# Patient Record
Sex: MALE | Race: BLACK OR AFRICAN AMERICAN | NOT HISPANIC OR LATINO | Employment: UNEMPLOYED | URBAN - METROPOLITAN AREA
[De-identification: names, ages, dates, MRNs, and addresses within clinical notes are randomized per-mention and may not be internally consistent; named-entity substitution may affect disease eponyms.]

---

## 2021-03-05 ENCOUNTER — HISTORICAL (OUTPATIENT)
Dept: ADMINISTRATIVE | Facility: HOSPITAL | Age: 21
End: 2021-03-05

## 2021-03-05 LAB
APPEARANCE, UA: CLEAR
BACTERIA #/AREA URNS AUTO: ABNORMAL /HPF
BILIRUB UR QL STRIP: NEGATIVE
COLOR UR: NORMAL
GLUCOSE (UA): NEGATIVE
HAV IGM SERPL QL IA: NONREACTIVE
HBV CORE IGM SERPL QL IA: NONREACTIVE
HBV SURFACE AG SERPL QL IA: NONREACTIVE
HCV AB SERPL QL IA: NONREACTIVE
HGB UR QL STRIP: NEGATIVE
HIV 1+2 AB+HIV1 P24 AG SERPL QL IA: NONREACTIVE
HYALINE CASTS #/AREA URNS LPF: ABNORMAL /LPF
KETONES UR QL STRIP: NEGATIVE
LEUKOCYTE ESTERASE UR QL STRIP: NEGATIVE
NITRITE UR QL STRIP: NEGATIVE
PH UR STRIP: 6.5 [PH] (ref 4.5–8)
PROT UR QL STRIP: NEGATIVE
RBC #/AREA URNS AUTO: ABNORMAL /HPF
SP GR UR STRIP: 1.02 (ref 1–1.03)
SQUAMOUS #/AREA URNS LPF: ABNORMAL /LPF
T PALLIDUM AB SER QL: NONREACTIVE
UROBILINOGEN UR STRIP-ACNC: NORMAL
WBC #/AREA URNS AUTO: ABNORMAL /HPF

## 2021-06-16 ENCOUNTER — HISTORICAL (OUTPATIENT)
Dept: ADMINISTRATIVE | Facility: HOSPITAL | Age: 21
End: 2021-06-16

## 2021-06-16 LAB
HAV IGM SERPL QL IA: NONREACTIVE
HBV CORE IGM SERPL QL IA: NONREACTIVE
HBV SURFACE AG SERPL QL IA: NONREACTIVE
HCV AB SERPL QL IA: NONREACTIVE
HIV 1+2 AB+HIV1 P24 AG SERPL QL IA: NONREACTIVE
T PALLIDUM AB SER QL: NONREACTIVE

## 2021-08-20 ENCOUNTER — HISTORICAL (OUTPATIENT)
Dept: ADMINISTRATIVE | Facility: HOSPITAL | Age: 21
End: 2021-08-20

## 2022-04-11 ENCOUNTER — HISTORICAL (OUTPATIENT)
Dept: ADMINISTRATIVE | Facility: HOSPITAL | Age: 22
End: 2022-04-11
Payer: MEDICAID

## 2022-04-25 VITALS
HEIGHT: 72 IN | WEIGHT: 184.06 LBS | OXYGEN SATURATION: 100 % | DIASTOLIC BLOOD PRESSURE: 96 MMHG | BODY MASS INDEX: 24.93 KG/M2 | SYSTOLIC BLOOD PRESSURE: 166 MMHG

## 2022-06-02 ENCOUNTER — HOSPITAL ENCOUNTER (EMERGENCY)
Facility: HOSPITAL | Age: 22
Discharge: HOME | End: 2022-06-02
Attending: FAMILY MEDICINE
Payer: MEDICAID

## 2022-06-02 VITALS
BODY MASS INDEX: 23.75 KG/M2 | DIASTOLIC BLOOD PRESSURE: 80 MMHG | TEMPERATURE: 98 F | WEIGHT: 175.38 LBS | SYSTOLIC BLOOD PRESSURE: 151 MMHG | HEART RATE: 100 BPM | HEIGHT: 72 IN | RESPIRATION RATE: 18 BRPM | OXYGEN SATURATION: 97 %

## 2022-06-02 DIAGNOSIS — L03.312 CELLULITIS OF BACK EXCEPT BUTTOCK: Primary | ICD-10-CM

## 2022-06-02 PROCEDURE — 99284 EMERGENCY DEPT VISIT MOD MDM: CPT

## 2022-06-02 RX ORDER — SULFAMETHOXAZOLE AND TRIMETHOPRIM 800; 160 MG/1; MG/1
1 TABLET ORAL 2 TIMES DAILY
Qty: 20 TABLET | Refills: 0 | Status: SHIPPED | OUTPATIENT
Start: 2022-06-02 | End: 2022-06-12

## 2022-06-02 RX ORDER — NAPROXEN 500 MG/1
500 TABLET ORAL 2 TIMES DAILY
Qty: 14 TABLET | Refills: 0 | Status: SHIPPED | OUTPATIENT
Start: 2022-06-02 | End: 2022-06-09

## 2022-06-02 NOTE — Clinical Note
"Magdaleno Hirsch (Robert) was seen and treated in our emergency department on 6/2/2022.  He may return to work on 06/04/2022.       If you have any questions or concerns, please don't hesitate to call.      Modesta Streeter RN    "

## 2022-06-03 NOTE — ED PROVIDER NOTES
Encounter Date: 6/2/2022       History     Chief Complaint   Patient presents with    Mass     Presents with mass on lower right back for the last week. Denies any injury or trauma.       21-year-old male presents with nontraumatic pain and swelling to the right flank for the past 1 week.  Patient has had a similar lesion to his right arm in the past and was treated for cellulitis successfully with antibiotics.  No other complaints.    The history is provided by the patient. No  was used.     Review of patient's allergies indicates:  No Known Allergies  History reviewed. No pertinent past medical history.  History reviewed. No pertinent surgical history.  History reviewed. No pertinent family history.  Social History     Tobacco Use    Smoking status: Never Smoker    Smokeless tobacco: Never Used   Substance Use Topics    Alcohol use: Never    Drug use: Never     Review of Systems   Constitutional: Negative.    HENT: Negative.    Eyes: Negative.    Respiratory: Negative.    Cardiovascular: Negative.    Gastrointestinal: Negative.    Endocrine: Negative.    Genitourinary: Negative.    Musculoskeletal: Negative.    Skin: Negative.    Allergic/Immunologic: Negative.    Neurological: Negative.    Hematological: Negative.    Psychiatric/Behavioral: Negative.        Physical Exam     Initial Vitals [06/02/22 2104]   BP Pulse Resp Temp SpO2   (!) 151/80 100 18 98.2 °F (36.8 °C) 97 %      MAP       --         Physical Exam    Nursing note and vitals reviewed.  Constitutional: He appears well-developed and well-nourished.   HENT:   Head: Normocephalic and atraumatic.   Eyes: Pupils are equal, round, and reactive to light.   Neck: Neck supple.   Normal range of motion.  Cardiovascular: Normal rate and regular rhythm.   Pulmonary/Chest: Breath sounds normal.   Abdominal: Abdomen is soft. Bowel sounds are normal.   Right flank -there is a 2 cm x 1 cm raised erythematous indurated area.  No fluctuance or  drainage.  Nothing to drain.   Musculoskeletal:         General: Normal range of motion.      Cervical back: Normal range of motion and neck supple.     Neurological: He is alert and oriented to person, place, and time. He has normal strength. GCS score is 15. GCS eye subscore is 4. GCS verbal subscore is 5. GCS motor subscore is 6.   Skin: Skin is warm. Capillary refill takes less than 2 seconds.   Psychiatric: He has a normal mood and affect.         ED Course   Procedures  Labs Reviewed - No data to display       Imaging Results    None          Medications - No data to display                       Clinical Impression:   Final diagnoses:  [L03.312] Cellulitis of back except buttock (Primary)          ED Disposition Condition    Discharge Stable        ED Prescriptions     Medication Sig Dispense Start Date End Date Auth. Provider    sulfamethoxazole-trimethoprim 800-160mg (BACTRIM DS) 800-160 mg Tab Take 1 tablet by mouth 2 (two) times daily. for 10 days 20 tablet 6/2/2022 6/12/2022 Oleg Arnold MD    naproxen (NAPROSYN) 500 MG tablet Take 1 tablet (500 mg total) by mouth 2 (two) times daily. for 7 days 14 tablet 6/2/2022 6/9/2022 Oleg Arnold MD        Follow-up Information     Follow up With Specialties Details Why Contact Info    Your PCP  Today             Oleg Arnold MD  06/03/22 0838

## 2022-06-03 NOTE — ED NOTES
Pt c/o of mass to right posterior lower back. Denies drainage, hurts to lay down, rates pain as 10/10. Denies fever.

## 2022-06-28 ENCOUNTER — OFFICE VISIT (OUTPATIENT)
Dept: URGENT CARE | Facility: CLINIC | Age: 22
End: 2022-06-28
Payer: MEDICAID

## 2022-06-28 VITALS
WEIGHT: 181.81 LBS | BODY MASS INDEX: 24.63 KG/M2 | RESPIRATION RATE: 18 BRPM | DIASTOLIC BLOOD PRESSURE: 99 MMHG | HEIGHT: 72 IN | TEMPERATURE: 98 F | HEART RATE: 53 BPM | OXYGEN SATURATION: 100 % | SYSTOLIC BLOOD PRESSURE: 158 MMHG

## 2022-06-28 DIAGNOSIS — Z20.2 STD EXPOSURE: Primary | ICD-10-CM

## 2022-06-28 DIAGNOSIS — I10 HYPERTENSION, UNSPECIFIED TYPE: ICD-10-CM

## 2022-06-28 LAB
C TRACH DNA SPEC QL NAA+PROBE: NOT DETECTED
HAV IGM SERPL QL IA: NONREACTIVE
HBV CORE IGM SERPL QL IA: NONREACTIVE
HBV SURFACE AG SERPL QL IA: NONREACTIVE
HCV AB SERPL QL IA: NONREACTIVE
HIV 1+2 AB+HIV1 P24 AG SERPL QL IA: NONREACTIVE
N GONORRHOEA DNA SPEC QL NAA+PROBE: NOT DETECTED
T PALLIDUM AB SER QL: NONREACTIVE

## 2022-06-28 PROCEDURE — 99213 OFFICE O/P EST LOW 20 MIN: CPT | Mod: S$PBB,,, | Performed by: FAMILY MEDICINE

## 2022-06-28 PROCEDURE — 87591 N.GONORRHOEAE DNA AMP PROB: CPT

## 2022-06-28 PROCEDURE — 87491 CHLMYD TRACH DNA AMP PROBE: CPT

## 2022-06-28 PROCEDURE — 86780 TREPONEMA PALLIDUM: CPT

## 2022-06-28 PROCEDURE — 99213 PR OFFICE/OUTPT VISIT, EST, LEVL III, 20-29 MIN: ICD-10-PCS | Mod: S$PBB,,, | Performed by: FAMILY MEDICINE

## 2022-06-28 PROCEDURE — 99214 OFFICE O/P EST MOD 30 MIN: CPT | Mod: PBBFAC | Performed by: FAMILY MEDICINE

## 2022-06-28 PROCEDURE — 87389 HIV-1 AG W/HIV-1&-2 AB AG IA: CPT

## 2022-06-28 PROCEDURE — 36415 COLL VENOUS BLD VENIPUNCTURE: CPT

## 2022-06-28 PROCEDURE — 80074 ACUTE HEPATITIS PANEL: CPT

## 2022-06-28 RX ORDER — DOXYCYCLINE HYCLATE 100 MG
100 TABLET ORAL 2 TIMES DAILY
Qty: 14 TABLET | Refills: 0 | Status: SHIPPED | OUTPATIENT
Start: 2022-06-28 | End: 2022-07-05

## 2022-06-28 NOTE — PROGRESS NOTES
"Subjective:       Patient ID: Magdaleno Hirsch is a 22 y.o. male.    Vitals:  height is 5' 11.81" (1.824 m) and weight is 82.5 kg (181 lb 12.8 oz). His oral temperature is 98.4 °F (36.9 °C). His blood pressure is 158/99 (abnormal) and his pulse is 53 (abnormal). His respiration is 18 and oxygen saturation is 100%.     Chief Complaint: Exposure to STD (Pt reports having unprotected intercourse with female partner 3 weeks ago, female partner tested positive for chlamydia, pt denies any symptoms.)    Patient states having unprotected sex with partner and partner just tested positive for chlamydia and seeking treatment. Denies any present symptoms. Also would like to be tested for all STIs.   Patient also had a high BP reading at visit. States not being treated for HTN but that he does have a family history of HTN. Denies having a PCP.    Exposure to STD  This is a new problem. The current episode started in the past 7 days. The problem has been unchanged. He is sexually active. He inconsistently uses condoms.     ROS    Objective:      Physical Exam   Constitutional: He is oriented to person, place, and time. He appears well-developed. No distress.   HENT:   Head: Normocephalic and atraumatic.   Ears:   Right Ear: External ear normal.   Left Ear: External ear normal.   Nose: Nose normal. No nasal deformity. No epistaxis.   Mouth/Throat: Oropharynx is clear and moist and mucous membranes are normal.   Eyes: Conjunctivae and lids are normal.   Neck: Trachea normal and phonation normal. Neck supple.   Cardiovascular: Normal rate, regular rhythm and normal heart sounds.   Pulmonary/Chest: Effort normal and breath sounds normal.   Abdominal: Normal appearance and bowel sounds are normal. He exhibits no distension. Soft. flat abdomen There is no abdominal tenderness.   Genitourinary:         Comments: Denies any discharge, burning with urination or penile symptoms     Musculoskeletal: Normal range of motion.         General: " Normal range of motion.   Neurological: He is alert and oriented to person, place, and time. He has normal reflexes.   Skin: Skin is warm, dry, intact and not diaphoretic.   Psychiatric: His speech is normal and behavior is normal. Judgment and thought content normal.   Nursing note and vitals reviewed.        Assessment:       1. STD exposure    2. Hypertension, unspecified type          Plan:         STD exposure  -     Chlamydia/GC, PCR  -     SYPHILIS ANTIBODY (WITH REFLEX RPR)  -     HIV 1/2 Ag/Ab (4th Gen)  -     Hepatitis Panel, Acute    Hypertension, unspecified type    Other orders  -     doxycycline (VIBRA-TABS) 100 MG tablet; Take 1 tablet (100 mg total) by mouth 2 (two) times daily. for 7 days  Dispense: 14 tablet; Refill: 0        Doxycycline prescribed for chlamydia exposure. Spoke to patient about the importance of safe sex practices and abstaining from sex until both partners have completed antibiotics. Pt verbalized understanding. STD panel pending, will treat if any other STDs positive.     Incidental finding of HTN. Pt states family history but no personal hx of HTN. Pt denies having a PCP, was given information and encouraged to obtain a PCP.    Instructed pt to return to clinic if any symptoms present.

## 2022-06-30 LAB — PATH REV: NORMAL

## 2022-07-18 VITALS
WEIGHT: 186 LBS | DIASTOLIC BLOOD PRESSURE: 98 MMHG | BODY MASS INDEX: 24.65 KG/M2 | OXYGEN SATURATION: 97 % | RESPIRATION RATE: 18 BRPM | TEMPERATURE: 99 F | SYSTOLIC BLOOD PRESSURE: 149 MMHG | HEIGHT: 73 IN | HEART RATE: 86 BPM

## 2022-07-18 PROCEDURE — 99283 EMERGENCY DEPT VISIT LOW MDM: CPT | Mod: 25

## 2022-07-19 ENCOUNTER — HOSPITAL ENCOUNTER (EMERGENCY)
Facility: HOSPITAL | Age: 22
Discharge: HOME | End: 2022-07-19
Attending: FAMILY MEDICINE
Payer: MEDICAID

## 2022-07-19 DIAGNOSIS — M25.572 ACUTE LEFT ANKLE PAIN: Primary | ICD-10-CM

## 2022-07-19 RX ORDER — MELOXICAM 15 MG/1
15 TABLET ORAL DAILY
Qty: 5 TABLET | Refills: 0 | Status: SHIPPED | OUTPATIENT
Start: 2022-07-19 | End: 2022-07-24

## 2022-07-19 NOTE — Clinical Note
"Magdaleno Garsia" Torrey was seen and treated in our emergency department on 7/18/2022.  He may return to work on 07/20/2022.       If you have any questions or concerns, please don't hesitate to call.      Oleg Arnold MD"

## 2022-07-19 NOTE — ED TRIAGE NOTES
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 Pt c/o swelling and pain to left ankle.

## 2022-07-19 NOTE — ED PROVIDER NOTES
Encounter Date: 7/18/2022       History     Chief Complaint   Patient presents with    Ankle Pain     Left ankle     22-year-old male presents with left lateral ankle pain after a customer ran a cart into his ankle while working at [employer] earlier today.  Patient reports pain worse with movement.  No other complaints.        Review of patient's allergies indicates:  No Known Allergies  History reviewed. No pertinent past medical history.  Past Surgical History:   Procedure Laterality Date    KNEE SURGERY Left      History reviewed. No pertinent family history.  Social History     Tobacco Use    Smoking status: Never Smoker    Smokeless tobacco: Never Used   Substance Use Topics    Alcohol use: Never    Drug use: Never     Review of Systems   HENT: Negative.    Eyes: Negative.    Respiratory: Negative.    Cardiovascular: Negative.    Gastrointestinal: Negative.    Endocrine: Negative.    Genitourinary: Negative.    Musculoskeletal: Positive for arthralgias.   Skin: Negative.    Allergic/Immunologic: Negative.    Neurological: Negative.    Hematological: Negative.    Psychiatric/Behavioral: Negative.        Physical Exam     Initial Vitals [07/18/22 2254]   BP Pulse Resp Temp SpO2   (!) 149/98 86 18 98.6 °F (37 °C) 97 %      MAP       --         Physical Exam    Nursing note and vitals reviewed.  Constitutional: He appears well-developed and well-nourished.   HENT:   Head: Normocephalic and atraumatic.   Eyes: EOM are normal. Pupils are equal, round, and reactive to light.   Neck: Neck supple.   Normal range of motion.  Cardiovascular: Normal rate and regular rhythm.   Pulmonary/Chest: Breath sounds normal.   Abdominal: Abdomen is soft.   Musculoskeletal:         General: Normal range of motion.      Cervical back: Normal range of motion and neck supple.      Comments: Left ankle-minimal swelling over the lateral malleolus.  Mildly tender to palpation.  No point bony tenderness.  Nontender to palpation over the  forefoot and medial malleolus.  Full range of motion, strength 5/5.  Sensation motion circulation intact throughout.     Neurological: He is oriented to person, place, and time. He has normal strength. GCS score is 15. GCS eye subscore is 4. GCS verbal subscore is 5. GCS motor subscore is 6.   Skin: Skin is warm. Capillary refill takes less than 2 seconds.   Psychiatric: He has a normal mood and affect.         ED Course   Procedures  Labs Reviewed - No data to display       Imaging Results          X-Ray Ankle Complete Left (Preliminary result)  Result time 07/19/22 01:21:28    ED Interpretation by Oleg Arnold MD (07/19/22 01:21:28, [facility] - Emergency Dept, Emergency Medicine)    No acute bony abnormality                               Medications - No data to display                       Clinical Impression:   Final diagnoses:  [M25.572] Acute left ankle pain (Primary)          ED Disposition Condition    Discharge Stable        ED Prescriptions     Medication Sig Dispense Start Date End Date Auth. Provider    meloxicam (MOBIC) 15 MG tablet Take 1 tablet (15 mg total) by mouth once daily. for 5 days 5 tablet 7/19/2022 7/24/2022 Oleg Arnold MD        Follow-up Information     Follow up With Specialties Details Why Contact Info    Your PCP  Today             Oleg Arnold MD  07/19/22 9238

## 2022-08-18 ENCOUNTER — HOSPITAL ENCOUNTER (INPATIENT)
Facility: HOSPITAL | Age: 22
LOS: 4 days | Discharge: HOME | DRG: 840 | End: 2022-08-22
Attending: EMERGENCY MEDICINE | Admitting: STUDENT IN AN ORGANIZED HEALTH CARE EDUCATION/TRAINING PROGRAM
Payer: MEDICAID

## 2022-08-18 ENCOUNTER — TELEPHONE (OUTPATIENT)
Dept: HEMATOLOGY/ONCOLOGY | Facility: CLINIC | Age: 22
End: 2022-08-18
Payer: MEDICAID

## 2022-08-18 DIAGNOSIS — D72.9 NEUTROPHILIA: Primary | ICD-10-CM

## 2022-08-18 DIAGNOSIS — H35.63 RETINAL HEMORRHAGE, BILATERAL: ICD-10-CM

## 2022-08-18 DIAGNOSIS — D72.829 LEUKOCYTOSIS, UNSPECIFIED TYPE: ICD-10-CM

## 2022-08-18 DIAGNOSIS — R03.0 ELEVATED BLOOD PRESSURE READING: ICD-10-CM

## 2022-08-18 DIAGNOSIS — U07.1 COVID-19: ICD-10-CM

## 2022-08-18 LAB
ABS NEUT CALC (OHS): 358.35 X10(3)/MCL (ref 2.1–9.2)
ALBUMIN SERPL-MCNC: 4 GM/DL (ref 3.5–5)
ALBUMIN/GLOB SERPL: 1.3 RATIO (ref 1.1–2)
ALP SERPL-CCNC: 81 UNIT/L (ref 40–150)
ALT SERPL-CCNC: 18 UNIT/L (ref 0–55)
ANISOCYTOSIS BLD QL SMEAR: ABNORMAL
APPEARANCE UR: CLEAR
APTT PPP: 36.4 SECONDS (ref 23.4–33.9)
AST SERPL-CCNC: 36 UNIT/L (ref 5–34)
BACTERIA #/AREA URNS AUTO: ABNORMAL /HPF
BILIRUB UR QL STRIP.AUTO: NEGATIVE MG/DL
BILIRUBIN DIRECT+TOT PNL SERPL-MCNC: 1.6 MG/DL
BUN SERPL-MCNC: 12.2 MG/DL (ref 8.9–20.6)
CALCIUM SERPL-MCNC: 9.4 MG/DL (ref 8.4–10.2)
CHLORIDE SERPL-SCNC: 108 MMOL/L (ref 98–107)
CO2 SERPL-SCNC: 28 MMOL/L (ref 22–29)
COLOR UR AUTO: YELLOW
CREAT SERPL-MCNC: 1.06 MG/DL (ref 0.73–1.18)
EOSINOPHIL NFR BLD MANUAL: 12.36 X10(3)/MCL (ref 0–0.9)
EOSINOPHIL NFR BLD MANUAL: 3 % (ref 0–8)
ERYTHROCYTE [DISTWIDTH] IN BLOOD BY AUTOMATED COUNT: 21.3 % (ref 11.5–17)
FIBRINOGEN PPP-MCNC: 292 MG/DL (ref 210–463)
FLUAV AG UPPER RESP QL IA.RAPID: NOT DETECTED
FLUBV AG UPPER RESP QL IA.RAPID: NOT DETECTED
GFR SERPLBLD CREATININE-BSD FMLA CKD-EPI: >60 MLS/MIN/1.73/M2
GLOBULIN SER-MCNC: 3.2 GM/DL (ref 2.4–3.5)
GLUCOSE SERPL-MCNC: 83 MG/DL (ref 74–100)
GLUCOSE UR QL STRIP.AUTO: NEGATIVE MG/DL
HCT VFR BLD AUTO: 23.8 % (ref 42–52)
HGB BLD-MCNC: 8.3 GM/DL (ref 14–18)
INR BLD: 1.38 (ref 2–3)
KETONES UR QL STRIP.AUTO: NEGATIVE MG/DL
LDH SERPL-CCNC: 1329 U/L (ref 125–220)
LEUKOCYTE ESTERASE UR QL STRIP.AUTO: NEGATIVE UNIT/L
LYMPHOCYTES NFR BLD MANUAL: 16.48 X10(3)/MCL
LYMPHOCYTES NFR BLD MANUAL: 4 % (ref 13–40)
MAGNESIUM SERPL-MCNC: 1.9 MG/DL (ref 1.6–2.6)
MCH RBC QN AUTO: 26.3 PG (ref 27–31)
MCHC RBC AUTO-ENTMCNC: 34.9 MG/DL (ref 33–36)
MCV RBC AUTO: 75.3 FL (ref 80–94)
METAMYELOCYTES NFR BLD MANUAL: 16 %
MICROCYTES BLD QL SMEAR: ABNORMAL
MUCOUS THREADS URNS QL MICRO: ABNORMAL /LPF
MYELOCYTES NFR BLD MANUAL: 9 %
NEUTROPHILS NFR BLD MANUAL: 42 % (ref 47–80)
NEUTS BAND NFR BLD MANUAL: 20 % (ref 0–11)
NITRITE UR QL STRIP.AUTO: NEGATIVE
NRBC BLD MANUAL-RTO: 1 %
OTHER CELLS MANUAL: 6 %
PH UR STRIP.AUTO: 6 [PH]
PLATELET # BLD AUTO: 373 X10(3)/MCL (ref 130–400)
PLATELET # BLD EST: ADEQUATE 10*3/UL
PMV BLD AUTO: 10.7 FL (ref 7.4–10.4)
POTASSIUM SERPL-SCNC: 3.6 MMOL/L (ref 3.5–5.1)
PROT SERPL-MCNC: 7.2 GM/DL (ref 6.4–8.3)
PROT UR QL STRIP.AUTO: ABNORMAL MG/DL
PROTHROMBIN TIME: 16.7 SECONDS (ref 11.7–14.5)
RBC # BLD AUTO: 3.16 X10(6)/MCL (ref 4.7–6.1)
RBC #/AREA URNS AUTO: ABNORMAL /HPF
RBC UR QL AUTO: NEGATIVE UNIT/L
SARS-COV-2 RNA RESP QL NAA+PROBE: DETECTED
SODIUM SERPL-SCNC: 145 MMOL/L (ref 136–145)
SP GR UR STRIP.AUTO: 1.02
SQUAMOUS #/AREA URNS AUTO: ABNORMAL /HPF
URATE SERPL-MCNC: 9.2 MG/DL (ref 3.5–7.2)
UROBILINOGEN UR STRIP-ACNC: 1 MG/DL
WBC # SPEC AUTO: 411.9 X10(3)/MCL (ref 4.5–11.5)
WBC #/AREA URNS AUTO: ABNORMAL /HPF

## 2022-08-18 PROCEDURE — 85730 THROMBOPLASTIN TIME PARTIAL: CPT | Performed by: EMERGENCY MEDICINE

## 2022-08-18 PROCEDURE — 87636 SARSCOV2 & INF A&B AMP PRB: CPT | Performed by: EMERGENCY MEDICINE

## 2022-08-18 PROCEDURE — 85384 FIBRINOGEN ACTIVITY: CPT | Performed by: EMERGENCY MEDICINE

## 2022-08-18 PROCEDURE — 84550 ASSAY OF BLOOD/URIC ACID: CPT | Performed by: EMERGENCY MEDICINE

## 2022-08-18 PROCEDURE — 99285 EMERGENCY DEPT VISIT HI MDM: CPT | Mod: 25

## 2022-08-18 PROCEDURE — 11000001 HC ACUTE MED/SURG PRIVATE ROOM

## 2022-08-18 PROCEDURE — 81001 URINALYSIS AUTO W/SCOPE: CPT | Performed by: EMERGENCY MEDICINE

## 2022-08-18 PROCEDURE — 36415 COLL VENOUS BLD VENIPUNCTURE: CPT | Performed by: EMERGENCY MEDICINE

## 2022-08-18 PROCEDURE — 25000003 PHARM REV CODE 250: Performed by: EMERGENCY MEDICINE

## 2022-08-18 PROCEDURE — 83615 LACTATE (LD) (LDH) ENZYME: CPT | Performed by: EMERGENCY MEDICINE

## 2022-08-18 PROCEDURE — 85610 PROTHROMBIN TIME: CPT | Performed by: EMERGENCY MEDICINE

## 2022-08-18 PROCEDURE — 83735 ASSAY OF MAGNESIUM: CPT | Performed by: EMERGENCY MEDICINE

## 2022-08-18 PROCEDURE — 85025 COMPLETE CBC W/AUTO DIFF WBC: CPT | Performed by: EMERGENCY MEDICINE

## 2022-08-18 PROCEDURE — 80053 COMPREHEN METABOLIC PANEL: CPT | Performed by: EMERGENCY MEDICINE

## 2022-08-18 RX ORDER — HYDROXYUREA 500 MG/1
4000 CAPSULE ORAL ONCE
Status: COMPLETED | OUTPATIENT
Start: 2022-08-18 | End: 2022-08-18

## 2022-08-18 RX ORDER — CLONIDINE HYDROCHLORIDE 0.1 MG/1
0.1 TABLET ORAL
Status: COMPLETED | OUTPATIENT
Start: 2022-08-18 | End: 2022-08-18

## 2022-08-18 RX ORDER — ALLOPURINOL 100 MG/1
300 TABLET ORAL ONCE
Status: COMPLETED | OUTPATIENT
Start: 2022-08-18 | End: 2022-08-18

## 2022-08-18 RX ORDER — SODIUM CHLORIDE 9 MG/ML
1000 INJECTION, SOLUTION INTRAVENOUS
Status: COMPLETED | OUTPATIENT
Start: 2022-08-18 | End: 2022-08-18

## 2022-08-18 RX ADMIN — CLONIDINE HYDROCHLORIDE 0.1 MG: 0.1 TABLET ORAL at 04:08

## 2022-08-18 RX ADMIN — CLONIDINE HYDROCHLORIDE 0.1 MG: 0.1 TABLET ORAL at 07:08

## 2022-08-18 RX ADMIN — ALLOPURINOL 300 MG: 100 TABLET ORAL at 06:08

## 2022-08-18 RX ADMIN — SODIUM CHLORIDE 1000 ML: 9 INJECTION, SOLUTION INTRAVENOUS at 04:08

## 2022-08-18 RX ADMIN — HYDROXYUREA 4000 MG: 500 CAPSULE ORAL at 06:08

## 2022-08-18 NOTE — TELEPHONE ENCOUNTER
Spoke with Dr Jes Archer, [facility], that kindly called us to discuss case. Patient was referred to the ER by his eye doctor and further w/u showed WBC 411k and anemia.   I worried that patient may loose vision as he has hemorrhagic spots already and his WBC and extremely high. I recommend to refer to transfer to a facility that performed Leukopheresis as we can not do it here.    Christal Gallardo MD

## 2022-08-18 NOTE — ED PROVIDER NOTES
"Encounter Date: 8/18/2022       History     Chief Complaint   Patient presents with    Eye Problem     Pt to er from his eye doctor for having white spots in eye. Pt also c/o having a knot to chest area. Denies visual problems.     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  He has bilateral Valdez spots with vessel tortuosity."  The ophthalmologist recommended that the patient have blood work today including testing for sickle cell disease.  He also complains of a subcu nodule noted on his chest a few days ago.  No trauma.  Not painful. No fever, weight loss, sweats, appetite change or other complaint.        Review of patient's allergies indicates:  No Known Allergies  No past medical history on file.  Past Surgical History:   Procedure Laterality Date    KNEE SURGERY Left      No family history on file.  Social History     Tobacco Use    Smoking status: Never Smoker    Smokeless tobacco: Never Used   Substance Use Topics    Alcohol use: Never    Drug use: Never     Review of Systems   Constitutional: Negative for fever.   HENT: Negative for sore throat.    Respiratory: Negative for shortness of breath.    Cardiovascular: Negative for chest pain.   Gastrointestinal: Negative for nausea.   Genitourinary: Negative for dysuria.   Musculoskeletal: Negative for back pain.   Skin: Negative for rash.        subcu nodule on chest   Neurological: Negative for weakness.   Hematological: Does not bruise/bleed easily.   All other systems reviewed and are negative.      Physical Exam     Initial Vitals [08/18/22 1354]   BP Pulse Resp Temp SpO2   (!) 154/98 68 18 98.2 °F (36.8 °C) 99 %      MAP       --         Physical Exam    Nursing note and vitals reviewed.  Constitutional: Vital signs are normal. He appears " well-developed and well-nourished.   HENT:   Head: Normocephalic and atraumatic.   Mouth/Throat: Oropharynx is clear and moist.   Eyes: EOM are normal. Pupils are equal, round, and reactive to light.   Neck: Neck supple.   Normal range of motion.  Cardiovascular: Normal rate, regular rhythm and normal heart sounds.   Pulmonary/Chest: Breath sounds normal. No respiratory distress.   Abdominal: Abdomen is soft. He exhibits no distension and no mass. There is no abdominal tenderness. There is no rebound and no guarding.   Musculoskeletal:         General: No tenderness or edema.      Cervical back: Normal range of motion and neck supple. No edema or erythema.      Comments: Ambulatory without assistance, pea sized non-tender inguinal lymph nodes     Lymphadenopathy:     He has no cervical adenopathy.   Neurological: He is alert and oriented to person, place, and time.   Skin: Skin is warm and dry. Capillary refill takes less than 2 seconds.   2cm hard nodule to mid chest over left sternal edge not tender or fluctuant, maybe mobile?    Psychiatric: He has a normal mood and affect.         ED Course   Procedures  Labs Reviewed   CBC WITH DIFFERENTIAL - Abnormal; Notable for the following components:       Result Value    .9 (*)     RBC 3.16 (*)     Hgb 8.3 (*)     Hct 23.8 (*)     MCV 75.3 (*)     MCH 26.3 (*)     RDW 21.3 (*)     MPV 10.7 (*)     All other components within normal limits   COMPREHENSIVE METABOLIC PANEL - Abnormal; Notable for the following components:    Chloride 108 (*)     Bilirubin Total 1.6 (*)     Aspartate Aminotransferase 36 (*)     All other components within normal limits   COVID/FLU A&B PCR - Abnormal; Notable for the following components:    SARS-CoV-2 PCR Detected (*)     All other components within normal limits   MANUAL DIFFERENTIAL - Abnormal; Notable for the following components:    Neut Man 42 (*)     Band Neutrophil Man 20 (*)     Lymph Man 4 (*)     Abs Neut calc 358.353 (*)      Abs Lymp 16.476 (*)     Abs Eos  12.357 (*)     Anisocyte 2+ (*)     Microcyte 1+ (*)     All other components within normal limits   APTT - Abnormal; Notable for the following components:    PTT 36.4 (*)     All other components within normal limits   PROTIME-INR - Abnormal; Notable for the following components:    PT 16.7 (*)     INR 1.38 (*)     All other components within normal limits   URINALYSIS, REFLEX TO URINE CULTURE - Abnormal; Notable for the following components:    Protein, UA Trace (*)     All other components within normal limits   URIC ACID - Abnormal; Notable for the following components:    Uric Acid 9.2 (*)     All other components within normal limits   LACTATE DEHYDROGENASE - Abnormal; Notable for the following components:    Lactate Dehydrogenase 1,329 (*)     All other components within normal limits   URINALYSIS, MICROSCOPIC - Abnormal; Notable for the following components:    Mucous, UA Small (*)     All other components within normal limits   MAGNESIUM - Normal   FIBRINOGEN - Normal   HEMOGLOBIN ELECTROPHORESIS EVALUATION, BLOOD   PATHOLOGIST INTERPRETATION   CBC W/ AUTO DIFFERENTIAL    Narrative:     The following orders were created for panel order CBC Auto Differential.  Procedure                               Abnormality         Status                     ---------                               -----------         ------                     CBC with Differential[[id number]]                                                         Please view results for these tests on the individual orders.   COMPREHENSIVE METABOLIC PANEL   URIC ACID   LACTATE DEHYDROGENASE   PHOSPHORUS   CBC WITH DIFFERENTIAL   BCR/ABL, P210, BONE MARROW   BCR/ABL1, QUALITATIVE DIAGNOSTIC ASSAY          Imaging Results          X-Ray Chest PA And Lateral (Final result)  Result time 08/18/22 14:24:38    Final result by Jerry Holloway MD (08/18/22 14:24:38)                 Impression:      No acute cardiopulmonary  abnormality.      Electronically signed by: Jerry Holloway  Date:    08/18/2022  Time:    14:24             Narrative:    EXAMINATION:  XR CHEST PA AND LATERAL    CLINICAL HISTORY:  nodule on chest;    COMPARISON:  No priors    FINDINGS:  PA and lateral views of the chest were obtained. Heart and mediastinum within normal limits. The lungs are clear. No pneumothorax or significant effusion.                                 Medications   hydroxyurea capsule 4,000 mg (4,000 mg Oral Given 8/19/22 0901)   0.9%  NaCl infusion (1,000 mLs Intravenous New Bag 8/18/22 1626)   cloNIDine tablet 0.1 mg (0.1 mg Oral Given 8/18/22 1626)   allopurinoL tablet 300 mg (300 mg Oral Given 8/18/22 1835)   hydroxyurea capsule 4,000 mg (4,000 mg Oral Given 8/18/22 1835)   cloNIDine tablet 0.1 mg (0.1 mg Oral Given 8/18/22 1915)     Medical Decision Making:   Initial Assessment:   22-year-old male referred to the emergency room due to bilateral retinal hemorrhages, retinal tortuosity, Valdez spots noted on routine eye exam today .  He states he feels well other than having a small nodule into the skin anterior chest which is not painful.  Clinical Tests:   Lab Tests: Reviewed       <> Summary of Lab: White blood cell count 411,000, pathologist is reviewing the slides and blood has been sent for flow cytometry.  Radiological Study: Reviewed  ED Management:  Patient is being given IV fluids at 200 mL/hour and has been given a single dose of clonidine 0.1 mg orally.  Case was discussed with Oncology and we will be transferring this patient for oncology services for leukapheresis.  Other:   I have discussed this case with another health care provider.       <> Summary of the Discussion: Case discussed with Dr. Gallardo, Oncologist.  Dr. Gallardo is not on-call, but graciously answered my page.  We do not have Oncology to see the patient at this campus.  Patient has bilateral retinal hemorrhages, Valdez spots, retinal tortuosity of the blood vessels as  seen by his eye doctor (pt has the note).  Due to the severely elevated white blood cell count at 411K, Dr. Gallardo is recommending that we transfer this patient to a facility that can do urgent leukapheresis to reduce the risk of blindness.  She recommends that we consider transferring to [facility] in [place] or [place].             ED Course as of 08/19/22 1302   Thu Aug 18, 2022   1604 The pathologist called and states he will send the blood sample for flow cytometry and what he is seeing does not look like an acute leukemia. [SH]   1657 Blood pressure 160/110 with no history of HTN.  Given Clonidine 0.1mg PO.  Covid test is positive but he denies fever, cough, SOB, body aches etc. [SH]   1730 Accepted to [facility] in [place] to Dr Tani Driver.  He recommends we give allopurinol 300mg po, Hydroxyurea 4g po BID and NS at 100ml per hour while awaiting bed assignment.  The Transfer center will notify us when the bed is available. [SH]   Fri Aug 19, 2022   0749 This is Dr Jes Archer.  When I left last night, pt had been accepted for transfer to [facility] in [place] and the transfer center said they had a bed.  However, this morning, he still has not been assigned a bed so I will page [facility] this AM for guidance. [SH]   0777 Case discussed with Dr Thomas Hogan, Oncology on call for [facility] in [place].  He will call Dr Driver regarding whether or not pt still needs to be transferred and call me back. [SH]   0913 Still no room assignment in [place].  Discussed with Dr Hogan Oncolocist on call today.  Admitting to [facility]. [SH]   0923 Dr Khan, Hospitalist at [facility] will admit the pt to [facility] considering no bed is available in [place] and Dr Hogan, Oncologist, is agreeable to hospital admit.  Will cancel request to [place] [SH]      ED Course User Index  [SH] Jes Archer MD             Clinical Impression:   Final  diagnoses:  [D72.9] Neutrophilia (Primary)  [U07.1] COVID-19  [H35.63] Retinal hemorrhage, bilateral  [R03.0] Elevated blood pressure reading          ED Disposition Condition    Admit               Jes Archer MD  08/18/22 1701       Jes Acrher MD  08/18/22 1737       Jes Archer MD  08/18/22 1738       Jes Archer MD  08/19/22 0914       Jes Archer MD  08/19/22 1302

## 2022-08-18 NOTE — ED TRIAGE NOTES
Pt to er from his eye doctor for having white spots in eye. Pt also c/o having a knot to chest area. Denies visual problems.

## 2022-08-19 LAB
ABS NEUT (OLG): 270.34 X10(3)/MCL (ref 2.1–9.2)
ABS NEUT (OLG): 287.16 X10(3)/MCL (ref 2.1–9.2)
ALBUMIN SERPL-MCNC: 3.6 GM/DL (ref 3.5–5)
ALBUMIN SERPL-MCNC: 3.9 GM/DL (ref 3.5–5)
ALBUMIN/GLOB SERPL: 1.1 RATIO (ref 1.1–2)
ALBUMIN/GLOB SERPL: 1.3 RATIO (ref 1.1–2)
ALP SERPL-CCNC: 81 UNIT/L (ref 40–150)
ALP SERPL-CCNC: 82 UNIT/L (ref 40–150)
ALT SERPL-CCNC: 16 UNIT/L (ref 0–55)
ALT SERPL-CCNC: 17 UNIT/L (ref 0–55)
ANISOCYTOSIS BLD QL SMEAR: ABNORMAL
AST SERPL-CCNC: 42 UNIT/L (ref 5–34)
AST SERPL-CCNC: 45 UNIT/L (ref 5–34)
B-HCG SERPL QL: 3 %
BASOPHILS NFR BLD MANUAL: 3 %
BASOPHILS NFR BLD MANUAL: 31.52 X10(3)/MCL (ref 0–0.2)
BASOPHILS NFR BLD MANUAL: 8.63 X10(3)/MCL (ref 0–0.2)
BASOPHILS NFR BLD MANUAL: 9 %
BILIRUBIN DIRECT+TOT PNL SERPL-MCNC: 1.4 MG/DL
BILIRUBIN DIRECT+TOT PNL SERPL-MCNC: 1.6 MG/DL
BUN SERPL-MCNC: 12.6 MG/DL (ref 8.9–20.6)
BUN SERPL-MCNC: 13 MG/DL (ref 8.9–20.6)
CALCIUM SERPL-MCNC: 8.8 MG/DL (ref 8.4–10.2)
CALCIUM SERPL-MCNC: 9.3 MG/DL (ref 8.4–10.2)
CHLORIDE SERPL-SCNC: 106 MMOL/L (ref 98–107)
CHLORIDE SERPL-SCNC: 108 MMOL/L (ref 98–107)
CO2 SERPL-SCNC: 22 MMOL/L (ref 22–29)
CO2 SERPL-SCNC: 26 MMOL/L (ref 22–29)
CREAT SERPL-MCNC: 0.84 MG/DL (ref 0.73–1.18)
CREAT SERPL-MCNC: 0.91 MG/DL (ref 0.73–1.18)
EOSINOPHIL NFR BLD MANUAL: 2 %
EOSINOPHIL NFR BLD MANUAL: 3 %
EOSINOPHIL NFR BLD MANUAL: 7 X10(3)/MCL (ref 0–0.9)
EOSINOPHIL NFR BLD MANUAL: 8.63 X10(3)/MCL (ref 0–0.9)
ERYTHROCYTE [DISTWIDTH] IN BLOOD BY AUTOMATED COUNT: 21.7 % (ref 11.5–17)
ERYTHROCYTE [DISTWIDTH] IN BLOOD BY AUTOMATED COUNT: 22 % (ref 11.5–17)
GFR SERPLBLD CREATININE-BSD FMLA CKD-EPI: >60 MLS/MIN/1.73/M2
GFR SERPLBLD CREATININE-BSD FMLA CKD-EPI: >60 MLS/MIN/1.73/M2
GLOBULIN SER-MCNC: 2.9 GM/DL (ref 2.4–3.5)
GLOBULIN SER-MCNC: 3.2 GM/DL (ref 2.4–3.5)
GLUCOSE SERPL-MCNC: 63 MG/DL (ref 74–100)
GLUCOSE SERPL-MCNC: 85 MG/DL (ref 74–100)
HCT VFR BLD AUTO: 22.3 % (ref 42–52)
HCT VFR BLD AUTO: 26.6 % (ref 42–52)
HGB BLD-MCNC: 7.5 GM/DL (ref 14–18)
HGB BLD-MCNC: 8.9 GM/DL (ref 14–18)
IMM GRANULOCYTES # BLD AUTO: 114.16 X10(3)/MCL (ref 0–0.04)
IMM GRANULOCYTES # BLD AUTO: 86.59 X10(3)/MCL (ref 0–0.04)
IMM GRANULOCYTES NFR BLD AUTO: 30.1 %
IMM GRANULOCYTES NFR BLD AUTO: 32.6 %
INSTRUMENT WBC (OLG): 287.6 X10(3)/MCL
INSTRUMENT WBC (OLG): 350.2 X10(3)/MCL
LDH SERPL-CCNC: 1822 U/L (ref 125–220)
LDH SERPL-CCNC: 1986 U/L (ref 125–220)
LYMPHOCYTES NFR BLD MANUAL: 24.51 X10(3)/MCL
LYMPHOCYTES NFR BLD MANUAL: 7 %
MCH RBC QN AUTO: 25.5 PG (ref 27–31)
MCH RBC QN AUTO: 26.6 PG (ref 27–31)
MCHC RBC AUTO-ENTMCNC: 33.5 MG/DL (ref 33–36)
MCHC RBC AUTO-ENTMCNC: 33.6 MG/DL (ref 33–36)
MCV RBC AUTO: 75.9 FL (ref 80–94)
MCV RBC AUTO: 79.4 FL (ref 80–94)
METAMYELOCYTES NFR BLD MANUAL: 12 %
METAMYELOCYTES NFR BLD MANUAL: 17 %
MICROCYTES BLD QL SMEAR: ABNORMAL
MYELOCYTES NFR BLD MANUAL: 14 %
MYELOCYTES NFR BLD MANUAL: 14 %
NEUTROPHILS NFR BLD MANUAL: 52 %
NEUTROPHILS NFR BLD MANUAL: 53 %
NRBC BLD AUTO-RTO: 0.4 %
NRBC BLD AUTO-RTO: 0.5 %
NRBC BLD MANUAL-RTO: 1 %
PHOSPHATE SERPL-MCNC: 4.7 MG/DL (ref 2.3–4.7)
PHOSPHATE SERPL-MCNC: 4.7 MG/DL (ref 2.3–4.7)
PLATELET # BLD AUTO: 370 X10(3)/MCL (ref 130–400)
PLATELET # BLD AUTO: 447 X10(3)/MCL (ref 130–400)
PLATELET # BLD EST: ABNORMAL 10*3/UL
PLATELET # BLD EST: NORMAL 10*3/UL
PMV BLD AUTO: 10.9 FL (ref 7.4–10.4)
PMV BLD AUTO: 11.3 FL (ref 7.4–10.4)
POIKILOCYTOSIS BLD QL SMEAR: ABNORMAL
POTASSIUM SERPL-SCNC: 3.3 MMOL/L (ref 3.5–5.1)
POTASSIUM SERPL-SCNC: 4.6 MMOL/L (ref 3.5–5.1)
PROMYELOCYTES # BLD MANUAL: 3 %
PROMYELOCYTES # BLD MANUAL: 8 %
PROT SERPL-MCNC: 6.8 GM/DL (ref 6.4–8.3)
PROT SERPL-MCNC: 6.8 GM/DL (ref 6.4–8.3)
RBC # BLD AUTO: 2.94 X10(6)/MCL (ref 4.7–6.1)
RBC # BLD AUTO: 3.35 X10(6)/MCL (ref 4.7–6.1)
RBC MORPH BLD: NORMAL
SCHISTOCYTE (OLG): ABNORMAL
SODIUM SERPL-SCNC: 140 MMOL/L (ref 136–145)
SODIUM SERPL-SCNC: 141 MMOL/L (ref 136–145)
TEAR DROP CELL (OLG): ABNORMAL
URATE SERPL-MCNC: 8.4 MG/DL (ref 3.5–7.2)
URATE SERPL-MCNC: 8.6 MG/DL (ref 3.5–7.2)
WBC # SPEC AUTO: 287.6 X10(3)/MCL (ref 4.5–11.5)
WBC # SPEC AUTO: 350.2 X10(3)/MCL (ref 4.5–11.5)

## 2022-08-19 PROCEDURE — 85025 COMPLETE CBC W/AUTO DIFF WBC: CPT | Performed by: STUDENT IN AN ORGANIZED HEALTH CARE EDUCATION/TRAINING PROGRAM

## 2022-08-19 PROCEDURE — 85007 BL SMEAR W/DIFF WBC COUNT: CPT | Performed by: INTERNAL MEDICINE

## 2022-08-19 PROCEDURE — 83615 LACTATE (LD) (LDH) ENZYME: CPT | Performed by: STUDENT IN AN ORGANIZED HEALTH CARE EDUCATION/TRAINING PROGRAM

## 2022-08-19 PROCEDURE — 84100 ASSAY OF PHOSPHORUS: CPT | Performed by: INTERNAL MEDICINE

## 2022-08-19 PROCEDURE — 85060 BLOOD SMEAR INTERPRETATION: CPT | Performed by: STUDENT IN AN ORGANIZED HEALTH CARE EDUCATION/TRAINING PROGRAM

## 2022-08-19 PROCEDURE — 84550 ASSAY OF BLOOD/URIC ACID: CPT | Performed by: INTERNAL MEDICINE

## 2022-08-19 PROCEDURE — 83615 LACTATE (LD) (LDH) ENZYME: CPT | Performed by: INTERNAL MEDICINE

## 2022-08-19 PROCEDURE — 80053 COMPREHEN METABOLIC PANEL: CPT | Performed by: INTERNAL MEDICINE

## 2022-08-19 PROCEDURE — 11000001 HC ACUTE MED/SURG PRIVATE ROOM

## 2022-08-19 PROCEDURE — 85027 COMPLETE CBC AUTOMATED: CPT | Performed by: INTERNAL MEDICINE

## 2022-08-19 PROCEDURE — 84550 ASSAY OF BLOOD/URIC ACID: CPT | Performed by: STUDENT IN AN ORGANIZED HEALTH CARE EDUCATION/TRAINING PROGRAM

## 2022-08-19 PROCEDURE — 99223 1ST HOSP IP/OBS HIGH 75: CPT | Mod: ,,, | Performed by: INTERNAL MEDICINE

## 2022-08-19 PROCEDURE — 25000003 PHARM REV CODE 250: Performed by: EMERGENCY MEDICINE

## 2022-08-19 PROCEDURE — 99223 PR INITIAL HOSPITAL CARE,LEVL III: ICD-10-PCS | Mod: ,,, | Performed by: INTERNAL MEDICINE

## 2022-08-19 PROCEDURE — 36415 COLL VENOUS BLD VENIPUNCTURE: CPT | Performed by: INTERNAL MEDICINE

## 2022-08-19 PROCEDURE — 27000207 HC ISOLATION

## 2022-08-19 PROCEDURE — 80053 COMPREHEN METABOLIC PANEL: CPT | Performed by: STUDENT IN AN ORGANIZED HEALTH CARE EDUCATION/TRAINING PROGRAM

## 2022-08-19 PROCEDURE — 84100 ASSAY OF PHOSPHORUS: CPT | Performed by: STUDENT IN AN ORGANIZED HEALTH CARE EDUCATION/TRAINING PROGRAM

## 2022-08-19 PROCEDURE — 36415 COLL VENOUS BLD VENIPUNCTURE: CPT | Performed by: STUDENT IN AN ORGANIZED HEALTH CARE EDUCATION/TRAINING PROGRAM

## 2022-08-19 RX ORDER — ACETAMINOPHEN 325 MG/1
650 TABLET ORAL EVERY 4 HOURS PRN
Status: DISCONTINUED | OUTPATIENT
Start: 2022-08-19 | End: 2022-08-22 | Stop reason: HOSPADM

## 2022-08-19 RX ORDER — ACETAMINOPHEN 325 MG/1
650 TABLET ORAL EVERY 8 HOURS PRN
Status: DISCONTINUED | OUTPATIENT
Start: 2022-08-19 | End: 2022-08-22 | Stop reason: HOSPADM

## 2022-08-19 RX ORDER — GLUCAGON 1 MG
1 KIT INJECTION
Status: DISCONTINUED | OUTPATIENT
Start: 2022-08-19 | End: 2022-08-22 | Stop reason: HOSPADM

## 2022-08-19 RX ORDER — HYDROXYUREA 500 MG/1
4000 CAPSULE ORAL 2 TIMES DAILY
Status: DISCONTINUED | OUTPATIENT
Start: 2022-08-19 | End: 2022-08-22 | Stop reason: HOSPADM

## 2022-08-19 RX ORDER — SODIUM CHLORIDE 0.9 % (FLUSH) 0.9 %
10 SYRINGE (ML) INJECTION
Status: CANCELLED | OUTPATIENT
Start: 2022-08-19

## 2022-08-19 RX ORDER — TALC
6 POWDER (GRAM) TOPICAL NIGHTLY PRN
Status: CANCELLED | OUTPATIENT
Start: 2022-08-19

## 2022-08-19 RX ORDER — SODIUM CHLORIDE 0.9 % (FLUSH) 0.9 %
10 SYRINGE (ML) INJECTION EVERY 12 HOURS PRN
Status: DISCONTINUED | OUTPATIENT
Start: 2022-08-19 | End: 2022-08-22 | Stop reason: HOSPADM

## 2022-08-19 RX ORDER — ONDANSETRON 2 MG/ML
4 INJECTION INTRAMUSCULAR; INTRAVENOUS EVERY 4 HOURS PRN
Status: DISCONTINUED | OUTPATIENT
Start: 2022-08-19 | End: 2022-08-22 | Stop reason: HOSPADM

## 2022-08-19 RX ORDER — IBUPROFEN 200 MG
24 TABLET ORAL
Status: DISCONTINUED | OUTPATIENT
Start: 2022-08-19 | End: 2022-08-22 | Stop reason: HOSPADM

## 2022-08-19 RX ORDER — IBUPROFEN 200 MG
16 TABLET ORAL
Status: DISCONTINUED | OUTPATIENT
Start: 2022-08-19 | End: 2022-08-22 | Stop reason: HOSPADM

## 2022-08-19 RX ORDER — HYDROXYUREA 500 MG/1
4000 CAPSULE ORAL 2 TIMES DAILY
Status: DISCONTINUED | OUTPATIENT
Start: 2022-08-19 | End: 2022-08-19

## 2022-08-19 RX ADMIN — HYDROXYUREA 4000 MG: 500 CAPSULE ORAL at 09:08

## 2022-08-19 NOTE — CONSULTS
Subjective:       Patient ID: Magdaleno Hirsch is a 22 y.o. male.    Chief Complaint: Eye Problem (Pt to er from his eye doctor for having white spots in eye. Pt also c/o having a knot to chest area. Denies visual problems.)      Diagnosis:  1. 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  3. Retinal hemorrhage secondary to 1.    Current Treatment:   1. Hydroxyurea 4 g every 12 hours    Treatment History:  N/A    HPI  22-year-old male with no real medical history who went in for a routine eye exam on 08/18/2022 to update his eye glasses prescription.  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  He had bilateral Valdez spots with vessel tortuosity.  The optometrist recommended that the patient have blood work including testing for sickle cell disease.  The patient presented to the emergency department.  CBC in the emergency department revealed a white blood cell count of 411,900. Hemoglobin was 8.3, platelets were normal at 375,000 hundred and seventy five thousand.  Differential was suggestive of CML.  Coagulation studies revealed an INR of 1.38, PTT of 36.4 and a fibrinogen of 292.  Patient was going to present to [place], however they were on diversion.  He was transferred from [facility] to [facility].  Hematology consulted.    Interval History:   Initial consult note  No past medical history on file.   Past Surgical History:   Procedure Laterality Date    KNEE SURGERY Left      Social History     Socioeconomic History    Marital status: Single   Tobacco Use    Smoking status: Never Smoker    Smokeless tobacco: Never Used   Substance and Sexual Activity    Alcohol use: Never    Drug use: Never      No family history on file.   Review of patient's allergies indicates:  No Known Allergies   Review of Systems   Constitutional: Negative for chills, diaphoresis, fatigue, fever and unexpected weight  change.   HENT: Negative for nasal congestion, mouth sores, sinus pressure/congestion and sore throat.    Eyes: Positive for visual disturbance. Negative for pain.   Respiratory: Negative for cough, chest tightness and shortness of breath.    Cardiovascular: Negative for chest pain, palpitations and leg swelling.   Gastrointestinal: Negative for abdominal distention, abdominal pain, blood in stool, constipation and diarrhea.   Genitourinary: Negative for dysuria, frequency and hematuria.   Musculoskeletal: Negative for arthralgias and back pain.   Integumentary:  Negative for rash.   Neurological: Negative for dizziness, weakness, numbness and headaches.   Hematological: Negative for adenopathy.   Psychiatric/Behavioral: Negative for confusion.         Objective:      Physical Exam  Vitals reviewed.   Constitutional:       General: He is awake.      Appearance: Normal appearance.   HENT:      Head: Normocephalic and atraumatic.      Right Ear: Hearing normal.      Left Ear: Hearing normal.      Nose: Nose normal.   Eyes:      General: Lids are normal. Vision grossly intact.      Extraocular Movements: Extraocular movements intact.      Conjunctiva/sclera: Conjunctivae normal.   Cardiovascular:      Rate and Rhythm: Normal rate and regular rhythm.      Pulses: Normal pulses.      Heart sounds: Normal heart sounds.   Pulmonary:      Effort: Pulmonary effort is normal.      Breath sounds: Normal breath sounds. No wheezing, rhonchi or rales.   Chest:   Breasts:      Right: No axillary adenopathy or supraclavicular adenopathy.      Left: No axillary adenopathy or supraclavicular adenopathy.        Comments: Soft mass in area of the sternum  Abdominal:      General: Bowel sounds are normal.      Palpations: Abdomen is soft. There is splenomegaly.      Tenderness: There is no abdominal tenderness.   Musculoskeletal:      Cervical back: Full passive range of motion without pain.      Right lower leg: No edema.      Left  lower leg: No edema.   Lymphadenopathy:      Cervical: No cervical adenopathy.      Upper Body:      Right upper body: No supraclavicular or axillary adenopathy.      Left upper body: No supraclavicular or axillary adenopathy.   Skin:     General: Skin is warm.   Neurological:      General: No focal deficit present.      Mental Status: He is alert and oriented to person, place, and time.   Psychiatric:         Attention and Perception: Attention normal.         Mood and Affect: Mood and affect normal.         Behavior: Behavior is cooperative.         LABS AND IMAGING REVIEWED IN EPIC          Assessment:   1. 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  3. Retinal hemorrhage secondary to 1.        Plan:       At this time, the patient's CBC is suggestive of likely CML.  We will check peripheral blood for bcr/ABL quantitative and qualitative.      Patient will need a bone marrow biopsy Monday.      Continue hydroxyurea 4 g every 12 hours.      We will check every 12 hours tumor lysis labs.      As for his retinal hemorrhage, no intervention needed.  This was discussed with ophthalmology.  The treatment for this will be treatment of the underlying cause which is likely CML.      Once we get a diagnosis, we will likely start a TKI.      Paul Hogan II, MD

## 2022-08-19 NOTE — H&P
"[facility] Hospital Medicine History & Physical Examination       Patient Name: Magdaleno Hirsch  MRN: [id number]  Patient Class: IP- Inpatient   Admission Date: 8/18/2022   Admitting Physician: Brody Khan MD   Length of Stay: 0  Attending Physician: Brody Khan MD   Primary Care Provider: Primary Doctor No  Face-to-Face encounter date: 08/19/2022  Code Status: Full Code  Chief Complaint: Eye Problem (Pt to er from his eye doctor for having white spots in eye. Pt also c/o having a knot to chest area. Denies visual problems.)        Patient information was obtained from patient, patient's family, past medical records and ER records.     HISTORY OF PRESENT ILLNESS:   Magdaleno Hirsch is a 22 y.o. Black or  male with significant past medical history.  Patient went for routine eye exam Tuesday (08/16/2022) and was told to have "lattice degeneration of the retina, bilateral retinal hemorrhages and bilateral Valdez spots with vessel tortuosity." His ophthalmologist recommended he have blood work performed including testing for sickle cell disease. The patient presented to [facility] ED on 8/18/2022 for further evaluation.  Work up revealed .9, H&H 8.3/23.8, MCV 75.3, LD 1329, PT 16.7, PTT 36.4 and INR 1.3. SARS-CoV-2 PCR positive.  Chest x-ray without acute cardiopulmonary processes. The patient was being transferred to [place] but they are currently on divert and therefore patient sent to [facility]. On exam, he has no complaints. He denies fever, chills, chest pain, abdominal pain, nausea, vomiting diarrhea, headache, vision changes, weight loss, night sweats. He does not use tobacco products or alcohol. He does smoke marijuana occasionally. He is not COVID vaccination and denies exposure. He denies a family history of any cancers.      PAST MEDICAL HISTORY:   Reviewed and negative    PAST SURGICAL HISTORY:   Left knee surgery    ALLERGIES:   Patient has no known allergies.    FAMILY " HISTORY:   Reviewed and negative    SOCIAL HISTORY:   Tobacco - Denies  Alcohol - Denies  Illicit Drugs - Marijuana occasionally    HOME MEDICATIONS:   Reviewed and none    REVIEW OF SYSTEMS:   Except as documented, all other systems reviewed and negative     PHYSICAL EXAM:     VITAL SIGNS: 24 HRS MIN & MAX LAST   Temp  Min: 97.5 °F (36.4 °C)  Max: 98.2 °F (36.8 °C) 97.5 °F (36.4 °C)   BP  Min: 118/57  Max: 164/107 (!) 148/84   Pulse  Min: 66  Max: 85  82   Resp  Min: 16  Max: 20 20   SpO2  Min: 97 %  Max: 100 % 98 %       General appearance: Well-developed, well-nourished male in no apparent distress. Sitting in bed. Mother at bedside.  HEENT: Atraumatic head. Moist mucous membranes of oral cavity.  Lungs: Clear to auscultation bilaterally.   Heart: Regular rate and rhythm.   Abdomen: Soft, non-distended, non-tender. Bowel sounds are normal.   Extremities: No cyanosis, clubbing. No deformities.  Skin: No Rash. Warm and dry.  Neuro: Awake, alert and oriented. Motor and sensory exams grossly intact.  Psych/mental status: Appropriate mood and affect. Cooperative. Responds appropriately to questions.       LABS AND IMAGING:     Recent Labs   Lab 08/18/22  1424   .9*   RBC 3.16*   HGB 8.3*   HCT 23.8*   MCV 75.3*   MCH 26.3*   MCHC 34.9   RDW 21.3*      MPV 10.7*       Recent Labs   Lab 08/18/22  1424 08/18/22  1521     --    K 3.6  --    CO2 28  --    BUN 12.2  --    CREATININE 1.06  --    CALCIUM 9.4  --    MG  --  1.90   ALBUMIN 4.0  --    ALKPHOS 81  --    ALT 18  --    AST 36*  --    BILITOT 1.6*  --        Microbiology Results (last 7 days)     ** No results found for the last 168 hours. **           X-Ray Chest PA And Lateral  Narrative: EXAMINATION:  XR CHEST PA AND LATERAL    CLINICAL HISTORY:  nodule on chest;    COMPARISON:  No priors    FINDINGS:  PA and lateral views of the chest were obtained. Heart and mediastinum within normal limits. The lungs are clear. No pneumothorax or  significant effusion.  Impression: No acute cardiopulmonary abnormality.    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 No indication for transfusion at this time  - IV Hydralazine as needed for hypertension  - Labs in AM      VTE Prophylaxis: will be placed on SCD for DVT prophylaxis and will be advised to be as mobile as possible and sit in a chair as tolerated      __________________________________________________________________________  INPATIENT LIST OF MEDICATIONS     Current Facility-Administered Medications:     hydroxyurea capsule 4,000 mg, 4,000 mg, Oral, BID, Jes Archer MD, 4,000 mg at 08/19/22 0901      Scheduled Meds:   hydroxyurea  4,000 mg Oral BID     Continuous Infusions:  PRN Meds:.      Discharge Planning and Disposition: Anticipated discharge to be determined.    IKatty PA, have reviewed and discussed the case with Dr. Brody Khan MD    Please see the following addendum for further assessment and plan from there attending MD.    MAIKEL Hector-SARAH  08/19/2022    ________________________________________________________________________________    MD Addendum:  Dr. Brody MEJIA MD assumed care of this patient today at ---am/pm  For the patient encounter, I performed the substantive portion of the visit, I reviewed the NP/PA documentation, treatment plan, and medical decision making.  I had face to face time with this patient     Seen and examined the patient, agree with the above history physical exam and management.  Discussed with patient, he denies any symptoms whatsoever he went to regular eye exam and found to have retinal hemorrhage was directed to  ER in the ER was found to have WBC 400s.  He has nodular lesions newly started,  his right thigh and also on sternum which needs to be followed.   His WBC is 400s, h.3 and plt: 373.  INR is 1.38 PTT 36.4 fibrinogen is 292 mildly hypokalemic besides that CMP is unremarkable.   Covid-19 positive. But asymptomatic. CXR  Negative.   Patient was started on hydroxyurea 4 mg b.i.d.   Hemonc evaluated the patient. BMB in Monday.     Plan:   - Closely monitor the patient. CBC, CMP. Mag an phosp, uric acid q12  - Covid-19 with no symptoms, monitor.   - follow up with ophthalmology as outpt. No need for intervention for now.     CC diagnosis: acute leukemia needing close monitoring.   CC time was given 45 min     All diagnosis and differential diagnosis have been reviewed; assessment and plan has been documented; I have personally reviewed the labs and test results that are presently available; I have reviewed the patients medication list; I have reviewed the consulting providers response and recommendations. I have reviewed or attempted to review medical records based upon their availability.      Brody Khan MD  2022

## 2022-08-20 LAB
ABS NEUT (OLG): 257.66 X10(3)/MCL (ref 2.1–9.2)
ABS NEUT (OLG): 259.69 X10(3)/MCL (ref 2.1–9.2)
ABS NEUT (OLG): 292.95 X10(3)/MCL (ref 2.1–9.2)
ALBUMIN SERPL-MCNC: 3.6 GM/DL (ref 3.5–5)
ALBUMIN SERPL-MCNC: 4 GM/DL (ref 3.5–5)
ALBUMIN/GLOB SERPL: 1.1 RATIO (ref 1.1–2)
ALBUMIN/GLOB SERPL: 1.1 RATIO (ref 1.1–2)
ALP SERPL-CCNC: 84 UNIT/L (ref 40–150)
ALP SERPL-CCNC: 87 UNIT/L (ref 40–150)
ALT SERPL-CCNC: 17 UNIT/L (ref 0–55)
ALT SERPL-CCNC: 19 UNIT/L (ref 0–55)
ANISOCYTOSIS BLD QL SMEAR: ABNORMAL
AST SERPL-CCNC: 45 UNIT/L (ref 5–34)
AST SERPL-CCNC: 53 UNIT/L (ref 5–34)
B-HCG SERPL QL: 2 %
B-HCG SERPL QL: 2 %
B-HCG SERPL QL: 25 %
BASOPHILS NFR BLD MANUAL: 10 %
BASOPHILS NFR BLD MANUAL: 29.96 X10(3)/MCL (ref 0–0.2)
BASOPHILS NFR BLD MANUAL: 5 %
BILIRUBIN DIRECT+TOT PNL SERPL-MCNC: 1.3 MG/DL
BILIRUBIN DIRECT+TOT PNL SERPL-MCNC: 1.5 MG/DL
BUN SERPL-MCNC: 13.1 MG/DL (ref 8.9–20.6)
BUN SERPL-MCNC: 14 MG/DL (ref 8.9–20.6)
CALCIUM SERPL-MCNC: 9.4 MG/DL (ref 8.4–10.2)
CALCIUM SERPL-MCNC: 9.9 MG/DL (ref 8.4–10.2)
CHLORIDE SERPL-SCNC: 104 MMOL/L (ref 98–107)
CHLORIDE SERPL-SCNC: 107 MMOL/L (ref 98–107)
CO2 SERPL-SCNC: 23 MMOL/L (ref 22–29)
CO2 SERPL-SCNC: 25 MMOL/L (ref 22–29)
CREAT SERPL-MCNC: 0.87 MG/DL (ref 0.73–1.18)
CREAT SERPL-MCNC: 0.89 MG/DL (ref 0.73–1.18)
EOSINOPHIL NFR BLD MANUAL: 2 %
EOSINOPHIL NFR BLD MANUAL: 2 %
EOSINOPHIL NFR BLD MANUAL: 5.99 X10(3)/MCL (ref 0–0.9)
ERYTHROCYTE [DISTWIDTH] IN BLOOD BY AUTOMATED COUNT: 21.6 % (ref 11.5–17)
ERYTHROCYTE [DISTWIDTH] IN BLOOD BY AUTOMATED COUNT: 21.8 % (ref 11.5–17)
ERYTHROCYTE [DISTWIDTH] IN BLOOD BY AUTOMATED COUNT: 21.8 % (ref 11.5–17)
GFR SERPLBLD CREATININE-BSD FMLA CKD-EPI: >60 MLS/MIN/1.73/M2
GFR SERPLBLD CREATININE-BSD FMLA CKD-EPI: >60 MLS/MIN/1.73/M2
GLOBULIN SER-MCNC: 3.2 GM/DL (ref 2.4–3.5)
GLOBULIN SER-MCNC: 3.6 GM/DL (ref 2.4–3.5)
GLUCOSE SERPL-MCNC: 76 MG/DL (ref 74–100)
GLUCOSE SERPL-MCNC: 90 MG/DL (ref 74–100)
HCT VFR BLD AUTO: 25.7 % (ref 42–52)
HCT VFR BLD AUTO: 25.9 % (ref 42–52)
HCT VFR BLD AUTO: 28.4 % (ref 42–52)
HEMATOLOGIST REVIEW: NORMAL
HGB BLD-MCNC: 8.7 GM/DL (ref 14–18)
HGB BLD-MCNC: 8.8 GM/DL (ref 14–18)
HGB BLD-MCNC: 9.4 GM/DL (ref 14–18)
IMM GRANULOCYTES # BLD AUTO: 80.58 X10(3)/MCL (ref 0–0.04)
IMM GRANULOCYTES # BLD AUTO: 82.39 X10(3)/MCL (ref 0–0.04)
IMM GRANULOCYTES # BLD AUTO: 84.34 X10(3)/MCL (ref 0–0.04)
IMM GRANULOCYTES NFR BLD AUTO: 24.8 %
IMM GRANULOCYTES NFR BLD AUTO: 27.5 %
IMM GRANULOCYTES NFR BLD AUTO: 28.6 %
INSTRUMENT WBC (OLG): 295.1 X10(3)/MCL
INSTRUMENT WBC (OLG): 299.6 X10(3)/MCL
INSTRUMENT WBC (OLG): 325.5 X10(3)/MCL
LDH SERPL-CCNC: 1849 U/L (ref 125–220)
LDH SERPL-CCNC: 2062 U/L (ref 125–220)
LYMPHOCYTES NFR BLD MANUAL: 1 %
LYMPHOCYTES NFR BLD MANUAL: 1 %
LYMPHOCYTES NFR BLD MANUAL: 10 %
LYMPHOCYTES NFR BLD MANUAL: 3 X10(3)/MCL
LYMPHOCYTES NFR BLD MANUAL: 32.55 X10(3)/MCL
MACROCYTES BLD QL SMEAR: ABNORMAL
MACROCYTES BLD QL SMEAR: ABNORMAL
MCH RBC QN AUTO: 25.3 PG (ref 27–31)
MCH RBC QN AUTO: 26.2 PG (ref 27–31)
MCH RBC QN AUTO: 26.3 PG (ref 27–31)
MCHC RBC AUTO-ENTMCNC: 33.1 MG/DL (ref 33–36)
MCHC RBC AUTO-ENTMCNC: 33.9 MG/DL (ref 33–36)
MCHC RBC AUTO-ENTMCNC: 34 MG/DL (ref 33–36)
MCV RBC AUTO: 76.5 FL (ref 80–94)
MCV RBC AUTO: 77.4 FL (ref 80–94)
MCV RBC AUTO: 77.5 FL (ref 80–94)
METAMYELOCYTES NFR BLD MANUAL: 11 %
METAMYELOCYTES NFR BLD MANUAL: 4 %
METAMYELOCYTES NFR BLD MANUAL: 9 %
MICROCYTES BLD QL SMEAR: ABNORMAL
MONOCYTES NFR BLD MANUAL: 4 %
MYELOCYTES NFR BLD MANUAL: 11 %
MYELOCYTES NFR BLD MANUAL: 8 %
NEUTROPHILS NFR BLD MANUAL: 61 %
NEUTROPHILS NFR BLD MANUAL: 64 %
NEUTROPHILS NFR BLD MANUAL: 66 %
NRBC BLD AUTO-RTO: 0.3 %
NRBC BLD AUTO-RTO: 0.3 %
NRBC BLD AUTO-RTO: 0.5 %
PHOSPHATE SERPL-MCNC: 4.2 MG/DL (ref 2.3–4.7)
PHOSPHATE SERPL-MCNC: 4.7 MG/DL (ref 2.3–4.7)
PLATELET # BLD AUTO: 418 X10(3)/MCL (ref 130–400)
PLATELET # BLD AUTO: 432 X10(3)/MCL (ref 130–400)
PLATELET # BLD AUTO: 495 X10(3)/MCL (ref 130–400)
PLATELET # BLD EST: ABNORMAL 10*3/UL
PMV BLD AUTO: ABNORMAL FL
POLYCHROMASIA BLD QL SMEAR: ABNORMAL
POTASSIUM SERPL-SCNC: 3.4 MMOL/L (ref 3.5–5.1)
POTASSIUM SERPL-SCNC: 3.8 MMOL/L (ref 3.5–5.1)
PROMYELOCYTES # BLD MANUAL: 1 %
PROT SERPL-MCNC: 6.8 GM/DL (ref 6.4–8.3)
PROT SERPL-MCNC: 7.6 GM/DL (ref 6.4–8.3)
RBC # BLD AUTO: 3.32 X10(6)/MCL (ref 4.7–6.1)
RBC # BLD AUTO: 3.34 X10(6)/MCL (ref 4.7–6.1)
RBC # BLD AUTO: 3.71 X10(6)/MCL (ref 4.7–6.1)
RBC MORPH BLD: ABNORMAL
SODIUM SERPL-SCNC: 138 MMOL/L (ref 136–145)
SODIUM SERPL-SCNC: 140 MMOL/L (ref 136–145)
URATE SERPL-MCNC: 9.2 MG/DL (ref 3.5–7.2)
URATE SERPL-MCNC: 9.6 MG/DL (ref 3.5–7.2)
WBC # SPEC AUTO: 295.1 X10(3)/MCL (ref 4.5–11.5)
WBC # SPEC AUTO: 299.6 X10(3)/MCL (ref 4.5–11.5)
WBC # SPEC AUTO: 325.5 X10(3)/MCL (ref 4.5–11.5)

## 2022-08-20 PROCEDURE — 84100 ASSAY OF PHOSPHORUS: CPT | Performed by: INTERNAL MEDICINE

## 2022-08-20 PROCEDURE — 25000003 PHARM REV CODE 250: Performed by: INTERNAL MEDICINE

## 2022-08-20 PROCEDURE — 25000003 PHARM REV CODE 250: Performed by: EMERGENCY MEDICINE

## 2022-08-20 PROCEDURE — 84550 ASSAY OF BLOOD/URIC ACID: CPT | Performed by: INTERNAL MEDICINE

## 2022-08-20 PROCEDURE — 99232 SBSQ HOSP IP/OBS MODERATE 35: CPT | Mod: ,,, | Performed by: INTERNAL MEDICINE

## 2022-08-20 PROCEDURE — 99232 PR SUBSEQUENT HOSPITAL CARE,LEVL II: ICD-10-PCS | Mod: ,,, | Performed by: INTERNAL MEDICINE

## 2022-08-20 PROCEDURE — 80053 COMPREHEN METABOLIC PANEL: CPT | Performed by: INTERNAL MEDICINE

## 2022-08-20 PROCEDURE — 36415 COLL VENOUS BLD VENIPUNCTURE: CPT | Performed by: INTERNAL MEDICINE

## 2022-08-20 PROCEDURE — 85007 BL SMEAR W/DIFF WBC COUNT: CPT | Performed by: INTERNAL MEDICINE

## 2022-08-20 PROCEDURE — 63600175 PHARM REV CODE 636 W HCPCS: Performed by: INTERNAL MEDICINE

## 2022-08-20 PROCEDURE — 36415 COLL VENOUS BLD VENIPUNCTURE: CPT | Performed by: PHYSICIAN ASSISTANT

## 2022-08-20 PROCEDURE — 11000001 HC ACUTE MED/SURG PRIVATE ROOM

## 2022-08-20 PROCEDURE — 27000207 HC ISOLATION

## 2022-08-20 PROCEDURE — 83615 LACTATE (LD) (LDH) ENZYME: CPT | Performed by: INTERNAL MEDICINE

## 2022-08-20 PROCEDURE — 63600175 PHARM REV CODE 636 W HCPCS: Performed by: PHYSICIAN ASSISTANT

## 2022-08-20 PROCEDURE — 85025 COMPLETE CBC W/AUTO DIFF WBC: CPT | Performed by: INTERNAL MEDICINE

## 2022-08-20 PROCEDURE — 85025 COMPLETE CBC W/AUTO DIFF WBC: CPT | Performed by: PHYSICIAN ASSISTANT

## 2022-08-20 RX ORDER — HYDRALAZINE HYDROCHLORIDE 20 MG/ML
20 INJECTION INTRAMUSCULAR; INTRAVENOUS EVERY 4 HOURS PRN
Status: DISCONTINUED | OUTPATIENT
Start: 2022-08-20 | End: 2022-08-20

## 2022-08-20 RX ORDER — ENALAPRILAT 1.25 MG/ML
1.25 INJECTION INTRAVENOUS EVERY 6 HOURS PRN
Status: DISCONTINUED | OUTPATIENT
Start: 2022-08-20 | End: 2022-08-22 | Stop reason: HOSPADM

## 2022-08-20 RX ORDER — SODIUM CHLORIDE 9 MG/ML
INJECTION, SOLUTION INTRAVENOUS CONTINUOUS
Status: DISCONTINUED | OUTPATIENT
Start: 2022-08-20 | End: 2022-08-22 | Stop reason: HOSPADM

## 2022-08-20 RX ORDER — AMLODIPINE BESYLATE 5 MG/1
10 TABLET ORAL DAILY
Status: DISCONTINUED | OUTPATIENT
Start: 2022-08-20 | End: 2022-08-22 | Stop reason: HOSPADM

## 2022-08-20 RX ADMIN — SODIUM CHLORIDE: 9 INJECTION, SOLUTION INTRAVENOUS at 06:08

## 2022-08-20 RX ADMIN — HYDRALAZINE HYDROCHLORIDE 20 MG: 20 INJECTION INTRAMUSCULAR; INTRAVENOUS at 01:08

## 2022-08-20 RX ADMIN — HYDROXYUREA 4000 MG: 500 CAPSULE ORAL at 10:08

## 2022-08-20 RX ADMIN — AMLODIPINE BESYLATE 10 MG: 5 TABLET ORAL at 10:08

## 2022-08-20 RX ADMIN — SODIUM CHLORIDE: 9 INJECTION, SOLUTION INTRAVENOUS at 10:08

## 2022-08-20 RX ADMIN — ONDANSETRON 4 MG: 2 INJECTION INTRAMUSCULAR; INTRAVENOUS at 03:08

## 2022-08-20 RX ADMIN — HYDROXYUREA 4000 MG: 500 CAPSULE ORAL at 08:08

## 2022-08-20 NOTE — PROGRESS NOTES
Subjective:       Patient ID: Magdaleno Hirsch is a 22 y.o. male.    Chief Complaint: Eye Problem (Pt to er from his eye doctor for having white spots in eye. Pt also c/o having a knot to chest area. Denies visual problems.)      Diagnosis:  1. 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  3. Retinal hemorrhage secondary to 1.    Current Treatment:   1. Hydroxyurea 4 g every 12 hours    Treatment History:  N/A    Eye Problem   Pertinent negatives include no fever or weakness.     HPI:  22-year-old male with no real medical history who went in for a routine eye exam on 08/18/2022 to update his eye glasses prescription.  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  He had bilateral Valdez spots with vessel tortuosity.  The optometrist recommended that the patient have blood work including testing for sickle cell disease.  The patient presented to the emergency department.  CBC in the emergency department revealed a white blood cell count of 411,900. Hemoglobin was 8.3, platelets were normal at 375,000 hundred and seventy five thousand.  Differential was suggestive of CML.  Coagulation studies revealed an INR of 1.38, PTT of 36.4 and a fibrinogen of 292.  Patient was going to present to [place], however they were on diversion.  He was transferred from [facility] to [facility].  Hematology consulted.    Interval History:   Patient seen and examined on rounds.  Overall, he has no major issues.  He still remains asymptomatic.  His blurry vision has improved.  He denies any fevers.  No acute events overnight.    No past medical history on file.   Past Surgical History:   Procedure Laterality Date    KNEE SURGERY Left      Social History     Socioeconomic History    Marital status: Single   Tobacco Use    Smoking status: Never Smoker    Smokeless tobacco: Never Used   Substance and Sexual Activity    Alcohol  use: Never    Drug use: Never      No family history on file.   Review of patient's allergies indicates:  No Known Allergies   Review of Systems   Constitutional: Negative for chills, diaphoresis, fatigue, fever and unexpected weight change.   HENT: Negative for nasal congestion, mouth sores, sinus pressure/congestion and sore throat.    Eyes: Positive for visual disturbance. Negative for pain.   Respiratory: Negative for cough, chest tightness and shortness of breath.    Cardiovascular: Negative for chest pain, palpitations and leg swelling.   Gastrointestinal: Negative for abdominal distention, abdominal pain, blood in stool, constipation and diarrhea.   Genitourinary: Negative for dysuria, frequency and hematuria.   Musculoskeletal: Negative for arthralgias and back pain.   Integumentary:  Negative for rash.   Neurological: Negative for dizziness, weakness, numbness and headaches.   Hematological: Negative for adenopathy.   Psychiatric/Behavioral: Negative for confusion.         Objective:      Physical Exam  Vitals reviewed.   Constitutional:       General: He is awake.      Appearance: Normal appearance.   HENT:      Head: Normocephalic and atraumatic.      Right Ear: Hearing normal.      Left Ear: Hearing normal.      Nose: Nose normal.   Eyes:      General: Lids are normal. Vision grossly intact.      Extraocular Movements: Extraocular movements intact.      Conjunctiva/sclera: Conjunctivae normal.   Cardiovascular:      Rate and Rhythm: Normal rate and regular rhythm.      Pulses: Normal pulses.      Heart sounds: Normal heart sounds.   Pulmonary:      Effort: Pulmonary effort is normal.      Breath sounds: Normal breath sounds. No wheezing, rhonchi or rales.   Chest:   Breasts:      Right: No axillary adenopathy or supraclavicular adenopathy.      Left: No axillary adenopathy or supraclavicular adenopathy.        Comments: Soft mass in area of the sternum  Abdominal:      General: Bowel sounds are normal.       Palpations: Abdomen is soft. There is splenomegaly.      Tenderness: There is no abdominal tenderness.   Musculoskeletal:      Cervical back: Full passive range of motion without pain.      Right lower leg: No edema.      Left lower leg: No edema.   Lymphadenopathy:      Cervical: No cervical adenopathy.      Upper Body:      Right upper body: No supraclavicular or axillary adenopathy.      Left upper body: No supraclavicular or axillary adenopathy.   Skin:     General: Skin is warm.   Neurological:      General: No focal deficit present.      Mental Status: He is alert and oriented to person, place, and time.   Psychiatric:         Attention and Perception: Attention normal.         Mood and Affect: Mood and affect normal.         Behavior: Behavior is cooperative.         LABS AND IMAGING REVIEWED IN EPIC          Assessment:   1. 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  3. Retinal hemorrhage secondary to 1.        Plan:       At this time, the patient's CBC is suggestive of likely CML.  We will check peripheral blood for bcr/ABL quantitative and qualitative.      Patient will need a bone marrow biopsy Monday.      Continue hydroxyurea 4 g every 12 hours.      We will check every 12 hours tumor lysis labs.      As for his retinal hemorrhage, no intervention needed.  This was discussed with ophthalmology.  The treatment for this will be treatment of the underlying cause which is likely CML.      Recommended SCDs for DVT prophylaxis due to retinal hemorrhage    Once we get a diagnosis, we will likely start a TKI.      Paul Hogan II, MD

## 2022-08-20 NOTE — PROGRESS NOTES
"[facility]  Hospital Medicine Progress Note        Chief Complaint: Eye Problem (Pt to er from his eye doctor for having white spots in eye. Pt also c/o having a knot to chest area. Denies visual problems.)         Patient information was obtained from patient, patient's family, past medical records and ER records.      HISTORY OF PRESENT ILLNESS:   Magdaleno Hirsch is a 22 y.o. Black or  male with significant past medical history.  Patient went for routine eye exam Tuesday (08/16/2022) and was told to have "lattice degeneration of the retina, bilateral retinal hemorrhages and bilateral Valdez spots with vessel tortuosity." His ophthalmologist recommended he have blood work performed including testing for sickle cell disease. The patient presented to [facility] ED on 8/18/2022 for further evaluation.  Work up revealed .9, H&H 8.3/23.8, MCV 75.3, LD 1329, PT 16.7, PTT 36.4 and INR 1.3. SARS-CoV-2 PCR positive.  Chest x-ray without acute cardiopulmonary processes. The patient was being transferred to [place] but they are currently on divert and therefore patient sent to [facility]. On exam, he has no complaints. He denies fever, chills, chest pain, abdominal pain, nausea, vomiting diarrhea, headache, vision changes, weight loss, night sweats. He does not use tobacco products or alcohol. He does smoke marijuana occasionally. He is not COVID vaccination and denies exposure. He denies a family history of any cancers.    Patient currently admitted for severe leukocytosis with concerns for CML.    Today's information  Patient seen and examined at bedside.  Currently has no problems or complaints   Vitals reviewed with blood pressure persistently elevated begin p.o. amlodipine 10 mg daily   White cell count is trending down words.  Continue hydroxyurea and plans for bone marrow biopsy on 08/22, oncology recs   Tumor lysis labs reviewed LDH trending upwards, uric acid elevated blood " improving, begin IV fluids 125 cc/hour   SCDs for DVT prophylaxis given bilateral retinal hemorrhage.          Exam  General appearance: Well-developed, well-nourished male in no apparent distress. Sitting in bed. Mother at bedside.  HEENT: Atraumatic head. Moist mucous membranes of oral cavity.  Lungs: Clear to auscultation bilaterally.   Heart: Regular rate and rhythm.   Abdomen: Soft, non-distended, non-tender. Bowel sounds are normal.   Extremities: No cyanosis, clubbing. No deformities.  Skin: No Rash. Warm and dry.  Neuro: Awake, alert and oriented. Motor and sensory exams grossly intact.  Psych/mental status: Appropriate mood and affect. Cooperative. Responds appropriately to questions.           ASSESSMENT & PLAN:   Assessment:  Leukocytosis, likely CML, improving slowly  At risk of tumor lysis syndrome  Hypertension, uncontrolled  Elevated uric acid, improving  Asymptomatic COVID-19 infection  Microcytic anemia secondary to above  Bilateral retinal hemorrhage     Plan:  begin p.o. amlodipine 10 mg daily    Continue hydroxyurea and plans for bone marrow biopsy on 08/22, oncology recs   Tumor lysis labs reviewed LDH trending upwards, uric acid elevated blood improving, begin IV fluids 125 cc/hour   SCDs for DVT prophylaxis given bilateral retinal hemorrhage.    - Continue to monitor H&H. No indication for transfusion at this time  - IV Hydralazine as needed for hypertension  - Labs in AM        VTE Prophylaxis: will be placed on SCD for DVT prophylaxis and will be advised to be as mobile as possible and sit in a chair as tolerated            VITAL SIGNS: 24 HRS MIN & MAX LAST   Temp  Min: 97.8 °F (36.6 °C)  Max: 98.5 °F (36.9 °C) 98.5 °F (36.9 °C)   BP  Min: 144/85  Max: 164/97 (!) 161/86   Pulse  Min: 64  Max: 80  71   Resp  Min: 17  Max: 18 17   SpO2  Min: 97 %  Max: 100 % 98 %       Recent Labs   Lab 08/18/22  1424 08/19/22  1507 08/19/22  2140 08/20/22  0553 08/20/22  0837   .9* 350.2* 287.6*  295.1* 299.6*   RBC 3.16* 3.35* 2.94* 3.32* 3.34*   HGB 8.3* 8.9* 7.5* 8.7* 8.8*   HCT 23.8* 26.6* 22.3* 25.7* 25.9*   MCV 75.3* 79.4* 75.9* 77.4* 77.5*   MCH 26.3* 26.6* 25.5* 26.2* 26.3*   MCHC 34.9 33.5 33.6 33.9 34.0   RDW 21.3* 22.0* 21.7* 21.6* 21.8*    447* 370 418* 432*   MPV 10.7* 10.9* 11.3*  --   --        Recent Labs   Lab 08/18/22  1521 08/19/22  1507 08/19/22 2005 08/20/22  0837   NA  --  141 140 140   K  --  3.3* 4.6 3.8   CO2  --  26 22 25   BUN  --  12.6 13.0 13.1   CREATININE  --  0.84 0.91 0.87   CALCIUM  --  9.3 8.8 9.4   MG 1.90  --   --   --    ALBUMIN  --  3.9 3.6 3.6   ALKPHOS  --  81 82 84   ALT  --  17 16 17   AST  --  42* 45* 45*   BILITOT  --  1.6* 1.4 1.3          Microbiology Results (last 7 days)     ** No results found for the last 168 hours. **           See below for Radiology    Scheduled Med:   amLODIPine  10 mg Oral Daily    hydroxyurea  4,000 mg Oral BID        Continuous Infusions:   sodium chloride 0.9% 125 mL/hr at 08/20/22 1023        PRN Meds:  acetaminophen, acetaminophen, dextrose 50%, dextrose 50%, glucagon (human recombinant), glucose, glucose, ondansetron, sodium chloride 0.9%         VTE prophylaxis:     Patient condition:  Stable/Fair/Guarded/ Serious/ Critical    Anticipated discharge and Disposition:         All diagnosis and differential diagnosis have been reviewed; assessment and plan has been documented; I have personally reviewed the labs and test results that are presently available; I have reviewed the patients medication list; I have reviewed the consulting providers response and recommendations. I have reviewed or attempted to review medical records based upon their availability    All of the patient's questions have been  addressed and answered. Patient's is agreeable to the above stated plan. I will continue to monitor closely and make adjustments to medical management as  needed.  _____________________________________________________________________    Nutrition Status:    Radiology:  US Abdomen Complete  Narrative: EXAMINATION:  US ABDOMEN COMPLETE    CLINICAL HISTORY:  evaluate splenomegaly;    TECHNIQUE:  Complete abdominal ultrasound (including pancreas, aorta, liver, gallbladder, common bile duct, IVC, kidneys, and spleen) was performed.    COMPARISON:  None    FINDINGS:  LIVER: Liver measures 21 cm cranial caudal at the midclavicular line. Homogeneous echotexture.  No focal mass appreciable. Portal vein is patent with appropriate directional flow.    PANCREAS: Obscured by overlying bowel gas.    GALLBLADDER: No shadowing calculi, wall thickening, or pericholecystic fluid.    BILE DUCTS: 3 mm common bile duct. Distal common bile duct is obscured by shadowing bowel gas.    AORTA: No aneurysm.    INFERIOR VENA CAVA: Normal in appearance.    RIGHT KIDNEY: 10.6 cm. No hydronephrosis.    LEFT KIDNEY: 12.1 cm. No hydronephrosis.    SPLEEN: 22 cm.  Homogeneous echotexture.    OTHER: No ascites.  Impression: Hepatosplenomegaly.    Electronically signed by: Kya Dolan  Date:    08/20/2022  Time:    09:03      Sybil Cat MD   08/20/2022

## 2022-08-21 LAB
ABS NEUT (OLG): 173.45 X10(3)/MCL (ref 2.1–9.2)
ABS NEUT (OLG): 245.34 X10(3)/MCL (ref 2.1–9.2)
ACANTHOCYTES (OLG): SLIGHT
ALBUMIN SERPL-MCNC: 3.7 GM/DL (ref 3.5–5)
ALBUMIN SERPL-MCNC: 3.9 GM/DL (ref 3.5–5)
ALBUMIN/GLOB SERPL: 1.1 RATIO (ref 1.1–2)
ALBUMIN/GLOB SERPL: 1.2 RATIO (ref 1.1–2)
ALP SERPL-CCNC: 76 UNIT/L (ref 40–150)
ALP SERPL-CCNC: 81 UNIT/L (ref 40–150)
ALT SERPL-CCNC: 16 UNIT/L (ref 0–55)
ALT SERPL-CCNC: 17 UNIT/L (ref 0–55)
ANISOCYTOSIS BLD QL SMEAR: ABNORMAL
ANISOCYTOSIS BLD QL SMEAR: ABNORMAL
AST SERPL-CCNC: 49 UNIT/L (ref 5–34)
AST SERPL-CCNC: 55 UNIT/L (ref 5–34)
B-HCG SERPL QL: 20 %
BASOPHILS NFR BLD MANUAL: 10.9 X10(3)/MCL (ref 0–0.2)
BASOPHILS NFR BLD MANUAL: 4 %
BILIRUBIN DIRECT+TOT PNL SERPL-MCNC: 1.3 MG/DL
BILIRUBIN DIRECT+TOT PNL SERPL-MCNC: 1.4 MG/DL
BUN SERPL-MCNC: 14.3 MG/DL (ref 8.9–20.6)
BUN SERPL-MCNC: 17.7 MG/DL (ref 8.9–20.6)
CALCIUM SERPL-MCNC: 9.1 MG/DL (ref 8.4–10.2)
CALCIUM SERPL-MCNC: 9.4 MG/DL (ref 8.4–10.2)
CHLORIDE SERPL-SCNC: 105 MMOL/L (ref 98–107)
CHLORIDE SERPL-SCNC: 105 MMOL/L (ref 98–107)
CO2 SERPL-SCNC: 22 MMOL/L (ref 22–29)
CO2 SERPL-SCNC: 22 MMOL/L (ref 22–29)
CREAT SERPL-MCNC: 0.78 MG/DL (ref 0.73–1.18)
CREAT SERPL-MCNC: 0.84 MG/DL (ref 0.73–1.18)
EOSINOPHIL NFR BLD MANUAL: 13.63 X10(3)/MCL (ref 0–0.9)
EOSINOPHIL NFR BLD MANUAL: 4 %
EOSINOPHIL NFR BLD MANUAL: 5 %
EOSINOPHIL NFR BLD MANUAL: 7.62 X10(3)/MCL (ref 0–0.9)
ERYTHROCYTE [DISTWIDTH] IN BLOOD BY AUTOMATED COUNT: 21.1 % (ref 11.5–17)
ERYTHROCYTE [DISTWIDTH] IN BLOOD BY AUTOMATED COUNT: 22 % (ref 11.5–17)
GFR SERPLBLD CREATININE-BSD FMLA CKD-EPI: >60 MLS/MIN/1.73/M2
GFR SERPLBLD CREATININE-BSD FMLA CKD-EPI: >60 MLS/MIN/1.73/M2
GLOBULIN SER-MCNC: 3.1 GM/DL (ref 2.4–3.5)
GLOBULIN SER-MCNC: 3.4 GM/DL (ref 2.4–3.5)
GLUCOSE SERPL-MCNC: 76 MG/DL (ref 74–100)
GLUCOSE SERPL-MCNC: 97 MG/DL (ref 74–100)
HCT VFR BLD AUTO: 27.8 % (ref 42–52)
HCT VFR BLD AUTO: 29 % (ref 42–52)
HGB BLD-MCNC: 8.7 GM/DL (ref 14–18)
HGB BLD-MCNC: 9.4 GM/DL (ref 14–18)
IMM GRANULOCYTES # BLD AUTO: 28.59 X10(3)/MCL (ref 0–0.04)
IMM GRANULOCYTES # BLD AUTO: 53.42 X10(3)/MCL (ref 0–0.04)
IMM GRANULOCYTES NFR BLD AUTO: 15 %
IMM GRANULOCYTES NFR BLD AUTO: 19.6 %
INSTRUMENT WBC (OLG): 190.6 X10(3)/MCL
INSTRUMENT WBC (OLG): 272.6 X10(3)/MCL
LDH SERPL-CCNC: 1792 U/L (ref 125–220)
LDH SERPL-CCNC: 2133 U/L (ref 125–220)
LYMPHOCYTES NFR BLD MANUAL: 2 %
LYMPHOCYTES NFR BLD MANUAL: 5 %
LYMPHOCYTES NFR BLD MANUAL: 5.45 X10(3)/MCL
LYMPHOCYTES NFR BLD MANUAL: 9.53 X10(3)/MCL
MCH RBC QN AUTO: 23.4 PG (ref 27–31)
MCH RBC QN AUTO: 25.1 PG (ref 27–31)
MCHC RBC AUTO-ENTMCNC: 31.3 MG/DL (ref 33–36)
MCHC RBC AUTO-ENTMCNC: 32.4 MG/DL (ref 33–36)
MCV RBC AUTO: 74.7 FL (ref 80–94)
MCV RBC AUTO: 77.5 FL (ref 80–94)
METAMYELOCYTES NFR BLD MANUAL: 2 %
METAMYELOCYTES NFR BLD MANUAL: 7 %
MICROCYTES BLD QL SMEAR: ABNORMAL
MICROCYTES BLD QL SMEAR: ABNORMAL
MYELOCYTES NFR BLD MANUAL: 4 %
MYELOCYTES NFR BLD MANUAL: 9 %
NEUTROPHILS NFR BLD MANUAL: 65 %
NEUTROPHILS NFR BLD MANUAL: 74 %
NRBC BLD AUTO-RTO: 0.4 %
NRBC BLD AUTO-RTO: 0.4 %
OVALOCYTES (OLG): ABNORMAL
PHOSPHATE SERPL-MCNC: 4.9 MG/DL (ref 2.3–4.7)
PHOSPHATE SERPL-MCNC: 4.9 MG/DL (ref 2.3–4.7)
PLATELET # BLD AUTO: 432 X10(3)/MCL (ref 130–400)
PLATELET # BLD AUTO: 486 X10(3)/MCL (ref 130–400)
PLATELET # BLD EST: ABNORMAL 10*3/UL
PLATELET # BLD EST: ABNORMAL 10*3/UL
PMV BLD AUTO: ABNORMAL FL
PMV BLD AUTO: ABNORMAL FL
POTASSIUM SERPL-SCNC: 3.8 MMOL/L (ref 3.5–5.1)
POTASSIUM SERPL-SCNC: 4.1 MMOL/L (ref 3.5–5.1)
PROT SERPL-MCNC: 6.8 GM/DL (ref 6.4–8.3)
PROT SERPL-MCNC: 7.3 GM/DL (ref 6.4–8.3)
RBC # BLD AUTO: 3.72 X10(6)/MCL (ref 4.7–6.1)
RBC # BLD AUTO: 3.74 X10(6)/MCL (ref 4.7–6.1)
RBC MORPH BLD: ABNORMAL
RBC MORPH BLD: ABNORMAL
SODIUM SERPL-SCNC: 135 MMOL/L (ref 136–145)
SODIUM SERPL-SCNC: 136 MMOL/L (ref 136–145)
STOMATOCYTES (OLG): SLIGHT
TEAR DROP CELL (OLG): ABNORMAL
URATE SERPL-MCNC: 9.7 MG/DL (ref 3.5–7.2)
URATE SERPL-MCNC: 9.8 MG/DL (ref 3.5–7.2)
WBC # SPEC AUTO: 190.6 X10(3)/MCL (ref 4.5–11.5)
WBC # SPEC AUTO: 272.6 X10(3)/MCL (ref 4.5–11.5)

## 2022-08-21 PROCEDURE — 27000207 HC ISOLATION

## 2022-08-21 PROCEDURE — 25000003 PHARM REV CODE 250: Performed by: EMERGENCY MEDICINE

## 2022-08-21 PROCEDURE — 85025 COMPLETE CBC W/AUTO DIFF WBC: CPT | Performed by: INTERNAL MEDICINE

## 2022-08-21 PROCEDURE — 80053 COMPREHEN METABOLIC PANEL: CPT | Performed by: INTERNAL MEDICINE

## 2022-08-21 PROCEDURE — 11000001 HC ACUTE MED/SURG PRIVATE ROOM

## 2022-08-21 PROCEDURE — 25000003 PHARM REV CODE 250: Performed by: INTERNAL MEDICINE

## 2022-08-21 PROCEDURE — 85007 BL SMEAR W/DIFF WBC COUNT: CPT | Performed by: INTERNAL MEDICINE

## 2022-08-21 PROCEDURE — 99232 SBSQ HOSP IP/OBS MODERATE 35: CPT | Mod: ,,, | Performed by: INTERNAL MEDICINE

## 2022-08-21 PROCEDURE — 99232 PR SUBSEQUENT HOSPITAL CARE,LEVL II: ICD-10-PCS | Mod: ,,, | Performed by: INTERNAL MEDICINE

## 2022-08-21 PROCEDURE — 84550 ASSAY OF BLOOD/URIC ACID: CPT | Performed by: INTERNAL MEDICINE

## 2022-08-21 PROCEDURE — 83615 LACTATE (LD) (LDH) ENZYME: CPT | Performed by: INTERNAL MEDICINE

## 2022-08-21 PROCEDURE — 84100 ASSAY OF PHOSPHORUS: CPT | Performed by: INTERNAL MEDICINE

## 2022-08-21 PROCEDURE — 36415 COLL VENOUS BLD VENIPUNCTURE: CPT | Performed by: INTERNAL MEDICINE

## 2022-08-21 RX ADMIN — SODIUM CHLORIDE: 9 INJECTION, SOLUTION INTRAVENOUS at 02:08

## 2022-08-21 RX ADMIN — SODIUM CHLORIDE: 9 INJECTION, SOLUTION INTRAVENOUS at 08:08

## 2022-08-21 RX ADMIN — HYDROXYUREA 4000 MG: 500 CAPSULE ORAL at 10:08

## 2022-08-21 RX ADMIN — AMLODIPINE BESYLATE 10 MG: 5 TABLET ORAL at 10:08

## 2022-08-21 RX ADMIN — SODIUM CHLORIDE: 9 INJECTION, SOLUTION INTRAVENOUS at 10:08

## 2022-08-21 RX ADMIN — HYDROXYUREA 4000 MG: 500 CAPSULE ORAL at 08:08

## 2022-08-21 NOTE — PLAN OF CARE
Problem: Adult Inpatient Plan of Care  Goal: Plan of Care Review  Outcome: Ongoing, Progressing  Goal: Patient-Specific Goal (Individualized)  Outcome: Ongoing, Progressing  Goal: Absence of Hospital-Acquired Illness or Injury  Outcome: Ongoing, Progressing  Intervention: Identify and Manage Fall Risk  Flowsheets (Taken 8/21/2022 1224)  Safety Promotion/Fall Prevention:   assistive device/personal item within reach   side rails raised x 2  Intervention: Prevent Skin Injury  Flowsheets (Taken 8/21/2022 1224)  Body Position: position changed independently  Skin Protection: adhesive use limited  Intervention: Prevent and Manage VTE (Venous Thromboembolism) Risk  Flowsheets (Taken 8/21/2022 1224)  Activity Management:   Ambulated -L4   Ambulated to bathroom - L4   Ambulated in room - L4  VTE Prevention/Management:   remove, assess skin, and reapply sequential compression device   ROM (active) performed  Range of Motion: ROM (range of motion) performed  Intervention: Prevent Infection  Flowsheets (Taken 8/21/2022 1224)  Infection Prevention: hand hygiene promoted      No

## 2022-08-21 NOTE — NURSING
Educated the pt on the importance of covering the IV when showering to preventing it from coming out or restarting a new one

## 2022-08-21 NOTE — PROGRESS NOTES
Subjective:       Patient ID: Magdaleno Hirsch is a 22 y.o. male.    Chief Complaint: Eye Problem (Pt to er from his eye doctor for having white spots in eye. Pt also c/o having a knot to chest area. Denies visual problems.)      Diagnosis:  1. 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  3. Retinal hemorrhage secondary to 1.    Current Treatment:   1. Hydroxyurea 4 g every 12 hours    Treatment History:  N/A    Eye Problem   Pertinent negatives include no fever or weakness.     HPI:  22-year-old male with no real medical history who went in for a routine eye exam on 08/18/2022 to update his eye glasses prescription.  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  He had bilateral Valdez spots with vessel tortuosity.  The optometrist recommended that the patient have blood work including testing for sickle cell disease.  The patient presented to the emergency department.  CBC in the emergency department revealed a white blood cell count of 411,900. Hemoglobin was 8.3, platelets were normal at 375,000 hundred and seventy five thousand.  Differential was suggestive of CML.  Coagulation studies revealed an INR of 1.38, PTT of 36.4 and a fibrinogen of 292.  Patient was going to present to [place], however they were on diversion.  He was transferred from [facility] to [facility].  Hematology consulted.    Interval History:   Patient seen and examined on rounds.  Overall, he has no major issues.  He still remains asymptomatic.  His blurry vision has improved.  He denies any fevers.  No acute events overnight.    No past medical history on file.   Past Surgical History:   Procedure Laterality Date    KNEE SURGERY Left      Social History     Socioeconomic History    Marital status: Single   Tobacco Use    Smoking status: Never Smoker    Smokeless tobacco: Never Used   Substance and Sexual Activity    Alcohol  use: Never    Drug use: Never      No family history on file.   Review of patient's allergies indicates:  No Known Allergies   Review of Systems   Constitutional: Negative for chills, diaphoresis, fatigue, fever and unexpected weight change.   HENT: Negative for nasal congestion, mouth sores, sinus pressure/congestion and sore throat.    Eyes: Positive for visual disturbance. Negative for pain.   Respiratory: Negative for cough, chest tightness and shortness of breath.    Cardiovascular: Negative for chest pain, palpitations and leg swelling.   Gastrointestinal: Negative for abdominal distention, abdominal pain, blood in stool, constipation and diarrhea.   Genitourinary: Negative for dysuria, frequency and hematuria.   Musculoskeletal: Negative for arthralgias and back pain.   Integumentary:  Negative for rash.   Neurological: Negative for dizziness, weakness, numbness and headaches.   Hematological: Negative for adenopathy.   Psychiatric/Behavioral: Negative for confusion.         Objective:      Physical Exam  Vitals reviewed.   Constitutional:       General: He is awake.      Appearance: Normal appearance.   HENT:      Head: Normocephalic and atraumatic.      Right Ear: Hearing normal.      Left Ear: Hearing normal.      Nose: Nose normal.   Eyes:      General: Lids are normal. Vision grossly intact.      Extraocular Movements: Extraocular movements intact.      Conjunctiva/sclera: Conjunctivae normal.   Cardiovascular:      Rate and Rhythm: Normal rate and regular rhythm.      Pulses: Normal pulses.      Heart sounds: Normal heart sounds.   Pulmonary:      Effort: Pulmonary effort is normal.      Breath sounds: Normal breath sounds. No wheezing, rhonchi or rales.   Chest:   Breasts:      Right: No axillary adenopathy or supraclavicular adenopathy.      Left: No axillary adenopathy or supraclavicular adenopathy.        Comments: Soft mass in area of the sternum  Abdominal:      General: Bowel sounds are normal.       Palpations: Abdomen is soft. There is splenomegaly.      Tenderness: There is no abdominal tenderness.   Musculoskeletal:      Cervical back: Full passive range of motion without pain.      Right lower leg: No edema.      Left lower leg: No edema.   Lymphadenopathy:      Cervical: No cervical adenopathy.      Upper Body:      Right upper body: No supraclavicular or axillary adenopathy.      Left upper body: No supraclavicular or axillary adenopathy.   Skin:     General: Skin is warm.   Neurological:      General: No focal deficit present.      Mental Status: He is alert and oriented to person, place, and time.   Psychiatric:         Attention and Perception: Attention normal.         Mood and Affect: Mood and affect normal.         Behavior: Behavior is cooperative.         LABS AND IMAGING REVIEWED IN EPIC          Assessment:   1. 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  3. Retinal hemorrhage secondary to 1.        Plan:       At this time, the patient's CBC is suggestive of likely CML.  We will check peripheral blood for bcr/ABL quantitative and qualitative.      Peripheral blood smear very consistent with CML.    Patient will need a bone marrow biopsy Monday.  Made NPO at midnight and bone marrow orders placed.    Continue hydroxyurea 4 g every 12 hours.      We will check every 12 hours tumor lysis labs.      As for his retinal hemorrhage, no intervention needed.  This was discussed with ophthalmology.  The treatment for this will be treatment of the underlying cause which is likely CML.      Recommended SCDs for DVT prophylaxis due to retinal hemorrhage    Once we get a diagnosis, we will likely start a TKI.      Paul Hogan II, MD

## 2022-08-21 NOTE — PLAN OF CARE
Problem: Adult Inpatient Plan of Care  Goal: Plan of Care Review  Outcome: Ongoing, Progressing  Goal: Patient-Specific Goal (Individualized)  Outcome: Ongoing, Progressing  Goal: Absence of Hospital-Acquired Illness or Injury  Outcome: Ongoing, Progressing  Intervention: Identify and Manage Fall Risk  Flowsheets (Taken 8/20/2022 1958)  Safety Promotion/Fall Prevention:   assistive device/personal item within reach   pulse ox   side rails raised x 2  Intervention: Prevent Skin Injury  Flowsheets (Taken 8/20/2022 1958)  Body Position: position changed independently  Skin Protection: adhesive use limited  Intervention: Prevent and Manage VTE (Venous Thromboembolism) Risk  Flowsheets (Taken 8/20/2022 1958)  Activity Management:   Ambulated -L4   Ambulated to bathroom - L4   Ambulated in room - L4  VTE Prevention/Management: remove, assess skin, and reapply sequential compression device  Range of Motion: active ROM (range of motion) encouraged  Intervention: Prevent Infection  Flowsheets (Taken 8/20/2022 1958)  Infection Prevention: hand hygiene promoted

## 2022-08-21 NOTE — PROGRESS NOTES
"[facility]  Hospital Medicine Progress Note        Chief Complaint: Eye Problem (Pt to er from his eye doctor for having white spots in eye. Pt also c/o having a knot to chest area. Denies visual problems.)         Patient information was obtained from patient, patient's family, past medical records and ER records.      HISTORY OF PRESENT ILLNESS:   Magdaleno Hirsch is a 22 y.o. Black or  male with significant past medical history.  Patient went for routine eye exam Tuesday (08/16/2022) and was told to have "lattice degeneration of the retina, bilateral retinal hemorrhages and bilateral Valdez spots with vessel tortuosity." His ophthalmologist recommended he have blood work performed including testing for sickle cell disease. The patient presented to [facility] ED on 8/18/2022 for further evaluation.  Work up revealed .9, H&H 8.3/23.8, MCV 75.3, LD 1329, PT 16.7, PTT 36.4 and INR 1.3. SARS-CoV-2 PCR positive.  Chest x-ray without acute cardiopulmonary processes. The patient was being transferred to [place] but they are currently on divert and therefore patient sent to [facility]. On exam, he has no complaints. He denies fever, chills, chest pain, abdominal pain, nausea, vomiting diarrhea, headache, vision changes, weight loss, night sweats. He does not use tobacco products or alcohol. He does smoke marijuana occasionally. He is not COVID vaccination and denies exposure. He denies a family history of any cancers.     Patient currently admitted for severe leukocytosis with concerns for CML.  blood pressure persistently elevated begin p.o. amlodipine 10 mg daily   White cell count is trending down words.  Continue hydroxyurea and plans for bone marrow biopsy on 08/22, oncology recs   Tumor lysis labs reviewed LDH trending upwards, uric acid elevated blood improving, begin IV fluids 125 cc/hour   SCDs for DVT prophylaxis given bilateral retinal hemorrhage       Today's " information  Patient seen and examined at bedside.  Currently has no problems or complaints   Overnight event patient was nauseous and vomited x1 however this has subsided.    Blood pressure is better controlled.    Bone marrow biopsy  tomorrow            Exam  General appearance: Well-developed, well-nourished male in no apparent distress. Sitting in bed. Mother at bedside.  HEENT: Atraumatic head. Moist mucous membranes of oral cavity.  Lungs: Clear to auscultation bilaterally.   Heart: Regular rate and rhythm.   Abdomen: Soft, non-distended, non-tender. Bowel sounds are normal.   Extremities: No cyanosis, clubbing. No deformities.  Skin: No Rash. Warm and dry.  Neuro: Awake, alert and oriented. Motor and sensory exams grossly intact.  Psych/mental status: Appropriate mood and affect. Cooperative. Responds appropriately to questions.            ASSESSMENT & PLAN:   Assessment:  Leukocytosis, likely CML, improving slowly  At risk of tumor lysis syndrome  Hypertension, controlled  Elevated uric acid, improving  Asymptomatic COVID-19 infection  Microcytic anemia secondary to above  Bilateral retinal hemorrhage     Plan:  Continue p.o. amlodipine 10 mg daily    Continue hydroxyurea and plans for bone marrow biopsy on 08/22, oncology recs   Tumor lysis labs reviewed LDH trending upwards, uric acid elevated blood improving, begin IV fluids 125 cc/hour   SCDs for DVT prophylaxis given bilateral retinal hemorrhage.    - Continue to monitor H&H. No indication for transfusion at this time  - IV Hydralazine as needed for hypertension  - Labs in AM        VTE Prophylaxis: will be placed on SCD for DVT prophylaxis and will be advised to be as mobile as possible and sit in a chair as tolerated            VITAL SIGNS: 24 HRS MIN & MAX LAST   Temp  Min: 97.8 °F (36.6 °C)  Max: 99.4 °F (37.4 °C) 99.4 °F (37.4 °C)   BP  Min: 117/69  Max: 170/98 (!) 153/88   Pulse  Min: 70  Max: 86  73   Resp  Min: 17  Max: 18 18   SpO2  Min: 97 %   Max: 100 % 100 %       Recent Labs   Lab 08/18/22  1424 08/19/22  1507 08/19/22  2140 08/20/22  0553 08/20/22  0837 08/20/22 2010   .9* 350.2* 287.6* 295.1* 299.6* 325.5*   RBC 3.16* 3.35* 2.94* 3.32* 3.34* 3.71*   HGB 8.3* 8.9* 7.5* 8.7* 8.8* 9.4*   HCT 23.8* 26.6* 22.3* 25.7* 25.9* 28.4*   MCV 75.3* 79.4* 75.9* 77.4* 77.5* 76.5*   MCH 26.3* 26.6* 25.5* 26.2* 26.3* 25.3*   MCHC 34.9 33.5 33.6 33.9 34.0 33.1   RDW 21.3* 22.0* 21.7* 21.6* 21.8* 21.8*    447* 370 418* 432* 495*   MPV 10.7* 10.9* 11.3*  --   --   --        Recent Labs   Lab 08/18/22  1521 08/19/22  1507 08/20/22  0837 08/20/22 2010 08/21/22  0903   NA  --    < > 140 138 136   K  --    < > 3.8 3.4* 3.8   CO2  --    < > 25 23 22   BUN  --    < > 13.1 14.0 14.3   CREATININE  --    < > 0.87 0.89 0.84   CALCIUM  --    < > 9.4 9.9 9.4   MG 1.90  --   --   --   --    ALBUMIN  --    < > 3.6 4.0 3.9   ALKPHOS  --    < > 84 87 81   ALT  --    < > 17 19 17   AST  --    < > 45* 53* 55*   BILITOT  --    < > 1.3 1.5 1.4    < > = values in this interval not displayed.          Microbiology Results (last 7 days)     ** No results found for the last 168 hours. **           See below for Radiology    Scheduled Med:   amLODIPine  10 mg Oral Daily    hydroxyurea  4,000 mg Oral BID        Continuous Infusions:   sodium chloride 0.9% 125 mL/hr at 08/21/22 0247        PRN Meds:  acetaminophen, acetaminophen, dextrose 50%, dextrose 50%, enalaprilat, glucagon (human recombinant), glucose, glucose, ondansetron, sodium chloride 0.9%       VTE prophylaxis:     Patient condition:  Stable/Fair/Guarded/ Serious/ Critical    Anticipated discharge and Disposition:         All diagnosis and differential diagnosis have been reviewed; assessment and plan has been documented; I have personally reviewed the labs and test results that are presently available; I have reviewed the patients medication list; I have reviewed the consulting providers response and  recommendations. I have reviewed or attempted to review medical records based upon their availability    All of the patient's questions have been  addressed and answered. Patient's is agreeable to the above stated plan. I will continue to monitor closely and make adjustments to medical management as needed.  _____________________________________________________________________    Nutrition Status:    Radiology:  US Abdomen Complete  Narrative: EXAMINATION:  US ABDOMEN COMPLETE    CLINICAL HISTORY:  evaluate splenomegaly;    TECHNIQUE:  Complete abdominal ultrasound (including pancreas, aorta, liver, gallbladder, common bile duct, IVC, kidneys, and spleen) was performed.    COMPARISON:  None    FINDINGS:  LIVER: Liver measures 21 cm cranial caudal at the midclavicular line. Homogeneous echotexture.  No focal mass appreciable. Portal vein is patent with appropriate directional flow.    PANCREAS: Obscured by overlying bowel gas.    GALLBLADDER: No shadowing calculi, wall thickening, or pericholecystic fluid.    BILE DUCTS: 3 mm common bile duct. Distal common bile duct is obscured by shadowing bowel gas.    AORTA: No aneurysm.    INFERIOR VENA CAVA: Normal in appearance.    RIGHT KIDNEY: 10.6 cm. No hydronephrosis.    LEFT KIDNEY: 12.1 cm. No hydronephrosis.    SPLEEN: 22 cm.  Homogeneous echotexture.    OTHER: No ascites.  Impression: Hepatosplenomegaly.    Electronically signed by: Kya Dolan  Date:    08/20/2022  Time:    09:03      Sybil Cat MD   08/21/2022

## 2022-08-22 VITALS
TEMPERATURE: 98 F | BODY MASS INDEX: 25.33 KG/M2 | SYSTOLIC BLOOD PRESSURE: 137 MMHG | OXYGEN SATURATION: 100 % | WEIGHT: 187 LBS | DIASTOLIC BLOOD PRESSURE: 88 MMHG | RESPIRATION RATE: 20 BRPM | HEIGHT: 72 IN | HEART RATE: 89 BPM

## 2022-08-22 PROBLEM — C95.00 ACUTE LEUKEMIA: Status: ACTIVE | Noted: 2022-08-22

## 2022-08-22 LAB
ABS NEUT (OLG): 174.15 X10(3)/MCL (ref 2.1–9.2)
ALBUMIN SERPL-MCNC: 3.7 GM/DL (ref 3.5–5)
ALBUMIN/GLOB SERPL: 1.3 RATIO (ref 1.1–2)
ALP SERPL-CCNC: 72 UNIT/L (ref 40–150)
ALT SERPL-CCNC: 17 UNIT/L (ref 0–55)
ANISOCYTOSIS BLD QL SMEAR: ABNORMAL
AST SERPL-CCNC: 49 UNIT/L (ref 5–34)
B-HCG SERPL QL: 3 %
BILIRUBIN DIRECT+TOT PNL SERPL-MCNC: 1.4 MG/DL
BUN SERPL-MCNC: 16.5 MG/DL (ref 8.9–20.6)
CALCIUM SERPL-MCNC: 9.4 MG/DL (ref 8.4–10.2)
CHLORIDE SERPL-SCNC: 108 MMOL/L (ref 98–107)
CO2 SERPL-SCNC: 21 MMOL/L (ref 22–29)
CREAT SERPL-MCNC: 0.81 MG/DL (ref 0.73–1.18)
ERYTHROCYTE [DISTWIDTH] IN BLOOD BY AUTOMATED COUNT: 21.3 % (ref 11.5–17)
GFR SERPLBLD CREATININE-BSD FMLA CKD-EPI: >60 MLS/MIN/1.73/M2
GLOBULIN SER-MCNC: 2.8 GM/DL (ref 2.4–3.5)
GLUCOSE SERPL-MCNC: 84 MG/DL (ref 74–100)
HCT VFR BLD AUTO: 27.2 % (ref 42–52)
HGB A MFR BLD ELPH: 98 % (ref 95.8–98)
HGB A2 MFR BLD: 2 % (ref 2–3.3)
HGB BLD-MCNC: 8.6 GM/DL (ref 14–18)
HGB F MFR BLD: 0 % (ref 0–0.9)
HGB FRACT BLD ELPH-IMP: NORMAL
IMM GRANULOCYTES # BLD AUTO: 19.86 X10(3)/MCL (ref 0–0.04)
IMM GRANULOCYTES NFR BLD AUTO: 11.2 %
INSTRUMENT WBC (OLG): 177.7 X10(3)/MCL
LDH SERPL-CCNC: 1784 U/L (ref 125–220)
LYMPHOCYTES NFR BLD MANUAL: 2 %
LYMPHOCYTES NFR BLD MANUAL: 3.55 X10(3)/MCL
M HPLC HB VARIANT, B: NORMAL
MCH RBC QN AUTO: 23.4 PG (ref 27–31)
MCHC RBC AUTO-ENTMCNC: 31.6 MG/DL (ref 33–36)
MCV RBC AUTO: 74.1 FL (ref 80–94)
METAMYELOCYTES NFR BLD MANUAL: 8 %
MYELOCYTES NFR BLD MANUAL: 1 %
NEUTROPHILS NFR BLD MANUAL: 86 %
NRBC BLD AUTO-RTO: 0.3 %
NRBC BLD MANUAL-RTO: 1 %
PHOSPHATE SERPL-MCNC: 4.7 MG/DL (ref 2.3–4.7)
PLATELET # BLD AUTO: 432 X10(3)/MCL (ref 130–400)
PLATELET # BLD EST: ABNORMAL 10*3/UL
PMV BLD AUTO: ABNORMAL FL
POTASSIUM SERPL-SCNC: 4.6 MMOL/L (ref 3.5–5.1)
PROT SERPL-MCNC: 6.5 GM/DL (ref 6.4–8.3)
RBC # BLD AUTO: 3.67 X10(6)/MCL (ref 4.7–6.1)
RBC MORPH BLD: ABNORMAL
SODIUM SERPL-SCNC: 135 MMOL/L (ref 136–145)
URATE SERPL-MCNC: 9 MG/DL (ref 3.5–7.2)
WBC # SPEC AUTO: 177.7 X10(3)/MCL (ref 4.5–11.5)

## 2022-08-22 PROCEDURE — 80053 COMPREHEN METABOLIC PANEL: CPT | Performed by: INTERNAL MEDICINE

## 2022-08-22 PROCEDURE — 36415 COLL VENOUS BLD VENIPUNCTURE: CPT | Performed by: INTERNAL MEDICINE

## 2022-08-22 PROCEDURE — 84550 ASSAY OF BLOOD/URIC ACID: CPT | Performed by: INTERNAL MEDICINE

## 2022-08-22 PROCEDURE — 63600175 PHARM REV CODE 636 W HCPCS: Performed by: PATHOLOGY

## 2022-08-22 PROCEDURE — 25000003 PHARM REV CODE 250: Performed by: EMERGENCY MEDICINE

## 2022-08-22 PROCEDURE — 84100 ASSAY OF PHOSPHORUS: CPT | Performed by: INTERNAL MEDICINE

## 2022-08-22 PROCEDURE — 83615 LACTATE (LD) (LDH) ENZYME: CPT | Performed by: INTERNAL MEDICINE

## 2022-08-22 PROCEDURE — 85025 COMPLETE CBC W/AUTO DIFF WBC: CPT | Performed by: INTERNAL MEDICINE

## 2022-08-22 PROCEDURE — 99232 PR SUBSEQUENT HOSPITAL CARE,LEVL II: ICD-10-PCS | Mod: ,,, | Performed by: INTERNAL MEDICINE

## 2022-08-22 PROCEDURE — 25000003 PHARM REV CODE 250: Performed by: INTERNAL MEDICINE

## 2022-08-22 PROCEDURE — 99232 SBSQ HOSP IP/OBS MODERATE 35: CPT | Mod: ,,, | Performed by: INTERNAL MEDICINE

## 2022-08-22 PROCEDURE — 25000003 PHARM REV CODE 250

## 2022-08-22 PROCEDURE — 38220 DX BONE MARROW ASPIRATIONS: CPT | Performed by: PATHOLOGY

## 2022-08-22 RX ORDER — HYDROCODONE BITARTRATE AND ACETAMINOPHEN 5; 325 MG/1; MG/1
1 TABLET ORAL EVERY 6 HOURS PRN
Qty: 12 TABLET | Refills: 0 | Status: SHIPPED | OUTPATIENT
Start: 2022-08-22 | End: 2022-08-25

## 2022-08-22 RX ORDER — LIDOCAINE HYDROCHLORIDE 10 MG/ML
INJECTION, SOLUTION EPIDURAL; INFILTRATION; INTRACAUDAL; PERINEURAL
Status: COMPLETED
Start: 2022-08-22 | End: 2022-08-22

## 2022-08-22 RX ORDER — MIDAZOLAM HYDROCHLORIDE 1 MG/ML
1 INJECTION INTRAMUSCULAR; INTRAVENOUS
Status: COMPLETED | OUTPATIENT
Start: 2022-08-22 | End: 2022-08-22

## 2022-08-22 RX ORDER — AMLODIPINE BESYLATE 10 MG/1
10 TABLET ORAL DAILY
Qty: 30 TABLET | Refills: 11 | Status: ON HOLD | OUTPATIENT
Start: 2022-08-23 | End: 2023-07-11 | Stop reason: HOSPADM

## 2022-08-22 RX ORDER — FLUMAZENIL 0.1 MG/ML
INJECTION INTRAVENOUS
Status: DISCONTINUED
Start: 2022-08-22 | End: 2022-08-22 | Stop reason: WASHOUT

## 2022-08-22 RX ORDER — HYDROXYUREA 500 MG/1
4000 CAPSULE ORAL 2 TIMES DAILY
Qty: 480 CAPSULE | Refills: 2 | Status: ON HOLD | OUTPATIENT
Start: 2022-08-22 | End: 2022-09-27 | Stop reason: HOSPADM

## 2022-08-22 RX ORDER — FLUMAZENIL 0.1 MG/ML
0.2 INJECTION INTRAVENOUS
Status: DISCONTINUED | OUTPATIENT
Start: 2022-08-22 | End: 2022-08-22 | Stop reason: HOSPADM

## 2022-08-22 RX ORDER — MIDAZOLAM HYDROCHLORIDE 1 MG/ML
INJECTION INTRAMUSCULAR; INTRAVENOUS
Status: DISCONTINUED
Start: 2022-08-22 | End: 2022-08-22 | Stop reason: HOSPADM

## 2022-08-22 RX ADMIN — MIDAZOLAM HYDROCHLORIDE 1 MG: 1 INJECTION, SOLUTION INTRAMUSCULAR; INTRAVENOUS at 12:08

## 2022-08-22 RX ADMIN — AMLODIPINE BESYLATE 10 MG: 5 TABLET ORAL at 08:08

## 2022-08-22 RX ADMIN — HYDROXYUREA 4000 MG: 500 CAPSULE ORAL at 08:08

## 2022-08-22 RX ADMIN — LIDOCAINE HYDROCHLORIDE: 10 INJECTION, SOLUTION EPIDURAL; INFILTRATION; INTRACAUDAL; PERINEURAL at 12:08

## 2022-08-22 RX ADMIN — MIDAZOLAM HYDROCHLORIDE 2 MG: 1 INJECTION, SOLUTION INTRAMUSCULAR; INTRAVENOUS at 12:08

## 2022-08-22 RX ADMIN — SODIUM CHLORIDE: 9 INJECTION, SOLUTION INTRAVENOUS at 04:08

## 2022-08-22 NOTE — PLAN OF CARE
Problem: Adult Inpatient Plan of Care  Goal: Plan of Care Review  8/22/2022 1053 by Wesley Zheng LPN  Outcome: Ongoing, Progressing  8/22/2022 1051 by Wesley Zheng LPN  Outcome: Ongoing, Progressing  Goal: Patient-Specific Goal (Individualized)  8/22/2022 1053 by Wesley Zheng LPN  Outcome: Ongoing, Progressing  8/22/2022 1051 by Wesley Zheng LPN  Outcome: Ongoing, Progressing  Goal: Absence of Hospital-Acquired Illness or Injury  8/22/2022 1053 by Wesley Zheng LPN  Outcome: Ongoing, Progressing  8/22/2022 1051 by Wesley Zheng LPN  Outcome: Ongoing, Progressing  Intervention: Identify and Manage Fall Risk  Flowsheets (Taken 8/22/2022 1053)  Safety Promotion/Fall Prevention:   assistive device/personal item within reach   side rails raised x 2  Intervention: Prevent Skin Injury  Flowsheets (Taken 8/22/2022 1053)  Body Position: position changed independently  Skin Protection: adhesive use limited  Intervention: Prevent and Manage VTE (Venous Thromboembolism) Risk  Flowsheets (Taken 8/22/2022 1053)  Activity Management:   Ambulated -L4   Ambulated in [place] - L4   Ambulated to bathroom - L4   Ambulated in room - L4  VTE Prevention/Management:   ambulation promoted   ROM (active) performed  Range of Motion: ROM (range of motion) performed  Intervention: Prevent Infection  Flowsheets (Taken 8/22/2022 1053)  Infection Prevention: hand hygiene promoted     Problem: Infection  Goal: Absence of Infection Signs and Symptoms  Outcome: Ongoing, Progressing  Intervention: Prevent or Manage Infection  Flowsheets (Taken 8/22/2022 1053)  Isolation Precautions: precautions maintained

## 2022-08-22 NOTE — PROGRESS NOTES
Subjective:       Patient ID: Magdaleno Hirsch is a 22 y.o. male.    Chief Complaint: Eye Problem (Pt to er from his eye doctor for having white spots in eye. Pt also c/o having a knot to chest area. Denies visual problems.)      Diagnosis:  1. 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  3. Retinal hemorrhage secondary to 1.    Current Treatment:   1. Hydroxyurea 4 g every 12 hours    Treatment History:  N/A    Eye Problem   Pertinent negatives include no fever or weakness.     HPI:  22-year-old male with no real medical history who went in for a routine eye exam on 08/18/2022 to update his eye glasses prescription.  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  He had bilateral Valdez spots with vessel tortuosity.  The optometrist recommended that the patient have blood work including testing for sickle cell disease.  The patient presented to the emergency department.  CBC in the emergency department revealed a white blood cell count of 411,900. Hemoglobin was 8.3, platelets were normal at 375,000 hundred and seventy five thousand.  Differential was suggestive of CML.  Coagulation studies revealed an INR of 1.38, PTT of 36.4 and a fibrinogen of 292.  Patient was going to present to [place], however they were on diversion.  He was transferred from [facility] to [facility].  Hematology consulted.    Interval History:   Patient seen and examined on rounds.  He is doing well.  He denies any worsening symptoms.  He has his bone marrow scheduled for this morning.    No past medical history on file.   Past Surgical History:   Procedure Laterality Date    KNEE SURGERY Left      Social History     Socioeconomic History    Marital status: Single   Tobacco Use    Smoking status: Never Smoker    Smokeless tobacco: Never Used   Substance and Sexual Activity    Alcohol use: Never    Drug use: Never      No family  history on file.   Review of patient's allergies indicates:  No Known Allergies   Review of Systems   Constitutional: Negative for chills, diaphoresis, fatigue, fever and unexpected weight change.   HENT: Negative for nasal congestion, mouth sores, sinus pressure/congestion and sore throat.    Eyes: Positive for visual disturbance. Negative for pain.   Respiratory: Negative for cough, chest tightness and shortness of breath.    Cardiovascular: Negative for chest pain, palpitations and leg swelling.   Gastrointestinal: Negative for abdominal distention, abdominal pain, blood in stool, constipation and diarrhea.   Genitourinary: Negative for dysuria, frequency and hematuria.   Musculoskeletal: Negative for arthralgias and back pain.   Integumentary:  Negative for rash.   Neurological: Negative for dizziness, weakness, numbness and headaches.   Hematological: Negative for adenopathy.   Psychiatric/Behavioral: Negative for confusion.         Objective:      Physical Exam  Vitals reviewed.   Constitutional:       General: He is awake.      Appearance: Normal appearance.   HENT:      Head: Normocephalic and atraumatic.      Right Ear: Hearing normal.      Left Ear: Hearing normal.      Nose: Nose normal.   Eyes:      General: Lids are normal. Vision grossly intact.      Extraocular Movements: Extraocular movements intact.      Conjunctiva/sclera: Conjunctivae normal.   Cardiovascular:      Rate and Rhythm: Normal rate and regular rhythm.      Pulses: Normal pulses.      Heart sounds: Normal heart sounds.   Pulmonary:      Effort: Pulmonary effort is normal.      Breath sounds: Normal breath sounds. No wheezing, rhonchi or rales.   Chest:   Breasts:      Right: No axillary adenopathy or supraclavicular adenopathy.      Left: No axillary adenopathy or supraclavicular adenopathy.        Comments: Soft mass in area of the sternum  Abdominal:      General: Bowel sounds are normal.      Palpations: Abdomen is soft. There is  splenomegaly.      Tenderness: There is no abdominal tenderness.   Musculoskeletal:      Cervical back: Full passive range of motion without pain.      Right lower leg: No edema.      Left lower leg: No edema.   Lymphadenopathy:      Cervical: No cervical adenopathy.      Upper Body:      Right upper body: No supraclavicular or axillary adenopathy.      Left upper body: No supraclavicular or axillary adenopathy.   Skin:     General: Skin is warm.   Neurological:      General: No focal deficit present.      Mental Status: He is alert and oriented to person, place, and time.   Psychiatric:         Attention and Perception: Attention normal.         Mood and Affect: Mood and affect normal.         Behavior: Behavior is cooperative.         LABS AND IMAGING REVIEWED IN EPIC          Assessment:   1. 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  3. Retinal hemorrhage secondary to 1.        Plan:       At this time, the patient's CBC is suggestive of likely CML.  We will check peripheral blood for bcr/ABL quantitative and qualitative.      Peripheral blood smear very consistent with CML.    Bone marrow biopsy today.    Continue hydroxyurea 4 g every 12 hours.  Okay to discharge home with continuing Hydrea.  He will get labs twice this week, tomorrow and Thursday as well as next week.    As for his retinal hemorrhage, no intervention needed.  This was discussed with ophthalmology.  The treatment for this will be treatment of the underlying cause which is likely CML.      Once we get a diagnosis, we will likely start a TKI.      Paul Hogan II, MD

## 2022-08-22 NOTE — DISCHARGE SUMMARY
"[facility] Hospital Medicine Discharge Summary    Admit Date: 8/18/2022  Discharge Date and Time: 8/22/202212:45 PM  Admitting Physician:  Team  Discharging Physician: Sybil Cat MD.  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 Pt also c/o having a knot to chest area. Denies visual problems.)         Patient information was obtained from patient, patient's family, past medical records and ER records.      HISTORY OF PRESENT ILLNESS:   Magdaleno Hirsch is a 22 y.o. Black or  male with significant past medical history.  Patient went for routine eye exam Tuesday (08/16/2022) and was told to have "lattice degeneration of the retina, bilateral retinal hemorrhages and bilateral Valdez spots with vessel tortuosity." His ophthalmologist recommended he have blood work performed including testing for sickle cell disease. The patient presented to [facility] ED on 8/18/2022 for further evaluation.  Work up revealed .9, H&H 8.3/23.8, MCV 75.3, LD 1329, PT 16.7, PTT 36.4 and INR 1.3. SARS-CoV-2 PCR positive.  Chest x-ray without acute cardiopulmonary processes. The patient was being transferred to [place] but they are currently on divert and therefore patient sent to [facility]. On exam, he has no complaints. He denies fever, chills, chest pain, abdominal pain, nausea, vomiting diarrhea, headache, vision changes, weight loss, night sweats. He does not use tobacco products or alcohol. He does smoke marijuana occasionally. He is not COVID vaccination and denies exposure. He denies a family " history of any cancers.     Patient currently admitted for severe leukocytosis with concerns for CML.  blood pressure persistently elevated begin p.o. amlodipine 10 mg daily   White cell count is trending down words.  Continue hydroxyurea and plans for bone marrow biopsy on 08/22, oncology recs   Tumor lysis labs reviewed LDH trending upwards, uric acid elevated blood improving, begin IV fluids 125 cc/hour   SCDs for DVT prophylaxis given bilateral retinal hemorrhage  Bone marrow biopsy for today. Will follow up with treatment out pt   - po hydrea prescribed   - po norco q 6hr prn pain             Exam  General appearance: Well-developed, well-nourished male in no apparent distress. Sitting in bed. Mother at bedside.  HEENT: Atraumatic head. Moist mucous membranes of oral cavity.  Lungs: Clear to auscultation bilaterally.   Heart: Regular rate and rhythm.   Abdomen: Soft, non-distended, non-tender. Bowel sounds are normal.   Extremities: No cyanosis, clubbing. No deformities.  Skin: No Rash. Warm and dry.  Neuro: Awake, alert and oriented. Motor and sensory exams grossly intact.  Psych/mental status: Appropriate mood and affect. Cooperative. Responds appropriately to questions.        Pt was seen and examined on the day of discharge  Vitals:  VITAL SIGNS: 24 HRS MIN & MAX LAST   Temp  Min: 97.8 °F (36.6 °C)  Max: 98.4 °F (36.9 °C) 98.4 °F (36.9 °C)   BP  Min: 131/77  Max: 148/100 (!) 148/100   Pulse  Min: 83  Max: 95  83   Resp  Min: 18  Max: 20 18   SpO2  Min: 97 %  Max: 100 % 100 %       Procedures Performed: No admission procedures for hospital encounter.     Significant Diagnostic Studies: See Full reports for all details    Recent Labs   Lab 08/18/22  1424 08/19/22  1507 08/19/22  2140 08/20/22  0553 08/21/22  1000 08/21/22  1950 08/22/22  0839   .9* 350.2* 287.6*   < > 272.6* 190.6* 177.7*   RBC 3.16* 3.35* 2.94*   < > 3.74* 3.72* 3.67*   HGB 8.3* 8.9* 7.5*   < > 9.4* 8.7* 8.6*   HCT 23.8* 26.6* 22.3*    < > 29.0* 27.8* 27.2*   MCV 75.3* 79.4* 75.9*   < > 77.5* 74.7* 74.1*   MCH 26.3* 26.6* 25.5*   < > 25.1* 23.4* 23.4*   MCHC 34.9 33.5 33.6   < > 32.4* 31.3* 31.6*   RDW 21.3* 22.0* 21.7*   < > 22.0* 21.1* 21.3*    447* 370   < > 486* 432* 432*   MPV 10.7* 10.9* 11.3*  --   --   --   --     < > = values in this interval not displayed.       Recent Labs   Lab 08/18/22  1521 08/19/22  1507 08/21/22  0903 08/21/22  1950 08/22/22  0839   NA  --    < > 136 135* 135*   K  --    < > 3.8 4.1 4.6   CO2  --    < > 22 22 21*   BUN  --    < > 14.3 17.7 16.5   CREATININE  --    < > 0.84 0.78 0.81   CALCIUM  --    < > 9.4 9.1 9.4   MG 1.90  --   --   --   --    ALBUMIN  --    < > 3.9 3.7 3.7   ALKPHOS  --    < > 81 76 72   ALT  --    < > 17 16 17   AST  --    < > 55* 49* 49*   BILITOT  --    < > 1.4 1.3 1.4    < > = values in this interval not displayed.        Microbiology Results (last 7 days)     ** No results found for the last 168 hours. **           US Abdomen Complete  Narrative: EXAMINATION:  US ABDOMEN COMPLETE    CLINICAL HISTORY:  evaluate splenomegaly;    TECHNIQUE:  Complete abdominal ultrasound (including pancreas, aorta, liver, gallbladder, common bile duct, IVC, kidneys, and spleen) was performed.    COMPARISON:  None    FINDINGS:  LIVER: Liver measures 21 cm cranial caudal at the midclavicular line. Homogeneous echotexture.  No focal mass appreciable. Portal vein is patent with appropriate directional flow.    PANCREAS: Obscured by overlying bowel gas.    GALLBLADDER: No shadowing calculi, wall thickening, or pericholecystic fluid.    BILE DUCTS: 3 mm common bile duct. Distal common bile duct is obscured by shadowing bowel gas.    AORTA: No aneurysm.    INFERIOR VENA CAVA: Normal in appearance.    RIGHT KIDNEY: 10.6 cm. No hydronephrosis.    LEFT KIDNEY: 12.1 cm. No hydronephrosis.    SPLEEN: 22 cm.  Homogeneous echotexture.    OTHER: No ascites.  Impression: Hepatosplenomegaly.    Electronically signed  by: Kya Dolan  Date:    08/20/2022  Time:    09:03         Medication List      You have not been prescribed any medications.          Explained in detail to the patient about the discharge plan, medications, and follow-up visits. Pt understands and agrees with the treatment plan  Discharge Disposition: Home or Self Care   Discharged Condition: stable  Diet-   Dietary Orders (From admission, onward)     Start     Ordered    08/22/22 0001  Diet NPO Except for: Medication  Diet effective midnight        Question:  Except for  Answer:  Medication    08/21/22 0738               Medications Per DC med rec  Activities as tolerated   Follow-up Information     Primary Doctor No. Schedule an appointment as soon as possible for a visit in 1 week(s).    Why: oncology- f/up with dr orellana                     For further questions contact hospitalist office    Discharge time 33 minutes    For worsening symptoms, chest pain, shortness of breath, increased abdominal pain, high grade fever, stroke or stroke like symptoms, immediately go to the nearest Emergency Room or call 911 as soon as possible.      Sybil Urbina M.D on 8/22/2022. at 12:45 PM.

## 2022-08-22 NOTE — PROCEDURES
"Magdaleno Hirsch is a 22 y.o. male patient.    Temp: 98.4 °F (36.9 °C) (08/22/22 0757)  Pulse: 89 (08/22/22 1249)  Resp: 20 (08/22/22 1249)  BP: 137/88 (08/22/22 1249)  SpO2: 100 % (08/22/22 1249)  Weight: 84.8 kg (187 lb) (08/21/22 1230)  Height: 6' (182.9 cm) (08/21/22 1230)  Mallampati Scale: Class II  ASA Classification: Class 2    Bone marrow    Date/Time: 8/22/2022 1:04 PM  Performed by: Getachew Chen MD  Authorized by: Getachew Chen MD     Consent Done?: Yes (Verbal) and Yes (Written)   Immediately prior to procedure a time out was called to verify the correct patient, procedure, equipment, support staff and site/side marked as required.   Patient was prepped and draped in the usual sterile fashion.      Assistants?: Yes    List of assistants: Lilia Durán I was present for the entire procedure.    Position: right lateral  Anesthesia: local infiltration  Aspiration?: Yes   Biopsy?: Yes    Specimen source: posterior iliac crest  Number of Specimens: 2  Estimated blood loss (cc):5  Patient tolerated the procedure well with no immediate complications.  Post-operative instructions were provided for the patient.  Patient was discharged and will follow up if any complications occur.     The patient was sedated with 6mg Versed IV, administered in titrated doses.  The aspirate was essentially a dry tap.  Less than 1cc of liquid obtained.  The needle was partially withdrawn and re-positioned x2 and still no aspirate could be obtained.  After I obtained a core biopsy, I then re-inserted the Jamshidi needle and attempted another aspirate, but this still returned a "dry tap".  Additional core biopsy was obtained on this second pass.  A short segment (5mm) of core biopsy was preserved in EDTA in a purple top tube in the case that the reference lab may attempt some molecular testing on that sample.          8/22/2022    "

## 2022-08-23 ENCOUNTER — PATIENT OUTREACH (OUTPATIENT)
Dept: ADMINISTRATIVE | Facility: CLINIC | Age: 22
End: 2022-08-23
Payer: MEDICAID

## 2022-08-23 LAB — G6PD RBC-CCNT: 12.7 U/G HB (ref 8–11.9)

## 2022-08-23 NOTE — PROGRESS NOTES
C3 nurse spoke with Magdaleno Hirsch for a TCC post hospital discharge follow up call. The patient reports does not have a scheduled HOSFU appointment. C3 nurse was unable to schedule HOSFU appointment for [facility] PCP. Patient advised to contact their PCP to schedule a HOSPFU within 7-14 days.

## 2022-08-25 ENCOUNTER — PATIENT MESSAGE (OUTPATIENT)
Dept: HEMATOLOGY/ONCOLOGY | Facility: CLINIC | Age: 22
End: 2022-08-25
Payer: MEDICAID

## 2022-08-30 LAB
DHEA SERPL-MCNC: NORMAL
ESTROGEN SERPL-MCNC: NORMAL PG/ML
INSULIN SERPL-ACNC: NORMAL U[IU]/ML
LAB AP BM CBC: NORMAL
LAB AP BM PERIPHERAL SMEAR: NORMAL
LAB AP CLINICAL INFORMATION: NORMAL
LAB AP GROSS DESCRIPTION: NORMAL
T3RU NFR SERPL: NORMAL %

## 2022-08-31 LAB — VIEW PATHOLOGY REPORT (RELIAPATH): NORMAL

## 2022-09-05 ENCOUNTER — HOSPITAL ENCOUNTER (INPATIENT)
Facility: HOSPITAL | Age: 22
LOS: 22 days | Discharge: HOME | DRG: 840 | End: 2022-09-27
Attending: EMERGENCY MEDICINE | Admitting: INTERNAL MEDICINE
Payer: MEDICAID

## 2022-09-05 DIAGNOSIS — R50.81 PANCYTOPENIA WITH FEVER: ICD-10-CM

## 2022-09-05 DIAGNOSIS — R50.9 FEVER: ICD-10-CM

## 2022-09-05 DIAGNOSIS — C92.10 CML (CHRONIC MYELOCYTIC LEUKEMIA): Primary | ICD-10-CM

## 2022-09-05 DIAGNOSIS — I10 PRIMARY HYPERTENSION: Chronic | ICD-10-CM

## 2022-09-05 DIAGNOSIS — B37.0 OROPHARYNGEAL CANDIDIASIS: ICD-10-CM

## 2022-09-05 DIAGNOSIS — R50.81 NEUTROPENIC FEVER: ICD-10-CM

## 2022-09-05 DIAGNOSIS — D61.818 PANCYTOPENIA WITH FEVER: ICD-10-CM

## 2022-09-05 DIAGNOSIS — D70.9 NEUTROPENIC FEVER: ICD-10-CM

## 2022-09-05 LAB
ALBUMIN SERPL-MCNC: 3.7 GM/DL (ref 3.5–5)
ALBUMIN/GLOB SERPL: 1.2 RATIO (ref 1.1–2)
ALP SERPL-CCNC: 58 UNIT/L (ref 40–150)
ALT SERPL-CCNC: 24 UNIT/L (ref 0–55)
APTT PPP: 36.9 SECONDS (ref 23.4–33.9)
AST SERPL-CCNC: 24 UNIT/L (ref 5–34)
BILIRUBIN DIRECT+TOT PNL SERPL-MCNC: 1.7 MG/DL
BUN SERPL-MCNC: 11 MG/DL (ref 8.9–20.6)
CALCIUM SERPL-MCNC: 8.9 MG/DL (ref 8.4–10.2)
CHLORIDE SERPL-SCNC: 104 MMOL/L (ref 98–107)
CO2 SERPL-SCNC: 24 MMOL/L (ref 22–29)
CREAT SERPL-MCNC: 0.94 MG/DL (ref 0.73–1.18)
ERYTHROCYTE [DISTWIDTH] IN BLOOD BY AUTOMATED COUNT: 18.5 % (ref 11.5–17)
FLUAV AG UPPER RESP QL IA.RAPID: NOT DETECTED
FLUBV AG UPPER RESP QL IA.RAPID: NOT DETECTED
GFR SERPLBLD CREATININE-BSD FMLA CKD-EPI: >60 MLS/MIN/1.73/M2
GLOBULIN SER-MCNC: 3.2 GM/DL (ref 2.4–3.5)
GLUCOSE SERPL-MCNC: 83 MG/DL (ref 74–100)
HCT VFR BLD AUTO: 18.1 % (ref 42–52)
HGB BLD-MCNC: 5.8 GM/DL (ref 14–18)
IMM GRANULOCYTES # BLD AUTO: 0 X10(3)/MCL (ref 0–0.04)
IMM GRANULOCYTES NFR BLD AUTO: 0 %
INR BLD: 1.22 (ref 2–3)
LACTATE SERPL-SCNC: 1.2 MMOL/L (ref 0.5–2.2)
MCH RBC QN AUTO: 23.1 PG (ref 27–31)
MCHC RBC AUTO-ENTMCNC: 32 MG/DL (ref 33–36)
MCV RBC AUTO: 72.1 FL (ref 80–94)
NRBC BLD AUTO-RTO: 0 %
PLATELET # BLD AUTO: 23 X10(3)/MCL (ref 130–400)
PLATELETS.RETICULATED NFR BLD AUTO: 1.1 % (ref 0.9–11.2)
PMV BLD AUTO: ABNORMAL FL
POTASSIUM SERPL-SCNC: 4 MMOL/L (ref 3.5–5.1)
PROT SERPL-MCNC: 6.9 GM/DL (ref 6.4–8.3)
PROTHROMBIN TIME: 15.1 SECONDS (ref 11.7–14.5)
RBC # BLD AUTO: 2.51 X10(6)/MCL (ref 4.7–6.1)
SARS-COV-2 RNA RESP QL NAA+PROBE: NOT DETECTED
SODIUM SERPL-SCNC: 140 MMOL/L (ref 136–145)
STREP A PCR (OHS): NOT DETECTED
WBC # SPEC AUTO: 0.7 X10(3)/MCL (ref 4.5–11.5)

## 2022-09-05 PROCEDURE — 86920 COMPATIBILITY TEST SPIN: CPT | Performed by: HOSPITALIST

## 2022-09-05 PROCEDURE — 87636 SARSCOV2 & INF A&B AMP PRB: CPT | Performed by: PHYSICIAN ASSISTANT

## 2022-09-05 PROCEDURE — 85610 PROTHROMBIN TIME: CPT | Performed by: EMERGENCY MEDICINE

## 2022-09-05 PROCEDURE — 85025 COMPLETE CBC W/AUTO DIFF WBC: CPT | Performed by: EMERGENCY MEDICINE

## 2022-09-05 PROCEDURE — 80053 COMPREHEN METABOLIC PANEL: CPT | Performed by: EMERGENCY MEDICINE

## 2022-09-05 PROCEDURE — 21400001 HC TELEMETRY ROOM

## 2022-09-05 PROCEDURE — 86920 COMPATIBILITY TEST SPIN: CPT | Performed by: EMERGENCY MEDICINE

## 2022-09-05 PROCEDURE — 99291 CRITICAL CARE FIRST HOUR: CPT | Mod: 25

## 2022-09-05 PROCEDURE — 87040 BLOOD CULTURE FOR BACTERIA: CPT | Performed by: EMERGENCY MEDICINE

## 2022-09-05 PROCEDURE — 83605 ASSAY OF LACTIC ACID: CPT | Performed by: EMERGENCY MEDICINE

## 2022-09-05 PROCEDURE — 85384 FIBRINOGEN ACTIVITY: CPT | Performed by: EMERGENCY MEDICINE

## 2022-09-05 PROCEDURE — 87631 RESP VIRUS 3-5 TARGETS: CPT | Performed by: PHYSICIAN ASSISTANT

## 2022-09-05 PROCEDURE — 85730 THROMBOPLASTIN TIME PARTIAL: CPT | Performed by: EMERGENCY MEDICINE

## 2022-09-05 PROCEDURE — 25000003 PHARM REV CODE 250: Performed by: EMERGENCY MEDICINE

## 2022-09-05 PROCEDURE — 36415 COLL VENOUS BLD VENIPUNCTURE: CPT | Performed by: EMERGENCY MEDICINE

## 2022-09-05 PROCEDURE — 86901 BLOOD TYPING SEROLOGIC RH(D): CPT | Performed by: EMERGENCY MEDICINE

## 2022-09-05 RX ORDER — HYDROCODONE BITARTRATE AND ACETAMINOPHEN 500; 5 MG/1; MG/1
TABLET ORAL
Status: DISCONTINUED | OUTPATIENT
Start: 2022-09-05 | End: 2022-09-07

## 2022-09-05 RX ORDER — ACETAMINOPHEN 500 MG
500 TABLET ORAL
Status: COMPLETED | OUTPATIENT
Start: 2022-09-05 | End: 2022-09-05

## 2022-09-05 RX ORDER — CLONIDINE HYDROCHLORIDE 0.1 MG/1
0.1 TABLET ORAL
Status: COMPLETED | OUTPATIENT
Start: 2022-09-05 | End: 2022-09-05

## 2022-09-05 RX ORDER — IBUPROFEN 600 MG/1
600 TABLET ORAL
Status: DISCONTINUED | OUTPATIENT
Start: 2022-09-05 | End: 2022-09-05

## 2022-09-05 RX ADMIN — CEFEPIME 2 G: 2 INJECTION, POWDER, FOR SOLUTION INTRAVENOUS at 11:09

## 2022-09-05 RX ADMIN — CLONIDINE HYDROCHLORIDE 0.1 MG: 0.1 TABLET ORAL at 09:09

## 2022-09-05 RX ADMIN — ACETAMINOPHEN 500 MG: 500 TABLET ORAL at 09:09

## 2022-09-05 NOTE — Clinical Note
Diagnosis: Fever [460302]   Admitting Provider:: BOO DOBSON [[id number]]   Future Attending Provider: BOO DOBSON [[id number]]   Reason for IP Medical Treatment  (Clinical interventions that can only be accomplished in the IP setting? ) :: transfusion, oncology   Estimated Length of Stay:: 2 midnights   I certify that Inpatient services for greater than or equal to 2 midnights are medically necessary:: Yes   Plans for Post-Acute care--if anticipated (pick the single best option):: A. No post acute care anticipated at this time

## 2022-09-06 LAB
ABO + RH BLD: NORMAL
ABORH RETYPE: NORMAL
ABS NEUT CALC (OHS): 0.03 X10(3)/MCL (ref 2.1–9.2)
ANISOCYTOSIS BLD QL SMEAR: ABNORMAL
APPEARANCE UR: CLEAR
BACTERIA #/AREA URNS AUTO: ABNORMAL /HPF
BILIRUB UR QL STRIP.AUTO: NEGATIVE MG/DL
BLD PROD TYP BPU: NORMAL
BLOOD UNIT EXPIRATION DATE: NORMAL
BLOOD UNIT TYPE CODE: 1700
BLOOD UNIT TYPE CODE: 1700
BLOOD UNIT TYPE CODE: 600
COLOR UR AUTO: YELLOW
CROSSMATCH INTERPRETATION: NORMAL
DISPENSE STATUS: NORMAL
EOSINOPHIL NFR BLD MANUAL: 0.01 X10(3)/MCL (ref 0–0.9)
EOSINOPHIL NFR BLD MANUAL: 2 % (ref 0–8)
FIBRINOGEN PPP-MCNC: 390 MG/DL (ref 210–463)
GLUCOSE UR QL STRIP.AUTO: NEGATIVE MG/DL
GROUP & RH: NORMAL
HYPOCHROMIA BLD QL SMEAR: ABNORMAL
INDIRECT COOMBS GEL: NORMAL
KETONES UR QL STRIP.AUTO: ABNORMAL MG/DL
LEUKOCYTE ESTERASE UR QL STRIP.AUTO: NEGATIVE UNIT/L
LYMPHOCYTES NFR BLD MANUAL: 0.66 X10(3)/MCL
LYMPHOCYTES NFR BLD MANUAL: 94 % (ref 13–40)
MICROCYTES BLD QL SMEAR: ABNORMAL
MUCOUS THREADS URNS QL MICRO: ABNORMAL /LPF
NEUTROPHILS NFR BLD MANUAL: 4 % (ref 47–80)
NITRITE UR QL STRIP.AUTO: NEGATIVE
PH UR STRIP.AUTO: 7 [PH]
PLATELET # BLD EST: ABNORMAL 10*3/UL
POIKILOCYTOSIS BLD QL SMEAR: ABNORMAL
PROT UR QL STRIP.AUTO: ABNORMAL MG/DL
RBC #/AREA URNS AUTO: ABNORMAL /HPF
RBC MORPH BLD: ABNORMAL
RBC UR QL AUTO: ABNORMAL UNIT/L
SCHISTOCYTE (OLG): ABNORMAL
SP GR UR STRIP.AUTO: 1.01
SQUAMOUS #/AREA URNS AUTO: ABNORMAL /HPF
UNIT NUMBER: NORMAL
UROBILINOGEN UR STRIP-ACNC: 2 MG/DL
WBC #/AREA URNS AUTO: ABNORMAL /HPF

## 2022-09-06 PROCEDURE — P9040 RBC LEUKOREDUCED IRRADIATED: HCPCS | Performed by: EMERGENCY MEDICINE

## 2022-09-06 PROCEDURE — 81001 URINALYSIS AUTO W/SCOPE: CPT | Performed by: EMERGENCY MEDICINE

## 2022-09-06 PROCEDURE — 63600175 PHARM REV CODE 636 W HCPCS: Performed by: INTERNAL MEDICINE

## 2022-09-06 PROCEDURE — 25000003 PHARM REV CODE 250: Performed by: EMERGENCY MEDICINE

## 2022-09-06 PROCEDURE — P9037 PLATE PHERES LEUKOREDU IRRAD: HCPCS | Performed by: EMERGENCY MEDICINE

## 2022-09-06 PROCEDURE — 21400001 HC TELEMETRY ROOM

## 2022-09-06 PROCEDURE — 36430 TRANSFUSION BLD/BLD COMPNT: CPT

## 2022-09-06 PROCEDURE — 36415 COLL VENOUS BLD VENIPUNCTURE: CPT | Performed by: EMERGENCY MEDICINE

## 2022-09-06 PROCEDURE — 11000001 HC ACUTE MED/SURG PRIVATE ROOM

## 2022-09-06 PROCEDURE — 25000003 PHARM REV CODE 250: Performed by: INTERNAL MEDICINE

## 2022-09-06 PROCEDURE — 63600175 PHARM REV CODE 636 W HCPCS: Performed by: EMERGENCY MEDICINE

## 2022-09-06 RX ORDER — FAMOTIDINE 20 MG/1
20 TABLET, FILM COATED ORAL 2 TIMES DAILY
Status: DISCONTINUED | OUTPATIENT
Start: 2022-09-06 | End: 2022-09-27 | Stop reason: HOSPADM

## 2022-09-06 RX ORDER — GLUCAGON 1 MG
1 KIT INJECTION
Status: DISCONTINUED | OUTPATIENT
Start: 2022-09-06 | End: 2022-09-27 | Stop reason: HOSPADM

## 2022-09-06 RX ORDER — AMLODIPINE BESYLATE 5 MG/1
10 TABLET ORAL DAILY
Status: DISCONTINUED | OUTPATIENT
Start: 2022-09-06 | End: 2022-09-27 | Stop reason: HOSPADM

## 2022-09-06 RX ORDER — IBUPROFEN 200 MG
24 TABLET ORAL
Status: DISCONTINUED | OUTPATIENT
Start: 2022-09-06 | End: 2022-09-27 | Stop reason: HOSPADM

## 2022-09-06 RX ORDER — ONDANSETRON 2 MG/ML
4 INJECTION INTRAMUSCULAR; INTRAVENOUS EVERY 8 HOURS PRN
Status: DISCONTINUED | OUTPATIENT
Start: 2022-09-06 | End: 2022-09-27 | Stop reason: HOSPADM

## 2022-09-06 RX ORDER — ACETAMINOPHEN 500 MG
500 TABLET ORAL EVERY 4 HOURS PRN
Status: DISCONTINUED | OUTPATIENT
Start: 2022-09-06 | End: 2022-09-27 | Stop reason: HOSPADM

## 2022-09-06 RX ORDER — IBUPROFEN 200 MG
16 TABLET ORAL
Status: DISCONTINUED | OUTPATIENT
Start: 2022-09-06 | End: 2022-09-27 | Stop reason: HOSPADM

## 2022-09-06 RX ORDER — HYDROCODONE BITARTRATE AND ACETAMINOPHEN 5; 325 MG/1; MG/1
1 TABLET ORAL EVERY 6 HOURS PRN
Status: DISCONTINUED | OUTPATIENT
Start: 2022-09-06 | End: 2022-09-27 | Stop reason: HOSPADM

## 2022-09-06 RX ORDER — AMLODIPINE BESYLATE 5 MG/1
10 TABLET ORAL DAILY
Status: DISCONTINUED | OUTPATIENT
Start: 2022-09-06 | End: 2022-09-06

## 2022-09-06 RX ORDER — CLONIDINE HYDROCHLORIDE 0.1 MG/1
0.1 TABLET ORAL 3 TIMES DAILY PRN
Status: DISCONTINUED | OUTPATIENT
Start: 2022-09-06 | End: 2022-09-27 | Stop reason: HOSPADM

## 2022-09-06 RX ADMIN — CEFEPIME 2 G: 2 INJECTION, POWDER, FOR SOLUTION INTRAVENOUS at 04:09

## 2022-09-06 RX ADMIN — VANCOMYCIN HYDROCHLORIDE 1000 MG: 1 INJECTION, POWDER, LYOPHILIZED, FOR SOLUTION INTRAVENOUS at 06:09

## 2022-09-06 RX ADMIN — ACETAMINOPHEN 500 MG: 500 TABLET, FILM COATED ORAL at 12:09

## 2022-09-06 RX ADMIN — CEFEPIME 2 G: 2 INJECTION, POWDER, FOR SOLUTION INTRAVENOUS at 08:09

## 2022-09-06 RX ADMIN — AMLODIPINE BESYLATE 10 MG: 5 TABLET ORAL at 09:09

## 2022-09-06 RX ADMIN — CEFEPIME 2 G: 2 INJECTION, POWDER, FOR SOLUTION INTRAVENOUS at 11:09

## 2022-09-06 RX ADMIN — FAMOTIDINE 20 MG: 20 TABLET, FILM COATED ORAL at 08:09

## 2022-09-06 RX ADMIN — ACETAMINOPHEN 500 MG: 500 TABLET, FILM COATED ORAL at 03:09

## 2022-09-06 RX ADMIN — VANCOMYCIN HYDROCHLORIDE 1000 MG: 1 INJECTION, POWDER, LYOPHILIZED, FOR SOLUTION INTRAVENOUS at 12:09

## 2022-09-06 RX ADMIN — ACETAMINOPHEN 500 MG: 500 TABLET, FILM COATED ORAL at 04:09

## 2022-09-06 RX ADMIN — SODIUM CHLORIDE, POTASSIUM CHLORIDE, SODIUM LACTATE AND CALCIUM CHLORIDE 1000 ML: 600; 310; 30; 20 INJECTION, SOLUTION INTRAVENOUS at 01:09

## 2022-09-06 NOTE — H&P
[facility] - Emergency Dept  History & Physical    SUBJECTIVE:     Chief Complaint/Reason for Admission:   Sore throat and nose bleeds     History of Present Illness:  Patient is a 22 y.o. male seen through tele med with a nurse bedside and informed consent by me from home at [facility] er admitted for in pateint care with a new diagnosis of cml and pancytopenia with fever - patient was seen triaged and evaluated in the e r- hematology was consulted got a dose of vanc and cefepime and a platelet infusion and admitted for the same  (Not in a hospital admission)      Review of patient's allergies indicates:  No Known Allergies    No past medical history on file.  Past Surgical History:   Procedure Laterality Date    BONE MARROW BIOPSY N/A 8/22/2022    Procedure: Biopsy-bone marrow;  Surgeon: Getachew Chen MD;  Location: [facility];  Service: General;  Laterality: N/A;    KNEE SURGERY Left      History reviewed. No pertinent family history.  Social History     Tobacco Use    Smoking status: Never    Smokeless tobacco: Never   Substance Use Topics    Alcohol use: Never    Drug use: Never        Review of Systems:  Constitutional: no fever or chills, positive for fevers  Eyes: no visual changes  ENT: no nasal congestion or sore throat, positive for epistaxis  Respiratory: no cough or shortness of breath  Cardiovascular: no chest pain or palpitations  Gastrointestinal: no nausea or vomiting, no abdominal pain or change in bowel habits  Genitourinary: no hematuria or dysuria  Integument/Breast: no rash or pruritis  Hematologic/Lymphatic: no easy bruising or lymphadenopathy, positive for easy bruising  Allergy/Immunology: postnasal drip  Musculoskeletal: no arthralgias or myalgias, positive for myalgias    OBJECTIVE:     Vital Signs (Most Recent):  Temp: 100.3 °F (37.9 °C) (09/06/22 0408)  Pulse: 81 (09/06/22 0335)  Resp: 18 (09/06/22 0335)  BP: (!) 140/92 (09/06/22 0335)  SpO2: 100 % (09/06/22  0335)    Physical Exam:  General: well developed, well nourished, appears stated age  HEENT - nasal packing   Chest clear  CVS S1S2 N  Abd soft BS +  Neuro no new fnd  Ext no c/c/e  Psych NAD  Endo NAD  Msk myalgias      Laboratory:  I have reviewed all pertinent lab results within the past 24 hours.  CBC:   Recent Labs   Lab 09/05/22 2146   WBC 0.7*   RBC 2.51*   HGB 5.8*   HCT 18.1*   PLT 23*   MCV 72.1*   MCH 23.1*   MCHC 32.0*     BMP:   Recent Labs   Lab 09/05/22 2110      K 4.0   CO2 24   BUN 11.0   CREATININE 0.94   CALCIUM 8.9     CMP:   Recent Labs   Lab 09/05/22 2110   CALCIUM 8.9   ALBUMIN 3.7      K 4.0   CO2 24   BUN 11.0   CREATININE 0.94   ALKPHOS 58   ALT 24   AST 24   BILITOT 1.7*     LFTs:   Recent Labs   Lab 09/05/22 2110   ALT 24   AST 24   ALKPHOS 58   BILITOT 1.7*   ALBUMIN 3.7     Coagulation:   Recent Labs   Lab 09/05/22 2110   LABPROT 6.9   INR 1.22*     Cardiac markers: No results for input(s): CKMB, CPKMB, TROPONINT, TROPONINI, MYOGLOBIN in the last 168 hours.  ABGs: No results for input(s): PH, PCO2, PO2, HCO3, POCSATURATED, BE in the last 168 hours.  Microbiology Results (last 7 days)       Procedure Component Value Units Date/Time    Blood Culture #1 **CANNOT BE ORDERED STAT** [[id number]] Collected: 09/05/22 2135    Order Status: Sent Specimen: Blood from Antecubital, Left Updated: 09/05/22 2149    Blood Culture #2 **CANNOT BE ORDERED STAT** [[id number]] Collected: 09/05/22 2146    Order Status: Sent Specimen: Blood from Hand, Left Updated: 09/05/22 2148          Specimen (24h ago, onward)      None          No results for input(s): COLORU, CLARITYU, SPECGRAV, PHUR, PROTEINUA, GLUCOSEU, BILIRUBINCON, BLOODU, WBCU, RBCU, BACTERIA, MUCUS, NITRITE, LEUKOCYTESUR, UROBILINOGEN, HYALINECASTS in the last 168 hours.    Diagnostic Results:  Reviewed     ASSESSMENT/PLAN:     CML with pancytopenia  Admit to acute care  Hematology consulted  Platelet transfusion  Labs reviewed  Type  and cross of prbc's  Meds reviewed  Old records requested  Febrile neutropenia  IV cefepime and vanc  Hematology consulted  Precautions advised  Dobbs cultured  Repeat labs   DVT and GI prophylaxis

## 2022-09-06 NOTE — PROGRESS NOTES
[facility] EMERGENCY DEPARTMENT  Hospital Medicine Progress Note      Patient Name: Magdaleno Hirsch  MRN: [id number]  Admission Date: 9/5/2022   Length of Stay: 1  Attending Physician: Reno Lyle MD  Primary Care Provider: Primary Doctor No  Face-to-Face encounter date: 09/06/2022    Code Status: Not on file        Chief Complaint:   Sore Throat (Sore throat-chills -feet ache weakness for 2 days-has leukemia-no treatment yet-appt 9/16-oncology)        HPI:   23 yo male presents with fever Tmax 103f, sore throat, nose and gingival bleeding, weakness x 3 days.  Pt also says his appetite is poor and hurts to eat from sore gums, bottom feet have been hurting .  Recently diagnosed with leukemia biopsy 8/22 and has appointment 9/16 dr Negro.  He was started on hydroxyurea last month at initial presentation.  ED spoke with MD given 2u rbc/platelet,hydroxyurea stopped.  Pt started on cefepime and vancomycin, and ivfs.    Overview/Hospital Course:  No notes on file       Interval Hx:   Pt resting in bed ill appearing +f, hoarse,sore throat/gingiva, bottom of feet feel alittle better.    Review of Systems   All other systems reviewed and are negative.    Objective/physical exam:  General: In no acute distress,   Chest: Clear to auscultation bilaterally  Heart: RRR, +S1, S2, no appreciable murmur  Abdomen: Soft, nontender, BS +  MSK: Warm, no lower extremity edema, no clubbing or cyanosis  Neurologic: Alert and oriented x4, Cranial nerve II-XII intact, Strength 5/5 in all 4 extremities    VITAL SIGNS: 24 HRS MIN & MAX LAST   Temp  Min: 98.1 °F (36.7 °C)  Max: 100.9 °F (38.3 °C) (!) 100.4 °F (38 °C)     BP  Min: 126/68  Max: 192/112 (!) 142/83     Pulse  Min: 77  Max: 102  86   Resp  Min: 13  Max: 25 13   SpO2  Min: 98 %  Max: 100 % 100 %       Recent Labs   Lab 09/05/22  2146   WBC 0.7*   RBC 2.51*   HGB 5.8*   HCT 18.1*   MCV 72.1*   MCH 23.1*   MCHC 32.0*   RDW 18.5*   PLT 23*        Recent Labs   Lab 09/05/22 2110      K 4.0   CO2 24   BUN 11.0   CREATININE 0.94   CALCIUM 8.9   ALBUMIN 3.7   ALKPHOS 58   ALT 24   AST 24   BILITOT 1.7*        Microbiology Results (last 7 days)       Procedure Component Value Units Date/Time    Blood Culture #1 **CANNOT BE ORDERED STAT** [[id number]] Collected: 09/05/22 2135    Order Status: Resulted Specimen: Blood from Antecubital, Left Updated: 09/05/22 2149    Blood Culture #2 **CANNOT BE ORDERED STAT** [[id number]] Collected: 09/05/22 2146    Order Status: Resulted Specimen: Blood from Hand, Left Updated: 09/05/22 2148             Radiology:  X-Ray Chest AP Portable  Narrative: EXAMINATION:  XR CHEST AP PORTABLE    CLINICAL HISTORY:  Fever, unspecified    TECHNIQUE:  One view    COMPARISON:  August 18, 2022.    FINDINGS:  Cardiopericardial silhouette is within normal limits. Lungs are without dense focal or segmental consolidation, congestive process, pleural effusions or pneumothorax.  Impression: NO ACUTE CARDIOPULMONARY PROCESS IDENTIFIED.    Electronically signed by: Phill Andrew  Date:    09/06/2022  Time:    07:41      Scheduled Med:   amLODIPine  10 mg Oral Daily    ceFEPime (MAXIPIME) IVPB  2 g Intravenous Q8H        Continuous Infusions:       PRN Meds:  sodium chloride, acetaminophen     Nutrition Status:      Assessment/Plan:  Pancytopenia/febrile neutropenia  Htn    Plan    Continue antibiotics/ivfs  Bcxs  2u rbc/platelet  Repeat labs  Consult oncology/hematology    Gi proph: pepcid  Dvt proph: scd          All diagnosis and differential diagnosis have been reviewed; assessment and plan has been documented; I have personally reviewed the labs and test results that are presently available; I have reviewed the patients medication list; I have reviewed the consulting providers response and recommendations. I have reviewed or attempted to review medical records based upon their  availability      _____________________________________________________________________            Reno Lyle MD   09/06/2022

## 2022-09-06 NOTE — PROGRESS NOTES
Pharmacokinetic Initial Assessment: IV Vancomycin    Assessment/Plan:    Initiate intravenous vancomycin with loading dose of 2000   mg once followed by a maintenance dose of vancomycin 1000mg IV every 8 hours  Desired empiric serum trough concentration is 10 to 20 mcg/mL  Draw vancomycin trough level 60 min prior to fourth dose on 9/7 at approximately 1630  Pharmacy will continue to follow and monitor vancomycin.      Please contact pharmacy at extension [phone] with any questions regarding this assessment.     Thank you for the consult,   Binabatsheva Alejandro       Patient brief summary:  Magdaleno Hirsch is a 22 y.o. male initiated on antimicrobial therapy with IV Vancomycin for treatment of suspected neutropenic fever    Drug Allergies:   Review of patient's allergies indicates:  No Known Allergies    Actual Body Weight:   74.4kg      Renal Function:   Estimated Creatinine Clearance: 127.5 mL/min (based on SCr of 0.94 mg/dL).,     Dialysis Method (if applicable):  N/A    CBC (last 72 hours):  Recent Labs   Lab Result Units 09/05/22  2146   WBC x10(3)/mcL 0.7*   Hgb gm/dL 5.8*   Hct % 18.1*   Platelet x10(3)/mcL 23*   Eos Man % 2       Metabolic Panel (last 72 hours):  Recent Labs   Lab Result Units 09/05/22  2110 09/06/22  1444   Sodium Level mmol/L 140  --    Potassium Level mmol/L 4.0  --    Chloride mmol/L 104  --    Carbon Dioxide mmol/L 24  --    Glucose Level mg/dL 83  --    Glucose, UA mg/dL  --  Negative   Blood Urea Nitrogen mg/dL 11.0  --    Creatinine mg/dL 0.94  --    Albumin Level gm/dL 3.7  --    Bilirubin Total mg/dL 1.7*  --    Alkaline Phosphatase unit/L 58  --    Aspartate Aminotransferase unit/L 24  --    Alanine Aminotransferase unit/L 24  --        Drug levels (last 3 results):  No results for input(s): VANCOMYCINRA, VANCORANDOM, VANCOMYCINPE, VANCOPEAK, VANCOMYCINTR, VANCOTROUGH in the last 72 hours.    Microbiologic Results:  Microbiology Results (last 7 days)       Procedure Component Value Units  Date/Time    Blood Culture #1 **CANNOT BE ORDERED STAT** [[id number]] Collected: 09/05/22 2135    Order Status: Resulted Specimen: Blood from Antecubital, Left Updated: 09/05/22 2149    Blood Culture #2 **CANNOT BE ORDERED STAT** [[id number]] Collected: 09/05/22 2146    Order Status: Resulted Specimen: Blood from Hand, Left Updated: 09/05/22 2148

## 2022-09-06 NOTE — ED PROVIDER NOTES
Encounter Date: 9/5/2022       History     Chief Complaint   Patient presents with    Sore Throat     Sore throat-chills -feet ache weakness for 2 days-has leukemia-no treatment yet-appt 9/16-oncology     Patient is a 21 yo M presenting with fever and sore throat and weakness x 2 days. He was recently diagnosed with likely CML, had bone marrow biopsy done on 8/22. Started on medication (hydroxyurea and norco- compliant) and was to complete the work up as outpatient. His blood pressure was elevated during his hosptial stay and he was started on amlodipine 10 mg (non compliant) Also had some nose bleeds, mostly out of his left nare. Sore throat and some burning on his feet. His gums have been bleeding a little. No vomiting or diarrhea. No ear pain or neck stiff ness. He's had a non productive cough.    Review of patient's allergies indicates:  No Known Allergies  No past medical history on file.  Past Surgical History:   Procedure Laterality Date    BONE MARROW BIOPSY N/A 8/22/2022    Procedure: Biopsy-bone marrow;  Surgeon: Getachew Chen MD;  Location: [facility];  Service: General;  Laterality: N/A;    KNEE SURGERY Left      History reviewed. No pertinent family history.  Social History     Tobacco Use    Smoking status: Never    Smokeless tobacco: Never   Substance Use Topics    Alcohol use: Never    Drug use: Never     Review of Systems   Constitutional:  Positive for fatigue and fever.   HENT:  Positive for sore throat.    Respiratory:  Positive for cough. Negative for shortness of breath.    Cardiovascular:  Negative for chest pain.   Gastrointestinal:  Negative for abdominal pain, diarrhea and nausea.   Genitourinary:  Negative for dysuria.   Musculoskeletal:  Negative for back pain.   Skin:  Negative for rash.   Neurological:  Negative for weakness.   Hematological:  Does not bruise/bleed easily.   All other systems reviewed and are negative.    Physical Exam     Initial Vitals [09/05/22 2042]   BP Pulse Resp Temp  SpO2   (!) 192/112 95 20 (!) 100.5 °F (38.1 °C) 98 %      MAP       --         Physical Exam    Nursing note and vitals reviewed.  Constitutional: He appears well-developed and well-nourished. He is not diaphoretic. No distress.   HENT:   Head: Normocephalic and atraumatic.   Mouth/Throat: Oropharynx is clear and moist.   Some bleeding from the posterior gums. No bleeding currently from nares   Eyes: Conjunctivae are normal.   Neck: Neck supple.   Cardiovascular:  Normal rate, regular rhythm and normal heart sounds.           Pulmonary/Chest: Breath sounds normal. No respiratory distress. He has no wheezes. He has no rhonchi.   Abdominal: Abdomen is soft. Bowel sounds are normal. He exhibits no distension. There is no abdominal tenderness. There is no rebound and no guarding.   Musculoskeletal:         General: No edema. Normal range of motion.      Cervical back: Neck supple.     Neurological: He is alert and oriented to person, place, and time.   Skin: Skin is warm and dry.   Some dry skin on feet but no distinct rash.  A few dark spots on hands   Psychiatric: He has a normal mood and affect. Thought content normal.       ED Course   Critical Care    Date/Time: 9/6/2022 2:00 AM  Performed by: Jeanie Pedro MD  Authorized by: Jeanie Pedro MD   Direct patient critical care time: 20 minutes  Additional history critical care time: 5 minutes  Ordering / reviewing critical care time: 15 minutes  Documentation critical care time: 10 minutes  Consulting other physicians critical care time: 8 minutes  Total critical care time (exclusive of procedural time) : 58 minutes  Critical care time was exclusive of separately billable procedures and treating other patients.  Critical care was necessary to treat or prevent imminent or life-threatening deterioration of the following conditions: dehydration, sepsis and circulatory failure.  Critical care was time spent personally by me on the following activities: development of  treatment plan with patient or surrogate, discussions with consultants, evaluation of patient's response to treatment, examination of patient, obtaining history from patient or surrogate, ordering and performing treatments and interventions, ordering and review of laboratory studies, ordering and review of radiographic studies, pulse oximetry, re-evaluation of patient's condition and review of old charts.      Labs Reviewed   COMPREHENSIVE METABOLIC PANEL - Abnormal; Notable for the following components:       Result Value    Bilirubin Total 1.7 (*)     All other components within normal limits   PROTIME-INR - Abnormal; Notable for the following components:    PT 15.1 (*)     INR 1.22 (*)     All other components within normal limits   APTT - Abnormal; Notable for the following components:    PTT 36.9 (*)     All other components within normal limits   CBC WITH DIFFERENTIAL - Abnormal; Notable for the following components:    WBC 0.7 (*)     RBC 2.51 (*)     Hgb 5.8 (*)     Hct 18.1 (*)     MCV 72.1 (*)     MCH 23.1 (*)     MCHC 32.0 (*)     RDW 18.5 (*)     Platelet 23 (*)     All other components within normal limits   COVID/FLU A&B PCR - Normal   STREP GROUP A BY PCR - Normal   LACTIC ACID, PLASMA - Normal   FIBRINOGEN - Normal   BLOOD CULTURE [facility]   BLOOD CULTURE [facility]   CBC W/ AUTO DIFFERENTIAL    Narrative:     The following orders were created for panel order CBC auto differential.  Procedure                               Abnormality         Status                     ---------                               -----------         ------                     CBC with Differential[[id number]]        Abnormal            Final result               Manual Differential[[id number]]                              In process                   Please view results for these tests on the individual orders.   URINALYSIS, REFLEX TO URINE CULTURE   PATHOLOGIST INTERPRETATION   MANUAL DIFFERENTIAL   TYPE & SCREEN   ABORH RETYPE    PREPARE RBC SOFT   PREPARE PLATELETS (DOSE) SOFT          Imaging Results              X-Ray Chest AP Portable (Preliminary result)  Result time 09/05/22 22:11:38      ED Interpretation by Jeanie Pedro MD (09/05/22 22:11:38, [facility] - Emergency Dept, Emergency Medicine)    No acute process                                     Medications   vancomycin (VANCOCIN) 1,000 mg in dextrose 5 % 250 mL IVPB (1,000 mg Intravenous New Bag 9/6/22 0003)   ceFEPIme (MAXIPIME) 2 g in dextrose 5 % in water (D5W) 5 % 50 mL IVPB (MB+) (0 g Intravenous Stopped 9/5/22 2349)   0.9%  NaCl infusion (for blood administration) (has no administration in time range)   lactated ringers bolus 1,000 mL (1,000 mLs Intravenous New Bag 9/6/22 0117)   cloNIDine tablet 0.1 mg (0.1 mg Oral Given 9/5/22 2118)   acetaminophen tablet 500 mg (500 mg Oral Given 9/5/22 2118)     Medical Decision Making:   Results were reviewed with patient. Risks and benefits of blood transfusion were discussed. Questions were answered. Patient comfortable with plan. Currently sill awaiting urine.            ED Course as of 09/06/22 0123   Mon Sep 05, 2022   2239 Hospitalist paged. Dr. Refugio Hogan service paged. [GM]   Spoke with Dr. Roosevelt Cat who reviewed patient's history. Will stop hydroxyurea for now. Broad spectrum antibiotics. Okay to irradiated RBCs and irradiated Leukoreduced platelets. Fibrinogen added. Spoke with Hospitalist service who will admit the patient.    ED Course User Index  [GM] Jeanie Pedro MD             Clinical Impression:   Final diagnoses:  [R50.9] Fever        ED Disposition Condition    Admit                 Jeanie Pedro MD  09/06/22 0242

## 2022-09-07 LAB
ABS NEUT (OLG): ABNORMAL
ANION GAP SERPL CALC-SCNC: 4 MEQ/L
ANISOCYTOSIS BLD QL SMEAR: ABNORMAL
BASOPHILS NFR BLD MANUAL: 17 %
BUN SERPL-MCNC: 11.1 MG/DL (ref 8.9–20.6)
CALCIUM SERPL-MCNC: 8 MG/DL (ref 8.4–10.2)
CHLORIDE SERPL-SCNC: 106 MMOL/L (ref 98–107)
CO2 SERPL-SCNC: 26 MMOL/L (ref 22–29)
CREAT SERPL-MCNC: 0.95 MG/DL (ref 0.73–1.18)
CREAT/UREA NIT SERPL: 12
EOSINOPHIL NFR BLD MANUAL: 4 %
ERYTHROCYTE [DISTWIDTH] IN BLOOD BY AUTOMATED COUNT: 21 % (ref 11.5–17)
GFR SERPLBLD CREATININE-BSD FMLA CKD-EPI: >60 MLS/MIN/1.73/M2
GLUCOSE SERPL-MCNC: 106 MG/DL (ref 74–100)
HCT VFR BLD AUTO: 21.6 % (ref 42–52)
HGB BLD-MCNC: 6.9 GM/DL (ref 14–18)
IMM GRANULOCYTES # BLD AUTO: 0 X10(3)/MCL (ref 0–0.04)
IMM GRANULOCYTES NFR BLD AUTO: 0 %
LYMPHOCYTES NFR BLD MANUAL: 80 %
MACROCYTES BLD QL SMEAR: ABNORMAL
MCH RBC QN AUTO: 24.6 PG (ref 27–31)
MCHC RBC AUTO-ENTMCNC: 31.9 MG/DL (ref 33–36)
MCV RBC AUTO: 77.1 FL (ref 80–94)
MICROCYTES BLD QL SMEAR: ABNORMAL
NEUTROPHILS NFR BLD MANUAL: 0 %
NRBC BLD AUTO-RTO: 0 %
NRBC BLD MANUAL-RTO: 2 %
PLATELET # BLD AUTO: 20 X10(3)/MCL (ref 130–400)
PLATELET # BLD EST: ABNORMAL 10*3/UL
PMV BLD AUTO: ABNORMAL FL
POIKILOCYTOSIS BLD QL SMEAR: ABNORMAL
POTASSIUM SERPL-SCNC: 3.8 MMOL/L (ref 3.5–5.1)
RBC # BLD AUTO: 2.8 X10(6)/MCL (ref 4.7–6.1)
RBC MORPH BLD: ABNORMAL
SODIUM SERPL-SCNC: 136 MMOL/L (ref 136–145)
TEAR DROP CELL (OLG): ABNORMAL
VANCOMYCIN TROUGH SERPL-MCNC: 14.1 UG/ML (ref 15–20)
WBC # SPEC AUTO: 0.8 X10(3)/MCL (ref 4.5–11.5)

## 2022-09-07 PROCEDURE — 99222 PR INITIAL HOSPITAL CARE,LEVL II: ICD-10-PCS | Mod: ,,, | Performed by: INTERNAL MEDICINE

## 2022-09-07 PROCEDURE — 80048 BASIC METABOLIC PNL TOTAL CA: CPT | Performed by: INTERNAL MEDICINE

## 2022-09-07 PROCEDURE — 36415 COLL VENOUS BLD VENIPUNCTURE: CPT | Performed by: INTERNAL MEDICINE

## 2022-09-07 PROCEDURE — 25000003 PHARM REV CODE 250: Performed by: EMERGENCY MEDICINE

## 2022-09-07 PROCEDURE — 25000003 PHARM REV CODE 250: Performed by: INTERNAL MEDICINE

## 2022-09-07 PROCEDURE — 63600175 PHARM REV CODE 636 W HCPCS: Performed by: INTERNAL MEDICINE

## 2022-09-07 PROCEDURE — 21400001 HC TELEMETRY ROOM

## 2022-09-07 PROCEDURE — 85025 COMPLETE CBC W/AUTO DIFF WBC: CPT | Performed by: INTERNAL MEDICINE

## 2022-09-07 PROCEDURE — 99222 1ST HOSP IP/OBS MODERATE 55: CPT | Mod: ,,, | Performed by: INTERNAL MEDICINE

## 2022-09-07 PROCEDURE — 80202 ASSAY OF VANCOMYCIN: CPT | Performed by: INTERNAL MEDICINE

## 2022-09-07 PROCEDURE — 63600175 PHARM REV CODE 636 W HCPCS: Performed by: EMERGENCY MEDICINE

## 2022-09-07 RX ORDER — SODIUM CHLORIDE 9 MG/ML
INJECTION, SOLUTION INTRAVENOUS CONTINUOUS
Status: DISCONTINUED | OUTPATIENT
Start: 2022-09-07 | End: 2022-09-24

## 2022-09-07 RX ORDER — VANCOMYCIN HCL IN 5 % DEXTROSE 1G/250ML
1000 PLASTIC BAG, INJECTION (ML) INTRAVENOUS
Status: DISCONTINUED | OUTPATIENT
Start: 2022-09-07 | End: 2022-09-08

## 2022-09-07 RX ORDER — LABETALOL 200 MG/1
200 TABLET, FILM COATED ORAL EVERY 12 HOURS
Status: DISCONTINUED | OUTPATIENT
Start: 2022-09-07 | End: 2022-09-27 | Stop reason: HOSPADM

## 2022-09-07 RX ORDER — HYDRALAZINE HYDROCHLORIDE 20 MG/ML
20 INJECTION INTRAMUSCULAR; INTRAVENOUS
Status: DISCONTINUED | OUTPATIENT
Start: 2022-09-07 | End: 2022-09-27 | Stop reason: HOSPADM

## 2022-09-07 RX ORDER — LABETALOL HYDROCHLORIDE 5 MG/ML
20 INJECTION, SOLUTION INTRAVENOUS
Status: DISCONTINUED | OUTPATIENT
Start: 2022-09-07 | End: 2022-09-27 | Stop reason: HOSPADM

## 2022-09-07 RX ADMIN — AMLODIPINE BESYLATE 10 MG: 5 TABLET ORAL at 08:09

## 2022-09-07 RX ADMIN — VANCOMYCIN HYDROCHLORIDE 1000 MG: 1 INJECTION, POWDER, LYOPHILIZED, FOR SOLUTION INTRAVENOUS at 01:09

## 2022-09-07 RX ADMIN — ACETAMINOPHEN 500 MG: 500 TABLET, FILM COATED ORAL at 03:09

## 2022-09-07 RX ADMIN — CEFEPIME 2 G: 2 INJECTION, POWDER, FOR SOLUTION INTRAVENOUS at 04:09

## 2022-09-07 RX ADMIN — LABETALOL HYDROCHLORIDE 200 MG: 200 TABLET, FILM COATED ORAL at 09:09

## 2022-09-07 RX ADMIN — VANCOMYCIN HYDROCHLORIDE 1000 MG: 1 INJECTION, POWDER, LYOPHILIZED, FOR SOLUTION INTRAVENOUS at 06:09

## 2022-09-07 RX ADMIN — FAMOTIDINE 20 MG: 20 TABLET, FILM COATED ORAL at 08:09

## 2022-09-07 RX ADMIN — CEFEPIME 2 G: 2 INJECTION, POWDER, FOR SOLUTION INTRAVENOUS at 08:09

## 2022-09-07 RX ADMIN — SODIUM CHLORIDE: 9 INJECTION, SOLUTION INTRAVENOUS at 04:09

## 2022-09-07 RX ADMIN — DIPHENHYDRAMINE HYDROCHLORIDE 15 ML: 25 SOLUTION ORAL at 04:09

## 2022-09-07 RX ADMIN — VANCOMYCIN HYDROCHLORIDE 1000 MG: 1 INJECTION, POWDER, LYOPHILIZED, FOR SOLUTION INTRAVENOUS at 10:09

## 2022-09-07 NOTE — CONSULTS
Subjective:       Patient ID: Magdaleno Hirsch is a 22 y.o. male.    Chief Complaint: Sore Throat (Sore throat-chills -feet ache weakness for 2 days-has leukemia-no treatment yet-appt 9/16-oncology)      Diagnosis:  1. 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  3. Retinal hemorrhage secondary to 1.    Current Treatment:   1. Hydroxyurea 4 g every 12 hours    Treatment History:  N/A    Eye Problem   Pertinent negatives include no fever or weakness.   Sore Throat   Pertinent negatives include no abdominal pain, congestion, coughing, diarrhea, headaches or shortness of breath.   HPI:  22-year-old male with no real medical history who went in for a routine eye exam on 08/18/2022 to update his eye glasses prescription.  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  He had bilateral Valdez spots with vessel tortuosity.  The optometrist recommended that the patient have blood work including testing for sickle cell disease.  The patient presented to the emergency department.  CBC in the emergency department revealed a white blood cell count of 411,900. Hemoglobin was 8.3, platelets were normal at 375,000 hundred and seventy five thousand.  Differential was suggestive of CML.  Coagulation studies revealed an INR of 1.38, PTT of 36.4 and a fibrinogen of 292.  Patient was going to present to [place], however they were on diversion.  He was transferred from [facility] [place].  Hematology consulted.  Patient underwent bone marrow biopsy on 08/22/2022, this revealed CML, chronic phase, BCR/ABL1 positive.    Interval History:   22-year-old patient known to me from previous hospitalization.  Was discharged home on 08/22/2022, he was informed about twice weekly labs until follow up appointment with me and continued on hydroxyurea.  Unfortunately, the patient did not keep his lab appointments.  He presented to the  emergency department on 09/05/2022 with febrile neutropenia, was admitted to the hospitalist service and transferred to main campus [facility].  Hematology consulted as patient known to me.    No past medical history on file.   Past Surgical History:   Procedure Laterality Date    BONE MARROW BIOPSY N/A 8/22/2022    Procedure: Biopsy-bone marrow;  Surgeon: Getachew Chen MD;  Location: [facility];  Service: General;  Laterality: N/A;    KNEE SURGERY Left      Social History     Socioeconomic History    Marital status: Single   Tobacco Use    Smoking status: Never    Smokeless tobacco: Never   Substance and Sexual Activity    Alcohol use: Never    Drug use: Never      History reviewed. No pertinent family history.   Review of patient's allergies indicates:  No Known Allergies   Review of Systems   Constitutional:  Negative for chills, diaphoresis, fatigue, fever and unexpected weight change.   HENT:  Positive for sore throat. Negative for nasal congestion, mouth sores and sinus pressure/congestion.    Eyes:  Positive for visual disturbance. Negative for pain.   Respiratory:  Negative for cough, chest tightness and shortness of breath.    Cardiovascular:  Negative for chest pain, palpitations and leg swelling.   Gastrointestinal:  Negative for abdominal distention, abdominal pain, blood in stool, constipation and diarrhea.   Genitourinary:  Negative for dysuria, frequency and hematuria.   Musculoskeletal:  Negative for arthralgias and back pain.   Integumentary:  Negative for rash.   Neurological:  Negative for dizziness, weakness, numbness and headaches.   Hematological:  Negative for adenopathy.   Psychiatric/Behavioral:  Negative for confusion.        Objective:      Physical Exam  Vitals reviewed.   Constitutional:       General: He is awake.      Appearance: Normal appearance.   HENT:      Head: Normocephalic and atraumatic.      Right Ear: Hearing normal.      Left Ear: Hearing normal.      Nose: Nose  normal.   Eyes:      General: Lids are normal. Vision grossly intact.      Extraocular Movements: Extraocular movements intact.      Conjunctiva/sclera: Conjunctivae normal.   Cardiovascular:      Rate and Rhythm: Normal rate and regular rhythm.      Pulses: Normal pulses.      Heart sounds: Normal heart sounds.   Pulmonary:      Effort: Pulmonary effort is normal.      Breath sounds: Normal breath sounds. No wheezing, rhonchi or rales.   Chest:      Comments: Soft mass in area of the sternum  Abdominal:      General: Bowel sounds are normal.      Palpations: Abdomen is soft. There is splenomegaly.      Tenderness: There is no abdominal tenderness.   Musculoskeletal:      Cervical back: Full passive range of motion without pain.      Right lower leg: No edema.      Left lower leg: No edema.   Lymphadenopathy:      Cervical: No cervical adenopathy.      Upper Body:      Right upper body: No supraclavicular or axillary adenopathy.      Left upper body: No supraclavicular or axillary adenopathy.   Skin:     General: Skin is warm.   Neurological:      General: No focal deficit present.      Mental Status: He is alert and oriented to person, place, and time.   Psychiatric:         Attention and Perception: Attention normal.         Mood and Affect: Mood and affect normal.         Behavior: Behavior is cooperative.       LABS AND IMAGING REVIEWED IN EPIC          Assessment:   1. CML, chronic phase  2. 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  3. Retinal hemorrhage secondary to 1.        Plan:       Patient has CML, diagnosed on bone marrow biopsy done on 08/22/2022.    Patient was discharged home on 08/22/2022 with plans to continue hydroxyurea 4 g every 12 hours and have blood work twice a week until his follow up with me and final bone marrow biopsy.    Unfortunately, the patient did not have twice weekly labs, but continued on hydroxyurea.  When asked about lab appointments, the patient's wife stated that he was supposed to come on  Mondays and Thursdays.  Unfortunately he was not able to make these visits.      He is now admitted with febrile neutropenia, almost certainly from hydroxyurea.      We will hold hydroxyurea.      We will treat him with antibiotics.  So far workup is negative for COVID, flu, strep.  Does have possible thrush.      I agree with continued antibiotics, would add an antifungal.    We will need to start a TKI, likely dasatinib once the patient's counts recover.      Paul Hogan II, MD

## 2022-09-07 NOTE — PROGRESS NOTES
Pharmacokinetic Assessment Follow Up: IV Vancomycin    Vancomycin serum concentration assessment(s):  The trough level was drawn correctly and can be used to guide therapy at this time. The measurement is within the desired definitive target range of 10 to 20 mcg/mL.    Vancomycin Regimen Plan:  Continue regimen to Vancomycin 1000 mg IV every 8 hours with next serum trough concentration measured at 1630 prior to fourth dose on 9/8    Drug levels (last 3 results):  Recent Labs   Lab Result Units 09/07/22  1646   Vancomycin Trough ug/ml 14.1*       Pharmacy will continue to follow and monitor vancomycin.    Please contact pharmacy at extension [phone] for questions regarding this assessment.    Thank you for the consult,   Susan Wren, EnrriqueD       Patient brief summary:  Magdaleno Hirsch is a 22 y.o. male initiated on antimicrobial therapy with IV Vancomycin for treatment of neutropenic fever    The patient's current regimen is 1000 mg IV Q8H    Drug Allergies:   Review of patient's allergies indicates:  No Known Allergies    Actual Body Weight:   74.4 kg    Renal Function:   Estimated Creatinine Clearance: 126.2 mL/min (based on SCr of 0.95 mg/dL).,     Dialysis Method (if applicable):  N/A    CBC (last 72 hours):  Recent Labs   Lab Result Units 09/05/22  2146 09/07/22  0355   WBC x10(3)/mcL 0.7* 0.8*   Hgb gm/dL 5.8* 6.9*   Hct % 18.1* 21.6*   Platelet x10(3)/mcL 23* 20*   Eos Man % 2 4   Basophil Man %  --  17       Metabolic Panel (last 72 hours):  Recent Labs   Lab Result Units 09/05/22  2110 09/06/22  1444 09/07/22  0355   Sodium Level mmol/L 140  --  136   Potassium Level mmol/L 4.0  --  3.8   Chloride mmol/L 104  --  106   Carbon Dioxide mmol/L 24  --  26   Glucose Level mg/dL 83  --  106*   Glucose, UA mg/dL  --  Negative  --    Blood Urea Nitrogen mg/dL 11.0  --  11.1   Creatinine mg/dL 0.94  --  0.95   Albumin Level gm/dL 3.7  --   --    Bilirubin Total mg/dL 1.7*  --   --    Alkaline Phosphatase unit/L 58  --    --    Aspartate Aminotransferase unit/L 24  --   --    Alanine Aminotransferase unit/L 24  --   --        Vancomycin Administrations:  vancomycin given in the last 96 hours                     vancomycin in dextrose 5 % 1 gram/250 mL IVPB 1,000 mg (mg) 1,000 mg New Bag 09/07/22 1026    vancomycin (VANCOCIN) 1,000 mg in dextrose 5 % 250 mL IVPB (mg) 1,000 mg New Bag 09/07/22 0133    vancomycin (VANCOCIN) 1,000 mg in dextrose 5 % 250 mL IVPB (mg) 1,000 mg New Bag 09/06/22 1837    vancomycin (VANCOCIN) 1,000 mg in dextrose 5 % 250 mL IVPB (mg) 1,000 mg New Bag 09/06/22 0003                    Microbiologic Results:  Microbiology Results (last 7 days)       Procedure Component Value Units Date/Time    Blood Culture #1 **CANNOT BE ORDERED STAT** [[id number]]  (Normal) Collected: 09/05/22 2135    Order Status: Completed Specimen: Blood from Antecubital, Left Updated: 09/07/22 1001     CULTURE, BLOOD ([facility]) No Growth At 24 Hours    Blood Culture #2 **CANNOT BE ORDERED STAT** [[id number]]  (Normal) Collected: 09/05/22 2146    Order Status: Completed Specimen: Blood from Hand, Left Updated: 09/07/22 1001     CULTURE, BLOOD ([facility]) No Growth At 24 Hours

## 2022-09-07 NOTE — PLAN OF CARE
09/07/22 1051   Discharge Assessment   Assessment Type Discharge Planning Assessment   Confirmed/corrected address, phone number and insurance Yes   Confirmed Demographics Correct on Facesheet   Source of Information patient   When was your last doctors appointment?   (Patient does not have a PCP at this time.)   Communicated LISA with patient/caregiver Yes   Reason For Admission Fever   Lives With other relative(s)   Do you expect to return to your current living situation? Yes   Do you have help at home or someone to help you manage your care at home? Yes   Who are your caregiver(s) and their phone number(s)? Gaileint resides with his Aunt, Kristy Keller ([phone]) and Aunt's .   Prior to hospitilization cognitive status: Unable to Assess   Current cognitive status: Alert/Oriented   Walking or Climbing Stairs Difficulty none   Dressing/Bathing Difficulty none   Home Accessibility wheelchair accessible   Home Layout Able to live on 1st floor   Equipment Currently Used at Home none   Readmission within 30 days? Yes   Patient currently being followed by outpatient case management? No   Do you currently have service(s) that help you manage your care at home? No   Do you take prescription medications? Yes   Do you have prescription coverage? Yes   Do you have any problems affording any of your prescribed medications? No   Is the patient taking medications as prescribed? yes   Who is going to help you get home at discharge? Kristofert reports his girlfriend, Marcia Irwin ([phone])   How do you get to doctors appointments? car, drives self   Are you on dialysis? No   Do you take coumadin? No   Discharge Plan A Home with family   Discharge Plan B Home   DME Needed Upon Discharge  none   Discharge Plan discussed with: Patient;Caregiver   Name(s) and Number(s) Aunt: Kristy Keller: [phone]   Discharge Barriers Identified None   Relationship/Environment   Name(s) of Who Lives With Patient Pateint  resides with his aunt and uncle.     MARISOL scheduled patient with Dr. Mika Fraser as a new patient. Patient's appointment is Wednesday September 21, 2022 at 9 am.

## 2022-09-07 NOTE — PLAN OF CARE
Problem: Adult Inpatient Plan of Care  Goal: Plan of Care Review  Outcome: Ongoing, Progressing  Flowsheets (Taken 9/6/2022 1930)  Plan of Care Reviewed With: patient  Goal: Patient-Specific Goal (Individualized)  Outcome: Ongoing, Progressing  Goal: Absence of Hospital-Acquired Illness or Injury  Outcome: Ongoing, Progressing  Intervention: Identify and Manage Fall Risk  Flowsheets (Taken 9/6/2022 1930)  Safety Promotion/Fall Prevention:   assistive device/personal item within reach   side rails raised x 2   lighting adjusted   medications reviewed   nonskid shoes/socks when out of bed  Intervention: Prevent Skin Injury  Flowsheets (Taken 9/6/2022 1930)  Body Position: sitting up in bed  Skin Protection:   adhesive use limited   tubing/devices free from skin contact  Intervention: Prevent and Manage VTE (Venous Thromboembolism) Risk  Flowsheets (Taken 9/6/2022 1930)  Activity Management: Ambulated in room - L4  VTE Prevention/Management: ambulation promoted  Intervention: Prevent Infection  Flowsheets (Taken 9/6/2022 1930)  Infection Prevention:   hand hygiene promoted   single patient room provided   equipment surfaces disinfected   rest/sleep promoted  Goal: Optimal Comfort and Wellbeing  Outcome: Ongoing, Progressing  Intervention: Monitor Pain and Promote Comfort  Flowsheets (Taken 9/6/2022 1930)  Pain Management Interventions:   quiet environment facilitated   care clustered  Intervention: Provide Person-Centered Care  Flowsheets (Taken 9/6/2022 1930)  Trust Relationship/Rapport:   care explained   choices provided   emotional support provided   questions answered   thoughts/feelings acknowledged  Goal: Readiness for Transition of Care  Outcome: Ongoing, Progressing

## 2022-09-07 NOTE — PROGRESS NOTES
[facility]  Hospital Medicine Progress Note        Chief Complaint: Inpatient follow-up on febrile neutropenia    HPI:   Patient is a 22-year-old  male with a past medical history of hypertension who went in for a routine eye exam on 08/18/2022 to update his eye glasses prescription.  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  He had bilateral Valdez spots with vessel tortuosity.  The optometrist recommended that the patient have blood work including testing for sickle cell disease.  The patient presented to the emergency department.  CBC in the emergency department revealed a white blood cell count of 411,900. Hemoglobin was 8.3, platelets were normal at 375,000.  Differential was suggestive of chronic myelogenous leukemia.  Coagulation studies revealed an INR of 1.38, PTT of 36.4 and a fibrinogen of 292.  Patient was going to present to [place], however they were on diversion.  He was transferred from [facility] to [facility].  Hematology was consulted.  Patient underwent bone marrow biopsy on 08/22/2022, this revealed chronic myelogenous leukemia, chronic phase, BCR/ABL1 positive.  Patient was discharged home on August 22, 2022 and was supposed to obtain twice weekly labs until follow-up with Dr. Gardner and continue hydroxyurea.  Unfortunately he did not keep his lab appointments and presented to the emergency room at [facility] on 09/06/2022 and was admitted there with febrile neutropenia and anemia.  Patient was started on empiric antibiotic therapy, transfused 2 units of packed red blood cells and 1 dose of platelets and then subsequently transferred here for further treatment and hematology evaluation.     Interval Hx:   Patient reports odynophagia.  Last recorded fever was 102.4 at 3:25 a.m. this morning.  Patient is  hemodynamically stable and on room air    Objective/physical exam:  General:  Thin chronically ill-appearing  male who appears older than his stated age but in no acute distress  HENT: normocephalic, atraumatic; pharyngeal erythema with white patches  Eye: PERRL, EOMI, clear conjunctiva  Neck: full ROM, no thyromegaly, no JVD  Respiratory: clear to auscultation bilaterally  Cardiovascular: regular rate and rhythm  Gastrointestinal: non-distended, positive bowel sounds, non-tender  Musculoskeletal: no gross deformity  Integumentary: warm, dry, intact, no rashes  Neurological: cranial nerves grossly intact, no focal neurological deficit  Psychiatric: cooperative, flat affect, disinterested    VITAL SIGNS: 24 HRS MIN & MAX LAST   Temp  Min: 98.7 °F (37.1 °C)  Max: 102.4 °F (39.1 °C) 100.2 °F (37.9 °C)   BP  Min: 128/82  Max: 165/85 (!) 147/94     Pulse  Min: 76  Max: 105  76   Resp  Min: 13  Max: 22 18   SpO2  Min: 99 %  Max: 100 % 100 %       Recent Labs   Lab 09/05/22 2146 09/07/22  0355   WBC 0.7* 0.8*   RBC 2.51* 2.80*   HGB 5.8* 6.9*   HCT 18.1* 21.6*   MCV 72.1* 77.1*   MCH 23.1* 24.6*   MCHC 32.0* 31.9*   RDW 18.5* 21.0*   PLT 23* 20*       Recent Labs   Lab 09/05/22 2110 09/07/22  0355    136   K 4.0 3.8   CO2 24 26   BUN 11.0 11.1   CREATININE 0.94 0.95   CALCIUM 8.9 8.0*   ALBUMIN 3.7  --    ALKPHOS 58  --    ALT 24  --    AST 24  --    BILITOT 1.7*  --           Microbiology Results (last 7 days)       Procedure Component Value Units Date/Time    Blood Culture #1 **CANNOT BE ORDERED STAT** [[id number]]  (Normal) Collected: 09/05/22 2135    Order Status: Completed Specimen: Blood from Antecubital, Left Updated: 09/07/22 1001     CULTURE, BLOOD ([facility]) No Growth At 24 Hours    Blood Culture #2 **CANNOT BE ORDERED STAT** [[id number]]  (Normal) Collected: 09/05/22 1369    Order Status: Completed Specimen: Blood from Hand, Left Updated: 09/07/22 1001     CULTURE, BLOOD ([facility]) No Growth At 24  Hours             See below for Radiology    Scheduled Med:   amLODIPine  10 mg Oral Daily    ceFEPime (MAXIPIME) IVPB  2 g Intravenous Q8H    famotidine  20 mg Oral BID    vancomycin (VANCOCIN) IVPB  1,000 mg Intravenous Q8H        Continuous Infusions:  None.    PRN Meds:  (Magic mouthwash) 1:1:1 diphenhydramine(Benadryl) 12.5mg/5ml liq, aluminum & magnesium hydroxide-simethicone (Maalox), LIDOcaine viscous 2%, sodium chloride, acetaminophen, cloNIDine, glucagon (human recombinant), glucose, glucose, HYDROcodone-acetaminophen, ondansetron, Pharmacy to dose Vancomycin consult **AND** vancomycin - pharmacy to dose       Assessment/Plan:  Chronic myelogenous leukemia  Febrile neutropenia likely due to hydroxyurea use  Anemia and neoplastic disease  Retinal hemorrhage related to #1  Immunocompromised   Oropharyngeal candidiasis  Essential hypertension, suboptimally controlled at  Medical noncompliance      Plan:   Patient has evaluated by Medical Oncology who has discontinued the hydroxyurea and recommends continuing empiric antibiotic therapy and is also added an antifungal  Start intravenous maintenance fluids due to patient's poor oral intake  Continue antipyretics and analgesics as needed  Blood pressure control    VTE prophylaxis:  SCDs    Patient condition:  Guarded    Anticipated discharge and Disposition:     TBD    All diagnosis and differential diagnosis have been reviewed; assessment and plan has been documented; I have personally reviewed the labs and test results that are presently available; I have reviewed the patients medication list; I have reviewed the consulting providers response and recommendations. I have reviewed or attempted to review medical records based upon their availability    All of the patient's questions have been  addressed and answered. Patient's is agreeable to the above stated plan. I will continue to monitor closely and make adjustments to medical management as  needed.  _____________________________________________________________________      Radiology:  X-Ray Chest AP Portable  Narrative: EXAMINATION:  XR CHEST AP PORTABLE    CLINICAL HISTORY:  Fever, unspecified    TECHNIQUE:  One view    COMPARISON:  August 18, 2022.    FINDINGS:  Cardiopericardial silhouette is within normal limits. Lungs are without dense focal or segmental consolidation, congestive process, pleural effusions or pneumothorax.  Impression: NO ACUTE CARDIOPULMONARY PROCESS IDENTIFIED.    Electronically signed by: Phill Andrew  Date:    09/06/2022  Time:    07:41      Phillip Goins MD   09/07/2022

## 2022-09-08 LAB
ABO + RH BLD: NORMAL
ALBUMIN SERPL-MCNC: 2.9 GM/DL (ref 3.5–5)
ALBUMIN/GLOB SERPL: 1.2 RATIO (ref 1.1–2)
ALP SERPL-CCNC: 53 UNIT/L (ref 40–150)
ALT SERPL-CCNC: 15 UNIT/L (ref 0–55)
AST SERPL-CCNC: 12 UNIT/L (ref 5–34)
BASOPHILS # BLD AUTO: 0.04 X10(3)/MCL (ref 0–0.2)
BASOPHILS NFR BLD AUTO: 4.8 %
BILIRUBIN DIRECT+TOT PNL SERPL-MCNC: 1.4 MG/DL
BLD PROD TYP BPU: NORMAL
BLOOD UNIT EXPIRATION DATE: NORMAL
BLOOD UNIT TYPE CODE: 1700
BLOOD UNIT TYPE CODE: 5100
BLOOD UNIT TYPE CODE: 5100
BUN SERPL-MCNC: 9.8 MG/DL (ref 8.9–20.6)
CALCIUM SERPL-MCNC: 8.5 MG/DL (ref 8.4–10.2)
CHLORIDE SERPL-SCNC: 107 MMOL/L (ref 98–107)
CO2 SERPL-SCNC: 25 MMOL/L (ref 22–29)
CREAT SERPL-MCNC: 0.82 MG/DL (ref 0.73–1.18)
CROSSMATCH INTERPRETATION: NORMAL
DISPENSE STATUS: NORMAL
EOSINOPHIL # BLD AUTO: 0 X10(3)/MCL (ref 0–0.9)
EOSINOPHIL NFR BLD AUTO: 0 %
ERYTHROCYTE [DISTWIDTH] IN BLOOD BY AUTOMATED COUNT: 22.2 % (ref 11.5–17)
GFR SERPLBLD CREATININE-BSD FMLA CKD-EPI: >60 MLS/MIN/1.73/M2
GLOBULIN SER-MCNC: 2.4 GM/DL (ref 2.4–3.5)
GLUCOSE SERPL-MCNC: 98 MG/DL (ref 74–100)
GROUP & RH: NORMAL
HCT VFR BLD AUTO: 19.8 % (ref 42–52)
HGB BLD-MCNC: 6.4 GM/DL (ref 14–18)
IMM GRANULOCYTES # BLD AUTO: 0 X10(3)/MCL (ref 0–0.04)
IMM GRANULOCYTES NFR BLD AUTO: 0 %
INDIRECT COOMBS GEL: NORMAL
LYMPHOCYTES # BLD AUTO: 0.8 X10(3)/MCL (ref 0.6–4.6)
LYMPHOCYTES NFR BLD AUTO: 95.2 %
MCH RBC QN AUTO: 26.2 PG (ref 27–31)
MCHC RBC AUTO-ENTMCNC: 32.3 MG/DL (ref 33–36)
MCV RBC AUTO: 81.1 FL (ref 80–94)
MONOCYTES # BLD AUTO: 0 X10(3)/MCL (ref 0.1–1.3)
MONOCYTES NFR BLD AUTO: 0 %
NEUTROPHILS # BLD AUTO: 0 X10(3)/MCL (ref 2.1–9.2)
NEUTROPHILS NFR BLD AUTO: 0 %
NRBC BLD AUTO-RTO: 0 %
PLATELET # BLD AUTO: 13 X10(3)/MCL (ref 130–400)
PMV BLD AUTO: ABNORMAL FL
POTASSIUM SERPL-SCNC: 3.6 MMOL/L (ref 3.5–5.1)
PROT SERPL-MCNC: 5.3 GM/DL (ref 6.4–8.3)
RBC # BLD AUTO: 2.44 X10(6)/MCL (ref 4.7–6.1)
SODIUM SERPL-SCNC: 137 MMOL/L (ref 136–145)
UNIT NUMBER: NORMAL
WBC # SPEC AUTO: 0.8 X10(3)/MCL (ref 4.5–11.5)

## 2022-09-08 PROCEDURE — 36415 COLL VENOUS BLD VENIPUNCTURE: CPT | Performed by: HOSPITALIST

## 2022-09-08 PROCEDURE — 99232 PR SUBSEQUENT HOSPITAL CARE,LEVL II: ICD-10-PCS | Mod: ,,, | Performed by: INTERNAL MEDICINE

## 2022-09-08 PROCEDURE — 86920 COMPATIBILITY TEST SPIN: CPT | Performed by: INTERNAL MEDICINE

## 2022-09-08 PROCEDURE — 63600175 PHARM REV CODE 636 W HCPCS: Performed by: HOSPITALIST

## 2022-09-08 PROCEDURE — 86850 RBC ANTIBODY SCREEN: CPT | Performed by: HOSPITALIST

## 2022-09-08 PROCEDURE — 63700000 PHARM REV CODE 250 ALT 637 W/O HCPCS: Performed by: HOSPITALIST

## 2022-09-08 PROCEDURE — 25000003 PHARM REV CODE 250: Performed by: INTERNAL MEDICINE

## 2022-09-08 PROCEDURE — 99232 SBSQ HOSP IP/OBS MODERATE 35: CPT | Mod: ,,, | Performed by: INTERNAL MEDICINE

## 2022-09-08 PROCEDURE — P9037 PLATE PHERES LEUKOREDU IRRAD: HCPCS | Performed by: HOSPITALIST

## 2022-09-08 PROCEDURE — 36415 COLL VENOUS BLD VENIPUNCTURE: CPT | Performed by: INTERNAL MEDICINE

## 2022-09-08 PROCEDURE — 63600175 PHARM REV CODE 636 W HCPCS: Performed by: INTERNAL MEDICINE

## 2022-09-08 PROCEDURE — 80053 COMPREHEN METABOLIC PANEL: CPT | Performed by: INTERNAL MEDICINE

## 2022-09-08 PROCEDURE — 63600175 PHARM REV CODE 636 W HCPCS: Performed by: EMERGENCY MEDICINE

## 2022-09-08 PROCEDURE — 25000003 PHARM REV CODE 250: Performed by: EMERGENCY MEDICINE

## 2022-09-08 PROCEDURE — 85025 COMPLETE CBC W/AUTO DIFF WBC: CPT | Performed by: INTERNAL MEDICINE

## 2022-09-08 PROCEDURE — 36430 TRANSFUSION BLD/BLD COMPNT: CPT

## 2022-09-08 PROCEDURE — 21400001 HC TELEMETRY ROOM

## 2022-09-08 PROCEDURE — P9040 RBC LEUKOREDUCED IRRADIATED: HCPCS | Performed by: HOSPITALIST

## 2022-09-08 PROCEDURE — 86920 COMPATIBILITY TEST SPIN: CPT | Performed by: HOSPITALIST

## 2022-09-08 RX ORDER — OXYMETAZOLINE HCL 0.05 %
2 SPRAY, NON-AEROSOL (ML) NASAL EVERY 12 HOURS PRN
Status: DISPENSED | OUTPATIENT
Start: 2022-09-08 | End: 2022-09-11

## 2022-09-08 RX ORDER — HYDROCODONE BITARTRATE AND ACETAMINOPHEN 500; 5 MG/1; MG/1
TABLET ORAL
Status: DISCONTINUED | OUTPATIENT
Start: 2022-09-08 | End: 2022-09-11

## 2022-09-08 RX ORDER — FLUCONAZOLE 100 MG/1
100 TABLET ORAL DAILY
Status: DISCONTINUED | OUTPATIENT
Start: 2022-09-08 | End: 2022-09-27 | Stop reason: HOSPADM

## 2022-09-08 RX ORDER — HYDROCODONE BITARTRATE AND ACETAMINOPHEN 500; 5 MG/1; MG/1
TABLET ORAL
Status: DISCONTINUED | OUTPATIENT
Start: 2022-09-08 | End: 2022-09-10

## 2022-09-08 RX ORDER — VANCOMYCIN HCL IN 5 % DEXTROSE 1G/250ML
1000 PLASTIC BAG, INJECTION (ML) INTRAVENOUS
Status: DISCONTINUED | OUTPATIENT
Start: 2022-09-08 | End: 2022-09-11

## 2022-09-08 RX ADMIN — VANCOMYCIN HYDROCHLORIDE 1000 MG: 1 INJECTION, POWDER, LYOPHILIZED, FOR SOLUTION INTRAVENOUS at 09:09

## 2022-09-08 RX ADMIN — FAMOTIDINE 20 MG: 20 TABLET, FILM COATED ORAL at 08:09

## 2022-09-08 RX ADMIN — CEFEPIME 2 G: 2 INJECTION, POWDER, FOR SOLUTION INTRAVENOUS at 08:09

## 2022-09-08 RX ADMIN — HYDRALAZINE HYDROCHLORIDE 20 MG: 20 INJECTION INTRAMUSCULAR; INTRAVENOUS at 11:09

## 2022-09-08 RX ADMIN — LABETALOL HYDROCHLORIDE 200 MG: 200 TABLET, FILM COATED ORAL at 08:09

## 2022-09-08 RX ADMIN — VANCOMYCIN HYDROCHLORIDE 1000 MG: 1 INJECTION, POWDER, LYOPHILIZED, FOR SOLUTION INTRAVENOUS at 01:09

## 2022-09-08 RX ADMIN — CEFEPIME 2 G: 2 INJECTION, POWDER, FOR SOLUTION INTRAVENOUS at 06:09

## 2022-09-08 RX ADMIN — AMLODIPINE BESYLATE 10 MG: 5 TABLET ORAL at 08:09

## 2022-09-08 RX ADMIN — FLUCONAZOLE 100 MG: 100 TABLET ORAL at 09:09

## 2022-09-08 RX ADMIN — DIPHENHYDRAMINE HYDROCHLORIDE 15 ML: 25 SOLUTION ORAL at 11:09

## 2022-09-08 RX ADMIN — HYDROCODONE BITARTRATE AND ACETAMINOPHEN 1 TABLET: 5; 325 TABLET ORAL at 07:09

## 2022-09-08 RX ADMIN — CEFEPIME 2 G: 2 INJECTION, POWDER, FOR SOLUTION INTRAVENOUS at 12:09

## 2022-09-08 RX ADMIN — SODIUM CHLORIDE: 9 INJECTION, SOLUTION INTRAVENOUS at 04:09

## 2022-09-08 RX ADMIN — VANCOMYCIN HYDROCHLORIDE 1000 MG: 1 INJECTION, POWDER, LYOPHILIZED, FOR SOLUTION INTRAVENOUS at 08:09

## 2022-09-08 RX ADMIN — DIPHENHYDRAMINE HYDROCHLORIDE 15 ML: 25 SOLUTION ORAL at 03:09

## 2022-09-08 NOTE — PLAN OF CARE
Problem: Adult Inpatient Plan of Care  Goal: Plan of Care Review  Outcome: Ongoing, Progressing  Flowsheets (Taken 9/7/2022 1941)  Plan of Care Reviewed With: patient  Goal: Patient-Specific Goal (Individualized)  Outcome: Ongoing, Progressing  Goal: Absence of Hospital-Acquired Illness or Injury  Outcome: Ongoing, Progressing  Intervention: Identify and Manage Fall Risk  Flowsheets (Taken 9/7/2022 1941)  Safety Promotion/Fall Prevention:   assistive device/personal item within reach   side rails raised x 2   medications reviewed   lighting adjusted   nonskid shoes/socks when out of bed  Intervention: Prevent Skin Injury  Flowsheets (Taken 9/7/2022 1941)  Skin Protection:   adhesive use limited   tubing/devices free from skin contact  Intervention: Prevent and Manage VTE (Venous Thromboembolism) Risk  Flowsheets (Taken 9/7/2022 1941)  Activity Management:   Ambulated to bathroom - L4   Ambulated in room - L4  VTE Prevention/Management: ambulation promoted  Intervention: Prevent Infection  Flowsheets (Taken 9/7/2022 1941)  Infection Prevention:   hand hygiene promoted   equipment surfaces disinfected   rest/sleep promoted   single patient room provided  Goal: Optimal Comfort and Wellbeing  Outcome: Ongoing, Progressing  Intervention: Monitor Pain and Promote Comfort  Flowsheets (Taken 9/7/2022 1941)  Pain Management Interventions:   care clustered   quiet environment facilitated  Intervention: Provide Person-Centered Care  Flowsheets (Taken 9/7/2022 1941)  Trust Relationship/Rapport:   care explained   emotional support provided   choices provided   empathic listening provided   thoughts/feelings acknowledged  Goal: Readiness for Transition of Care  Outcome: Ongoing, Progressing     Problem: Fall Injury Risk  Goal: Absence of Fall and Fall-Related Injury  Outcome: Ongoing, Progressing  Intervention: Identify and Manage Contributors  Flowsheets (Taken 9/7/2022 1941)  Self-Care Promotion: independence  encouraged  Medication Review/Management: medications reviewed

## 2022-09-08 NOTE — PLAN OF CARE
Problem: Adult Inpatient Plan of Care  Goal: Plan of Care Review  Outcome: Ongoing, Progressing  Goal: Patient-Specific Goal (Individualized)  Outcome: Ongoing, Progressing  Goal: Absence of Hospital-Acquired Illness or Injury  Outcome: Ongoing, Progressing  Goal: Optimal Comfort and Wellbeing  Outcome: Ongoing, Progressing  Goal: Readiness for Transition of Care  Outcome: Ongoing, Progressing     Problem: Fall Injury Risk  Goal: Absence of Fall and Fall-Related Injury  Outcome: Ongoing, Progressing  Intervention: Identify and Manage Contributors  Flowsheets (Taken 9/8/2022 1132)  Self-Care Promotion: independence encouraged  Medication Review/Management: medications reviewed  Intervention: Promote Injury-Free Environment  Flowsheets (Taken 9/8/2022 1132)  Safety Promotion/Fall Prevention: assistive device/personal item within reach

## 2022-09-08 NOTE — PROGRESS NOTES
[facility] [place] [facility]  Hospital Medicine Progress Note        Chief Complaint: Inpatient Follow-up for fever    HPI:   22-year-old  male with a past medical history of hypertension who went in for a routine eye exam on 08/18/2022 to update his eye glasses prescription.  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  He had bilateral Valdez spots with vessel tortuosity.  The optometrist recommended that the patient have blood work including testing for sickle cell disease.  The patient presented to the emergency department.  CBC in the emergency department revealed a white blood cell count of 411,900. Hemoglobin was 8.3, platelets were normal at 375,000.  Differential was suggestive of chronic myelogenous leukemia.  Coagulation studies revealed an INR of 1.38, PTT of 36.4 and a fibrinogen of 292.  Patient was going to present to [place], however they were on diversion.  He was transferred from [facility] to [facility].  Hematology was consulted.  Patient underwent bone marrow biopsy on 08/22/2022, this revealed chronic myelogenous leukemia, chronic phase, BCR/ABL1 positive.  Patient was discharged home on August 22, 2022 and was supposed to obtain twice weekly labs until follow-up with Dr. Gardner and continue hydroxyurea.  Unfortunately he did not keep his lab appointments and presented to the emergency room at [facility] on 09/06/2022 and was admitted there with febrile neutropenia and anemia.  Patient was started on empiric antibiotic therapy, transfused 2 units of packed red blood cells and 1 dose of platelets and then subsequently transferred here for further treatment and hematology evaluation.     Interval Hx:   No changes overnight. Throat pain about the same but has chloroseptic spray. Low grade fever overnight of 100.6.      Objective/physical  exam:  General:  Thin chronically ill-appearing  male who appears older than his stated age but in no acute distress  HENT: normocephalic, atraumatic; pharyngeal erythema with white patches  Eye: PERRL, EOMI, clear conjunctiva  Neck: full ROM, no thyromegaly, no JVD  Respiratory: clear to auscultation bilaterally  Cardiovascular: regular rate and rhythm  Gastrointestinal: non-distended, positive bowel sounds, non-tender  Musculoskeletal: no gross deformity  Integumentary: warm, dry, intact, no rashes  Neurological: cranial nerves grossly intact, no focal neurological deficit  Psychiatric: cooperative, flat affect, disinterested    VITAL SIGNS: 24 HRS MIN & MAX LAST   Temp  Min: 97.8 °F (36.6 °C)  Max: 100.6 °F (38.1 °C) 97.8 °F (36.6 °C)   BP  Min: 147/94  Max: 153/85 (!) 153/85     Pulse  Min: 76  Max: 102  86   Resp  Min: 18  Max: 18 18   SpO2  Min: 98 %  Max: 100 % 100 %       Recent Labs   Lab 09/05/22 2146 09/07/22 0355 09/08/22 0348   WBC 0.7* 0.8* 0.8*   RBC 2.51* 2.80* 2.44*   HGB 5.8* 6.9* 6.4*   HCT 18.1* 21.6* 19.8*   MCV 72.1* 77.1* 81.1   MCH 23.1* 24.6* 26.2*   MCHC 32.0* 31.9* 32.3*   RDW 18.5* 21.0* 22.2*   PLT 23* 20* 13*       Recent Labs   Lab 09/05/22 2110 09/07/22 0355 09/08/22  0348    136 137   K 4.0 3.8 3.6   CO2 24 26 25   BUN 11.0 11.1 9.8   CREATININE 0.94 0.95 0.82   CALCIUM 8.9 8.0* 8.5   ALBUMIN 3.7  --  2.9*   ALKPHOS 58  --  53   ALT 24  --  15   AST 24  --  12   BILITOT 1.7*  --  1.4          Microbiology Results (last 7 days)       Procedure Component Value Units Date/Time    Blood Culture #1 **CANNOT BE ORDERED STAT** [[id number]]  (Normal) Collected: 09/05/22 2135    Order Status: Completed Specimen: Blood from Antecubital, Left Updated: 09/07/22 1001     CULTURE, BLOOD ([facility]) No Growth At 24 Hours    Blood Culture #2 **CANNOT BE ORDERED STAT** [[id number]]  (Normal) Collected: 09/05/22 2146    Order Status: Completed Specimen: Blood from Hand, Left  Updated: 09/07/22 1001     CULTURE, BLOOD ([facility]) No Growth At 24 Hours             See below for Radiology    Scheduled Med:   amLODIPine  10 mg Oral Daily    ceFEPime (MAXIPIME) IVPB  2 g Intravenous Q8H    famotidine  20 mg Oral BID    labetaloL  200 mg Oral Q12H    vancomycin (VANCOCIN) IVPB  1,000 mg Intravenous Q8H        Continuous Infusions:   sodium chloride 0.9% 125 mL/hr at 09/08/22 0404        PRN Meds:  (Magic mouthwash) 1:1:1 diphenhydramine(Benadryl) 12.5mg/5ml liq, aluminum & magnesium hydroxide-simethicone (Maalox), LIDOcaine viscous 2%, acetaminophen, cloNIDine, glucagon (human recombinant), glucose, glucose, hydrALAZINE, HYDROcodone-acetaminophen, labetaloL, ondansetron, Pharmacy to dose Vancomycin consult **AND** vancomycin - pharmacy to dose       Assessment/Plan:   Chronic myelogenous leukemia  Febrile neutropenia likely due to hydroxyurea use  Anemia and neoplastic disease  Retinal hemorrhage related to #1  Immunocompromised   Oropharyngeal candidiasis  Essential hypertension, suboptimally controlled at  Medical noncompliance    Concerned that fevers are secondary to hydroxyurea.  Only a low-grade fever last night.  Last higher fever yesterday morning around 3:00 a.m..    He remains on vanc and cefepime at this time.  So far all cultures and swabs have been negative including COVID, flu, GC/chlamydia.  White count up to 0.8 this morning.  Unfortunately platelets dropped to 13.  Hemoglobin 6.4.  Will likely need transfusion.  Will discuss with Oncology this morning.      Critical care diagnosis: pancytopenia requiring transfusion  Critical care interventions: hands on evaluation, review of labs/radiographs/records and discussions with family  Critical care time spent: >32 minutes      Patient condition:  Stable    Anticipated discharge and Disposition: TBD      All diagnosis and differential diagnosis have been reviewed; assessment and plan has been documented; I have personally reviewed the labs  and test results that are presently available; I have reviewed the patients medication list; I have reviewed the consulting providers response and recommendations. I have reviewed or attempted to review medical records based upon their availability    All of the patient's questions have been  addressed and answered. Patient's is agreeable to the above stated plan. I will continue to monitor closely and make adjustments to medical management as needed.  _____________________________________________________________________      Radiology:  X-Ray Chest AP Portable  Narrative: EXAMINATION:  XR CHEST AP PORTABLE    CLINICAL HISTORY:  Fever, unspecified    TECHNIQUE:  One view    COMPARISON:  August 18, 2022.    FINDINGS:  Cardiopericardial silhouette is within normal limits. Lungs are without dense focal or segmental consolidation, congestive process, pleural effusions or pneumothorax.  Impression: NO ACUTE CARDIOPULMONARY PROCESS IDENTIFIED.    Electronically signed by: Phill Andrew  Date:    09/06/2022  Time:    07:41      Soto Leonard MD   09/08/2022

## 2022-09-08 NOTE — PROGRESS NOTES
Pharmacokinetic Assessment Follow Up: IV Vancomycin    Vancomycin serum concentration assessment(s):    The trough level was drawn correctly and can be used to guide therapy at this time. The measurement is below the desired definitive target range of 15 to 20 mcg/mL.    Vancomycin Regimen Plan:    Continue regimen to Vancomycin 1000 mg IV every 8 hours with next serum trough concentration measured at 1630 prior to 1730 dose on 9/8    Drug levels (last 3 results):  Recent Labs   Lab Result Units 09/07/22  1646   Vancomycin Trough ug/ml 14.1*       Pharmacy will continue to follow and monitor vancomycin.    Please contact pharmacy at extension [phone] for questions regarding this assessment.    Thank you for the consult,   King Hand       Patient brief summary:  Magdaleno Hirsch is a 22 y.o. male initiated on antimicrobial therapy with IV Vancomycin for treatment of neutropenic fever    Drug Allergies:   Review of patient's allergies indicates:  No Known Allergies      Renal Function:   Estimated Creatinine Clearance: 146.2 mL/min (based on SCr of 0.82 mg/dL).,       CBC (last 72 hours):  Recent Labs   Lab Result Units 09/05/22  2146 09/07/22  0355 09/08/22  0348   WBC x10(3)/mcL 0.7* 0.8* 0.8*   Hgb gm/dL 5.8* 6.9* 6.4*   Hct % 18.1* 21.6* 19.8*   Platelet x10(3)/mcL 23* 20* 13*   Mono % %  --   --  0.0   Eos % %  --   --  0.0   Eos Man % 2 4  --    Basophil % %  --   --  4.8   Basophil Man %  --  17  --        Metabolic Panel (last 72 hours):  Recent Labs   Lab Result Units 09/05/22  2110 09/06/22  1444 09/07/22  0355 09/08/22  0348   Sodium Level mmol/L 140  --  136 137   Potassium Level mmol/L 4.0  --  3.8 3.6   Chloride mmol/L 104  --  106 107   Carbon Dioxide mmol/L 24  --  26 25   Glucose Level mg/dL 83  --  106* 98   Glucose, UA mg/dL  --  Negative  --   --    Blood Urea Nitrogen mg/dL 11.0  --  11.1 9.8   Creatinine mg/dL 0.94  --  0.95 0.82   Albumin Level gm/dL 3.7  --   --  2.9*   Bilirubin Total mg/dL 1.7*   --   --  1.4   Alkaline Phosphatase unit/L 58  --   --  53   Aspartate Aminotransferase unit/L 24  --   --  12   Alanine Aminotransferase unit/L 24  --   --  15       Vancomycin Administrations:  vancomycin given in the last 96 hours                     vancomycin in dextrose 5 % 1 gram/250 mL IVPB 1,000 mg (mg) 1,000 mg New Bag 09/08/22 0131     1,000 mg New Bag 09/07/22 1809     1,000 mg New Bag  1026    vancomycin (VANCOCIN) 1,000 mg in dextrose 5 % 250 mL IVPB (mg) 1,000 mg New Bag 09/07/22 0133    vancomycin (VANCOCIN) 1,000 mg in dextrose 5 % 250 mL IVPB (mg) 1,000 mg New Bag 09/06/22 1837    vancomycin (VANCOCIN) 1,000 mg in dextrose 5 % 250 mL IVPB (mg) 1,000 mg New Bag 09/06/22 0003                    Microbiologic Results:  Microbiology Results (last 7 days)       Procedure Component Value Units Date/Time    Blood Culture #1 **CANNOT BE ORDERED STAT** [[id number]]  (Normal) Collected: 09/05/22 2135    Order Status: Completed Specimen: Blood from Antecubital, Left Updated: 09/07/22 1001     CULTURE, BLOOD ([facility]) No Growth At 24 Hours    Blood Culture #2 **CANNOT BE ORDERED STAT** [[id number]]  (Normal) Collected: 09/05/22 2146    Order Status: Completed Specimen: Blood from Hand, Left Updated: 09/07/22 1001     CULTURE, BLOOD ([facility]) No Growth At 24 Hours

## 2022-09-08 NOTE — PROGRESS NOTES
Subjective:       Patient ID: Magdaleno Hirsch is a 22 y.o. male.    Chief Complaint: Sore Throat (Sore throat-chills -feet ache weakness for 2 days-has leukemia-no treatment yet-appt 9/16-oncology)      Diagnosis:  1. CML, chronic phase  2. 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  3. Retinal hemorrhage secondary to 1.    Current Treatment:       Treatment History:  1. Hydroxyurea 4 g every 12 hours    Eye Problem   Associated symptoms include a fever. Pertinent negatives include no weakness.   Sore Throat   Pertinent negatives include no abdominal pain, congestion, coughing, diarrhea, headaches or shortness of breath.     HPI:  22-year-old male with no real medical history who went in for a routine eye exam on 08/18/2022 to update his eye glasses prescription.  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  He had bilateral Valdez spots with vessel tortuosity.  The optometrist recommended that the patient have blood work including testing for sickle cell disease.  The patient presented to the emergency department.  CBC in the emergency department revealed a white blood cell count of 411,900. Hemoglobin was 8.3, platelets were normal at 375,000 hundred and seventy five thousand.  Differential was suggestive of CML.  Coagulation studies revealed an INR of 1.38, PTT of 36.4 and a fibrinogen of 292.  Patient was going to present to [place], however they were on diversion.  He was transferred from [facility] to [facility].  Hematology consulted.  Patient underwent bone marrow biopsy on 08/22/2022, this revealed CML, chronic phase, BCR/ABL1 positive.    Interval History:   22-year-old patient known to me from previous hospitalization.  Was discharged home on 08/22/2022, he was informed about twice weekly labs until follow up appointment with me and continued on hydroxyurea.  Unfortunately, the patient did not keep his lab appointments.   He presented to the emergency department on 09/05/2022 with febrile neutropenia, was admitted to the hospitalist service and transferred to main campus [facility].  Hematology consulted as patient known to me.    Today, 09/08/2022:   Patient seen and examined.  He is feeling better.  His throat still hurts, but he feels better.    No past medical history on file.   Past Surgical History:   Procedure Laterality Date    BONE MARROW BIOPSY N/A 8/22/2022    Procedure: Biopsy-bone marrow;  Surgeon: Getachew Chen MD;  Location: [facility];  Service: General;  Laterality: N/A;    KNEE SURGERY Left      Social History     Socioeconomic History    Marital status: Single   Tobacco Use    Smoking status: Never    Smokeless tobacco: Never   Substance and Sexual Activity    Alcohol use: Never    Drug use: Never      History reviewed. No pertinent family history.   Review of patient's allergies indicates:  No Known Allergies   Review of Systems   Constitutional:  Positive for fever. Negative for chills, diaphoresis, fatigue and unexpected weight change.   HENT:  Positive for sore throat. Negative for nasal congestion, mouth sores and sinus pressure/congestion.    Eyes:  Positive for visual disturbance. Negative for pain.   Respiratory:  Negative for cough, chest tightness and shortness of breath.    Cardiovascular:  Negative for chest pain, palpitations and leg swelling.   Gastrointestinal:  Negative for abdominal distention, abdominal pain, blood in stool, constipation and diarrhea.   Genitourinary:  Negative for dysuria, frequency and hematuria.   Musculoskeletal:  Negative for arthralgias and back pain.   Integumentary:  Negative for rash.   Neurological:  Negative for dizziness, weakness, numbness and headaches.   Hematological:  Negative for adenopathy.   Psychiatric/Behavioral:  Negative for confusion.        Objective:      Physical Exam  Vitals reviewed.   Constitutional:       General: He is awake.       Appearance: Normal appearance.   HENT:      Head: Normocephalic and atraumatic.      Right Ear: Hearing normal.      Left Ear: Hearing normal.      Nose: Nose normal.   Eyes:      General: Lids are normal. Vision grossly intact.      Extraocular Movements: Extraocular movements intact.      Conjunctiva/sclera: Conjunctivae normal.   Cardiovascular:      Rate and Rhythm: Normal rate and regular rhythm.      Pulses: Normal pulses.      Heart sounds: Normal heart sounds.   Pulmonary:      Effort: Pulmonary effort is normal.      Breath sounds: Normal breath sounds. No wheezing, rhonchi or rales.   Chest:      Comments: Soft mass in area of the sternum  Abdominal:      General: Bowel sounds are normal.      Palpations: Abdomen is soft. There is splenomegaly.      Tenderness: There is no abdominal tenderness.   Musculoskeletal:      Cervical back: Full passive range of motion without pain.      Right lower leg: No edema.      Left lower leg: No edema.   Lymphadenopathy:      Cervical: No cervical adenopathy.      Upper Body:      Right upper body: No supraclavicular or axillary adenopathy.      Left upper body: No supraclavicular or axillary adenopathy.   Skin:     General: Skin is warm.   Neurological:      General: No focal deficit present.      Mental Status: He is alert and oriented to person, place, and time.   Psychiatric:         Attention and Perception: Attention normal.         Mood and Affect: Mood and affect normal.         Behavior: Behavior is cooperative.       LABS AND IMAGING REVIEWED IN EPIC          Assessment:   1. CML, chronic phase  2. 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  3. Retinal hemorrhage secondary to 1.        Plan:       Patient has CML, diagnosed on bone marrow biopsy done on 08/22/2022.    Patient was discharged home on 08/22/2022 with plans to continue hydroxyurea 4 g every 12 hours and have blood work twice a week until his follow up with me and final bone marrow biopsy.    Unfortunately, the patient  did not have twice weekly labs, but continued on hydroxyurea.  When asked about lab appointments, the patient's wife stated that he was supposed to come on Mondays and Thursdays.  Unfortunately he was not able to make these visits.      He is now admitted with febrile neutropenia, almost certainly from hydroxyurea.      We will hold hydroxyurea.      We will treat him with antibiotics.  So far workup is negative for COVID, flu, strep.  Does have possible thrush.      I agree with continued antibiotics and fluconazole.    We will need to start a TKI, likely dasatinib once the patient's counts recover.      Paul Hogan II, MD

## 2022-09-09 ENCOUNTER — PATIENT MESSAGE (OUTPATIENT)
Dept: HEMATOLOGY/ONCOLOGY | Facility: CLINIC | Age: 22
End: 2022-09-09
Payer: MEDICAID

## 2022-09-09 LAB
ANION GAP SERPL CALC-SCNC: 8 MEQ/L
BASOPHILS # BLD AUTO: 0.05 X10(3)/MCL (ref 0–0.2)
BASOPHILS NFR BLD AUTO: 6.5 %
BUN SERPL-MCNC: 8.2 MG/DL (ref 8.9–20.6)
CALCIUM SERPL-MCNC: 8.2 MG/DL (ref 8.4–10.2)
CHLORIDE SERPL-SCNC: 106 MMOL/L (ref 98–107)
CO2 SERPL-SCNC: 24 MMOL/L (ref 22–29)
CREAT SERPL-MCNC: 0.73 MG/DL (ref 0.73–1.18)
CREAT/UREA NIT SERPL: 11
EOSINOPHIL # BLD AUTO: 0.01 X10(3)/MCL (ref 0–0.9)
EOSINOPHIL NFR BLD AUTO: 1.3 %
ERYTHROCYTE [DISTWIDTH] IN BLOOD BY AUTOMATED COUNT: 21.2 % (ref 11.5–17)
GFR SERPLBLD CREATININE-BSD FMLA CKD-EPI: >60 MLS/MIN/1.73/M2
GLUCOSE SERPL-MCNC: 98 MG/DL (ref 74–100)
HCT VFR BLD AUTO: 22.6 % (ref 42–52)
HGB BLD-MCNC: 7.3 GM/DL (ref 14–18)
IMM GRANULOCYTES # BLD AUTO: 0 X10(3)/MCL (ref 0–0.04)
IMM GRANULOCYTES NFR BLD AUTO: 0 %
LYMPHOCYTES # BLD AUTO: 0.7 X10(3)/MCL (ref 0.6–4.6)
LYMPHOCYTES NFR BLD AUTO: 90.9 %
MCH RBC QN AUTO: 25.8 PG (ref 27–31)
MCHC RBC AUTO-ENTMCNC: 32.3 MG/DL (ref 33–36)
MCV RBC AUTO: 79.9 FL (ref 80–94)
MONOCYTES # BLD AUTO: 0.01 X10(3)/MCL (ref 0.1–1.3)
MONOCYTES NFR BLD AUTO: 1.3 %
NEUTROPHILS # BLD AUTO: 0 X10(3)/MCL (ref 2.1–9.2)
NEUTROPHILS NFR BLD AUTO: 0 %
NRBC BLD AUTO-RTO: 0 %
PLATELET # BLD AUTO: 20 X10(3)/MCL (ref 130–400)
PMV BLD AUTO: ABNORMAL FL
POTASSIUM SERPL-SCNC: 3.8 MMOL/L (ref 3.5–5.1)
RBC # BLD AUTO: 2.83 X10(6)/MCL (ref 4.7–6.1)
SODIUM SERPL-SCNC: 138 MMOL/L (ref 136–145)
VANCOMYCIN TROUGH SERPL-MCNC: 16.1 UG/ML (ref 15–20)
WBC # SPEC AUTO: 0.8 X10(3)/MCL (ref 4.5–11.5)

## 2022-09-09 PROCEDURE — 99232 SBSQ HOSP IP/OBS MODERATE 35: CPT | Mod: ,,, | Performed by: INTERNAL MEDICINE

## 2022-09-09 PROCEDURE — 21400001 HC TELEMETRY ROOM

## 2022-09-09 PROCEDURE — 63600175 PHARM REV CODE 636 W HCPCS: Performed by: EMERGENCY MEDICINE

## 2022-09-09 PROCEDURE — 80202 ASSAY OF VANCOMYCIN: CPT | Performed by: HOSPITALIST

## 2022-09-09 PROCEDURE — 63700000 PHARM REV CODE 250 ALT 637 W/O HCPCS: Performed by: HOSPITALIST

## 2022-09-09 PROCEDURE — 25000003 PHARM REV CODE 250: Performed by: HOSPITALIST

## 2022-09-09 PROCEDURE — 25000003 PHARM REV CODE 250: Performed by: INTERNAL MEDICINE

## 2022-09-09 PROCEDURE — 36415 COLL VENOUS BLD VENIPUNCTURE: CPT | Performed by: HOSPITALIST

## 2022-09-09 PROCEDURE — 25000003 PHARM REV CODE 250: Performed by: EMERGENCY MEDICINE

## 2022-09-09 PROCEDURE — 63600175 PHARM REV CODE 636 W HCPCS: Performed by: HOSPITALIST

## 2022-09-09 PROCEDURE — 80048 BASIC METABOLIC PNL TOTAL CA: CPT | Performed by: HOSPITALIST

## 2022-09-09 PROCEDURE — 25000003 PHARM REV CODE 250: Performed by: NURSE PRACTITIONER

## 2022-09-09 PROCEDURE — 85025 COMPLETE CBC W/AUTO DIFF WBC: CPT | Performed by: HOSPITALIST

## 2022-09-09 PROCEDURE — 99232 PR SUBSEQUENT HOSPITAL CARE,LEVL II: ICD-10-PCS | Mod: ,,, | Performed by: INTERNAL MEDICINE

## 2022-09-09 RX ORDER — MORPHINE SULFATE 4 MG/ML
2 INJECTION, SOLUTION INTRAMUSCULAR; INTRAVENOUS ONCE
Status: DISCONTINUED | OUTPATIENT
Start: 2022-09-09 | End: 2022-09-10

## 2022-09-09 RX ORDER — HYDROCODONE BITARTRATE AND ACETAMINOPHEN 10; 325 MG/1; MG/1
1 TABLET ORAL EVERY 4 HOURS PRN
Status: DISCONTINUED | OUTPATIENT
Start: 2022-09-09 | End: 2022-09-27 | Stop reason: HOSPADM

## 2022-09-09 RX ORDER — HYDROCODONE BITARTRATE AND ACETAMINOPHEN 10; 325 MG/1; MG/1
1 TABLET ORAL EVERY 6 HOURS PRN
Status: DISCONTINUED | OUTPATIENT
Start: 2022-09-09 | End: 2022-09-09

## 2022-09-09 RX ADMIN — HYDROCODONE BITARTRATE AND ACETAMINOPHEN 1 TABLET: 10; 325 TABLET ORAL at 09:09

## 2022-09-09 RX ADMIN — LABETALOL HYDROCHLORIDE 200 MG: 200 TABLET, FILM COATED ORAL at 08:09

## 2022-09-09 RX ADMIN — HYDROCODONE BITARTRATE AND ACETAMINOPHEN 1 TABLET: 10; 325 TABLET ORAL at 02:09

## 2022-09-09 RX ADMIN — VANCOMYCIN HYDROCHLORIDE 1000 MG: 1 INJECTION, POWDER, LYOPHILIZED, FOR SOLUTION INTRAVENOUS at 12:09

## 2022-09-09 RX ADMIN — HYDROCODONE BITARTRATE AND ACETAMINOPHEN 1 TABLET: 5; 325 TABLET ORAL at 01:09

## 2022-09-09 RX ADMIN — SODIUM CHLORIDE: 9 INJECTION, SOLUTION INTRAVENOUS at 12:09

## 2022-09-09 RX ADMIN — FAMOTIDINE 20 MG: 20 TABLET, FILM COATED ORAL at 08:09

## 2022-09-09 RX ADMIN — DIPHENHYDRAMINE HYDROCHLORIDE 15 ML: 25 SOLUTION ORAL at 11:09

## 2022-09-09 RX ADMIN — VANCOMYCIN HYDROCHLORIDE 1000 MG: 1 INJECTION, POWDER, LYOPHILIZED, FOR SOLUTION INTRAVENOUS at 04:09

## 2022-09-09 RX ADMIN — CEFEPIME 2 G: 2 INJECTION, POWDER, FOR SOLUTION INTRAVENOUS at 06:09

## 2022-09-09 RX ADMIN — FLUCONAZOLE 100 MG: 100 TABLET ORAL at 08:09

## 2022-09-09 RX ADMIN — CEFEPIME 2 G: 2 INJECTION, POWDER, FOR SOLUTION INTRAVENOUS at 02:09

## 2022-09-09 RX ADMIN — CEFEPIME 2 G: 2 INJECTION, POWDER, FOR SOLUTION INTRAVENOUS at 11:09

## 2022-09-09 RX ADMIN — AMLODIPINE BESYLATE 10 MG: 5 TABLET ORAL at 08:09

## 2022-09-09 RX ADMIN — CLONIDINE HYDROCHLORIDE 0.1 MG: 0.1 TABLET ORAL at 04:09

## 2022-09-09 RX ADMIN — HYDROCODONE BITARTRATE AND ACETAMINOPHEN 1 TABLET: 10; 325 TABLET ORAL at 05:09

## 2022-09-09 RX ADMIN — VANCOMYCIN HYDROCHLORIDE 1000 MG: 1 INJECTION, POWDER, LYOPHILIZED, FOR SOLUTION INTRAVENOUS at 08:09

## 2022-09-09 NOTE — PLAN OF CARE
Problem: Adult Inpatient Plan of Care  Goal: Plan of Care Review  Outcome: Ongoing, Progressing  Goal: Patient-Specific Goal (Individualized)  Outcome: Ongoing, Progressing  Goal: Absence of Hospital-Acquired Illness or Injury  Outcome: Ongoing, Progressing  Goal: Optimal Comfort and Wellbeing  Outcome: Ongoing, Progressing  Goal: Readiness for Transition of Care  Outcome: Ongoing, Progressing     Problem: Fall Injury Risk  Goal: Absence of Fall and Fall-Related Injury  Outcome: Ongoing, Progressing  Intervention: Identify and Manage Contributors  Flowsheets (Taken 9/9/2022 1045)  Self-Care Promotion: independence encouraged  Medication Review/Management: medications reviewed  Intervention: Promote Injury-Free Environment  Flowsheets (Taken 9/9/2022 1045)  Safety Promotion/Fall Prevention:   assistive device/personal item within reach   nonskid shoes/socks when out of bed   medications reviewed   lighting adjusted

## 2022-09-09 NOTE — PROGRESS NOTES
Subjective:       Patient ID: Magdaleno Hirsch is a 22 y.o. male.    Chief Complaint: Sore Throat (Sore throat-chills -feet ache weakness for 2 days-has leukemia-no treatment yet-appt 9/16-oncology)      Diagnosis:  1. CML, chronic phase  2. 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  3. Retinal hemorrhage secondary to 1.    Current Treatment:       Treatment History:  1. Hydroxyurea 4 g every 12 hours    Eye Problem   Associated symptoms include a fever. Pertinent negatives include no weakness.   Sore Throat   Pertinent negatives include no abdominal pain, congestion, coughing, diarrhea, headaches or shortness of breath.     HPI:  22-year-old male with no real medical history who went in for a routine eye exam on 08/18/2022 to update his eye glasses prescription.  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  He had bilateral Valdez spots with vessel tortuosity.  The optometrist recommended that the patient have blood work including testing for sickle cell disease.  The patient presented to the emergency department.  CBC in the emergency department revealed a white blood cell count of 411,900. Hemoglobin was 8.3, platelets were normal at 375,000 hundred and seventy five thousand.  Differential was suggestive of CML.  Coagulation studies revealed an INR of 1.38, PTT of 36.4 and a fibrinogen of 292.  Patient was going to present to [place], however they were on diversion.  He was transferred from [facility] to [facility].  Hematology consulted.  Patient underwent bone marrow biopsy on 08/22/2022, this revealed CML, chronic phase, BCR/ABL1 positive.    Interval History:   22-year-old patient known to me from previous hospitalization.  Was discharged home on 08/22/2022, he was informed about twice weekly labs until follow up appointment with me and continued on hydroxyurea.  Unfortunately, the patient did not keep his lab appointments.   He presented to the emergency department on 09/05/2022 with febrile neutropenia, was admitted to the hospitalist service and transferred to main campus [facility].  Hematology consulted as patient known to me.    Today, 09/09/2022:   Patient seen and examined.  He is feeling better.  His throat still hurts, but he continues to have improvement. He is not having any issues with breathing.    No past medical history on file.   Past Surgical History:   Procedure Laterality Date    BONE MARROW BIOPSY N/A 8/22/2022    Procedure: Biopsy-bone marrow;  Surgeon: Getachew Chen MD;  Location: [facility];  Service: General;  Laterality: N/A;    KNEE SURGERY Left      Social History     Socioeconomic History    Marital status: Single   Tobacco Use    Smoking status: Never    Smokeless tobacco: Never   Substance and Sexual Activity    Alcohol use: Never    Drug use: Never      History reviewed. No pertinent family history.   Review of patient's allergies indicates:  No Known Allergies   Review of Systems   Constitutional:  Positive for fever. Negative for chills, diaphoresis, fatigue and unexpected weight change.   HENT:  Positive for sore throat. Negative for nasal congestion, mouth sores and sinus pressure/congestion.    Eyes:  Positive for visual disturbance. Negative for pain.   Respiratory:  Negative for cough, chest tightness and shortness of breath.    Cardiovascular:  Negative for chest pain, palpitations and leg swelling.   Gastrointestinal:  Negative for abdominal distention, abdominal pain, blood in stool, constipation and diarrhea.   Genitourinary:  Negative for dysuria, frequency and hematuria.   Musculoskeletal:  Negative for arthralgias and back pain.   Integumentary:  Negative for rash.   Neurological:  Negative for dizziness, weakness, numbness and headaches.   Hematological:  Negative for adenopathy.   Psychiatric/Behavioral:  Negative for confusion.        Objective:      Physical Exam  Vitals  reviewed.   Constitutional:       General: He is awake.      Appearance: Normal appearance.   HENT:      Head: Normocephalic and atraumatic.      Right Ear: Hearing normal.      Left Ear: Hearing normal.      Nose: Nose normal.   Eyes:      General: Lids are normal. Vision grossly intact.      Extraocular Movements: Extraocular movements intact.      Conjunctiva/sclera: Conjunctivae normal.   Cardiovascular:      Rate and Rhythm: Normal rate and regular rhythm.      Pulses: Normal pulses.      Heart sounds: Normal heart sounds.   Pulmonary:      Effort: Pulmonary effort is normal.      Breath sounds: Normal breath sounds. No wheezing, rhonchi or rales.   Chest:      Comments: Soft mass in area of the sternum  Abdominal:      General: Bowel sounds are normal.      Palpations: Abdomen is soft. There is splenomegaly.      Tenderness: There is no abdominal tenderness.   Musculoskeletal:      Cervical back: Full passive range of motion without pain.      Right lower leg: No edema.      Left lower leg: No edema.   Lymphadenopathy:      Cervical: No cervical adenopathy.      Upper Body:      Right upper body: No supraclavicular or axillary adenopathy.      Left upper body: No supraclavicular or axillary adenopathy.   Skin:     General: Skin is warm.   Neurological:      General: No focal deficit present.      Mental Status: He is alert and oriented to person, place, and time.   Psychiatric:         Attention and Perception: Attention normal.         Mood and Affect: Mood and affect normal.         Behavior: Behavior is cooperative.       LABS AND IMAGING REVIEWED IN EPIC          Assessment:   1. CML, chronic phase  2. 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  3. Retinal hemorrhage secondary to 1.        Plan:       Patient has CML, diagnosed on bone marrow biopsy done on 08/22/2022.    Patient was discharged home on 08/22/2022 with plans to continue hydroxyurea 4 g every 12 hours and have blood work twice a week until his follow up with me  and final bone marrow biopsy.    Unfortunately, the patient did not have twice weekly labs, but continued on hydroxyurea.  When asked about lab appointments, the patient's wife stated that he was supposed to come on Mondays and Thursdays.  Unfortunately he was not able to make these visits.      He is now admitted with febrile neutropenia, almost certainly from hydroxyurea.      We will hold hydroxyurea.      We will treat him with antibiotics.  So far workup is negative for COVID, flu, strep.  Does have possible thrush.      I agree with continued antibiotics and fluconazole.    Await count recovery.    We will need to start a TKI, likely dasatinib once the patient's counts recover.      Paul Hogan II, MD

## 2022-09-09 NOTE — PROGRESS NOTES
Pharmacokinetic Assessment Follow Up: IV Vancomycin    Vancomycin serum concentration assessment(s):    The trough level was drawn correctly and can be used to guide therapy at this time. The measurement is within the desired definitive target range of 15 to 20 mcg/mL.    Vancomycin Regimen Plan:    Continue regimen to Vancomycin 1000 mg IV every 8 hours with next serum trough concentration measured at 1100 prior to 1200 dose on 9/11    Drug levels (last 3 results):  Recent Labs   Lab Result Units 09/07/22  1646 09/09/22  1113   Vancomycin Trough ug/ml 14.1* 16.1       Pharmacy will continue to follow and monitor vancomycin.    Please contact pharmacy at extension [phone] for questions regarding this assessment.    Thank you for the consult,   King Hand       Patient brief summary:  Magdaleno Hirsch is a 22 y.o. male initiated on antimicrobial therapy with IV Vancomycin for treatment of neutropenic fever      Drug Allergies:   Review of patient's allergies indicates:  No Known Allergies      Renal Function:   Estimated Creatinine Clearance: 164.2 mL/min (based on SCr of 0.73 mg/dL).,     Dialysis Method (if applicable):  N/A    CBC (last 72 hours):  Recent Labs   Lab Result Units 09/07/22  0355 09/08/22  0348 09/09/22  0325   WBC x10(3)/mcL 0.8* 0.8* 0.8*   Hgb gm/dL 6.9* 6.4* 7.3*   Hct % 21.6* 19.8* 22.6*   Platelet x10(3)/mcL 20* 13* 20*   Mono % %  --  0.0 1.3   Eos % %  --  0.0 1.3   Eos Man % 4  --   --    Basophil % %  --  4.8 6.5   Basophil Man % 17  --   --        Metabolic Panel (last 72 hours):  Recent Labs   Lab Result Units 09/06/22  1444 09/07/22  0355 09/08/22  0348 09/09/22  0325   Sodium Level mmol/L  --  136 137 138   Potassium Level mmol/L  --  3.8 3.6 3.8   Chloride mmol/L  --  106 107 106   Carbon Dioxide mmol/L  --  26 25 24   Glucose Level mg/dL  --  106* 98 98   Glucose, UA mg/dL Negative  --   --   --    Blood Urea Nitrogen mg/dL  --  11.1 9.8 8.2*   Creatinine mg/dL  --  0.95 0.82 0.73   Albumin  Level gm/dL  --   --  2.9*  --    Bilirubin Total mg/dL  --   --  1.4  --    Alkaline Phosphatase unit/L  --   --  53  --    Aspartate Aminotransferase unit/L  --   --  12  --    Alanine Aminotransferase unit/L  --   --  15  --        Vancomycin Administrations:  vancomycin given in the last 96 hours                     vancomycin in dextrose 5 % 1 gram/250 mL IVPB 1,000 mg (mg) 1,000 mg New Bag 09/09/22 1217     1,000 mg New Bag  0436     1,000 mg New Bag 09/08/22 2009    vancomycin in dextrose 5 % 1 gram/250 mL IVPB 1,000 mg (mg) 1,000 mg New Bag 09/08/22 0915     1,000 mg New Bag  0131     1,000 mg New Bag 09/07/22 1809     1,000 mg New Bag  1026    vancomycin (VANCOCIN) 1,000 mg in dextrose 5 % 250 mL IVPB (mg) 1,000 mg New Bag 09/07/22 0133    vancomycin (VANCOCIN) 1,000 mg in dextrose 5 % 250 mL IVPB (mg) 1,000 mg New Bag 09/06/22 1837    vancomycin (VANCOCIN) 1,000 mg in dextrose 5 % 250 mL IVPB (mg) 1,000 mg New Bag 09/06/22 0003                    Microbiologic Results:  Microbiology Results (last 7 days)       Procedure Component Value Units Date/Time    Blood Culture #1 **CANNOT BE ORDERED STAT** [[id number]]  (Normal) Collected: 09/05/22 2135    Order Status: Completed Specimen: Blood from Antecubital, Left Updated: 09/09/22 1000     CULTURE, BLOOD ([facility]) No Growth At 72 Hours    Blood Culture #2 **CANNOT BE ORDERED STAT** [[id number]]  (Normal) Collected: 09/05/22 2146    Order Status: Completed Specimen: Blood from Hand, Left Updated: 09/09/22 1000     CULTURE, BLOOD ([facility]) No Growth At 72 Hours

## 2022-09-09 NOTE — PROGRESS NOTES
[facility]  Hospital Medicine Progress Note        Chief Complaint: Inpatient Follow-up for  fever     HPI:   22-year-old  male with a past medical history of hypertension who went in for a routine eye exam on 08/18/2022 to update his eye glasses prescription.  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  He had bilateral Valdez spots with vessel tortuosity.  The optometrist recommended that the patient have blood work including testing for sickle cell disease.  The patient presented to the emergency department.  CBC in the emergency department revealed a white blood cell count of 411,900. Hemoglobin was 8.3, platelets were normal at 375,000.  Differential was suggestive of chronic myelogenous leukemia.  Coagulation studies revealed an INR of 1.38, PTT of 36.4 and a fibrinogen of 292.  Patient was going to present to [place], however they were on diversion.  He was transferred from [facility] to [facility].  Hematology was consulted.  Patient underwent bone marrow biopsy on 08/22/2022, this revealed chronic myelogenous leukemia, chronic phase, BCR/ABL1 positive.  Patient was discharged home on August 22, 2022 and was supposed to obtain twice weekly labs until follow-up with Dr. Gardner and continue hydroxyurea.  Unfortunately he did not keep his lab appointments and presented to the emergency room at [facility] on 09/06/2022 and was admitted there with febrile neutropenia and anemia.  Patient was started on empiric antibiotic therapy, transfused 2 units of packed red blood cells and 1 dose of platelets and then subsequently transferred here for further treatment and hematology evaluation.     Interval Hx:   No acute changes overnight.  Vital stable.  No new complaints.  No family with him today     Objective/physical exam:  General:  Thin  chronically ill-appearing  male who appears older than his stated age but in no acute distress  HENT: normocephalic, atraumatic; pharyngeal erythema with white patches  Eye: PERRL, EOMI, clear conjunctiva  Neck: full ROM, no thyromegaly, no JVD  Respiratory: clear to auscultation bilaterally  Cardiovascular: regular rate and rhythm  Gastrointestinal: non-distended, positive bowel sounds, non-tender  Musculoskeletal: no gross deformity  Integumentary: warm, dry, intact, no rashes  Neurological: cranial nerves grossly intact, no focal neurological deficit  Psychiatric: cooperative, flat affect, disinterested    VITAL SIGNS: 24 HRS MIN & MAX LAST   Temp  Min: 98.3 °F (36.8 °C)  Max: 99.8 °F (37.7 °C) 98.4 °F (36.9 °C)   BP  Min: 131/81  Max: 164/97 (!) 163/97     Pulse  Min: 74  Max: 93  92   Resp  Min: 14  Max: 20 18   SpO2  Min: 98 %  Max: 100 % 99 %       Recent Labs   Lab 09/07/22 0355 09/08/22 0348 09/09/22  0325   WBC 0.8* 0.8* 0.8*   RBC 2.80* 2.44* 2.83*   HGB 6.9* 6.4* 7.3*   HCT 21.6* 19.8* 22.6*   MCV 77.1* 81.1 79.9*   MCH 24.6* 26.2* 25.8*   MCHC 31.9* 32.3* 32.3*   RDW 21.0* 22.2* 21.2*   PLT 20* 13* 20*       Recent Labs   Lab 09/05/22 2110 09/07/22 0355 09/08/22 0348 09/09/22  0325    136 137 138   K 4.0 3.8 3.6 3.8   CO2 24 26 25 24   BUN 11.0 11.1 9.8 8.2*   CREATININE 0.94 0.95 0.82 0.73   CALCIUM 8.9 8.0* 8.5 8.2*   ALBUMIN 3.7  --  2.9*  --    ALKPHOS 58  --  53  --    ALT 24  --  15  --    AST 24  --  12  --    BILITOT 1.7*  --  1.4  --           Microbiology Results (last 7 days)       Procedure Component Value Units Date/Time    Blood Culture #1 **CANNOT BE ORDERED STAT** [[id number]]  (Normal) Collected: 09/05/22 2135    Order Status: Completed Specimen: Blood from Antecubital, Left Updated: 09/08/22 1001     CULTURE, BLOOD ([facility]) No Growth At 48 Hours    Blood Culture #2 **CANNOT BE ORDERED STAT** [[id number]]  (Normal) Collected: 09/05/22 2146    Order Status:  Completed Specimen: Blood from Hand, Left Updated: 09/08/22 1001     CULTURE, BLOOD ([facility]) No Growth At 48 Hours             See below for Radiology    Scheduled Med:   amLODIPine  10 mg Oral Daily    ceFEPime (MAXIPIME) IVPB  2 g Intravenous Q8H    famotidine  20 mg Oral BID    fluconazole  100 mg Oral Daily    labetaloL  200 mg Oral Q12H    morphine  2 mg Intravenous Once    vancomycin (VANCOCIN) IVPB  1,000 mg Intravenous Q8H        Continuous Infusions:   sodium chloride 0.9% 125 mL/hr at 09/08/22 0404        PRN Meds:  (Magic mouthwash) 1:1:1 diphenhydramine(Benadryl) 12.5mg/5ml liq, aluminum & magnesium hydroxide-simethicone (Maalox), LIDOcaine viscous 2%, sodium chloride, sodium chloride, sodium chloride, acetaminophen, cloNIDine, glucagon (human recombinant), glucose, glucose, hydrALAZINE, HYDROcodone-acetaminophen, HYDROcodone-acetaminophen, labetaloL, ondansetron, oxymetazoline, Pharmacy to dose Vancomycin consult **AND** vancomycin - pharmacy to dose       Assessment/Plan:   Chronic myelogenous leukemia  Febrile neutropenia likely due to hydroxyurea use  Anemia and neoplastic disease  Retinal hemorrhage related to #1  Immunocompromised   Oropharyngeal candidiasis  Essential hypertension, suboptimally controlled at  Medical noncompliance    Cell lines low but better this morning.  Currently borderline for needing another transfusion.    Will touch base with Oncology to see if we need any more blood products.  Platelets are 20 but no more fevers.  His hemoglobin is up to 7.4.    White blood count unchanged.    He is being maintained on empiric antibiotics for now.  Like to start deescalating these as low suspicion for acute infectious process.  All cultures have remained negative times 48 hours     Critical care diagnosis: pancytopenia requiring transfusion  Critical care interventions: hands on evaluation, review of labs/radiographs/records and discussions with family  Critical care time spent: >32  minutes    Patient condition:  Stable    Anticipated discharge and Disposition: TBD      All diagnosis and differential diagnosis have been reviewed; assessment and plan has been documented; I have personally reviewed the labs and test results that are presently available; I have reviewed the patients medication list; I have reviewed the consulting providers response and recommendations. I have reviewed or attempted to review medical records based upon their availability    All of the patient's questions have been  addressed and answered. Patient's is agreeable to the above stated plan. I will continue to monitor closely and make adjustments to medical management as needed.  _____________________________________________________________________      Radiology:  X-Ray Chest AP Portable  Narrative: EXAMINATION:  XR CHEST AP PORTABLE    CLINICAL HISTORY:  Fever, unspecified    TECHNIQUE:  One view    COMPARISON:  August 18, 2022.    FINDINGS:  Cardiopericardial silhouette is within normal limits. Lungs are without dense focal or segmental consolidation, congestive process, pleural effusions or pneumothorax.  Impression: NO ACUTE CARDIOPULMONARY PROCESS IDENTIFIED.    Electronically signed by: Phill Andrew  Date:    09/06/2022  Time:    07:41      Soto Leonard MD   09/09/2022

## 2022-09-10 LAB
ABO + RH BLD: NORMAL
ABO + RH BLD: NORMAL
ANION GAP SERPL CALC-SCNC: 8 MEQ/L
ANISOCYTOSIS BLD QL SMEAR: ABNORMAL
BASOPHILS NFR BLD MANUAL: 0.04 X10(3)/MCL (ref 0–0.2)
BASOPHILS NFR BLD MANUAL: 5 %
BLD PROD TYP BPU: NORMAL
BLD PROD TYP BPU: NORMAL
BLOOD UNIT EXPIRATION DATE: NORMAL
BLOOD UNIT EXPIRATION DATE: NORMAL
BLOOD UNIT TYPE CODE: 1700
BLOOD UNIT TYPE CODE: 9500
BUN SERPL-MCNC: 7.7 MG/DL (ref 8.9–20.6)
CALCIUM SERPL-MCNC: 8.4 MG/DL (ref 8.4–10.2)
CHLORIDE SERPL-SCNC: 107 MMOL/L (ref 98–107)
CO2 SERPL-SCNC: 22 MMOL/L (ref 22–29)
CREAT SERPL-MCNC: 0.71 MG/DL (ref 0.73–1.18)
CREAT/UREA NIT SERPL: 11
CROSSMATCH INTERPRETATION: NORMAL
CROSSMATCH INTERPRETATION: NORMAL
DISPENSE STATUS: NORMAL
DISPENSE STATUS: NORMAL
ERYTHROCYTE [DISTWIDTH] IN BLOOD BY AUTOMATED COUNT: 20 % (ref 11.5–17)
GFR SERPLBLD CREATININE-BSD FMLA CKD-EPI: >60 MLS/MIN/1.73/M2
GLUCOSE SERPL-MCNC: 90 MG/DL (ref 74–100)
HCT VFR BLD AUTO: 20.7 % (ref 42–52)
HGB BLD-MCNC: 6.6 GM/DL (ref 14–18)
IMM GRANULOCYTES # BLD AUTO: 0 X10(3)/MCL (ref 0–0.04)
IMM GRANULOCYTES NFR BLD AUTO: 0 %
INSTRUMENT WBC (OLG): 0.8 X10(3)/MCL
LYMPHOCYTES NFR BLD MANUAL: 0.76 X10(3)/MCL
LYMPHOCYTES NFR BLD MANUAL: 95 %
MCH RBC QN AUTO: 24 PG (ref 27–31)
MCHC RBC AUTO-ENTMCNC: 31.9 MG/DL (ref 33–36)
MCV RBC AUTO: 75.3 FL (ref 80–94)
MICROCYTES BLD QL SMEAR: ABNORMAL
NRBC BLD AUTO-RTO: 0 %
PLATELET # BLD AUTO: 11 X10(3)/MCL (ref 130–400)
PLATELET # BLD EST: ABNORMAL 10*3/UL
PMV BLD AUTO: ABNORMAL FL
POIKILOCYTOSIS BLD QL SMEAR: ABNORMAL
POTASSIUM SERPL-SCNC: 3.9 MMOL/L (ref 3.5–5.1)
RBC # BLD AUTO: 2.75 X10(6)/MCL (ref 4.7–6.1)
RBC MORPH BLD: ABNORMAL
SCHISTOCYTE (OLG): ABNORMAL
SODIUM SERPL-SCNC: 137 MMOL/L (ref 136–145)
UNIT NUMBER: NORMAL
UNIT NUMBER: NORMAL
WBC # SPEC AUTO: 0.8 X10(3)/MCL (ref 4.5–11.5)

## 2022-09-10 PROCEDURE — 25000003 PHARM REV CODE 250: Performed by: NURSE PRACTITIONER

## 2022-09-10 PROCEDURE — 63600175 PHARM REV CODE 636 W HCPCS: Performed by: HOSPITALIST

## 2022-09-10 PROCEDURE — 25000003 PHARM REV CODE 250: Performed by: INTERNAL MEDICINE

## 2022-09-10 PROCEDURE — 99232 SBSQ HOSP IP/OBS MODERATE 35: CPT | Mod: ,,, | Performed by: INTERNAL MEDICINE

## 2022-09-10 PROCEDURE — 99232 PR SUBSEQUENT HOSPITAL CARE,LEVL II: ICD-10-PCS | Mod: ,,, | Performed by: INTERNAL MEDICINE

## 2022-09-10 PROCEDURE — 36415 COLL VENOUS BLD VENIPUNCTURE: CPT | Performed by: HOSPITALIST

## 2022-09-10 PROCEDURE — 25000003 PHARM REV CODE 250: Performed by: HOSPITALIST

## 2022-09-10 PROCEDURE — 25000003 PHARM REV CODE 250: Performed by: EMERGENCY MEDICINE

## 2022-09-10 PROCEDURE — 63600175 PHARM REV CODE 636 W HCPCS: Performed by: EMERGENCY MEDICINE

## 2022-09-10 PROCEDURE — 36430 TRANSFUSION BLD/BLD COMPNT: CPT

## 2022-09-10 PROCEDURE — 80048 BASIC METABOLIC PNL TOTAL CA: CPT | Performed by: HOSPITALIST

## 2022-09-10 PROCEDURE — 21400001 HC TELEMETRY ROOM

## 2022-09-10 PROCEDURE — P9040 RBC LEUKOREDUCED IRRADIATED: HCPCS | Performed by: INTERNAL MEDICINE

## 2022-09-10 PROCEDURE — 85025 COMPLETE CBC W/AUTO DIFF WBC: CPT | Performed by: HOSPITALIST

## 2022-09-10 PROCEDURE — 63700000 PHARM REV CODE 250 ALT 637 W/O HCPCS: Performed by: HOSPITALIST

## 2022-09-10 PROCEDURE — P9037 PLATE PHERES LEUKOREDU IRRAD: HCPCS | Performed by: INTERNAL MEDICINE

## 2022-09-10 RX ORDER — NYSTATIN 100000 [USP'U]/ML
500000 SUSPENSION ORAL 4 TIMES DAILY
Status: DISCONTINUED | OUTPATIENT
Start: 2022-09-10 | End: 2022-09-27 | Stop reason: HOSPADM

## 2022-09-10 RX ADMIN — AMLODIPINE BESYLATE 10 MG: 5 TABLET ORAL at 09:09

## 2022-09-10 RX ADMIN — SODIUM CHLORIDE: 9 INJECTION, SOLUTION INTRAVENOUS at 12:09

## 2022-09-10 RX ADMIN — FAMOTIDINE 20 MG: 20 TABLET, FILM COATED ORAL at 08:09

## 2022-09-10 RX ADMIN — NYSTATIN 500000 UNITS: 100000 SUSPENSION ORAL at 05:09

## 2022-09-10 RX ADMIN — VANCOMYCIN HYDROCHLORIDE 1000 MG: 1 INJECTION, POWDER, LYOPHILIZED, FOR SOLUTION INTRAVENOUS at 04:09

## 2022-09-10 RX ADMIN — CEFEPIME 2 G: 2 INJECTION, POWDER, FOR SOLUTION INTRAVENOUS at 06:09

## 2022-09-10 RX ADMIN — HYDROCODONE BITARTRATE AND ACETAMINOPHEN 1 TABLET: 10; 325 TABLET ORAL at 08:09

## 2022-09-10 RX ADMIN — LABETALOL HYDROCHLORIDE 200 MG: 200 TABLET, FILM COATED ORAL at 08:09

## 2022-09-10 RX ADMIN — LABETALOL HYDROCHLORIDE 200 MG: 200 TABLET, FILM COATED ORAL at 09:09

## 2022-09-10 RX ADMIN — CEFEPIME 2 G: 2 INJECTION, POWDER, FOR SOLUTION INTRAVENOUS at 11:09

## 2022-09-10 RX ADMIN — VANCOMYCIN HYDROCHLORIDE 1000 MG: 1 INJECTION, POWDER, LYOPHILIZED, FOR SOLUTION INTRAVENOUS at 08:09

## 2022-09-10 RX ADMIN — FLUCONAZOLE 100 MG: 100 TABLET ORAL at 09:09

## 2022-09-10 RX ADMIN — DIPHENHYDRAMINE HYDROCHLORIDE 15 ML: 25 SOLUTION ORAL at 03:09

## 2022-09-10 RX ADMIN — NYSTATIN 500000 UNITS: 100000 SUSPENSION ORAL at 08:09

## 2022-09-10 RX ADMIN — FAMOTIDINE 20 MG: 20 TABLET, FILM COATED ORAL at 09:09

## 2022-09-10 RX ADMIN — VANCOMYCIN HYDROCHLORIDE 1000 MG: 1 INJECTION, POWDER, LYOPHILIZED, FOR SOLUTION INTRAVENOUS at 12:09

## 2022-09-10 RX ADMIN — HYDROCODONE BITARTRATE AND ACETAMINOPHEN 1 TABLET: 10; 325 TABLET ORAL at 11:09

## 2022-09-10 RX ADMIN — CEFEPIME 2 G: 2 INJECTION, POWDER, FOR SOLUTION INTRAVENOUS at 03:09

## 2022-09-10 NOTE — PLAN OF CARE
Problem: Adult Inpatient Plan of Care  Goal: Plan of Care Review  Outcome: Ongoing, Progressing  Flowsheets (Taken 9/9/2022 2310)  Plan of Care Reviewed With: patient  Goal: Patient-Specific Goal (Individualized)  Outcome: Ongoing, Progressing  Goal: Absence of Hospital-Acquired Illness or Injury  Outcome: Ongoing, Progressing  Intervention: Identify and Manage Fall Risk  Flowsheets (Taken 9/9/2022 2310)  Safety Promotion/Fall Prevention:   assistive device/personal item within reach   side rails raised x 2   lighting adjusted   medications reviewed   nonskid shoes/socks when out of bed  Intervention: Prevent Skin Injury  Flowsheets (Taken 9/9/2022 2310)  Skin Protection:   tubing/devices free from skin contact   adhesive use limited   protective footwear used  Intervention: Prevent and Manage VTE (Venous Thromboembolism) Risk  Flowsheets (Taken 9/9/2022 2310)  Activity Management:   Ambulated to bathroom - L4   Ambulated in room - L4  VTE Prevention/Management: ambulation promoted  Intervention: Prevent Infection  Flowsheets (Taken 9/9/2022 2310)  Infection Prevention:   hand hygiene promoted   rest/sleep promoted   single patient room provided  Goal: Optimal Comfort and Wellbeing  Outcome: Ongoing, Progressing  Intervention: Monitor Pain and Promote Comfort  Flowsheets (Taken 9/9/2022 2310)  Pain Management Interventions:   medication offered   quiet environment facilitated   care clustered  Intervention: Provide Person-Centered Care  Flowsheets (Taken 9/9/2022 2310)  Trust Relationship/Rapport:   care explained   choices provided   empathic listening provided   questions answered   thoughts/feelings acknowledged   emotional support provided  Goal: Readiness for Transition of Care  Outcome: Ongoing, Progressing     Problem: Fall Injury Risk  Goal: Absence of Fall and Fall-Related Injury  Outcome: Ongoing, Progressing  Intervention: Identify and Manage Contributors  Flowsheets (Taken 9/9/2022 2310)  Self-Care  Promotion: independence encouraged  Medication Review/Management: medications reviewed

## 2022-09-10 NOTE — CARE UPDATE
History of CML recently diagnosed, was placed on hydroxyurea but missed his lab appointments and readmitted with febrile neutropenia/pancytopenia.  He received 2 unit PRBC 1 unit platelet on 09/06/2022 and another 1 unit of PRBC and 1 unit of platelet on 09/08/2022.    This morning HGB 6.6 and platelets 11, and remained leukopenic WBC 0.8.  Remains afebrile currently on broad-spectrum antibiotic and infectious workup remains negative.     Transfuse 1 unit PRBC and 1 unit of SDP this morning.

## 2022-09-10 NOTE — PROGRESS NOTES
Inpatient Nutrition Evaluation    Admit Date: 9/5/2022   Total duration of encounter: 5 days    Nutrition Recommendation/Prescription     Continue regular diet as tolerated  RD to order vanilla boost TID to provide an additional 240 kcal and 10 g protein per container  RD to monitor po intake and weight changes    Nutrition Assessment     Chart Review    Reason Seen: length of stay    Diagnosis: Chronic myelogenous leukemia  Febrile neutropenia likely due to hydroxyurea use  Anemia and neoplastic disease  Retinal hemorrhage related to #1  Immunocompromised   Oropharyngeal candidiasis  Essential hypertension, suboptimally controlled at  Medical noncompliance      Relevant Medical History: HTN    Nutrition-Related Medications: magic mouthwash PRN, NaCl, pepcid BID, zofran PRN    Nutrition-Related Labs: 9/10: WBC-0.8, RBC-2.75, H/H-6.6/20.7, BUN-7.7, creat-0.71      Diet Order: Diet Adult Regular  Oral Supplement Order: none at this time  Appetite/Oral Intake: poor/0-25% of meals  Factors Affecting Nutritional Intake: decreased appetite and diarrhea  Food/Episcopal/Cultural Preferences: none reported       Wound(s):       Comments    9/10: pt sleeping at time of visit; spoke with wife, who reports poor appetite x3-4 days, only eating few bites of food. Agreed to ONS at this time. Pt with diarrhea. UBW reported 164 lb with no weight loss. Latest weight of 74.4 kg (164 lb) on 9/6, however previous weight of 84.8 kg (187 lb) on 8/21. Per previous weights, pt with possible 12.3% weight loss in 2 weeks. Will continue to monitor.    Anthropometrics    Height: 6' (182.9 cm)    Last Weight: 74.4 kg (164 lb) (09/06/22 1747) Weight Method: Bed Scale  BMI (Calculated): 22.2  BMI Classification: normal (BMI 18.5-24.9)        Ideal Body Weight (IBW), Male: 178 lb  % Ideal Body Weight, Male (lb): 92.13 %                   Wt Readings from Last 5 Encounters:   09/06/22 74.4 kg (164 lb)   08/21/22 84.8 kg (187 lb)   07/18/22 84.4 kg  (186 lb)   06/28/22 82.5 kg (181 lb 12.8 oz)   06/02/22 79.6 kg (175 lb 6.4 oz)     Weight Change(s) Since Admission:  Admit Weight: 74.4 kg (164 lb) (09/05/22 2042)      Patient Education    Not applicable.    Monitoring & Evaluation     Dietitian will monitor food and beverage intake and weight change.  Nutrition Risk/Follow-Up: low (follow-up in 5-7 days)  Patients assigned 'low nutrition risk' status do not qualify for a full nutritional assessment but will be monitored and re-evaluated in a 5-7 day time period.    Clemencia Weber, Registration Eligible, Provisional LDN

## 2022-09-10 NOTE — PROGRESS NOTES
Subjective:       Patient ID: Magdaleno Hirsch is a 22 y.o. male.    Chief Complaint: Sore Throat (Sore throat-chills -feet ache weakness for 2 days-has leukemia-no treatment yet-appt 9/16-oncology)      Diagnosis:  1. CML, chronic phase  2. 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  3. Retinal hemorrhage secondary to 1.    Current Treatment:       Treatment History:  1. Hydroxyurea 4 g every 12 hours    Eye Problem   Associated symptoms include a fever. Pertinent negatives include no weakness.   Sore Throat   Pertinent negatives include no abdominal pain, congestion, coughing, diarrhea, headaches or shortness of breath.     HPI:  22-year-old male with no real medical history who went in for a routine eye exam on 08/18/2022 to update his eye glasses prescription.  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  He had bilateral Valdez spots with vessel tortuosity.  The optometrist recommended that the patient have blood work including testing for sickle cell disease.  The patient presented to the emergency department.  CBC in the emergency department revealed a white blood cell count of 411,900. Hemoglobin was 8.3, platelets were normal at 375,000 hundred and seventy five thousand.  Differential was suggestive of CML.  Coagulation studies revealed an INR of 1.38, PTT of 36.4 and a fibrinogen of 292.  Patient was going to present to [place], however they were on diversion.  He was transferred from [facility] to [facility].  Hematology consulted.  Patient underwent bone marrow biopsy on 08/22/2022, this revealed CML, chronic phase, BCR/ABL1 positive.    Interval History:   22-year-old patient known to me from previous hospitalization.  Was discharged home on 08/22/2022, he was informed about twice weekly labs until follow up appointment with me and continued on hydroxyurea.  Unfortunately, the patient did not keep his lab appointments.   He presented to the emergency department on 09/05/2022 with febrile neutropenia, was admitted to the hospitalist service and transferred to main campus [facility].  Hematology consulted as patient known to me.    Today, 09/10/2022:   Patient seen and examined.  .  His throat still hurts, blood in mucus from mouth today  No eating much  No Fever  Vision stable    No past medical history on file.   Past Surgical History:   Procedure Laterality Date    BONE MARROW BIOPSY N/A 8/22/2022    Procedure: Biopsy-bone marrow;  Surgeon: Getachew Chen MD;  Location: [facility];  Service: General;  Laterality: N/A;    KNEE SURGERY Left      Social History     Socioeconomic History    Marital status: Single   Tobacco Use    Smoking status: Never    Smokeless tobacco: Never   Substance and Sexual Activity    Alcohol use: Never    Drug use: Never      History reviewed. No pertinent family history.   Review of patient's allergies indicates:  No Known Allergies   Review of Systems   Constitutional:  Positive for fever. Negative for chills, diaphoresis, fatigue and unexpected weight change.   HENT:  Positive for sore throat. Negative for nasal congestion, mouth sores and sinus pressure/congestion.    Eyes:  Positive for visual disturbance. Negative for pain.   Respiratory:  Negative for cough, chest tightness and shortness of breath.    Cardiovascular:  Negative for chest pain, palpitations and leg swelling.   Gastrointestinal:  Negative for abdominal distention, abdominal pain, blood in stool, constipation and diarrhea.   Genitourinary:  Negative for dysuria, frequency and hematuria.   Musculoskeletal:  Negative for arthralgias and back pain.   Integumentary:  Negative for rash.   Neurological:  Negative for dizziness, weakness, numbness and headaches.   Hematological:  Negative for adenopathy.   Psychiatric/Behavioral:  Negative for confusion.        Objective:      Physical Exam  Vitals reviewed.   Constitutional:        General: He is awake.      Appearance: Normal appearance.   HENT:      Head: Normocephalic and atraumatic.      Right Ear: Hearing normal.      Left Ear: Hearing normal.      Nose: Nose normal.      Mouth/Throat:      Comments: Mucositis    Eyes:      General: Lids are normal. Vision grossly intact.      Extraocular Movements: Extraocular movements intact.      Conjunctiva/sclera: Conjunctivae normal.   Cardiovascular:      Rate and Rhythm: Normal rate and regular rhythm.      Pulses: Normal pulses.      Heart sounds: Normal heart sounds.   Pulmonary:      Effort: Pulmonary effort is normal.      Breath sounds: Normal breath sounds. No wheezing, rhonchi or rales.   Chest:      Comments: Soft mass in area of the sternum  Abdominal:      General: Bowel sounds are normal.      Palpations: Abdomen is soft. There is splenomegaly.      Tenderness: There is no abdominal tenderness.   Musculoskeletal:      Cervical back: Full passive range of motion without pain.      Right lower leg: No edema.      Left lower leg: No edema.   Lymphadenopathy:      Cervical: No cervical adenopathy.      Upper Body:      Right upper body: No supraclavicular or axillary adenopathy.      Left upper body: No supraclavicular or axillary adenopathy.   Skin:     General: Skin is warm.   Neurological:      General: No focal deficit present.      Mental Status: He is alert and oriented to person, place, and time.   Psychiatric:         Attention and Perception: Attention normal.         Mood and Affect: Mood and affect normal.         Behavior: Behavior is cooperative.       LABS AND IMAGING REVIEWED IN EPIC          Assessment:   1. CML, chronic phase  2. 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  3. Retinal hemorrhage secondary to 1.        Plan:       Patient has CML, diagnosed on bone marrow biopsy done on 08/22/2022.    Patient was discharged home on 08/22/2022 with plans to continue hydroxyurea 4 g every 12 hours and have blood work twice a week until his follow  up with me and final bone marrow biopsy.    Unfortunately, the patient did not have twice weekly labs, but continued on hydroxyurea.  When asked about lab appointments, the patient's wife stated that he was supposed to come on Mondays and Thursdays.  Unfortunately he was not able to make these visits.      He is now admitted with febrile neutropenia, almost certainly from hydroxyurea.      d/c hydroxyurea.      We will treat him with antibiotics.  So far workup is negative for COVID, flu, strep.  Does have possible thrush.      I agree with continued antibiotics and fluconazole.    Await count recovery.  Agree with transfusion today plt and blood  ADD boost with meals  To get paperwork for job sent for missing work       We will need to start a TKI, likely dasatinib once the patient's counts recover.      Jose Ramon Cochran MD

## 2022-09-10 NOTE — PROGRESS NOTES
[facility]  Hospital Medicine Progress Note        Chief Complaint: Inpatient Follow-up for pancytopenia, CML    HPI:   22-year-old  male with a past medical history of hypertension who went in for a routine eye exam on 08/18/2022 to update his eye glasses prescription.  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  He had bilateral Valdez spots with vessel tortuosity.  The optometrist recommended that the patient have blood work including testing for sickle cell disease.  The patient presented to the emergency department.  CBC in the emergency department revealed a white blood cell count of 411,900. Hemoglobin was 8.3, platelets were normal at 375,000.  Differential was suggestive of chronic myelogenous leukemia.  Coagulation studies revealed an INR of 1.38, PTT of 36.4 and a fibrinogen of 292.  Patient was going to present to [place], however they were on diversion.  He was transferred from [facility] to [facility].  Hematology was consulted.  Patient underwent bone marrow biopsy on 08/22/2022, this revealed chronic myelogenous leukemia, chronic phase, BCR/ABL1 positive.  Patient was discharged home on August 22, 2022 and was supposed to obtain twice weekly labs until follow-up with Dr. Gardner and continue hydroxyurea.  Unfortunately he did not keep his lab appointments and presented to the emergency room at [facility] on 09/06/2022 and was admitted there with febrile neutropenia and anemia.  Patient was started on empiric antibiotic therapy, transfused 2 units of packed red blood cells and 1 dose of platelets and then subsequently transferred here for further treatment and hematology evaluation.       Interval Hx:   Complaining of mouth and gum pain. H/h and plts low this am. No fever overnight.     Objective/physical exam:  General: In  no acute distress, afebrile  Chest: Clear to auscultation bilaterally  Heart: RRR, +S1, S2, no appreciable murmur  Abdomen: Soft, nontender, BS +  MSK: Warm, no lower extremity edema, no clubbing or cyanosis  Neurologic: Alert and oriented x4, Cranial nerve II-XII intact, Strength 5/5 in all 4 extremities    VITAL SIGNS: 24 HRS MIN & MAX LAST   Temp  Min: 98 °F (36.7 °C)  Max: 99.9 °F (37.7 °C) 98.9 °F (37.2 °C)   BP  Min: 130/79  Max: 159/88 (!) 149/81     Pulse  Min: 74  Max: 106  75   Resp  Min: 18  Max: 20 20   SpO2  Min: 98 %  Max: 100 % 100 %       Recent Labs   Lab 09/08/22  0348 09/09/22  0325 09/10/22  0340   WBC 0.8* 0.8* 0.8*   RBC 2.44* 2.83* 2.75*   HGB 6.4* 7.3* 6.6*   HCT 19.8* 22.6* 20.7*   MCV 81.1 79.9* 75.3*   MCH 26.2* 25.8* 24.0*   MCHC 32.3* 32.3* 31.9*   RDW 22.2* 21.2* 20.0*   PLT 13* 20* 11*       Recent Labs   Lab 09/05/22  2110 09/07/22  0355 09/08/22  0348 09/09/22  0325 09/10/22  0340      < > 137 138 137   K 4.0   < > 3.6 3.8 3.9   CO2 24   < > 25 24 22   BUN 11.0   < > 9.8 8.2* 7.7*   CREATININE 0.94   < > 0.82 0.73 0.71*   CALCIUM 8.9   < > 8.5 8.2* 8.4   ALBUMIN 3.7  --  2.9*  --   --    ALKPHOS 58  --  53  --   --    ALT 24  --  15  --   --    AST 24  --  12  --   --    BILITOT 1.7*  --  1.4  --   --     < > = values in this interval not displayed.          Microbiology Results (last 7 days)       Procedure Component Value Units Date/Time    Blood Culture #1 **CANNOT BE ORDERED STAT** [[id number]]  (Normal) Collected: 09/05/22 2135    Order Status: Completed Specimen: Blood from Antecubital, Left Updated: 09/10/22 1000     CULTURE, BLOOD ([facility]) No Growth At 96 Hours    Blood Culture #2 **CANNOT BE ORDERED STAT** [[id number]]  (Normal) Collected: 09/05/22 2146    Order Status: Completed Specimen: Blood from Hand, Left Updated: 09/10/22 1000     CULTURE, BLOOD ([facility]) No Growth At 96 Hours             See below for Radiology    Scheduled Med:   amLODIPine  10 mg Oral Daily     ceFEPime (MAXIPIME) IVPB  2 g Intravenous Q8H    famotidine  20 mg Oral BID    fluconazole  100 mg Oral Daily    labetaloL  200 mg Oral Q12H    vancomycin (VANCOCIN) IVPB  1,000 mg Intravenous Q8H        Continuous Infusions:   sodium chloride 0.9% 125 mL/hr at 09/10/22 0043        PRN Meds:  (Magic mouthwash) 1:1:1 diphenhydramine(Benadryl) 12.5mg/5ml liq, aluminum & magnesium hydroxide-simethicone (Maalox), LIDOcaine viscous 2%, sodium chloride, acetaminophen, cloNIDine, glucagon (human recombinant), glucose, glucose, hydrALAZINE, HYDROcodone-acetaminophen, HYDROcodone-acetaminophen, labetaloL, ondansetron, oxymetazoline, Pharmacy to dose Vancomycin consult **AND** vancomycin - pharmacy to dose       Assessment/Plan:  Chronic myelogenous leukemia  Febrile neutropenia likely due to hydroxyurea use  Anemia and neoplastic disease  Retinal hemorrhage related to #1  Immunocompromised   Oropharyngeal candidiasis  Essential hypertension, suboptimally controlled at  Medical noncompliance       White blood count stable  Transfuse PRBC, plts today  Cont on empiric IV antibiotics for now.    Cx's negative  Like to start deescalating these as low suspicion for acute infectious process.    Give magic mouthwash, oral nystatin on Fluconazole  Oncology following     Critical care diagnosis: pancytopenia requiring transfusion  Critical care interventions: hands on evaluation, review of labs/radiographs/records and discussions with family  Critical care time spent: >32 minutes     Patient condition:  Stable     Anticipated discharge and Disposition: TBD       All diagnosis and differential diagnosis have been reviewed; assessment and plan has been documented; I have personally reviewed the labs and test results that are presently available; I have reviewed the patients medication list; I have reviewed the consulting providers response and recommendations. I have reviewed or attempted to review medical records based upon their  availability    All of the patient's questions have been  addressed and answered. Patient's is agreeable to the above stated plan. I will continue to monitor closely and make adjustments to medical management as needed.  _____________________________________________________________________    Nutrition Status:    Radiology:  X-Ray Chest AP Portable  Narrative: EXAMINATION:  XR CHEST AP PORTABLE    CLINICAL HISTORY:  Fever, unspecified    TECHNIQUE:  One view    COMPARISON:  August 18, 2022.    FINDINGS:  Cardiopericardial silhouette is within normal limits. Lungs are without dense focal or segmental consolidation, congestive process, pleural effusions or pneumothorax.  Impression: NO ACUTE CARDIOPULMONARY PROCESS IDENTIFIED.    Electronically signed by: Phill Andrew  Date:    09/06/2022  Time:    07:41      Nehemiah Latham MD   09/10/2022

## 2022-09-11 LAB
ABO + RH BLD: NORMAL
ANION GAP SERPL CALC-SCNC: 6 MEQ/L
BACTERIA BLD CULT: NORMAL
BACTERIA BLD CULT: NORMAL
BASOPHILS # BLD AUTO: 0.05 X10(3)/MCL (ref 0–0.2)
BASOPHILS NFR BLD AUTO: 6.4 %
BLD PROD TYP BPU: NORMAL
BLOOD UNIT EXPIRATION DATE: NORMAL
BLOOD UNIT TYPE CODE: 5100
BUN SERPL-MCNC: 8 MG/DL (ref 8.9–20.6)
CALCIUM SERPL-MCNC: 8.9 MG/DL (ref 8.4–10.2)
CHLORIDE SERPL-SCNC: 106 MMOL/L (ref 98–107)
CO2 SERPL-SCNC: 26 MMOL/L (ref 22–29)
CREAT SERPL-MCNC: 0.76 MG/DL (ref 0.73–1.18)
CREAT/UREA NIT SERPL: 11
CROSSMATCH INTERPRETATION: NORMAL
DISPENSE STATUS: NORMAL
EOSINOPHIL # BLD AUTO: 0.03 X10(3)/MCL (ref 0–0.9)
EOSINOPHIL NFR BLD AUTO: 3.8 %
ERYTHROCYTE [DISTWIDTH] IN BLOOD BY AUTOMATED COUNT: 19.6 % (ref 11.5–17)
GFR SERPLBLD CREATININE-BSD FMLA CKD-EPI: >60 MLS/MIN/1.73/M2
GLUCOSE SERPL-MCNC: 98 MG/DL (ref 74–100)
HCT VFR BLD AUTO: 23.4 % (ref 42–52)
HGB BLD-MCNC: 7.8 GM/DL (ref 14–18)
IMM GRANULOCYTES # BLD AUTO: 0 X10(3)/MCL (ref 0–0.04)
IMM GRANULOCYTES NFR BLD AUTO: 0 %
LYMPHOCYTES # BLD AUTO: 0.69 X10(3)/MCL (ref 0.6–4.6)
LYMPHOCYTES NFR BLD AUTO: 88.5 %
MCH RBC QN AUTO: 25.8 PG (ref 27–31)
MCHC RBC AUTO-ENTMCNC: 33.3 MG/DL (ref 33–36)
MCV RBC AUTO: 77.5 FL (ref 80–94)
MONOCYTES # BLD AUTO: 0.01 X10(3)/MCL (ref 0.1–1.3)
MONOCYTES NFR BLD AUTO: 1.3 %
NEUTROPHILS # BLD AUTO: 0 X10(3)/MCL (ref 2.1–9.2)
NEUTROPHILS NFR BLD AUTO: 0 %
NRBC BLD AUTO-RTO: 0 %
PLATELET # BLD AUTO: 23 X10(3)/MCL (ref 130–400)
PMV BLD AUTO: ABNORMAL FL
POTASSIUM SERPL-SCNC: 4.4 MMOL/L (ref 3.5–5.1)
RBC # BLD AUTO: 3.02 X10(6)/MCL (ref 4.7–6.1)
SODIUM SERPL-SCNC: 138 MMOL/L (ref 136–145)
UNIT NUMBER: NORMAL
WBC # SPEC AUTO: 0.8 X10(3)/MCL (ref 4.5–11.5)

## 2022-09-11 PROCEDURE — 63600175 PHARM REV CODE 636 W HCPCS: Performed by: HOSPITALIST

## 2022-09-11 PROCEDURE — 25000003 PHARM REV CODE 250: Performed by: EMERGENCY MEDICINE

## 2022-09-11 PROCEDURE — 36430 TRANSFUSION BLD/BLD COMPNT: CPT

## 2022-09-11 PROCEDURE — 63600175 PHARM REV CODE 636 W HCPCS: Performed by: EMERGENCY MEDICINE

## 2022-09-11 PROCEDURE — 25000003 PHARM REV CODE 250: Performed by: INTERNAL MEDICINE

## 2022-09-11 PROCEDURE — 25000003 PHARM REV CODE 250: Performed by: NURSE PRACTITIONER

## 2022-09-11 PROCEDURE — 36415 COLL VENOUS BLD VENIPUNCTURE: CPT | Performed by: HOSPITALIST

## 2022-09-11 PROCEDURE — 99232 PR SUBSEQUENT HOSPITAL CARE,LEVL II: ICD-10-PCS | Mod: ,,, | Performed by: INTERNAL MEDICINE

## 2022-09-11 PROCEDURE — 21400001 HC TELEMETRY ROOM

## 2022-09-11 PROCEDURE — 99232 SBSQ HOSP IP/OBS MODERATE 35: CPT | Mod: ,,, | Performed by: INTERNAL MEDICINE

## 2022-09-11 PROCEDURE — P9037 PLATE PHERES LEUKOREDU IRRAD: HCPCS | Performed by: INTERNAL MEDICINE

## 2022-09-11 PROCEDURE — 36415 COLL VENOUS BLD VENIPUNCTURE: CPT | Performed by: INTERNAL MEDICINE

## 2022-09-11 PROCEDURE — 25000003 PHARM REV CODE 250: Performed by: HOSPITALIST

## 2022-09-11 PROCEDURE — 80048 BASIC METABOLIC PNL TOTAL CA: CPT | Performed by: INTERNAL MEDICINE

## 2022-09-11 PROCEDURE — 85025 COMPLETE CBC W/AUTO DIFF WBC: CPT | Performed by: INTERNAL MEDICINE

## 2022-09-11 PROCEDURE — 63700000 PHARM REV CODE 250 ALT 637 W/O HCPCS: Performed by: HOSPITALIST

## 2022-09-11 RX ORDER — HYDROCODONE BITARTRATE AND ACETAMINOPHEN 500; 5 MG/1; MG/1
TABLET ORAL
Status: DISCONTINUED | OUTPATIENT
Start: 2022-09-11 | End: 2022-09-19

## 2022-09-11 RX ADMIN — LABETALOL HYDROCHLORIDE 200 MG: 200 TABLET, FILM COATED ORAL at 09:09

## 2022-09-11 RX ADMIN — AMLODIPINE BESYLATE 10 MG: 5 TABLET ORAL at 09:09

## 2022-09-11 RX ADMIN — NYSTATIN 500000 UNITS: 100000 SUSPENSION ORAL at 09:09

## 2022-09-11 RX ADMIN — FLUCONAZOLE 100 MG: 100 TABLET ORAL at 09:09

## 2022-09-11 RX ADMIN — NYSTATIN 500000 UNITS: 100000 SUSPENSION ORAL at 12:09

## 2022-09-11 RX ADMIN — CEFEPIME 2 G: 2 INJECTION, POWDER, FOR SOLUTION INTRAVENOUS at 07:09

## 2022-09-11 RX ADMIN — VANCOMYCIN HYDROCHLORIDE 1000 MG: 1 INJECTION, POWDER, LYOPHILIZED, FOR SOLUTION INTRAVENOUS at 12:09

## 2022-09-11 RX ADMIN — HYDROCODONE BITARTRATE AND ACETAMINOPHEN 1 TABLET: 10; 325 TABLET ORAL at 09:09

## 2022-09-11 RX ADMIN — FAMOTIDINE 20 MG: 20 TABLET, FILM COATED ORAL at 09:09

## 2022-09-11 RX ADMIN — VANCOMYCIN HYDROCHLORIDE 1000 MG: 1 INJECTION, POWDER, LYOPHILIZED, FOR SOLUTION INTRAVENOUS at 04:09

## 2022-09-11 RX ADMIN — HYDROCODONE BITARTRATE AND ACETAMINOPHEN 1 TABLET: 10; 325 TABLET ORAL at 10:09

## 2022-09-11 RX ADMIN — SODIUM CHLORIDE: 9 INJECTION, SOLUTION INTRAVENOUS at 12:09

## 2022-09-11 RX ADMIN — NYSTATIN 500000 UNITS: 100000 SUSPENSION ORAL at 04:09

## 2022-09-11 RX ADMIN — CEFEPIME 2 G: 2 INJECTION, POWDER, FOR SOLUTION INTRAVENOUS at 02:09

## 2022-09-11 RX ADMIN — CEFEPIME 2 G: 2 INJECTION, POWDER, FOR SOLUTION INTRAVENOUS at 10:09

## 2022-09-11 NOTE — PROGRESS NOTES
[facility]  Hospital Medicine Progress Note        Chief Complaint: Inpatient Follow-up for pancytopenia, CML    HPI:   22-year-old  male with a past medical history of hypertension who went in for a routine eye exam on 08/18/2022 to update his eye glasses prescription.  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  He had bilateral Valdez spots with vessel tortuosity.  The optometrist recommended that the patient have blood work including testing for sickle cell disease.  The patient presented to the emergency department.  CBC in the emergency department revealed a white blood cell count of 411,900. Hemoglobin was 8.3, platelets were normal at 375,000.  Differential was suggestive of chronic myelogenous leukemia.  Coagulation studies revealed an INR of 1.38, PTT of 36.4 and a fibrinogen of 292.  Patient was going to present to [place], however they were on diversion.  He was transferred from [facility] to [facility].  Hematology was consulted.  Patient underwent bone marrow biopsy on 08/22/2022, this revealed chronic myelogenous leukemia, chronic phase, BCR/ABL1 positive.  Patient was discharged home on August 22, 2022 and was supposed to obtain twice weekly labs until follow-up with Dr. Gardner and continue hydroxyurea.  Unfortunately he did not keep his lab appointments and presented to the emergency room at [facility] on 09/06/2022 and was admitted there with febrile neutropenia and anemia.  Patient was started on empiric antibiotic therapy, transfused 2 units of packed red blood cells and 1 dose of platelets and then subsequently transferred here for further treatment and hematology evaluation.       Interval Hx:   Patient seen and examined this morning spiked low-grade temp of around 99.3       Objective/physical exam:  General: In no  acute distress, afebrile  Chest: Clear to auscultation bilaterally  Heart: RRR, +S1, S2, no appreciable murmur  Abdomen: Soft, nontender, BS +  MSK: Warm, no lower extremity edema, no clubbing or cyanosis  Neurologic: Alert and oriented x4, Cranial nerve II-XII intact, Strength 5/5 in all 4 extremities    VITAL SIGNS: 24 HRS MIN & MAX LAST   Temp  Min: 98.7 °F (37.1 °C)  Max: 99.5 °F (37.5 °C) 99.5 °F (37.5 °C)   BP  Min: 124/80  Max: 160/71 124/80     Pulse  Min: 76  Max: 88  76   Resp  Min: 18  Max: 20 20   SpO2  Min: 97 %  Max: 100 % 100 %       Recent Labs   Lab 09/09/22  0325 09/10/22  0340 09/11/22  0958   WBC 0.8* 0.8* 0.8*   RBC 2.83* 2.75* 3.02*   HGB 7.3* 6.6* 7.8*   HCT 22.6* 20.7* 23.4*   MCV 79.9* 75.3* 77.5*   MCH 25.8* 24.0* 25.8*   MCHC 32.3* 31.9* 33.3   RDW 21.2* 20.0* 19.6*   PLT 20* 11* 23*       Recent Labs   Lab 09/05/22  2110 09/07/22  0355 09/08/22  0348 09/09/22  0325 09/10/22  0340 09/11/22  0958      < > 137 138 137 138   K 4.0   < > 3.6 3.8 3.9 4.4   CO2 24   < > 25 24 22 26   BUN 11.0   < > 9.8 8.2* 7.7* 8.0*   CREATININE 0.94   < > 0.82 0.73 0.71* 0.76   CALCIUM 8.9   < > 8.5 8.2* 8.4 8.9   ALBUMIN 3.7  --  2.9*  --   --   --    ALKPHOS 58  --  53  --   --   --    ALT 24  --  15  --   --   --    AST 24  --  12  --   --   --    BILITOT 1.7*  --  1.4  --   --   --     < > = values in this interval not displayed.          Microbiology Results (last 7 days)       Procedure Component Value Units Date/Time    Blood Culture #1 **CANNOT BE ORDERED STAT** [[id number]]  (Normal) Collected: 09/05/22 2135    Order Status: Completed Specimen: Blood from Antecubital, Left Updated: 09/11/22 1000     CULTURE, BLOOD ([facility]) No Growth at 5 days    Blood Culture #2 **CANNOT BE ORDERED STAT** [[id number]]  (Normal) Collected: 09/05/22 2146    Order Status: Completed Specimen: Blood from Hand, Left Updated: 09/11/22 1000     CULTURE, BLOOD ([facility]) No Growth at 5 days             See below for  Radiology    Scheduled Med:   amLODIPine  10 mg Oral Daily    ceFEPime (MAXIPIME) IVPB  2 g Intravenous Q8H    famotidine  20 mg Oral BID    fluconazole  100 mg Oral Daily    labetaloL  200 mg Oral Q12H    nystatin  500,000 Units Oral QID        Continuous Infusions:   sodium chloride 0.9% 125 mL/hr at 09/11/22 0000        PRN Meds:  (Magic mouthwash) 1:1:1 diphenhydramine(Benadryl) 12.5mg/5ml liq, aluminum & magnesium hydroxide-simethicone (Maalox), LIDOcaine viscous 2%, sodium chloride, acetaminophen, cloNIDine, glucagon (human recombinant), glucose, glucose, hydrALAZINE, HYDROcodone-acetaminophen, HYDROcodone-acetaminophen, labetaloL, ondansetron, oxymetazoline       Assessment/Plan:  Chronic myelogenous leukemia  Febrile neutropenia likely due to hydroxyurea use  Anemia and neoplastic disease  Retinal hemorrhage related to #1  Immunocompromised   Oropharyngeal candidiasis  Essential hypertension, suboptimally controlled at  Medical noncompliance     On vancomycin and Zosyn all cultures have been negative Will discontinue vancomycin today  Hemoglobin is 7.8 and platelet count is 43388   Give magic mouthwash, oral nystatin on Fluconazole  Oncology following        Patient condition:  Stable     Anticipated discharge and Disposition: TBD       All diagnosis and differential diagnosis have been reviewed; assessment and plan has been documented; I have personally reviewed the labs and test results that are presently available; I have reviewed the patients medication list; I have reviewed the consulting providers response and recommendations. I have reviewed or attempted to review medical records based upon their availability    All of the patient's questions have been  addressed and answered. Patient's is agreeable to the above stated plan. I will continue to monitor closely and make adjustments to medical management as needed.  _____________________________________________________________________    Nutrition  Status:    Radiology:  X-Ray Chest AP Portable  Narrative: EXAMINATION:  XR CHEST AP PORTABLE    CLINICAL HISTORY:  Fever, unspecified    TECHNIQUE:  One view    COMPARISON:  August 18, 2022.    FINDINGS:  Cardiopericardial silhouette is within normal limits. Lungs are without dense focal or segmental consolidation, congestive process, pleural effusions or pneumothorax.  Impression: NO ACUTE CARDIOPULMONARY PROCESS IDENTIFIED.    Electronically signed by: Phill Andrew  Date:    09/06/2022  Time:    07:41      Mariajose Gonzalez MD   09/11/2022

## 2022-09-11 NOTE — PROGRESS NOTES
Subjective:       Patient ID: Magdaleno Hirsch is a 22 y.o. male.    Chief Complaint: Sore Throat (Sore throat-chills -feet ache weakness for 2 days-has leukemia-no treatment yet-appt 9/16-oncology)      Diagnosis:  1. CML, chronic phase  2. 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  3. Retinal hemorrhage secondary to 1.    Current Treatment:       Treatment History:  1. Hydroxyurea 4 g every 12 hours    Eye Problem   Pertinent negatives include no weakness.   Sore Throat   Associated symptoms include trouble swallowing. Pertinent negatives include no abdominal pain, congestion, coughing, diarrhea, headaches or shortness of breath.     HPI:  22-year-old male with no real medical history who went in for a routine eye exam on 08/18/2022 to update his eye glasses prescription.  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  He had bilateral Valdez spots with vessel tortuosity.  The optometrist recommended that the patient have blood work including testing for sickle cell disease.  The patient presented to the emergency department.  CBC in the emergency department revealed a white blood cell count of 411,900. Hemoglobin was 8.3, platelets were normal at 375,000 hundred and seventy five thousand.  Differential was suggestive of CML.  Coagulation studies revealed an INR of 1.38, PTT of 36.4 and a fibrinogen of 292.  Patient was going to present to [place], however they were on diversion.  He was transferred from [facility] to [facility].  Hematology consulted.  Patient underwent bone marrow biopsy on 08/22/2022, this revealed CML, chronic phase, BCR/ABL1 positive.    Interval History:   22-year-old patient known to me from previous hospitalization.  Was discharged home on 08/22/2022, he was informed about twice weekly labs until follow up appointment with me and continued on hydroxyurea.  Unfortunately, the patient did not keep his lab  appointments.  He presented to the emergency department on 09/05/2022 with febrile neutropenia, was admitted to the hospitalist service and transferred to main campus [facility].  Hematology consulted as patient known to me.    Today, 09/11/2022:   Patient seen and examined.  .  His throat still hurts, more  blood in mucus from mouth today  No eating much  No Fever  Vision stable  Does feel better - up more and out of bed    No past medical history on file.   Past Surgical History:   Procedure Laterality Date    BONE MARROW BIOPSY N/A 8/22/2022    Procedure: Biopsy-bone marrow;  Surgeon: Getachew Chen MD;  Location: [facility];  Service: General;  Laterality: N/A;    KNEE SURGERY Left      Social History     Socioeconomic History    Marital status: Single   Tobacco Use    Smoking status: Never    Smokeless tobacco: Never   Substance and Sexual Activity    Alcohol use: Never    Drug use: Never      History reviewed. No pertinent family history.   Review of patient's allergies indicates:  No Known Allergies   Review of Systems   Constitutional:  Negative for chills, diaphoresis, fatigue and unexpected weight change.   HENT:  Positive for mouth sores, sore throat and trouble swallowing. Negative for nasal congestion and sinus pressure/congestion.    Eyes:  Positive for visual disturbance. Negative for pain.   Respiratory:  Negative for cough, chest tightness and shortness of breath.    Cardiovascular:  Negative for chest pain, palpitations and leg swelling.   Gastrointestinal:  Negative for abdominal distention, abdominal pain, blood in stool, constipation and diarrhea.   Genitourinary:  Negative for dysuria, frequency and hematuria.   Musculoskeletal:  Negative for arthralgias and back pain.   Integumentary:  Negative for rash.   Neurological:  Negative for dizziness, weakness, numbness and headaches.   Hematological:  Negative for adenopathy.   Psychiatric/Behavioral:  Negative for confusion.         Objective:      Physical Exam  Vitals reviewed.   Constitutional:       General: He is awake.      Appearance: Normal appearance.   HENT:      Head: Normocephalic and atraumatic.      Right Ear: Hearing normal.      Left Ear: Hearing normal.      Nose: Nose normal.      Mouth/Throat:      Comments: Mucositis    Eyes:      General: Lids are normal. Vision grossly intact.      Extraocular Movements: Extraocular movements intact.      Conjunctiva/sclera: Conjunctivae normal.   Cardiovascular:      Rate and Rhythm: Normal rate and regular rhythm.      Pulses: Normal pulses.      Heart sounds: Normal heart sounds.   Pulmonary:      Effort: Pulmonary effort is normal.      Breath sounds: Normal breath sounds. No wheezing, rhonchi or rales.   Chest:      Comments: Soft mass in area of the sternum  Abdominal:      General: Bowel sounds are normal.      Palpations: Abdomen is soft. There is splenomegaly.      Tenderness: There is no abdominal tenderness.   Musculoskeletal:      Cervical back: Full passive range of motion without pain.      Right lower leg: No edema.      Left lower leg: No edema.   Lymphadenopathy:      Cervical: No cervical adenopathy.      Upper Body:      Right upper body: No supraclavicular or axillary adenopathy.      Left upper body: No supraclavicular or axillary adenopathy.   Skin:     General: Skin is warm.   Neurological:      General: No focal deficit present.      Mental Status: He is alert and oriented to person, place, and time.   Psychiatric:         Attention and Perception: Attention normal.         Mood and Affect: Mood and affect normal.         Behavior: Behavior is cooperative.       LABS AND IMAGING REVIEWED IN EPIC  Lab Review:  CBC:   Recent Labs     09/11/22  0958 09/10/22  0340 09/09/22  0325   WBC 0.8* 0.8* 0.8*   RBC 3.02* 2.75* 2.83*   HGB 7.8* 6.6* 7.3*   HCT 23.4* 20.7* 22.6*   PLT 23* 11* 20*     CMP:   Recent Labs     09/11/22 0958 09/10/22  0340 09/09/22 0325  09/08/22  0348 09/07/22  0355 09/05/22  2110 08/22/22  0839    137 138 137   < > 140 135*   K 4.4 3.9 3.8 3.6   < > 4.0 4.6   CO2 26 22 24 25   < > 24 21*   BUN 8.0* 7.7* 8.2* 9.8   < > 11.0 16.5   CREATININE 0.76 0.71* 0.73 0.82   < > 0.94 0.81   CALCIUM 8.9 8.4 8.2* 8.5   < > 8.9 9.4   ALBUMIN  --   --   --  2.9*  --  3.7 3.7   BILITOT  --   --   --  1.4  --  1.7* 1.4   ALKPHOS  --   --   --  53  --  58 72   AST  --   --   --  12  --  24 49*   ALT  --   --   --  15  --  24 17    < > = values in this interval not displayed.            Assessment:   1. CML, chronic phase  2. 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  3. Retinal hemorrhage secondary to 1.        Plan:       Patient has CML, diagnosed on bone marrow biopsy done on 08/22/2022.    Patient was discharged home on 08/22/2022 with plans to continue hydroxyurea 4 g every 12 hours and have blood work twice a week until his follow up with me and final bone marrow biopsy.    Unfortunately, the patient did not have twice weekly labs, but continued on hydroxyurea.  When asked about lab appointments, the patient's wife stated that he was supposed to come on Mondays and Thursdays.  Unfortunately he was not able to make these visits.      He is now admitted with febrile neutropenia, almost certainly from hydroxyurea.      d/c hydroxyurea.      We will treat him with antibiotics.  So far workup is negative for COVID, flu, strep.  Does have possible thrush.      I agree with continued antibiotics and fluconazole.d/c vanc    Await count recovery.  Agree with transfusion today plt today with more blood in mucus  Keep Hgb >7  ADD boost with meals  To get paperwork for job sent for missing work -to Dr. Hogan in am  Culture negative d/c vanc-- no fever  Keep on Maxipime      We will need to start a TKI, likely dasatinib once the patient's counts recover.  Dr. Prouet will return in am    Jose Ramon Cochran MD

## 2022-09-11 NOTE — PLAN OF CARE
Problem: Adult Inpatient Plan of Care  Goal: Plan of Care Review  Outcome: Ongoing, Progressing  Flowsheets (Taken 9/11/2022 0137)  Plan of Care Reviewed With: patient  Goal: Patient-Specific Goal (Individualized)  Outcome: Ongoing, Progressing  Goal: Absence of Hospital-Acquired Illness or Injury  Outcome: Ongoing, Progressing  Intervention: Identify and Manage Fall Risk  Flowsheets (Taken 9/11/2022 0137)  Safety Promotion/Fall Prevention:   assistive device/personal item within reach   nonskid shoes/socks when out of bed   side rails raised x 2  Intervention: Prevent and Manage VTE (Venous Thromboembolism) Risk  Flowsheets (Taken 9/11/2022 0137)  VTE Prevention/Management:   bleeding risk assessed   bleeding precations maintained   ambulation promoted  Intervention: Prevent Infection  Flowsheets (Taken 9/11/2022 0137)  Infection Prevention:   hand hygiene promoted   rest/sleep promoted  Goal: Optimal Comfort and Wellbeing  Outcome: Ongoing, Progressing  Intervention: Monitor Pain and Promote Comfort  Flowsheets (Taken 9/11/2022 0137)  Pain Management Interventions:   care clustered   pillow support provided  Goal: Readiness for Transition of Care  Outcome: Ongoing, Progressing     Problem: Fall Injury Risk  Goal: Absence of Fall and Fall-Related Injury  Outcome: Ongoing, Progressing  Intervention: Identify and Manage Contributors  Flowsheets (Taken 9/11/2022 0137)  Medication Review/Management: medications reviewed  Intervention: Promote Injury-Free Environment  Flowsheets (Taken 9/11/2022 0137)  Safety Promotion/Fall Prevention:   assistive device/personal item within reach   nonskid shoes/socks when out of bed   side rails raised x 2

## 2022-09-12 LAB
ABO + RH BLD: NORMAL
ANION GAP SERPL CALC-SCNC: 6 MEQ/L
BASOPHILS # BLD AUTO: 0.02 X10(3)/MCL (ref 0–0.2)
BASOPHILS NFR BLD AUTO: 2.2 %
BLD PROD TYP BPU: NORMAL
BLOOD UNIT EXPIRATION DATE: NORMAL
BLOOD UNIT TYPE CODE: 1700
BUN SERPL-MCNC: 8.6 MG/DL (ref 8.9–20.6)
CALCIUM SERPL-MCNC: 8.5 MG/DL (ref 8.4–10.2)
CHLORIDE SERPL-SCNC: 105 MMOL/L (ref 98–107)
CO2 SERPL-SCNC: 27 MMOL/L (ref 22–29)
CREAT SERPL-MCNC: 0.72 MG/DL (ref 0.73–1.18)
CREAT/UREA NIT SERPL: 12
CROSSMATCH INTERPRETATION: NORMAL
DISPENSE STATUS: NORMAL
EOSINOPHIL # BLD AUTO: 0.01 X10(3)/MCL (ref 0–0.9)
EOSINOPHIL NFR BLD AUTO: 1.1 %
ERYTHROCYTE [DISTWIDTH] IN BLOOD BY AUTOMATED COUNT: 20.2 % (ref 11.5–17)
GFR SERPLBLD CREATININE-BSD FMLA CKD-EPI: >60 MLS/MIN/1.73/M2
GLUCOSE SERPL-MCNC: 94 MG/DL (ref 74–100)
GROUP & RH: NORMAL
HCT VFR BLD AUTO: 21 % (ref 42–52)
HGB BLD-MCNC: 6.9 GM/DL (ref 14–18)
IMM GRANULOCYTES # BLD AUTO: 0 X10(3)/MCL (ref 0–0.04)
IMM GRANULOCYTES NFR BLD AUTO: 0 %
INDIRECT COOMBS GEL: NORMAL
LYMPHOCYTES # BLD AUTO: 0.85 X10(3)/MCL (ref 0.6–4.6)
LYMPHOCYTES NFR BLD AUTO: 93.4 %
MCH RBC QN AUTO: 26.1 PG (ref 27–31)
MCHC RBC AUTO-ENTMCNC: 32.9 MG/DL (ref 33–36)
MCV RBC AUTO: 79.5 FL (ref 80–94)
MONOCYTES # BLD AUTO: 0.01 X10(3)/MCL (ref 0.1–1.3)
MONOCYTES NFR BLD AUTO: 1.1 %
NEUTROPHILS # BLD AUTO: 0 X10(3)/MCL (ref 2.1–9.2)
NEUTROPHILS NFR BLD AUTO: 2.2 %
NRBC BLD AUTO-RTO: 0 %
PLATELET # BLD AUTO: 27 X10(3)/MCL (ref 130–400)
PMV BLD AUTO: ABNORMAL FL
POTASSIUM SERPL-SCNC: 3.9 MMOL/L (ref 3.5–5.1)
RBC # BLD AUTO: 2.64 X10(6)/MCL (ref 4.7–6.1)
SODIUM SERPL-SCNC: 138 MMOL/L (ref 136–145)
UNIT NUMBER: NORMAL
WBC # SPEC AUTO: 0.9 X10(3)/MCL (ref 4.5–11.5)

## 2022-09-12 PROCEDURE — P9040 RBC LEUKOREDUCED IRRADIATED: HCPCS | Performed by: INTERNAL MEDICINE

## 2022-09-12 PROCEDURE — 25000003 PHARM REV CODE 250: Performed by: INTERNAL MEDICINE

## 2022-09-12 PROCEDURE — 85025 COMPLETE CBC W/AUTO DIFF WBC: CPT | Performed by: INTERNAL MEDICINE

## 2022-09-12 PROCEDURE — 36415 COLL VENOUS BLD VENIPUNCTURE: CPT | Performed by: INTERNAL MEDICINE

## 2022-09-12 PROCEDURE — 63600175 PHARM REV CODE 636 W HCPCS: Performed by: EMERGENCY MEDICINE

## 2022-09-12 PROCEDURE — 99232 PR SUBSEQUENT HOSPITAL CARE,LEVL II: ICD-10-PCS | Mod: ,,, | Performed by: INTERNAL MEDICINE

## 2022-09-12 PROCEDURE — 99232 SBSQ HOSP IP/OBS MODERATE 35: CPT | Mod: ,,, | Performed by: INTERNAL MEDICINE

## 2022-09-12 PROCEDURE — 86850 RBC ANTIBODY SCREEN: CPT | Performed by: INTERNAL MEDICINE

## 2022-09-12 PROCEDURE — 21400001 HC TELEMETRY ROOM

## 2022-09-12 PROCEDURE — 25000003 PHARM REV CODE 250: Performed by: EMERGENCY MEDICINE

## 2022-09-12 PROCEDURE — 86920 COMPATIBILITY TEST SPIN: CPT | Performed by: INTERNAL MEDICINE

## 2022-09-12 PROCEDURE — 25000003 PHARM REV CODE 250: Performed by: NURSE PRACTITIONER

## 2022-09-12 PROCEDURE — 80048 BASIC METABOLIC PNL TOTAL CA: CPT | Performed by: INTERNAL MEDICINE

## 2022-09-12 RX ORDER — HYDROCODONE BITARTRATE AND ACETAMINOPHEN 500; 5 MG/1; MG/1
TABLET ORAL
Status: DISCONTINUED | OUTPATIENT
Start: 2022-09-12 | End: 2022-09-19

## 2022-09-12 RX ADMIN — LABETALOL HYDROCHLORIDE 200 MG: 200 TABLET, FILM COATED ORAL at 08:09

## 2022-09-12 RX ADMIN — HYDROCODONE BITARTRATE AND ACETAMINOPHEN 1 TABLET: 10; 325 TABLET ORAL at 10:09

## 2022-09-12 RX ADMIN — NYSTATIN 500000 UNITS: 100000 SUSPENSION ORAL at 08:09

## 2022-09-12 RX ADMIN — DIPHENHYDRAMINE HYDROCHLORIDE 15 ML: 25 SOLUTION ORAL at 01:09

## 2022-09-12 RX ADMIN — HYDROCODONE BITARTRATE AND ACETAMINOPHEN 1 TABLET: 10; 325 TABLET ORAL at 06:09

## 2022-09-12 RX ADMIN — SODIUM CHLORIDE: 9 INJECTION, SOLUTION INTRAVENOUS at 10:09

## 2022-09-12 RX ADMIN — CEFEPIME 2 G: 2 INJECTION, POWDER, FOR SOLUTION INTRAVENOUS at 01:09

## 2022-09-12 RX ADMIN — NYSTATIN 500000 UNITS: 100000 SUSPENSION ORAL at 03:09

## 2022-09-12 RX ADMIN — CEFEPIME 2 G: 2 INJECTION, POWDER, FOR SOLUTION INTRAVENOUS at 08:09

## 2022-09-12 RX ADMIN — FAMOTIDINE 20 MG: 20 TABLET, FILM COATED ORAL at 08:09

## 2022-09-12 RX ADMIN — SODIUM CHLORIDE: 9 INJECTION, SOLUTION INTRAVENOUS at 01:09

## 2022-09-12 RX ADMIN — NYSTATIN 500000 UNITS: 100000 SUSPENSION ORAL at 01:09

## 2022-09-12 RX ADMIN — CEFEPIME 2 G: 2 INJECTION, POWDER, FOR SOLUTION INTRAVENOUS at 03:09

## 2022-09-12 NOTE — PROGRESS NOTES
[facility]   Hospital Medicine Progress Note        Chief Complaint: Inpatient Follow-up for pancytopenia, CML    HPI:   22-year-old  male with a past medical history of hypertension who went in for a routine eye exam on 08/18/2022 to update his eye glasses prescription.  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  He had bilateral Valdez spots with vessel tortuosity.  The optometrist recommended that the patient have blood work including testing for sickle cell disease.  The patient presented to the emergency department.  CBC in the emergency department revealed a white blood cell count of 411,900. Hemoglobin was 8.3, platelets were normal at 375,000.  Differential was suggestive of chronic myelogenous leukemia.  Coagulation studies revealed an INR of 1.38, PTT of 36.4 and a fibrinogen of 292.  Patient was going to present to [place], however they were on diversion.  He was transferred from [facility] to [facility].  Hematology was consulted.  Patient underwent bone marrow biopsy on 08/22/2022, this revealed chronic myelogenous leukemia, chronic phase, BCR/ABL1 positive.  Patient was discharged home on August 22, 2022 and was supposed to obtain twice weekly labs until follow-up with Dr. Gardner and continue hydroxyurea.  Unfortunately he did not keep his lab appointments and presented to the emergency room at [facility] on 09/06/2022 and was admitted there with febrile neutropenia and anemia.  Patient was started on empiric antibiotic therapy, transfused 2 units of packed red blood cells and 1 dose of platelets and then subsequently transferred here for further treatment and hematology evaluation.       Interval Hx:   Patient seen and examined spiked low-grade temp of around 99.4       Objective/physical exam:  General: In no acute  distress, afebrile  Chest: Clear to auscultation bilaterally  Heart: RRR, +S1, S2, no appreciable murmur  Abdomen: Soft, nontender, BS +  MSK: Warm, no lower extremity edema, no clubbing or cyanosis  Neurologic: Alert and oriented x4,   VITAL SIGNS: 24 HRS MIN & MAX LAST   Temp  Min: 98.2 °F (36.8 °C)  Max: 99.5 °F (37.5 °C) 98.2 °F (36.8 °C)   BP  Min: 124/80  Max: 159/98 (!) 151/87     Pulse  Min: 65  Max: 78  66   Resp  Min: 18  Max: 20 18   SpO2  Min: 99 %  Max: 100 % 99 %       Recent Labs   Lab 09/10/22  0340 09/11/22  0958 09/12/22  0326   WBC 0.8* 0.8* 0.9*   RBC 2.75* 3.02* 2.64*   HGB 6.6* 7.8* 6.9*   HCT 20.7* 23.4* 21.0*   MCV 75.3* 77.5* 79.5*   MCH 24.0* 25.8* 26.1*   MCHC 31.9* 33.3 32.9*   RDW 20.0* 19.6* 20.2*   PLT 11* 23* 27*       Recent Labs   Lab 09/05/22  2110 09/07/22  0355 09/08/22  0348 09/09/22  0325 09/10/22  0340 09/11/22  0958 09/12/22  0326      < > 137   < > 137 138 138   K 4.0   < > 3.6   < > 3.9 4.4 3.9   CO2 24   < > 25   < > 22 26 27   BUN 11.0   < > 9.8   < > 7.7* 8.0* 8.6*   CREATININE 0.94   < > 0.82   < > 0.71* 0.76 0.72*   CALCIUM 8.9   < > 8.5   < > 8.4 8.9 8.5   ALBUMIN 3.7  --  2.9*  --   --   --   --    ALKPHOS 58  --  53  --   --   --   --    ALT 24  --  15  --   --   --   --    AST 24  --  12  --   --   --   --    BILITOT 1.7*  --  1.4  --   --   --   --     < > = values in this interval not displayed.          Microbiology Results (last 7 days)       Procedure Component Value Units Date/Time    Blood Culture #1 **CANNOT BE ORDERED STAT** [[id number]]  (Normal) Collected: 09/05/22 2135    Order Status: Completed Specimen: Blood from Antecubital, Left Updated: 09/11/22 1000     CULTURE, BLOOD ([facility]) No Growth at 5 days    Blood Culture #2 **CANNOT BE ORDERED STAT** [[id number]]  (Normal) Collected: 09/05/22 2146    Order Status: Completed Specimen: Blood from Hand, Left Updated: 09/11/22 1000     CULTURE, BLOOD ([facility]) No Growth at 5 days             See below for  Radiology    Scheduled Med:   amLODIPine  10 mg Oral Daily    ceFEPime (MAXIPIME) IVPB  2 g Intravenous Q8H    famotidine  20 mg Oral BID    fluconazole  100 mg Oral Daily    labetaloL  200 mg Oral Q12H    nystatin  500,000 Units Oral QID        Continuous Infusions:   sodium chloride 0.9% 125 mL/hr at 09/11/22 0000        PRN Meds:  (Magic mouthwash) 1:1:1 diphenhydramine(Benadryl) 12.5mg/5ml liq, aluminum & magnesium hydroxide-simethicone (Maalox), LIDOcaine viscous 2%, sodium chloride, sodium chloride, acetaminophen, cloNIDine, glucagon (human recombinant), glucose, glucose, hydrALAZINE, HYDROcodone-acetaminophen, HYDROcodone-acetaminophen, labetaloL, ondansetron       Assessment/Plan:  Chronic myelogenous leukemia  Febrile neutropenia likely due to hydroxyurea use  Anemia and neoplastic disease  Retinal hemorrhage related to #1  Immunocompromised   Oropharyngeal candidiasis  Essential hypertension, suboptimally controlled at  Medical noncompliance     On cefepime spiked low-grade temperature of around 99.4   All cultures have been negative, vancomycin was discontinued yesterday   H&H dropped will be given 1 unit of packed RBC  White cell count this morning is 0.9 and platelet count is 75274  On Magic mouthwash, nystatin and fluconazole   Oncology following      Prophylaxis:  SCD       Patient condition:  Stable     Anticipated discharge and Disposition: TBD       All diagnosis and differential diagnosis have been reviewed; assessment and plan has been documented; I have personally reviewed the labs and test results that are presently available; I have reviewed the patients medication list; I have reviewed the consulting providers response and recommendations. I have reviewed or attempted to review medical records based upon their availability    All of the patient's questions have been  addressed and answered. Patient's is agreeable to the above stated plan. I will continue to monitor closely and make  adjustments to medical management as needed.  _____________________________________________________________________    Nutrition Status:    Radiology:  X-Ray Chest AP Portable  Narrative: EXAMINATION:  XR CHEST AP PORTABLE    CLINICAL HISTORY:  Fever, unspecified    TECHNIQUE:  One view    COMPARISON:  August 18, 2022.    FINDINGS:  Cardiopericardial silhouette is within normal limits. Lungs are without dense focal or segmental consolidation, congestive process, pleural effusions or pneumothorax.  Impression: NO ACUTE CARDIOPULMONARY PROCESS IDENTIFIED.    Electronically signed by: Phill Andrew  Date:    09/06/2022  Time:    07:41      Mariajose Gonzalez MD   09/12/2022

## 2022-09-12 NOTE — PLAN OF CARE
Problem: Adult Inpatient Plan of Care  Goal: Plan of Care Review  Outcome: Ongoing, Progressing  Flowsheets (Taken 9/11/2022 2242)  Plan of Care Reviewed With: patient  Goal: Patient-Specific Goal (Individualized)  Outcome: Ongoing, Progressing  Goal: Absence of Hospital-Acquired Illness or Injury  Outcome: Ongoing, Progressing  Intervention: Identify and Manage Fall Risk  Flowsheets (Taken 9/11/2022 2242)  Safety Promotion/Fall Prevention:   assistive device/personal item within reach   side rails raised x 2   nonskid shoes/socks when out of bed  Intervention: Prevent Skin Injury  Flowsheets (Taken 9/11/2022 2242)  Skin Protection: silicone foam dressing in place  Goal: Optimal Comfort and Wellbeing  Outcome: Ongoing, Progressing  Intervention: Monitor Pain and Promote Comfort  Flowsheets (Taken 9/11/2022 2242)  Pain Management Interventions:   care clustered   medication offered  Goal: Readiness for Transition of Care  Outcome: Ongoing, Progressing     Problem: Fall Injury Risk  Goal: Absence of Fall and Fall-Related Injury  Outcome: Ongoing, Progressing

## 2022-09-12 NOTE — PROGRESS NOTES
Subjective:       Patient ID: Magdaleno Hirsch is a 22 y.o. male.    Chief Complaint: Sore Throat (Sore throat-chills -feet ache weakness for 2 days-has leukemia-no treatment yet-appt 9/16-oncology)      Diagnosis:  1. CML, chronic phase  2. 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  3. Retinal hemorrhage secondary to 1.    Current Treatment:       Treatment History:  1. Hydroxyurea 4 g every 12 hours    Eye Problem   Pertinent negatives include no weakness.   Sore Throat   Associated symptoms include trouble swallowing. Pertinent negatives include no abdominal pain, congestion, coughing, diarrhea, headaches or shortness of breath.     HPI:  22-year-old male with no real medical history who went in for a routine eye exam on 08/18/2022 to update his eye glasses prescription.  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  He had bilateral Valdez spots with vessel tortuosity.  The optometrist recommended that the patient have blood work including testing for sickle cell disease.  The patient presented to the emergency department.  CBC in the emergency department revealed a white blood cell count of 411,900. Hemoglobin was 8.3, platelets were normal at 375,000 hundred and seventy five thousand.  Differential was suggestive of CML.  Coagulation studies revealed an INR of 1.38, PTT of 36.4 and a fibrinogen of 292.  Patient was going to present to [place], however they were on diversion.  He was transferred from [facility] to [facility].  Hematology consulted.  Patient underwent bone marrow biopsy on 08/22/2022, this revealed CML, chronic phase, BCR/ABL1 positive.    Interval History:   22-year-old patient known to me from previous hospitalization.  Was discharged home on 08/22/2022, he was informed about twice weekly labs until follow up appointment with me and continued on hydroxyurea.  Unfortunately, the patient did not keep his lab  appointments.  He presented to the emergency department on 09/05/2022 with febrile neutropenia, was admitted to the hospitalist service and transferred to main campus [facility].  Hematology consulted as patient known to me.    Today, 09/12/2022:   Patient seen and examined on rounds.  His throat is feeling much better.  He has been afebrile.  No acute events overnight.    No past medical history on file.   Past Surgical History:   Procedure Laterality Date    BONE MARROW BIOPSY N/A 8/22/2022    Procedure: Biopsy-bone marrow;  Surgeon: Getachew Chen MD;  Location: [facility];  Service: General;  Laterality: N/A;    KNEE SURGERY Left      Social History     Socioeconomic History    Marital status: Single   Tobacco Use    Smoking status: Never    Smokeless tobacco: Never   Substance and Sexual Activity    Alcohol use: Never    Drug use: Never      History reviewed. No pertinent family history.   Review of patient's allergies indicates:  No Known Allergies   Review of Systems   Constitutional:  Negative for chills, diaphoresis, fatigue and unexpected weight change.   HENT:  Positive for mouth sores, sore throat and trouble swallowing. Negative for nasal congestion and sinus pressure/congestion.    Eyes:  Positive for visual disturbance. Negative for pain.   Respiratory:  Negative for cough, chest tightness and shortness of breath.    Cardiovascular:  Negative for chest pain, palpitations and leg swelling.   Gastrointestinal:  Negative for abdominal distention, abdominal pain, blood in stool, constipation and diarrhea.   Genitourinary:  Negative for dysuria, frequency and hematuria.   Musculoskeletal:  Negative for arthralgias and back pain.   Integumentary:  Negative for rash.   Neurological:  Negative for dizziness, weakness, numbness and headaches.   Hematological:  Negative for adenopathy.   Psychiatric/Behavioral:  Negative for confusion.        Objective:      Physical Exam  Vitals reviewed.    Constitutional:       General: He is awake.      Appearance: Normal appearance.   HENT:      Head: Normocephalic and atraumatic.      Right Ear: Hearing normal.      Left Ear: Hearing normal.      Nose: Nose normal.      Mouth/Throat:      Comments: Mucositis    Eyes:      General: Lids are normal. Vision grossly intact.      Extraocular Movements: Extraocular movements intact.      Conjunctiva/sclera: Conjunctivae normal.   Cardiovascular:      Rate and Rhythm: Normal rate and regular rhythm.      Pulses: Normal pulses.      Heart sounds: Normal heart sounds.   Pulmonary:      Effort: Pulmonary effort is normal.      Breath sounds: Normal breath sounds. No wheezing, rhonchi or rales.   Chest:      Comments: Soft mass in area of the sternum  Abdominal:      General: Bowel sounds are normal.      Palpations: Abdomen is soft. There is splenomegaly.      Tenderness: There is no abdominal tenderness.   Musculoskeletal:      Cervical back: Full passive range of motion without pain.      Right lower leg: No edema.      Left lower leg: No edema.   Lymphadenopathy:      Cervical: No cervical adenopathy.      Upper Body:      Right upper body: No supraclavicular or axillary adenopathy.      Left upper body: No supraclavicular or axillary adenopathy.   Skin:     General: Skin is warm.   Neurological:      General: No focal deficit present.      Mental Status: He is alert and oriented to person, place, and time.   Psychiatric:         Attention and Perception: Attention normal.         Mood and Affect: Mood and affect normal.         Behavior: Behavior is cooperative.       LABS AND IMAGING REVIEWED IN EPIC  Lab Review:  CBC:   Recent Labs     09/12/22  0326 09/11/22 0958 09/10/22  0340   WBC 0.9* 0.8* 0.8*   RBC 2.64* 3.02* 2.75*   HGB 6.9* 7.8* 6.6*   HCT 21.0* 23.4* 20.7*   PLT 27* 23* 11*       CMP:   Recent Labs     09/12/22  0326 09/11/22  0958 09/10/22  0340 09/09/22  0325 09/08/22  0348 09/07/22  0355 09/05/22  2110  08/22/22  0839    138 137   < > 137   < > 140 135*   K 3.9 4.4 3.9   < > 3.6   < > 4.0 4.6   CO2 27 26 22   < > 25   < > 24 21*   BUN 8.6* 8.0* 7.7*   < > 9.8   < > 11.0 16.5   CREATININE 0.72* 0.76 0.71*   < > 0.82   < > 0.94 0.81   CALCIUM 8.5 8.9 8.4   < > 8.5   < > 8.9 9.4   ALBUMIN  --   --   --   --  2.9*  --  3.7 3.7   BILITOT  --   --   --   --  1.4  --  1.7* 1.4   ALKPHOS  --   --   --   --  53  --  58 72   AST  --   --   --   --  12  --  24 49*   ALT  --   --   --   --  15  --  24 17    < > = values in this interval not displayed.              Assessment:   1. CML, chronic phase  2. 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  3. Retinal hemorrhage secondary to 1.        Plan:       Patient has CML, diagnosed on bone marrow biopsy done on 08/22/2022.    Patient was discharged home on 08/22/2022 with plans to continue hydroxyurea 4 g every 12 hours and have blood work twice a week until his follow up with me and final bone marrow biopsy.    Unfortunately, the patient did not have twice weekly labs, but continued on hydroxyurea.  When asked about lab appointments, the patient's wife stated that he was supposed to come on Mondays and Thursdays.  Unfortunately he was not able to make these visits.      He is now admitted with febrile neutropenia, almost certainly from hydroxyurea.      We will treat him with antibiotics.  So far workup is negative for COVID, flu, strep.  Does have possible thrush.      I agree with continued antibiotics and fluconazole.    Await count recovery.    We will need to start a TKI, likely dasatinib once the patient's counts recover.      Paul Hogan II, MD

## 2022-09-13 LAB
ANION GAP SERPL CALC-SCNC: 8 MEQ/L
BASOPHILS # BLD AUTO: 0.02 X10(3)/MCL (ref 0–0.2)
BASOPHILS NFR BLD AUTO: 2 %
BUN SERPL-MCNC: 11.2 MG/DL (ref 8.9–20.6)
CALCIUM SERPL-MCNC: 8.7 MG/DL (ref 8.4–10.2)
CHLORIDE SERPL-SCNC: 105 MMOL/L (ref 98–107)
CO2 SERPL-SCNC: 26 MMOL/L (ref 22–29)
CREAT SERPL-MCNC: 0.79 MG/DL (ref 0.73–1.18)
CREAT/UREA NIT SERPL: 14
EOSINOPHIL # BLD AUTO: 0.02 X10(3)/MCL (ref 0–0.9)
EOSINOPHIL NFR BLD AUTO: 2 %
ERYTHROCYTE [DISTWIDTH] IN BLOOD BY AUTOMATED COUNT: 18.9 % (ref 11.5–17)
GFR SERPLBLD CREATININE-BSD FMLA CKD-EPI: >60 MLS/MIN/1.73/M2
GLUCOSE SERPL-MCNC: 101 MG/DL (ref 74–100)
HCT VFR BLD AUTO: 24.4 % (ref 42–52)
HGB BLD-MCNC: 7.8 GM/DL (ref 14–18)
IMM GRANULOCYTES # BLD AUTO: 0 X10(3)/MCL (ref 0–0.04)
IMM GRANULOCYTES NFR BLD AUTO: 0 %
LYMPHOCYTES # BLD AUTO: 0.92 X10(3)/MCL (ref 0.6–4.6)
LYMPHOCYTES NFR BLD AUTO: 93 %
MCH RBC QN AUTO: 26.1 PG (ref 27–31)
MCHC RBC AUTO-ENTMCNC: 32 MG/DL (ref 33–36)
MCV RBC AUTO: 81.6 FL (ref 80–94)
MONOCYTES # BLD AUTO: 0.03 X10(3)/MCL (ref 0.1–1.3)
MONOCYTES NFR BLD AUTO: 3 %
NEUTROPHILS # BLD AUTO: 0 X10(3)/MCL (ref 2.1–9.2)
NEUTROPHILS NFR BLD AUTO: 0 %
NRBC BLD AUTO-RTO: 0 %
PLATELET # BLD AUTO: 11 X10(3)/MCL (ref 130–400)
PMV BLD AUTO: ABNORMAL FL
POTASSIUM SERPL-SCNC: 3.9 MMOL/L (ref 3.5–5.1)
RBC # BLD AUTO: 2.99 X10(6)/MCL (ref 4.7–6.1)
SODIUM SERPL-SCNC: 139 MMOL/L (ref 136–145)
WBC # SPEC AUTO: 1 X10(3)/MCL (ref 4.5–11.5)

## 2022-09-13 PROCEDURE — 25000003 PHARM REV CODE 250: Performed by: EMERGENCY MEDICINE

## 2022-09-13 PROCEDURE — 99232 SBSQ HOSP IP/OBS MODERATE 35: CPT | Mod: ,,, | Performed by: INTERNAL MEDICINE

## 2022-09-13 PROCEDURE — 85025 COMPLETE CBC W/AUTO DIFF WBC: CPT | Performed by: INTERNAL MEDICINE

## 2022-09-13 PROCEDURE — 80048 BASIC METABOLIC PNL TOTAL CA: CPT | Performed by: INTERNAL MEDICINE

## 2022-09-13 PROCEDURE — 36415 COLL VENOUS BLD VENIPUNCTURE: CPT | Performed by: INTERNAL MEDICINE

## 2022-09-13 PROCEDURE — 25000003 PHARM REV CODE 250: Performed by: INTERNAL MEDICINE

## 2022-09-13 PROCEDURE — 21400001 HC TELEMETRY ROOM

## 2022-09-13 PROCEDURE — 63700000 PHARM REV CODE 250 ALT 637 W/O HCPCS: Performed by: HOSPITALIST

## 2022-09-13 PROCEDURE — 99232 PR SUBSEQUENT HOSPITAL CARE,LEVL II: ICD-10-PCS | Mod: ,,, | Performed by: INTERNAL MEDICINE

## 2022-09-13 PROCEDURE — 63600175 PHARM REV CODE 636 W HCPCS: Performed by: EMERGENCY MEDICINE

## 2022-09-13 PROCEDURE — 25000003 PHARM REV CODE 250: Performed by: NURSE PRACTITIONER

## 2022-09-13 RX ADMIN — NYSTATIN 500000 UNITS: 100000 SUSPENSION ORAL at 08:09

## 2022-09-13 RX ADMIN — NYSTATIN 500000 UNITS: 100000 SUSPENSION ORAL at 05:09

## 2022-09-13 RX ADMIN — FAMOTIDINE 20 MG: 20 TABLET, FILM COATED ORAL at 08:09

## 2022-09-13 RX ADMIN — FLUCONAZOLE 100 MG: 100 TABLET ORAL at 08:09

## 2022-09-13 RX ADMIN — NYSTATIN 500000 UNITS: 100000 SUSPENSION ORAL at 01:09

## 2022-09-13 RX ADMIN — CEFEPIME 2 G: 2 INJECTION, POWDER, FOR SOLUTION INTRAVENOUS at 08:09

## 2022-09-13 RX ADMIN — SODIUM CHLORIDE: 9 INJECTION, SOLUTION INTRAVENOUS at 07:09

## 2022-09-13 RX ADMIN — SODIUM CHLORIDE: 9 INJECTION, SOLUTION INTRAVENOUS at 05:09

## 2022-09-13 RX ADMIN — HYDROCODONE BITARTRATE AND ACETAMINOPHEN 1 TABLET: 10; 325 TABLET ORAL at 08:09

## 2022-09-13 RX ADMIN — LABETALOL HYDROCHLORIDE 200 MG: 200 TABLET, FILM COATED ORAL at 08:09

## 2022-09-13 RX ADMIN — CEFEPIME 2 G: 2 INJECTION, POWDER, FOR SOLUTION INTRAVENOUS at 01:09

## 2022-09-13 RX ADMIN — CEFEPIME 2 G: 2 INJECTION, POWDER, FOR SOLUTION INTRAVENOUS at 05:09

## 2022-09-13 RX ADMIN — AMLODIPINE BESYLATE 10 MG: 5 TABLET ORAL at 08:09

## 2022-09-13 NOTE — PLAN OF CARE
Problem: Adult Inpatient Plan of Care  Goal: Plan of Care Review  Outcome: Ongoing, Progressing  Flowsheets (Taken 9/12/2022 1938)  Plan of Care Reviewed With: patient  Goal: Patient-Specific Goal (Individualized)  Outcome: Ongoing, Progressing  Goal: Absence of Hospital-Acquired Illness or Injury  Outcome: Ongoing, Progressing  Intervention: Identify and Manage Fall Risk  Flowsheets (Taken 9/12/2022 1938)  Safety Promotion/Fall Prevention:   assistive device/personal item within reach   nonskid shoes/socks when out of bed   side rails raised x 2   medications reviewed   lighting adjusted  Intervention: Prevent Skin Injury  Flowsheets (Taken 9/12/2022 1938)  Skin Protection:   protective footwear used   adhesive use limited   tubing/devices free from skin contact  Intervention: Prevent and Manage VTE (Venous Thromboembolism) Risk  Flowsheets (Taken 9/12/2022 1938)  Activity Management:   Ambulated to bathroom - L4   Ambulated in room - L4  VTE Prevention/Management:   bleeding precations maintained   bleeding risk assessed  Intervention: Prevent Infection  Flowsheets (Taken 9/12/2022 1938)  Infection Prevention:   single patient room provided   equipment surfaces disinfected   rest/sleep promoted   hand hygiene promoted  Goal: Optimal Comfort and Wellbeing  Outcome: Ongoing, Progressing  Intervention: Monitor Pain and Promote Comfort  Flowsheets (Taken 9/12/2022 1938)  Pain Management Interventions:   quiet environment facilitated   care clustered   medication offered  Intervention: Provide Person-Centered Care  Flowsheets (Taken 9/12/2022 1938)  Trust Relationship/Rapport:   care explained   empathic listening provided   questions answered   thoughts/feelings acknowledged   emotional support provided  Goal: Readiness for Transition of Care  Outcome: Ongoing, Progressing     Problem: Fall Injury Risk  Goal: Absence of Fall and Fall-Related Injury  Outcome: Ongoing, Progressing  Intervention: Identify and Manage  Contributors  Flowsheets (Taken 9/12/2022 1938)  Self-Care Promotion: independence encouraged  Medication Review/Management: medications reviewed

## 2022-09-13 NOTE — PROGRESS NOTES
Subjective:       Patient ID: Magdaleno Hirsch is a 22 y.o. male.    Chief Complaint: Sore Throat (Sore throat-chills -feet ache weakness for 2 days-has leukemia-no treatment yet-appt 9/16-oncology)      Diagnosis:  1. CML, chronic phase  2. 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  3. Retinal hemorrhage secondary to 1.    Current Treatment:       Treatment History:  1. Hydroxyurea 4 g every 12 hours    Eye Problem   Pertinent negatives include no weakness.   Sore Throat   Associated symptoms include trouble swallowing. Pertinent negatives include no abdominal pain, congestion, coughing, diarrhea, headaches or shortness of breath.     HPI:  22-year-old male with no real medical history who went in for a routine eye exam on 08/18/2022 to update his eye glasses prescription.  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  He had bilateral Valdez spots with vessel tortuosity.  The optometrist recommended that the patient have blood work including testing for sickle cell disease.  The patient presented to the emergency department.  CBC in the emergency department revealed a white blood cell count of 411,900. Hemoglobin was 8.3, platelets were normal at 375,000 hundred and seventy five thousand.  Differential was suggestive of CML.  Coagulation studies revealed an INR of 1.38, PTT of 36.4 and a fibrinogen of 292.  Patient was going to present to [place], however they were on diversion.  He was transferred from [facility] to [facility].  Hematology consulted.  Patient underwent bone marrow biopsy on 08/22/2022, this revealed CML, chronic phase, BCR/ABL1 positive.    Interval History:   22-year-old patient known to me from previous hospitalization.  Was discharged home on 08/22/2022, he was informed about twice weekly labs until follow up appointment with me and continued on hydroxyurea.  Unfortunately, the patient did not keep his lab  appointments.  He presented to the emergency department on 09/05/2022 with febrile neutropenia, was admitted to the hospitalist service and transferred to main campus [facility].  Hematology consulted as patient known to me.    Today, 09/13/2022:   Patient seen and examined on rounds.  His throat is feeling much better.  He remains afebrile.  Still awaiting count recovery.    No past medical history on file.   Past Surgical History:   Procedure Laterality Date    BONE MARROW BIOPSY N/A 8/22/2022    Procedure: Biopsy-bone marrow;  Surgeon: Getachew Chen MD;  Location: [facility];  Service: General;  Laterality: N/A;    KNEE SURGERY Left      Social History     Socioeconomic History    Marital status: Single   Tobacco Use    Smoking status: Never    Smokeless tobacco: Never   Substance and Sexual Activity    Alcohol use: Never    Drug use: Never      History reviewed. No pertinent family history.   Review of patient's allergies indicates:  No Known Allergies   Review of Systems   Constitutional:  Negative for chills, diaphoresis, fatigue and unexpected weight change.   HENT:  Positive for mouth sores, sore throat and trouble swallowing. Negative for nasal congestion and sinus pressure/congestion.    Eyes:  Positive for visual disturbance. Negative for pain.   Respiratory:  Negative for cough, chest tightness and shortness of breath.    Cardiovascular:  Negative for chest pain, palpitations and leg swelling.   Gastrointestinal:  Negative for abdominal distention, abdominal pain, blood in stool, constipation and diarrhea.   Genitourinary:  Negative for dysuria, frequency and hematuria.   Musculoskeletal:  Negative for arthralgias and back pain.   Integumentary:  Negative for rash.   Neurological:  Negative for dizziness, weakness, numbness and headaches.   Hematological:  Negative for adenopathy.   Psychiatric/Behavioral:  Negative for confusion.        Objective:      Physical Exam  Vitals reviewed.    Constitutional:       General: He is awake.      Appearance: Normal appearance.   HENT:      Head: Normocephalic and atraumatic.      Right Ear: Hearing normal.      Left Ear: Hearing normal.      Nose: Nose normal.      Mouth/Throat:      Comments: Mucositis    Eyes:      General: Lids are normal. Vision grossly intact.      Extraocular Movements: Extraocular movements intact.      Conjunctiva/sclera: Conjunctivae normal.   Cardiovascular:      Rate and Rhythm: Normal rate and regular rhythm.      Pulses: Normal pulses.      Heart sounds: Normal heart sounds.   Pulmonary:      Effort: Pulmonary effort is normal.      Breath sounds: Normal breath sounds. No wheezing, rhonchi or rales.   Chest:      Comments: Soft mass in area of the sternum  Abdominal:      General: Bowel sounds are normal.      Palpations: Abdomen is soft. There is splenomegaly.      Tenderness: There is no abdominal tenderness.   Musculoskeletal:      Cervical back: Full passive range of motion without pain.      Right lower leg: No edema.      Left lower leg: No edema.   Lymphadenopathy:      Cervical: No cervical adenopathy.      Upper Body:      Right upper body: No supraclavicular or axillary adenopathy.      Left upper body: No supraclavicular or axillary adenopathy.   Skin:     General: Skin is warm.   Neurological:      General: No focal deficit present.      Mental Status: He is alert and oriented to person, place, and time.   Psychiatric:         Attention and Perception: Attention normal.         Mood and Affect: Mood and affect normal.         Behavior: Behavior is cooperative.       LABS AND IMAGING REVIEWED IN EPIC  Lab Review:  CBC:   Recent Labs     09/13/22  0330 09/12/22  0326 09/11/22  0958   WBC 1.0* 0.9* 0.8*   RBC 2.99* 2.64* 3.02*   HGB 7.8* 6.9* 7.8*   HCT 24.4* 21.0* 23.4*   PLT 11* 27* 23*       CMP:   Recent Labs     09/13/22  0330 09/12/22  0326 09/11/22  0958 09/09/22  0325 09/08/22  0348 09/07/22  0355 09/05/22  2110  08/22/22  0839    138 138   < > 137   < > 140 135*   K 3.9 3.9 4.4   < > 3.6   < > 4.0 4.6   CO2 26 27 26   < > 25   < > 24 21*   BUN 11.2 8.6* 8.0*   < > 9.8   < > 11.0 16.5   CREATININE 0.79 0.72* 0.76   < > 0.82   < > 0.94 0.81   CALCIUM 8.7 8.5 8.9   < > 8.5   < > 8.9 9.4   ALBUMIN  --   --   --   --  2.9*  --  3.7 3.7   BILITOT  --   --   --   --  1.4  --  1.7* 1.4   ALKPHOS  --   --   --   --  53  --  58 72   AST  --   --   --   --  12  --  24 49*   ALT  --   --   --   --  15  --  24 17    < > = values in this interval not displayed.              Assessment:   1. CML, chronic phase  2. 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  3. Retinal hemorrhage secondary to 1.        Plan:       Patient has CML, diagnosed on bone marrow biopsy done on 08/22/2022.    Patient was discharged home on 08/22/2022 with plans to continue hydroxyurea 4 g every 12 hours and have blood work twice a week until his follow up with me and final bone marrow biopsy.    Unfortunately, the patient did not have twice weekly labs, but continued on hydroxyurea.  When asked about lab appointments, the patient's wife stated that he was supposed to come on Mondays and Thursdays.  Unfortunately he was not able to make these visits.      He is now admitted with febrile neutropenia and pancytopenia almost certainly from hydroxyurea.      We will treat him with antibiotics.  So far workup is negative for COVID, flu, strep.  Does have possible thrush.      I agree with continued antibiotics and fluconazole.    Await count recovery.    We will need to start a TKI, likely dasatinib once the patient's counts recover.      Paul Hogan II, MD

## 2022-09-13 NOTE — PHYSICIAN QUERY
PT Name: Magdaleno Hirsch  MR #: [id number]     DOCUMENTATION CLARIFICATION      CDS: Breonna Gómez RN        Contact information:[email or web address] or (cell) [phone]   This form is a permanent document in the medical record.     Query Date: September 13, 2022    By submitting this query, we are merely seeking further clarification of documentation.  Please utilize your independent clinical judgment when addressing the question(s) below.     The Medical Record contains the following:    Clinical Information Location in Medical Record   Patient has CML, diagnosed on bone marrow biopsy done on 08/22/2022.     Patient was discharged home on 08/22/2022 with plans to continue hydroxyurea 4 g every 12 hours and have blood work twice a week until his follow up with me and final bone marrow biopsy.    Unfortunately, the patient did not have twice weekly labs, but continued on hydroxyurea.  When asked about lab appointments, the patient's wife stated that he was supposed to come on Mondays and Thursdays.  Unfortunately he was not able to make these visits.      He is now admitted with febrile neutropenia, almost certainly from hydroxyurea.      We will treat him with antibiotics.  So far workup is negative for COVID, flu, strep.  Does have possible thrush.    Hem/Onc PN 9/12     Please document your best medical opinion regarding the etiology of Pancytopenia?       [   ] CML     [ x  ] Chemotherapy      [   ] Other etiology (please specify):___________________     [  ] Clinically Undetermined       Please document in your progress notes daily for the duration of treatment, until resolved, and include in your discharge summary.

## 2022-09-13 NOTE — PROGRESS NOTES
[facility]  Hospital Medicine Progress Note        Chief Complaint: Inpatient Follow-up for pancytopenia, CML    HPI:   22-year-old  male with a past medical history of hypertension who went in for a routine eye exam on 08/18/2022 to update his eye glasses prescription.  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  He had bilateral Valdez spots with vessel tortuosity.  The optometrist recommended that the patient have blood work including testing for sickle cell disease.  The patient presented to the emergency department.  CBC in the emergency department revealed a white blood cell count of 411,900. Hemoglobin was 8.3, platelets were normal at 375,000.  Differential was suggestive of chronic myelogenous leukemia.  Coagulation studies revealed an INR of 1.38, PTT of 36.4 and a fibrinogen of 292.  Patient was going to present to [place], however they were on diversion.  He was transferred from [facility] to [facility].  Hematology was consulted.  Patient underwent bone marrow biopsy on 08/22/2022, this revealed chronic myelogenous leukemia, chronic phase, BCR/ABL1 positive.  Patient was discharged home on August 22, 2022 and was supposed to obtain twice weekly labs until follow-up with Dr. Gardner and continue hydroxyurea.  Unfortunately he did not keep his lab appointments and presented to the emergency room at [facility] on 09/06/2022 and was admitted there with febrile neutropenia and anemia.  Patient was started on empiric antibiotic therapy, transfused 2 units of packed red blood cells and 1 dose of platelets and then subsequently transferred here for further treatment and hematology evaluation.       Interval Hx:   Patient seen and examined, afebrile this am, denies any complaints, no sob or chest pain. Cont iv abx        Objective/physical exam:  General: In no acute distress, afebrile  Chest: Clear to auscultation bilaterally  Heart: RRR, +S1, S2, no appreciable murmur  Abdomen: Soft, nontender, BS +  MSK: Warm, no lower extremity edema, no clubbing or cyanosis  Neurologic: Alert and oriented x4,   VITAL SIGNS: 24 HRS MIN & MAX LAST   Temp  Min: 98.1 °F (36.7 °C)  Max: 99.9 °F (37.7 °C) 99.1 °F (37.3 °C)   BP  Min: 132/75  Max: 164/92 (!) 153/103     Pulse  Min: 74  Max: 83  83   Resp  Min: 17  Max: 18 18   SpO2  Min: 96 %  Max: 100 % 100 %       Recent Labs   Lab 09/11/22 0958 09/12/22 0326 09/13/22  0330   WBC 0.8* 0.9* 1.0*   RBC 3.02* 2.64* 2.99*   HGB 7.8* 6.9* 7.8*   HCT 23.4* 21.0* 24.4*   MCV 77.5* 79.5* 81.6   MCH 25.8* 26.1* 26.1*   MCHC 33.3 32.9* 32.0*   RDW 19.6* 20.2* 18.9*   PLT 23* 27* 11*         Recent Labs   Lab 09/08/22 0348 09/09/22 0325 09/11/22 0958 09/12/22 0326 09/13/22  0330      < > 138 138 139   K 3.6   < > 4.4 3.9 3.9   CO2 25   < > 26 27 26   BUN 9.8   < > 8.0* 8.6* 11.2   CREATININE 0.82   < > 0.76 0.72* 0.79   CALCIUM 8.5   < > 8.9 8.5 8.7   ALBUMIN 2.9*  --   --   --   --    ALKPHOS 53  --   --   --   --    ALT 15  --   --   --   --    AST 12  --   --   --   --    BILITOT 1.4  --   --   --   --     < > = values in this interval not displayed.            Microbiology Results (last 7 days)       Procedure Component Value Units Date/Time    Blood Culture #1 **CANNOT BE ORDERED STAT** [[id number]]  (Normal) Collected: 09/05/22 2135    Order Status: Completed Specimen: Blood from Antecubital, Left Updated: 09/11/22 1000     CULTURE, BLOOD ([facility]) No Growth at 5 days    Blood Culture #2 **CANNOT BE ORDERED STAT** [[id number]]  (Normal) Collected: 09/05/22 2146    Order Status: Completed Specimen: Blood from Hand, Left Updated: 09/11/22 1000     CULTURE, BLOOD ([facility]) No Growth at 5 days             See below for Radiology    Scheduled Med:   amLODIPine  10 mg Oral Daily    ceFEPime (MAXIPIME)  IVPB  2 g Intravenous Q8H    famotidine  20 mg Oral BID    fluconazole  100 mg Oral Daily    labetaloL  200 mg Oral Q12H    nystatin  500,000 Units Oral QID        Continuous Infusions:   sodium chloride 0.9% 125 mL/hr at 09/13/22 0714        PRN Meds:  (Magic mouthwash) 1:1:1 diphenhydramine(Benadryl) 12.5mg/5ml liq, aluminum & magnesium hydroxide-simethicone (Maalox), LIDOcaine viscous 2%, sodium chloride, sodium chloride, sodium chloride, acetaminophen, cloNIDine, glucagon (human recombinant), glucose, glucose, hydrALAZINE, HYDROcodone-acetaminophen, HYDROcodone-acetaminophen, labetaloL, ondansetron       Assessment/Plan:  Chronic myelogenous leukemia  Febrile neutropenia likely due to hydroxyurea use  Anemia and neoplastic disease  Retinal hemorrhage related to #1  Immunocompromised   Oropharyngeal candidiasis  Essential hypertension, suboptimally controlled at  Medical noncompliance  Thrombocytopenia      On cefepime, afebrile today  All cultures have been negative,  H&H stable after blood transfusion   White cell count this morning is 1.0 and platelet count is 07347  On Magic mouthwash, nystatin and fluconazole   Oncology following      Prophylaxis:  SCD       Patient condition:  Stable     Anticipated discharge and Disposition: TBD       All diagnosis and differential diagnosis have been reviewed; assessment and plan has been documented; I have personally reviewed the labs and test results that are presently available; I have reviewed the patients medication list; I have reviewed the consulting providers response and recommendations. I have reviewed or attempted to review medical records based upon their availability    All of the patient's questions have been  addressed and answered. Patient's is agreeable to the above stated plan. I will continue to monitor closely and make adjustments to medical management as needed.  _____________________________________________________________________    Nutrition  Status:    Radiology:  X-Ray Chest AP Portable  Narrative: EXAMINATION:  XR CHEST AP PORTABLE    CLINICAL HISTORY:  Fever, unspecified    TECHNIQUE:  One view    COMPARISON:  August 18, 2022.    FINDINGS:  Cardiopericardial silhouette is within normal limits. Lungs are without dense focal or segmental consolidation, congestive process, pleural effusions or pneumothorax.  Impression: NO ACUTE CARDIOPULMONARY PROCESS IDENTIFIED.    Electronically signed by: Phill Andrew  Date:    09/06/2022  Time:    07:41      Atif Jones MD   09/13/2022

## 2022-09-14 LAB
ABO + RH BLD: NORMAL
ABO + RH BLD: NORMAL
ANION GAP SERPL CALC-SCNC: 8 MEQ/L
BASOPHILS # BLD AUTO: 0.03 X10(3)/MCL (ref 0–0.2)
BASOPHILS NFR BLD AUTO: 3 %
BLD PROD TYP BPU: NORMAL
BLD PROD TYP BPU: NORMAL
BLOOD UNIT EXPIRATION DATE: NORMAL
BLOOD UNIT EXPIRATION DATE: NORMAL
BLOOD UNIT TYPE CODE: 600
BLOOD UNIT TYPE CODE: 6200
BUN SERPL-MCNC: 11.1 MG/DL (ref 8.9–20.6)
CALCIUM SERPL-MCNC: 8.6 MG/DL (ref 8.4–10.2)
CHLORIDE SERPL-SCNC: 103 MMOL/L (ref 98–107)
CO2 SERPL-SCNC: 25 MMOL/L (ref 22–29)
CREAT SERPL-MCNC: 0.7 MG/DL (ref 0.73–1.18)
CREAT/UREA NIT SERPL: 16
CROSSMATCH INTERPRETATION: NORMAL
CROSSMATCH INTERPRETATION: NORMAL
DISPENSE STATUS: NORMAL
DISPENSE STATUS: NORMAL
EOSINOPHIL # BLD AUTO: 0.01 X10(3)/MCL (ref 0–0.9)
EOSINOPHIL NFR BLD AUTO: 1 %
ERYTHROCYTE [DISTWIDTH] IN BLOOD BY AUTOMATED COUNT: 18.7 % (ref 11.5–17)
GFR SERPLBLD CREATININE-BSD FMLA CKD-EPI: >60 MLS/MIN/1.73/M2
GLUCOSE SERPL-MCNC: 88 MG/DL (ref 74–100)
HCT VFR BLD AUTO: 22.4 % (ref 42–52)
HGB BLD-MCNC: 7.4 GM/DL (ref 14–18)
IMM GRANULOCYTES # BLD AUTO: 0 X10(3)/MCL (ref 0–0.04)
IMM GRANULOCYTES NFR BLD AUTO: 0 %
LYMPHOCYTES # BLD AUTO: 0.93 X10(3)/MCL (ref 0.6–4.6)
LYMPHOCYTES NFR BLD AUTO: 94 %
MCH RBC QN AUTO: 26.1 PG (ref 27–31)
MCHC RBC AUTO-ENTMCNC: 33 MG/DL (ref 33–36)
MCV RBC AUTO: 79.2 FL (ref 80–94)
MONOCYTES # BLD AUTO: 0.02 X10(3)/MCL (ref 0.1–1.3)
MONOCYTES NFR BLD AUTO: 2 %
NEUTROPHILS # BLD AUTO: 0 X10(3)/MCL (ref 2.1–9.2)
NEUTROPHILS NFR BLD AUTO: 0 %
NRBC BLD AUTO-RTO: 0 %
PLATELET # BLD AUTO: 6 X10(3)/MCL (ref 130–400)
PMV BLD AUTO: ABNORMAL FL
POTASSIUM SERPL-SCNC: 4 MMOL/L (ref 3.5–5.1)
RBC # BLD AUTO: 2.83 X10(6)/MCL (ref 4.7–6.1)
SODIUM SERPL-SCNC: 136 MMOL/L (ref 136–145)
UNIT NUMBER: NORMAL
UNIT NUMBER: NORMAL
WBC # SPEC AUTO: 1 X10(3)/MCL (ref 4.5–11.5)

## 2022-09-14 PROCEDURE — 80048 BASIC METABOLIC PNL TOTAL CA: CPT | Performed by: INTERNAL MEDICINE

## 2022-09-14 PROCEDURE — 63600175 PHARM REV CODE 636 W HCPCS: Performed by: INTERNAL MEDICINE

## 2022-09-14 PROCEDURE — 36415 COLL VENOUS BLD VENIPUNCTURE: CPT | Performed by: INTERNAL MEDICINE

## 2022-09-14 PROCEDURE — 63700000 PHARM REV CODE 250 ALT 637 W/O HCPCS: Performed by: HOSPITALIST

## 2022-09-14 PROCEDURE — 25000003 PHARM REV CODE 250: Performed by: EMERGENCY MEDICINE

## 2022-09-14 PROCEDURE — 36430 TRANSFUSION BLD/BLD COMPNT: CPT

## 2022-09-14 PROCEDURE — 63600175 PHARM REV CODE 636 W HCPCS: Performed by: EMERGENCY MEDICINE

## 2022-09-14 PROCEDURE — 25000003 PHARM REV CODE 250: Performed by: INTERNAL MEDICINE

## 2022-09-14 PROCEDURE — 85025 COMPLETE CBC W/AUTO DIFF WBC: CPT | Performed by: INTERNAL MEDICINE

## 2022-09-14 PROCEDURE — P9037 PLATE PHERES LEUKOREDU IRRAD: HCPCS | Performed by: INTERNAL MEDICINE

## 2022-09-14 PROCEDURE — 21400001 HC TELEMETRY ROOM

## 2022-09-14 PROCEDURE — 99232 SBSQ HOSP IP/OBS MODERATE 35: CPT | Mod: ,,, | Performed by: INTERNAL MEDICINE

## 2022-09-14 PROCEDURE — 25000003 PHARM REV CODE 250: Performed by: NURSE PRACTITIONER

## 2022-09-14 PROCEDURE — 99232 PR SUBSEQUENT HOSPITAL CARE,LEVL II: ICD-10-PCS | Mod: ,,, | Performed by: INTERNAL MEDICINE

## 2022-09-14 RX ORDER — DIPHENHYDRAMINE HYDROCHLORIDE 50 MG/ML
25 INJECTION INTRAMUSCULAR; INTRAVENOUS ONCE
Status: DISCONTINUED | OUTPATIENT
Start: 2022-09-14 | End: 2022-09-14

## 2022-09-14 RX ORDER — HYDROCODONE BITARTRATE AND ACETAMINOPHEN 500; 5 MG/1; MG/1
TABLET ORAL
Status: DISCONTINUED | OUTPATIENT
Start: 2022-09-14 | End: 2022-09-19

## 2022-09-14 RX ORDER — DIPHENHYDRAMINE HYDROCHLORIDE 50 MG/ML
25 INJECTION INTRAMUSCULAR; INTRAVENOUS ONCE
Status: COMPLETED | OUTPATIENT
Start: 2022-09-14 | End: 2022-09-14

## 2022-09-14 RX ORDER — ACETAMINOPHEN 325 MG/1
650 TABLET ORAL ONCE
Status: COMPLETED | OUTPATIENT
Start: 2022-09-14 | End: 2022-09-14

## 2022-09-14 RX ORDER — ACETAMINOPHEN 325 MG/1
650 TABLET ORAL ONCE
Status: DISCONTINUED | OUTPATIENT
Start: 2022-09-14 | End: 2022-09-14

## 2022-09-14 RX ADMIN — FAMOTIDINE 20 MG: 20 TABLET, FILM COATED ORAL at 08:09

## 2022-09-14 RX ADMIN — CEFEPIME 2 G: 2 INJECTION, POWDER, FOR SOLUTION INTRAVENOUS at 05:09

## 2022-09-14 RX ADMIN — ACETAMINOPHEN 650 MG: 325 TABLET ORAL at 07:09

## 2022-09-14 RX ADMIN — CEFEPIME 2 G: 2 INJECTION, POWDER, FOR SOLUTION INTRAVENOUS at 09:09

## 2022-09-14 RX ADMIN — AMLODIPINE BESYLATE 10 MG: 5 TABLET ORAL at 08:09

## 2022-09-14 RX ADMIN — LABETALOL HYDROCHLORIDE 200 MG: 200 TABLET, FILM COATED ORAL at 08:09

## 2022-09-14 RX ADMIN — NYSTATIN 500000 UNITS: 100000 SUSPENSION ORAL at 12:09

## 2022-09-14 RX ADMIN — NYSTATIN 500000 UNITS: 100000 SUSPENSION ORAL at 08:09

## 2022-09-14 RX ADMIN — SODIUM CHLORIDE: 9 INJECTION, SOLUTION INTRAVENOUS at 01:09

## 2022-09-14 RX ADMIN — DIPHENHYDRAMINE HYDROCHLORIDE 25 MG: 50 INJECTION INTRAMUSCULAR; INTRAVENOUS at 07:09

## 2022-09-14 RX ADMIN — CEFEPIME 2 G: 2 INJECTION, POWDER, FOR SOLUTION INTRAVENOUS at 12:09

## 2022-09-14 RX ADMIN — FLUCONAZOLE 100 MG: 100 TABLET ORAL at 08:09

## 2022-09-14 RX ADMIN — NYSTATIN 500000 UNITS: 100000 SUSPENSION ORAL at 05:09

## 2022-09-14 RX ADMIN — HYDROCODONE BITARTRATE AND ACETAMINOPHEN 1 TABLET: 10; 325 TABLET ORAL at 10:09

## 2022-09-14 NOTE — PROGRESS NOTES
Inpatient Nutrition Evaluation    Admit Date: 9/5/2022   Total duration of encounter: 9 days    Nutrition Recommendation/Prescription     Continue regular diet as tolerated  Continue vanilla boost plus TID to provide an additional 360 kcal and 14 g protein per container  Continue and encourage Magic Mouthwash, Nystatin, and fluconazole for throat.     Nutrition Assessment     Chart Review    Reason Seen: follow-up    Diagnosis: Chronic myelogenous leukemia  Febrile neutropenia likely due to hydroxyurea use  Anemia and neoplastic disease  Retinal hemorrhage related to #1  Immunocompromised   Oropharyngeal candidiasis  Essential hypertension, suboptimally controlled at  Medical noncompliance      Relevant Medical History: HTN    Nutrition-Related Medications: maxipime, fluconazole, magic mouthwash, nystatin    Nutrition-Related Labs: 9/10: WBC-0.8, RBC-2.75, H/H-6.6/20.7, BUN-7.7, creat-0.71  9/14: WBC-1.0, H/H-7.4/22.4, Crea-0.70      Diet Order: Diet Adult Regular  Oral Supplement Order: Boost Plus  Appetite/Oral Intake: poor/0-25% of meals  Factors Affecting Nutritional Intake: decreased appetite and painful swallowing  Food/Spiritism/Cultural Preferences: none reported       Wound(s):   none reported    Comments    9/10: pt sleeping at time of visit; spoke with wife, who reports poor appetite x3-4 days, only eating few bites of food. Agreed to ONS at this time. Pt with diarrhea. UBW reported 164 lb with no weight loss. Latest weight of 74.4 kg (164 lb) on 9/6, however previous weight of 84.8 kg (187 lb) on 8/21. Per previous weights, pt with possible 12.3% weight loss in 2 weeks. Will continue to monitor.  9/14: Pt sleeping. Girlfriend at bedside, stated he has not been eating well due to throat pain. Feels hungry but cant eat. Pt is drinking all Boost shakes well. Encouraged magic mouthwash to help with throat pain.     Anthropometrics    Height: 6' (182.9 cm)    Last Weight: 74.4 kg (164 lb) (09/06/22 1747)  Weight Method: Bed Scale  BMI (Calculated): 22.2  BMI Classification: normal (BMI 18.5-24.9)        Ideal Body Weight (IBW), Male: 178 lb  % Ideal Body Weight, Male (lb): 92.13 %                   Wt Readings from Last 5 Encounters:   09/06/22 74.4 kg (164 lb)   08/21/22 84.8 kg (187 lb)   07/18/22 84.4 kg (186 lb)   06/28/22 82.5 kg (181 lb 12.8 oz)   06/02/22 79.6 kg (175 lb 6.4 oz)     Weight Change(s) Since Admission:  Admit Weight: 74.4 kg (164 lb) (09/05/22 2042)  9/14: no new wts      Patient Education    Not applicable.    Monitoring & Evaluation     Dietitian will monitor food and beverage intake and weight change.  Nutrition Risk/Follow-Up: low (follow-up in 5-7 days)  Patients assigned 'low nutrition risk' status do not qualify for a full nutritional assessment but will be monitored and re-evaluated in a 5-7 day time period.

## 2022-09-14 NOTE — PLAN OF CARE
Problem: Adult Inpatient Plan of Care  Goal: Plan of Care Review  Outcome: Ongoing, Progressing  Flowsheets (Taken 9/13/2022 1936)  Plan of Care Reviewed With: patient  Goal: Patient-Specific Goal (Individualized)  Outcome: Ongoing, Progressing  Goal: Absence of Hospital-Acquired Illness or Injury  Outcome: Ongoing, Progressing  Intervention: Identify and Manage Fall Risk  Flowsheets (Taken 9/13/2022 1936)  Safety Promotion/Fall Prevention:   assistive device/personal item within reach   commode/urinal/bedpan at bedside   nonskid shoes/socks when out of bed   side rails raised x 2   medications reviewed   high risk medications identified   lighting adjusted   Fall Risk reviewed with patient/family  Intervention: Prevent Skin Injury  Flowsheets (Taken 9/13/2022 1936)  Skin Protection:   adhesive use limited   tubing/devices free from skin contact  Intervention: Prevent and Manage VTE (Venous Thromboembolism) Risk  Flowsheets (Taken 9/13/2022 1936)  Activity Management:   Ambulated to bathroom - L4   Ambulated in room - L4  VTE Prevention/Management:   ambulation promoted   intravenous hydration  Intervention: Prevent Infection  Flowsheets (Taken 9/13/2022 1936)  Infection Prevention:   hand hygiene promoted   single patient room provided   rest/sleep promoted   equipment surfaces disinfected  Goal: Optimal Comfort and Wellbeing  Outcome: Ongoing, Progressing  Intervention: Monitor Pain and Promote Comfort  Flowsheets (Taken 9/13/2022 1936)  Pain Management Interventions:   quiet environment facilitated   care clustered   medication offered  Intervention: Provide Person-Centered Care  Flowsheets (Taken 9/13/2022 1936)  Trust Relationship/Rapport:   care explained   choices provided   thoughts/feelings acknowledged   questions answered   empathic listening provided   emotional support provided  Goal: Readiness for Transition of Care  Outcome: Ongoing, Progressing  Intervention: Mutually Develop Transition  Plan  Flowsheets (Taken 9/13/2022 1936)  Equipment Currently Used at Home: none     Problem: Fall Injury Risk  Goal: Absence of Fall and Fall-Related Injury  Outcome: Ongoing, Progressing  Intervention: Identify and Manage Contributors  Flowsheets (Taken 9/13/2022 1936)  Self-Care Promotion: independence encouraged  Medication Review/Management: medications reviewed  Intervention: Promote Injury-Free Environment  Flowsheets (Taken 9/13/2022 1936)  Safety Promotion/Fall Prevention:   assistive device/personal item within reach   commode/urinal/bedpan at bedside   nonskid shoes/socks when out of bed   side rails raised x 2   medications reviewed   high risk medications identified   lighting adjusted   Fall Risk reviewed with patient/family

## 2022-09-14 NOTE — PROGRESS NOTES
[facility]  Hospital Medicine Progress Note        Chief Complaint: Inpatient Follow-up for pancytopenia, CML    HPI:   22-year-old  male with a past medical history of hypertension who went in for a routine eye exam on 08/18/2022 to update his eye glasses prescription.  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  He had bilateral Valdez spots with vessel tortuosity.  The optometrist recommended that the patient have blood work including testing for sickle cell disease.  The patient presented to the emergency department.  CBC in the emergency department revealed a white blood cell count of 411,900. Hemoglobin was 8.3, platelets were normal at 375,000.  Differential was suggestive of chronic myelogenous leukemia.  Coagulation studies revealed an INR of 1.38, PTT of 36.4 and a fibrinogen of 292.  Patient was going to present to [place], however they were on diversion.  He was transferred from [facility] to [facility].  Hematology was consulted.  Patient underwent bone marrow biopsy on 08/22/2022, this revealed chronic myelogenous leukemia, chronic phase, BCR/ABL1 positive.  Patient was discharged home on August 22, 2022 and was supposed to obtain twice weekly labs until follow-up with Dr. Gardner and continue hydroxyurea.  Unfortunately he did not keep his lab appointments and presented to the emergency room at [facility] on 09/06/2022 and was admitted there with febrile neutropenia and anemia.  Patient was started on empiric antibiotic therapy, transfused 2 units of packed red blood cells and 1 dose of platelets and then subsequently transferred here for further treatment and hematology evaluation.       Interval Hx:   Patient seen and examined, afebrile this am, denies any complaints, no sob or chest pain. Cont iv abx, will need plts  transfusion, will defer to hem/onc       Objective/physical exam:  General: In no acute distress, afebrile  Chest: Clear to auscultation bilaterally  Heart: RRR, +S1, S2, no appreciable murmur  Abdomen: Soft, nontender, BS +  MSK: Warm, no lower extremity edema, no clubbing or cyanosis  Neurologic: Alert and oriented x4,   VITAL SIGNS: 24 HRS MIN & MAX LAST   Temp  Min: 98.5 °F (36.9 °C)  Max: 99.1 °F (37.3 °C) 99 °F (37.2 °C)   BP  Min: 142/90  Max: 156/81 (!) 143/78     Pulse  Min: 63  Max: 93  63   Resp  Min: 17  Max: 18 17   SpO2  Min: 99 %  Max: 100 % 99 %       Recent Labs   Lab 09/12/22 0326 09/13/22 0330 09/14/22 0332   WBC 0.9* 1.0* 1.0*   RBC 2.64* 2.99* 2.83*   HGB 6.9* 7.8* 7.4*   HCT 21.0* 24.4* 22.4*   MCV 79.5* 81.6 79.2*   MCH 26.1* 26.1* 26.1*   MCHC 32.9* 32.0* 33.0   RDW 20.2* 18.9* 18.7*   PLT 27* 11* 6*         Recent Labs   Lab 09/08/22  0348 09/09/22 0325 09/12/22 0326 09/13/22 0330 09/14/22 0332      < > 138 139 136   K 3.6   < > 3.9 3.9 4.0   CO2 25   < > 27 26 25   BUN 9.8   < > 8.6* 11.2 11.1   CREATININE 0.82   < > 0.72* 0.79 0.70*   CALCIUM 8.5   < > 8.5 8.7 8.6   ALBUMIN 2.9*  --   --   --   --    ALKPHOS 53  --   --   --   --    ALT 15  --   --   --   --    AST 12  --   --   --   --    BILITOT 1.4  --   --   --   --     < > = values in this interval not displayed.            Microbiology Results (last 7 days)       Procedure Component Value Units Date/Time    Blood Culture #1 **CANNOT BE ORDERED STAT** [[id number]]  (Normal) Collected: 09/05/22 2135    Order Status: Completed Specimen: Blood from Antecubital, Left Updated: 09/11/22 1000     CULTURE, BLOOD ([facility]) No Growth at 5 days    Blood Culture #2 **CANNOT BE ORDERED STAT** [[id number]]  (Normal) Collected: 09/05/22 2146    Order Status: Completed Specimen: Blood from Hand, Left Updated: 09/11/22 1000     CULTURE, BLOOD ([facility]) No Growth at 5 days             See below for Radiology    Scheduled Med:   amLODIPine  10 mg  Oral Daily    ceFEPime (MAXIPIME) IVPB  2 g Intravenous Q8H    famotidine  20 mg Oral BID    fluconazole  100 mg Oral Daily    labetaloL  200 mg Oral Q12H    nystatin  500,000 Units Oral QID        Continuous Infusions:   sodium chloride 0.9% 125 mL/hr at 09/14/22 0155        PRN Meds:  (Magic mouthwash) 1:1:1 diphenhydramine(Benadryl) 12.5mg/5ml liq, aluminum & magnesium hydroxide-simethicone (Maalox), LIDOcaine viscous 2%, sodium chloride, sodium chloride, sodium chloride, sodium chloride, sodium chloride, acetaminophen, cloNIDine, glucagon (human recombinant), glucose, glucose, hydrALAZINE, HYDROcodone-acetaminophen, HYDROcodone-acetaminophen, labetaloL, ondansetron       Assessment/Plan:  Chronic myelogenous leukemia  Febrile neutropenia likely due to hydroxyurea use  Anemia and neoplastic disease  Retinal hemorrhage related to #1  Immunocompromised   Oropharyngeal candidiasis  Essential hypertension, suboptimally controlled at  Medical noncompliance  Thrombocytopenia     Will ptls transfusion, defer to hem/onc   On cefepime, afebrile today  All cultures have been negative,  H&H stable after blood transfusion   White cell count this morning is 1.0 and platelet count is 01203  On Magic mouthwash, nystatin and fluconazole   Oncology following      Prophylaxis:  SCD       Patient condition:  Stable     Anticipated discharge and Disposition: TBD       All diagnosis and differential diagnosis have been reviewed; assessment and plan has been documented; I have personally reviewed the labs and test results that are presently available; I have reviewed the patients medication list; I have reviewed the consulting providers response and recommendations. I have reviewed or attempted to review medical records based upon their availability    All of the patient's questions have been  addressed and answered. Patient's is agreeable to the above stated plan. I will continue to monitor closely and make adjustments to medical  management as needed.  _____________________________________________________________________    Nutrition Status:    Radiology:  X-Ray Chest AP Portable  Narrative: EXAMINATION:  XR CHEST AP PORTABLE    CLINICAL HISTORY:  Fever, unspecified    TECHNIQUE:  One view    COMPARISON:  August 18, 2022.    FINDINGS:  Cardiopericardial silhouette is within normal limits. Lungs are without dense focal or segmental consolidation, congestive process, pleural effusions or pneumothorax.  Impression: NO ACUTE CARDIOPULMONARY PROCESS IDENTIFIED.    Electronically signed by: Phill Andrew  Date:    09/06/2022  Time:    07:41      Atif Jones MD   09/14/2022

## 2022-09-14 NOTE — PROGRESS NOTES
Subjective:       Patient ID: Magdaleno Hirsch is a 22 y.o. male.    Chief Complaint: Sore Throat (Sore throat-chills -feet ache weakness for 2 days-has leukemia-no treatment yet-appt 9/16-oncology)      Diagnosis:  1. CML, chronic phase  2. 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  3. Retinal hemorrhage secondary to 1.    Current Treatment:       Treatment History:  1. Hydroxyurea 4 g every 12 hours    Eye Problem   Pertinent negatives include no weakness.   Sore Throat   Associated symptoms include trouble swallowing. Pertinent negatives include no abdominal pain, congestion, coughing, diarrhea, headaches or shortness of breath.     HPI:  22-year-old male with no real medical history who went in for a routine eye exam on 08/18/2022 to update his eye glasses prescription.  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  He had bilateral Valdez spots with vessel tortuosity.  The optometrist recommended that the patient have blood work including testing for sickle cell disease.  The patient presented to the emergency department.  CBC in the emergency department revealed a white blood cell count of 411,900. Hemoglobin was 8.3, platelets were normal at 375,000 hundred and seventy five thousand.  Differential was suggestive of CML.  Coagulation studies revealed an INR of 1.38, PTT of 36.4 and a fibrinogen of 292.  Patient was going to present to [place], however they were on diversion.  He was transferred from [facility] to [facility].  Hematology consulted.  Patient underwent bone marrow biopsy on 08/22/2022, this revealed CML, chronic phase, BCR/ABL1 positive.    Interval History:   22-year-old patient known to me from previous hospitalization.  Was discharged home on 08/22/2022, he was informed about twice weekly labs until follow up appointment with me and continued on hydroxyurea.  Unfortunately, the patient did not keep his lab  appointments.  He presented to the emergency department on 09/05/2022 with febrile neutropenia, was admitted to the hospitalist service and transferred to main campus [facility].  Hematology consulted as patient known to me.    Today, 09/14/2022:   Patient seen and examined on rounds.  His throat is feeling much better.  He remains afebrile.  Still awaiting count recovery.    No past medical history on file.   Past Surgical History:   Procedure Laterality Date    BONE MARROW BIOPSY N/A 8/22/2022    Procedure: Biopsy-bone marrow;  Surgeon: Getachew Chen MD;  Location: [facility];  Service: General;  Laterality: N/A;    KNEE SURGERY Left      Social History     Socioeconomic History    Marital status: Single   Tobacco Use    Smoking status: Never    Smokeless tobacco: Never   Substance and Sexual Activity    Alcohol use: Never    Drug use: Never      History reviewed. No pertinent family history.   Review of patient's allergies indicates:  No Known Allergies   Review of Systems   Constitutional:  Negative for chills, diaphoresis, fatigue and unexpected weight change.   HENT:  Positive for mouth sores, sore throat and trouble swallowing. Negative for nasal congestion and sinus pressure/congestion.    Eyes:  Positive for visual disturbance. Negative for pain.   Respiratory:  Negative for cough, chest tightness and shortness of breath.    Cardiovascular:  Negative for chest pain, palpitations and leg swelling.   Gastrointestinal:  Negative for abdominal distention, abdominal pain, blood in stool, constipation and diarrhea.   Genitourinary:  Negative for dysuria, frequency and hematuria.   Musculoskeletal:  Negative for arthralgias and back pain.   Integumentary:  Negative for rash.   Neurological:  Negative for dizziness, weakness, numbness and headaches.   Hematological:  Negative for adenopathy.   Psychiatric/Behavioral:  Negative for confusion.        Objective:      Physical Exam  Vitals reviewed.    Constitutional:       General: He is awake.      Appearance: Normal appearance.   HENT:      Head: Normocephalic and atraumatic.      Right Ear: Hearing normal.      Left Ear: Hearing normal.      Nose: Nose normal.      Mouth/Throat:      Comments: Mucositis    Eyes:      General: Lids are normal. Vision grossly intact.      Extraocular Movements: Extraocular movements intact.      Conjunctiva/sclera: Conjunctivae normal.   Cardiovascular:      Rate and Rhythm: Normal rate and regular rhythm.      Pulses: Normal pulses.      Heart sounds: Normal heart sounds.   Pulmonary:      Effort: Pulmonary effort is normal.      Breath sounds: Normal breath sounds. No wheezing, rhonchi or rales.   Chest:      Comments: Soft mass in area of the sternum  Abdominal:      General: Bowel sounds are normal.      Palpations: Abdomen is soft. There is splenomegaly.      Tenderness: There is no abdominal tenderness.   Musculoskeletal:      Cervical back: Full passive range of motion without pain.      Right lower leg: No edema.      Left lower leg: No edema.   Lymphadenopathy:      Cervical: No cervical adenopathy.      Upper Body:      Right upper body: No supraclavicular or axillary adenopathy.      Left upper body: No supraclavicular or axillary adenopathy.   Skin:     General: Skin is warm.   Neurological:      General: No focal deficit present.      Mental Status: He is alert and oriented to person, place, and time.   Psychiatric:         Attention and Perception: Attention normal.         Mood and Affect: Mood and affect normal.         Behavior: Behavior is cooperative.       LABS AND IMAGING REVIEWED IN EPIC  Lab Review:  CBC:   Recent Labs     09/14/22  0332 09/13/22  0330 09/12/22  0326   WBC 1.0* 1.0* 0.9*   RBC 2.83* 2.99* 2.64*   HGB 7.4* 7.8* 6.9*   HCT 22.4* 24.4* 21.0*   PLT 6* 11* 27*       CMP:   Recent Labs     09/14/22  0332 09/13/22  0330 09/12/22  0326 09/09/22  0325 09/08/22  0348 09/07/22  0355 09/05/22  2110  08/22/22  0839    139 138   < > 137   < > 140 135*   K 4.0 3.9 3.9   < > 3.6   < > 4.0 4.6   CO2 25 26 27   < > 25   < > 24 21*   BUN 11.1 11.2 8.6*   < > 9.8   < > 11.0 16.5   CREATININE 0.70* 0.79 0.72*   < > 0.82   < > 0.94 0.81   CALCIUM 8.6 8.7 8.5   < > 8.5   < > 8.9 9.4   ALBUMIN  --   --   --   --  2.9*  --  3.7 3.7   BILITOT  --   --   --   --  1.4  --  1.7* 1.4   ALKPHOS  --   --   --   --  53  --  58 72   AST  --   --   --   --  12  --  24 49*   ALT  --   --   --   --  15  --  24 17    < > = values in this interval not displayed.              Assessment:   1. CML, chronic phase  2. 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  3. Retinal hemorrhage secondary to 1.        Plan:       Patient has CML, diagnosed on bone marrow biopsy done on 08/22/2022.    Patient was discharged home on 08/22/2022 with plans to continue hydroxyurea 4 g every 12 hours and have blood work twice a week until his follow up with me and final bone marrow biopsy.    Unfortunately, the patient did not have twice weekly labs, but continued on hydroxyurea.  When asked about lab appointments, the patient's wife stated that he was supposed to come on Mondays and Thursdays.  Unfortunately he was not able to make these visits.      He is now admitted with febrile neutropenia and pancytopenia almost certainly from hydroxyurea.      We will treat him with antibiotics.  So far workup is negative for COVID, flu, strep.  Does have possible thrush.      I agree with continued antibiotics and fluconazole.    Await count recovery.    We will need to start a TKI, likely dasatinib once the patient's counts recover.      Paul Hogan II, MD

## 2022-09-15 LAB
ABO + RH BLD: NORMAL
ABO + RH BLD: NORMAL
ANION GAP SERPL CALC-SCNC: 6 MEQ/L
BASOPHILS # BLD AUTO: 0.02 X10(3)/MCL (ref 0–0.2)
BASOPHILS NFR BLD AUTO: 2.2 %
BLD PROD TYP BPU: NORMAL
BLD PROD TYP BPU: NORMAL
BLOOD UNIT EXPIRATION DATE: NORMAL
BLOOD UNIT EXPIRATION DATE: NORMAL
BLOOD UNIT TYPE CODE: 7300
BLOOD UNIT TYPE CODE: 8400
BUN SERPL-MCNC: 12.1 MG/DL (ref 8.9–20.6)
CALCIUM SERPL-MCNC: 9.1 MG/DL (ref 8.4–10.2)
CHLORIDE SERPL-SCNC: 103 MMOL/L (ref 98–107)
CO2 SERPL-SCNC: 28 MMOL/L (ref 22–29)
CREAT SERPL-MCNC: 0.74 MG/DL (ref 0.73–1.18)
CREAT/UREA NIT SERPL: 16
CROSSMATCH INTERPRETATION: NORMAL
CROSSMATCH INTERPRETATION: NORMAL
DISPENSE STATUS: NORMAL
DISPENSE STATUS: NORMAL
EOSINOPHIL # BLD AUTO: 0.01 X10(3)/MCL (ref 0–0.9)
EOSINOPHIL NFR BLD AUTO: 1.1 %
ERYTHROCYTE [DISTWIDTH] IN BLOOD BY AUTOMATED COUNT: 18.9 % (ref 11.5–17)
GFR SERPLBLD CREATININE-BSD FMLA CKD-EPI: >60 MLS/MIN/1.73/M2
GLUCOSE SERPL-MCNC: 92 MG/DL (ref 74–100)
HCT VFR BLD AUTO: 22.6 % (ref 42–52)
HGB BLD-MCNC: 7.3 GM/DL (ref 14–18)
IMM GRANULOCYTES # BLD AUTO: 0 X10(3)/MCL (ref 0–0.04)
IMM GRANULOCYTES NFR BLD AUTO: 0 %
LYMPHOCYTES # BLD AUTO: 0.89 X10(3)/MCL (ref 0.6–4.6)
LYMPHOCYTES NFR BLD AUTO: 95.6 %
MCH RBC QN AUTO: 25.8 PG (ref 27–31)
MCHC RBC AUTO-ENTMCNC: 32.3 MG/DL (ref 33–36)
MCV RBC AUTO: 79.9 FL (ref 80–94)
MONOCYTES # BLD AUTO: 0.01 X10(3)/MCL (ref 0.1–1.3)
MONOCYTES NFR BLD AUTO: 1.1 %
NEUTROPHILS # BLD AUTO: 0 X10(3)/MCL (ref 2.1–9.2)
NEUTROPHILS NFR BLD AUTO: 0 %
NRBC BLD AUTO-RTO: 0 %
PLATELET # BLD AUTO: 7 X10(3)/MCL (ref 130–400)
PMV BLD AUTO: ABNORMAL FL
POTASSIUM SERPL-SCNC: 3.8 MMOL/L (ref 3.5–5.1)
RBC # BLD AUTO: 2.83 X10(6)/MCL (ref 4.7–6.1)
SODIUM SERPL-SCNC: 137 MMOL/L (ref 136–145)
UNIT NUMBER: NORMAL
UNIT NUMBER: NORMAL
WBC # SPEC AUTO: 0.9 X10(3)/MCL (ref 4.5–11.5)

## 2022-09-15 PROCEDURE — 36415 COLL VENOUS BLD VENIPUNCTURE: CPT | Performed by: INTERNAL MEDICINE

## 2022-09-15 PROCEDURE — 99232 SBSQ HOSP IP/OBS MODERATE 35: CPT | Mod: ,,, | Performed by: INTERNAL MEDICINE

## 2022-09-15 PROCEDURE — 25000003 PHARM REV CODE 250: Performed by: INTERNAL MEDICINE

## 2022-09-15 PROCEDURE — 25000003 PHARM REV CODE 250: Performed by: EMERGENCY MEDICINE

## 2022-09-15 PROCEDURE — 63700000 PHARM REV CODE 250 ALT 637 W/O HCPCS: Performed by: HOSPITALIST

## 2022-09-15 PROCEDURE — 85025 COMPLETE CBC W/AUTO DIFF WBC: CPT | Performed by: INTERNAL MEDICINE

## 2022-09-15 PROCEDURE — 99232 PR SUBSEQUENT HOSPITAL CARE,LEVL II: ICD-10-PCS | Mod: ,,, | Performed by: INTERNAL MEDICINE

## 2022-09-15 PROCEDURE — 63600175 PHARM REV CODE 636 W HCPCS: Performed by: EMERGENCY MEDICINE

## 2022-09-15 PROCEDURE — 80048 BASIC METABOLIC PNL TOTAL CA: CPT | Performed by: INTERNAL MEDICINE

## 2022-09-15 PROCEDURE — 36430 TRANSFUSION BLD/BLD COMPNT: CPT

## 2022-09-15 PROCEDURE — 21400001 HC TELEMETRY ROOM

## 2022-09-15 PROCEDURE — 25000003 PHARM REV CODE 250: Performed by: NURSE PRACTITIONER

## 2022-09-15 PROCEDURE — P9037 PLATE PHERES LEUKOREDU IRRAD: HCPCS | Performed by: INTERNAL MEDICINE

## 2022-09-15 RX ORDER — HYDROCODONE BITARTRATE AND ACETAMINOPHEN 500; 5 MG/1; MG/1
TABLET ORAL
Status: DISCONTINUED | OUTPATIENT
Start: 2022-09-15 | End: 2022-09-19

## 2022-09-15 RX ADMIN — HYDROCODONE BITARTRATE AND ACETAMINOPHEN 1 TABLET: 5; 325 TABLET ORAL at 06:09

## 2022-09-15 RX ADMIN — HYDROCODONE BITARTRATE AND ACETAMINOPHEN 1 TABLET: 10; 325 TABLET ORAL at 09:09

## 2022-09-15 RX ADMIN — NYSTATIN 500000 UNITS: 100000 SUSPENSION ORAL at 12:09

## 2022-09-15 RX ADMIN — DIPHENHYDRAMINE HYDROCHLORIDE 15 ML: 25 SOLUTION ORAL at 09:09

## 2022-09-15 RX ADMIN — CEFEPIME 2 G: 2 INJECTION, POWDER, FOR SOLUTION INTRAVENOUS at 12:09

## 2022-09-15 RX ADMIN — CEFEPIME 2 G: 2 INJECTION, POWDER, FOR SOLUTION INTRAVENOUS at 09:09

## 2022-09-15 RX ADMIN — LABETALOL HYDROCHLORIDE 200 MG: 200 TABLET, FILM COATED ORAL at 09:09

## 2022-09-15 RX ADMIN — AMLODIPINE BESYLATE 10 MG: 5 TABLET ORAL at 08:09

## 2022-09-15 RX ADMIN — LABETALOL HYDROCHLORIDE 200 MG: 200 TABLET, FILM COATED ORAL at 08:09

## 2022-09-15 RX ADMIN — FAMOTIDINE 20 MG: 20 TABLET, FILM COATED ORAL at 08:09

## 2022-09-15 RX ADMIN — SODIUM CHLORIDE: 9 INJECTION, SOLUTION INTRAVENOUS at 09:09

## 2022-09-15 RX ADMIN — FAMOTIDINE 20 MG: 20 TABLET, FILM COATED ORAL at 09:09

## 2022-09-15 RX ADMIN — NYSTATIN 500000 UNITS: 100000 SUSPENSION ORAL at 08:09

## 2022-09-15 RX ADMIN — CEFEPIME 2 G: 2 INJECTION, POWDER, FOR SOLUTION INTRAVENOUS at 04:09

## 2022-09-15 RX ADMIN — FLUCONAZOLE 100 MG: 100 TABLET ORAL at 08:09

## 2022-09-15 NOTE — PROGRESS NOTES
Subjective:       Patient ID: Magdaleno Hirsch is a 22 y.o. male.    Chief Complaint: Sore Throat (Sore throat-chills -feet ache weakness for 2 days-has leukemia-no treatment yet-appt 9/16-oncology)      Diagnosis:  1. CML, chronic phase  2. 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  3. Retinal hemorrhage secondary to 1.    Current Treatment:       Treatment History:  1. Hydroxyurea 4 g every 12 hours    Eye Problem   Pertinent negatives include no weakness.   Sore Throat   Associated symptoms include trouble swallowing. Pertinent negatives include no abdominal pain, congestion, coughing, diarrhea, headaches or shortness of breath.     HPI:  22-year-old male with no real medical history who went in for a routine eye exam on 08/18/2022 to update his eye glasses prescription.  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  He had bilateral Valdez spots with vessel tortuosity.  The optometrist recommended that the patient have blood work including testing for sickle cell disease.  The patient presented to the emergency department.  CBC in the emergency department revealed a white blood cell count of 411,900. Hemoglobin was 8.3, platelets were normal at 375,000 hundred and seventy five thousand.  Differential was suggestive of CML.  Coagulation studies revealed an INR of 1.38, PTT of 36.4 and a fibrinogen of 292.  Patient was going to present to [place], however they were on diversion.  He was transferred from [facility] to [facility].  Hematology consulted.  Patient underwent bone marrow biopsy on 08/22/2022, this revealed CML, chronic phase, BCR/ABL1 positive.    Interval History:   22-year-old patient known to me from previous hospitalization.  Was discharged home on 08/22/2022, he was informed about twice weekly labs until follow up appointment with me and continued on hydroxyurea.  Unfortunately, the patient did not keep his lab  appointments.  He presented to the emergency department on 09/05/2022 with febrile neutropenia, was admitted to the hospitalist service and transferred to main campus [facility].  Hematology consulted as patient known to me.    Today, 09/15/2022:   Patient seen and examined on rounds.  His throat is feeling much better.  He remains afebrile.  He still has gum swelling.  Still awaiting count recovery.    No past medical history on file.   Past Surgical History:   Procedure Laterality Date    BONE MARROW BIOPSY N/A 8/22/2022    Procedure: Biopsy-bone marrow;  Surgeon: Getachew Chen MD;  Location: [facility];  Service: General;  Laterality: N/A;    KNEE SURGERY Left      Social History     Socioeconomic History    Marital status: Single   Tobacco Use    Smoking status: Never    Smokeless tobacco: Never   Substance and Sexual Activity    Alcohol use: Never    Drug use: Never      History reviewed. No pertinent family history.   Review of patient's allergies indicates:  No Known Allergies   Review of Systems   Constitutional:  Negative for chills, diaphoresis, fatigue and unexpected weight change.   HENT:  Positive for mouth sores, sore throat and trouble swallowing. Negative for nasal congestion and sinus pressure/congestion.    Eyes:  Positive for visual disturbance. Negative for pain.   Respiratory:  Negative for cough, chest tightness and shortness of breath.    Cardiovascular:  Negative for chest pain, palpitations and leg swelling.   Gastrointestinal:  Negative for abdominal distention, abdominal pain, blood in stool, constipation and diarrhea.   Genitourinary:  Negative for dysuria, frequency and hematuria.   Musculoskeletal:  Negative for arthralgias and back pain.   Integumentary:  Negative for rash.   Neurological:  Negative for dizziness, weakness, numbness and headaches.   Hematological:  Negative for adenopathy.   Psychiatric/Behavioral:  Negative for confusion.        Objective:      Physical  Exam  Vitals reviewed.   Constitutional:       General: He is awake.      Appearance: Normal appearance.   HENT:      Head: Normocephalic and atraumatic.      Right Ear: Hearing normal.      Left Ear: Hearing normal.      Nose: Nose normal.      Mouth/Throat:      Comments: Mucositis    Eyes:      General: Lids are normal. Vision grossly intact.      Extraocular Movements: Extraocular movements intact.      Conjunctiva/sclera: Conjunctivae normal.   Cardiovascular:      Rate and Rhythm: Normal rate and regular rhythm.      Pulses: Normal pulses.      Heart sounds: Normal heart sounds.   Pulmonary:      Effort: Pulmonary effort is normal.      Breath sounds: Normal breath sounds. No wheezing, rhonchi or rales.   Chest:      Comments: Soft mass in area of the sternum  Abdominal:      General: Bowel sounds are normal.      Palpations: Abdomen is soft. There is splenomegaly.      Tenderness: There is no abdominal tenderness.   Musculoskeletal:      Cervical back: Full passive range of motion without pain.      Right lower leg: No edema.      Left lower leg: No edema.   Lymphadenopathy:      Cervical: No cervical adenopathy.      Upper Body:      Right upper body: No supraclavicular or axillary adenopathy.      Left upper body: No supraclavicular or axillary adenopathy.   Skin:     General: Skin is warm.   Neurological:      General: No focal deficit present.      Mental Status: He is alert and oriented to person, place, and time.   Psychiatric:         Attention and Perception: Attention normal.         Mood and Affect: Mood and affect normal.         Behavior: Behavior is cooperative.       LABS AND IMAGING REVIEWED IN EPIC  Lab Review:  CBC:   Recent Labs     09/15/22  0634 09/14/22  0332 09/13/22  0330   WBC 0.9* 1.0* 1.0*   RBC 2.83* 2.83* 2.99*   HGB 7.3* 7.4* 7.8*   HCT 22.6* 22.4* 24.4*   PLT 7* 6* 11*       CMP:   Recent Labs     09/15/22  0533 09/14/22  0332 09/13/22  0330 09/09/22  0325 09/08/22  0348  09/07/22  0355 09/05/22  2110 08/22/22  0839    136 139   < > 137   < > 140 135*   K 3.8 4.0 3.9   < > 3.6   < > 4.0 4.6   CO2 28 25 26   < > 25   < > 24 21*   BUN 12.1 11.1 11.2   < > 9.8   < > 11.0 16.5   CREATININE 0.74 0.70* 0.79   < > 0.82   < > 0.94 0.81   CALCIUM 9.1 8.6 8.7   < > 8.5   < > 8.9 9.4   ALBUMIN  --   --   --   --  2.9*  --  3.7 3.7   BILITOT  --   --   --   --  1.4  --  1.7* 1.4   ALKPHOS  --   --   --   --  53  --  58 72   AST  --   --   --   --  12  --  24 49*   ALT  --   --   --   --  15  --  24 17    < > = values in this interval not displayed.              Assessment:   1. CML, chronic phase  2. 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  3. Retinal hemorrhage secondary to 1.        Plan:       Patient has CML, diagnosed on bone marrow biopsy done on 08/22/2022.    Patient was discharged home on 08/22/2022 with plans to continue hydroxyurea 4 g every 12 hours and have blood work twice a week until his follow up with me and final bone marrow biopsy.    Unfortunately, the patient did not have twice weekly labs, but continued on hydroxyurea.  When asked about lab appointments, the patient's wife stated that he was supposed to come on Mondays and Thursdays.  Unfortunately he was not able to make these visits.      He is now admitted with febrile neutropenia and pancytopenia almost certainly from hydroxyurea.      We will treat him with antibiotics.  So far workup is negative for COVID, flu, strep.  Does have possible thrush.      I agree with continued antibiotics and fluconazole.    Await count recovery.    We will need to start a TKI, likely dasatinib once the patient's counts recover.      Paul Hogan II, MD

## 2022-09-16 LAB
ALBUMIN SERPL-MCNC: 3.1 GM/DL (ref 3.5–5)
ALBUMIN/GLOB SERPL: 0.8 RATIO (ref 1.1–2)
ALP SERPL-CCNC: 61 UNIT/L (ref 40–150)
ALT SERPL-CCNC: 13 UNIT/L (ref 0–55)
AST SERPL-CCNC: 12 UNIT/L (ref 5–34)
BASOPHILS # BLD AUTO: 0.01 X10(3)/MCL (ref 0–0.2)
BASOPHILS NFR BLD AUTO: 1.2 %
BILIRUBIN DIRECT+TOT PNL SERPL-MCNC: 1.3 MG/DL
BUN SERPL-MCNC: 13.3 MG/DL (ref 8.9–20.6)
CALCIUM SERPL-MCNC: 9.2 MG/DL (ref 8.4–10.2)
CHLORIDE SERPL-SCNC: 101 MMOL/L (ref 98–107)
CO2 SERPL-SCNC: 28 MMOL/L (ref 22–29)
CREAT SERPL-MCNC: 0.72 MG/DL (ref 0.73–1.18)
EOSINOPHIL # BLD AUTO: 0.01 X10(3)/MCL (ref 0–0.9)
EOSINOPHIL NFR BLD AUTO: 1.2 %
ERYTHROCYTE [DISTWIDTH] IN BLOOD BY AUTOMATED COUNT: 18.8 % (ref 11.5–17)
GFR SERPLBLD CREATININE-BSD FMLA CKD-EPI: >60 MLS/MIN/1.73/M2
GLOBULIN SER-MCNC: 3.7 GM/DL (ref 2.4–3.5)
GLUCOSE SERPL-MCNC: 96 MG/DL (ref 74–100)
HCT VFR BLD AUTO: 22.3 % (ref 42–52)
HGB BLD-MCNC: 7.2 GM/DL (ref 14–18)
IMM GRANULOCYTES # BLD AUTO: 0 X10(3)/MCL (ref 0–0.04)
IMM GRANULOCYTES NFR BLD AUTO: 0 %
LYMPHOCYTES # BLD AUTO: 0.79 X10(3)/MCL (ref 0.6–4.6)
LYMPHOCYTES NFR BLD AUTO: 95.2 %
MCH RBC QN AUTO: 25.9 PG (ref 27–31)
MCHC RBC AUTO-ENTMCNC: 32.3 MG/DL (ref 33–36)
MCV RBC AUTO: 80.2 FL (ref 80–94)
MONOCYTES # BLD AUTO: 0.01 X10(3)/MCL (ref 0.1–1.3)
MONOCYTES NFR BLD AUTO: 1.2 %
NEUTROPHILS # BLD AUTO: 0 X10(3)/MCL (ref 2.1–9.2)
NEUTROPHILS NFR BLD AUTO: 1.2 %
NRBC BLD AUTO-RTO: 0 %
PLATELET # BLD AUTO: 16 X10(3)/MCL (ref 130–400)
PMV BLD AUTO: ABNORMAL FL
POTASSIUM SERPL-SCNC: 3.8 MMOL/L (ref 3.5–5.1)
PROT SERPL-MCNC: 6.8 GM/DL (ref 6.4–8.3)
RBC # BLD AUTO: 2.78 X10(6)/MCL (ref 4.7–6.1)
RBCS: NORMAL
SODIUM SERPL-SCNC: 135 MMOL/L (ref 136–145)
WBC # SPEC AUTO: 0.8 X10(3)/MCL (ref 4.5–11.5)

## 2022-09-16 PROCEDURE — 63700000 PHARM REV CODE 250 ALT 637 W/O HCPCS: Performed by: HOSPITALIST

## 2022-09-16 PROCEDURE — 25000003 PHARM REV CODE 250: Performed by: INTERNAL MEDICINE

## 2022-09-16 PROCEDURE — 36415 COLL VENOUS BLD VENIPUNCTURE: CPT | Performed by: INTERNAL MEDICINE

## 2022-09-16 PROCEDURE — 63600175 PHARM REV CODE 636 W HCPCS: Performed by: EMERGENCY MEDICINE

## 2022-09-16 PROCEDURE — 21400001 HC TELEMETRY ROOM

## 2022-09-16 PROCEDURE — 99232 SBSQ HOSP IP/OBS MODERATE 35: CPT | Mod: ,,, | Performed by: INTERNAL MEDICINE

## 2022-09-16 PROCEDURE — 80053 COMPREHEN METABOLIC PANEL: CPT | Performed by: INTERNAL MEDICINE

## 2022-09-16 PROCEDURE — 25000003 PHARM REV CODE 250: Performed by: EMERGENCY MEDICINE

## 2022-09-16 PROCEDURE — 25000003 PHARM REV CODE 250: Performed by: NURSE PRACTITIONER

## 2022-09-16 PROCEDURE — 85025 COMPLETE CBC W/AUTO DIFF WBC: CPT | Performed by: INTERNAL MEDICINE

## 2022-09-16 PROCEDURE — 94761 N-INVAS EAR/PLS OXIMETRY MLT: CPT

## 2022-09-16 PROCEDURE — 99232 PR SUBSEQUENT HOSPITAL CARE,LEVL II: ICD-10-PCS | Mod: ,,, | Performed by: INTERNAL MEDICINE

## 2022-09-16 RX ADMIN — HYDROCODONE BITARTRATE AND ACETAMINOPHEN 1 TABLET: 10; 325 TABLET ORAL at 08:09

## 2022-09-16 RX ADMIN — NYSTATIN 500000 UNITS: 100000 SUSPENSION ORAL at 01:09

## 2022-09-16 RX ADMIN — CEFEPIME 2 G: 2 INJECTION, POWDER, FOR SOLUTION INTRAVENOUS at 05:09

## 2022-09-16 RX ADMIN — CEFEPIME 2 G: 2 INJECTION, POWDER, FOR SOLUTION INTRAVENOUS at 08:09

## 2022-09-16 RX ADMIN — SODIUM CHLORIDE: 9 INJECTION, SOLUTION INTRAVENOUS at 03:09

## 2022-09-16 RX ADMIN — FAMOTIDINE 20 MG: 20 TABLET, FILM COATED ORAL at 08:09

## 2022-09-16 RX ADMIN — NYSTATIN 500000 UNITS: 100000 SUSPENSION ORAL at 04:09

## 2022-09-16 RX ADMIN — NYSTATIN 500000 UNITS: 100000 SUSPENSION ORAL at 08:09

## 2022-09-16 RX ADMIN — HYDROCODONE BITARTRATE AND ACETAMINOPHEN 1 TABLET: 10; 325 TABLET ORAL at 01:09

## 2022-09-16 RX ADMIN — HYDROCODONE BITARTRATE AND ACETAMINOPHEN 1 TABLET: 10; 325 TABLET ORAL at 05:09

## 2022-09-16 RX ADMIN — LABETALOL HYDROCHLORIDE 200 MG: 200 TABLET, FILM COATED ORAL at 08:09

## 2022-09-16 RX ADMIN — FLUCONAZOLE 100 MG: 100 TABLET ORAL at 08:09

## 2022-09-16 RX ADMIN — CEFEPIME 2 G: 2 INJECTION, POWDER, FOR SOLUTION INTRAVENOUS at 01:09

## 2022-09-16 RX ADMIN — AMLODIPINE BESYLATE 10 MG: 5 TABLET ORAL at 08:09

## 2022-09-16 NOTE — PROGRESS NOTES
Hospital Medicine  Progress Note    Patient Name: Magdaleno Hirsch  MRN: [id number]  Status: IP- Inpatient   Admission Date: 9/5/2022  Length of Stay: 11      CC: hospital follow-up for pancytopenia       SUBJECTIVE   22-year-old  male with history that includes recently diagnosed CML, presented to the ED at [facility] on 09/05 with pancytopenia.  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  Work up during that time noted a white count of  411,900. Hemoglobin was 8.3, platelets were normal at 375,000.  Differential was suggestive of chronic myelogenous leukemia.  Coagulation studies revealed an INR of 1.38, PTT of 36.4 and a fibrinogen of 292.  Hematology was consulted and patient underwent bone marrow biopsy on 08/22.  This revealed chronic myelogenous leukemia, chronic phase, BCR/ABL1 positive.  Patient was discharged home on August 22, 2022 on hydroxyurea, and was supposed to obtain twice weekly labs until follow-up with Dr. Gardner.  Unfortunately he did not keep these lab appointments and now presented to ED on 9/5, where he was found to be pancytopenic. Lab note a white count of 700 (ANC of 28), H&H 5.8/18, platelets 23,000.  Patient was also noted to be febrile.  He was started on empiric antibiotic therapy, transfused 2 units of packed red blood cells and 1 of platelets and then subsequently transferred [facility] for further treatment and hematology evaluation.  He was continued on antibiotics, sepsis workup remained negative, though he was noted to have oral thrush and started on Diflucan.  Hematology was on board; counts were monitored.    Today: Patient seen and examined at bedside, and chart reviewed.  Counts unchanged.  He is having some some minor gingival bleeding  today.      MEDICATIONS   Scheduled   amLODIPine  10 mg Oral Daily    ceFEPime (MAXIPIME) IVPB  2 g Intravenous Q8H    famotidine  20 mg Oral BID    fluconazole  100 mg Oral Daily    labetaloL  200 mg Oral Q12H    nystatin  500,000 Units Oral QID     Continuous Infusions   sodium chloride 0.9% 125 mL/hr at 09/15/22 0926       PHYSICAL EXAM   VITALS: T 99.8 °F (37.7 °C)   /77   P 70   RR 16   O2 99 %    GENERAL: Awake and in NAD  LUNGS: CTA B/L  CVS: Normal rate  GI/: Soft, NT, bowel sounds positive.  EXTREMITIES: No peripheral edema  NEURO: AAOx3  PSYCH: Cooperative      LABS   CBC  Recent Labs     09/16/22 0449   WBC 0.8*   HGB 7.2*   HCT 22.3*   PLT 16*       CHEM  Recent Labs     09/16/22 0449   *   K 3.8   CO2 28   BUN 13.3   CREATININE 0.72*   CALCIUM 9.2   BILITOT 1.3   AST 12   ALT 13   ALKPHOS 61   ALBUMIN 3.1*       MICROBIOLOGY     Microbiology Results (last 7 days)       Procedure Component Value Units Date/Time    Blood Culture #1 **CANNOT BE ORDERED STAT** [[id number]]  (Normal) Collected: 09/05/22 2135    Order Status: Completed Specimen: Blood from Antecubital, Left Updated: 09/11/22 1000     CULTURE, BLOOD ([facility]) No Growth at 5 days    Blood Culture #2 **CANNOT BE ORDERED STAT** [[id number]]  (Normal) Collected: 09/05/22 2146    Order Status: Completed Specimen: Blood from Hand, Left Updated: 09/11/22 1000     CULTURE, BLOOD ([facility]) No Growth at 5 days          ASSESSMENT   Chronic myelogenous leukemia  Febrile neutropenia  Pancytopenia secondary to hydroxyurea  Immunocompromised secondary to above  Retinal hemorrhage related to #1  Oropharyngeal candidiasis  Essential hypertension  Medical noncompliance    PLAN   Continue with IV antibiotics and Diflucan  Continue to monitor counts  Will monitor gingival bleeding, if severe need to consider platelet transfusion  Appreciate oncology input, planing for initiation of kinase inhibitor once counts recovered  Continue monitoring in the  interim      Prophylaxis: B/L SCDs        Wilman Rizvi MD  [facility]  09/16/2022

## 2022-09-16 NOTE — PROGRESS NOTES
Hospital Medicine  Progress Note    Patient Name: Magdaleno Hirsch  MRN: [id number]  Status: IP- Inpatient   Admission Date: 9/5/2022  Length of Stay: 10      CC: hospital follow-up for pancytopenia       SUBJECTIVE       Today: Patient seen and examined at bedside, and chart reviewed.  No new complaints today.  Counts remain low.  No evidence of bleeding.  Patient remains afebrile.      MEDICATIONS   Scheduled   amLODIPine  10 mg Oral Daily    ceFEPime (MAXIPIME) IVPB  2 g Intravenous Q8H    famotidine  20 mg Oral BID    fluconazole  100 mg Oral Daily    labetaloL  200 mg Oral Q12H    nystatin  500,000 Units Oral QID     Continuous Infusions   sodium chloride 0.9% 125 mL/hr at 09/15/22 0926       PHYSICAL EXAM   VITALS: T 99 °F (37.2 °C)   BP (!) 148/81   P 79   RR 18   O2 100 %    GENERAL: Awake and in NAD  LUNGS: CTA B/L  CVS: Normal rate  GI/: Soft, NT, bowel sounds positive.  EXTREMITIES: No peripheral edema  NEURO: AAOx3  PSYCH: Cooperative      LABS   CBC  Recent Labs     09/15/22  0634   WBC 0.9*   HGB 7.3*   HCT 22.6*   PLT 7*      CHEM  Recent Labs     09/15/22  0533      K 3.8   CO2 28   BUN 12.1   CREATININE 0.74   CALCIUM 9.1       MICROBIOLOGY     Microbiology Results (last 7 days)       Procedure Component Value Units Date/Time    Blood Culture #1 **CANNOT BE ORDERED STAT** [[id number]]  (Normal) Collected: 09/05/22 2135    Order Status: Completed Specimen: Blood from Antecubital, Left Updated: 09/11/22 1000     CULTURE, BLOOD ([facility]) No Growth at 5 days    Blood Culture #2 **CANNOT BE ORDERED STAT** [[id number]]  (Normal) Collected: 09/05/22 2146    Order Status: Completed Specimen: Blood from Hand, Left Updated: 09/11/22 1000     CULTURE, BLOOD ([facility]) No Growth at 5 days            ASSESSMENT   Chronic myelogenous leukemia  Febrile neutropenia  Pancytopenia secondary to hydroxyurea  Immunocompromised secondary to above  Retinal hemorrhage related to #1  Oropharyngeal candidiasis  Essential  hypertension  Medical noncompliance    PLAN   Continue with IV antibiotics and Diflucan  Continue to monitor counts,  Appreciate oncology input, planing for initiation of kinase inhibitor once counts recovered  Continue monitoring in the interim      Prophylaxis: B/L SCDs        Wilman Rizvi MD  [facility]  09/15/2022

## 2022-09-16 NOTE — PROGRESS NOTES
Subjective:       Patient ID: Magdaleno Hirsch is a 22 y.o. male.    Chief Complaint: Sore Throat (Sore throat-chills -feet ache weakness for 2 days-has leukemia-no treatment yet-appt 9/16-oncology)      Diagnosis:  1. CML, chronic phase  2. 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  3. Retinal hemorrhage secondary to 1.    Current Treatment:       Treatment History:  1. Hydroxyurea 4 g every 12 hours    Eye Problem   Pertinent negatives include no weakness.   Sore Throat   Associated symptoms include trouble swallowing. Pertinent negatives include no abdominal pain, congestion, coughing, diarrhea, headaches or shortness of breath.     HPI:  22-year-old male with no real medical history who went in for a routine eye exam on 08/18/2022 to update his eye glasses prescription.  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  He had bilateral Valdez spots with vessel tortuosity.  The optometrist recommended that the patient have blood work including testing for sickle cell disease.  The patient presented to the emergency department.  CBC in the emergency department revealed a white blood cell count of 411,900. Hemoglobin was 8.3, platelets were normal at 375,000 hundred and seventy five thousand.  Differential was suggestive of CML.  Coagulation studies revealed an INR of 1.38, PTT of 36.4 and a fibrinogen of 292.  Patient was going to present to [place], however they were on diversion.  He was transferred from [facility] to [facility].  Hematology consulted.  Patient underwent bone marrow biopsy on 08/22/2022, this revealed CML, chronic phase, BCR/ABL1 positive.    Interval History:   22-year-old patient known to me from previous hospitalization.  Was discharged home on 08/22/2022, he was informed about twice weekly labs until follow up appointment with me and continued on hydroxyurea.  Unfortunately, the patient did not keep his lab  appointments.  He presented to the emergency department on 09/05/2022 with febrile neutropenia, was admitted to the hospitalist service and transferred to main campus [facility].  Hematology consulted as patient known to me.    Today, 09/16/2022:   Patient seen and examined on rounds.  His throat is feeling much better.  He remains afebrile.  He still has gum swelling, but this is also starting to decrease.  We are still awaiting count recovery.    No past medical history on file.   Past Surgical History:   Procedure Laterality Date    BONE MARROW BIOPSY N/A 8/22/2022    Procedure: Biopsy-bone marrow;  Surgeon: Getachew Chen MD;  Location: [facility];  Service: General;  Laterality: N/A;    KNEE SURGERY Left      Social History     Socioeconomic History    Marital status: Single   Tobacco Use    Smoking status: Never    Smokeless tobacco: Never   Substance and Sexual Activity    Alcohol use: Never    Drug use: Never      History reviewed. No pertinent family history.   Review of patient's allergies indicates:  No Known Allergies   Review of Systems   Constitutional:  Negative for chills, diaphoresis, fatigue and unexpected weight change.   HENT:  Positive for mouth sores, sore throat and trouble swallowing. Negative for nasal congestion and sinus pressure/congestion.    Eyes:  Positive for visual disturbance. Negative for pain.   Respiratory:  Negative for cough, chest tightness and shortness of breath.    Cardiovascular:  Negative for chest pain, palpitations and leg swelling.   Gastrointestinal:  Negative for abdominal distention, abdominal pain, blood in stool, constipation and diarrhea.   Genitourinary:  Negative for dysuria, frequency and hematuria.   Musculoskeletal:  Negative for arthralgias and back pain.   Integumentary:  Negative for rash.   Neurological:  Negative for dizziness, weakness, numbness and headaches.   Hematological:  Negative for adenopathy.   Psychiatric/Behavioral:  Negative for  confusion.        Objective:      Physical Exam  Vitals reviewed.   Constitutional:       General: He is awake.      Appearance: Normal appearance.   HENT:      Head: Normocephalic and atraumatic.      Right Ear: Hearing normal.      Left Ear: Hearing normal.      Nose: Nose normal.      Mouth/Throat:      Comments: Mucositis    Eyes:      General: Lids are normal. Vision grossly intact.      Extraocular Movements: Extraocular movements intact.      Conjunctiva/sclera: Conjunctivae normal.   Cardiovascular:      Rate and Rhythm: Normal rate and regular rhythm.      Pulses: Normal pulses.      Heart sounds: Normal heart sounds.   Pulmonary:      Effort: Pulmonary effort is normal.      Breath sounds: Normal breath sounds. No wheezing, rhonchi or rales.   Chest:      Comments: Soft mass in area of the sternum  Abdominal:      General: Bowel sounds are normal.      Palpations: Abdomen is soft. There is splenomegaly.      Tenderness: There is no abdominal tenderness.   Musculoskeletal:      Cervical back: Full passive range of motion without pain.      Right lower leg: No edema.      Left lower leg: No edema.   Lymphadenopathy:      Cervical: No cervical adenopathy.      Upper Body:      Right upper body: No supraclavicular or axillary adenopathy.      Left upper body: No supraclavicular or axillary adenopathy.   Skin:     General: Skin is warm.   Neurological:      General: No focal deficit present.      Mental Status: He is alert and oriented to person, place, and time.   Psychiatric:         Attention and Perception: Attention normal.         Mood and Affect: Mood and affect normal.         Behavior: Behavior is cooperative.       LABS AND IMAGING REVIEWED IN EPIC  Lab Review:  CBC:   Recent Labs     09/16/22 0449 09/15/22  0634 09/14/22  0332   WBC 0.8* 0.9* 1.0*   RBC 2.78* 2.83* 2.83*   HGB 7.2* 7.3* 7.4*   HCT 22.3* 22.6* 22.4*   PLT 16* 7* 6*       CMP:   Recent Labs     09/16/22  0449 09/15/22  0533  09/14/22  0332 09/09/22  0325 09/08/22  0348 09/07/22  0355 09/05/22  2110   * 137 136   < > 137   < > 140   K 3.8 3.8 4.0   < > 3.6   < > 4.0   CO2 28 28 25   < > 25   < > 24   BUN 13.3 12.1 11.1   < > 9.8   < > 11.0   CREATININE 0.72* 0.74 0.70*   < > 0.82   < > 0.94   CALCIUM 9.2 9.1 8.6   < > 8.5   < > 8.9   ALBUMIN 3.1*  --   --   --  2.9*  --  3.7   BILITOT 1.3  --   --   --  1.4  --  1.7*   ALKPHOS 61  --   --   --  53  --  58   AST 12  --   --   --  12  --  24   ALT 13  --   --   --  15  --  24    < > = values in this interval not displayed.              Assessment:   1. CML, chronic phase  2. 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  3. Retinal hemorrhage secondary to 1.        Plan:       Patient has CML, diagnosed on bone marrow biopsy done on 08/22/2022.    Patient was discharged home on 08/22/2022 with plans to continue hydroxyurea 4 g every 12 hours and have blood work twice a week until his follow up with me and final bone marrow biopsy.    Unfortunately, the patient did not have twice weekly labs, but continued on hydroxyurea.  When asked about lab appointments, the patient's wife stated that he was supposed to come on Mondays and Thursdays.  Unfortunately he was not able to make these visits.      He is now admitted with febrile neutropenia and pancytopenia almost certainly from hydroxyurea.      We will treat him with antibiotics.  So far workup is negative for COVID, flu, strep.  Thrush has improved.    I agree with continued antibiotics and fluconazole.    Await count recovery.    We will need to start a TKI, likely dasatinib once the patient's counts recover.    If his gum swelling does not improve in the next week, we may need to get ENT to biopsy.      Paul Hogan II, MD

## 2022-09-17 LAB
ABO + RH BLD: NORMAL
ALBUMIN SERPL-MCNC: 3 GM/DL (ref 3.5–5)
ALBUMIN/GLOB SERPL: 0.8 RATIO (ref 1.1–2)
ALP SERPL-CCNC: 61 UNIT/L (ref 40–150)
ALT SERPL-CCNC: 14 UNIT/L (ref 0–55)
AST SERPL-CCNC: 12 UNIT/L (ref 5–34)
BASOPHILS # BLD AUTO: 0.01 X10(3)/MCL (ref 0–0.2)
BASOPHILS NFR BLD AUTO: 1.1 %
BILIRUBIN DIRECT+TOT PNL SERPL-MCNC: 1.2 MG/DL
BLD PROD TYP BPU: NORMAL
BLOOD UNIT EXPIRATION DATE: NORMAL
BLOOD UNIT TYPE CODE: 6200
BUN SERPL-MCNC: 10.8 MG/DL (ref 8.9–20.6)
CALCIUM SERPL-MCNC: 9.2 MG/DL (ref 8.4–10.2)
CHLORIDE SERPL-SCNC: 103 MMOL/L (ref 98–107)
CO2 SERPL-SCNC: 27 MMOL/L (ref 22–29)
CREAT SERPL-MCNC: 0.74 MG/DL (ref 0.73–1.18)
CROSSMATCH INTERPRETATION: NORMAL
DISPENSE STATUS: NORMAL
EOSINOPHIL # BLD AUTO: 0.01 X10(3)/MCL (ref 0–0.9)
EOSINOPHIL NFR BLD AUTO: 1.1 %
ERYTHROCYTE [DISTWIDTH] IN BLOOD BY AUTOMATED COUNT: 18.4 % (ref 11.5–17)
GFR SERPLBLD CREATININE-BSD FMLA CKD-EPI: >60 MLS/MIN/1.73/M2
GLOBULIN SER-MCNC: 3.8 GM/DL (ref 2.4–3.5)
GLUCOSE SERPL-MCNC: 90 MG/DL (ref 74–100)
HCT VFR BLD AUTO: 21.6 % (ref 42–52)
HGB BLD-MCNC: 7 GM/DL (ref 14–18)
IMM GRANULOCYTES # BLD AUTO: 0 X10(3)/MCL (ref 0–0.04)
IMM GRANULOCYTES NFR BLD AUTO: 0 %
LYMPHOCYTES # BLD AUTO: 0.86 X10(3)/MCL (ref 0.6–4.6)
LYMPHOCYTES NFR BLD AUTO: 96.7 %
MCH RBC QN AUTO: 26.2 PG (ref 27–31)
MCHC RBC AUTO-ENTMCNC: 32.4 MG/DL (ref 33–36)
MCV RBC AUTO: 80.9 FL (ref 80–94)
MONOCYTES # BLD AUTO: 0.01 X10(3)/MCL (ref 0.1–1.3)
MONOCYTES NFR BLD AUTO: 1.1 %
NEUTROPHILS # BLD AUTO: 0 X10(3)/MCL (ref 2.1–9.2)
NEUTROPHILS NFR BLD AUTO: 0 %
NRBC BLD AUTO-RTO: 0 %
PLATELET # BLD AUTO: 9 X10(3)/MCL (ref 130–400)
PMV BLD AUTO: ABNORMAL FL
POTASSIUM SERPL-SCNC: 4 MMOL/L (ref 3.5–5.1)
PROT SERPL-MCNC: 6.8 GM/DL (ref 6.4–8.3)
RBC # BLD AUTO: 2.67 X10(6)/MCL (ref 4.7–6.1)
SODIUM SERPL-SCNC: 138 MMOL/L (ref 136–145)
UNIT NUMBER: NORMAL
WBC # SPEC AUTO: 0.9 X10(3)/MCL (ref 4.5–11.5)

## 2022-09-17 PROCEDURE — P9037 PLATE PHERES LEUKOREDU IRRAD: HCPCS | Performed by: STUDENT IN AN ORGANIZED HEALTH CARE EDUCATION/TRAINING PROGRAM

## 2022-09-17 PROCEDURE — 25000003 PHARM REV CODE 250: Performed by: NURSE PRACTITIONER

## 2022-09-17 PROCEDURE — 25000003 PHARM REV CODE 250: Performed by: INTERNAL MEDICINE

## 2022-09-17 PROCEDURE — 80053 COMPREHEN METABOLIC PANEL: CPT | Performed by: INTERNAL MEDICINE

## 2022-09-17 PROCEDURE — 99233 PR SUBSEQUENT HOSPITAL CARE,LEVL III: ICD-10-PCS | Mod: ,,, | Performed by: STUDENT IN AN ORGANIZED HEALTH CARE EDUCATION/TRAINING PROGRAM

## 2022-09-17 PROCEDURE — 25000003 PHARM REV CODE 250: Performed by: EMERGENCY MEDICINE

## 2022-09-17 PROCEDURE — 21400001 HC TELEMETRY ROOM

## 2022-09-17 PROCEDURE — 99233 SBSQ HOSP IP/OBS HIGH 50: CPT | Mod: ,,, | Performed by: STUDENT IN AN ORGANIZED HEALTH CARE EDUCATION/TRAINING PROGRAM

## 2022-09-17 PROCEDURE — 85025 COMPLETE CBC W/AUTO DIFF WBC: CPT | Performed by: INTERNAL MEDICINE

## 2022-09-17 PROCEDURE — 36415 COLL VENOUS BLD VENIPUNCTURE: CPT | Performed by: INTERNAL MEDICINE

## 2022-09-17 PROCEDURE — 25000003 PHARM REV CODE 250: Performed by: STUDENT IN AN ORGANIZED HEALTH CARE EDUCATION/TRAINING PROGRAM

## 2022-09-17 PROCEDURE — 63600175 PHARM REV CODE 636 W HCPCS: Performed by: EMERGENCY MEDICINE

## 2022-09-17 PROCEDURE — 36430 TRANSFUSION BLD/BLD COMPNT: CPT

## 2022-09-17 PROCEDURE — 63700000 PHARM REV CODE 250 ALT 637 W/O HCPCS: Performed by: HOSPITALIST

## 2022-09-17 PROCEDURE — 63600175 PHARM REV CODE 636 W HCPCS: Performed by: STUDENT IN AN ORGANIZED HEALTH CARE EDUCATION/TRAINING PROGRAM

## 2022-09-17 RX ORDER — HYDROCODONE BITARTRATE AND ACETAMINOPHEN 500; 5 MG/1; MG/1
TABLET ORAL
Status: DISCONTINUED | OUTPATIENT
Start: 2022-09-17 | End: 2022-09-19

## 2022-09-17 RX ORDER — DIPHENHYDRAMINE HYDROCHLORIDE 50 MG/ML
25 INJECTION INTRAMUSCULAR; INTRAVENOUS ONCE
Status: COMPLETED | OUTPATIENT
Start: 2022-09-17 | End: 2022-09-17

## 2022-09-17 RX ORDER — ACETAMINOPHEN 325 MG/1
650 TABLET ORAL ONCE
Status: COMPLETED | OUTPATIENT
Start: 2022-09-17 | End: 2022-09-17

## 2022-09-17 RX ADMIN — DIPHENHYDRAMINE HYDROCHLORIDE 25 MG: 50 INJECTION INTRAMUSCULAR; INTRAVENOUS at 02:09

## 2022-09-17 RX ADMIN — FAMOTIDINE 20 MG: 20 TABLET, FILM COATED ORAL at 08:09

## 2022-09-17 RX ADMIN — SODIUM CHLORIDE: 9 INJECTION, SOLUTION INTRAVENOUS at 08:09

## 2022-09-17 RX ADMIN — HYDROCODONE BITARTRATE AND ACETAMINOPHEN 1 TABLET: 10; 325 TABLET ORAL at 08:09

## 2022-09-17 RX ADMIN — NYSTATIN 500000 UNITS: 100000 SUSPENSION ORAL at 08:09

## 2022-09-17 RX ADMIN — FLUCONAZOLE 100 MG: 100 TABLET ORAL at 08:09

## 2022-09-17 RX ADMIN — DIPHENHYDRAMINE HYDROCHLORIDE 15 ML: 25 SOLUTION ORAL at 08:09

## 2022-09-17 RX ADMIN — AMLODIPINE BESYLATE 10 MG: 5 TABLET ORAL at 08:09

## 2022-09-17 RX ADMIN — HYDROCODONE BITARTRATE AND ACETAMINOPHEN 1 TABLET: 10; 325 TABLET ORAL at 05:09

## 2022-09-17 RX ADMIN — CEFEPIME 2 G: 2 INJECTION, POWDER, FOR SOLUTION INTRAVENOUS at 08:09

## 2022-09-17 RX ADMIN — CEFEPIME 2 G: 2 INJECTION, POWDER, FOR SOLUTION INTRAVENOUS at 12:09

## 2022-09-17 RX ADMIN — LABETALOL HYDROCHLORIDE 200 MG: 200 TABLET, FILM COATED ORAL at 08:09

## 2022-09-17 RX ADMIN — CEFEPIME 2 G: 2 INJECTION, POWDER, FOR SOLUTION INTRAVENOUS at 05:09

## 2022-09-17 RX ADMIN — SODIUM CHLORIDE: 9 INJECTION, SOLUTION INTRAVENOUS at 09:09

## 2022-09-17 RX ADMIN — ACETAMINOPHEN 650 MG: 325 TABLET ORAL at 02:09

## 2022-09-17 RX ADMIN — NYSTATIN 500000 UNITS: 100000 SUSPENSION ORAL at 05:09

## 2022-09-17 RX ADMIN — NYSTATIN 500000 UNITS: 100000 SUSPENSION ORAL at 12:09

## 2022-09-17 NOTE — PROGRESS NOTES
Subjective:       Patient ID: Magdaleno Hirsch is a 22 y.o. male.    Chief Complaint: Sore Throat (Sore throat-chills -feet ache weakness for 2 days-has leukemia-no treatment yet-appt 9/16-oncology)      Diagnosis:  1. CML, chronic phase  2. 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  3. Retinal hemorrhage secondary to 1.    Current Treatment:       Treatment History:  1. Hydroxyurea 4 g every 12 hours    Eye Problem   Pertinent negatives include no weakness.   Sore Throat   Associated symptoms include trouble swallowing. Pertinent negatives include no abdominal pain, congestion, coughing, diarrhea, headaches or shortness of breath.     HPI:  22-year-old male with no real medical history who went in for a routine eye exam on 08/18/2022 to update his eye glasses prescription.  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  He had bilateral Valdez spots with vessel tortuosity.  The optometrist recommended that the patient have blood work including testing for sickle cell disease.  The patient presented to the emergency department.  CBC in the emergency department revealed a white blood cell count of 411,900. Hemoglobin was 8.3, platelets were normal at 375,000 hundred and seventy five thousand.  Differential was suggestive of CML.  Coagulation studies revealed an INR of 1.38, PTT of 36.4 and a fibrinogen of 292.  Patient was going to present to [place], however they were on diversion.  He was transferred from [facility] to [facility].  Hematology consulted.  Patient underwent bone marrow biopsy on 08/22/2022, this revealed CML, chronic phase, BCR/ABL1 positive.    Interval History:   22-year-old patient known to me from previous hospitalization.  Was discharged home on 08/22/2022, he was informed about twice weekly labs until follow up appointment with me and continued on hydroxyurea.  Unfortunately, the patient did not keep his lab  appointments.  He presented to the emergency department on 09/05/2022 with febrile neutropenia, was admitted to the hospitalist service and transferred to main campus [facility].  Hematology consulted as patient known to me.    Today, 09/17/2022:  Patient reports persistent symptoms of gum swelling and intermittent gum bleeding.  He denies any other symptoms of bleeding.  He reports improvement in his symptoms of sore throat and denies any fevers or chills.  He reports a fair appetite denies any nausea, vomiting, diarrhea constipation.      No past medical history on file.   Past Surgical History:   Procedure Laterality Date    BONE MARROW BIOPSY N/A 8/22/2022    Procedure: Biopsy-bone marrow;  Surgeon: Getachew Chen MD;  Location: [facility];  Service: General;  Laterality: N/A;    KNEE SURGERY Left      Social History     Socioeconomic History    Marital status: Single   Tobacco Use    Smoking status: Never    Smokeless tobacco: Never   Substance and Sexual Activity    Alcohol use: Never    Drug use: Never      History reviewed. No pertinent family history.   Review of patient's allergies indicates:  No Known Allergies     Review of Systems   Constitutional:  Negative for chills, diaphoresis, fatigue and unexpected weight change.   HENT:  Positive for dental problem, mouth sores, sore throat and trouble swallowing. Negative for nasal congestion and sinus pressure/congestion.    Eyes:  Negative for pain.   Respiratory:  Negative for cough, chest tightness and shortness of breath.    Cardiovascular:  Negative for chest pain, palpitations and leg swelling.   Gastrointestinal:  Negative for abdominal distention, abdominal pain, blood in stool, constipation and diarrhea.   Genitourinary:  Negative for dysuria, frequency and hematuria.   Musculoskeletal:  Negative for arthralgias and back pain.   Integumentary:  Negative for rash.   Neurological:  Negative for dizziness, weakness, numbness and headaches.    Hematological:  Negative for adenopathy.   Psychiatric/Behavioral:  Negative for confusion.        Objective:      Physical Exam  Vitals reviewed.   Constitutional:       General: He is awake.      Appearance: Normal appearance.   HENT:      Head: Normocephalic and atraumatic.      Right Ear: Hearing normal.      Left Ear: Hearing normal.      Nose: Nose normal.      Mouth/Throat:      Comments: Mucositis    Eyes:      General: Lids are normal. Vision grossly intact.      Extraocular Movements: Extraocular movements intact.      Conjunctiva/sclera: Conjunctivae normal.   Cardiovascular:      Rate and Rhythm: Normal rate and regular rhythm.      Pulses: Normal pulses.      Heart sounds: Normal heart sounds.   Pulmonary:      Effort: Pulmonary effort is normal.      Breath sounds: Normal breath sounds. No wheezing, rhonchi or rales.   Chest:      Comments: Soft mass in area of the sternum  Abdominal:      General: Bowel sounds are normal.      Palpations: Abdomen is soft. There is splenomegaly.      Tenderness: There is no abdominal tenderness.   Musculoskeletal:      Cervical back: Full passive range of motion without pain.      Right lower leg: No edema.      Left lower leg: No edema.   Lymphadenopathy:      Cervical: No cervical adenopathy.      Upper Body:      Right upper body: No supraclavicular or axillary adenopathy.      Left upper body: No supraclavicular or axillary adenopathy.   Skin:     General: Skin is warm.   Neurological:      General: No focal deficit present.      Mental Status: He is alert and oriented to person, place, and time.   Psychiatric:         Attention and Perception: Attention normal.         Mood and Affect: Mood and affect normal.         Behavior: Behavior is cooperative.       LABS AND IMAGING REVIEWED IN EPIC  Lab Review:  CBC:   Recent Labs     09/17/22  0346 09/16/22  0449 09/15/22  0634   WBC 0.9* 0.8* 0.9*   RBC 2.67* 2.78* 2.83*   HGB 7.0* 7.2* 7.3*   HCT 21.6* 22.3* 22.6*    PLT 9* 16* 7*       CMP:   Recent Labs     09/17/22  0346 09/16/22  0449 09/15/22  0533 09/09/22  0325 09/08/22  0348    135* 137   < > 137   K 4.0 3.8 3.8   < > 3.6   CO2 27 28 28   < > 25   BUN 10.8 13.3 12.1   < > 9.8   CREATININE 0.74 0.72* 0.74   < > 0.82   CALCIUM 9.2 9.2 9.1   < > 8.5   ALBUMIN 3.0* 3.1*  --   --  2.9*   BILITOT 1.2 1.3  --   --  1.4   ALKPHOS 61 61  --   --  53   AST 12 12  --   --  12   ALT 14 13  --   --  15    < > = values in this interval not displayed.              Assessment:   1. CML, chronic phase  2. 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  3. Retinal hemorrhage secondary to 1.        Plan:       Patient has CML, diagnosed on bone marrow biopsy done on 08/22/2022.    Patient was discharged home on 08/22/2022 with plans to continue hydroxyurea 4 g every 12 hours and have blood work twice a week until his follow up with me and final bone marrow biopsy.    Unfortunately, the patient did not have twice weekly labs, but continued on hydroxyurea.  When asked about lab appointments, the patient's wife stated that he was supposed to come on Mondays and Thursdays.  Unfortunately he was not able to make these visits.      He is now admitted with febrile neutropenia and pancytopenia almost certainly from hydroxyurea.      We will treat him with antibiotics.  So far workup is negative for COVID, flu, strep.  Thrush has improved.    I agree with continued antibiotics and fluconazole.    Await count recovery.    We will need to start a TKI, likely dasatinib once the patient's counts recover.    If his gum swelling does not improve in the next week, we may need to get ENT to biopsy.    Reviewed labs, noted platelet count less than 10,000.  Will transfuse 1 unit of irradiated platelets today in the setting of gum bleeding.  Patient afebrile overnight.

## 2022-09-17 NOTE — PROGRESS NOTES
Hospital Medicine  Progress Note    Patient Name: Magdaleno Hirsch  MRN: [id number]  Status: IP- Inpatient   Admission Date: 9/5/2022  Length of Stay: 12      CC: hospital follow-up for pancytopenia       SUBJECTIVE   22-year-old  male with history that includes recently diagnosed CML, presented to the ED at [facility] on 09/05 with pancytopenia.  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  Work up during that time noted a white count of  411,900. Hemoglobin was 8.3, platelets were normal at 375,000.  Differential was suggestive of chronic myelogenous leukemia.  Coagulation studies revealed an INR of 1.38, PTT of 36.4 and a fibrinogen of 292.  Hematology was consulted and patient underwent bone marrow biopsy on 08/22.  This revealed chronic myelogenous leukemia, chronic phase, BCR/ABL1 positive.  Patient was discharged home on August 22, 2022 on hydroxyurea, and was supposed to obtain twice weekly labs until follow-up with Dr. Gardner.  Unfortunately he did not keep these lab appointments and now presented to ED on 9/5, where he was found to be pancytopenic. Lab note a white count of 700 (ANC of 28), H&H 5.8/18, platelets 23,000.  Patient was also noted to be febrile.  He was started on empiric antibiotic therapy, transfused 2 units of packed red blood cells and 1 of platelets and then subsequently transferred [facility] for further treatment and hematology evaluation.  He was continued on antibiotics, sepsis workup remained negative, though he was noted to have oral thrush and started on Diflucan.  Hematology was on board; counts were monitored.    Today: Patient seen and examined at bedside, and chart reviewed.  Counts unchanged.  Gums still with some minor bleeding.      MEDICATIONS    Scheduled   amLODIPine  10 mg Oral Daily    ceFEPime (MAXIPIME) IVPB  2 g Intravenous Q8H    famotidine  20 mg Oral BID    fluconazole  100 mg Oral Daily    labetaloL  200 mg Oral Q12H    nystatin  500,000 Units Oral QID     Continuous Infusions   sodium chloride 0.9% 125 mL/hr at 09/17/22 0923       PHYSICAL EXAM   VITALS: T 98.6 °F (37 °C)   /75   P 68   RR 20   O2 99 %    GENERAL: Awake and in NAD  LUNGS: CTA B/L  CVS: Normal rate  GI/: Soft, NT, bowel sounds positive.  EXTREMITIES: No peripheral edema  NEURO: AAOx3  PSYCH: Cooperative      LABS   CBC  Recent Labs     09/17/22  0346   WBC 0.9*   HGB 7.0*   HCT 21.6*   PLT 9*       CHEM  Recent Labs     09/17/22  0346      K 4.0   CO2 27   BUN 10.8   CREATININE 0.74   CALCIUM 9.2   BILITOT 1.2   AST 12   ALT 14   ALKPHOS 61   ALBUMIN 3.0*       MICROBIOLOGY     Microbiology Results (last 7 days)       Procedure Component Value Units Date/Time    Blood Culture #1 **CANNOT BE ORDERED STAT** [[id number]]  (Normal) Collected: 09/05/22 2135    Order Status: Completed Specimen: Blood from Antecubital, Left Updated: 09/11/22 1000     CULTURE, BLOOD ([facility]) No Growth at 5 days    Blood Culture #2 **CANNOT BE ORDERED STAT** [[id number]]  (Normal) Collected: 09/05/22 2146    Order Status: Completed Specimen: Blood from Hand, Left Updated: 09/11/22 1000     CULTURE, BLOOD ([facility]) No Growth at 5 days          ASSESSMENT   Chronic myelogenous leukemia  Febrile neutropenia  Pancytopenia secondary to hydroxyurea  Immunocompromised secondary to above  Retinal hemorrhage related to #1  Oropharyngeal candidiasis  Essential hypertension  Medical noncompliance    PLAN   Continue with IV antibiotics and Diflucan  Continue to monitor counts  If gingival bleeding worsening would consider platelet transfusion  Appreciate oncology input, planing for initiation of kinase inhibitor once counts recovered  Otherwise continue monitoring in the interim      Prophylaxis: B/L  SCDs        Wilman Rizvi MD  [facility]  09/17/2022

## 2022-09-18 LAB
ABO + RH BLD: NORMAL
ABORH RETYPE: NORMAL
ALBUMIN SERPL-MCNC: 2.9 GM/DL (ref 3.5–5)
ALBUMIN/GLOB SERPL: 0.8 RATIO (ref 1.1–2)
ALP SERPL-CCNC: 59 UNIT/L (ref 40–150)
ALT SERPL-CCNC: 13 UNIT/L (ref 0–55)
AST SERPL-CCNC: 12 UNIT/L (ref 5–34)
BASOPHILS # BLD AUTO: 0 X10(3)/MCL (ref 0–0.2)
BASOPHILS NFR BLD AUTO: 0 %
BILIRUBIN DIRECT+TOT PNL SERPL-MCNC: 1.1 MG/DL
BLD PROD TYP BPU: NORMAL
BLOOD UNIT EXPIRATION DATE: NORMAL
BLOOD UNIT TYPE CODE: 1700
BUN SERPL-MCNC: 9.7 MG/DL (ref 8.9–20.6)
CALCIUM SERPL-MCNC: 9.1 MG/DL (ref 8.4–10.2)
CHLORIDE SERPL-SCNC: 102 MMOL/L (ref 98–107)
CO2 SERPL-SCNC: 28 MMOL/L (ref 22–29)
CREAT SERPL-MCNC: 0.73 MG/DL (ref 0.73–1.18)
CROSSMATCH INTERPRETATION: NORMAL
DISPENSE STATUS: NORMAL
EOSINOPHIL # BLD AUTO: 0 X10(3)/MCL (ref 0–0.9)
EOSINOPHIL NFR BLD AUTO: 0 %
ERYTHROCYTE [DISTWIDTH] IN BLOOD BY AUTOMATED COUNT: 18.1 % (ref 11.5–17)
GFR SERPLBLD CREATININE-BSD FMLA CKD-EPI: >60 MLS/MIN/1.73/M2
GLOBULIN SER-MCNC: 3.8 GM/DL (ref 2.4–3.5)
GLUCOSE SERPL-MCNC: 99 MG/DL (ref 74–100)
GROUP & RH: NORMAL
HCT VFR BLD AUTO: 20.6 % (ref 42–52)
HGB BLD-MCNC: 6.7 GM/DL (ref 14–18)
IMM GRANULOCYTES # BLD AUTO: 0 X10(3)/MCL (ref 0–0.04)
IMM GRANULOCYTES NFR BLD AUTO: 0 %
INDIRECT COOMBS GEL: NORMAL
LYMPHOCYTES # BLD AUTO: 0.77 X10(3)/MCL (ref 0.6–4.6)
LYMPHOCYTES NFR BLD AUTO: 97.5 %
MCH RBC QN AUTO: 25.2 PG (ref 27–31)
MCHC RBC AUTO-ENTMCNC: 32.5 MG/DL (ref 33–36)
MCV RBC AUTO: 77.4 FL (ref 80–94)
MONOCYTES # BLD AUTO: 0 X10(3)/MCL (ref 0.1–1.3)
MONOCYTES NFR BLD AUTO: 0 %
NEUTROPHILS # BLD AUTO: 0 X10(3)/MCL (ref 2.1–9.2)
NEUTROPHILS NFR BLD AUTO: 2.5 %
NRBC BLD AUTO-RTO: 0 %
PLATELET # BLD AUTO: 6 X10(3)/MCL (ref 130–400)
PMV BLD AUTO: ABNORMAL FL
POTASSIUM SERPL-SCNC: 3.9 MMOL/L (ref 3.5–5.1)
PROT SERPL-MCNC: 6.7 GM/DL (ref 6.4–8.3)
RBC # BLD AUTO: 2.66 X10(6)/MCL (ref 4.7–6.1)
SODIUM SERPL-SCNC: 137 MMOL/L (ref 136–145)
UNIT NUMBER: NORMAL
WBC # SPEC AUTO: 0.8 X10(3)/MCL (ref 4.5–11.5)

## 2022-09-18 PROCEDURE — 99233 PR SUBSEQUENT HOSPITAL CARE,LEVL III: ICD-10-PCS | Mod: ,,, | Performed by: STUDENT IN AN ORGANIZED HEALTH CARE EDUCATION/TRAINING PROGRAM

## 2022-09-18 PROCEDURE — 63700000 PHARM REV CODE 250 ALT 637 W/O HCPCS: Performed by: HOSPITALIST

## 2022-09-18 PROCEDURE — 25000003 PHARM REV CODE 250: Performed by: EMERGENCY MEDICINE

## 2022-09-18 PROCEDURE — 63600175 PHARM REV CODE 636 W HCPCS: Performed by: EMERGENCY MEDICINE

## 2022-09-18 PROCEDURE — 99233 SBSQ HOSP IP/OBS HIGH 50: CPT | Mod: ,,, | Performed by: STUDENT IN AN ORGANIZED HEALTH CARE EDUCATION/TRAINING PROGRAM

## 2022-09-18 PROCEDURE — 36415 COLL VENOUS BLD VENIPUNCTURE: CPT | Performed by: STUDENT IN AN ORGANIZED HEALTH CARE EDUCATION/TRAINING PROGRAM

## 2022-09-18 PROCEDURE — 25000003 PHARM REV CODE 250: Performed by: STUDENT IN AN ORGANIZED HEALTH CARE EDUCATION/TRAINING PROGRAM

## 2022-09-18 PROCEDURE — 36415 COLL VENOUS BLD VENIPUNCTURE: CPT | Performed by: INTERNAL MEDICINE

## 2022-09-18 PROCEDURE — P9037 PLATE PHERES LEUKOREDU IRRAD: HCPCS | Performed by: INTERNAL MEDICINE

## 2022-09-18 PROCEDURE — 21400001 HC TELEMETRY ROOM

## 2022-09-18 PROCEDURE — 85025 COMPLETE CBC W/AUTO DIFF WBC: CPT | Performed by: STUDENT IN AN ORGANIZED HEALTH CARE EDUCATION/TRAINING PROGRAM

## 2022-09-18 PROCEDURE — 25000003 PHARM REV CODE 250: Performed by: INTERNAL MEDICINE

## 2022-09-18 PROCEDURE — 25000003 PHARM REV CODE 250: Performed by: NURSE PRACTITIONER

## 2022-09-18 PROCEDURE — 86920 COMPATIBILITY TEST SPIN: CPT | Performed by: INTERNAL MEDICINE

## 2022-09-18 PROCEDURE — 86901 BLOOD TYPING SEROLOGIC RH(D): CPT | Performed by: INTERNAL MEDICINE

## 2022-09-18 PROCEDURE — 80053 COMPREHEN METABOLIC PANEL: CPT | Performed by: STUDENT IN AN ORGANIZED HEALTH CARE EDUCATION/TRAINING PROGRAM

## 2022-09-18 PROCEDURE — 36430 TRANSFUSION BLD/BLD COMPNT: CPT

## 2022-09-18 RX ORDER — HYDROCODONE BITARTRATE AND ACETAMINOPHEN 500; 5 MG/1; MG/1
TABLET ORAL
Status: DISCONTINUED | OUTPATIENT
Start: 2022-09-18 | End: 2022-09-19

## 2022-09-18 RX ORDER — DIPHENHYDRAMINE HCL 25 MG
25 CAPSULE ORAL ONCE
Status: COMPLETED | OUTPATIENT
Start: 2022-09-18 | End: 2022-09-18

## 2022-09-18 RX ORDER — ACETAMINOPHEN 325 MG/1
650 TABLET ORAL ONCE
Status: COMPLETED | OUTPATIENT
Start: 2022-09-18 | End: 2022-09-18

## 2022-09-18 RX ADMIN — DIPHENHYDRAMINE HYDROCHLORIDE 25 MG: 25 CAPSULE ORAL at 01:09

## 2022-09-18 RX ADMIN — NYSTATIN 500000 UNITS: 100000 SUSPENSION ORAL at 05:09

## 2022-09-18 RX ADMIN — CEFEPIME 2 G: 2 INJECTION, POWDER, FOR SOLUTION INTRAVENOUS at 01:09

## 2022-09-18 RX ADMIN — CEFEPIME 2 G: 2 INJECTION, POWDER, FOR SOLUTION INTRAVENOUS at 04:09

## 2022-09-18 RX ADMIN — NYSTATIN 500000 UNITS: 100000 SUSPENSION ORAL at 10:09

## 2022-09-18 RX ADMIN — FAMOTIDINE 20 MG: 20 TABLET, FILM COATED ORAL at 08:09

## 2022-09-18 RX ADMIN — LABETALOL HYDROCHLORIDE 200 MG: 200 TABLET, FILM COATED ORAL at 08:09

## 2022-09-18 RX ADMIN — NYSTATIN 500000 UNITS: 100000 SUSPENSION ORAL at 08:09

## 2022-09-18 RX ADMIN — FAMOTIDINE 20 MG: 20 TABLET, FILM COATED ORAL at 10:09

## 2022-09-18 RX ADMIN — ACETAMINOPHEN 650 MG: 325 TABLET ORAL at 01:09

## 2022-09-18 RX ADMIN — FLUCONAZOLE 100 MG: 100 TABLET ORAL at 10:09

## 2022-09-18 RX ADMIN — HYDROCODONE BITARTRATE AND ACETAMINOPHEN 1 TABLET: 10; 325 TABLET ORAL at 10:09

## 2022-09-18 RX ADMIN — AMLODIPINE BESYLATE 10 MG: 5 TABLET ORAL at 10:09

## 2022-09-18 RX ADMIN — LABETALOL HYDROCHLORIDE 200 MG: 200 TABLET, FILM COATED ORAL at 10:09

## 2022-09-18 RX ADMIN — NYSTATIN 500000 UNITS: 100000 SUSPENSION ORAL at 01:09

## 2022-09-18 RX ADMIN — HYDROCODONE BITARTRATE AND ACETAMINOPHEN 1 TABLET: 10; 325 TABLET ORAL at 12:09

## 2022-09-18 RX ADMIN — CEFEPIME 2 G: 2 INJECTION, POWDER, FOR SOLUTION INTRAVENOUS at 08:09

## 2022-09-18 RX ADMIN — SODIUM CHLORIDE: 9 INJECTION, SOLUTION INTRAVENOUS at 04:09

## 2022-09-18 NOTE — PROGRESS NOTES
Hospital Medicine  Progress Note    Patient Name: Magdaleno Hirsch  MRN: [id number]  Status: IP- Inpatient   Admission Date: 9/5/2022  Length of Stay: 13      CC: hospital follow-up for pancytopenia       SUBJECTIVE   22-year-old  male with history that includes recently diagnosed CML, presented to the ED at [facility] on 09/05 with pancytopenia.  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  Work up during that time noted a white count of  411,900. Hemoglobin was 8.3, platelets were normal at 375,000.  Differential was suggestive of chronic myelogenous leukemia.  Coagulation studies revealed an INR of 1.38, PTT of 36.4 and a fibrinogen of 292.  Hematology was consulted and patient underwent bone marrow biopsy on 08/22.  This revealed chronic myelogenous leukemia, chronic phase, BCR/ABL1 positive.  Patient was discharged home on August 22, 2022 on hydroxyurea, and was supposed to obtain twice weekly labs until follow-up with Dr. Gardner.  Unfortunately he did not keep these lab appointments and now presented to ED on 9/5, where he was found to be pancytopenic. Lab note a white count of 700 (ANC of 28), H&H 5.8/18, platelets 23,000.  Patient was also noted to be febrile.  He was started on empiric antibiotic therapy, transfused 2 units of packed red blood cells and 1 of platelets and then subsequently transferred [facility] for further treatment and hematology evaluation.  He was continued on antibiotics, sepsis workup remained negative, though he was noted to have oral thrush and started on Diflucan.  Hematology was on board; counts were monitored.    Today: Patient seen and examined at bedside, and chart reviewed.  Still with painful, bleeding gums.  H&H this morning 6.7/20, with a with  platelets down from 9,000-6000, despite transfusion of 1 unit yesterday.      MEDICATIONS   Scheduled   amLODIPine  10 mg Oral Daily    ceFEPime (MAXIPIME) IVPB  2 g Intravenous Q8H    famotidine  20 mg Oral BID    fluconazole  100 mg Oral Daily    labetaloL  200 mg Oral Q12H    nystatin  500,000 Units Oral QID     Continuous Infusions   sodium chloride 0.9% 125 mL/hr at 09/18/22 0459       PHYSICAL EXAM   VITALS: T 98.2 °F (36.8 °C)   BP (!) 149/84   P 83   RR 18   O2 99 %    GENERAL: Awake and in NAD  LUNGS: CTA B/L  CVS: Normal rate  GI/: Soft, NT, bowel sounds positive.  EXTREMITIES: No peripheral edema  NEURO: AAOx3  PSYCH: Cooperative      LABS   CBC  Recent Labs     09/18/22  0950   WBC 0.8*   HGB 6.7*   HCT 20.6*   PLT 6*       CHEM  Recent Labs     09/18/22  0950      K 3.9   CO2 28   BUN 9.7   CREATININE 0.73   CALCIUM 9.1   BILITOT 1.1   AST 12   ALT 13   ALKPHOS 59   ALBUMIN 2.9*       ASSESSMENT   Chronic myelogenous leukemia  Febrile neutropenia  Pancytopenia secondary to hydroxyurea  Immunocompromised secondary to above  Retinal hemorrhage related to #1  Oropharyngeal candidiasis  Essential hypertension  Medical noncompliance    PLAN   Continue with antibiotics and Diflucan  Continue to monitor counts  Will tranfuse 2 units PRBCs and another unit of platelets today.  Awaiting recovery of counts  Otherwise continue monitoring in the interim      Prophylaxis: B/L SCDs        Wilman Rizvi MD  [facility]  09/18/2022       Critical Care Time: 33 minutes spent in direct hands on care, review of labs, imaging and medical record, and discussion of diagnosis, treatment, prognosis with patient/family.  Patient remains at high-risk for clinical decompensation  Critical Care diagnosis: Acute blood loss anemia, thrombocytopenia, requiring transfusion of blood products

## 2022-09-18 NOTE — PROGRESS NOTES
Subjective:       Patient ID: Magdaleno Hirsch is a 22 y.o. male.    Chief Complaint: Sore Throat (Sore throat-chills -feet ache weakness for 2 days-has leukemia-no treatment yet-appt 9/16-oncology)      Diagnosis:  1. CML, chronic phase  2. 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  3. Conjuctival hemorrhage secondary to 1.    Current Treatment:       Treatment History:  1. Hydroxyurea 4 g every 12 hours        HPI:  22-year-old male with no real medical history who went in for a routine eye exam on 08/18/2022 to update his eye glasses prescription.  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  He had bilateral Valdez spots with vessel tortuosity.  The optometrist recommended that the patient have blood work including testing for sickle cell disease.  The patient presented to the emergency department.  CBC in the emergency department revealed a white blood cell count of 411,900. Hemoglobin was 8.3, platelets were normal at 375,000 hundred and seventy five thousand.  Differential was suggestive of CML.  Coagulation studies revealed an INR of 1.38, PTT of 36.4 and a fibrinogen of 292.  Patient was going to present to [place], however they were on diversion.  He was transferred from [facility] to [facility].  Hematology consulted.  Patient underwent bone marrow biopsy on 08/22/2022, this revealed CML, chronic phase, BCR/ABL1 positive.    Interval History:   22-year-old patient known to me from previous hospitalization.  Was discharged home on 08/22/2022, he was informed about twice weekly labs until follow up appointment with me and continued on hydroxyurea.  Unfortunately, the patient did not keep his lab appointments.  He presented to the emergency department on 09/05/2022 with febrile neutropenia, was admitted to the hospitalist service and transferred to main campus [facility].  Hematology consulted as patient known  to me.    Today, 09/18/2022.  Patient denies any acute concerns overnight.  He reports persistent gum swelling and intermittent gum bleeding.  He denies any other symptoms of bleeding.  Patient denies any fevers, chills diarrhea.  He denies any blood in his stools.  He reports improvement in symptoms of sore throat.      No past medical history on file.   Past Surgical History:   Procedure Laterality Date    BONE MARROW BIOPSY N/A 8/22/2022    Procedure: Biopsy-bone marrow;  Surgeon: Getachew Chen MD;  Location: [facility];  Service: General;  Laterality: N/A;    KNEE SURGERY Left      Social History     Socioeconomic History    Marital status: Single   Tobacco Use    Smoking status: Never    Smokeless tobacco: Never   Substance and Sexual Activity    Alcohol use: Never    Drug use: Never      History reviewed. No pertinent family history.   Review of patient's allergies indicates:  No Known Allergies     Review of Systems   Constitutional:  Negative for chills, diaphoresis, fatigue and unexpected weight change.   HENT:  Positive for dental problem, mouth sores, sore throat and trouble swallowing. Negative for nasal congestion and sinus pressure/congestion.    Eyes:  Negative for pain.   Respiratory:  Negative for cough, chest tightness and shortness of breath.    Cardiovascular:  Negative for chest pain, palpitations and leg swelling.   Gastrointestinal:  Negative for abdominal distention, abdominal pain, blood in stool, constipation and diarrhea.   Genitourinary:  Negative for dysuria, frequency and hematuria.   Musculoskeletal:  Negative for arthralgias and back pain.   Integumentary:  Negative for rash.   Neurological:  Negative for dizziness, weakness, numbness and headaches.   Hematological:  Negative for adenopathy.   Psychiatric/Behavioral:  Negative for confusion.        Objective:      Physical Exam  Vitals reviewed.   Constitutional:       General: He is awake.      Appearance: Normal appearance.   HENT:       Head: Normocephalic and atraumatic.      Comments: + gingival hyperplasia     Right Ear: Hearing normal.      Left Ear: Hearing normal.      Nose: Nose normal.      Mouth/Throat:      Comments: Mucositis    Eyes:      General: Lids are normal. Vision grossly intact.      Extraocular Movements: Extraocular movements intact.      Conjunctiva/sclera: Conjunctivae normal.   Cardiovascular:      Rate and Rhythm: Normal rate and regular rhythm.      Pulses: Normal pulses.      Heart sounds: Normal heart sounds.   Pulmonary:      Effort: Pulmonary effort is normal.      Breath sounds: Normal breath sounds. No wheezing, rhonchi or rales.   Chest:      Comments: Soft mass in area of the sternum  Abdominal:      General: Bowel sounds are normal.      Palpations: Abdomen is soft. There is splenomegaly.      Tenderness: There is no abdominal tenderness.   Musculoskeletal:      Cervical back: Full passive range of motion without pain.      Right lower leg: No edema.      Left lower leg: No edema.   Lymphadenopathy:      Cervical: No cervical adenopathy.      Upper Body:      Right upper body: No supraclavicular or axillary adenopathy.      Left upper body: No supraclavicular or axillary adenopathy.   Skin:     General: Skin is warm.   Neurological:      General: No focal deficit present.      Mental Status: He is alert and oriented to person, place, and time.   Psychiatric:         Attention and Perception: Attention normal.         Mood and Affect: Mood and affect normal.         Behavior: Behavior is cooperative.       LABS AND IMAGING REVIEWED IN EPIC  Lab Review:  CBC:   Recent Labs     09/18/22  0950 09/17/22  0346 09/16/22  0449   WBC 0.8* 0.9* 0.8*   RBC 2.66* 2.67* 2.78*   HGB 6.7* 7.0* 7.2*   HCT 20.6* 21.6* 22.3*   PLT 6* 9* 16*     CMP:   Recent Labs     09/18/22  0950 09/17/22  0346 09/16/22  0449    138 135*   K 3.9 4.0 3.8   CO2 28 27 28   BUN 9.7 10.8 13.3   CREATININE 0.73 0.74 0.72*   CALCIUM 9.1 9.2 9.2    ALBUMIN 2.9* 3.0* 3.1*   BILITOT 1.1 1.2 1.3   ALKPHOS 59 61 61   AST 12 12 12   ALT 13 14 13            Assessment:   1. CML, chronic phase  2. 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  3. Retinal hemorrhage secondary to 1.        Plan:       Patient has CML, diagnosed on bone marrow biopsy done on 08/22/2022.    Patient was discharged home on 08/22/2022 with plans to continue hydroxyurea 4 g every 12 hours and have blood work twice a week until his follow up with me and final bone marrow biopsy.    Unfortunately, the patient did not have twice weekly labs, but continued on hydroxyurea.  When asked about lab appointments, the patient's wife stated that he was supposed to come on Mondays and Thursdays.  Unfortunately he was not able to make these visits.      He is now admitted with febrile neutropenia and pancytopenia almost certainly from hydroxyurea.      We will treat him with antibiotics.  So far workup is negative for COVID, flu, strep.  Thrush has improved.    I agree with continued antibiotics and fluconazole.    Await count recovery.    We will need to start a TKI, likely dasatinib once the patient's counts recover.    If his gum swelling does not improve in the next week, we may need to get ENT to biopsy.    Reviewed labs, noted platelet count less than 10,000 despite 1 unit of platelet transfusion yesterday.  Noted hemoglobin less than 7.  Noted plans per primary team to transfuse 2 units of PRBC and 1 unit of platelets today.        All blood products to be irradiated.

## 2022-09-19 LAB
ABO + RH BLD: NORMAL
ANION GAP SERPL CALC-SCNC: 7 MEQ/L
BASOPHILS # BLD AUTO: 0.01 X10(3)/MCL (ref 0–0.2)
BASOPHILS NFR BLD AUTO: 1 %
BLD PROD TYP BPU: NORMAL
BLOOD UNIT EXPIRATION DATE: NORMAL
BLOOD UNIT TYPE CODE: 1700
BUN SERPL-MCNC: 11.9 MG/DL (ref 8.9–20.6)
CALCIUM SERPL-MCNC: 8.7 MG/DL (ref 8.4–10.2)
CHLORIDE SERPL-SCNC: 103 MMOL/L (ref 98–107)
CO2 SERPL-SCNC: 26 MMOL/L (ref 22–29)
CREAT SERPL-MCNC: 0.86 MG/DL (ref 0.73–1.18)
CREAT/UREA NIT SERPL: 14
CROSSMATCH INTERPRETATION: NORMAL
DISPENSE STATUS: NORMAL
EOSINOPHIL # BLD AUTO: 0 X10(3)/MCL (ref 0–0.9)
EOSINOPHIL NFR BLD AUTO: 0 %
ERYTHROCYTE [DISTWIDTH] IN BLOOD BY AUTOMATED COUNT: 17.4 % (ref 11.5–17)
GFR SERPLBLD CREATININE-BSD FMLA CKD-EPI: >60 MLS/MIN/1.73/M2
GLUCOSE SERPL-MCNC: 94 MG/DL (ref 74–100)
HCT VFR BLD AUTO: 24.1 % (ref 42–52)
HGB BLD-MCNC: 8.1 GM/DL (ref 14–18)
IMM GRANULOCYTES # BLD AUTO: 0 X10(3)/MCL (ref 0–0.04)
IMM GRANULOCYTES NFR BLD AUTO: 0 %
LYMPHOCYTES # BLD AUTO: 1.03 X10(3)/MCL (ref 0.6–4.6)
LYMPHOCYTES NFR BLD AUTO: 98.1 %
MCH RBC QN AUTO: 26.3 PG (ref 27–31)
MCHC RBC AUTO-ENTMCNC: 33.6 MG/DL (ref 33–36)
MCV RBC AUTO: 78.2 FL (ref 80–94)
MONOCYTES # BLD AUTO: 0 X10(3)/MCL (ref 0.1–1.3)
MONOCYTES NFR BLD AUTO: 0 %
NEUTROPHILS # BLD AUTO: 0 X10(3)/MCL (ref 2.1–9.2)
NEUTROPHILS NFR BLD AUTO: 0.9 %
NRBC BLD AUTO-RTO: 0 %
PLATELET # BLD AUTO: 5 X10(3)/MCL (ref 130–400)
PLATELET # BLD AUTO: 7 X10(3)/MCL (ref 130–400)
PMV BLD AUTO: ABNORMAL FL
POTASSIUM SERPL-SCNC: 4.1 MMOL/L (ref 3.5–5.1)
RBC # BLD AUTO: 3.08 X10(6)/MCL (ref 4.7–6.1)
SODIUM SERPL-SCNC: 136 MMOL/L (ref 136–145)
UNIT NUMBER: NORMAL
WBC # SPEC AUTO: 1.1 X10(3)/MCL (ref 4.5–11.5)

## 2022-09-19 PROCEDURE — 99232 PR SUBSEQUENT HOSPITAL CARE,LEVL II: ICD-10-PCS | Mod: ,,, | Performed by: STUDENT IN AN ORGANIZED HEALTH CARE EDUCATION/TRAINING PROGRAM

## 2022-09-19 PROCEDURE — 25000003 PHARM REV CODE 250: Performed by: INTERNAL MEDICINE

## 2022-09-19 PROCEDURE — 85025 COMPLETE CBC W/AUTO DIFF WBC: CPT | Performed by: INTERNAL MEDICINE

## 2022-09-19 PROCEDURE — 36415 COLL VENOUS BLD VENIPUNCTURE: CPT | Performed by: STUDENT IN AN ORGANIZED HEALTH CARE EDUCATION/TRAINING PROGRAM

## 2022-09-19 PROCEDURE — P9040 RBC LEUKOREDUCED IRRADIATED: HCPCS | Performed by: INTERNAL MEDICINE

## 2022-09-19 PROCEDURE — 63600175 PHARM REV CODE 636 W HCPCS: Performed by: EMERGENCY MEDICINE

## 2022-09-19 PROCEDURE — 21400001 HC TELEMETRY ROOM

## 2022-09-19 PROCEDURE — 85049 AUTOMATED PLATELET COUNT: CPT | Performed by: STUDENT IN AN ORGANIZED HEALTH CARE EDUCATION/TRAINING PROGRAM

## 2022-09-19 PROCEDURE — 36415 COLL VENOUS BLD VENIPUNCTURE: CPT | Performed by: INTERNAL MEDICINE

## 2022-09-19 PROCEDURE — P9037 PLATE PHERES LEUKOREDU IRRAD: HCPCS | Performed by: STUDENT IN AN ORGANIZED HEALTH CARE EDUCATION/TRAINING PROGRAM

## 2022-09-19 PROCEDURE — 63700000 PHARM REV CODE 250 ALT 637 W/O HCPCS: Performed by: HOSPITALIST

## 2022-09-19 PROCEDURE — 36430 TRANSFUSION BLD/BLD COMPNT: CPT

## 2022-09-19 PROCEDURE — 80048 BASIC METABOLIC PNL TOTAL CA: CPT | Performed by: INTERNAL MEDICINE

## 2022-09-19 PROCEDURE — 99232 SBSQ HOSP IP/OBS MODERATE 35: CPT | Mod: ,,, | Performed by: STUDENT IN AN ORGANIZED HEALTH CARE EDUCATION/TRAINING PROGRAM

## 2022-09-19 PROCEDURE — 25000003 PHARM REV CODE 250: Performed by: EMERGENCY MEDICINE

## 2022-09-19 PROCEDURE — 25000003 PHARM REV CODE 250: Performed by: NURSE PRACTITIONER

## 2022-09-19 RX ORDER — HYDROCODONE BITARTRATE AND ACETAMINOPHEN 500; 5 MG/1; MG/1
TABLET ORAL
Status: DISCONTINUED | OUTPATIENT
Start: 2022-09-19 | End: 2022-09-27 | Stop reason: HOSPADM

## 2022-09-19 RX ADMIN — FAMOTIDINE 20 MG: 20 TABLET, FILM COATED ORAL at 09:09

## 2022-09-19 RX ADMIN — DIPHENHYDRAMINE HYDROCHLORIDE 15 ML: 25 SOLUTION ORAL at 04:09

## 2022-09-19 RX ADMIN — CEFEPIME 2 G: 2 INJECTION, POWDER, FOR SOLUTION INTRAVENOUS at 05:09

## 2022-09-19 RX ADMIN — LABETALOL HYDROCHLORIDE 200 MG: 200 TABLET, FILM COATED ORAL at 08:09

## 2022-09-19 RX ADMIN — HYDROCODONE BITARTRATE AND ACETAMINOPHEN 1 TABLET: 10; 325 TABLET ORAL at 04:09

## 2022-09-19 RX ADMIN — NYSTATIN 500000 UNITS: 100000 SUSPENSION ORAL at 09:09

## 2022-09-19 RX ADMIN — CEFEPIME 2 G: 2 INJECTION, POWDER, FOR SOLUTION INTRAVENOUS at 09:09

## 2022-09-19 RX ADMIN — DIPHENHYDRAMINE HYDROCHLORIDE 15 ML: 25 SOLUTION ORAL at 12:09

## 2022-09-19 RX ADMIN — AMLODIPINE BESYLATE 10 MG: 5 TABLET ORAL at 08:09

## 2022-09-19 RX ADMIN — NYSTATIN 500000 UNITS: 100000 SUSPENSION ORAL at 08:09

## 2022-09-19 RX ADMIN — NYSTATIN 500000 UNITS: 100000 SUSPENSION ORAL at 04:09

## 2022-09-19 RX ADMIN — HYDROCODONE BITARTRATE AND ACETAMINOPHEN 1 TABLET: 10; 325 TABLET ORAL at 02:09

## 2022-09-19 RX ADMIN — FAMOTIDINE 20 MG: 20 TABLET, FILM COATED ORAL at 08:09

## 2022-09-19 RX ADMIN — LABETALOL HYDROCHLORIDE 200 MG: 200 TABLET, FILM COATED ORAL at 09:09

## 2022-09-19 RX ADMIN — FLUCONAZOLE 100 MG: 100 TABLET ORAL at 08:09

## 2022-09-19 RX ADMIN — HYDROCODONE BITARTRATE AND ACETAMINOPHEN 1 TABLET: 10; 325 TABLET ORAL at 09:09

## 2022-09-19 RX ADMIN — NYSTATIN 500000 UNITS: 100000 SUSPENSION ORAL at 12:09

## 2022-09-19 RX ADMIN — CEFEPIME 2 G: 2 INJECTION, POWDER, FOR SOLUTION INTRAVENOUS at 12:09

## 2022-09-19 NOTE — PROGRESS NOTES
Hospital Medicine  Progress Note    Patient Name: Magdaleno Hirsch  MRN: [id number]  Status: IP- Inpatient   Admission Date: 9/5/2022  Length of Stay: 14  DOS: 9/20/2022    CC: hospital follow-up for pancytopenia       SUBJECTIVE   22-year-old  male with history that includes recently diagnosed CML, presented to the ED at [facility] on 09/05 with pancytopenia.  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  Work up during that time noted a white count of  411,900. Hemoglobin was 8.3, platelets were normal at 375,000.  Differential was suggestive of chronic myelogenous leukemia.  Coagulation studies revealed an INR of 1.38, PTT of 36.4 and a fibrinogen of 292.  Hematology was consulted and patient underwent bone marrow biopsy on 08/22.  This revealed chronic myelogenous leukemia, chronic phase, BCR/ABL1 positive.  Patient was discharged home on August 22, 2022 on hydroxyurea, and was supposed to obtain twice weekly labs until follow-up with Dr. Gardner.  Unfortunately he did not keep these lab appointments and now presented to ED on 9/5, where he was found to be pancytopenic. Lab note a white count of 700 (ANC of 28), H&H 5.8/18, platelets 23,000.  Patient was also noted to be febrile.  He was started on empiric antibiotic therapy, transfused 2 units of packed red blood cells and 1 of platelets and then subsequently transferred Loc[facility] for further treatment and hematology evaluation.  He was continued on antibiotics, sepsis workup remained negative, though he was noted to have oral thrush and started on Diflucan.  Hematology was on board; counts were monitored.    Today: Patient seen and examined at bedside, and chart reviewed.  Platelet counts continues to drop despite transfusions.  Gums  still bleeding, otherwise no new issues.      MEDICATIONS   Scheduled   amLODIPine  10 mg Oral Daily    ceFEPime (MAXIPIME) IVPB  2 g Intravenous Q8H    famotidine  20 mg Oral BID    fluconazole  100 mg Oral Daily    labetaloL  200 mg Oral Q12H    nystatin  500,000 Units Oral QID     Continuous Infusions   sodium chloride 0.9% 125 mL/hr at 09/18/22 0459       PHYSICAL EXAM   VITALS: T 98.3 °F (36.8 °C)   BP (!) 141/80   P 82   RR 19   O2 100 %    GENERAL: Awake and in NAD  LUNGS: CTA B/L  CVS: Normal rate  GI/: Soft, NT, bowel sounds positive.  EXTREMITIES: No peripheral edema  NEURO: AAOx3  PSYCH: Cooperative      LABS   CBC  Recent Labs     09/19/22  0617   WBC 1.1*   HGB 8.1*   HCT 24.1*   PLT 5*     CHEM  Recent Labs     09/18/22  0950 09/19/22  0617    136   K 3.9 4.1   CO2 28 26   BUN 9.7 11.9   CREATININE 0.73 0.86   CALCIUM 9.1 8.7   BILITOT 1.1  --    AST 12  --    ALT 13  --    ALKPHOS 59  --    ALBUMIN 2.9*  --        ASSESSMENT   Chronic myelogenous leukemia  Febrile neutropenia  Pancytopenia secondary to hydroxyurea  Immunocompromised secondary to above  Retinal hemorrhage related to #1  Oropharyngeal candidiasis  Essential hypertension  Medical noncompliance    PLAN   Continue with antibiotics and Diflucan, pending rebound in counts  Getting platelets again today, Hematology started on steorids as well.  Continue to monitor counts closely, appreciate Heme/Onc input  Otherwise continue monitoring in the interim      Prophylaxis: B/L SCDs        Wilman Rizvi MD  [facility]  09/19/2022       Critical Care Time: 32 minutes spent in direct hands on care, review of labs, imaging and medical record, and discussion of diagnosis, treatment, prognosis with patient/family.  Patient remains at high-risk for clinical decompensation  Critical Care diagnosis: Acute thrombocytopenia, requiring transfusion of blood products

## 2022-09-19 NOTE — PROGRESS NOTES
Subjective:       Patient ID: Magdaleno Hirsch is a 22 y.o. male.    Chief Complaint: Sore Throat (Sore throat-chills -feet ache weakness for 2 days-has leukemia-no treatment yet-appt 9/16-oncology)      Diagnosis:  1. CML, chronic phase  2. 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  3. Conjuctival hemorrhage secondary to 1.    Current Treatment:       Treatment History:  1. Hydroxyurea 4 g every 12 hours        HPI:  22-year-old male with no real medical history who went in for a routine eye exam on 08/18/2022 to update his eye glasses prescription.  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  He had bilateral Valdez spots with vessel tortuosity.  The optometrist recommended that the patient have blood work including testing for sickle cell disease.  The patient presented to the emergency department.  CBC in the emergency department revealed a white blood cell count of 411,900. Hemoglobin was 8.3, platelets were normal at 375,000 hundred and seventy five thousand.  Differential was suggestive of CML.  Coagulation studies revealed an INR of 1.38, PTT of 36.4 and a fibrinogen of 292.  Patient was going to present to [place], however they were on diversion.  He was transferred from [facility] to [facility].  Hematology consulted.  Patient underwent bone marrow biopsy on 08/22/2022, this revealed CML, chronic phase, BCR/ABL1 positive.    Interval History:   22-year-old patient known to me from previous hospitalization.  Was discharged home on 08/22/2022, he was informed about twice weekly labs until follow up appointment with me and continued on hydroxyurea.  Unfortunately, the patient did not keep his lab appointments.  He presented to the emergency department on 09/05/2022 with febrile neutropenia, was admitted to the hospitalist service and transferred to main campus [facility].  Hematology consulted as patient known  to me.    Today, 09/19/2022.  Doing well today. Gums pain and swelling is stable if not slightly improved. Mainly pain with eating and some mild gum  bleeding. Denies bleeding elsewhere.       No past medical history on file.   Past Surgical History:   Procedure Laterality Date    BONE MARROW BIOPSY N/A 8/22/2022    Procedure: Biopsy-bone marrow;  Surgeon: Getachew Chen MD;  Location: [facility];  Service: General;  Laterality: N/A;    KNEE SURGERY Left      Social History     Socioeconomic History    Marital status: Single   Tobacco Use    Smoking status: Never    Smokeless tobacco: Never   Substance and Sexual Activity    Alcohol use: Never    Drug use: Never      History reviewed. No pertinent family history.   Review of patient's allergies indicates:  No Known Allergies     Review of Systems   Constitutional:  Negative for chills, diaphoresis, fatigue and unexpected weight change.   HENT:  Positive for dental problem, mouth sores, sore throat and trouble swallowing. Negative for nasal congestion and sinus pressure/congestion.    Eyes:  Negative for pain.   Respiratory:  Negative for cough, chest tightness and shortness of breath.    Cardiovascular:  Negative for chest pain, palpitations and leg swelling.   Gastrointestinal:  Negative for abdominal distention, abdominal pain, blood in stool, constipation and diarrhea.   Genitourinary:  Negative for dysuria, frequency and hematuria.   Musculoskeletal:  Negative for arthralgias and back pain.   Integumentary:  Negative for rash.   Neurological:  Negative for dizziness, weakness, numbness and headaches.   Hematological:  Negative for adenopathy.   Psychiatric/Behavioral:  Negative for confusion.        Objective:      Physical Exam  Vitals reviewed.   Constitutional:       General: He is awake.      Appearance: Normal appearance.   HENT:      Head: Normocephalic and atraumatic.      Comments: + gingival hyperplasia     Right Ear: Hearing normal.      Left Ear: Hearing  normal.      Nose: Nose normal.      Mouth/Throat:      Comments: Mucositis    Eyes:      General: Lids are normal. Vision grossly intact.      Extraocular Movements: Extraocular movements intact.      Conjunctiva/sclera: Conjunctivae normal.   Cardiovascular:      Rate and Rhythm: Normal rate and regular rhythm.      Pulses: Normal pulses.      Heart sounds: Normal heart sounds.   Pulmonary:      Effort: Pulmonary effort is normal.      Breath sounds: Normal breath sounds. No wheezing, rhonchi or rales.   Chest:      Comments: Soft mass in area of the sternum  Abdominal:      General: Bowel sounds are normal.      Palpations: Abdomen is soft. There is splenomegaly.      Tenderness: There is no abdominal tenderness.   Musculoskeletal:      Cervical back: Full passive range of motion without pain.      Right lower leg: No edema.      Left lower leg: No edema.   Lymphadenopathy:      Cervical: No cervical adenopathy.      Upper Body:      Right upper body: No supraclavicular or axillary adenopathy.      Left upper body: No supraclavicular or axillary adenopathy.   Skin:     General: Skin is warm.   Neurological:      General: No focal deficit present.      Mental Status: He is alert and oriented to person, place, and time.   Psychiatric:         Attention and Perception: Attention normal.         Mood and Affect: Mood and affect normal.         Behavior: Behavior is cooperative.       LABS AND IMAGING REVIEWED IN EPIC  Lab Review:  CBC:   Recent Labs     09/19/22  0617 09/18/22  0950 09/17/22  0346   WBC 1.1* 0.8* 0.9*   RBC 3.08* 2.66* 2.67*   HGB 8.1* 6.7* 7.0*   HCT 24.1* 20.6* 21.6*   PLT 5* 6* 9*       CMP:   Recent Labs     09/19/22  0617 09/18/22  0950 09/17/22  0346 09/16/22  0449    137 138 135*   K 4.1 3.9 4.0 3.8   CO2 26 28 27 28   BUN 11.9 9.7 10.8 13.3   CREATININE 0.86 0.73 0.74 0.72*   CALCIUM 8.7 9.1 9.2 9.2   ALBUMIN  --  2.9* 3.0* 3.1*   BILITOT  --  1.1 1.2 1.3   ALKPHOS  --  59 61 61   AST   --  12 12 12   ALT  --  13 14 13              Assessment:   1. CML, chronic phase  2. 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  3. Retinal hemorrhage secondary to 1.        Plan:       Patient has CML, diagnosed on bone marrow biopsy done on 08/22/2022.    Patient was discharged home on 08/22/2022 with plans to continue hydroxyurea 4 g every 12 hours and have blood work twice a week until his follow up with me and final bone marrow biopsy.    Unfortunately, the patient did not have twice weekly labs, but continued on hydroxyurea.  When asked about lab appointments, the patient's wife stated that he was supposed to come on Mondays and Thursdays.  Unfortunately he was not able to make these visits.      He is now admitted with febrile neutropenia and pancytopenia almost certainly from hydroxyurea.      We will treat him with antibiotics.  So far workup is negative for COVID, flu, strep.  Thrush has improved.    I agree with continued antibiotics and fluconazole.    Await count recovery.    We will need to start a TKI, likely dasatinib once the patient's counts recover.    Gum swelling stable/mildly improved. Will continue to monitor and hold off on ENT to biopsy for now.    Reviewed labs, noted platelet count less than 10,000 despite 1 unit of platelet transfusion yesterday.    Will check platelet count 1 hour post transfusion to ensure appropriate transfusion response      All blood products to be irradiated.         Elizabeth LeJeune, MD  Hematology/Oncology

## 2022-09-19 NOTE — PLAN OF CARE
Problem: Adult Inpatient Plan of Care  Goal: Plan of Care Review  Outcome: Ongoing, Progressing  Flowsheets (Taken 9/18/2022 2000)  Plan of Care Reviewed With: patient  Goal: Patient-Specific Goal (Individualized)  Outcome: Ongoing, Progressing  Goal: Absence of Hospital-Acquired Illness or Injury  Outcome: Ongoing, Progressing  Intervention: Identify and Manage Fall Risk  Flowsheets (Taken 9/18/2022 2000)  Safety Promotion/Fall Prevention:   assistive device/personal item within reach   high risk medications identified   lighting adjusted   medications reviewed   side rails raised x 2   nonskid shoes/socks when out of bed  Intervention: Prevent Skin Injury  Flowsheets (Taken 9/18/2022 2000)  Skin Protection:   adhesive use limited   protective footwear used   tubing/devices free from skin contact  Intervention: Prevent Infection  Flowsheets (Taken 9/18/2022 2000)  Infection Prevention:   hand hygiene promoted   single patient room provided   rest/sleep promoted   equipment surfaces disinfected  Goal: Optimal Comfort and Wellbeing  Outcome: Ongoing, Progressing  Intervention: Provide Person-Centered Care  Flowsheets (Taken 9/18/2022 1121)  Trust Relationship/Rapport:   care explained   choices provided   emotional support provided   empathic listening provided   questions answered   thoughts/feelings acknowledged  Goal: Readiness for Transition of Care  Outcome: Ongoing, Progressing  Intervention: Mutually Develop Transition Plan  Flowsheets (Taken 9/18/2022 2000)  Equipment Currently Used at Home: none     Problem: Fall Injury Risk  Goal: Absence of Fall and Fall-Related Injury  Outcome: Ongoing, Progressing  Intervention: Identify and Manage Contributors  Flowsheets (Taken 9/18/2022 2000)  Self-Care Promotion: independence encouraged  Medication Review/Management: medications reviewed

## 2022-09-20 LAB
ABO + RH BLD: NORMAL
ABO + RH BLD: NORMAL
ANION GAP SERPL CALC-SCNC: 7 MEQ/L
BASOPHILS # BLD AUTO: 0.01 X10(3)/MCL (ref 0–0.2)
BASOPHILS NFR BLD AUTO: 1 %
BLD PROD TYP BPU: NORMAL
BLD PROD TYP BPU: NORMAL
BLOOD UNIT EXPIRATION DATE: NORMAL
BLOOD UNIT EXPIRATION DATE: NORMAL
BLOOD UNIT TYPE CODE: 6200
BLOOD UNIT TYPE CODE: 8400
BUN SERPL-MCNC: 12.8 MG/DL (ref 8.9–20.6)
CALCIUM SERPL-MCNC: 9 MG/DL (ref 8.4–10.2)
CHLORIDE SERPL-SCNC: 103 MMOL/L (ref 98–107)
CO2 SERPL-SCNC: 25 MMOL/L (ref 22–29)
CREAT SERPL-MCNC: 0.75 MG/DL (ref 0.73–1.18)
CREAT/UREA NIT SERPL: 17
CROSSMATCH INTERPRETATION: NORMAL
CROSSMATCH INTERPRETATION: NORMAL
DIRECT COOMBS (DAT): NORMAL
DISPENSE STATUS: NORMAL
DISPENSE STATUS: NORMAL
EOSINOPHIL # BLD AUTO: 0 X10(3)/MCL (ref 0–0.9)
EOSINOPHIL NFR BLD AUTO: 0 %
ERYTHROCYTE [DISTWIDTH] IN BLOOD BY AUTOMATED COUNT: 17.3 % (ref 11.5–17)
FIBRINOGEN PPP-MCNC: 436 MG/DL (ref 210–463)
GFR SERPLBLD CREATININE-BSD FMLA CKD-EPI: >60 MLS/MIN/1.73/M2
GLUCOSE SERPL-MCNC: 91 MG/DL (ref 74–100)
HAPTOGLOB SERPL-MCNC: 109 MG/DL (ref 14–258)
HCT VFR BLD AUTO: 23.1 % (ref 42–52)
HGB BLD-MCNC: 7.5 GM/DL (ref 14–18)
IMM GRANULOCYTES # BLD AUTO: 0 X10(3)/MCL (ref 0–0.04)
IMM GRANULOCYTES NFR BLD AUTO: 0 %
LYMPHOCYTES # BLD AUTO: 0.92 X10(3)/MCL (ref 0.6–4.6)
LYMPHOCYTES NFR BLD AUTO: 95.8 %
MCH RBC QN AUTO: 26.2 PG (ref 27–31)
MCHC RBC AUTO-ENTMCNC: 32.5 MG/DL (ref 33–36)
MCV RBC AUTO: 80.8 FL (ref 80–94)
MONOCYTES # BLD AUTO: 0.01 X10(3)/MCL (ref 0.1–1.3)
MONOCYTES NFR BLD AUTO: 1 %
NEUTROPHILS # BLD AUTO: 0 X10(3)/MCL (ref 2.1–9.2)
NEUTROPHILS NFR BLD AUTO: 2.2 %
NRBC BLD AUTO-RTO: 0 %
PLATELET # BLD AUTO: 3 X10(3)/MCL (ref 130–400)
PLATELET # BLD AUTO: 3 X10(3)/MCL (ref 130–400)
PLATELET # BLD AUTO: 9 X10(3)/MCL (ref 130–400)
PMV BLD AUTO: ABNORMAL FL
POTASSIUM SERPL-SCNC: 4 MMOL/L (ref 3.5–5.1)
RBC # BLD AUTO: 2.86 X10(6)/MCL (ref 4.7–6.1)
RET# (OHS): 0.01 (ref 0.03–0.1)
RETICULOCYTE COUNT AUTOMATED (OLG): 0.31 % (ref 1.1–2.1)
SODIUM SERPL-SCNC: 135 MMOL/L (ref 136–145)
UNIT NUMBER: NORMAL
UNIT NUMBER: NORMAL
WBC # SPEC AUTO: 1 X10(3)/MCL (ref 4.5–11.5)

## 2022-09-20 PROCEDURE — 25000003 PHARM REV CODE 250: Performed by: INTERNAL MEDICINE

## 2022-09-20 PROCEDURE — 80048 BASIC METABOLIC PNL TOTAL CA: CPT | Performed by: INTERNAL MEDICINE

## 2022-09-20 PROCEDURE — 85049 AUTOMATED PLATELET COUNT: CPT | Performed by: STUDENT IN AN ORGANIZED HEALTH CARE EDUCATION/TRAINING PROGRAM

## 2022-09-20 PROCEDURE — 36430 TRANSFUSION BLD/BLD COMPNT: CPT

## 2022-09-20 PROCEDURE — 83010 ASSAY OF HAPTOGLOBIN QUANT: CPT | Performed by: STUDENT IN AN ORGANIZED HEALTH CARE EDUCATION/TRAINING PROGRAM

## 2022-09-20 PROCEDURE — 85025 COMPLETE CBC W/AUTO DIFF WBC: CPT | Performed by: INTERNAL MEDICINE

## 2022-09-20 PROCEDURE — 25000003 PHARM REV CODE 250: Performed by: STUDENT IN AN ORGANIZED HEALTH CARE EDUCATION/TRAINING PROGRAM

## 2022-09-20 PROCEDURE — 85045 AUTOMATED RETICULOCYTE COUNT: CPT | Performed by: STUDENT IN AN ORGANIZED HEALTH CARE EDUCATION/TRAINING PROGRAM

## 2022-09-20 PROCEDURE — 25000003 PHARM REV CODE 250: Performed by: EMERGENCY MEDICINE

## 2022-09-20 PROCEDURE — 63700000 PHARM REV CODE 250 ALT 637 W/O HCPCS: Performed by: HOSPITALIST

## 2022-09-20 PROCEDURE — 63600175 PHARM REV CODE 636 W HCPCS: Performed by: STUDENT IN AN ORGANIZED HEALTH CARE EDUCATION/TRAINING PROGRAM

## 2022-09-20 PROCEDURE — 36415 COLL VENOUS BLD VENIPUNCTURE: CPT | Performed by: STUDENT IN AN ORGANIZED HEALTH CARE EDUCATION/TRAINING PROGRAM

## 2022-09-20 PROCEDURE — 21400001 HC TELEMETRY ROOM

## 2022-09-20 PROCEDURE — 63600175 PHARM REV CODE 636 W HCPCS: Performed by: EMERGENCY MEDICINE

## 2022-09-20 PROCEDURE — 99233 SBSQ HOSP IP/OBS HIGH 50: CPT | Mod: ,,, | Performed by: STUDENT IN AN ORGANIZED HEALTH CARE EDUCATION/TRAINING PROGRAM

## 2022-09-20 PROCEDURE — 25000003 PHARM REV CODE 250: Performed by: NURSE PRACTITIONER

## 2022-09-20 PROCEDURE — 99233 PR SUBSEQUENT HOSPITAL CARE,LEVL III: ICD-10-PCS | Mod: ,,, | Performed by: STUDENT IN AN ORGANIZED HEALTH CARE EDUCATION/TRAINING PROGRAM

## 2022-09-20 PROCEDURE — 36415 COLL VENOUS BLD VENIPUNCTURE: CPT | Performed by: INTERNAL MEDICINE

## 2022-09-20 PROCEDURE — 86880 COOMBS TEST DIRECT: CPT | Performed by: STUDENT IN AN ORGANIZED HEALTH CARE EDUCATION/TRAINING PROGRAM

## 2022-09-20 PROCEDURE — P9037 PLATE PHERES LEUKOREDU IRRAD: HCPCS | Performed by: INTERNAL MEDICINE

## 2022-09-20 PROCEDURE — 85384 FIBRINOGEN ACTIVITY: CPT | Performed by: STUDENT IN AN ORGANIZED HEALTH CARE EDUCATION/TRAINING PROGRAM

## 2022-09-20 RX ORDER — MORPHINE SULFATE 4 MG/ML
2 INJECTION, SOLUTION INTRAMUSCULAR; INTRAVENOUS EVERY 4 HOURS PRN
Status: DISCONTINUED | OUTPATIENT
Start: 2022-09-20 | End: 2022-09-27 | Stop reason: HOSPADM

## 2022-09-20 RX ORDER — ACETAMINOPHEN 325 MG/1
650 TABLET ORAL ONCE
Status: COMPLETED | OUTPATIENT
Start: 2022-09-20 | End: 2022-09-20

## 2022-09-20 RX ORDER — POLYETHYLENE GLYCOL 3350 17 G/17G
17 POWDER, FOR SOLUTION ORAL DAILY
Status: DISCONTINUED | OUTPATIENT
Start: 2022-09-20 | End: 2022-09-27 | Stop reason: HOSPADM

## 2022-09-20 RX ORDER — SENNOSIDES 8.6 MG/1
8.6 TABLET ORAL DAILY PRN
Status: DISCONTINUED | OUTPATIENT
Start: 2022-09-20 | End: 2022-09-27 | Stop reason: HOSPADM

## 2022-09-20 RX ORDER — HYDROCODONE BITARTRATE AND ACETAMINOPHEN 500; 5 MG/1; MG/1
TABLET ORAL
Status: DISCONTINUED | OUTPATIENT
Start: 2022-09-20 | End: 2022-09-27 | Stop reason: HOSPADM

## 2022-09-20 RX ORDER — PANTOPRAZOLE SODIUM 40 MG/1
40 TABLET, DELAYED RELEASE ORAL DAILY
Status: COMPLETED | OUTPATIENT
Start: 2022-09-20 | End: 2022-09-24

## 2022-09-20 RX ORDER — DIPHENHYDRAMINE HYDROCHLORIDE 50 MG/ML
25 INJECTION INTRAMUSCULAR; INTRAVENOUS ONCE
Status: COMPLETED | OUTPATIENT
Start: 2022-09-20 | End: 2022-09-20

## 2022-09-20 RX ADMIN — SODIUM CHLORIDE: 9 INJECTION, SOLUTION INTRAVENOUS at 05:09

## 2022-09-20 RX ADMIN — ACETAMINOPHEN 650 MG: 325 TABLET ORAL at 09:09

## 2022-09-20 RX ADMIN — NYSTATIN 500000 UNITS: 100000 SUSPENSION ORAL at 03:09

## 2022-09-20 RX ADMIN — HYDROCODONE BITARTRATE AND ACETAMINOPHEN 1 TABLET: 10; 325 TABLET ORAL at 10:09

## 2022-09-20 RX ADMIN — MORPHINE SULFATE 2 MG: 4 INJECTION INTRAVENOUS at 03:09

## 2022-09-20 RX ADMIN — NYSTATIN 500000 UNITS: 100000 SUSPENSION ORAL at 05:09

## 2022-09-20 RX ADMIN — LABETALOL HYDROCHLORIDE 200 MG: 200 TABLET, FILM COATED ORAL at 09:09

## 2022-09-20 RX ADMIN — PANTOPRAZOLE SODIUM 40 MG: 40 TABLET, DELAYED RELEASE ORAL at 03:09

## 2022-09-20 RX ADMIN — FAMOTIDINE 20 MG: 20 TABLET, FILM COATED ORAL at 10:09

## 2022-09-20 RX ADMIN — SODIUM CHLORIDE: 9 INJECTION, SOLUTION INTRAVENOUS at 06:09

## 2022-09-20 RX ADMIN — FAMOTIDINE 20 MG: 20 TABLET, FILM COATED ORAL at 09:09

## 2022-09-20 RX ADMIN — HYDROCODONE BITARTRATE AND ACETAMINOPHEN 1 TABLET: 10; 325 TABLET ORAL at 06:09

## 2022-09-20 RX ADMIN — CEFEPIME 2 G: 2 INJECTION, POWDER, FOR SOLUTION INTRAVENOUS at 05:09

## 2022-09-20 RX ADMIN — LABETALOL HYDROCHLORIDE 200 MG: 200 TABLET, FILM COATED ORAL at 10:09

## 2022-09-20 RX ADMIN — POLYETHYLENE GLYCOL 3350 17 G: 17 POWDER, FOR SOLUTION ORAL at 09:09

## 2022-09-20 RX ADMIN — DIPHENHYDRAMINE HYDROCHLORIDE 25 MG: 50 INJECTION INTRAMUSCULAR; INTRAVENOUS at 09:09

## 2022-09-20 RX ADMIN — FLUCONAZOLE 100 MG: 100 TABLET ORAL at 09:09

## 2022-09-20 RX ADMIN — NYSTATIN 500000 UNITS: 100000 SUSPENSION ORAL at 09:09

## 2022-09-20 RX ADMIN — CEFEPIME 2 G: 2 INJECTION, POWDER, FOR SOLUTION INTRAVENOUS at 10:09

## 2022-09-20 RX ADMIN — AMLODIPINE BESYLATE 10 MG: 5 TABLET ORAL at 09:09

## 2022-09-20 RX ADMIN — NYSTATIN 500000 UNITS: 100000 SUSPENSION ORAL at 10:09

## 2022-09-20 RX ADMIN — METHYLPREDNISOLONE SODIUM SUCCINATE 40 MG: 40 INJECTION, POWDER, FOR SOLUTION INTRAMUSCULAR; INTRAVENOUS at 09:09

## 2022-09-20 RX ADMIN — CEFEPIME 2 G: 2 INJECTION, POWDER, FOR SOLUTION INTRAVENOUS at 03:09

## 2022-09-20 NOTE — PLAN OF CARE
Problem: Adult Inpatient Plan of Care  Goal: Plan of Care Review  Outcome: Ongoing, Progressing  Flowsheets (Taken 9/19/2022 2000)  Plan of Care Reviewed With: patient  Goal: Patient-Specific Goal (Individualized)  Outcome: Ongoing, Progressing  Goal: Absence of Hospital-Acquired Illness or Injury  Outcome: Ongoing, Progressing  Intervention: Identify and Manage Fall Risk  Flowsheets (Taken 9/19/2022 2000)  Safety Promotion/Fall Prevention:   assistive device/personal item within reach   high risk medications identified   lighting adjusted   medications reviewed   side rails raised x 2   nonskid shoes/socks when out of bed  Intervention: Prevent Skin Injury  Flowsheets (Taken 9/19/2022 2000)  Skin Protection:   adhesive use limited   tubing/devices free from skin contact   protective footwear used  Intervention: Prevent and Manage VTE (Venous Thromboembolism) Risk  Flowsheets (Taken 9/19/2022 2000)  Activity Management: Ambulated -L4  VTE Prevention/Management: ambulation promoted  Goal: Optimal Comfort and Wellbeing  Outcome: Ongoing, Progressing  Intervention: Monitor Pain and Promote Comfort  Flowsheets (Taken 9/19/2022 2000)  Pain Management Interventions:   care clustered   pain management plan reviewed with patient/caregiver   medication offered   quiet environment facilitated  Intervention: Provide Person-Centered Care  Flowsheets (Taken 9/19/2022 2000)  Trust Relationship/Rapport:   care explained   choices provided   emotional support provided   empathic listening provided   questions answered   thoughts/feelings acknowledged  Goal: Readiness for Transition of Care  Outcome: Ongoing, Progressing     Problem: Fall Injury Risk  Goal: Absence of Fall and Fall-Related Injury  Outcome: Ongoing, Progressing  Intervention: Identify and Manage Contributors  Flowsheets (Taken 9/19/2022 2000)  Self-Care Promotion: independence encouraged  Medication Review/Management: medications reviewed  Intervention: Promote  Injury-Free Environment  Flowsheets (Taken 9/19/2022 2000)  Safety Promotion/Fall Prevention:   assistive device/personal item within reach   high risk medications identified   lighting adjusted   medications reviewed   side rails raised x 2   nonskid shoes/socks when out of bed

## 2022-09-20 NOTE — PROGRESS NOTES
Subjective:       Patient ID: Magdaleno Hirsch is a 22 y.o. male.    Chief Complaint: Sore Throat (Sore throat-chills -feet ache weakness for 2 days-has leukemia-no treatment yet-appt 9/16-oncology)      Diagnosis:  1. CML, chronic phase  2. 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  3. Conjuctival hemorrhage secondary to 1.    Current Treatment:       Treatment History:  1. Hydroxyurea 4 g every 12 hours        HPI:  22-year-old male with no real medical history who went in for a routine eye exam on 08/18/2022 to update his eye glasses prescription.  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  He had bilateral Valdez spots with vessel tortuosity.  The optometrist recommended that the patient have blood work including testing for sickle cell disease.  The patient presented to the emergency department.  CBC in the emergency department revealed a white blood cell count of 411,900. Hemoglobin was 8.3, platelets were normal at 375,000 hundred and seventy five thousand.  Differential was suggestive of CML.  Coagulation studies revealed an INR of 1.38, PTT of 36.4 and a fibrinogen of 292.  Patient was going to present to [place], however they were on diversion.  He was transferred from [facility] to [facility].  Hematology consulted.  Patient underwent bone marrow biopsy on 08/22/2022, this revealed CML, chronic phase, BCR/ABL1 positive.    Interval History:   22-year-old patient known to me from previous hospitalization.  Was discharged home on 08/22/2022, he was informed about twice weekly labs until follow up appointment with me and continued on hydroxyurea.  Unfortunately, the patient did not keep his lab appointments.  He presented to the emergency department on 09/05/2022 with febrile neutropenia, was admitted to the hospitalist service and transferred to main campus [facility].  Hematology consulted as patient known  to me.    Today, 09/20/2022.  Yesterday 1 hour post transfusion count shows he is not responding to platelet transfusions. Unclear reason why. Today he is having more gingival bleeding as well as more pain to his left back jaw. Discussed he is a heavy fall and bleeding risk right now due to his platelet count. Precautions in place.       No past medical history on file.   Past Surgical History:   Procedure Laterality Date    BONE MARROW BIOPSY N/A 8/22/2022    Procedure: Biopsy-bone marrow;  Surgeon: Getachew Chen MD;  Location: [facility];  Service: General;  Laterality: N/A;    KNEE SURGERY Left      Social History     Socioeconomic History    Marital status: Single   Tobacco Use    Smoking status: Never    Smokeless tobacco: Never   Substance and Sexual Activity    Alcohol use: Never    Drug use: Never      History reviewed. No pertinent family history.   Review of patient's allergies indicates:  No Known Allergies     Review of Systems   Constitutional:  Negative for chills, diaphoresis, fatigue and unexpected weight change.   HENT:  Positive for dental problem, mouth sores, sore throat and trouble swallowing. Negative for nasal congestion and sinus pressure/congestion.    Eyes:  Negative for pain.   Respiratory:  Negative for cough, chest tightness and shortness of breath.    Cardiovascular:  Negative for chest pain, palpitations and leg swelling.   Gastrointestinal:  Negative for abdominal distention, abdominal pain, blood in stool, constipation and diarrhea.   Genitourinary:  Negative for dysuria, frequency and hematuria.   Musculoskeletal:  Negative for arthralgias and back pain.   Integumentary:  Negative for rash.   Neurological:  Negative for dizziness, weakness, numbness and headaches.   Hematological:  Negative for adenopathy.   Psychiatric/Behavioral:  Negative for confusion.        Objective:      Physical Exam  Vitals reviewed.   Constitutional:       General: He is awake.      Appearance: Normal  appearance.   HENT:      Head: Normocephalic and atraumatic.      Comments: + gingival hyperplasia     Right Ear: Hearing normal.      Left Ear: Hearing normal.      Nose: Nose normal.      Mouth/Throat:      Comments: Mucositis    Eyes:      General: Lids are normal. Vision grossly intact.      Extraocular Movements: Extraocular movements intact.      Conjunctiva/sclera: Conjunctivae normal.   Cardiovascular:      Rate and Rhythm: Normal rate and regular rhythm.      Pulses: Normal pulses.      Heart sounds: Normal heart sounds.   Pulmonary:      Effort: Pulmonary effort is normal.      Breath sounds: Normal breath sounds. No wheezing, rhonchi or rales.   Chest:      Comments: Soft mass in area of the sternum  Abdominal:      General: Bowel sounds are normal.      Palpations: Abdomen is soft. There is splenomegaly.      Tenderness: There is no abdominal tenderness.   Musculoskeletal:      Cervical back: Full passive range of motion without pain.      Right lower leg: No edema.      Left lower leg: No edema.   Lymphadenopathy:      Cervical: No cervical adenopathy.      Upper Body:      Right upper body: No supraclavicular or axillary adenopathy.      Left upper body: No supraclavicular or axillary adenopathy.   Skin:     General: Skin is warm.   Neurological:      General: No focal deficit present.      Mental Status: He is alert and oriented to person, place, and time.   Psychiatric:         Attention and Perception: Attention normal.         Mood and Affect: Mood and affect normal.         Behavior: Behavior is cooperative.       LABS AND IMAGING REVIEWED IN EPIC  Lab Review:  CBC:   Recent Labs     09/20/22  0906 09/20/22  0331 09/19/22  1507 09/19/22  0617 09/18/22  0950   WBC  --  1.0*  --  1.1* 0.8*   RBC  --  2.86*  --  3.08* 2.66*   HGB  --  7.5*  --  8.1* 6.7*   HCT  --  23.1*  --  24.1* 20.6*   PLT 3* 3* 7* 5* 6*       CMP:   Recent Labs     09/20/22  0331 09/19/22  0617 09/18/22  0950 09/17/22  0346  09/16/22  0449   * 136 137 138 135*   K 4.0 4.1 3.9 4.0 3.8   CO2 25 26 28 27 28   BUN 12.8 11.9 9.7 10.8 13.3   CREATININE 0.75 0.86 0.73 0.74 0.72*   CALCIUM 9.0 8.7 9.1 9.2 9.2   ALBUMIN  --   --  2.9* 3.0* 3.1*   BILITOT  --   --  1.1 1.2 1.3   ALKPHOS  --   --  59 61 61   AST  --   --  12 12 12   ALT  --   --  13 14 13              Assessment:   1. CML, chronic phase  2. 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  3. Retinal hemorrhage secondary to 1.        Plan:       Patient has CML, diagnosed on bone marrow biopsy done on 08/22/2022.    Patient was discharged home on 08/22/2022 with plans to continue hydroxyurea 4 g every 12 hours and have blood work twice a week until his follow up with me and final bone marrow biopsy.    Unfortunately, the patient did not have twice weekly labs, but continued on hydroxyurea.  When asked about lab appointments, the patient's wife stated that he was supposed to come on Mondays and Thursdays.  Unfortunately he was not able to make these visits.      He is now admitted with febrile neutropenia and pancytopenia almost certainly from hydroxyurea.      We will treat him with antibiotics.  So far workup is negative for COVID, flu, strep.  Thrush has improved.    Continue antibiotics and fluconazole as we await count recovery.     We will need to start a TKI, likely dasatinib once the patient's counts recover.    Gum swelling stable/mildly improved. Will continue to monitor and hold off on ENT to biopsy for now.    PLAN  - Add IV Morphine 2mg to help with mouth pain  - Miralax and senna-dano to relieve his current constipation    1 hour post platelet transfusion check yesterday shows he is not responding to transfusions. Unclear etiology. Will begin workup for HLA-type matching for platelet transfusions in the future but this will take time. In the interim since he is bleeding more, will try pulse dose steroids with 40mg IV solumedrol prior to transfusion and see if he responds with 1 hour post  transfusion check again today. If he does have some response will plan for 5 days of stress steroids to help with possible autoimmune component to this. Also checking for hemolysis although less likely.       All blood products to be irradiated.         Elizabeth LeJeune, MD  Hematology/Oncology

## 2022-09-21 LAB
ABS NEUT (OLG): 0.04 X10(3)/MCL (ref 2.1–9.2)
ANISOCYTOSIS BLD QL SMEAR: ABNORMAL
ERYTHROCYTE [DISTWIDTH] IN BLOOD BY AUTOMATED COUNT: 17.1 % (ref 11.5–17)
HCT VFR BLD AUTO: 22.8 % (ref 42–52)
HGB BLD-MCNC: 7.5 GM/DL (ref 14–18)
IMM GRANULOCYTES # BLD AUTO: 0 X10(3)/MCL (ref 0–0.04)
IMM GRANULOCYTES NFR BLD AUTO: 0 %
INSTRUMENT WBC (OLG): 0.7 X10(3)/MCL
LYMPHOCYTES NFR BLD MANUAL: 0.67 X10(3)/MCL
LYMPHOCYTES NFR BLD MANUAL: 95 %
MCH RBC QN AUTO: 25.7 PG (ref 27–31)
MCHC RBC AUTO-ENTMCNC: 32.9 MG/DL (ref 33–36)
MCV RBC AUTO: 78.1 FL (ref 80–94)
MICROCYTES BLD QL SMEAR: ABNORMAL
NEUTROPHILS NFR BLD MANUAL: 5 %
NRBC BLD AUTO-RTO: 0 %
PLATELET # BLD AUTO: 20 X10(3)/MCL (ref 130–400)
PLATELET # BLD EST: ABNORMAL 10*3/UL
PMV BLD AUTO: ABNORMAL FL
POIKILOCYTOSIS BLD QL SMEAR: ABNORMAL
RBC # BLD AUTO: 2.92 X10(6)/MCL (ref 4.7–6.1)
RBC MORPH BLD: ABNORMAL
SCHISTOCYTE (OLG): ABNORMAL
STOMATOCYTES (OLG): ABNORMAL
WBC # SPEC AUTO: 0.7 X10(3)/MCL (ref 4.5–11.5)

## 2022-09-21 PROCEDURE — 25000003 PHARM REV CODE 250: Performed by: INTERNAL MEDICINE

## 2022-09-21 PROCEDURE — 25000003 PHARM REV CODE 250: Performed by: STUDENT IN AN ORGANIZED HEALTH CARE EDUCATION/TRAINING PROGRAM

## 2022-09-21 PROCEDURE — 63700000 PHARM REV CODE 250 ALT 637 W/O HCPCS: Performed by: HOSPITALIST

## 2022-09-21 PROCEDURE — 99232 SBSQ HOSP IP/OBS MODERATE 35: CPT | Mod: ,,, | Performed by: STUDENT IN AN ORGANIZED HEALTH CARE EDUCATION/TRAINING PROGRAM

## 2022-09-21 PROCEDURE — 25000003 PHARM REV CODE 250: Performed by: EMERGENCY MEDICINE

## 2022-09-21 PROCEDURE — 99232 PR SUBSEQUENT HOSPITAL CARE,LEVL II: ICD-10-PCS | Mod: ,,, | Performed by: STUDENT IN AN ORGANIZED HEALTH CARE EDUCATION/TRAINING PROGRAM

## 2022-09-21 PROCEDURE — 36415 COLL VENOUS BLD VENIPUNCTURE: CPT | Performed by: INTERNAL MEDICINE

## 2022-09-21 PROCEDURE — 25000003 PHARM REV CODE 250: Performed by: NURSE PRACTITIONER

## 2022-09-21 PROCEDURE — 85025 COMPLETE CBC W/AUTO DIFF WBC: CPT | Performed by: INTERNAL MEDICINE

## 2022-09-21 PROCEDURE — 63600175 PHARM REV CODE 636 W HCPCS: Performed by: EMERGENCY MEDICINE

## 2022-09-21 PROCEDURE — 21400001 HC TELEMETRY ROOM

## 2022-09-21 PROCEDURE — 63600175 PHARM REV CODE 636 W HCPCS: Performed by: STUDENT IN AN ORGANIZED HEALTH CARE EDUCATION/TRAINING PROGRAM

## 2022-09-21 RX ADMIN — SODIUM CHLORIDE: 9 INJECTION, SOLUTION INTRAVENOUS at 08:09

## 2022-09-21 RX ADMIN — CEFEPIME 2 G: 2 INJECTION, POWDER, FOR SOLUTION INTRAVENOUS at 08:09

## 2022-09-21 RX ADMIN — CEFEPIME 2 G: 2 INJECTION, POWDER, FOR SOLUTION INTRAVENOUS at 01:09

## 2022-09-21 RX ADMIN — NYSTATIN 500000 UNITS: 100000 SUSPENSION ORAL at 05:09

## 2022-09-21 RX ADMIN — CEFEPIME 2 G: 2 INJECTION, POWDER, FOR SOLUTION INTRAVENOUS at 05:09

## 2022-09-21 RX ADMIN — FLUCONAZOLE 100 MG: 100 TABLET ORAL at 08:09

## 2022-09-21 RX ADMIN — SODIUM CHLORIDE: 9 INJECTION, SOLUTION INTRAVENOUS at 11:09

## 2022-09-21 RX ADMIN — FAMOTIDINE 20 MG: 20 TABLET, FILM COATED ORAL at 08:09

## 2022-09-21 RX ADMIN — SENNOSIDES 8.6 MG: 8.6 TABLET, FILM COATED ORAL at 08:09

## 2022-09-21 RX ADMIN — METHYLPREDNISOLONE SODIUM SUCCINATE 40 MG: 40 INJECTION, POWDER, FOR SOLUTION INTRAMUSCULAR; INTRAVENOUS at 08:09

## 2022-09-21 RX ADMIN — LABETALOL HYDROCHLORIDE 200 MG: 200 TABLET, FILM COATED ORAL at 08:09

## 2022-09-21 RX ADMIN — HYDROCODONE BITARTRATE AND ACETAMINOPHEN 1 TABLET: 10; 325 TABLET ORAL at 08:09

## 2022-09-21 RX ADMIN — NYSTATIN 500000 UNITS: 100000 SUSPENSION ORAL at 01:09

## 2022-09-21 RX ADMIN — PANTOPRAZOLE SODIUM 40 MG: 40 TABLET, DELAYED RELEASE ORAL at 08:09

## 2022-09-21 RX ADMIN — DIPHENHYDRAMINE HYDROCHLORIDE 15 ML: 25 SOLUTION ORAL at 08:09

## 2022-09-21 RX ADMIN — AMLODIPINE BESYLATE 10 MG: 5 TABLET ORAL at 08:09

## 2022-09-21 RX ADMIN — POLYETHYLENE GLYCOL 3350 17 G: 17 POWDER, FOR SOLUTION ORAL at 08:09

## 2022-09-21 RX ADMIN — NYSTATIN 500000 UNITS: 100000 SUSPENSION ORAL at 08:09

## 2022-09-21 NOTE — PROGRESS NOTES
Hospital Medicine  Progress Note    Patient Name: Magdaleno Hirsch  MRN: [id number]  Status: IP- Inpatient   Admission Date: 9/5/2022  Length of Stay: 16  DOS: 9/20/2022    CC: hospital follow-up for pancytopenia       SUBJECTIVE   22-year-old  male with history that includes recently diagnosed CML, presented to the ED at [facility] on 09/05 with pancytopenia.  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  Work up during that time noted a white count of  411,900. Hemoglobin was 8.3, platelets were normal at 375,000.  Differential was suggestive of chronic myelogenous leukemia.  Coagulation studies revealed an INR of 1.38, PTT of 36.4 and a fibrinogen of 292.  Hematology was consulted and patient underwent bone marrow biopsy on 08/22.  This revealed chronic myelogenous leukemia, chronic phase, BCR/ABL1 positive.  Patient was discharged home on August 22, 2022 on hydroxyurea, and was supposed to obtain twice weekly labs until follow-up with Dr. Gardner.  Unfortunately he did not keep these lab appointments and now presented to ED on 9/5, where he was found to be pancytopenic. Lab note a white count of 700 (ANC of 28), H&H 5.8/18, platelets 23,000.  Patient was also noted to be febrile.  He was started on empiric antibiotic therapy, transfused 2 units of packed red blood cells and 1 of platelets and then subsequently transferred [facility] for further treatment and hematology evaluation.  He was continued on antibiotics, sepsis workup remained negative, though he was noted to have oral thrush and started on Diflucan.  Hematology was on board; counts were monitored.    Today: Patient seen and examined at bedside, and chart reviewed.  Platelet counts dropped despite transfusions.  Gums still  bleeding, otherwise no new issues.      MEDICATIONS   Scheduled   amLODIPine  10 mg Oral Daily    ceFEPime (MAXIPIME) IVPB  2 g Intravenous Q8H    famotidine  20 mg Oral BID    fluconazole  100 mg Oral Daily    labetaloL  200 mg Oral Q12H    methylPREDNISolone sodium succinate injection  40 mg Intravenous Daily    nystatin  500,000 Units Oral QID    pantoprazole  40 mg Oral Daily    polyethylene glycol  17 g Oral Daily     Continuous Infusions   sodium chloride 0.9% 125 mL/hr at 09/21/22 1159       PHYSICAL EXAM   VITALS: reviewed    GENERAL: Awake and in NAD  LUNGS: CTA B/L  CVS: Normal rate  GI/: Soft, NT, bowel sounds positive.  EXTREMITIES: No peripheral edema  NEURO: AAOx3  PSYCH: Cooperative      LABS   Reviewed    ASSESSMENT   Chronic myelogenous leukemia  Febrile neutropenia  Pancytopenia secondary to hydroxyurea  Immunocompromised secondary to above  Retinal hemorrhage related to #1  Oropharyngeal candidiasis  Essential hypertension  Medical noncompliance    PLAN   Continue with antibiotics and Diflucan, pending rebound in counts  Getting platelets again today, will recheck in AM  Continue to monitor counts closely, appreciate Heme/Onc input  Otherwise continue monitoring in the interim      Prophylaxis: B/L SCDs        Wilman Rizvi MD  [facility] Medicine  09/19/2022       Critical Care Time: 32 minutes spent in direct hands on care, review of labs, imaging and medical record, and discussion of diagnosis, treatment, prognosis with patient/family.  Patient remains at high-risk for clinical decompensation  Critical Care diagnosis: Acute thrombocytopenia, requiring transfusion of blood products

## 2022-09-21 NOTE — PROGRESS NOTES
Inpatient Nutrition Evaluation    Admit Date: 9/5/2022   Total duration of encounter: 16 days    Nutrition Recommendation/Prescription     Continue regular diet as tolerated  Continue vanilla boost plus TID to provide an additional 360 kcal and 14 g protein per container  Continue and encourage Magic Mouthwash, Nystatin, and fluconazole for throat.     Nutrition Assessment     Chart Review    Reason Seen: follow-up    Diagnosis: Chronic myelogenous leukemia  Febrile neutropenia likely due to hydroxyurea use  Anemia and neoplastic disease  Retinal hemorrhage related to #1  Immunocompromised   Oropharyngeal candidiasis  Essential hypertension, suboptimally controlled at  Medical noncompliance      Relevant Medical History: HTN    Nutrition-Related Medications: maxipime, fluconazole, magic mouthwash, nystatin    Nutrition-Related Labs: 9/10: WBC-0.8, RBC-2.75, H/H-6.6/20.7, BUN-7.7, creat-0.71  9/14: WBC-1.0, H/H-7.4/22.4, Crea-0.70  9/21: WBC-0.7, H/H-7.5/22.8, (9/20): Na-135      Diet Order: Diet Adult Regular  Oral Supplement Order: Boost Plus  Appetite/Oral Intake: fair/25-50% of meals  Factors Affecting Nutritional Intake: decreased appetite and bleeding gums  Food/Moravian/Cultural Preferences: none reported       Wound(s):   none reported    Comments    9/10: pt sleeping at time of visit; spoke with wife, who reports poor appetite x3-4 days, only eating few bites of food. Agreed to ONS at this time. Pt with diarrhea. UBW reported 164 lb with no weight loss. Latest weight of 74.4 kg (164 lb) on 9/6, however previous weight of 84.8 kg (187 lb) on 8/21. Per previous weights, pt with possible 12.3% weight loss in 2 weeks. Will continue to monitor.  9/14: Pt sleeping. Girlfriend at bedside, stated he has not been eating well due to throat pain. Feels hungry but cant eat. Pt is drinking all Boost shakes well. Encouraged magic mouthwash to help with throat pain.   9/21: Pt stated he is feeling better. Throat pain  improved. Only complaint is bleeding gums-but has improved today. Likes boost drinks.    Anthropometrics    Height: 6' (182.9 cm)    Last Weight: 74.4 kg (164 lb) (09/06/22 1747) Weight Method: Bed Scale  BMI (Calculated): 22.2  BMI Classification: normal (BMI 18.5-24.9)        Ideal Body Weight (IBW), Male: 178 lb  % Ideal Body Weight, Male (lb): 92.13 %                   Wt Readings from Last 5 Encounters:   09/06/22 74.4 kg (164 lb)   08/21/22 84.8 kg (187 lb)   07/18/22 84.4 kg (186 lb)   06/28/22 82.5 kg (181 lb 12.8 oz)   06/02/22 79.6 kg (175 lb 6.4 oz)     Weight Change(s) Since Admission:  Admit Weight: 74.4 kg (164 lb) (09/05/22 2042)  9/14: no new wts      Patient Education    Not applicable.    Monitoring & Evaluation     Dietitian will monitor food and beverage intake and weight change.  Nutrition Risk/Follow-Up: low (follow-up in 5-7 days)  Patients assigned 'low nutrition risk' status do not qualify for a full nutritional assessment but will be monitored and re-evaluated in a 5-7 day time period.

## 2022-09-21 NOTE — PLAN OF CARE
SW followed up with patient at bedside. Patient reports no needs at this time. SW will follow patient as needed.

## 2022-09-21 NOTE — PROGRESS NOTES
Subjective:       Patient ID: Magdaleno Hirsch is a 22 y.o. male.    Chief Complaint: Sore Throat (Sore throat-chills -feet ache weakness for 2 days-has leukemia-no treatment yet-appt 9/16-oncology)      Diagnosis:  1. CML, chronic phase  2. 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  3. Conjuctival hemorrhage secondary to 1.    Current Treatment:       Treatment History:  1. Hydroxyurea 4 g every 12 hours        HPI:  22-year-old male with no real medical history who went in for a routine eye exam on 08/18/2022 to update his eye glasses prescription.  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  He had bilateral Valdez spots with vessel tortuosity.  The optometrist recommended that the patient have blood work including testing for sickle cell disease.  The patient presented to the emergency department.  CBC in the emergency department revealed a white blood cell count of 411,900. Hemoglobin was 8.3, platelets were normal at 375,000 hundred and seventy five thousand.  Differential was suggestive of CML.  Coagulation studies revealed an INR of 1.38, PTT of 36.4 and a fibrinogen of 292.  Patient was going to present to [place], however they were on diversion.  He was transferred from [facility] to [facility].  Hematology consulted.  Patient underwent bone marrow biopsy on 08/22/2022, this revealed CML, chronic phase, BCR/ABL1 positive.    Interval History:   22-year-old patient known to me from previous hospitalization.  Was discharged home on 08/22/2022, he was informed about twice weekly labs until follow up appointment with me and continued on hydroxyurea.  Unfortunately, the patient did not keep his lab appointments.  He presented to the emergency department on 09/05/2022 with febrile neutropenia, was admitted to the hospitalist service and transferred to main campus [facility].  Hematology consulted as patient known  to me.    Today, 09/21/2022.  Responding to platelets with steroids now. Had non bloody BM yesterday. Mouth bleeding minimal now, pain improved from yesterday.       No past medical history on file.   Past Surgical History:   Procedure Laterality Date    BONE MARROW BIOPSY N/A 8/22/2022    Procedure: Biopsy-bone marrow;  Surgeon: Getachew Chen MD;  Location: [facility];  Service: General;  Laterality: N/A;    KNEE SURGERY Left      Social History     Socioeconomic History    Marital status: Single   Tobacco Use    Smoking status: Never    Smokeless tobacco: Never   Substance and Sexual Activity    Alcohol use: Never    Drug use: Never      History reviewed. No pertinent family history.   Review of patient's allergies indicates:  No Known Allergies     Review of Systems   Constitutional:  Negative for chills, diaphoresis, fatigue and unexpected weight change.   HENT:  Positive for dental problem, mouth sores, sore throat and trouble swallowing. Negative for nasal congestion and sinus pressure/congestion.    Eyes:  Negative for pain.   Respiratory:  Negative for cough, chest tightness and shortness of breath.    Cardiovascular:  Negative for chest pain, palpitations and leg swelling.   Gastrointestinal:  Negative for abdominal distention, abdominal pain, blood in stool, constipation and diarrhea.   Genitourinary:  Negative for dysuria, frequency and hematuria.   Musculoskeletal:  Negative for arthralgias and back pain.   Integumentary:  Negative for rash.   Neurological:  Negative for dizziness, weakness, numbness and headaches.   Hematological:  Negative for adenopathy.   Psychiatric/Behavioral:  Negative for confusion.        Objective:      Physical Exam  Vitals reviewed.   Constitutional:       General: He is awake.      Appearance: Normal appearance.   HENT:      Head: Normocephalic and atraumatic.      Comments: + gingival hyperplasia     Right Ear: Hearing normal.      Left Ear: Hearing normal.      Nose: Nose  normal.      Mouth/Throat:      Comments: Mucositis    Eyes:      General: Lids are normal. Vision grossly intact.      Extraocular Movements: Extraocular movements intact.      Conjunctiva/sclera: Conjunctivae normal.   Cardiovascular:      Rate and Rhythm: Normal rate and regular rhythm.      Pulses: Normal pulses.      Heart sounds: Normal heart sounds.   Pulmonary:      Effort: Pulmonary effort is normal.      Breath sounds: Normal breath sounds. No wheezing, rhonchi or rales.   Chest:      Comments: Soft mass in area of the sternum  Abdominal:      General: Bowel sounds are normal.      Palpations: Abdomen is soft. There is splenomegaly.      Tenderness: There is no abdominal tenderness.   Musculoskeletal:      Cervical back: Full passive range of motion without pain.      Right lower leg: No edema.      Left lower leg: No edema.   Lymphadenopathy:      Cervical: No cervical adenopathy.      Upper Body:      Right upper body: No supraclavicular or axillary adenopathy.      Left upper body: No supraclavicular or axillary adenopathy.   Skin:     General: Skin is warm.   Neurological:      General: No focal deficit present.      Mental Status: He is alert and oriented to person, place, and time.   Psychiatric:         Attention and Perception: Attention normal.         Mood and Affect: Mood and affect normal.         Behavior: Behavior is cooperative.       LABS AND IMAGING REVIEWED IN EPIC  Lab Review:  CBC:   Recent Labs     09/21/22  0320 09/20/22  1335 09/20/22  0906 09/20/22  0331 09/19/22  1507 09/19/22  0617   WBC 0.7*  --   --  1.0*  --  1.1*   RBC 2.92*  --   --  2.86*  --  3.08*   HGB 7.5*  --   --  7.5*  --  8.1*   HCT 22.8*  --   --  23.1*  --  24.1*   PLT 20* 9* 3* 3*   < > 5*    < > = values in this interval not displayed.       CMP:   Recent Labs     09/20/22  0331 09/19/22  0617 09/18/22  0950 09/17/22  0346 09/16/22  0449   * 136 137 138 135*   K 4.0 4.1 3.9 4.0 3.8   CO2 25 26 28 27 28    BUN 12.8 11.9 9.7 10.8 13.3   CREATININE 0.75 0.86 0.73 0.74 0.72*   CALCIUM 9.0 8.7 9.1 9.2 9.2   ALBUMIN  --   --  2.9* 3.0* 3.1*   BILITOT  --   --  1.1 1.2 1.3   ALKPHOS  --   --  59 61 61   AST  --   --  12 12 12   ALT  --   --  13 14 13              Assessment:   1. CML, chronic phase  2. 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  3. Retinal hemorrhage secondary to 1.        Plan:       Patient has CML, diagnosed on bone marrow biopsy done on 08/22/2022.  Patient was discharged home on 08/22/2022 with plans to continue hydroxyurea 4 g every 12 hours and have blood work twice a week until his follow up with me and final bone marrow biopsy.Unfortunately, the patient did not have twice weekly labs, but continued on hydroxyurea.  When asked about lab appointments, the patient's wife stated that he was supposed to come on Mondays and Thursdays.  Unfortunately he was not able to make these visits.      He is now admitted with febrile neutropenia and pancytopenia almost certainly from hydroxyurea.  We will treat him with antibiotics.  So far workup is negative for COVID, flu, strep.  Thrush has improved.Continue antibiotics and fluconazole as we await count recovery.     We will need to start a TKI, likely dasatinib once the patient's counts recover.      PLAN  - Continue pulse dose steroids x 5 days total. Today Day 2/5. With PPI  - Continue prophylactic abx/antifungal while we await for count recovery  - No blood products needed this morning    All blood products to be irradiated.         Elizabeth LeJeune, MD  Hematology/Oncology

## 2022-09-21 NOTE — PROGRESS NOTES
Hospital Medicine  Progress Note    Patient Name: Magdaleno Hirsch  MRN: [id number]  Status: IP- Inpatient   Admission Date: 9/5/2022  Length of Stay: 16      CC: hospital follow-up for pancytopenia       SUBJECTIVE   22-year-old  male with history that includes recently diagnosed CML, presented to the ED at [facility] on 09/05 with pancytopenia.  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  Work up during that time noted a white count of  411,900. Hemoglobin was 8.3, platelets were normal at 375,000.  Differential was suggestive of chronic myelogenous leukemia.  Coagulation studies revealed an INR of 1.38, PTT of 36.4 and a fibrinogen of 292.  Hematology was consulted and patient underwent bone marrow biopsy on 08/22.  This revealed chronic myelogenous leukemia, chronic phase, BCR/ABL1 positive.  Patient was discharged home on August 22, 2022 on hydroxyurea, and was supposed to obtain twice weekly labs until follow-up with Dr. Gardner.  Unfortunately he did not keep these lab appointments and now presented to ED on 9/5, where he was found to be pancytopenic. Lab note a white count of 700 (ANC of 28), H&H 5.8/18, platelets 23,000.  Patient was also noted to be febrile.  He was started on empiric antibiotic therapy, transfused 2 units of packed red blood cells and 1 of platelets and then subsequently transferred [facility] for further treatment and hematology evaluation.  He was continued on antibiotics, sepsis workup remained negative, though he was noted to have oral thrush and started on Diflucan.  Hematology was on board; counts were monitored.    Today: Patient seen and examined at bedside, and chart reviewed.  Platelet counts have responded to transfusion yesterday with addition of  steroids.  Gums bleeding improved, otherwise no new issues.      MEDICATIONS   Scheduled   amLODIPine  10 mg Oral Daily    ceFEPime (MAXIPIME) IVPB  2 g Intravenous Q8H    famotidine  20 mg Oral BID    fluconazole  100 mg Oral Daily    labetaloL  200 mg Oral Q12H    methylPREDNISolone sodium succinate injection  40 mg Intravenous Daily    nystatin  500,000 Units Oral QID    pantoprazole  40 mg Oral Daily    polyethylene glycol  17 g Oral Daily     Continuous Infusions   sodium chloride 0.9% 125 mL/hr at 09/21/22 1159       PHYSICAL EXAM   VITALS: T 98.4 °F (36.9 °C)   /78   P 70   RR 18   O2 100 %    GENERAL: Awake and in NAD  LUNGS: CTA B/L  CVS: Normal rate  GI/: Soft, NT, bowel sounds positive.  EXTREMITIES: No peripheral edema  NEURO: AAOx3  PSYCH: Cooperative      LABS   CBC  Recent Labs     09/21/22  0320   WBC 0.7*   HGB 7.5*   HCT 22.8*   PLT 20*       CHEM  Recent Labs     09/20/22  0331   *   K 4.0   CO2 25   BUN 12.8   CREATININE 0.75   CALCIUM 9.0         ASSESSMENT   Chronic myelogenous leukemia  Febrile neutropenia  Pancytopenia secondary to hydroxyurea  Immunocompromised secondary to above  Retinal hemorrhage related to #1  Oropharyngeal candidiasis  Essential hypertension  Medical noncompliance    PLAN   Continue with antibiotics and Diflucan, pending rebound in counts  Continue steroids for thrombocytopenia resistant to transfusions  Continue to monitor counts closely, appreciate Heme/Onc input  Will transfuse further as indicated pending improvement in counts  Otherwise continue monitoring in the interim      Prophylaxis: B/L SCDs        Wilman Rizvi MD  [facility]  09/21/2022       Critical Care Time: 32 minutes spent in direct hands on care, review of labs, imaging and medical record, and discussion of diagnosis, treatment, prognosis with patient/family.  Patient remains at high-risk for clinical decompensation  Critical Care diagnosis: Acute thrombocytopenia, requiring  transfusion of blood products

## 2022-09-22 LAB
ABO + RH BLD: NORMAL
BASOPHILS # BLD AUTO: 0 X10(3)/MCL (ref 0–0.2)
BASOPHILS NFR BLD AUTO: 0 %
BLD PROD TYP BPU: NORMAL
BLOOD UNIT EXPIRATION DATE: NORMAL
BLOOD UNIT TYPE CODE: 1700
CROSSMATCH INTERPRETATION: NORMAL
DISPENSE STATUS: NORMAL
EOSINOPHIL # BLD AUTO: 0 X10(3)/MCL (ref 0–0.9)
EOSINOPHIL NFR BLD AUTO: 0 %
ERYTHROCYTE [DISTWIDTH] IN BLOOD BY AUTOMATED COUNT: 17.5 % (ref 11.5–17)
HAPTOGLOB SERPL-MCNC: 110 MG/DL (ref 14–258)
HCT VFR BLD AUTO: 23.8 % (ref 42–52)
HGB BLD-MCNC: 7.8 GM/DL (ref 14–18)
IMM GRANULOCYTES # BLD AUTO: 0 X10(3)/MCL (ref 0–0.04)
IMM GRANULOCYTES NFR BLD AUTO: 0 %
LYMPHOCYTES # BLD AUTO: 1.08 X10(3)/MCL (ref 0.6–4.6)
LYMPHOCYTES NFR BLD AUTO: 95.6 %
MCH RBC QN AUTO: 27.4 PG (ref 27–31)
MCHC RBC AUTO-ENTMCNC: 32.8 MG/DL (ref 33–36)
MCV RBC AUTO: 83.5 FL (ref 80–94)
MONOCYTES # BLD AUTO: 0.01 X10(3)/MCL (ref 0.1–1.3)
MONOCYTES NFR BLD AUTO: 0.9 %
NEUTROPHILS # BLD AUTO: 0 X10(3)/MCL (ref 2.1–9.2)
NEUTROPHILS NFR BLD AUTO: 3.5 %
NRBC BLD AUTO-RTO: 0 %
PLATELET # BLD AUTO: 17 X10(3)/MCL (ref 130–400)
PMV BLD AUTO: ABNORMAL FL
RBC # BLD AUTO: 2.85 X10(6)/MCL (ref 4.7–6.1)
RBCS: NORMAL
UNIT NUMBER: NORMAL
WBC # SPEC AUTO: 1.1 X10(3)/MCL (ref 4.5–11.5)

## 2022-09-22 PROCEDURE — 25000003 PHARM REV CODE 250: Performed by: INTERNAL MEDICINE

## 2022-09-22 PROCEDURE — 25000003 PHARM REV CODE 250: Performed by: EMERGENCY MEDICINE

## 2022-09-22 PROCEDURE — 36415 COLL VENOUS BLD VENIPUNCTURE: CPT | Performed by: STUDENT IN AN ORGANIZED HEALTH CARE EDUCATION/TRAINING PROGRAM

## 2022-09-22 PROCEDURE — 85025 COMPLETE CBC W/AUTO DIFF WBC: CPT | Performed by: STUDENT IN AN ORGANIZED HEALTH CARE EDUCATION/TRAINING PROGRAM

## 2022-09-22 PROCEDURE — 99232 SBSQ HOSP IP/OBS MODERATE 35: CPT | Mod: ,,, | Performed by: STUDENT IN AN ORGANIZED HEALTH CARE EDUCATION/TRAINING PROGRAM

## 2022-09-22 PROCEDURE — 99232 PR SUBSEQUENT HOSPITAL CARE,LEVL II: ICD-10-PCS | Mod: ,,, | Performed by: STUDENT IN AN ORGANIZED HEALTH CARE EDUCATION/TRAINING PROGRAM

## 2022-09-22 PROCEDURE — 63600175 PHARM REV CODE 636 W HCPCS: Performed by: EMERGENCY MEDICINE

## 2022-09-22 PROCEDURE — 63600175 PHARM REV CODE 636 W HCPCS: Performed by: STUDENT IN AN ORGANIZED HEALTH CARE EDUCATION/TRAINING PROGRAM

## 2022-09-22 PROCEDURE — 63700000 PHARM REV CODE 250 ALT 637 W/O HCPCS: Performed by: HOSPITALIST

## 2022-09-22 PROCEDURE — 83010 ASSAY OF HAPTOGLOBIN QUANT: CPT | Performed by: STUDENT IN AN ORGANIZED HEALTH CARE EDUCATION/TRAINING PROGRAM

## 2022-09-22 PROCEDURE — 25000003 PHARM REV CODE 250: Performed by: STUDENT IN AN ORGANIZED HEALTH CARE EDUCATION/TRAINING PROGRAM

## 2022-09-22 PROCEDURE — 21400001 HC TELEMETRY ROOM

## 2022-09-22 RX ADMIN — FAMOTIDINE 20 MG: 20 TABLET, FILM COATED ORAL at 08:09

## 2022-09-22 RX ADMIN — NYSTATIN 500000 UNITS: 100000 SUSPENSION ORAL at 08:09

## 2022-09-22 RX ADMIN — AMLODIPINE BESYLATE 10 MG: 5 TABLET ORAL at 08:09

## 2022-09-22 RX ADMIN — FLUCONAZOLE 100 MG: 100 TABLET ORAL at 08:09

## 2022-09-22 RX ADMIN — SODIUM CHLORIDE: 9 INJECTION, SOLUTION INTRAVENOUS at 05:09

## 2022-09-22 RX ADMIN — METHYLPREDNISOLONE SODIUM SUCCINATE 40 MG: 40 INJECTION, POWDER, FOR SOLUTION INTRAMUSCULAR; INTRAVENOUS at 08:09

## 2022-09-22 RX ADMIN — NYSTATIN 500000 UNITS: 100000 SUSPENSION ORAL at 01:09

## 2022-09-22 RX ADMIN — FAMOTIDINE 20 MG: 20 TABLET, FILM COATED ORAL at 09:09

## 2022-09-22 RX ADMIN — CEFEPIME 2 G: 2 INJECTION, POWDER, FOR SOLUTION INTRAVENOUS at 01:09

## 2022-09-22 RX ADMIN — NYSTATIN 500000 UNITS: 100000 SUSPENSION ORAL at 09:09

## 2022-09-22 RX ADMIN — LABETALOL HYDROCHLORIDE 200 MG: 200 TABLET, FILM COATED ORAL at 08:09

## 2022-09-22 RX ADMIN — CEFEPIME 2 G: 2 INJECTION, POWDER, FOR SOLUTION INTRAVENOUS at 09:09

## 2022-09-22 RX ADMIN — CEFEPIME 2 G: 2 INJECTION, POWDER, FOR SOLUTION INTRAVENOUS at 05:09

## 2022-09-22 RX ADMIN — PANTOPRAZOLE SODIUM 40 MG: 40 TABLET, DELAYED RELEASE ORAL at 08:09

## 2022-09-22 NOTE — PROGRESS NOTES
[facility]  Hospital Medicine Progress Note        Chief Complaint: Inpatient Follow-up for Pancytopenia    HPI:   22-year-old  male with history that includes recently diagnosed CML, presented to the ED at [facility] on 09/05 with pancytopenia.  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  Work up during that time noted a white count of  411,900. Hemoglobin was 8.3, platelets were normal at 375,000.  Differential was suggestive of chronic myelogenous leukemia.  Coagulation studies revealed an INR of 1.38, PTT of 36.4 and a fibrinogen of 292.  Hematology was consulted and patient underwent bone marrow biopsy on 08/22.  This revealed chronic myelogenous leukemia, chronic phase, BCR/ABL1 positive.  Patient was discharged home on August 22, 2022 on hydroxyurea, and was supposed to obtain twice weekly labs until follow-up with Dr. Gardner.  Unfortunately he did not keep these lab appointments and now presented to ED on 9/5, where he was found to be pancytopenic. Lab note a white count of 700 (ANC of 28), H&H 5.8/18, platelets 23,000.  Patient was also noted to be febrile.  He was started on empiric antibiotic therapy, transfused 2 units of packed red blood cells and 1 of platelets and then subsequently transferred [facility] for further treatment and hematology evaluation.  He was continued on antibiotics, sepsis workup remained negative, though he was noted to have oral thrush and started on Diflucan.  Hematology was on board; counts were monitored.    Interval Hx:      Hemodynamically stable.  Hemoglobin 7.8 with thrombocytopenia 17 K today.   Neutropenia noted    Objective/physical exam:  Vitals:    09/21/22 1919 09/21/22 2036 09/21/22 2322 09/22/22 0351    BP: (!) 147/80  125/67 128/66   Pulse: 68  (!) 57 64   Resp:  18 15 18   Temp: 98.3 °F (36.8 °C)  98.9 °F (37.2 °C) 97.5 °F (36.4 °C)   TempSrc: Oral  Oral Oral   SpO2: 100%  100% 98%   Weight:       Height:         General: In no acute distress, afebrile  Respiratory: Clear to auscultation bilaterally  Cardiovascular: S1, S2, no appreciable murmur  Abdomen: Soft, nontender, BS +  MSK: Warm, no lower extremity edema, no clubbing or cyanosis  Neurologic: Alert and oriented x4, moving all extremities with good strength     Lab Results   Component Value Date     (L) 09/20/2022    K 4.0 09/20/2022    CO2 25 09/20/2022    BUN 12.8 09/20/2022    CREATININE 0.75 09/20/2022    CALCIUM 9.0 09/20/2022      Lab Results   Component Value Date    ALT 13 09/18/2022    AST 12 09/18/2022    ALKPHOS 59 09/18/2022    BILITOT 1.1 09/18/2022      Lab Results   Component Value Date    WBC 0.7 (LL) 09/21/2022    HGB 7.5 (L) 09/21/2022    HCT 22.8 (L) 09/21/2022    MCV 78.1 (L) 09/21/2022    PLT 20 (LL) 09/21/2022           Medications:   amLODIPine  10 mg Oral Daily    ceFEPime (MAXIPIME) IVPB  2 g Intravenous Q8H    famotidine  20 mg Oral BID    fluconazole  100 mg Oral Daily    labetaloL  200 mg Oral Q12H    methylPREDNISolone sodium succinate injection  40 mg Intravenous Daily    nystatin  500,000 Units Oral QID    pantoprazole  40 mg Oral Daily    polyethylene glycol  17 g Oral Daily      (Magic mouthwash) 1:1:1 diphenhydramine(Benadryl) 12.5mg/5ml liq, aluminum & magnesium hydroxide-simethicone (Maalox), LIDOcaine viscous 2%, sodium chloride, sodium chloride, sodium chloride, sodium chloride, acetaminophen, cloNIDine, glucagon (human recombinant), glucose, glucose, hydrALAZINE, HYDROcodone-acetaminophen, HYDROcodone-acetaminophen, labetaloL, morphine, ondansetron, senna     Assessment/Plan:    Chronic myelogenous leukemia  Febrile neutropenia  Pancytopenia secondary to hydroxyurea  Immunocompromised secondary to  above  Retinal hemorrhage related to #1  Oropharyngeal candidiasis  Essential hypertension  Medical noncompliance     Plan:   -Continue current antibiotic regimen, Diflucan  -thrombocytopenia, neutropenia continues.  Oncology following.  Currently receiving steroids due to resistant to transfusions  -continue neutropenic precautions    SCDs      Kamlesh Wheeler MD

## 2022-09-22 NOTE — PROGRESS NOTES
Subjective:       Patient ID: Magdaleno Hirsch is a 22 y.o. male.    Chief Complaint: Sore Throat (Sore throat-chills -feet ache weakness for 2 days-has leukemia-no treatment yet-appt 9/16-oncology)      Diagnosis:  1. CML, chronic phase  2. 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  3. Conjuctival hemorrhage secondary to 1.    Current Treatment:       Treatment History:  1. Hydroxyurea 4 g every 12 hours        HPI:  22-year-old male with no real medical history who went in for a routine eye exam on 08/18/2022 to update his eye glasses prescription.  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  He had bilateral Valdez spots with vessel tortuosity.  The optometrist recommended that the patient have blood work including testing for sickle cell disease.  The patient presented to the emergency department.  CBC in the emergency department revealed a white blood cell count of 411,900. Hemoglobin was 8.3, platelets were normal at 375,000 hundred and seventy five thousand.  Differential was suggestive of CML.  Coagulation studies revealed an INR of 1.38, PTT of 36.4 and a fibrinogen of 292.  Patient was going to present to [facility], however they were on diversion.  He was transferred from [facility] to [facility].  Hematology consulted.  Patient underwent bone marrow biopsy on 08/22/2022, this revealed CML, chronic phase, BCR/ABL1 positive.    Interval History:   22-year-old patient known to me from previous hospitalization.  Was discharged home on 08/22/2022, he was informed about twice weekly labs until follow up appointment with me and continued on hydroxyurea.  Unfortunately, the patient did not keep his lab appointments.  He presented to the emergency department on 09/05/2022 with febrile neutropenia, was admitted to the hospitalist service and transferred to main campus [facility].  Hematology consulted as patient known  to me.    Today, 09/22/2022.  Pain continues to improve. Able to tolerate more PO. No need for transfusions today. Minimal gingival oozing only when eating or brushing teeth      No past medical history on file.   Past Surgical History:   Procedure Laterality Date    BONE MARROW BIOPSY N/A 8/22/2022    Procedure: Biopsy-bone marrow;  Surgeon: Getachew Chen MD;  Location: [facility];  Service: General;  Laterality: N/A;    KNEE SURGERY Left      Social History     Socioeconomic History    Marital status: Single   Tobacco Use    Smoking status: Never    Smokeless tobacco: Never   Substance and Sexual Activity    Alcohol use: Never    Drug use: Never      History reviewed. No pertinent family history.   Review of patient's allergies indicates:  No Known Allergies     Review of Systems   Constitutional:  Negative for chills, diaphoresis, fatigue and unexpected weight change.   HENT:  Positive for dental problem and mouth sores. Negative for nasal congestion and sinus pressure/congestion.    Eyes:  Negative for pain.   Respiratory:  Negative for cough, chest tightness and shortness of breath.    Cardiovascular:  Negative for chest pain, palpitations and leg swelling.   Gastrointestinal:  Negative for abdominal distention, abdominal pain, blood in stool, constipation and diarrhea.   Genitourinary:  Negative for dysuria, frequency and hematuria.   Musculoskeletal:  Negative for arthralgias and back pain.   Integumentary:  Negative for rash.   Neurological:  Negative for dizziness, weakness, numbness and headaches.   Hematological:  Negative for adenopathy.   Psychiatric/Behavioral:  Negative for confusion.        Objective:      Physical Exam  Vitals reviewed.   Constitutional:       General: He is awake.      Appearance: Normal appearance.   HENT:      Head: Normocephalic and atraumatic.      Comments: + gingival hyperplasia     Right Ear: Hearing normal.      Left Ear: Hearing normal.      Nose: Nose normal.       Mouth/Throat:      Comments: Mucositis    Eyes:      General: Lids are normal. Vision grossly intact.      Extraocular Movements: Extraocular movements intact.      Conjunctiva/sclera: Conjunctivae normal.   Cardiovascular:      Rate and Rhythm: Normal rate and regular rhythm.      Pulses: Normal pulses.      Heart sounds: Normal heart sounds.   Pulmonary:      Effort: Pulmonary effort is normal.      Breath sounds: Normal breath sounds. No wheezing, rhonchi or rales.   Chest:      Comments: Soft mass in area of the sternum  Abdominal:      General: Bowel sounds are normal.      Palpations: Abdomen is soft. There is splenomegaly.      Tenderness: There is no abdominal tenderness.   Musculoskeletal:      Cervical back: Full passive range of motion without pain.      Right lower leg: No edema.      Left lower leg: No edema.   Lymphadenopathy:      Cervical: No cervical adenopathy.      Upper Body:      Right upper body: No supraclavicular or axillary adenopathy.      Left upper body: No supraclavicular or axillary adenopathy.   Skin:     General: Skin is warm.   Neurological:      General: No focal deficit present.      Mental Status: He is alert and oriented to person, place, and time.   Psychiatric:         Attention and Perception: Attention normal.         Mood and Affect: Mood and affect normal.         Behavior: Behavior is cooperative.       LABS AND IMAGING REVIEWED IN EPIC  Lab Review:  CBC:   Recent Labs     09/22/22  0734 09/21/22  0320 09/20/22  1335 09/20/22  0906 09/20/22  0331   WBC 1.1* 0.7*  --   --  1.0*   RBC 2.85* 2.92*  --   --  2.86*   HGB 7.8* 7.5*  --   --  7.5*   HCT 23.8* 22.8*  --   --  23.1*   PLT 17* 20* 9*   < > 3*    < > = values in this interval not displayed.       CMP:   Recent Labs     09/20/22  0331 09/19/22  0617 09/18/22  0950 09/17/22  0346 09/16/22  0449   * 136 137 138 135*   K 4.0 4.1 3.9 4.0 3.8   CO2 25 26 28 27 28   BUN 12.8 11.9 9.7 10.8 13.3   CREATININE 0.75 0.86  0.73 0.74 0.72*   CALCIUM 9.0 8.7 9.1 9.2 9.2   ALBUMIN  --   --  2.9* 3.0* 3.1*   BILITOT  --   --  1.1 1.2 1.3   ALKPHOS  --   --  59 61 61   AST  --   --  12 12 12   ALT  --   --  13 14 13              Assessment:   1. CML, chronic phase  2. 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  3. Retinal hemorrhage secondary to 1.        Plan:       Patient has CML, diagnosed on bone marrow biopsy done on 08/22/2022.  Patient was discharged home on 08/22/2022 with plans to continue hydroxyurea 4 g every 12 hours and have blood work twice a week until his follow up with me and final bone marrow biopsy.Unfortunately, the patient did not have twice weekly labs, but continued on hydroxyurea.  When asked about lab appointments, the patient's wife stated that he was supposed to come on Mondays and Thursdays.  Unfortunately he was not able to make these visits.      He is now admitted with febrile neutropenia and pancytopenia almost certainly from hydroxyurea.  We will treat him with antibiotics.  So far workup is negative for COVID, flu, strep.  Thrush has improved.Continue antibiotics and fluconazole as we await count recovery.     We will need to start a TKI, likely dasatinib once the patient's counts recover.      PLAN  - Continue pulse dose steroids x 5 days total. Today Day 3/5. With PPI  - Continue prophylactic abx/antifungal while we await for count recovery  - No blood products needed today    All blood products to be irradiated.         Elizabeth LeJeune, MD  Hematology/Oncology

## 2022-09-23 LAB
ABS NEUT (OLG): 0.05 X10(3)/MCL (ref 2.1–9.2)
ALBUMIN SERPL-MCNC: 3 GM/DL (ref 3.5–5)
ALBUMIN/GLOB SERPL: 0.9 RATIO (ref 1.1–2)
ALP SERPL-CCNC: 79 UNIT/L (ref 40–150)
ALT SERPL-CCNC: 57 UNIT/L (ref 0–55)
ANISOCYTOSIS BLD QL SMEAR: ABNORMAL
AST SERPL-CCNC: 32 UNIT/L (ref 5–34)
BILIRUBIN DIRECT+TOT PNL SERPL-MCNC: 0.6 MG/DL
BUN SERPL-MCNC: 17.3 MG/DL (ref 8.9–20.6)
CALCIUM SERPL-MCNC: 8.8 MG/DL (ref 8.4–10.2)
CHLORIDE SERPL-SCNC: 110 MMOL/L (ref 98–107)
CO2 SERPL-SCNC: 26 MMOL/L (ref 22–29)
CREAT SERPL-MCNC: 0.64 MG/DL (ref 0.73–1.18)
DEPRECATED CALCIDIOL+CALCIFEROL SERPL-MC: 18.5 NG/ML (ref 30–80)
ERYTHROCYTE [DISTWIDTH] IN BLOOD BY AUTOMATED COUNT: 17.4 % (ref 11.5–17)
GFR SERPLBLD CREATININE-BSD FMLA CKD-EPI: >60 MLS/MIN/1.73/M2
GLOBULIN SER-MCNC: 3.4 GM/DL (ref 2.4–3.5)
GLUCOSE SERPL-MCNC: 94 MG/DL (ref 74–100)
HCT VFR BLD AUTO: 22.6 % (ref 42–52)
HGB BLD-MCNC: 7.4 GM/DL (ref 14–18)
IMM GRANULOCYTES # BLD AUTO: 0 X10(3)/MCL (ref 0–0.04)
IMM GRANULOCYTES NFR BLD AUTO: 0 %
INSTRUMENT WBC (OLG): 1 X10(3)/MCL
LYMPHOCYTES NFR BLD MANUAL: 0.95 X10(3)/MCL
LYMPHOCYTES NFR BLD MANUAL: 95 %
MAGNESIUM SERPL-MCNC: 1.6 MG/DL (ref 1.6–2.6)
MCH RBC QN AUTO: 26.3 PG (ref 27–31)
MCHC RBC AUTO-ENTMCNC: 32.7 MG/DL (ref 33–36)
MCV RBC AUTO: 80.4 FL (ref 80–94)
MICROCYTES BLD QL SMEAR: ABNORMAL
NEUTROPHILS NFR BLD MANUAL: 5 %
NRBC BLD AUTO-RTO: 0 %
NRBC BLD MANUAL-RTO: 1 %
PLATELET # BLD AUTO: 20 X10(3)/MCL (ref 130–400)
PLATELET # BLD EST: ABNORMAL 10*3/UL
PMV BLD AUTO: ABNORMAL FL
POTASSIUM SERPL-SCNC: 3.8 MMOL/L (ref 3.5–5.1)
PROT SERPL-MCNC: 6.4 GM/DL (ref 6.4–8.3)
RBC # BLD AUTO: 2.81 X10(6)/MCL (ref 4.7–6.1)
RBC MORPH BLD: ABNORMAL
SODIUM SERPL-SCNC: 142 MMOL/L (ref 136–145)
WBC # SPEC AUTO: 1.3 X10(3)/MCL (ref 4.5–11.5)

## 2022-09-23 PROCEDURE — 25000003 PHARM REV CODE 250: Performed by: INTERNAL MEDICINE

## 2022-09-23 PROCEDURE — 21400001 HC TELEMETRY ROOM

## 2022-09-23 PROCEDURE — 99232 PR SUBSEQUENT HOSPITAL CARE,LEVL II: ICD-10-PCS | Mod: ,,, | Performed by: STUDENT IN AN ORGANIZED HEALTH CARE EDUCATION/TRAINING PROGRAM

## 2022-09-23 PROCEDURE — 83735 ASSAY OF MAGNESIUM: CPT | Performed by: INTERNAL MEDICINE

## 2022-09-23 PROCEDURE — 63600175 PHARM REV CODE 636 W HCPCS: Performed by: STUDENT IN AN ORGANIZED HEALTH CARE EDUCATION/TRAINING PROGRAM

## 2022-09-23 PROCEDURE — 36415 COLL VENOUS BLD VENIPUNCTURE: CPT | Performed by: INTERNAL MEDICINE

## 2022-09-23 PROCEDURE — 63600175 PHARM REV CODE 636 W HCPCS: Performed by: EMERGENCY MEDICINE

## 2022-09-23 PROCEDURE — 25000003 PHARM REV CODE 250: Performed by: STUDENT IN AN ORGANIZED HEALTH CARE EDUCATION/TRAINING PROGRAM

## 2022-09-23 PROCEDURE — 80053 COMPREHEN METABOLIC PANEL: CPT | Performed by: INTERNAL MEDICINE

## 2022-09-23 PROCEDURE — 85025 COMPLETE CBC W/AUTO DIFF WBC: CPT | Performed by: INTERNAL MEDICINE

## 2022-09-23 PROCEDURE — 25000003 PHARM REV CODE 250: Performed by: EMERGENCY MEDICINE

## 2022-09-23 PROCEDURE — 99232 SBSQ HOSP IP/OBS MODERATE 35: CPT | Mod: ,,, | Performed by: STUDENT IN AN ORGANIZED HEALTH CARE EDUCATION/TRAINING PROGRAM

## 2022-09-23 PROCEDURE — 82306 VITAMIN D 25 HYDROXY: CPT | Performed by: INTERNAL MEDICINE

## 2022-09-23 PROCEDURE — 63700000 PHARM REV CODE 250 ALT 637 W/O HCPCS: Performed by: HOSPITALIST

## 2022-09-23 RX ADMIN — PANTOPRAZOLE SODIUM 40 MG: 40 TABLET, DELAYED RELEASE ORAL at 09:09

## 2022-09-23 RX ADMIN — NYSTATIN 500000 UNITS: 100000 SUSPENSION ORAL at 02:09

## 2022-09-23 RX ADMIN — FAMOTIDINE 20 MG: 20 TABLET, FILM COATED ORAL at 08:09

## 2022-09-23 RX ADMIN — SODIUM CHLORIDE: 9 INJECTION, SOLUTION INTRAVENOUS at 11:09

## 2022-09-23 RX ADMIN — LABETALOL HYDROCHLORIDE 200 MG: 200 TABLET, FILM COATED ORAL at 08:09

## 2022-09-23 RX ADMIN — NYSTATIN 500000 UNITS: 100000 SUSPENSION ORAL at 09:09

## 2022-09-23 RX ADMIN — NYSTATIN 500000 UNITS: 100000 SUSPENSION ORAL at 05:09

## 2022-09-23 RX ADMIN — SODIUM CHLORIDE: 9 INJECTION, SOLUTION INTRAVENOUS at 05:09

## 2022-09-23 RX ADMIN — NYSTATIN 500000 UNITS: 100000 SUSPENSION ORAL at 08:09

## 2022-09-23 RX ADMIN — CEFEPIME 2 G: 2 INJECTION, POWDER, FOR SOLUTION INTRAVENOUS at 05:09

## 2022-09-23 RX ADMIN — CEFEPIME 2 G: 2 INJECTION, POWDER, FOR SOLUTION INTRAVENOUS at 08:09

## 2022-09-23 RX ADMIN — POLYETHYLENE GLYCOL 3350 17 G: 17 POWDER, FOR SOLUTION ORAL at 09:09

## 2022-09-23 RX ADMIN — METHYLPREDNISOLONE SODIUM SUCCINATE 40 MG: 40 INJECTION, POWDER, FOR SOLUTION INTRAMUSCULAR; INTRAVENOUS at 09:09

## 2022-09-23 RX ADMIN — CEFEPIME 2 G: 2 INJECTION, POWDER, FOR SOLUTION INTRAVENOUS at 02:09

## 2022-09-23 RX ADMIN — FAMOTIDINE 20 MG: 20 TABLET, FILM COATED ORAL at 09:09

## 2022-09-23 RX ADMIN — LABETALOL HYDROCHLORIDE 200 MG: 200 TABLET, FILM COATED ORAL at 09:09

## 2022-09-23 RX ADMIN — SODIUM CHLORIDE: 9 INJECTION, SOLUTION INTRAVENOUS at 02:09

## 2022-09-23 RX ADMIN — AMLODIPINE BESYLATE 10 MG: 5 TABLET ORAL at 09:09

## 2022-09-23 RX ADMIN — FLUCONAZOLE 100 MG: 100 TABLET ORAL at 09:09

## 2022-09-23 NOTE — PLAN OF CARE
/72   Pulse 70   Temp 98 °F (36.7 °C) (Oral)   Resp 18   Ht 6' (1.829 m)   Wt 74.4 kg (164 lb)   SpO2 100%   BMI 22.24 kg/m²     Problem: Adult Inpatient Plan of Care  Goal: Plan of Care Review  Outcome: Ongoing, Progressing  Goal: Patient-Specific Goal (Individualized)  Outcome: Ongoing, Progressing  Goal: Absence of Hospital-Acquired Illness or Injury  Outcome: Ongoing, Progressing  Goal: Optimal Comfort and Wellbeing  Outcome: Ongoing, Progressing  Goal: Readiness for Transition of Care  Outcome: Ongoing, Progressing     Problem: Fall Injury Risk  Goal: Absence of Fall and Fall-Related Injury  Outcome: Ongoing, Progressing

## 2022-09-23 NOTE — PROGRESS NOTES
Subjective:       Patient ID: Magdaleno Hirsch is a 22 y.o. male.    Chief Complaint: Sore Throat (Sore throat-chills -feet ache weakness for 2 days-has leukemia-no treatment yet-appt 9/16-oncology)      Diagnosis:  1. CML, chronic phase  2. 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  3. Conjuctival hemorrhage secondary to 1.    Current Treatment:       Treatment History:  1. Hydroxyurea 4 g every 12 hours        HPI:  22-year-old male with no real medical history who went in for a routine eye exam on 08/18/2022 to update his eye glasses prescription.  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  He had bilateral Valdez spots with vessel tortuosity.  The optometrist recommended that the patient have blood work including testing for sickle cell disease.  The patient presented to the emergency department.  CBC in the emergency department revealed a white blood cell count of 411,900. Hemoglobin was 8.3, platelets were normal at 375,000 hundred and seventy five thousand.  Differential was suggestive of CML.  Coagulation studies revealed an INR of 1.38, PTT of 36.4 and a fibrinogen of 292.  Patient was going to present to [facility], however they were on diversion.  He was transferred from [facility] to [facility].  Hematology consulted.  Patient underwent bone marrow biopsy on 08/22/2022, this revealed CML, chronic phase, BCR/ABL1 positive.    Interval History:   22-year-old patient known to me from previous hospitalization.  Was discharged home on 08/22/2022, he was informed about twice weekly labs until follow up appointment with me and continued on hydroxyurea.  Unfortunately, the patient did not keep his lab appointments.  He presented to the emergency department on 09/05/2022 with febrile neutropenia, was admitted to the hospitalist service and transferred to main campus [facility].  Hematology consulted as patient known  to me.    Today, 09/23/2022.    Continues to look better. No bleeding issues overnight or yesterday. Occasional gum oozing with eating.       No past medical history on file.   Past Surgical History:   Procedure Laterality Date    BONE MARROW BIOPSY N/A 8/22/2022    Procedure: Biopsy-bone marrow;  Surgeon: Getachew Chen MD;  Location: [facility];  Service: General;  Laterality: N/A;    KNEE SURGERY Left      Social History     Socioeconomic History    Marital status: Single   Tobacco Use    Smoking status: Never    Smokeless tobacco: Never   Substance and Sexual Activity    Alcohol use: Never    Drug use: Never      History reviewed. No pertinent family history.   Review of patient's allergies indicates:  No Known Allergies     Review of Systems   Constitutional:  Negative for chills, diaphoresis, fatigue and unexpected weight change.   HENT:  Positive for dental problem and mouth sores. Negative for nasal congestion and sinus pressure/congestion.    Eyes:  Negative for pain.   Respiratory:  Negative for cough, chest tightness and shortness of breath.    Cardiovascular:  Negative for chest pain, palpitations and leg swelling.   Gastrointestinal:  Negative for abdominal distention, abdominal pain, blood in stool, constipation and diarrhea.   Genitourinary:  Negative for dysuria, frequency and hematuria.   Musculoskeletal:  Negative for arthralgias and back pain.   Integumentary:  Negative for rash.   Neurological:  Negative for dizziness, weakness, numbness and headaches.   Hematological:  Negative for adenopathy.   Psychiatric/Behavioral:  Negative for confusion.        Objective:      Physical Exam  Vitals reviewed.   Constitutional:       General: He is awake.      Appearance: Normal appearance.   HENT:      Head: Normocephalic and atraumatic.      Comments: + gingival hyperplasia     Right Ear: Hearing normal.      Left Ear: Hearing normal.      Nose: Nose normal.      Mouth/Throat:      Comments: Mucositis    Eyes:       General: Lids are normal. Vision grossly intact.      Extraocular Movements: Extraocular movements intact.      Conjunctiva/sclera: Conjunctivae normal.   Cardiovascular:      Rate and Rhythm: Normal rate and regular rhythm.      Pulses: Normal pulses.      Heart sounds: Normal heart sounds.   Pulmonary:      Effort: Pulmonary effort is normal.      Breath sounds: Normal breath sounds. No wheezing, rhonchi or rales.   Chest:      Comments: Soft mass in area of the sternum  Abdominal:      General: Bowel sounds are normal.      Palpations: Abdomen is soft. There is splenomegaly.      Tenderness: There is no abdominal tenderness.   Musculoskeletal:      Cervical back: Full passive range of motion without pain.      Right lower leg: No edema.      Left lower leg: No edema.   Lymphadenopathy:      Cervical: No cervical adenopathy.      Upper Body:      Right upper body: No supraclavicular or axillary adenopathy.      Left upper body: No supraclavicular or axillary adenopathy.   Skin:     General: Skin is warm.   Neurological:      General: No focal deficit present.      Mental Status: He is alert and oriented to person, place, and time.   Psychiatric:         Attention and Perception: Attention normal.         Mood and Affect: Mood and affect normal.         Behavior: Behavior is cooperative.       LABS AND IMAGING REVIEWED IN EPIC  Lab Review:  CBC:   Recent Labs     09/23/22  0416 09/22/22  0734 09/21/22  0320   WBC 1.3* 1.1* 0.7*   RBC 2.81* 2.85* 2.92*   HGB 7.4* 7.8* 7.5*   HCT 22.6* 23.8* 22.8*   PLT 20* 17* 20*       CMP:   Recent Labs     09/23/22  0416 09/20/22  0331 09/19/22  0617 09/18/22  0950 09/17/22  0346    135* 136 137 138   K 3.8 4.0 4.1 3.9 4.0   CO2 26 25 26 28 27   BUN 17.3 12.8 11.9 9.7 10.8   CREATININE 0.64* 0.75 0.86 0.73 0.74   CALCIUM 8.8 9.0 8.7 9.1 9.2   ALBUMIN 3.0*  --   --  2.9* 3.0*   BILITOT 0.6  --   --  1.1 1.2   ALKPHOS 79  --   --  59 61   AST 32  --   --  12 12   ALT 57*   --   --  13 14              Assessment:   1. CML, chronic phase  2. 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  3. Retinal hemorrhage secondary to 1.        Plan:       Patient has CML, diagnosed on bone marrow biopsy done on 08/22/2022.  Patient was discharged home on 08/22/2022 with plans to continue hydroxyurea 4 g every 12 hours and have blood work twice a week until his follow up with me and final bone marrow biopsy.Unfortunately, the patient did not have twice weekly labs, but continued on hydroxyurea.  When asked about lab appointments, the patient's wife stated that he was supposed to come on Mondays and Thursdays.  Unfortunately he was not able to make these visits.      He is now admitted with febrile neutropenia and pancytopenia almost certainly from hydroxyurea.  We will treat him with antibiotics.  So far workup is negative for COVID, flu, strep.  Thrush has improved.Continue antibiotics and fluconazole as we await count recovery.     We will need to start a TKI, likely dasatinib once the patient's counts recover.      PLAN  - Continue pulse dose steroids for suspected ITP x 5 days total. Today Day 4/5. With PPI  - Continue prophylactic abx/antifungal while we await for count recovery.  today  - No blood products needed today.   - Will continue to monitor over the weekend. If his counts continue to improve even off steroids will likely DC early next week.     All blood products to be irradiated.         Elizabeth LeJeune, MD  Hematology/Oncology

## 2022-09-23 NOTE — PROGRESS NOTES
[facility]  Hospital Medicine Progress Note        Chief Complaint: Inpatient Follow-up for Pancytopenia    HPI:   22-year-old  male with history that includes recently diagnosed CML, presented to the ED at [facility] on 09/05 with pancytopenia.  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  Work up during that time noted a white count of  411,900. Hemoglobin was 8.3, platelets were normal at 375,000.  Differential was suggestive of chronic myelogenous leukemia.  Coagulation studies revealed an INR of 1.38, PTT of 36.4 and a fibrinogen of 292.  Hematology was consulted and patient underwent bone marrow biopsy on 08/22.  This revealed chronic myelogenous leukemia, chronic phase, BCR/ABL1 positive.  Patient was discharged home on August 22, 2022 on hydroxyurea, and was supposed to obtain twice weekly labs until follow-up with Dr. Gardner.  Unfortunately he did not keep these lab appointments and now presented to ED on 9/5, where he was found to be pancytopenic. Lab note a white count of 700 (ANC of 28), H&H 5.8/18, platelets 23,000.  Patient was also noted to be febrile.  He was started on empiric antibiotic therapy, transfused 2 units of packed red blood cells and 1 of platelets and then subsequently transferred [facility] for further treatment and hematology evaluation.  He was continued on antibiotics, sepsis workup remained negative, though he was noted to have oral thrush and started on Diflucan.  Hematology was on board; counts were monitored.    Interval Hx:   No acute events overnight.  Comfortably resting in the bed.  Platelets 20 K today, slightly improved  from yesterday.  Hemoglobin stable    Objective/physical exam:  Vitals:    09/22/22 1900  09/22/22 2335 09/23/22 0431 09/23/22 0743   BP: 135/78 134/74 125/70 132/74   Pulse: 77 81 72 75   Resp: 18 18 18 18   Temp: 98.2 °F (36.8 °C) 98.6 °F (37 °C) 98.3 °F (36.8 °C) 98.1 °F (36.7 °C)   TempSrc: Oral Oral Oral Oral   SpO2: 100% 99% 98% 98%   Weight:       Height:         General: In no acute distress, afebrile  Respiratory: Clear to auscultation bilaterally  Cardiovascular: S1, S2, no appreciable murmur  Abdomen: Soft, nontender, BS +  MSK: Warm, no lower extremity edema, no clubbing or cyanosis  Neurologic: Alert and oriented x4, moving all extremities with good strength     Lab Results   Component Value Date     09/23/2022    K 3.8 09/23/2022    CO2 26 09/23/2022    BUN 17.3 09/23/2022    CREATININE 0.64 (L) 09/23/2022    CALCIUM 8.8 09/23/2022      Lab Results   Component Value Date    ALT 57 (H) 09/23/2022    AST 32 09/23/2022    ALKPHOS 79 09/23/2022    BILITOT 0.6 09/23/2022      Lab Results   Component Value Date    WBC 1.3 (LL) 09/23/2022    HGB 7.4 (L) 09/23/2022    HCT 22.6 (L) 09/23/2022    MCV 80.4 09/23/2022    PLT 20 (LL) 09/23/2022           Medications:   amLODIPine  10 mg Oral Daily    ceFEPime (MAXIPIME) IVPB  2 g Intravenous Q8H    famotidine  20 mg Oral BID    fluconazole  100 mg Oral Daily    labetaloL  200 mg Oral Q12H    methylPREDNISolone sodium succinate injection  40 mg Intravenous Daily    nystatin  500,000 Units Oral QID    pantoprazole  40 mg Oral Daily    polyethylene glycol  17 g Oral Daily      (Magic mouthwash) 1:1:1 diphenhydramine(Benadryl) 12.5mg/5ml liq, aluminum & magnesium hydroxide-simethicone (Maalox), LIDOcaine viscous 2%, sodium chloride, sodium chloride, sodium chloride, sodium chloride, acetaminophen, cloNIDine, glucagon (human recombinant), glucose, glucose, hydrALAZINE, HYDROcodone-acetaminophen, HYDROcodone-acetaminophen, labetaloL, morphine, ondansetron, senna     Assessment/Plan:    Chronic myelogenous leukemia  Febrile neutropenia  Pancytopenia  secondary to hydroxyurea  Immunocompromised secondary to above  Retinal hemorrhage related to #1  Oropharyngeal candidiasis  Essential hypertension  Medical noncompliance     Plan:   -continue steroids today 4/5.  Monitor count.  Oncology following  -Continue current antibiotic regimen, Diflucan  -continue neutropenic precautions     SCDs      Kamlesh Wheeler MD

## 2022-09-24 LAB
BASOPHILS # BLD AUTO: 0 X10(3)/MCL (ref 0–0.2)
BASOPHILS NFR BLD AUTO: 0 %
EOSINOPHIL # BLD AUTO: 0 X10(3)/MCL (ref 0–0.9)
EOSINOPHIL NFR BLD AUTO: 0 %
ERYTHROCYTE [DISTWIDTH] IN BLOOD BY AUTOMATED COUNT: 17.4 % (ref 11.5–17)
HCT VFR BLD AUTO: 23.7 % (ref 42–52)
HGB BLD-MCNC: 7.7 GM/DL (ref 14–18)
IMM GRANULOCYTES # BLD AUTO: 0 X10(3)/MCL (ref 0–0.04)
IMM GRANULOCYTES NFR BLD AUTO: 0 %
LYMPHOCYTES # BLD AUTO: 1.28 X10(3)/MCL (ref 0.6–4.6)
LYMPHOCYTES NFR BLD AUTO: 91.4 %
MCH RBC QN AUTO: 26.6 PG (ref 27–31)
MCHC RBC AUTO-ENTMCNC: 32.5 MG/DL (ref 33–36)
MCV RBC AUTO: 81.7 FL (ref 80–94)
MONOCYTES # BLD AUTO: 0.01 X10(3)/MCL (ref 0.1–1.3)
MONOCYTES NFR BLD AUTO: 0.7 %
NEUTROPHILS # BLD AUTO: 0.1 X10(3)/MCL (ref 2.1–9.2)
NEUTROPHILS NFR BLD AUTO: 7.9 %
NRBC BLD AUTO-RTO: 1.4 %
PLATELET # BLD AUTO: 33 X10(3)/MCL (ref 130–400)
PMV BLD AUTO: ABNORMAL FL
RBC # BLD AUTO: 2.9 X10(6)/MCL (ref 4.7–6.1)
WBC # SPEC AUTO: 1.4 X10(3)/MCL (ref 4.5–11.5)

## 2022-09-24 PROCEDURE — 99232 SBSQ HOSP IP/OBS MODERATE 35: CPT | Mod: ,,, | Performed by: INTERNAL MEDICINE

## 2022-09-24 PROCEDURE — 63600175 PHARM REV CODE 636 W HCPCS: Performed by: EMERGENCY MEDICINE

## 2022-09-24 PROCEDURE — 21400001 HC TELEMETRY ROOM

## 2022-09-24 PROCEDURE — 85025 COMPLETE CBC W/AUTO DIFF WBC: CPT | Performed by: INTERNAL MEDICINE

## 2022-09-24 PROCEDURE — 25000003 PHARM REV CODE 250: Performed by: INTERNAL MEDICINE

## 2022-09-24 PROCEDURE — 36415 COLL VENOUS BLD VENIPUNCTURE: CPT | Performed by: INTERNAL MEDICINE

## 2022-09-24 PROCEDURE — 25000003 PHARM REV CODE 250: Performed by: STUDENT IN AN ORGANIZED HEALTH CARE EDUCATION/TRAINING PROGRAM

## 2022-09-24 PROCEDURE — 25000003 PHARM REV CODE 250: Performed by: EMERGENCY MEDICINE

## 2022-09-24 PROCEDURE — 63700000 PHARM REV CODE 250 ALT 637 W/O HCPCS: Performed by: HOSPITALIST

## 2022-09-24 PROCEDURE — 99232 PR SUBSEQUENT HOSPITAL CARE,LEVL II: ICD-10-PCS | Mod: ,,, | Performed by: INTERNAL MEDICINE

## 2022-09-24 PROCEDURE — 63600175 PHARM REV CODE 636 W HCPCS: Performed by: STUDENT IN AN ORGANIZED HEALTH CARE EDUCATION/TRAINING PROGRAM

## 2022-09-24 RX ADMIN — PANTOPRAZOLE SODIUM 40 MG: 40 TABLET, DELAYED RELEASE ORAL at 08:09

## 2022-09-24 RX ADMIN — CEFEPIME 2 G: 2 INJECTION, POWDER, FOR SOLUTION INTRAVENOUS at 05:09

## 2022-09-24 RX ADMIN — NYSTATIN 500000 UNITS: 100000 SUSPENSION ORAL at 08:09

## 2022-09-24 RX ADMIN — METHYLPREDNISOLONE SODIUM SUCCINATE 40 MG: 40 INJECTION, POWDER, FOR SOLUTION INTRAMUSCULAR; INTRAVENOUS at 08:09

## 2022-09-24 RX ADMIN — AMLODIPINE BESYLATE 10 MG: 5 TABLET ORAL at 08:09

## 2022-09-24 RX ADMIN — FAMOTIDINE 20 MG: 20 TABLET, FILM COATED ORAL at 09:09

## 2022-09-24 RX ADMIN — FAMOTIDINE 20 MG: 20 TABLET, FILM COATED ORAL at 08:09

## 2022-09-24 RX ADMIN — FLUCONAZOLE 100 MG: 100 TABLET ORAL at 08:09

## 2022-09-24 RX ADMIN — NYSTATIN 500000 UNITS: 100000 SUSPENSION ORAL at 12:09

## 2022-09-24 RX ADMIN — LABETALOL HYDROCHLORIDE 200 MG: 200 TABLET, FILM COATED ORAL at 09:09

## 2022-09-24 RX ADMIN — NYSTATIN 500000 UNITS: 100000 SUSPENSION ORAL at 09:09

## 2022-09-24 RX ADMIN — LABETALOL HYDROCHLORIDE 200 MG: 200 TABLET, FILM COATED ORAL at 08:09

## 2022-09-24 RX ADMIN — NYSTATIN 500000 UNITS: 100000 SUSPENSION ORAL at 04:09

## 2022-09-24 NOTE — PROGRESS NOTES
[facility]  Hospital Medicine Progress Note        Chief Complaint: Inpatient Follow-up for Pancytopenia    HPI:   22-year-old  male with history that includes recently diagnosed CML, presented to the ED at [facility] on 09/05 with pancytopenia.  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  Work up during that time noted a white count of  411,900. Hemoglobin was 8.3, platelets were normal at 375,000.  Differential was suggestive of chronic myelogenous leukemia.  Coagulation studies revealed an INR of 1.38, PTT of 36.4 and a fibrinogen of 292.  Hematology was consulted and patient underwent bone marrow biopsy on 08/22.  This revealed chronic myelogenous leukemia, chronic phase, BCR/ABL1 positive.  Patient was discharged home on August 22, 2022 on hydroxyurea, and was supposed to obtain twice weekly labs until follow-up with Dr. Gardner.  Unfortunately he did not keep these lab appointments and now presented to ED on 9/5, where he was found to be pancytopenic. Lab note a white count of 700 (ANC of 28), H&H 5.8/18, platelets 23,000.  Patient was also noted to be febrile.  He was started on empiric antibiotic therapy, transfused 2 units of packed red blood cells and 1 of platelets and then subsequently transferred [facility] for further treatment and hematology evaluation.  He was continued on antibiotics, sepsis workup remained negative, though he was noted to have oral thrush and started on Diflucan.  Hematology was on board; counts were monitored.    Interval Hx:     No acute events overnight.  Platelets improving.  Finishing steroids today.  Comfortably ambulating in the room.      Objective/physical exam:  Vitals:    09/23/22 2047 09/23/22 2300 09/24/22  0500 09/24/22 0720   BP: (!) 160/90 (!) 145/75 (!) 141/72 135/82   Pulse: 71 72 66 64   Resp:       Temp:  98.4 °F (36.9 °C) 98 °F (36.7 °C) 98.5 °F (36.9 °C)   TempSrc:  Oral Oral Oral   SpO2:  98% 100% 98%   Weight:       Height:         General: In no acute distress, afebrile  Respiratory: Clear to auscultation bilaterally  Cardiovascular: S1, S2, no appreciable murmur  Abdomen: Soft, nontender, BS +  MSK: Warm, no lower extremity edema, no clubbing or cyanosis  Neurologic: Alert and oriented x4, moving all extremities with good strength     Lab Results   Component Value Date     09/23/2022    K 3.8 09/23/2022    CO2 26 09/23/2022    BUN 17.3 09/23/2022    CREATININE 0.64 (L) 09/23/2022    CALCIUM 8.8 09/23/2022      Lab Results   Component Value Date    ALT 57 (H) 09/23/2022    AST 32 09/23/2022    ALKPHOS 79 09/23/2022    BILITOT 0.6 09/23/2022      Lab Results   Component Value Date    WBC 1.4 (LL) 09/24/2022    HGB 7.7 (L) 09/24/2022    HCT 23.7 (L) 09/24/2022    MCV 81.7 09/24/2022    PLT 33 (LL) 09/24/2022           Medications:   amLODIPine  10 mg Oral Daily    famotidine  20 mg Oral BID    fluconazole  100 mg Oral Daily    labetaloL  200 mg Oral Q12H    nystatin  500,000 Units Oral QID    polyethylene glycol  17 g Oral Daily      (Magic mouthwash) 1:1:1 diphenhydramine(Benadryl) 12.5mg/5ml liq, aluminum & magnesium hydroxide-simethicone (Maalox), LIDOcaine viscous 2%, sodium chloride, sodium chloride, sodium chloride, sodium chloride, acetaminophen, cloNIDine, glucagon (human recombinant), glucose, glucose, hydrALAZINE, HYDROcodone-acetaminophen, HYDROcodone-acetaminophen, labetaloL, morphine, ondansetron, senna     Assessment/Plan:    Chronic myelogenous leukemia  Febrile neutropenia  Pancytopenia secondary to hydroxyurea  Immunocompromised secondary to above  Retinal hemorrhage related to #1  Oropharyngeal candidiasis  Essential hypertension  Medical noncompliance     Plan:   -continue steroids  today 5/5.  Monitor count, improving.  Oncology following  -Continue current antibiotic regimen, Diflucan  -continue neutropenic precautions     SCDs      Kamlesh Wheeler MD

## 2022-09-24 NOTE — PROGRESS NOTES
Subjective:       Patient ID: Magdaleno Hirsch is a 22 y.o. male.    Chief Complaint: Boredom    Diagnosis:  1. CML, chronic phase  2. 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  3. Conjuctival hemorrhage secondary to 1.    Current Treatment:       Treatment History:  1. Hydroxyurea 4 g every 12 hours    HPI:  22-year-old male with no real medical history who went in for a routine eye exam on 08/18/2022 to update his eye glasses prescription.  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  He had bilateral Valdez spots with vessel tortuosity.  The optometrist recommended that the patient have blood work including testing for sickle cell disease.  The patient presented to the emergency department.  CBC in the emergency department revealed a white blood cell count of 411,900. Hemoglobin was 8.3, platelets were normal at 375,000 hundred and seventy five thousand.  Differential was suggestive of CML.  Coagulation studies revealed an INR of 1.38, PTT of 36.4 and a fibrinogen of 292.  Patient was going to present to [place], however they were on diversion.  He was transferred from [facility] to [facility].  Hematology consulted.  Patient underwent bone marrow biopsy on 08/22/2022, this revealed CML, chronic phase, BCR/ABL1 positive.    Interval History:   22-year-old patient known to me from previous hospitalization.  Was discharged home on 08/22/2022, he was informed about twice weekly labs until follow up appointment with me and continued on hydroxyurea.  Unfortunately, the patient did not keep his lab appointments.  He presented to the emergency department on 09/05/2022 with febrile neutropenia, was admitted to the hospitalist service and transferred to main campus [facility].  Hematology consulted as patient known to me.    Today (9/24/2022):  Patient awake and alert this morning, in bed with his wife.  Remains afebrile.   He complains of frequent urination.  Still receiving IV normal saline at 125 cc/hour.  Day 5 of IV steroids.  Platelet count 56033.  No mucocutaneous bleeding.    No past medical history on file.   Past Surgical History:   Procedure Laterality Date    BONE MARROW BIOPSY N/A 8/22/2022    Procedure: Biopsy-bone marrow;  Surgeon: Getachew Chen MD;  Location: [facility];  Service: General;  Laterality: N/A;    KNEE SURGERY Left      Social History     Socioeconomic History    Marital status: Single   Tobacco Use    Smoking status: Never    Smokeless tobacco: Never   Substance and Sexual Activity    Alcohol use: Never    Drug use: Never      History reviewed. No pertinent family history.        Review of Systems   Constitutional:  Negative for chills, diaphoresis, fatigue and unexpected weight change.   HENT:  Negative for nasal congestion and sore throat.    Eyes:  Negative for pain.   Respiratory:  Negative for cough, chest tightness and shortness of breath.    Cardiovascular:  Negative for chest pain, palpitations and leg swelling.   Gastrointestinal:  Negative for abdominal distention, abdominal pain, blood in stool, constipation and diarrhea.   Genitourinary:  Negative for dysuria, frequency and hematuria.   Musculoskeletal:  Negative for arthralgias and back pain.   Integumentary:  Negative for rash.   Neurological:  Negative for dizziness, weakness, numbness and headaches.   Hematological:  Negative for adenopathy. Bruises/bleeds easily.   Psychiatric/Behavioral:  Negative for confusion.        Objective:      Physical Exam  Vitals reviewed.   Constitutional:       General: He is awake.      Appearance: Normal appearance.   HENT:      Head: Normocephalic and atraumatic.      Comments: + gingival hyperplasia     Right Ear: Hearing normal.      Left Ear: Hearing normal.      Nose: Nose normal.      Mouth/Throat:      Comments:     Eyes:      General: Lids are normal. Vision grossly intact.      Extraocular Movements:  Extraocular movements intact.      Conjunctiva/sclera: Conjunctivae normal.   Cardiovascular:      Rate and Rhythm: Normal rate and regular rhythm.      Pulses: Normal pulses.      Heart sounds: Normal heart sounds.   Pulmonary:      Effort: Pulmonary effort is normal.      Breath sounds: Normal breath sounds. No wheezing, rhonchi or rales.   Chest:      Comments: Soft mass in area of the sternum  Abdominal:      General: Bowel sounds are normal.      Palpations: Abdomen is soft. There is splenomegaly (Improved).      Tenderness: There is no abdominal tenderness.   Musculoskeletal:      Cervical back: Full passive range of motion without pain.      Right lower leg: No edema.      Left lower leg: No edema.   Lymphadenopathy:      Cervical: No cervical adenopathy.      Upper Body:      Right upper body: No supraclavicular or axillary adenopathy.      Left upper body: No supraclavicular or axillary adenopathy.   Skin:     General: Skin is warm.   Neurological:      General: No focal deficit present.      Mental Status: He is alert and oriented to person, place, and time.   Psychiatric:         Attention and Perception: Attention normal.         Mood and Affect: Mood and affect normal.         Behavior: Behavior is cooperative.       LABS AND IMAGING REVIEWED IN EPIC  Lab Review:  CBC:   Recent Labs     09/24/22  0358 09/23/22  0416 09/22/22  0734   WBC 1.4* 1.3* 1.1*   RBC 2.90* 2.81* 2.85*   HGB 7.7* 7.4* 7.8*   HCT 23.7* 22.6* 23.8*   PLT 33* 20* 17*       CMP:   Recent Labs     09/23/22  0416 09/20/22  0331 09/19/22  0617 09/18/22  0950 09/17/22  0346    135* 136 137 138   K 3.8 4.0 4.1 3.9 4.0   CO2 26 25 26 28 27   BUN 17.3 12.8 11.9 9.7 10.8   CREATININE 0.64* 0.75 0.86 0.73 0.74   CALCIUM 8.8 9.0 8.7 9.1 9.2   ALBUMIN 3.0*  --   --  2.9* 3.0*   BILITOT 0.6  --   --  1.1 1.2   ALKPHOS 79  --   --  59 61   AST 32  --   --  12 12   ALT 57*  --   --  13 14              Assessment:   1. CML, chronic phase  2.  Pancytopenia secondary to Hydrea  3. Refractory thrombocytopenia - improving        Plan:     He remains afebrile off of IV antibiotics.  WBC stable.  He remains neutropenic.  Day 5 IV steroids.  Platelet count improved.  Hep-Lock IV.  Dr. Hogan will round tomorrow.    All blood products to be irradiated.       LESLEY ALMAGUER MD  Hematology/Oncology

## 2022-09-24 NOTE — PLAN OF CARE
Problem: Adult Inpatient Plan of Care  Goal: Plan of Care Review  Outcome: Ongoing, Progressing  Goal: Patient-Specific Goal (Individualized)  Outcome: Ongoing, Progressing  Goal: Absence of Hospital-Acquired Illness or Injury  Outcome: Ongoing, Progressing  Intervention: Identify and Manage Fall Risk  Flowsheets (Taken 9/23/2022 2000)  Safety Promotion/Fall Prevention:   assistive device/personal item within reach   lighting adjusted   medications reviewed   high risk medications identified   side rails raised x 2   nonskid shoes/socks when out of bed  Intervention: Prevent Skin Injury  Flowsheets (Taken 9/23/2022 2000)  Skin Protection:   adhesive use limited   protective footwear used   tubing/devices free from skin contact  Intervention: Prevent and Manage VTE (Venous Thromboembolism) Risk  Flowsheets (Taken 9/23/2022 2000)  Activity Management:   Ambulated to bathroom - L4   Ambulated in room - L4  VTE Prevention/Management:   intravenous hydration   ambulation promoted  Intervention: Prevent Infection  Flowsheets (Taken 9/23/2022 2000)  Infection Prevention:   hand hygiene promoted   single patient room provided   equipment surfaces disinfected   rest/sleep promoted  Goal: Optimal Comfort and Wellbeing  Outcome: Ongoing, Progressing  Intervention: Monitor Pain and Promote Comfort  Flowsheets (Taken 9/23/2022 2000)  Pain Management Interventions:   quiet environment facilitated   care clustered  Intervention: Provide Person-Centered Care  Flowsheets (Taken 9/23/2022 2000)  Trust Relationship/Rapport:   care explained   choices provided   emotional support provided   empathic listening provided   questions answered   thoughts/feelings acknowledged  Goal: Readiness for Transition of Care  Outcome: Ongoing, Progressing     Problem: Fall Injury Risk  Goal: Absence of Fall and Fall-Related Injury  Outcome: Ongoing, Progressing  Intervention: Identify and Manage Contributors  Flowsheets (Taken 9/23/2022  2000)  Self-Care Promotion: independence encouraged  Medication Review/Management: medications reviewed  Intervention: Promote Injury-Free Environment  Flowsheets (Taken 9/23/2022 2000)  Safety Promotion/Fall Prevention:   assistive device/personal item within reach   lighting adjusted   medications reviewed   high risk medications identified   side rails raised x 2   nonskid shoes/socks when out of bed

## 2022-09-25 LAB
ABS NEUT (OLG): 0.1 X10(3)/MCL (ref 2.1–9.2)
ANISOCYTOSIS BLD QL SMEAR: ABNORMAL
ERYTHROCYTE [DISTWIDTH] IN BLOOD BY AUTOMATED COUNT: 17.4 % (ref 11.5–17)
HCT VFR BLD AUTO: 22.3 % (ref 42–52)
HGB BLD-MCNC: 7.1 GM/DL (ref 14–18)
IMM GRANULOCYTES # BLD AUTO: 0.01 X10(3)/MCL (ref 0–0.04)
IMM GRANULOCYTES NFR BLD AUTO: 0.7 %
INSTRUMENT WBC (OLG): 1.5 X10(3)/MCL
LYMPHOCYTES NFR BLD MANUAL: 1.4 X10(3)/MCL
LYMPHOCYTES NFR BLD MANUAL: 93 %
MCH RBC QN AUTO: 26 PG (ref 27–31)
MCHC RBC AUTO-ENTMCNC: 31.8 MG/DL (ref 33–36)
MCV RBC AUTO: 81.7 FL (ref 80–94)
MICROCYTES BLD QL SMEAR: ABNORMAL
NEUTROPHILS NFR BLD MANUAL: 7 %
NRBC BLD AUTO-RTO: 1.3 %
NRBC BLD MANUAL-RTO: 1 %
PLATELET # BLD AUTO: 55 X10(3)/MCL (ref 130–400)
PLATELET # BLD EST: ABNORMAL 10*3/UL
PMV BLD AUTO: ABNORMAL FL
POIKILOCYTOSIS BLD QL SMEAR: ABNORMAL
RBC # BLD AUTO: 2.73 X10(6)/MCL (ref 4.7–6.1)
RBC MORPH BLD: ABNORMAL
STOMATOCYTES (OLG): ABNORMAL
WBC # SPEC AUTO: 1.5 X10(3)/MCL (ref 4.5–11.5)

## 2022-09-25 PROCEDURE — 99232 PR SUBSEQUENT HOSPITAL CARE,LEVL II: ICD-10-PCS | Mod: ,,, | Performed by: INTERNAL MEDICINE

## 2022-09-25 PROCEDURE — 25000003 PHARM REV CODE 250: Performed by: INTERNAL MEDICINE

## 2022-09-25 PROCEDURE — 85025 COMPLETE CBC W/AUTO DIFF WBC: CPT | Performed by: INTERNAL MEDICINE

## 2022-09-25 PROCEDURE — 21400001 HC TELEMETRY ROOM

## 2022-09-25 PROCEDURE — 36415 COLL VENOUS BLD VENIPUNCTURE: CPT | Performed by: INTERNAL MEDICINE

## 2022-09-25 PROCEDURE — 63700000 PHARM REV CODE 250 ALT 637 W/O HCPCS: Performed by: HOSPITALIST

## 2022-09-25 PROCEDURE — 99232 SBSQ HOSP IP/OBS MODERATE 35: CPT | Mod: ,,, | Performed by: INTERNAL MEDICINE

## 2022-09-25 PROCEDURE — 25000003 PHARM REV CODE 250: Performed by: EMERGENCY MEDICINE

## 2022-09-25 RX ADMIN — FLUCONAZOLE 100 MG: 100 TABLET ORAL at 08:09

## 2022-09-25 RX ADMIN — NYSTATIN 500000 UNITS: 100000 SUSPENSION ORAL at 05:09

## 2022-09-25 RX ADMIN — NYSTATIN 500000 UNITS: 100000 SUSPENSION ORAL at 08:09

## 2022-09-25 RX ADMIN — AMLODIPINE BESYLATE 10 MG: 5 TABLET ORAL at 08:09

## 2022-09-25 RX ADMIN — LABETALOL HYDROCHLORIDE 200 MG: 200 TABLET, FILM COATED ORAL at 08:09

## 2022-09-25 RX ADMIN — FAMOTIDINE 20 MG: 20 TABLET, FILM COATED ORAL at 08:09

## 2022-09-25 RX ADMIN — NYSTATIN 500000 UNITS: 100000 SUSPENSION ORAL at 12:09

## 2022-09-25 NOTE — PLAN OF CARE
Problem: Adult Inpatient Plan of Care  Goal: Plan of Care Review  Outcome: Ongoing, Progressing  Flowsheets (Taken 9/25/2022 0239)  Plan of Care Reviewed With:   patient   spouse  Goal: Patient-Specific Goal (Individualized)  Outcome: Ongoing, Progressing  Goal: Absence of Hospital-Acquired Illness or Injury  Outcome: Ongoing, Progressing  Intervention: Identify and Manage Fall Risk  Flowsheets (Taken 9/25/2022 0239)  Safety Promotion/Fall Prevention:   assistive device/personal item within reach   nonskid shoes/socks when out of bed   side rails raised x 2  Intervention: Prevent Infection  Flowsheets (Taken 9/25/2022 0239)  Infection Prevention:   hand hygiene promoted   rest/sleep promoted  Goal: Optimal Comfort and Wellbeing  Outcome: Ongoing, Progressing  Intervention: Monitor Pain and Promote Comfort  Flowsheets (Taken 9/25/2022 0239)  Pain Management Interventions: care clustered  Goal: Readiness for Transition of Care  Outcome: Ongoing, Progressing     Problem: Fall Injury Risk  Goal: Absence of Fall and Fall-Related Injury  Outcome: Ongoing, Progressing  Intervention: Promote Injury-Free Environment  Flowsheets (Taken 9/25/2022 0239)  Safety Promotion/Fall Prevention:   assistive device/personal item within reach   nonskid shoes/socks when out of bed   side rails raised x 2

## 2022-09-25 NOTE — PROGRESS NOTES
Subjective:       Patient ID: Magdaleno Hirsch is a 22 y.o. male.    Chief Complaint: Boredom    Diagnosis:  1. CML, chronic phase  2. 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  3. Conjuctival hemorrhage secondary to 1.    Current Treatment:       Treatment History:  1. Hydroxyurea 4 g every 12 hours    HPI:  22-year-old male with no real medical history who went in for a routine eye exam on 08/18/2022 to update his eye glasses prescription.  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  He had bilateral Valdez spots with vessel tortuosity.  The optometrist recommended that the patient have blood work including testing for sickle cell disease.  The patient presented to the emergency department.  CBC in the emergency department revealed a white blood cell count of 411,900. Hemoglobin was 8.3, platelets were normal at 375,000 hundred and seventy five thousand.  Differential was suggestive of CML.  Coagulation studies revealed an INR of 1.38, PTT of 36.4 and a fibrinogen of 292.  Patient was going to present to [place], however they were on diversion.  He was transferred from [facility] to [facility].  Hematology consulted.  Patient underwent bone marrow biopsy on 08/22/2022, this revealed CML, chronic phase, BCR/ABL1 positive.    Interval History:   22-year-old patient known to me from previous hospitalization.  Was discharged home on 08/22/2022, he was informed about twice weekly labs until follow up appointment with me and continued on hydroxyurea.  Unfortunately, the patient did not keep his lab appointments.  He presented to the emergency department on 09/05/2022 with febrile neutropenia, was admitted to the hospitalist service and transferred to main campus [facility].  Hematology consulted as patient known to me.    Today (9/25/2022):  Patient awake and alert this morning, in bed with his child.  Remains afebrile.   He denies any recurrent bleeding.  He states that he is ready to go home.  He is not had any issues.    No past medical history on file.   Past Surgical History:   Procedure Laterality Date    BONE MARROW BIOPSY N/A 8/22/2022    Procedure: Biopsy-bone marrow;  Surgeon: Getachew Chen MD;  Location: [facility];  Service: General;  Laterality: N/A;    KNEE SURGERY Left      Social History     Socioeconomic History    Marital status: Single   Tobacco Use    Smoking status: Never    Smokeless tobacco: Never   Substance and Sexual Activity    Alcohol use: Never    Drug use: Never      History reviewed. No pertinent family history.        Review of Systems   Constitutional:  Negative for chills, diaphoresis, fatigue and unexpected weight change.   HENT:  Negative for nasal congestion and sore throat.    Eyes:  Negative for pain.   Respiratory:  Negative for cough, chest tightness and shortness of breath.    Cardiovascular:  Negative for chest pain, palpitations and leg swelling.   Gastrointestinal:  Negative for abdominal distention, abdominal pain, blood in stool, constipation and diarrhea.   Genitourinary:  Negative for dysuria, frequency and hematuria.   Musculoskeletal:  Negative for arthralgias and back pain.   Integumentary:  Negative for rash.   Neurological:  Negative for dizziness, weakness, numbness and headaches.   Hematological:  Negative for adenopathy. Bruises/bleeds easily.   Psychiatric/Behavioral:  Negative for confusion.        Objective:      Physical Exam  Vitals reviewed.   Constitutional:       General: He is awake.      Appearance: Normal appearance.   HENT:      Head: Normocephalic and atraumatic.      Comments: + gingival hyperplasia     Right Ear: Hearing normal.      Left Ear: Hearing normal.      Nose: Nose normal.      Mouth/Throat:      Comments:     Eyes:      General: Lids are normal. Vision grossly intact.      Extraocular Movements: Extraocular movements intact.      Conjunctiva/sclera:  Conjunctivae normal.   Cardiovascular:      Rate and Rhythm: Normal rate and regular rhythm.      Pulses: Normal pulses.      Heart sounds: Normal heart sounds.   Pulmonary:      Effort: Pulmonary effort is normal.      Breath sounds: Normal breath sounds. No wheezing, rhonchi or rales.   Chest:      Comments: Soft mass in area of the sternum  Abdominal:      General: Bowel sounds are normal.      Palpations: Abdomen is soft. There is splenomegaly (Improved).      Tenderness: There is no abdominal tenderness.   Musculoskeletal:      Cervical back: Full passive range of motion without pain.      Right lower leg: No edema.      Left lower leg: No edema.   Lymphadenopathy:      Cervical: No cervical adenopathy.      Upper Body:      Right upper body: No supraclavicular or axillary adenopathy.      Left upper body: No supraclavicular or axillary adenopathy.   Skin:     General: Skin is warm.   Neurological:      General: No focal deficit present.      Mental Status: He is alert and oriented to person, place, and time.   Psychiatric:         Attention and Perception: Attention normal.         Mood and Affect: Mood and affect normal.         Behavior: Behavior is cooperative.       LABS AND IMAGING REVIEWED IN EPIC  Lab Review:  CBC:   Recent Labs     09/25/22  0434 09/24/22  0358 09/23/22  0416   WBC 1.5* 1.4* 1.3*   RBC 2.73* 2.90* 2.81*   HGB 7.1* 7.7* 7.4*   HCT 22.3* 23.7* 22.6*   PLT 55* 33* 20*       CMP:   Recent Labs     09/23/22  0416 09/20/22  0331 09/19/22  0617 09/18/22  0950 09/17/22  0346    135* 136 137 138   K 3.8 4.0 4.1 3.9 4.0   CO2 26 25 26 28 27   BUN 17.3 12.8 11.9 9.7 10.8   CREATININE 0.64* 0.75 0.86 0.73 0.74   CALCIUM 8.8 9.0 8.7 9.1 9.2   ALBUMIN 3.0*  --   --  2.9* 3.0*   BILITOT 0.6  --   --  1.1 1.2   ALKPHOS 79  --   --  59 61   AST 32  --   --  12 12   ALT 57*  --   --  13 14              Assessment:   1. CML, chronic phase  2. Pancytopenia secondary to Hydrea  3. Refractory  thrombocytopenia - improving        Plan:     He remains afebrile off of IV antibiotics.    He remains neutropenic, however white blood cell count and ANC are increasing.    Patient completed 5 days of steroids, platelet counts have improved daily.    Discharge early this week.    All blood products to be irradiated.     Paul Hogan II, MD  Hematology/Oncology

## 2022-09-26 LAB
ABO + RH BLD: NORMAL
ABO + RH BLD: NORMAL
BASOPHILS # BLD AUTO: 0 X10(3)/MCL (ref 0–0.2)
BASOPHILS NFR BLD AUTO: 0 %
BLD PROD TYP BPU: NORMAL
BLD PROD TYP BPU: NORMAL
BLOOD UNIT EXPIRATION DATE: NORMAL
BLOOD UNIT EXPIRATION DATE: NORMAL
BLOOD UNIT TYPE CODE: 1700
BLOOD UNIT TYPE CODE: 1700
CROSSMATCH INTERPRETATION: NORMAL
CROSSMATCH INTERPRETATION: NORMAL
DISPENSE STATUS: NORMAL
DISPENSE STATUS: NORMAL
EOSINOPHIL # BLD AUTO: 0 X10(3)/MCL (ref 0–0.9)
EOSINOPHIL NFR BLD AUTO: 0 %
ERYTHROCYTE [DISTWIDTH] IN BLOOD BY AUTOMATED COUNT: 18 % (ref 11.5–17)
GROUP & RH: NORMAL
HCT VFR BLD AUTO: 20.9 % (ref 42–52)
HGB BLD-MCNC: 6.7 GM/DL (ref 14–18)
IMM GRANULOCYTES # BLD AUTO: 0.01 X10(3)/MCL (ref 0–0.04)
IMM GRANULOCYTES NFR BLD AUTO: 0.6 %
INDIRECT COOMBS GEL: NORMAL
LYMPHOCYTES # BLD AUTO: 1.4 X10(3)/MCL (ref 0.6–4.6)
LYMPHOCYTES NFR BLD AUTO: 87.5 %
MCH RBC QN AUTO: 25.6 PG (ref 27–31)
MCHC RBC AUTO-ENTMCNC: 32.1 MG/DL (ref 33–36)
MCV RBC AUTO: 79.8 FL (ref 80–94)
MONOCYTES # BLD AUTO: 0.03 X10(3)/MCL (ref 0.1–1.3)
MONOCYTES NFR BLD AUTO: 1.9 %
NEUTROPHILS # BLD AUTO: 0.2 X10(3)/MCL (ref 2.1–9.2)
NEUTROPHILS NFR BLD AUTO: 10 %
NRBC BLD AUTO-RTO: 2.5 %
PLATELET # BLD AUTO: 78 X10(3)/MCL (ref 130–400)
PMV BLD AUTO: 10.3 FL (ref 7.4–10.4)
RBC # BLD AUTO: 2.62 X10(6)/MCL (ref 4.7–6.1)
UNIT NUMBER: NORMAL
UNIT NUMBER: NORMAL
WBC # SPEC AUTO: 1.6 X10(3)/MCL (ref 4.5–11.5)

## 2022-09-26 PROCEDURE — P9040 RBC LEUKOREDUCED IRRADIATED: HCPCS | Performed by: INTERNAL MEDICINE

## 2022-09-26 PROCEDURE — 36430 TRANSFUSION BLD/BLD COMPNT: CPT

## 2022-09-26 PROCEDURE — 25000003 PHARM REV CODE 250: Performed by: EMERGENCY MEDICINE

## 2022-09-26 PROCEDURE — 85025 COMPLETE CBC W/AUTO DIFF WBC: CPT | Performed by: INTERNAL MEDICINE

## 2022-09-26 PROCEDURE — 63700000 PHARM REV CODE 250 ALT 637 W/O HCPCS: Performed by: HOSPITALIST

## 2022-09-26 PROCEDURE — 99232 PR SUBSEQUENT HOSPITAL CARE,LEVL II: ICD-10-PCS | Mod: ,,, | Performed by: INTERNAL MEDICINE

## 2022-09-26 PROCEDURE — 36415 COLL VENOUS BLD VENIPUNCTURE: CPT | Performed by: INTERNAL MEDICINE

## 2022-09-26 PROCEDURE — 25000003 PHARM REV CODE 250: Performed by: INTERNAL MEDICINE

## 2022-09-26 PROCEDURE — 21400001 HC TELEMETRY ROOM

## 2022-09-26 PROCEDURE — 86901 BLOOD TYPING SEROLOGIC RH(D): CPT | Performed by: INTERNAL MEDICINE

## 2022-09-26 PROCEDURE — 99232 SBSQ HOSP IP/OBS MODERATE 35: CPT | Mod: ,,, | Performed by: INTERNAL MEDICINE

## 2022-09-26 PROCEDURE — 86920 COMPATIBILITY TEST SPIN: CPT | Performed by: INTERNAL MEDICINE

## 2022-09-26 RX ORDER — HYDROCODONE BITARTRATE AND ACETAMINOPHEN 500; 5 MG/1; MG/1
TABLET ORAL
Status: DISCONTINUED | OUTPATIENT
Start: 2022-09-26 | End: 2022-09-27 | Stop reason: HOSPADM

## 2022-09-26 RX ADMIN — NYSTATIN 500000 UNITS: 100000 SUSPENSION ORAL at 04:09

## 2022-09-26 RX ADMIN — NYSTATIN 500000 UNITS: 100000 SUSPENSION ORAL at 08:09

## 2022-09-26 RX ADMIN — FLUCONAZOLE 100 MG: 100 TABLET ORAL at 08:09

## 2022-09-26 RX ADMIN — FAMOTIDINE 20 MG: 20 TABLET, FILM COATED ORAL at 08:09

## 2022-09-26 RX ADMIN — NYSTATIN 500000 UNITS: 100000 SUSPENSION ORAL at 01:09

## 2022-09-26 RX ADMIN — LABETALOL HYDROCHLORIDE 200 MG: 200 TABLET, FILM COATED ORAL at 08:09

## 2022-09-26 RX ADMIN — AMLODIPINE BESYLATE 10 MG: 5 TABLET ORAL at 08:09

## 2022-09-26 NOTE — PLAN OF CARE
Problem: Adult Inpatient Plan of Care  Goal: Plan of Care Review  Outcome: Ongoing, Progressing  Goal: Patient-Specific Goal (Individualized)  Outcome: Ongoing, Progressing  Goal: Absence of Hospital-Acquired Illness or Injury  Outcome: Ongoing, Progressing  Intervention: Identify and Manage Fall Risk  Flowsheets (Taken 9/26/2022 0754)  Safety Promotion/Fall Prevention:   assistive device/personal item within reach   side rails raised x 2   nonskid shoes/socks when out of bed  Intervention: Prevent and Manage VTE (Venous Thromboembolism) Risk  Flowsheets (Taken 9/26/2022 0754)  Activity Management: Ambulated to bathroom -   Goal: Optimal Comfort and Wellbeing  Outcome: Ongoing, Progressing  Intervention: Provide Person-Centered Care  Flowsheets (Taken 9/26/2022 0754)  Trust Relationship/Rapport:   empathic listening provided   questions answered   thoughts/feelings acknowledged   emotional support provided   questions encouraged  Goal: Readiness for Transition of Care  Outcome: Ongoing, Progressing     Problem: Fall Injury Risk  Goal: Absence of Fall and Fall-Related Injury  Outcome: Ongoing, Progressing  Intervention: Promote Injury-Free Environment  Flowsheets (Taken 9/26/2022 0754)  Safety Promotion/Fall Prevention:   assistive device/personal item within reach   side rails raised x 2   nonskid shoes/socks when out of bed

## 2022-09-26 NOTE — PLAN OF CARE
Problem: Adult Inpatient Plan of Care  Goal: Plan of Care Review  Outcome: Ongoing, Progressing  Flowsheets (Taken 9/25/2022 1913)  Plan of Care Reviewed With: patient  Goal: Patient-Specific Goal (Individualized)  Outcome: Ongoing, Progressing  Goal: Absence of Hospital-Acquired Illness or Injury  Outcome: Ongoing, Progressing  Intervention: Identify and Manage Fall Risk  Flowsheets (Taken 9/25/2022 1913)  Safety Promotion/Fall Prevention:   assistive device/personal item within reach   nonskid shoes/socks when out of bed   side rails raised x 2  Intervention: Prevent and Manage VTE (Venous Thromboembolism) Risk  Flowsheets (Taken 9/25/2022 1913)  VTE Prevention/Management:   bleeding risk assessed   ambulation promoted  Intervention: Prevent Infection  Flowsheets (Taken 9/25/2022 1913)  Infection Prevention:   hand hygiene promoted   single patient room provided   rest/sleep promoted   equipment surfaces disinfected  Goal: Optimal Comfort and Wellbeing  Outcome: Ongoing, Progressing  Intervention: Monitor Pain and Promote Comfort  Flowsheets (Taken 9/25/2022 1913)  Pain Management Interventions: care clustered  Goal: Readiness for Transition of Care  Outcome: Ongoing, Progressing     Problem: Fall Injury Risk  Goal: Absence of Fall and Fall-Related Injury  Outcome: Ongoing, Progressing

## 2022-09-26 NOTE — PROGRESS NOTES
[facility]  Hospital Medicine Progress Note        Chief Complaint: Inpatient Follow-up for Pancytopenia    HPI:   22-year-old  male with history that includes recently diagnosed CML, presented to the ED at [facility] on 09/05 with pancytopenia.  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  Work up during that time noted a white count of  411,900. Hemoglobin was 8.3, platelets were normal at 375,000.  Differential was suggestive of chronic myelogenous leukemia.  Coagulation studies revealed an INR of 1.38, PTT of 36.4 and a fibrinogen of 292.  Hematology was consulted and patient underwent bone marrow biopsy on 08/22.  This revealed chronic myelogenous leukemia, chronic phase, BCR/ABL1 positive.  Patient was discharged home on August 22, 2022 on hydroxyurea, and was supposed to obtain twice weekly labs until follow-up with Dr. Gardner.  Unfortunately he did not keep these lab appointments and now presented to ED on 9/5, where he was found to be pancytopenic. Lab note a white count of 700 (ANC of 28), H&H 5.8/18, platelets 23,000.  Patient was also noted to be febrile.  He was started on empiric antibiotic therapy, transfused 2 units of packed red blood cells and 1 of platelets and then subsequently transferred [facility] for further treatment and hematology evaluation.  He was continued on antibiotics, sepsis workup remained negative, though he was noted to have oral thrush and started on Diflucan.  Hematology was on board; counts were monitored.    Interval Hx:     9/24/22-No acute events overnight.  Platelets improving.  Finishing steroids today.  Comfortably ambulating in the room.      9/25/22 voices no complains but disappeared  for prolonged time ,  room smells kind of funny. Told nurse to cancel hospital privileges . Voices no complains , playing [employer]. Plt's improving.    9/26/22 post 2 units prbc/plts at 78,000/no new issues. Will discharge in am with follow up appt with oncology    Objective/physical exam:  Vitals:    09/26/22 1200 09/26/22 1610 09/26/22 1645 09/26/22 1700   BP: (!) 143/81 126/64 (!) 148/81 (!) 151/88   BP Location: Left arm Right arm     Patient Position:  Lying     Pulse: 77 90 88 86   Resp: 18 18  18   Temp: 98.1 °F (36.7 °C) 98.1 °F (36.7 °C) 98.5 °F (36.9 °C) 99.1 °F (37.3 °C)   TempSrc: Oral Oral Oral Oral   SpO2: 100% 100% 100% 100%   Weight:       Height:         General: In no acute distress, afebrile  Respiratory: Clear to auscultation bilaterally  Cardiovascular: S1, S2, no appreciable murmur  Abdomen: Soft, nontender, BS +  MSK: Warm, no lower extremity edema, no clubbing or cyanosis  Neurologic: Alert and oriented x4, moving all extremities with good strength     Lab Results   Component Value Date     09/23/2022    K 3.8 09/23/2022    CO2 26 09/23/2022    BUN 17.3 09/23/2022    CREATININE 0.64 (L) 09/23/2022    CALCIUM 8.8 09/23/2022      Lab Results   Component Value Date    ALT 57 (H) 09/23/2022    AST 32 09/23/2022    ALKPHOS 79 09/23/2022    BILITOT 0.6 09/23/2022      Lab Results   Component Value Date    WBC 1.6 (LL) 09/26/2022    HGB 6.7 (L) 09/26/2022    HCT 20.9 (L) 09/26/2022    MCV 79.8 (L) 09/26/2022    PLT 78 (L) 09/26/2022           Medications:   amLODIPine  10 mg Oral Daily    famotidine  20 mg Oral BID    fluconazole  100 mg Oral Daily    labetaloL  200 mg Oral Q12H    nystatin  500,000 Units Oral QID    polyethylene glycol  17 g Oral Daily      (Magic mouthwash) 1:1:1 diphenhydramine(Benadryl) 12.5mg/5ml liq, aluminum & magnesium hydroxide-simethicone (Maalox), LIDOcaine viscous 2%, sodium chloride, sodium chloride, sodium chloride, sodium chloride, sodium chloride, acetaminophen, cloNIDine, glucagon  (human recombinant), glucose, glucose, hydrALAZINE, HYDROcodone-acetaminophen, HYDROcodone-acetaminophen, labetaloL, morphine, ondansetron, senna     Assessment/Plan:    Chronic myelogenous leukemia  Febrile neutropenia- remains afebrile off abx's   Pancytopenia secondary to hydroxyurea- improving  Immunocompromised secondary to above  Retinal hemorrhage related to #1  Oropharyngeal candidiasis- resolved   Essential hypertension- bp elevated   Medical noncompliance  Acute symptomatic anemia - post 2 units prbc 9-26-22     Plan:   -completed  steroids 5/5.  Monitor count, improving.  Oncology following  -off antibiotic regimen,remains afebrile    Diflucan/nystatin for oral thrush- resolved  -continues neutropenic but plt's improving w no signs of bleeding  -am labs   - post 2 units prbc today  - will discharge in am w follow up with dr orellana      Cc time 35 minutes   Cc dx- acute symptomatic anemia requiring prbc transfusion     SCDs

## 2022-09-26 NOTE — PROGRESS NOTES
[facility]  Hospital Medicine Progress Note        Chief Complaint: Inpatient Follow-up for Pancytopenia    HPI:   22-year-old  male with history that includes recently diagnosed CML, presented to the ED at [facility] on 09/05 with pancytopenia.  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  Work up during that time noted a white count of  411,900. Hemoglobin was 8.3, platelets were normal at 375,000.  Differential was suggestive of chronic myelogenous leukemia.  Coagulation studies revealed an INR of 1.38, PTT of 36.4 and a fibrinogen of 292.  Hematology was consulted and patient underwent bone marrow biopsy on 08/22.  This revealed chronic myelogenous leukemia, chronic phase, BCR/ABL1 positive.  Patient was discharged home on August 22, 2022 on hydroxyurea, and was supposed to obtain twice weekly labs until follow-up with Dr. Gardner.  Unfortunately he did not keep these lab appointments and now presented to ED on 9/5, where he was found to be pancytopenic. Lab note a white count of 700 (ANC of 28), H&H 5.8/18, platelets 23,000.  Patient was also noted to be febrile.  He was started on empiric antibiotic therapy, transfused 2 units of packed red blood cells and 1 of platelets and then subsequently transferred [facility] for further treatment and hematology evaluation.  He was continued on antibiotics, sepsis workup remained negative, though he was noted to have oral thrush and started on Diflucan.  Hematology was on board; counts were monitored.    Interval Hx:     9/24/22-No acute events overnight.  Platelets improving.  Finishing steroids today.  Comfortably ambulating in the room.      9/25/22 voices no complains but disappeared  for prolonged time ,  room smells kind of funny. Told nurse to cancel hospital privileges . Voices no complains , playing [employer]. Plt's improving.    Objective/physical exam:  Vitals:    09/25/22 0736 09/25/22 1114 09/25/22 1614 09/25/22 2001   BP: (!) 150/77 (!) 141/80 134/69 130/87   Pulse: 83 78 74 (!) 113   Resp:       Temp: 99 °F (37.2 °C) 98.5 °F (36.9 °C) 98.2 °F (36.8 °C) 98.4 °F (36.9 °C)   TempSrc: Oral Oral Oral Oral   SpO2: 100% 100% 100% 100%   Weight:       Height:         General: In no acute distress, afebrile  Respiratory: Clear to auscultation bilaterally  Cardiovascular: S1, S2, no appreciable murmur  Abdomen: Soft, nontender, BS +  MSK: Warm, no lower extremity edema, no clubbing or cyanosis  Neurologic: Alert and oriented x4, moving all extremities with good strength     Lab Results   Component Value Date     09/23/2022    K 3.8 09/23/2022    CO2 26 09/23/2022    BUN 17.3 09/23/2022    CREATININE 0.64 (L) 09/23/2022    CALCIUM 8.8 09/23/2022      Lab Results   Component Value Date    ALT 57 (H) 09/23/2022    AST 32 09/23/2022    ALKPHOS 79 09/23/2022    BILITOT 0.6 09/23/2022      Lab Results   Component Value Date    WBC 1.5 (LL) 09/25/2022    HGB 7.1 (L) 09/25/2022    HCT 22.3 (L) 09/25/2022    MCV 81.7 09/25/2022    PLT 55 (L) 09/25/2022           Medications:   amLODIPine  10 mg Oral Daily    famotidine  20 mg Oral BID    fluconazole  100 mg Oral Daily    labetaloL  200 mg Oral Q12H    nystatin  500,000 Units Oral QID    polyethylene glycol  17 g Oral Daily      (Magic mouthwash) 1:1:1 diphenhydramine(Benadryl) 12.5mg/5ml liq, aluminum & magnesium hydroxide-simethicone (Maalox), LIDOcaine viscous 2%, sodium chloride, sodium chloride, sodium chloride, sodium chloride, acetaminophen, cloNIDine, glucagon (human recombinant), glucose, glucose, hydrALAZINE, HYDROcodone-acetaminophen, HYDROcodone-acetaminophen, labetaloL, morphine, ondansetron, senna     Assessment/Plan:    Chronic myelogenous leukemia  Febrile  neutropenia- remains afebrile off abx's   Pancytopenia secondary to hydroxyurea- improving  Immunocompromised secondary to above  Retinal hemorrhage related to #1  Oropharyngeal candidiasis- resolved   Essential hypertension- bp elevated   Medical noncompliance     Plan:   -completed  steroids 5/5.  Monitor count, improving.  Oncology following  -off antibiotic regimen,remains afebrile    Diflucan/nystatin for oral thrush- resolved  -continues neutropenic but plt's improving w no signs of bleeding  -am labs      SCDs

## 2022-09-27 VITALS
HEART RATE: 72 BPM | RESPIRATION RATE: 20 BRPM | OXYGEN SATURATION: 99 % | HEIGHT: 72 IN | TEMPERATURE: 99 F | SYSTOLIC BLOOD PRESSURE: 128 MMHG | BODY MASS INDEX: 22.21 KG/M2 | WEIGHT: 164 LBS | DIASTOLIC BLOOD PRESSURE: 65 MMHG

## 2022-09-27 DIAGNOSIS — C92.10 CML (CHRONIC MYELOID LEUKEMIA): Primary | ICD-10-CM

## 2022-09-27 PROBLEM — I10 HVD (HYPERTENSIVE VASCULAR DISEASE): Chronic | Status: ACTIVE | Noted: 2022-09-27

## 2022-09-27 PROBLEM — D61.818 PANCYTOPENIA WITH FEVER: Status: RESOLVED | Noted: 2022-09-27 | Resolved: 2022-09-27

## 2022-09-27 PROBLEM — D61.818 PANCYTOPENIA WITH FEVER: Status: ACTIVE | Noted: 2022-09-27

## 2022-09-27 PROBLEM — B37.0 OROPHARYNGEAL CANDIDIASIS: Status: ACTIVE | Noted: 2022-09-27

## 2022-09-27 PROBLEM — R50.81 PANCYTOPENIA WITH FEVER: Status: ACTIVE | Noted: 2022-09-27

## 2022-09-27 PROBLEM — R50.81 PANCYTOPENIA WITH FEVER: Status: RESOLVED | Noted: 2022-09-27 | Resolved: 2022-09-27

## 2022-09-27 LAB
ABS NEUT (OLG): 0.34 X10(3)/MCL (ref 2.1–9.2)
ANISOCYTOSIS BLD QL SMEAR: ABNORMAL
BASOPHILS NFR BLD MANUAL: 0.02 X10(3)/MCL (ref 0–0.2)
BASOPHILS NFR BLD MANUAL: 1 %
ERYTHROCYTE [DISTWIDTH] IN BLOOD BY AUTOMATED COUNT: 16.5 % (ref 11.5–17)
HCT VFR BLD AUTO: 25.8 % (ref 42–52)
HGB BLD-MCNC: 8.5 GM/DL (ref 14–18)
IMM GRANULOCYTES # BLD AUTO: 0.02 X10(3)/MCL (ref 0–0.04)
IMM GRANULOCYTES NFR BLD AUTO: 1.1 %
INSTRUMENT WBC (OLG): 1.8 X10(3)/MCL
LYMPHOCYTES NFR BLD MANUAL: 1.44 X10(3)/MCL
LYMPHOCYTES NFR BLD MANUAL: 80 %
MCH RBC QN AUTO: 26.6 PG (ref 27–31)
MCHC RBC AUTO-ENTMCNC: 32.9 MG/DL (ref 33–36)
MCV RBC AUTO: 80.9 FL (ref 80–94)
MICROCYTES BLD QL SMEAR: ABNORMAL
MONOCYTES NFR BLD MANUAL: 0.02 X10(3)/MCL (ref 0.1–1.3)
MONOCYTES NFR BLD MANUAL: 1 %
NEUTROPHILS NFR BLD MANUAL: 19 %
NRBC BLD AUTO-RTO: 2.3 %
NRBC BLD MANUAL-RTO: 1 %
PLATELET # BLD AUTO: 105 X10(3)/MCL (ref 130–400)
PLATELET # BLD EST: ABNORMAL 10*3/UL
PMV BLD AUTO: 10.9 FL (ref 7.4–10.4)
RBC # BLD AUTO: 3.19 X10(6)/MCL (ref 4.7–6.1)
RBC MORPH BLD: NORMAL
WBC # SPEC AUTO: 1.8 X10(3)/MCL (ref 4.5–11.5)

## 2022-09-27 PROCEDURE — 99232 PR SUBSEQUENT HOSPITAL CARE,LEVL II: ICD-10-PCS | Mod: ,,, | Performed by: INTERNAL MEDICINE

## 2022-09-27 PROCEDURE — 36415 COLL VENOUS BLD VENIPUNCTURE: CPT | Performed by: INTERNAL MEDICINE

## 2022-09-27 PROCEDURE — 25000003 PHARM REV CODE 250: Performed by: INTERNAL MEDICINE

## 2022-09-27 PROCEDURE — 99232 SBSQ HOSP IP/OBS MODERATE 35: CPT | Mod: ,,, | Performed by: INTERNAL MEDICINE

## 2022-09-27 PROCEDURE — 25000003 PHARM REV CODE 250: Performed by: EMERGENCY MEDICINE

## 2022-09-27 PROCEDURE — 63700000 PHARM REV CODE 250 ALT 637 W/O HCPCS: Performed by: HOSPITALIST

## 2022-09-27 PROCEDURE — 85025 COMPLETE CBC W/AUTO DIFF WBC: CPT | Performed by: INTERNAL MEDICINE

## 2022-09-27 RX ORDER — FLUCONAZOLE 200 MG/1
400 TABLET ORAL DAILY
Qty: 14 TABLET | Refills: 0 | Status: SHIPPED | OUTPATIENT
Start: 2022-09-27 | End: 2022-10-04

## 2022-09-27 RX ORDER — LEVOFLOXACIN 500 MG/1
500 TABLET, FILM COATED ORAL DAILY
Qty: 7 TABLET | Refills: 0 | Status: SHIPPED | OUTPATIENT
Start: 2022-09-27 | End: 2022-09-27 | Stop reason: SDUPTHER

## 2022-09-27 RX ORDER — FLUCONAZOLE 200 MG/1
400 TABLET ORAL DAILY
Qty: 14 TABLET | Refills: 0 | Status: SHIPPED | OUTPATIENT
Start: 2022-09-27 | End: 2022-09-27 | Stop reason: SDUPTHER

## 2022-09-27 RX ORDER — FLUCONAZOLE 200 MG/1
200 TABLET ORAL DAILY
Qty: 7 TABLET | Refills: 0 | Status: SHIPPED | OUTPATIENT
Start: 2022-09-27 | End: 2022-09-27 | Stop reason: SDUPTHER

## 2022-09-27 RX ORDER — LEVOFLOXACIN 500 MG/1
500 TABLET, FILM COATED ORAL DAILY
Qty: 7 TABLET | Refills: 0 | Status: SHIPPED | OUTPATIENT
Start: 2022-09-27 | End: 2022-10-04

## 2022-09-27 RX ADMIN — FAMOTIDINE 20 MG: 20 TABLET, FILM COATED ORAL at 08:09

## 2022-09-27 RX ADMIN — AMLODIPINE BESYLATE 10 MG: 5 TABLET ORAL at 08:09

## 2022-09-27 RX ADMIN — LABETALOL HYDROCHLORIDE 200 MG: 200 TABLET, FILM COATED ORAL at 08:09

## 2022-09-27 RX ADMIN — NYSTATIN 500000 UNITS: 100000 SUSPENSION ORAL at 08:09

## 2022-09-27 RX ADMIN — FLUCONAZOLE 100 MG: 100 TABLET ORAL at 08:09

## 2022-09-27 NOTE — DISCHARGE SUMMARY
[facility]  Hospital Medicine Discharge Summary    Admit Date: 9/5/2022  Discharge Date and Time: 9/27/202211:28 AM  Admitting Physician: RYAN Team  Discharging Physician: Ramakrishna Hoover MD.  Primary Care Physician: Primary Doctor No  Consults: Hematology/Oncology    Discharge Diagnoses:  Chronic myelogenous leukemia    Febrile neutropenia- remains afebrile off abx's     Pancytopenia secondary to hydroxyurea- improving    Immunocompromised secondary to above    Retinal hemorrhage related to #1    Oropharyngeal candidiasis- resolved     Essential hypertension-normotensive upon discharge    Medical noncompliance    Acute symptomatic anemia - post 2 units prbc 9-26-22    Hospital Course:   22-year-old  male with history that includes recently diagnosed CML, presented to the ED at [facility] on 09/05 with pancytopenia.  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  Work up during that time noted a white count of  411,900. Hemoglobin was 8.3, platelets were normal at 375,000.  Differential was suggestive of chronic myelogenous leukemia.  Coagulation studies revealed an INR of 1.38, PTT of 36.4 and a fibrinogen of 292.  Hematology was consulted and patient underwent bone marrow biopsy on 08/22.  This revealed chronic myelogenous leukemia, chronic phase, BCR/ABL1 positive.  Patient was discharged home on August 22, 2022 on hydroxyurea, and was supposed to obtain twice weekly labs until follow-up with Dr. Hogan.  Unfortunately he did not keep these lab appointments and now presented to ED on 9/5, where he was found to be pancytopenic. Lab note a white count of 700 (ANC of 28), H&H 5.8/18, platelets 23,000.  Patient was also noted to be febrile.  He was  started on empiric antibiotic therapy, transfused 2 units of packed red blood cells and 1 of platelets and then subsequently transferred [facility] for further treatment and hematology evaluation.  He was continued on antibiotics, sepsis workup remained negative, though he was noted to have oral thrush and started on Diflucan/nystatin oral.  Hematology was on board; counts were monitored.  Pt with severe thrombocytopenia. He was placed on prednisone and completed 5 days with counts improving. Again, heme onco was consulted for eval/ recs and management.Platelets now 105,000/wbc 1.8 from 1.3.  He voices no complains and feels fine  Final recs by hematology: He will need to go home on Levaquin 500 mg p.o. daily, fluconazole 400 mg p.o. daily, and acyclovir 400 mg p.o. b.i.d.. Will come  in Thursday, 09/29/2022 for blood work and also next Monday and Thursday.  We will do labs every Monday and Thursday until he sees me in clinic, hopefully in the next 2-3 weeks.       Pt was seen and examined on the day of discharge  Vitals:  VITAL SIGNS: 24 HRS MIN & MAX LAST   Temp  Min: 98.1 °F (36.7 °C)  Max: 99.1 °F (37.3 °C) 98.6 °F (37 °C)   BP  Min: 112/56  Max: 151/88 136/75   Pulse  Min: 65  Max: 90  69   Resp  Min: 18  Max: 19 18   SpO2  Min: 99 %  Max: 100 % 99 %       Physical Exam:  General: In no acute distress, afebrile    Respiratory: Clear to auscultation bilaterally    Cardiovascular: S1, S2, no appreciable murmur    Abdomen: Soft, nontender, BS +    MSK: Warm, no lower extremity edema, no clubbing or cyanosis    Neurologic: Alert and oriented x4, moving all extremities with good strength     Procedures Performed: No admission procedures for hospital encounter.     Significant Diagnostic Studies: See Full reports for all details    Recent Labs   Lab 09/25/22  0434 09/26/22  0502 09/27/22  0719   WBC 1.5* 1.6* 1.8*   RBC 2.73* 2.62* 3.19*   HGB 7.1* 6.7* 8.5*   HCT 22.3* 20.9* 25.8*   MCV 81.7 79.8* 80.9   MCH 26.0* 25.6*  26.6*   MCHC 31.8* 32.1* 32.9*   RDW 17.4* 18.0* 16.5   PLT 55* 78* 105*   MPV  --  10.3 10.9*       Recent Labs   Lab 09/23/22  0416      K 3.8   CO2 26   BUN 17.3   CREATININE 0.64*   CALCIUM 8.8   MG 1.60   ALBUMIN 3.0*   ALKPHOS 79   ALT 57*   AST 32   BILITOT 0.6        Microbiology Results (last 7 days)       ** No results found for the last 168 hours. **             X-Ray Chest AP Portable  Narrative: EXAMINATION:  XR CHEST AP PORTABLE    CLINICAL HISTORY:  Fever, unspecified    TECHNIQUE:  One view    COMPARISON:  August 18, 2022.    FINDINGS:  Cardiopericardial silhouette is within normal limits. Lungs are without dense focal or segmental consolidation, congestive process, pleural effusions or pneumothorax.  Impression: NO ACUTE CARDIOPULMONARY PROCESS IDENTIFIED.    Electronically signed by: Phill Andrew  Date:    09/06/2022  Time:    07:41         Medication List        ASK your doctor about these medications      amLODIPine 10 MG tablet  Commonly known as: NORVASC  Take 1 tablet (10 mg total) by mouth once daily.     hydroxyurea 500 mg Cap  Commonly known as: HYDREA  Take 8 capsules (4,000 mg total) by mouth 2 (two) times daily.  Ask about: Should I take this medication?               Explained in detail to the patient about the discharge plan, medications, and follow-up visits. Pt understands and agrees with the treatment plan  Discharge Disposition:home  Discharged Condition: stable  Diet- regular  Dietary Orders (From admission, onward)       Start     Ordered    09/10/22 1239  Dietary nutrition supplements Boost Plus Vanilla; All Meals  Continuous        Question Answer Comment   Select PO Supplement: Boost Plus Vanilla    Frequency: All Meals        09/10/22 1239    09/10/22 1223  Dietary nutrition supplements Boost Plus Vanilla; TID  Continuous        Question Answer Comment   Select PO Supplement: Boost Plus Vanilla    Frequency: TID        09/10/22 1222    09/06/22 1831  Diet Adult Regular   (Diet/Nutrition [facility])  Diet effective now         09/06/22 1830                   Medications Per DC med rec  Activities as tolerated   Follow-up Information       Bria Brennan MD. Go on 9/21/2022.    Specialty: Internal Medicine  Why: This is your new primary doctor (PCP). Appointment is 09/21/22 at 9:00 am.  Contact information:  [place] [id number] [phone]                           For further questions contact hospitalist office    Discharge time 33 minutes    For worsening symptoms, chest pain, shortness of breath, increased abdominal pain, high grade fever, stroke or stroke like symptoms, immediately go to the nearest Emergency Room or call 911 as soon as possible.      Ramakrishna Moreau M.D, on 9/27/2022. at 11:28 AM.

## 2022-09-27 NOTE — DISCHARGE INSTRUCTIONS
1- please follow up with dr landeros  2- please follow up with dr orellana/LENA as scheduled by him

## 2022-09-27 NOTE — PROGRESS NOTES
Subjective:       Patient ID: Magdaleno Hirsch is a 22 y.o. male.    Chief Complaint: Boredom    Diagnosis:  1. CML, chronic phase  2. 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  3. Conjuctival hemorrhage secondary to 1.    Current Treatment:       Treatment History:  1. Hydroxyurea 4 g every 12 hours    HPI:  22-year-old male with no real medical history who went in for a routine eye exam on 08/18/2022 to update his eye glasses prescription.  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  He had bilateral Valdez spots with vessel tortuosity.  The optometrist recommended that the patient have blood work including testing for sickle cell disease.  The patient presented to the emergency department.  CBC in the emergency department revealed a white blood cell count of 411,900. Hemoglobin was 8.3, platelets were normal at 375,000 hundred and seventy five thousand.  Differential was suggestive of CML.  Coagulation studies revealed an INR of 1.38, PTT of 36.4 and a fibrinogen of 292.  Patient was going to present to [place], however they were on diversion.  He was transferred from [facility] to [facility].  Hematology consulted.  Patient underwent bone marrow biopsy on 08/22/2022, this revealed CML, chronic phase, BCR/ABL1 positive.    Interval History:   22-year-old patient known to me from previous hospitalization.  Was discharged home on 08/22/2022, he was informed about twice weekly labs until follow up appointment with me and continued on hydroxyurea.  Unfortunately, the patient did not keep his lab appointments.  He presented to the emergency department on 09/05/2022 with febrile neutropenia, was admitted to the hospitalist service and transferred to main campus [facility].  Hematology consulted as patient known to me.    Today (9/27/2022):  Patient awake and alert this morning, in bed with his child.  Overall he is doing  very well.  He has significant improvement.  He is ready to go home.    No past medical history on file.   Past Surgical History:   Procedure Laterality Date    BONE MARROW BIOPSY N/A 8/22/2022    Procedure: Biopsy-bone marrow;  Surgeon: Getachew Chen MD;  Location: [facility];  Service: General;  Laterality: N/A;    KNEE SURGERY Left      Social History     Socioeconomic History    Marital status: Single   Tobacco Use    Smoking status: Never    Smokeless tobacco: Never   Substance and Sexual Activity    Alcohol use: Never    Drug use: Never      History reviewed. No pertinent family history.        Review of Systems   Constitutional:  Negative for chills, diaphoresis, fatigue and unexpected weight change.   HENT:  Negative for nasal congestion and sore throat.    Eyes:  Negative for pain.   Respiratory:  Negative for cough, chest tightness and shortness of breath.    Cardiovascular:  Negative for chest pain, palpitations and leg swelling.   Gastrointestinal:  Negative for abdominal distention, abdominal pain, blood in stool, constipation and diarrhea.   Genitourinary:  Negative for dysuria, frequency and hematuria.   Musculoskeletal:  Negative for arthralgias and back pain.   Integumentary:  Negative for rash.   Neurological:  Negative for dizziness, weakness, numbness and headaches.   Hematological:  Negative for adenopathy. Bruises/bleeds easily.   Psychiatric/Behavioral:  Negative for confusion.        Objective:      Physical Exam  Vitals reviewed.   Constitutional:       General: He is awake.      Appearance: Normal appearance.   HENT:      Head: Normocephalic and atraumatic.      Comments: + gingival hyperplasia     Right Ear: Hearing normal.      Left Ear: Hearing normal.      Nose: Nose normal.      Mouth/Throat:      Comments:     Eyes:      General: Lids are normal. Vision grossly intact.      Extraocular Movements: Extraocular movements intact.      Conjunctiva/sclera: Conjunctivae normal.   Cardiovascular:       Rate and Rhythm: Normal rate and regular rhythm.      Pulses: Normal pulses.      Heart sounds: Normal heart sounds.   Pulmonary:      Effort: Pulmonary effort is normal.      Breath sounds: Normal breath sounds. No wheezing, rhonchi or rales.   Chest:      Comments: Soft mass in area of the sternum  Abdominal:      General: Bowel sounds are normal.      Palpations: Abdomen is soft. There is splenomegaly (Improved).      Tenderness: There is no abdominal tenderness.   Musculoskeletal:      Cervical back: Full passive range of motion without pain.      Right lower leg: No edema.      Left lower leg: No edema.   Lymphadenopathy:      Cervical: No cervical adenopathy.      Upper Body:      Right upper body: No supraclavicular or axillary adenopathy.      Left upper body: No supraclavicular or axillary adenopathy.   Skin:     General: Skin is warm.   Neurological:      General: No focal deficit present.      Mental Status: He is alert and oriented to person, place, and time.   Psychiatric:         Attention and Perception: Attention normal.         Mood and Affect: Mood and affect normal.         Behavior: Behavior is cooperative.       LABS AND IMAGING REVIEWED IN EPIC  Lab Review:  CBC:   Recent Labs     09/27/22  0719 09/26/22  0502 09/25/22  0434   WBC 1.8* 1.6* 1.5*   RBC 3.19* 2.62* 2.73*   HGB 8.5* 6.7* 7.1*   HCT 25.8* 20.9* 22.3*   * 78* 55*       CMP:   Recent Labs     09/23/22  0416 09/20/22  0331 09/19/22  0617 09/18/22  0950 09/17/22  0346    135* 136 137 138   K 3.8 4.0 4.1 3.9 4.0   CO2 26 25 26 28 27   BUN 17.3 12.8 11.9 9.7 10.8   CREATININE 0.64* 0.75 0.86 0.73 0.74   CALCIUM 8.8 9.0 8.7 9.1 9.2   ALBUMIN 3.0*  --   --  2.9* 3.0*   BILITOT 0.6  --   --  1.1 1.2   ALKPHOS 79  --   --  59 61   AST 32  --   --  12 12   ALT 57*  --   --  13 14              Assessment:   1. CML, chronic phase  2. Pancytopenia secondary to Hydrea  3. Refractory thrombocytopenia - improving        Plan:      He remains afebrile off of IV antibiotics.    He remains neutropenic, however white blood cell count and ANC are increasing.  ANC today 342.    Patient completed 5 days of steroids, platelet counts have improved daily and continued to do so.      All of his counts are stable today.  I am okay with him being discharged.  Will come in Thursday, 09/29/2022 for blood work and also next Monday and Thursday.  We will do labs every Monday and Thursday until he sees me in clinic, hopefully in the next 2-3 weeks.    Once his counts fully recover, we will start him on dasatinib.    He will need to go home on Levaquin 500 mg p.o. daily, fluconazole 400 mg p.o. daily, and acyclovir 400 mg p.o. b.i.d..    Paul Hogan II, MD  Hematology/Oncology

## 2022-09-28 ENCOUNTER — PATIENT OUTREACH (OUTPATIENT)
Dept: ADMINISTRATIVE | Facility: CLINIC | Age: 22
End: 2022-09-28
Payer: MEDICAID

## 2022-09-29 ENCOUNTER — LAB VISIT (OUTPATIENT)
Dept: LAB | Facility: HOSPITAL | Age: 22
End: 2022-09-29
Attending: INTERNAL MEDICINE
Payer: MEDICAID

## 2022-09-29 ENCOUNTER — PATIENT MESSAGE (OUTPATIENT)
Dept: HEMATOLOGY/ONCOLOGY | Facility: CLINIC | Age: 22
End: 2022-09-29
Payer: MEDICAID

## 2022-09-29 DIAGNOSIS — C92.10 CML (CHRONIC MYELOID LEUKEMIA): ICD-10-CM

## 2022-09-29 LAB
ALBUMIN SERPL-MCNC: 3.4 GM/DL (ref 3.5–5)
ALBUMIN/GLOB SERPL: 1 RATIO (ref 1.1–2)
ALP SERPL-CCNC: 83 UNIT/L (ref 40–150)
ALT SERPL-CCNC: 55 UNIT/L (ref 0–55)
AST SERPL-CCNC: 17 UNIT/L (ref 5–34)
BASOPHILS # BLD AUTO: 0.02 X10(3)/MCL (ref 0–0.2)
BASOPHILS NFR BLD AUTO: 0.9 %
BILIRUBIN DIRECT+TOT PNL SERPL-MCNC: 0.9 MG/DL
BUN SERPL-MCNC: 10.3 MG/DL (ref 8.9–20.6)
CALCIUM SERPL-MCNC: 9.4 MG/DL (ref 8.4–10.2)
CHLORIDE SERPL-SCNC: 102 MMOL/L (ref 98–107)
CO2 SERPL-SCNC: 32 MMOL/L (ref 22–29)
CREAT SERPL-MCNC: 0.74 MG/DL (ref 0.73–1.18)
EOSINOPHIL # BLD AUTO: 0 X10(3)/MCL (ref 0–0.9)
EOSINOPHIL NFR BLD AUTO: 0 %
ERYTHROCYTE [DISTWIDTH] IN BLOOD BY AUTOMATED COUNT: 17.4 % (ref 11.5–17)
GFR SERPLBLD CREATININE-BSD FMLA CKD-EPI: >60 MLS/MIN/1.73/M2
GLOBULIN SER-MCNC: 3.3 GM/DL (ref 2.4–3.5)
GLUCOSE SERPL-MCNC: 97 MG/DL (ref 74–100)
HCT VFR BLD AUTO: 29.8 % (ref 42–52)
HGB BLD-MCNC: 9.1 GM/DL (ref 14–18)
IMM GRANULOCYTES # BLD AUTO: 0.05 X10(3)/MCL (ref 0–0.04)
IMM GRANULOCYTES NFR BLD AUTO: 2.3 %
LYMPHOCYTES # BLD AUTO: 1.42 X10(3)/MCL (ref 0.6–4.6)
LYMPHOCYTES NFR BLD AUTO: 66.7 %
MCH RBC QN AUTO: 25.9 PG (ref 27–31)
MCHC RBC AUTO-ENTMCNC: 30.5 MG/DL (ref 33–36)
MCV RBC AUTO: 84.9 FL (ref 80–94)
MONOCYTES # BLD AUTO: 0.05 X10(3)/MCL (ref 0.1–1.3)
MONOCYTES NFR BLD AUTO: 2.3 %
NEUTROPHILS # BLD AUTO: 0.6 X10(3)/MCL (ref 2.1–9.2)
NEUTROPHILS NFR BLD AUTO: 27.8 %
PLATELET # BLD AUTO: 173 X10(3)/MCL (ref 130–400)
PMV BLD AUTO: 8.8 FL (ref 7.4–10.4)
POTASSIUM SERPL-SCNC: 4.2 MMOL/L (ref 3.5–5.1)
PROT SERPL-MCNC: 6.7 GM/DL (ref 6.4–8.3)
RBC # BLD AUTO: 3.51 X10(6)/MCL (ref 4.7–6.1)
SODIUM SERPL-SCNC: 140 MMOL/L (ref 136–145)
WBC # SPEC AUTO: 2.1 X10(3)/MCL (ref 4.5–11.5)

## 2022-09-29 PROCEDURE — 80053 COMPREHEN METABOLIC PANEL: CPT

## 2022-09-29 PROCEDURE — 85025 COMPLETE CBC W/AUTO DIFF WBC: CPT

## 2022-09-29 PROCEDURE — 36415 COLL VENOUS BLD VENIPUNCTURE: CPT

## 2022-10-03 ENCOUNTER — LAB VISIT (OUTPATIENT)
Dept: LAB | Facility: HOSPITAL | Age: 22
End: 2022-10-03
Attending: INTERNAL MEDICINE
Payer: MEDICAID

## 2022-10-03 DIAGNOSIS — C92.10 CML (CHRONIC MYELOID LEUKEMIA): ICD-10-CM

## 2022-10-03 LAB
ALBUMIN SERPL-MCNC: 3.8 GM/DL (ref 3.5–5)
ALBUMIN/GLOB SERPL: 1.1 RATIO (ref 1.1–2)
ALP SERPL-CCNC: 83 UNIT/L (ref 40–150)
ALT SERPL-CCNC: 39 UNIT/L (ref 0–55)
AST SERPL-CCNC: 21 UNIT/L (ref 5–34)
BASOPHILS # BLD AUTO: 0.07 X10(3)/MCL (ref 0–0.2)
BASOPHILS NFR BLD AUTO: 2.1 %
BILIRUBIN DIRECT+TOT PNL SERPL-MCNC: 1.2 MG/DL
BUN SERPL-MCNC: 15.4 MG/DL (ref 8.9–20.6)
CALCIUM SERPL-MCNC: 9.7 MG/DL (ref 8.4–10.2)
CHLORIDE SERPL-SCNC: 105 MMOL/L (ref 98–107)
CO2 SERPL-SCNC: 33 MMOL/L (ref 22–29)
CREAT SERPL-MCNC: 0.92 MG/DL (ref 0.73–1.18)
EOSINOPHIL # BLD AUTO: 0 X10(3)/MCL (ref 0–0.9)
EOSINOPHIL NFR BLD AUTO: 0 %
ERYTHROCYTE [DISTWIDTH] IN BLOOD BY AUTOMATED COUNT: 18.6 % (ref 11.5–17)
GFR SERPLBLD CREATININE-BSD FMLA CKD-EPI: >60 MLS/MIN/1.73/M2
GLOBULIN SER-MCNC: 3.4 GM/DL (ref 2.4–3.5)
GLUCOSE SERPL-MCNC: 93 MG/DL (ref 74–100)
HCT VFR BLD AUTO: 31.9 % (ref 42–52)
HGB BLD-MCNC: 9.7 GM/DL (ref 14–18)
IMM GRANULOCYTES # BLD AUTO: 0.1 X10(3)/MCL (ref 0–0.04)
IMM GRANULOCYTES NFR BLD AUTO: 3 %
LYMPHOCYTES # BLD AUTO: 1.75 X10(3)/MCL (ref 0.6–4.6)
LYMPHOCYTES NFR BLD AUTO: 52.4 %
MCH RBC QN AUTO: 26 PG (ref 27–31)
MCHC RBC AUTO-ENTMCNC: 30.4 MG/DL (ref 33–36)
MCV RBC AUTO: 85.5 FL (ref 80–94)
MONOCYTES # BLD AUTO: 0.08 X10(3)/MCL (ref 0.1–1.3)
MONOCYTES NFR BLD AUTO: 2.4 %
NEUTROPHILS # BLD AUTO: 1.3 X10(3)/MCL (ref 2.1–9.2)
NEUTROPHILS NFR BLD AUTO: 40.1 %
PLATELET # BLD AUTO: 344 X10(3)/MCL (ref 130–400)
PMV BLD AUTO: 9.9 FL (ref 7.4–10.4)
POTASSIUM SERPL-SCNC: 4.5 MMOL/L (ref 3.5–5.1)
PROT SERPL-MCNC: 7.2 GM/DL (ref 6.4–8.3)
RBC # BLD AUTO: 3.73 X10(6)/MCL (ref 4.7–6.1)
SODIUM SERPL-SCNC: 142 MMOL/L (ref 136–145)
WBC # SPEC AUTO: 3.3 X10(3)/MCL (ref 4.5–11.5)

## 2022-10-03 PROCEDURE — 80053 COMPREHEN METABOLIC PANEL: CPT

## 2022-10-03 PROCEDURE — 85025 COMPLETE CBC W/AUTO DIFF WBC: CPT

## 2022-10-03 PROCEDURE — 36415 COLL VENOUS BLD VENIPUNCTURE: CPT

## 2022-10-06 ENCOUNTER — LAB VISIT (OUTPATIENT)
Dept: LAB | Facility: HOSPITAL | Age: 22
End: 2022-10-06
Attending: INTERNAL MEDICINE
Payer: MEDICAID

## 2022-10-06 DIAGNOSIS — C92.10 CML (CHRONIC MYELOID LEUKEMIA): ICD-10-CM

## 2022-10-06 LAB
ALBUMIN SERPL-MCNC: 3.7 GM/DL (ref 3.5–5)
ALBUMIN/GLOB SERPL: 1.2 RATIO (ref 1.1–2)
ALP SERPL-CCNC: 82 UNIT/L (ref 40–150)
ALT SERPL-CCNC: 32 UNIT/L (ref 0–55)
AST SERPL-CCNC: 19 UNIT/L (ref 5–34)
BASOPHILS # BLD AUTO: 0.08 X10(3)/MCL (ref 0–0.2)
BASOPHILS NFR BLD AUTO: 2.4 %
BILIRUBIN DIRECT+TOT PNL SERPL-MCNC: 1 MG/DL
BUN SERPL-MCNC: 9.7 MG/DL (ref 8.9–20.6)
CALCIUM SERPL-MCNC: 9.4 MG/DL (ref 8.4–10.2)
CHLORIDE SERPL-SCNC: 105 MMOL/L (ref 98–107)
CO2 SERPL-SCNC: 32 MMOL/L (ref 22–29)
CREAT SERPL-MCNC: 0.82 MG/DL (ref 0.73–1.18)
EOSINOPHIL # BLD AUTO: 0 X10(3)/MCL (ref 0–0.9)
EOSINOPHIL NFR BLD AUTO: 0 %
ERYTHROCYTE [DISTWIDTH] IN BLOOD BY AUTOMATED COUNT: 19.3 % (ref 11.5–17)
GFR SERPLBLD CREATININE-BSD FMLA CKD-EPI: >60 MLS/MIN/1.73/M2
GLOBULIN SER-MCNC: 3.2 GM/DL (ref 2.4–3.5)
GLUCOSE SERPL-MCNC: 80 MG/DL (ref 74–100)
HCT VFR BLD AUTO: 31.2 % (ref 42–52)
HGB BLD-MCNC: 9.4 GM/DL (ref 14–18)
IMM GRANULOCYTES # BLD AUTO: 0.1 X10(3)/MCL (ref 0–0.04)
IMM GRANULOCYTES NFR BLD AUTO: 3 %
LYMPHOCYTES # BLD AUTO: 1.59 X10(3)/MCL (ref 0.6–4.6)
LYMPHOCYTES NFR BLD AUTO: 47.9 %
MCH RBC QN AUTO: 25.6 PG (ref 27–31)
MCHC RBC AUTO-ENTMCNC: 30.1 MG/DL (ref 33–36)
MCV RBC AUTO: 85 FL (ref 80–94)
MONOCYTES # BLD AUTO: 0.11 X10(3)/MCL (ref 0.1–1.3)
MONOCYTES NFR BLD AUTO: 3.3 %
NEUTROPHILS # BLD AUTO: 1.4 X10(3)/MCL (ref 2.1–9.2)
NEUTROPHILS NFR BLD AUTO: 43.4 %
PLATELET # BLD AUTO: 352 X10(3)/MCL (ref 130–400)
PMV BLD AUTO: 9.4 FL (ref 7.4–10.4)
POTASSIUM SERPL-SCNC: 4.6 MMOL/L (ref 3.5–5.1)
PROT SERPL-MCNC: 6.9 GM/DL (ref 6.4–8.3)
RBC # BLD AUTO: 3.67 X10(6)/MCL (ref 4.7–6.1)
SODIUM SERPL-SCNC: 142 MMOL/L (ref 136–145)
WBC # SPEC AUTO: 3.3 X10(3)/MCL (ref 4.5–11.5)

## 2022-10-06 PROCEDURE — 80053 COMPREHEN METABOLIC PANEL: CPT

## 2022-10-06 PROCEDURE — 36415 COLL VENOUS BLD VENIPUNCTURE: CPT

## 2022-10-06 PROCEDURE — 85025 COMPLETE CBC W/AUTO DIFF WBC: CPT

## 2022-10-07 DIAGNOSIS — B37.0 OROPHARYNGEAL CANDIDIASIS: ICD-10-CM

## 2022-10-07 DIAGNOSIS — C92.10 CML (CHRONIC MYELOID LEUKEMIA): Primary | ICD-10-CM

## 2022-10-10 ENCOUNTER — OFFICE VISIT (OUTPATIENT)
Dept: HEMATOLOGY/ONCOLOGY | Facility: CLINIC | Age: 22
End: 2022-10-10
Payer: MEDICAID

## 2022-10-10 VITALS
DIASTOLIC BLOOD PRESSURE: 79 MMHG | SYSTOLIC BLOOD PRESSURE: 129 MMHG | RESPIRATION RATE: 18 BRPM | WEIGHT: 182.13 LBS | HEIGHT: 72 IN | HEART RATE: 65 BPM | TEMPERATURE: 98 F | BODY MASS INDEX: 24.67 KG/M2 | OXYGEN SATURATION: 100 %

## 2022-10-10 DIAGNOSIS — C92.10 CML (CHRONIC MYELOCYTIC LEUKEMIA): Primary | Chronic | ICD-10-CM

## 2022-10-10 PROCEDURE — 3008F PR BODY MASS INDEX (BMI) DOCUMENTED: ICD-10-PCS | Mod: CPTII,,, | Performed by: INTERNAL MEDICINE

## 2022-10-10 PROCEDURE — 1160F RVW MEDS BY RX/DR IN RCRD: CPT | Mod: CPTII,,, | Performed by: INTERNAL MEDICINE

## 2022-10-10 PROCEDURE — 1159F PR MEDICATION LIST DOCUMENTED IN MEDICAL RECORD: ICD-10-PCS | Mod: CPTII,,, | Performed by: INTERNAL MEDICINE

## 2022-10-10 PROCEDURE — 1159F MED LIST DOCD IN RCRD: CPT | Mod: CPTII,,, | Performed by: INTERNAL MEDICINE

## 2022-10-10 PROCEDURE — 81206 BCR/ABL1 GENE MAJOR BP: CPT | Performed by: INTERNAL MEDICINE

## 2022-10-10 PROCEDURE — 99214 OFFICE O/P EST MOD 30 MIN: CPT | Mod: S$PBB,,, | Performed by: INTERNAL MEDICINE

## 2022-10-10 PROCEDURE — 3078F PR MOST RECENT DIASTOLIC BLOOD PRESSURE < 80 MM HG: ICD-10-PCS | Mod: CPTII,,, | Performed by: INTERNAL MEDICINE

## 2022-10-10 PROCEDURE — 3074F PR MOST RECENT SYSTOLIC BLOOD PRESSURE < 130 MM HG: ICD-10-PCS | Mod: CPTII,,, | Performed by: INTERNAL MEDICINE

## 2022-10-10 PROCEDURE — 99999 PR PBB SHADOW E&M-EST. PATIENT-LVL IV: ICD-10-PCS | Mod: PBBFAC,,, | Performed by: INTERNAL MEDICINE

## 2022-10-10 PROCEDURE — 3078F DIAST BP <80 MM HG: CPT | Mod: CPTII,,, | Performed by: INTERNAL MEDICINE

## 2022-10-10 PROCEDURE — 99214 OFFICE O/P EST MOD 30 MIN: CPT | Mod: PBBFAC | Performed by: INTERNAL MEDICINE

## 2022-10-10 PROCEDURE — 1160F PR REVIEW ALL MEDS BY PRESCRIBER/CLIN PHARMACIST DOCUMENTED: ICD-10-PCS | Mod: CPTII,,, | Performed by: INTERNAL MEDICINE

## 2022-10-10 PROCEDURE — 1111F PR DISCHARGE MEDS RECONCILED W/ CURRENT OUTPATIENT MED LIST: ICD-10-PCS | Mod: CPTII,,, | Performed by: INTERNAL MEDICINE

## 2022-10-10 PROCEDURE — 99214 PR OFFICE/OUTPT VISIT, EST, LEVL IV, 30-39 MIN: ICD-10-PCS | Mod: S$PBB,,, | Performed by: INTERNAL MEDICINE

## 2022-10-10 PROCEDURE — 1111F DSCHRG MED/CURRENT MED MERGE: CPT | Mod: CPTII,,, | Performed by: INTERNAL MEDICINE

## 2022-10-10 PROCEDURE — 99999 PR PBB SHADOW E&M-EST. PATIENT-LVL IV: CPT | Mod: PBBFAC,,, | Performed by: INTERNAL MEDICINE

## 2022-10-10 PROCEDURE — 3074F SYST BP LT 130 MM HG: CPT | Mod: CPTII,,, | Performed by: INTERNAL MEDICINE

## 2022-10-10 PROCEDURE — 3008F BODY MASS INDEX DOCD: CPT | Mod: CPTII,,, | Performed by: INTERNAL MEDICINE

## 2022-10-10 NOTE — PROGRESS NOTES
Subjective:       Patient ID: Magdaleno Hirsch is a 22 y.o. male.    Chief Complaint: Boredom    Diagnosis:  1. CML, chronic phase  2. 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  3. Conjuctival hemorrhage secondary to 1.    Current Treatment:   Dasatinib 100 mg po daily    Treatment History:  1. Hydroxyurea 4 g every 12 hours    HPI:  Patient with no real medical history who went in for a routine eye exam on 08/18/2022 to update his eye glasses prescription.  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  He had bilateral Valdez spots with vessel tortuosity.  The optometrist recommended that the patient have blood work including testing for sickle cell disease.  The patient presented to the emergency department.  CBC in the emergency department revealed a white blood cell count of 411,900. Hemoglobin was 8.3, platelets were normal at 375,000 hundred and seventy five thousand.  Differential was suggestive of CML.  Coagulation studies revealed an INR of 1.38, PTT of 36.4 and a fibrinogen of 292.  Patient was going to present to [place], however they were on diversion.  He was transferred from [facility] to [facility].  Hematology consulted.  Patient underwent bone marrow biopsy on 08/22/2022, this revealed CML, chronic phase, BCR/ABL1 positive.  Order dasatinib 100 mg p.o. daily as of 10/10/2022.    Interval History:   Patient presents to clinic for scheduled follow up appointment.  He is overall doing well.  His blood counts have recovered.  He is no longer febrile.  He overall feels well and is able to do what he wants to on a daily basis with no limitation.      Past Medical History:   Diagnosis Date    CML (chronic myelocytic leukemia)     HVD (hypertensive vascular disease)     Oropharyngeal candidiasis       Past Surgical History:   Procedure Laterality Date    BONE MARROW BIOPSY N/A 8/22/2022    Procedure: Biopsy-bone marrow;   Surgeon: Getachew Chen MD;  Location: [facility];  Service: General;  Laterality: N/A;    KNEE SURGERY Left      Social History     Socioeconomic History    Marital status: Single   Tobacco Use    Smoking status: Never    Smokeless tobacco: Never   Substance and Sexual Activity    Alcohol use: Never    Drug use: Never      History reviewed. No pertinent family history.        Review of Systems   Constitutional:  Negative for chills, diaphoresis, fatigue and unexpected weight change.   HENT:  Negative for nasal congestion and sore throat.    Eyes:  Negative for pain.   Respiratory:  Negative for cough, chest tightness and shortness of breath.    Cardiovascular:  Negative for chest pain, palpitations and leg swelling.   Gastrointestinal:  Negative for abdominal distention, abdominal pain, blood in stool, constipation and diarrhea.   Genitourinary:  Negative for dysuria, frequency and hematuria.   Musculoskeletal:  Negative for arthralgias and back pain.   Integumentary:  Negative for rash.   Neurological:  Negative for dizziness, weakness, numbness and headaches.   Hematological:  Negative for adenopathy. Bruises/bleeds easily.   Psychiatric/Behavioral:  Negative for confusion.        Objective:      Physical Exam  Vitals reviewed.   Constitutional:       General: He is awake.      Appearance: Normal appearance.   HENT:      Head: Normocephalic and atraumatic.      Comments: + gingival hyperplasia     Right Ear: Hearing normal.      Left Ear: Hearing normal.      Nose: Nose normal.      Mouth/Throat:      Comments:     Eyes:      General: Lids are normal. Vision grossly intact.      Extraocular Movements: Extraocular movements intact.      Conjunctiva/sclera: Conjunctivae normal.   Cardiovascular:      Rate and Rhythm: Normal rate and regular rhythm.      Pulses: Normal pulses.      Heart sounds: Normal heart sounds.   Pulmonary:      Effort: Pulmonary effort is normal.      Breath sounds: Normal breath sounds. No  wheezing, rhonchi or rales.   Chest:      Comments: Soft mass in area of the sternum  Abdominal:      General: Bowel sounds are normal.      Palpations: Abdomen is soft. There is splenomegaly (Improved).      Tenderness: There is no abdominal tenderness.   Musculoskeletal:      Cervical back: Full passive range of motion without pain.      Right lower leg: No edema.      Left lower leg: No edema.   Lymphadenopathy:      Cervical: No cervical adenopathy.      Upper Body:      Right upper body: No supraclavicular or axillary adenopathy.      Left upper body: No supraclavicular or axillary adenopathy.   Skin:     General: Skin is warm.   Neurological:      General: No focal deficit present.      Mental Status: He is alert and oriented to person, place, and time.   Psychiatric:         Attention and Perception: Attention normal.         Mood and Affect: Mood and affect normal.         Behavior: Behavior is cooperative.       LABS AND IMAGING REVIEWED IN EPIC  Lab Review:  CBC:   Recent Labs     10/10/22  1320 10/06/22  1143 10/03/22  1258   WBC 4.3* 3.3* 3.3*   RBC 3.76* 3.67* 3.73*   HGB 9.7* 9.4* 9.7*   HCT 31.7* 31.2* 31.9*   * 352 344       CMP:   Recent Labs     10/06/22  1143 10/03/22  1258 09/29/22  1139    142 140   K 4.6 4.5 4.2   CO2 32* 33* 32*   BUN 9.7 15.4 10.3   CREATININE 0.82 0.92 0.74   CALCIUM 9.4 9.7 9.4   ALBUMIN 3.7 3.8 3.4*   BILITOT 1.0 1.2 0.9   ALKPHOS 82 83 83   AST 19 21 17   ALT 32 39 55              Assessment:     1. CML, chronic phase  2. Pancytopenia secondary to Hydrea  3. Refractory thrombocytopenia - improving        Plan:     Patient's blood counts have recovered.    I have ordered dasatinib 100 mg p.o. daily.    I will check a bcr/ABL PCR today.      Repeat labs in 6 weeks: CBC, CMP, and BCR/ABL     Return to clinic in 7 weeks    Paul Hogan II, MD

## 2022-10-10 NOTE — PLAN OF CARE
START ON PATHWAY REGIMEN - CML    [id number]        Dasatinib (Sprycel)     **Always confirm dose/schedule in your pharmacy ordering system**    Patient Characteristics:  BCR-ABL Positive, Chronic Phase, First Line, Low Risk (ELTS Score ? 1.5680),   Initial Therapy  BCR-ABL Status: Positive  Line of Therapy: First Line  Risk Category: Low Risk  (ELTS Score ? 1.5680)  Treatment Type: Initial Therapy  Intent of Therapy:  Curative Intent, Discussed with Patient

## 2022-10-11 ENCOUNTER — SPECIALTY PHARMACY (OUTPATIENT)
Dept: PHARMACY | Facility: CLINIC | Age: 22
End: 2022-10-11
Payer: MEDICAID

## 2022-10-11 DIAGNOSIS — C92.10 CML (CHRONIC MYELOCYTIC LEUKEMIA): Primary | ICD-10-CM

## 2022-10-11 DIAGNOSIS — R11.0 NAUSEA: ICD-10-CM

## 2022-10-11 RX ORDER — ONDANSETRON HYDROCHLORIDE 8 MG/1
8 TABLET, FILM COATED ORAL EVERY 6 HOURS PRN
Qty: 60 TABLET | Refills: 2 | Status: ON HOLD | OUTPATIENT
Start: 2022-10-11 | End: 2023-11-20

## 2022-10-11 NOTE — TELEPHONE ENCOUNTER
Specialty Pharmacy - Initial Clinical Assessment    Specialty Medication Orders Linked to Encounter      Flowsheet Row Most Recent Value   Medication #1 dasatinib (SPRYCEL) 100 mg (Order#[id number], Rx#[id number])          Patient Diagnosis   C92.10 - CML (chronic myelocytic leukemia)    Subjective    Magdaleno Hirsch is a 22 y.o. male, who is followed by the specialty pharmacy service for management and education.    Recent Encounters       Date Type Provider Description    10/11/2022 Specialty Pharmacy Ilya Myrick PharmD Initial Clinical Assessment    10/11/2022 Specialty Pharmacy Marnie Pollard PharmD Referral Authorization          Clinical call attempts since last clinical assessment   No call attempts found.     Current Outpatient Medications   Medication Sig    amLODIPine (NORVASC) 10 MG tablet Take 1 tablet (10 mg total) by mouth once daily.    dasatinib (SPRYCEL) 100 mg Take 1 tablet (100 mg total) by mouth once daily.   Last reviewed on 10/11/2022 12:57 PM by Ilya Myrick PharmD    Review of patient's allergies indicates:  No Known AllergiesLast reviewed on  10/11/2022 12:57 PM by Ilya Jeff    Drug Interactions    Drug interactions evaluated: yes  Clinically relevant drug interactions identified: no  Provided the patient with educational material regarding drug interactions: not applicable         Adverse Effects    *All other systems reviewed and are negative       Assessment Questions - Documented Responses      Flowsheet Row Most Recent Value   Assessment    Medication Reconciliation completed for patient Yes   During the past 4 weeks, has patient missed any activities due to condition or medication? No   During the past 4 weeks, did patient have any of the following urgent care visits? None   Goals of Therapy Status Discussed (new start)   Status of the patients ability to self-administer: Is Able   All education points have been covered with patient? Yes, supplemental printed  "education provided   Welcome packet contents reviewed and discussed with patient? Yes   Assesment completed? Yes   Plan Therapy being initiated   Do you need to open a clinical intervention (i-vent)? No   Do you want to schedule first shipment? Yes   Medication #1 Assessment Info    Patient status New medication, New to [facility]   Is this medication appropriate for the patient? Yes   Is this medication effective? Not yet started          Refill Questions - Documented Responses      Flowsheet Row Most Recent Value   Refill Screening Questions    When does the patient need to receive the medication? 10/13/22   Refill Delivery Questions    How will the patient receive the medication? MEDRx   When does the patient need to receive the medication? 10/13/22   Shipping Address Home   Address in [facility] confirmed and updated if neccessary? Yes   Expected Copay ($) 0   Is the patient able to afford the medication copay? Yes   Payment Method zero copay   Days supply of Refill 30   Supplies needed? --  [Hydrocortisone cream]   Refill activity completed? Yes   Refill activity plan Refill scheduled   Shipment/Pickup Date: 10/12/22            Objective    He has a past medical history of CML (chronic myelocytic leukemia), HVD (hypertensive vascular disease), and Oropharyngeal candidiasis.    Tried/failed medications: None    BP Readings from Last 4 Encounters:   10/10/22 129/79   09/27/22 128/65   08/22/22 137/88   07/18/22 (!) 149/98     Ht Readings from Last 4 Encounters:   10/10/22 6' (1.829 m)   09/06/22 6' (1.829 m)   08/21/22 6' (1.829 m)   07/18/22 6' 1" (1.854 m)     Wt Readings from Last 4 Encounters:   10/10/22 82.6 kg (182 lb 1.6 oz)   09/06/22 74.4 kg (164 lb)   08/21/22 84.8 kg (187 lb)   07/18/22 84.4 kg (186 lb)     Recent Labs   Lab Result Units 10/10/22  1320 10/06/22  1143 10/03/22  1258 09/29/22  1139   RBC x10(6)/mcL 3.76 L 3.67 L 3.73 L 3.51 L   Hgb gm/dL 9.7 L 9.4 L 9.7 L 9.1 L   Hct % 31.7 L 31.2 L 31.9 " L 29.8 L   WBC x10(3)/mcL 4.3 L 3.3 L 3.3 L 2.1 L   Platelet x10(3)/mcL 407 H 352 344 173   Sodium Level mmol/L 140 142 142 140   Potassium Level mmol/L 4.4 4.6 4.5 4.2   Blood Urea Nitrogen mg/dL 15.3 9.7 15.4 10.3   Creatinine mg/dL 0.91 0.82 0.92 0.74   Calcium Level Total mg/dL 9.6 9.4 9.7 9.4   Albumin Level gm/dL 3.8 3.7 3.8 3.4 L   Bilirubin Total mg/dL 1.3 1.0 1.2 0.9   Alkaline Phosphatase unit/L 73 82 83 83   Aspartate Aminotransferase unit/L 22 19 21 17   Alanine Aminotransferase unit/L 27 32 39 55     The goals of cancer treatment include:  Achieving remission of cancer, if possible  Reducing tumor size and spread of cancer, if remission is not possible  Minimizing pain and symptoms of the cancer  Preventing infection and other complications of treatment  Promoting adequate nutrition  Encouraging proper hydration  Improving or maintaining quality of life  Maintaining optimal therapy adherence  Minimizing and managing side effects    Goals of Therapy Status: Discussed (new start)    Assessment/Plan  Patient plans to start therapy on 10/13/22      Indication, dosage, appropriateness, effectiveness, safety and convenience of his specialty medication(s) were reviewed today.     Patient Education   Patient received education on the following:   Expectations and possible outcomes of therapy  Proper use, timely administration, and missed dose management  Duration of therapy  Side effects, including prevention, minimization, and management  Contraindications and safety precautions  New or changed medications, including prescribe and over the counter medications and supplements  Reviews recommended vaccinations, as appropriate  Storage, safe handling, and disposal    Patient will reach out to provider once medication has been delivered.     Tasks added this encounter   No tasks added.   Tasks due within next 3 months   10/11/2022 - Clinical - Initial Assessment     Ilya Myrick, PharmD  [facility]  [facility] [facility] [address] [address]  Phone: [phone]  Fax: [phone]

## 2022-10-14 LAB
BCR/ABL1 KINASE DOMAIN MUT ANL BLD/T: NORMAL
PATH REPORT.FINAL DX SPEC: NORMAL
SPECIMEN SOURCE: NORMAL

## 2022-11-10 ENCOUNTER — SPECIALTY PHARMACY (OUTPATIENT)
Dept: PHARMACY | Facility: CLINIC | Age: 22
End: 2022-11-10
Payer: MEDICAID

## 2022-11-10 NOTE — TELEPHONE ENCOUNTER
Specialty Pharmacy - Refill Coordination    Specialty Medication Orders Linked to Encounter      Flowsheet Row Most Recent Value   Medication #1 dasatinib (SPRYCEL) 100 mg (Order#[id number], Rx#[id number])            Refill Questions - Documented Responses      Flowsheet Row Most Recent Value   Patient Availability and HIPAA Verification    Does patient want to proceed with activity? Yes   HIPAA/medical authority confirmed? Yes   Relationship to patient of person spoken to? Self   Refill Screening Questions    Changes to allergies? No   Changes to medications? No   New conditions since last clinic visit? No   Unplanned office visit, urgent care, ED, or hospital admission in the last 4 weeks? No   How does patient/caregiver feel medication is working? Good   Financial problems or insurance changes? No   How many doses of your specialty medications were missed in the last 4 weeks? 0   Would patient like to speak to a pharmacist? No   When does the patient need to receive the medication? 11/17/22   Refill Delivery Questions    How will the patient receive the medication? MEDRx   When does the patient need to receive the medication? 11/17/22   Shipping Address Home   Address in [facility] confirmed and updated if neccessary? Yes   Expected Copay ($) 0   Is the patient able to afford the medication copay? Yes   Payment Method zero copay   Days supply of Refill 30   Supplies needed? No supplies needed   Refill activity completed? Yes   Refill activity plan Refill scheduled   Shipment/Pickup Date: 11/11/22            Current Outpatient Medications   Medication Sig    amLODIPine (NORVASC) 10 MG tablet Take 1 tablet (10 mg total) by mouth once daily.    dasatinib (SPRYCEL) 100 mg Take 1 tablet (100 mg total) by mouth once daily.    ondansetron (ZOFRAN) 8 MG tablet Take 1 tablet (8 mg total) by mouth every 6 (six) hours as needed for Nausea.   Last reviewed on 10/11/2022 12:57 PM by Ilya Myrick  PharmD    Review of patient's allergies indicates:  No Known Allergies Last reviewed on  10/11/2022 12:57 PM by Ilya Jeff      Tasks added this encounter   12/10/2022 - Refill Call (Auto Added)   Tasks due within next 3 months   No tasks due.     Patricia Gómez, PharmD  [facility]  [address] [place] [address]  Phone: [phone]  Fax: [phone]

## 2022-11-14 ENCOUNTER — LAB VISIT (OUTPATIENT)
Dept: LAB | Facility: HOSPITAL | Age: 22
End: 2022-11-14
Attending: INTERNAL MEDICINE
Payer: MEDICAID

## 2022-11-14 DIAGNOSIS — C92.10 CML (CHRONIC MYELOCYTIC LEUKEMIA): ICD-10-CM

## 2022-11-14 LAB
ALBUMIN SERPL-MCNC: 4.4 GM/DL (ref 3.5–5)
ALBUMIN/GLOB SERPL: 1.5 RATIO (ref 1.1–2)
ALP SERPL-CCNC: 69 UNIT/L (ref 40–150)
ALT SERPL-CCNC: 30 UNIT/L (ref 0–55)
AST SERPL-CCNC: 29 UNIT/L (ref 5–34)
BASOPHILS # BLD AUTO: 0.55 X10(3)/MCL (ref 0–0.2)
BASOPHILS NFR BLD AUTO: 1.4 %
BILIRUBIN DIRECT+TOT PNL SERPL-MCNC: 1.6 MG/DL
BUN SERPL-MCNC: 15.2 MG/DL (ref 8.9–20.6)
CALCIUM SERPL-MCNC: 10.1 MG/DL (ref 8.4–10.2)
CHLORIDE SERPL-SCNC: 103 MMOL/L (ref 98–107)
CO2 SERPL-SCNC: 28 MMOL/L (ref 22–29)
CREAT SERPL-MCNC: 1.16 MG/DL (ref 0.73–1.18)
EOSINOPHIL # BLD AUTO: 0.42 X10(3)/MCL (ref 0–0.9)
EOSINOPHIL NFR BLD AUTO: 1.1 %
ERYTHROCYTE [DISTWIDTH] IN BLOOD BY AUTOMATED COUNT: 18.8 % (ref 11.5–17)
GFR SERPLBLD CREATININE-BSD FMLA CKD-EPI: >60 MLS/MIN/1.73/M2
GLOBULIN SER-MCNC: 3 GM/DL (ref 2.4–3.5)
GLUCOSE SERPL-MCNC: 73 MG/DL (ref 74–100)
HCT VFR BLD AUTO: 45.1 % (ref 42–52)
HGB BLD-MCNC: 13.5 GM/DL (ref 14–18)
IMM GRANULOCYTES # BLD AUTO: 2.61 X10(3)/MCL (ref 0–0.04)
IMM GRANULOCYTES NFR BLD AUTO: 6.9 %
LYMPHOCYTES # BLD AUTO: 9.54 X10(3)/MCL (ref 0.6–4.6)
LYMPHOCYTES NFR BLD AUTO: 25.1 %
MCH RBC QN AUTO: 24.5 PG (ref 27–31)
MCHC RBC AUTO-ENTMCNC: 29.9 MG/DL (ref 33–36)
MCV RBC AUTO: 81.7 FL (ref 80–94)
MONOCYTES # BLD AUTO: 1.14 X10(3)/MCL (ref 0.1–1.3)
MONOCYTES NFR BLD AUTO: 3 %
NEUTROPHILS # BLD AUTO: 23.8 X10(3)/MCL (ref 2.1–9.2)
NEUTROPHILS NFR BLD AUTO: 62.5 %
PLATELET # BLD AUTO: 359 X10(3)/MCL (ref 130–400)
PMV BLD AUTO: ABNORMAL FL
POTASSIUM SERPL-SCNC: 4.6 MMOL/L (ref 3.5–5.1)
PROT SERPL-MCNC: 7.4 GM/DL (ref 6.4–8.3)
RBC # BLD AUTO: 5.52 X10(6)/MCL (ref 4.7–6.1)
SODIUM SERPL-SCNC: 138 MMOL/L (ref 136–145)
WBC # SPEC AUTO: 38.1 X10(3)/MCL (ref 4.5–11.5)

## 2022-11-14 PROCEDURE — 85025 COMPLETE CBC W/AUTO DIFF WBC: CPT

## 2022-11-14 PROCEDURE — 36415 COLL VENOUS BLD VENIPUNCTURE: CPT

## 2022-11-14 PROCEDURE — 81206 BCR/ABL1 GENE MAJOR BP: CPT

## 2022-11-14 PROCEDURE — 80053 COMPREHEN METABOLIC PANEL: CPT

## 2022-12-12 ENCOUNTER — SPECIALTY PHARMACY (OUTPATIENT)
Dept: PHARMACY | Facility: CLINIC | Age: 22
End: 2022-12-12
Payer: MEDICAID

## 2022-12-12 NOTE — TELEPHONE ENCOUNTER
Specialty Pharmacy - Refill Coordination    Specialty Medication Orders Linked to Encounter      Flowsheet Row Most Recent Value   Medication #1 dasatinib (SPRYCEL) 100 mg (Order#[id number], Rx#[id number])            Refill Questions - Documented Responses      Flowsheet Row Most Recent Value   Patient Availability and HIPAA Verification    Does patient want to proceed with activity? Yes   HIPAA/medical authority confirmed? Yes   Relationship to patient of person spoken to? Self   Refill Screening Questions    Changes to allergies? No   Changes to medications? No   New conditions since last clinic visit? No   Unplanned office visit, urgent care, ED, or hospital admission in the last 4 weeks? No   How does patient/caregiver feel medication is working? Good   Financial problems or insurance changes? No   How many doses of your specialty medications were missed in the last 4 weeks? 0   Would patient like to speak to a pharmacist? No   When does the patient need to receive the medication? 12/15/22   Refill Delivery Questions    How will the patient receive the medication? MEDRx   When does the patient need to receive the medication? 12/15/22   Shipping Address Home   Address in [facility] confirmed and updated if neccessary? Yes   Expected Copay ($) 0   Is the patient able to afford the medication copay? Yes   Payment Method zero copay   Days supply of Refill 30   Supplies needed? No supplies needed   Refill activity completed? Yes   Refill activity plan Refill scheduled   Shipment/Pickup Date: 12/12/22            Current Outpatient Medications   Medication Sig    amLODIPine (NORVASC) 10 MG tablet Take 1 tablet (10 mg total) by mouth once daily.    dasatinib (SPRYCEL) 100 mg Take 1 tablet (100 mg total) by mouth once daily.    ondansetron (ZOFRAN) 8 MG tablet Take 1 tablet (8 mg total) by mouth every 6 (six) hours as needed for Nausea.   Last reviewed on 10/11/2022 12:57 PM by Ilya Myrick  PharmD    Review of patient's allergies indicates:  No Known Allergies Last reviewed on  10/11/2022 12:57 PM by Ilya Jeff      Tasks added this encounter   1/7/2023 - Refill Call (Auto Added)   Tasks due within next 3 months   No tasks due.     [facility]  [address]  [facility] [address]  Phone: [phone]  Fax: [phone]

## 2022-12-27 ENCOUNTER — PATIENT MESSAGE (OUTPATIENT)
Dept: HEMATOLOGY/ONCOLOGY | Facility: CLINIC | Age: 22
End: 2022-12-27
Payer: MEDICAID

## 2023-01-05 ENCOUNTER — APPOINTMENT (OUTPATIENT)
Dept: LAB | Facility: HOSPITAL | Age: 23
End: 2023-01-05
Attending: INTERNAL MEDICINE
Payer: MEDICAID

## 2023-01-09 ENCOUNTER — SPECIALTY PHARMACY (OUTPATIENT)
Dept: PHARMACY | Facility: CLINIC | Age: 23
End: 2023-01-09
Payer: MEDICAID

## 2023-01-09 NOTE — TELEPHONE ENCOUNTER
Specialty Pharmacy - Refill Coordination    Specialty Medication Orders Linked to Encounter      Flowsheet Row Most Recent Value   Medication #1 dasatinib (SPRYCEL) 100 mg (Order#[id number], Rx#[id number])            Refill Questions - Documented Responses      Flowsheet Row Most Recent Value   Patient Availability and HIPAA Verification    Does patient want to proceed with activity? Yes   HIPAA/medical authority confirmed? Yes   Relationship to patient of person spoken to? Self   Refill Screening Questions    Changes to allergies? No   Changes to medications? No   New conditions since last clinic visit? No   Unplanned office visit, urgent care, ED, or hospital admission in the last 4 weeks? No   How does patient/caregiver feel medication is working? Good   Financial problems or insurance changes? No   How many doses of your specialty medications were missed in the last 4 weeks? 0   Would patient like to speak to a pharmacist? No   When does the patient need to receive the medication? 01/16/23   Refill Delivery Questions    How will the patient receive the medication? MEDRx   When does the patient need to receive the medication? 01/16/23   Shipping Address Home   Address in [facility] confirmed and updated if neccessary? Yes   Expected Copay ($) 0   Is the patient able to afford the medication copay? Yes   Payment Method zero copay   Days supply of Refill 30   Supplies needed? No supplies needed   Refill activity completed? Yes   Refill activity plan Refill scheduled   Shipment/Pickup Date: 01/11/23            Current Outpatient Medications   Medication Sig    amLODIPine (NORVASC) 10 MG tablet Take 1 tablet (10 mg total) by mouth once daily.    dasatinib (SPRYCEL) 100 mg Take 1 tablet (100 mg total) by mouth once daily.    ondansetron (ZOFRAN) 8 MG tablet Take 1 tablet (8 mg total) by mouth every 6 (six) hours as needed for Nausea.   Last reviewed on 10/11/2022 12:57 PM by Ilya Myrick  PharmD    Review of patient's allergies indicates:  No Known Allergies Last reviewed on  10/11/2022 12:57 PM by Ilya Jeff      Tasks added this encounter   2/8/2023 - Refill Call (Auto Added)   Tasks due within next 3 months   4/2/2023 - Clinical - Follow Up Assesement (180 day)     [facility]  [address]  [facility] [address]  Phone: [phone]  Fax: [phone]

## 2023-02-08 ENCOUNTER — SPECIALTY PHARMACY (OUTPATIENT)
Dept: PHARMACY | Facility: CLINIC | Age: 23
End: 2023-02-08
Payer: MEDICAID

## 2023-02-08 NOTE — TELEPHONE ENCOUNTER
Specialty Pharmacy - Refill Coordination    Specialty Medication Orders Linked to Encounter      Flowsheet Row Most Recent Value   Medication #1 dasatinib (SPRYCEL) 100 mg (Order#[id number], Rx#[id number])            Refill Questions - Documented Responses      Flowsheet Row Most Recent Value   Patient Availability and HIPAA Verification    Does patient want to proceed with activity? Yes   HIPAA/medical authority confirmed? Yes   Relationship to patient of person spoken to? Self   Refill Screening Questions    Changes to allergies? No   Changes to medications? No   New conditions since last clinic visit? No   Unplanned office visit, urgent care, ED, or hospital admission in the last 4 weeks? No   How does patient/caregiver feel medication is working? Good   Financial problems or insurance changes? No   How many doses of your specialty medications were missed in the last 4 weeks? 0   Would patient like to speak to a pharmacist? No   When does the patient need to receive the medication? 02/15/23   Refill Delivery Questions    How will the patient receive the medication? MEDRx   When does the patient need to receive the medication? 02/15/23   Shipping Address Home   Address in [facility] confirmed and updated if neccessary? Yes   Expected Copay ($) 0   Is the patient able to afford the medication copay? Yes   Payment Method zero copay   Days supply of Refill 30   Supplies needed? No supplies needed   Refill activity completed? Yes   Shipment/Pickup Date: 02/09/23            Current Outpatient Medications   Medication Sig    amLODIPine (NORVASC) 10 MG tablet Take 1 tablet (10 mg total) by mouth once daily.    dasatinib (SPRYCEL) 100 mg Take 1 tablet (100 mg total) by mouth once daily.    ondansetron (ZOFRAN) 8 MG tablet Take 1 tablet (8 mg total) by mouth every 6 (six) hours as needed for Nausea.   Last reviewed on 10/11/2022 12:57 PM by Ilya Myrick, PharmD    Review of patient's allergies  indicates:  No Known Allergies Last reviewed on  10/11/2022 12:57 PM by Ilya Jeff      Tasks added this encounter   3/10/2023 - Refill Call (Auto Added)   Tasks due within next 3 months   4/2/2023 - Clinical - Follow Up Assesement (180 day)     [facility]  [address]  [facility] [address]  Phone: [phone]  Fax: [phone]

## 2023-03-10 ENCOUNTER — SPECIALTY PHARMACY (OUTPATIENT)
Dept: PHARMACY | Facility: CLINIC | Age: 23
End: 2023-03-10
Payer: MEDICAID

## 2023-03-10 NOTE — TELEPHONE ENCOUNTER
Specialty Pharmacy - Refill Coordination    Specialty Medication Orders Linked to Encounter      Flowsheet Row Most Recent Value   Medication #1 dasatinib (SPRYCEL) 100 mg (Order#[id number], Rx#[id number])            Refill Questions - Documented Responses      Flowsheet Row Most Recent Value   Patient Availability and HIPAA Verification    Does patient want to proceed with activity? Yes   HIPAA/medical authority confirmed? Yes   Relationship to patient of person spoken to? Self   Refill Screening Questions    Changes to allergies? No   Changes to medications? No   New conditions since last clinic visit? No   Unplanned office visit, urgent care, ED, or hospital admission in the last 4 weeks? No   How does patient/caregiver feel medication is working? Good   Financial problems or insurance changes? No   How many doses of your specialty medications were missed in the last 4 weeks? 0   Would patient like to speak to a pharmacist? No   When does the patient need to receive the medication? 03/15/23   Refill Delivery Questions    How will the patient receive the medication? MEDRx   When does the patient need to receive the medication? 03/15/23   Shipping Address Home   Address in [facility] confirmed and updated if neccessary? Yes   Expected Copay ($) 0   Is the patient able to afford the medication copay? Yes   Payment Method zero copay   Days supply of Refill 30   Supplies needed? No supplies needed   Refill activity completed? Yes   Refill activity plan Refill scheduled   Shipment/Pickup Date: 03/10/23            Current Outpatient Medications   Medication Sig    amLODIPine (NORVASC) 10 MG tablet Take 1 tablet (10 mg total) by mouth once daily.    dasatinib (SPRYCEL) 100 mg Take 1 tablet (100 mg total) by mouth once daily.    ondansetron (ZOFRAN) 8 MG tablet Take 1 tablet (8 mg total) by mouth every 6 (six) hours as needed for Nausea.   Last reviewed on 10/11/2022 12:57 PM by Ilya Myrick  PharmD    Review of patient's allergies indicates:  No Known Allergies Last reviewed on  10/11/2022 12:57 PM by Ilya Jeff      Tasks added this encounter   4/7/2023 - Refill Call (Auto Added)   Tasks due within next 3 months   4/2/2023 - Clinical - Follow Up Assesement (180 day)     [facility]  [address]  [facility] [address]  Phone: [phone]  Fax: [phone]

## 2023-04-03 ENCOUNTER — SPECIALTY PHARMACY (OUTPATIENT)
Dept: PHARMACY | Facility: CLINIC | Age: 23
End: 2023-04-03
Payer: COMMERCIAL

## 2023-04-03 DIAGNOSIS — C92.10 CML (CHRONIC MYELOCYTIC LEUKEMIA): Primary | ICD-10-CM

## 2023-04-03 NOTE — TELEPHONE ENCOUNTER
Specialty Pharmacy - Clinical Reassessment    Specialty Medication Orders Linked to Encounter      Flowsheet Row Most Recent Value   Medication #1 dasatinib (SPRYCEL) 100 mg (Order#[id number], Rx#[id number])          Patient Diagnosis   C92.10 - CML (chronic myelocytic leukemia)    Magdaleno Hirsch is a 22 y.o. male, who is followed by the specialty pharmacy service for management and education of his Sprycel.  He has been on therapy with Sprycel for 6 months ( since 10/2022).  I have reviewed his electronic medical record and current medication list and determined that specialty medication adjustment Is not needed at this time.    Patient has not experienced adverse events.  He Is adherent reporting 0 missed doses since last review.  Adherence has been encouraged with the following mechanism(s): daily routine, keeping a schedule and staying consistent. He seems adherent to therapy. Patient states he takes his sprycel every morning often on an empty stomach.  He is on target to meet goals of therapy and will continue treatment.        3/10/2023 2/8/2023 1/9/2023 12/12/2022 11/10/2022 10/11/2022   Follow Up Review   # of missed doses 0 0 0 0 0    New Medications? No No No No No    New Conditions? No No No No No    New Allergies? No No No No No    Med Effective? Good Good Good Good Good    Missed activities?      No   Urgent Care? No No No No No    Requested Pharmacist? No No No No No             Therapy is appropriate to continue.    Therapy is effective: Yes  On scale of 1 to 10, how does patient rank quality of life? (10 - Best): 10  Recommendations:  follow up with his provider's office to schedule a follow up appt and labs.  I stressed the importance of keeping his scheduled appts and labs. Patient expressed understanding. I have messaged his provider's office to request someone contact the patient for f/u and notified the assigned pharmacist.   Review Method: Patient Contact     Mr. Hirsch has not had labs or a  follow up since Oct/Nov 2022. Last Bcrabl on 11/14/2022 was 15.2% down from 22.2% on 10/10/2022. Patient will continue with current dose but was encouraged to scheduled his next office visit as soon as he can.          Tasks added this encounter   No tasks added.   Tasks due within next 3 months   4/2/2023 - Clinical - Follow Up Assesement (180 day)  4/7/2023 - Refill Call (Auto Added)     BREANA CHEUNG, PharmD  [facility]  [address] [place], [place] [address]  [facility] [address]  Phone: [phone]  Fax: [phone]

## 2023-04-04 ENCOUNTER — PATIENT MESSAGE (OUTPATIENT)
Dept: RESEARCH | Facility: HOSPITAL | Age: 23
End: 2023-04-04
Payer: COMMERCIAL

## 2023-04-05 ENCOUNTER — LAB VISIT (OUTPATIENT)
Dept: LAB | Facility: HOSPITAL | Age: 23
End: 2023-04-05
Payer: MEDICAID

## 2023-04-05 DIAGNOSIS — C92.10 CML (CHRONIC MYELOCYTIC LEUKEMIA): ICD-10-CM

## 2023-04-05 LAB
ALBUMIN SERPL-MCNC: 4.3 G/DL (ref 3.5–5)
ALBUMIN/GLOB SERPL: 1.6 RATIO (ref 1.1–2)
ALP SERPL-CCNC: 79 UNIT/L (ref 40–150)
ALT SERPL-CCNC: 16 UNIT/L (ref 0–55)
AST SERPL-CCNC: 22 UNIT/L (ref 5–34)
BASOPHILS # BLD AUTO: 0.28 X10(3)/MCL (ref 0–0.2)
BASOPHILS NFR BLD AUTO: 1.2 %
BILIRUBIN DIRECT+TOT PNL SERPL-MCNC: 1.3 MG/DL
BUN SERPL-MCNC: 10.5 MG/DL (ref 8.9–20.6)
CALCIUM SERPL-MCNC: 9.6 MG/DL (ref 8.4–10.2)
CHLORIDE SERPL-SCNC: 107 MMOL/L (ref 98–107)
CO2 SERPL-SCNC: 26 MMOL/L (ref 22–29)
CREAT SERPL-MCNC: 1.13 MG/DL (ref 0.73–1.18)
EOSINOPHIL # BLD AUTO: 0.17 X10(3)/MCL (ref 0–0.9)
EOSINOPHIL NFR BLD AUTO: 0.8 %
ERYTHROCYTE [DISTWIDTH] IN BLOOD BY AUTOMATED COUNT: 19 % (ref 11.5–17)
GFR SERPLBLD CREATININE-BSD FMLA CKD-EPI: >60 MLS/MIN/1.73/M2
GLOBULIN SER-MCNC: 2.7 GM/DL (ref 2.4–3.5)
GLUCOSE SERPL-MCNC: 86 MG/DL (ref 74–100)
HCT VFR BLD AUTO: 46.2 % (ref 42–52)
HGB BLD-MCNC: 14.1 G/DL (ref 14–18)
IMM GRANULOCYTES # BLD AUTO: 2.62 X10(3)/MCL (ref 0–0.04)
IMM GRANULOCYTES NFR BLD AUTO: 11.6 %
LYMPHOCYTES # BLD AUTO: 5.03 X10(3)/MCL (ref 0.6–4.6)
LYMPHOCYTES NFR BLD AUTO: 22.3 %
MCH RBC QN AUTO: 22.4 PG (ref 27–31)
MCHC RBC AUTO-ENTMCNC: 30.5 G/DL (ref 33–36)
MCV RBC AUTO: 73.3 FL (ref 80–94)
MONOCYTES # BLD AUTO: 0.7 X10(3)/MCL (ref 0.1–1.3)
MONOCYTES NFR BLD AUTO: 3.1 %
NEUTROPHILS # BLD AUTO: 13.77 X10(3)/MCL (ref 2.1–9.2)
NEUTROPHILS NFR BLD AUTO: 61 %
PLATELET # BLD AUTO: 625 X10(3)/MCL (ref 130–400)
PMV BLD AUTO: 10.3 FL (ref 7.4–10.4)
POTASSIUM SERPL-SCNC: 5 MMOL/L (ref 3.5–5.1)
PROT SERPL-MCNC: 7 GM/DL (ref 6.4–8.3)
RBC # BLD AUTO: 6.3 X10(6)/MCL (ref 4.7–6.1)
SODIUM SERPL-SCNC: 142 MMOL/L (ref 136–145)
WBC # SPEC AUTO: 22.6 X10(3)/MCL (ref 4.5–11.5)

## 2023-04-05 PROCEDURE — 36415 COLL VENOUS BLD VENIPUNCTURE: CPT

## 2023-04-05 PROCEDURE — 80053 COMPREHEN METABOLIC PANEL: CPT

## 2023-04-05 PROCEDURE — 85025 COMPLETE CBC W/AUTO DIFF WBC: CPT

## 2023-04-05 PROCEDURE — 81206 BCR/ABL1 GENE MAJOR BP: CPT | Mod: 90

## 2023-04-06 ENCOUNTER — SPECIALTY PHARMACY (OUTPATIENT)
Dept: PHARMACY | Facility: CLINIC | Age: 23
End: 2023-04-06
Payer: COMMERCIAL

## 2023-04-06 NOTE — TELEPHONE ENCOUNTER
Spoke to patient regarding Sprycel refill. He told me he has 15 tablets left on hand, which is a 15 day supply. I asked if his therapy was delayed, and he mentioned that he missed a week of doses due to forgetting to bring them with him on a vacation to [place]. Will route to assigned pharmacist for follow up next week.

## 2023-04-13 ENCOUNTER — PATIENT MESSAGE (OUTPATIENT)
Dept: PHARMACY | Facility: CLINIC | Age: 23
End: 2023-04-13
Payer: COMMERCIAL

## 2023-04-19 ENCOUNTER — OFFICE VISIT (OUTPATIENT)
Dept: HEMATOLOGY/ONCOLOGY | Facility: CLINIC | Age: 23
End: 2023-04-19
Payer: MEDICAID

## 2023-04-19 VITALS
SYSTOLIC BLOOD PRESSURE: 167 MMHG | RESPIRATION RATE: 18 BRPM | HEIGHT: 73 IN | DIASTOLIC BLOOD PRESSURE: 104 MMHG | OXYGEN SATURATION: 99 % | BODY MASS INDEX: 24.03 KG/M2 | HEART RATE: 66 BPM | TEMPERATURE: 98 F

## 2023-04-19 DIAGNOSIS — C92.10 CML (CHRONIC MYELOCYTIC LEUKEMIA): Primary | Chronic | ICD-10-CM

## 2023-04-19 PROBLEM — Z72.0 TOBACCO USER: Status: ACTIVE | Noted: 2023-04-19

## 2023-04-19 PROCEDURE — 3008F BODY MASS INDEX DOCD: CPT | Mod: CPTII,,, | Performed by: NURSE PRACTITIONER

## 2023-04-19 PROCEDURE — 99999 PR PBB SHADOW E&M-EST. PATIENT-LVL III: ICD-10-PCS | Mod: PBBFAC,,, | Performed by: NURSE PRACTITIONER

## 2023-04-19 PROCEDURE — 99213 OFFICE O/P EST LOW 20 MIN: CPT | Mod: PBBFAC | Performed by: NURSE PRACTITIONER

## 2023-04-19 PROCEDURE — 1160F PR REVIEW ALL MEDS BY PRESCRIBER/CLIN PHARMACIST DOCUMENTED: ICD-10-PCS | Mod: CPTII,,, | Performed by: NURSE PRACTITIONER

## 2023-04-19 PROCEDURE — 3008F PR BODY MASS INDEX (BMI) DOCUMENTED: ICD-10-PCS | Mod: CPTII,,, | Performed by: NURSE PRACTITIONER

## 2023-04-19 PROCEDURE — 3077F PR MOST RECENT SYSTOLIC BLOOD PRESSURE >= 140 MM HG: ICD-10-PCS | Mod: CPTII,,, | Performed by: NURSE PRACTITIONER

## 2023-04-19 PROCEDURE — 99214 OFFICE O/P EST MOD 30 MIN: CPT | Mod: S$PBB,,, | Performed by: NURSE PRACTITIONER

## 2023-04-19 PROCEDURE — 99999 PR PBB SHADOW E&M-EST. PATIENT-LVL III: CPT | Mod: PBBFAC,,, | Performed by: NURSE PRACTITIONER

## 2023-04-19 PROCEDURE — 1159F MED LIST DOCD IN RCRD: CPT | Mod: CPTII,,, | Performed by: NURSE PRACTITIONER

## 2023-04-19 PROCEDURE — 3077F SYST BP >= 140 MM HG: CPT | Mod: CPTII,,, | Performed by: NURSE PRACTITIONER

## 2023-04-19 PROCEDURE — 1159F PR MEDICATION LIST DOCUMENTED IN MEDICAL RECORD: ICD-10-PCS | Mod: CPTII,,, | Performed by: NURSE PRACTITIONER

## 2023-04-19 PROCEDURE — 3080F DIAST BP >= 90 MM HG: CPT | Mod: CPTII,,, | Performed by: NURSE PRACTITIONER

## 2023-04-19 PROCEDURE — 99214 PR OFFICE/OUTPT VISIT, EST, LEVL IV, 30-39 MIN: ICD-10-PCS | Mod: S$PBB,,, | Performed by: NURSE PRACTITIONER

## 2023-04-19 PROCEDURE — 3080F PR MOST RECENT DIASTOLIC BLOOD PRESSURE >= 90 MM HG: ICD-10-PCS | Mod: CPTII,,, | Performed by: NURSE PRACTITIONER

## 2023-04-19 PROCEDURE — 1160F RVW MEDS BY RX/DR IN RCRD: CPT | Mod: CPTII,,, | Performed by: NURSE PRACTITIONER

## 2023-04-19 NOTE — PROGRESS NOTES
Subjective:       Patient ID: Magdaleno Hirsch is a 22 y.o. male.    Chief Complaint: Boredom    Diagnosis:  1. CML, chronic phase  2. 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  3. Conjuctival hemorrhage secondary to 1.    Current Treatment:   Dasatinib 100 mg po daily    Treatment History:  1. Hydroxyurea 4 g every 12 hours    HPI:  Patient with no real medical history who went in for a routine eye exam on 08/18/2022 to update his eye glasses prescription.  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  He had bilateral Valdez spots with vessel tortuosity.  The optometrist recommended that the patient have blood work including testing for sickle cell disease.  The patient presented to the emergency department.  CBC in the emergency department revealed a white blood cell count of 411,900. Hemoglobin was 8.3, platelets were normal at 375,000 hundred and seventy five thousand.  Differential was suggestive of CML.  Coagulation studies revealed an INR of 1.38, PTT of 36.4 and a fibrinogen of 292.  Patient was going to present to [facility], however they were on diversion.  He was transferred from [facility] to [facility].  Hematology consulted.  Patient underwent bone marrow biopsy on 08/22/2022, this revealed CML, chronic phase, BCR/ABL1 positive.  Order dasatinib 100 mg p.o. daily as of 10/10/2022.    Interval History:   Patient presents to clinic for follow up for CML.  He has been lost to follow up the past few months and reports that he has not been on dasatinib for the last month or 2. Overall he states that he feels good and he denies complaints.       Past Medical History:   Diagnosis Date    CML (chronic myelocytic leukemia)     HVD (hypertensive vascular disease)     Oropharyngeal candidiasis       Past Surgical History:   Procedure Laterality Date    BONE MARROW BIOPSY N/A 8/22/2022    Procedure: Biopsy-bone marrow;  Surgeon: Getachew  MD Gustavo;  Location: [facility];  Service: General;  Laterality: N/A;    KNEE SURGERY Left      Social History     Socioeconomic History    Marital status: Single   Tobacco Use    Smoking status: Never    Smokeless tobacco: Never   Substance and Sexual Activity    Alcohol use: Never    Drug use: Yes     Types: Marijuana      Family History   Problem Relation Age of Onset    Hypertension Maternal Grandmother     Cancer Maternal Grandmother            Review of Systems   Constitutional:  Negative for chills, diaphoresis, fatigue and unexpected weight change.   HENT:  Negative for nasal congestion and sore throat.    Eyes:  Negative for pain.   Respiratory:  Negative for cough, chest tightness and shortness of breath.    Cardiovascular:  Negative for chest pain, palpitations and leg swelling.   Gastrointestinal:  Negative for abdominal distention, abdominal pain, blood in stool, constipation and diarrhea.   Genitourinary:  Negative for dysuria, frequency and hematuria.   Musculoskeletal:  Negative for arthralgias and back pain.   Integumentary:  Negative for rash.   Neurological:  Negative for dizziness, weakness, numbness and headaches.   Hematological:  Negative for adenopathy. Bruises/bleeds easily.   Psychiatric/Behavioral:  Negative for confusion.        Objective:      Physical Exam  Vitals reviewed.   Constitutional:       General: He is awake.      Appearance: Normal appearance.   HENT:      Head: Normocephalic and atraumatic.      Comments: + gingival hyperplasia     Right Ear: Hearing normal.      Left Ear: Hearing normal.      Nose: Nose normal.      Mouth/Throat:      Comments:     Eyes:      General: Lids are normal. Vision grossly intact.      Extraocular Movements: Extraocular movements intact.      Conjunctiva/sclera: Conjunctivae normal.   Cardiovascular:      Rate and Rhythm: Normal rate and regular rhythm.      Pulses: Normal pulses.      Heart sounds: Normal heart sounds.   Pulmonary:      Effort:  Pulmonary effort is normal.      Breath sounds: Normal breath sounds. No wheezing, rhonchi or rales.   Chest:      Comments: Soft mass in area of the sternum  Abdominal:      General: Bowel sounds are normal.      Palpations: Abdomen is soft. There is splenomegaly.      Tenderness: There is no abdominal tenderness.   Musculoskeletal:      Cervical back: Full passive range of motion without pain.      Right lower leg: No edema.      Left lower leg: No edema.   Lymphadenopathy:      Cervical: No cervical adenopathy.      Upper Body:      Right upper body: No supraclavicular or axillary adenopathy.      Left upper body: No supraclavicular or axillary adenopathy.   Skin:     General: Skin is warm.   Neurological:      General: No focal deficit present.      Mental Status: He is alert and oriented to person, place, and time.   Psychiatric:         Attention and Perception: Attention normal.         Mood and Affect: Mood and affect normal.         Behavior: Behavior is cooperative.       LABS AND IMAGING REVIEWED IN EPIC  Lab Review:  CBC:   Recent Labs     04/05/23  1537   WBC 22.6*   RBC 6.30*   HGB 14.1   HCT 46.2   *       CMP:   Recent Labs     04/05/23  1537      K 5.0   CO2 26   BUN 10.5   CREATININE 1.13   CALCIUM 9.6   ALBUMIN 4.3   BILITOT 1.3   ALKPHOS 79   AST 22   ALT 16              Assessment:     1. CML, chronic phase  2. Pancytopenia secondary to Hydrea  3. Refractory thrombocytopenia - improving        Plan:     He has been off dasatinib, for 1-2 months.  Dr. Hogan discussed importance of compliance with patient, we will resume dasatinib 100 mg p.o. daily.    Most recent BCR/ABL PCR is positive, BCR/ABL1 p210 mRNA transcripts were detected and   estimated to represent 35.4% total ABL1     Repeat labs in 6 weeks: CBC, CMP    Return to clinic in 6 weeks    CHUY Mayo

## 2023-04-20 NOTE — TELEPHONE ENCOUNTER
Specialty Pharmacy - Refill Coordination    Specialty Medication Orders Linked to Encounter      Flowsheet Row Most Recent Value   Medication #1 dasatinib (SPRYCEL) 100 mg (Order#[id number], Rx#[id number])            Refill Questions - Documented Responses      Flowsheet Row Most Recent Value   Patient Availability and HIPAA Verification    Does patient want to proceed with activity? Yes   HIPAA/medical authority confirmed? Yes   Relationship to patient of person spoken to? Self   Refill Screening Questions    Changes to allergies? No   Changes to medications? No   New conditions since last clinic visit? No   Unplanned office visit, urgent care, ED, or hospital admission in the last 4 weeks? No   How does patient/caregiver feel medication is working? Good   Financial problems or insurance changes? No   How many doses of your specialty medications were missed in the last 4 weeks? --  [[facility] is aware pt missed a week due to forgetting while traveling]   Would patient like to speak to a pharmacist? No   When does the patient need to receive the medication? 04/28/23   Refill Delivery Questions    How will the patient receive the medication? MEDRx   When does the patient need to receive the medication? 04/28/23   Shipping Address Home   Address in [facility] confirmed and updated if neccessary? Yes   Expected Copay ($) 0   Is the patient able to afford the medication copay? Yes   Payment Method zero copay   Days supply of Refill 30   Supplies needed? No supplies needed   Refill activity completed? Yes   Refill activity plan Refill scheduled   Shipment/Pickup Date: 04/20/23            Current Outpatient Medications   Medication Sig    amLODIPine (NORVASC) 10 MG tablet Take 1 tablet (10 mg total) by mouth once daily.    dasatinib (SPRYCEL) 100 mg Take 1 tablet (100 mg total) by mouth once daily.    ondansetron (ZOFRAN) 8 MG tablet Take 1 tablet (8 mg total) by mouth every 6 (six) hours as needed for Nausea.   Last  reviewed on 4/19/2023  2:42 PM by TERRI Rader    Review of patient's allergies indicates:  No Known Allergies Last reviewed on  4/19/2023 2:42 PM by Alexsandra Rojas      Tasks added this encounter   5/21/2023 - Refill Call (Auto Added)   Tasks due within next 3 months   No tasks due.     [facility]  [address]  [facility] [address]  Phone: [phone]  Fax: [phone]

## 2023-04-20 NOTE — TELEPHONE ENCOUNTER
Specialty Pharmacy - Refill Coordination    Specialty Medication Orders Linked to Encounter      Flowsheet Row Most Recent Value   Medication #1 dasatinib (SPRYCEL) 100 mg (Order#[id number], Rx#[id number])            Refill Questions - Documented Responses      Flowsheet Row Most Recent Value   Patient Availability and HIPAA Verification    Does patient want to proceed with activity? Yes   HIPAA/medical authority confirmed? Yes   Relationship to patient of person spoken to? Self   Refill Screening Questions    Changes to allergies? No   Changes to medications? No   New conditions since last clinic visit? No   Unplanned office visit, urgent care, ED, or hospital admission in the last 4 weeks? No   How does patient/caregiver feel medication is working? Good   Financial problems or insurance changes? No   How many doses of your specialty medications were missed in the last 4 weeks? 0   Would patient like to speak to a pharmacist? No   When does the patient need to receive the medication? 04/28/23   Refill Delivery Questions    How will the patient receive the medication? MEDRx   When does the patient need to receive the medication? 04/28/23   Shipping Address Home   Address in [facility] confirmed and updated if neccessary? Yes   Expected Copay ($) 0   Is the patient able to afford the medication copay? Yes   Payment Method zero copay   Days supply of Refill 30   Supplies needed? No supplies needed   Refill activity completed? Yes   Refill activity plan Refill scheduled   Shipment/Pickup Date: 04/20/23            Current Outpatient Medications   Medication Sig    amLODIPine (NORVASC) 10 MG tablet Take 1 tablet (10 mg total) by mouth once daily.    dasatinib (SPRYCEL) 100 mg Take 1 tablet (100 mg total) by mouth once daily.    ondansetron (ZOFRAN) 8 MG tablet Take 1 tablet (8 mg total) by mouth every 6 (six) hours as needed for Nausea.   Last reviewed on 4/19/2023  2:42 PM by TERRI Rader    Review of  patient's allergies indicates:  No Known Allergies Last reviewed on  4/19/2023 2:42 PM by Alexsandra Rojas      Tasks added this encounter   5/21/2023 - Refill Call (Auto Added)   Tasks due within next 3 months   No tasks due.     [facility]  [address]  [facility] [address]  Phone: [phone]  Fax: [phone]

## 2023-05-22 ENCOUNTER — SPECIALTY PHARMACY (OUTPATIENT)
Dept: PHARMACY | Facility: CLINIC | Age: 23
End: 2023-05-22
Payer: COMMERCIAL

## 2023-05-22 NOTE — TELEPHONE ENCOUNTER
Specialty Pharmacy - Refill Coordination    Specialty Medication Orders Linked to Encounter      Flowsheet Row Most Recent Value   Medication #1 dasatinib (SPRYCEL) 100 mg (Order#[id number], Rx#[id number])            Refill Questions - Documented Responses      Flowsheet Row Most Recent Value   Patient Availability and HIPAA Verification    Does patient want to proceed with activity? Yes   HIPAA/medical authority confirmed? Yes   Relationship to patient of person spoken to? Self   Refill Screening Questions    Changes to allergies? No   Changes to medications? No   New conditions since last clinic visit? No   Unplanned office visit, urgent care, ED, or hospital admission in the last 4 weeks? No   How does patient/caregiver feel medication is working? Good   Financial problems or insurance changes? No   How many doses of your specialty medications were missed in the last 4 weeks? 0   Would patient like to speak to a pharmacist? No   When does the patient need to receive the medication? 05/29/23   Refill Delivery Questions    How will the patient receive the medication? MEDRx   When does the patient need to receive the medication? 05/29/23   Shipping Address Home   Address in [place] confirmed and updated if neccessary? Yes   Expected Copay ($) 3   Is the patient able to afford the medication copay? Yes   Payment Method new CC added to file   Days supply of Refill 30   Supplies needed? No supplies needed   Refill activity completed? Yes   Refill activity plan Refill scheduled   Shipment/Pickup Date: 05/23/23            Current Outpatient Medications   Medication Sig    amLODIPine (NORVASC) 10 MG tablet Take 1 tablet (10 mg total) by mouth once daily.    dasatinib (SPRYCEL) 100 mg Take 1 tablet (100 mg total) by mouth once daily.    ondansetron (ZOFRAN) 8 MG tablet Take 1 tablet (8 mg total) by mouth every 6 (six) hours as needed for Nausea.   Last reviewed on 4/19/2023  2:42 PM by TERRI Rader    Review  of patient's allergies indicates:  No Known Allergies Last reviewed on  4/19/2023 2:42 PM by Alexsandra Rojas      Tasks added this encounter   No tasks added.   Tasks due within next 3 months   5/21/2023 - Refill Coordination Outreach (1 time occurrence)     [facility]  [address]  [facility] [address]  Phone: [phone]  Fax: [phone]

## 2023-05-29 ENCOUNTER — LAB VISIT (OUTPATIENT)
Dept: LAB | Facility: HOSPITAL | Age: 23
End: 2023-05-29
Payer: MEDICAID

## 2023-05-29 DIAGNOSIS — C92.10 CML (CHRONIC MYELOCYTIC LEUKEMIA): ICD-10-CM

## 2023-05-29 LAB
ABS NEUT CALC (OHS): 3.3 X10(3)/MCL (ref 2.1–9.2)
ALBUMIN SERPL-MCNC: 4 G/DL (ref 3.5–5)
ALBUMIN/GLOB SERPL: 1.5 RATIO (ref 1.1–2)
ALP SERPL-CCNC: 90 UNIT/L (ref 40–150)
ALT SERPL-CCNC: 14 UNIT/L (ref 0–55)
ANISOCYTOSIS BLD QL SMEAR: ABNORMAL
AST SERPL-CCNC: 21 UNIT/L (ref 5–34)
BILIRUBIN DIRECT+TOT PNL SERPL-MCNC: 0.6 MG/DL
BUN SERPL-MCNC: 9.4 MG/DL (ref 8.9–20.6)
CALCIUM SERPL-MCNC: 8.8 MG/DL (ref 8.4–10.2)
CHLORIDE SERPL-SCNC: 110 MMOL/L (ref 98–107)
CO2 SERPL-SCNC: 26 MMOL/L (ref 22–29)
CREAT SERPL-MCNC: 1.05 MG/DL (ref 0.73–1.18)
EOSINOPHIL NFR BLD MANUAL: 0.55 X10(3)/MCL (ref 0–0.9)
EOSINOPHIL NFR BLD MANUAL: 6 % (ref 0–8)
ERYTHROCYTE [DISTWIDTH] IN BLOOD BY AUTOMATED COUNT: 19.5 % (ref 11.5–17)
GFR SERPLBLD CREATININE-BSD FMLA CKD-EPI: >60 MLS/MIN/1.73/M2
GLOBULIN SER-MCNC: 2.6 GM/DL (ref 2.4–3.5)
GLUCOSE SERPL-MCNC: 92 MG/DL (ref 74–100)
HCT VFR BLD AUTO: 40.7 % (ref 42–52)
HGB BLD-MCNC: 12.3 G/DL (ref 14–18)
HYPOCHROMIA BLD QL SMEAR: ABNORMAL
LG GRANULAR LYMP (OHS): ABNORMAL
LYMPH ABN # BLD MANUAL: 10 %
LYMPHOCYTES NFR BLD MANUAL: 4.21 X10(3)/MCL
LYMPHOCYTES NFR BLD MANUAL: 46 % (ref 13–40)
MCH RBC QN AUTO: 23 PG (ref 27–31)
MCHC RBC AUTO-ENTMCNC: 30.2 G/DL (ref 33–36)
MCV RBC AUTO: 76.2 FL (ref 80–94)
MICROCYTES BLD QL SMEAR: ABNORMAL
MONOCYTES NFR BLD MANUAL: 0.18 X10(3)/MCL (ref 0.1–1.3)
MONOCYTES NFR BLD MANUAL: 2 % (ref 2–11)
NEUTROPHILS NFR BLD MANUAL: 36 % (ref 47–80)
OVALOCYTES (OLG): ABNORMAL
PLATELET # BLD AUTO: 152 X10(3)/MCL (ref 130–400)
PLATELET # BLD EST: ADEQUATE 10*3/UL
PMV BLD AUTO: 10.4 FL (ref 7.4–10.4)
POIKILOCYTOSIS BLD QL SMEAR: ABNORMAL
POLYCHROMASIA BLD QL SMEAR: ABNORMAL
POTASSIUM SERPL-SCNC: 3.9 MMOL/L (ref 3.5–5.1)
PROT SERPL-MCNC: 6.6 GM/DL (ref 6.4–8.3)
RBC # BLD AUTO: 5.34 X10(6)/MCL (ref 4.7–6.1)
RBC MORPH BLD: ABNORMAL
SMUDGE CELL (OLG): ABNORMAL
SODIUM SERPL-SCNC: 142 MMOL/L (ref 136–145)
STIPPLED RBC (OHS): ABNORMAL
TEAR DROP CELL (OLG): SLIGHT
WBC # SPEC AUTO: 9.16 X10(3)/MCL (ref 4.5–11.5)

## 2023-05-29 PROCEDURE — 36415 COLL VENOUS BLD VENIPUNCTURE: CPT

## 2023-05-29 PROCEDURE — 85027 COMPLETE CBC AUTOMATED: CPT

## 2023-05-29 PROCEDURE — 80053 COMPREHEN METABOLIC PANEL: CPT

## 2023-06-02 ENCOUNTER — OFFICE VISIT (OUTPATIENT)
Dept: HEMATOLOGY/ONCOLOGY | Facility: CLINIC | Age: 23
End: 2023-06-02
Payer: MEDICAID

## 2023-06-02 VITALS
DIASTOLIC BLOOD PRESSURE: 103 MMHG | RESPIRATION RATE: 16 BRPM | HEART RATE: 103 BPM | TEMPERATURE: 99 F | WEIGHT: 201 LBS | SYSTOLIC BLOOD PRESSURE: 156 MMHG | OXYGEN SATURATION: 98 % | BODY MASS INDEX: 26.64 KG/M2 | HEIGHT: 73 IN

## 2023-06-02 DIAGNOSIS — C92.10 CML (CHRONIC MYELOCYTIC LEUKEMIA): Primary | ICD-10-CM

## 2023-06-02 PROCEDURE — 1160F RVW MEDS BY RX/DR IN RCRD: CPT | Mod: CPTII,,, | Performed by: INTERNAL MEDICINE

## 2023-06-02 PROCEDURE — 1160F PR REVIEW ALL MEDS BY PRESCRIBER/CLIN PHARMACIST DOCUMENTED: ICD-10-PCS | Mod: CPTII,,, | Performed by: INTERNAL MEDICINE

## 2023-06-02 PROCEDURE — 3077F PR MOST RECENT SYSTOLIC BLOOD PRESSURE >= 140 MM HG: ICD-10-PCS | Mod: CPTII,,, | Performed by: INTERNAL MEDICINE

## 2023-06-02 PROCEDURE — 99999 PR PBB SHADOW E&M-EST. PATIENT-LVL III: ICD-10-PCS | Mod: PBBFAC,,, | Performed by: INTERNAL MEDICINE

## 2023-06-02 PROCEDURE — 1159F MED LIST DOCD IN RCRD: CPT | Mod: CPTII,,, | Performed by: INTERNAL MEDICINE

## 2023-06-02 PROCEDURE — 81206 BCR/ABL1 GENE MAJOR BP: CPT | Performed by: INTERNAL MEDICINE

## 2023-06-02 PROCEDURE — 99214 PR OFFICE/OUTPT VISIT, EST, LEVL IV, 30-39 MIN: ICD-10-PCS | Mod: S$PBB,,, | Performed by: INTERNAL MEDICINE

## 2023-06-02 PROCEDURE — 3080F DIAST BP >= 90 MM HG: CPT | Mod: CPTII,,, | Performed by: INTERNAL MEDICINE

## 2023-06-02 PROCEDURE — 3008F BODY MASS INDEX DOCD: CPT | Mod: CPTII,,, | Performed by: INTERNAL MEDICINE

## 2023-06-02 PROCEDURE — 3008F PR BODY MASS INDEX (BMI) DOCUMENTED: ICD-10-PCS | Mod: CPTII,,, | Performed by: INTERNAL MEDICINE

## 2023-06-02 PROCEDURE — 3077F SYST BP >= 140 MM HG: CPT | Mod: CPTII,,, | Performed by: INTERNAL MEDICINE

## 2023-06-02 PROCEDURE — 99214 OFFICE O/P EST MOD 30 MIN: CPT | Mod: S$PBB,,, | Performed by: INTERNAL MEDICINE

## 2023-06-02 PROCEDURE — 1159F PR MEDICATION LIST DOCUMENTED IN MEDICAL RECORD: ICD-10-PCS | Mod: CPTII,,, | Performed by: INTERNAL MEDICINE

## 2023-06-02 PROCEDURE — 99213 OFFICE O/P EST LOW 20 MIN: CPT | Mod: PBBFAC | Performed by: INTERNAL MEDICINE

## 2023-06-02 PROCEDURE — 3080F PR MOST RECENT DIASTOLIC BLOOD PRESSURE >= 90 MM HG: ICD-10-PCS | Mod: CPTII,,, | Performed by: INTERNAL MEDICINE

## 2023-06-02 PROCEDURE — 99999 PR PBB SHADOW E&M-EST. PATIENT-LVL III: CPT | Mod: PBBFAC,,, | Performed by: INTERNAL MEDICINE

## 2023-06-02 NOTE — PROGRESS NOTES
Subjective:       Patient ID: Magdaleno Hirsch is a 22 y.o. male.    Chief Complaint: Boredom    Diagnosis:  1. CML, chronic phase  2. 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  3. Conjuctival hemorrhage secondary to 1.    Current Treatment:   Dasatinib 100 mg po daily, unsure on start date as patient was lost to follow up.    Treatment History:  Hydroxyurea 2g every 12 hours    HPI:  Patient with no real medical history who went in for a routine eye exam on 08/18/2022 to update his eye glasses prescription.  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  He had bilateral Valdez spots with vessel tortuosity.  The optometrist recommended that the patient have blood work including testing for sickle cell disease.  The patient presented to the emergency department.  CBC in the emergency department revealed a white blood cell count of 411,900. Hemoglobin was 8.3, platelets were normal at 375,000 hundred and seventy five thousand.  Differential was suggestive of CML.  Coagulation studies revealed an INR of 1.38, PTT of 36.4 and a fibrinogen of 292.  Patient was going to present to [facility], however they were on diversion.  He was transferred from [facility] to [facility].  Hematology consulted.  Patient underwent bone marrow biopsy on 08/22/2022, this revealed CML, chronic phase, BCR/ABL1 positive.  Ordered dasatinib 100 mg p.o. daily as of 10/10/2022, unsure when patient started taking medication due to him being lost to follow-up.    Interval History:   Patient presents to clinic for follow up for CML.  He has been lost to follow up the past few weeks and reports he has been busy with moving to a new home. He reports compliance with dasatinib. Overall he states that he feels good and he denies complaints.   His white blood cell count has normalized.    Past Medical History:   Diagnosis Date    CML (chronic myelocytic leukemia)     [facility]  (hypertensive vascular disease)     Oropharyngeal candidiasis       Past Surgical History:   Procedure Laterality Date    BONE MARROW BIOPSY N/A 8/22/2022    Procedure: Biopsy-bone marrow;  Surgeon: Getachew Chen MD;  Location: [facility] OR;  Service: General;  Laterality: N/A;    KNEE SURGERY Left      Social History     Socioeconomic History    Marital status: Single   Tobacco Use    Smoking status: Never    Smokeless tobacco: Never   Substance and Sexual Activity    Alcohol use: Never    Drug use: Yes     Types: Marijuana      Family History   Problem Relation Age of Onset    Hypertension Maternal Grandmother     Cancer Maternal Grandmother            Review of Systems   Constitutional:  Negative for chills, diaphoresis, fatigue and unexpected weight change.   HENT:  Negative for nasal congestion and sore throat.    Eyes:  Negative for pain.   Respiratory:  Negative for cough, chest tightness and shortness of breath.    Cardiovascular:  Negative for chest pain, palpitations and leg swelling.   Gastrointestinal:  Negative for abdominal distention, abdominal pain, blood in stool, constipation and diarrhea.   Genitourinary:  Negative for dysuria, frequency and hematuria.   Musculoskeletal:  Negative for arthralgias and back pain.   Integumentary:  Negative for rash.   Neurological:  Negative for dizziness, weakness, numbness and headaches.   Hematological:  Negative for adenopathy. Bruises/bleeds easily.   Psychiatric/Behavioral:  Negative for confusion.        Objective:      Physical Exam  Vitals reviewed.   Constitutional:       General: He is awake.      Appearance: Normal appearance.   HENT:      Head: Normocephalic and atraumatic.      Right Ear: Hearing normal.      Left Ear: Hearing normal.      Nose: Nose normal.      Mouth/Throat:      Comments:     Eyes:      General: Lids are normal. Vision grossly intact.      Extraocular Movements: Extraocular movements intact.      Conjunctiva/sclera: Conjunctivae normal.    Cardiovascular:      Rate and Rhythm: Normal rate and regular rhythm.      Pulses: Normal pulses.      Heart sounds: Normal heart sounds.   Pulmonary:      Effort: Pulmonary effort is normal.      Breath sounds: Normal breath sounds. No wheezing, rhonchi or rales.   Chest:      Comments: Soft mass in area of the sternum  Abdominal:      General: Bowel sounds are normal.      Palpations: Abdomen is soft. There is splenomegaly.      Tenderness: There is no abdominal tenderness.   Musculoskeletal:      Cervical back: Full passive range of motion without pain.      Right lower leg: No edema.      Left lower leg: No edema.   Lymphadenopathy:      Cervical: No cervical adenopathy.      Upper Body:      Right upper body: No supraclavicular or axillary adenopathy.      Left upper body: No supraclavicular or axillary adenopathy.   Skin:     General: Skin is warm.   Neurological:      General: No focal deficit present.      Mental Status: He is alert and oriented to person, place, and time.   Psychiatric:         Attention and Perception: Attention normal.         Mood and Affect: Mood and affect normal.         Behavior: Behavior is cooperative.       LABS AND IMAGING REVIEWED IN [facility]  Lab Review:        Assessment:   CML, chronic phase  Pancytopenia secondary to Hydrea  Refractory thrombocytopenia - improving     Plan:     Patient on dasatinib 100 mg p.o. daily.  He states that he has been compliant with this.  His white blood cell count has normalized.    Most recent BCR/ABL PCR is positive, BCR/ABL1 p210 mRNA transcripts were detected and   estimated to represent 35.4% total ABL1.  We will follow up on BCR/ABL 1 that was drawn today.      Will check  BCR/ABL1 p210 today    Return to clinic in 6 weeks with CBC, CMP, and  BCR/ABL1 p210     Paul Hogan II, MD I, Jaelyn Giles LPN, acted solely as a scribe for and in the presence of, Dr. Paul Hogan who performed the service.

## 2023-06-07 ENCOUNTER — HOSPITAL ENCOUNTER (INPATIENT)
Facility: HOSPITAL | Age: 23
LOS: 1 days | Discharge: ANOTHER HOSPITAL | DRG: 842 | End: 2023-06-08
Attending: EMERGENCY MEDICINE | Admitting: INTERNAL MEDICINE
Payer: MEDICAID

## 2023-06-07 DIAGNOSIS — E87.6 HYPOKALEMIA: ICD-10-CM

## 2023-06-07 DIAGNOSIS — C92.10 CML (CHRONIC MYELOCYTIC LEUKEMIA): Primary | ICD-10-CM

## 2023-06-07 DIAGNOSIS — R07.9 CHEST PAIN: ICD-10-CM

## 2023-06-07 DIAGNOSIS — D69.6 THROMBOCYTOPENIA: ICD-10-CM

## 2023-06-07 PROBLEM — C92.12: Status: ACTIVE | Noted: 2022-09-27

## 2023-06-07 LAB
ABS NEUT (OLG): 125.59 X10(3)/MCL (ref 2.1–9.2)
ABS NEUT CALC (OHS): 6.91 X10(3)/MCL (ref 2.1–9.2)
ALBUMIN SERPL-MCNC: 3.2 G/DL (ref 3.5–5)
ALBUMIN/GLOB SERPL: 0.9 RATIO (ref 1.1–2)
ALP SERPL-CCNC: 69 UNIT/L (ref 40–150)
ALT SERPL-CCNC: 14 UNIT/L (ref 0–55)
ANISOCYTOSIS BLD QL SMEAR: ABNORMAL
AST SERPL-CCNC: 27 UNIT/L (ref 5–34)
B-HCG SERPL QL: 72 %
BILIRUBIN DIRECT+TOT PNL SERPL-MCNC: 0.7 MG/DL
BUN SERPL-MCNC: 5.4 MG/DL (ref 8.9–20.6)
CALCIUM SERPL-MCNC: 8.7 MG/DL (ref 8.4–10.2)
CHLORIDE SERPL-SCNC: 106 MMOL/L (ref 98–107)
CO2 SERPL-SCNC: 27 MMOL/L (ref 22–29)
CREAT SERPL-MCNC: 1.07 MG/DL (ref 0.73–1.18)
EOSINOPHIL NFR BLD MANUAL: 1 %
EOSINOPHIL NFR BLD MANUAL: 1 % (ref 0–8)
EOSINOPHIL NFR BLD MANUAL: 1.38 X10(3)/MCL (ref 0–0.9)
EOSINOPHIL NFR BLD MANUAL: 1.5 X10(3)/MCL (ref 0–0.9)
ERYTHROCYTE [DISTWIDTH] IN BLOOD BY AUTOMATED COUNT: 19.8 % (ref 11.5–17)
ERYTHROCYTE [DISTWIDTH] IN BLOOD BY AUTOMATED COUNT: 20.2 % (ref 11.5–17)
FLUAV AG UPPER RESP QL IA.RAPID: NOT DETECTED
FLUBV AG UPPER RESP QL IA.RAPID: NOT DETECTED
GFR SERPLBLD CREATININE-BSD FMLA CKD-EPI: >60 MLS/MIN/1.73/M2
GLOBULIN SER-MCNC: 3.4 GM/DL (ref 2.4–3.5)
GLUCOSE SERPL-MCNC: 97 MG/DL (ref 74–100)
GROUP & RH: NORMAL
HCT VFR BLD AUTO: 36.6 % (ref 42–52)
HCT VFR BLD AUTO: 38.1 % (ref 42–52)
HGB BLD-MCNC: 11.4 G/DL (ref 14–18)
HGB BLD-MCNC: 11.9 G/DL (ref 14–18)
HYPOCHROMIA BLD QL SMEAR: ABNORMAL
INDIRECT COOMBS GEL: NORMAL
INR BLD: 1.31 (ref 2–3)
INSTRUMENT WBC (OLG): 149.51 X10(3)/MCL
LYMPHOCYTES NFR BLD MANUAL: 5 % (ref 13–40)
LYMPHOCYTES NFR BLD MANUAL: 6 %
LYMPHOCYTES NFR BLD MANUAL: 6.91 X10(3)/MCL
LYMPHOCYTES NFR BLD MANUAL: 8.97 X10(3)/MCL
MAGNESIUM SERPL-MCNC: 1.9 MG/DL (ref 1.6–2.6)
MCH RBC QN AUTO: 22.8 PG (ref 27–31)
MCH RBC QN AUTO: 23 PG (ref 27–31)
MCHC RBC AUTO-ENTMCNC: 31.1 G/DL (ref 33–36)
MCHC RBC AUTO-ENTMCNC: 31.2 G/DL (ref 33–36)
MCV RBC AUTO: 73.2 FL (ref 80–94)
MCV RBC AUTO: 73.7 FL (ref 80–94)
MICROCYTES BLD QL SMEAR: ABNORMAL
MONOCYTES NFR BLD MANUAL: 10 % (ref 2–11)
MONOCYTES NFR BLD MANUAL: 13.46 X10(3)/MCL (ref 0.1–1.3)
MONOCYTES NFR BLD MANUAL: 13.83 X10(3)/MCL (ref 0.1–1.3)
MONOCYTES NFR BLD MANUAL: 9 %
NEUTROPHILS NFR BLD MANUAL: 1 %
NEUTROPHILS NFR BLD MANUAL: 4 % (ref 47–80)
NEUTS BAND NFR BLD MANUAL: 1 % (ref 0–11)
NRBC BLD AUTO-RTO: 0.1 %
NRBC BLD AUTO-RTO: 0.1 %
OTHER CELLS MANUAL: 80 %
PLATELET # BLD AUTO: 47 X10(3)/MCL (ref 130–400)
PLATELET # BLD AUTO: 50 X10(3)/MCL (ref 130–400)
PLATELET # BLD EST: ABNORMAL 10*3/UL
PLATELET # BLD EST: ABNORMAL 10*3/UL
PLATELETS.RETICULATED NFR BLD AUTO: 3.7 % (ref 0.9–11.2)
PMV BLD AUTO: ABNORMAL FL
PMV BLD AUTO: ABNORMAL FL
POIKILOCYTOSIS BLD QL SMEAR: ABNORMAL
POTASSIUM SERPL-SCNC: 2.6 MMOL/L (ref 3.5–5.1)
PROMYELOCYTES # BLD MANUAL: 11 %
PROT SERPL-MCNC: 6.6 GM/DL (ref 6.4–8.3)
PROTHROMBIN TIME: 16.4 SECONDS (ref 11.7–14.5)
RBC # BLD AUTO: 5 X10(6)/MCL (ref 4.7–6.1)
RBC # BLD AUTO: 5.17 X10(6)/MCL (ref 4.7–6.1)
RBC MORPH BLD: ABNORMAL
RBC MORPH BLD: ABNORMAL
SARS-COV-2 RNA RESP QL NAA+PROBE: NOT DETECTED
SODIUM SERPL-SCNC: 142 MMOL/L (ref 136–145)
SPECIMEN OUTDATE: NORMAL
STREP A PCR (OHS): NOT DETECTED
WBC # SPEC AUTO: 138.27 X10(3)/MCL (ref 4.5–11.5)
WBC # SPEC AUTO: 149.51 X10(3)/MCL (ref 4.5–11.5)

## 2023-06-07 PROCEDURE — 85730 THROMBOPLASTIN TIME PARTIAL: CPT | Performed by: INTERNAL MEDICINE

## 2023-06-07 PROCEDURE — 85610 PROTHROMBIN TIME: CPT | Performed by: INTERNAL MEDICINE

## 2023-06-07 PROCEDURE — 63600175 PHARM REV CODE 636 W HCPCS

## 2023-06-07 PROCEDURE — 25000003 PHARM REV CODE 250: Performed by: EMERGENCY MEDICINE

## 2023-06-07 PROCEDURE — 25000003 PHARM REV CODE 250: Performed by: INTERNAL MEDICINE

## 2023-06-07 PROCEDURE — 84550 ASSAY OF BLOOD/URIC ACID: CPT | Performed by: INTERNAL MEDICINE

## 2023-06-07 PROCEDURE — 63600175 PHARM REV CODE 636 W HCPCS: Performed by: EMERGENCY MEDICINE

## 2023-06-07 PROCEDURE — 63600175 PHARM REV CODE 636 W HCPCS: Performed by: NURSE PRACTITIONER

## 2023-06-07 PROCEDURE — 63600175 PHARM REV CODE 636 W HCPCS: Performed by: INTERNAL MEDICINE

## 2023-06-07 PROCEDURE — 99285 EMERGENCY DEPT VISIT HI MDM: CPT

## 2023-06-07 PROCEDURE — 83615 LACTATE (LD) (LDH) ENZYME: CPT | Performed by: INTERNAL MEDICINE

## 2023-06-07 PROCEDURE — 96366 THER/PROPH/DIAG IV INF ADDON: CPT

## 2023-06-07 PROCEDURE — 0240U COVID/FLU A&B PCR: CPT

## 2023-06-07 PROCEDURE — 85027 COMPLETE CBC AUTOMATED: CPT

## 2023-06-07 PROCEDURE — 85007 BL SMEAR W/DIFF WBC COUNT: CPT | Performed by: EMERGENCY MEDICINE

## 2023-06-07 PROCEDURE — 11000001 HC ACUTE MED/SURG PRIVATE ROOM

## 2023-06-07 PROCEDURE — 85027 COMPLETE CBC AUTOMATED: CPT | Performed by: EMERGENCY MEDICINE

## 2023-06-07 PROCEDURE — 87651 STREP A DNA AMP PROBE: CPT

## 2023-06-07 PROCEDURE — 85610 PROTHROMBIN TIME: CPT

## 2023-06-07 PROCEDURE — 96365 THER/PROPH/DIAG IV INF INIT: CPT

## 2023-06-07 PROCEDURE — 86900 BLOOD TYPING SEROLOGIC ABO: CPT

## 2023-06-07 PROCEDURE — 83735 ASSAY OF MAGNESIUM: CPT

## 2023-06-07 PROCEDURE — 85384 FIBRINOGEN ACTIVITY: CPT | Performed by: INTERNAL MEDICINE

## 2023-06-07 PROCEDURE — 85060 BLOOD SMEAR INTERPRETATION: CPT | Performed by: EMERGENCY MEDICINE

## 2023-06-07 PROCEDURE — 25000003 PHARM REV CODE 250

## 2023-06-07 PROCEDURE — 80053 COMPREHEN METABOLIC PANEL: CPT

## 2023-06-07 RX ORDER — POTASSIUM CHLORIDE 20 MEQ/1
20 TABLET, EXTENDED RELEASE ORAL
Status: COMPLETED | OUTPATIENT
Start: 2023-06-07 | End: 2023-06-07

## 2023-06-07 RX ORDER — MAGNESIUM SULFATE 1 G/100ML
1 INJECTION INTRAVENOUS ONCE
Status: COMPLETED | OUTPATIENT
Start: 2023-06-07 | End: 2023-06-08

## 2023-06-07 RX ORDER — HYDRALAZINE HYDROCHLORIDE 20 MG/ML
10 INJECTION INTRAMUSCULAR; INTRAVENOUS EVERY 4 HOURS PRN
Status: DISCONTINUED | OUTPATIENT
Start: 2023-06-07 | End: 2023-06-07

## 2023-06-07 RX ORDER — TALC
6 POWDER (GRAM) TOPICAL NIGHTLY PRN
Status: DISCONTINUED | OUTPATIENT
Start: 2023-06-07 | End: 2023-06-09 | Stop reason: HOSPADM

## 2023-06-07 RX ORDER — LABETALOL HYDROCHLORIDE 5 MG/ML
10 INJECTION, SOLUTION INTRAVENOUS EVERY 4 HOURS PRN
Status: DISCONTINUED | OUTPATIENT
Start: 2023-06-07 | End: 2023-06-08

## 2023-06-07 RX ORDER — CLONIDINE HYDROCHLORIDE 0.2 MG/1
0.2 TABLET ORAL 3 TIMES DAILY PRN
Status: DISCONTINUED | OUTPATIENT
Start: 2023-06-07 | End: 2023-06-09 | Stop reason: HOSPADM

## 2023-06-07 RX ORDER — POTASSIUM CHLORIDE 14.9 MG/ML
20 INJECTION INTRAVENOUS
Status: COMPLETED | OUTPATIENT
Start: 2023-06-07 | End: 2023-06-07

## 2023-06-07 RX ORDER — ACETAMINOPHEN 500 MG
1000 TABLET ORAL EVERY 6 HOURS PRN
Status: DISCONTINUED | OUTPATIENT
Start: 2023-06-07 | End: 2023-06-09 | Stop reason: HOSPADM

## 2023-06-07 RX ORDER — HYDRALAZINE HYDROCHLORIDE 20 MG/ML
20 INJECTION INTRAMUSCULAR; INTRAVENOUS EVERY 4 HOURS PRN
Status: DISCONTINUED | OUTPATIENT
Start: 2023-06-07 | End: 2023-06-08

## 2023-06-07 RX ORDER — HYDROXYUREA 500 MG/1
2000 CAPSULE ORAL
Status: COMPLETED | OUTPATIENT
Start: 2023-06-07 | End: 2023-06-07

## 2023-06-07 RX ORDER — AMOXICILLIN 250 MG
2 CAPSULE ORAL 2 TIMES DAILY PRN
Status: DISCONTINUED | OUTPATIENT
Start: 2023-06-07 | End: 2023-06-09 | Stop reason: HOSPADM

## 2023-06-07 RX ORDER — POLYETHYLENE GLYCOL 3350 17 G/17G
17 POWDER, FOR SOLUTION ORAL 2 TIMES DAILY PRN
Status: DISCONTINUED | OUTPATIENT
Start: 2023-06-07 | End: 2023-06-09 | Stop reason: HOSPADM

## 2023-06-07 RX ORDER — ACETAMINOPHEN 325 MG/1
650 TABLET ORAL EVERY 4 HOURS PRN
Status: DISCONTINUED | OUTPATIENT
Start: 2023-06-07 | End: 2023-06-09 | Stop reason: HOSPADM

## 2023-06-07 RX ORDER — AMLODIPINE BESYLATE 5 MG/1
10 TABLET ORAL DAILY
Status: DISCONTINUED | OUTPATIENT
Start: 2023-06-07 | End: 2023-06-08

## 2023-06-07 RX ORDER — MAG HYDROX/ALUMINUM HYD/SIMETH 200-200-20
30 SUSPENSION, ORAL (FINAL DOSE FORM) ORAL 4 TIMES DAILY PRN
Status: DISCONTINUED | OUTPATIENT
Start: 2023-06-07 | End: 2023-06-09 | Stop reason: HOSPADM

## 2023-06-07 RX ORDER — POTASSIUM CHLORIDE 20 MEQ/1
60 TABLET, EXTENDED RELEASE ORAL ONCE
Status: COMPLETED | OUTPATIENT
Start: 2023-06-07 | End: 2023-06-07

## 2023-06-07 RX ORDER — SODIUM CHLORIDE 0.9 % (FLUSH) 0.9 %
10 SYRINGE (ML) INJECTION
Status: DISCONTINUED | OUTPATIENT
Start: 2023-06-07 | End: 2023-06-09 | Stop reason: HOSPADM

## 2023-06-07 RX ORDER — DEXTROSE MONOHYDRATE, SODIUM CHLORIDE, AND POTASSIUM CHLORIDE 50; 2.98; 4.5 G/1000ML; G/1000ML; G/1000ML
100 INJECTION, SOLUTION INTRAVENOUS
Status: COMPLETED | OUTPATIENT
Start: 2023-06-07 | End: 2023-06-07

## 2023-06-07 RX ORDER — AMLODIPINE BESYLATE 5 MG/1
10 TABLET ORAL
Status: COMPLETED | OUTPATIENT
Start: 2023-06-07 | End: 2023-06-07

## 2023-06-07 RX ORDER — ONDANSETRON 2 MG/ML
4 INJECTION INTRAMUSCULAR; INTRAVENOUS EVERY 4 HOURS PRN
Status: DISCONTINUED | OUTPATIENT
Start: 2023-06-07 | End: 2023-06-09 | Stop reason: HOSPADM

## 2023-06-07 RX ORDER — LOSARTAN POTASSIUM 50 MG/1
50 TABLET ORAL DAILY
Status: DISCONTINUED | OUTPATIENT
Start: 2023-06-07 | End: 2023-06-08

## 2023-06-07 RX ORDER — PROCHLORPERAZINE EDISYLATE 5 MG/ML
5 INJECTION INTRAMUSCULAR; INTRAVENOUS EVERY 6 HOURS PRN
Status: DISCONTINUED | OUTPATIENT
Start: 2023-06-07 | End: 2023-06-09 | Stop reason: HOSPADM

## 2023-06-07 RX ADMIN — POTASSIUM CHLORIDE 60 MEQ: 1500 TABLET, EXTENDED RELEASE ORAL at 10:06

## 2023-06-07 RX ADMIN — POTASSIUM CHLORIDE 20 MEQ: 20 TABLET, EXTENDED RELEASE ORAL at 03:06

## 2023-06-07 RX ADMIN — AMLODIPINE BESYLATE 10 MG: 5 TABLET ORAL at 02:06

## 2023-06-07 RX ADMIN — POTASSIUM CHLORIDE 20 MEQ: 14.9 INJECTION, SOLUTION INTRAVENOUS at 05:06

## 2023-06-07 RX ADMIN — LOSARTAN POTASSIUM 50 MG: 50 TABLET, FILM COATED ORAL at 11:06

## 2023-06-07 RX ADMIN — MAGNESIUM SULFATE IN DEXTROSE 1 G: 10 INJECTION, SOLUTION INTRAVENOUS at 11:06

## 2023-06-07 RX ADMIN — HYDROXYUREA 2000 MG: 500 CAPSULE ORAL at 06:06

## 2023-06-07 RX ADMIN — POTASSIUM CHLORIDE, DEXTROSE MONOHYDRATE AND SODIUM CHLORIDE 100 ML/HR: 300; 5; 450 INJECTION, SOLUTION INTRAVENOUS at 03:06

## 2023-06-07 RX ADMIN — ACETAMINOPHEN 650 MG: 325 TABLET ORAL at 11:06

## 2023-06-07 RX ADMIN — AMLODIPINE BESYLATE 10 MG: 5 TABLET ORAL at 11:06

## 2023-06-07 RX ADMIN — HYDRALAZINE HYDROCHLORIDE 10 MG: 20 INJECTION INTRAMUSCULAR; INTRAVENOUS at 10:06

## 2023-06-07 RX ADMIN — CLONIDINE HYDROCHLORIDE 0.2 MG: 0.2 TABLET ORAL at 10:06

## 2023-06-07 RX ADMIN — SODIUM CHLORIDE 1000 ML: 9 INJECTION, SOLUTION INTRAVENOUS at 07:06

## 2023-06-07 NOTE — Clinical Note
Diagnosis: CML (chronic myelocytic leukemia) [199958]   Admitting Provider:: NYDIA ESPINAL [[id number]]   Future Attending Provider: NYDIA ESPINAL [[id number]]   Reason for IP Medical Treatment  (Clinical interventions that can only be accomplished in the IP setting? ) :: Needs inpatient managment   Estimated Length of Stay:: 3-4 midnights   I certify that Inpatient services for greater than or equal to 2 midnights are medically necessary:: Yes   Plans for Post-Acute care--if anticipated (pick the single best option):: A. No post acute care anticipated at this time   Special Needs:: No Special Needs [1]

## 2023-06-07 NOTE — ED PROVIDER NOTES
Encounter Date: 6/7/2023    SCRIBE #1 NOTE: I, Mine Ferguson, am scribing for, and in the presence of,  Janusz Lugo III, MD. I have scribed the following portions of the note - Other sections scribed: HPI, ROS, PE, MDM.     History     Chief Complaint   Patient presents with    Oral Swelling     Pt with hx of leukemia c/o swelling to mouth and gums onset two days ago. States his platelets are usually low when this happens.    Transfer     From [facility]     22 Y.O. male with a history of chronic myelocytic leukemia and HVD presents to the ED as a transfer form [facility] ER for oral swelling and bleeding onset two days ago. Pt states that the sx worsen with brushing and usually occur when his platelets are low. Reports nausea, vomiting, and weakness. Currently reports relief with fluids administered en route. Denies bruising, CP, and SOB. Reports compliance with medication. Regular marijuana smoker. Pt's oncologist is Paul Hogan II, MD.    The history is provided by the patient. No  was used.   Dental Pain  The primary symptoms include oral bleeding. Primary symptoms do not include fever or shortness of breath. The symptoms began two days ago. The symptoms are new. The symptoms occur constantly.   Location of the bleeding: gum(s). The bleeding is associated with brushing teeth.   Additional symptoms include: gum swelling and facial swelling. Additional symptoms do not include: fatigue. Medical issues include: smoking and cancer.   Review of patient's allergies indicates:  No Known Allergies  Past Medical History:   Diagnosis Date    CML (chronic myelocytic leukemia)     HVD (hypertensive vascular disease)     Oropharyngeal candidiasis      Past Surgical History:   Procedure Laterality Date    BONE MARROW BIOPSY N/A 8/22/2022    Procedure: Biopsy-bone marrow;  Surgeon: Getachew Chen MD;  Location: [facility];  Service: General;  Laterality: N/A;    KNEE SURGERY Left      Family History   Problem  Relation Age of Onset    Hypertension Maternal Grandmother     Cancer Maternal Grandmother      Social History     Tobacco Use    Smoking status: Never    Smokeless tobacco: Never   Substance Use Topics    Alcohol use: Never    Drug use: Yes     Types: Marijuana     Review of Systems   Constitutional:  Negative for fatigue, fever and unexpected weight change.   HENT:  Positive for dental problem and facial swelling. Negative for congestion and rhinorrhea.    Eyes:  Negative for pain.   Respiratory:  Negative for chest tightness, shortness of breath and wheezing.    Cardiovascular:  Negative for chest pain.   Gastrointestinal:  Positive for nausea and vomiting. Negative for abdominal pain, constipation and diarrhea.   Genitourinary:  Negative for dysuria.   Musculoskeletal:  Negative for back pain and neck pain.   Skin:  Negative for rash.   Allergic/Immunologic: Negative for environmental allergies, food allergies and immunocompromised state.   Neurological:  Positive for weakness. Negative for dizziness and speech difficulty.   Hematological:  Does not bruise/bleed easily.   Psychiatric/Behavioral:  Negative for sleep disturbance and suicidal ideas.      Physical Exam     Initial Vitals [06/07/23 1152]   BP Pulse Resp Temp SpO2   (!) 176/109 101 20 98.1 °F (36.7 °C) 98 %      MAP       --         Physical Exam    Nursing note and vitals reviewed.  Constitutional: He appears well-developed and well-nourished.   HENT:   Head: Normocephalic and atraumatic.   Mild gum edema. Edematous face.   Eyes: EOM are normal. Pupils are equal, round, and reactive to light.   Neck:   Normal range of motion.  Cardiovascular:  Normal rate, regular rhythm, normal heart sounds and intact distal pulses.           Pulmonary/Chest: Breath sounds normal.   Abdominal: Abdomen is soft. Bowel sounds are normal.   Musculoskeletal:         General: Normal range of motion.      Cervical back: Normal range of motion.     Neurological: He is alert  and oriented to person, place, and time. He has normal strength. GCS score is 15. GCS eye subscore is 4. GCS verbal subscore is 5. GCS motor subscore is 6.   Skin: Skin is warm and dry. Capillary refill takes less than 2 seconds.   Psychiatric: He has a normal mood and affect. Judgment normal.       ED Course   Procedures  Labs Reviewed   COMPREHENSIVE METABOLIC PANEL - Abnormal; Notable for the following components:       Result Value    Potassium Level 2.6 (*)     Blood Urea Nitrogen 5.4 (*)     Albumin Level 3.2 (*)     Albumin/Globulin Ratio 0.9 (*)     All other components within normal limits   CBC WITH DIFFERENTIAL - Abnormal; Notable for the following components:    .27 (*)     Hgb 11.4 (*)     Hct 36.6 (*)     MCV 73.2 (*)     MCH 22.8 (*)     MCHC 31.1 (*)     RDW 19.8 (*)     Platelet 47 (*)     All other components within normal limits   MANUAL DIFFERENTIAL - Abnormal; Notable for the following components:    Neut Man 4 (*)     Lymph Man 5 (*)     Abs Mono 13.827 (*)     Abs Lymp 6.9135 (*)     Abs Eos  1.3827 (*)     RBC Morph Abnormal (*)     Anisocyte 2+ (*)     Microcyte 2+ (*)     Hypochrom 1+ (*)     Platelet Est Decreased (*)     All other components within normal limits   PROTIME-INR - Abnormal; Notable for the following components:    PT 16.4 (*)     INR 1.31 (*)     All other components within normal limits   CBC WITH DIFFERENTIAL - Abnormal; Notable for the following components:    .51 (*)     Hgb 11.9 (*)     Hct 38.1 (*)     MCV 73.7 (*)     MCH 23.0 (*)     MCHC 31.2 (*)     RDW 20.2 (*)     Platelet 50 (*)     All other components within normal limits   STREP GROUP A BY PCR - Normal    Narrative:     The Xpert Xpress Strep A test is a rapid, qualitative in vitro diagnostic test for the detection of Streptococcus pyogenes (Group A ß-hemolytic Streptococcus, Strep A) in throat swab specimens from patients with signs and symptoms of pharyngitis.     COVID/FLU A&B PCR - Normal     Narrative:     The Xpert Xpress SARS-CoV-2/FLU/RSV plus is a rapid, multiplexed real-time PCR test intended for the simultaneous qualitative detection and differentiation of SARS-CoV-2, Influenza A, Influenza B, and respiratory syncytial virus (RSV) viral RNA in either nasopharyngeal swab or nasal swab specimens.         MAGNESIUM - Normal   CBC W/ AUTO DIFFERENTIAL    Narrative:     The following orders were created for panel order CBC Auto Differential.  Procedure                               Abnormality         Status                     ---------                               -----------         ------                     CBC with Differential[[id number]]        Abnormal            Final result               Manual Differential[[id number]]          Abnormal            Final result                 Please view results for these tests on the individual orders.   CBC W/ AUTO DIFFERENTIAL    Narrative:     The following orders were created for panel order CBC auto differential.  Procedure                               Abnormality         Status                     ---------                               -----------         ------                     CBC with Differential[[id number]]        Abnormal            Final result               Manual Differential[[id number]]                              In process                   Please view results for these tests on the individual orders.   MANUAL DIFFERENTIAL   PATH REVIEW OF BLOOD SMEAR   PATH REVIEW OF BLOOD SMEAR   TYPE & SCREEN          Imaging Results    None        Medical Decision Making  The differential diagnosis inlcudes, but is not limited to: CML blast crisis and thrombocytopenia.    Repeat CBC after discussing with the oncology still with markedly elevated white count discussed case with Dr. Gardner will give 2 g of hydroxyurea admit peripheral    Patient with potassium 2.6 without any EKG changes will give potassium supplementation    Problems Addressed:  CML  (chronic myelocytic leukemia): complicated acute illness or injury  Hypokalemia: acute illness or injury    Amount and/or Complexity of Data Reviewed  External Data Reviewed: labs, radiology, ECG and notes.  Labs: ordered.         Medications   amLODIPine tablet 10 mg (10 mg Oral Given 6/7/23 1409)   dextrose 5 % and 0.45 % NaCl with KCl 40 mEq infusion (100 mL/hr Intravenous New Bag 6/7/23 1551)   potassium chloride SA CR tablet 20 mEq (20 mEq Oral Given 6/7/23 1551)   potassium chloride 20 mEq in 100 mL IVPB (FOR CENTRAL LINE ADMINISTRATION ONLY) (20 mEq Intravenous New Bag 6/7/23 1726)   hydroxyurea capsule 2,000 mg (2,000 mg Oral Given 6/7/23 1814)              Scribe Attestation:   Scribe #1: I performed the above scribed service and the documentation accurately describes the services I performed. I attest to the accuracy of the note.    Attending Attestation:           Physician Attestation for Scribe:  Physician Attestation Statement for Scribe #1: I, Janusz Lugo III, MD, reviewed documentation, as scribed by Mine Ferguson in my presence, and it is both accurate and complete.           ED Course as of 06/07/23 1822 Wed Jun 07, 2023   1345 Notified by lab that patient is in blast crisis  [LM]   1355 Dr. Hogan pagematt [LM]   1527 Discussed with Dr. Hogan who recommends ER to ER transfer. States that he will evaluate patient at [facility] [LM]   1536 Discussed with Dr. Hinojosa who accepts ER to ER transfer.  [LM]      ED Course User Index  [LM] Stalin Abernathy NP                 Clinical Impression:   Final diagnoses:  [C92.10] CML (chronic myelocytic leukemia) (Primary)  [D69.6] Thrombocytopenia  [E87.6] Hypokalemia        ED Disposition Condition    Admit Stable                Janusz Lugo III, MD  06/07/23 1822

## 2023-06-07 NOTE — ED TRIAGE NOTES
Pt with hx of leukemia c/o swelling to mouth and gums onset two days ago. States his platelets are usually low when this happens.

## 2023-06-07 NOTE — PROVIDER PROGRESS NOTES - EMERGENCY DEPT.
Encounter Date: 6/7/2023    ED Physician Progress Notes        Physician Note:   22-year-old male is being seen by me in conjunction with Ashlie trevizo mid-level who was working in the ER today.  I reviewed her note and agree with her assessment and plan had face to face time with this patient who tells me he has CML under the care of Dr. Gardner and is coming to the emergency room today because of swelling to his gums and submandibular region.  He states his swelling began 2 days ago.  He is had no fevers chills sweats cough cold rhinorrhea sore throat nausea vomiting or diarrhea.  He has no dysphagia.  On exam he is resting comfortably in no acute distress.  I see no inflammation of his gums.  The examination of his oral cavity oropharynx are normal.  There is no submandibular mass present.  Labs reveal a white blood cell count greater than 120,000.

## 2023-06-07 NOTE — ED PROVIDER NOTES
Encounter Date: 6/7/2023       History     Chief Complaint   Patient presents with    Oral Swelling     Pt with hx of leukemia c/o swelling to mouth and gums onset two days ago. States his platelets are usually low when this happens.     The patient is a 22 y.o. male with a history of CML who presents to the Emergency Department with a chief complaint of gum swelling. Symptoms began 2 days ago and have been constant since onset. Patient reports it is difficult for him to brush his teeth due to pain from his gums. He also reports increased bleeding from his gums. His pain is currently rated as a 3/10 in severity and described as throbbing with no radiation. Associated symptoms include lymphadenopathy. The patient denies chest pain, sob, fever, or chills. He reports taking nothing prior to arrival with no relief of symptoms. No other reported symptoms at this time     The history is provided by the patient. No  was used.   Dental Pain  The primary symptoms include mouth pain and oral bleeding. Primary symptoms do not include fever, shortness of breath, sore throat or angioedema. The symptoms began two days ago. The symptoms are unchanged. The symptoms are recurrent. The symptoms occur intermittently.   Mouth pain began 24 -48 hours ago. Mouth pain occurs intermittently. Mouth pain is unchanged. Affected locations include: gum(s). At its highest the mouth pain was at 6/10. The mouth pain is currently at 2/10.   Oral bleeding began 24 - 48 hours ago. The bleeding is improving. The bleeding is recurrent. Location of the bleeding: gum(s). The bleeding is associated with brushing teeth.   Additional symptoms include: gum swelling and gum tenderness. Additional symptoms do not include: jaw pain, facial swelling and fatigue. Medical issues include: cancer.   Review of patient's allergies indicates:  No Known Allergies  Past Medical History:   Diagnosis Date    CML (chronic myelocytic leukemia)     HVD  (hypertensive vascular disease)     Oropharyngeal candidiasis      Past Surgical History:   Procedure Laterality Date    BONE MARROW BIOPSY N/A 8/22/2022    Procedure: Biopsy-bone marrow;  Surgeon: Getachew Chen MD;  Location: [facility] OR;  Service: General;  Laterality: N/A;    KNEE SURGERY Left      Family History   Problem Relation Age of Onset    Hypertension Maternal Grandmother     Cancer Maternal Grandmother      Social History     Tobacco Use    Smoking status: Never    Smokeless tobacco: Never   Substance Use Topics    Alcohol use: Never    Drug use: Yes     Types: Marijuana     Review of Systems   Constitutional:  Negative for chills, fatigue and fever.   HENT:  Negative for facial swelling and sore throat.         Gum swelling   Respiratory:  Negative for shortness of breath.    Cardiovascular:  Negative for chest pain.   Gastrointestinal:  Negative for nausea.   Genitourinary:  Negative for dysuria.   Musculoskeletal:  Negative for back pain.   Skin:  Negative for rash.   Neurological:  Negative for weakness.   Hematological:  Positive for adenopathy. Bruises/bleeds easily.   All other systems reviewed and are negative.    Physical Exam     Initial Vitals [06/07/23 1152]   BP Pulse Resp Temp SpO2   (!) 176/109 101 20 98.1 °F (36.7 °C) 98 %      MAP       --         Physical Exam    Nursing note and vitals reviewed.  Constitutional: Vital signs are normal. He appears well-developed and well-nourished.   HENT:   Head: Normocephalic.   Right Ear: Hearing and tympanic membrane normal.   Left Ear: Hearing and tympanic membrane normal.   Mouth/Throat: Uvula is midline, oropharynx is clear and moist and mucous membranes are normal. No dental abscesses.   Mild swelling to gums    Cardiovascular:  Normal rate, regular rhythm, normal heart sounds and normal pulses.           Pulmonary/Chest: Effort normal and breath sounds normal.   Abdominal: Abdomen is soft. Bowel sounds are normal. There is no abdominal tenderness.    Musculoskeletal:      Cervical back: No spinous process tenderness or muscular tenderness.     Lymphadenopathy:        Head (right side): Submandibular adenopathy present.        Head (left side): Submandibular adenopathy present.     He has no cervical adenopathy.   Neurological: He is alert. GCS eye subscore is 4. GCS verbal subscore is 5. GCS motor subscore is 6.   Skin: Skin is warm, dry and intact. Capillary refill takes less than 2 seconds.       ED Course   Procedures  Labs Reviewed   COMPREHENSIVE METABOLIC PANEL - Abnormal; Notable for the following components:       Result Value    Potassium Level 2.6 (*)     Blood Urea Nitrogen 5.4 (*)     Albumin Level 3.2 (*)     Albumin/Globulin Ratio 0.9 (*)     All other components within normal limits   CBC WITH DIFFERENTIAL - Abnormal; Notable for the following components:    .27 (*)     Hgb 11.4 (*)     Hct 36.6 (*)     MCV 73.2 (*)     MCH 22.8 (*)     MCHC 31.1 (*)     RDW 19.8 (*)     Platelet 47 (*)     All other components within normal limits   MANUAL DIFFERENTIAL - Abnormal; Notable for the following components:    Neut Man 4 (*)     Lymph Man 5 (*)     Abs Mono 13.827 (*)     Abs Lymp 6.9135 (*)     Abs Eos  1.3827 (*)     RBC Morph Abnormal (*)     Anisocyte 2+ (*)     Microcyte 2+ (*)     Hypochrom 1+ (*)     Platelet Est Decreased (*)     All other components within normal limits   PROTIME-INR - Abnormal; Notable for the following components:    PT 16.4 (*)     INR 1.31 (*)     All other components within normal limits   STREP GROUP A BY PCR - Normal    Narrative:     The Xpert Xpress Strep A test is a rapid, qualitative in vitro diagnostic test for the detection of Streptococcus pyogenes (Group A ß-hemolytic Streptococcus, Strep A) in throat swab specimens from patients with signs and symptoms of pharyngitis.     COVID/FLU A&B PCR - Normal    Narrative:     The Xpert Xpress SARS-CoV-2/FLU/RSV plus is a rapid, multiplexed real-time PCR test  intended for the simultaneous qualitative detection and differentiation of SARS-CoV-2, Influenza A, Influenza B, and respiratory syncytial virus (RSV) viral RNA in either nasopharyngeal swab or nasal swab specimens.         MAGNESIUM - Normal   CBC W/ AUTO DIFFERENTIAL    Narrative:     The following orders were created for panel order CBC Auto Differential.  Procedure                               Abnormality         Status                     ---------                               -----------         ------                     CBC with Differential[[id number]]        Abnormal            Final result               Manual Differential[[id number]]          Abnormal            Final result                 Please view results for these tests on the individual orders.   PATHOLOGIST INTERPRETATION   TYPE & SCREEN          Imaging Results    None          Medications   dextrose 5 % and 0.45 % NaCl with KCl 40 mEq infusion (has no administration in time range)   potassium chloride SA CR tablet 20 mEq (has no administration in time range)   amLODIPine tablet 10 mg (10 mg Oral Given 6/7/23 2185)     Medical Decision Making:   Initial Assessment:   The patient is a 22 y.o. male with a history of CML who presents to the Emergency Department with a chief complaint of gum swelling. Symptoms began 2 days ago and have been constant since onset. Patient reports it is difficult for him to brush his teeth due to pain from his gums. He also reports increased bleeding from his gums. His pain is currently rated as a 3/10 in severity and described as throbbing with no radiation. Associated symptoms include lymphadenopathy. The patient denies chest pain, sob, fever, or chills. He reports taking nothing prior to arrival with no relief of symptoms. No other reported symptoms at this time   Differential Diagnosis:   Gingivitis, strep, covid, flu, electrolyte derangement, thrombocytopenia   Clinical Tests:   Lab Tests: Ordered and Reviewed  ED  Management:  MDM  History obtained by patient.   Co-morbidities and/or factors adding to the complexity or risk for the patient?: none  Differential diagnoses: Gingivitis, strep, covid, flu, electrolyte derangement, thrombocytopenia   Decision to obtain previous or outside records?: no  Chart Review (nursing home, outside records, CareEverywhere): none  Review of RX medications/new RX prescribed by me?: none  My EKG Interpretation: see above  Labs/imaging/other tests obtained/considered (risk/benefits of testing discussed): cbc, cmp, type and screen, covid, flu, strep  Labs/tests intepretation: , potassium 2.6  My independent imaging interpretation: none  Treatment/interventions, IV fluids, IV medications: potassium  Complex management (IV controlled substances, went to OR, DNR, meds requiring monitoring, transfer, etc)?: none  Workup/treatment affected by social determinants of health?: none   Point of care US done/interpretation: none  Consults/radiologist/EMS/social work/family discussion/alternate history: none  Advanced care planning/end of life discussion: none  Shared decision making: shared decision making with patient  ETOH/smoking/drug cessation discussion: none  Dispo: transfer to [facility] main er   Other:   I have discussed this case with another health care provider.       <> Summary of the Discussion: Discussed with ED attending, , he had face to face encounter with patient.     Discussed with Dr. Hinojosa who accepts er to er transfer    Discussed with Dr. Hogan who recommends er to er transfer and he will evaluate patient.            ED Course as of 06/07/23 1545   Wed Jun 07, 2023   1345 Notified by lab that patient is in blast crisis  [LM]   1355 Dr. Hogan paged [LM]   1527 Discussed with Dr. Hogan who recommends ER to ER transfer. States that he will evaluate patient at [facility] [LM]   1536 Discussed with Dr. Hinojosa who accepts ER to ER transfer.  [LM]      ED Course User Index  [LM]  Stalin Abernathy NP                 Clinical Impression:   Final diagnoses:  [C92.10] CML (chronic myelocytic leukemia) (Primary)  [D69.6] Thrombocytopenia  [E87.6] Hypokalemia        ED Disposition Condition    Transfer to Another Facility Stable                Stalin Abernathy NP  06/07/23 8091

## 2023-06-08 VITALS
TEMPERATURE: 98 F | DIASTOLIC BLOOD PRESSURE: 90 MMHG | HEIGHT: 72 IN | SYSTOLIC BLOOD PRESSURE: 142 MMHG | WEIGHT: 201.06 LBS | HEART RATE: 94 BPM | BODY MASS INDEX: 27.23 KG/M2 | RESPIRATION RATE: 20 BRPM | OXYGEN SATURATION: 100 %

## 2023-06-08 PROBLEM — R50.81 NEUTROPENIC FEVER: Status: ACTIVE | Noted: 2023-06-08

## 2023-06-08 PROBLEM — C92.10 BLAST CRISIS PHASE OF CHRONIC MYELOID LEUKEMIA: Status: ACTIVE | Noted: 2023-06-08

## 2023-06-08 PROBLEM — I10 HYPERTENSION: Status: ACTIVE | Noted: 2023-06-08

## 2023-06-08 PROBLEM — D70.9 NEUTROPENIC FEVER: Status: ACTIVE | Noted: 2023-06-08

## 2023-06-08 PROBLEM — E88.3 TUMOR LYSIS SYNDROME: Status: ACTIVE | Noted: 2023-06-08

## 2023-06-08 PROBLEM — D61.818 PANCYTOPENIA: Status: ACTIVE | Noted: 2023-06-08

## 2023-06-08 LAB
ABS NEUT (OLG): 126.9 X10(3)/MCL (ref 2.1–9.2)
ALBUMIN SERPL-MCNC: 3.1 G/DL (ref 3.5–5)
ALBUMIN/GLOB SERPL: 1.1 RATIO (ref 1.1–2)
ALP SERPL-CCNC: 67 UNIT/L (ref 40–150)
ALT SERPL-CCNC: 13 UNIT/L (ref 0–55)
ANISOCYTOSIS BLD QL SMEAR: ABNORMAL
APPEARANCE UR: CLEAR
APTT PPP: 30.7 SECONDS (ref 23.2–33.7)
AST SERPL-CCNC: 26 UNIT/L (ref 5–34)
B-HCG SERPL QL: 88 %
BACTERIA #/AREA URNS AUTO: ABNORMAL /HPF
BILIRUB UR QL STRIP.AUTO: NEGATIVE MG/DL
BILIRUBIN DIRECT+TOT PNL SERPL-MCNC: 0.7 MG/DL
BUN SERPL-MCNC: 7.3 MG/DL (ref 8.9–20.6)
CALCIUM SERPL-MCNC: 8.3 MG/DL (ref 8.4–10.2)
CHLORIDE SERPL-SCNC: 107 MMOL/L (ref 98–107)
CO2 SERPL-SCNC: 27 MMOL/L (ref 22–29)
COLOR UR: YELLOW
CREAT SERPL-MCNC: 1.03 MG/DL (ref 0.73–1.18)
EOSINOPHIL NFR BLD MANUAL: 1 %
EOSINOPHIL NFR BLD MANUAL: 1.41 X10(3)/MCL (ref 0–0.9)
ERYTHROCYTE [DISTWIDTH] IN BLOOD BY AUTOMATED COUNT: 20 % (ref 11.5–17)
FIBRINOGEN PPP-MCNC: 394 MG/DL (ref 210–463)
GFR SERPLBLD CREATININE-BSD FMLA CKD-EPI: >60 MLS/MIN/1.73/M2
GLOBULIN SER-MCNC: 2.9 GM/DL (ref 2.4–3.5)
GLUCOSE SERPL-MCNC: 95 MG/DL (ref 74–100)
GLUCOSE UR QL STRIP.AUTO: NEGATIVE MG/DL
HCT VFR BLD AUTO: 35.6 % (ref 42–52)
HEMATOLOGIST REVIEW: NORMAL
HGB BLD-MCNC: 10.9 G/DL (ref 14–18)
INR BLD: 1.27 (ref 0–1.3)
INSTRUMENT WBC (OLG): 141 X10(3)/MCL
KETONES UR QL STRIP.AUTO: ABNORMAL MG/DL
LDH SERPL-CCNC: 884 U/L (ref 125–220)
LEUKOCYTE ESTERASE UR QL STRIP.AUTO: NEGATIVE UNIT/L
LYMPHOCYTES NFR BLD MANUAL: 4 %
LYMPHOCYTES NFR BLD MANUAL: 5.64 X10(3)/MCL
MAGNESIUM SERPL-MCNC: 1.9 MG/DL (ref 1.6–2.6)
MCH RBC QN AUTO: 22.6 PG (ref 27–31)
MCHC RBC AUTO-ENTMCNC: 30.6 G/DL (ref 33–36)
MCV RBC AUTO: 73.9 FL (ref 80–94)
MICROCYTES BLD QL SMEAR: ABNORMAL
MONOCYTES NFR BLD MANUAL: 5 %
MONOCYTES NFR BLD MANUAL: 7.05 X10(3)/MCL (ref 0.1–1.3)
MRSA PCR SCRN (OHS): NOT DETECTED
NEUTROPHILS NFR BLD MANUAL: 2 %
NITRITE UR QL STRIP.AUTO: NEGATIVE
NRBC BLD AUTO-RTO: 0 %
OVALOCYTES (OLG): ABNORMAL
PH UR STRIP.AUTO: 7.5 [PH]
PHOSPHATE SERPL-MCNC: 2.8 MG/DL (ref 2.3–4.7)
PLATELET # BLD AUTO: 44 X10(3)/MCL (ref 130–400)
PLATELET # BLD EST: ABNORMAL 10*3/UL
PMV BLD AUTO: ABNORMAL FL
POIKILOCYTOSIS BLD QL SMEAR: ABNORMAL
POTASSIUM SERPL-SCNC: 2.3 MMOL/L (ref 3.5–5.1)
POTASSIUM SERPL-SCNC: 2.7 MMOL/L (ref 3.5–5.1)
POTASSIUM SERPL-SCNC: 3.2 MMOL/L (ref 3.5–5.1)
PROT SERPL-MCNC: 6 GM/DL (ref 6.4–8.3)
PROT UR QL STRIP.AUTO: ABNORMAL MG/DL
PROTHROMBIN TIME: 15.7 SECONDS (ref 12.5–14.5)
RBC # BLD AUTO: 4.82 X10(6)/MCL (ref 4.7–6.1)
RBC #/AREA URNS AUTO: 29 /HPF
RBC MORPH BLD: ABNORMAL
RBC UR QL AUTO: ABNORMAL UNIT/L
SODIUM SERPL-SCNC: 142 MMOL/L (ref 136–145)
SP GR UR STRIP.AUTO: 1.01 (ref 1–1.03)
SQUAMOUS #/AREA URNS AUTO: <5 /HPF
URATE SERPL-MCNC: 10.1 MG/DL (ref 3.5–7.2)
UROBILINOGEN UR STRIP-ACNC: 0.2 MG/DL
WBC # SPEC AUTO: 140.69 X10(3)/MCL (ref 4.5–11.5)
WBC #/AREA URNS AUTO: <5 /HPF

## 2023-06-08 PROCEDURE — 80053 COMPREHEN METABOLIC PANEL: CPT | Performed by: INTERNAL MEDICINE

## 2023-06-08 PROCEDURE — 81001 URINALYSIS AUTO W/SCOPE: CPT | Performed by: INTERNAL MEDICINE

## 2023-06-08 PROCEDURE — 84132 ASSAY OF SERUM POTASSIUM: CPT | Performed by: INTERNAL MEDICINE

## 2023-06-08 PROCEDURE — 99222 PR INITIAL HOSPITAL CARE,LEVL II: ICD-10-PCS | Mod: ,,, | Performed by: INTERNAL MEDICINE

## 2023-06-08 PROCEDURE — 63600175 PHARM REV CODE 636 W HCPCS: Performed by: INTERNAL MEDICINE

## 2023-06-08 PROCEDURE — 83735 ASSAY OF MAGNESIUM: CPT | Performed by: INTERNAL MEDICINE

## 2023-06-08 PROCEDURE — 87040 BLOOD CULTURE FOR BACTERIA: CPT | Performed by: INTERNAL MEDICINE

## 2023-06-08 PROCEDURE — 84100 ASSAY OF PHOSPHORUS: CPT | Performed by: INTERNAL MEDICINE

## 2023-06-08 PROCEDURE — 99222 1ST HOSP IP/OBS MODERATE 55: CPT | Mod: ,,, | Performed by: INTERNAL MEDICINE

## 2023-06-08 PROCEDURE — 87641 MR-STAPH DNA AMP PROBE: CPT | Performed by: INTERNAL MEDICINE

## 2023-06-08 PROCEDURE — 25000003 PHARM REV CODE 250: Performed by: INTERNAL MEDICINE

## 2023-06-08 PROCEDURE — 85027 COMPLETE CBC AUTOMATED: CPT | Performed by: INTERNAL MEDICINE

## 2023-06-08 PROCEDURE — 85025 COMPLETE CBC W/AUTO DIFF WBC: CPT | Performed by: INTERNAL MEDICINE

## 2023-06-08 RX ORDER — NIFEDIPINE 60 MG/1
60 TABLET, EXTENDED RELEASE ORAL DAILY
Status: DISCONTINUED | OUTPATIENT
Start: 2023-06-08 | End: 2023-06-08

## 2023-06-08 RX ORDER — HYDROXYUREA 500 MG/1
2000 CAPSULE ORAL EVERY 8 HOURS
Status: DISCONTINUED | OUTPATIENT
Start: 2023-06-08 | End: 2023-06-09 | Stop reason: HOSPADM

## 2023-06-08 RX ORDER — SODIUM CHLORIDE, SODIUM LACTATE, POTASSIUM CHLORIDE, CALCIUM CHLORIDE 600; 310; 30; 20 MG/100ML; MG/100ML; MG/100ML; MG/100ML
INJECTION, SOLUTION INTRAVENOUS CONTINUOUS
Status: DISCONTINUED | OUTPATIENT
Start: 2023-06-08 | End: 2023-06-09 | Stop reason: HOSPADM

## 2023-06-08 RX ORDER — HYDROCODONE BITARTRATE AND ACETAMINOPHEN 7.5; 325 MG/1; MG/1
1 TABLET ORAL EVERY 4 HOURS PRN
Status: DISCONTINUED | OUTPATIENT
Start: 2023-06-08 | End: 2023-06-09 | Stop reason: HOSPADM

## 2023-06-08 RX ORDER — LABETALOL HYDROCHLORIDE 5 MG/ML
10 INJECTION, SOLUTION INTRAVENOUS EVERY 4 HOURS PRN
Status: DISCONTINUED | OUTPATIENT
Start: 2023-06-08 | End: 2023-06-09 | Stop reason: HOSPADM

## 2023-06-08 RX ORDER — POTASSIUM CHLORIDE 14.9 MG/ML
40 INJECTION INTRAVENOUS EVERY 4 HOURS
Status: COMPLETED | OUTPATIENT
Start: 2023-06-08 | End: 2023-06-08

## 2023-06-08 RX ORDER — NIFEDIPINE 60 MG/1
60 TABLET, EXTENDED RELEASE ORAL DAILY
Status: DISCONTINUED | OUTPATIENT
Start: 2023-06-08 | End: 2023-06-09 | Stop reason: HOSPADM

## 2023-06-08 RX ORDER — LOSARTAN POTASSIUM 50 MG/1
50 TABLET ORAL 2 TIMES DAILY
Status: DISCONTINUED | OUTPATIENT
Start: 2023-06-08 | End: 2023-06-09 | Stop reason: HOSPADM

## 2023-06-08 RX ORDER — HYDRALAZINE HYDROCHLORIDE 20 MG/ML
20 INJECTION INTRAMUSCULAR; INTRAVENOUS EVERY 4 HOURS PRN
Status: DISCONTINUED | OUTPATIENT
Start: 2023-06-08 | End: 2023-06-09 | Stop reason: HOSPADM

## 2023-06-08 RX ORDER — ALLOPURINOL 300 MG/1
300 TABLET ORAL 2 TIMES DAILY
Status: DISCONTINUED | OUTPATIENT
Start: 2023-06-08 | End: 2023-06-09 | Stop reason: HOSPADM

## 2023-06-08 RX ADMIN — SODIUM CHLORIDE, POTASSIUM CHLORIDE, SODIUM LACTATE AND CALCIUM CHLORIDE: 600; 310; 30; 20 INJECTION, SOLUTION INTRAVENOUS at 10:06

## 2023-06-08 RX ADMIN — HYDRALAZINE HYDROCHLORIDE 20 MG: 20 INJECTION INTRAMUSCULAR; INTRAVENOUS at 11:06

## 2023-06-08 RX ADMIN — HYDROCODONE BITARTRATE AND ACETAMINOPHEN 1 TABLET: 7.5; 325 TABLET ORAL at 08:06

## 2023-06-08 RX ADMIN — ALLOPURINOL 300 MG: 300 TABLET ORAL at 08:06

## 2023-06-08 RX ADMIN — ACETAMINOPHEN 1000 MG: 500 TABLET, FILM COATED ORAL at 01:06

## 2023-06-08 RX ADMIN — POTASSIUM CHLORIDE 40 MEQ: 14.9 INJECTION, SOLUTION INTRAVENOUS at 06:06

## 2023-06-08 RX ADMIN — POTASSIUM CHLORIDE 40 MEQ: 14.9 INJECTION, SOLUTION INTRAVENOUS at 02:06

## 2023-06-08 RX ADMIN — HYDROXYUREA 2000 MG: 500 CAPSULE ORAL at 09:06

## 2023-06-08 RX ADMIN — LOSARTAN POTASSIUM 50 MG: 50 TABLET, FILM COATED ORAL at 08:06

## 2023-06-08 RX ADMIN — CEFEPIME 1 G: 1 INJECTION, POWDER, FOR SOLUTION INTRAMUSCULAR; INTRAVENOUS at 09:06

## 2023-06-08 RX ADMIN — HYDROXYUREA 2000 MG: 500 CAPSULE ORAL at 01:06

## 2023-06-08 RX ADMIN — POTASSIUM BICARBONATE 50 MEQ: 977.5 TABLET, EFFERVESCENT ORAL at 08:06

## 2023-06-08 RX ADMIN — CLONIDINE HYDROCHLORIDE 0.2 MG: 0.2 TABLET ORAL at 01:06

## 2023-06-08 RX ADMIN — POTASSIUM BICARBONATE 50 MEQ: 977.5 TABLET, EFFERVESCENT ORAL at 06:06

## 2023-06-08 RX ADMIN — ALLOPURINOL 300 MG: 300 TABLET ORAL at 02:06

## 2023-06-08 RX ADMIN — CEFEPIME 1 G: 1 INJECTION, POWDER, FOR SOLUTION INTRAMUSCULAR; INTRAVENOUS at 01:06

## 2023-06-08 RX ADMIN — AMLODIPINE BESYLATE 10 MG: 5 TABLET ORAL at 08:06

## 2023-06-08 RX ADMIN — POTASSIUM BICARBONATE 50 MEQ: 977.5 TABLET, EFFERVESCENT ORAL at 04:06

## 2023-06-08 NOTE — ASSESSMENT & PLAN NOTE
- Uric acid and LDH elevated on presentation to [facility] ED. Kidney function and electrolytes normal.  - Not G6PD deficient  - Continue allopurinol 300 mg BID  - Daily TLS labs while inpatient

## 2023-06-08 NOTE — SUBJECTIVE & OBJECTIVE
Patient information was obtained from patient, past medical records, and ER records.     Oncology History:   From Dr. Hogan's most recent clinic note:  Patient with no real medical history who went in for a routine eye exam on 08/18/2022 to update his eye glasses prescription.  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  He had bilateral Valdez spots with vessel tortuosity.  The optometrist recommended that the patient have blood work including testing for sickle cell disease.  The patient presented to the emergency department.  CBC in the emergency department revealed a white blood cell count of 411,900. Hemoglobin was 8.3, platelets were normal at 375,000 hundred and seventy five thousand.  Differential was suggestive of CML.  Coagulation studies revealed an INR of 1.38, PTT of 36.4 and a fibrinogen of 292.  Patient was going to present to Center, however they were on diversion.  He was transferred from [facility] to [facility].  Hematology consulted.  Patient underwent bone marrow biopsy on 08/22/2022, this revealed CML, chronic phase, BCR/ABL1 positive.  Ordered dasatinib 100 mg p.o. daily as of 10/10/2022, unsure when patient started taking medication due to him being lost to follow-up.  There is a question about his compliance.  Blood work on 05/29/2023 showed a normal white blood cell count, unfortunately on 06/07/2023, white blood cell count had increased very significantly and as of 06/07/2023 he was in blast crisis.    Medications Prior to Admission   Medication Sig Dispense Refill Last Dose    amLODIPine (NORVASC) 10 MG tablet Take 1 tablet (10 mg total) by mouth once daily. 30 tablet 11     dasatinib (SPRYCEL) 100 mg Take 1 tablet (100 mg total) by mouth once daily. 30 tablet 11     ondansetron (ZOFRAN) 8 MG tablet Take 1 tablet (8 mg total) by mouth every 6 (six) hours as needed for Nausea. 60  tablet 2        Patient has no known allergies.     Past Medical History:   Diagnosis Date    CML (chronic myelocytic leukemia)     HVD (hypertensive vascular disease)     Oropharyngeal candidiasis      Past Surgical History:   Procedure Laterality Date    BONE MARROW BIOPSY N/A 8/22/2022    Procedure: Biopsy-bone marrow;  Surgeon: Getachew Chen MD;  Location: [facility];  Service: General;  Laterality: N/A;    KNEE SURGERY Left      Family History       Problem Relation (Age of Onset)    Cancer Maternal Grandmother    Hypertension Maternal Grandmother          Tobacco Use    Smoking status: Never    Smokeless tobacco: Never   Substance and Sexual Activity    Alcohol use: Never    Drug use: Yes     Types: Marijuana    Sexual activity: Not on file       Review of Systems   Constitutional:  Positive for fatigue. Negative for activity change, appetite change, chills and fever.   HENT:  Negative for congestion, mouth sores, nosebleeds, rhinorrhea, sinus pressure, sinus pain, sneezing and sore throat.         Gum swelling   Eyes:  Negative for photophobia, pain, discharge, redness, itching and visual disturbance.   Respiratory:  Negative for cough, chest tightness, shortness of breath and wheezing.    Cardiovascular:  Negative for chest pain, palpitations and leg swelling.   Gastrointestinal:  Negative for abdominal distention, abdominal pain, blood in stool, constipation, diarrhea, nausea and vomiting.   Endocrine: Negative for cold intolerance, heat intolerance, polydipsia, polyphagia and polyuria.   Genitourinary:  Negative for difficulty urinating, frequency, hematuria and urgency.   Musculoskeletal:  Negative for arthralgias, back pain, myalgias, neck pain and neck stiffness.   Skin:  Negative for pallor, rash and wound.   Allergic/Immunologic: Negative for environmental allergies, food allergies and immunocompromised state.   Neurological:  Negative for dizziness, tremors, seizures, syncope, speech difficulty,  weakness, light-headedness, numbness and headaches.   Hematological:  Negative for adenopathy. Does not bruise/bleed easily.   Psychiatric/Behavioral:  Negative for agitation, confusion, hallucinations, sleep disturbance and suicidal ideas. The patient is not nervous/anxious.    Objective:     Vital Signs (Most Recent):    Vital Signs (24h Range):  Temp:  [97.6 °F (36.4 °C)-101.4 °F (38.6 °C)] 98.9 °F (37.2 °C)  Pulse:  [] 121  Resp:  [18-32] 20  SpO2:  [97 %-100 %] 98 %  BP: (143-189)/() 158/91        There is no height or weight on file to calculate BMI.  There is no height or weight on file to calculate BSA.    ECOG SCORE           [unfilled]    Lines/Drains/Airways       Peripheral Intravenous Line  Duration                  Peripheral IV - Single Lumen 06/07/23 1356 20 G Anterior;Right Forearm 1 day                     Physical Exam  Constitutional:       Appearance: He is well-developed.   HENT:      Head: Normocephalic and atraumatic.      Mouth/Throat:      Pharynx: No oropharyngeal exudate.      Comments: Gum swelling  Eyes:      General:         Right eye: No discharge.         Left eye: No discharge.      Conjunctiva/sclera: Conjunctivae normal.      Pupils: Pupils are equal, round, and reactive to light.   Cardiovascular:      Rate and Rhythm: Normal rate and regular rhythm.      Heart sounds: Normal heart sounds. No murmur heard.  Pulmonary:      Effort: Pulmonary effort is normal. No respiratory distress.      Breath sounds: Normal breath sounds. No wheezing or rales.   Abdominal:      General: Bowel sounds are normal. There is no distension.      Palpations: Abdomen is soft.      Tenderness: There is no abdominal tenderness.   Musculoskeletal:         General: No deformity. Normal range of motion.      Cervical back: Normal range of motion and neck supple.   Skin:     General: Skin is warm and dry.      Findings: No erythema or rash.   Neurological:      Mental Status: He is alert and  oriented to person, place, and time.   Psychiatric:         Behavior: Behavior normal.         Thought Content: Thought content normal.         Judgment: Judgment normal.          Significant Labs:   CBC:   Recent Labs   Lab 06/07/23  1247 06/07/23  1722 06/08/23  0441   .27* 149.51* 140.69*   HGB 11.4* 11.9* 10.9*   HCT 36.6* 38.1* 35.6*   PLT 47* 50* 44*    and CMP:   Recent Labs   Lab 06/07/23  1247 06/08/23  0441    142   K 2.6* 2.3*   CO2 27 27   BUN 5.4* 7.3*   CREATININE 1.07 1.03   CALCIUM 8.7 8.3*   ALBUMIN 3.2* 3.1*   BILITOT 0.7 0.7   ALKPHOS 69 67   AST 27 26   ALT 14 13       Diagnostic Results:  I have reviewed all pertinent imaging results/findings within the past 24 hours.

## 2023-06-08 NOTE — NURSING
Nurses Note -- 4 Eyes      6/8/2023   1:26 AM      Skin assessed during: Admit      [x] No Altered Skin Integrity Present    []Prevention Measures Documented      [] Yes- Altered Skin Integrity Present or Discovered   [] LDA Added if Not in Epic (Describe Wound)   [] New Altered Skin Integrity was Present on Admit and Documented in LDA   [] Wound Image Taken    Wound Care Consulted? No    Attending Nurse:  Ana Pandey LPN     Second RN/Staff Member:  Komal Crawford RN

## 2023-06-08 NOTE — PLAN OF CARE
YOBANI contacted the transfer center at [facility] for an update. Yobani spoke with Jacob who stated they are awaiting an oncology bed at this time and may consider a temporary unit as well.

## 2023-06-08 NOTE — ASSESSMENT & PLAN NOTE
- Takes amlodipine at home  - Given hypertension, amlodipine stopped and nifedipine and losartan started in [place]. Will continue this.

## 2023-06-08 NOTE — ASSESSMENT & PLAN NOTE
- Patient with CML diagnosed 8/2022  - Follows locally in [place] with Dr. Brett Hogan.   - He has been on dasatinib outpatient since 10/2022. Some question regarding his compliance over the course of treatment, however.  - Presented to [facility] with c/o gum swelling on 6/7/23.   - Labs remarkable for WBC count of 138K, with 80% blasts indicating blast crisis.   - WBC count from approximately a week and a half earlier was normal.   - Reports recent compliance with his TKI.   - Transferred to [facility] for higher level of care.  - Continue dasatinib, hydrea 2 grams TID, and allopurinol 300 mg daily.  - Will get BCR/ABL p190 on admission  - Plan for inpatient BMBx  - Daily CBC and TLS and DIC labs. Mild TLS, but no evidence of DIC at this time.

## 2023-06-08 NOTE — ASSESSMENT & PLAN NOTE
- Functionally neutropenic despite leukocytosis, given low neutrophil count  - 2/2 disease and hydrea  - Daily CBC while inpatient  - Transfuse for hgb < 7 or plts < 10K  - Will start antimicrobial ppx with acyclovir and fluconazole and continue Cefepime for neutropenic fever

## 2023-06-08 NOTE — ASSESSMENT & PLAN NOTE
- Temp fo 101.4 at [facility]  - CXR neg. Blood cx and u/a pending collection  - Strep, flu, and COVID neg  - Continue broad spectrum abx with Cefepime, and follow infection w/u

## 2023-06-08 NOTE — HPI
Mr. Hirsch is a 23 y.o. patient with CML diagnosed 8/2022. Follows locally in [place] with Dr. Brett Hogan. He has been on dasatinib outpatient since 10/2022. Some question regarding his compliance over the course of treatment, however. Other medical history includes HTN and TKI-induced nausea. He presented to [facility] with c/o gum swelling on 6/7/23. Labs remarkable for WBC count of 138K, with 80% blasts indicating blast crisis. WBC count from approximately a week and a half earlier was normal. He reports recent compliance with his TKI. He was transferred to [facility] for higher level of care.      Of note, he had a temp of 101.4 in the ED at [place] and was started on broad spectrum abx with Cefepime. Blood cx and u/a are pending collection, and CXR is neg. He was also hypertensive at [place]. Takes amlodipine at home. Amlodipine was held, and nifedipine and losartan were started [place].

## 2023-06-08 NOTE — PROGRESS NOTES
[facility]  Hospital Medicine Progress Note        Chief Complaint: Inpatient Follow-up    HPI:   22-year-old male with significant history of HTN, CML diagnosed in August 2022 initially started on Hydrea pending final result and subsequently initiated on dasatinib.  Patient had complication with hydroxyurea resulting in pancytopenia/thrombocytopenia that resolved after discontinuation.  Patient is compliant with dasatinib, but lost to follow-up with Oncology.  Patient presented to the ED with complaints of oral/lump eating for the past 2 days.  Patient was afebrile, hypertensive in the ED.  Lab significant for severe leukocytosis, thrombocytopenia and severe hypokalemia.  Patient was admitted hospitalist medicine service.  Hematology/oncology was consulted.  Peripheral smear ordered.  Concern for relapse.  Interval Hx:   Patient seen at bedside.  He is hypertensive.  Otherwise hemodynamics stable.  Had 1 episode of temperature spike in the ED, none after that.  No new complaints.    Objective/physical exam:  General: In no acute distress, afebrile  Chest: Clear to auscultation bilaterally  Heart: S1, S2, no appreciable murmur  Abdomen: Soft, nontender, BS +  MSK: Warm, no lower extremity edema, no clubbing or cyanosis  Neurologic: Alert and oriented x4, moving all extremities with good strength     VITAL SIGNS: 24 HRS MIN & MAX LAST   Temp  Min: 97.6 °F (36.4 °C)  Max: 101.4 °F (38.6 °C) 98.3 °F (36.8 °C)   BP  Min: 143/88  Max: 189/115 (!) 151/95   Pulse  Min: 84  Max: 104  103   Resp  Min: 18  Max: 32 20   SpO2  Min: 97 %  Max: 100 % 97 %       Recent Labs   Lab 06/08/23  0441   .69*   RBC 4.82   HGB 10.9*   HCT 35.6*   MCV 73.9*   MCH 22.6*   MCHC 30.6*   RDW 20.0*   PLT 44*         Recent Labs   Lab 06/08/23  0441      K 2.3*   CO2 27   BUN 7.3*   CREATININE 1.03   CALCIUM 8.3*   MG 1.90   ALBUMIN 3.1*   ALKPHOS 67   ALT 13   AST 26   BILITOT 0.7          Microbiology  Results (last 7 days)       ** No results found for the last 168 hours. **             Scheduled Med:   allopurinoL  300 mg Oral BID    amLODIPine  10 mg Oral Daily    dasatinib  100 mg Oral Daily    losartan  50 mg Oral Daily    potassium bicarbonate  50 mEq Oral Q4H    potassium chloride  40 mEq Intravenous Q4H          Assessment/Plan:    CML chronic phase in blast crisis  Severe leukocytosis  Anemia/thrombocytopenia  Severe hypokalemia  Fever spike x1 episode-rule out sepsis in immunocompromised  Poorly-controlled HTN  Prophylaxis    BCR-ABL positive   Patient reports compliance with dasatinib  Unfortunately in blast crisis  Hematology on board   Recommends to continue hydroxyurea for cytoreduction along with dasatinib  Continue allopurinol  Hematology recommends transfer to [place] for induction therapy   I spoke with  to initiate the transfer process as soon as possible  Hemoglobin is more than 10   Platelet count is 44 K, no indication for transfusion  BP poorly controlled   On amlodipine   Losartan increased to 50 milligram b.i.d.  Will continue to make adjustments based on subsequent BP   Patient had 1 episode of fever spike on admit   Chest x-ray is negative   Urine is not impressive  Obtain blood cultures x2   Empiric cefepime for now   Potassium replaced-50 milliequivalent x2 doses by mouth and 40 milliequivalent IV x2 doses  I will recheck potassium again at 15:00   DVT prophylaxis-bilateral SCDs, no chemical prophylaxis given thrombocytopenia    Critical care time-35 minutes  Critical care diagnosis-CML in blast crisis requiring transfer to tertiary facility, severe hypokalemia requiring IV and p.o. replacement  Critical care interventions: Hands-on evaluation, review of labs/radiographs/records and discussions with patient           Olive Padilla MD   06/08/2023

## 2023-06-08 NOTE — PLAN OF CARE
"MARISOL was notified by Dr. Padilla and Dr. Edison GUZMAN regarding transferring patient to [place] for "induction therapy-Indication Blast crisis".       Transfer initiated at 12:30 PM today. MARISOL spoke with Jacob at the transfer center. MARISOL added Dr. Edison GUZMAN has spoken to Dr. Thakkar (Hematology) regarding patient's transfer.  MARISOL has obtained a disc from Radiology and have a packet on pt's physical chart.  "

## 2023-06-08 NOTE — H&P
[facility] Hospital Medicine - H&P Note    Patient Name: Magdaleno Hirsch  : 2000  MRN: [id number]  PCP: Primary Doctor No  Admitting Physician: Brenda Law MD  Admission Class: IP- Inpatient   Length of Stay: 0  Face-to-Face encounter date: 2023  Code status: Full    Chief Complaint   Gum bleeding and swelling    History of Present Illness   This is a 22-year-old male with medical history of hypertension, CML diagnosed in 2022 and was initially started on Hydrea prior to final result and subsequently initiated on dasatinib, had complication with hydroxyurea resulting in pancytopenia/thrombocytopenia that resolved after discontinuation.  He was lost to follow-up with Oncology for a while but report continuing taking his dasatinib.    Present to the ED on 2023 with complaint of oral/gum bleeding for 2 days.  Denies fever, chills, hemoptysis, hematemesis, melena, hematochezia or hematuria.  Report compliance with his dasatinib.    On arrival to ED he was afebrile and hemodynamically stable.  Labs notable for ,510, hemoglobin 11.9, platelets 50,000, potassium 2.6, uric acid 10.1, , INR 1.27, normal PTT and fibrinogen.    Hematology consulted and referred to hospital medicine service for further evaluation and management.        ROS   Except as documented, all other systems reviewed and negative     Past Medical History   CML  Hypertension    Past Surgical History     Past Surgical History:   Procedure Laterality Date    BONE MARROW BIOPSY N/A 2022    Procedure: Biopsy-bone marrow;  Surgeon: Getachew Chen MD;  Location: [facility];  Service: General;  Laterality: N/A;    KNEE SURGERY Left        Social History     Social History     Tobacco Use    Smoking status: Never    Smokeless tobacco: Never   Substance Use Topics    Alcohol use: Never        Family History   Reviewed and negative    Allergies   Patient has no known allergies.    Home Medications      Prior to Admission medications    Medication Sig Start Date End Date Taking? Authorizing Provider   amLODIPine (NORVASC) 10 MG tablet Take 1 tablet (10 mg total) by mouth once daily. 8/23/22 8/23/23 Yes Sybil Cat MD   dasatinib (SPRYCEL) 100 mg Take 1 tablet (100 mg total) by mouth once daily. 10/10/22  Yes Paul Hogan II, MD   ondansetron (ZOFRAN) 8 MG tablet Take 1 tablet (8 mg total) by mouth every 6 (six) hours as needed for Nausea. 10/11/22  Yes Paul Hogan II, MD        Physical Exam   Vital Signs  Temp:  [98.1 °F (36.7 °C)-98.4 °F (36.9 °C)]   Pulse:  []   Resp:  [18-32]   BP: (164-181)/(107-116)   SpO2:  [98 %-100 %]    General: Appears comfortable  HEENT: NC/AT, oral cavity with stigmata of gingival bleeding and palatal petechiae  Neck:  No JVD  Chest: CTABL  CVS: Regular rhythm. Normal S1/S2.  Abdomen: nondistended, normoactive BS, soft and non-tender.  MSK: No obvious deformity or joint swelling  Skin: Warm and dry  Neuro: AAOx3, no focal neurological deficit  Psych: Cooperative    Labs     Recent Labs     06/07/23  1722   .51*   RBC 5.17   HGB 11.9*   HCT 38.1*   MCV 73.7*   MCH 23.0*   MCHC 31.2*   RDW 20.2*   PLT 50*   ABSNEUT 125.5884*     Recent Labs     06/07/23  1354   PROTIME 16.4*   INR 1.31*      Recent Labs     06/07/23  1247      K 2.6*   CHLORIDE 106   CO2 27   BUN 5.4*   CREATININE 1.07   EGFRNORACEVR >60   GLUCOSE 97   CALCIUM 8.7   MG 1.90   ALBUMIN 3.2*   GLOBULIN 3.4   ALKPHOS 69   ALT 14   AST 27   BILITOT 0.7          Assessment & Plan   CML relapse/possible accelerated phase versus AML transformation  Anemia and thrombocytopenia  Hypokalemia  Hypertensive urgency      Plan:    No indication for transfusion at this time, will keep monitoring CBC  Peripheral smear pending  Start allopurinol 300 mg b.i.d.  Continue dasatinib  Hematology consulted  Will keep NPO until Hematology evaluation in case bone marrow biopsy planned tomorrow  Replace  potassium IV+ PO  Start losartan 50 mg daily and continue amlodipine 10 mg daily, prn antihypertensive  VTE Prophylaxis:  SCDs    Critical care time 32 minutes  Critical care diagnosis CML accelerated phase versus AML transformation and tumor lysis    Brenda Law MD  Internal Medicine

## 2023-06-08 NOTE — PLAN OF CARE
06/08/23 1050   Discharge Assessment   Assessment Type Discharge Planning Assessment   Confirmed/corrected address, phone number and insurance Yes   Confirmed Demographics Correct on Facesheet   Source of Information patient   When was your last doctors appointment?   (Patient does not have a PCP at this time.)   Communicated LISA with patient/caregiver Yes   Reason For Admission Hypokalemia/Chronic Myeloid Leukemia   People in Home alone   Do you expect to return to your current living situation? Yes   Do you have help at home or someone to help you manage your care at home? Yes   Who are your caregiver(s) and their phone number(s)? Grandparent: Kristy Keller: [phone]   Prior to hospitilization cognitive status: Unable to Assess   Current cognitive status: Alert/Oriented   Home Accessibility wheelchair accessible   Home Layout Able to live on 1st floor   Equipment Currently Used at Home none   Readmission within 30 days? No   Patient currently being followed by outpatient case management? No   Do you currently have service(s) that help you manage your care at home? No   Do you take prescription medications? Yes   Do you have prescription coverage? Yes   Coverage Medicaid [employer] [facility] [employer] [facility] [place]   Do you have any problems affording any of your prescribed medications? No   Is the patient taking medications as prescribed? yes   Who is going to help you get home at discharge? Patient reports his girlfriend   How do you get to doctors appointments? car, drives self   Are you on dialysis? No   Do you take coumadin? No   Discharge Plan A Home   Discharge Plan B Home with family   DME Needed Upon Discharge  none   Discharge Plan discussed with: Patient   Transition of Care Barriers None       Patient does not have a PCP at this time but would like assistance with getting established with a new PCP prior to discharge.  No barriers to discharge at this time.

## 2023-06-08 NOTE — CONSULTS
Subjective:       Patient ID: Magdaleno Hirsch is a 22 y.o. male.    Chief Complaint: Boredom    Diagnosis:  CML, chronic phase  In blast crisis as of 06/08/2023.    Current Treatment:   Dasatinib 100 mg po daily, unsure on start date as patient was lost to follow up.    Treatment History:  Hydroxyurea 2g every 12 hours    HPI:  Patient with no real medical history who went in for a routine eye exam on 08/18/2022 to update his eye glasses prescription.  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  He had bilateral Valdez spots with vessel tortuosity.  The optometrist recommended that the patient have blood work including testing for sickle cell disease.  The patient presented to the emergency department.  CBC in the emergency department revealed a white blood cell count of 411,900. Hemoglobin was 8.3, platelets were normal at 375,000 hundred and seventy five thousand.  Differential was suggestive of CML.  Coagulation studies revealed an INR of 1.38, PTT of 36.4 and a fibrinogen of 292.  Patient was going to present to [place], however they were on diversion.  He was transferred from [facility] to [facility].  Hematology consulted.  Patient underwent bone marrow biopsy on 08/22/2022, this revealed CML, chronic phase, BCR/ABL1 positive.  Ordered dasatinib 100 mg p.o. daily as of 10/10/2022, unsure when patient started taking medication due to him being lost to follow-up.  There is a question about his compliance.  Blood work on 05/29/2023 showed a normal white blood cell count, unfortunately on 06/07/2023, white blood cell count had increased very significantly and as of 06/07/2023 he was in blast crisis.    Interval History:   22-year-old patient well known to me who has CML, when diagnosed in October of 2022 was in chronic phase.  There has been some questionable compliance with medications, but I saw him on  06/02/2023 and he stated he was being very compliant with his medication.  Labs done on 05/29/2023 would suggest that this was true as he had a normal white blood cell count.  Presented to the emergency department on 06/07/2023 at [facility] with swelling of his gums.  Blood work showed a white blood cell count significantly elevated at 138,270. He was transferred to [facility], peripheral smear reviewed by myself and by pathology suggested 80% blasts.  Patient had swelling of his gums.    Past Medical History:   Diagnosis Date    CML (chronic myelocytic leukemia)     HVD (hypertensive vascular disease)     Oropharyngeal candidiasis       Past Surgical History:   Procedure Laterality Date    BONE MARROW BIOPSY N/A 8/22/2022    Procedure: Biopsy-bone marrow;  Surgeon: Getachew Chen MD;  Location[facility];  Service: General;  Laterality: N/A;    KNEE SURGERY Left      Social History     Socioeconomic History    Marital status: Single   Tobacco Use    Smoking status: Never    Smokeless tobacco: Never   Substance and Sexual Activity    Alcohol use: Never    Drug use: Yes     Types: Marijuana      Family History   Problem Relation Age of Onset    Hypertension Maternal Grandmother     Cancer Maternal Grandmother            Review of Systems   Constitutional:  Negative for chills, diaphoresis, fatigue and unexpected weight change.   HENT:  Negative for nasal congestion and sore throat.         Swollen gums   Eyes:  Negative for pain.   Respiratory:  Negative for cough, chest tightness and shortness of breath.    Cardiovascular:  Negative for chest pain, palpitations and leg swelling.   Gastrointestinal:  Negative for abdominal distention, abdominal pain, blood in stool, constipation and diarrhea.   Genitourinary:  Negative for dysuria, frequency and hematuria.   Musculoskeletal:  Negative for arthralgias and back pain.   Integumentary:  Negative for rash.    Neurological:  Negative for dizziness, weakness, numbness and headaches.   Hematological:  Negative for adenopathy. Bruises/bleeds easily.   Psychiatric/Behavioral:  Negative for confusion.        Objective:      Physical Exam  Vitals reviewed.   Constitutional:       General: He is awake.      Appearance: Normal appearance.   HENT:      Head: Normocephalic and atraumatic.      Right Ear: Hearing normal.      Left Ear: Hearing normal.      Nose: Nose normal.      Mouth/Throat:      Comments:     Eyes:      General: Lids are normal. Vision grossly intact.      Extraocular Movements: Extraocular movements intact.      Conjunctiva/sclera: Conjunctivae normal.   Cardiovascular:      Rate and Rhythm: Normal rate and regular rhythm.      Pulses: Normal pulses.      Heart sounds: Normal heart sounds.   Pulmonary:      Effort: Pulmonary effort is normal.      Breath sounds: Normal breath sounds. No wheezing, rhonchi or rales.   Chest:      Comments: Soft mass in area of the sternum  Abdominal:      General: Bowel sounds are normal.      Palpations: Abdomen is soft. There is splenomegaly.      Tenderness: There is no abdominal tenderness.   Musculoskeletal:      Cervical back: Full passive range of motion without pain.      Right lower leg: No edema.      Left lower leg: No edema.   Lymphadenopathy:      Cervical: No cervical adenopathy.      Upper Body:      Right upper body: No supraclavicular or axillary adenopathy.      Left upper body: No supraclavicular or axillary adenopathy.   Skin:     General: Skin is warm.   Neurological:      General: No focal deficit present.      Mental Status: He is alert and oriented to person, place, and time.   Psychiatric:         Attention and Perception: Attention normal.         Mood and Affect: Mood and affect normal.         Behavior: Behavior is cooperative.       LABS AND IMAGING REVIEWED IN [facility]  Lab Review:        Assessment:   CML, chronic phase  Pancytopenia secondary to  Hydrea  Refractory thrombocytopenia - improving     Plan:     Patient on dasatinib 100 mg p.o. daily.  He states that he has been compliant with this.  His white blood cell count has normalized.    Most recent BCR/ABL PCR is positive, BCR/ABL1 p210 mRNA transcripts were detected and   estimated to represent 35.4% total ABL1.  Repeat was done at his visit on 06/02/2023, this was down to 20.8%.    Unfortunately, patient presented to the emergency department on 06/07/2023 and was found to be in blast crisis.    I have spoken with Dr. Thakkar also felt in [place], we will transfer the patient over because he may need induction therapy as he may have also transformed into acute leukemia.    We will continue Hydrea 2 g every 8 hours for cytoreduction.    Paul Hogan II, MD

## 2023-06-09 ENCOUNTER — HOSPITAL ENCOUNTER (INPATIENT)
Facility: HOSPITAL | Age: 23
LOS: 33 days | Discharge: HOME | DRG: 834 | End: 2023-07-12
Attending: INTERNAL MEDICINE | Admitting: INTERNAL MEDICINE
Payer: MEDICAID

## 2023-06-09 DIAGNOSIS — E87.5 HYPERKALEMIA: ICD-10-CM

## 2023-06-09 DIAGNOSIS — R06.02 SHORTNESS OF BREATH: ICD-10-CM

## 2023-06-09 DIAGNOSIS — C92.10 BLAST CRISIS PHASE OF CHRONIC MYELOID LEUKEMIA: Primary | ICD-10-CM

## 2023-06-09 DIAGNOSIS — R00.0 TACHYCARDIA: ICD-10-CM

## 2023-06-09 DIAGNOSIS — C92.10 BLASTIC PHASE CHRONIC MYELOID LEUKEMIA: ICD-10-CM

## 2023-06-09 DIAGNOSIS — C92.10 CHRONIC MYELOID LEUKEMIA: ICD-10-CM

## 2023-06-09 DIAGNOSIS — R07.9 CHEST PAIN: ICD-10-CM

## 2023-06-09 PROBLEM — D72.829 LEUKOCYTOSIS: Status: ACTIVE | Noted: 2023-06-09

## 2023-06-09 PROBLEM — E87.6 HYPOKALEMIA: Status: ACTIVE | Noted: 2023-06-09

## 2023-06-09 PROBLEM — R19.7 DIARRHEA: Status: ACTIVE | Noted: 2023-06-09

## 2023-06-09 LAB
ADENOVIRUS: NOT DETECTED
ALBUMIN SERPL BCP-MCNC: 3 G/DL (ref 3.5–5.2)
ALP SERPL-CCNC: 65 U/L (ref 55–135)
ALT SERPL W/O P-5'-P-CCNC: 11 U/L (ref 10–44)
ANION GAP SERPL CALC-SCNC: 13 MMOL/L (ref 8–16)
ANISOCYTOSIS BLD QL SMEAR: SLIGHT
APTT PPP: 30.1 SEC (ref 21–32)
AST SERPL-CCNC: 31 U/L (ref 10–40)
BASOPHILS NFR BLD: 0 % (ref 0–1.9)
BILIRUB SERPL-MCNC: 1.4 MG/DL (ref 0.1–1)
BODY SITE - BONE MARROW: NORMAL
BORDETELLA PARAPERTUSSIS (IS1001): NOT DETECTED
BORDETELLA PERTUSSIS (PTXP): NOT DETECTED
BUN SERPL-MCNC: 10 MG/DL (ref 6–20)
BURR CELLS BLD QL SMEAR: ABNORMAL
C DIFF GDH STL QL: NEGATIVE
C DIFF TOX A+B STL QL IA: NEGATIVE
CALCIUM SERPL-MCNC: 8.7 MG/DL (ref 8.7–10.5)
CHLAMYDIA PNEUMONIAE: NOT DETECTED
CHLORIDE SERPL-SCNC: 105 MMOL/L (ref 95–110)
CLINICAL DIAGNOSIS - BONE MARROW: NORMAL
CO2 SERPL-SCNC: 22 MMOL/L (ref 23–29)
CORONAVIRUS 229E, COMMON COLD VIRUS: NOT DETECTED
CORONAVIRUS HKU1, COMMON COLD VIRUS: NOT DETECTED
CORONAVIRUS NL63, COMMON COLD VIRUS: NOT DETECTED
CORONAVIRUS OC43, COMMON COLD VIRUS: NOT DETECTED
CREAT SERPL-MCNC: 1 MG/DL (ref 0.5–1.4)
DACRYOCYTES BLD QL SMEAR: ABNORMAL
DIFFERENTIAL METHOD: ABNORMAL
EOSINOPHIL NFR BLD: 1 % (ref 0–8)
ERYTHROCYTE [DISTWIDTH] IN BLOOD BY AUTOMATED COUNT: 20.6 % (ref 11.5–14.5)
EST. GFR  (NO RACE VARIABLE): >60 ML/MIN/1.73 M^2
FIBRINOGEN PPP-MCNC: 377 MG/DL (ref 182–400)
FLOW CYTOMETRY ANTIBODIES ANALYZED - BONE MARROW: NORMAL
FLOW CYTOMETRY COMMENT - BONE MARROW: NORMAL
FLOW CYTOMETRY INTERPRETATION - BONE MARROW: NORMAL
FLUBV RNA NPH QL NAA+NON-PROBE: NOT DETECTED
GLUCOSE SERPL-MCNC: 102 MG/DL (ref 70–110)
HCT VFR BLD AUTO: 36.1 % (ref 40–54)
HGB BLD-MCNC: 11.2 G/DL (ref 14–18)
HPIV1 RNA NPH QL NAA+NON-PROBE: NOT DETECTED
HPIV2 RNA NPH QL NAA+NON-PROBE: NOT DETECTED
HPIV3 RNA NPH QL NAA+NON-PROBE: NOT DETECTED
HPIV4 RNA NPH QL NAA+NON-PROBE: NOT DETECTED
HUMAN METAPNEUMOVIRUS: NOT DETECTED
IMM GRANULOCYTES # BLD AUTO: ABNORMAL K/UL (ref 0–0.04)
IMM GRANULOCYTES NFR BLD AUTO: ABNORMAL % (ref 0–0.5)
INFLUENZA A (SUBTYPES H1,H1-2009,H3): NOT DETECTED
INR PPP: 1.4 (ref 0.8–1.2)
LDH SERPL L TO P-CCNC: 907 U/L (ref 110–260)
LYMPHOCYTES NFR BLD: 12 % (ref 18–48)
MAGNESIUM SERPL-MCNC: 1.7 MG/DL (ref 1.6–2.6)
MCH RBC QN AUTO: 22.8 PG (ref 27–31)
MCHC RBC AUTO-ENTMCNC: 31 G/DL (ref 32–36)
MCV RBC AUTO: 73 FL (ref 82–98)
MONOCYTES NFR BLD: 6 % (ref 4–15)
MYCOPLASMA PNEUMONIAE: NOT DETECTED
NEUTROPHILS NFR BLD: 2 % (ref 38–73)
NEUTS BAND NFR BLD MANUAL: 1 %
NRBC BLD-RTO: 0 /100 WBC
OVALOCYTES BLD QL SMEAR: ABNORMAL
PATH REV BLD -IMP: NORMAL
PHOSPHATE SERPL-MCNC: 3.5 MG/DL (ref 2.7–4.5)
PLATELET # BLD AUTO: 36 K/UL (ref 150–450)
PLATELET BLD QL SMEAR: ABNORMAL
PMV BLD AUTO: ABNORMAL FL (ref 9.2–12.9)
POIKILOCYTOSIS BLD QL SMEAR: SLIGHT
POTASSIUM SERPL-SCNC: 3.4 MMOL/L (ref 3.5–5.1)
PROT SERPL-MCNC: 6.5 G/DL (ref 6–8.4)
PROTHROMBIN TIME: 14.5 SEC (ref 9–12.5)
RBC # BLD AUTO: 4.92 M/UL (ref 4.6–6.2)
RESPIRATORY INFECTION PANEL SOURCE: NORMAL
RSV RNA NPH QL NAA+NON-PROBE: NOT DETECTED
RV+EV RNA NPH QL NAA+NON-PROBE: NOT DETECTED
SARS-COV-2 RDRP RESP QL NAA+PROBE: NEGATIVE
SARS-COV-2 RNA RESP QL NAA+PROBE: NOT DETECTED
SMUDGE CELLS BLD QL SMEAR: PRESENT
SODIUM SERPL-SCNC: 140 MMOL/L (ref 136–145)
TARGETS BLD QL SMEAR: ABNORMAL
URATE SERPL-MCNC: 4.7 MG/DL (ref 3.4–7)
WBC # BLD AUTO: 95.75 K/UL (ref 3.9–12.7)
WBC OTHER NFR BLD MANUAL: 78 %

## 2023-06-09 PROCEDURE — 88275 CYTOGENETICS 100-300: CPT | Performed by: NURSE PRACTITIONER

## 2023-06-09 PROCEDURE — 81207 BCR/ABL1 GENE MINOR BP: CPT | Performed by: NURSE PRACTITIONER

## 2023-06-09 PROCEDURE — 85610 PROTHROMBIN TIME: CPT | Performed by: NURSE PRACTITIONER

## 2023-06-09 PROCEDURE — 88342 IMHCHEM/IMCYTCHM 1ST ANTB: CPT | Performed by: PATHOLOGY

## 2023-06-09 PROCEDURE — 88189 FLOWCYTOMETRY/READ 16 & >: CPT | Mod: ,,, | Performed by: PATHOLOGY

## 2023-06-09 PROCEDURE — 88299 UNLISTED CYTOGENETIC STUDY: CPT | Performed by: NURSE PRACTITIONER

## 2023-06-09 PROCEDURE — 88313 SPECIAL STAINS GROUP 2: CPT | Mod: 26,,, | Performed by: PATHOLOGY

## 2023-06-09 PROCEDURE — 99233 PR SUBSEQUENT HOSPITAL CARE,LEVL III: ICD-10-PCS | Mod: FS,,, | Performed by: INTERNAL MEDICINE

## 2023-06-09 PROCEDURE — 38222 DX BONE MARROW BX & ASPIR: CPT

## 2023-06-09 PROCEDURE — 25000003 PHARM REV CODE 250: Performed by: INTERNAL MEDICINE

## 2023-06-09 PROCEDURE — 88313 PR  SPECIAL STAINS,GROUP II: ICD-10-PCS | Mod: 26,,, | Performed by: PATHOLOGY

## 2023-06-09 PROCEDURE — 87040 BLOOD CULTURE FOR BACTERIA: CPT | Mod: 59

## 2023-06-09 PROCEDURE — 25000003 PHARM REV CODE 250: Performed by: NURSE PRACTITIONER

## 2023-06-09 PROCEDURE — 88311 PR  DECALCIFY TISSUE: ICD-10-PCS | Mod: 26,,, | Performed by: PATHOLOGY

## 2023-06-09 PROCEDURE — 88271 CYTOGENETICS DNA PROBE: CPT | Mod: 59

## 2023-06-09 PROCEDURE — 81245 FLT3 GENE: CPT | Performed by: NURSE PRACTITIONER

## 2023-06-09 PROCEDURE — 87633 RESP VIRUS 12-25 TARGETS: CPT | Performed by: NURSE PRACTITIONER

## 2023-06-09 PROCEDURE — 93010 ELECTROCARDIOGRAM REPORT: CPT | Mod: ,,, | Performed by: INTERNAL MEDICINE

## 2023-06-09 PROCEDURE — 36415 COLL VENOUS BLD VENIPUNCTURE: CPT | Performed by: NURSE PRACTITIONER

## 2023-06-09 PROCEDURE — 88237 TISSUE CULTURE BONE MARROW: CPT | Performed by: NURSE PRACTITIONER

## 2023-06-09 PROCEDURE — 85027 COMPLETE CBC AUTOMATED: CPT | Performed by: NURSE PRACTITIONER

## 2023-06-09 PROCEDURE — 83615 LACTATE (LD) (LDH) ENZYME: CPT | Performed by: NURSE PRACTITIONER

## 2023-06-09 PROCEDURE — 93010 EKG 12-LEAD: ICD-10-PCS | Mod: ,,, | Performed by: INTERNAL MEDICINE

## 2023-06-09 PROCEDURE — 38222 PR BONE MARROW BIOPSY(IES) W/ASPIRATION(S); DIAGNOSTIC: ICD-10-PCS | Mod: LT,,, | Performed by: NURSE PRACTITIONER

## 2023-06-09 PROCEDURE — 85097 PR  BONE MARROW,SMEAR INTERPRETATION: ICD-10-PCS | Mod: ,,, | Performed by: PATHOLOGY

## 2023-06-09 PROCEDURE — 83735 ASSAY OF MAGNESIUM: CPT | Performed by: NURSE PRACTITIONER

## 2023-06-09 PROCEDURE — 85060 PATHOLOGIST REVIEW: ICD-10-PCS | Mod: ,,, | Performed by: PATHOLOGY

## 2023-06-09 PROCEDURE — 81206 BCR/ABL1 GENE MAJOR BP: CPT | Mod: 91 | Performed by: NURSE PRACTITIONER

## 2023-06-09 PROCEDURE — 99233 SBSQ HOSP IP/OBS HIGH 50: CPT | Mod: FS,,, | Performed by: INTERNAL MEDICINE

## 2023-06-09 PROCEDURE — U0002 COVID-19 LAB TEST NON-CDC: HCPCS | Performed by: INTERNAL MEDICINE

## 2023-06-09 PROCEDURE — 88305 TISSUE EXAM BY PATHOLOGIST: CPT | Mod: 26,,, | Performed by: PATHOLOGY

## 2023-06-09 PROCEDURE — 20600001 HC STEP DOWN PRIVATE ROOM

## 2023-06-09 PROCEDURE — 88275 CYTOGENETICS 100-300: CPT | Mod: 59

## 2023-06-09 PROCEDURE — 88311 DECALCIFY TISSUE: CPT | Mod: 26,,, | Performed by: PATHOLOGY

## 2023-06-09 PROCEDURE — 88311 DECALCIFY TISSUE: CPT | Performed by: PATHOLOGY

## 2023-06-09 PROCEDURE — 88264 CHROMOSOME ANALYSIS 20-25: CPT | Performed by: NURSE PRACTITIONER

## 2023-06-09 PROCEDURE — 36415 COLL VENOUS BLD VENIPUNCTURE: CPT

## 2023-06-09 PROCEDURE — 38222 DX BONE MARROW BX & ASPIR: CPT | Mod: LT,,, | Performed by: NURSE PRACTITIONER

## 2023-06-09 PROCEDURE — 81450 HL NEO GSAP 5-50DNA/DNA&RNA: CPT | Performed by: NURSE PRACTITIONER

## 2023-06-09 PROCEDURE — 88305 TISSUE EXAM BY PATHOLOGIST: ICD-10-PCS | Mod: 26,,, | Performed by: PATHOLOGY

## 2023-06-09 PROCEDURE — 25000003 PHARM REV CODE 250

## 2023-06-09 PROCEDURE — 87449 NOS EACH ORGANISM AG IA: CPT | Performed by: NURSE PRACTITIONER

## 2023-06-09 PROCEDURE — 88185 FLOWCYTOMETRY/TC ADD-ON: CPT | Mod: 59 | Performed by: PATHOLOGY

## 2023-06-09 PROCEDURE — 85007 BL SMEAR W/DIFF WBC COUNT: CPT | Performed by: NURSE PRACTITIONER

## 2023-06-09 PROCEDURE — 27000207 HC ISOLATION

## 2023-06-09 PROCEDURE — 88184 FLOWCYTOMETRY/ TC 1 MARKER: CPT | Performed by: PATHOLOGY

## 2023-06-09 PROCEDURE — 88342 CHG IMMUNOCYTOCHEMISTRY: ICD-10-PCS | Mod: 26,59,, | Performed by: PATHOLOGY

## 2023-06-09 PROCEDURE — 87798 DETECT AGENT NOS DNA AMP: CPT | Mod: 59 | Performed by: NURSE PRACTITIONER

## 2023-06-09 PROCEDURE — 88342 IMHCHEM/IMCYTCHM 1ST ANTB: CPT | Mod: 26,59,, | Performed by: PATHOLOGY

## 2023-06-09 PROCEDURE — 85097 BONE MARROW INTERPRETATION: CPT | Mod: ,,, | Performed by: PATHOLOGY

## 2023-06-09 PROCEDURE — 88313 SPECIAL STAINS GROUP 2: CPT | Mod: 59 | Performed by: PATHOLOGY

## 2023-06-09 PROCEDURE — 85384 FIBRINOGEN ACTIVITY: CPT | Performed by: NURSE PRACTITIONER

## 2023-06-09 PROCEDURE — 84100 ASSAY OF PHOSPHORUS: CPT | Performed by: NURSE PRACTITIONER

## 2023-06-09 PROCEDURE — 81206 BCR/ABL1 GENE MAJOR BP: CPT | Performed by: NURSE PRACTITIONER

## 2023-06-09 PROCEDURE — 80053 COMPREHEN METABOLIC PANEL: CPT | Performed by: NURSE PRACTITIONER

## 2023-06-09 PROCEDURE — 63600175 PHARM REV CODE 636 W HCPCS

## 2023-06-09 PROCEDURE — 85730 THROMBOPLASTIN TIME PARTIAL: CPT | Performed by: NURSE PRACTITIONER

## 2023-06-09 PROCEDURE — 93005 ELECTROCARDIOGRAM TRACING: CPT

## 2023-06-09 PROCEDURE — 63600175 PHARM REV CODE 636 W HCPCS: Performed by: NURSE PRACTITIONER

## 2023-06-09 PROCEDURE — 84550 ASSAY OF BLOOD/URIC ACID: CPT | Performed by: NURSE PRACTITIONER

## 2023-06-09 PROCEDURE — 88189 PR  FLOWCYTOMETRY/READ, 16 & > MARKERS: ICD-10-PCS | Mod: ,,, | Performed by: PATHOLOGY

## 2023-06-09 PROCEDURE — 88305 TISSUE EXAM BY PATHOLOGIST: CPT | Performed by: PATHOLOGY

## 2023-06-09 PROCEDURE — 85060 BLOOD SMEAR INTERPRETATION: CPT | Mod: ,,, | Performed by: PATHOLOGY

## 2023-06-09 RX ORDER — GLUCAGON 1 MG
1 KIT INJECTION
Status: DISCONTINUED | OUTPATIENT
Start: 2023-06-09 | End: 2023-07-12 | Stop reason: HOSPADM

## 2023-06-09 RX ORDER — DEXTROSE 40 %
15 GEL (GRAM) ORAL
Status: DISCONTINUED | OUTPATIENT
Start: 2023-06-09 | End: 2023-07-12 | Stop reason: HOSPADM

## 2023-06-09 RX ORDER — ACYCLOVIR 200 MG/1
400 CAPSULE ORAL 2 TIMES DAILY
Status: DISCONTINUED | OUTPATIENT
Start: 2023-06-09 | End: 2023-06-14

## 2023-06-09 RX ORDER — OXYCODONE HYDROCHLORIDE 5 MG/1
5 TABLET ORAL EVERY 6 HOURS PRN
Status: DISCONTINUED | OUTPATIENT
Start: 2023-06-09 | End: 2023-06-12

## 2023-06-09 RX ORDER — IBUPROFEN 200 MG
16 TABLET ORAL
Status: DISCONTINUED | OUTPATIENT
Start: 2023-06-09 | End: 2023-06-09

## 2023-06-09 RX ORDER — ACETAMINOPHEN 325 MG/1
650 TABLET ORAL ONCE
Status: COMPLETED | OUTPATIENT
Start: 2023-06-09 | End: 2023-06-09

## 2023-06-09 RX ORDER — SODIUM CHLORIDE 9 MG/ML
INJECTION, SOLUTION INTRAVENOUS CONTINUOUS
Status: ACTIVE | OUTPATIENT
Start: 2023-06-09 | End: 2023-06-17

## 2023-06-09 RX ORDER — FLUCONAZOLE 200 MG/1
400 TABLET ORAL DAILY
Status: COMPLETED | OUTPATIENT
Start: 2023-06-09 | End: 2023-06-20

## 2023-06-09 RX ORDER — LIDOCAINE HYDROCHLORIDE 20 MG/ML
10 INJECTION, SOLUTION INFILTRATION; PERINEURAL ONCE AS NEEDED
Status: DISCONTINUED | OUTPATIENT
Start: 2023-06-09 | End: 2023-06-20

## 2023-06-09 RX ORDER — NALOXONE HCL 0.4 MG/ML
0.02 VIAL (ML) INJECTION
Status: DISCONTINUED | OUTPATIENT
Start: 2023-06-09 | End: 2023-07-12 | Stop reason: HOSPADM

## 2023-06-09 RX ORDER — SODIUM CHLORIDE 0.9 % (FLUSH) 0.9 %
10 SYRINGE (ML) INJECTION EVERY 12 HOURS PRN
Status: DISCONTINUED | OUTPATIENT
Start: 2023-06-09 | End: 2023-06-20

## 2023-06-09 RX ORDER — HYDRALAZINE HYDROCHLORIDE 25 MG/1
25 TABLET, FILM COATED ORAL EVERY 8 HOURS
Status: DISCONTINUED | OUTPATIENT
Start: 2023-06-09 | End: 2023-06-11

## 2023-06-09 RX ORDER — ALLOPURINOL 100 MG/1
300 TABLET ORAL 2 TIMES DAILY
Status: DISCONTINUED | OUTPATIENT
Start: 2023-06-09 | End: 2023-06-12

## 2023-06-09 RX ORDER — FLUCONAZOLE 200 MG/1
200 TABLET ORAL DAILY
Status: DISCONTINUED | OUTPATIENT
Start: 2023-06-09 | End: 2023-06-09

## 2023-06-09 RX ORDER — DEXTROSE 40 %
30 GEL (GRAM) ORAL
Status: DISCONTINUED | OUTPATIENT
Start: 2023-06-09 | End: 2023-07-12 | Stop reason: HOSPADM

## 2023-06-09 RX ORDER — LORAZEPAM 2 MG/ML
0.5 INJECTION INTRAMUSCULAR ONCE AS NEEDED
Status: COMPLETED | OUTPATIENT
Start: 2023-06-09 | End: 2023-06-09

## 2023-06-09 RX ORDER — POTASSIUM CHLORIDE 20 MEQ/1
40 TABLET, EXTENDED RELEASE ORAL ONCE
Status: DISCONTINUED | OUTPATIENT
Start: 2023-06-09 | End: 2023-06-09

## 2023-06-09 RX ORDER — ACETAMINOPHEN 325 MG/1
650 TABLET ORAL EVERY 6 HOURS PRN
Status: DISCONTINUED | OUTPATIENT
Start: 2023-06-09 | End: 2023-06-12

## 2023-06-09 RX ORDER — POTASSIUM CHLORIDE 7.45 MG/ML
10 INJECTION INTRAVENOUS
Status: COMPLETED | OUTPATIENT
Start: 2023-06-09 | End: 2023-06-09

## 2023-06-09 RX ORDER — ONDANSETRON 8 MG/1
8 TABLET, ORALLY DISINTEGRATING ORAL EVERY 8 HOURS PRN
Status: DISCONTINUED | OUTPATIENT
Start: 2023-06-09 | End: 2023-07-12 | Stop reason: HOSPADM

## 2023-06-09 RX ORDER — LOSARTAN POTASSIUM 50 MG/1
50 TABLET ORAL DAILY
Status: DISCONTINUED | OUTPATIENT
Start: 2023-06-09 | End: 2023-06-18

## 2023-06-09 RX ORDER — IBUPROFEN 200 MG
24 TABLET ORAL
Status: DISCONTINUED | OUTPATIENT
Start: 2023-06-09 | End: 2023-06-09

## 2023-06-09 RX ORDER — HYDROXYUREA 500 MG/1
2000 CAPSULE ORAL 3 TIMES DAILY
Status: DISCONTINUED | OUTPATIENT
Start: 2023-06-09 | End: 2023-06-10

## 2023-06-09 RX ORDER — NIFEDIPINE 60 MG/1
60 TABLET, EXTENDED RELEASE ORAL DAILY
Status: DISCONTINUED | OUTPATIENT
Start: 2023-06-09 | End: 2023-07-12 | Stop reason: HOSPADM

## 2023-06-09 RX ADMIN — LORAZEPAM 0.5 MG: 2 INJECTION INTRAMUSCULAR; INTRAVENOUS at 11:06

## 2023-06-09 RX ADMIN — ACYCLOVIR 400 MG: 200 CAPSULE ORAL at 08:06

## 2023-06-09 RX ADMIN — POTASSIUM CHLORIDE 10 MEQ: 10 INJECTION, SOLUTION INTRAVENOUS at 06:06

## 2023-06-09 RX ADMIN — POTASSIUM CHLORIDE 10 MEQ: 10 INJECTION, SOLUTION INTRAVENOUS at 03:06

## 2023-06-09 RX ADMIN — CEFEPIME 2 G: 2 INJECTION, POWDER, FOR SOLUTION INTRAVENOUS at 05:06

## 2023-06-09 RX ADMIN — HYDROXYUREA 2000 MG: 500 CAPSULE ORAL at 08:06

## 2023-06-09 RX ADMIN — GUAIFENESIN AND DEXTROMETHORPHAN HYDROBROMIDE 1 TABLET: 600; 30 TABLET, EXTENDED RELEASE ORAL at 09:06

## 2023-06-09 RX ADMIN — ACETAMINOPHEN 650 MG: 325 TABLET ORAL at 11:06

## 2023-06-09 RX ADMIN — ALLOPURINOL 300 MG: 100 TABLET ORAL at 09:06

## 2023-06-09 RX ADMIN — ACYCLOVIR 400 MG: 200 CAPSULE ORAL at 09:06

## 2023-06-09 RX ADMIN — FLUCONAZOLE 400 MG: 200 TABLET ORAL at 09:06

## 2023-06-09 RX ADMIN — LOSARTAN POTASSIUM 50 MG: 50 TABLET, FILM COATED ORAL at 09:06

## 2023-06-09 RX ADMIN — OXYCODONE HYDROCHLORIDE 5 MG: 5 TABLET ORAL at 03:06

## 2023-06-09 RX ADMIN — ACETAMINOPHEN 650 MG: 325 TABLET ORAL at 01:06

## 2023-06-09 RX ADMIN — POTASSIUM CHLORIDE 10 MEQ: 10 INJECTION, SOLUTION INTRAVENOUS at 01:06

## 2023-06-09 RX ADMIN — ALLOPURINOL 300 MG: 100 TABLET ORAL at 08:06

## 2023-06-09 RX ADMIN — ALLOPURINOL 300 MG: 100 TABLET ORAL at 01:06

## 2023-06-09 RX ADMIN — ACETAMINOPHEN 650 MG: 325 TABLET ORAL at 08:06

## 2023-06-09 RX ADMIN — HYDROXYUREA 2000 MG: 500 CAPSULE ORAL at 09:06

## 2023-06-09 RX ADMIN — CEFEPIME 2 G: 2 INJECTION, POWDER, FOR SOLUTION INTRAVENOUS at 02:06

## 2023-06-09 RX ADMIN — DASATINIB 100 MG: 100 TABLET ORAL at 11:06

## 2023-06-09 RX ADMIN — DIPHENHYDRAMINE HYDROCHLORIDE 10 ML: 25 SOLUTION ORAL at 11:06

## 2023-06-09 RX ADMIN — POTASSIUM CHLORIDE 10 MEQ: 10 INJECTION, SOLUTION INTRAVENOUS at 04:06

## 2023-06-09 RX ADMIN — GUAIFENESIN AND DEXTROMETHORPHAN HYDROBROMIDE 1 TABLET: 600; 30 TABLET, EXTENDED RELEASE ORAL at 01:06

## 2023-06-09 RX ADMIN — HYDROXYUREA 2000 MG: 500 CAPSULE ORAL at 02:06

## 2023-06-09 RX ADMIN — DIPHENHYDRAMINE HYDROCHLORIDE 10 ML: 25 SOLUTION ORAL at 07:06

## 2023-06-09 RX ADMIN — ACYCLOVIR 400 MG: 200 CAPSULE ORAL at 01:06

## 2023-06-09 RX ADMIN — HYDRALAZINE HYDROCHLORIDE 25 MG: 25 TABLET, FILM COATED ORAL at 01:06

## 2023-06-09 RX ADMIN — SODIUM CHLORIDE: 9 INJECTION, SOLUTION INTRAVENOUS at 04:06

## 2023-06-09 RX ADMIN — CEFEPIME 2 G: 2 INJECTION, POWDER, FOR SOLUTION INTRAVENOUS at 09:06

## 2023-06-09 RX ADMIN — NIFEDIPINE 60 MG: 60 TABLET, FILM COATED, EXTENDED RELEASE ORAL at 09:06

## 2023-06-09 NOTE — HPI
Mr. Hirsch is a 21yo M with CML on dasatinib, HTN who presented to [facility] with gums that would bleed with brushing and eating, headaches, night sweats and fevers, swollen neck lymph nodes, non-productive cough, N/V, loose-watery diarrhea (3-4 BM/day) that started 2-3 days prior to his presentation at the hospital.     In the OSH ED he was febrile to 101F, tachycardic to 110s, hypertensive, sats >94% on RA. CBC indicative of blast crisis with leukocytosis to 140, anemia to 10.9, thrombocytopenia to 44, blood cultures drawn, UA with some hematuria, but no pyuria, CXR non-contributory, COVID negative.     Patient transferred to BMT service at [facility] to evaluate for induction therapy with concern for transformation to acute leukemia.    Oncology History, per Dr. Hogan note  Diagnosis:  CML, chronic phase  In blast crisis as of 06/08/2023.     Current Treatment:   Dasatinib 100 mg po daily, unsure on start date as patient was lost to follow up.     Treatment History:  Hydroxyurea 2g every 12 hours     Patient with no real medical history who went in for a routine eye exam on 08/18/2022 to update his eye glasses prescription.  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  He had bilateral Valdez spots with vessel tortuosity.  The optometrist recommended that the patient have blood work including testing for sickle cell disease.  The patient presented to the emergency department.  CBC in the emergency department revealed a white blood cell count of 411,900. Hemoglobin was 8.3, platelets were normal at 375,000 hundred and seventy five thousand.  Differential was suggestive of CML.  Coagulation studies revealed an INR of 1.38, PTT of 36.4 and a fibrinogen of 292.  Patient was going to present to [place], however they were on diversion.  He was transferred from [facility] to [facility].   Hematology consulted.  Patient underwent bone marrow biopsy on 08/22/2022, this revealed CML, chronic phase, BCR/ABL1 positive.  Ordered dasatinib 100 mg p.o. daily as of 10/10/2022, unsure when patient started taking medication due to him being lost to follow-up.  There is a question about his compliance.  Blood work on 05/29/2023 showed a normal white blood cell count, unfortunately on 06/07/2023, white blood cell count had increased very significantly and as of 06/07/2023 he was in blast crisis.

## 2023-06-09 NOTE — NURSING
Pt arrived to [facility] [id number] via stretcher. AOx4, oriented to room. No skin breakdown noted. MD notified.

## 2023-06-09 NOTE — NURSING
Pt arrived with Cefepime IVPB bag sent from  outside hospital (started at [place], held during ambulance ride). Dose noted as different than ordered doses here. MD notified. Pt started on new cefepime IVPB order on TSU, per MD Billy.

## 2023-06-09 NOTE — ASSESSMENT & PLAN NOTE
- Temp fo 101.4 at [facility]  - CXR neg. Blood cx and u/a pending collection  - Strep, flu, and COVID neg  - Continue broad spectrum abx with Cefepime, and follow infection w/u

## 2023-06-09 NOTE — SUBJECTIVE & OBJECTIVE
Subjective:     Interval History: Transferred over night from [place] over night due to concern for blast phase CML. Follows locally outpatient with Dr. Hogan. On dasatinib. Reports that he has been compliant with dasatinib. Continuing inpatient. Also continuing Hydrea and allopurinol. WBC count down to 97.95 today. Will plan to perform BMBx today. Neutropenic fever prior to transfer. Infection w/u neg thus far. Continuing cefepime. Also reports diarrhea prior to transfer. Will r/o c-diff. Replacing K+.    Objective:     Vital Signs (Most Recent):  Temp: 100.2 °F (37.9 °C) (06/09/23 0743)  Pulse: 101 (06/09/23 0743)  Resp: 18 (06/09/23 0743)  BP: (!) 170/101 (06/09/23 0743)  SpO2: 96 % (06/09/23 0743) Vital Signs (24h Range):  Temp:  [98 °F (36.7 °C)-101.1 °F (38.4 °C)] 100.2 °F (37.9 °C)  Pulse:  [] 101  Resp:  [16-20] 18  SpO2:  [95 %-100 %] 96 %  BP: (132-179)/() 170/101     Weight: 92.1 kg (203 lb)  Body mass index is 26.78 kg/m².  Body surface area is 2.18 meters squared.    ECOG SCORE           [unfilled]    Intake/Output - Last 3 Shifts         06/07 0700 06/08 0659 06/08 0700 06/09 0659 06/09 0700  06/10 0659    P.O.  1200     IV Piggyback  98.9     Total Intake(mL/kg)  1298.9 (14.1)     Urine (mL/kg/hr)  0     Emesis/NG output  0     Stool  0     Total Output  0     Net  +1298.9            Urine Occurrence  0 x     Stool Occurrence  0 x     Emesis Occurrence  0 x              Physical Exam  Constitutional:       Appearance: He is well-developed.   HENT:      Head: Normocephalic and atraumatic.      Mouth/Throat:      Pharynx: No oropharyngeal exudate.      Comments: Tonsillar swelling. Throat redness.  Gum swelling/bleeding.  Eyes:      General:         Right eye: No discharge.         Left eye: No discharge.      Conjunctiva/sclera: Conjunctivae normal.      Pupils: Pupils are equal, round, and reactive to light.   Cardiovascular:      Rate and Rhythm: Normal rate and regular rhythm.       Heart sounds: Normal heart sounds. No murmur heard.  Pulmonary:      Effort: Pulmonary effort is normal. No respiratory distress.      Breath sounds: Normal breath sounds. No wheezing or rales.   Abdominal:      General: Bowel sounds are normal. There is no distension.      Palpations: Abdomen is soft.      Tenderness: There is no abdominal tenderness.   Musculoskeletal:         General: No deformity. Normal range of motion.      Cervical back: Normal range of motion and neck supple.   Skin:     General: Skin is warm and dry.      Findings: No erythema or rash.   Neurological:      Mental Status: He is alert and oriented to person, place, and time.   Psychiatric:         Behavior: Behavior normal.         Thought Content: Thought content normal.         Judgment: Judgment normal.          Significant Labs:   CBC:   Recent Labs   Lab 06/07/23  1722 06/08/23  0441 06/09/23  0641   .51* 140.69* 95.75*   HGB 11.9* 10.9* 11.2*   HCT 38.1* 35.6* 36.1*   PLT 50* 44* 36*    and CMP:   Recent Labs   Lab 06/07/23  1247 06/08/23  0441 06/08/23  1347 06/08/23  1625 06/09/23  0641    142  --   --  140   K 2.6* 2.3* 2.7* 3.2* 3.4*   CL  --   --   --   --  105   CO2 27 27  --   --  22*   GLU  --   --   --   --  102   BUN 5.4* 7.3*  --   --  10   CREATININE 1.07 1.03  --   --  1.0   CALCIUM 8.7 8.3*  --   --  8.7   PROT  --   --   --   --  6.5   ALBUMIN 3.2* 3.1*  --   --  3.0*   BILITOT 0.7 0.7  --   --  1.4*   ALKPHOS 69 67  --   --  65   AST 27 26  --   --  31   ALT 14 13  --   --  11   ANIONGAP  --   --   --   --  13       Diagnostic Results:  I have reviewed all pertinent imaging results/findings within the past 24 hours.

## 2023-06-09 NOTE — PLAN OF CARE
AAOx4. VSS. RA. Independent. PRN magic mouthwash in pt's room on medcart for inflamed/painful gums. Tmax 101.1. Fall precautions maintained. Call light within reach. Pt verbalized understanding to call nurse as needed. No concerns voiced at this time.

## 2023-06-09 NOTE — PROGRESS NOTES
[facility] - Transplant Stepdown  Hematology  Bone Marrow Transplant  Progress Note    Patient Name: Magdaleno Hirsch  Admission Date: 6/9/2023  Hospital Length of Stay: 0 days  Code Status: Full Code    Subjective:     Interval History: Transferred over night from [place] over night due to concern for blast phase CML. Follows locally outpatient with Dr. Hogan. On dasatinib. Reports that he has been compliant with dasatinib. Continuing inpatient. Also continuing Hydrea and allopurinol. WBC count down to 97.95 today. Will plan to perform BMBx today. Neutropenic fever prior to transfer. Infection w/u neg thus far. Continuing cefepime. Also reports diarrhea prior to transfer. Will r/o c-diff. Replacing K+.    Objective:     Vital Signs (Most Recent):  Temp: 100.2 °F (37.9 °C) (06/09/23 0743)  Pulse: 101 (06/09/23 0743)  Resp: 18 (06/09/23 0743)  BP: (!) 170/101 (06/09/23 0743)  SpO2: 96 % (06/09/23 0743) Vital Signs (24h Range):  Temp:  [98 °F (36.7 °C)-101.1 °F (38.4 °C)] 100.2 °F (37.9 °C)  Pulse:  [] 101  Resp:  [16-20] 18  SpO2:  [95 %-100 %] 96 %  BP: (132-179)/() 170/101     Weight: 92.1 kg (203 lb)  Body mass index is 26.78 kg/m².  Body surface area is 2.18 meters squared.    ECOG SCORE           [unfilled]    Intake/Output - Last 3 Shifts         06/07 0700  06/08 0659 06/08 0700 06/09 0659 06/09 0700  06/10 0659    P.O.  1200     IV Piggyback  98.9     Total Intake(mL/kg)  1298.9 (14.1)     Urine (mL/kg/hr)  0     Emesis/NG output  0     Stool  0     Total Output  0     Net  +1298.9            Urine Occurrence  0 x     Stool Occurrence  0 x     Emesis Occurrence  0 x              Physical Exam  Constitutional:       Appearance: He is well-developed.   HENT:      Head: Normocephalic and atraumatic.      Mouth/Throat:      Pharynx: No oropharyngeal exudate.      Comments: Tonsillar swelling. Throat redness.  Gum swelling/bleeding.  Eyes:      General:         Right eye: No discharge.         Left eye: No  discharge.      Conjunctiva/sclera: Conjunctivae normal.      Pupils: Pupils are equal, round, and reactive to light.   Cardiovascular:      Rate and Rhythm: Normal rate and regular rhythm.      Heart sounds: Normal heart sounds. No murmur heard.  Pulmonary:      Effort: Pulmonary effort is normal. No respiratory distress.      Breath sounds: Normal breath sounds. No wheezing or rales.   Abdominal:      General: Bowel sounds are normal. There is no distension.      Palpations: Abdomen is soft.      Tenderness: There is no abdominal tenderness.   Musculoskeletal:         General: No deformity. Normal range of motion.      Cervical back: Normal range of motion and neck supple.   Skin:     General: Skin is warm and dry.      Findings: No erythema or rash.   Neurological:      Mental Status: He is alert and oriented to person, place, and time.   Psychiatric:         Behavior: Behavior normal.         Thought Content: Thought content normal.         Judgment: Judgment normal.          Significant Labs:   CBC:   Recent Labs   Lab 06/07/23  1722 06/08/23  0441 06/09/23  0641   .51* 140.69* 95.75*   HGB 11.9* 10.9* 11.2*   HCT 38.1* 35.6* 36.1*   PLT 50* 44* 36*    and CMP:   Recent Labs   Lab 06/07/23  1247 06/08/23  0441 06/08/23  1347 06/08/23  1625 06/09/23  0641    142  --   --  140   K 2.6* 2.3* 2.7* 3.2* 3.4*   CL  --   --   --   --  105   CO2 27 27  --   --  22*   GLU  --   --   --   --  102   BUN 5.4* 7.3*  --   --  10   CREATININE 1.07 1.03  --   --  1.0   CALCIUM 8.7 8.3*  --   --  8.7   PROT  --   --   --   --  6.5   ALBUMIN 3.2* 3.1*  --   --  3.0*   BILITOT 0.7 0.7  --   --  1.4*   ALKPHOS 69 67  --   --  65   AST 27 26  --   --  31   ALT 14 13  --   --  11   ANIONGAP  --   --   --   --  13       Diagnostic Results:  I have reviewed all pertinent imaging results/findings within the past 24 hours.    Assessment/Plan:     * 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  - Patient with CML diagnosed  8/2022  - Follows locally in [place] with Dr. Brett Hogan.   - He has been on dasatinib outpatient since 10/2022. Some question regarding his compliance over the course of treatment, however.  - Presented to [facility] with c/o gum swelling on 6/7/23.   - Labs remarkable for WBC count of 138K, with 80% blasts indicating blast crisis.   - WBC count from approximately a week and a half earlier was normal.   - Reports recent compliance with his TKI.   - [facility] level of [facility].  - Continue dasatinib, hydrea 2 grams TID, and allopurinol 300 mg daily.  - BCR/ABL p210 collected on admission. Result pending.  - Plan for inpatient BMBx today  - Daily CBC and TLS and DIC labs. Mild TLS, but no evidence of DIC at this time.    Pancytopenia  - Functionally neutropenic despite leukocytosis, given low neutrophil count  - 2/2 disease and hydrea  - Daily CBC while inpatient  - Transfuse for hgb < 7 or plts < 30K in setting of gum bleeding  - Will start antimicrobial ppx with acyclovir and fluconazole and continue Cefepime for neutropenic fever.  - Will need to discharge on acyclovir, fluconazole, and levaquin ppx    Neutropenic fever  - Temp fo 101.4 at [place] ED  - CXR neg. Blood cx and u/a pending collection  - Strep, flu, and COVID neg  - Continue broad spectrum abx with Cefepime, and follow infection w/u    Tumor lysis syndrome  - Uric acid and LDH elevated on presentation to [place] ED. Kidney function and electrolytes normal.  - Not G6PD deficient  - Continue allopurinol 300 mg BID  - Daily TLS labs while inpatient    Hypokalemia  - May be 2/2 diarrhea.   - Repleting  - Daily CMP while inpatient    Diarrhea  - Reports multiple days of diarrhea prior to transfer  - Will r/o c-diff    Hypertension  - Takes amlodipine at home  - Given hypertension, amlodipine stopped and nifedipine and losartan started in [place]. Will continue this.        VTE Risk Mitigation (From admission, onward)          Ordered     IP VTE HIGH RISK PATIENT  Once         06/09/23 0049     Place sequential compression device  Until discontinued         06/09/23 0049                Disposition: Inpatient.    Brittany Hammond, NP  Bone Marrow Transplant  [facility] - Transplant Stepdown

## 2023-06-09 NOTE — DISCHARGE SUMMARY
[facility] Hospital Medicine Discharge Summary    Admit Date: 6/7/2023  Discharge Date and Time: 6.8.2023  Admitting Physician:  Team  Discharging Physician: Olive Padilla MD.  Primary Care Physician: Primary Doctor No      Discharge Diagnoses:  CML chronic phase in blast crisis  Severe leukocytosis  Anemia/thrombocytopenia  Severe hypokalemia  Fever spike x1 episode-rule out sepsis in immunocompromised  Poorly-controlled HTN    Hospital Course:   Please see progress note from 6/8 for detailed summary.  Patient was accepted to [facility] and was transferred in stable condition for higher level of care  Vitals:  VITAL SIGNS: 24 HRS MIN & MAX LAST   Temp  Min: 98 °F (36.7 °C)  Max: 101.1 °F (38.4 °C) 98.1 °F (36.7 °C)   BP  Min: 132/80  Max: 179/105 (!) 142/90   Pulse  Min: 94  Max: 121  94   Resp  Min: 16  Max: 20 20   SpO2  Min: 95 %  Max: 100 % 100 %       Physical Exam:  General appearance:  No acute distress  HENT: Atraumatic   Lungs: Clear to auscultation bilaterally.   Heart: RRR,No edema  Abdomen: Soft, non tender   Extremities: warm  Neuro:  Awake, alert, oriented x4  Psych/mental status: Appropriate mood and affect.      Procedures Performed: No admission procedures for hospital encounter.     Significant Diagnostic Studies: See Full reports for all details    Recent Labs   Lab 06/07/23  1247 06/07/23  1722 06/08/23  0441   .27* 149.51* 140.69*   RBC 5.00 5.17 4.82   HGB 11.4* 11.9* 10.9*   HCT 36.6* 38.1* 35.6*   MCV 73.2* 73.7* 73.9*   MCH 22.8* 23.0* 22.6*   MCHC 31.1* 31.2* 30.6*   RDW 19.8* 20.2* 20.0*   PLT 47* 50* 44*       Recent Labs   Lab 06/07/23  1247 06/08/23  0441 06/08/23  1347 06/08/23  1625    142  --   --    K 2.6* 2.3* 2.7* 3.2*   CO2 27 27  --   --    BUN 5.4* 7.3*  --   --    CREATININE 1.07 1.03  --   --    CALCIUM 8.7 8.3*  --   --    MG 1.90 1.90  --   --    ALBUMIN 3.2* 3.1*  --   --    ALKPHOS 69  67  --   --    ALT 14 13  --   --    AST 27 26  --   --    BILITOT 0.7 0.7  --   --         Microbiology Results (last 7 days)       Procedure Component Value Units Date/Time    Blood Culture [[id number]] Collected: 06/08/23 1347    Order Status: Resulted Specimen: Blood Updated: 06/08/23 2129    Blood Culture [[id number]] Collected: 06/08/23 1347    Order Status: Resulted Specimen: Blood Updated: 06/08/23 2129             X-Ray Chest 1 View  Narrative: EXAMINATION:  XR CHEST 1 VIEW    CLINICAL HISTORY:  fever;, .    COMPARISON:  September 5, 2022    FINDINGS:  No alveolar consolidation, effusion, or pneumothorax is seen.   The thoracic aorta is normal  cardiac silhouette, central pulmonary vessels and mediastinum are normal in size and are grossly unremarkable.   visualized osseous structures are grossly unremarkable.  Impression: No acute chest disease is identified.    Electronically signed by: Juan Adler  Date:    06/08/2023  Time:    09:22         Medication List        ASK your doctor about these medications      amLODIPine 10 MG tablet  Commonly known as: NORVASC  Take 1 tablet (10 mg total) by mouth once daily.     ondansetron 8 MG tablet  Commonly known as: ZOFRAN  Take 1 tablet (8 mg total) by mouth every 6 (six) hours as needed for Nausea.     SpryceL 100 mg  Generic drug: dasatinib  Take 1 tablet (100 mg total) by mouth once daily.               Explained in detail to the patient about the discharge plan, medications, and follow-up visits. Pt understands and agrees with the treatment plan  Discharge Disposition: Discharged/Transferred to Cancer Center   Discharged Condition: stable  Diet-    Medications Per [place] med rec  Activities as tolerated    For further questions contact hospitalist office    Discharge time 33 minutes    For worsening symptoms, chest pain, shortness of breath, increased abdominal pain, high grade fever, stroke or stroke like symptoms, immediately go to the nearest Emergency Room  or call 911 as soon as possible.      Olive Evans M.D, on 6/9/2023. at 7:51 AM.

## 2023-06-09 NOTE — ASSESSMENT & PLAN NOTE
- Uric acid and LDH elevated on presentation to [facility]. Kidney function and electrolytes normal.  - Not G6PD deficient  - Continue allopurinol 300 mg BID  - Daily TLS labs while inpatient

## 2023-06-09 NOTE — PLAN OF CARE
Bone Marrow Biopsy and Aspiration done today. Patient unable to swallow potassium chloride tablets d/t the large size of the pills. Patient unable to take the potassium bicarbonate disintegrating tablet d/t it burning his mouth/throat. 40mEq IV potassium chloride given alternatively. Continuous IV fluids started this afternoon for elevated HR. EKG done. Tmax this shift 101.3. Patient hypertensive, see MAR for HTN medications. C. Diff sample and respiratory infection panel PCR done and sent. Utilizing magic mouthwash and oxycodone for mouth/gum pain.      Problem: Adult Inpatient Plan of Care  Goal: Plan of Care Review  Outcome: Ongoing, Progressing     Problem: Infection  Goal: Absence of Infection Signs and Symptoms  Outcome: Ongoing, Progressing

## 2023-06-09 NOTE — H&P
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 Hirsch is a 22-y-o patient with CML diagnosed 8/2022. Follows locally in [place] with Dr. Brett Hogan. He has been on dasatinib outpatient since 10/2022. Some question regarding his compliance over the course of treatment, however. Other medical history includes HTN and TKI-induced nausea. He presented to [facility] with c/o gum swelling on 6/7/23. Labs remarkable for WBC count of 138K, with 80% blasts indicating blast crisis. WBC count from approximately a week and a half earlier was normal. He reports recent compliance with his TKI. He was transferred to [facility] for higher level of care.      Of note, he had a temp of 101.4 in the ED at [place] and was started on broad spectrum abx with Cefepime. Blood cx and u/a are pending collection, and CXR is neg. He was also hypertensive at [place]. Takes amlodipine at home. Amlodipine was held, and nifedipine and losartan were started [place].    On presentation patient reports gums that would bleed with brushing and eating, headaches, night sweats and fevers, swollen neck lymph nodes, non-productive cough, N/V, loose-watery diarrhea (3-4 BM/day) that started 2-3 days prior to his presentation at the hospital. .      Patient information was obtained from patient, past medical records, and ER records.     Oncology History:   From Dr. Hogan's most recent clinic note:  Patient with no real medical history who went in for a routine eye exam on 08/18/2022 to update his eye glasses prescription.  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  He had bilateral Valdez spots with vessel tortuosity.  The optometrist recommended that the patient have blood work including testing for sickle cell disease.  The patient presented to the emergency  department.  CBC in the emergency department revealed a white blood cell count of 411,900. Hemoglobin was 8.3, platelets were normal at 375,000 hundred and seventy five thousand.  Differential was suggestive of CML.  Coagulation studies revealed an INR of 1.38, PTT of 36.4 and a fibrinogen of 292.  Patient was going to present to [place], however they were on diversion.  He was transferred from [facility] to [facility].  Hematology consulted.  Patient underwent bone marrow biopsy on 08/22/2022, this revealed CML, chronic phase, BCR/ABL1 positive.  Ordered dasatinib 100 mg p.o. daily as of 10/10/2022, unsure when patient started taking medication due to him being lost to follow-up.  There is a question about his compliance.  Blood work on 05/29/2023 showed a normal white blood cell count, unfortunately on 06/07/2023, white blood cell count had increased very significantly and as of 06/07/2023 he was in blast crisis.    Medications Prior to Admission   Medication Sig Dispense Refill Last Dose    amLODIPine (NORVASC) 10 MG tablet Take 1 tablet (10 mg total) by mouth once daily. 30 tablet 11     dasatinib (SPRYCEL) 100 mg Take 1 tablet (100 mg total) by mouth once daily. 30 tablet 11     ondansetron (ZOFRAN) 8 MG tablet Take 1 tablet (8 mg total) by mouth every 6 (six) hours as needed for Nausea. 60 tablet 2        Patient has no known allergies.     Past Medical History:   Diagnosis Date    CML (chronic myelocytic leukemia)     HVD (hypertensive vascular disease)     Oropharyngeal candidiasis      Past Surgical History:   Procedure Laterality Date    BONE MARROW BIOPSY N/A 8/22/2022    Procedure: Biopsy-bone marrow;  Surgeon: Getachew Chen MD;  Location: [facility];  Service: General;  Laterality: N/A;    KNEE SURGERY Left      Family History       Problem Relation (Age of Onset)    Cancer Maternal Grandmother    Hypertension Maternal Grandmother          Tobacco  Use    Smoking status: Never    Smokeless tobacco: Never   Substance and Sexual Activity    Alcohol use: Never    Drug use: Yes     Types: Marijuana    Sexual activity: Not on file       Review of Systems   Constitutional:  Positive for fatigue. Negative for activity change, appetite change, chills and fever.   HENT:  Negative for congestion, mouth sores, nosebleeds, rhinorrhea, sinus pressure, sinus pain, sneezing and sore throat.         Gum swelling   Eyes:  Negative for photophobia, pain, discharge, redness, itching and visual disturbance.   Respiratory:  Negative for cough, chest tightness, shortness of breath and wheezing.    Cardiovascular:  Negative for chest pain, palpitations and leg swelling.   Gastrointestinal:  Negative for abdominal distention, abdominal pain, blood in stool, constipation, diarrhea, nausea and vomiting.   Endocrine: Negative for cold intolerance, heat intolerance, polydipsia, polyphagia and polyuria.   Genitourinary:  Negative for difficulty urinating, frequency, hematuria and urgency.   Musculoskeletal:  Negative for arthralgias, back pain, myalgias, neck pain and neck stiffness.   Skin:  Negative for pallor, rash and wound.   Allergic/Immunologic: Negative for environmental allergies, food allergies and immunocompromised state.   Neurological:  Negative for dizziness, tremors, seizures, syncope, speech difficulty, weakness, light-headedness, numbness and headaches.   Hematological:  Negative for adenopathy. Does not bruise/bleed easily.   Psychiatric/Behavioral:  Negative for agitation, confusion, hallucinations, sleep disturbance and suicidal ideas. The patient is not nervous/anxious.    Objective:     Vital Signs (Most Recent):    Vital Signs (24h Range):  Temp:  [97.6 °F (36.4 °C)-101.4 °F (38.6 °C)] 98.9 °F (37.2 °C)  Pulse:  [] 121  Resp:  [18-32] 20  SpO2:  [97 %-100 %] 98 %  BP: (143-189)/() 158/91        There is no height or weight on file to calculate  BMI.  There is no height or weight on file to calculate BSA.    ECOG SCORE           [unfilled]    Lines/Drains/Airways       Peripheral Intravenous Line  Duration                  Peripheral IV - Single Lumen 06/07/23 1356 20 G Anterior;Right Forearm 1 day                     Physical Exam  Constitutional:       Appearance: He is well-developed.   HENT:      Head: Normocephalic and atraumatic.      Mouth/Throat:      Pharynx: No oropharyngeal exudate.      Comments: Gum swelling  Eyes:      General:         Right eye: No discharge.         Left eye: No discharge.      Conjunctiva/sclera: Conjunctivae normal.      Pupils: Pupils are equal, round, and reactive to light.   Cardiovascular:      Rate and Rhythm: Normal rate and regular rhythm.      Heart sounds: Normal heart sounds. No murmur heard.  Pulmonary:      Effort: Pulmonary effort is normal. No respiratory distress.      Breath sounds: Normal breath sounds. No wheezing or rales.   Abdominal:      General: Bowel sounds are normal. There is no distension.      Palpations: Abdomen is soft.      Tenderness: There is no abdominal tenderness.   Musculoskeletal:         General: No deformity. Normal range of motion.      Cervical back: Normal range of motion and neck supple.   Skin:     General: Skin is warm and dry.      Findings: No erythema or rash.   Neurological:      Mental Status: He is alert and oriented to person, place, and time.   Psychiatric:         Behavior: Behavior normal.         Thought Content: Thought content normal.         Judgment: Judgment normal.          Significant Labs:   CBC:   Recent Labs   Lab 06/07/23  1247 06/07/23  1722 06/08/23  0441   .27* 149.51* 140.69*   HGB 11.4* 11.9* 10.9*   HCT 36.6* 38.1* 35.6*   PLT 47* 50* 44*    and CMP:   Recent Labs   Lab 06/07/23  1247 06/08/23  0441    142   K 2.6* 2.3*   CO2 27 27   BUN 5.4* 7.3*   CREATININE 1.07 1.03   CALCIUM 8.7 8.3*   ALBUMIN 3.2* 3.1*   BILITOT 0.7 0.7   ALKPHOS 69  67   AST 27 26   ALT 14 13       Diagnostic Results:  I have reviewed all pertinent imaging results/findings within the past 24 hours.    Assessment/Plan:     Cardiac/Vascular  Hypertension  - Takes amlodipine at home  - Given hypertension, amlodipine stopped and nifedipine and losartan started in [place]. Will continue this.    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 Brett Hogan.   - He has been on dasatinib outpatient since 10/2022. Some question regarding his compliance over the course of treatment, however.  - Presented to [facility] with c/o gum swelling on 6/7/23.   - Labs remarkable for WBC count of 138K, with 80% blasts indicating blast crisis.   - WBC count from approximately a week and a half earlier was normal.   - Reports recent compliance with his TKI.   - [place] [facility] [place] of care.  - Continue dasatinib, hydrea 2 grams TID, and allopurinol 300 mg daily.  - Will get BCR/ABL p190 on admission  - Plan for inpatient BMBx  - Daily CBC and TLS and DIC labs. Mild TLS, but no evidence of DIC at this time.    Neutropenic fever  - Temp fo 101.4 at [place] ED  - CXR neg. Blood cx and u/a pending collection  - Strep, flu, and COVID neg  - Continue broad spectrum abx with Cefepime, and follow infection w/u    Pancytopenia  - Functionally neutropenic despite leukocytosis, given low neutrophil count  - 2/2 disease and hydrea  - Daily CBC while inpatient  - Transfuse for hgb < 7 or plts < 10K  - Will start antimicrobial ppx with acyclovir and fluconazole and continue Cefepime for neutropenic fever    Tumor lysis syndrome  - Uric acid and LDH elevated on presentation to [place] ED. Kidney function and electrolytes normal.  - Not G6PD deficient  - Continue allopurinol 300 mg BID  - Daily TLS labs while inpatient      VTE Risk Mitigation (From admission, onward)         Ordered     IP  VTE HIGH RISK PATIENT  Once         06/09/23 0049     Place sequential compression device  Until discontinued         06/09/23 0049                Disposition: Admit to inpatient service    Tulio Billy MD  Bone Marrow Transplant  Hematology  [facility] - Transplant Stepdown

## 2023-06-09 NOTE — ASSESSMENT & PLAN NOTE
- Patient with CML diagnosed 8/2022  - Follows locally in [place] with Dr. Brett Hogan.   - He has been on dasatinib outpatient since 10/2022. Some question regarding his compliance over the course of treatment, however.  - Presented to [facility] with c/o gum swelling on 6/7/23.   - Labs remarkable for WBC count of 138K, with 80% blasts indicating blast crisis.   - WBC count from approximately a week and a half earlier was normal.   - Reports recent compliance with his TKI.   - Transferred to [facility] for higher level of care.  - Continue dasatinib, hydrea 2 grams TID, and allopurinol 300 mg daily.  - BCR/ABL p210 collected on admission. Result pending.  - Plan for inpatient BMBx today  - Daily CBC and TLS and DIC labs. Mild TLS, but no evidence of DIC at this time.

## 2023-06-09 NOTE — PROCEDURES
PROCEDURE NOTE:  Date of Procedure: 06/09/2023   Bone Marrow Biopsy and Aspiration  Indication: Blast phase CML  Consent: Informed consent was obtained from patient.  Timeout: Done and documented.  Position: Prone  Site: Left posterior illiac crest.  Prep: Betadine.  Needle used: 11 gauge Jamshidi needle.  Anesthetic: 1% lidocaine 7 cc.  Biopsy: The biopsy needle was introduced into the marrow cavity and an aspirate was obtained without complications and sent for flow cytometry and cytogenetics, AML FISH, FLT3, NGS, BCR/ABL p210, and DNA hold. Core biopsy obtained without difficulty and sent for routine histologic examination.  Complications: None.  Disposition: Patient was left in the room with RN and instructed to lie on back for 20 minutes. Instructed to keep dressing dry and intact for 24 hours.   Blood loss: Minimal.     Brittany Hammond, JHONNYP  Hematology/Oncology/Bone Marrow Transplant

## 2023-06-09 NOTE — HOSPITAL COURSE
06/09/2023: Transferred over night from [place] over night due to concern for blast phase CML. Follows locally outpatient with Dr. Hogan. On dasatinib. Reports that he has been compliant with dasatinib. Continuing inpatient. Also continuing Hydrea and allopurinol. WBC count down to 97.95 today. Will plan to perform BMBx today. Neutropenic fever prior to transfer. Infection w/u neg thus far. Continuing cefepime. Also reports diarrhea prior to transfer. Will r/o c-diff. Replacing K+.  06/10 Remains febrile and tachycardic. Infectious workup remains negative. Cdiff Negative  6/11 hydrea halved given good response, WBC now normalized  06/12/2023: Flow suggestive of transformation of CML to AML. Tentatively planning for inpatient induction. Continues to have neutropenic fevers. Changed Cefepime to Zosyn and continuing vanc. Consulted ID. Will need ID clearance for line placement. RIP via nares neg. Will get RIP via throat swab given redness and exudates in throat. Also checking EBV. Urology consulted for purposes of fertility preservation. ANC down to 374 today. WBC count now normal. Hydrea stopped. TLS resolved. Changed allopurinol from BID to daily and stopped twice weekly TLS and DIC monitoring.  6/13 Conistently febrile throughout evening, Picc line placed, plan for induction  06/14/2023: Last temp was > 24 hours ago. Tachycardia persists. Continue antimicrobial per ID recs. Oral intake remains poor. Recommended [facility] prior to meals. Encouraged patient to get OOB more frequently to avoid debility. Tentatively planning for induction with FLAG Hayde + ponatanib pending fludarabine availability.   06/15 Blasts elevated, tachy to 132 but afebrile patient now agreeable to fertility preservation. Chemo will begin tomorrow after sample is obtained.Patient to need daily tls labs after initiation of chemo  6/16 the importance and timing of semen donation was stressed several times by multiple members of care team. Specimen  yet to be collected. AYA arranged for financial assistance. Tentative plan to initiate chemo after final fertility discussions. Hydrea initiated given rising wbc count ID recs finish 5 days of doxycycline, 10 days of fluconazole and 48 more hours of piptazo and can transition back to levofloxacin prophylaxis. Increasing hydrea to 1000 bid, reducing dosatinib to 15 given future initiationn\  6/17 Good response to first round of therapy, Hyperkalemic yesterday and fluids increased to 125 for 12 hours. Ponatinib 15 ordered. Fertility preservation declined prior to chemotherapy initiation- documented in chart. Checking TLS and DIC labs daily , started on 3 days vitakmin K   6/18 Hyperkalemic in am, shifted and then started on lokelma, losartan stopped to try to prevent hyperk, ekg and repeat labs ordered, BMP confirmed shift. Upped vitamin K to 10.  6/19 again hyperkalemic this am. 1 unit of platelets given, continuing to monitor for tls and shifted, bactrim started, sevalemer started, doxy course completed  6/20 Shifted again this am with albuterol and lasix  06/21/2023 Day 6 CLIA + Ponatinib for blast phase CML/AML. Completed chemo overnight. VSS. No evidence of TLS. K elevated again at 5.6. insulin/D10 shift this am with scheduled Lokelma ordered. Transfusing platelets today. Patient reports mouth pain improving. Complains of constipation, daily Miralax ordered. He has no other complaints this am.  6/22 CLIA completed and remains on ponatinib Some heartburn this am, otherwise vitals and labs stable. Biopsy to take place on say 21, continuing to monitor for count recover  06/23/2023 Day 8 CLIA + Ponatinib for blast phase CML/AML. Afebrile, VSS. ,mouth pain stable. BMx4 after laxatives yesterday.   06/26/2023 Day 11 CLIA + Ponatinib for blast phase CML/AML. TLS labs remain wnl, will stop allopurinol. Mucositis controlled with po Oxy. T.max 99.8. on ppx antimicrobials.   06/27/2023 Day 12 of CLIA+Ponatinib for blast  phase CML. Denies n/v/d/c today. Tmax 100.9F yesterday but unsustained, if fevers today will initiate infectious workup. Receiving 1unit PRBC and platelets today. No acute issues or concerns  06/28/2023 Day 13 of CLIA+ponatinib for blast phase CML. Continues feeling well. Denies n/v/d/c. Flat affect. Will receive 1unit PRBC and platelets today. Will check post platelet given third consecutive day at 9K. Afebrile, VSS  06/29/2023 Day 14 of CLIA+ponatinib for blast phase CML. Fever last night, tmax 101.8F@2330. Started on cefepime. Blood cultures NGTD. CXR and UA negative. Denies any chills or rigors with fever. Throat pain controlled with prn oxy. Denies n/v/d/c. Afebrile, VSS  06/30/2023 Day 15 of CLIA+ponatinib for blast phase CML. Afebrile since 6/28 @2330. Remains on cefepime. 1/4 blood cultures GPC in chains resembling strep. Awaiting speciation/susceptibilities. Reports no acute issues or problems this AM. Will receive 1unit PRBC today  07/01/2023 Day 16 of CLIA+ponatinib for blast phase CML. Has been afebrile in the past 24 hours. Received 1U platelets. 1/4 blood culture showed viridans streptococcus. Denies acute issues last night or this morning.  07/02/2023 Day 17 of CLIA+ponatinib for blast phase CML. No acute overnight events. He denies acute issues. ID consulted for viridans streptococcus 1/4 blood culture, continue cefepime.  07/03/2023 Day 18 of CLIA+ponatinib for blast phase CML. Continues to feel well. Tmax this .6F, remains on cefepime per ID for strep viridans. Repeat BC remain NGTD. Denies pain, n/v/d/c. Encouraged more physical activity/ambulation today. Plt count pending this AM. Vitals otherwise stable  07/04/2023 Day 19 of CLIA+ponatinib for blast phase CML. Tmax in last 24 hours was 103 F. No additional cultures were drawn. On cefepime.  07/05/2023 Day 20 of CLIA+ponatinib for blast phase CML. Tmax in last 24 hours was 102 F. On cefepime.  07/06/2023 Day 21 of CLIA+ponatinib for blast  phase CML. Bone marrow biopsy done at bedside today. Afebrile last 24hours. Remains on cefepime, all repeat cultures NGTD. Continues using prn oxy for throat pain. Denies n/v/d/c. Low PO intake but drinking fluids.  07/07/2023: Day 22 of CLIA+ponatinib for blast phase CML. Bone marrow biopsy on 7/06, results pending. Remains afebrile. Last fever 7/04 at 1800. Remains on Cefepime until 7/15 per ID for pharyngitis and strep viridans bacteremia. Repeat Bcx remain NGTD. Labs significant for WBC 0.5, ANC 20; hgb 7.2, and PLT 11 - will likely require transfusions of both tomorrow.  07/08/2023 Day 23 of CLIA+ponatinib for blast phase CML. Bone marrow biopsy on 7/06, preliminary report with no evidence of leukemia. Afebrile, VSS, platelets 10k this am, denies bleeding therefore will hold on transfusion today. Throat pain improving.   07/09/2023 Day 24 of CLIA+ponatinib for blast phase CML. Afebrile, VSS. No new complaints today.  07/10/2023: Day 25 of CLIA+ponatinib for blast phase CML. NAEON, afebrile, VSS. Doing well. Remains on Cefepime through 7/15, but per ID, can transition to [place] if plans to discharge. ANC slowly recovering, at 30 today. Transfusion requirements minimal.   07/11/2023: Day 26 of CLIA+ponatinib for blast phase CML. NAEON, afebrile, VSS. No concerns or complaints this morning. ANC uptrending, now at 60. No transfusion requirements.     NEEDS INTRATHECAL CHEMO AFTER BLAST PHASE

## 2023-06-09 NOTE — ASSESSMENT & PLAN NOTE
- Functionally neutropenic despite leukocytosis, given low neutrophil count  - 2/2 disease and hydrea  - Daily CBC while inpatient  - Transfuse for hgb < 7 or plts < 30K in setting of gum bleeding  - Will start antimicrobial ppx with acyclovir and fluconazole and continue Cefepime for neutropenic fever.  - Will need to discharge on acyclovir, fluconazole, and levaquin ppx

## 2023-06-09 NOTE — PLAN OF CARE
Problem: Adult Inpatient Plan of Care  Goal: Plan of Care Review  Outcome: Ongoing, Progressing  Flowsheets (Taken 6/8/2023 1922)  Plan of Care Reviewed With: patient  Goal: Patient-Specific Goal (Individualized)  Outcome: Ongoing, Progressing  Goal: Absence of Hospital-Acquired Illness or Injury  Outcome: Ongoing, Progressing  Intervention: Identify and Manage Fall Risk  Flowsheets (Taken 6/8/2023 1922)  Safety Promotion/Fall Prevention:   assistive device/personal item within reach   Fall Risk reviewed with patient/family   commode/urinal/bedpan at bedside   lighting adjusted   medications reviewed   nonskid shoes/socks when out of bed   side rails raised x 2  Intervention: Prevent Skin Injury  Flowsheets (Taken 6/8/2023 1922)  Body Position:   right   side-lying   position changed independently  Skin Protection:   adhesive use limited   tubing/devices free from skin contact  Intervention: Prevent and Manage VTE (Venous Thromboembolism) Risk  Flowsheets (Taken 6/8/2023 1922)  Activity Management: Ambulated to bathroom - L4  VTE Prevention/Management:   ambulation promoted   intravenous hydration  Range of Motion: active ROM (range of motion) encouraged  Intervention: Prevent Infection  Flowsheets (Taken 6/8/2023 1922)  Infection Prevention:   environmental surveillance performed   equipment surfaces disinfected   hand hygiene promoted   personal protective equipment utilized   rest/sleep promoted   single patient room provided  Goal: Optimal Comfort and Wellbeing  Outcome: Ongoing, Progressing  Intervention: Monitor Pain and Promote Comfort  Flowsheets (Taken 6/8/2023 1922)  Pain Management Interventions:   care clustered   pain management plan reviewed with patient/caregiver   quiet environment facilitated  Intervention: Provide Person-Centered Care  Flowsheets (Taken 6/8/2023 1922)  Trust Relationship/Rapport:   care explained   choices provided   emotional support provided   thoughts/feelings acknowledged    empathic listening provided  Goal: Readiness for Transition of Care  Outcome: Ongoing, Progressing

## 2023-06-09 NOTE — PROGRESS NOTES
Pharmacist Renal Dose Adjustment Note    Magdaelno Hirsch is a 22 y.o. male being treated with the medication fluconazole    Patient Data:    Vital Signs (Most Recent):  Temp: 100.2 °F (37.9 °C) (06/09/23 0743)  Pulse: 101 (06/09/23 0743)  Resp: 18 (06/09/23 0743)  BP: (!) 170/101 (06/09/23 0743)  SpO2: 96 % (06/09/23 0743) Vital Signs (72h Range):  Temp:  [97.6 °F (36.4 °C)-101.4 °F (38.6 °C)]   Pulse:  []   Resp:  [16-32]   BP: (132-189)/()   SpO2:  [95 %-100 %]      Recent Labs   Lab 06/07/23  1247 06/08/23  0441 06/09/23  0641   CREATININE 1.07 1.03 1.0     Serum creatinine: 1 mg/dL 06/09/23 0641  Estimated creatinine clearance: 128.7 mL/min    Fluconazole 200mg PO daily will be changed to fluconazole 400mg PO daily.     Pharmacist's Name: Kathy Fortune  Pharmacist's Extension: [phone]

## 2023-06-09 NOTE — SUBJECTIVE & OBJECTIVE
Patient information was obtained from patient, past medical records, and ER records.     Oncology History:     Diagnosis:  CML, chronic phase  In blast crisis as of 06/08/2023.     Current Treatment:   Dasatinib 100 mg po daily, unsure on start date as patient was lost to follow up.     Treatment History:  Hydroxyurea 2g every 12 hours     Patient with no real medical history who went in for a routine eye exam on 08/18/2022 to update his eye glasses prescription.  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  He had bilateral Valdez spots with vessel tortuosity.  The optometrist recommended that the patient have blood work including testing for sickle cell disease.  The patient presented to the emergency department.  CBC in the emergency department revealed a white blood cell count of 411,900. Hemoglobin was 8.3, platelets were normal at 375,000 hundred and seventy five thousand.  Differential was suggestive of CML.  Coagulation studies revealed an INR of 1.38, PTT of 36.4 and a fibrinogen of 292.  Patient was going to present to [facility], however they were on diversion.  He was transferred from [facility] to [facility].  Hematology consulted.  Patient underwent bone marrow biopsy on 08/22/2022, this revealed CML, chronic phase, BCR/ABL1 positive.  Ordered dasatinib 100 mg p.o. daily as of 10/10/2022, unsure when patient started taking medication due to him being lost to follow-up.  There is a question about his compliance.  Blood work on 05/29/2023 showed a normal white blood cell count, unfortunately on 06/07/2023, white blood cell count had increased very significantly and as of 06/07/2023 he was in blast crisis.    Medications Prior to Admission   Medication Sig Dispense Refill Last Dose    amLODIPine (NORVASC) 10 MG tablet Take 1 tablet (10 mg total) by mouth once daily. 30 tablet 11     dasatinib (SPRYCEL)  100 mg Take 1 tablet (100 mg total) by mouth once daily. 30 tablet 11     ondansetron (ZOFRAN) 8 MG tablet Take 1 tablet (8 mg total) by mouth every 6 (six) hours as needed for Nausea. 60 tablet 2        Patient has no known allergies.     Past Medical History:   Diagnosis Date    CML (chronic myelocytic leukemia)     HVD (hypertensive vascular disease)     Oropharyngeal candidiasis      Past Surgical History:   Procedure Laterality Date    BONE MARROW BIOPSY N/A 8/22/2022    Procedure: Biopsy-bone marrow;  Surgeon: Getachew Chen MD;  Location: [facility];  Service: General;  Laterality: N/A;    KNEE SURGERY Left      Family History       Problem Relation (Age of Onset)    Cancer Maternal Grandmother    Hypertension Maternal Grandmother          Tobacco Use    Smoking status: Never    Smokeless tobacco: Never   Substance and Sexual Activity    Alcohol use: Never    Drug use: Yes     Types: Marijuana    Sexual activity: Not on file       Review of Systems  Objective:     Vital Signs (Most Recent):  Temp: (!) 101.1 °F (38.4 °C) (06/09/23 0045)  Pulse: 102 (06/09/23 0045)  Resp: 16 (06/09/23 0045)  BP: (!) 156/88 (06/09/23 0045)  SpO2: 98 % (06/09/23 0045) Vital Signs (24h Range):  Temp:  [98 °F (36.7 °C)-101.1 °F (38.4 °C)] 101.1 °F (38.4 °C)  Pulse:  [] 102  Resp:  [16-20] 16  SpO2:  [97 %-100 %] 98 %  BP: (142-179)/() 156/88     Weight: 92.1 kg (203 lb)  Body mass index is 26.78 kg/m².  Body surface area is 2.18 meters squared.    ECOG SCORE           [unfilled]    Lines/Drains/Airways       Peripheral Intravenous Line  Duration                  Peripheral IV - Single Lumen 06/07/23 1356 20 G Anterior;Right Forearm 1 day                     Physical Exam  Vitals and nursing note reviewed.   HENT:      Head: Normocephalic and atraumatic.      Mouth/Throat:      Comments: Gums erythematous and swollen  Eyes:      Extraocular Movements: Extraocular movements intact.      Conjunctiva/sclera: Conjunctivae normal.    Cardiovascular:      Rate and Rhythm: Regular rhythm. Tachycardia present.      Pulses: Normal pulses.   Pulmonary:      Effort: Pulmonary effort is normal. No respiratory distress.      Breath sounds: No wheezing or rales.   Abdominal:      Palpations: Abdomen is soft.      Tenderness: There is no abdominal tenderness. There is no guarding.   Musculoskeletal:      Right lower leg: No edema.      Left lower leg: No edema.   Lymphadenopathy:      Cervical: Cervical adenopathy present.   Skin:     General: Skin is warm and dry.   Neurological:      General: No focal deficit present.      Mental Status: He is alert and oriented to person, place, and time.   Psychiatric:         Mood and Affect: Mood normal.          Significant Labs:   All pertinent labs from the last 24 hours have been reviewed.    Diagnostic Results:  I have reviewed all pertinent imaging results/findings within the past 24 hours.

## 2023-06-09 NOTE — ASSESSMENT & PLAN NOTE
- Takes amlodipine at home  - Given hypertension, amlodipine stopped and nifedipine and losartan started in [place]. Will continue this.

## 2023-06-10 LAB
ALBUMIN SERPL BCP-MCNC: 3 G/DL (ref 3.5–5.2)
ALBUMIN SERPL BCP-MCNC: 3 G/DL (ref 3.5–5.2)
ALP SERPL-CCNC: 61 U/L (ref 55–135)
ALP SERPL-CCNC: 62 U/L (ref 55–135)
ALT SERPL W/O P-5'-P-CCNC: 10 U/L (ref 10–44)
ALT SERPL W/O P-5'-P-CCNC: 11 U/L (ref 10–44)
AML FISH REASON FOR REFERRAL (BM): NORMAL
ANION GAP SERPL CALC-SCNC: 11 MMOL/L (ref 8–16)
ANION GAP SERPL CALC-SCNC: 13 MMOL/L (ref 8–16)
ANISOCYTOSIS BLD QL SMEAR: SLIGHT
ANNOTATION COMMENT IMP: NORMAL
APTT PPP: 30.2 SEC (ref 21–32)
AST SERPL-CCNC: 39 U/L (ref 10–40)
AST SERPL-CCNC: 40 U/L (ref 10–40)
BASOPHILS NFR BLD: 0 % (ref 0–1.9)
BILIRUB SERPL-MCNC: 0.9 MG/DL (ref 0.1–1)
BILIRUB SERPL-MCNC: 0.9 MG/DL (ref 0.1–1)
BUN SERPL-MCNC: 14 MG/DL (ref 6–20)
BUN SERPL-MCNC: 15 MG/DL (ref 6–20)
CALCIUM SERPL-MCNC: 8 MG/DL (ref 8.7–10.5)
CALCIUM SERPL-MCNC: 8.1 MG/DL (ref 8.7–10.5)
CELLS W CYTOGENETIC ABNL BLD/T: NORMAL
CHLORIDE SERPL-SCNC: 105 MMOL/L (ref 95–110)
CHLORIDE SERPL-SCNC: 106 MMOL/L (ref 95–110)
CHROM ANALY RESULT (ISCN): NORMAL
CLINICAL CYTOGENETICIST REVIEW: NORMAL
CO2 SERPL-SCNC: 20 MMOL/L (ref 23–29)
CO2 SERPL-SCNC: 22 MMOL/L (ref 23–29)
CREAT SERPL-MCNC: 1 MG/DL (ref 0.5–1.4)
CREAT SERPL-MCNC: 1.1 MG/DL (ref 0.5–1.4)
DIFFERENTIAL METHOD: ABNORMAL
EOSINOPHIL NFR BLD: 1 % (ref 0–8)
ERYTHROCYTE [DISTWIDTH] IN BLOOD BY AUTOMATED COUNT: 20.5 % (ref 11.5–14.5)
EST. GFR  (NO RACE VARIABLE): >60 ML/MIN/1.73 M^2
EST. GFR  (NO RACE VARIABLE): >60 ML/MIN/1.73 M^2
FIBRINOGEN PPP-MCNC: 402 MG/DL (ref 182–400)
GLUCOSE SERPL-MCNC: 104 MG/DL (ref 70–110)
GLUCOSE SERPL-MCNC: 105 MG/DL (ref 70–110)
HCT VFR BLD AUTO: 37.4 % (ref 40–54)
HGB BLD-MCNC: 11.6 G/DL (ref 14–18)
HIV 1+2 AB+HIV1 P24 AG SERPL QL IA: NORMAL
IMM GRANULOCYTES # BLD AUTO: ABNORMAL K/UL (ref 0–0.04)
IMM GRANULOCYTES NFR BLD AUTO: ABNORMAL % (ref 0–0.5)
INR PPP: 1.4 (ref 0.8–1.2)
LAB TEST METHOD: NORMAL
LDH SERPL L TO P-CCNC: 1245 U/L (ref 110–260)
LYMPHOCYTES NFR BLD: 14 % (ref 18–48)
MAGNESIUM SERPL-MCNC: 2 MG/DL (ref 1.6–2.6)
MCH RBC QN AUTO: 22.7 PG (ref 27–31)
MCHC RBC AUTO-ENTMCNC: 31 G/DL (ref 32–36)
MCV RBC AUTO: 73 FL (ref 82–98)
MOL DX INTERP BLD/T QL: NORMAL
MONOCYTES NFR BLD: 14 % (ref 4–15)
NEUTROPHILS NFR BLD: 6 % (ref 38–73)
NRBC BLD-RTO: 0 /100 WBC
OVALOCYTES BLD QL SMEAR: ABNORMAL
PHOSPHATE SERPL-MCNC: 3.5 MG/DL (ref 2.7–4.5)
PLATELET # BLD AUTO: 30 K/UL (ref 150–450)
PLATELET BLD QL SMEAR: ABNORMAL
PMV BLD AUTO: ABNORMAL FL (ref 9.2–12.9)
POIKILOCYTOSIS BLD QL SMEAR: SLIGHT
POTASSIUM SERPL-SCNC: 4 MMOL/L (ref 3.5–5.1)
POTASSIUM SERPL-SCNC: 4 MMOL/L (ref 3.5–5.1)
PROT SERPL-MCNC: 6.6 G/DL (ref 6–8.4)
PROT SERPL-MCNC: 6.6 G/DL (ref 6–8.4)
PROTHROMBIN TIME: 15 SEC (ref 9–12.5)
PROVIDER SIGNING NAME: NORMAL
RBC # BLD AUTO: 5.1 M/UL (ref 4.6–6.2)
SMUDGE CELLS BLD QL SMEAR: PRESENT
SODIUM SERPL-SCNC: 138 MMOL/L (ref 136–145)
SODIUM SERPL-SCNC: 139 MMOL/L (ref 136–145)
SPECIMEN SOURCE: NORMAL
TEST PERFORMANCE INFO SPEC: NORMAL
URATE SERPL-MCNC: 3.3 MG/DL (ref 3.4–7)
WBC # BLD AUTO: 40.46 K/UL (ref 3.9–12.7)
WBC OTHER NFR BLD MANUAL: 65 %

## 2023-06-10 PROCEDURE — 87389 HIV-1 AG W/HIV-1&-2 AB AG IA: CPT | Performed by: STUDENT IN AN ORGANIZED HEALTH CARE EDUCATION/TRAINING PROGRAM

## 2023-06-10 PROCEDURE — 81170 ABL1 GENE: CPT | Performed by: INTERNAL MEDICINE

## 2023-06-10 PROCEDURE — 84100 ASSAY OF PHOSPHORUS: CPT | Performed by: NURSE PRACTITIONER

## 2023-06-10 PROCEDURE — 85384 FIBRINOGEN ACTIVITY: CPT | Performed by: NURSE PRACTITIONER

## 2023-06-10 PROCEDURE — 99233 SBSQ HOSP IP/OBS HIGH 50: CPT | Mod: ,,, | Performed by: INTERNAL MEDICINE

## 2023-06-10 PROCEDURE — 85007 BL SMEAR W/DIFF WBC COUNT: CPT | Performed by: NURSE PRACTITIONER

## 2023-06-10 PROCEDURE — 25000003 PHARM REV CODE 250: Performed by: NURSE PRACTITIONER

## 2023-06-10 PROCEDURE — 25000003 PHARM REV CODE 250: Performed by: INTERNAL MEDICINE

## 2023-06-10 PROCEDURE — 25000003 PHARM REV CODE 250: Performed by: STUDENT IN AN ORGANIZED HEALTH CARE EDUCATION/TRAINING PROGRAM

## 2023-06-10 PROCEDURE — 99233 PR SUBSEQUENT HOSPITAL CARE,LEVL III: ICD-10-PCS | Mod: ,,, | Performed by: INTERNAL MEDICINE

## 2023-06-10 PROCEDURE — 63600175 PHARM REV CODE 636 W HCPCS: Performed by: INTERNAL MEDICINE

## 2023-06-10 PROCEDURE — 63600175 PHARM REV CODE 636 W HCPCS

## 2023-06-10 PROCEDURE — 25000003 PHARM REV CODE 250

## 2023-06-10 PROCEDURE — 85730 THROMBOPLASTIN TIME PARTIAL: CPT | Performed by: NURSE PRACTITIONER

## 2023-06-10 PROCEDURE — 85027 COMPLETE CBC AUTOMATED: CPT | Performed by: NURSE PRACTITIONER

## 2023-06-10 PROCEDURE — 84550 ASSAY OF BLOOD/URIC ACID: CPT | Performed by: NURSE PRACTITIONER

## 2023-06-10 PROCEDURE — 20600001 HC STEP DOWN PRIVATE ROOM

## 2023-06-10 PROCEDURE — 83615 LACTATE (LD) (LDH) ENZYME: CPT | Performed by: NURSE PRACTITIONER

## 2023-06-10 PROCEDURE — 80053 COMPREHEN METABOLIC PANEL: CPT | Performed by: NURSE PRACTITIONER

## 2023-06-10 PROCEDURE — 80053 COMPREHEN METABOLIC PANEL: CPT | Mod: 91 | Performed by: STUDENT IN AN ORGANIZED HEALTH CARE EDUCATION/TRAINING PROGRAM

## 2023-06-10 PROCEDURE — 85610 PROTHROMBIN TIME: CPT | Performed by: NURSE PRACTITIONER

## 2023-06-10 PROCEDURE — 83735 ASSAY OF MAGNESIUM: CPT | Performed by: NURSE PRACTITIONER

## 2023-06-10 RX ORDER — MORPHINE SULFATE 2 MG/ML
2 INJECTION, SOLUTION INTRAMUSCULAR; INTRAVENOUS EVERY 6 HOURS PRN
Status: DISCONTINUED | OUTPATIENT
Start: 2023-06-10 | End: 2023-06-29

## 2023-06-10 RX ORDER — HYDROXYUREA 500 MG/1
2000 CAPSULE ORAL 2 TIMES DAILY
Status: DISCONTINUED | OUTPATIENT
Start: 2023-06-10 | End: 2023-06-11

## 2023-06-10 RX ADMIN — NIFEDIPINE 60 MG: 60 TABLET, FILM COATED, EXTENDED RELEASE ORAL at 09:06

## 2023-06-10 RX ADMIN — OXYCODONE HYDROCHLORIDE 5 MG: 5 TABLET ORAL at 07:06

## 2023-06-10 RX ADMIN — DIPHENHYDRAMINE HYDROCHLORIDE 10 ML: 25 SOLUTION ORAL at 07:06

## 2023-06-10 RX ADMIN — OXYCODONE HYDROCHLORIDE 5 MG: 5 TABLET ORAL at 09:06

## 2023-06-10 RX ADMIN — CEFEPIME 2 G: 2 INJECTION, POWDER, FOR SOLUTION INTRAVENOUS at 05:06

## 2023-06-10 RX ADMIN — LOSARTAN POTASSIUM 50 MG: 50 TABLET, FILM COATED ORAL at 09:06

## 2023-06-10 RX ADMIN — DIPHENHYDRAMINE HYDROCHLORIDE 10 ML: 25 SOLUTION ORAL at 01:06

## 2023-06-10 RX ADMIN — CEFEPIME 2 G: 2 INJECTION, POWDER, FOR SOLUTION INTRAVENOUS at 09:06

## 2023-06-10 RX ADMIN — SODIUM CHLORIDE: 9 INJECTION, SOLUTION INTRAVENOUS at 07:06

## 2023-06-10 RX ADMIN — ALLOPURINOL 300 MG: 100 TABLET ORAL at 08:06

## 2023-06-10 RX ADMIN — FLUCONAZOLE 400 MG: 200 TABLET ORAL at 09:06

## 2023-06-10 RX ADMIN — DASATINIB 100 MG: 100 TABLET ORAL at 09:06

## 2023-06-10 RX ADMIN — GUAIFENESIN AND DEXTROMETHORPHAN HYDROBROMIDE 1 TABLET: 600; 30 TABLET, EXTENDED RELEASE ORAL at 09:06

## 2023-06-10 RX ADMIN — VANCOMYCIN HYDROCHLORIDE 1500 MG: 1.5 INJECTION, POWDER, LYOPHILIZED, FOR SOLUTION INTRAVENOUS at 09:06

## 2023-06-10 RX ADMIN — ACYCLOVIR 400 MG: 200 CAPSULE ORAL at 08:06

## 2023-06-10 RX ADMIN — CEFEPIME 2 G: 2 INJECTION, POWDER, FOR SOLUTION INTRAVENOUS at 01:06

## 2023-06-10 RX ADMIN — HYDROXYUREA 2000 MG: 500 CAPSULE ORAL at 08:06

## 2023-06-10 RX ADMIN — VANCOMYCIN HYDROCHLORIDE 2000 MG: 500 INJECTION, POWDER, LYOPHILIZED, FOR SOLUTION INTRAVENOUS at 10:06

## 2023-06-10 RX ADMIN — GUAIFENESIN AND DEXTROMETHORPHAN HYDROBROMIDE 1 TABLET: 600; 30 TABLET, EXTENDED RELEASE ORAL at 08:06

## 2023-06-10 RX ADMIN — ALLOPURINOL 300 MG: 100 TABLET ORAL at 09:06

## 2023-06-10 RX ADMIN — HYDROXYUREA 2000 MG: 500 CAPSULE ORAL at 09:06

## 2023-06-10 RX ADMIN — ACYCLOVIR 400 MG: 200 CAPSULE ORAL at 09:06

## 2023-06-10 NOTE — ASSESSMENT & PLAN NOTE
Patient with CML diagnosed 8/2022 Follows locally in [place] with Dr. Brett Hogan. He has been on dasatinib outpatient since 10/2022. Some question regarding his compliance over the course of treatment, however.Presented to [facility] with c/o gum swelling on 6/7/23. Labs remarkable for WBC count of 138K, with 80% blasts indicating blast crisis. WBC count from approximately a week and a half earlier was normal. Reports recent compliance with his TKI. transferred to [facility] for higher level of care.    Bone marrow Biopsy performed yesterday    - Continue dasatinib, hydrea 2 grams TID, and allopurinol 300 mg daily.  - BCR/ABL p210 collected on admission. Result pending.  - Daily CBC and TLS and DIC labs. Mild TLS, but no evidence of DIC at this time.  - CT chest neck ordered

## 2023-06-10 NOTE — CARE UPDATE
RAPID RESPONSE NURSE ROUND       Rounding completed with charge RN, Kelli. No additional concerns verbalized at this time. Instructed to call [phone] for further concerns or assistance.

## 2023-06-10 NOTE — PLAN OF CARE
Pt aaox4 vswnl except temp elevated 103.9 then 102.2 and 100 after tylenol. Md  called and blood cultures done again. Ns at 75cc/hr infusing. Bone marrow bx done today and pt c/o pain with mild relief with pain meds. C diff and resp panel negative. Pt is independent. Antibiotic continued.

## 2023-06-10 NOTE — ASSESSMENT & PLAN NOTE
- Takes amlodipine at home  - Given hypertension, amlodipine stopped and nifedipine and losartan started in [place]. Will continue this.

## 2023-06-10 NOTE — PLAN OF CARE
Pt AAOx4. Girlfriend at bedside. Independent. VS stable - Tachy all day and low grade fever. Plt 30; WBC 40.46. PRN oxy 5 given for bone marrow biopsy site pain. Vanc and cefepime hung. Cdif precautions taken down. NS 75cc/hr running. Mouth and throat still swollen from the thrush.

## 2023-06-10 NOTE — ASSESSMENT & PLAN NOTE
- Uric acid and LDH elevated on presentation to [place] ED. Kidney function and electrolytes normal.  - Not G6PD deficient  - Continue allopurinol 300 mg BID  - Daily TLS labs while inpatient

## 2023-06-10 NOTE — PROGRESS NOTES
Pharmacokinetic Initial Assessment: IV Vancomycin    Assessment/Plan:    Initiate intravenous vancomycin with loading dose of 2000 mg once followed by a maintenance dose of vancomycin 1500 mg IV every 12 hours  Desired empiric serum trough concentration is 15 to 20 mcg/mL  Draw vancomycin trough level 60 min prior to fourth dose on 6/11/23 at approximately 21:00 or sooner if clinically indicated  Pharmacy will continue to follow and monitor vancomycin.      Please contact pharmacy at extension [phone] with any questions regarding this assessment.     Thank you for the consult,   Mars Potter       Patient brief summary:  Magdaleno Hirsch is a 22 y.o. male initiated on antimicrobial therapy with IV Vancomycin for treatment of suspected bacteremia    Drug Allergies:   Review of patient's allergies indicates:  No Known Allergies    Actual Body Weight:   92.1 kg    Renal Function:   Estimated Creatinine Clearance: 128.7 mL/min (based on SCr of 1 mg/dL).,     Dialysis Method (if applicable):  N/A    CBC (last 72 hours):  Recent Labs   Lab Result Units 06/07/23  1247 06/07/23  1722 06/08/23  0441 06/09/23  0641   WBC K/uL 138.27* 149.51* 140.69* 95.75*   Hemoglobin g/dL  --   --   --  11.2*   Hgb g/dL 11.4* 11.9* 10.9*  --    Hematocrit %  --   --   --  36.1*   Hct % 36.6* 38.1* 35.6*  --    Platelets K/uL  --   --   --  36*   Platelet x10(3)/mcL 47* 50* 44*  --    Gran % %  --   --   --  2.0*   Lymph % %  --   --   --  12.0*   Mono % %  --   --   --  6.0   Monocyte Man % 10 9 5  --    Eosinophil % %  --   --   --  1.0   Eos Man % 1 1 1  --    Basophil % %  --   --   --  0.0   Differential Method   --   --   --  Manual       Metabolic Panel (last 72 hours):  Recent Labs   Lab Result Units 06/07/23  1247 06/08/23  0441 06/08/23  1347 06/08/23  1625 06/08/23  1911 06/09/23  0641   Sodium mmol/L  --   --   --   --   --  140   Sodium Level mmol/L 142 142  --   --   --   --    Potassium mmol/L  --   --   --   --   --  3.4*   Potassium  Level mmol/L 2.6* 2.3* 2.7* 3.2*  --   --    Chloride mmol/L 106 107  --   --   --  105   CO2 mmol/L  --   --   --   --   --  22*   Carbon Dioxide mmol/L 27 27  --   --   --   --    Glucose mg/dL  --   --   --   --   --  102   Glucose Level mg/dL 97 95  --   --   --   --    Glucose, UA mg/dL  --   --   --   --  Negative  --    BUN mg/dL  --   --   --   --   --  10   Blood Urea Nitrogen mg/dL 5.4* 7.3*  --   --   --   --    Creatinine mg/dL 1.07 1.03  --   --   --  1.0   Albumin g/dL  --   --   --   --   --  3.0*   Albumin Level g/dL 3.2* 3.1*  --   --   --   --    Total Bilirubin mg/dL  --   --   --   --   --  1.4*   Bilirubin Total mg/dL 0.7 0.7  --   --   --   --    Alkaline Phosphatase U/L 69 67  --   --   --  65   AST U/L  --   --   --   --   --  31   Aspartate Aminotransferase unit/L 27 26  --   --   --   --    ALT U/L  --   --   --   --   --  11   Alanine Aminotransferase unit/L 14 13  --   --   --   --    Magnesium mg/dL  --   --   --   --   --  1.7   Magnesium Level mg/dL 1.90 1.90  --   --   --   --    Phosphorus mg/dL  --   --   --   --   --  3.5   Phosphorus Level mg/dL  --  2.8  --   --   --   --        Drug levels (last 3 results):  No results for input(s): VANCOMYCINRA, VANCORANDOM, VANCOMYCINPE, VANCOPEAK, VANCOMYCINTR, VANCOTROUGH in the last 72 hours.    Microbiologic Results:  Microbiology Results (last 7 days)       Procedure Component Value Units Date/Time    Blood culture [[id number]] Collected: 06/09/23 9149    Order Status: Completed Specimen: Blood Updated: 06/10/23 0515     Blood Culture, Routine No Growth to date    Blood culture [[id number]] Collected: 06/09/23 2134    Order Status: Completed Specimen: Blood Updated: 06/10/23 0515     Blood Culture, Routine No Growth to date    Clostridium difficile EIA [[id number]] Collected: 06/09/23 1346    Order Status: Completed Specimen: Stool Updated: 06/09/23 2136     C. diff Antigen Negative     C difficile Toxins A+B, EIA Negative     Comment:  Testing not recommended for children <24 months old.       Respiratory Infection Panel (PCR), Nasopharyngeal [[id number]] Collected: 06/09/23 1636    Order Status: Completed Specimen: Nasopharyngeal Swab Updated: 06/09/23 3107     Respiratory Infection Panel Source NP Swab     Adenovirus Not Detected     Coronavirus 229E, Common Cold Virus Not Detected     Coronavirus HKU1, Common Cold Virus Not Detected     Coronavirus NL63, Common Cold Virus Not Detected     Coronavirus OC43, Common Cold Virus Not Detected     Comment: The Coronavirus strains detected in this test cause the common cold.  These strains are not the COVID-19 (novel Coronavirus)strain   associated with the respiratory disease outbreak.          SARS-CoV2 (COVID-19) Qualitative PCR Not Detected     Human Metapneumovirus Not Detected     Human Rhinovirus/Enterovirus Not Detected     Influenza A (subtypes H1, H1-2009,H3) Not Detected     Influenza B Not Detected     Parainfluenza Virus 1 Not Detected     Parainfluenza Virus 2 Not Detected     Parainfluenza Virus 3 Not Detected     Parainfluenza Virus 4 Not Detected     Respiratory Syncytial Virus Not Detected     Bordetella Parapertussis (BD4218) Not Detected     Bordetella pertussis (ptxP) Not Detected     Chlamydia pneumoniae Not Detected     Mycoplasma pneumoniae Not Detected    Narrative:      For all other respiratory sources, order [id number] -  Respiratory Viral Panel by PCR

## 2023-06-10 NOTE — CARE UPDATE
"RAPID RESPONSE NURSE CHART REVIEW       Chart Reviewed: 06/10/2023, 1:02 AM    MRN: [id number]  Bed: 40861/13622 A    Dx: 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    Magdaleno Hirsch has a past medical history of CML (chronic myelocytic leukemia), HVD (hypertensive vascular disease), and Oropharyngeal candidiasis.    Last VS: /60 (BP Location: Left arm, Patient Position: Lying)   Pulse (!) 124   Temp 100 °F (37.8 °C) (Oral)   Resp 18   Ht 6' 1" (1.854 m)   Wt 92.1 kg (203 lb)   SpO2 96%   BMI 26.78 kg/m²     24H Vital Sign Range:  Temp:  [99 °F (37.2 °C)-103.9 °F (39.9 °C)]   Pulse:  [100-128]   Resp:  [18-20]   BP: (123-170)/()   SpO2:  [95 %-100 %]     Level of Consciousness (AVPU): alert    Recent Labs     06/07/23  1722 06/08/23  0441 06/09/23  0641   .51* 140.69* 95.75*   HGB 11.9* 10.9* 11.2*   HCT 38.1* 35.6* 36.1*   PLT 50* 44* 36*       Recent Labs     06/07/23  1247 06/08/23  0441 06/08/23  1347 06/08/23  1625 06/09/23  0641    142  --   --  140   K 2.6* 2.3* 2.7* 3.2* 3.4*   CL  --   --   --   --  105   CO2 27 27  --   --  22*   CREATININE 1.07 1.03  --   --  1.0   GLU  --   --   --   --  102   PHOS  --  2.8  --   --  3.5   MG 1.90 1.90  --   --  1.7        No results for input(s): PH, PCO2, PO2, HCO3, POCSATURATED, BE in the last 72 hours.     OXYGEN:             MEWS score: 3    Charge [facility]  contacted. No concerns verbalized at this time. Instructed to call [phone] for further concerns or assistance.    Karthik Vanegas RN         "

## 2023-06-10 NOTE — PROGRESS NOTES
[facility] - Transplant Stepdown  Hematology  Bone Marrow Transplant  Progress Note    Patient Name: Magdaleno Hirsch  Admission Date: 6/9/2023  Hospital Length of Stay: 1 days  Code Status: Full Code    Subjective:     Interval History: remains febrile and tachycardic    Objective:     Vital Signs (Most Recent):  Temp: 99.9 °F (37.7 °C) (06/10/23 0432)  Pulse: (!) 132 (06/10/23 0432)  Resp: 18 (06/10/23 0432)  BP: (!) 140/58 (06/10/23 0432)  SpO2: 98 % (06/10/23 0432) Vital Signs (24h Range):  Temp:  [99.7 °F (37.6 °C)-103.9 °F (39.9 °C)] 99.9 °F (37.7 °C)  Pulse:  [116-132] 132  Resp:  [18-20] 18  SpO2:  [96 %-100 %] 98 %  BP: (123-165)/() 140/58     Weight: 92.1 kg (203 lb)  Body mass index is 26.78 kg/m².  Body surface area is 2.18 meters squared.    ECOG SCORE           [unfilled]    Intake/Output - Last 3 Shifts         06/08 0700  06/09 0659 06/09 0700  06/10 0659 06/10 0700  06/11 0659    P.O. 1200 1220     I.V. (mL/kg)  581.5 (6.3)     IV Piggyback 98.9 600     Total Intake(mL/kg) 1298.9 (14.1) 2401.5 (26.1)     Urine (mL/kg/hr) 0 0 (0)     Emesis/NG output 0      Stool 0 100     Total Output 0 100     Net +1298.9 +2301.5            Urine Occurrence 0 x 9 x     Stool Occurrence 0 x 4 x     Emesis Occurrence 0 x               Physical Exam  Constitutional:       Appearance: He is well-developed.   HENT:      Head: Normocephalic and atraumatic.      Mouth/Throat:      Pharynx: No oropharyngeal exudate.      Comments: Tonsillar swelling. Throat redness.  Gum swelling/bleeding.  Eyes:      General:         Right eye: No discharge.         Left eye: No discharge.      Conjunctiva/sclera: Conjunctivae normal.      Pupils: Pupils are equal, round, and reactive to light.   Cardiovascular:      Rate and Rhythm: Normal rate and regular rhythm.      Heart sounds: Normal heart sounds. No murmur heard.  Pulmonary:      Effort: Pulmonary effort is normal. No respiratory distress.      Breath sounds: Normal breath sounds.  No wheezing or rales.   Abdominal:      General: Bowel sounds are normal. There is no distension.      Palpations: Abdomen is soft.      Tenderness: There is no abdominal tenderness.   Musculoskeletal:         General: No deformity. Normal range of motion.      Cervical back: Normal range of motion and neck supple.   Skin:     General: Skin is warm and dry.      Findings: No erythema or rash.   Neurological:      Mental Status: He is alert and oriented to person, place, and time.   Psychiatric:         Behavior: Behavior normal.         Thought Content: Thought content normal.         Judgment: Judgment normal.          Significant Labs:   All pertinent labs from the last 24 hours have been reviewed.    Diagnostic Results:  I have reviewed all pertinent imaging results/findings within the past 24 hours.    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 Brett Hogan. He has been on dasatinib outpatient since 10/2022. Some question regarding his compliance over the course of treatment, however.Presented to [facility] with c/o gum swelling on 6/7/23. Labs remarkable for WBC count of 138K, with 80% blasts indicating blast crisis. WBC count from approximately a week and a half earlier was normal. Reports recent compliance with his TKI. transferred to [facility] for higher level of care.    Bone marrow Biopsy performed yesterday    - Continue dasatinib, hydrea 2 grams TID, and allopurinol 300 mg daily.  - BCR/ABL p210 collected on admission. Result pending.  - Daily CBC and TLS and DIC labs. Mild TLS, but no evidence of DIC at this time.  - CT chest neck ordered    Leukocytosis  - See blast crisis phase of CML    Hypokalemia  - May be 2/2 diarrhea.   - Repleting  - Daily CMP while inpatient    Diarrhea  - Reports multiple days of diarrhea prior to transfer  - Will r/o c-diff    Neutropenic fever  Temp fo 101.4 at  [place] [facility]    CXR neg. Blood culture ngtd. CDiff negative-   Strep, flu, and COVID neg  UA negative    -Cefepime  -Vancomycin    Pancytopenia  - Functionally neutropenic despite leukocytosis, given low neutrophil count  - 2/2 disease and hydrea  - Daily CBC while inpatient  - Transfuse for hgb < 7 or plts < 30K in setting of gum bleeding  - Will start antimicrobial ppx with acyclovir and fluconazole and continue Cefepime for neutropenic fever.  - Will need to discharge on acyclovir, fluconazole, and levaquin ppx    Hypertension  - Takes amlodipine at home  - Given hypertension, amlodipine stopped and nifedipine and losartan started in [place]. Will continue this.    Tumor lysis syndrome  - Uric acid and LDH elevated on presentation to [place] ED. Kidney function and electrolytes normal.  - Not G6PD deficient  - Continue allopurinol 300 mg BID  - Daily TLS labs while inpatient        VTE Risk Mitigation (From admission, onward)         Ordered     IP VTE HIGH RISK PATIENT  Once         06/09/23 0049     Place sequential compression device  Until discontinued         06/09/23 0049                Disposition: Home    Gus Posadas MD  Bone Marrow Transplant  [facility] - Transplant Stepdown

## 2023-06-10 NOTE — SUBJECTIVE & OBJECTIVE
Subjective:     Interval History: remains febrile and tachycardic    Objective:     Vital Signs (Most Recent):  Temp: 99.9 °F (37.7 °C) (06/10/23 0432)  Pulse: (!) 132 (06/10/23 0432)  Resp: 18 (06/10/23 0432)  BP: (!) 140/58 (06/10/23 0432)  SpO2: 98 % (06/10/23 0432) Vital Signs (24h Range):  Temp:  [99.7 °F (37.6 °C)-103.9 °F (39.9 °C)] 99.9 °F (37.7 °C)  Pulse:  [116-132] 132  Resp:  [18-20] 18  SpO2:  [96 %-100 %] 98 %  BP: (123-165)/() 140/58     Weight: 92.1 kg (203 lb)  Body mass index is 26.78 kg/m².  Body surface area is 2.18 meters squared.    ECOG SCORE           [unfilled]    Intake/Output - Last 3 Shifts         06/08 0700  06/09 0659 06/09 0700  06/10 0659 06/10 0700  06/11 0659    P.O. 1200 1220     I.V. (mL/kg)  581.5 (6.3)     IV Piggyback 98.9 600     Total Intake(mL/kg) 1298.9 (14.1) 2401.5 (26.1)     Urine (mL/kg/hr) 0 0 (0)     Emesis/NG output 0      Stool 0 100     Total Output 0 100     Net +1298.9 +2301.5            Urine Occurrence 0 x 9 x     Stool Occurrence 0 x 4 x     Emesis Occurrence 0 x               Physical Exam  Constitutional:       Appearance: He is well-developed.   HENT:      Head: Normocephalic and atraumatic.      Mouth/Throat:      Pharynx: No oropharyngeal exudate.      Comments: Tonsillar swelling. Throat redness.  Gum swelling/bleeding.  Eyes:      General:         Right eye: No discharge.         Left eye: No discharge.      Conjunctiva/sclera: Conjunctivae normal.      Pupils: Pupils are equal, round, and reactive to light.   Cardiovascular:      Rate and Rhythm: Normal rate and regular rhythm.      Heart sounds: Normal heart sounds. No murmur heard.  Pulmonary:      Effort: Pulmonary effort is normal. No respiratory distress.      Breath sounds: Normal breath sounds. No wheezing or rales.   Abdominal:      General: Bowel sounds are normal. There is no distension.      Palpations: Abdomen is soft.      Tenderness: There is no abdominal tenderness.    Musculoskeletal:         General: No deformity. Normal range of motion.      Cervical back: Normal range of motion and neck supple.   Skin:     General: Skin is warm and dry.      Findings: No erythema or rash.   Neurological:      Mental Status: He is alert and oriented to person, place, and time.   Psychiatric:         Behavior: Behavior normal.         Thought Content: Thought content normal.         Judgment: Judgment normal.          Significant Labs:   All pertinent labs from the last 24 hours have been reviewed.    Diagnostic Results:  I have reviewed all pertinent imaging results/findings within the past 24 hours.

## 2023-06-10 NOTE — ASSESSMENT & PLAN NOTE
Temp fo 101.4 at [facility]    CXR neg. Blood culture ngtd. CDiff negative-   Strep, flu, and COVID neg  UA negative    -Cefepime  -Vancomycin

## 2023-06-11 LAB
ABO + RH BLD: NORMAL
ALBUMIN SERPL BCP-MCNC: 2.7 G/DL (ref 3.5–5.2)
ALP SERPL-CCNC: 58 U/L (ref 55–135)
ALT SERPL W/O P-5'-P-CCNC: 11 U/L (ref 10–44)
ANION GAP SERPL CALC-SCNC: 9 MMOL/L (ref 8–16)
APTT PPP: 29.9 SEC (ref 21–32)
AST SERPL-CCNC: 33 U/L (ref 10–40)
BASOPHILS NFR BLD: 5 % (ref 0–1.9)
BILIRUB SERPL-MCNC: 0.9 MG/DL (ref 0.1–1)
BLD GP AB SCN CELLS X3 SERPL QL: NORMAL
BUN SERPL-MCNC: 24 MG/DL (ref 6–20)
CALCIUM SERPL-MCNC: 7.9 MG/DL (ref 8.7–10.5)
CHLORIDE SERPL-SCNC: 106 MMOL/L (ref 95–110)
CO2 SERPL-SCNC: 19 MMOL/L (ref 23–29)
CREAT SERPL-MCNC: 1.3 MG/DL (ref 0.5–1.4)
DIFFERENTIAL METHOD: ABNORMAL
EOSINOPHIL NFR BLD: 0 % (ref 0–8)
ERYTHROCYTE [DISTWIDTH] IN BLOOD BY AUTOMATED COUNT: 19.9 % (ref 11.5–14.5)
EST. GFR  (NO RACE VARIABLE): >60 ML/MIN/1.73 M^2
FIBRINOGEN PPP-MCNC: 410 MG/DL (ref 182–400)
GLUCOSE SERPL-MCNC: 117 MG/DL (ref 70–110)
HCT VFR BLD AUTO: 34.9 % (ref 40–54)
HGB BLD-MCNC: 11 G/DL (ref 14–18)
IMM GRANULOCYTES # BLD AUTO: ABNORMAL K/UL (ref 0–0.04)
IMM GRANULOCYTES NFR BLD AUTO: ABNORMAL % (ref 0–0.5)
INR PPP: 1.3 (ref 0.8–1.2)
LDH SERPL L TO P-CCNC: 911 U/L (ref 110–260)
LYMPHOCYTES NFR BLD: 20 % (ref 18–48)
MAGNESIUM SERPL-MCNC: 2.1 MG/DL (ref 1.6–2.6)
MCH RBC QN AUTO: 23 PG (ref 27–31)
MCHC RBC AUTO-ENTMCNC: 31.5 G/DL (ref 32–36)
MCV RBC AUTO: 73 FL (ref 82–98)
MONOCYTES NFR BLD: 2 % (ref 4–15)
NEUTROPHILS NFR BLD: 5 % (ref 38–73)
NRBC BLD-RTO: 0 /100 WBC
PHOSPHATE SERPL-MCNC: 3.8 MG/DL (ref 2.7–4.5)
PLATELET # BLD AUTO: 25 K/UL (ref 150–450)
PLATELET BLD QL SMEAR: ABNORMAL
PMV BLD AUTO: ABNORMAL FL (ref 9.2–12.9)
POTASSIUM SERPL-SCNC: 4.6 MMOL/L (ref 3.5–5.1)
PROT SERPL-MCNC: 6 G/DL (ref 6–8.4)
PROTHROMBIN TIME: 13.9 SEC (ref 9–12.5)
RBC # BLD AUTO: 4.78 M/UL (ref 4.6–6.2)
SODIUM SERPL-SCNC: 134 MMOL/L (ref 136–145)
SPECIMEN OUTDATE: NORMAL
URATE SERPL-MCNC: 2.9 MG/DL (ref 3.4–7)
VANCOMYCIN TROUGH SERPL-MCNC: 13.3 UG/ML (ref 10–22)
WBC # BLD AUTO: 10.26 K/UL (ref 3.9–12.7)
WBC OTHER NFR BLD MANUAL: 68 %

## 2023-06-11 PROCEDURE — 20600001 HC STEP DOWN PRIVATE ROOM

## 2023-06-11 PROCEDURE — 36415 COLL VENOUS BLD VENIPUNCTURE: CPT | Performed by: INTERNAL MEDICINE

## 2023-06-11 PROCEDURE — 25000003 PHARM REV CODE 250: Performed by: NURSE PRACTITIONER

## 2023-06-11 PROCEDURE — 83615 LACTATE (LD) (LDH) ENZYME: CPT | Performed by: NURSE PRACTITIONER

## 2023-06-11 PROCEDURE — 99233 PR SUBSEQUENT HOSPITAL CARE,LEVL III: ICD-10-PCS | Mod: ,,, | Performed by: INTERNAL MEDICINE

## 2023-06-11 PROCEDURE — 93010 EKG 12-LEAD: ICD-10-PCS | Mod: ,,, | Performed by: INTERNAL MEDICINE

## 2023-06-11 PROCEDURE — 85027 COMPLETE CBC AUTOMATED: CPT | Performed by: NURSE PRACTITIONER

## 2023-06-11 PROCEDURE — 36415 COLL VENOUS BLD VENIPUNCTURE: CPT | Performed by: STUDENT IN AN ORGANIZED HEALTH CARE EDUCATION/TRAINING PROGRAM

## 2023-06-11 PROCEDURE — 84550 ASSAY OF BLOOD/URIC ACID: CPT | Performed by: NURSE PRACTITIONER

## 2023-06-11 PROCEDURE — 25000003 PHARM REV CODE 250

## 2023-06-11 PROCEDURE — 36415 COLL VENOUS BLD VENIPUNCTURE: CPT | Performed by: NURSE PRACTITIONER

## 2023-06-11 PROCEDURE — 80202 ASSAY OF VANCOMYCIN: CPT | Performed by: INTERNAL MEDICINE

## 2023-06-11 PROCEDURE — 25000003 PHARM REV CODE 250: Performed by: INTERNAL MEDICINE

## 2023-06-11 PROCEDURE — 85610 PROTHROMBIN TIME: CPT | Performed by: NURSE PRACTITIONER

## 2023-06-11 PROCEDURE — 86900 BLOOD TYPING SEROLOGIC ABO: CPT | Performed by: STUDENT IN AN ORGANIZED HEALTH CARE EDUCATION/TRAINING PROGRAM

## 2023-06-11 PROCEDURE — 93010 ELECTROCARDIOGRAM REPORT: CPT | Mod: ,,, | Performed by: INTERNAL MEDICINE

## 2023-06-11 PROCEDURE — 25000003 PHARM REV CODE 250: Performed by: STUDENT IN AN ORGANIZED HEALTH CARE EDUCATION/TRAINING PROGRAM

## 2023-06-11 PROCEDURE — 25500020 PHARM REV CODE 255: Performed by: INTERNAL MEDICINE

## 2023-06-11 PROCEDURE — 80053 COMPREHEN METABOLIC PANEL: CPT | Performed by: NURSE PRACTITIONER

## 2023-06-11 PROCEDURE — 93005 ELECTROCARDIOGRAM TRACING: CPT

## 2023-06-11 PROCEDURE — 87040 BLOOD CULTURE FOR BACTERIA: CPT | Mod: 59

## 2023-06-11 PROCEDURE — 85384 FIBRINOGEN ACTIVITY: CPT | Performed by: NURSE PRACTITIONER

## 2023-06-11 PROCEDURE — 36415 COLL VENOUS BLD VENIPUNCTURE: CPT

## 2023-06-11 PROCEDURE — 83735 ASSAY OF MAGNESIUM: CPT | Performed by: NURSE PRACTITIONER

## 2023-06-11 PROCEDURE — 99233 SBSQ HOSP IP/OBS HIGH 50: CPT | Mod: ,,, | Performed by: INTERNAL MEDICINE

## 2023-06-11 PROCEDURE — 84100 ASSAY OF PHOSPHORUS: CPT | Performed by: NURSE PRACTITIONER

## 2023-06-11 PROCEDURE — 85007 BL SMEAR W/DIFF WBC COUNT: CPT | Performed by: NURSE PRACTITIONER

## 2023-06-11 PROCEDURE — 85730 THROMBOPLASTIN TIME PARTIAL: CPT | Performed by: NURSE PRACTITIONER

## 2023-06-11 PROCEDURE — 63600175 PHARM REV CODE 636 W HCPCS

## 2023-06-11 PROCEDURE — 63600175 PHARM REV CODE 636 W HCPCS: Performed by: INTERNAL MEDICINE

## 2023-06-11 RX ORDER — OXYMETAZOLINE HCL 0.05 %
2 SPRAY, NON-AEROSOL (ML) NASAL DAILY PRN
Status: DISPENSED | OUTPATIENT
Start: 2023-06-11 | End: 2023-06-14

## 2023-06-11 RX ORDER — HYDROXYUREA 500 MG/1
500 CAPSULE ORAL DAILY
Status: DISCONTINUED | OUTPATIENT
Start: 2023-06-11 | End: 2023-06-12

## 2023-06-11 RX ADMIN — CEFEPIME 2 G: 2 INJECTION, POWDER, FOR SOLUTION INTRAVENOUS at 05:06

## 2023-06-11 RX ADMIN — HYDROXYUREA 500 MG: 500 CAPSULE ORAL at 09:06

## 2023-06-11 RX ADMIN — SODIUM CHLORIDE: 9 INJECTION, SOLUTION INTRAVENOUS at 04:06

## 2023-06-11 RX ADMIN — GUAIFENESIN AND DEXTROMETHORPHAN HYDROBROMIDE 1 TABLET: 600; 30 TABLET, EXTENDED RELEASE ORAL at 08:06

## 2023-06-11 RX ADMIN — DASATINIB 100 MG: 100 TABLET ORAL at 09:06

## 2023-06-11 RX ADMIN — ACETAMINOPHEN 650 MG: 325 TABLET ORAL at 10:06

## 2023-06-11 RX ADMIN — VANCOMYCIN HYDROCHLORIDE 1500 MG: 1.5 INJECTION, POWDER, LYOPHILIZED, FOR SOLUTION INTRAVENOUS at 11:06

## 2023-06-11 RX ADMIN — OXYCODONE HYDROCHLORIDE 5 MG: 5 TABLET ORAL at 03:06

## 2023-06-11 RX ADMIN — DIPHENHYDRAMINE HYDROCHLORIDE 10 ML: 25 SOLUTION ORAL at 01:06

## 2023-06-11 RX ADMIN — CEFEPIME 2 G: 2 INJECTION, POWDER, FOR SOLUTION INTRAVENOUS at 09:06

## 2023-06-11 RX ADMIN — NIFEDIPINE 60 MG: 60 TABLET, FILM COATED, EXTENDED RELEASE ORAL at 09:06

## 2023-06-11 RX ADMIN — ALLOPURINOL 300 MG: 100 TABLET ORAL at 08:06

## 2023-06-11 RX ADMIN — CEFEPIME 2 G: 2 INJECTION, POWDER, FOR SOLUTION INTRAVENOUS at 12:06

## 2023-06-11 RX ADMIN — ACYCLOVIR 400 MG: 200 CAPSULE ORAL at 09:06

## 2023-06-11 RX ADMIN — IOHEXOL 100 ML: 350 INJECTION, SOLUTION INTRAVENOUS at 01:06

## 2023-06-11 RX ADMIN — GUAIFENESIN AND DEXTROMETHORPHAN HYDROBROMIDE 1 TABLET: 600; 30 TABLET, EXTENDED RELEASE ORAL at 09:06

## 2023-06-11 RX ADMIN — OXYCODONE HYDROCHLORIDE 5 MG: 5 TABLET ORAL at 11:06

## 2023-06-11 RX ADMIN — ALLOPURINOL 300 MG: 100 TABLET ORAL at 09:06

## 2023-06-11 RX ADMIN — SODIUM CHLORIDE: 9 INJECTION, SOLUTION INTRAVENOUS at 11:06

## 2023-06-11 RX ADMIN — LOSARTAN POTASSIUM 50 MG: 50 TABLET, FILM COATED ORAL at 09:06

## 2023-06-11 RX ADMIN — OXYCODONE HYDROCHLORIDE 5 MG: 5 TABLET ORAL at 01:06

## 2023-06-11 RX ADMIN — FLUCONAZOLE 400 MG: 200 TABLET ORAL at 09:06

## 2023-06-11 RX ADMIN — SODIUM CHLORIDE: 9 INJECTION, SOLUTION INTRAVENOUS at 03:06

## 2023-06-11 RX ADMIN — ACETAMINOPHEN 650 MG: 325 TABLET ORAL at 07:06

## 2023-06-11 RX ADMIN — DIPHENHYDRAMINE HYDROCHLORIDE 10 ML: 25 SOLUTION ORAL at 11:06

## 2023-06-11 RX ADMIN — ACYCLOVIR 400 MG: 200 CAPSULE ORAL at 08:06

## 2023-06-11 NOTE — PROGRESS NOTES
[facility] Transplant Stepdown  Hematology  Bone Marrow Transplant  Progress Note    Patient Name: Magdaleno Hirsch  Admission Date: 6/9/2023  Hospital Length of Stay: 2 days  Code Status: Full Code    Subjective:     Interval History: WBC now normalized, remaining persistently tachycardic, CT neck done    Objective:     Vital Signs (Most Recent):  Temp: 99.8 °F (37.7 °C) (06/11/23 0430)  Pulse: (!) 133 (06/11/23 0400)  Resp: 18 (06/11/23 0400)  BP: (!) 118/54 (06/11/23 0400)  SpO2: 97 % (06/10/23 2300) Vital Signs (24h Range):  Temp:  [99.7 °F (37.6 °C)-100.7 °F (38.2 °C)] 99.8 °F (37.7 °C)  Pulse:  [123-138] 133  Resp:  [16-18] 18  SpO2:  [96 %-98 %] 97 %  BP: (118-136)/(54-65) 118/54     Weight: 90.4 kg (199 lb 4.7 oz)  Body mass index is 26.29 kg/m².  Body surface area is 2.16 meters squared.    ECOG SCORE           [unfilled]    Intake/Output - Last 3 Shifts         06/09 0700  06/10 0659 06/10 0700 06/11 0659 06/11 0700  06/12 0659    P.O. 1220 1420     I.V. (mL/kg) 581.5 (6.3) 1676.8 (18.5)     Other  0     IV Piggyback 600 450     Total Intake(mL/kg) 2401.5 (26.1) 3546.8 (39.2)     Urine (mL/kg/hr) 0 (0) 700 (0.3)     Emesis/NG output  0     Other  0     Stool 100 0     Blood  0     Total Output 100 700     Net +2301.5 +2846.8            Urine Occurrence 9 x 1 x     Stool Occurrence 4 x 0 x     Emesis Occurrence  0 x              Physical Exam  Constitutional:       Appearance: He is well-developed.   HENT:      Head: Normocephalic and atraumatic.      Mouth/Throat:      Pharynx: No oropharyngeal exudate.      Comments: Tonsillar swelling. Throat redness.  Gum swelling/bleeding.  Eyes:      General:         Right eye: No discharge.         Left eye: No discharge.      Conjunctiva/sclera: Conjunctivae normal.      Pupils: Pupils are equal, round, and reactive to light.   Cardiovascular:      Rate and Rhythm: Normal rate and regular rhythm.      Heart sounds: Normal heart sounds. No murmur heard.  Pulmonary:       Effort: Pulmonary effort is normal. No respiratory distress.      Breath sounds: Normal breath sounds. No wheezing or rales.   Abdominal:      General: Bowel sounds are normal. There is no distension.      Palpations: Abdomen is soft.      Tenderness: There is no abdominal tenderness.   Musculoskeletal:         General: No deformity. Normal range of motion.      Cervical back: Normal range of motion and neck supple.   Skin:     General: Skin is warm and dry.      Findings: No erythema or rash.   Neurological:      Mental Status: He is alert and oriented to person, place, and time.   Psychiatric:         Behavior: Behavior normal.         Thought Content: Thought content normal.         Judgment: Judgment normal.          Significant Labs:   All pertinent labs from the last 24 hours have been reviewed.    Diagnostic Results:  I have reviewed all pertinent imaging results/findings within the past 24 hours.    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 Brett Hogan. He has been on dasatinib outpatient since 10/2022. Some question regarding his compliance over the course of treatment, however.Presented to [facility] with c/o gum swelling on 6/7/23. Labs remarkable for WBC count of 138K, with 80% blasts indicating blast crisis. WBC count from approximately a week and a half earlier was normal. Reports recent compliance with his TKI. transferred [place] [facility] [place] higher level of care.    Bone marrow Biopsy performed yesterday    - Continue dasatinib, hydrea 2 grams TID, and allopurinol 300 mg daily.  - BCR/ABL p210 collected on admission. Result pending.  - Daily CBC and TLS and DIC labs. Mild TLS, but no evidence of DIC at this time.  - CT chest neck ordered    Leukocytosis  - See blast crisis phase of CML    Hypokalemia  - May be 2/2 diarrhea.   - Repleting  - Daily CMP while inpatient    Diarrhea  - Reports  multiple days of diarrhea prior to transfer  - C diff negative    Neutropenic fever  Temp fo 101.4 at [place] [facility]    CXR neg. Blood culture ngtd. CDiff negative-   Strep, flu, and COVID neg  UA negative    -Cefepime  -Vancomycin    Pancytopenia  - Functionally neutropenic despite leukocytosis, given low neutrophil count  - 2/2 disease and hydrea  - Daily CBC while inpatient  - Transfuse for hgb < 7 or plts < 30K in setting of gum bleeding  - Will start antimicrobial ppx with acyclovir and fluconazole and continue Cefepime for neutropenic fever.  - Will need to discharge on acyclovir, fluconazole, and levaquin ppx    Hypertension  - Takes amlodipine at home  - Given hypertension, amlodipine stopped and nifedipine and losartan started in [place]. Will continue this.    Tumor lysis syndrome  - Uric acid and LDH elevated on presentation to [place] ED. Kidney function and electrolytes normal.  - Not G6PD deficient  - Continue allopurinol 300 mg BID  - Daily TLS labs while inpatient        VTE Risk Mitigation (From admission, onward)         Ordered     IP VTE HIGH RISK PATIENT  Once         06/09/23 0049     Place sequential compression device  Until discontinued         06/09/23 0049                Disposition: home    Gus Posadas MD  Bone Marrow Transplant  [facility] - [facility]

## 2023-06-11 NOTE — SUBJECTIVE & OBJECTIVE
Subjective:     Interval History: WBC now normalized, remaining persistently tachycardic, CT neck done    Objective:     Vital Signs (Most Recent):  Temp: 99.8 °F (37.7 °C) (06/11/23 0430)  Pulse: (!) 133 (06/11/23 0400)  Resp: 18 (06/11/23 0400)  BP: (!) 118/54 (06/11/23 0400)  SpO2: 97 % (06/10/23 2300) Vital Signs (24h Range):  Temp:  [99.7 °F (37.6 °C)-100.7 °F (38.2 °C)] 99.8 °F (37.7 °C)  Pulse:  [123-138] 133  Resp:  [16-18] 18  SpO2:  [96 %-98 %] 97 %  BP: (118-136)/(54-65) 118/54     Weight: 90.4 kg (199 lb 4.7 oz)  Body mass index is 26.29 kg/m².  Body surface area is 2.16 meters squared.    ECOG SCORE           [unfilled]    Intake/Output - Last 3 Shifts         06/09 0700  06/10 0659 06/10 0700  06/11 0659 06/11 0700  06/12 0659    P.O. 1220 1420     I.V. (mL/kg) 581.5 (6.3) 1676.8 (18.5)     Other  0     IV Piggyback 600 450     Total Intake(mL/kg) 2401.5 (26.1) 3546.8 (39.2)     Urine (mL/kg/hr) 0 (0) 700 (0.3)     Emesis/NG output  0     Other  0     Stool 100 0     Blood  0     Total Output 100 700     Net +2301.5 +2846.8            Urine Occurrence 9 x 1 x     Stool Occurrence 4 x 0 x     Emesis Occurrence  0 x              Physical Exam  Constitutional:       Appearance: He is well-developed.   HENT:      Head: Normocephalic and atraumatic.      Mouth/Throat:      Pharynx: No oropharyngeal exudate.      Comments: Tonsillar swelling. Throat redness.  Gum swelling/bleeding.  Eyes:      General:         Right eye: No discharge.         Left eye: No discharge.      Conjunctiva/sclera: Conjunctivae normal.      Pupils: Pupils are equal, round, and reactive to light.   Cardiovascular:      Rate and Rhythm: Normal rate and regular rhythm.      Heart sounds: Normal heart sounds. No murmur heard.  Pulmonary:      Effort: Pulmonary effort is normal. No respiratory distress.      Breath sounds: Normal breath sounds. No wheezing or rales.   Abdominal:      General: Bowel sounds are normal. There is no  distension.      Palpations: Abdomen is soft.      Tenderness: There is no abdominal tenderness.   Musculoskeletal:         General: No deformity. Normal range of motion.      Cervical back: Normal range of motion and neck supple.   Skin:     General: Skin is warm and dry.      Findings: No erythema or rash.   Neurological:      Mental Status: He is alert and oriented to person, place, and time.   Psychiatric:         Behavior: Behavior normal.         Thought Content: Thought content normal.         Judgment: Judgment normal.          Significant Labs:   All pertinent labs from the last 24 hours have been reviewed.    Diagnostic Results:  I have reviewed all pertinent imaging results/findings within the past 24 hours.

## 2023-06-11 NOTE — PLAN OF CARE
AAOx3, tmax 99.9, c/o pain in mouth. PRN pain medication given and mouthwash. Pt remains tachy- see previous note. PO hydralazine held per order parameters. CT neck and chest done. IV cefepime and vanc continued. NS @75cc/hr. ECHO ordered. Pt able to position self independently.  Pt in lowest position, side rails up x2, non-skid foot wear in place, call light within reach, pt verbalized understanding to call RN when needed.  Hand hygiene practiced per protocol.  Will continue to monitor.

## 2023-06-11 NOTE — PLAN OF CARE
Pt AAOx4. Girlfriend at bedside. Independent. VS stable - Tachy all day (110s) and low grade fever. 103.1 in the am - PRN tylenol given. Plt 25; WBC 10.26. PRN oxy 5 given for bone marrow biopsy site pain. Vanc and cefepime hung. Vanc d/c. NS 100cc/hr running. Mouth and throat still swollen from the thrush. Complained of clots when sneezing - PRN afrin at bedside.

## 2023-06-11 NOTE — NURSING
Notified Dr. Billy of pt's temp of 100.7 and hr in the 130s this am. MD aware of hr. MD stated not to treat temp until >101. Will continue to monitor.

## 2023-06-11 NOTE — ASSESSMENT & PLAN NOTE
- Takes amlodipine at home  - Given hypertension, amlodipine stopped and nifedipine and losartan started in [place]. Will continue this.

## 2023-06-11 NOTE — ASSESSMENT & PLAN NOTE
- Uric acid and LDH elevated on presentation to [place] ED. Kidney function and electrolytes normal.  - Not G6PD deficient  - Continue allopurinol 300 mg BID  - Daily TLS labs while inpatient

## 2023-06-11 NOTE — ASSESSMENT & PLAN NOTE
Temp fo 101.4 at [facility]    CXR neg. Blood culture ngtd. CDiff negative-   Strep, flu, and COVID neg  UA negative    -Cefepime  -Vancomycin

## 2023-06-11 NOTE — ASSESSMENT & PLAN NOTE
Patient with CML diagnosed 8/2022 Follows locally in [place] with Dr. Brett Hogan. He has been on dasatinib outpatient since 10/2022. Some question regarding his compliance over the course of treatment, however.Presented to [facility] with c/o gum swelling on 6/7/23. Labs remarkable for WBC count of 138K, with 80% blasts indicating blast crisis. WBC count from approximately a week and a half earlier was normal. Reports recent compliance with his TKI. transferred to [facility] for higher level of care.    Bone marrow Biopsy performed yesterday    - Continue dasatinib, hydrea 2 grams TID, and allopurinol 300 mg daily.  - BCR/ABL p210 collected on admission. Result pending.  - Daily CBC and TLS and DIC labs. Mild TLS, but no evidence of DIC at this time.  - CT chest neck ordered

## 2023-06-11 NOTE — NURSING
Dr. Billy aware that pt's hr is between 120-130s bpm. No new orders. MD stated no need for tele monitor at this time. Will continue to monitor.

## 2023-06-12 LAB
ALBUMIN SERPL BCP-MCNC: 2.6 G/DL (ref 3.5–5.2)
ALP SERPL-CCNC: 56 U/L (ref 55–135)
ALT SERPL W/O P-5'-P-CCNC: 11 U/L (ref 10–44)
ANION GAP SERPL CALC-SCNC: 8 MMOL/L (ref 8–16)
ANISOCYTOSIS BLD QL SMEAR: SLIGHT
APTT PPP: 28.6 SEC (ref 21–32)
AST SERPL-CCNC: 26 U/L (ref 10–40)
BASOPHILS NFR BLD: 0 % (ref 0–1.9)
BILIRUB SERPL-MCNC: 0.7 MG/DL (ref 0.1–1)
BLASTS NFR BLD MANUAL: 0 %
BUN SERPL-MCNC: 22 MG/DL (ref 6–20)
CALCIUM SERPL-MCNC: 7.8 MG/DL (ref 8.7–10.5)
CHLORIDE SERPL-SCNC: 105 MMOL/L (ref 95–110)
CO2 SERPL-SCNC: 19 MMOL/L (ref 23–29)
CREAT SERPL-MCNC: 1.1 MG/DL (ref 0.5–1.4)
DIFFERENTIAL METHOD: ABNORMAL
EOSINOPHIL NFR BLD: 2 % (ref 0–8)
ERYTHROCYTE [DISTWIDTH] IN BLOOD BY AUTOMATED COUNT: 20 % (ref 11.5–14.5)
EST. GFR  (NO RACE VARIABLE): >60 ML/MIN/1.73 M^2
FIBRINOGEN PPP-MCNC: 502 MG/DL (ref 182–400)
GLUCOSE SERPL-MCNC: 118 MG/DL (ref 70–110)
HBV CORE IGM SERPL QL IA: NORMAL
HBV SURFACE AG SERPL QL IA: NORMAL
HCT VFR BLD AUTO: 33.5 % (ref 40–54)
HGB BLD-MCNC: 10.6 G/DL (ref 14–18)
HYPOCHROMIA BLD QL SMEAR: ABNORMAL
IMM GRANULOCYTES # BLD AUTO: ABNORMAL K/UL (ref 0–0.04)
IMM GRANULOCYTES NFR BLD AUTO: ABNORMAL % (ref 0–0.5)
INR PPP: 1.2 (ref 0.8–1.2)
LDH SERPL L TO P-CCNC: 595 U/L (ref 110–260)
LYMPHOCYTES NFR BLD: 23 % (ref 18–48)
MAGNESIUM SERPL-MCNC: 2.3 MG/DL (ref 1.6–2.6)
MCH RBC QN AUTO: 22.8 PG (ref 27–31)
MCHC RBC AUTO-ENTMCNC: 31.6 G/DL (ref 32–36)
MCV RBC AUTO: 72 FL (ref 82–98)
MONOCYTES NFR BLD: 6 % (ref 4–15)
NEUTROPHILS NFR BLD: 6 % (ref 38–73)
NRBC BLD-RTO: 0 /100 WBC
OVALOCYTES BLD QL SMEAR: ABNORMAL
PHOSPHATE SERPL-MCNC: 2.8 MG/DL (ref 2.7–4.5)
PLATELET # BLD AUTO: 23 K/UL (ref 150–450)
PLATELET BLD QL SMEAR: ABNORMAL
PMV BLD AUTO: ABNORMAL FL (ref 9.2–12.9)
POIKILOCYTOSIS BLD QL SMEAR: SLIGHT
POTASSIUM SERPL-SCNC: 4.9 MMOL/L (ref 3.5–5.1)
PROT SERPL-MCNC: 6.1 G/DL (ref 6–8.4)
PROTHROMBIN TIME: 12.3 SEC (ref 9–12.5)
RBC # BLD AUTO: 4.64 M/UL (ref 4.6–6.2)
SODIUM SERPL-SCNC: 132 MMOL/L (ref 136–145)
URATE SERPL-MCNC: 2.4 MG/DL (ref 3.4–7)
VANCOMYCIN SERPL-MCNC: 13.9 UG/ML
VIEW PATHOLOGY REPORT (RELIAPATH): NORMAL
WBC # BLD AUTO: 6.23 K/UL (ref 3.9–12.7)
WBC OTHER NFR BLD MANUAL: 63 %

## 2023-06-12 PROCEDURE — 85610 PROTHROMBIN TIME: CPT | Performed by: NURSE PRACTITIONER

## 2023-06-12 PROCEDURE — 25000003 PHARM REV CODE 250

## 2023-06-12 PROCEDURE — 99223 1ST HOSP IP/OBS HIGH 75: CPT | Mod: FS,,, | Performed by: INTERNAL MEDICINE

## 2023-06-12 PROCEDURE — 25000003 PHARM REV CODE 250: Performed by: NURSE PRACTITIONER

## 2023-06-12 PROCEDURE — 87340 HEPATITIS B SURFACE AG IA: CPT

## 2023-06-12 PROCEDURE — 63600175 PHARM REV CODE 636 W HCPCS: Performed by: INTERNAL MEDICINE

## 2023-06-12 PROCEDURE — 99233 PR SUBSEQUENT HOSPITAL CARE,LEVL III: ICD-10-PCS | Mod: FS,,, | Performed by: INTERNAL MEDICINE

## 2023-06-12 PROCEDURE — 87799 DETECT AGENT NOS DNA QUANT: CPT | Performed by: NURSE PRACTITIONER

## 2023-06-12 PROCEDURE — 85730 THROMBOPLASTIN TIME PARTIAL: CPT | Performed by: NURSE PRACTITIONER

## 2023-06-12 PROCEDURE — 25000003 PHARM REV CODE 250: Performed by: INTERNAL MEDICINE

## 2023-06-12 PROCEDURE — 84100 ASSAY OF PHOSPHORUS: CPT | Performed by: NURSE PRACTITIONER

## 2023-06-12 PROCEDURE — 63600175 PHARM REV CODE 636 W HCPCS: Performed by: STUDENT IN AN ORGANIZED HEALTH CARE EDUCATION/TRAINING PROGRAM

## 2023-06-12 PROCEDURE — 86705 HEP B CORE ANTIBODY IGM: CPT

## 2023-06-12 PROCEDURE — 85027 COMPLETE CBC AUTOMATED: CPT | Performed by: NURSE PRACTITIONER

## 2023-06-12 PROCEDURE — 36415 COLL VENOUS BLD VENIPUNCTURE: CPT

## 2023-06-12 PROCEDURE — A4216 STERILE WATER/SALINE, 10 ML: HCPCS | Performed by: INTERNAL MEDICINE

## 2023-06-12 PROCEDURE — 63600175 PHARM REV CODE 636 W HCPCS: Performed by: NURSE PRACTITIONER

## 2023-06-12 PROCEDURE — 86592 SYPHILIS TEST NON-TREP QUAL: CPT

## 2023-06-12 PROCEDURE — 85007 BL SMEAR W/DIFF WBC COUNT: CPT | Performed by: NURSE PRACTITIONER

## 2023-06-12 PROCEDURE — 36415 COLL VENOUS BLD VENIPUNCTURE: CPT | Performed by: NURSE PRACTITIONER

## 2023-06-12 PROCEDURE — 99233 SBSQ HOSP IP/OBS HIGH 50: CPT | Mod: FS,,, | Performed by: INTERNAL MEDICINE

## 2023-06-12 PROCEDURE — 76937 US GUIDE VASCULAR ACCESS: CPT

## 2023-06-12 PROCEDURE — C1751 CATH, INF, PER/CENT/MIDLINE: HCPCS

## 2023-06-12 PROCEDURE — 25000003 PHARM REV CODE 250: Performed by: STUDENT IN AN ORGANIZED HEALTH CARE EDUCATION/TRAINING PROGRAM

## 2023-06-12 PROCEDURE — 85384 FIBRINOGEN ACTIVITY: CPT | Performed by: NURSE PRACTITIONER

## 2023-06-12 PROCEDURE — 83735 ASSAY OF MAGNESIUM: CPT | Performed by: NURSE PRACTITIONER

## 2023-06-12 PROCEDURE — 20600001 HC STEP DOWN PRIVATE ROOM

## 2023-06-12 PROCEDURE — 87522 HEPATITIS C REVRS TRNSCRPJ: CPT

## 2023-06-12 PROCEDURE — 84550 ASSAY OF BLOOD/URIC ACID: CPT | Performed by: NURSE PRACTITIONER

## 2023-06-12 PROCEDURE — 80202 ASSAY OF VANCOMYCIN: CPT | Performed by: INTERNAL MEDICINE

## 2023-06-12 PROCEDURE — 99223 PR INITIAL HOSPITAL CARE,LEVL III: ICD-10-PCS | Mod: FS,,, | Performed by: INTERNAL MEDICINE

## 2023-06-12 PROCEDURE — 63600175 PHARM REV CODE 636 W HCPCS

## 2023-06-12 PROCEDURE — 36573 INSJ PICC RS&I 5 YR+: CPT

## 2023-06-12 PROCEDURE — 83615 LACTATE (LD) (LDH) ENZYME: CPT | Performed by: NURSE PRACTITIONER

## 2023-06-12 PROCEDURE — 80053 COMPREHEN METABOLIC PANEL: CPT | Performed by: NURSE PRACTITIONER

## 2023-06-12 RX ORDER — OXYCODONE HYDROCHLORIDE 5 MG/1
5 TABLET ORAL EVERY 6 HOURS PRN
Status: DISCONTINUED | OUTPATIENT
Start: 2023-06-12 | End: 2023-06-13

## 2023-06-12 RX ORDER — DOXYCYCLINE HYCLATE 100 MG
100 TABLET ORAL EVERY 12 HOURS
Status: DISCONTINUED | OUTPATIENT
Start: 2023-06-12 | End: 2023-06-19

## 2023-06-12 RX ORDER — ALLOPURINOL 300 MG/1
300 TABLET ORAL DAILY
Status: DISCONTINUED | OUTPATIENT
Start: 2023-06-13 | End: 2023-06-17

## 2023-06-12 RX ORDER — HYDROMORPHONE HYDROCHLORIDE 1 MG/ML
0.5 INJECTION, SOLUTION INTRAMUSCULAR; INTRAVENOUS; SUBCUTANEOUS EVERY 4 HOURS PRN
Status: DISCONTINUED | OUTPATIENT
Start: 2023-06-12 | End: 2023-06-12

## 2023-06-12 RX ORDER — SODIUM CHLORIDE 0.9 % (FLUSH) 0.9 %
10 SYRINGE (ML) INJECTION
Status: DISCONTINUED | OUTPATIENT
Start: 2023-06-12 | End: 2023-07-12 | Stop reason: HOSPADM

## 2023-06-12 RX ORDER — SODIUM CHLORIDE 0.9 % (FLUSH) 0.9 %
10 SYRINGE (ML) INJECTION EVERY 6 HOURS
Status: DISCONTINUED | OUTPATIENT
Start: 2023-06-12 | End: 2023-07-07

## 2023-06-12 RX ADMIN — DOXYCYCLINE HYCLATE 100 MG: 100 TABLET, COATED ORAL at 08:06

## 2023-06-12 RX ADMIN — ALLOPURINOL 300 MG: 100 TABLET ORAL at 09:06

## 2023-06-12 RX ADMIN — ACYCLOVIR 400 MG: 200 CAPSULE ORAL at 08:06

## 2023-06-12 RX ADMIN — MORPHINE SULFATE 2 MG: 2 INJECTION, SOLUTION INTRAMUSCULAR; INTRAVENOUS at 03:06

## 2023-06-12 RX ADMIN — Medication 10 ML: at 11:06

## 2023-06-12 RX ADMIN — PIPERACILLIN SODIUM AND TAZOBACTAM SODIUM 4.5 G: 4; .5 INJECTION, POWDER, FOR SOLUTION INTRAVENOUS at 09:06

## 2023-06-12 RX ADMIN — GUAIFENESIN AND DEXTROMETHORPHAN HYDROBROMIDE 1 TABLET: 600; 30 TABLET, EXTENDED RELEASE ORAL at 09:06

## 2023-06-12 RX ADMIN — OXYCODONE HYDROCHLORIDE 5 MG: 5 TABLET ORAL at 05:06

## 2023-06-12 RX ADMIN — NIFEDIPINE 60 MG: 60 TABLET, FILM COATED, EXTENDED RELEASE ORAL at 09:06

## 2023-06-12 RX ADMIN — DASATINIB 100 MG: 100 TABLET ORAL at 09:06

## 2023-06-12 RX ADMIN — GUAIFENESIN AND DEXTROMETHORPHAN HYDROBROMIDE 1 TABLET: 600; 30 TABLET, EXTENDED RELEASE ORAL at 08:06

## 2023-06-12 RX ADMIN — CEFEPIME 2 G: 2 INJECTION, POWDER, FOR SOLUTION INTRAVENOUS at 01:06

## 2023-06-12 RX ADMIN — LOSARTAN POTASSIUM 50 MG: 50 TABLET, FILM COATED ORAL at 09:06

## 2023-06-12 RX ADMIN — OXYCODONE HYDROCHLORIDE 5 MG: 5 TABLET ORAL at 11:06

## 2023-06-12 RX ADMIN — VANCOMYCIN HYDROCHLORIDE 1500 MG: 1.5 INJECTION, POWDER, LYOPHILIZED, FOR SOLUTION INTRAVENOUS at 12:06

## 2023-06-12 RX ADMIN — ACYCLOVIR 400 MG: 200 CAPSULE ORAL at 09:06

## 2023-06-12 RX ADMIN — FLUCONAZOLE 400 MG: 200 TABLET ORAL at 09:06

## 2023-06-12 RX ADMIN — SODIUM CHLORIDE: 9 INJECTION, SOLUTION INTRAVENOUS at 05:06

## 2023-06-12 RX ADMIN — PIPERACILLIN SODIUM AND TAZOBACTAM SODIUM 4.5 G: 4; .5 INJECTION, POWDER, FOR SOLUTION INTRAVENOUS at 06:06

## 2023-06-12 RX ADMIN — Medication 10 ML: at 06:06

## 2023-06-12 NOTE — SUBJECTIVE & OBJECTIVE
Past Medical History:   Diagnosis Date    CML (chronic myelocytic leukemia)     HVD (hypertensive vascular disease)     Oropharyngeal candidiasis        Past Surgical History:   Procedure Laterality Date    BONE MARROW BIOPSY N/A 8/22/2022    Procedure: Biopsy-bone marrow;  Surgeon: eGtachew Chen MD;  Location: [facility];  Service: General;  Laterality: N/A;    KNEE SURGERY Left        Review of patient's allergies indicates:  No Known Allergies    Medications:  Medications Prior to Admission   Medication Sig    amLODIPine (NORVASC) 10 MG tablet Take 1 tablet (10 mg total) by mouth once daily.    dasatinib (SPRYCEL) 100 mg Take 1 tablet (100 mg total) by mouth once daily.    ondansetron (ZOFRAN) 8 MG tablet Take 1 tablet (8 mg total) by mouth every 6 (six) hours as needed for Nausea.     Antibiotics (From admission, onward)      Start     Stop Route Frequency Ordered    06/12/23 2100  doxycycline tablet 100 mg         -- Oral Every 12 hours 06/12/23 1353    06/12/23 1030  piperacillin-tazobactam (ZOSYN) 4.5 g in dextrose 5 % in water (D5W) 5 % 100 mL IVPB (MB+)         -- IV Every 8 hours (non-standard times) 06/12/23 0926          Antifungals (From admission, onward)      Start     Stop Route Frequency Ordered    06/09/23 0900  fluconazole tablet 400 mg         -- Oral Daily 06/09/23 0756          Antivirals (From admission, onward)          Stop Route Frequency     acyclovir         -- Oral 2 times daily               There is no immunization history on file for this patient.    Family History       Problem Relation (Age of Onset)    Cancer Maternal Grandmother    Hypertension Maternal Grandmother          Social History     Socioeconomic History    Marital status: Single   Tobacco Use    Smoking status: Never    Smokeless tobacco: Never   Substance and Sexual Activity    Alcohol use: Never    Drug use: Yes     Types: Marijuana     Review of Systems   Constitutional:  Positive for activity change, appetite change,  fatigue and fever. Negative for chills.   HENT:  Positive for mouth sores. Negative for congestion, dental problem and sinus pressure.    Eyes:  Negative for pain, redness and visual disturbance.   Respiratory:  Negative for cough, shortness of breath and wheezing.    Cardiovascular:  Negative for chest pain and leg swelling.   Gastrointestinal:  Negative for abdominal distention, abdominal pain, diarrhea, nausea and vomiting.   Endocrine: Negative for polyuria.   Genitourinary:  Negative for decreased urine volume, dysuria and frequency.   Musculoskeletal:  Negative for joint swelling.   Skin:  Negative for color change.   Allergic/Immunologic: Negative for food allergies.   Neurological:  Negative for dizziness, weakness and headaches.   Hematological:  Negative for adenopathy.   Psychiatric/Behavioral:  Negative for agitation and confusion. The patient is not nervous/anxious.    Objective:     Vital Signs (Most Recent):  Temp: (!) 102.3 °F (39.1 °C) (06/12/23 1554)  Pulse: (!) 136 (06/12/23 1554)  Resp: 18 (06/12/23 1554)  BP: 130/61 (06/12/23 1554)  SpO2: 95 % (06/12/23 1554) Vital Signs (24h Range):  Temp:  [99 °F (37.2 °C)-102.3 °F (39.1 °C)] 102.3 °F (39.1 °C)  Pulse:  [119-136] 136  Resp:  [15-18] 18  SpO2:  [94 %-100 %] 95 %  BP: (127-145)/(56-65) 130/61     Weight: 91.2 kg (200 lb 15.2 oz)  Body mass index is 26.51 kg/m².    Estimated Creatinine Clearance: 117 mL/min (based on SCr of 1.1 mg/dL).     Physical Exam  Constitutional:       Appearance: He is well-developed.   HENT:      Head: Normocephalic and atraumatic.      Mouth/Throat:      Comments: Severe ulcerative disease most in soft palate, cervical LAD+  Eyes:      Conjunctiva/sclera: Conjunctivae normal.   Cardiovascular:      Rate and Rhythm: Normal rate and regular rhythm.      Heart sounds: Normal heart sounds. No murmur heard.  Pulmonary:      Effort: Pulmonary effort is normal. No respiratory distress.      Breath sounds: Normal breath sounds.  No wheezing.   Abdominal:      General: Bowel sounds are normal. There is no distension.      Palpations: Abdomen is soft.      Tenderness: There is no abdominal tenderness.   Musculoskeletal:         General: No tenderness. Normal range of motion.      Cervical back: Neck supple.   Lymphadenopathy:      Cervical: No cervical adenopathy.   Skin:     General: Skin is warm and dry.      Findings: No rash.   Neurological:      Mental Status: He is alert and oriented to person, place, and time.      Coordination: Coordination normal.   Psychiatric:         Behavior: Behavior normal.        Significant Labs: CBC:   Recent Labs   Lab 06/11/23  0548 06/12/23  0701   WBC 10.26 6.23   HGB 11.0* 10.6*   HCT 34.9* 33.5*   PLT 25* 23*     CMP:   Recent Labs   Lab 06/11/23  0548 06/12/23  0701   * 132*   K 4.6 4.9    105   CO2 19* 19*   * 118*   BUN 24* 22*   CREATININE 1.3 1.1   CALCIUM 7.9* 7.8*   PROT 6.0 6.1   ALBUMIN 2.7* 2.6*   BILITOT 0.9 0.7   ALKPHOS 58 56   AST 33 26   ALT 11 11   ANIONGAP 9 8       Significant Imaging: I have reviewed all pertinent imaging results/findings within the past 24 hours.

## 2023-06-12 NOTE — ASSESSMENT & PLAN NOTE
- Uric acid and LDH elevated on presentation to [place] ED. Kidney function and electrolytes normal.  - Not G6PD deficient  - Changed allopurinol from 300 mg BID to 300 mg daily  - Twice TLS labs while inpatient

## 2023-06-12 NOTE — PROGRESS NOTES
Admit Assessment    Patient Identification  Magdaleno Hirsch   :  2000  Admit Date:  2023  Attending Provider:  Denny Barajas MD              Referral:   Pt was admitted to Hematology with a diagnosis of 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, and was admitted this hospital stay due to Chronic myeloid leukemia [C92.10].   is involved was referred to the Social Work Department via routine referral.  Patient presents as a 22 y.o. year old single male.    Persons interviewed: patientand mother.    Living Situation:  Lives in own apartment; mother, girlfriend, and aunt Kristy ([phone]) all supportive.  Independent with ADLs.      Resides at [address] [place], [place] [address] [place] [address],   phone: [phone] (home).      (RETIRED) Functional Status Prior  Ambulation Prior: 0-->independent  Transferrin-->independent  Toiletin-->independent  Bathin-->independent  Dressin-->independent  Eatin-->independent  Communication: understands/communicates without difficulty  Swallowing: swallows foods/liquids without difficulty    Current or Past Agencies and Description of Services/Supplies    DME  Agency Name: none    Home Health  Agency Name: none    IV Infusion  Agency Name: none    Nutrition: Oral.      Outpatient Pharmacy:     [facility] #[id number] [address] AT [facility] & [address] [address]  Phone: [phone] Fax: [phone]    [facility] [facility] #[id number] [facility] [place] - [address] AT [facility] [address]  [address]  [facility] [address]  Phone: [phone] Fax: [phone]      Patient Preference of agencies include: none expressed.      Patient/Caregiver informed of right to choose providers or agencies.  Patient provides permission to release any necessary information to [facility] and to Non[facility] agencies as needed to  facilitate patient care, treatment planning, and patient discharge planning.  Written and verbal resources provided.      Coping   Coping well with support of family at baseline.         Adjustment to Diagnosis and Treatment  Adequate despite recent change in diagnosis.      Emotional/Behavioral/Cognitive Issues   None noted.           History/Current Symptoms of Anxiety/Depression: No:   History/Current Substance Use:   Social History     Tobacco Use    Smoking status: Never    Smokeless tobacco: Never   Substance and Sexual Activity    Alcohol use: Never    Drug use: Yes     Types: Marijuana    Sexual activity: Not on file       Indications of Abuse/Neglect: No:   Abuse Screen (yes response referral indicated)  Feels Unsafe at Home or Work/School: no  Physical Signs of Abuse Present: no    Financial:  Payer/Plan Subscr  Sex Relation Sub. Ins. ID Effective Group Num   1. MEDICAID - AE* CAROLYN TOMLIN 2000 Male Self [id number]* 19 [id number]                                   [address] [address], [place] [address]        Other identified concerns/needs: TBD.      Plan: return home to care of family.      Interventions/Referrals: TBD.    Patient/caregiver engaged in treatment planning process.     providing psychosocial and supportive counseling, resources, education, assistance and discharge planning as appropriate.  Patient/caregiver state understanding of  available resources,  following, remains available.  Given SWer contact info and encouraged to call with any needs or concerns.

## 2023-06-12 NOTE — SUBJECTIVE & OBJECTIVE
Subjective:     Interval History: Flow suggestive of transformation of CML to AML. Tentatively planning for inpatient induction. Continues to have neutropenic fevers. Changed Cefepime to Zosyn and continuing vanc. Consulted ID. Will need ID clearance for line placement. RIP via nares neg. Will get RIP via throat swab given redness and exudates in throat. Also checking EBV. Urology consulted for purposes of fertility preservation. ANC down to 374 today. WBC count now normal. Hydrea stopped. TLS resolved. Changed allopurinol from BID to daily and stopped twice weekly TLS and DIC monitoring.    Objective:     Vital Signs (Most Recent):  Temp: (!) 100.7 °F (38.2 °C) (06/12/23 1110)  Pulse: (!) 128 (06/12/23 1110)  Resp: 18 (06/12/23 1110)  BP: (!) 141/65 (06/12/23 1110)  SpO2: 100 % (06/12/23 1110) Vital Signs (24h Range):  Temp:  [98.2 °F (36.8 °C)-101.7 °F (38.7 °C)] 100.7 °F (38.2 °C)  Pulse:  [112-136] 128  Resp:  [15-18] 18  SpO2:  [94 %-100 %] 100 %  BP: (127-141)/(56-69) 141/65     Weight: 91.2 kg (200 lb 15.2 oz)  Body mass index is 26.51 kg/m².  Body surface area is 2.17 meters squared.    ECOG SCORE           [unfilled]    Intake/Output - Last 3 Shifts         06/10 0700 06/11 0659 06/11 0700 06/12 0659 06/12 0700 06/13 0659    P.O. 1420 1200     I.V. (mL/kg) 1676.8 (18.5) 1697.4 (18.6)     Other 0 0     IV Piggyback 450      Total Intake(mL/kg) 3546.8 (39.2) 2897.4 (31.8)     Urine (mL/kg/hr) 700 (0.3) 0 (0)     Emesis/NG output 0 0     Other 0 0     Stool 0 0     Blood 0 0     Total Output 700 0     Net +2846.8 +2897.4            Urine Occurrence 1 x 3 x     Stool Occurrence 0 x 1 x     Emesis Occurrence 0 x 0 x              Physical Exam  Constitutional:       Appearance: He is well-developed.   HENT:      Head: Normocephalic and atraumatic.      Mouth/Throat:      Pharynx: No oropharyngeal exudate.      Comments: Tonsillar swelling. Throat redness.  Gum swelling/bleeding.  Eyes:      General:          Right eye: No discharge.         Left eye: No discharge.      Conjunctiva/sclera: Conjunctivae normal.      Pupils: Pupils are equal, round, and reactive to light.   Cardiovascular:      Rate and Rhythm: Normal rate and regular rhythm.      Heart sounds: Normal heart sounds. No murmur heard.  Pulmonary:      Effort: Pulmonary effort is normal. No respiratory distress.      Breath sounds: Normal breath sounds. No wheezing or rales.   Abdominal:      General: Bowel sounds are normal. There is no distension.      Palpations: Abdomen is soft.      Tenderness: There is no abdominal tenderness.   Musculoskeletal:         General: No deformity. Normal range of motion.      Cervical back: Normal range of motion and neck supple.   Skin:     General: Skin is warm and dry.      Findings: No erythema or rash.   Neurological:      Mental Status: He is alert and oriented to person, place, and time.   Psychiatric:         Behavior: Behavior normal.         Thought Content: Thought content normal.         Judgment: Judgment normal.          Significant Labs:   CBC:   Recent Labs   Lab 06/11/23  0548 06/12/23  0701   WBC 10.26 6.23   HGB 11.0* 10.6*   HCT 34.9* 33.5*   PLT 25* 23*      and CMP:   Recent Labs   Lab 06/11/23  0548 06/12/23  0701   * 132*   K 4.6 4.9    105   CO2 19* 19*   * 118*   BUN 24* 22*   CREATININE 1.3 1.1   CALCIUM 7.9* 7.8*   PROT 6.0 6.1   ALBUMIN 2.7* 2.6*   BILITOT 0.9 0.7   ALKPHOS 58 56   AST 33 26   ALT 11 11   ANIONGAP 9 8         Diagnostic Results:  I have reviewed all pertinent imaging results/findings within the past 24 hours.

## 2023-06-12 NOTE — PROGRESS NOTES
Pt transferred from TSU to BMT floor 8 hospital room 824. VS stable and pt in no distress. All belongings sent and pt currently hooked up to zosyn and cont NS. Report called to Sushil and pt handed off to Sushil.

## 2023-06-12 NOTE — PROGRESS NOTES
Pharmacokinetic Assessment Follow Up: IV Vancomycin    Vancomycin serum concentration assessment(s):    The trough level was drawn correctly and can be used to guide therapy at this time. The measurement is below the desired definitive target range of 15 to 20 mcg/mL.  - The trough level was drawn ~23 hours after previous dose and resulted at 13.3    Vancomycin Regimen Plan:    Will re-dose with vancomycin 1500 mg once now.  - Mr. Hirsch's Scr jumped 1.0 to 1.3 in ~21 hours.   - Level makes me think he's still clearing okay, so Scr increase may be transient.   - Will pulse dose for now regardless to be safe. May consider scheduling again if next level and Scr indicate sufficient clearance.  - Discontinue the scheduled vancomycin regimen and re-dose when the random level is less than 20 mcg/mL, next level to be drawn at 1200 on 6/12.    Drug levels (last 3 results):  Recent Labs   Lab Result Units 06/11/23  2210   Vancomycin-Trough ug/mL 13.3       Pharmacy will continue to follow and monitor vancomycin.    Please contact pharmacy at extension [phone] for questions regarding this assessment.    Thank you for the consult,   Gricelda Galarza       Patient brief summary:  Magdaleno Hirsch is a 22 y.o. male initiated on antimicrobial therapy with IV Vancomycin for treatment of bacteremia    The patient's current regimen is pulse dosing in the setting of Scr increase    Actual Body Weight:   90.4 kg    Renal Function:   Estimated Creatinine Clearance: 99 mL/min (based on SCr of 1.3 mg/dL).     Dialysis Method (if applicable):  N/A

## 2023-06-12 NOTE — PROGRESS NOTES
VANCOMYCIN DOSING BY PHARMACY DISCONTINUATION NOTE    Magdaleno Hirsch is a 22 y.o. male who had been consulted for vancomycin dosing.    The pharmacy consult for vancomycin dosing has been discontinued.     Vancomycin Dosing by Pharmacy Consult will sign-off. Please reconsult if necessary. Thank you for allowing us to participate in this patient's care.       Mihaela Oswald, PharmD  Ext: [phone]

## 2023-06-12 NOTE — NURSING
Dr. Sanders aware of pt's temp of 101.7. Blood cx x2 ordered. MD also aware of pt's hr in the 120s.EKG ordered. Will continue to monitor.

## 2023-06-12 NOTE — SUBJECTIVE & OBJECTIVE
Past Medical History:   Diagnosis Date    CML (chronic myelocytic leukemia)     HVD (hypertensive vascular disease)     Oropharyngeal candidiasis        Past Surgical History:   Procedure Laterality Date    BONE MARROW BIOPSY N/A 8/22/2022    Procedure: Biopsy-bone marrow;  Surgeon: Getachew Chen MD;  Location: [facility];  Service: General;  Laterality: N/A;    KNEE SURGERY Left        Review of patient's allergies indicates:  No Known Allergies    Family History       Problem Relation (Age of Onset)    Cancer Maternal Grandmother    Hypertension Maternal Grandmother            Tobacco Use    Smoking status: Never    Smokeless tobacco: Never   Substance and Sexual Activity    Alcohol use: Never    Drug use: Yes     Types: Marijuana    Sexual activity: Not on file       Review of Systems   Constitutional:  Positive for fever.     Objective:     Temp:  [98.2 °F (36.8 °C)-101.7 °F (38.7 °C)] 100.8 °F (38.2 °C)  Pulse:  [119-136] 125  Resp:  [15-18] 16  SpO2:  [94 %-100 %] 96 %  BP: (127-145)/(56-68) 145/63  Weight: 91.2 kg (200 lb 15.2 oz)  Body mass index is 26.51 kg/m².           Drains       None                    Physical Exam  Constitutional:       Appearance: He is ill-appearing.   HENT:      Head: Normocephalic.   Eyes:      Extraocular Movements: Extraocular movements intact.   Cardiovascular:      Rate and Rhythm: Tachycardia present.   Pulmonary:      Effort: Pulmonary effort is normal. No respiratory distress.   Abdominal:      Palpations: Abdomen is soft. There is no mass.   Genitourinary:     Comments: Testes descended bilaterally with no abnormal masses or tenderness.  Epididymis, cord structures, and vas are normal and palpated bilaterally.  16 cc testicular volume bilaterally.    Musculoskeletal:         General: No swelling or deformity.      Cervical back: Normal range of motion.   Skin:     General: Skin is warm and dry.   Neurological:      General: No focal deficit present.      Mental Status: He is  alert.   Psychiatric:         Mood and Affect: Mood normal.         Behavior: Behavior normal.        Significant Labs:    BMP:  Recent Labs   Lab 06/08/23  0441 06/08/23  1347 06/10/23  0901 06/11/23  0548 06/12/23  0701      < > 138  139 134* 132*   K 2.3*   < > 4.0  4.0 4.6 4.9   CL  --    < > 105  106 106 105   CO2 27   < > 22*  20* 19* 19*   BUN 7.3*   < > 14  15 24* 22*   CREATININE 1.03   < > 1.1  1.0 1.3 1.1   GLUCOSE 95  --   --   --   --    CALCIUM 8.3*   < > 8.0*  8.1* 7.9* 7.8*    < > = values in this interval not displayed.       CBC:  Recent Labs   Lab 06/10/23  0901 06/11/23  0548 06/12/23  0701   WBC 40.46* 10.26 6.23   HGB 11.6* 11.0* 10.6*   HCT 37.4* 34.9* 33.5*   PLT 30* 25* 23*       All pertinent labs results from the past 24 hours have been reviewed.    Significant Imaging:  All pertinent imaging results/findings from the past 24 hours have been reviewed.

## 2023-06-12 NOTE — ASSESSMENT & PLAN NOTE
- Temp fo 101.4 at [facility]. Fevers persist since transfer.  - CXR neg. Blood culture ngtd. CDiff negative. U/a neg  - Strep, flu, and COVID neg at OSH  - Throat red with exudates. Tonsils mildly swollen. CT neck showing adenopathy but no abscesses.  - CT chest without consoidations  - Will check EBV  - Stopped Cefepime and started Zosyn. Continuing Vanc.  - Consulting ID

## 2023-06-12 NOTE — PROGRESS NOTES
Pharmacokinetic Assessment Follow Up: IV Vancomycin    Vancomycin serum concentration assessment(s):    The random level was drawn correctly and can be used to guide therapy at this time. The measurement is below 20mcg/mL.    Vancomycin Regimen Plan:    Change regimen to Vancomycin 1500 mg IV every 12 hours with next serum trough concentration measured at 60 minutes prior to the dose on 6/14 at ~13:00    Drug levels (last 3 results):  Recent Labs   Lab Result Units 06/11/23  2210 06/12/23  1139   Vancomycin, Random ug/mL  --  13.9   Vancomycin-Trough ug/mL 13.3  --        Pharmacy will continue to follow and monitor vancomycin.    Please contact pharmacy at extension [phone] for questions regarding this assessment.    Thank you for the consult,   Sania Peng       Patient brief summary:  Magdaleno Hirsch is a 22 y.o. male initiated on antimicrobial therapy with IV Vancomycin for treatment of neutropenic fever      Drug Allergies:   Review of patient's allergies indicates:  No Known Allergies    Actual Body Weight:   91.2kg    Renal Function:   Estimated Creatinine Clearance: 117 mL/min (based on SCr of 1.1 mg/dL).,     Dialysis Method (if applicable):  N/A    CBC (last 72 hours):  Recent Labs   Lab Result Units 06/10/23  0901 06/11/23  0548 06/12/23  0701   WBC K/uL 40.46* 10.26 6.23   Hemoglobin g/dL 11.6* 11.0* 10.6*   Hematocrit % 37.4* 34.9* 33.5*   Platelets K/uL 30* 25* 23*   Gran % % 6.0* 5.0* 6.0*   Lymph % % 14.0* 20.0 23.0   Mono % % 14.0 2.0* 6.0   Eosinophil % % 1.0 0.0 2.0   Basophil % % 0.0 5.0* 0.0   Differential Method  Manual Manual Manual       Metabolic Panel (last 72 hours):  Recent Labs   Lab Result Units 06/10/23  0901 06/11/23  0548 06/12/23  0701   Sodium mmol/L 138  139 134* 132*   Potassium mmol/L 4.0  4.0 4.6 4.9   Chloride mmol/L 105  106 106 105   CO2 mmol/L 22*  20* 19* 19*   Glucose mg/dL 104  105 117* 118*   BUN mg/dL 14  15 24* 22*   Creatinine mg/dL 1.1  1.0 1.3 1.1   Albumin g/dL  3.0*  3.0* 2.7* 2.6*   Total Bilirubin mg/dL 0.9  0.9 0.9 0.7   Alkaline Phosphatase U/L 61  62 58 56   AST U/L 39  40 33 26   ALT U/L 10  11 11 11   Magnesium mg/dL 2.0 2.1 2.3   Phosphorus mg/dL 3.5 3.8 2.8       Vancomycin Administrations:  vancomycin given in the last 96 hours                     vancomycin 1,500 mg in dextrose 5 % (D5W) 250 mL IVPB (Vial-Mate) (mg) 1,500 mg New Bag 06/12/23 0001    vancomycin 1,500 mg in dextrose 5 % (D5W) 250 mL IVPB (Vial-Mate) (mg) 1,500 mg New Bag 06/11/23 1101     1,500 mg New Bag 06/10/23 2121    vancomycin 2 g in dextrose 5 % 500 mL IVPB (mg) 2,000 mg New Bag 06/10/23 1030                    Microbiologic Results:  Microbiology Results (last 7 days)       Procedure Component Value Units Date/Time    Respiratory Infection Panel (PCR), Nasopharyngeal [[id number]]     Order Status: No result Specimen: Nasopharyngeal Swab     Blood culture [[id number]] Collected: 06/11/23 2000    Order Status: Completed Specimen: Blood Updated: 06/12/23 0315     Blood Culture, Routine No Growth to date    Blood culture [[id number]] Collected: 06/11/23 2000    Order Status: Completed Specimen: Blood Updated: 06/12/23 0315     Blood Culture, Routine No Growth to date    Blood culture [[id number]] Collected: 06/09/23 2134    Order Status: Completed Specimen: Blood Updated: 06/11/23 2212     Blood Culture, Routine No Growth to date      No Growth to date      No Growth to date    Blood culture [[id number]] Collected: 06/09/23 2134    Order Status: Completed Specimen: Blood Updated: 06/11/23 2212     Blood Culture, Routine No Growth to date      No Growth to date      No Growth to date    Clostridium difficile EIA [[id number]] Collected: 06/09/23 1346    Order Status: Completed Specimen: Stool Updated: 06/09/23 2136     C. diff Antigen Negative     C difficile Toxins A+B, EIA Negative     Comment: Testing not recommended for children <24 months old.       Respiratory Infection Panel (PCR),  Nasopharyngeal [[id number]] Collected: 06/09/23 1636    Order Status: Completed Specimen: Nasopharyngeal Swab Updated: 06/09/23 9961     Respiratory Infection Panel Source NP Swab     Adenovirus Not Detected     Coronavirus 229E, Common Cold Virus Not Detected     Coronavirus HKU1, Common Cold Virus Not Detected     Coronavirus NL63, Common Cold Virus Not Detected     Coronavirus OC43, Common Cold Virus Not Detected     Comment: The Coronavirus strains detected in this test cause the common cold.  These strains are not the COVID-19 (novel Coronavirus)strain   associated with the respiratory disease outbreak.          SARS-CoV2 (COVID-19) Qualitative PCR Not Detected     Human Metapneumovirus Not Detected     Human Rhinovirus/Enterovirus Not Detected     Influenza A (subtypes H1, H1-2009,H3) Not Detected     Influenza B Not Detected     Parainfluenza Virus 1 Not Detected     Parainfluenza Virus 2 Not Detected     Parainfluenza Virus 3 Not Detected     Parainfluenza Virus 4 Not Detected     Respiratory Syncytial Virus Not Detected     Bordetella Parapertussis (FH0078) Not Detected     Bordetella pertussis (ptxP) Not Detected     Chlamydia pneumoniae Not Detected     Mycoplasma pneumoniae Not Detected    Narrative:      For all other respiratory sources, order [id number] -  Respiratory Viral Panel by PCR

## 2023-06-12 NOTE — CONSULTS
Double lumen PICC to right basilic vein.  41 cm in length, 36 cm arm circumference and 0 cm exposed.   Lot # [id number].

## 2023-06-12 NOTE — HPI
21 y/o M h/o CML diagnosed in August 2022 and was on dasatinib presented to [facility] with c/o gum swelling on 6/7/23. Labs remarkable for WBC count of 138K, with 80% blasts (blast crisis), also functional neutropenic fevers, curve downtrending, CT chest with 3 small pulmonary nodules, CT neck negative, RIP negative, all cultures NGTD, most significant complaint is severe throat pain and oral uclers

## 2023-06-12 NOTE — CARE UPDATE
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    Last VS: BP (!) 127/58 (BP Location: Right arm, Patient Position: Lying)   Pulse (!) 133   Temp 100.2 °F (37.9 °C) (Oral)   Resp 16   Ht 6' 1" (1.854 m)   Wt 91.2 kg (200 lb 15.2 oz)   SpO2 95%   BMI 26.51 kg/m²     24H Vital Sign Range:  Temp:  [98.2 °F (36.8 °C)-101.7 °F (38.7 °C)]   Pulse:  [112-136]   Resp:  [15-18]   BP: (127-133)/(56-69)   SpO2:  [94 %-98 %]     Level of Consciousness (AVPU): alert    Recent Labs     06/10/23  0901 06/11/23  0548 06/12/23  0701   WBC 40.46* 10.26 6.23   HGB 11.6* 11.0* 10.6*   HCT 37.4* 34.9* 33.5*   PLT 30* 25* 23*       Recent Labs     06/10/23  0901 06/11/23  0548 06/12/23  0701     139 134* 132*   K 4.0  4.0 4.6 4.9     106 106 105   CO2 22*  20* 19* 19*   CREATININE 1.1  1.0 1.3 1.1     105 117* 118*   PHOS 3.5 3.8 2.8   MG 2.0 2.1 2.3        No results for input(s): PH, PCO2, PO2, HCO3, POCSATURATED, BE in the last 72 hours.     OXYGEN:             MEWS score: 4    charge RN, Aisha  contacted. No concerns verbalized at this time. Instructed to call [phone] for further concerns or assistance.    London Lozano RN        "

## 2023-06-12 NOTE — PLAN OF CARE
AAOx3, c/o pain. PRN oxy and mouth wash given for pain to mouth and throat. Tmax 101.7-prn tylenol given. Blood cx x2 collected. Pt's hr in the 120s. Team is aware. EKG done but no tele monitor ordered. Vanc trough collected last night. One dose of vanc ordered. Random vanc today @1200. IV cefepime continued. NS @100cc/hr. Bone marrow bx results pending. ECHO ordered. Pt able to position self independently.  Pt in lowest position, side rails up x2, non-skid foot wear in place, call light within reach, pt verbalized understanding to call RN when needed.  Hand hygiene practiced per protocol.  Will continue to monitor.

## 2023-06-12 NOTE — ASSESSMENT & PLAN NOTE
Patient is a 23 yo male who was diagnosed with CML with evidence of transformation to AML with plans to begin chemotherapy in the next few days. He has no children but wants to pursue sperm banking as he is interested in having children in the future.      - Provided patient with information on sperm cryopreservation.   - Facilitated appointment with sperm cryopreservation facility.     - [facility].  [address] [place] [id number].  - Discussed importance of obtaining semen sample prior to initiation of chemotherapy  - Labs ordered: HCV, HepB, chlamydia/gonorrhea, CMV, RPR  - Rest of care per primary.

## 2023-06-12 NOTE — ASSESSMENT & PLAN NOTE
23 y/o M h/o CML diagnosed in August 2022 and was on dasatinib presented to [facility] with c/o gum swelling on 6/7/23. Labs remarkable for WBC count of 138K, with 80% blasts (blast crisis), also functional neutropenic fevers, curve downtrending, CT chest with 3 small pulmonary nodules, CT neck negative, RIP negative, all cultures NGTD, most significant complaint is severe throat pain and oral ulcers.  Unclear if oral ulcers mucositis vs candida or HSV  - continue empiric piptazo, transition vancomycin to doxycycline, will cover mycoplasma  - continue acyclovir and fluconazole  - discussed with team will follow  - no absolute infectious contraindication to central line placement if needed

## 2023-06-12 NOTE — CONSULTS
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 Hirsch is a 22 y.o.  male w/ hx of CML and recent transformation to AML.  He presented to OSH initially with fevers, night sweats, and gum bleeding and was transferred to [facility] for further workup and higher level of care.  Urology consulted for sperm banking prior to inpatient chemo.      He has a partner and they both desire children in the future.  He has never achieved pregnancy in the past.   He denies a history of erectile dysfunction and ejaculatory problems.  His Tmax this admission is 103.1.      He denies a history of exposure to harmful chemicals, toxins, and radiation.No history of recent exposure to "wet heat."No history of urological trauma or testicular torsion.No history of prostatitis, epididymitis, and orchitis. No history of post-pubertal mumps. There is no known family history of fertility problems.      Past Medical History:   Diagnosis Date    CML (chronic myelocytic leukemia)     HVD (hypertensive vascular disease)     Oropharyngeal candidiasis        Past Surgical History:   Procedure Laterality Date    BONE MARROW BIOPSY N/A 8/22/2022    Procedure: Biopsy-bone marrow;  Surgeon: Getachew Chen MD;  Location: [facility];  Service: General;  Laterality: N/A;    KNEE SURGERY Left        Review of patient's allergies indicates:  No Known Allergies    Family History       Problem Relation (Age of Onset)    Cancer Maternal Grandmother    Hypertension Maternal Grandmother      "       Tobacco Use    Smoking status: Never    Smokeless tobacco: Never   Substance and Sexual Activity    Alcohol use: Never    Drug use: Yes     Types: Marijuana    Sexual activity: Not on file       Review of Systems   Constitutional:  Positive for fever.     Objective:     Temp:  [98.2 °F (36.8 °C)-101.7 °F (38.7 °C)] 100.8 °F (38.2 °C)  Pulse:  [119-136] 125  Resp:  [15-18] 16  SpO2:  [94 %-100 %] 96 %  BP: (127-145)/(56-68) 145/63  Weight: 91.2 kg (200 lb 15.2 oz)  Body mass index is 26.51 kg/m².           Drains       None                    Physical Exam  Constitutional:       Appearance: He is ill-appearing.   HENT:      Head: Normocephalic.   Eyes:      Extraocular Movements: Extraocular movements intact.   Cardiovascular:      Rate and Rhythm: Tachycardia present.   Pulmonary:      Effort: Pulmonary effort is normal. No respiratory distress.   Abdominal:      Palpations: Abdomen is soft. There is no mass.   Genitourinary:     Comments: Testes descended bilaterally with no abnormal masses or tenderness.  Epididymis, cord structures, and vas are normal and palpated bilaterally.  16 cc testicular volume bilaterally.    Musculoskeletal:         General: No swelling or deformity.      Cervical back: Normal range of motion.   Skin:     General: Skin is warm and dry.   Neurological:      General: No focal deficit present.      Mental Status: He is alert.   Psychiatric:         Mood and Affect: Mood normal.         Behavior: Behavior normal.        Significant Labs:    BMP:  Recent Labs   Lab 06/08/23  0441 06/08/23  1347 06/10/23  0901 06/11/23  0548 06/12/23  0701      < > 138  139 134* 132*   K 2.3*   < > 4.0  4.0 4.6 4.9   CL  --    < > 105  106 106 105   CO2 27   < > 22*  20* 19* 19*   BUN 7.3*   < > 14  15 24* 22*   CREATININE 1.03   < > 1.1  1.0 1.3 1.1   GLUCOSE 95  --   --   --   --    CALCIUM 8.3*   < > 8.0*  8.1* 7.9* 7.8*    < > = values in this interval not displayed.        CBC:  Recent Labs   Lab 06/10/23  0901 06/11/23  0548 06/12/23  0701   WBC 40.46* 10.26 6.23   HGB 11.6* 11.0* 10.6*   HCT 37.4* 34.9* 33.5*   PLT 30* 25* 23*       All pertinent labs results from the past 24 hours have been reviewed.    Significant Imaging:  All pertinent imaging results/findings from the past 24 hours have been reviewed.                      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 He has no children but wants to pursue sperm banking as he is interested in having children in the future.      - Provided patient with information on sperm cryopreservation.   - Facilitated appointment with sperm cryopreservation facility.     - [facility].  [address] [place], [place] [id number].  - Discussed importance of obtaining semen sample prior to initiation of chemotherapy  - Labs ordered: HCV, HepB, chlamydia/gonorrhea, CMV, RPR  - Rest of care per primary.          VTE Risk Mitigation (From admission, onward)           Ordered     IP VTE HIGH RISK PATIENT  Once         06/09/23 0049     Place sequential compression device  Until discontinued         06/09/23 0049                    Thank you for your consult.     Mary Pimentel MD  Urology  [facility] ([facility])

## 2023-06-12 NOTE — PROGRESS NOTES
[facility] - Transplant Stepdown  Hematology  Bone Marrow Transplant  Progress Note    Patient Name: Magdaleno Hirsch  Admission Date: 6/9/2023  Hospital Length of Stay: 3 days  Code Status: Full Code    Subjective:     Interval History: Flow suggestive of transformation of CML to AML. Tentatively planning for inpatient induction. Continues to have neutropenic fevers. Changed Cefepime to Zosyn and continuing vanc. Consulted ID. Will need ID clearance for line placement. RIP via nares neg. Will get RIP via throat swab given redness and exudates in throat. Also checking EBV. Urology consulted for purposes of fertility preservation. ANC down to 374 today. WBC count now normal. Hydrea stopped. TLS resolved. Changed allopurinol from BID to daily and stopped twice weekly TLS and DIC monitoring.    Objective:     Vital Signs (Most Recent):  Temp: (!) 100.7 °F (38.2 °C) (06/12/23 1110)  Pulse: (!) 128 (06/12/23 1110)  Resp: 18 (06/12/23 1110)  BP: (!) 141/65 (06/12/23 1110)  SpO2: 100 % (06/12/23 1110) Vital Signs (24h Range):  Temp:  [98.2 °F (36.8 °C)-101.7 °F (38.7 °C)] 100.7 °F (38.2 °C)  Pulse:  [112-136] 128  Resp:  [15-18] 18  SpO2:  [94 %-100 %] 100 %  BP: (127-141)/(56-69) 141/65     Weight: 91.2 kg (200 lb 15.2 oz)  Body mass index is 26.51 kg/m².  Body surface area is 2.17 meters squared.    ECOG SCORE           [unfilled]    Intake/Output - Last 3 Shifts         06/10 0700  06/11 0659 06/11 0700 06/12 0659 06/12 0700  06/13 0659    P.O. 1420 1200     I.V. (mL/kg) 1676.8 (18.5) 1697.4 (18.6)     Other 0 0     IV Piggyback 450      Total Intake(mL/kg) 3546.8 (39.2) 2897.4 (31.8)     Urine (mL/kg/hr) 700 (0.3) 0 (0)     Emesis/NG output 0 0     Other 0 0     Stool 0 0     Blood 0 0     Total Output 700 0     Net +2846.8 +2897.4            Urine Occurrence 1 x 3 x     Stool Occurrence 0 x 1 x     Emesis Occurrence 0 x 0 x             Physical Exam  Constitutional:       Appearance: He is well-developed.   HENT:      Head:  Normocephalic and atraumatic.      Mouth/Throat:      Pharynx: No oropharyngeal exudate.      Comments: Tonsillar swelling. Throat redness.  Gum swelling/bleeding.  Eyes:      General:         Right eye: No discharge.         Left eye: No discharge.      Conjunctiva/sclera: Conjunctivae normal.      Pupils: Pupils are equal, round, and reactive to light.   Cardiovascular:      Rate and Rhythm: Normal rate and regular rhythm.      Heart sounds: Normal heart sounds. No murmur heard.  Pulmonary:      Effort: Pulmonary effort is normal. No respiratory distress.      Breath sounds: Normal breath sounds. No wheezing or rales.   Abdominal:      General: Bowel sounds are normal. There is no distension.      Palpations: Abdomen is soft.      Tenderness: There is no abdominal tenderness.   Musculoskeletal:         General: No deformity. Normal range of motion.      Cervical back: Normal range of motion and neck supple.   Skin:     General: Skin is warm and dry.      Findings: No erythema or rash.   Neurological:      Mental Status: He is alert and oriented to person, place, and time.   Psychiatric:         Behavior: Behavior normal.         Thought Content: Thought content normal.         Judgment: Judgment normal.          Significant Labs:   CBC:   Recent Labs   Lab 06/11/23  0548 06/12/23  0701   WBC 10.26 6.23   HGB 11.0* 10.6*   HCT 34.9* 33.5*   PLT 25* 23*      and CMP:   Recent Labs   Lab 06/11/23  0548 06/12/23  0701   * 132*   K 4.6 4.9    105   CO2 19* 19*   * 118*   BUN 24* 22*   CREATININE 1.3 1.1   CALCIUM 7.9* 7.8*   PROT 6.0 6.1   ALBUMIN 2.7* 2.6*   BILITOT 0.9 0.7   ALKPHOS 58 56   AST 33 26   ALT 11 11   ANIONGAP 9 8         Diagnostic Results:  I have reviewed all pertinent imaging results/findings within the past 24 hours.    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 Brett Hogan.    - He has been on dasatinib outpatient since 10/2022.   - Some question regarding his compliance over the course of treatment, however.  - Presented to [facility] with c/o gum swelling on 6/7/23.   - Labs remarkable for WBC count of 138K, with 80% blasts indicating blast crisis.   - WBC count from approximately a week and a half earlier was normal. Reported recent compliance with his TKI.   - [facility] of [facility].  - Bone marrow Biopsy performed 6/9/23. Results pending, but flow suggestive of transformation to AML.  - Continue dasatinib and allopurinol 300 mg daily. Hydrea stopped with normal WBC count.  - BCR/ABL p210 collected on admission. Result pending.  - Daily CBC and twice TLS and DIC labs. Mild TLS on admission, but none at this time.  - Echo ordered  - Will plan for line placement once cleared by ID  - Tentatively planning for inpatient induction with 7+3  - Urology consulted for fertility preservation    Pancytopenia  - Functionally neutropenic despite leukocytosis, given low neutrophil count  - 2/2 disease and hydrea  - Daily CBC while inpatient  - Transfuse for hgb < 7 or plts < 10K  - Will start antimicrobial ppx with acyclovir and fluconazole and continue Cefepime for neutropenic fever.  - Will need to discharge on acyclovir, fluconazole, and levaquin ppx    Neutropenic fever  - Temp fo 101.4 at [place] ED. Fevers persist since transfer.  - CXR neg. Blood culture ngtd. CDiff negative. U/a neg  - Strep, flu, and COVID neg at OSH  - Throat red with exudates. Tonsils mildly swollen. CT neck showing adenopathy but no abscesses.  - CT chest without consoidations  - Will check EBV  - Stopped Cefepime and started Zosyn. Continuing Vanc.  - Consulting ID    Tumor lysis syndrome  - Uric acid and LDH elevated on presentation to [place] ED. Kidney function and electrolytes normal.  - Not G6PD deficient  - Changed allopurinol from 300 mg BID to 300 mg daily  - Twice TLS  labs while inpatient    Leukocytosis  - See blast crisis phase of CML    Hypokalemia  - May be 2/2 diarrhea.   - Repleting  - Daily CMP while inpatient    Diarrhea  - Reports multiple days of diarrhea prior to transfer  - C diff negative    Hypertension  - Takes amlodipine at home  - Given hypertension, amlodipine stopped and nifedipine and losartan started in [place]. Continued on transfer.        VTE Risk Mitigation (From admission, onward)         Ordered     IP VTE HIGH RISK PATIENT  Once         06/09/23 0049     Place sequential compression device  Until discontinued         06/09/23 0049                Disposition: Inpatient.    Brittany Hammond, NP  Bone Marrow Transplant  [facility] - Transplant Stepdown

## 2023-06-12 NOTE — HPI
"Mr. Hirsch is a 22 y.o.  male w/ hx of CML and recent transformation to AML.  He presented to OSH initially with fevers, night sweats, and gum bleeding and was transferred to [facility] for further workup and higher level of care.  Urology consulted for sperm banking prior to inpatient chemo.      He has a partner and they both desire children in the future.  He has never achieved pregnancy in the past.   He denies a history of erectile dysfunction and ejaculatory problems.  His Tmax this admission is 103.1.      He denies a history of exposure to harmful chemicals, toxins, and radiation.No history of recent exposure to "wet heat."No history of urological trauma or testicular torsion.No history of prostatitis, epididymitis, and orchitis. No history of post-pubertal mumps. There is no known family history of fertility problems.  "

## 2023-06-12 NOTE — PROCEDURES
"Magdaleno Hirsch is a 22 y.o. male patient.    Temp: (!) 100.8 °F (38.2 °C) (06/12/23 1226)  Pulse: (!) 125 (06/12/23 1226)  Resp: 16 (06/12/23 1226)  BP: (!) 145/63 (06/12/23 1226)  SpO2: 96 % (06/12/23 1226)  Weight: 91.2 kg (200 lb 15.2 oz) (06/12/23 0400)  Height: 6' 1" (185.4 cm) (06/09/23 0045)    PICC  Date/Time: 6/12/2023 2:37 PM  Performed by: Jose Ramon Arnold RN  Assisting provider: Angelia Kern RN  Consent Done: Yes  Time out: Immediately prior to procedure a time out was called to verify the correct patient, procedure, equipment, support staff and site/side marked as required  Indications: med administration and vascular access  Anesthesia: local infiltration  Local anesthetic: lidocaine 1% without epinephrine  Anesthetic Total (mL): 3  Description of findings: PICC  Preparation: skin prepped with ChloraPrep  Skin prep agent dried: skin prep agent completely dried prior to procedure  Sterile barriers: all five maximum sterile barriers used - cap, mask, sterile gown, sterile gloves, and large sterile sheet  Hand hygiene: hand hygiene performed prior to central venous catheter insertion  Location details: right basilic  Catheter type: double lumen  Catheter size: 5 Fr  Catheter Length: 41cm    Ultrasound guidance: yes  Vessel Caliber: large and patent, compressibility normal  Vascular Doppler: not done  Needle advanced into vessel with real time Ultrasound guidance.  Guidewire confirmed in vessel.  Image recorded and saved.  Sterile sheath used.  no esophageal manometryNumber of attempts: 1  Post-procedure: blood return through all ports, sterile dressing applied and chlorhexidine patch  Technical procedures used: 3cg  Estimated blood loss (mL): 0  Specimens: No  Implants: No  Assessment: placement verified by x-ray  Complications: none        Name   6/12/2023    "

## 2023-06-12 NOTE — ASSESSMENT & PLAN NOTE
- Takes amlodipine at home  - Given hypertension, amlodipine stopped and nifedipine and losartan started in [place]. Continued on transfer.

## 2023-06-12 NOTE — ASSESSMENT & PLAN NOTE
- Functionally neutropenic despite leukocytosis, given low neutrophil count  - 2/2 disease and hydrea  - Daily CBC while inpatient  - Transfuse for hgb < 7 or plts < 10K  - Will start antimicrobial ppx with acyclovir and fluconazole and continue Cefepime for neutropenic fever.  - Will need to discharge on acyclovir, fluconazole, and levaquin ppx

## 2023-06-12 NOTE — CONSULTS
[facility] ([facility])  Infectious Disease  Consult Note    Patient Name: Magdaleno Hirsch  MRN: [id number]  Admission Date: 6/9/2023  Hospital Length of Stay: 3 days  Attending Physician: Denny Barajas MD  Primary Care Provider: Primary Doctor No     Isolation Status: No active isolations    Patient information was obtained from patient and ER records.      Inpatient consult to Infectious Diseases  Consult performed by: Madi Collado MD  Consult ordered by: Brittany Hammond NP  Reason for consult: FN        Assessment/Plan:     Oncology  Neutropenic fever  23 y/o M h/o CML diagnosed in August 2022 and was on dasatinib presented to [facility] with c/o gum swelling on 6/7/23. Labs remarkable for WBC count of 138K, with 80% blasts (blast crisis), also functional neutropenic fevers, curve downtrending, CT chest with 3 small pulmonary nodules, CT neck negative, RIP negative, all cultures NGTD, most significant complaint is severe throat pain and oral ulcers.  Unclear if oral ulcers mucositis vs candida or HSV  - continue empiric piptazo, transition vancomycin to doxycycline, will cover mycoplasma  - continue acyclovir and fluconazole  - discussed with team will follow  - no absolute infectious contraindication to central line placement if needed        Thank you for your consult. I will follow-up with patient. Please contact us if you have any additional questions.    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 Labs remarkable for WBC count of 138K, with 80% blasts (blast crisis), also functional neutropenic fevers, curve downtrending, CT chest with 3 small pulmonary nodules, CT neck negative, RIP negative, all cultures NGTD, most significant  complaint is severe throat pain and oral uclers      Past Medical History:   Diagnosis Date    CML (chronic myelocytic leukemia)     HVD (hypertensive vascular disease)     Oropharyngeal candidiasis        Past Surgical History:   Procedure Laterality Date    BONE MARROW BIOPSY N/A 8/22/2022    Procedure: Biopsy-bone marrow;  Surgeon: Getachew Chen MD;  Location: [facility];  Service: General;  Laterality: N/A;    KNEE SURGERY Left        Review of patient's allergies indicates:  No Known Allergies    Medications:  Medications Prior to Admission   Medication Sig    amLODIPine (NORVASC) 10 MG tablet Take 1 tablet (10 mg total) by mouth once daily.    dasatinib (SPRYCEL) 100 mg Take 1 tablet (100 mg total) by mouth once daily.    ondansetron (ZOFRAN) 8 MG tablet Take 1 tablet (8 mg total) by mouth every 6 (six) hours as needed for Nausea.     Antibiotics (From admission, onward)      Start     Stop Route Frequency Ordered    06/12/23 2100  doxycycline tablet 100 mg         -- Oral Every 12 hours 06/12/23 1353    06/12/23 1030  piperacillin-tazobactam (ZOSYN) 4.5 g in dextrose 5 % in water (D5W) 5 % 100 mL IVPB (MB+)         -- IV Every 8 hours (non-standard times) 06/12/23 0926          Antifungals (From admission, onward)      Start     Stop Route Frequency Ordered    06/09/23 0900  fluconazole tablet 400 mg         -- Oral Daily 06/09/23 0756          Antivirals (From admission, onward)          Stop Route Frequency     acyclovir         -- Oral 2 times daily               There is no immunization history on file for this patient.    Family History       Problem Relation (Age of Onset)    Cancer Maternal Grandmother    Hypertension Maternal Grandmother          Social History     Socioeconomic History    Marital status: Single   Tobacco Use    Smoking status: Never    Smokeless tobacco: Never   Substance and Sexual Activity    Alcohol use: Never    Drug use: Yes     Types: Marijuana     Review of Systems    Constitutional:  Positive for activity change, appetite change, fatigue and fever. Negative for chills.   HENT:  Positive for mouth sores. Negative for congestion, dental problem and sinus pressure.    Eyes:  Negative for pain, redness and visual disturbance.   Respiratory:  Negative for cough, shortness of breath and wheezing.    Cardiovascular:  Negative for chest pain and leg swelling.   Gastrointestinal:  Negative for abdominal distention, abdominal pain, diarrhea, nausea and vomiting.   Endocrine: Negative for polyuria.   Genitourinary:  Negative for decreased urine volume, dysuria and frequency.   Musculoskeletal:  Negative for joint swelling.   Skin:  Negative for color change.   Allergic/Immunologic: Negative for food allergies.   Neurological:  Negative for dizziness, weakness and headaches.   Hematological:  Negative for adenopathy.   Psychiatric/Behavioral:  Negative for agitation and confusion. The patient is not nervous/anxious.    Objective:     Vital Signs (Most Recent):  Temp: (!) 102.3 °F (39.1 °C) (06/12/23 1554)  Pulse: (!) 136 (06/12/23 1554)  Resp: 18 (06/12/23 1554)  BP: 130/61 (06/12/23 1554)  SpO2: 95 % (06/12/23 1554) Vital Signs (24h Range):  Temp:  [99 °F (37.2 °C)-102.3 °F (39.1 °C)] 102.3 °F (39.1 °C)  Pulse:  [119-136] 136  Resp:  [15-18] 18  SpO2:  [94 %-100 %] 95 %  BP: (127-145)/(56-65) 130/61     Weight: 91.2 kg (200 lb 15.2 oz)  Body mass index is 26.51 kg/m².    Estimated Creatinine Clearance: 117 mL/min (based on SCr of 1.1 mg/dL).     Physical Exam  Constitutional:       Appearance: He is well-developed.   HENT:      Head: Normocephalic and atraumatic.      Mouth/Throat:      Comments: Severe ulcerative disease most in soft palate, cervical LAD+  Eyes:      Conjunctiva/sclera: Conjunctivae normal.   Cardiovascular:      Rate and Rhythm: Normal rate and regular rhythm.      Heart sounds: Normal heart sounds. No murmur heard.  Pulmonary:      Effort: Pulmonary effort is normal.  No respiratory distress.      Breath sounds: Normal breath sounds. No wheezing.   Abdominal:      General: Bowel sounds are normal. There is no distension.      Palpations: Abdomen is soft.      Tenderness: There is no abdominal tenderness.   Musculoskeletal:         General: No tenderness. Normal range of motion.      Cervical back: Neck supple.   Lymphadenopathy:      Cervical: No cervical adenopathy.   Skin:     General: Skin is warm and dry.      Findings: No rash.   Neurological:      Mental Status: He is alert and oriented to person, place, and time.      Coordination: Coordination normal.   Psychiatric:         Behavior: Behavior normal.        Significant Labs: CBC:   Recent Labs   Lab 06/11/23  0548 06/12/23  0701   WBC 10.26 6.23   HGB 11.0* 10.6*   HCT 34.9* 33.5*   PLT 25* 23*     CMP:   Recent Labs   Lab 06/11/23  0548 06/12/23  0701   * 132*   K 4.6 4.9    105   CO2 19* 19*   * 118*   BUN 24* 22*   CREATININE 1.3 1.1   CALCIUM 7.9* 7.8*   PROT 6.0 6.1   ALBUMIN 2.7* 2.6*   BILITOT 0.9 0.7   ALKPHOS 58 56   AST 33 26   ALT 11 11   ANIONGAP 9 8       Significant Imaging: I have reviewed all pertinent imaging results/findings within the past 24 hours.

## 2023-06-13 LAB
ADENOVIRUS: NOT DETECTED
ALBUMIN SERPL BCP-MCNC: 2.4 G/DL (ref 3.5–5.2)
ALP SERPL-CCNC: 58 U/L (ref 55–135)
ALT SERPL W/O P-5'-P-CCNC: 11 U/L (ref 10–44)
ANION GAP SERPL CALC-SCNC: 9 MMOL/L (ref 8–16)
ANISOCYTOSIS BLD QL SMEAR: SLIGHT
ASCENDING AORTA: 2.23 CM
AST SERPL-CCNC: 25 U/L (ref 10–40)
AV INDEX (PROSTH): 0.79
AV MEAN GRADIENT: 10 MMHG
AV PEAK GRADIENT: 20 MMHG
AV VALVE AREA: 2.56 CM2
AV VELOCITY RATIO: 0.7
BACTERIA BLD CULT: NORMAL
BACTERIA BLD CULT: NORMAL
BASOPHILS # BLD AUTO: ABNORMAL K/UL (ref 0–0.2)
BASOPHILS NFR BLD: 0 % (ref 0–1.9)
BCR/ABL RESULT, P190, QUANT, BLD: NORMAL
BCR/ABL,P210 RESULT: NORMAL
BCR/ABL,P210 RESULT: NORMAL
BILIRUB SERPL-MCNC: 0.6 MG/DL (ref 0.1–1)
BORDETELLA PARAPERTUSSIS (IS1001): NOT DETECTED
BORDETELLA PERTUSSIS (PTXP): NOT DETECTED
BSA FOR ECHO PROCEDURE: 2.16 M2
BUN SERPL-MCNC: 16 MG/DL (ref 6–20)
CALCIUM SERPL-MCNC: 8.2 MG/DL (ref 8.7–10.5)
CHLAMYDIA PNEUMONIAE: NOT DETECTED
CHLORIDE SERPL-SCNC: 107 MMOL/L (ref 95–110)
CO2 SERPL-SCNC: 18 MMOL/L (ref 23–29)
CORONAVIRUS 229E, COMMON COLD VIRUS: NOT DETECTED
CORONAVIRUS HKU1, COMMON COLD VIRUS: NOT DETECTED
CORONAVIRUS NL63, COMMON COLD VIRUS: NOT DETECTED
CORONAVIRUS OC43, COMMON COLD VIRUS: NOT DETECTED
CREAT SERPL-MCNC: 1 MG/DL (ref 0.5–1.4)
CV ECHO LV RWT: 0.48 CM
DIFFERENTIAL METHOD: ABNORMAL
DOP CALC AO PEAK VEL: 2.25 M/S
DOP CALC AO VTI: 29.87 CM
DOP CALC LVOT AREA: 3.2 CM2
DOP CALC LVOT DIAMETER: 2.03 CM
DOP CALC LVOT PEAK VEL: 1.57 M/S
DOP CALC LVOT STROKE VOLUME: 76.41 CM3
DOP CALCLVOT PEAK VEL VTI: 23.62 CM
E WAVE DECELERATION TIME: 155.82 MSEC
E/A RATIO: 1.61
E/E' RATIO: 5.38 M/S
EBV DNA SERPL NAA+PROBE-ACNC: NORMAL IU/ML
ECHO LV POSTERIOR WALL: 1.18 CM (ref 0.6–1.1)
EJECTION FRACTION: 65 %
EOSINOPHIL # BLD AUTO: ABNORMAL K/UL (ref 0–0.5)
EOSINOPHIL NFR BLD: 0 % (ref 0–8)
ERYTHROCYTE [DISTWIDTH] IN BLOOD BY AUTOMATED COUNT: 19.9 % (ref 11.5–14.5)
EST. GFR  (NO RACE VARIABLE): >60 ML/MIN/1.73 M^2
FLT3 RESULT: NORMAL
FLUBV RNA NPH QL NAA+NON-PROBE: NOT DETECTED
FRACTIONAL SHORTENING: 34 % (ref 28–44)
GLUCOSE SERPL-MCNC: 104 MG/DL (ref 70–110)
HCT VFR BLD AUTO: 32.2 % (ref 40–54)
HCV RNA SERPL QL NAA+PROBE: NOT DETECTED
HCV RNA SPEC NAA+PROBE-ACNC: <12 IU/ML
HGB BLD-MCNC: 10.3 G/DL (ref 14–18)
HPIV1 RNA NPH QL NAA+NON-PROBE: NOT DETECTED
HPIV2 RNA NPH QL NAA+NON-PROBE: NOT DETECTED
HPIV3 RNA NPH QL NAA+NON-PROBE: NOT DETECTED
HPIV4 RNA NPH QL NAA+NON-PROBE: NOT DETECTED
HUMAN METAPNEUMOVIRUS: NOT DETECTED
HYPOCHROMIA BLD QL SMEAR: ABNORMAL
IMM GRANULOCYTES # BLD AUTO: ABNORMAL K/UL (ref 0–0.04)
IMM GRANULOCYTES NFR BLD AUTO: ABNORMAL % (ref 0–0.5)
INFLUENZA A (SUBTYPES H1,H1-2009,H3): NOT DETECTED
INTERVENTRICULAR SEPTUM: 0.72 CM (ref 0.6–1.1)
IVRT: 37.11 MSEC
LA MAJOR: 5.2 CM
LA MINOR: 5.24 CM
LA WIDTH: 4.42 CM
LEFT ATRIUM SIZE: 3.14 CM
LEFT ATRIUM VOLUME INDEX MOD: 25.2 ML/M2
LEFT ATRIUM VOLUME INDEX: 28.6 ML/M2
LEFT ATRIUM VOLUME MOD: 54.2 CM3
LEFT ATRIUM VOLUME: 61.58 CM3
LEFT INTERNAL DIMENSION IN SYSTOLE: 3.22 CM (ref 2.1–4)
LEFT VENTRICLE DIASTOLIC VOLUME INDEX: 51.99 ML/M2
LEFT VENTRICLE DIASTOLIC VOLUME: 111.77 ML
LEFT VENTRICLE MASS INDEX: 76 G/M2
LEFT VENTRICLE SYSTOLIC VOLUME INDEX: 19.3 ML/M2
LEFT VENTRICLE SYSTOLIC VOLUME: 41.45 ML
LEFT VENTRICULAR INTERNAL DIMENSION IN DIASTOLE: 4.88 CM (ref 3.5–6)
LEFT VENTRICULAR MASS: 163.22 G
LV LATERAL E/E' RATIO: 4.52 M/S
LV SEPTAL E/E' RATIO: 6.65 M/S
LYMPHOCYTES # BLD AUTO: ABNORMAL K/UL (ref 1–4.8)
LYMPHOCYTES NFR BLD: 29 % (ref 18–48)
MAGNESIUM SERPL-MCNC: 2.1 MG/DL (ref 1.6–2.6)
MCH RBC QN AUTO: 22.7 PG (ref 27–31)
MCHC RBC AUTO-ENTMCNC: 32 G/DL (ref 32–36)
MCV RBC AUTO: 71 FL (ref 82–98)
MONOCYTES # BLD AUTO: ABNORMAL K/UL (ref 0.3–1)
MONOCYTES NFR BLD: 0 % (ref 4–15)
MV A" WAVE DURATION": 8.85 MSEC
MV PEAK A VEL: 0.7 M/S
MV PEAK E VEL: 1.13 M/S
MV STENOSIS PRESSURE HALF TIME: 45.19 MS
MV VALVE AREA P 1/2 METHOD: 4.87 CM2
MYCOPLASMA PNEUMONIAE: NOT DETECTED
NEUTROPHILS NFR BLD: 1 % (ref 38–73)
NRBC BLD-RTO: 0 /100 WBC
OVALOCYTES BLD QL SMEAR: ABNORMAL
PATH REPORT.FINAL DX SPEC: NORMAL
PHOSPHATE SERPL-MCNC: 2.5 MG/DL (ref 2.7–4.5)
PISA TR MAX VEL: 3.18 M/S
PLATELET # BLD AUTO: 21 K/UL (ref 150–450)
PLATELET BLD QL SMEAR: ABNORMAL
PMV BLD AUTO: ABNORMAL FL (ref 9.2–12.9)
POIKILOCYTOSIS BLD QL SMEAR: SLIGHT
POLYCHROMASIA BLD QL SMEAR: ABNORMAL
POTASSIUM SERPL-SCNC: 4.3 MMOL/L (ref 3.5–5.1)
PROT SERPL-MCNC: 6.2 G/DL (ref 6–8.4)
PULM VEIN S/D RATIO: 1.32
PV PEAK D VEL: 0.53 M/S
PV PEAK S VEL: 0.7 M/S
RA MAJOR: 5.14 CM
RA PRESSURE: 15 MMHG
RA WIDTH: 4.01 CM
RBC # BLD AUTO: 4.53 M/UL (ref 4.6–6.2)
RESPIRATORY INFECTION PANEL SOURCE: NORMAL
RIGHT VENTRICULAR END-DIASTOLIC DIMENSION: 4.16 CM
RPR SER QL: NORMAL
RSV RNA NPH QL NAA+NON-PROBE: NOT DETECTED
RV+EV RNA NPH QL NAA+NON-PROBE: NOT DETECTED
SARS-COV-2 RNA RESP QL NAA+PROBE: NOT DETECTED
SINUS: 2.22 CM
SODIUM SERPL-SCNC: 134 MMOL/L (ref 136–145)
SPECIMEN TYPE, P190, QUANT, BLD: NORMAL
SPECIMEN TYPE: NORMAL
STJ: 2.08 CM
TDI LATERAL: 0.25 M/S
TDI SEPTAL: 0.17 M/S
TDI: 0.21 M/S
TR MAX PG: 40 MMHG
TRICUSPID ANNULAR PLANE SYSTOLIC EXCURSION: 2.66 CM
TV REST PULMONARY ARTERY PRESSURE: 55 MMHG
WBC # BLD AUTO: 9.76 K/UL (ref 3.9–12.7)
WBC OTHER NFR BLD MANUAL: 70 %

## 2023-06-13 PROCEDURE — 25000003 PHARM REV CODE 250: Performed by: NURSE PRACTITIONER

## 2023-06-13 PROCEDURE — 63600175 PHARM REV CODE 636 W HCPCS: Performed by: NURSE PRACTITIONER

## 2023-06-13 PROCEDURE — 25000003 PHARM REV CODE 250: Performed by: INTERNAL MEDICINE

## 2023-06-13 PROCEDURE — 85027 COMPLETE CBC AUTOMATED: CPT | Performed by: NURSE PRACTITIONER

## 2023-06-13 PROCEDURE — A4216 STERILE WATER/SALINE, 10 ML: HCPCS | Performed by: INTERNAL MEDICINE

## 2023-06-13 PROCEDURE — 20600001 HC STEP DOWN PRIVATE ROOM

## 2023-06-13 PROCEDURE — 25000003 PHARM REV CODE 250

## 2023-06-13 PROCEDURE — 83735 ASSAY OF MAGNESIUM: CPT | Performed by: NURSE PRACTITIONER

## 2023-06-13 PROCEDURE — 99233 SBSQ HOSP IP/OBS HIGH 50: CPT | Mod: ,,, | Performed by: INTERNAL MEDICINE

## 2023-06-13 PROCEDURE — 99233 PR SUBSEQUENT HOSPITAL CARE,LEVL III: ICD-10-PCS | Mod: ,,, | Performed by: INTERNAL MEDICINE

## 2023-06-13 PROCEDURE — 25000003 PHARM REV CODE 250: Performed by: STUDENT IN AN ORGANIZED HEALTH CARE EDUCATION/TRAINING PROGRAM

## 2023-06-13 PROCEDURE — 84100 ASSAY OF PHOSPHORUS: CPT | Performed by: NURSE PRACTITIONER

## 2023-06-13 PROCEDURE — 85007 BL SMEAR W/DIFF WBC COUNT: CPT | Performed by: NURSE PRACTITIONER

## 2023-06-13 PROCEDURE — 80053 COMPREHEN METABOLIC PANEL: CPT | Performed by: NURSE PRACTITIONER

## 2023-06-13 PROCEDURE — 87798 DETECT AGENT NOS DNA AMP: CPT | Performed by: STUDENT IN AN ORGANIZED HEALTH CARE EDUCATION/TRAINING PROGRAM

## 2023-06-13 RX ORDER — OXYCODONE HYDROCHLORIDE 5 MG/1
5 TABLET ORAL EVERY 4 HOURS PRN
Status: DISCONTINUED | OUTPATIENT
Start: 2023-06-13 | End: 2023-06-29

## 2023-06-13 RX ORDER — ACETAMINOPHEN 325 MG/1
650 TABLET ORAL ONCE
Status: COMPLETED | OUTPATIENT
Start: 2023-06-13 | End: 2023-06-13

## 2023-06-13 RX ADMIN — ALLOPURINOL 300 MG: 300 TABLET ORAL at 09:06

## 2023-06-13 RX ADMIN — DIPHENHYDRAMINE HYDROCHLORIDE 10 ML: 25 SOLUTION ORAL at 04:06

## 2023-06-13 RX ADMIN — SODIUM CHLORIDE: 9 INJECTION, SOLUTION INTRAVENOUS at 11:06

## 2023-06-13 RX ADMIN — PIPERACILLIN SODIUM AND TAZOBACTAM SODIUM 4.5 G: 4; .5 INJECTION, POWDER, FOR SOLUTION INTRAVENOUS at 02:06

## 2023-06-13 RX ADMIN — NIFEDIPINE 60 MG: 60 TABLET, FILM COATED, EXTENDED RELEASE ORAL at 10:06

## 2023-06-13 RX ADMIN — LOSARTAN POTASSIUM 50 MG: 50 TABLET, FILM COATED ORAL at 10:06

## 2023-06-13 RX ADMIN — OXYCODONE HYDROCHLORIDE 5 MG: 5 TABLET ORAL at 04:06

## 2023-06-13 RX ADMIN — GUAIFENESIN AND DEXTROMETHORPHAN HYDROBROMIDE 1 TABLET: 600; 30 TABLET, EXTENDED RELEASE ORAL at 08:06

## 2023-06-13 RX ADMIN — Medication 10 ML: at 05:06

## 2023-06-13 RX ADMIN — ACYCLOVIR 400 MG: 200 CAPSULE ORAL at 08:06

## 2023-06-13 RX ADMIN — PIPERACILLIN SODIUM AND TAZOBACTAM SODIUM 4.5 G: 4; .5 INJECTION, POWDER, FOR SOLUTION INTRAVENOUS at 11:06

## 2023-06-13 RX ADMIN — SODIUM CHLORIDE: 9 INJECTION, SOLUTION INTRAVENOUS at 12:06

## 2023-06-13 RX ADMIN — GUAIFENESIN AND DEXTROMETHORPHAN HYDROBROMIDE 1 TABLET: 600; 30 TABLET, EXTENDED RELEASE ORAL at 10:06

## 2023-06-13 RX ADMIN — DOXYCYCLINE HYCLATE 100 MG: 100 TABLET, COATED ORAL at 08:06

## 2023-06-13 RX ADMIN — DOXYCYCLINE HYCLATE 100 MG: 100 TABLET, COATED ORAL at 10:06

## 2023-06-13 RX ADMIN — FLUCONAZOLE 400 MG: 200 TABLET ORAL at 10:06

## 2023-06-13 RX ADMIN — ACYCLOVIR 400 MG: 200 CAPSULE ORAL at 10:06

## 2023-06-13 RX ADMIN — SODIUM CHLORIDE: 9 INJECTION, SOLUTION INTRAVENOUS at 10:06

## 2023-06-13 RX ADMIN — DASATINIB 100 MG: 100 TABLET ORAL at 10:06

## 2023-06-13 RX ADMIN — PIPERACILLIN SODIUM AND TAZOBACTAM SODIUM 4.5 G: 4; .5 INJECTION, POWDER, FOR SOLUTION INTRAVENOUS at 06:06

## 2023-06-13 RX ADMIN — OXYCODONE HYDROCHLORIDE 5 MG: 5 TABLET ORAL at 08:06

## 2023-06-13 RX ADMIN — Medication 10 ML: at 11:06

## 2023-06-13 RX ADMIN — ACETAMINOPHEN 650 MG: 325 TABLET ORAL at 05:06

## 2023-06-13 RX ADMIN — Medication 10 ML: at 06:06

## 2023-06-13 NOTE — ASSESSMENT & PLAN NOTE
- Uric acid and LDH elevated on presentation to [place] ED. Kidney function and electrolytes normal.  - Not G6PD deficient  - Changed allopurinol from 300 mg BID to 300 mg daily  - Twice TLS labs while inpatient

## 2023-06-13 NOTE — PLAN OF CARE
Plan of care reviewed with patient. Pt remained afebrile this shift. Only complaint today was continued pain r/t mucositis. Per MD note, plan to start chemo soon. VSS, q1h patient rounds, bed in low position and wheels locked, rails up x2, call light and personal belongings within reach.    Clinton Lester   6/13/2023   6:55 PM

## 2023-06-13 NOTE — PROGRESS NOTES
[facility] Oncology ([facility])  Hematology  Bone Marrow Transplant  Progress Note    Patient Name: Magdaleno Hirsch  Admission Date: 6/9/2023  Hospital Length of Stay: 4 days  Code Status: Full Code    Subjective:     Interval History: Febrile throughout the night and tachycardic    Objective:     Vital Signs (Most Recent):  Temp: (!) 100.8 °F (38.2 °C) (06/13/23 0521)  Pulse: (!) 127 (06/13/23 0410)  Resp: 18 (06/13/23 0416)  BP: 129/62 (06/13/23 0410)  SpO2: 97 % (06/13/23 0410) Vital Signs (24h Range):  Temp:  [100.2 °F (37.9 °C)-102.3 °F (39.1 °C)] 100.8 °F (38.2 °C)  Pulse:  [125-136] 127  Resp:  [16-18] 18  SpO2:  [95 %-100 %] 97 %  BP: (120-145)/(58-65) 129/62     Weight: 91.2 kg (200 lb 15.2 oz)  Body mass index is 26.51 kg/m².  Body surface area is 2.17 meters squared.    ECOG SCORE           [unfilled]    Intake/Output - Last 3 Shifts         06/11 0700  06/12 0659 06/12 0700  06/13 0659    P.O. 1200     I.V. (mL/kg) 1697.4 (18.6)     Other 0     Total Intake(mL/kg) 2897.4 (31.8)     Urine (mL/kg/hr) 0 (0)     Emesis/NG output 0     Other 0     Stool 0     Blood 0     Total Output 0     Net +2897.4           Urine Occurrence 3 x     Stool Occurrence 1 x     Emesis Occurrence 0 x              Physical Exam  Constitutional:       Appearance: He is well-developed.   HENT:      Head: Normocephalic and atraumatic.      Mouth/Throat:      Comments: Severe ulcerative disease most in soft palate, cervical LAD+  Eyes:      Conjunctiva/sclera: Conjunctivae normal.   Cardiovascular:      Rate and Rhythm: Normal rate and regular rhythm.      Heart sounds: Normal heart sounds. No murmur heard.  Pulmonary:      Effort: Pulmonary effort is normal. No respiratory distress.      Breath sounds: Normal breath sounds. No wheezing.   Abdominal:      General: Bowel sounds are normal. There is no distension.      Palpations: Abdomen is soft.      Tenderness: There is no abdominal tenderness.   Musculoskeletal:         General: No  tenderness. Normal range of motion.      Cervical back: Neck supple.   Lymphadenopathy:      Cervical: No cervical adenopathy.   Skin:     General: Skin is warm and dry.      Findings: No rash.   Neurological:      Mental Status: He is alert and oriented to person, place, and time.      Coordination: Coordination normal.   Psychiatric:         Behavior: Behavior normal.          Significant Labs:   All pertinent labs from the last 24 hours have been reviewed.    Diagnostic Results:  I have reviewed all pertinent imaging results/findings within the past 24 hours.    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 Brett Hogan.   - He has been on dasatinib outpatient since 10/2022.   - Some question regarding his compliance over the course of treatment, however.  - Presented to [facility] with c/o gum swelling on 6/7/23.   - Labs remarkable for WBC count of 138K, with 80% blasts indicating blast crisis.   - WBC count from approximately a week and a half earlier was normal. Reported recent compliance with his TKI.   - Transferred to [facility] for higher level of care.  - Bone marrow Biopsy performed 6/9/23. Results pending, but flow suggestive of transformation to AML.  - Continue dasatinib and allopurinol 300 mg daily. Hydrea stopped with normal WBC count.  - BCR/ABL p210 collected on admission. Result pending.  - Daily CBC and twice TLS and DIC labs. Mild TLS on admission, but none at this time.  - Echo ordered  - Picc line placed  - Tentatively planning for inpatient induction with 7+3  - Urology consulted for fertility preservation    Leukocytosis  - See blast crisis phase of CML    Hypokalemia  - May be 2/2 diarrhea.   - Repleting  - Daily CMP while inpatient    Diarrhea  - Reports multiple days of diarrhea prior to transfer  - C diff negative    Neutropenic fever  - Temp fo 101.4 at [place] ED. Fevers  persist since transfer.  - CXR neg. Blood culture ngtd. CDiff negative. U/a neg  - Strep, flu, and COVID neg at OSH  - Throat red with exudates. Tonsils mildly swollen. CT neck showing adenopathy but no abscesses.  - CT chest without consoidations  - Will check EBV  - Stopped Cefepime and started Zosyn. Continuing Vanc.  - Consulting ID    Pancytopenia  - Functionally neutropenic despite leukocytosis, given low neutrophil count  - 2/2 disease and hydrea  - Daily CBC while inpatient  - Transfuse for hgb < 7 or plts < 10K  - Will start antimicrobial ppx with acyclovir and fluconazole and continue Cefepime for neutropenic fever.  - Will need to discharge on acyclovir, fluconazole, and levaquin ppx    Hypertension  - Takes amlodipine at home  - Given hypertension, amlodipine stopped and nifedipine and losartan started in [place]. Continued on transfer.    Tumor lysis syndrome  - Uric acid and LDH elevated on presentation to [place] ED. Kidney function and electrolytes normal.  - Not G6PD deficient  - Changed allopurinol from 300 mg BID to 300 mg daily  - Twice TLS labs while inpatient        VTE Risk Mitigation (From admission, onward)           Ordered     IP VTE HIGH RISK PATIENT  Once         06/09/23 0049     Place sequential compression device  Until discontinued         06/09/23 0049                    Disposition: Home    Gus Posadas MD  Bone Marrow Transplant  [facility] - Oncology ([facility])

## 2023-06-13 NOTE — ASSESSMENT & PLAN NOTE
- Takes amlodipine at home  - Given hypertension, amlodipine stopped and nifedipine and losartan started in [place]. Continued on transfer.

## 2023-06-13 NOTE — ASSESSMENT & PLAN NOTE
- Patient with CML diagnosed 8/2022   - Follows locally in [place] with Dr. Brett Hogan.   - He has been on dasatinib outpatient since 10/2022.   - Some question regarding his compliance over the course of treatment, however.  - Presented to [facility] with c/o gum swelling on 6/7/23.   - Labs remarkable for WBC count of 138K, with 80% blasts indicating blast crisis.   - WBC count from approximately a week and a half earlier was normal. Reported recent compliance with his TKI.   - Transferred to [facility] for higher level of care.  - Bone marrow Biopsy performed 6/9/23. Results pending, but flow suggestive of transformation to AML.  - Continue dasatinib and allopurinol 300 mg daily. Hydrea stopped with normal WBC count.  - BCR/ABL p210 collected on admission. Result pending.  - Daily CBC and twice TLS and DIC labs. Mild TLS on admission, but none at this time.  - Echo ordered  - Picc line placed  - Tentatively planning for inpatient induction with 7+3  - Urology consulted for fertility preservation

## 2023-06-13 NOTE — SUBJECTIVE & OBJECTIVE
Subjective:     Interval History: Febrile throughout the night and tachycardic    Objective:     Vital Signs (Most Recent):  Temp: (!) 100.8 °F (38.2 °C) (06/13/23 0521)  Pulse: (!) 127 (06/13/23 0410)  Resp: 18 (06/13/23 0416)  BP: 129/62 (06/13/23 0410)  SpO2: 97 % (06/13/23 0410) Vital Signs (24h Range):  Temp:  [100.2 °F (37.9 °C)-102.3 °F (39.1 °C)] 100.8 °F (38.2 °C)  Pulse:  [125-136] 127  Resp:  [16-18] 18  SpO2:  [95 %-100 %] 97 %  BP: (120-145)/(58-65) 129/62     Weight: 91.2 kg (200 lb 15.2 oz)  Body mass index is 26.51 kg/m².  Body surface area is 2.17 meters squared.    ECOG SCORE           [unfilled]    Intake/Output - Last 3 Shifts         06/11 0700  06/12 0659 06/12 0700  06/13 0659    P.O. 1200     I.V. (mL/kg) 1697.4 (18.6)     Other 0     Total Intake(mL/kg) 2897.4 (31.8)     Urine (mL/kg/hr) 0 (0)     Emesis/NG output 0     Other 0     Stool 0     Blood 0     Total Output 0     Net +2897.4           Urine Occurrence 3 x     Stool Occurrence 1 x     Emesis Occurrence 0 x              Physical Exam  Constitutional:       Appearance: He is well-developed.   HENT:      Head: Normocephalic and atraumatic.      Mouth/Throat:      Comments: Severe ulcerative disease most in soft palate, cervical LAD+  Eyes:      Conjunctiva/sclera: Conjunctivae normal.   Cardiovascular:      Rate and Rhythm: Normal rate and regular rhythm.      Heart sounds: Normal heart sounds. No murmur heard.  Pulmonary:      Effort: Pulmonary effort is normal. No respiratory distress.      Breath sounds: Normal breath sounds. No wheezing.   Abdominal:      General: Bowel sounds are normal. There is no distension.      Palpations: Abdomen is soft.      Tenderness: There is no abdominal tenderness.   Musculoskeletal:         General: No tenderness. Normal range of motion.      Cervical back: Neck supple.   Lymphadenopathy:      Cervical: No cervical adenopathy.   Skin:     General: Skin is warm and dry.      Findings: No rash.    Neurological:      Mental Status: He is alert and oriented to person, place, and time.      Coordination: Coordination normal.   Psychiatric:         Behavior: Behavior normal.          Significant Labs:   All pertinent labs from the last 24 hours have been reviewed.    Diagnostic Results:  I have reviewed all pertinent imaging results/findings within the past 24 hours.

## 2023-06-13 NOTE — ASSESSMENT & PLAN NOTE
- Temp fo 101.4 at [facility]. Fevers persist since transfer.  - CXR neg. Blood culture ngtd. CDiff negative. U/a neg  - Strep, flu, and COVID neg at OSH  - Throat red with exudates. Tonsils mildly swollen. CT neck showing adenopathy but no abscesses.  - CT chest without consoidations  - Will check EBV  - Stopped Cefepime and started Zosyn. Continuing Vanc.  - Consulting ID

## 2023-06-13 NOTE — CARE UPDATE
RAPID RESPONSE NURSE ROUND       Rounding completed with charge RNMadi for fever/tachycardia reports pt stable at this time. No additional concerns verbalized at this time. Instructed to call [phone] for further concerns or assistance.

## 2023-06-14 ENCOUNTER — TELEPHONE (OUTPATIENT)
Dept: PHARMACY | Facility: CLINIC | Age: 23
End: 2023-06-14
Payer: COMMERCIAL

## 2023-06-14 DIAGNOSIS — C92.10 BLAST CRISIS PHASE OF CHRONIC MYELOID LEUKEMIA: Primary | ICD-10-CM

## 2023-06-14 PROBLEM — J02.9 PHARYNGITIS: Status: ACTIVE | Noted: 2023-06-14

## 2023-06-14 LAB
ALBUMIN SERPL BCP-MCNC: 2.5 G/DL (ref 3.5–5.2)
ALP SERPL-CCNC: 64 U/L (ref 55–135)
ALT SERPL W/O P-5'-P-CCNC: 12 U/L (ref 10–44)
ANION GAP SERPL CALC-SCNC: 10 MMOL/L (ref 8–16)
ANISOCYTOSIS BLD QL SMEAR: SLIGHT
AST SERPL-CCNC: 28 U/L (ref 10–40)
BACTERIA BLD CULT: NORMAL
BACTERIA BLD CULT: NORMAL
BASOPHILS # BLD AUTO: ABNORMAL K/UL (ref 0–0.2)
BASOPHILS NFR BLD: 0 % (ref 0–1.9)
BCR/ABL SPECIMEN TYPE (BLOOD): NORMAL
BCR/ABL1 KINASE DOMAIN MUT ANL BLD/T: NORMAL
BILIRUB SERPL-MCNC: 0.6 MG/DL (ref 0.1–1)
BLASTS NFR BLD MANUAL: 84 %
BUN SERPL-MCNC: 11 MG/DL (ref 6–20)
BURR CELLS BLD QL SMEAR: ABNORMAL
CALCIUM SERPL-MCNC: 8.6 MG/DL (ref 8.7–10.5)
CHLORIDE SERPL-SCNC: 105 MMOL/L (ref 95–110)
CMV DNA SPEC QL NAA+PROBE: NOT DETECTED
CO2 SERPL-SCNC: 19 MMOL/L (ref 23–29)
CREAT SERPL-MCNC: 0.8 MG/DL (ref 0.5–1.4)
CYTOMEGALOVIRUS LOG (IU/ML): NOT DETECTED LOGIU/ML
CYTOMEGALOVIRUS PCR, QUANT: NOT DETECTED IU/ML
DIFFERENTIAL METHOD: ABNORMAL
DOHLE BOD BLD QL SMEAR: PRESENT
EOSINOPHIL # BLD AUTO: ABNORMAL K/UL (ref 0–0.5)
EOSINOPHIL NFR BLD: 1 % (ref 0–8)
ERYTHROCYTE [DISTWIDTH] IN BLOOD BY AUTOMATED COUNT: 19.5 % (ref 11.5–14.5)
EST. GFR  (NO RACE VARIABLE): >60 ML/MIN/1.73 M^2
GLUCOSE SERPL-MCNC: 95 MG/DL (ref 70–110)
HCT VFR BLD AUTO: 32 % (ref 40–54)
HGB BLD-MCNC: 10.3 G/DL (ref 14–18)
HYPOCHROMIA BLD QL SMEAR: ABNORMAL
IMM GRANULOCYTES # BLD AUTO: ABNORMAL K/UL (ref 0–0.04)
IMM GRANULOCYTES NFR BLD AUTO: ABNORMAL % (ref 0–0.5)
LYMPHOCYTES # BLD AUTO: ABNORMAL K/UL (ref 1–4.8)
LYMPHOCYTES NFR BLD: 9 % (ref 18–48)
MAGNESIUM SERPL-MCNC: 1.7 MG/DL (ref 1.6–2.6)
MCH RBC QN AUTO: 22.5 PG (ref 27–31)
MCHC RBC AUTO-ENTMCNC: 32.2 G/DL (ref 32–36)
MCV RBC AUTO: 70 FL (ref 82–98)
MONOCYTES # BLD AUTO: ABNORMAL K/UL (ref 0.3–1)
MONOCYTES NFR BLD: 3 % (ref 4–15)
NEUTROPHILS NFR BLD: 3 % (ref 38–73)
NRBC BLD-RTO: 0 /100 WBC
OVALOCYTES BLD QL SMEAR: ABNORMAL
PATH REPORT.FINAL DX SPEC: NORMAL
PHOSPHATE SERPL-MCNC: 2.2 MG/DL (ref 2.7–4.5)
PLATELET # BLD AUTO: 24 K/UL (ref 150–450)
PLATELET BLD QL SMEAR: ABNORMAL
PMV BLD AUTO: ABNORMAL FL (ref 9.2–12.9)
POIKILOCYTOSIS BLD QL SMEAR: SLIGHT
POLYCHROMASIA BLD QL SMEAR: ABNORMAL
POTASSIUM SERPL-SCNC: 3.8 MMOL/L (ref 3.5–5.1)
PROT SERPL-MCNC: 6.3 G/DL (ref 6–8.4)
RBC # BLD AUTO: 4.57 M/UL (ref 4.6–6.2)
SCHISTOCYTES BLD QL SMEAR: ABNORMAL
SCHISTOCYTES BLD QL SMEAR: PRESENT
SMUDGE CELLS BLD QL SMEAR: PRESENT
SODIUM SERPL-SCNC: 134 MMOL/L (ref 136–145)
SPHEROCYTES BLD QL SMEAR: ABNORMAL
STOMATOCYTES BLD QL SMEAR: PRESENT
TOXIC GRANULES BLD QL SMEAR: PRESENT
WBC # BLD AUTO: 20.42 K/UL (ref 3.9–12.7)

## 2023-06-14 PROCEDURE — 81382 HLA II TYPING 1 LOC HR: CPT | Performed by: INTERNAL MEDICINE

## 2023-06-14 PROCEDURE — 81382 HLA II TYPING 1 LOC HR: CPT | Mod: 91 | Performed by: INTERNAL MEDICINE

## 2023-06-14 PROCEDURE — 85007 BL SMEAR W/DIFF WBC COUNT: CPT | Performed by: NURSE PRACTITIONER

## 2023-06-14 PROCEDURE — 81380 HLA I TYPING 1 LOCUS HR: CPT | Performed by: INTERNAL MEDICINE

## 2023-06-14 PROCEDURE — 99233 SBSQ HOSP IP/OBS HIGH 50: CPT | Mod: ,,, | Performed by: INTERNAL MEDICINE

## 2023-06-14 PROCEDURE — 25000003 PHARM REV CODE 250: Performed by: STUDENT IN AN ORGANIZED HEALTH CARE EDUCATION/TRAINING PROGRAM

## 2023-06-14 PROCEDURE — 84100 ASSAY OF PHOSPHORUS: CPT | Performed by: NURSE PRACTITIONER

## 2023-06-14 PROCEDURE — A4216 STERILE WATER/SALINE, 10 ML: HCPCS | Performed by: INTERNAL MEDICINE

## 2023-06-14 PROCEDURE — 20600001 HC STEP DOWN PRIVATE ROOM

## 2023-06-14 PROCEDURE — 81380 HLA I TYPING 1 LOCUS HR: CPT | Mod: 91 | Performed by: INTERNAL MEDICINE

## 2023-06-14 PROCEDURE — 99233 PR SUBSEQUENT HOSPITAL CARE,LEVL III: ICD-10-PCS | Mod: FS,,, | Performed by: INTERNAL MEDICINE

## 2023-06-14 PROCEDURE — 63600175 PHARM REV CODE 636 W HCPCS: Performed by: NURSE PRACTITIONER

## 2023-06-14 PROCEDURE — 25000003 PHARM REV CODE 250: Performed by: INTERNAL MEDICINE

## 2023-06-14 PROCEDURE — 99233 PR SUBSEQUENT HOSPITAL CARE,LEVL III: ICD-10-PCS | Mod: ,,, | Performed by: INTERNAL MEDICINE

## 2023-06-14 PROCEDURE — 80053 COMPREHEN METABOLIC PANEL: CPT | Performed by: NURSE PRACTITIONER

## 2023-06-14 PROCEDURE — 25000003 PHARM REV CODE 250: Performed by: NURSE PRACTITIONER

## 2023-06-14 PROCEDURE — 85027 COMPLETE CBC AUTOMATED: CPT | Performed by: NURSE PRACTITIONER

## 2023-06-14 PROCEDURE — 83735 ASSAY OF MAGNESIUM: CPT | Performed by: NURSE PRACTITIONER

## 2023-06-14 PROCEDURE — 25000003 PHARM REV CODE 250

## 2023-06-14 PROCEDURE — 99233 SBSQ HOSP IP/OBS HIGH 50: CPT | Mod: FS,,, | Performed by: INTERNAL MEDICINE

## 2023-06-14 RX ORDER — ACYCLOVIR 200 MG/1
800 CAPSULE ORAL 2 TIMES DAILY
Status: DISCONTINUED | OUTPATIENT
Start: 2023-06-14 | End: 2023-07-12 | Stop reason: HOSPADM

## 2023-06-14 RX ORDER — LOPERAMIDE HYDROCHLORIDE 2 MG/1
2 CAPSULE ORAL 4 TIMES DAILY PRN
Status: DISCONTINUED | OUTPATIENT
Start: 2023-06-14 | End: 2023-06-20

## 2023-06-14 RX ADMIN — Medication 10 ML: at 06:06

## 2023-06-14 RX ADMIN — ALUMINUM HYDROXIDE, MAGNESIUM HYDROXIDE, AND DIMETHICONE 10 ML: 400; 400; 40 SUSPENSION ORAL at 05:06

## 2023-06-14 RX ADMIN — GUAIFENESIN AND DEXTROMETHORPHAN HYDROBROMIDE 1 TABLET: 600; 30 TABLET, EXTENDED RELEASE ORAL at 09:06

## 2023-06-14 RX ADMIN — DASATINIB 100 MG: 100 TABLET ORAL at 08:06

## 2023-06-14 RX ADMIN — PIPERACILLIN SODIUM AND TAZOBACTAM SODIUM 4.5 G: 4; .5 INJECTION, POWDER, FOR SOLUTION INTRAVENOUS at 05:06

## 2023-06-14 RX ADMIN — SODIUM CHLORIDE: 9 INJECTION, SOLUTION INTRAVENOUS at 07:06

## 2023-06-14 RX ADMIN — DOXYCYCLINE HYCLATE 100 MG: 100 TABLET, COATED ORAL at 09:06

## 2023-06-14 RX ADMIN — ALLOPURINOL 300 MG: 300 TABLET ORAL at 08:06

## 2023-06-14 RX ADMIN — GUAIFENESIN AND DEXTROMETHORPHAN HYDROBROMIDE 1 TABLET: 600; 30 TABLET, EXTENDED RELEASE ORAL at 08:06

## 2023-06-14 RX ADMIN — ALUMINUM HYDROXIDE, MAGNESIUM HYDROXIDE, AND DIMETHICONE 10 ML: 400; 400; 40 SUSPENSION ORAL at 09:06

## 2023-06-14 RX ADMIN — PIPERACILLIN SODIUM AND TAZOBACTAM SODIUM 4.5 G: 4; .5 INJECTION, POWDER, FOR SOLUTION INTRAVENOUS at 10:06

## 2023-06-14 RX ADMIN — OXYCODONE HYDROCHLORIDE 5 MG: 5 TABLET ORAL at 01:06

## 2023-06-14 RX ADMIN — SODIUM CHLORIDE: 9 INJECTION, SOLUTION INTRAVENOUS at 08:06

## 2023-06-14 RX ADMIN — DOXYCYCLINE HYCLATE 100 MG: 100 TABLET, COATED ORAL at 08:06

## 2023-06-14 RX ADMIN — ACYCLOVIR 400 MG: 200 CAPSULE ORAL at 08:06

## 2023-06-14 RX ADMIN — LOSARTAN POTASSIUM 50 MG: 50 TABLET, FILM COATED ORAL at 08:06

## 2023-06-14 RX ADMIN — Medication 10 ML: at 12:06

## 2023-06-14 RX ADMIN — FLUCONAZOLE 400 MG: 200 TABLET ORAL at 08:06

## 2023-06-14 RX ADMIN — Medication 10 ML: at 05:06

## 2023-06-14 RX ADMIN — NIFEDIPINE 60 MG: 60 TABLET, FILM COATED, EXTENDED RELEASE ORAL at 08:06

## 2023-06-14 RX ADMIN — ALUMINUM HYDROXIDE, MAGNESIUM HYDROXIDE, AND DIMETHICONE 10 ML: 400; 400; 40 SUSPENSION ORAL at 01:06

## 2023-06-14 RX ADMIN — OXYCODONE HYDROCHLORIDE 5 MG: 5 TABLET ORAL at 04:06

## 2023-06-14 RX ADMIN — PIPERACILLIN SODIUM AND TAZOBACTAM SODIUM 4.5 G: 4; .5 INJECTION, POWDER, FOR SOLUTION INTRAVENOUS at 01:06

## 2023-06-14 RX ADMIN — ACYCLOVIR 800 MG: 200 CAPSULE ORAL at 09:06

## 2023-06-14 NOTE — ASSESSMENT & PLAN NOTE
- Takes amlodipine at home  - Given hypertension, amlodipine stopped and nifedipine and losartan started in [place]. Continued on transfer.  - BP now improved

## 2023-06-14 NOTE — ASSESSMENT & PLAN NOTE
- Continue [place] and full liquid diet due to pain with swallowing  - Pain well-controlled with IV dilaudid  - ID following. Continue abx per ID recs  - RIP throat swab, strep, flu, and COVID neg  - Leukemia infiltration may be playing a role

## 2023-06-14 NOTE — ASSESSMENT & PLAN NOTE
- Patient with CML diagnosed 8/2022   - Follows locally in [place] with Dr. Brett Hogan.   - He has been on dasatinib outpatient since 10/2022.   - Some question regarding his compliance over the course of treatment, however.  - Presented to [facility] with c/o gum swelling on 6/7/23.   - Labs remarkable for WBC count of 138K, with 80% blasts indicating blast crisis.   - WBC count from approximately a week and a half earlier was normal. Reported recent compliance with his TKI.   - Transferred to [facility] for higher level of care.  - Bone marrow Biopsy performed 6/9/23. Results pending, but flow suggestive of transformation to AML.  - Continue dasatinib and allopurinol 300 mg daily. Hydrea stopped with normal WBC count.  - BCR/ABL p210 collected on admission. Result pending.  - Daily CBC and twice TLS and DIC labs. Mild TLS on admission, but none at this time.  - Echo ordered  - Picc line placed  - Urology consulted for fertility preservation, and family member delivered sample to fertility center  - Tentatively planning for induction with FLAG Hayde + ponatanib pending fludarabine availability. Will resume daily TLS monitoring when patient starts chemo.

## 2023-06-14 NOTE — PLAN OF CARE
No acute events overnight. VSS; remained afebrile (TMAX 99.3). Pt with complaints of mouth and throat pain; oxy given x2. IVF infusing. Pt remained free from injury or falls. Nonskid footwear on with bed in lowest position and locked. Pt ambulates independently and instructed to call if assistance is needed. Call light within reach. Family at bedside. Will continue to monitor pt.

## 2023-06-14 NOTE — PROGRESS NOTES
[facility])  Infectious Disease  Progress Note    Patient Name: Magdaleno Hirsch  MRN: [id number]  Admission Date: 6/9/2023  Length of Stay: 5 days  Attending Physician: Denny Barajas MD  Primary Care Provider: Primary Doctor No    Isolation Status: No active isolations  Assessment/Plan:      Oncology  Neutropenic fever  23 y/o M h/o CML diagnosed in August 2022 and was on dasatinib presented to [facility] with c/o gum swelling on 6/7/23. Labs remarkable for WBC count of 138K, with 80% blasts (blast crisis), also functional neutropenic fevers, curve downtrending, CT chest with 3 small pulmonary nodules, CT neck negative, RIP negative, all cultures NGTD, most significant complaint is severe throat pain and oral ulcers. Repeat bone marrow confirming AML.  Unclear if oral ulcers related to AML vs candida or HSV  - continue empiric piptazo, doxycycline, will cover mycoplasma  - continue acyclovir and fluconazole  - discussed with team will follow          Anticipated Disposition: pending    Thank you for your consult. I will follow-up with patient. Please contact us if you have any additional questions.    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 Labs remarkable for WBC count of 138K, with 80% blasts (blast crisis), also functional neutropenic fevers, curve downtrending, CT chest with 3 small pulmonary nodules, CT neck negative, RIP negative, all cultures NGTD, most significant complaint is severe throat pain and oral uclers    Interval History: afebrile but still with severe throat pain    Review of Systems   Constitutional:  Positive for activity change, appetite change, fatigue and fever. Negative for chills.   HENT:  Positive for  mouth sores. Negative for congestion, dental problem and sinus pressure.    Eyes:  Negative for pain, redness and visual disturbance.   Respiratory:  Negative for cough, shortness of breath and wheezing.    Cardiovascular:  Negative for chest pain and leg swelling.   Gastrointestinal:  Negative for abdominal distention, abdominal pain, diarrhea, nausea and vomiting.   Endocrine: Negative for polyuria.   Genitourinary:  Negative for decreased urine volume, dysuria and frequency.   Musculoskeletal:  Negative for joint swelling.   Skin:  Negative for color change.   Allergic/Immunologic: Negative for food allergies.   Neurological:  Negative for dizziness, weakness and headaches.   Hematological:  Negative for adenopathy.   Psychiatric/Behavioral:  Negative for agitation and confusion. The patient is not nervous/anxious.    Objective:     Vital Signs (Most Recent):  Temp: 99.1 °F (37.3 °C) (06/14/23 1118)  Pulse: (!) 124 (06/14/23 1118)  Resp: 18 (06/14/23 1303)  BP: 139/73 (06/14/23 1118)  SpO2: 95 % (06/14/23 1118) Vital Signs (24h Range):  Temp:  [98.2 °F (36.8 °C)-99.3 °F (37.4 °C)] 99.1 °F (37.3 °C)  Pulse:  [120-131] 124  Resp:  [16-20] 18  SpO2:  [95 %-99 %] 95 %  BP: (133-143)/(62-73) 139/73     Weight: 91.3 kg (201 lb 6.2 oz)  Body mass index is 26.57 kg/m².    Estimated Creatinine Clearance: 160.9 mL/min (based on SCr of 0.8 mg/dL).     Physical Exam  Constitutional:       Appearance: He is well-developed.   HENT:      Head: Normocephalic and atraumatic.      Mouth/Throat:      Comments: Severe ulcerative disease most in soft palate, cervical LAD+  Eyes:      Conjunctiva/sclera: Conjunctivae normal.   Cardiovascular:      Rate and Rhythm: Normal rate and regular rhythm.      Heart sounds: Normal heart sounds. No murmur heard.  Pulmonary:      Effort: Pulmonary effort is normal. No respiratory distress.      Breath sounds: Normal breath sounds. No wheezing.   Abdominal:      General: Bowel sounds are normal.  There is no distension.      Palpations: Abdomen is soft.      Tenderness: There is no abdominal tenderness.   Musculoskeletal:         General: No tenderness. Normal range of motion.      Cervical back: Neck supple.   Lymphadenopathy:      Cervical: No cervical adenopathy.   Skin:     General: Skin is warm and dry.      Findings: No rash.   Neurological:      Mental Status: He is alert and oriented to person, place, and time.      Coordination: Coordination normal.   Psychiatric:         Behavior: Behavior normal.        Significant Labs: CBC:   Recent Labs   Lab 06/13/23 0433 06/14/23 0429   WBC 9.76 20.42*   HGB 10.3* 10.3*   HCT 32.2* 32.0*   PLT 21* 24*     CMP:   Recent Labs   Lab 06/13/23 0433 06/14/23 0429   * 134*   K 4.3 3.8    105   CO2 18* 19*    95   BUN 16 11   CREATININE 1.0 0.8   CALCIUM 8.2* 8.6*   PROT 6.2 6.3   ALBUMIN 2.4* 2.5*   BILITOT 0.6 0.6   ALKPHOS 58 64   AST 25 28   ALT 11 12   ANIONGAP 9 10       Significant Imaging: I have reviewed all pertinent imaging results/findings within the past 24 hours.

## 2023-06-14 NOTE — ASSESSMENT & PLAN NOTE
21 y/o M h/o CML diagnosed in August 2022 and was on dasatinib presented to [facility] with c/o gum swelling on 6/7/23. Labs remarkable for WBC count of 138K, with 80% blasts (blast crisis), also functional neutropenic fevers, curve downtrending, CT chest with 3 small pulmonary nodules, CT neck negative, RIP negative, all cultures NGTD, most significant complaint is severe throat pain and oral ulcers. Repeat bone marrow confirming AML.  Unclear if oral ulcers related to AML vs candida or HSV  - continue empiric piptazo, doxycycline, will cover mycoplasma  - continue acyclovir and fluconazole  - discussed with team will follow

## 2023-06-14 NOTE — PLAN OF CARE
Pt involved in plan of care and communicating needs throughout shift. PRN oxy given x1. Pt c/o severe gum pain. [facility]'s solution started today, oxy offered q4. Pt states he no longer wants to preserve his sperm cells. All VSS; no acute events so far this shift.  Pt remaining free from falls or injury throughout shift; bed locked and in lowest position; call light within reach.  Pt instructed to call for assistance as needed.  Q1H rounding done on pt.

## 2023-06-14 NOTE — TELEPHONE ENCOUNTER
Herminio, this is BREANA CHEUNG, clinical pharmacist with [facility] that is part of your care team.  We have begun working on your prescription that your doctor has sent us. Our next steps include:     Working with your insurance company to obtain approval for your medication  Working with you to ensure your medication is affordable     We will be calling you along the way with updates on your medication but if you have any concerns or receive information that you would like to discuss please reach us at [phone].    Welcome call outcome: Left voicemail

## 2023-06-14 NOTE — ASSESSMENT & PLAN NOTE
- Uric acid and LDH elevated on presentation to [facility]. Kidney function and electrolytes normal.  - Not G6PD deficient  - Changed allopurinol from 300 mg BID to 300 mg daily  - Twice TLS labs while inpatient. Will resume daily TLS monitoring when patient starts chemo.

## 2023-06-14 NOTE — SUBJECTIVE & OBJECTIVE
Subjective:     Interval History: Last temp was > 24 hours ago. Tachycardia persists. Continue antimicrobial per ID recs. Oral intake remains poor. Recommended [facility]'s prior to meals. Encouraged patient to get OOB more frequently to avoid debility. Tentatively planning for induction with FLAG Hayde + ponatanib pending fludarabine availability.     Objective:     Vital Signs (Most Recent):  Temp: 99.1 °F (37.3 °C) (06/14/23 1118)  Pulse: (!) 124 (06/14/23 1118)  Resp: 20 (06/14/23 1118)  BP: 139/73 (06/14/23 1118)  SpO2: 95 % (06/14/23 1118) Vital Signs (24h Range):  Temp:  [98.2 °F (36.8 °C)-99.3 °F (37.4 °C)] 99.1 °F (37.3 °C)  Pulse:  [120-131] 124  Resp:  [16-20] 20  SpO2:  [95 %-99 %] 95 %  BP: (133-143)/(62-73) 139/73     Weight: 91.3 kg (201 lb 6.2 oz)  Body mass index is 26.57 kg/m².  Body surface area is 2.17 meters squared.    ECOG SCORE           [unfilled]    Intake/Output - Last 3 Shifts         06/12 0700  06/13 0659 06/13 0700  06/14 0659 06/14 0700  06/15 0659    P.O.  0     I.V. (mL/kg)  4993 (54.7)     Other       IV Piggyback  489.6     Total Intake(mL/kg)  5482.6 (60.1)     Urine (mL/kg/hr)       Emesis/NG output       Other       Stool       Blood       Total Output       Net  +5482.6                     Physical Exam  Constitutional:       Appearance: He is well-developed.   HENT:      Head: Normocephalic and atraumatic.      Mouth/Throat:      Pharynx: No oropharyngeal exudate.      Comments: Tonsillar swelling. Throat redness.  Gum swelling/bleeding.  Eyes:      General:         Right eye: No discharge.         Left eye: No discharge.      Conjunctiva/sclera: Conjunctivae normal.      Pupils: Pupils are equal, round, and reactive to light.   Cardiovascular:      Rate and Rhythm: Normal rate and regular rhythm.      Heart sounds: Normal heart sounds. No murmur heard.  Pulmonary:      Effort: Pulmonary effort is normal. No respiratory distress.      Breath sounds: Normal breath sounds. No wheezing  or rales.   Abdominal:      General: Bowel sounds are normal. There is no distension.      Palpations: Abdomen is soft.      Tenderness: There is no abdominal tenderness.   Musculoskeletal:         General: No deformity. Normal range of motion.      Cervical back: Normal range of motion and neck supple.   Skin:     General: Skin is warm and dry.      Findings: No erythema or rash.   Neurological:      Mental Status: He is alert and oriented to person, place, and time.   Psychiatric:         Behavior: Behavior normal.         Thought Content: Thought content normal.         Judgment: Judgment normal.          Significant Labs:   CBC:   Recent Labs   Lab 06/13/23 0433 06/14/23 0429   WBC 9.76 20.42*   HGB 10.3* 10.3*   HCT 32.2* 32.0*   PLT 21* 24*      and CMP:   Recent Labs   Lab 06/13/23 0433 06/14/23 0429   * 134*   K 4.3 3.8    105   CO2 18* 19*    95   BUN 16 11   CREATININE 1.0 0.8   CALCIUM 8.2* 8.6*   PROT 6.2 6.3   ALBUMIN 2.4* 2.5*   BILITOT 0.6 0.6   ALKPHOS 58 64   AST 25 28   ALT 11 12   ANIONGAP 9 10         Diagnostic Results:  I have reviewed all pertinent imaging results/findings within the past 24 hours.

## 2023-06-14 NOTE — SUBJECTIVE & OBJECTIVE
Interval History: afebrile but still with severe throat pain    Review of Systems   Constitutional:  Positive for activity change, appetite change, fatigue and fever. Negative for chills.   HENT:  Positive for mouth sores. Negative for congestion, dental problem and sinus pressure.    Eyes:  Negative for pain, redness and visual disturbance.   Respiratory:  Negative for cough, shortness of breath and wheezing.    Cardiovascular:  Negative for chest pain and leg swelling.   Gastrointestinal:  Negative for abdominal distention, abdominal pain, diarrhea, nausea and vomiting.   Endocrine: Negative for polyuria.   Genitourinary:  Negative for decreased urine volume, dysuria and frequency.   Musculoskeletal:  Negative for joint swelling.   Skin:  Negative for color change.   Allergic/Immunologic: Negative for food allergies.   Neurological:  Negative for dizziness, weakness and headaches.   Hematological:  Negative for adenopathy.   Psychiatric/Behavioral:  Negative for agitation and confusion. The patient is not nervous/anxious.    Objective:     Vital Signs (Most Recent):  Temp: 99.1 °F (37.3 °C) (06/14/23 1118)  Pulse: (!) 124 (06/14/23 1118)  Resp: 18 (06/14/23 1303)  BP: 139/73 (06/14/23 1118)  SpO2: 95 % (06/14/23 1118) Vital Signs (24h Range):  Temp:  [98.2 °F (36.8 °C)-99.3 °F (37.4 °C)] 99.1 °F (37.3 °C)  Pulse:  [120-131] 124  Resp:  [16-20] 18  SpO2:  [95 %-99 %] 95 %  BP: (133-143)/(62-73) 139/73     Weight: 91.3 kg (201 lb 6.2 oz)  Body mass index is 26.57 kg/m².    Estimated Creatinine Clearance: 160.9 mL/min (based on SCr of 0.8 mg/dL).     Physical Exam  Constitutional:       Appearance: He is well-developed.   HENT:      Head: Normocephalic and atraumatic.      Mouth/Throat:      Comments: Severe ulcerative disease most in soft palate, cervical LAD+  Eyes:      Conjunctiva/sclera: Conjunctivae normal.   Cardiovascular:      Rate and Rhythm: Normal rate and regular rhythm.      Heart sounds: Normal heart  sounds. No murmur heard.  Pulmonary:      Effort: Pulmonary effort is normal. No respiratory distress.      Breath sounds: Normal breath sounds. No wheezing.   Abdominal:      General: Bowel sounds are normal. There is no distension.      Palpations: Abdomen is soft.      Tenderness: There is no abdominal tenderness.   Musculoskeletal:         General: No tenderness. Normal range of motion.      Cervical back: Neck supple.   Lymphadenopathy:      Cervical: No cervical adenopathy.   Skin:     General: Skin is warm and dry.      Findings: No rash.   Neurological:      Mental Status: He is alert and oriented to person, place, and time.      Coordination: Coordination normal.   Psychiatric:         Behavior: Behavior normal.        Significant Labs: CBC:   Recent Labs   Lab 06/13/23  0433 06/14/23 0429   WBC 9.76 20.42*   HGB 10.3* 10.3*   HCT 32.2* 32.0*   PLT 21* 24*     CMP:   Recent Labs   Lab 06/13/23 0433 06/14/23 0429   * 134*   K 4.3 3.8    105   CO2 18* 19*    95   BUN 16 11   CREATININE 1.0 0.8   CALCIUM 8.2* 8.6*   PROT 6.2 6.3   ALBUMIN 2.4* 2.5*   BILITOT 0.6 0.6   ALKPHOS 58 64   AST 25 28   ALT 11 12   ANIONGAP 9 10       Significant Imaging: I have reviewed all pertinent imaging results/findings within the past 24 hours.

## 2023-06-14 NOTE — PROGRESS NOTES
[facility]an - Oncology ([facility])  Hematology  Bone Marrow Transplant  Progress Note    Patient Name: Magdaleno Hirsch  Admission Date: 6/9/2023  Hospital Length of Stay: 5 days  Code Status: Full Code    Subjective:     Interval History: Last temp was > 24 hours ago. Tachycardia persists. Continue antimicrobial per ID recs. Oral intake remains poor. Recommended [facility] prior to meals. Encouraged patient to get OOB more frequently to avoid debility. Tentatively planning for induction with FLAG Hayde + ponatanib pending fludarabine availability.     Objective:     Vital Signs (Most Recent):  Temp: 99.1 °F (37.3 °C) (06/14/23 1118)  Pulse: (!) 124 (06/14/23 1118)  Resp: 20 (06/14/23 1118)  BP: 139/73 (06/14/23 1118)  SpO2: 95 % (06/14/23 1118) Vital Signs (24h Range):  Temp:  [98.2 °F (36.8 °C)-99.3 °F (37.4 °C)] 99.1 °F (37.3 °C)  Pulse:  [120-131] 124  Resp:  [16-20] 20  SpO2:  [95 %-99 %] 95 %  BP: (133-143)/(62-73) 139/73     Weight: 91.3 kg (201 lb 6.2 oz)  Body mass index is 26.57 kg/m².  Body surface area is 2.17 meters squared.    ECOG SCORE           [unfilled]    Intake/Output - Last 3 Shifts         06/12 0700  06/13 0659 06/13 0700  06/14 0659 06/14 0700  06/15 0659    P.O.  0     I.V. (mL/kg)  4993 (54.7)     Other       IV Piggyback  489.6     Total Intake(mL/kg)  5482.6 (60.1)     Urine (mL/kg/hr)       Emesis/NG output       Other       Stool       Blood       Total Output       Net  +5482.6                    Physical Exam  Constitutional:       Appearance: He is well-developed.   HENT:      Head: Normocephalic and atraumatic.      Mouth/Throat:      Pharynx: No oropharyngeal exudate.      Comments: Tonsillar swelling. Throat redness.  Gum swelling/bleeding.  Eyes:      General:         Right eye: No discharge.         Left eye: No discharge.      Conjunctiva/sclera: Conjunctivae normal.      Pupils: Pupils are equal, round, and reactive to light.   Cardiovascular:      Rate and Rhythm: Normal rate and regular  rhythm.      Heart sounds: Normal heart sounds. No murmur heard.  Pulmonary:      Effort: Pulmonary effort is normal. No respiratory distress.      Breath sounds: Normal breath sounds. No wheezing or rales.   Abdominal:      General: Bowel sounds are normal. There is no distension.      Palpations: Abdomen is soft.      Tenderness: There is no abdominal tenderness.   Musculoskeletal:         General: No deformity. Normal range of motion.      Cervical back: Normal range of motion and neck supple.   Skin:     General: Skin is warm and dry.      Findings: No erythema or rash.   Neurological:      Mental Status: He is alert and oriented to person, place, and time.   Psychiatric:         Behavior: Behavior normal.         Thought Content: Thought content normal.         Judgment: Judgment normal.          Significant Labs:   CBC:   Recent Labs   Lab 06/13/23 0433 06/14/23 0429   WBC 9.76 20.42*   HGB 10.3* 10.3*   HCT 32.2* 32.0*   PLT 21* 24*      and CMP:   Recent Labs   Lab 06/13/23 0433 06/14/23 0429   * 134*   K 4.3 3.8    105   CO2 18* 19*    95   BUN 16 11   CREATININE 1.0 0.8   CALCIUM 8.2* 8.6*   PROT 6.2 6.3   ALBUMIN 2.4* 2.5*   BILITOT 0.6 0.6   ALKPHOS 58 64   AST 25 28   ALT 11 12   ANIONGAP 9 10         Diagnostic Results:  I have reviewed all pertinent imaging results/findings within the past 24 hours.    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 Brett Hogan.   - He has been on dasatinib outpatient since 10/2022.   - Some question regarding his compliance over the course of treatment, however.  - Presented to [facility] with c/o gum swelling on 6/7/23.   - Labs remarkable for WBC count of 138K, with 80% blasts indicating blast crisis.   - WBC count from approximately a week and a half earlier was normal. Reported recent compliance with his TKI.   - Transferred  to [facility] for higher level of care.  - Bone marrow Biopsy performed 6/9/23. Results pending, but flow suggestive of transformation to AML.  - Continue dasatinib and allopurinol 300 mg daily. Hydrea stopped with normal WBC count.  - BCR/ABL p210 collected on admission. Result pending.  - Daily CBC and twice TLS and DIC labs. Mild TLS on admission, but none at this time.  - Echo ordered  - Picc line placed  - Urology consulted for fertility preservation, and family member delivered sample to fertility center  - Tentatively planning for induction with FLAG Hayde + ponatanib pending fludarabine availability. Will resume daily TLS monitoring when patient starts chemo.    Pancytopenia  - Functionally neutropenic despite leukocytosis, given low neutrophil count  - 2/2 disease and hydrea  - Daily CBC while inpatient  - Transfuse for hgb < 7 or plts < 10K  - Started antimicrobial ppx with acyclovir and fluconazole and contining Zosyn and doxy for neutropenic fever.    Neutropenic fever  - Temp fo 101.4 at [place] [facility]. Fevers persist since transfer.  - CXR neg. Blood culture ngtd. CDiff negative. U/a neg  - Strep, flu, and COVID neg at OSH  - Throat red with exudates. Tonsils mildly swollen. CT neck showing adenopathy but no abscesses.  - CT chest without consoidations  - Will check EBV  - ID following  - Continue Zosyn and doxy per ID recs  - Last temp was > 24 hours ago    Tumor lysis syndrome  - Uric acid and LDH elevated on presentation to [place] ED. Kidney function and electrolytes normal.  - Not G6PD deficient  - Changed allopurinol from 300 mg BID to 300 mg daily  - Twice TLS labs while inpatient. Will resume daily TLS monitoring when patient starts chemo.    Pharyngitis  - Continue [facility] and full liquid diet due to pain with swallowing  - Pain well-controlled with IV dilaudid  - ID following. Continue abx per ID recs  - RIP throat swab, strep, flu, and COVID neg  - Leukemia infiltration may be playing a  role    Leukocytosis  - See blast crisis phase of CML    Hypokalemia  - May be 2/2 diarrhea.   - Repleting  - Daily CMP while inpatient    Diarrhea  - Reports multiple days of diarrhea prior to transfer  - C diff negative  - PRN imodium available    Hypertension  - Takes amlodipine at home  - Given hypertension, amlodipine stopped and nifedipine and losartan started in [place]. Continued on transfer.  - BP now improved        VTE Risk Mitigation (From admission, onward)         Ordered     IP VTE HIGH RISK PATIENT  Once         06/09/23 0049     Place sequential compression device  Until discontinued         06/09/23 0049                Disposition: Inpatient.    Brittany Hammond, NP  Bone Marrow Transplant  [facility] Cowan - Oncology ([facility])

## 2023-06-14 NOTE — ASSESSMENT & PLAN NOTE
- Temp fo 101.4 at [facility]. Fevers persist since transfer.  - CXR neg. Blood culture ngtd. CDiff negative. U/a neg  - Strep, flu, and COVID neg at OSH  - Throat red with exudates. Tonsils mildly swollen. CT neck showing adenopathy but no abscesses.  - CT chest without consoidations  - Will check EBV  - ID following  - Continue Zosyn and doxy per ID recs  - Last temp was > 24 hours ago

## 2023-06-15 ENCOUNTER — SPECIALTY PHARMACY (OUTPATIENT)
Dept: PHARMACY | Facility: CLINIC | Age: 23
End: 2023-06-15
Payer: COMMERCIAL

## 2023-06-15 LAB
ALBUMIN SERPL BCP-MCNC: 2.2 G/DL (ref 3.5–5.2)
ALBUMIN SERPL BCP-MCNC: 2.7 G/DL (ref 3.5–5.2)
ALP SERPL-CCNC: 55 U/L (ref 55–135)
ALP SERPL-CCNC: 65 U/L (ref 55–135)
ALT SERPL W/O P-5'-P-CCNC: 12 U/L (ref 10–44)
ALT SERPL W/O P-5'-P-CCNC: 14 U/L (ref 10–44)
ANION GAP SERPL CALC-SCNC: 10 MMOL/L (ref 8–16)
ANION GAP SERPL CALC-SCNC: 12 MMOL/L (ref 8–16)
ANISOCYTOSIS BLD QL SMEAR: SLIGHT
ANISOCYTOSIS BLD QL SMEAR: SLIGHT
APTT PPP: 27.1 SEC (ref 21–32)
AST SERPL-CCNC: 28 U/L (ref 10–40)
AST SERPL-CCNC: 33 U/L (ref 10–40)
BASOPHILS NFR BLD: 0 % (ref 0–1.9)
BASOPHILS NFR BLD: 0 % (ref 0–1.9)
BILIRUB SERPL-MCNC: 0.5 MG/DL (ref 0.1–1)
BILIRUB SERPL-MCNC: 0.6 MG/DL (ref 0.1–1)
BLASTS NFR BLD MANUAL: 72 %
BLASTS NFR BLD MANUAL: 74 %
BUN SERPL-MCNC: 8 MG/DL (ref 6–20)
BUN SERPL-MCNC: 8 MG/DL (ref 6–20)
BURR CELLS BLD QL SMEAR: ABNORMAL
CALCIUM SERPL-MCNC: 7.5 MG/DL (ref 8.7–10.5)
CALCIUM SERPL-MCNC: 9.1 MG/DL (ref 8.7–10.5)
CHLORIDE SERPL-SCNC: 103 MMOL/L (ref 95–110)
CHLORIDE SERPL-SCNC: 110 MMOL/L (ref 95–110)
CHROM BANDING METHOD: NORMAL
CHROMOSOME ANALYSIS BM ADDITIONAL INFORMATION: NORMAL
CHROMOSOME ANALYSIS BM RELEASED BY: NORMAL
CHROMOSOME ANALYSIS BM RESULT SUMMARY: NORMAL
CLINICAL CYTOGENETICIST REVIEW: NORMAL
CO2 SERPL-SCNC: 18 MMOL/L (ref 23–29)
CO2 SERPL-SCNC: 23 MMOL/L (ref 23–29)
CREAT SERPL-MCNC: 0.7 MG/DL (ref 0.5–1.4)
CREAT SERPL-MCNC: 0.8 MG/DL (ref 0.5–1.4)
DIFFERENTIAL METHOD: ABNORMAL
DIFFERENTIAL METHOD: ABNORMAL
DNA/RNA EXTRACT AND HOLD RESULT: NORMAL
DNA/RNA EXTRACTION: NORMAL
EOSINOPHIL NFR BLD: 0 % (ref 0–8)
EOSINOPHIL NFR BLD: 0 % (ref 0–8)
ERYTHROCYTE [DISTWIDTH] IN BLOOD BY AUTOMATED COUNT: 19.5 % (ref 11.5–14.5)
ERYTHROCYTE [DISTWIDTH] IN BLOOD BY AUTOMATED COUNT: 19.9 % (ref 11.5–14.5)
EST. GFR  (NO RACE VARIABLE): >60 ML/MIN/1.73 M^2
EST. GFR  (NO RACE VARIABLE): >60 ML/MIN/1.73 M^2
EXHR SPECIMEN TYPE: NORMAL
FIBRINOGEN PPP-MCNC: 429 MG/DL (ref 182–400)
GLUCOSE SERPL-MCNC: 88 MG/DL (ref 70–110)
GLUCOSE SERPL-MCNC: 96 MG/DL (ref 70–110)
HCT VFR BLD AUTO: 29.6 % (ref 40–54)
HCT VFR BLD AUTO: 33.9 % (ref 40–54)
HGB BLD-MCNC: 10.8 G/DL (ref 14–18)
HGB BLD-MCNC: 9.2 G/DL (ref 14–18)
HYPOCHROMIA BLD QL SMEAR: ABNORMAL
HYPOCHROMIA BLD QL SMEAR: ABNORMAL
IMM GRANULOCYTES # BLD AUTO: ABNORMAL K/UL (ref 0–0.04)
IMM GRANULOCYTES # BLD AUTO: ABNORMAL K/UL (ref 0–0.04)
IMM GRANULOCYTES NFR BLD AUTO: ABNORMAL % (ref 0–0.5)
IMM GRANULOCYTES NFR BLD AUTO: ABNORMAL % (ref 0–0.5)
INR PPP: 1.3 (ref 0.8–1.2)
KARYOTYP MAR: NORMAL
LDH SERPL L TO P-CCNC: 406 U/L (ref 110–260)
LYMPHOCYTES NFR BLD: 23 % (ref 18–48)
LYMPHOCYTES NFR BLD: 25 % (ref 18–48)
MAGNESIUM SERPL-MCNC: 1.3 MG/DL (ref 1.6–2.6)
MAGNESIUM SERPL-MCNC: 2.3 MG/DL (ref 1.6–2.6)
MCH RBC QN AUTO: 22.4 PG (ref 27–31)
MCH RBC QN AUTO: 23 PG (ref 27–31)
MCHC RBC AUTO-ENTMCNC: 31.1 G/DL (ref 32–36)
MCHC RBC AUTO-ENTMCNC: 31.9 G/DL (ref 32–36)
MCV RBC AUTO: 72 FL (ref 82–98)
MCV RBC AUTO: 72 FL (ref 82–98)
MONOCYTES NFR BLD: 0 % (ref 4–15)
MONOCYTES NFR BLD: 2 % (ref 4–15)
NEUTROPHILS NFR BLD: 1 % (ref 38–73)
NEUTROPHILS NFR BLD: 3 % (ref 38–73)
NRBC BLD-RTO: 0 /100 WBC
NRBC BLD-RTO: 0 /100 WBC
OVALOCYTES BLD QL SMEAR: ABNORMAL
OVALOCYTES BLD QL SMEAR: ABNORMAL
PHOSPHATE SERPL-MCNC: 2.1 MG/DL (ref 2.7–4.5)
PHOSPHATE SERPL-MCNC: 2.6 MG/DL (ref 2.7–4.5)
PLATELET # BLD AUTO: 21 K/UL (ref 150–450)
PLATELET # BLD AUTO: 22 K/UL (ref 150–450)
PLATELET BLD QL SMEAR: ABNORMAL
PLATELET BLD QL SMEAR: ABNORMAL
PMV BLD AUTO: ABNORMAL FL (ref 9.2–12.9)
PMV BLD AUTO: ABNORMAL FL (ref 9.2–12.9)
POIKILOCYTOSIS BLD QL SMEAR: SLIGHT
POIKILOCYTOSIS BLD QL SMEAR: SLIGHT
POLYCHROMASIA BLD QL SMEAR: ABNORMAL
POLYCHROMASIA BLD QL SMEAR: ABNORMAL
POTASSIUM SERPL-SCNC: 3 MMOL/L (ref 3.5–5.1)
POTASSIUM SERPL-SCNC: 3.7 MMOL/L (ref 3.5–5.1)
PROT SERPL-MCNC: 5.5 G/DL (ref 6–8.4)
PROT SERPL-MCNC: 6.2 G/DL (ref 6–8.4)
PROTHROMBIN TIME: 14.1 SEC (ref 9–12.5)
RBC # BLD AUTO: 4.11 M/UL (ref 4.6–6.2)
RBC # BLD AUTO: 4.7 M/UL (ref 4.6–6.2)
REASON FOR REFERRAL (NARRATIVE): NORMAL
REF LAB TEST METHOD: NORMAL
SODIUM SERPL-SCNC: 138 MMOL/L (ref 136–145)
SODIUM SERPL-SCNC: 138 MMOL/L (ref 136–145)
SPECIMEN SOURCE: NORMAL
SPECIMEN: NORMAL
SPHEROCYTES BLD QL SMEAR: ABNORMAL
URATE SERPL-MCNC: 1.8 MG/DL (ref 3.4–7)
WBC # BLD AUTO: 37.91 K/UL (ref 3.9–12.7)
WBC # BLD AUTO: 43.61 K/UL (ref 3.9–12.7)

## 2023-06-15 PROCEDURE — 97161 PT EVAL LOW COMPLEX 20 MIN: CPT

## 2023-06-15 PROCEDURE — 80053 COMPREHEN METABOLIC PANEL: CPT | Performed by: NURSE PRACTITIONER

## 2023-06-15 PROCEDURE — 99233 PR SUBSEQUENT HOSPITAL CARE,LEVL III: ICD-10-PCS | Mod: ,,, | Performed by: INTERNAL MEDICINE

## 2023-06-15 PROCEDURE — 85025 COMPLETE CBC W/AUTO DIFF WBC: CPT | Performed by: STUDENT IN AN ORGANIZED HEALTH CARE EDUCATION/TRAINING PROGRAM

## 2023-06-15 PROCEDURE — 63600175 PHARM REV CODE 636 W HCPCS: Performed by: NURSE PRACTITIONER

## 2023-06-15 PROCEDURE — 63600175 PHARM REV CODE 636 W HCPCS: Performed by: STUDENT IN AN ORGANIZED HEALTH CARE EDUCATION/TRAINING PROGRAM

## 2023-06-15 PROCEDURE — 83735 ASSAY OF MAGNESIUM: CPT | Mod: 91 | Performed by: STUDENT IN AN ORGANIZED HEALTH CARE EDUCATION/TRAINING PROGRAM

## 2023-06-15 PROCEDURE — 85027 COMPLETE CBC AUTOMATED: CPT | Performed by: NURSE PRACTITIONER

## 2023-06-15 PROCEDURE — 25000003 PHARM REV CODE 250: Performed by: STUDENT IN AN ORGANIZED HEALTH CARE EDUCATION/TRAINING PROGRAM

## 2023-06-15 PROCEDURE — 84100 ASSAY OF PHOSPHORUS: CPT | Mod: 91 | Performed by: STUDENT IN AN ORGANIZED HEALTH CARE EDUCATION/TRAINING PROGRAM

## 2023-06-15 PROCEDURE — 25000003 PHARM REV CODE 250: Performed by: INTERNAL MEDICINE

## 2023-06-15 PROCEDURE — 85610 PROTHROMBIN TIME: CPT | Performed by: NURSE PRACTITIONER

## 2023-06-15 PROCEDURE — 85730 THROMBOPLASTIN TIME PARTIAL: CPT | Performed by: NURSE PRACTITIONER

## 2023-06-15 PROCEDURE — A4216 STERILE WATER/SALINE, 10 ML: HCPCS | Performed by: INTERNAL MEDICINE

## 2023-06-15 PROCEDURE — 20600001 HC STEP DOWN PRIVATE ROOM

## 2023-06-15 PROCEDURE — 83735 ASSAY OF MAGNESIUM: CPT | Performed by: NURSE PRACTITIONER

## 2023-06-15 PROCEDURE — 84100 ASSAY OF PHOSPHORUS: CPT | Performed by: NURSE PRACTITIONER

## 2023-06-15 PROCEDURE — 85384 FIBRINOGEN ACTIVITY: CPT | Performed by: NURSE PRACTITIONER

## 2023-06-15 PROCEDURE — 99233 SBSQ HOSP IP/OBS HIGH 50: CPT | Mod: ,,, | Performed by: INTERNAL MEDICINE

## 2023-06-15 PROCEDURE — 85007 BL SMEAR W/DIFF WBC COUNT: CPT | Performed by: NURSE PRACTITIONER

## 2023-06-15 PROCEDURE — 84550 ASSAY OF BLOOD/URIC ACID: CPT | Performed by: NURSE PRACTITIONER

## 2023-06-15 PROCEDURE — 25000003 PHARM REV CODE 250: Performed by: NURSE PRACTITIONER

## 2023-06-15 PROCEDURE — 83615 LACTATE (LD) (LDH) ENZYME: CPT | Performed by: NURSE PRACTITIONER

## 2023-06-15 PROCEDURE — 97165 OT EVAL LOW COMPLEX 30 MIN: CPT

## 2023-06-15 PROCEDURE — 25000003 PHARM REV CODE 250

## 2023-06-15 PROCEDURE — 80053 COMPREHEN METABOLIC PANEL: CPT | Mod: 91 | Performed by: STUDENT IN AN ORGANIZED HEALTH CARE EDUCATION/TRAINING PROGRAM

## 2023-06-15 RX ORDER — HYDROXYUREA 500 MG/1
500 CAPSULE ORAL 2 TIMES DAILY
Status: DISCONTINUED | OUTPATIENT
Start: 2023-06-15 | End: 2023-06-16

## 2023-06-15 RX ORDER — MAGNESIUM SULFATE HEPTAHYDRATE 40 MG/ML
4 INJECTION, SOLUTION INTRAVENOUS ONCE
Status: COMPLETED | OUTPATIENT
Start: 2023-06-15 | End: 2023-06-15

## 2023-06-15 RX ADMIN — OXYCODONE HYDROCHLORIDE 5 MG: 5 TABLET ORAL at 01:06

## 2023-06-15 RX ADMIN — MAGNESIUM SULFATE 4 G: 2 INJECTION INTRAVENOUS at 09:06

## 2023-06-15 RX ADMIN — PIPERACILLIN SODIUM AND TAZOBACTAM SODIUM 4.5 G: 4; .5 INJECTION, POWDER, FOR SOLUTION INTRAVENOUS at 11:06

## 2023-06-15 RX ADMIN — OXYCODONE HYDROCHLORIDE 5 MG: 5 TABLET ORAL at 06:06

## 2023-06-15 RX ADMIN — ALUMINUM HYDROXIDE, MAGNESIUM HYDROXIDE, AND DIMETHICONE 10 ML: 400; 400; 40 SUSPENSION ORAL at 09:06

## 2023-06-15 RX ADMIN — GUAIFENESIN AND DEXTROMETHORPHAN HYDROBROMIDE 1 TABLET: 600; 30 TABLET, EXTENDED RELEASE ORAL at 10:06

## 2023-06-15 RX ADMIN — GUAIFENESIN AND DEXTROMETHORPHAN HYDROBROMIDE 1 TABLET: 600; 30 TABLET, EXTENDED RELEASE ORAL at 09:06

## 2023-06-15 RX ADMIN — HYDROXYUREA 500 MG: 500 CAPSULE ORAL at 10:06

## 2023-06-15 RX ADMIN — ALUMINUM HYDROXIDE, MAGNESIUM HYDROXIDE, AND DIMETHICONE 10 ML: 400; 400; 40 SUSPENSION ORAL at 01:06

## 2023-06-15 RX ADMIN — PIPERACILLIN SODIUM AND TAZOBACTAM SODIUM 4.5 G: 4; .5 INJECTION, POWDER, FOR SOLUTION INTRAVENOUS at 03:06

## 2023-06-15 RX ADMIN — DASATINIB 100 MG: 100 TABLET ORAL at 10:06

## 2023-06-15 RX ADMIN — DOXYCYCLINE HYCLATE 100 MG: 100 TABLET, COATED ORAL at 10:06

## 2023-06-15 RX ADMIN — ALUMINUM HYDROXIDE, MAGNESIUM HYDROXIDE, AND DIMETHICONE 10 ML: 400; 400; 40 SUSPENSION ORAL at 10:06

## 2023-06-15 RX ADMIN — NIFEDIPINE 60 MG: 60 TABLET, FILM COATED, EXTENDED RELEASE ORAL at 09:06

## 2023-06-15 RX ADMIN — LOSARTAN POTASSIUM 50 MG: 50 TABLET, FILM COATED ORAL at 09:06

## 2023-06-15 RX ADMIN — ALUMINUM HYDROXIDE, MAGNESIUM HYDROXIDE, AND DIMETHICONE 10 ML: 400; 400; 40 SUSPENSION ORAL at 06:06

## 2023-06-15 RX ADMIN — ACYCLOVIR 800 MG: 200 CAPSULE ORAL at 09:06

## 2023-06-15 RX ADMIN — Medication 10 ML: at 06:06

## 2023-06-15 RX ADMIN — Medication 10 ML: at 11:06

## 2023-06-15 RX ADMIN — DOXYCYCLINE HYCLATE 100 MG: 100 TABLET, COATED ORAL at 09:06

## 2023-06-15 RX ADMIN — SODIUM CHLORIDE: 9 INJECTION, SOLUTION INTRAVENOUS at 05:06

## 2023-06-15 RX ADMIN — ALLOPURINOL 300 MG: 300 TABLET ORAL at 09:06

## 2023-06-15 RX ADMIN — ACYCLOVIR 800 MG: 200 CAPSULE ORAL at 10:06

## 2023-06-15 RX ADMIN — Medication 10 ML: at 12:06

## 2023-06-15 RX ADMIN — PIPERACILLIN SODIUM AND TAZOBACTAM SODIUM 4.5 G: 4; .5 INJECTION, POWDER, FOR SOLUTION INTRAVENOUS at 06:06

## 2023-06-15 RX ADMIN — FLUCONAZOLE 400 MG: 200 TABLET ORAL at 09:06

## 2023-06-15 NOTE — TELEPHONE ENCOUNTER
Patient currently admitted for blast crisis, receiving Spry[facility] inpatient. Will continue to follow for discharge date

## 2023-06-15 NOTE — ASSESSMENT & PLAN NOTE
- Patient with CML diagnosed 8/2022   - Follows locally in [place] with Dr. Brett Hogan.   - He has been on dasatinib outpatient since 10/2022.   - Some question regarding his compliance over the course of treatment, however.  - Presented to [facility] with c/o gum swelling on 6/7/23.   - Labs remarkable for WBC count of 138K, with 80% blasts indicating blast crisis.   - WBC count from approximately a week and a half earlier was normal. Reported recent compliance with his TKI.   - Transferred to [facility] for higher level of care.  - Bone marrow Biopsy performed 6/9/23. Results pending, but flow suggestive of transformation to AML.  - Continue dasatinib and allopurinol 300 mg daily. Hydrea stopped with normal WBC count.  - BCR/ABL p210 collected on admission. Result pending.  - Daily CBC and twice TLS and DIC labs. Mild TLS on admission, but none at this time.  - Echo ordered  - Picc line placed  - Urology consulted for fertility preservation, and family member delivered sample to fertility center  - Tentatively planning for induction with FLAG Hayde + ponatanib pending fludarabine availability. Will resume daily TLS monitoring when patient starts chemo.

## 2023-06-15 NOTE — ASSESSMENT & PLAN NOTE
- Continue [place] and full liquid diet due to pain with swallowing  - Pain well-controlled with IV dilaudid  - ID following. Continue abx per ID recs  - RIP throat swab, strep, flu, and COVID neg  - Leukemia infiltration may be playing a role

## 2023-06-15 NOTE — ASSESSMENT & PLAN NOTE
- Takes amlodipine at home  - Given hypertension, amlodipine stopped and nifedipine and losartan started in [place]. Continued on transfer.  - BP now improved

## 2023-06-15 NOTE — ASSESSMENT & PLAN NOTE
- Temp fo 101.4 at [facility]. Fevers persist since transfer.  - CXR neg. Blood culture ngtd. CDiff negative. U/a neg  - Strep, flu, and COVID neg at OSH  - Throat red with exudates. Tonsils mildly swollen. CT neck showing adenopathy but no abscesses.  - CT chest without consoidations  - Will check EBV  - ID following  - Continue Zosyn and doxy per ID recs  - Last temp was > 24 hours ago

## 2023-06-15 NOTE — PROGRESS NOTES
Esdras Cowan - Oncology ([facility])  Hematology  Bone Marrow Transplant  Progress Note    Patient Name: Magdaleno Hirsch  Admission Date: 6/9/2023  Hospital Length of Stay: 6 days  Code Status: Full Code    Subjective:     Interval History: blasts remain elevated. Tachy overnight    Objective:     Vital Signs (Most Recent):  Temp: 98.7 °F (37.1 °C) (06/15/23 0333)  Pulse: (!) 122 (06/15/23 0333)  Resp: 20 (06/15/23 0333)  BP: 137/80 (06/15/23 0333)  SpO2: 98 % (06/15/23 0333) Vital Signs (24h Range):  Temp:  [98 °F (36.7 °C)-99.9 °F (37.7 °C)] 98.7 °F (37.1 °C)  Pulse:  [120-132] 122  Resp:  [16-22] 20  SpO2:  [95 %-98 %] 98 %  BP: (129-140)/(64-80) 137/80     Weight: 88.3 kg (194 lb 10.7 oz)  Body mass index is 25.68 kg/m².  Body surface area is 2.13 meters squared.    ECOG SCORE           [unfilled]    Intake/Output - Last 3 Shifts         06/13 0700  06/14 0659 06/14 0700  06/15 0659    P.O. 0 690    I.V. (mL/kg) 4993 (54.7) 1112.7 (12.6)    IV Piggyback 489.6 113.4    Total Intake(mL/kg) 5482.6 (60.1) 1916.1 (21.7)    Net +5482.6 +1916.1          Urine Occurrence  2 x             Physical Exam  Constitutional:       Appearance: He is well-developed.   HENT:      Head: Normocephalic and atraumatic.      Mouth/Throat:      Comments: Severe ulcerative disease most in soft palate, cervical LAD+  Eyes:      Conjunctiva/sclera: Conjunctivae normal.   Cardiovascular:      Rate and Rhythm: Normal rate and regular rhythm.      Heart sounds: Normal heart sounds. No murmur heard.  Pulmonary:      Effort: Pulmonary effort is normal. No respiratory distress.      Breath sounds: Normal breath sounds. No wheezing.   Abdominal:      General: Bowel sounds are normal. There is no distension.      Palpations: Abdomen is soft.      Tenderness: There is no abdominal tenderness.   Musculoskeletal:         General: No tenderness. Normal range of motion.      Cervical back: Neck supple.   Lymphadenopathy:      Cervical: No cervical adenopathy.    Skin:     General: Skin is warm and dry.      Findings: No rash.   Neurological:      Mental Status: He is alert and oriented to person, place, and time.      Coordination: Coordination normal.   Psychiatric:         Behavior: Behavior normal.          Significant Labs:   All pertinent labs from the last 24 hours have been reviewed.    Diagnostic Results:  I have reviewed all pertinent imaging results/findings within the past 24 hours.    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 Brett Hogan.   - He has been on dasatinib outpatient since 10/2022.   - Some question regarding his compliance over the course of treatment, however.  - Presented to [facility] with c/o gum swelling on 6/7/23.   - Labs remarkable for WBC count of 138K, with 80% blasts indicating blast crisis.   - WBC count from approximately a week and a half earlier was normal. Reported recent compliance with his TKI.   - Transferred to [facility] for higher level of care.  - Bone marrow Biopsy performed 6/9/23. Results pending, but flow suggestive of transformation to AML.  - Continue dasatinib and allopurinol 300 mg daily. Hydrea stopped with normal WBC count.  - BCR/ABL p210 collected on admission. Result pending.  - Daily CBC and twice TLS and DIC labs. Mild TLS on admission, but none at this time.  - Echo ordered  - Picc line placed  - Urology consulted for fertility preservation, and family member delivered sample to fertility center  - Tentatively planning for induction with FLAG Hayde + ponatanib pending fludarabine availability. Will resume daily TLS monitoring when patient starts chemo.    Pharyngitis  - Continue [facility] and full liquid diet due to pain with swallowing  - Pain well-controlled with IV dilaudid  - ID following. Continue abx per ID recs  - RIP throat swab, strep, flu, and COVID neg  - Leukemia infiltration may be playing  a role    Leukocytosis  - See blast crisis phase of CML    Hypokalemia  - May be 2/2 diarrhea.   - Repleting  - Daily CMP while inpatient    Diarrhea  - Reports multiple days of diarrhea prior to transfer  - C diff negative  - PRN imodium available    Neutropenic fever  - Temp fo 101.4 at [place] ED. Fevers persist since transfer.  - CXR neg. Blood culture ngtd. CDiff negative. U/a neg  - Strep, flu, and COVID neg at OSH  - Throat red with exudates. Tonsils mildly swollen. CT neck showing adenopathy but no abscesses.  - CT chest without consoidations  - Will check EBV  - ID following  - Continue Zosyn and doxy per ID recs  - Last temp was > 24 hours ago    Pancytopenia  - Functionally neutropenic despite leukocytosis, given low neutrophil count  - 2/2 disease and hydrea  - Daily CBC while inpatient  - Transfuse for hgb < 7 or plts < 10K  - Started antimicrobial ppx with acyclovir and fluconazole and contining Zosyn and doxy for neutropenic fever.    Hypertension  - Takes amlodipine at home  - Given hypertension, amlodipine stopped and nifedipine and losartan started in [place]. Continued on transfer.  - BP now improved    Tumor lysis syndrome  - Uric acid and LDH elevated on presentation to [place] ED. Kidney function and electrolytes normal.  - Not G6PD deficient  - Changed allopurinol from 300 mg BID to 300 mg daily  - Twice TLS labs while inpatient. Will resume daily TLS monitoring when patient starts chemo.        VTE Risk Mitigation (From admission, onward)         Ordered     IP VTE HIGH RISK PATIENT  Once         06/09/23 0049     Place sequential compression device  Until discontinued         06/09/23 0049                Disposition: home    Gus Posadas MD  Bone Marrow Transplant  [facility] fernando - Oncology ([facility])

## 2023-06-15 NOTE — PLAN OF CARE
Side rails up x2; call bell in place; bed in lowest, locked position; skid proof socks on; no evidence of skin breakdown; care plan explained to patient; pt remains free of injury.  Pt with mucositis, pt with poor apetite, voids, ambulates. Information given concerning semen sample for sperm banking. Pt verbalized understanding to call [facility] in the am and to have sample sent to facility prior to 12 noon closing time. Oxy IR given for pain x 1, IVF infusing at 100 cc/hr, zosyn given as scheduled, mg 4 gms ivpb completed. Frequent rounding in progress, pt encouraged to call as needed, VSS and afebrile.

## 2023-06-15 NOTE — PROGRESS NOTES
Received request to assist with possible sperm banking.  Attempted to meet with patient; patient and mother sleeping soundly in room at this writing.    Reached out to [facility] SW and navigator for process and costs; if accepted in [facility], covered at no charge through their grants.  Otherwise $700 cost for workup and one year of storage.    Patient is eligible for [employer] patient assistance if needed.  [facility] closed after recent reopening.  Appropriate based on age for [facility] service per Dr. Driver who reached out to [facility] coordinator.  Will assist as needed; will follow.

## 2023-06-15 NOTE — SUBJECTIVE & OBJECTIVE
Subjective:     Interval History: blasts remain elevated. Tachy overnight    Objective:     Vital Signs (Most Recent):  Temp: 98.7 °F (37.1 °C) (06/15/23 0333)  Pulse: (!) 122 (06/15/23 0333)  Resp: 20 (06/15/23 0333)  BP: 137/80 (06/15/23 0333)  SpO2: 98 % (06/15/23 0333) Vital Signs (24h Range):  Temp:  [98 °F (36.7 °C)-99.9 °F (37.7 °C)] 98.7 °F (37.1 °C)  Pulse:  [120-132] 122  Resp:  [16-22] 20  SpO2:  [95 %-98 %] 98 %  BP: (129-140)/(64-80) 137/80     Weight: 88.3 kg (194 lb 10.7 oz)  Body mass index is 25.68 kg/m².  Body surface area is 2.13 meters squared.    ECOG SCORE           [unfilled]    Intake/Output - Last 3 Shifts         06/13 0700  06/14 0659 06/14 0700  06/15 0659    P.O. 0 690    I.V. (mL/kg) 4993 (54.7) 1112.7 (12.6)    IV Piggyback 489.6 113.4    Total Intake(mL/kg) 5482.6 (60.1) 1916.1 (21.7)    Net +5482.6 +1916.1          Urine Occurrence  2 x             Physical Exam  Constitutional:       Appearance: He is well-developed.   HENT:      Head: Normocephalic and atraumatic.      Mouth/Throat:      Comments: Severe ulcerative disease most in soft palate, cervical LAD+  Eyes:      Conjunctiva/sclera: Conjunctivae normal.   Cardiovascular:      Rate and Rhythm: Normal rate and regular rhythm.      Heart sounds: Normal heart sounds. No murmur heard.  Pulmonary:      Effort: Pulmonary effort is normal. No respiratory distress.      Breath sounds: Normal breath sounds. No wheezing.   Abdominal:      General: Bowel sounds are normal. There is no distension.      Palpations: Abdomen is soft.      Tenderness: There is no abdominal tenderness.   Musculoskeletal:         General: No tenderness. Normal range of motion.      Cervical back: Neck supple.   Lymphadenopathy:      Cervical: No cervical adenopathy.   Skin:     General: Skin is warm and dry.      Findings: No rash.   Neurological:      Mental Status: He is alert and oriented to person, place, and time.      Coordination: Coordination normal.    Psychiatric:         Behavior: Behavior normal.          Significant Labs:   All pertinent labs from the last 24 hours have been reviewed.    Diagnostic Results:  I have reviewed all pertinent imaging results/findings within the past 24 hours.

## 2023-06-15 NOTE — PLAN OF CARE
Problem: Physical Therapy  Goal: Physical Therapy Goal  Outcome: Met   PT D/Rivas due to pt able to perform functional mobility. Modesta Benton PT 6/15/23

## 2023-06-15 NOTE — PT/OT/SLP EVAL
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 male with a medical diagnosis of 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. At this time, patient is functioning at their prior level of function and does not require further acute OT services. Patient completed ADL's, transfers/mobility independently with no LOB or SOB; educated on importance of continued activity and exercises with good understanding demonstrated; does not require skilled acute therapy services, d/c OT.    Plan:     During this hospitalization, patient does not require further acute OT services.  Please re-consult if situation changes.    Plan of Care Reviewed with: patient    Subjective     Chief Complaint: Throat pain  Patient/Family Comments/goals: Return home    Occupational Profile:  Living Environment: Patient lives with his girlfriend, 1st floor apartment, 0STE, t/s combo + grab bars  Previous level of function: Independent, no recent falls  Roles and Routines: Still driving, not currently working  Equipment Used at home: none  Assistance upon Discharge: Girlfriend/family    Pain/Comfort:  Pain Rating 1: 6/10  Location - Orientation 1: generalized  Location 1: throat  Pain Addressed 1: Pre-medicate for activity, Distraction, Nurse notified  Pain Rating Post-Intervention 1: 6/10    Patients cultural, spiritual, Yazidi conflicts given the current situation: no    Objective:     Communicated with: Nursing prior to session.  Patient found supine with peripheral IV upon OT entry to room.    General Precautions: Standard, fall  Orthopedic Precautions: N/A  Braces: N/A  Respiratory Status: Room air     Occupational  Performance:    Bed Mobility:    Patient completed Rolling/Turning to Right with independence  Patient completed Scooting/Bridging with independence  Patient completed Supine to Sit with independence    Functional Mobility/Transfers:  Patient completed Sit <> Stand Transfer with independence  with  no assistive device   Functional Mobility: Independent, no AD and patient managing IV pole; >HH distance x 4 (~300 ft) within room and into hallway    Activities of Daily Living:  Independently seated/standing    Cognitive/Visual Perceptual:  Cognitive/Psychosocial Skills:     -       Oriented to: Person, Place, Time, and Situation   -       Follows Commands/attention:Follows multistep  commands  -       Safety awareness/insight to disability: intact   -       Mood/Affect/Coping skills/emotional control: Appropriate to situation    Physical Exam:  Postural examination/scapula alignment:    -       No postural abnormalities identified  Upper Extremity Range of Motion:     -       Right Upper Extremity: WFL  -       Left Upper Extremity: WFL  Upper Extremity Strength:    -       Right Upper Extremity: WFL  -       Left Upper Extremity: WFL   Strength:    -       Right Upper Extremity: WFL  -       Left Upper Extremity: WFL    AMPAC 6 Click ADL:  AMPAC Total Score: 24    Treatment & Education:  Evaluation completed, patient determined to be at baseline for completion of ADL's and functional transfers. Patient D/C acute OT.    Patient does not require continued skilled OT services at this time.  Patient educated on importance of continued edge of bed and out of bed activity and safety.   Patient educated to request re-ordering of therapy from MD if functional status/mobility should change during this admission.     Patient left sitting edge of bed with all lines intact and call button in reach    GOALS:   Multidisciplinary Problems       Occupational Therapy Goals       Not on file                    History:     Past  Medical History:   Diagnosis Date    CML (chronic myelocytic leukemia)     HVD (hypertensive vascular disease)     Oropharyngeal candidiasis          Past Surgical History:   Procedure Laterality Date    BONE MARROW BIOPSY N/A 8/22/2022    Procedure: Biopsy-bone marrow;  Surgeon: Getachew Chen MD;  [facility]: [facility];  Service: General;  Laterality: N/A;    KNEE SURGERY Left        Time Tracking:     OT Date of Treatment: 06/15/23  OT Start Time: 1102  OT Stop Time: 1114  OT Total Time (min): 12 min    Billable Minutes:Evaluation 12    6/15/2023

## 2023-06-15 NOTE — ASSESSMENT & PLAN NOTE
- Functionally neutropenic despite leukocytosis, given low neutrophil count  - 2/2 disease and hydrea  - Daily CBC while inpatient  - Transfuse for hgb < 7 or plts < 10K  - Started antimicrobial ppx with acyclovir and fluconazole and contining Zosyn and doxy for neutropenic fever.

## 2023-06-15 NOTE — PT/OT/SLP EVAL
"Physical Therapy Evaluation/D/C Summary    Patient Name:  Magdaleno Hirsch   MRN:  [id number]    Recommendations:     Discharge Recommendations: home   Discharge Equipment Recommendations: none   Barriers to discharge: None    Assessment:     Magdaleno Hirsch is a 22 y.o. 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  He presents with the following impairments/functional limitations:  (none) . D/C PT due to pt independent with functional mobility.  Recent Surgery: * No surgery found *      Plan:       (D/C PT due to pt able to perform functional mobility)   Plan of Care Expires:  07/15/23    Subjective   "I have been tired, I haven't been walking much" (PT educated pt in the importance of OOB mobility/walking, he expressed understanding)    Pain/Comfort:  Pain Rating 1: 0/10 (pt did not c/o pain during treatment)  Pain Rating Post-Intervention 1: 0/10    Patients cultural, spiritual, Hindu conflicts given the current situation: no    Living Environment:  Pt lives with his girlfriend in a 1st floor apt   Prior to admission, patients level of function was independent.  Equipment used at home: none.  Upon discharge, patient will have assistance from significant other.    Objective:     Communicated with nurse prior to session.  Patient found HOB elevated with peripheral IV  upon PT entry to room.    General Precautions: Standard, fall  Orthopedic Precautions:N/A   Braces: N/A  Respiratory Status: Room air    Exams:  Cognitive Exam:  Patient is oriented to Person, Place, and Time  Sensation:    -       Intact  light/touch B LE  RLE ROM: WFL  RLE Strength: WFL  LLE ROM: WFL  LLE Strength: WFL    Functional Mobility:  Bed Mobility:     Supine to Sit: independence  Sit to Supine: independence  Transfers:     Sit to Stand:  independence with no AD  Gait: 300ft with no AD with independence. Pt performed standing balance activities: high knee gait and ambulated backwards with no " instability    AM-PAC 6 CLICK MOBILITY  Total Score:24     Patient left HOB elevated with all lines intact, call button in reach, and nurse notified.    GOALS:   Multidisciplinary Problems       Physical Therapy Goals       Not on file              Multidisciplinary Problems (Resolved)          Problem: Physical Therapy    Goal Priority Disciplines Outcome Goal Variances Interventions   Physical Therapy Goal   (Resolved)     PT, PT/OT Met                         History:     Past Medical History:   Diagnosis Date    CML (chronic myelocytic leukemia)     HVD (hypertensive vascular disease)     Oropharyngeal candidiasis        Past Surgical History:   Procedure Laterality Date    BONE MARROW BIOPSY N/A 8/22/2022    Procedure: Biopsy-bone marrow;  Surgeon: Getachew Chen MD;  Location: [facility];  Service: General;  Laterality: N/A;    KNEE SURGERY Left        Time Tracking:     PT Received On: 06/15/23  PT Start Time: 1159     PT Stop Time: 1216  PT Total Time (min): 17 min     Billable Minutes: Evaluation 17      06/15/2023

## 2023-06-15 NOTE — ASSESSMENT & PLAN NOTE
- Uric acid and LDH elevated on presentation to [place] ED. Kidney function and electrolytes normal.  - Not G6PD deficient  - Changed allopurinol from 300 mg BID to 300 mg daily  - Twice TLS labs while inpatient. Will resume daily TLS monitoring when patient starts chemo.

## 2023-06-16 ENCOUNTER — TELEPHONE (OUTPATIENT)
Dept: PHARMACY | Facility: CLINIC | Age: 23
End: 2023-06-16
Payer: COMMERCIAL

## 2023-06-16 ENCOUNTER — SPECIALTY PHARMACY (OUTPATIENT)
Dept: PHARMACY | Facility: CLINIC | Age: 23
End: 2023-06-16
Payer: COMMERCIAL

## 2023-06-16 DIAGNOSIS — C92.10 BLASTIC PHASE CHRONIC MYELOID LEUKEMIA: Primary | ICD-10-CM

## 2023-06-16 DIAGNOSIS — C92.12 CML (CHRONIC MYELOID LEUKEMIA) IN RELAPSE: Primary | ICD-10-CM

## 2023-06-16 LAB
ALBUMIN SERPL BCP-MCNC: 2.3 G/DL (ref 3.5–5.2)
ALBUMIN SERPL BCP-MCNC: 2.9 G/DL (ref 3.5–5.2)
ALP SERPL-CCNC: 53 U/L (ref 55–135)
ALP SERPL-CCNC: 65 U/L (ref 55–135)
ALT SERPL W/O P-5'-P-CCNC: 11 U/L (ref 10–44)
ALT SERPL W/O P-5'-P-CCNC: 13 U/L (ref 10–44)
ANION GAP SERPL CALC-SCNC: 10 MMOL/L (ref 8–16)
ANION GAP SERPL CALC-SCNC: 11 MMOL/L (ref 8–16)
ANISOCYTOSIS BLD QL SMEAR: SLIGHT
ANISOCYTOSIS BLD QL SMEAR: SLIGHT
AST SERPL-CCNC: 26 U/L (ref 10–40)
AST SERPL-CCNC: 30 U/L (ref 10–40)
BACTERIA BLD CULT: NORMAL
BACTERIA BLD CULT: NORMAL
BASOPHILS NFR BLD: 0 % (ref 0–1.9)
BASOPHILS NFR BLD: 0 % (ref 0–1.9)
BILIRUB SERPL-MCNC: 0.4 MG/DL (ref 0.1–1)
BILIRUB SERPL-MCNC: 0.6 MG/DL (ref 0.1–1)
BLASTS NFR BLD MANUAL: 76 %
BLASTS NFR BLD MANUAL: 77 %
BUN SERPL-MCNC: 6 MG/DL (ref 6–20)
BUN SERPL-MCNC: 7 MG/DL (ref 6–20)
BURR CELLS BLD QL SMEAR: ABNORMAL
BURR CELLS BLD QL SMEAR: ABNORMAL
CALCIUM SERPL-MCNC: 7.5 MG/DL (ref 8.7–10.5)
CALCIUM SERPL-MCNC: 9.1 MG/DL (ref 8.7–10.5)
CHLORIDE SERPL-SCNC: 105 MMOL/L (ref 95–110)
CHLORIDE SERPL-SCNC: 106 MMOL/L (ref 95–110)
CMV DNA SPEC QL NAA+PROBE: NOT DETECTED
CO2 SERPL-SCNC: 21 MMOL/L (ref 23–29)
CO2 SERPL-SCNC: 23 MMOL/L (ref 23–29)
CREAT SERPL-MCNC: 0.7 MG/DL (ref 0.5–1.4)
CREAT SERPL-MCNC: 0.9 MG/DL (ref 0.5–1.4)
CYTOMEGALOVIRUS LOG (IU/ML): NOT DETECTED LOGIU/ML
CYTOMEGALOVIRUS PCR, QUANT: NOT DETECTED IU/ML
DACRYOCYTES BLD QL SMEAR: ABNORMAL
DIFFERENTIAL METHOD: ABNORMAL
DIFFERENTIAL METHOD: ABNORMAL
EOSINOPHIL NFR BLD: 0 % (ref 0–8)
EOSINOPHIL NFR BLD: 2 % (ref 0–8)
ERYTHROCYTE [DISTWIDTH] IN BLOOD BY AUTOMATED COUNT: 19.4 % (ref 11.5–14.5)
ERYTHROCYTE [DISTWIDTH] IN BLOOD BY AUTOMATED COUNT: 19.6 % (ref 11.5–14.5)
EST. GFR  (NO RACE VARIABLE): >60 ML/MIN/1.73 M^2
EST. GFR  (NO RACE VARIABLE): >60 ML/MIN/1.73 M^2
FIBRINOGEN PPP-MCNC: 408 MG/DL (ref 182–400)
GLUCOSE SERPL-MCNC: 192 MG/DL (ref 70–110)
GLUCOSE SERPL-MCNC: 91 MG/DL (ref 70–110)
HCT VFR BLD AUTO: 30.4 % (ref 40–54)
HCT VFR BLD AUTO: 33.2 % (ref 40–54)
HGB BLD-MCNC: 10.7 G/DL (ref 14–18)
HGB BLD-MCNC: 9.5 G/DL (ref 14–18)
HYPOCHROMIA BLD QL SMEAR: ABNORMAL
HYPOCHROMIA BLD QL SMEAR: ABNORMAL
IMM GRANULOCYTES # BLD AUTO: ABNORMAL K/UL (ref 0–0.04)
IMM GRANULOCYTES # BLD AUTO: ABNORMAL K/UL (ref 0–0.04)
IMM GRANULOCYTES NFR BLD AUTO: ABNORMAL %
IMM GRANULOCYTES NFR BLD AUTO: ABNORMAL % (ref 0–0.5)
INR PPP: 1.3 (ref 0.8–1.2)
LYMPHOCYTES NFR BLD: 18 % (ref 18–48)
LYMPHOCYTES NFR BLD: 21 % (ref 18–48)
MAGNESIUM SERPL-MCNC: 1.5 MG/DL (ref 1.6–2.6)
MCH RBC QN AUTO: 22.9 PG (ref 27–31)
MCH RBC QN AUTO: 23.7 PG (ref 27–31)
MCHC RBC AUTO-ENTMCNC: 31.3 G/DL (ref 32–36)
MCHC RBC AUTO-ENTMCNC: 32.2 G/DL (ref 32–36)
MCV RBC AUTO: 73 FL (ref 82–98)
MCV RBC AUTO: 74 FL (ref 82–98)
MONOCYTES NFR BLD: 1 % (ref 4–15)
MONOCYTES NFR BLD: 2 % (ref 4–15)
NEUTROPHILS NFR BLD: 1 % (ref 38–73)
NEUTROPHILS NFR BLD: 2 % (ref 38–73)
NRBC BLD-RTO: 0 /100 WBC
NRBC BLD-RTO: 0 /100 WBC
OVALOCYTES BLD QL SMEAR: ABNORMAL
OVALOCYTES BLD QL SMEAR: ABNORMAL
PHOSPHATE SERPL-MCNC: 2.4 MG/DL (ref 2.7–4.5)
PLATELET # BLD AUTO: 20 K/UL (ref 150–450)
PLATELET # BLD AUTO: 24 K/UL (ref 150–450)
PLATELET BLD QL SMEAR: ABNORMAL
PLATELET BLD QL SMEAR: ABNORMAL
PMV BLD AUTO: ABNORMAL FL (ref 9.2–12.9)
PMV BLD AUTO: ABNORMAL FL (ref 9.2–12.9)
POIKILOCYTOSIS BLD QL SMEAR: SLIGHT
POIKILOCYTOSIS BLD QL SMEAR: SLIGHT
POLYCHROMASIA BLD QL SMEAR: ABNORMAL
POLYCHROMASIA BLD QL SMEAR: ABNORMAL
POTASSIUM SERPL-SCNC: 2.8 MMOL/L (ref 3.5–5.1)
POTASSIUM SERPL-SCNC: 3.7 MMOL/L (ref 3.5–5.1)
PROT SERPL-MCNC: 5.4 G/DL (ref 6–8.4)
PROT SERPL-MCNC: 6.8 G/DL (ref 6–8.4)
PROTHROMBIN TIME: 13.4 SEC (ref 9–12.5)
RBC # BLD AUTO: 4.15 M/UL (ref 4.6–6.2)
RBC # BLD AUTO: 4.51 M/UL (ref 4.6–6.2)
SCHISTOCYTES BLD QL SMEAR: PRESENT
SODIUM SERPL-SCNC: 137 MMOL/L (ref 136–145)
SODIUM SERPL-SCNC: 139 MMOL/L (ref 136–145)
SPHEROCYTES BLD QL SMEAR: ABNORMAL
SPHEROCYTES BLD QL SMEAR: ABNORMAL
TOXIC GRANULES BLD QL SMEAR: PRESENT
URATE SERPL-MCNC: 2.2 MG/DL (ref 3.4–7)
URATE SERPL-MCNC: 2.3 MG/DL (ref 3.4–7)
WBC # BLD AUTO: 50.25 K/UL (ref 3.9–12.7)
WBC # BLD AUTO: 69.44 K/UL (ref 3.9–12.7)

## 2023-06-16 PROCEDURE — 25000003 PHARM REV CODE 250: Performed by: INTERNAL MEDICINE

## 2023-06-16 PROCEDURE — 80053 COMPREHEN METABOLIC PANEL: CPT | Mod: 91

## 2023-06-16 PROCEDURE — 96156 PR ASSESS/REASSESSMENT, HEALTH BEHAVIOR: ICD-10-PCS | Mod: AF,HB,, | Performed by: PSYCHOLOGIST

## 2023-06-16 PROCEDURE — 25000003 PHARM REV CODE 250: Performed by: STUDENT IN AN ORGANIZED HEALTH CARE EDUCATION/TRAINING PROGRAM

## 2023-06-16 PROCEDURE — 84550 ASSAY OF BLOOD/URIC ACID: CPT | Mod: 91

## 2023-06-16 PROCEDURE — 63600175 PHARM REV CODE 636 W HCPCS: Mod: JG | Performed by: INTERNAL MEDICINE

## 2023-06-16 PROCEDURE — 96156 HLTH BHV ASSMT/REASSESSMENT: CPT | Mod: AF,HB,, | Performed by: PSYCHOLOGIST

## 2023-06-16 PROCEDURE — 63600175 PHARM REV CODE 636 W HCPCS: Performed by: STUDENT IN AN ORGANIZED HEALTH CARE EDUCATION/TRAINING PROGRAM

## 2023-06-16 PROCEDURE — 84550 ASSAY OF BLOOD/URIC ACID: CPT | Performed by: STUDENT IN AN ORGANIZED HEALTH CARE EDUCATION/TRAINING PROGRAM

## 2023-06-16 PROCEDURE — A4216 STERILE WATER/SALINE, 10 ML: HCPCS | Performed by: INTERNAL MEDICINE

## 2023-06-16 PROCEDURE — 85027 COMPLETE CBC AUTOMATED: CPT | Mod: 91

## 2023-06-16 PROCEDURE — 85007 BL SMEAR W/DIFF WBC COUNT: CPT | Mod: 91

## 2023-06-16 PROCEDURE — 99233 SBSQ HOSP IP/OBS HIGH 50: CPT | Mod: ,,, | Performed by: INTERNAL MEDICINE

## 2023-06-16 PROCEDURE — 25000003 PHARM REV CODE 250: Performed by: NURSE PRACTITIONER

## 2023-06-16 PROCEDURE — 85610 PROTHROMBIN TIME: CPT | Performed by: STUDENT IN AN ORGANIZED HEALTH CARE EDUCATION/TRAINING PROGRAM

## 2023-06-16 PROCEDURE — 25000003 PHARM REV CODE 250

## 2023-06-16 PROCEDURE — 83735 ASSAY OF MAGNESIUM: CPT | Performed by: NURSE PRACTITIONER

## 2023-06-16 PROCEDURE — 63600175 PHARM REV CODE 636 W HCPCS: Performed by: NURSE PRACTITIONER

## 2023-06-16 PROCEDURE — 84100 ASSAY OF PHOSPHORUS: CPT | Performed by: NURSE PRACTITIONER

## 2023-06-16 PROCEDURE — 99233 PR SUBSEQUENT HOSPITAL CARE,LEVL III: ICD-10-PCS | Mod: ,,, | Performed by: INTERNAL MEDICINE

## 2023-06-16 PROCEDURE — 85027 COMPLETE CBC AUTOMATED: CPT | Performed by: NURSE PRACTITIONER

## 2023-06-16 PROCEDURE — 20600001 HC STEP DOWN PRIVATE ROOM

## 2023-06-16 PROCEDURE — 80053 COMPREHEN METABOLIC PANEL: CPT | Performed by: NURSE PRACTITIONER

## 2023-06-16 PROCEDURE — 85384 FIBRINOGEN ACTIVITY: CPT | Performed by: STUDENT IN AN ORGANIZED HEALTH CARE EDUCATION/TRAINING PROGRAM

## 2023-06-16 PROCEDURE — 85007 BL SMEAR W/DIFF WBC COUNT: CPT | Performed by: NURSE PRACTITIONER

## 2023-06-16 RX ORDER — ONDANSETRON 2 MG/ML
16 INJECTION INTRAMUSCULAR; INTRAVENOUS
Status: COMPLETED | OUTPATIENT
Start: 2023-06-16 | End: 2023-06-20

## 2023-06-16 RX ORDER — ONDANSETRON 2 MG/ML
16 INJECTION INTRAMUSCULAR; INTRAVENOUS
Status: DISCONTINUED | OUTPATIENT
Start: 2023-06-16 | End: 2023-06-16

## 2023-06-16 RX ORDER — PROCHLORPERAZINE EDISYLATE 5 MG/ML
10 INJECTION INTRAMUSCULAR; INTRAVENOUS EVERY 6 HOURS PRN
Status: DISCONTINUED | OUTPATIENT
Start: 2023-06-16 | End: 2023-06-20

## 2023-06-16 RX ORDER — POTASSIUM CHLORIDE 7.45 MG/ML
10 INJECTION INTRAVENOUS
Status: COMPLETED | OUTPATIENT
Start: 2023-06-16 | End: 2023-06-16

## 2023-06-16 RX ORDER — HYDROXYUREA 500 MG/1
1000 CAPSULE ORAL 2 TIMES DAILY
Status: DISCONTINUED | OUTPATIENT
Start: 2023-06-16 | End: 2023-06-17

## 2023-06-16 RX ORDER — DEXAMETHASONE SODIUM PHOSPHATE 4 MG/ML
8 INJECTION, SOLUTION INTRA-ARTICULAR; INTRALESIONAL; INTRAMUSCULAR; INTRAVENOUS; SOFT TISSUE
Status: DISCONTINUED | OUTPATIENT
Start: 2023-06-16 | End: 2023-06-16

## 2023-06-16 RX ORDER — DEXAMETHASONE SODIUM PHOSPHATE 1 MG/ML
1 SOLUTION/ DROPS OPHTHALMIC
Status: COMPLETED | OUTPATIENT
Start: 2023-06-17 | End: 2023-06-23

## 2023-06-16 RX ORDER — SODIUM CHLORIDE 0.9 % (FLUSH) 0.9 %
10 SYRINGE (ML) INJECTION
Status: DISCONTINUED | OUTPATIENT
Start: 2023-06-16 | End: 2023-07-12 | Stop reason: HOSPADM

## 2023-06-16 RX ORDER — DEXAMETHASONE SODIUM PHOSPHATE 4 MG/ML
8 INJECTION, SOLUTION INTRA-ARTICULAR; INTRALESIONAL; INTRAMUSCULAR; INTRAVENOUS; SOFT TISSUE
Status: COMPLETED | OUTPATIENT
Start: 2023-06-16 | End: 2023-06-20

## 2023-06-16 RX ORDER — HEPARIN 100 UNIT/ML
300 SYRINGE INTRAVENOUS
Status: DISCONTINUED | OUTPATIENT
Start: 2023-06-16 | End: 2023-07-12 | Stop reason: HOSPADM

## 2023-06-16 RX ORDER — SODIUM CHLORIDE 9 MG/ML
INJECTION, SOLUTION INTRAVENOUS CONTINUOUS
Status: DISCONTINUED | OUTPATIENT
Start: 2023-06-16 | End: 2023-06-21

## 2023-06-16 RX ADMIN — ALUMINUM HYDROXIDE, MAGNESIUM HYDROXIDE, AND DIMETHICONE 10 ML: 400; 400; 40 SUSPENSION ORAL at 08:06

## 2023-06-16 RX ADMIN — Medication 10 ML: at 06:06

## 2023-06-16 RX ADMIN — FLUCONAZOLE 400 MG: 200 TABLET ORAL at 09:06

## 2023-06-16 RX ADMIN — SODIUM CHLORIDE: 9 INJECTION, SOLUTION INTRAVENOUS at 06:06

## 2023-06-16 RX ADMIN — LOSARTAN POTASSIUM 50 MG: 50 TABLET, FILM COATED ORAL at 09:06

## 2023-06-16 RX ADMIN — CLADRIBINE 10.9 MG: 1 INJECTION INTRAVENOUS at 09:06

## 2023-06-16 RX ADMIN — POTASSIUM CHLORIDE 10 MEQ: 7.46 INJECTION, SOLUTION INTRAVENOUS at 07:06

## 2023-06-16 RX ADMIN — ACYCLOVIR 800 MG: 200 CAPSULE ORAL at 10:06

## 2023-06-16 RX ADMIN — GUAIFENESIN AND DEXTROMETHORPHAN HYDROBROMIDE 1 TABLET: 600; 30 TABLET, EXTENDED RELEASE ORAL at 09:06

## 2023-06-16 RX ADMIN — NIFEDIPINE 60 MG: 60 TABLET, FILM COATED, EXTENDED RELEASE ORAL at 09:06

## 2023-06-16 RX ADMIN — DOXYCYCLINE HYCLATE 100 MG: 100 TABLET, COATED ORAL at 08:06

## 2023-06-16 RX ADMIN — PIPERACILLIN SODIUM AND TAZOBACTAM SODIUM 4.5 G: 4; .5 INJECTION, POWDER, FOR SOLUTION INTRAVENOUS at 11:06

## 2023-06-16 RX ADMIN — DASATINIB 100 MG: 100 TABLET ORAL at 11:06

## 2023-06-16 RX ADMIN — ALUMINUM HYDROXIDE, MAGNESIUM HYDROXIDE, AND DIMETHICONE 10 ML: 400; 400; 40 SUSPENSION ORAL at 02:06

## 2023-06-16 RX ADMIN — Medication 10 ML: at 12:06

## 2023-06-16 RX ADMIN — DEXAMETHASONE SODIUM PHOSPHATE 8 MG: 4 INJECTION INTRA-ARTICULAR; INTRALESIONAL; INTRAMUSCULAR; INTRAVENOUS; SOFT TISSUE at 08:06

## 2023-06-16 RX ADMIN — POTASSIUM CHLORIDE 10 MEQ: 7.46 INJECTION, SOLUTION INTRAVENOUS at 09:06

## 2023-06-16 RX ADMIN — HYDROXYUREA 1000 MG: 500 CAPSULE ORAL at 08:06

## 2023-06-16 RX ADMIN — ALUMINUM HYDROXIDE, MAGNESIUM HYDROXIDE, AND DIMETHICONE 10 ML: 400; 400; 40 SUSPENSION ORAL at 05:06

## 2023-06-16 RX ADMIN — Medication 10 ML: at 02:06

## 2023-06-16 RX ADMIN — ALLOPURINOL 300 MG: 300 TABLET ORAL at 09:06

## 2023-06-16 RX ADMIN — PIPERACILLIN SODIUM AND TAZOBACTAM SODIUM 4.5 G: 4; .5 INJECTION, POWDER, FOR SOLUTION INTRAVENOUS at 02:06

## 2023-06-16 RX ADMIN — POTASSIUM CHLORIDE 10 MEQ: 7.46 INJECTION, SOLUTION INTRAVENOUS at 11:06

## 2023-06-16 RX ADMIN — PIPERACILLIN SODIUM AND TAZOBACTAM SODIUM 4.5 G: 4; .5 INJECTION, POWDER, FOR SOLUTION INTRAVENOUS at 05:06

## 2023-06-16 RX ADMIN — DOXYCYCLINE HYCLATE 100 MG: 100 TABLET, COATED ORAL at 09:06

## 2023-06-16 RX ADMIN — ONDANSETRON 16 MG: 2 INJECTION INTRAMUSCULAR; INTRAVENOUS at 08:06

## 2023-06-16 RX ADMIN — HYDROXYUREA 1000 MG: 500 CAPSULE ORAL at 09:06

## 2023-06-16 RX ADMIN — ACYCLOVIR 800 MG: 200 CAPSULE ORAL at 08:06

## 2023-06-16 RX ADMIN — GUAIFENESIN AND DEXTROMETHORPHAN HYDROBROMIDE 1 TABLET: 600; 30 TABLET, EXTENDED RELEASE ORAL at 08:06

## 2023-06-16 RX ADMIN — POTASSIUM CHLORIDE 10 MEQ: 7.46 INJECTION, SOLUTION INTRAVENOUS at 10:06

## 2023-06-16 RX ADMIN — Medication 10 ML: at 08:06

## 2023-06-16 RX ADMIN — ALUMINUM HYDROXIDE, MAGNESIUM HYDROXIDE, AND DIMETHICONE 10 ML: 400; 400; 40 SUSPENSION ORAL at 10:06

## 2023-06-16 RX ADMIN — OXYCODONE HYDROCHLORIDE 5 MG: 5 TABLET ORAL at 08:06

## 2023-06-16 NOTE — PLAN OF CARE
TID staff plan of care    Afebrile, throat pain slightly improved, no clear infectious etiology at this time  - finish 5 days of doxycycline, 10 days of fluconazole and 48 more hours of piptazo and can transition back to levofloxacin prophylaxis  - discussed with team will sign off call back if questions

## 2023-06-16 NOTE — PLAN OF CARE
No acute events overnight.  Semen specimen still needs collected.  VSS, all needs met, will cont to monitor.

## 2023-06-16 NOTE — PLAN OF CARE
We discussed with Mr Hirsch several times the importance and possible benefits of fertility preservation. Knowing the risks and benefits he has ultimately decided to not undergo this process. He states that he is sure about his decision    We will consent him to begin chemotherapy    Chan Posadas MD

## 2023-06-16 NOTE — PROGRESS NOTES
Pharmacist Patient Education Note    CLIA + ponatinib chemotherapy induction regimen was discussed with the patient. Medication handouts for each agent were provided to the patient.    Administration instructions were discussed including:  - Cladribine on Days 1 through 5 given intravenously over 2 hours  - Cytarabine on Days 1 through 5 given intravenously over 2 hours starting 3 hours after cladribine start  - Idarubicin on Days 1 through 3 given intravenously over 30 minutes   - Ponatinib 15mg daily by mouth for 28 days     It was also discussed that patient will be here for about a month while counts recover.    DAY NAUSEA PREVENTION MEDICATIONS CHEMOTHERAPY Venetoclax/SUPPORTIVE CARE MEDICATIONS   1 Zofran/dexamethasone Cladribine   Cytarabine   Idarubicin Dexamethasone eye drops   2 Zofran/dexamethasone Cladribine   Cytarabine   Idarubicin Dexamethasone eye drops     3 Zofran/dexamethasone Cladribine   Cytarabine   Idarubicin Dexamethasone eye drops     4 Zofran/dexamethasone Cladribine   Cytarabine   Idarubicin  Dexamethasone eye drops     5 Zofran/dexamethasone Cladribine   Cytarabine   Idarubicin Dexamethasone eye drops     6   Dexamethasone eye drops       7   Dexamethasone eye drops             The following side effects were reviewed:   - Prevention and treatment of nausea/vomiting  - The need for a PICC line for the chemotherapy infusion to reduce risk of extravasation  - Myelosuppression and prophylaxis against infection while counts are low (including changes of antibiotics from oral to IV if there is a suspected infection)  - Cardiac screening and as needed monitoring  - Red discoloration of urine, sweat, and tears associated with idarubicin  - Hair loss  - Changes in nail beds and discoloration  - Taste changes   - Mouth sores  - Neurotoxicity and cerebellar syndrome  - Chemical conjunctivitis and use of steroid eye drops  - Monitoring and potential changes of hepatic and renal function while  receiving chemotherapy     Drug-drug interactions:   - ponatinib and posaconazole --> decrease ponatinib to 15mg if initial dose is 30mg (has been addressed)  - idarubicin and posaconazole --> will delay posaconazole start until patient clears idarubicin    All questions were answered.    Sania Peng, PharmD, [facility]  Hematology/Oncology - BMT   Ext. [phone]

## 2023-06-16 NOTE — ASSESSMENT & PLAN NOTE
- Takes amlodipine at home  - Given hypertension, amlodipine stopped and nifedipine and losartan started in [place]. Continued on transfer.  - BP now improved

## 2023-06-16 NOTE — ASSESSMENT & PLAN NOTE
- Continue Clark and full liquid diet due to pain with swallowing  - Pain well-controlled with IV dilaudid  - ID following. Continue abx per ID recs  - RIP throat swab, strep, flu, and COVID neg  - Leukemia infiltration may be playing a role

## 2023-06-16 NOTE — SUBJECTIVE & OBJECTIVE
Magdaleno Hirsch, a 22 y.o. male, seen for initial evaluation. Met with patient and partner.    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      Past Psychiatric History:     Inpatient treatment: No    Outpatient treatment: No    Prior substance abuse treatment: No    Suicide Attempts: No    Psychotropic Medications:   Current: none   Past: none       Social Situation/Stressors:     Magdaleno Hirsch lives with his girlfriend (Marcia) in [place], [place].  He is a former  at [employer]. He stopped working due to illness (absences due to MD visits).  He had been in his job for 1 year. 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 Magdaleno Hirsch has never been  and has no children. He has 4 brothers. He is the oldest. The patient reports excellent social support from his girlfriend, family, and friends.  Magdaleno Hirsch's hobbies include listening to music, jose, and spending time with his family.    Current stressors: none other than health and finances     Strengths and liabilities: Strength: Patient is expressive/articulate., Strength: Patient is intelligent., Strength: Patient has positive support network., Strength: Patient has reasonable judgment., Liability: Patient has poor health.      Current Evaluation:     Mental Status Exam: Magdaleno Hirsch was seen at the request of the inpatient team.  His girlfriend was present, with his permission.  Mr. Hirsch  "was in bed at the time of session. The patient was fully cooperative throughout the interview and was an adequate historian.    Appearance: age appropriate, casually dressed, adequately groomed  Behavior/Cooperation: friendly and cooperative  Speech: Not pressured, clearly audible, no slurring  Mood: euthymic  Affect: euthymic  Thought Process: goal-directed, logical  Thought Content: normal, no suicidality, no homicidality, delusions, or paranoia; did not appear to be responding to internal stimuli during the interview.   Orientation: grossly intact  Memory: Grossly intact  Attention Span/Concentration: Attends to interview without distraction  Cognition: grossly intact  Insight: patient has awareness of illness;   Judgment: the patient's behavior is adequate to circumstances      History of Present Illness:     Magdaleno Hirsch, a 22 y.o. male, for initial evaluation visit.  Met with patient and partner.    Chief Complaint/Reason for Encounter: adaptation to disease and treatment        Magdaleno Hirsch has adjusted to illness  adequately  primarily through active coping strategies, passive coping strategies, and focus on alternative activites. Chart notes do indicate non-adherence with treatment medications, at times. He has engaged in  minimal independent  information gathering, but appears receptive to the information provided by his team.  The patient has good family/friend support.  His support system is coping adequately with the diagnosis/treatment/prognosis (reports mother and grandmother are "taking it hard"). Illness-related psychosocial stressors include financial strain, absence from work, changes in fertility options, changes in ability to engage in leisure activities, and absence from home.  The patient has a newly formed, but adequate partnership with his [facility] oncology treatment team. The patient reports the following barriers to cancer care:none.     Symptoms:   Mood: denied;  no prior and No " SI/HI  Anxiety: denied; no prior  Sleep: 8 hours, restful sleep and no concerns , no sleep onset difficulty and no sleep maintenance difficulty, no EDS, no caffeine/stimulants no use of OTC/melatonin/hypnotics/benzodiazepines

## 2023-06-16 NOTE — PROGRESS NOTES
[facility] - Oncology ([facility])  Hematology  Bone Marrow Transplant  Progress Note    Patient Name: Magdaleno Hirsch  Admission Date: 6/9/2023  Hospital Length of Stay: 7 days  Code Status: Full Code    Subjective:     Interval History: Still awaiting sample collection slightly tachy and hypertensive overnight but afebrle    Objective:     Vital Signs (Most Recent):  Temp: 98.2 °F (36.8 °C) (06/16/23 0406)  Pulse: (!) 118 (06/16/23 0406)  Resp: 20 (06/16/23 0406)  BP: 125/66 (06/16/23 0406)  SpO2: 98 % (06/16/23 0406) Vital Signs (24h Range):  Temp:  [98.2 °F (36.8 °C)-99.1 °F (37.3 °C)] 98.2 °F (36.8 °C)  Pulse:  [115-124] 118  Resp:  [16-20] 20  SpO2:  [95 %-99 %] 98 %  BP: (125-157)/(63-83) 125/66     Weight: 88.4 kg (194 lb 14.9 oz)  Body mass index is 25.72 kg/m².  Body surface area is 2.13 meters squared.    ECOG SCORE           [unfilled]    Intake/Output - Last 3 Shifts         06/14 0700  06/15 0659 06/15 0700  06/16 0659    P.O. 690 480    I.V. (mL/kg) 1112.7 (12.6)     IV Piggyback 113.4     Total Intake(mL/kg) 1916.1 (21.7) 480 (5.4)    Net +1916.1 +480          Urine Occurrence 2 x 3 x    Stool Occurrence  0 x             Physical Exam  Constitutional:       Appearance: He is well-developed.   HENT:      Head: Normocephalic and atraumatic.      Mouth/Throat:      Comments: Severe ulcerative disease most in soft palate, cervical LAD+  Eyes:      Conjunctiva/sclera: Conjunctivae normal.   Cardiovascular:      Rate and Rhythm: Normal rate and regular rhythm.      Heart sounds: Normal heart sounds. No murmur heard.  Pulmonary:      Effort: Pulmonary effort is normal. No respiratory distress.      Breath sounds: Normal breath sounds. No wheezing.   Abdominal:      General: Bowel sounds are normal. There is no distension.      Palpations: Abdomen is soft.      Tenderness: There is no abdominal tenderness.   Musculoskeletal:         General: No tenderness. Normal range of motion.      Cervical back: Neck supple.    Lymphadenopathy:      Cervical: No cervical adenopathy.   Skin:     General: Skin is warm and dry.      Findings: No rash.   Neurological:      Mental Status: He is alert and oriented to person, place, and time.      Coordination: Coordination normal.   Psychiatric:      Comments: Flat affect          Significant Labs:   All pertinent labs from the last 24 hours have been reviewed.    Diagnostic Results:  I have reviewed all pertinent imaging results/findings within the past 24 hours.    Assessment/Plan:     * 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  - Patient with CML diagnosed 8/2022   - Follows locally in [place] with Dr. Brett Hogan.   - He has been on dasatinib outpatient since 10/2022.   - Some question regarding his compliance over the course of treatment, however.  - Presented to [facility] with c/o gum swelling on 6/7/23.   - Labs remarkable for WBC count of 138K, with 80% blasts indicating blast crisis.   - WBC count from approximately a week and a half earlier was normal. Reported recent compliance with his TKI.   - Transferred to [facility] for higher level of care.  - Bone marrow Biopsy performed 6/9/23. Results pending, but flow suggestive of transformation to AML.  - Continue dasatinib and allopurinol 300 mg daily. Hydrea stopped with normal WBC count.  - BCR/ABL p210 collected on admission. Result pending.  - Daily CBC and twice TLS and DIC labs. Mild TLS on admission, but none at this time.  - Echo ordered  - Picc line placed  - Urology consulted for fertility preservation, and family member delivered sample to fertility center  - Tentatively planning for induction with FLAG Hayde + ponatanib pending fludarabine availability.   Will resume daily TLS monitoring when patient starts chemo.  Tentative plan to begin today    Pharyngitis  - Continue Dukes and full liquid diet due to pain with swallowing  - Pain well-controlled with IV dilaudid  - ID following. Continue abx per ID recs  -  RIP throat swab, strep, flu, and COVID neg  - Leukemia infiltration may be playing a role    Leukocytosis  - See blast crisis phase of CML    Hypokalemia  - May be 2/2 diarrhea.   - Repleting  - Daily CMP while inpatient    Diarrhea  - Reports multiple days of diarrhea prior to transfer  - C diff negative  - PRN imodium available    Neutropenic fever  - Temp fo 101.4 at [place] ED. Fevers persist since transfer.  - CXR neg. Blood culture ngtd. CDiff negative. U/a neg  - Strep, flu, and COVID neg at OSH  - Throat red with exudates. Tonsils mildly swollen. CT neck showing adenopathy but no abscesses.  - CT chest without consoidations  - Will check EBV  - ID following  - Continue Zosyn and doxy per ID recs  - Last temp was > 24 hours ago    Pancytopenia  - Functionally neutropenic despite leukocytosis, given low neutrophil count  - 2/2 disease and hydrea  - Daily CBC while inpatient  - Transfuse for hgb < 7 or plts < 10K  - Started antimicrobial ppx with acyclovir and fluconazole and contining Zosyn and doxy for neutropenic fever.    Hypertension  - Takes amlodipine at home  - Given hypertension, amlodipine stopped and nifedipine and losartan started in [place]. Continued on transfer.  - BP now improved    Tumor lysis syndrome  - Uric acid and LDH elevated on presentation to [place] ED. Kidney function and electrolytes normal.  - Not G6PD deficient  - Changed allopurinol from 300 mg BID to 300 mg daily  - Twice TLS labs while inpatient. Will resume daily TLS monitoring when patient starts chemo.        VTE Risk Mitigation (From admission, onward)         Ordered     IP VTE HIGH RISK PATIENT  Once         06/09/23 0049     Place sequential compression device  Until discontinued         06/09/23 0049                Disposition: Home    Gus Posadas MD  Bone Marrow Transplant  [facility] - Oncology ([facility])

## 2023-06-16 NOTE — TELEPHONE ENCOUNTER
Delivered patients medication Iclusig to patient Magdaleno Hirsch at [facility] bedside room#804A today 6/16/2023. Two patient identifiers verified.    Lissy Perry  [facility]- Refill Technician  Phone: [phone]

## 2023-06-16 NOTE — ASSESSMENT & PLAN NOTE
- Patient with CML diagnosed 8/2022   - Follows locally in [place] with Dr. Brtet Hogan.   - He has been on dasatinib outpatient since 10/2022.   - Some question regarding his compliance over the course of treatment, however.  - Presented to [facility] with c/o gum swelling on 6/7/23.   - Labs remarkable for WBC count of 138K, with 80% blasts indicating blast crisis.   - WBC count from approximately a week and a half earlier was normal. Reported recent compliance with his TKI.   - Transferred to [facility] for higher level of care.  - Bone marrow Biopsy performed 6/9/23. Results pending, but flow suggestive of transformation to AML.  - Continue dasatinib and allopurinol 300 mg daily. Hydrea stopped with normal WBC count.  - BCR/ABL p210 collected on admission. Result pending.  - Daily CBC and twice TLS and DIC labs. Mild TLS on admission, but none at this time.  - Echo ordered  - Picc line placed  - Urology consulted for fertility preservation, and family member delivered sample to fertility center  - Tentatively planning for induction with FLAG Hayde + ponatanib pending fludarabine availability.   Will resume daily TLS monitoring when patient starts chemo.  Tentative plan to begin today

## 2023-06-16 NOTE — HPI
Magdaleno Hirsch, a 22 y.o. male, for initial evaluation visit.  Met with patient and partner.    Chief Complaint/Reason for Encounter: adaptation to disease and treatment

## 2023-06-16 NOTE — PLAN OF CARE
Patient involved in plan of care and communicating needs throughout shift. Up in room and to bathroom independently; no c/o pain. Patient to begin CLIA + ponatinib chemo induction regimen this evening. Tolerating diet, voiding without difficulty.  All VSS; no acute events so far this shift.  Pt remaining free from falls or injury throughout shift; bed locked and in lowest position; call light within reach.  Pt instructed to call for assistance as needed.  Q1H rounding done on pt.

## 2023-06-16 NOTE — CONSULTS
[facility] - Oncology ([facility])  Psychology  Progress Note  Psycho-Oncology Intake (PhD)    Patient Name: Magdaleno Hirsch  MRN: [id number]    Patient Class: IP- Inpatient  Admission Date: 6/9/2023  Hospital Length of Stay: 7 days  Attending Physician: Tani Driver MD  Primary Care Provider: Primary Doctor No  Length of Service (minutes): 30    Magdaleno Hirsch, a 22 y.o. male, seen for initial evaluation. Met with patient and partner.    Chief Complaint   Patient presents with    Blast phase CML       Patient Active Problem List   Diagnosis    CML (chronic myeloid leukemia) in relapse    HVD (hypertensive vascular disease)    Oropharyngeal candidiasis    Tobacco user    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    Tumor lysis syndrome    Hypertension    Pancytopenia    Neutropenic fever    Diarrhea    Hypokalemia    Leukocytosis    Pharyngitis       Health Behaviors:      ETOH Use: No     Tobacco Use: No  Illicit Drug Use: occasional marijuana  Prescription Misuse: No  Caffeine: none  Exercise:  occasional workouts.      Past Psychiatric History:     Inpatient treatment: No    Outpatient treatment: No    Prior substance abuse treatment: No    Suicide Attempts: No    Psychotropic Medications:   · Current: none   · Past: none       Social Situation/Stressors:     Magdaleno Hirsch lives with his girlfriend (Marcia) in [place], [place].  He is a former  at [employer]. He stopped working due to illness (absences due to MD visits).  He had been in his job for 1 year. 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 Magdaleno Hirsch has never been  and has no children. He has 4 brothers. He is the oldest. The patient reports excellent social support from his girlfriend, family, and friends.  Magdaleno Hirsch's hobbies include listening to music, jose, and spending time with his family.    Current stressors: none other than health and finances     Strengths and liabilities: Strength:  "Patient is expressive/articulate., Strength: Patient is intelligent., Strength: Patient has positive support network., Strength: Patient has reasonable judgment., Liability: Patient has poor health.      Current Evaluation:     Mental Status Exam: Magdaleno Hirsch was seen at the request of the inpatient team.  His girlfriend was present, with his permission.  Mr. Hirsch was in bed at the time of session. The patient was fully cooperative throughout the interview and was an adequate historian.    Appearance: age appropriate, casually dressed, adequately groomed  Behavior/Cooperation: friendly and cooperative  Speech: Not pressured, clearly audible, no slurring  Mood: euthymic  Affect: euthymic  Thought Process: goal-directed, logical  Thought Content: normal, no suicidality, no homicidality, delusions, or paranoia; did not appear to be responding to internal stimuli during the interview.   Orientation: grossly intact  Memory: Grossly intact  Attention Span/Concentration: Attends to interview without distraction  Cognition: grossly intact  Insight: patient has awareness of illness;   Judgment: the patient's behavior is adequate to circumstances      History of Present Illness:     Magdaleno Hirsch, a 22 y.o. male, for initial evaluation visit.  Met with patient and partner.    Chief Complaint/Reason for Encounter: adaptation to disease and treatment        Magdaleno Hirsch has adjusted to illness  adequately  primarily through active coping strategies, passive coping strategies, and focus on alternative activites. Chart notes do indicate non-adherence with treatment medications, at times. He has engaged in  minimal independent  information gathering, but appears receptive to the information provided by his team.  The patient has good family/friend support.  His support system is coping adequately with the diagnosis/treatment/prognosis (reports mother and grandmother are "taking it hard"). Illness-related psychosocial stressors " include financial strain, absence from work, changes in fertility options, changes in ability to engage in leisure activities, and absence from home.  The patient has a newly formed, but adequate partnership with his [facility] oncology treatment team. The patient reports the following barriers to cancer care:none.     Symptoms:   · Mood: denied;  no prior and No SI/HI  · Anxiety: denied; no prior  · Sleep: 8 hours, restful sleep and no concerns , no sleep onset difficulty and no sleep maintenance difficulty, no EDS, no caffeine/stimulants no use of OTC/melatonin/hypnotics/benzodiazepines    Assessment - Diagnosis - Goals:       ICD-10-CM ICD-9-CM   1. 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  C92.10 205.10   2. Chronic myeloid leukemia  C92.10 205.10   3. Chest pain  R07.9 786.50   4. Tachycardia  R00.0 785.0   5. Shortness of breath  R06.02 786.05     Oncology  * 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  Patient reports adequate coping with illness and appropriate understanding of treatment and potential side effects.  (Chart does note historical nonadherence which suggests avoidant coping tendencies or lack of illness knowledge.)  He describes solid emotional support. He has made plans to aid in coping during the induction month.  Information about support services for patients and families provided.  Patient provided with my contact information in case of future needs.    Patient did request assistance from social work to start application for disability (Madi Grace [facility] notified).            Marcio Saeed, PhD  Clinical Psychologist  [place] license #[id number]

## 2023-06-16 NOTE — ASSESSMENT & PLAN NOTE
Patient reports adequate coping with illness and appropriate understanding of treatment and potential side effects.  (Chart does note historical nonadherence which suggests avoidant coping tendencies or lack of illness knowledge.)  He describes solid emotional support. He has made plans to aid in coping during the induction month.  Information about support services for patients and families provided.  Patient provided with my contact information in case of future needs.    Patient did request assistance from social work to start application for disability ([facility]).

## 2023-06-16 NOTE — ASSESSMENT & PLAN NOTE
- Uric acid and LDH elevated on presentation to [facility] ED. Kidney function and electrolytes normal.  - Not G6PD deficient  - Changed allopurinol from 300 mg BID to 300 mg daily  - Twice TLS labs while inpatient. Will resume daily TLS monitoring when patient starts chemo.

## 2023-06-16 NOTE — SUBJECTIVE & OBJECTIVE
Subjective:     Interval History: Still awaiting sample collection slightly tachy and hypertensive overnight but afebrle    Objective:     Vital Signs (Most Recent):  Temp: 98.2 °F (36.8 °C) (06/16/23 0406)  Pulse: (!) 118 (06/16/23 0406)  Resp: 20 (06/16/23 0406)  BP: 125/66 (06/16/23 0406)  SpO2: 98 % (06/16/23 0406) Vital Signs (24h Range):  Temp:  [98.2 °F (36.8 °C)-99.1 °F (37.3 °C)] 98.2 °F (36.8 °C)  Pulse:  [115-124] 118  Resp:  [16-20] 20  SpO2:  [95 %-99 %] 98 %  BP: (125-157)/(63-83) 125/66     Weight: 88.4 kg (194 lb 14.9 oz)  Body mass index is 25.72 kg/m².  Body surface area is 2.13 meters squared.    ECOG SCORE           [unfilled]    Intake/Output - Last 3 Shifts         06/14 0700  06/15 0659 06/15 0700  06/16 0659    P.O. 690 480    I.V. (mL/kg) 1112.7 (12.6)     IV Piggyback 113.4     Total Intake(mL/kg) 1916.1 (21.7) 480 (5.4)    Net +1916.1 +480          Urine Occurrence 2 x 3 x    Stool Occurrence  0 x             Physical Exam  Constitutional:       Appearance: He is well-developed.   HENT:      Head: Normocephalic and atraumatic.      Mouth/Throat:      Comments: Severe ulcerative disease most in soft palate, cervical LAD+  Eyes:      Conjunctiva/sclera: Conjunctivae normal.   Cardiovascular:      Rate and Rhythm: Normal rate and regular rhythm.      Heart sounds: Normal heart sounds. No murmur heard.  Pulmonary:      Effort: Pulmonary effort is normal. No respiratory distress.      Breath sounds: Normal breath sounds. No wheezing.   Abdominal:      General: Bowel sounds are normal. There is no distension.      Palpations: Abdomen is soft.      Tenderness: There is no abdominal tenderness.   Musculoskeletal:         General: No tenderness. Normal range of motion.      Cervical back: Neck supple.   Lymphadenopathy:      Cervical: No cervical adenopathy.   Skin:     General: Skin is warm and dry.      Findings: No rash.   Neurological:      Mental Status: He is alert and oriented to person,  place, and time.      Coordination: Coordination normal.   Psychiatric:      Comments: Flat affect          Significant Labs:   All pertinent labs from the last 24 hours have been reviewed.    Diagnostic Results:  I have reviewed all pertinent imaging results/findings within the past 24 hours.

## 2023-06-16 NOTE — ASSESSMENT & PLAN NOTE
- Temp fo 101.4 at [facility]. Fevers persist since transfer.  - CXR neg. Blood culture ngtd. CDiff negative. U/a neg  - Strep, flu, and COVID neg at OSH  - Throat red with exudates. Tonsils mildly swollen. CT neck showing adenopathy but no abscesses.  - CT chest without consoidations  - Will check EBV  - ID following  - Continue Zosyn and doxy per ID recs  - Last temp was > 24 hours ago

## 2023-06-16 NOTE — TELEPHONE ENCOUNTER
Specialty Pharmacy - Initial Clinical Assessment    Specialty Medication Orders Linked to Encounter      Flowsheet Row Most Recent Value   Medication #1 PONATinib 15 mg Tab (Order#[id number], Rx#[id number])          Patient Diagnosis   C92.10 - 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    Subjective    Magdaleno Hirsch is a 22 y.o. male, who is followed by the specialty pharmacy service for management and education.    Recent Encounters       Date Type Provider Description    2023 Specialty Pharmacy Marnie Pollard PharmD Initial Clinical Assessment    2023 Specialty Pharmacy Marnie Pollard PharmD Referral Authorization    06/15/2023 Specialty Pharmacy Marnie Pollard PharmD Refill Coordination    2023 Specialty Pharmacy Marnie Alonzo Refill Coordination    2023 Specialty Pharmacy Ga Mckeon PharmD Refill Coordination            Current Outpatient Medications   Medication Sig    PONATinib 15 mg Tab Take 1 tablet (15 mg total) by mouth Daily.   Last reviewed on 2023 11:04 AM by Marnie Pollard PharmD    Review of patient's allergies indicates:  No Known AllergiesLast reviewed on  2023 11:04 AM by Marnie Pollard    Drug Interactions    Drug interactions evaluated: yes  Clinically relevant drug interactions identified: yes   Interactions list: Cat D: Iclusig and posaconazole - CY Inhibitors (Strong) may increase the serum concentration of PONATinib     Drug management plan: Cat D: Iclusig and posaconazole - Avoid concomitant use of ponatinib with strong CY inhibitors when possible. If combined, ponatinib dose reductions are required. If taking 45 mg daily, reduce to 30 mg daily; if taking 30 mg daily, reduce to 15 mg daily; if taking 15 mg daily, reduce to 10 mg daily. If taking 10 mg daily, avoid concomitant use with strong CY inhibitors   Provided the patient with educational material regarding drug interactions: yes   Educational material comments: Iclusig  dose has been adjusted to account for DDI            Adverse Effects    Activity change: Pos  Fatigue: Pos  Mouth sores: Pos  Skin: System reviewed and is negative  Eyes: System reviewed and is negative  Endocrine and Metabolic: System reviewed and is negative  Gastrointestinal: System reviewed and is negative  Genitourinary: System reviewed and is negative  Cardiovascular: System reviewed and is negative  Hematologic: System reviewed and is negative  Musculoskeletal: System reviewed and is negative  Neurological: System reviewed and is negative  Psychiatric: System reviewed and is negative  Respiratory: System reviewed and is negative       Assessment Questions - Documented Responses      Flowsheet Row Most Recent Value   Assessment    Medication Reconciliation completed for patient Yes   During the past 4 weeks, has patient missed any activities due to condition or medication? No   During the past 4 weeks, did patient have any of the following urgent care visits? None   Goals of Therapy Status Discussed (new start)   Status of the patients ability to self-administer: Is Able   All education points have been covered with patient? Yes, supplemental printed education provided   Welcome packet contents reviewed and discussed with patient? Yes   Assesment completed? Yes   Plan Therapy being initiated   Do you need to open a clinical intervention (i-vent)? No   Do you want to schedule first shipment? Yes   Medication #1 Assessment Info    Patient status New medication, Exisiting to OSP   Is this medication appropriate for the patient? Yes   Is this medication effective? Not yet started          Refill Questions - Documented Responses      Flowsheet Row Most Recent Value   Patient Availability and HIPAA Verification    Does patient want to proceed with activity? Yes   HIPAA/medical authority confirmed? Yes   Relationship to patient of person spoken to? Self   Refill Screening Questions    When does the patient need to  "receive the medication? 06/16/23   Refill Delivery Questions    How will the patient receive the medication?   [bedside 804A]   When does the patient need to receive the medication? 06/16/23   Shipping Address Temporary   Address in [facility] confirmed and updated if neccessary? Yes   Expected Copay ($) 3   Is the patient able to afford the medication copay? Yes   Payment Method CC on file   Days supply of Refill 30   Supplies needed? No supplies needed   Refill activity completed? Yes   Refill activity plan Refill scheduled   Shipment/Pickup Date: 06/16/23            Objective    He has a past medical history of CML (chronic myelocytic leukemia), HVD (hypertensive vascular disease), and Oropharyngeal candidiasis.    Tried/failed medications: Sprycel    BP Readings from Last 4 Encounters:   06/16/23 132/66   06/08/23 (!) 142/90   06/02/23 (!) 156/103   04/19/23 (!) 167/104     Ht Readings from Last 4 Encounters:   06/13/23 6' 1" (1.854 m)   06/07/23 6' 0.01" (1.829 m)   06/02/23 6' 1" (1.854 m)   04/19/23 6' 1" (1.854 m)     Wt Readings from Last 4 Encounters:   06/16/23 88.4 kg (194 lb 14.9 oz)   06/07/23 91.2 kg (201 lb 1 oz)   06/02/23 91.2 kg (201 lb)   10/10/22 82.6 kg (182 lb 1.6 oz)     Recent Labs   Lab Result Units 06/16/23  0432 06/15/23  1552 06/15/23  0354 06/14/23  0429   RBC M/uL 4.15 L 4.70 4.11 L 4.57 L   Hemoglobin g/dL 9.5 L 10.8 L 9.2 L 10.3 L   Hematocrit % 30.4 L 33.9 L 29.6 L 32.0 L   WBC K/uL 50.25 HH 43.61 H 37.91 H 20.42 H   Gran % % 2.0 L 3.0 L 1.0 L 3.0 L   Platelets K/uL 20 LL 22 LL 21 LL 24 LL   Sodium mmol/L 139 138 138 134 L   Potassium mmol/L 2.8 L 3.7 3.0 L 3.8   Chloride mmol/L 106 103 110 105   Glucose mg/dL 192 H 88 96 95   BUN mg/dL 6 8 8 11   Creatinine mg/dL 0.7 0.8 0.7 0.8   Calcium mg/dL 7.5 L 9.1 7.5 L 8.6 L   Total Protein g/dL 5.4 L 6.2 5.5 L 6.3   Albumin g/dL 2.3 L 2.7 L 2.2 L 2.5 L   Total Bilirubin mg/dL 0.4 0.6 0.5 0.6   Alkaline Phosphatase U/L 53 L 65 " 55 64   AST U/L 26 33 28 28   ALT U/L 11 14 12 12     The goals of cancer treatment include:  Achieving remission of cancer, if possible  Reducing tumor size and spread of cancer, if remission is not possible  Minimizing pain and symptoms of the cancer  Preventing infection and other complications of treatment  Promoting adequate nutrition  Encouraging proper hydration  Improving or maintaining quality of life  Maintaining optimal therapy adherence  Minimizing and managing side effects    Goals of Therapy Status: Discussed (new start)    Assessment/Plan  Patient plans to start therapy on 06/16/23      Indication, dosage, appropriateness, effectiveness, safety and convenience of his specialty medication(s) were reviewed today.     Patient Education   Patient received education on the following:   Expectations and possible outcomes of therapy  Proper use, timely administration, and missed dose management  Duration of therapy  Side effects, including prevention, minimization, and management  Contraindications and safety precautions  New or changed medications, including prescribe and over the counter medications and supplements  Reviews recommended vaccinations, as appropriate  Storage, safe handling, and disposal    Patient is currently inpatient for CLIA + Iclusig induction. Set up for bedside delivery. Patient estimated to be inpatient for at least 30 days    Tasks added this encounter   No tasks added.   Tasks due within next 3 months   6/16/2023 - Initial Clinical Assessment/Patient Education (180 Day Reassessment)  6/16/2023 - Set up Initial Fill     Marnie Pollard, PharmD  [facility]  [facility]  [facility] [address]  Phone: [phone]  Fax: [phone]

## 2023-06-16 NOTE — PROGRESS NOTES
22 y.o. male with blast phase CML consented for chemotherapy. Regimen is CLIA+ponatinib as noted below. Written and verbal education on each drug provided by pharmD and myself. Potential side effects discussed. Pt signed chemotherapy consent and this was witnessed and placed in patient's chart. All questions answered.    Chemotherapy Treatment          Only medications are shown             Days 1 through 5, Cycle 1 (28-day cycle)  Planned for 6/14/2023 through 6/18/2023                       Chemotherapy        cladribine (LEUSTATIN) 10.9 mg in sodium chloride 0.9% 510.9 mL chemo infusion        10.9 mg (rounded from 10.85 mg = 5 mg/m2 × 2.17 m2 Treatment Plan BSA from Recorded weight), Intravenous         CYTarabine (PF) (ZAHRA-C) 1,500 mg/m2 = 3,255 mg in sodium chloride 0.9% 532.55 mL chemo infusion        3,255 mg (1,500 mg/m2 × 2.17 m2 Treatment Plan BSA from Recorded weight), Intravenous         IDArubicin (IDAMYCIN) 22 mg in sodium chloride 0.9% 72 mL chemo infusion        22 mg (rounded from 21.7 mg = 10 mg/m2 × 2.17 m2 Treatment Plan BSA from Recorded weight), Intravenous                       Ginger Jacob NP  Hematology/Oncology/BMT

## 2023-06-17 PROBLEM — E87.6 HYPOKALEMIA: Status: RESOLVED | Noted: 2023-06-09 | Resolved: 2023-06-17

## 2023-06-17 LAB
ALBUMIN SERPL BCP-MCNC: 2.7 G/DL (ref 3.5–5.2)
ALP SERPL-CCNC: 68 U/L (ref 55–135)
ALT SERPL W/O P-5'-P-CCNC: 16 U/L (ref 10–44)
ANION GAP SERPL CALC-SCNC: 8 MMOL/L (ref 8–16)
ANISOCYTOSIS BLD QL SMEAR: SLIGHT
APTT PPP: 34 SEC (ref 21–32)
AST SERPL-CCNC: 60 U/L (ref 10–40)
BASOPHILS # BLD AUTO: ABNORMAL K/UL (ref 0–0.2)
BASOPHILS NFR BLD: 0 % (ref 0–1.9)
BILIRUB SERPL-MCNC: 0.3 MG/DL (ref 0.1–1)
BLASTS NFR BLD MANUAL: 60 %
BUN SERPL-MCNC: 13 MG/DL (ref 6–20)
CALCIUM SERPL-MCNC: 8.8 MG/DL (ref 8.7–10.5)
CHLORIDE SERPL-SCNC: 107 MMOL/L (ref 95–110)
CO2 SERPL-SCNC: 23 MMOL/L (ref 23–29)
CREAT SERPL-MCNC: 0.9 MG/DL (ref 0.5–1.4)
DIFFERENTIAL METHOD: ABNORMAL
EOSINOPHIL # BLD AUTO: ABNORMAL K/UL (ref 0–0.5)
EOSINOPHIL NFR BLD: 1 % (ref 0–8)
ERYTHROCYTE [DISTWIDTH] IN BLOOD BY AUTOMATED COUNT: 19.9 % (ref 11.5–14.5)
EST. GFR  (NO RACE VARIABLE): >60 ML/MIN/1.73 M^2
FIBRINOGEN PPP-MCNC: 356 MG/DL (ref 182–400)
GLUCOSE SERPL-MCNC: 139 MG/DL (ref 70–110)
HCT VFR BLD AUTO: 34.1 % (ref 40–54)
HGB BLD-MCNC: 10.7 G/DL (ref 14–18)
HYPOCHROMIA BLD QL SMEAR: ABNORMAL
IMM GRANULOCYTES # BLD AUTO: ABNORMAL K/UL (ref 0–0.04)
IMM GRANULOCYTES NFR BLD AUTO: ABNORMAL % (ref 0–0.5)
INR PPP: 2.1 (ref 0.8–1.2)
LDH SERPL L TO P-CCNC: 1288 U/L (ref 110–260)
LYMPHOCYTES # BLD AUTO: ABNORMAL K/UL (ref 1–4.8)
LYMPHOCYTES NFR BLD: 27 % (ref 18–48)
MAGNESIUM SERPL-MCNC: 1.8 MG/DL (ref 1.6–2.6)
MCH RBC QN AUTO: 22.9 PG (ref 27–31)
MCHC RBC AUTO-ENTMCNC: 31.4 G/DL (ref 32–36)
MCV RBC AUTO: 73 FL (ref 82–98)
MONOCYTES # BLD AUTO: ABNORMAL K/UL (ref 0.3–1)
MONOCYTES NFR BLD: 1 % (ref 4–15)
NEUTROPHILS NFR BLD: 11 % (ref 38–73)
NRBC BLD-RTO: 0 /100 WBC
PHOSPHATE SERPL-MCNC: 4.5 MG/DL (ref 2.7–4.5)
PLATELET # BLD AUTO: 29 K/UL (ref 150–450)
PLATELET BLD QL SMEAR: ABNORMAL
PMV BLD AUTO: ABNORMAL FL (ref 9.2–12.9)
POLYCHROMASIA BLD QL SMEAR: ABNORMAL
POTASSIUM SERPL-SCNC: 5.7 MMOL/L (ref 3.5–5.1)
PROT SERPL-MCNC: 6.4 G/DL (ref 6–8.4)
PROTHROMBIN TIME: 21.6 SEC (ref 9–12.5)
RBC # BLD AUTO: 4.68 M/UL (ref 4.6–6.2)
SODIUM SERPL-SCNC: 138 MMOL/L (ref 136–145)
URATE SERPL-MCNC: 2.6 MG/DL (ref 3.4–7)
WBC # BLD AUTO: 23.31 K/UL (ref 3.9–12.7)

## 2023-06-17 PROCEDURE — 99233 SBSQ HOSP IP/OBS HIGH 50: CPT | Mod: ,,, | Performed by: INTERNAL MEDICINE

## 2023-06-17 PROCEDURE — 85384 FIBRINOGEN ACTIVITY: CPT | Performed by: STUDENT IN AN ORGANIZED HEALTH CARE EDUCATION/TRAINING PROGRAM

## 2023-06-17 PROCEDURE — 63600175 PHARM REV CODE 636 W HCPCS: Performed by: STUDENT IN AN ORGANIZED HEALTH CARE EDUCATION/TRAINING PROGRAM

## 2023-06-17 PROCEDURE — 25000003 PHARM REV CODE 250: Performed by: INTERNAL MEDICINE

## 2023-06-17 PROCEDURE — A4216 STERILE WATER/SALINE, 10 ML: HCPCS | Performed by: INTERNAL MEDICINE

## 2023-06-17 PROCEDURE — 85730 THROMBOPLASTIN TIME PARTIAL: CPT | Performed by: STUDENT IN AN ORGANIZED HEALTH CARE EDUCATION/TRAINING PROGRAM

## 2023-06-17 PROCEDURE — 80053 COMPREHEN METABOLIC PANEL: CPT | Performed by: NURSE PRACTITIONER

## 2023-06-17 PROCEDURE — 85610 PROTHROMBIN TIME: CPT | Performed by: STUDENT IN AN ORGANIZED HEALTH CARE EDUCATION/TRAINING PROGRAM

## 2023-06-17 PROCEDURE — 84550 ASSAY OF BLOOD/URIC ACID: CPT | Performed by: STUDENT IN AN ORGANIZED HEALTH CARE EDUCATION/TRAINING PROGRAM

## 2023-06-17 PROCEDURE — 25000003 PHARM REV CODE 250: Performed by: STUDENT IN AN ORGANIZED HEALTH CARE EDUCATION/TRAINING PROGRAM

## 2023-06-17 PROCEDURE — 25000003 PHARM REV CODE 250: Performed by: NURSE PRACTITIONER

## 2023-06-17 PROCEDURE — 85007 BL SMEAR W/DIFF WBC COUNT: CPT | Performed by: NURSE PRACTITIONER

## 2023-06-17 PROCEDURE — 83615 LACTATE (LD) (LDH) ENZYME: CPT | Performed by: STUDENT IN AN ORGANIZED HEALTH CARE EDUCATION/TRAINING PROGRAM

## 2023-06-17 PROCEDURE — 84100 ASSAY OF PHOSPHORUS: CPT | Performed by: NURSE PRACTITIONER

## 2023-06-17 PROCEDURE — 85027 COMPLETE CBC AUTOMATED: CPT | Performed by: NURSE PRACTITIONER

## 2023-06-17 PROCEDURE — 99233 PR SUBSEQUENT HOSPITAL CARE,LEVL III: ICD-10-PCS | Mod: ,,, | Performed by: INTERNAL MEDICINE

## 2023-06-17 PROCEDURE — 20600001 HC STEP DOWN PRIVATE ROOM

## 2023-06-17 PROCEDURE — 25000003 PHARM REV CODE 250

## 2023-06-17 PROCEDURE — 83735 ASSAY OF MAGNESIUM: CPT | Performed by: NURSE PRACTITIONER

## 2023-06-17 PROCEDURE — 63600175 PHARM REV CODE 636 W HCPCS: Mod: JG | Performed by: INTERNAL MEDICINE

## 2023-06-17 RX ORDER — ALLOPURINOL 300 MG/1
300 TABLET ORAL DAILY
Status: DISCONTINUED | OUTPATIENT
Start: 2023-06-18 | End: 2023-06-26

## 2023-06-17 RX ORDER — ALLOPURINOL 300 MG/1
300 TABLET ORAL 2 TIMES DAILY
Status: DISCONTINUED | OUTPATIENT
Start: 2023-06-17 | End: 2023-06-17

## 2023-06-17 RX ORDER — LEVOFLOXACIN 500 MG/1
500 TABLET, FILM COATED ORAL DAILY
Status: DISCONTINUED | OUTPATIENT
Start: 2023-06-18 | End: 2023-06-29

## 2023-06-17 RX ADMIN — NIFEDIPINE 60 MG: 60 TABLET, FILM COATED, EXTENDED RELEASE ORAL at 09:06

## 2023-06-17 RX ADMIN — PONATINIB HYDROCHLORIDE 15 MG: 15 TABLET, FILM COATED ORAL at 12:06

## 2023-06-17 RX ADMIN — OXYCODONE HYDROCHLORIDE 5 MG: 5 TABLET ORAL at 09:06

## 2023-06-17 RX ADMIN — DEXAMETHASONE SODIUM PHOSPHATE 8 MG: 4 INJECTION INTRA-ARTICULAR; INTRALESIONAL; INTRAMUSCULAR; INTRAVENOUS; SOFT TISSUE at 08:06

## 2023-06-17 RX ADMIN — ALUMINUM HYDROXIDE, MAGNESIUM HYDROXIDE, AND DIMETHICONE 10 ML: 400; 400; 40 SUSPENSION ORAL at 06:06

## 2023-06-17 RX ADMIN — GUAIFENESIN AND DEXTROMETHORPHAN HYDROBROMIDE 1 TABLET: 600; 30 TABLET, EXTENDED RELEASE ORAL at 08:06

## 2023-06-17 RX ADMIN — PIPERACILLIN SODIUM AND TAZOBACTAM SODIUM 4.5 G: 4; .5 INJECTION, POWDER, FOR SOLUTION INTRAVENOUS at 03:06

## 2023-06-17 RX ADMIN — GUAIFENESIN AND DEXTROMETHORPHAN HYDROBROMIDE 1 TABLET: 600; 30 TABLET, EXTENDED RELEASE ORAL at 09:06

## 2023-06-17 RX ADMIN — ALUMINUM HYDROXIDE, MAGNESIUM HYDROXIDE, AND DIMETHICONE 10 ML: 400; 400; 40 SUSPENSION ORAL at 12:06

## 2023-06-17 RX ADMIN — SODIUM CHLORIDE 22 MG: 9 INJECTION, SOLUTION INTRAVENOUS at 02:06

## 2023-06-17 RX ADMIN — CLADRIBINE 10.9 MG: 1 INJECTION INTRAVENOUS at 09:06

## 2023-06-17 RX ADMIN — ALUMINUM HYDROXIDE, MAGNESIUM HYDROXIDE, AND DIMETHICONE 10 ML: 400; 400; 40 SUSPENSION ORAL at 09:06

## 2023-06-17 RX ADMIN — SODIUM ZIRCONIUM CYCLOSILICATE 5 G: 5 POWDER, FOR SUSPENSION ORAL at 06:06

## 2023-06-17 RX ADMIN — ONDANSETRON 16 MG: 2 INJECTION INTRAMUSCULAR; INTRAVENOUS at 08:06

## 2023-06-17 RX ADMIN — ALLOPURINOL 300 MG: 300 TABLET ORAL at 09:06

## 2023-06-17 RX ADMIN — ACYCLOVIR 800 MG: 200 CAPSULE ORAL at 08:06

## 2023-06-17 RX ADMIN — ACYCLOVIR 800 MG: 200 CAPSULE ORAL at 09:06

## 2023-06-17 RX ADMIN — DEXAMETHASONE 1 DROP: 1 SUSPENSION/ DROPS OPHTHALMIC at 06:06

## 2023-06-17 RX ADMIN — Medication 10 ML: at 12:06

## 2023-06-17 RX ADMIN — Medication 10 ML: at 06:06

## 2023-06-17 RX ADMIN — PIPERACILLIN SODIUM AND TAZOBACTAM SODIUM 4.5 G: 4; .5 INJECTION, POWDER, FOR SOLUTION INTRAVENOUS at 12:06

## 2023-06-17 RX ADMIN — OXYCODONE HYDROCHLORIDE 5 MG: 5 TABLET ORAL at 04:06

## 2023-06-17 RX ADMIN — CYTARABINE 3255 MG: 100 INJECTION, SOLUTION INTRATHECAL; INTRAVENOUS; SUBCUTANEOUS at 12:06

## 2023-06-17 RX ADMIN — DEXAMETHASONE 1 DROP: 1 SUSPENSION/ DROPS OPHTHALMIC at 01:06

## 2023-06-17 RX ADMIN — DEXAMETHASONE 1 DROP: 1 SUSPENSION/ DROPS OPHTHALMIC at 12:06

## 2023-06-17 RX ADMIN — SODIUM CHLORIDE: 9 INJECTION, SOLUTION INTRAVENOUS at 09:06

## 2023-06-17 RX ADMIN — FLUCONAZOLE 400 MG: 200 TABLET ORAL at 09:06

## 2023-06-17 RX ADMIN — SODIUM CHLORIDE: 9 INJECTION, SOLUTION INTRAVENOUS at 04:06

## 2023-06-17 RX ADMIN — DOXYCYCLINE HYCLATE 100 MG: 100 TABLET, COATED ORAL at 08:06

## 2023-06-17 RX ADMIN — LOSARTAN POTASSIUM 50 MG: 50 TABLET, FILM COATED ORAL at 09:06

## 2023-06-17 RX ADMIN — PHYTONADIONE 5 MG: 10 INJECTION, EMULSION INTRAMUSCULAR; INTRAVENOUS; SUBCUTANEOUS at 12:06

## 2023-06-17 RX ADMIN — DOXYCYCLINE HYCLATE 100 MG: 100 TABLET, COATED ORAL at 09:06

## 2023-06-17 NOTE — SUBJECTIVE & OBJECTIVE
Subjective:     Interval History: hyperkalemia this am, shifted, tolerating chemo well    Objective:     Vital Signs (Most Recent):  Temp: 98.4 °F (36.9 °C) (06/17/23 1543)  Pulse: 89 (06/17/23 1543)  Resp: 18 (06/17/23 1133)  BP: 128/85 (06/17/23 1543)  SpO2: 98 % (06/17/23 1543) Vital Signs (24h Range):  Temp:  [98 °F (36.7 °C)-98.9 °F (37.2 °C)] 98.4 °F (36.9 °C)  Pulse:  [] 89  Resp:  [16-20] 18  SpO2:  [93 %-100 %] 98 %  BP: (119-144)/(60-85) 128/85     Weight: 88.2 kg (194 lb 6.8 oz)  Body mass index is 25.65 kg/m².  Body surface area is 2.13 meters squared.    ECOG SCORE           [unfilled]    Intake/Output - Last 3 Shifts         06/15 0700  06/16 0659 06/16 0700  06/17 0659 06/17 0700  06/18 0659    P.O. 480 720 486    I.V. (mL/kg)  4035.1 (45.7)     IV Piggyback  2343.6     Total Intake(mL/kg) 480 (5.4) 7098.7 (80.5) 486 (5.5)    Urine (mL/kg/hr)  650 (0.3) 700 (0.9)    Total Output  650 700    Net +480 +6448.7 -214           Urine Occurrence 3 x 4 x 1 x    Stool Occurrence 0 x               Physical Exam  Constitutional:       Appearance: He is well-developed.   HENT:      Head: Normocephalic and atraumatic.      Mouth/Throat:      Comments: Greatly improved throat soreness  Eyes:      Conjunctiva/sclera: Conjunctivae normal.   Cardiovascular:      Rate and Rhythm: Normal rate and regular rhythm.      Heart sounds: Normal heart sounds. No murmur heard.  Pulmonary:      Effort: Pulmonary effort is normal. No respiratory distress.      Breath sounds: Normal breath sounds. No wheezing.   Abdominal:      General: Bowel sounds are normal. There is no distension.      Palpations: Abdomen is soft.      Tenderness: There is no abdominal tenderness.   Musculoskeletal:         General: No tenderness. Normal range of motion.      Cervical back: Neck supple.   Lymphadenopathy:      Cervical: No cervical adenopathy.   Skin:     General: Skin is warm and dry.      Findings: No rash.   Neurological:      Mental  Status: He is alert and oriented to person, place, and time.      Coordination: Coordination normal.   Psychiatric:      Comments: Flat affect          Significant Labs:   All pertinent labs from the last 24 hours have been reviewed.    Diagnostic Results:  I have reviewed all pertinent imaging results/findings within the past 24 hours.

## 2023-06-17 NOTE — NURSING
cladribine (LEUSTATIN) 10.9 mg in sodium chloride 0.9% 575.9 mL chemo infusion  started through right arm PICC noted for having positive blood return over 2 hours.  Dosage and BSA checked by two chemotherapy certified nurses and premedications given prior to starting infusion.  Chemotherapy education completed at this time.  Chemotherapeutic precautions in place throughout therapy.  Will continue to monitor.

## 2023-06-17 NOTE — PLAN OF CARE
Plan of care reviewed with the pt at the beginning of the shift. Pt has remained free from injury and falls. Pt ambulating independently without difficulty. Pt reports have generalized fatigue but does not require assistance with ambulation. Fall precautions maintained. Bed locked in lowest position, side rails up x2, non skid footwear on, personal belongings and call light within reach. Instructed pt to call for assistance as needed. Pt has remained afebrile at this time. CLIA IV chemo started to right arm PICC.  Pt with pain to throat and oxycodone given.

## 2023-06-17 NOTE — ASSESSMENT & PLAN NOTE
- Continue Garrard and full liquid diet due to pain with swallowing  - Pain well-controlled with IV dilaudid  - ID following. Continue abx per ID recs  - RIP throat swab, strep, flu, and COVID neg  - Leukemia infiltration may be playing a role

## 2023-06-17 NOTE — PROGRESS NOTES
[facility] - Oncology ([facility])  Hematology  Bone Marrow Transplant  Progress Note    Patient Name: Magdaleno Hirsch  Admission Date: 6/9/2023  Hospital Length of Stay: 8 days  Code Status: Full Code    Subjective:     Interval History: no acute events overnight, tolerating chemo well, advanced diet    Objective:     Vital Signs (Most Recent):  Temp: 98.4 °F (36.9 °C) (06/17/23 1543)  Pulse: 89 (06/17/23 1543)  Resp: 18 (06/17/23 1133)  BP: 128/85 (06/17/23 1543)  SpO2: 98 % (06/17/23 1543) Vital Signs (24h Range):  Temp:  [98 °F (36.7 °C)-98.9 °F (37.2 °C)] 98.4 °F (36.9 °C)  Pulse:  [] 89  Resp:  [16-20] 18  SpO2:  [93 %-100 %] 98 %  BP: (119-144)/(60-85) 128/85     Weight: 88.2 kg (194 lb 6.8 oz)  Body mass index is 25.65 kg/m².  Body surface area is 2.13 meters squared.    ECOG SCORE           [unfilled]    Intake/Output - Last 3 Shifts         06/15 0700  06/16 0659 06/16 0700  06/17 0659 06/17 0700  06/18 0659    P.O. 480 720 486    I.V. (mL/kg)  4035.1 (45.7)     IV Piggyback  2343.6     Total Intake(mL/kg) 480 (5.4) 7098.7 (80.5) 486 (5.5)    Urine (mL/kg/hr)  650 (0.3) 700 (0.9)    Total Output  650 700    Net +480 +6448.7 -214           Urine Occurrence 3 x 4 x 1 x    Stool Occurrence 0 x               Physical Exam  Constitutional:       Appearance: He is well-developed.   HENT:      Head: Normocephalic and atraumatic.      Mouth/Throat:      Comments: Greatly improved throat soreness  Eyes:      Conjunctiva/sclera: Conjunctivae normal.   Cardiovascular:      Rate and Rhythm: Normal rate and regular rhythm.      Heart sounds: Normal heart sounds. No murmur heard.  Pulmonary:      Effort: Pulmonary effort is normal. No respiratory distress.      Breath sounds: Normal breath sounds. No wheezing.   Abdominal:      General: Bowel sounds are normal. There is no distension.      Palpations: Abdomen is soft.      Tenderness: There is no abdominal tenderness.   Musculoskeletal:         General: No tenderness.  Normal range of motion.      Cervical back: Neck supple.   Lymphadenopathy:      Cervical: No cervical adenopathy.   Skin:     General: Skin is warm and dry.      Findings: No rash.   Neurological:      Mental Status: He is alert and oriented to person, place, and time.      Coordination: Coordination normal.   Psychiatric:      Comments: Flat affect          Significant Labs:   All pertinent labs from the last 24 hours have been reviewed.    Diagnostic Results:  I have reviewed all pertinent imaging results/findings within the past 24 hours.    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 Brett Hogan.   - He has been on dasatinib outpatient since 10/2022.   - Some question regarding his compliance over the course of treatment, however.  - Presented to [facility] with c/o gum swelling on 6/7/23.   - Labs remarkable for WBC count of 138K, with 80% blasts indicating blast crisis.   - WBC count from approximately a week and a half earlier was normal. Reported recent compliance with his TKI.   - Transferred to [facility] for higher level of care.  - Bone marrow Biopsy performed 6/9/23. Results pending, but flow suggestive of transformation to AML.  - Continue dasatinib and allopurinol 300 mg daily. Hydrea stopped with normal WBC count.  - BCR/ABL p210 collected on admission. Result pending.  - Daily CBC and twice TLS and DIC labs. Mild TLS on admission, but none at this time.  - Echo ordered  - Picc line placed  -Patient decided to not do fertility presentation  Stared on Cladribine Idarubicin Cytyrabine and the patient was transitioned from dasatinb to ponatinib  Will resume daily TLS monitoring when patient starts chemo.  Tentative plan to begin today    Pharyngitis  - Continue [place] and full liquid diet due to pain with swallowing  - Pain well-controlled with IV dilaudid  - ID following. Continue abx per ID  recs  - RIP throat swab, strep, flu, and COVID neg  - Leukemia infiltration may be playing a role    Leukocytosis  - See blast crisis phase of CML    Diarrhea  - Reports multiple days of diarrhea prior to transfer  - C diff negative  - PRN imodium available    Neutropenic fever  - Temp fo 101.4 at [place] ED. Fevers persist since transfer.  - CXR neg. Blood culture ngtd. CDiff negative. U/a neg  - Strep, flu, and COVID neg at OSH  - Throat red with exudates. Tonsils mildly swollen. CT neck showing adenopathy but no abscesses.  - CT chest without consoidations  - Will check EBV  - ID following  - Continue Zosyn and doxy per ID recs  - Last temp was > 24 hours ago    Pancytopenia  - Functionally neutropenic despite leukocytosis, given low neutrophil count  - 2/2 disease and hydrea  - Daily CBC while inpatient  - Transfuse for hgb < 7 or plts < 10K  - Started antimicrobial ppx with acyclovir and fluconazole and contining Zosyn and doxy for neutropenic fever.    Hypertension  - Takes amlodipine at home  - Given hypertension, amlodipine stopped and nifedipine and losartan started in [place]. Continued on transfer.  - BP now improved    Tumor lysis syndrome  - Uric acid and LDH elevated on presentation to [place] ED. Kidney function and electrolytes normal.  - Not G6PD deficient  -12 hours at increased fluid rate given hyperkalemia  -Allopurinol upped to 300 BID  -Daily TLS labs        VTE Risk Mitigation (From admission, onward)           Ordered     heparin, porcine (PF) 100 unit/mL injection flush 300 Units  As needed (PRN)         06/16/23 1550     IP VTE HIGH RISK PATIENT  Once         06/09/23 0049     Place sequential compression device  Until discontinued         06/09/23 0049                    Disposition: home    Gus Posadas MD  Bone Marrow Transplant  [facility] - Oncology ([facility])

## 2023-06-17 NOTE — ASSESSMENT & PLAN NOTE
- Patient with CML diagnosed 8/2022   - Follows locally in [place] with Dr. Brett Hogan.   - He has been on dasatinib outpatient since 10/2022.   - Some question regarding his compliance over the course of treatment, however.  - Presented to [facility] with c/o gum swelling on 6/7/23.   - Labs remarkable for WBC count of 138K, with 80% blasts indicating blast crisis.   - WBC count from approximately a week and a half earlier was normal. Reported recent compliance with his TKI.   - Transferred to [facility] for higher level of care.  - Bone marrow Biopsy performed 6/9/23. Results pending, but flow suggestive of transformation to AML.  - Continue dasatinib and allopurinol 300 mg daily. Hydrea stopped with normal WBC count.  - BCR/ABL p210 collected on admission. Result pending.  - Daily CBC and twice TLS and DIC labs. Mild TLS on admission, but none at this time.  - Echo ordered  - Picc line placed  -Patient decided to not do fertility presentation  Stared on Cladribine Idarubicin Cytyrabine and the patient was transitioned from dasatinb to ponatinib  Will resume daily TLS monitoring when patient starts chemo.  Tentative plan to begin today

## 2023-06-17 NOTE — NURSING
CYTarabine (PF) (ZAHRA-C) 1,500 mg/m2 = 3,255 mg in sodium chloride 0.9% 597.55 mL chemo infusion     started through right arm PICC noted for having positive blood return over 2 hours.  Dosage and BSA checked by two chemotherapy certified nurses and premedications given prior to starting infusion.  Chemotherapy education completed at this time.  Chemotherapeutic precautions in place throughout therapy.  Will continue to monitor.

## 2023-06-17 NOTE — NURSING
IDArubicin (IDAMYCIN) 22 mg in sodium chloride 0.9% 85 mL chemo infusion  started through right arm PICC noted for having positive blood return over 30 mins.  Dosage and BSA checked by two chemotherapy certified nurses and premedications given prior to starting infusion.  Chemotherapy education completed at this time.  Chemotherapeutic precautions in place throughout therapy.  Will continue to monitor.

## 2023-06-17 NOTE — ASSESSMENT & PLAN NOTE
- Temp fo 101.4 at [facility]. Fevers persist since transfer.  - CXR neg. Blood culture ngtd. CDiff negative. U/a neg  - Strep, flu, and COVID neg at OSH  - Throat red with exudates. Tonsils mildly swollen. CT neck showing adenopathy but no abscesses.  - CT chest without consoidations  - Will check EBV  - ID following  - Continue Zosyn and doxy per ID recs  - Last temp was > 24 hours ago

## 2023-06-17 NOTE — PLAN OF CARE
AAOX4.VS stable.Oxycodone given twice for oral pain.Patient denies any nausea or vomiting.MD aware of elevated potassium;one dose of sodium zirconium given.Tolerating a regular diet without any difficulty.  Fall risk/thrombocytopenic precautions maintained. Fall risk sign in use; assistance provided with activity; voids per urinal; No BM noted today.Care plan explained to patient; no additional complaints at this time. Will continue routine plan of care.

## 2023-06-17 NOTE — ASSESSMENT & PLAN NOTE
- Takes amlodipine at home  - Given hypertension, amlodipine stopped and nifedipine and losartan started in [place]. Continued on transfer.  - BP now improved

## 2023-06-17 NOTE — ASSESSMENT & PLAN NOTE
- Uric acid and LDH elevated on presentation to [place] ED. Kidney function and electrolytes normal.  - Not G6PD deficient  -12 hours at increased fluid rate given hyperkalemia  -Allopurinol upped to 300 BID  -Daily TLS labs

## 2023-06-18 LAB
ALBUMIN SERPL BCP-MCNC: 2.7 G/DL (ref 3.5–5.2)
ALP SERPL-CCNC: 64 U/L (ref 55–135)
ALT SERPL W/O P-5'-P-CCNC: 20 U/L (ref 10–44)
ANION GAP SERPL CALC-SCNC: 11 MMOL/L (ref 8–16)
ANION GAP SERPL CALC-SCNC: 11 MMOL/L (ref 8–16)
ANION GAP SERPL CALC-SCNC: 12 MMOL/L (ref 8–16)
ANION GAP SERPL CALC-SCNC: 9 MMOL/L (ref 8–16)
ANISOCYTOSIS BLD QL SMEAR: SLIGHT
APTT PPP: 36.9 SEC (ref 21–32)
AST SERPL-CCNC: 126 U/L (ref 10–40)
BASOPHILS NFR BLD: 0 % (ref 0–1.9)
BILIRUB SERPL-MCNC: 0.3 MG/DL (ref 0.1–1)
BUN SERPL-MCNC: 19 MG/DL (ref 6–20)
BUN SERPL-MCNC: 22 MG/DL (ref 6–20)
BUN SERPL-MCNC: 22 MG/DL (ref 6–20)
BUN SERPL-MCNC: 27 MG/DL (ref 6–20)
BURR CELLS BLD QL SMEAR: ABNORMAL
CALCIUM SERPL-MCNC: 8.1 MG/DL (ref 8.7–10.5)
CALCIUM SERPL-MCNC: 8.5 MG/DL (ref 8.7–10.5)
CHLORIDE SERPL-SCNC: 104 MMOL/L (ref 95–110)
CHLORIDE SERPL-SCNC: 104 MMOL/L (ref 95–110)
CHLORIDE SERPL-SCNC: 106 MMOL/L (ref 95–110)
CHLORIDE SERPL-SCNC: 109 MMOL/L (ref 95–110)
CO2 SERPL-SCNC: 20 MMOL/L (ref 23–29)
CO2 SERPL-SCNC: 20 MMOL/L (ref 23–29)
CO2 SERPL-SCNC: 22 MMOL/L (ref 23–29)
CO2 SERPL-SCNC: 22 MMOL/L (ref 23–29)
CREAT SERPL-MCNC: 0.8 MG/DL (ref 0.5–1.4)
CREAT SERPL-MCNC: 0.8 MG/DL (ref 0.5–1.4)
CREAT SERPL-MCNC: 0.9 MG/DL (ref 0.5–1.4)
CREAT SERPL-MCNC: 1 MG/DL (ref 0.5–1.4)
DACRYOCYTES BLD QL SMEAR: ABNORMAL
DIFFERENTIAL METHOD: ABNORMAL
EOSINOPHIL NFR BLD: 0 % (ref 0–8)
ERYTHROCYTE [DISTWIDTH] IN BLOOD BY AUTOMATED COUNT: 19 % (ref 11.5–14.5)
EST. GFR  (NO RACE VARIABLE): >60 ML/MIN/1.73 M^2
FIBRINOGEN PPP-MCNC: 220 MG/DL (ref 182–400)
GLUCOSE SERPL-MCNC: 102 MG/DL (ref 70–110)
GLUCOSE SERPL-MCNC: 109 MG/DL (ref 70–110)
GLUCOSE SERPL-MCNC: 126 MG/DL (ref 70–110)
GLUCOSE SERPL-MCNC: 134 MG/DL (ref 70–110)
HCT VFR BLD AUTO: 32.3 % (ref 40–54)
HGB BLD-MCNC: 10.3 G/DL (ref 14–18)
HYPOCHROMIA BLD QL SMEAR: ABNORMAL
IMM GRANULOCYTES # BLD AUTO: ABNORMAL K/UL (ref 0–0.04)
IMM GRANULOCYTES NFR BLD AUTO: ABNORMAL % (ref 0–0.5)
INR PPP: 2.5 (ref 0.8–1.2)
LDH SERPL L TO P-CCNC: 3095 U/L (ref 110–260)
LYMPHOCYTES NFR BLD: 14.7 % (ref 18–48)
MAGNESIUM SERPL-MCNC: 2.3 MG/DL (ref 1.6–2.6)
MCH RBC QN AUTO: 23 PG (ref 27–31)
MCHC RBC AUTO-ENTMCNC: 31.9 G/DL (ref 32–36)
MCV RBC AUTO: 72 FL (ref 82–98)
MONOCYTES NFR BLD: 5.9 % (ref 4–15)
NEUTROPHILS NFR BLD: 76.5 % (ref 38–73)
NEUTS BAND NFR BLD MANUAL: 2.9 %
NRBC BLD-RTO: 0 /100 WBC
OVALOCYTES BLD QL SMEAR: ABNORMAL
PHOSPHATE SERPL-MCNC: 4.5 MG/DL (ref 2.7–4.5)
PLATELET # BLD AUTO: 17 K/UL (ref 150–450)
PLATELET BLD QL SMEAR: ABNORMAL
PMV BLD AUTO: ABNORMAL FL (ref 9.2–12.9)
POCT GLUCOSE: 105 MG/DL (ref 70–110)
POCT GLUCOSE: 111 MG/DL (ref 70–110)
POCT GLUCOSE: 133 MG/DL (ref 70–110)
POCT GLUCOSE: 150 MG/DL (ref 70–110)
POCT GLUCOSE: 179 MG/DL (ref 70–110)
POIKILOCYTOSIS BLD QL SMEAR: SLIGHT
POLYCHROMASIA BLD QL SMEAR: ABNORMAL
POTASSIUM SERPL-SCNC: 4.7 MMOL/L (ref 3.5–5.1)
POTASSIUM SERPL-SCNC: 5.2 MMOL/L (ref 3.5–5.1)
POTASSIUM SERPL-SCNC: 5.2 MMOL/L (ref 3.5–5.1)
POTASSIUM SERPL-SCNC: 6 MMOL/L (ref 3.5–5.1)
PROT SERPL-MCNC: 6.2 G/DL (ref 6–8.4)
PROTHROMBIN TIME: 25.6 SEC (ref 9–12.5)
RBC # BLD AUTO: 4.48 M/UL (ref 4.6–6.2)
SODIUM SERPL-SCNC: 137 MMOL/L (ref 136–145)
SODIUM SERPL-SCNC: 137 MMOL/L (ref 136–145)
SODIUM SERPL-SCNC: 138 MMOL/L (ref 136–145)
SODIUM SERPL-SCNC: 138 MMOL/L (ref 136–145)
SPHEROCYTES BLD QL SMEAR: ABNORMAL
URATE SERPL-MCNC: 3.4 MG/DL (ref 3.4–7)
WBC # BLD AUTO: 0.55 K/UL (ref 3.9–12.7)

## 2023-06-18 PROCEDURE — 25000003 PHARM REV CODE 250: Performed by: INTERNAL MEDICINE

## 2023-06-18 PROCEDURE — 63600175 PHARM REV CODE 636 W HCPCS: Performed by: STUDENT IN AN ORGANIZED HEALTH CARE EDUCATION/TRAINING PROGRAM

## 2023-06-18 PROCEDURE — 80048 BASIC METABOLIC PNL TOTAL CA: CPT | Mod: 91,XB | Performed by: STUDENT IN AN ORGANIZED HEALTH CARE EDUCATION/TRAINING PROGRAM

## 2023-06-18 PROCEDURE — 83735 ASSAY OF MAGNESIUM: CPT | Performed by: NURSE PRACTITIONER

## 2023-06-18 PROCEDURE — 85610 PROTHROMBIN TIME: CPT | Performed by: STUDENT IN AN ORGANIZED HEALTH CARE EDUCATION/TRAINING PROGRAM

## 2023-06-18 PROCEDURE — 80053 COMPREHEN METABOLIC PANEL: CPT | Performed by: NURSE PRACTITIONER

## 2023-06-18 PROCEDURE — 25000003 PHARM REV CODE 250

## 2023-06-18 PROCEDURE — A4216 STERILE WATER/SALINE, 10 ML: HCPCS | Performed by: INTERNAL MEDICINE

## 2023-06-18 PROCEDURE — 93005 ELECTROCARDIOGRAM TRACING: CPT

## 2023-06-18 PROCEDURE — 93010 ELECTROCARDIOGRAM REPORT: CPT | Mod: ,,, | Performed by: INTERNAL MEDICINE

## 2023-06-18 PROCEDURE — 25000003 PHARM REV CODE 250: Performed by: STUDENT IN AN ORGANIZED HEALTH CARE EDUCATION/TRAINING PROGRAM

## 2023-06-18 PROCEDURE — 85384 FIBRINOGEN ACTIVITY: CPT | Performed by: STUDENT IN AN ORGANIZED HEALTH CARE EDUCATION/TRAINING PROGRAM

## 2023-06-18 PROCEDURE — 25000003 PHARM REV CODE 250: Performed by: NURSE PRACTITIONER

## 2023-06-18 PROCEDURE — 85007 BL SMEAR W/DIFF WBC COUNT: CPT | Performed by: NURSE PRACTITIONER

## 2023-06-18 PROCEDURE — 80048 BASIC METABOLIC PNL TOTAL CA: CPT | Mod: XB | Performed by: INTERNAL MEDICINE

## 2023-06-18 PROCEDURE — 84100 ASSAY OF PHOSPHORUS: CPT | Performed by: NURSE PRACTITIONER

## 2023-06-18 PROCEDURE — 93010 EKG 12-LEAD: ICD-10-PCS | Mod: ,,, | Performed by: INTERNAL MEDICINE

## 2023-06-18 PROCEDURE — 84550 ASSAY OF BLOOD/URIC ACID: CPT | Performed by: STUDENT IN AN ORGANIZED HEALTH CARE EDUCATION/TRAINING PROGRAM

## 2023-06-18 PROCEDURE — 99233 PR SUBSEQUENT HOSPITAL CARE,LEVL III: ICD-10-PCS | Mod: ,,, | Performed by: INTERNAL MEDICINE

## 2023-06-18 PROCEDURE — 84132 ASSAY OF SERUM POTASSIUM: CPT | Performed by: STUDENT IN AN ORGANIZED HEALTH CARE EDUCATION/TRAINING PROGRAM

## 2023-06-18 PROCEDURE — 85027 COMPLETE CBC AUTOMATED: CPT | Performed by: NURSE PRACTITIONER

## 2023-06-18 PROCEDURE — 20600001 HC STEP DOWN PRIVATE ROOM

## 2023-06-18 PROCEDURE — 63600175 PHARM REV CODE 636 W HCPCS: Performed by: INTERNAL MEDICINE

## 2023-06-18 PROCEDURE — 85730 THROMBOPLASTIN TIME PARTIAL: CPT | Performed by: STUDENT IN AN ORGANIZED HEALTH CARE EDUCATION/TRAINING PROGRAM

## 2023-06-18 PROCEDURE — 99233 SBSQ HOSP IP/OBS HIGH 50: CPT | Mod: ,,, | Performed by: INTERNAL MEDICINE

## 2023-06-18 PROCEDURE — 83615 LACTATE (LD) (LDH) ENZYME: CPT | Performed by: STUDENT IN AN ORGANIZED HEALTH CARE EDUCATION/TRAINING PROGRAM

## 2023-06-18 RX ORDER — CALCIUM GLUCONATE 20 MG/ML
1 INJECTION, SOLUTION INTRAVENOUS EVERY 10 MIN PRN
Status: DISCONTINUED | OUTPATIENT
Start: 2023-06-18 | End: 2023-07-12 | Stop reason: HOSPADM

## 2023-06-18 RX ORDER — CALCIUM GLUCONATE 20 MG/ML
1 INJECTION, SOLUTION INTRAVENOUS ONCE
Status: COMPLETED | OUTPATIENT
Start: 2023-06-18 | End: 2023-06-18

## 2023-06-18 RX ORDER — FUROSEMIDE 10 MG/ML
20 INJECTION INTRAMUSCULAR; INTRAVENOUS ONCE
Status: COMPLETED | OUTPATIENT
Start: 2023-06-18 | End: 2023-06-18

## 2023-06-18 RX ADMIN — FLUCONAZOLE 400 MG: 200 TABLET ORAL at 10:06

## 2023-06-18 RX ADMIN — ALUMINUM HYDROXIDE, MAGNESIUM HYDROXIDE, AND DIMETHICONE 10 ML: 400; 400; 40 SUSPENSION ORAL at 09:06

## 2023-06-18 RX ADMIN — CYTARABINE 3255 MG: 100 INJECTION, SOLUTION INTRATHECAL; INTRAVENOUS; SUBCUTANEOUS at 01:06

## 2023-06-18 RX ADMIN — CLADRIBINE 10.9 MG: 1 INJECTION INTRAVENOUS at 09:06

## 2023-06-18 RX ADMIN — DEXAMETHASONE SODIUM PHOSPHATE 8 MG: 4 INJECTION INTRA-ARTICULAR; INTRALESIONAL; INTRAMUSCULAR; INTRAVENOUS; SOFT TISSUE at 08:06

## 2023-06-18 RX ADMIN — ACYCLOVIR 800 MG: 200 CAPSULE ORAL at 09:06

## 2023-06-18 RX ADMIN — DOXYCYCLINE HYCLATE 100 MG: 100 TABLET, COATED ORAL at 09:06

## 2023-06-18 RX ADMIN — Medication 10 ML: at 11:06

## 2023-06-18 RX ADMIN — FUROSEMIDE 20 MG: 10 INJECTION, SOLUTION INTRAMUSCULAR; INTRAVENOUS at 07:06

## 2023-06-18 RX ADMIN — DOXYCYCLINE HYCLATE 100 MG: 100 TABLET, COATED ORAL at 10:06

## 2023-06-18 RX ADMIN — GUAIFENESIN AND DEXTROMETHORPHAN HYDROBROMIDE 1 TABLET: 600; 30 TABLET, EXTENDED RELEASE ORAL at 10:06

## 2023-06-18 RX ADMIN — ALUMINUM HYDROXIDE, MAGNESIUM HYDROXIDE, AND DIMETHICONE 10 ML: 400; 400; 40 SUSPENSION ORAL at 10:06

## 2023-06-18 RX ADMIN — LEVOFLOXACIN 500 MG: 500 TABLET, FILM COATED ORAL at 10:06

## 2023-06-18 RX ADMIN — GUAIFENESIN AND DEXTROMETHORPHAN HYDROBROMIDE 1 TABLET: 600; 30 TABLET, EXTENDED RELEASE ORAL at 09:06

## 2023-06-18 RX ADMIN — PHYTONADIONE 5 MG: 10 INJECTION, EMULSION INTRAMUSCULAR; INTRAVENOUS; SUBCUTANEOUS at 10:06

## 2023-06-18 RX ADMIN — SODIUM ZIRCONIUM CYCLOSILICATE 5 G: 5 POWDER, FOR SUSPENSION ORAL at 10:06

## 2023-06-18 RX ADMIN — Medication 10 ML: at 01:06

## 2023-06-18 RX ADMIN — PONATINIB HYDROCHLORIDE 15 MG: 15 TABLET, FILM COATED ORAL at 10:06

## 2023-06-18 RX ADMIN — CALCIUM GLUCONATE 1 G: 20 INJECTION, SOLUTION INTRAVENOUS at 08:06

## 2023-06-18 RX ADMIN — ONDANSETRON 8 MG: 8 TABLET, ORALLY DISINTEGRATING ORAL at 06:06

## 2023-06-18 RX ADMIN — Medication 10 ML: at 12:06

## 2023-06-18 RX ADMIN — ONDANSETRON 16 MG: 2 INJECTION INTRAMUSCULAR; INTRAVENOUS at 08:06

## 2023-06-18 RX ADMIN — DEXAMETHASONE 1 DROP: 1 SUSPENSION/ DROPS OPHTHALMIC at 05:06

## 2023-06-18 RX ADMIN — DEXAMETHASONE 1 DROP: 1 SUSPENSION/ DROPS OPHTHALMIC at 01:06

## 2023-06-18 RX ADMIN — NIFEDIPINE 60 MG: 60 TABLET, FILM COATED, EXTENDED RELEASE ORAL at 10:06

## 2023-06-18 RX ADMIN — PHYTONADIONE 5 MG: 10 INJECTION, EMULSION INTRAMUSCULAR; INTRAVENOUS; SUBCUTANEOUS at 03:06

## 2023-06-18 RX ADMIN — SODIUM ZIRCONIUM CYCLOSILICATE 5 G: 5 POWDER, FOR SUSPENSION ORAL at 09:06

## 2023-06-18 RX ADMIN — ACYCLOVIR 800 MG: 200 CAPSULE ORAL at 10:06

## 2023-06-18 RX ADMIN — CYTARABINE 3255 MG: 100 INJECTION, SOLUTION INTRATHECAL; INTRAVENOUS; SUBCUTANEOUS at 11:06

## 2023-06-18 RX ADMIN — Medication 10 ML: at 05:06

## 2023-06-18 RX ADMIN — ALLOPURINOL 300 MG: 300 TABLET ORAL at 10:06

## 2023-06-18 RX ADMIN — SODIUM CHLORIDE: 9 INJECTION, SOLUTION INTRAVENOUS at 07:06

## 2023-06-18 RX ADMIN — DEXTROSE MONOHYDRATE 250 ML: 100 INJECTION, SOLUTION INTRAVENOUS at 08:06

## 2023-06-18 RX ADMIN — Medication 10 ML: at 06:06

## 2023-06-18 RX ADMIN — DEXAMETHASONE 1 DROP: 1 SUSPENSION/ DROPS OPHTHALMIC at 06:06

## 2023-06-18 RX ADMIN — SODIUM CHLORIDE 22 MG: 9 INJECTION, SOLUTION INTRAVENOUS at 03:06

## 2023-06-18 RX ADMIN — DEXAMETHASONE 1 DROP: 1 SUSPENSION/ DROPS OPHTHALMIC at 12:06

## 2023-06-18 RX ADMIN — SODIUM ZIRCONIUM CYCLOSILICATE 5 G: 5 POWDER, FOR SUSPENSION ORAL at 03:06

## 2023-06-18 RX ADMIN — DEXAMETHASONE 1 DROP: 1 SUSPENSION/ DROPS OPHTHALMIC at 11:06

## 2023-06-18 RX ADMIN — INSULIN HUMAN 10 UNITS: 100 INJECTION, SOLUTION PARENTERAL at 08:06

## 2023-06-18 RX ADMIN — PIPERACILLIN SODIUM AND TAZOBACTAM SODIUM 4.5 G: 4; .5 INJECTION, POWDER, FOR SOLUTION INTRAVENOUS at 04:06

## 2023-06-18 NOTE — PROGRESS NOTES
[facility] Cowan - Oncology ([facility])  Hematology  Bone Marrow Transplant  Progress Note    Patient Name: Magdaleno Hirsch  Admission Date: 6/9/2023  Hospital Length of Stay: 9 days  Code Status: Full Code    Subjective:     Interval History: hyperkalemia this am, shifted, tolerating chemo well    Objective:     Vital Signs (Most Recent):  Temp: 98.4 °F (36.9 °C) (06/17/23 1543)  Pulse: 89 (06/17/23 1543)  Resp: 18 (06/17/23 1133)  BP: 128/85 (06/17/23 1543)  SpO2: 98 % (06/17/23 1543) Vital Signs (24h Range):  Temp:  [98 °F (36.7 °C)-98.9 °F (37.2 °C)] 98.4 °F (36.9 °C)  Pulse:  [] 89  Resp:  [16-20] 18  SpO2:  [93 %-100 %] 98 %  BP: (119-144)/(60-85) 128/85     Weight: 88.2 kg (194 lb 6.8 oz)  Body mass index is 25.65 kg/m².  Body surface area is 2.13 meters squared.    ECOG SCORE           [unfilled]    Intake/Output - Last 3 Shifts         06/15 0700  06/16 0659 06/16 0700  06/17 0659 06/17 0700  06/18 0659    P.O. 480 720 486    I.V. (mL/kg)  4035.1 (45.7)     IV Piggyback  2343.6     Total Intake(mL/kg) 480 (5.4) 7098.7 (80.5) 486 (5.5)    Urine (mL/kg/hr)  650 (0.3) 700 (0.9)    Total Output  650 700    Net +480 +6448.7 -214           Urine Occurrence 3 x 4 x 1 x    Stool Occurrence 0 x               Physical Exam  Constitutional:       Appearance: He is well-developed.   HENT:      Head: Normocephalic and atraumatic.      Mouth/Throat:      Comments: Greatly improved throat soreness  Eyes:      Conjunctiva/sclera: Conjunctivae normal.   Cardiovascular:      Rate and Rhythm: Normal rate and regular rhythm.      Heart sounds: Normal heart sounds. No murmur heard.  Pulmonary:      Effort: Pulmonary effort is normal. No respiratory distress.      Breath sounds: Normal breath sounds. No wheezing.   Abdominal:      General: Bowel sounds are normal. There is no distension.      Palpations: Abdomen is soft.      Tenderness: There is no abdominal tenderness.   Musculoskeletal:         General: No tenderness. Normal range of  motion.      Cervical back: Neck supple.   Lymphadenopathy:      Cervical: No cervical adenopathy.   Skin:     General: Skin is warm and dry.      Findings: No rash.   Neurological:      Mental Status: He is alert and oriented to person, place, and time.      Coordination: Coordination normal.   Psychiatric:      Comments: Flat affect          Significant Labs:   All pertinent labs from the last 24 hours have been reviewed.    Diagnostic Results:  I have reviewed all pertinent imaging results/findings within the past 24 hours.    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 Brett Hogan. He has been on dasatinib outpatient since 10/2022. Some question regarding his compliance over the course of treatment, however. Presented to [facility] with c/o gum swelling on 6/7/23. Labs on presentation remarkable for WBC count of 138K, with 80% blasts indicating blast crisis. WBC count from approximately a week and a half earlier was normal. Reported recent compliance with his TKI.     [place] [facility] [place].    - Bone marrow Biopsy performed 6/9/23.   - BCR/ABL p210 collected on admission.  - Echo performed  - Picc line placed  -Patient decided to not do fertility presentation  Stared on Cladribine Idarubicin Cytyrabine and the patient was transitioned from dasatinb to ponatinib  -Daily TLS monitoring    Pharyngitis  - Continue [place]ukes and full liquid diet due to pain with swallowing  - Pain well-controlled with IV dilaudid  - ID following. Continue abx per ID recs  - RIP throat swab, strep, flu, and COVID neg  - Leukemia infiltration may be playing a role    Leukocytosis  - See blast crisis phase of CML    Diarrhea  - Reports multiple days of diarrhea prior to transfer  - C diff negative  - PRN imodium available    Neutropenic fever  Resolved    - Temp fo 101.4 at [place] ED. Fevers persist  since transfer.  - CXR neg. Blood culture ngtd. CDiff negative. U/a neg  - Strep, flu, and COVID neg at OSH  - Throat red with exudates. Tonsils mildly swollen. CT neck showing adenopathy but no abscesses.  - CT chest without consoidations  - Last temp was > 24 hours ago    Pancytopenia  - Functionally neutropenic despite leukocytosis, given low neutrophil count  - 2/2 disease and hydrea  - Daily CBC while inpatient  - Transfuse for hgb < 7 or plts < 10K  -Now on doxy levaquin fluconazole and acyclovir    Hypertension  - Takes amlodipine at home  - Given hypertension, amlodipine stopped and nifedipine and losartan started in [place]. Continued on transfer.  - BP now improved    Tumor lysis syndrome  - Uric acid and LDH elevated on presentation to [place] ED. Kidney function and electrolytes normal.  - Not G6PD deficient  -12 hours at increased fluid rate given hyperkalemia  -Allopurinol 300 daily  Lokelma tid for 48 hours then daily   -Losartan held  -Daily TLS labs        VTE Risk Mitigation (From admission, onward)         Ordered     heparin, porcine (PF) 100 unit/mL injection flush 300 Units  As needed (PRN)         06/16/23 1550     IP VTE HIGH RISK PATIENT  Once         06/09/23 0049     Place sequential compression device  Until discontinued         06/09/23 0049                Disposition: Home    Gus Posadas MD  Bone Marrow Transplant  [facility] - Oncology ([facility])

## 2023-06-18 NOTE — ASSESSMENT & PLAN NOTE
- Takes amlodipine at home  - Given hypertension, amlodipine stopped and nifedipine and losartan started in [place]. Continued on transfer.  - BP now improved

## 2023-06-18 NOTE — ASSESSMENT & PLAN NOTE
Resolved    - Temp fo 101.4 at [place] ED. Fevers persist since transfer.  - CXR neg. Blood culture ngtd. CDiff negative. U/a neg  - Strep, flu, and COVID neg at OSH  - Throat red with exudates. Tonsils mildly swollen. CT neck showing adenopathy but no abscesses.  - CT chest without consoidations  - Last temp was > 24 hours ago

## 2023-06-18 NOTE — NURSING
cladribine (LEUSTATIN) 10.9 mg in sodium chloride 0.9% 575.9 mL chemo infusion    hemo infusion  started through right arm PICC noted for having positive blood return over 2 hrs.  Dosage and BSA checked by two chemotherapy certified nurses and premedications given prior to starting infusion.  Chemotherapy education completed at this time.  Chemotherapeutic precautions in place throughout therapy.  Will continue to monitor.

## 2023-06-18 NOTE — ASSESSMENT & PLAN NOTE
- Patient with CML diagnosed 8/2022 Follows locally in [place] with Dr. Brett Hogan. He has been on dasatinib outpatient since 10/2022. Some question regarding his compliance over the course of treatment, however. Presented to [facility] with c/o gum swelling on 6/7/23. Labs on presentation remarkable for WBC count of 138K, with 80% blasts indicating blast crisis. WBC count from approximately a week and a half earlier was normal. Reported recent compliance with his TKI.     Transferred to [facility] for higher level of care.    - Bone marrow Biopsy performed 6/9/23.   - BCR/ABL p210 collected on admission.  - Echo performed  - Picc line placed  -Patient decided to not do fertility presentation  Stared on Cladribine Idarubicin Cytyrabine and the patient was transitioned from dasatinb to ponatinib  -Daily TLS monitoring

## 2023-06-18 NOTE — ASSESSMENT & PLAN NOTE
- Functionally neutropenic despite leukocytosis, given low neutrophil count  - 2/2 disease and hydrea  - Daily CBC while inpatient  - Transfuse for hgb < 7 or plts < 10K  -Now on doxy levaquin fluconazole and acyclovir

## 2023-06-18 NOTE — ASSESSMENT & PLAN NOTE
- Continue [place] and full liquid diet due to pain with swallowing  - Pain well-controlled with IV dilaudid  - ID following. Continue abx per ID recs  - RIP throat swab, strep, flu, and COVID neg  - Leukemia infiltration may be playing a role

## 2023-06-18 NOTE — ASSESSMENT & PLAN NOTE
- Uric acid and LDH elevated on presentation to [place] ED. Kidney function and electrolytes normal.  - Not G6PD deficient  -12 hours at increased fluid rate given hyperkalemia  -Allopurinol 300 daily  Lokelma tid for 48 hours then daily   -Losartan held  -Daily TLS labs

## 2023-06-19 LAB
ALBUMIN SERPL BCP-MCNC: 2.8 G/DL (ref 3.5–5.2)
ALP SERPL-CCNC: 50 U/L (ref 55–135)
ALT SERPL W/O P-5'-P-CCNC: 23 U/L (ref 10–44)
ANION GAP SERPL CALC-SCNC: 7 MMOL/L (ref 8–16)
ANION GAP SERPL CALC-SCNC: 9 MMOL/L (ref 8–16)
ANISOCYTOSIS BLD QL SMEAR: SLIGHT
APTT PPP: 26.5 SEC (ref 21–32)
AST SERPL-CCNC: 75 U/L (ref 10–40)
BASOPHILS # BLD AUTO: 0 K/UL (ref 0–0.2)
BASOPHILS NFR BLD: 0 % (ref 0–1.9)
BILIRUB SERPL-MCNC: 0.6 MG/DL (ref 0.1–1)
BLD PROD TYP BPU: NORMAL
BLOOD UNIT EXPIRATION DATE: NORMAL
BLOOD UNIT TYPE CODE: 1700
BLOOD UNIT TYPE: NORMAL
BUN SERPL-MCNC: 23 MG/DL (ref 6–20)
BUN SERPL-MCNC: 26 MG/DL (ref 6–20)
BURR CELLS BLD QL SMEAR: ABNORMAL
CALCIUM SERPL-MCNC: 7.7 MG/DL (ref 8.7–10.5)
CALCIUM SERPL-MCNC: 8 MG/DL (ref 8.7–10.5)
CHLORIDE SERPL-SCNC: 105 MMOL/L (ref 95–110)
CHLORIDE SERPL-SCNC: 106 MMOL/L (ref 95–110)
CO2 SERPL-SCNC: 22 MMOL/L (ref 23–29)
CO2 SERPL-SCNC: 23 MMOL/L (ref 23–29)
CODING SYSTEM: NORMAL
CREAT SERPL-MCNC: 0.8 MG/DL (ref 0.5–1.4)
CREAT SERPL-MCNC: 0.9 MG/DL (ref 0.5–1.4)
CROSSMATCH INTERPRETATION: NORMAL
DIFFERENTIAL METHOD: ABNORMAL
DISPENSE STATUS: NORMAL
EOSINOPHIL # BLD AUTO: 0 K/UL (ref 0–0.5)
EOSINOPHIL NFR BLD: 0 % (ref 0–8)
ERYTHROCYTE [DISTWIDTH] IN BLOOD BY AUTOMATED COUNT: 18.6 % (ref 11.5–14.5)
EST. GFR  (NO RACE VARIABLE): >60 ML/MIN/1.73 M^2
EST. GFR  (NO RACE VARIABLE): >60 ML/MIN/1.73 M^2
FIBRINOGEN PPP-MCNC: 205 MG/DL (ref 182–400)
GLUCOSE SERPL-MCNC: 109 MG/DL (ref 70–110)
GLUCOSE SERPL-MCNC: 134 MG/DL (ref 70–110)
HCT VFR BLD AUTO: 29.9 % (ref 40–54)
HGB BLD-MCNC: 9.4 G/DL (ref 14–18)
HYPOCHROMIA BLD QL SMEAR: ABNORMAL
IMM GRANULOCYTES # BLD AUTO: 0 K/UL (ref 0–0.04)
IMM GRANULOCYTES NFR BLD AUTO: 0 % (ref 0–0.5)
INR PPP: 1.3 (ref 0.8–1.2)
LDH SERPL L TO P-CCNC: 2081 U/L (ref 110–260)
LYMPHOCYTES # BLD AUTO: 0 K/UL (ref 1–4.8)
LYMPHOCYTES NFR BLD: 10.8 % (ref 18–48)
MAGNESIUM SERPL-MCNC: 2.4 MG/DL (ref 1.6–2.6)
MCH RBC QN AUTO: 22.7 PG (ref 27–31)
MCHC RBC AUTO-ENTMCNC: 31.4 G/DL (ref 32–36)
MCV RBC AUTO: 72 FL (ref 82–98)
MONOCYTES # BLD AUTO: 0 K/UL (ref 0.3–1)
MONOCYTES NFR BLD: 2.7 % (ref 4–15)
NEUTROPHILS # BLD AUTO: 0.3 K/UL (ref 1.8–7.7)
NEUTROPHILS NFR BLD: 86.5 % (ref 38–73)
NRBC BLD-RTO: 0 /100 WBC
OVALOCYTES BLD QL SMEAR: ABNORMAL
PHOSPHATE SERPL-MCNC: 5 MG/DL (ref 2.7–4.5)
PLATELET # BLD AUTO: 7 K/UL (ref 150–450)
PMV BLD AUTO: ABNORMAL FL (ref 9.2–12.9)
POCT GLUCOSE: 139 MG/DL (ref 70–110)
POIKILOCYTOSIS BLD QL SMEAR: SLIGHT
POLYCHROMASIA BLD QL SMEAR: ABNORMAL
POTASSIUM SERPL-SCNC: 5.1 MMOL/L (ref 3.5–5.1)
POTASSIUM SERPL-SCNC: 5.5 MMOL/L (ref 3.5–5.1)
PROT SERPL-MCNC: 6.1 G/DL (ref 6–8.4)
PROTHROMBIN TIME: 14.2 SEC (ref 9–12.5)
RBC # BLD AUTO: 4.15 M/UL (ref 4.6–6.2)
SCHISTOCYTES BLD QL SMEAR: ABNORMAL
SODIUM SERPL-SCNC: 135 MMOL/L (ref 136–145)
SODIUM SERPL-SCNC: 137 MMOL/L (ref 136–145)
UNIT NUMBER: NORMAL
URATE SERPL-MCNC: 4.5 MG/DL (ref 3.4–7)
WBC # BLD AUTO: 0.37 K/UL (ref 3.9–12.7)

## 2023-06-19 PROCEDURE — 25000003 PHARM REV CODE 250: Performed by: STUDENT IN AN ORGANIZED HEALTH CARE EDUCATION/TRAINING PROGRAM

## 2023-06-19 PROCEDURE — 63600175 PHARM REV CODE 636 W HCPCS: Performed by: INTERNAL MEDICINE

## 2023-06-19 PROCEDURE — 85730 THROMBOPLASTIN TIME PARTIAL: CPT | Performed by: STUDENT IN AN ORGANIZED HEALTH CARE EDUCATION/TRAINING PROGRAM

## 2023-06-19 PROCEDURE — 84100 ASSAY OF PHOSPHORUS: CPT | Performed by: NURSE PRACTITIONER

## 2023-06-19 PROCEDURE — 99233 SBSQ HOSP IP/OBS HIGH 50: CPT | Mod: ,,, | Performed by: INTERNAL MEDICINE

## 2023-06-19 PROCEDURE — P9037 PLATE PHERES LEUKOREDU IRRAD: HCPCS | Performed by: STUDENT IN AN ORGANIZED HEALTH CARE EDUCATION/TRAINING PROGRAM

## 2023-06-19 PROCEDURE — 94761 N-INVAS EAR/PLS OXIMETRY MLT: CPT

## 2023-06-19 PROCEDURE — 83735 ASSAY OF MAGNESIUM: CPT | Performed by: NURSE PRACTITIONER

## 2023-06-19 PROCEDURE — 25000003 PHARM REV CODE 250: Performed by: INTERNAL MEDICINE

## 2023-06-19 PROCEDURE — 25000003 PHARM REV CODE 250: Performed by: NURSE PRACTITIONER

## 2023-06-19 PROCEDURE — 36430 TRANSFUSION BLD/BLD COMPNT: CPT

## 2023-06-19 PROCEDURE — A4216 STERILE WATER/SALINE, 10 ML: HCPCS | Performed by: INTERNAL MEDICINE

## 2023-06-19 PROCEDURE — 85610 PROTHROMBIN TIME: CPT | Performed by: STUDENT IN AN ORGANIZED HEALTH CARE EDUCATION/TRAINING PROGRAM

## 2023-06-19 PROCEDURE — 85025 COMPLETE CBC W/AUTO DIFF WBC: CPT | Performed by: NURSE PRACTITIONER

## 2023-06-19 PROCEDURE — 25000003 PHARM REV CODE 250

## 2023-06-19 PROCEDURE — 80048 BASIC METABOLIC PNL TOTAL CA: CPT | Performed by: STUDENT IN AN ORGANIZED HEALTH CARE EDUCATION/TRAINING PROGRAM

## 2023-06-19 PROCEDURE — 80053 COMPREHEN METABOLIC PANEL: CPT | Performed by: NURSE PRACTITIONER

## 2023-06-19 PROCEDURE — 85384 FIBRINOGEN ACTIVITY: CPT | Performed by: STUDENT IN AN ORGANIZED HEALTH CARE EDUCATION/TRAINING PROGRAM

## 2023-06-19 PROCEDURE — 20600001 HC STEP DOWN PRIVATE ROOM

## 2023-06-19 PROCEDURE — 94640 AIRWAY INHALATION TREATMENT: CPT

## 2023-06-19 PROCEDURE — 99233 PR SUBSEQUENT HOSPITAL CARE,LEVL III: ICD-10-PCS | Mod: ,,, | Performed by: INTERNAL MEDICINE

## 2023-06-19 PROCEDURE — 84550 ASSAY OF BLOOD/URIC ACID: CPT | Performed by: STUDENT IN AN ORGANIZED HEALTH CARE EDUCATION/TRAINING PROGRAM

## 2023-06-19 PROCEDURE — 25000242 PHARM REV CODE 250 ALT 637 W/ HCPCS: Performed by: STUDENT IN AN ORGANIZED HEALTH CARE EDUCATION/TRAINING PROGRAM

## 2023-06-19 PROCEDURE — 63600175 PHARM REV CODE 636 W HCPCS: Performed by: STUDENT IN AN ORGANIZED HEALTH CARE EDUCATION/TRAINING PROGRAM

## 2023-06-19 PROCEDURE — 83615 LACTATE (LD) (LDH) ENZYME: CPT | Performed by: STUDENT IN AN ORGANIZED HEALTH CARE EDUCATION/TRAINING PROGRAM

## 2023-06-19 RX ORDER — HYDROCODONE BITARTRATE AND ACETAMINOPHEN 500; 5 MG/1; MG/1
TABLET ORAL
Status: DISCONTINUED | OUTPATIENT
Start: 2023-06-19 | End: 2023-06-20

## 2023-06-19 RX ORDER — POSACONAZOLE 100 MG/1
300 TABLET, DELAYED RELEASE ORAL DAILY
Status: DISCONTINUED | OUTPATIENT
Start: 2023-06-22 | End: 2023-07-12 | Stop reason: HOSPADM

## 2023-06-19 RX ORDER — POSACONAZOLE 100 MG/1
300 TABLET, DELAYED RELEASE ORAL 2 TIMES DAILY
Status: COMPLETED | OUTPATIENT
Start: 2023-06-21 | End: 2023-06-21

## 2023-06-19 RX ORDER — MUPIROCIN 20 MG/G
OINTMENT TOPICAL 2 TIMES DAILY
Status: COMPLETED | OUTPATIENT
Start: 2023-06-19 | End: 2023-06-23

## 2023-06-19 RX ORDER — SULFAMETHOXAZOLE AND TRIMETHOPRIM 800; 160 MG/1; MG/1
1 TABLET ORAL
Status: DISCONTINUED | OUTPATIENT
Start: 2023-06-19 | End: 2023-06-20

## 2023-06-19 RX ORDER — DIPHENHYDRAMINE HCL 25 MG
25 CAPSULE ORAL ONCE
Status: COMPLETED | OUTPATIENT
Start: 2023-06-19 | End: 2023-06-19

## 2023-06-19 RX ORDER — FUROSEMIDE 10 MG/ML
20 INJECTION INTRAMUSCULAR; INTRAVENOUS ONCE
Status: COMPLETED | OUTPATIENT
Start: 2023-06-19 | End: 2023-06-19

## 2023-06-19 RX ORDER — ACETAMINOPHEN 325 MG/1
650 TABLET ORAL ONCE
Status: COMPLETED | OUTPATIENT
Start: 2023-06-19 | End: 2023-06-19

## 2023-06-19 RX ORDER — SEVELAMER CARBONATE 800 MG/1
800 TABLET, FILM COATED ORAL
Status: DISCONTINUED | OUTPATIENT
Start: 2023-06-19 | End: 2023-06-28

## 2023-06-19 RX ORDER — ALBUTEROL SULFATE 2.5 MG/.5ML
10 SOLUTION RESPIRATORY (INHALATION) ONCE
Status: COMPLETED | OUTPATIENT
Start: 2023-06-19 | End: 2023-06-19

## 2023-06-19 RX ADMIN — SODIUM ZIRCONIUM CYCLOSILICATE 5 G: 5 POWDER, FOR SUSPENSION ORAL at 08:06

## 2023-06-19 RX ADMIN — SULFAMETHOXAZOLE AND TRIMETHOPRIM 1 TABLET: 800; 160 TABLET ORAL at 05:06

## 2023-06-19 RX ADMIN — ACETAMINOPHEN 650 MG: 325 TABLET ORAL at 10:06

## 2023-06-19 RX ADMIN — GUAIFENESIN AND DEXTROMETHORPHAN HYDROBROMIDE 1 TABLET: 600; 30 TABLET, EXTENDED RELEASE ORAL at 08:06

## 2023-06-19 RX ADMIN — ALLOPURINOL 300 MG: 300 TABLET ORAL at 09:06

## 2023-06-19 RX ADMIN — ALBUTEROL SULFATE 10 MG: 2.5 SOLUTION RESPIRATORY (INHALATION) at 08:06

## 2023-06-19 RX ADMIN — Medication 10 ML: at 12:06

## 2023-06-19 RX ADMIN — OXYCODONE HYDROCHLORIDE 5 MG: 5 TABLET ORAL at 02:06

## 2023-06-19 RX ADMIN — DIPHENHYDRAMINE HYDROCHLORIDE 25 MG: 25 CAPSULE ORAL at 10:06

## 2023-06-19 RX ADMIN — Medication 10 ML: at 05:06

## 2023-06-19 RX ADMIN — MUPIROCIN: 20 OINTMENT TOPICAL at 09:06

## 2023-06-19 RX ADMIN — GUAIFENESIN AND DEXTROMETHORPHAN HYDROBROMIDE 1 TABLET: 600; 30 TABLET, EXTENDED RELEASE ORAL at 09:06

## 2023-06-19 RX ADMIN — CLADRIBINE 10.9 MG: 1 INJECTION INTRAVENOUS at 09:06

## 2023-06-19 RX ADMIN — SODIUM CHLORIDE: 9 INJECTION, SOLUTION INTRAVENOUS at 05:06

## 2023-06-19 RX ADMIN — DEXAMETHASONE SODIUM PHOSPHATE 8 MG: 4 INJECTION INTRA-ARTICULAR; INTRALESIONAL; INTRAMUSCULAR; INTRAVENOUS; SOFT TISSUE at 08:06

## 2023-06-19 RX ADMIN — ONDANSETRON 16 MG: 2 INJECTION INTRAMUSCULAR; INTRAVENOUS at 08:06

## 2023-06-19 RX ADMIN — SODIUM ZIRCONIUM CYCLOSILICATE 5 G: 5 POWDER, FOR SUSPENSION ORAL at 09:06

## 2023-06-19 RX ADMIN — SEVELAMER CARBONATE 800 MG: 800 TABLET, FILM COATED ORAL at 12:06

## 2023-06-19 RX ADMIN — FUROSEMIDE 20 MG: 10 INJECTION, SOLUTION INTRAMUSCULAR; INTRAVENOUS at 07:06

## 2023-06-19 RX ADMIN — NIFEDIPINE 60 MG: 60 TABLET, FILM COATED, EXTENDED RELEASE ORAL at 09:06

## 2023-06-19 RX ADMIN — SEVELAMER CARBONATE 800 MG: 800 TABLET, FILM COATED ORAL at 05:06

## 2023-06-19 RX ADMIN — FLUCONAZOLE 400 MG: 200 TABLET ORAL at 09:06

## 2023-06-19 RX ADMIN — MUPIROCIN: 20 OINTMENT TOPICAL at 08:06

## 2023-06-19 RX ADMIN — DEXAMETHASONE 1 DROP: 1 SUSPENSION/ DROPS OPHTHALMIC at 12:06

## 2023-06-19 RX ADMIN — PHYTONADIONE 10 MG: 10 INJECTION, EMULSION INTRAMUSCULAR; INTRAVENOUS; SUBCUTANEOUS at 09:06

## 2023-06-19 RX ADMIN — SODIUM CHLORIDE 22 MG: 9 INJECTION, SOLUTION INTRAVENOUS at 02:06

## 2023-06-19 RX ADMIN — ACYCLOVIR 800 MG: 200 CAPSULE ORAL at 08:06

## 2023-06-19 RX ADMIN — SODIUM ZIRCONIUM CYCLOSILICATE 5 G: 5 POWDER, FOR SUSPENSION ORAL at 02:06

## 2023-06-19 RX ADMIN — DOXYCYCLINE HYCLATE 100 MG: 100 TABLET, COATED ORAL at 09:06

## 2023-06-19 RX ADMIN — DEXAMETHASONE 1 DROP: 1 SUSPENSION/ DROPS OPHTHALMIC at 05:06

## 2023-06-19 RX ADMIN — DEXAMETHASONE 1 DROP: 1 SUSPENSION/ DROPS OPHTHALMIC at 06:06

## 2023-06-19 RX ADMIN — PONATINIB HYDROCHLORIDE 15 MG: 15 TABLET, FILM COATED ORAL at 09:06

## 2023-06-19 RX ADMIN — LEVOFLOXACIN 500 MG: 500 TABLET, FILM COATED ORAL at 09:06

## 2023-06-19 RX ADMIN — Medication 10 ML: at 04:06

## 2023-06-19 RX ADMIN — ACYCLOVIR 800 MG: 200 CAPSULE ORAL at 09:06

## 2023-06-19 NOTE — ASSESSMENT & PLAN NOTE
- Continue [place] and full liquid diet due to pain with swallowing  - Pain well-controlled with IV dilaudid  - ID following. Continue abx per ID recs  - RIP throat swab, strep, flu, and COVID neg  - Leukemia infiltration may be playing a role

## 2023-06-19 NOTE — SUBJECTIVE & OBJECTIVE
Subjective:     Interval History: NAEO, am hyperkalemia shifted    Objective:     Vital Signs (Most Recent):  Temp: 97.4 °F (36.3 °C) (06/19/23 0358)  Pulse: 94 (06/19/23 0625)  Resp: 18 (06/19/23 0358)  BP: 133/72 (06/19/23 0358)  SpO2: 95 % (06/19/23 0358) Vital Signs (24h Range):  Temp:  [97.4 °F (36.3 °C)-98.9 °F (37.2 °C)] 97.4 °F (36.3 °C)  Pulse:  [] 94  Resp:  [15-18] 18  SpO2:  [95 %-98 %] 95 %  BP: (116-142)/(59-82) 133/72     Weight: 89.1 kg (196 lb 6.9 oz)  Body mass index is 25.92 kg/m².  Body surface area is 2.14 meters squared.    ECOG SCORE           [unfilled]    Intake/Output - Last 3 Shifts         06/17 0700  06/18 0659 06/18 0700  06/19 0659 06/19 0700  06/20 0659    P.O. 1226 1660     I.V. (mL/kg) 1915.3 (21.7) 1299.7 (14.6)     IV Piggyback 133.9 293.7     Total Intake(mL/kg) 3275.3 (37.1) 3253.5 (36.5)     Urine (mL/kg/hr) 2450 (1.2) 2051 (1)     Total Output 2450 2051     Net +825.3 +1202.5            Urine Occurrence 1 x 5 x              Physical Exam  Constitutional:       Appearance: He is well-developed.   HENT:      Head: Normocephalic and atraumatic.      Mouth/Throat:      Comments: Greatly improved throat soreness  Eyes:      Conjunctiva/sclera: Conjunctivae normal.   Cardiovascular:      Rate and Rhythm: Normal rate and regular rhythm.      Heart sounds: Normal heart sounds. No murmur heard.  Pulmonary:      Effort: Pulmonary effort is normal. No respiratory distress.      Breath sounds: Normal breath sounds. No wheezing.   Abdominal:      General: Bowel sounds are normal. There is no distension.      Palpations: Abdomen is soft.      Tenderness: There is no abdominal tenderness.   Musculoskeletal:         General: No tenderness. Normal range of motion.      Cervical back: Neck supple.   Lymphadenopathy:      Cervical: No cervical adenopathy.   Skin:     General: Skin is warm and dry.      Findings: No rash.   Neurological:      Mental Status: He is alert and oriented to  person, place, and time.      Coordination: Coordination normal.   Psychiatric:      Comments: Flat affect          Significant Labs:   All pertinent labs from the last 24 hours have been reviewed.    Diagnostic Results:  I have reviewed all pertinent imaging results/findings within the past 24 hours.

## 2023-06-19 NOTE — PLAN OF CARE
Recommendations    1. Continue Regular Diet   2. Continue Boost Plus Vanilla -TID   3. RD to monitor and follow    Goals: To meet % of EEN,EPN by next RD follow up  Nutrition Goal Status: progressing towards goal  Communication of RD Recs:  Plan of care (POC)

## 2023-06-19 NOTE — ASSESSMENT & PLAN NOTE
Resolved    - Temp fo 101.4 at [place] ED. Fevers persist since transfer.  - CXR neg. Blood culture ngtd. CDiff negative. U/a neg  - Strep, flu, and COVID neg at OSH  - Throat red with exudates. Tonsils mildly swollen. CT neck showing adenopathy but no abscesses.  - CT chest without consoidations  - Last temp was > 24 hours ago

## 2023-06-19 NOTE — ASSESSMENT & PLAN NOTE
- Uric acid and LDH elevated on presentation to [place] ED. Kidney function and electrolytes normal.  - Not G6PD deficient  -12 hours at increased fluid rate given hyperkalemia  -Allopurinol 300 daily  Lokelma tid for 48 hours then daily   -Losartan held  -Daily TLS labs

## 2023-06-19 NOTE — PROGRESS NOTES
Esdras Cowan - Oncology ([facility])  Hematology  Bone Marrow Transplant  Progress Note    Patient Name: Magdaleno Hirsch  Admission Date: 6/9/2023  Hospital Length of Stay: 10 days  Code Status: Full Code    Subjective:     Interval History: NAEO, am hyperkalemia shifted    Objective:     Vital Signs (Most Recent):  Temp: 97.4 °F (36.3 °C) (06/19/23 0358)  Pulse: 94 (06/19/23 0625)  Resp: 18 (06/19/23 0358)  BP: 133/72 (06/19/23 0358)  SpO2: 95 % (06/19/23 0358) Vital Signs (24h Range):  Temp:  [97.4 °F (36.3 °C)-98.9 °F (37.2 °C)] 97.4 °F (36.3 °C)  Pulse:  [] 94  Resp:  [15-18] 18  SpO2:  [95 %-98 %] 95 %  BP: (116-142)/(59-82) 133/72     Weight: 89.1 kg (196 lb 6.9 oz)  Body mass index is 25.92 kg/m².  Body surface area is 2.14 meters squared.    ECOG SCORE           [unfilled]    Intake/Output - Last 3 Shifts         06/17 0700  06/18 0659 06/18 0700  06/19 0659 06/19 0700  06/20 0659    P.O. 1226 1660     I.V. (mL/kg) 1915.3 (21.7) 1299.7 (14.6)     IV Piggyback 133.9 293.7     Total Intake(mL/kg) 3275.3 (37.1) 3253.5 (36.5)     Urine (mL/kg/hr) 2450 (1.2) 2051 (1)     Total Output 2450 2051     Net +825.3 +1202.5            Urine Occurrence 1 x 5 x              Physical Exam  Constitutional:       Appearance: He is well-developed.   HENT:      Head: Normocephalic and atraumatic.      Mouth/Throat:      Comments: Greatly improved throat soreness  Eyes:      Conjunctiva/sclera: Conjunctivae normal.   Cardiovascular:      Rate and Rhythm: Normal rate and regular rhythm.      Heart sounds: Normal heart sounds. No murmur heard.  Pulmonary:      Effort: Pulmonary effort is normal. No respiratory distress.      Breath sounds: Normal breath sounds. No wheezing.   Abdominal:      General: Bowel sounds are normal. There is no distension.      Palpations: Abdomen is soft.      Tenderness: There is no abdominal tenderness.   Musculoskeletal:         General: No tenderness. Normal range of motion.      Cervical back: Neck  supple.   Lymphadenopathy:      Cervical: No cervical adenopathy.   Skin:     General: Skin is warm and dry.      Findings: No rash.   Neurological:      Mental Status: He is alert and oriented to person, place, and time.      Coordination: Coordination normal.   Psychiatric:      Comments: Flat affect          Significant Labs:   All pertinent labs from the last 24 hours have been reviewed.    Diagnostic Results:  I have reviewed all pertinent imaging results/findings within the past 24 hours.    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 Brett Hogan. He has been on dasatinib outpatient since 10/2022. Some question regarding his compliance over the course of treatment, however. Presented to [facility] with c/o gum swelling on 6/7/23. Labs on presentation remarkable for WBC count of 138K, with 80% blasts indicating blast crisis. WBC count from approximately a week and a half earlier was normal. Reported recent compliance with his TKI.     Transferred to [facility] for higher level of care.    - Bone marrow Biopsy performed 6/9/23.   - BCR/ABL p210 collected on admission.  - Echo performed  - Picc line placed  -Patient decided to not do fertility presentation  Stared on Cladribine Idarubicin Cytyrabine and the patient was transitioned from dasatinb to ponatinib  -Daily TLS monitoring    Pharyngitis  - Continue Dukes and full liquid diet due to pain with swallowing  - Pain well-controlled with IV dilaudid  - ID following. Continue abx per ID recs  - RIP throat swab, strep, flu, and COVID neg  - Leukemia infiltration may be playing a role    Leukocytosis  - See blast crisis phase of CML    Diarrhea  - Reports multiple days of diarrhea prior to transfer  - C diff negative  - PRN imodium available    Neutropenic fever  Resolved    - Temp fo 101.4 at [place] ED. Fevers persist since transfer.  - CXR neg. Blood  culture ngtd. CDiff negative. U/a neg  - Strep, flu, and COVID neg at OSH  - Throat red with exudates. Tonsils mildly swollen. CT neck showing adenopathy but no abscesses.  - CT chest without consoidations  - Last temp was > 24 hours ago    Pancytopenia  - Functionally neutropenic despite leukocytosis, given low neutrophil count  - 2/2 disease and hydrea  - Daily CBC while inpatient  - Transfuse for hgb < 7 or plts < 10K  -Now on bactrim levaquin fluconazole and acyclovir    Hypertension  - Takes amlodipine at home  - Given hypertension, amlodipine stopped and nifedipine and losartan started in [place]. Continued on transfer.  - BP now improved    Tumor lysis syndrome  - Uric acid and LDH elevated on presentation to [place] ED. Kidney function and electrolytes normal.  - Not G6PD deficient  -12 hours at increased fluid rate given hyperkalemia  -Allopurinol 300 daily  Lokelma tid for 48 hours then daily   -Losartan held  -Daily TLS labs        VTE Risk Mitigation (From admission, onward)         Ordered     heparin, porcine (PF) 100 unit/mL injection flush 300 Units  As needed (PRN)         06/16/23 1550     IP VTE HIGH RISK PATIENT  Once         06/09/23 0049     Place sequential compression device  Until discontinued         06/09/23 0049                Disposition: Home    Gus Posadas MD  Bone Marrow Transplant  Esdras fernando - Oncology ([facility])

## 2023-06-19 NOTE — ASSESSMENT & PLAN NOTE
- Takes amlodipine at home  - Given hypertension, amlodipine stopped and nifedipine and losartan started in [place]. Continued on transfer.  - BP now improved

## 2023-06-19 NOTE — PROGRESS NOTES
"Esdras Cowan - Oncology ([facility])  Adult Nutrition  Consult Note    SUMMARY     Recommendations    1. Continue Regular Diet   2. Continue Boost Plus Vanilla -TID   3. RD to monitor and follow    Goals: To meet % of EEN,EPN by next RD follow up  Nutrition Goal Status: progressing towards goal  Communication of RD Recs:  Plan of care (POC)    Assessment and Plan  Nutrition Problem  Increased nutrient needs    Related to (etiology):   Physiological demands of the body     Signs and Symptoms (as evidenced by):   Chronic Condition: Myeloid leukemia    Interventions/Recommendations (treatment strategy):  Collaboration w/ other healthcare professionals    Nutrition Diagnosis Status:   Improving     Reason for Assessment    Reason For Assessment: length of stay  Diagnosis: cancer diagnosis/related complications (Cancer Myeloid Leukemia)  Relevant Medical History: HTN and TKI-induced nausea (Tyrosine Kinase Inhibitors)  Interdisciplinary Rounds: did not attend    General Information Comments: Consulted w/ patient today. Pt reports moderate appetite. Pt denies any n/v/d/c. No recent weight trends recorded. Pt states 100% meal consumption with 1 Boost ONS per day. Pt appears well nourished. NFPE not warranted at this time.  Nutrition Discharge Planning: Pending Clinical Course    Nutrition Risk Screen    Nutrition Risk Screen: no indicators present    Nutrition/Diet History    Patient Reported Diet/Restrictions/Preferences: general  Typical Food/Fluid Intake: 3 meals a day  Food Preferences: Grilled fish and water  Spiritual, Cultural Beliefs, Anglican Practices, Values that Affect Care: no  Food Allergies: NKFA  Factors Affecting Nutritional Intake: None identified at this time    Anthropometrics    Temp: 97.5 °F (36.4 °C)  Height Method: Stated  Height: 6' 1" (185.4 cm)  Height (inches): 73 in  Weight Method: Standard Scale  Weight: 89.1 kg (196 lb 6.9 oz)  Weight (lb): 196.43 lb  Ideal Body Weight (IBW), Male: 184 " lb  % Ideal Body Weight, Male (lb): 106.76 %  BMI (Calculated): 25.9  BMI Grade: 25 - 29.9 - overweight       Lab/Procedures/Meds    Pertinent Labs Reviewed: reviewed  Pertinent Labs Comments: Potassium 5.5, BUN 26, Glucose 134, Calcium 7.7, Phosphorous 5.0, Alkaline Phosphatase 50, AST 75  Pertinent Medications Reviewed: reviewed  Pertinent Medications Comments: Dexamethasone Injection, Ondansetron, Sevelamer Carbonate, Sodium Zirconium, Cyclosilicate      Estimated/Assessed Needs    Weight Used For Calorie Calculations: 89.1 kg (196 lb 6.9 oz)  Energy Calorie Requirements (kcal): 2228 kcals/day (25 kcal/kg)  Energy Need Method: Kcal/kg  Protein Requirements:  grams of protein/day  Weight Used For Protein Calculations: 89.1 kg (196 lb 6.9 oz)  Fluid Requirements (mL): 1 mL/kcal or Per MD  Estimated Fluid Requirement Method: RDA Method  RDA Method (mL): 2228         Nutrition Prescription Ordered    Current Diet Order: Diet Adult Regular (IDDSI Level 7)    Evaluation of Received Nutrient/Fluid Intake    I/O: +28.14  L since admit  Comments: LBM: 06/16  % Intake of Estimated Energy Needs: 75 - 100 %  % Meal Intake: 75 - 100 %    Nutrition Risk    Level of Risk/Frequency of Follow-up: low - moderate       Monitor and Evaluation    Food and Nutrient Intake: energy intake, food and beverage intake  Food and Nutrient Adminstration: diet order  Knowledge/Beliefs/Attitudes: food and nutrition knowledge/skill, beliefs and attitudes  Physical Activity and Function: nutrition-related ADLs and IADLs, factors affecting access to physical activity  Anthropometric Measurements: height/length, weight, weight change, body mass index  Biochemical Data, Medical Tests and Procedures: gastrointestinal profile, electrolyte and renal panel, glucose/endocrine profile, inflammatory profile, lipid profile  Nutrition-Focused Physical Findings: overall appearance       Nutrition Follow-Up    RD Follow-up?: Yes    Toby  Bhaskar  [facility] Kamryn Granger, Registration Eligible, Provisional LDN

## 2023-06-19 NOTE — NURSING
cladribine (LEUSTATIN) 10.9 mg in sodium chloride 0.9% 575.9 mL chemo infusion    started through right arm PICC noted for having positive blood return over 2 hrs.  Dosage and BSA checked by two chemotherapy certified nurses and premedications given prior to starting infusion. Chemotherapy education completed at this time.  Chemotherapeutic precautions in place throughout therapy.  Will continue to monitor.

## 2023-06-19 NOTE — ASSESSMENT & PLAN NOTE
- Functionally neutropenic despite leukocytosis, given low neutrophil count  - 2/2 disease and hydrea  - Daily CBC while inpatient  - Transfuse for hgb < 7 or plts < 10K  -Now on bactrim levaquin fluconazole and acyclovir

## 2023-06-19 NOTE — ASSESSMENT & PLAN NOTE
- Patient with CML diagnosed 8/2022 Follows locally in [place] with Dr. Brett Hogan. He has been on dasatinib outpatient since 10/2022. Some question regarding his compliance over the course of treatment, however. Presented to [facility] with c/o gum swelling on 6/7/23. Labs on presentation remarkable for WBC count of 138K, with 80% blasts indicating blast crisis. WBC count from approximately a week and a half earlier was normal. Reported recent compliance with his TKI.     [place] [facility] [place] level of [place].    - Bone marrow Biopsy performed 6/9/23.   - BCR/ABL p210 collected on admission.  - Echo performed  - Picc line placed  -Patient decided to not do fertility presentation  Stared on Cladribine Idarubicin Cytyrabine and the patient was transitioned from dasatinb to ponatinib  -Daily TLS monitoring

## 2023-06-19 NOTE — PLAN OF CARE
POC 06/18/2023-06/19/2023:    Patient remained AAOX4.Neuro assessment completed q24 per order. VS stable.Intermittent tachycardia noted on exertion;MD aware.Telemetry monitoring remains in use per orders.Monitoring electrolytes:potassium insulin shift and calcium gluconate given on yesterday.Sodium Zirconium given as scheduled.One episode of nausea on yesterday;Zofran given.One dose of IV lasix given on yesterday and today. One unit of platelets given today.Oral/gum discomfort resolved and patient able to tolerate a regular diet. Activity assistance provided. Fall and thrombocytopenic precautions maintained.Overall, patient doing well.Care plan explained to patient; no additional complaints at this time. Will continue routine plan of care.

## 2023-06-20 LAB
ALBUMIN SERPL BCP-MCNC: 2.8 G/DL (ref 3.5–5.2)
ALP SERPL-CCNC: 49 U/L (ref 55–135)
ALT SERPL W/O P-5'-P-CCNC: 26 U/L (ref 10–44)
ANION GAP SERPL CALC-SCNC: 7 MMOL/L (ref 8–16)
ANION GAP SERPL CALC-SCNC: 8 MMOL/L (ref 8–16)
ANION GAP SERPL CALC-SCNC: 8 MMOL/L (ref 8–16)
ANISOCYTOSIS BLD QL SMEAR: SLIGHT
APTT PPP: 24.3 SEC (ref 21–32)
AST SERPL-CCNC: 42 U/L (ref 10–40)
BASOPHILS # BLD AUTO: 0 K/UL (ref 0–0.2)
BASOPHILS NFR BLD: 0 % (ref 0–1.9)
BILIRUB SERPL-MCNC: 0.5 MG/DL (ref 0.1–1)
BUN SERPL-MCNC: 21 MG/DL (ref 6–20)
BUN SERPL-MCNC: 21 MG/DL (ref 6–20)
BUN SERPL-MCNC: 22 MG/DL (ref 6–20)
BUN SERPL-MCNC: 22 MG/DL (ref 6–20)
BUN SERPL-MCNC: 24 MG/DL (ref 6–20)
CALCIUM SERPL-MCNC: 8 MG/DL (ref 8.7–10.5)
CALCIUM SERPL-MCNC: 8.1 MG/DL (ref 8.7–10.5)
CALCIUM SERPL-MCNC: 8.5 MG/DL (ref 8.7–10.5)
CALCIUM SERPL-MCNC: 8.6 MG/DL (ref 8.7–10.5)
CALCIUM SERPL-MCNC: 8.8 MG/DL (ref 8.7–10.5)
CHLORIDE SERPL-SCNC: 105 MMOL/L (ref 95–110)
CHLORIDE SERPL-SCNC: 106 MMOL/L (ref 95–110)
CHLORIDE SERPL-SCNC: 107 MMOL/L (ref 95–110)
CHLORIDE SERPL-SCNC: 107 MMOL/L (ref 95–110)
CHLORIDE SERPL-SCNC: 108 MMOL/L (ref 95–110)
CO2 SERPL-SCNC: 22 MMOL/L (ref 23–29)
CO2 SERPL-SCNC: 23 MMOL/L (ref 23–29)
CREAT SERPL-MCNC: 0.8 MG/DL (ref 0.5–1.4)
CREAT SERPL-MCNC: 0.8 MG/DL (ref 0.5–1.4)
CREAT SERPL-MCNC: 0.9 MG/DL (ref 0.5–1.4)
CREAT SERPL-MCNC: 1 MG/DL (ref 0.5–1.4)
CREAT SERPL-MCNC: 1 MG/DL (ref 0.5–1.4)
DIFFERENTIAL METHOD: ABNORMAL
EOSINOPHIL # BLD AUTO: 0 K/UL (ref 0–0.5)
EOSINOPHIL NFR BLD: 0 % (ref 0–8)
ERYTHROCYTE [DISTWIDTH] IN BLOOD BY AUTOMATED COUNT: 18.2 % (ref 11.5–14.5)
EST. GFR  (NO RACE VARIABLE): >60 ML/MIN/1.73 M^2
FIBRINOGEN PPP-MCNC: 164 MG/DL (ref 182–400)
GLUCOSE SERPL-MCNC: 111 MG/DL (ref 70–110)
GLUCOSE SERPL-MCNC: 114 MG/DL (ref 70–110)
GLUCOSE SERPL-MCNC: 124 MG/DL (ref 70–110)
GLUCOSE SERPL-MCNC: 125 MG/DL (ref 70–110)
GLUCOSE SERPL-MCNC: 134 MG/DL (ref 70–110)
HCT VFR BLD AUTO: 25.2 % (ref 40–54)
HGB BLD-MCNC: 8.1 G/DL (ref 14–18)
HYPOCHROMIA BLD QL SMEAR: ABNORMAL
IMM GRANULOCYTES # BLD AUTO: 0 K/UL (ref 0–0.04)
IMM GRANULOCYTES NFR BLD AUTO: 0 % (ref 0–0.5)
INR PPP: 1.1 (ref 0.8–1.2)
LDH SERPL L TO P-CCNC: 962 U/L (ref 110–260)
LYMPHOCYTES # BLD AUTO: 0.1 K/UL (ref 1–4.8)
LYMPHOCYTES NFR BLD: 26.3 % (ref 18–48)
MAGNESIUM SERPL-MCNC: 2.4 MG/DL (ref 1.6–2.6)
MCH RBC QN AUTO: 22.5 PG (ref 27–31)
MCHC RBC AUTO-ENTMCNC: 32.1 G/DL (ref 32–36)
MCV RBC AUTO: 70 FL (ref 82–98)
MONOCYTES # BLD AUTO: 0 K/UL (ref 0.3–1)
MONOCYTES NFR BLD: 0 % (ref 4–15)
NEUTROPHILS # BLD AUTO: 0.3 K/UL (ref 1.8–7.7)
NEUTROPHILS NFR BLD: 73.7 % (ref 38–73)
NRBC BLD-RTO: 0 /100 WBC
OVALOCYTES BLD QL SMEAR: ABNORMAL
PHOSPHATE SERPL-MCNC: 3.2 MG/DL (ref 2.7–4.5)
PLATELET # BLD AUTO: 14 K/UL (ref 150–450)
PMV BLD AUTO: ABNORMAL FL (ref 9.2–12.9)
POIKILOCYTOSIS BLD QL SMEAR: SLIGHT
POLYCHROMASIA BLD QL SMEAR: ABNORMAL
POTASSIUM SERPL-SCNC: 5 MMOL/L (ref 3.5–5.1)
POTASSIUM SERPL-SCNC: 5 MMOL/L (ref 3.5–5.1)
POTASSIUM SERPL-SCNC: 5.1 MMOL/L (ref 3.5–5.1)
POTASSIUM SERPL-SCNC: 5.1 MMOL/L (ref 3.5–5.1)
POTASSIUM SERPL-SCNC: 5.3 MMOL/L (ref 3.5–5.1)
POTASSIUM SERPL-SCNC: 5.3 MMOL/L (ref 3.5–5.1)
POTASSIUM SERPL-SCNC: 5.4 MMOL/L (ref 3.5–5.1)
POTASSIUM SERPL-SCNC: 5.5 MMOL/L (ref 3.5–5.1)
POTASSIUM SERPL-SCNC: 5.5 MMOL/L (ref 3.5–5.1)
PROT SERPL-MCNC: 5.9 G/DL (ref 6–8.4)
PROTHROMBIN TIME: 12 SEC (ref 9–12.5)
RBC # BLD AUTO: 3.6 M/UL (ref 4.6–6.2)
SODIUM SERPL-SCNC: 136 MMOL/L (ref 136–145)
SODIUM SERPL-SCNC: 136 MMOL/L (ref 136–145)
SODIUM SERPL-SCNC: 137 MMOL/L (ref 136–145)
SODIUM SERPL-SCNC: 137 MMOL/L (ref 136–145)
SODIUM SERPL-SCNC: 138 MMOL/L (ref 136–145)
SPHEROCYTES BLD QL SMEAR: ABNORMAL
URATE SERPL-MCNC: 4 MG/DL (ref 3.4–7)
WBC # BLD AUTO: 0.38 K/UL (ref 3.9–12.7)

## 2023-06-20 PROCEDURE — 25000003 PHARM REV CODE 250

## 2023-06-20 PROCEDURE — 85384 FIBRINOGEN ACTIVITY: CPT | Performed by: STUDENT IN AN ORGANIZED HEALTH CARE EDUCATION/TRAINING PROGRAM

## 2023-06-20 PROCEDURE — 63600175 PHARM REV CODE 636 W HCPCS: Performed by: STUDENT IN AN ORGANIZED HEALTH CARE EDUCATION/TRAINING PROGRAM

## 2023-06-20 PROCEDURE — 25000242 PHARM REV CODE 250 ALT 637 W/ HCPCS: Performed by: STUDENT IN AN ORGANIZED HEALTH CARE EDUCATION/TRAINING PROGRAM

## 2023-06-20 PROCEDURE — 25000003 PHARM REV CODE 250: Performed by: STUDENT IN AN ORGANIZED HEALTH CARE EDUCATION/TRAINING PROGRAM

## 2023-06-20 PROCEDURE — 84100 ASSAY OF PHOSPHORUS: CPT | Performed by: NURSE PRACTITIONER

## 2023-06-20 PROCEDURE — 99233 PR SUBSEQUENT HOSPITAL CARE,LEVL III: ICD-10-PCS | Mod: ,,, | Performed by: INTERNAL MEDICINE

## 2023-06-20 PROCEDURE — 83735 ASSAY OF MAGNESIUM: CPT | Performed by: NURSE PRACTITIONER

## 2023-06-20 PROCEDURE — 83615 LACTATE (LD) (LDH) ENZYME: CPT | Performed by: STUDENT IN AN ORGANIZED HEALTH CARE EDUCATION/TRAINING PROGRAM

## 2023-06-20 PROCEDURE — 94761 N-INVAS EAR/PLS OXIMETRY MLT: CPT

## 2023-06-20 PROCEDURE — 25000003 PHARM REV CODE 250: Performed by: NURSE PRACTITIONER

## 2023-06-20 PROCEDURE — 94640 AIRWAY INHALATION TREATMENT: CPT

## 2023-06-20 PROCEDURE — A4216 STERILE WATER/SALINE, 10 ML: HCPCS | Performed by: INTERNAL MEDICINE

## 2023-06-20 PROCEDURE — 85730 THROMBOPLASTIN TIME PARTIAL: CPT | Performed by: STUDENT IN AN ORGANIZED HEALTH CARE EDUCATION/TRAINING PROGRAM

## 2023-06-20 PROCEDURE — 99233 SBSQ HOSP IP/OBS HIGH 50: CPT | Mod: ,,, | Performed by: INTERNAL MEDICINE

## 2023-06-20 PROCEDURE — 84550 ASSAY OF BLOOD/URIC ACID: CPT | Performed by: STUDENT IN AN ORGANIZED HEALTH CARE EDUCATION/TRAINING PROGRAM

## 2023-06-20 PROCEDURE — 80053 COMPREHEN METABOLIC PANEL: CPT | Performed by: NURSE PRACTITIONER

## 2023-06-20 PROCEDURE — 20600001 HC STEP DOWN PRIVATE ROOM

## 2023-06-20 PROCEDURE — 85025 COMPLETE CBC W/AUTO DIFF WBC: CPT | Performed by: NURSE PRACTITIONER

## 2023-06-20 PROCEDURE — 25000003 PHARM REV CODE 250: Performed by: INTERNAL MEDICINE

## 2023-06-20 PROCEDURE — 63600175 PHARM REV CODE 636 W HCPCS: Performed by: INTERNAL MEDICINE

## 2023-06-20 PROCEDURE — 80048 BASIC METABOLIC PNL TOTAL CA: CPT | Mod: XB | Performed by: STUDENT IN AN ORGANIZED HEALTH CARE EDUCATION/TRAINING PROGRAM

## 2023-06-20 PROCEDURE — 85610 PROTHROMBIN TIME: CPT | Performed by: STUDENT IN AN ORGANIZED HEALTH CARE EDUCATION/TRAINING PROGRAM

## 2023-06-20 RX ORDER — SULFAMETHOXAZOLE AND TRIMETHOPRIM 800; 160 MG/1; MG/1
1 TABLET ORAL
Status: DISCONTINUED | OUTPATIENT
Start: 2023-06-21 | End: 2023-07-12 | Stop reason: HOSPADM

## 2023-06-20 RX ORDER — FUROSEMIDE 10 MG/ML
20 INJECTION INTRAMUSCULAR; INTRAVENOUS ONCE
Status: COMPLETED | OUTPATIENT
Start: 2023-06-20 | End: 2023-06-20

## 2023-06-20 RX ORDER — PROCHLORPERAZINE EDISYLATE 5 MG/ML
10 INJECTION INTRAMUSCULAR; INTRAVENOUS EVERY 6 HOURS PRN
Status: DISCONTINUED | OUTPATIENT
Start: 2023-06-20 | End: 2023-07-12 | Stop reason: HOSPADM

## 2023-06-20 RX ORDER — IPRATROPIUM BROMIDE AND ALBUTEROL SULFATE 2.5; .5 MG/3ML; MG/3ML
3 SOLUTION RESPIRATORY (INHALATION) ONCE
Status: COMPLETED | OUTPATIENT
Start: 2023-06-20 | End: 2023-06-20

## 2023-06-20 RX ADMIN — Medication 10 ML: at 12:06

## 2023-06-20 RX ADMIN — MUPIROCIN: 20 OINTMENT TOPICAL at 09:06

## 2023-06-20 RX ADMIN — ACYCLOVIR 800 MG: 200 CAPSULE ORAL at 08:06

## 2023-06-20 RX ADMIN — LEVOFLOXACIN 500 MG: 500 TABLET, FILM COATED ORAL at 09:06

## 2023-06-20 RX ADMIN — DEXAMETHASONE 1 DROP: 1 SUSPENSION/ DROPS OPHTHALMIC at 06:06

## 2023-06-20 RX ADMIN — SEVELAMER CARBONATE 800 MG: 800 TABLET, FILM COATED ORAL at 04:06

## 2023-06-20 RX ADMIN — IPRATROPIUM BROMIDE AND ALBUTEROL SULFATE 3 ML: .5; 3 SOLUTION RESPIRATORY (INHALATION) at 08:06

## 2023-06-20 RX ADMIN — ALLOPURINOL 300 MG: 300 TABLET ORAL at 09:06

## 2023-06-20 RX ADMIN — DEXAMETHASONE 1 DROP: 1 SUSPENSION/ DROPS OPHTHALMIC at 11:06

## 2023-06-20 RX ADMIN — NIFEDIPINE 60 MG: 60 TABLET, FILM COATED, EXTENDED RELEASE ORAL at 09:06

## 2023-06-20 RX ADMIN — FUROSEMIDE 20 MG: 10 INJECTION, SOLUTION INTRAMUSCULAR; INTRAVENOUS at 08:06

## 2023-06-20 RX ADMIN — SEVELAMER CARBONATE 800 MG: 800 TABLET, FILM COATED ORAL at 12:06

## 2023-06-20 RX ADMIN — Medication 10 ML: at 11:06

## 2023-06-20 RX ADMIN — SEVELAMER CARBONATE 800 MG: 800 TABLET, FILM COATED ORAL at 09:06

## 2023-06-20 RX ADMIN — GUAIFENESIN AND DEXTROMETHORPHAN HYDROBROMIDE 1 TABLET: 600; 30 TABLET, EXTENDED RELEASE ORAL at 08:06

## 2023-06-20 RX ADMIN — DEXAMETHASONE 1 DROP: 1 SUSPENSION/ DROPS OPHTHALMIC at 12:06

## 2023-06-20 RX ADMIN — ACYCLOVIR 800 MG: 200 CAPSULE ORAL at 09:06

## 2023-06-20 RX ADMIN — CYTARABINE 3255 MG: 100 INJECTION, SOLUTION INTRATHECAL; INTRAVENOUS; SUBCUTANEOUS at 11:06

## 2023-06-20 RX ADMIN — CLADRIBINE 10.9 MG: 1 INJECTION INTRAVENOUS at 08:06

## 2023-06-20 RX ADMIN — SODIUM CHLORIDE: 9 INJECTION, SOLUTION INTRAVENOUS at 08:06

## 2023-06-20 RX ADMIN — DEXAMETHASONE SODIUM PHOSPHATE 8 MG: 4 INJECTION INTRA-ARTICULAR; INTRALESIONAL; INTRAMUSCULAR; INTRAVENOUS; SOFT TISSUE at 08:06

## 2023-06-20 RX ADMIN — CYTARABINE 3255 MG: 100 INJECTION, SOLUTION INTRATHECAL; INTRAVENOUS; SUBCUTANEOUS at 12:06

## 2023-06-20 RX ADMIN — GUAIFENESIN AND DEXTROMETHORPHAN HYDROBROMIDE 1 TABLET: 600; 30 TABLET, EXTENDED RELEASE ORAL at 09:06

## 2023-06-20 RX ADMIN — FLUCONAZOLE 400 MG: 200 TABLET ORAL at 09:06

## 2023-06-20 RX ADMIN — Medication 10 ML: at 06:06

## 2023-06-20 RX ADMIN — PONATINIB HYDROCHLORIDE 15 MG: 15 TABLET, FILM COATED ORAL at 09:06

## 2023-06-20 RX ADMIN — ALUMINUM HYDROXIDE, MAGNESIUM HYDROXIDE, AND DIMETHICONE 10 ML: 400; 400; 40 SUSPENSION ORAL at 08:06

## 2023-06-20 RX ADMIN — Medication 10 ML: at 05:06

## 2023-06-20 RX ADMIN — MUPIROCIN: 20 OINTMENT TOPICAL at 08:06

## 2023-06-20 RX ADMIN — ONDANSETRON 16 MG: 2 INJECTION INTRAMUSCULAR; INTRAVENOUS at 08:06

## 2023-06-20 NOTE — PLAN OF CARE
Patient A&Ox4, tolerating chemotherapy well. No signs or symptoms of adverse effects. Vitals remained stable. On tele - runs sinus tach. No acute events overnight. Discharge planning is ongoing.

## 2023-06-20 NOTE — SUBJECTIVE & OBJECTIVE
Subjective:     Interval History: Continuing to monitor for TLS, patient monitoring chemo well    Objective:     Vital Signs (Most Recent):  Temp: 98 °F (36.7 °C) (06/20/23 0403)  Pulse: 87 (06/20/23 0403)  Resp: 20 (06/20/23 0403)  BP: 123/67 (06/20/23 0403)  SpO2: 95 % (06/20/23 0403) Vital Signs (24h Range):  Temp:  [97.5 °F (36.4 °C)-98.1 °F (36.7 °C)] 98 °F (36.7 °C)  Pulse:  [] 87  Resp:  [16-20] 20  SpO2:  [95 %-100 %] 95 %  BP: (118-139)/(58-77) 123/67     Weight: 89.1 kg (196 lb 6.9 oz)  Body mass index is 25.92 kg/m².  Body surface area is 2.14 meters squared.    ECOG SCORE           [unfilled]    Intake/Output - Last 3 Shifts         06/18 0700  06/19 0659 06/19 0700  06/20 0659    P.O. 1660 973    I.V. (mL/kg) 1299.7 (14.6) 2387.8 (26.8)    Blood  1487.4    IV Piggyback 293.7     Total Intake(mL/kg) 3253.5 (36.5) 4848.2 (54.4)    Urine (mL/kg/hr) 2051 (1) 1800 (0.8)    Total Output 2051 1800    Net +1202.5 +3048.2          Urine Occurrence 5 x 3 x             Physical Exam  Constitutional:       Appearance: He is well-developed.   HENT:      Head: Normocephalic and atraumatic.      Mouth/Throat:      Comments: Greatly improved throat soreness  Eyes:      Conjunctiva/sclera: Conjunctivae normal.   Cardiovascular:      Rate and Rhythm: Normal rate and regular rhythm.      Heart sounds: Normal heart sounds. No murmur heard.  Pulmonary:      Effort: Pulmonary effort is normal. No respiratory distress.      Breath sounds: Normal breath sounds. No wheezing.   Abdominal:      General: Bowel sounds are normal. There is no distension.      Palpations: Abdomen is soft.      Tenderness: There is no abdominal tenderness.   Musculoskeletal:         General: No tenderness. Normal range of motion.      Cervical back: Neck supple.   Lymphadenopathy:      Cervical: No cervical adenopathy.   Skin:     General: Skin is warm and dry.      Findings: No rash.   Neurological:      Mental Status: He is alert and oriented  to person, place, and time.      Coordination: Coordination normal.   Psychiatric:      Comments: Flat affect          Significant Labs:   All pertinent labs from the last 24 hours have been reviewed.    Diagnostic Results:  I have reviewed all pertinent imaging results/findings within the past 24 hours.

## 2023-06-20 NOTE — ASSESSMENT & PLAN NOTE
- Patient with CML diagnosed 8/2022 Follows locally in [place] with Dr. Brett Hogan. He has been on dasatinib outpatient since 10/2022. Some question regarding his compliance over the course of treatment, however. Presented to [facility] with c/o gum swelling on 6/7/23. Labs on presentation remarkable for WBC count of 138K, with 80% blasts indicating blast crisis. WBC count from approximately a week and a half earlier was normal. Reported recent compliance with his TKI.     Transferred to [facility] [place] for higher level of care.    - Bone marrow Biopsy performed 6/9/23.   - BCR/ABL p210 collected on admission.  - Echo performed  - Picc line placed  -Patient decided to not do fertility presentation  Stared on Cladribine Idarubicin Cytyrabine and the patient was transitioned from dasatinb to ponatinib  -Daily TLS monitoring

## 2023-06-20 NOTE — ASSESSMENT & PLAN NOTE
- Continue Gosper and full liquid diet due to pain with swallowing  - Pain well-controlled with IV dilaudid  - ID following. Continue abx per ID recs  - RIP throat swab, strep, flu, and COVID neg  - Leukemia infiltration may be playing a role

## 2023-06-20 NOTE — PROGRESS NOTES
Esdras Cowan - Oncology ([facility])  Hematology  Bone Marrow Transplant  Progress Note    Patient Name: Magdaleno Hirsch  Admission Date: 6/9/2023  Hospital Length of Stay: 11 days  Code Status: Full Code    Subjective:     Interval History: Continuing to monitor for TLS, patient monitoring chemo well    Objective:     Vital Signs (Most Recent):  Temp: 98 °F (36.7 °C) (06/20/23 0403)  Pulse: 87 (06/20/23 0403)  Resp: 20 (06/20/23 0403)  BP: 123/67 (06/20/23 0403)  SpO2: 95 % (06/20/23 0403) Vital Signs (24h Range):  Temp:  [97.5 °F (36.4 °C)-98.1 °F (36.7 °C)] 98 °F (36.7 °C)  Pulse:  [] 87  Resp:  [16-20] 20  SpO2:  [95 %-100 %] 95 %  BP: (118-139)/(58-77) 123/67     Weight: 89.1 kg (196 lb 6.9 oz)  Body mass index is 25.92 kg/m².  Body surface area is 2.14 meters squared.    ECOG SCORE           [unfilled]    Intake/Output - Last 3 Shifts         06/18 0700  06/19 0659 06/19 0700  06/20 0659    P.O. 1660 973    I.V. (mL/kg) 1299.7 (14.6) 2387.8 (26.8)    Blood  1487.4    IV Piggyback 293.7     Total Intake(mL/kg) 3253.5 (36.5) 4848.2 (54.4)    Urine (mL/kg/hr) 2051 (1) 1800 (0.8)    Total Output 2051 1800    Net +1202.5 +3048.2          Urine Occurrence 5 x 3 x             Physical Exam  Constitutional:       Appearance: He is well-developed.   HENT:      Head: Normocephalic and atraumatic.      Mouth/Throat:      Comments: Greatly improved throat soreness  Eyes:      Conjunctiva/sclera: Conjunctivae normal.   Cardiovascular:      Rate and Rhythm: Normal rate and regular rhythm.      Heart sounds: Normal heart sounds. No murmur heard.  Pulmonary:      Effort: Pulmonary effort is normal. No respiratory distress.      Breath sounds: Normal breath sounds. No wheezing.   Abdominal:      General: Bowel sounds are normal. There is no distension.      Palpations: Abdomen is soft.      Tenderness: There is no abdominal tenderness.   Musculoskeletal:         General: No tenderness. Normal range of motion.      Cervical back:  Neck supple.   Lymphadenopathy:      Cervical: No cervical adenopathy.   Skin:     General: Skin is warm and dry.      Findings: No rash.   Neurological:      Mental Status: He is alert and oriented to person, place, and time.      Coordination: Coordination normal.   Psychiatric:      Comments: Flat affect          Significant Labs:   All pertinent labs from the last 24 hours have been reviewed.    Diagnostic Results:  I have reviewed all pertinent imaging results/findings within the past 24 hours.    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 Brett Hogan. He has been on dasatinib outpatient since 10/2022. Some question regarding his compliance over the course of treatment, however. Presented to [facility] with c/o gum swelling on 6/7/23. Labs on presentation remarkable for WBC count of 138K, with 80% blasts indicating blast crisis. WBC count from approximately a week and a half earlier was normal. Reported recent compliance with his TKI.     Transferred to [facility] for higher level of care.    - Bone marrow Biopsy performed 6/9/23.   - BCR/ABL p210 collected on admission.  - Echo performed  - Picc line placed  -Patient decided to not do fertility presentation  Stared on Cladribine Idarubicin Cytyrabine and the patient was transitioned from dasatinb to ponatinib  -Daily TLS monitoring    Pharyngitis  - Continue Dukes and full liquid diet due to pain with swallowing  - Pain well-controlled with IV dilaudid  - ID following. Continue abx per ID recs  - RIP throat swab, strep, flu, and COVID neg  - Leukemia infiltration may be playing a role    Leukocytosis  - See blast crisis phase of CML    Diarrhea  - Reports multiple days of diarrhea prior to transfer  - C diff negative  - PRN imodium available    Neutropenic fever  Resolved    - Temp fo 101.4 at [place] ED. Fevers persist since transfer.  - CXR neg.  Blood culture ngtd. CDiff negative. U/a neg  - Strep, flu, and COVID neg at OSH  - Throat red with exudates. Tonsils mildly swollen. CT neck showing adenopathy but no abscesses.  - CT chest without consoidations  - Last temp was > 24 hours ago    Pancytopenia  - Functionally neutropenic despite leukocytosis, given low neutrophil count  - 2/2 disease and hydrea  - Daily CBC while inpatient  - Transfuse for hgb < 7 or plts < 10K  -Now on bactrim levaquin fluconazole and acyclovir    Hypertension  - Takes amlodipine at home  - Given hypertension, amlodipine stopped and nifedipine and losartan started in [place]. Continued on transfer.  - BP now improved    Tumor lysis syndrome  - Uric acid and LDH elevated on presentation to [place] ED. Kidney function and electrolytes normal.  - Not G6PD deficient  -12 hours at increased fluid rate given hyperkalemia  -Allopurinol 300 daily  Lokelma tid for 48 hours then daily   -Losartan held  -Daily TLS labs        VTE Risk Mitigation (From admission, onward)         Ordered     heparin, porcine (PF) 100 unit/mL injection flush 300 Units  As needed (PRN)         06/16/23 1550     IP VTE HIGH RISK PATIENT  Once         06/09/23 0049     Place sequential compression device  Until discontinued         06/09/23 0049                Disposition: home    Gus Posadas MD  Bone Marrow Transplant  Esdras fernando - Oncology ([facility])

## 2023-06-20 NOTE — ASSESSMENT & PLAN NOTE
- Uric acid and LDH elevated on presentation to [place] ED. Kidney function and electrolytes normal.  - Not G6PD deficient  -12 hours at increased fluid rate given hyperkalemia  -Allopurinol 300 daily  Lokelma tid for 48 hours then daily   -Losartan held  -Daily TLS labs

## 2023-06-20 NOTE — PLAN OF CARE
Pt involved in plan of care and communicating needs throughout shift. Pt is C1/D5; AM K+ 5.6 shifted per protocol; decreased to K+ 5.1. Pt. Denies chest pain, SOB, palpitations. Ambulates in room & to RR independently; Tolerating diet, voiding without difficulty. q1h patient rounds. Tele monitor removed from pt. As per MD; All VSS; no acute events so far this shift. Non skid socks on; Pt. Instructed to call if any assistance is needed. Pt remaining free from falls or injury; Bed locked in lowest position with side rails up x3 & all belongings including call light within reach; All needs met at this time.

## 2023-06-20 NOTE — PLAN OF CARE
Problem: Adult Inpatient Plan of Care  Goal: Plan of Care Review  Outcome: Ongoing, Progressing  Goal: Patient-Specific Goal (Individualized)  Outcome: Ongoing, Progressing  Goal: Absence of Hospital-Acquired Illness or Injury  Outcome: Ongoing, Progressing  Goal: Optimal Comfort and Wellbeing  Outcome: Ongoing, Progressing  Goal: Readiness for Transition of Care  Outcome: Ongoing, Progressing     Problem: Infection  Goal: Absence of Infection Signs and Symptoms  Outcome: Ongoing, Progressing     Problem: Coping Ineffective (Oncology Care)  Goal: Effective Coping  Outcome: Ongoing, Progressing     Problem: Fatigue (Oncology Care)  Goal: Improved Activity Tolerance  Outcome: Ongoing, Progressing     Problem: Oral Intake Altered (Oncology Care)  Goal: Optimal Oral Intake  Outcome: Ongoing, Progressing     Problem: Oral Mucositis (Oncology Care)  Goal: Improved Oral Mucous Membrane Integrity  Outcome: Ongoing, Progressing     Problem: Pain Acute (Oncology Care)  Goal: Optimal Pain Control  Outcome: Ongoing, Progressing     Problem: Fall Injury Risk  Goal: Absence of Fall and Fall-Related Injury  Outcome: Ongoing, Progressing

## 2023-06-20 NOTE — ASSESSMENT & PLAN NOTE
Resolved    - Temp fo 101.4 at [place] ED. Fevers persist since transfer.  - CXR neg. Blood culture ngtd. CDiff negative. U/a neg  - Strep, flu, and COVID neg at OSH  - Throat red with exudates. Tonsils mildly swollen. CT neck showing adenopathy but no abscesses.  - CT chest without consoidations  - Last temp was > 24 hours ago

## 2023-06-20 NOTE — ASSESSMENT & PLAN NOTE
- Takes amlodipine at home  - Given hypertension, amlodipine stopped and nifedipine and losartan started in [place]. Continued on transfer.  - BP now improved

## 2023-06-21 PROBLEM — E87.5 HYPERKALEMIA: Status: ACTIVE | Noted: 2023-06-21

## 2023-06-21 PROBLEM — K59.00 CONSTIPATION: Status: ACTIVE | Noted: 2023-06-21

## 2023-06-21 LAB
ALBUMIN SERPL BCP-MCNC: 2.8 G/DL (ref 3.5–5.2)
ALP SERPL-CCNC: 50 U/L (ref 55–135)
ALT SERPL W/O P-5'-P-CCNC: 44 U/L (ref 10–44)
AML FISH REASON FOR REFERRAL (BLD): NORMAL
ANION GAP SERPL CALC-SCNC: 10 MMOL/L (ref 8–16)
ANION GAP SERPL CALC-SCNC: 9 MMOL/L (ref 8–16)
ANISOCYTOSIS BLD QL SMEAR: ABNORMAL
ANNOTATION COMMENT IMP: NORMAL
APTT PPP: 23.4 SEC (ref 21–32)
AST SERPL-CCNC: 44 U/L (ref 10–40)
BASOPHILS # BLD AUTO: 0 K/UL (ref 0–0.2)
BASOPHILS NFR BLD: 0 % (ref 0–1.9)
BILIRUB SERPL-MCNC: 0.6 MG/DL (ref 0.1–1)
BLD PROD TYP BPU: NORMAL
BLOOD UNIT EXPIRATION DATE: NORMAL
BLOOD UNIT TYPE CODE: 5100
BLOOD UNIT TYPE: NORMAL
BUN SERPL-MCNC: 16 MG/DL (ref 6–20)
BUN SERPL-MCNC: 18 MG/DL (ref 6–20)
BUN SERPL-MCNC: 18 MG/DL (ref 6–20)
BUN SERPL-MCNC: 20 MG/DL (ref 6–20)
CALCIUM SERPL-MCNC: 8.1 MG/DL (ref 8.7–10.5)
CALCIUM SERPL-MCNC: 8.2 MG/DL (ref 8.7–10.5)
CALCIUM SERPL-MCNC: 8.2 MG/DL (ref 8.7–10.5)
CALCIUM SERPL-MCNC: 8.6 MG/DL (ref 8.7–10.5)
CELLS W CYTOGENETIC ABNL BLD/T: NORMAL
CHLORIDE SERPL-SCNC: 106 MMOL/L (ref 95–110)
CHLORIDE SERPL-SCNC: 107 MMOL/L (ref 95–110)
CHLORIDE SERPL-SCNC: 107 MMOL/L (ref 95–110)
CHLORIDE SERPL-SCNC: 108 MMOL/L (ref 95–110)
CHROM ANALY RESULT (ISCN): NORMAL
CLINICAL CYTOGENETICIST REVIEW: NORMAL
CO2 SERPL-SCNC: 21 MMOL/L (ref 23–29)
CO2 SERPL-SCNC: 21 MMOL/L (ref 23–29)
CO2 SERPL-SCNC: 22 MMOL/L (ref 23–29)
CO2 SERPL-SCNC: 22 MMOL/L (ref 23–29)
CODING SYSTEM: NORMAL
CREAT SERPL-MCNC: 0.7 MG/DL (ref 0.5–1.4)
CREAT SERPL-MCNC: 0.8 MG/DL (ref 0.5–1.4)
CREAT SERPL-MCNC: 0.8 MG/DL (ref 0.5–1.4)
CREAT SERPL-MCNC: 0.9 MG/DL (ref 0.5–1.4)
CROSSMATCH INTERPRETATION: NORMAL
DACRYOCYTES BLD QL SMEAR: ABNORMAL
DIFFERENTIAL METHOD: ABNORMAL
DISPENSE STATUS: NORMAL
EOSINOPHIL # BLD AUTO: 0 K/UL (ref 0–0.5)
EOSINOPHIL NFR BLD: 0 % (ref 0–8)
ERYTHROCYTE [DISTWIDTH] IN BLOOD BY AUTOMATED COUNT: 18.2 % (ref 11.5–14.5)
EST. GFR  (NO RACE VARIABLE): >60 ML/MIN/1.73 M^2
FIBRINOGEN PPP-MCNC: 139 MG/DL (ref 182–400)
GLUCOSE SERPL-MCNC: 144 MG/DL (ref 70–110)
GLUCOSE SERPL-MCNC: 147 MG/DL (ref 70–110)
GLUCOSE SERPL-MCNC: 147 MG/DL (ref 70–110)
GLUCOSE SERPL-MCNC: 202 MG/DL (ref 70–110)
HCT VFR BLD AUTO: 23.1 % (ref 40–54)
HGB BLD-MCNC: 7.3 G/DL (ref 14–18)
IMM GRANULOCYTES # BLD AUTO: 0 K/UL (ref 0–0.04)
IMM GRANULOCYTES NFR BLD AUTO: 0 % (ref 0–0.5)
INR PPP: 1.1 (ref 0.8–1.2)
LAB TEST METHOD: NORMAL
LDH SERPL L TO P-CCNC: 566 U/L (ref 110–260)
LYMPHOCYTES # BLD AUTO: 0.1 K/UL (ref 1–4.8)
LYMPHOCYTES NFR BLD: 41.7 % (ref 18–48)
MAGNESIUM SERPL-MCNC: 2 MG/DL (ref 1.6–2.6)
MCH RBC QN AUTO: 23 PG (ref 27–31)
MCHC RBC AUTO-ENTMCNC: 31.6 G/DL (ref 32–36)
MCV RBC AUTO: 73 FL (ref 82–98)
MOL DX INTERP BLD/T QL: NORMAL
MONOCYTES # BLD AUTO: 0 K/UL (ref 0.3–1)
MONOCYTES NFR BLD: 0 % (ref 4–15)
NEUTROPHILS # BLD AUTO: 0.1 K/UL (ref 1.8–7.7)
NEUTROPHILS NFR BLD: 58.3 % (ref 38–73)
NRBC BLD-RTO: 0 /100 WBC
OVALOCYTES BLD QL SMEAR: ABNORMAL
PHOSPHATE SERPL-MCNC: 3.1 MG/DL (ref 2.7–4.5)
PLATELET # BLD AUTO: 7 K/UL (ref 150–450)
PLATELET BLD QL SMEAR: ABNORMAL
PMV BLD AUTO: ABNORMAL FL (ref 9.2–12.9)
POCT GLUCOSE: 107 MG/DL (ref 70–110)
POCT GLUCOSE: 126 MG/DL (ref 70–110)
POCT GLUCOSE: 212 MG/DL (ref 70–110)
POCT GLUCOSE: 98 MG/DL (ref 70–110)
POIKILOCYTOSIS BLD QL SMEAR: SLIGHT
POTASSIUM SERPL-SCNC: 4.7 MMOL/L (ref 3.5–5.1)
POTASSIUM SERPL-SCNC: 4.7 MMOL/L (ref 3.5–5.1)
POTASSIUM SERPL-SCNC: 4.8 MMOL/L (ref 3.5–5.1)
POTASSIUM SERPL-SCNC: 4.8 MMOL/L (ref 3.5–5.1)
POTASSIUM SERPL-SCNC: 5.6 MMOL/L (ref 3.5–5.1)
PROT SERPL-MCNC: 5.7 G/DL (ref 6–8.4)
PROTHROMBIN TIME: 11.6 SEC (ref 9–12.5)
PROVIDER SIGNING NAME: NORMAL
RBC # BLD AUTO: 3.17 M/UL (ref 4.6–6.2)
SODIUM SERPL-SCNC: 137 MMOL/L (ref 136–145)
SODIUM SERPL-SCNC: 138 MMOL/L (ref 136–145)
SPECIMEN SOURCE: NORMAL
TEST PERFORMANCE INFO SPEC: NORMAL
UNIT NUMBER: NORMAL
URATE SERPL-MCNC: 3 MG/DL (ref 3.4–7)
WBC # BLD AUTO: 0.12 K/UL (ref 3.9–12.7)

## 2023-06-21 PROCEDURE — 84550 ASSAY OF BLOOD/URIC ACID: CPT | Performed by: STUDENT IN AN ORGANIZED HEALTH CARE EDUCATION/TRAINING PROGRAM

## 2023-06-21 PROCEDURE — 83735 ASSAY OF MAGNESIUM: CPT | Performed by: NURSE PRACTITIONER

## 2023-06-21 PROCEDURE — 63600175 PHARM REV CODE 636 W HCPCS: Performed by: NURSE PRACTITIONER

## 2023-06-21 PROCEDURE — A4216 STERILE WATER/SALINE, 10 ML: HCPCS | Performed by: INTERNAL MEDICINE

## 2023-06-21 PROCEDURE — 25000003 PHARM REV CODE 250: Performed by: INTERNAL MEDICINE

## 2023-06-21 PROCEDURE — 80048 BASIC METABOLIC PNL TOTAL CA: CPT | Mod: XB | Performed by: NURSE PRACTITIONER

## 2023-06-21 PROCEDURE — 84100 ASSAY OF PHOSPHORUS: CPT | Performed by: NURSE PRACTITIONER

## 2023-06-21 PROCEDURE — 85610 PROTHROMBIN TIME: CPT | Performed by: STUDENT IN AN ORGANIZED HEALTH CARE EDUCATION/TRAINING PROGRAM

## 2023-06-21 PROCEDURE — 80053 COMPREHEN METABOLIC PANEL: CPT | Performed by: NURSE PRACTITIONER

## 2023-06-21 PROCEDURE — 25000003 PHARM REV CODE 250: Performed by: STUDENT IN AN ORGANIZED HEALTH CARE EDUCATION/TRAINING PROGRAM

## 2023-06-21 PROCEDURE — P9037 PLATE PHERES LEUKOREDU IRRAD: HCPCS | Performed by: NURSE PRACTITIONER

## 2023-06-21 PROCEDURE — 99233 PR SUBSEQUENT HOSPITAL CARE,LEVL III: ICD-10-PCS | Mod: FS,,, | Performed by: INTERNAL MEDICINE

## 2023-06-21 PROCEDURE — 20600001 HC STEP DOWN PRIVATE ROOM

## 2023-06-21 PROCEDURE — 25000003 PHARM REV CODE 250

## 2023-06-21 PROCEDURE — 25000003 PHARM REV CODE 250: Performed by: NURSE PRACTITIONER

## 2023-06-21 PROCEDURE — 36430 TRANSFUSION BLD/BLD COMPNT: CPT

## 2023-06-21 PROCEDURE — 99233 SBSQ HOSP IP/OBS HIGH 50: CPT | Mod: FS,,, | Performed by: INTERNAL MEDICINE

## 2023-06-21 PROCEDURE — 85730 THROMBOPLASTIN TIME PARTIAL: CPT | Performed by: STUDENT IN AN ORGANIZED HEALTH CARE EDUCATION/TRAINING PROGRAM

## 2023-06-21 PROCEDURE — 85025 COMPLETE CBC W/AUTO DIFF WBC: CPT | Performed by: NURSE PRACTITIONER

## 2023-06-21 PROCEDURE — 85384 FIBRINOGEN ACTIVITY: CPT | Performed by: STUDENT IN AN ORGANIZED HEALTH CARE EDUCATION/TRAINING PROGRAM

## 2023-06-21 PROCEDURE — 83615 LACTATE (LD) (LDH) ENZYME: CPT | Performed by: STUDENT IN AN ORGANIZED HEALTH CARE EDUCATION/TRAINING PROGRAM

## 2023-06-21 RX ORDER — POLYETHYLENE GLYCOL 3350 17 G/17G
17 POWDER, FOR SOLUTION ORAL DAILY
Status: DISCONTINUED | OUTPATIENT
Start: 2023-06-21 | End: 2023-06-23

## 2023-06-21 RX ORDER — DIPHENHYDRAMINE HYDROCHLORIDE 50 MG/ML
12.5 INJECTION INTRAMUSCULAR; INTRAVENOUS EVERY 6 HOURS PRN
Status: DISCONTINUED | OUTPATIENT
Start: 2023-06-21 | End: 2023-07-12 | Stop reason: HOSPADM

## 2023-06-21 RX ORDER — ACETAMINOPHEN 650 MG/20.3ML
650 LIQUID ORAL EVERY 6 HOURS PRN
Status: DISCONTINUED | OUTPATIENT
Start: 2023-06-21 | End: 2023-07-12 | Stop reason: HOSPADM

## 2023-06-21 RX ORDER — HYDROCODONE BITARTRATE AND ACETAMINOPHEN 500; 5 MG/1; MG/1
TABLET ORAL
Status: DISCONTINUED | OUTPATIENT
Start: 2023-06-21 | End: 2023-06-25

## 2023-06-21 RX ADMIN — POLYETHYLENE GLYCOL 3350 17 G: 17 POWDER, FOR SOLUTION ORAL at 12:06

## 2023-06-21 RX ADMIN — LEVOFLOXACIN 500 MG: 500 TABLET, FILM COATED ORAL at 09:06

## 2023-06-21 RX ADMIN — Medication 10 ML: at 05:06

## 2023-06-21 RX ADMIN — INSULIN HUMAN 10 UNITS: 100 INJECTION, SOLUTION PARENTERAL at 09:06

## 2023-06-21 RX ADMIN — SEVELAMER CARBONATE 800 MG: 800 TABLET, FILM COATED ORAL at 05:06

## 2023-06-21 RX ADMIN — DEXTROSE MONOHYDRATE 250 ML: 100 INJECTION, SOLUTION INTRAVENOUS at 09:06

## 2023-06-21 RX ADMIN — GUAIFENESIN AND DEXTROMETHORPHAN HYDROBROMIDE 1 TABLET: 600; 30 TABLET, EXTENDED RELEASE ORAL at 09:06

## 2023-06-21 RX ADMIN — PONATINIB HYDROCHLORIDE 15 MG: 15 TABLET, FILM COATED ORAL at 09:06

## 2023-06-21 RX ADMIN — POSACONAZOLE 300 MG: 100 TABLET, DELAYED RELEASE ORAL at 09:06

## 2023-06-21 RX ADMIN — SEVELAMER CARBONATE 800 MG: 800 TABLET, FILM COATED ORAL at 12:06

## 2023-06-21 RX ADMIN — POSACONAZOLE 300 MG: 100 TABLET, DELAYED RELEASE ORAL at 08:06

## 2023-06-21 RX ADMIN — NIFEDIPINE 60 MG: 60 TABLET, FILM COATED, EXTENDED RELEASE ORAL at 09:06

## 2023-06-21 RX ADMIN — MUPIROCIN: 20 OINTMENT TOPICAL at 09:06

## 2023-06-21 RX ADMIN — DEXAMETHASONE 1 DROP: 1 SUSPENSION/ DROPS OPHTHALMIC at 12:06

## 2023-06-21 RX ADMIN — ALUMINUM HYDROXIDE, MAGNESIUM HYDROXIDE, AND DIMETHICONE 10 ML: 400; 400; 40 SUSPENSION ORAL at 08:06

## 2023-06-21 RX ADMIN — MUPIROCIN: 20 OINTMENT TOPICAL at 08:06

## 2023-06-21 RX ADMIN — ALLOPURINOL 300 MG: 300 TABLET ORAL at 09:06

## 2023-06-21 RX ADMIN — GUAIFENESIN AND DEXTROMETHORPHAN HYDROBROMIDE 1 TABLET: 600; 30 TABLET, EXTENDED RELEASE ORAL at 08:06

## 2023-06-21 RX ADMIN — ACETAMINOPHEN 650 MG: 650 SOLUTION ORAL at 09:06

## 2023-06-21 RX ADMIN — SODIUM ZIRCONIUM CYCLOSILICATE 10 G: 5 POWDER, FOR SUSPENSION ORAL at 01:06

## 2023-06-21 RX ADMIN — ACYCLOVIR 800 MG: 200 CAPSULE ORAL at 08:06

## 2023-06-21 RX ADMIN — ALUMINUM HYDROXIDE, MAGNESIUM HYDROXIDE, AND DIMETHICONE 10 ML: 400; 400; 40 SUSPENSION ORAL at 09:06

## 2023-06-21 RX ADMIN — Medication 10 ML: at 12:06

## 2023-06-21 RX ADMIN — ALUMINUM HYDROXIDE, MAGNESIUM HYDROXIDE, AND DIMETHICONE 10 ML: 400; 400; 40 SUSPENSION ORAL at 05:06

## 2023-06-21 RX ADMIN — DIPHENHYDRAMINE HYDROCHLORIDE 12.5 MG: 50 INJECTION, SOLUTION INTRAMUSCULAR; INTRAVENOUS at 09:06

## 2023-06-21 RX ADMIN — SULFAMETHOXAZOLE AND TRIMETHOPRIM 1 TABLET: 800; 160 TABLET ORAL at 05:06

## 2023-06-21 RX ADMIN — DEXAMETHASONE 1 DROP: 1 SUSPENSION/ DROPS OPHTHALMIC at 05:06

## 2023-06-21 RX ADMIN — SEVELAMER CARBONATE 800 MG: 800 TABLET, FILM COATED ORAL at 09:06

## 2023-06-21 RX ADMIN — DEXAMETHASONE 1 DROP: 1 SUSPENSION/ DROPS OPHTHALMIC at 07:06

## 2023-06-21 RX ADMIN — DEXAMETHASONE 1 DROP: 1 SUSPENSION/ DROPS OPHTHALMIC at 11:06

## 2023-06-21 RX ADMIN — ACYCLOVIR 800 MG: 200 CAPSULE ORAL at 09:06

## 2023-06-21 NOTE — ASSESSMENT & PLAN NOTE
- Reports multiple days of diarrhea prior to transfer  - C diff negative  - PRN imodium started  RESOLVED  - now c/o constipation, daily Miralax added

## 2023-06-21 NOTE — PROGRESS NOTES
[facility] - Oncology ([facility])  Hematology  Bone Marrow Transplant  Progress Note    Patient Name: Magdaleno Hirsch  Admission Date: 6/9/2023  Hospital Length of Stay: 12 days  Code Status: Full Code    Subjective:     Interval History: Day 6 CLIA + Ponatinib for blast phase CML/AML. Completed chemo overnight. VSS. No evidence of TLS. K elevated again at 5.6. insulin/D10 shift this am with scheduled Lokelma ordered. Transfusing platelets today. Patient reports mouth pain improving. Complains of constipation, daily Miralax ordered. He has no other complaints this am.    Objective:     Vital Signs (Most Recent):  Temp: 97.6 °F (36.4 °C) (06/21/23 0745)  Pulse: 70 (06/21/23 0745)  Resp: 16 (06/21/23 0745)  BP: 118/62 (06/21/23 0745)  SpO2: 99 % (06/21/23 0745) Vital Signs (24h Range):  Temp:  [97.6 °F (36.4 °C)-98.5 °F (36.9 °C)] 97.6 °F (36.4 °C)  Pulse:  [70-94] 70  Resp:  [14-20] 16  SpO2:  [96 %-100 %] 99 %  BP: (107-138)/(61-81) 118/62     Weight: 89.1 kg (196 lb 6.9 oz)  Body mass index is 25.92 kg/m².  Body surface area is 2.14 meters squared.    ECOG SCORE             Intake/Output - Last 3 Shifts       Intake/Output Category 06/19 0700 to 06/20 0659 06/20 0700 to 06/21 0659 06/21 0700 to 06/22 0659    P.O. 973 120     I.V. (mL/kg) 2387.8 (26.8)      Blood 1487.4      IV Piggyback       Total Intake(mL/kg) 4848.2 (54.4) 120 (1.3)     Urine (mL/kg/hr) 1800 (0.8)      Total Output 1800      Net +3048.2 +120            Urine Occurrence 3 x               Physical Exam  Constitutional:       Appearance: He is well-developed.   HENT:      Head: Normocephalic and atraumatic.      Mouth/Throat:      Dentition: Gingival swelling present.      Pharynx: Pharyngeal swelling and posterior oropharyngeal erythema present.   Eyes:      Conjunctiva/sclera: Conjunctivae normal.   Cardiovascular:      Rate and Rhythm: Normal rate and regular rhythm.      Heart sounds: Normal heart sounds. No murmur heard.  Pulmonary:      Effort:  Pulmonary effort is normal. No respiratory distress.      Breath sounds: Normal breath sounds. No wheezing.   Abdominal:      General: Bowel sounds are normal. There is no distension.      Palpations: Abdomen is soft.      Tenderness: There is no abdominal tenderness.   Musculoskeletal:         General: No tenderness. Normal range of motion.      Cervical back: Neck supple.   Lymphadenopathy:      Cervical: No cervical adenopathy.   Skin:     General: Skin is warm and dry.      Findings: No rash.      Comments: PICC Intact to right arm with no redness or drainage   Neurological:      Mental Status: He is alert and oriented to person, place, and time.      Coordination: Coordination normal.   Psychiatric:      Comments: Flat affect          Significant Labs:   CBC:   Recent Labs   Lab 06/20/23  0403 06/21/23  0411   WBC 0.38* 0.12*   HGB 8.1* 7.3*   HCT 25.2* 23.1*   PLT 14* 7*   , CMP:   Recent Labs   Lab 06/20/23  0403 06/20/23  0823 06/20/23 2038 06/20/23  2344 06/21/23  0411      < > 136 137 138  138   K 5.4*   < > 5.0  5.0 4.8  4.8 5.6*  5.6*  5.6*      < > 105 106 107  107   CO2 22*   < > 23 21* 22*  22*   *   < > 114* 202* 147*  147*   BUN 24*   < > 21* 20 18  18   CREATININE 1.0   < > 0.9 0.9 0.8  0.8   CALCIUM 8.0*   < > 8.8 8.6* 8.2*  8.2*   PROT 5.9*  --   --   --  5.7*   ALBUMIN 2.8*  --   --   --  2.8*   BILITOT 0.5  --   --   --  0.6   ALKPHOS 49*  --   --   --  50*   AST 42*  --   --   --  44*   ALT 26  --   --   --  44   ANIONGAP 7*   < > 8 10 9  9    < > = values in this interval not displayed.   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 Brett Hogan. He has been on dasatinib outpatient since 10/2022. Some question regarding his compliance over the course of treatment, however. Presented to [facility] with c/o gum swelling on 6/7/23. Labs on presentation remarkable for WBC count of 138K, with 80% blasts indicating blast crisis. WBC count from approximately a week and a half earlier was normal. Reported recent compliance with his TKI.     Transferred to [facility] for higher level of care.    - Bone marrow Biopsy performed 6/9/23 c/w progression for blast phase CML.   - BCR/ABL p210 collected on admission, >55%  - BCR/ABL mutation negative  - Echo with 65% EF and mild-moderate pulmonary hypertension.  - Picc line in place  - Patient decided to not do fertility presentation  - Stared on Cladribine Idarubicin Cytarabine, CLIA and transitioned from dasatinb to ponatinib. Day 6  - TLS labs except for hyperkalemia which is monitored and treated daily, continue allopurinol      Leukocytosis  - See blast crisis phase of CML    Pancytopenia  - Functionally neutropenic despite leukocytosis, given low neutrophil count  - 2/2 disease and hydrea  - Daily CBC while inpatient  - Transfuse for hgb < 7 or plts < 10K  - Now on ppx bactrim levaquin fluconazole and acyclovir    Neutropenic fever  - Temp fo 101.4 at [place] ED. Fevers persist since transfer.  - CXR neg. Blood culture ngtd. CDiff negative. U/a neg  - Strep, flu, and COVID neg at OSH  - Throat red with exudates. Tonsils mildly swollen. CT neck showing adenopathy but no abscesses.  - CT chest without consoidations  - Last temp was > 24 hours ago  RESOLVED    Tumor lysis syndrome  - Uric acid and LDH elevated on presentation to [place] ED. Kidney function and electrolytes normal.  - Not G6PD deficient  - Allopurinol 300 daily  - Lokelma daily for hyperkalemia   - Daily TLS labs wnl  - will stop IVF today    Pharyngitis  - Continue [place] and  full liquid diet due to pain with swallowing  - Pain well-controlled with IV dilaudid  - ID following. Continue abx per ID recs  - RIP throat swab, strep, flu, and COVID neg  - Leukemia infiltration may be playing a role    Hyperkalemia  - shift this am with insulin and D10  - repeat K was 4.7  - starting daily Lokalema     Constipation  - daily Miralax added 6/21    Diarrhea  - Reports multiple days of diarrhea prior to transfer  - C diff negative  - PRN imodium started  RESOLVED  - now c/o constipation, daily Miralax added    Hypertension  - Takes amlodipine at home  - Given hypertension, amlodipine stopped and nifedipine and losartan started in [place]. Continued on transfer.  - BP now improved        VTE Risk Mitigation (From admission, onward)         Ordered     heparin, porcine (PF) 100 unit/mL injection flush 300 Units  As needed (PRN)         06/16/23 1550     IP VTE HIGH RISK PATIENT  Once         06/09/23 0049     Place sequential compression device  Until discontinued         06/09/23 0049                Disposition: inpatient     Suzan Carreon NP  Bone Marrow Transplant  [facility] - Oncology ([facility])

## 2023-06-21 NOTE — PLAN OF CARE
Patient AAOx4. Ambulates independently. POC reviewed with patient; understanding verbalized. 1 unit of plts administered. Potassium shift with insulin, K from 5.6 to 4.7. Voids in the urinal. No BM this shift. Pt. with nonskid footwear on, bed in lowest position, and locked with bed rails up x2.  Pt. instructed to call prior to getting OOB.  Pt. has call light and personal items within reach. VSS and afebrile this shift. All questions and concerns addressed at this time. Will continue to monitor.

## 2023-06-21 NOTE — ASSESSMENT & PLAN NOTE
- Continue [place] and full liquid diet due to pain with swallowing  - Pain well-controlled with IV dilaudid  - ID following. Continue abx per ID recs  - RIP throat swab, strep, flu, and COVID neg  - Leukemia infiltration may be playing a role

## 2023-06-21 NOTE — ASSESSMENT & PLAN NOTE
- Functionally neutropenic despite leukocytosis, given low neutrophil count  - 2/2 disease and hydrea  - Daily CBC while inpatient  - Transfuse for hgb < 7 or plts < 10K  - Now on ppx bactrim levaquin fluconazole and acyclovir

## 2023-06-21 NOTE — ASSESSMENT & PLAN NOTE
- Uric acid and LDH elevated on presentation to [place] ED. Kidney function and electrolytes normal.  - Not G6PD deficient  - Allopurinol 300 daily  - Lokelma daily for hyperkalemia   - Daily TLS labs wnl  - will stop IVF today

## 2023-06-21 NOTE — SUBJECTIVE & OBJECTIVE
Subjective:     Interval History: Day 6 CLIA + Ponatinib for blast phase CML/AML. Completed chemo overnight. VSS. No evidence of TLS. K elevated again at 5.6. insulin/D10 shift this am with scheduled Lokelma ordered. Transfusing platelets today. Patient reports mouth pain improving. Complains of constipation, daily Miralax ordered. He has no other complaints this am.    Objective:     Vital Signs (Most Recent):  Temp: 97.6 °F (36.4 °C) (06/21/23 0745)  Pulse: 70 (06/21/23 0745)  Resp: 16 (06/21/23 0745)  BP: 118/62 (06/21/23 0745)  SpO2: 99 % (06/21/23 0745) Vital Signs (24h Range):  Temp:  [97.6 °F (36.4 °C)-98.5 °F (36.9 °C)] 97.6 °F (36.4 °C)  Pulse:  [70-94] 70  Resp:  [14-20] 16  SpO2:  [96 %-100 %] 99 %  BP: (107-138)/(61-81) 118/62     Weight: 89.1 kg (196 lb 6.9 oz)  Body mass index is 25.92 kg/m².  Body surface area is 2.14 meters squared.    ECOG SCORE             Intake/Output - Last 3 Shifts       Intake/Output Category 06/19 0700 to 06/20 0659 06/20 0700 to 06/21 0659 06/21 0700 to 06/22 0659    P.O. 973 120     I.V. (mL/kg) 2387.8 (26.8)      Blood 1487.4      IV Piggyback       Total Intake(mL/kg) 4848.2 (54.4) 120 (1.3)     Urine (mL/kg/hr) 1800 (0.8)      Total Output 1800      Net +3048.2 +120            Urine Occurrence 3 x               Physical Exam  Constitutional:       Appearance: He is well-developed.   HENT:      Head: Normocephalic and atraumatic.      Mouth/Throat:      Dentition: Gingival swelling present.      Pharynx: Pharyngeal swelling and posterior oropharyngeal erythema present.   Eyes:      Conjunctiva/sclera: Conjunctivae normal.   Cardiovascular:      Rate and Rhythm: Normal rate and regular rhythm.      Heart sounds: Normal heart sounds. No murmur heard.  Pulmonary:      Effort: Pulmonary effort is normal. No respiratory distress.      Breath sounds: Normal breath sounds. No wheezing.   Abdominal:      General: Bowel sounds are normal. There is no distension.      Palpations:  Abdomen is soft.      Tenderness: There is no abdominal tenderness.   Musculoskeletal:         General: No tenderness. Normal range of motion.      Cervical back: Neck supple.   Lymphadenopathy:      Cervical: No cervical adenopathy.   Skin:     General: Skin is warm and dry.      Findings: No rash.      Comments: PICC Intact to right arm with no redness or drainage   Neurological:      Mental Status: He is alert and oriented to person, place, and time.      Coordination: Coordination normal.   Psychiatric:      Comments: Flat affect          Significant Labs:   CBC:   Recent Labs   Lab 06/20/23 0403 06/21/23 0411   WBC 0.38* 0.12*   HGB 8.1* 7.3*   HCT 25.2* 23.1*   PLT 14* 7*   , CMP:   Recent Labs   Lab 06/20/23 0403 06/20/23 0823 06/20/23 2038 06/20/23  2344 06/21/23 0411      < > 136 137 138  138   K 5.4*   < > 5.0  5.0 4.8  4.8 5.6*  5.6*  5.6*      < > 105 106 107  107   CO2 22*   < > 23 21* 22*  22*   *   < > 114* 202* 147*  147*   BUN 24*   < > 21* 20 18  18   CREATININE 1.0   < > 0.9 0.9 0.8  0.8   CALCIUM 8.0*   < > 8.8 8.6* 8.2*  8.2*   PROT 5.9*  --   --   --  5.7*   ALBUMIN 2.8*  --   --   --  2.8*   BILITOT 0.5  --   --   --  0.6   ALKPHOS 49*  --   --   --  50*   AST 42*  --   --   --  44*   ALT 26  --   --   --  44   ANIONGAP 7*   < > 8 10 9  9    < > = values in this interval not displayed.   , Coagulation:   Recent Labs   Lab 06/20/23 0403 06/21/23 0411   INR 1.1 1.1   APTT 24.3 23.4   , LDH: No results for input(s): LDHCSF, BFSOURCE in the last 48 hours., and Uric Acid   Recent Labs   Lab 06/20/23 0403 06/21/23 0411   URICACID 4.0 3.0*       Diagnostic Results:  None

## 2023-06-21 NOTE — ASSESSMENT & PLAN NOTE
- Takes amlodipine at home  - Given hypertension, amlodipine stopped and nifedipine and losartan started in [place]. Continued on transfer.  - BP now improved

## 2023-06-21 NOTE — ASSESSMENT & PLAN NOTE
- Patient with CML diagnosed 8/2022 Follows locally in [place] with Dr. Brett Hogan. He has been on dasatinib outpatient since 10/2022. Some question regarding his compliance over the course of treatment, however. Presented to [facility] with c/o gum swelling on 6/7/23. Labs on presentation remarkable for WBC count of 138K, with 80% blasts indicating blast crisis. WBC count from approximately a week and a half earlier was normal. Reported recent compliance with his TKI.     [place] [facility] [place].    - Bone marrow Biopsy performed 6/9/23 c/w progression for blast phase CML.   - BCR/ABL p210 collected on admission, >55%  - BCR/ABL mutation negative  - Echo with 65% EF and mild-moderate pulmonary hypertension.  - Picc line in place  - Patient decided to not do fertility presentation  - Stared on Cladribine Idarubicin Cytarabine, CLIA and transitioned from dasatinb to ponatinib. Day 6  - TLS labs except for hyperkalemia which is monitored and treated daily, continue allopurinol

## 2023-06-21 NOTE — ASSESSMENT & PLAN NOTE
- Temp fo 101.4 at [place] ED. Fevers persist since transfer.  - CXR neg. Blood culture ngtd. CDiff negative. U/a neg  - Strep, flu, and COVID neg at OSH  - Throat red with exudates. Tonsils mildly swollen. CT neck showing adenopathy but no abscesses.  - CT chest without consoidations  - Last temp was > 24 hours ago  RESOLVED

## 2023-06-22 LAB
ALBUMIN SERPL BCP-MCNC: 3.2 G/DL (ref 3.5–5.2)
ALP SERPL-CCNC: 57 U/L (ref 55–135)
ALT SERPL W/O P-5'-P-CCNC: 74 U/L (ref 10–44)
ANION GAP SERPL CALC-SCNC: 9 MMOL/L (ref 8–16)
ANISOCYTOSIS BLD QL SMEAR: SLIGHT
APTT PPP: 22.5 SEC (ref 21–32)
AST SERPL-CCNC: 61 U/L (ref 10–40)
BASOPHILS # BLD AUTO: 0 K/UL (ref 0–0.2)
BASOPHILS NFR BLD: 0 % (ref 0–1.9)
BILIRUB SERPL-MCNC: 0.5 MG/DL (ref 0.1–1)
BUN SERPL-MCNC: 20 MG/DL (ref 6–20)
BURR CELLS BLD QL SMEAR: ABNORMAL
CALCIUM SERPL-MCNC: 8.9 MG/DL (ref 8.7–10.5)
CHLORIDE SERPL-SCNC: 107 MMOL/L (ref 95–110)
CO2 SERPL-SCNC: 23 MMOL/L (ref 23–29)
CREAT SERPL-MCNC: 0.8 MG/DL (ref 0.5–1.4)
DIFFERENTIAL METHOD: ABNORMAL
EOSINOPHIL # BLD AUTO: 0 K/UL (ref 0–0.5)
EOSINOPHIL NFR BLD: 0 % (ref 0–8)
ERYTHROCYTE [DISTWIDTH] IN BLOOD BY AUTOMATED COUNT: 18 % (ref 11.5–14.5)
EST. GFR  (NO RACE VARIABLE): >60 ML/MIN/1.73 M^2
FIBRINOGEN PPP-MCNC: 154 MG/DL (ref 182–400)
GLUCOSE SERPL-MCNC: 132 MG/DL (ref 70–110)
HCT VFR BLD AUTO: 23.6 % (ref 40–54)
HGB BLD-MCNC: 7.4 G/DL (ref 14–18)
HYPOCHROMIA BLD QL SMEAR: ABNORMAL
IMM GRANULOCYTES # BLD AUTO: 0 K/UL (ref 0–0.04)
IMM GRANULOCYTES NFR BLD AUTO: 0 % (ref 0–0.5)
INR PPP: 1 (ref 0.8–1.2)
LDH SERPL L TO P-CCNC: 471 U/L (ref 110–260)
LYMPHOCYTES # BLD AUTO: 0.3 K/UL (ref 1–4.8)
LYMPHOCYTES NFR BLD: 96.3 % (ref 18–48)
MAGNESIUM SERPL-MCNC: 1.8 MG/DL (ref 1.6–2.6)
MCH RBC QN AUTO: 23.2 PG (ref 27–31)
MCHC RBC AUTO-ENTMCNC: 31.4 G/DL (ref 32–36)
MCV RBC AUTO: 74 FL (ref 82–98)
MONOCYTES # BLD AUTO: 0 K/UL (ref 0.3–1)
MONOCYTES NFR BLD: 0 % (ref 4–15)
NEUTROPHILS # BLD AUTO: 0 K/UL (ref 1.8–7.7)
NEUTROPHILS NFR BLD: 3.7 % (ref 38–73)
NRBC BLD-RTO: 0 /100 WBC
OVALOCYTES BLD QL SMEAR: ABNORMAL
PHOSPHATE SERPL-MCNC: 3.1 MG/DL (ref 2.7–4.5)
PLATELET # BLD AUTO: 17 K/UL (ref 150–450)
PLATELET BLD QL SMEAR: ABNORMAL
PMV BLD AUTO: ABNORMAL FL (ref 9.2–12.9)
POIKILOCYTOSIS BLD QL SMEAR: SLIGHT
POLYCHROMASIA BLD QL SMEAR: ABNORMAL
POTASSIUM SERPL-SCNC: 4.6 MMOL/L (ref 3.5–5.1)
PROT SERPL-MCNC: 6.3 G/DL (ref 6–8.4)
PROTHROMBIN TIME: 10.9 SEC (ref 9–12.5)
RBC # BLD AUTO: 3.19 M/UL (ref 4.6–6.2)
SODIUM SERPL-SCNC: 139 MMOL/L (ref 136–145)
SPHEROCYTES BLD QL SMEAR: ABNORMAL
URATE SERPL-MCNC: 2.8 MG/DL (ref 3.4–7)
WBC # BLD AUTO: 0.27 K/UL (ref 3.9–12.7)

## 2023-06-22 PROCEDURE — A4216 STERILE WATER/SALINE, 10 ML: HCPCS | Performed by: INTERNAL MEDICINE

## 2023-06-22 PROCEDURE — 20600001 HC STEP DOWN PRIVATE ROOM

## 2023-06-22 PROCEDURE — 25000003 PHARM REV CODE 250: Performed by: NURSE PRACTITIONER

## 2023-06-22 PROCEDURE — 84100 ASSAY OF PHOSPHORUS: CPT | Performed by: NURSE PRACTITIONER

## 2023-06-22 PROCEDURE — 83735 ASSAY OF MAGNESIUM: CPT | Performed by: NURSE PRACTITIONER

## 2023-06-22 PROCEDURE — 99233 PR SUBSEQUENT HOSPITAL CARE,LEVL III: ICD-10-PCS | Mod: ,,, | Performed by: INTERNAL MEDICINE

## 2023-06-22 PROCEDURE — 25000003 PHARM REV CODE 250: Performed by: STUDENT IN AN ORGANIZED HEALTH CARE EDUCATION/TRAINING PROGRAM

## 2023-06-22 PROCEDURE — 25000003 PHARM REV CODE 250: Performed by: INTERNAL MEDICINE

## 2023-06-22 PROCEDURE — 84550 ASSAY OF BLOOD/URIC ACID: CPT | Performed by: STUDENT IN AN ORGANIZED HEALTH CARE EDUCATION/TRAINING PROGRAM

## 2023-06-22 PROCEDURE — 83615 LACTATE (LD) (LDH) ENZYME: CPT | Performed by: STUDENT IN AN ORGANIZED HEALTH CARE EDUCATION/TRAINING PROGRAM

## 2023-06-22 PROCEDURE — 85025 COMPLETE CBC W/AUTO DIFF WBC: CPT | Performed by: NURSE PRACTITIONER

## 2023-06-22 PROCEDURE — 99233 SBSQ HOSP IP/OBS HIGH 50: CPT | Mod: ,,, | Performed by: INTERNAL MEDICINE

## 2023-06-22 PROCEDURE — 85384 FIBRINOGEN ACTIVITY: CPT | Performed by: STUDENT IN AN ORGANIZED HEALTH CARE EDUCATION/TRAINING PROGRAM

## 2023-06-22 PROCEDURE — 85730 THROMBOPLASTIN TIME PARTIAL: CPT | Performed by: STUDENT IN AN ORGANIZED HEALTH CARE EDUCATION/TRAINING PROGRAM

## 2023-06-22 PROCEDURE — 80053 COMPREHEN METABOLIC PANEL: CPT | Performed by: NURSE PRACTITIONER

## 2023-06-22 PROCEDURE — 85610 PROTHROMBIN TIME: CPT | Performed by: STUDENT IN AN ORGANIZED HEALTH CARE EDUCATION/TRAINING PROGRAM

## 2023-06-22 PROCEDURE — 25000003 PHARM REV CODE 250

## 2023-06-22 RX ORDER — PANTOPRAZOLE SODIUM 40 MG/1
40 TABLET, DELAYED RELEASE ORAL DAILY
Status: DISCONTINUED | OUTPATIENT
Start: 2023-06-22 | End: 2023-07-12 | Stop reason: HOSPADM

## 2023-06-22 RX ADMIN — NIFEDIPINE 60 MG: 60 TABLET, FILM COATED, EXTENDED RELEASE ORAL at 08:06

## 2023-06-22 RX ADMIN — SEVELAMER CARBONATE 800 MG: 800 TABLET, FILM COATED ORAL at 12:06

## 2023-06-22 RX ADMIN — SEVELAMER CARBONATE 800 MG: 800 TABLET, FILM COATED ORAL at 06:06

## 2023-06-22 RX ADMIN — POLYETHYLENE GLYCOL 3350 17 G: 17 POWDER, FOR SOLUTION ORAL at 08:06

## 2023-06-22 RX ADMIN — OXYCODONE HYDROCHLORIDE 5 MG: 5 TABLET ORAL at 05:06

## 2023-06-22 RX ADMIN — ACYCLOVIR 800 MG: 200 CAPSULE ORAL at 08:06

## 2023-06-22 RX ADMIN — MUPIROCIN: 20 OINTMENT TOPICAL at 08:06

## 2023-06-22 RX ADMIN — DEXAMETHASONE 1 DROP: 1 SUSPENSION/ DROPS OPHTHALMIC at 06:06

## 2023-06-22 RX ADMIN — GUAIFENESIN AND DEXTROMETHORPHAN HYDROBROMIDE 1 TABLET: 600; 30 TABLET, EXTENDED RELEASE ORAL at 08:06

## 2023-06-22 RX ADMIN — PANTOPRAZOLE SODIUM 40 MG: 40 TABLET, DELAYED RELEASE ORAL at 08:06

## 2023-06-22 RX ADMIN — ALUMINUM HYDROXIDE, MAGNESIUM HYDROXIDE, AND DIMETHICONE 10 ML: 400; 400; 40 SUSPENSION ORAL at 08:06

## 2023-06-22 RX ADMIN — Medication 10 ML: at 06:06

## 2023-06-22 RX ADMIN — DEXAMETHASONE 1 DROP: 1 SUSPENSION/ DROPS OPHTHALMIC at 12:06

## 2023-06-22 RX ADMIN — ALLOPURINOL 300 MG: 300 TABLET ORAL at 08:06

## 2023-06-22 RX ADMIN — PONATINIB HYDROCHLORIDE 15 MG: 15 TABLET, FILM COATED ORAL at 08:06

## 2023-06-22 RX ADMIN — POSACONAZOLE 300 MG: 100 TABLET, DELAYED RELEASE ORAL at 08:06

## 2023-06-22 RX ADMIN — Medication 10 ML: at 12:06

## 2023-06-22 RX ADMIN — SODIUM ZIRCONIUM CYCLOSILICATE 10 G: 5 POWDER, FOR SUSPENSION ORAL at 08:06

## 2023-06-22 RX ADMIN — LEVOFLOXACIN 500 MG: 500 TABLET, FILM COATED ORAL at 08:06

## 2023-06-22 RX ADMIN — DEXAMETHASONE 1 DROP: 1 SUSPENSION/ DROPS OPHTHALMIC at 05:06

## 2023-06-22 RX ADMIN — SEVELAMER CARBONATE 800 MG: 800 TABLET, FILM COATED ORAL at 07:06

## 2023-06-22 NOTE — ASSESSMENT & PLAN NOTE
- Uric acid and LDH elevated on presentation to [place] ED. Kidney function and electrolytes normal.  - Not G6PD deficient  - Allopurinol 300 daily  - Lokelma daily for hyperkalemia   - Daily TLS labs wnl  - will stop IVF today

## 2023-06-22 NOTE — PLAN OF CARE
Patient AAOx4. Ambulates independently. POC reviewed with patient; understanding verbalized. Voids in urinal. BM x2 this shift. Pt. with nonskid footwear on, bed in lowest position, and locked with bed rails up x2.  Pt. instructed to call prior to getting OOB.  Pt. has call light and personal items within reach. VSS and afebrile this shift. All questions and concerns addressed at this time. Will continue to monitor.

## 2023-06-22 NOTE — ASSESSMENT & PLAN NOTE
- Takes amlodipine at home  - Given hypertension, amlodipine stopped and nifedipine and losartan started in [place]. Continued on transfer.  - BP now improved

## 2023-06-22 NOTE — ASSESSMENT & PLAN NOTE
- Temp fo 101.4 at [place] ED. Fevers persist since transfer.  - CXR neg. Blood culture ngtd. CDiff negative. U/a neg  - Strep, flu, and COVID neg at OSH  - Throat red with exudates. Tonsils mildly swollen. CT neck showing adenopathy but no abscesses.  - CT chest without consoidations  - Last temp was > 24 hours ago  RESOLVED

## 2023-06-22 NOTE — ASSESSMENT & PLAN NOTE
- Continue [place] and full liquid diet due to pain with swallowing  - Pain well-controlled with IV dilaudid  - ID following. Continue abx per ID recs  - RIP throat swab, strep, flu, and COVID neg  - Leukemia infiltration may be playing a role

## 2023-06-22 NOTE — PROGRESS NOTES
[facility]an - Oncology ([facility])  Hematology  Bone Marrow Transplant  Progress Note    Patient Name: Magdaleno Hirsch  Admission Date: 6/9/2023  Hospital Length of Stay: 13 days  Code Status: Full Code    Subjective:     Interval History: AFVSS some heartburn this am     Objective:     Vital Signs (Most Recent):  Temp: 97.7 °F (36.5 °C) (06/22/23 0728)  Pulse: 81 (06/22/23 0728)  Resp: 14 (06/22/23 0728)  BP: 130/71 (06/22/23 0728)  SpO2: 98 % (06/22/23 0728) Vital Signs (24h Range):  Temp:  [97.6 °F (36.4 °C)-98.4 °F (36.9 °C)] 97.7 °F (36.5 °C)  Pulse:  [] 81  Resp:  [14-18] 14  SpO2:  [98 %-100 %] 98 %  BP: (129-164)/(69-86) 130/71     Weight: 83.1 kg (183 lb 3.2 oz)  Body mass index is 24.17 kg/m².  Body surface area is 2.07 meters squared.    ECOG SCORE           [unfilled]    Intake/Output - Last 3 Shifts         06/20 0700  06/21 0659 06/21 0700  06/22 0659 06/22 0700  06/23 0659    P.O. 120 1420     I.V. (mL/kg)  10 (0.1)     Blood  304     Total Intake(mL/kg) 120 (1.3) 1734 (20.9)     Urine (mL/kg/hr)  500 (0.3)     Total Output  500     Net +120 +1234                     Physical Exam  Constitutional:       Appearance: He is well-developed.   HENT:      Head: Normocephalic and atraumatic.      Mouth/Throat:      Dentition: Gingival swelling present.      Pharynx: Pharyngeal swelling and posterior oropharyngeal erythema present.   Eyes:      Conjunctiva/sclera: Conjunctivae normal.   Cardiovascular:      Rate and Rhythm: Normal rate and regular rhythm.      Heart sounds: Normal heart sounds. No murmur heard.  Pulmonary:      Effort: Pulmonary effort is normal. No respiratory distress.      Breath sounds: Normal breath sounds. No wheezing.   Abdominal:      General: Bowel sounds are normal. There is no distension.      Palpations: Abdomen is soft.      Tenderness: There is no abdominal tenderness.   Musculoskeletal:         General: No tenderness. Normal range of motion.      Cervical back: Neck supple.    Lymphadenopathy:      Cervical: No cervical adenopathy.   Skin:     General: Skin is warm and dry.      Findings: No rash.      Comments: PICC Intact to right arm with no redness or drainage   Neurological:      Mental Status: He is alert and oriented to person, place, and time.      Coordination: Coordination normal.   Psychiatric:      Comments: Flat affect          Significant Labs:   All pertinent labs from the last 24 hours have been reviewed.    Diagnostic Results:  I have reviewed all pertinent imaging results/findings within the past 24 hours.    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 Brett Hogan. He has been on dasatinib outpatient since 10/2022. Some question regarding his compliance over the course of treatment, however. Presented to [facility] with c/o gum swelling on 6/7/23. Labs on presentation remarkable for WBC count of 138K, with 80% blasts indicating blast crisis. WBC count from approximately a week and a half earlier was normal. Reported recent compliance with his TKI.     Transferred to [facility] for higher level of care.    - Bone marrow Biopsy performed 6/9/23 c/w progression for blast phase CML.   - BCR/ABL p210 collected on admission, >55%  - BCR/ABL mutation negative  - Echo with 65% EF and mild-moderate pulmonary hypertension.  - Picc line in place  - Patient decided to not do fertility presentation  - Stared on Cladribine Idarubicin Cytarabine, CLIA and transitioned from dasatinb to ponatinib. Day 7  - TLS labs except for hyperkalemia which is monitored and treated daily, continue allopurinol      Hyperkalemia  - shift this am with insulin and D10  - repeat K was 4.7  - starting daily Lokalema     Constipation  - daily Miralax added 6/21    Pharyngitis  - Continue [place] and full liquid diet due to pain with swallowing  - Pain well-controlled with IV dilaudid  - ID following.  Continue abx per ID recs  - RIP throat swab, strep, flu, and COVID neg  - Leukemia infiltration may be playing a role    Leukocytosis  - See blast crisis phase of CML    Diarrhea  - Reports multiple days of diarrhea prior to transfer  - C diff negative  - PRN imodium started  RESOLVED  - now c/o constipation, daily Miralax added    Neutropenic fever  - Temp fo 101.4 at [place] ED. Fevers persist since transfer.  - CXR neg. Blood culture ngtd. CDiff negative. U/a neg  - Strep, flu, and COVID neg at OSH  - Throat red with exudates. Tonsils mildly swollen. CT neck showing adenopathy but no abscesses.  - CT chest without consoidations  - Last temp was > 24 hours ago  RESOLVED    Pancytopenia  - Functionally neutropenic despite leukocytosis, given low neutrophil count  - 2/2 disease and hydrea  - Daily CBC while inpatient  - Transfuse for hgb < 7 or plts < 10K  - Now on ppx bactrim levaquin fluconazole and acyclovir    Hypertension  - Takes amlodipine at home  - Given hypertension, amlodipine stopped and nifedipine and losartan started in [place]. Continued on transfer.  - BP now improved    Tumor lysis syndrome  - Uric acid and LDH elevated on presentation to [place] ED. Kidney function and electrolytes normal.  - Not G6PD deficient  - Allopurinol 300 daily  - Lokelma daily for hyperkalemia   - Daily TLS labs wnl  - will stop IVF today        VTE Risk Mitigation (From admission, onward)         Ordered     heparin, porcine (PF) 100 unit/mL injection flush 300 Units  As needed (PRN)         06/16/23 1550     IP VTE HIGH RISK PATIENT  Once         06/09/23 0049     Place sequential compression device  Until discontinued         06/09/23 0049                Disposition: home    Gus Posadas MD  Bone Marrow Transplant  [facility] - Oncology ([facility])

## 2023-06-22 NOTE — PROGRESS NOTES
Pt seen for follow up. In good spirits today; requested assistance with applying for disability.  [facility]  No other needs identified at this time. Will continue to follow and assist as needed.

## 2023-06-22 NOTE — SUBJECTIVE & OBJECTIVE
Subjective:     Interval History: AFVSS some heartburn this am     Objective:     Vital Signs (Most Recent):  Temp: 97.7 °F (36.5 °C) (06/22/23 0728)  Pulse: 81 (06/22/23 0728)  Resp: 14 (06/22/23 0728)  BP: 130/71 (06/22/23 0728)  SpO2: 98 % (06/22/23 0728) Vital Signs (24h Range):  Temp:  [97.6 °F (36.4 °C)-98.4 °F (36.9 °C)] 97.7 °F (36.5 °C)  Pulse:  [] 81  Resp:  [14-18] 14  SpO2:  [98 %-100 %] 98 %  BP: (129-164)/(69-86) 130/71     Weight: 83.1 kg (183 lb 3.2 oz)  Body mass index is 24.17 kg/m².  Body surface area is 2.07 meters squared.    ECOG SCORE           [unfilled]    Intake/Output - Last 3 Shifts         06/20 0700  06/21 0659 06/21 0700  06/22 0659 06/22 0700  06/23 0659    P.O. 120 1420     I.V. (mL/kg)  10 (0.1)     Blood  304     Total Intake(mL/kg) 120 (1.3) 1734 (20.9)     Urine (mL/kg/hr)  500 (0.3)     Total Output  500     Net +120 +1234                     Physical Exam  Constitutional:       Appearance: He is well-developed.   HENT:      Head: Normocephalic and atraumatic.      Mouth/Throat:      Dentition: Gingival swelling present.      Pharynx: Pharyngeal swelling and posterior oropharyngeal erythema present.   Eyes:      Conjunctiva/sclera: Conjunctivae normal.   Cardiovascular:      Rate and Rhythm: Normal rate and regular rhythm.      Heart sounds: Normal heart sounds. No murmur heard.  Pulmonary:      Effort: Pulmonary effort is normal. No respiratory distress.      Breath sounds: Normal breath sounds. No wheezing.   Abdominal:      General: Bowel sounds are normal. There is no distension.      Palpations: Abdomen is soft.      Tenderness: There is no abdominal tenderness.   Musculoskeletal:         General: No tenderness. Normal range of motion.      Cervical back: Neck supple.   Lymphadenopathy:      Cervical: No cervical adenopathy.   Skin:     General: Skin is warm and dry.      Findings: No rash.      Comments: PICC Intact to right arm with no redness or drainage    Neurological:      Mental Status: He is alert and oriented to person, place, and time.      Coordination: Coordination normal.   Psychiatric:      Comments: Flat affect          Significant Labs:   All pertinent labs from the last 24 hours have been reviewed.    Diagnostic Results:  I have reviewed all pertinent imaging results/findings within the past 24 hours.

## 2023-06-22 NOTE — PLAN OF CARE
No acute events or complaints overnight. VSS; AAOx4. Pt remained free from injury or falls. Nonskid footwear on with bed in lowest position and locked. Pt ambulates independently and instructed to call if assistance is needed. Call light within reach. Will continue to monitor pt.         Problem: Adult Inpatient Plan of Care  Goal: Plan of Care Review  Outcome: Ongoing, Progressing  Goal: Patient-Specific Goal (Individualized)  Outcome: Ongoing, Progressing  Goal: Absence of Hospital-Acquired Illness or Injury  Outcome: Ongoing, Progressing  Goal: Optimal Comfort and Wellbeing  Outcome: Ongoing, Progressing  Intervention: Provide Person-Centered Care  Flowsheets (Taken 6/22/2023 0506)  Trust Relationship/Rapport:   care explained   choices provided   questions answered  Goal: Readiness for Transition of Care  Outcome: Ongoing, Progressing     Problem: Infection  Goal: Absence of Infection Signs and Symptoms  Outcome: Ongoing, Progressing     Problem: Coping Ineffective (Oncology Care)  Goal: Effective Coping  Outcome: Ongoing, Progressing     Problem: Fatigue (Oncology Care)  Goal: Improved Activity Tolerance  Outcome: Ongoing, Progressing     Problem: Oral Intake Altered (Oncology Care)  Goal: Optimal Oral Intake  Outcome: Ongoing, Progressing  Intervention: Minimize and Manage Barriers to Oral Intake  Flowsheets (Taken 6/22/2023 0506)  Oral Care:   oral rinse provided   mouth wash rinse     Problem: Oral Mucositis (Oncology Care)  Goal: Improved Oral Mucous Membrane Integrity  Outcome: Ongoing, Progressing  Intervention: Promote Oral Comfort and Health  Flowsheets (Taken 6/22/2023 0506)  Oral Mucous Membrane Protection:   nonirritating oral fluids promoted   nonirritating oral foods promoted  Oral Care:   oral rinse provided   mouth wash rinse     Problem: Pain Acute (Oncology Care)  Goal: Optimal Pain Control  Outcome: Ongoing, Progressing     Problem: Fall Injury Risk  Goal: Absence of Fall and Fall-Related  Injury  Outcome: Ongoing, Progressing

## 2023-06-22 NOTE — ASSESSMENT & PLAN NOTE
- Patient with CML diagnosed 8/2022 Follows locally in [place] with Dr. Brett Hogan. He has been on dasatinib outpatient since 10/2022. Some question regarding his compliance over the course of treatment, however. Presented to [facility] with c/o gum swelling on 6/7/23. Labs on presentation remarkable for WBC count of 138K, with 80% blasts indicating blast crisis. WBC count from approximately a week and a half earlier was normal. Reported recent compliance with his TKI.     [place] [facility] [place]    - Bone marrow Biopsy performed 6/9/23 c/w progression for blast phase CML.   - BCR/ABL p210 collected on admission, >55%  - BCR/ABL mutation negative  - Echo with 65% EF and mild-moderate pulmonary hypertension.  - Picc line in place  - Patient decided to not do fertility presentation  - Stared on Cladribine Idarubicin Cytarabine, CLIA and transitioned from dasatinb to ponatinib. Day 7  - TLS labs except for hyperkalemia which is monitored and treated daily, continue allopurinol

## 2023-06-23 LAB
ALBUMIN SERPL BCP-MCNC: 3.2 G/DL (ref 3.5–5.2)
ALP SERPL-CCNC: 60 U/L (ref 55–135)
ALT SERPL W/O P-5'-P-CCNC: 85 U/L (ref 10–44)
ANION GAP SERPL CALC-SCNC: 9 MMOL/L (ref 8–16)
ANISOCYTOSIS BLD QL SMEAR: SLIGHT
APTT PPP: 23.4 SEC (ref 21–32)
AST SERPL-CCNC: 51 U/L (ref 10–40)
BASOPHILS # BLD AUTO: ABNORMAL K/UL (ref 0–0.2)
BASOPHILS NFR BLD: 0 % (ref 0–1.9)
BILIRUB SERPL-MCNC: 0.7 MG/DL (ref 0.1–1)
BUN SERPL-MCNC: 12 MG/DL (ref 6–20)
CALCIUM SERPL-MCNC: 8.6 MG/DL (ref 8.7–10.5)
CHLORIDE SERPL-SCNC: 107 MMOL/L (ref 95–110)
CO2 SERPL-SCNC: 22 MMOL/L (ref 23–29)
CREAT SERPL-MCNC: 0.8 MG/DL (ref 0.5–1.4)
DACRYOCYTES BLD QL SMEAR: ABNORMAL
DIFFERENTIAL METHOD: ABNORMAL
EOSINOPHIL # BLD AUTO: ABNORMAL K/UL (ref 0–0.5)
EOSINOPHIL NFR BLD: 2 % (ref 0–8)
ERYTHROCYTE [DISTWIDTH] IN BLOOD BY AUTOMATED COUNT: 17.7 % (ref 11.5–14.5)
EST. GFR  (NO RACE VARIABLE): >60 ML/MIN/1.73 M^2
FIBRINOGEN PPP-MCNC: 238 MG/DL (ref 182–400)
GLUCOSE SERPL-MCNC: 116 MG/DL (ref 70–110)
HCT VFR BLD AUTO: 23.7 % (ref 40–54)
HGB BLD-MCNC: 7.5 G/DL (ref 14–18)
HYPOCHROMIA BLD QL SMEAR: ABNORMAL
IMM GRANULOCYTES # BLD AUTO: ABNORMAL K/UL (ref 0–0.04)
IMM GRANULOCYTES NFR BLD AUTO: ABNORMAL % (ref 0–0.5)
INR PPP: 1 (ref 0.8–1.2)
LDH SERPL L TO P-CCNC: 352 U/L (ref 110–260)
LYMPHOCYTES # BLD AUTO: ABNORMAL K/UL (ref 1–4.8)
LYMPHOCYTES NFR BLD: 94 % (ref 18–48)
MAGNESIUM SERPL-MCNC: 1.6 MG/DL (ref 1.6–2.6)
MCH RBC QN AUTO: 22.9 PG (ref 27–31)
MCHC RBC AUTO-ENTMCNC: 31.6 G/DL (ref 32–36)
MCV RBC AUTO: 73 FL (ref 82–98)
MONOCYTES # BLD AUTO: ABNORMAL K/UL (ref 0.3–1)
MONOCYTES NFR BLD: 2 % (ref 4–15)
NEUTROPHILS NFR BLD: 2 % (ref 38–73)
NRBC BLD-RTO: 0 /100 WBC
OVALOCYTES BLD QL SMEAR: ABNORMAL
PHOSPHATE SERPL-MCNC: 3.2 MG/DL (ref 2.7–4.5)
PLATELET # BLD AUTO: 12 K/UL (ref 150–450)
PLATELET BLD QL SMEAR: ABNORMAL
PMV BLD AUTO: ABNORMAL FL (ref 9.2–12.9)
POIKILOCYTOSIS BLD QL SMEAR: SLIGHT
POLYCHROMASIA BLD QL SMEAR: ABNORMAL
POTASSIUM SERPL-SCNC: 4.2 MMOL/L (ref 3.5–5.1)
PROT SERPL-MCNC: 6.4 G/DL (ref 6–8.4)
PROTHROMBIN TIME: 10.6 SEC (ref 9–12.5)
RBC # BLD AUTO: 3.27 M/UL (ref 4.6–6.2)
SCHISTOCYTES BLD QL SMEAR: ABNORMAL
SCHISTOCYTES BLD QL SMEAR: PRESENT
SODIUM SERPL-SCNC: 138 MMOL/L (ref 136–145)
SPHEROCYTES BLD QL SMEAR: ABNORMAL
URATE SERPL-MCNC: 2.7 MG/DL (ref 3.4–7)
WBC # BLD AUTO: 0.34 K/UL (ref 3.9–12.7)

## 2023-06-23 PROCEDURE — 25000003 PHARM REV CODE 250: Performed by: INTERNAL MEDICINE

## 2023-06-23 PROCEDURE — A4216 STERILE WATER/SALINE, 10 ML: HCPCS | Performed by: INTERNAL MEDICINE

## 2023-06-23 PROCEDURE — 85610 PROTHROMBIN TIME: CPT | Performed by: STUDENT IN AN ORGANIZED HEALTH CARE EDUCATION/TRAINING PROGRAM

## 2023-06-23 PROCEDURE — 99232 SBSQ HOSP IP/OBS MODERATE 35: CPT | Mod: ,,, | Performed by: INTERNAL MEDICINE

## 2023-06-23 PROCEDURE — 25000003 PHARM REV CODE 250: Performed by: STUDENT IN AN ORGANIZED HEALTH CARE EDUCATION/TRAINING PROGRAM

## 2023-06-23 PROCEDURE — 83615 LACTATE (LD) (LDH) ENZYME: CPT | Performed by: STUDENT IN AN ORGANIZED HEALTH CARE EDUCATION/TRAINING PROGRAM

## 2023-06-23 PROCEDURE — 83735 ASSAY OF MAGNESIUM: CPT | Performed by: NURSE PRACTITIONER

## 2023-06-23 PROCEDURE — 80053 COMPREHEN METABOLIC PANEL: CPT | Performed by: NURSE PRACTITIONER

## 2023-06-23 PROCEDURE — 25000003 PHARM REV CODE 250: Performed by: NURSE PRACTITIONER

## 2023-06-23 PROCEDURE — 85007 BL SMEAR W/DIFF WBC COUNT: CPT | Performed by: NURSE PRACTITIONER

## 2023-06-23 PROCEDURE — 85027 COMPLETE CBC AUTOMATED: CPT | Performed by: NURSE PRACTITIONER

## 2023-06-23 PROCEDURE — 85730 THROMBOPLASTIN TIME PARTIAL: CPT | Performed by: STUDENT IN AN ORGANIZED HEALTH CARE EDUCATION/TRAINING PROGRAM

## 2023-06-23 PROCEDURE — 20600001 HC STEP DOWN PRIVATE ROOM

## 2023-06-23 PROCEDURE — 85384 FIBRINOGEN ACTIVITY: CPT | Performed by: STUDENT IN AN ORGANIZED HEALTH CARE EDUCATION/TRAINING PROGRAM

## 2023-06-23 PROCEDURE — 25000003 PHARM REV CODE 250

## 2023-06-23 PROCEDURE — 99232 PR SUBSEQUENT HOSPITAL CARE,LEVL II: ICD-10-PCS | Mod: ,,, | Performed by: INTERNAL MEDICINE

## 2023-06-23 PROCEDURE — 84100 ASSAY OF PHOSPHORUS: CPT | Performed by: NURSE PRACTITIONER

## 2023-06-23 PROCEDURE — 84550 ASSAY OF BLOOD/URIC ACID: CPT | Performed by: STUDENT IN AN ORGANIZED HEALTH CARE EDUCATION/TRAINING PROGRAM

## 2023-06-23 RX ORDER — LANOLIN ALCOHOL/MO/W.PET/CERES
400 CREAM (GRAM) TOPICAL EVERY 4 HOURS PRN
Status: DISCONTINUED | OUTPATIENT
Start: 2023-06-23 | End: 2023-07-12 | Stop reason: HOSPADM

## 2023-06-23 RX ORDER — LANOLIN ALCOHOL/MO/W.PET/CERES
800 CREAM (GRAM) TOPICAL EVERY 4 HOURS PRN
Status: DISCONTINUED | OUTPATIENT
Start: 2023-06-23 | End: 2023-07-12 | Stop reason: HOSPADM

## 2023-06-23 RX ORDER — POTASSIUM CHLORIDE 20 MEQ/1
20 TABLET, EXTENDED RELEASE ORAL
Status: DISCONTINUED | OUTPATIENT
Start: 2023-06-23 | End: 2023-07-12 | Stop reason: HOSPADM

## 2023-06-23 RX ORDER — POLYETHYLENE GLYCOL 3350 17 G/17G
17 POWDER, FOR SOLUTION ORAL 2 TIMES DAILY PRN
Status: DISCONTINUED | OUTPATIENT
Start: 2023-06-23 | End: 2023-07-12 | Stop reason: HOSPADM

## 2023-06-23 RX ADMIN — ACYCLOVIR 800 MG: 200 CAPSULE ORAL at 08:06

## 2023-06-23 RX ADMIN — ALUMINUM HYDROXIDE, MAGNESIUM HYDROXIDE, AND DIMETHICONE 10 ML: 400; 400; 40 SUSPENSION ORAL at 01:06

## 2023-06-23 RX ADMIN — GUAIFENESIN AND DEXTROMETHORPHAN HYDROBROMIDE 1 TABLET: 600; 30 TABLET, EXTENDED RELEASE ORAL at 08:06

## 2023-06-23 RX ADMIN — Medication 10 ML: at 06:06

## 2023-06-23 RX ADMIN — SEVELAMER CARBONATE 800 MG: 800 TABLET, FILM COATED ORAL at 01:06

## 2023-06-23 RX ADMIN — DEXAMETHASONE 1 DROP: 1 SUSPENSION/ DROPS OPHTHALMIC at 06:06

## 2023-06-23 RX ADMIN — Medication 10 ML: at 12:06

## 2023-06-23 RX ADMIN — DEXAMETHASONE 1 DROP: 1 SUSPENSION/ DROPS OPHTHALMIC at 12:06

## 2023-06-23 RX ADMIN — ALLOPURINOL 300 MG: 300 TABLET ORAL at 08:06

## 2023-06-23 RX ADMIN — SULFAMETHOXAZOLE AND TRIMETHOPRIM 1 TABLET: 800; 160 TABLET ORAL at 04:06

## 2023-06-23 RX ADMIN — PANTOPRAZOLE SODIUM 40 MG: 40 TABLET, DELAYED RELEASE ORAL at 08:06

## 2023-06-23 RX ADMIN — OXYCODONE HYDROCHLORIDE 5 MG: 5 TABLET ORAL at 08:06

## 2023-06-23 RX ADMIN — POSACONAZOLE 300 MG: 100 TABLET, DELAYED RELEASE ORAL at 08:06

## 2023-06-23 RX ADMIN — NIFEDIPINE 60 MG: 60 TABLET, FILM COATED, EXTENDED RELEASE ORAL at 08:06

## 2023-06-23 RX ADMIN — SEVELAMER CARBONATE 800 MG: 800 TABLET, FILM COATED ORAL at 08:06

## 2023-06-23 RX ADMIN — ALUMINUM HYDROXIDE, MAGNESIUM HYDROXIDE, AND DIMETHICONE 10 ML: 400; 400; 40 SUSPENSION ORAL at 08:06

## 2023-06-23 RX ADMIN — MUPIROCIN: 20 OINTMENT TOPICAL at 08:06

## 2023-06-23 RX ADMIN — SODIUM ZIRCONIUM CYCLOSILICATE 10 G: 5 POWDER, FOR SUSPENSION ORAL at 08:06

## 2023-06-23 RX ADMIN — PONATINIB HYDROCHLORIDE 15 MG: 15 TABLET, FILM COATED ORAL at 08:06

## 2023-06-23 RX ADMIN — LEVOFLOXACIN 500 MG: 500 TABLET, FILM COATED ORAL at 08:06

## 2023-06-23 RX ADMIN — OXYCODONE HYDROCHLORIDE 5 MG: 5 TABLET ORAL at 06:06

## 2023-06-23 RX ADMIN — DEXAMETHASONE 1 DROP: 1 SUSPENSION/ DROPS OPHTHALMIC at 11:06

## 2023-06-23 RX ADMIN — OXYCODONE HYDROCHLORIDE 5 MG: 5 TABLET ORAL at 10:06

## 2023-06-23 NOTE — ASSESSMENT & PLAN NOTE
- Uric acid and LDH elevated on presentation to [facility] ED. Kidney function and electrolytes normal.  - Not G6PD deficient  - Allopurinol 300 daily  - Lokelma daily for hyperkalemia   - Daily TLS labs wnl  - IVF stopped

## 2023-06-23 NOTE — ASSESSMENT & PLAN NOTE
- Temp fo 101.4 at [place] ED. Fevers persist since transfer.  - CXR neg. Blood culture ngtd. CDiff negative. U/a neg  - Strep, flu, and COVID neg at OSH  - Throat red with exudates. Tonsils mildly swollen. CT neck showing adenopathy but no abscesses.  - CT chest without consoidations  - Last temp was 6/12  RESOLVED

## 2023-06-23 NOTE — ASSESSMENT & PLAN NOTE
- Reports multiple days of diarrhea prior to transfer  - C diff negative  - PRN imodium started  RESOLVED

## 2023-06-23 NOTE — PLAN OF CARE
Problem: Adult Inpatient Plan of Care  Goal: Plan of Care Review  Outcome: Ongoing, Progressing  Goal: Optimal Comfort and Wellbeing  Outcome: Ongoing, Progressing    Plan of care reviewed with patient . He as been afebrile and VSS  this  shift. No BM today. Encouraged self care . GF is at the bedside and in the bed with patient. He required one pain pill this am. No other complaints of pain or nausea since. Resting comfortably at his time. Will continue to monitor

## 2023-06-23 NOTE — ASSESSMENT & PLAN NOTE
- Continue [place] and full liquid diet due to pain with swallowing  - Pain well-controlled with IV Morphine  - ID signed off, continue ppx antimicrobials  - RIP throat swab, strep, flu, and COVID neg  - Leukemia infiltration may be playing a role

## 2023-06-23 NOTE — ASSESSMENT & PLAN NOTE
- Patient with CML diagnosed 8/2022 Follows locally in [place] with Dr. Brett Hogan. He has been on dasatinib outpatient since 10/2022. Some question regarding his compliance over the course of treatment, however. Presented to [facility] with c/o gum swelling on 6/7/23. Labs on presentation remarkable for WBC count of 138K, with 80% blasts indicating blast crisis. WBC count from approximately a week and a half earlier was normal. Reported recent compliance with his TKI.     Transferred to [facility] [place] higher level of care.    - Bone marrow Biopsy performed 6/9/23 c/w progression for blast phase CML.   - BCR/ABL p210 collected on admission, >55%  - BCR/ABL mutation negative  - Echo with 65% EF and mild-moderate pulmonary hypertension.  - Picc line in place  - Patient decided to not do fertility presentation  - Stared on Cladribine Idarubicin Cytarabine, CLIA and transitioned from dasatinb to ponatinib. Day 8  - plan for day 21 bone marrow biopsy for restaging   - TLS labs wnl. TLS labs changed to twice weekly  - Allopurinol continued.

## 2023-06-23 NOTE — SUBJECTIVE & OBJECTIVE
Subjective:     Interval History: Day 8 CLIA + Ponatinib for blast phase CML/AML. Afebrile, VSS. ,mouth pain stable. BMx4 after laxatives yesterday.     Objective:     Vital Signs (Most Recent):  Temp: 98.7 °F (37.1 °C) (06/23/23 1521)  Pulse: 110 (06/23/23 1521)  Resp: 14 (06/23/23 1521)  BP: 124/69 (06/23/23 1521)  SpO2: 99 % (06/23/23 1521) Vital Signs (24h Range):  Temp:  [98.4 °F (36.9 °C)-99.3 °F (37.4 °C)] 98.7 °F (37.1 °C)  Pulse:  [] 110  Resp:  [14-20] 14  SpO2:  [97 %-100 %] 99 %  BP: (118-174)/(67-85) 124/69     Weight: 82.9 kg (182 lb 12.2 oz)  Body mass index is 24.11 kg/m².  Body surface area is 2.07 meters squared.    ECOG SCORE             Intake/Output - Last 3 Shifts       Intake/Output Category 06/21 0700 to 06/22 0659 06/22 0700 to 06/23 0659 06/23 0700 to 06/24 0659    P.O. 1420 930     I.V. (mL/kg) 10 (0.1)      Blood 304      Total Intake(mL/kg) 1734 (20.9) 930 (11.2)     Urine (mL/kg/hr) 500 (0.3) 800 (0.4)     Stool  0     Total Output 500 800     Net +1234 +130            Urine Occurrence  3 x     Stool Occurrence  4 x              Physical Exam  Constitutional:       Appearance: He is well-developed.   HENT:      Head: Normocephalic and atraumatic.      Mouth/Throat:      Dentition: Gingival swelling present.      Pharynx: Pharyngeal swelling and posterior oropharyngeal erythema present.   Eyes:      Conjunctiva/sclera: Conjunctivae normal.   Cardiovascular:      Rate and Rhythm: Normal rate and regular rhythm.      Heart sounds: Normal heart sounds. No murmur heard.  Pulmonary:      Effort: Pulmonary effort is normal. No respiratory distress.      Breath sounds: Normal breath sounds. No wheezing.   Abdominal:      General: Bowel sounds are normal. There is no distension.      Palpations: Abdomen is soft.      Tenderness: There is no abdominal tenderness.   Musculoskeletal:         General: No tenderness. Normal range of motion.      Cervical back: Neck supple.   Lymphadenopathy:       Cervical: No cervical adenopathy.   Skin:     General: Skin is warm and dry.      Findings: No rash.      Comments: PICC Intact to right arm with no redness or drainage   Neurological:      Mental Status: He is alert and oriented to person, place, and time.      Coordination: Coordination normal.   Psychiatric:      Comments: Flat affect          Significant Labs:   CBC:   Recent Labs   Lab 06/22/23  0331 06/23/23  0346   WBC 0.27* 0.34*   HGB 7.4* 7.5*   HCT 23.6* 23.7*   PLT 17* 12*    and CMP:   Recent Labs   Lab 06/22/23  0331 06/23/23  0346    138   K 4.6 4.2    107   CO2 23 22*   * 116*   BUN 20 12   CREATININE 0.8 0.8   CALCIUM 8.9 8.6*   PROT 6.3 6.4   ALBUMIN 3.2* 3.2*   BILITOT 0.5 0.7   ALKPHOS 57 60   AST 61* 51*   ALT 74* 85*   ANIONGAP 9 9       Diagnostic Results:  None

## 2023-06-23 NOTE — ASSESSMENT & PLAN NOTE
- Takes amlodipine at home  - Given hypertension, amlodipine stopped and nifedipine and losartan started in [place]. Continued on transfer.  - BP now improved

## 2023-06-23 NOTE — PROGRESS NOTES
[facility] - Oncology ([facility])  Hematology  Bone Marrow Transplant  Progress Note    Patient Name: Magdaleno Hirsch  Admission Date: 6/9/2023  Hospital Length of Stay: 14 days  Code Status: Full Code    Subjective:     Interval History: Day 8 CLIA + Ponatinib for blast phase CML/AML. Afebrile, VSS. ,mouth pain stable. BMx4 after laxatives yesterday.     Objective:     Vital Signs (Most Recent):  Temp: 98.7 °F (37.1 °C) (06/23/23 1521)  Pulse: 110 (06/23/23 1521)  Resp: 14 (06/23/23 1521)  BP: 124/69 (06/23/23 1521)  SpO2: 99 % (06/23/23 1521) Vital Signs (24h Range):  Temp:  [98.4 °F (36.9 °C)-99.3 °F (37.4 °C)] 98.7 °F (37.1 °C)  Pulse:  [] 110  Resp:  [14-20] 14  SpO2:  [97 %-100 %] 99 %  BP: (118-174)/(67-85) 124/69     Weight: 82.9 kg (182 lb 12.2 oz)  Body mass index is 24.11 kg/m².  Body surface area is 2.07 meters squared.    ECOG SCORE             Intake/Output - Last 3 Shifts       Intake/Output Category 06/21 0700 to 06/22 0659 06/22 0700 to 06/23 0659 06/23 0700 to 06/24 0659    P.O. 1420 930     I.V. (mL/kg) 10 (0.1)      Blood 304      Total Intake(mL/kg) 1734 (20.9) 930 (11.2)     Urine (mL/kg/hr) 500 (0.3) 800 (0.4)     Stool  0     Total Output 500 800     Net +1234 +130            Urine Occurrence  3 x     Stool Occurrence  4 x              Physical Exam  Constitutional:       Appearance: He is well-developed.   HENT:      Head: Normocephalic and atraumatic.      Mouth/Throat:      Dentition: Gingival swelling present.      Pharynx: Pharyngeal swelling and posterior oropharyngeal erythema present.   Eyes:      Conjunctiva/sclera: Conjunctivae normal.   Cardiovascular:      Rate and Rhythm: Normal rate and regular rhythm.      Heart sounds: Normal heart sounds. No murmur heard.  Pulmonary:      Effort: Pulmonary effort is normal. No respiratory distress.      Breath sounds: Normal breath sounds. No wheezing.   Abdominal:      General: Bowel sounds are normal. There is no distension.       Palpations: Abdomen is soft.      Tenderness: There is no abdominal tenderness.   Musculoskeletal:         General: No tenderness. Normal range of motion.      Cervical back: Neck supple.   Lymphadenopathy:      Cervical: No cervical adenopathy.   Skin:     General: Skin is warm and dry.      Findings: No rash.      Comments: PICC Intact to right arm with no redness or drainage   Neurological:      Mental Status: He is alert and oriented to person, place, and time.      Coordination: Coordination normal.   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 Brett Hogan. He has been on dasatinib outpatient since 10/2022. Some question regarding his compliance over the course of treatment, however. Presented to [facility] with c/o gum swelling on 6/7/23. Labs on presentation remarkable for WBC count of 138K, with 80% blasts indicating blast crisis. WBC count from approximately a week and a half earlier was normal. Reported recent compliance with his TKI.     Transferred to [facility] for higher level of care.    - Bone marrow Biopsy performed 6/9/23 c/w progression for blast phase CML.   - BCR/ABL p210 collected on admission, >55%  - BCR/ABL mutation negative  - Echo with 65% EF and mild-moderate pulmonary hypertension.  - Picc line in place  - Patient decided to not do fertility presentation  - Stared on  Cladribine Idarubicin Cytarabine, CLIA and transitioned from dasatinb to ponatinib. Day 8  - plan for day 21 bone marrow biopsy for restaging   - TLS labs wnl. TLS labs changed to twice weekly  - Allopurinol continued.       Leukocytosis  - See blast crisis phase of CML    Pancytopenia  - Functionally neutropenic despite leukocytosis, given low neutrophil count  - 2/2 disease and hydrea  - Daily CBC while inpatient  - Transfuse for hgb < 7 or plts < 10K  - Now on ppx bactrim levaquin fluconazole and acyclovir    Neutropenic fever  - Temp fo 101.4 at [place] ED. Fevers persist since transfer.  - CXR neg. Blood culture ngtd. CDiff negative. U/a neg  - Strep, flu, and COVID neg at OSH  - Throat red with exudates. Tonsils mildly swollen. CT neck showing adenopathy but no abscesses.  - CT chest without consoidations  - Last temp was 6/12  RESOLVED    Tumor lysis syndrome  - Uric acid and LDH elevated on presentation to [place] ED. Kidney function and electrolytes normal.  - Not G6PD deficient  - Allopurinol 300 daily  - Lokelma daily for hyperkalemia   - Daily TLS labs wnl  - IVF stopped    Pharyngitis  - Continue [place] and full liquid diet due to pain with swallowing  - Pain well-controlled with IV Morphine  - ID signed off, continue ppx antimicrobials  - RIP throat swab, strep, flu, and COVID neg  - Leukemia infiltration may be playing a role    Hyperkalemia  - shifted last 6/21 with insulin and D10  - K was 4.2 today  - started daily Lokalema on 6/21     Constipation  - daily Miralax added 6/21, will change to PRN today as BM x 4 yesterday    Diarrhea  - Reports multiple days of diarrhea prior to transfer  - C diff negative  - PRN imodium started  RESOLVED      Hypertension  - Takes amlodipine at home  - Given hypertension, amlodipine stopped and nifedipine and losartan started in [place]. Continued on transfer.  - BP now improved      VTE Risk Mitigation (From admission, onward)         Ordered     heparin,  porcine (PF) 100 unit/mL injection flush 300 Units  As needed (PRN)         06/16/23 1550     IP VTE HIGH RISK PATIENT  Once         06/09/23 0049     Place sequential compression device  Until discontinued         06/09/23 0049                Disposition: inpatient     Suzan Carreon NP  Bone Marrow Transplant  [facility] - Oncology ([facility])

## 2023-06-24 LAB
ALBUMIN SERPL BCP-MCNC: 3.2 G/DL (ref 3.5–5.2)
ALP SERPL-CCNC: 54 U/L (ref 55–135)
ALT SERPL W/O P-5'-P-CCNC: 72 U/L (ref 10–44)
ANION GAP SERPL CALC-SCNC: 11 MMOL/L (ref 8–16)
ANISOCYTOSIS BLD QL SMEAR: SLIGHT
AST SERPL-CCNC: 30 U/L (ref 10–40)
BASOPHILS # BLD AUTO: 0 K/UL (ref 0–0.2)
BASOPHILS NFR BLD: 0 % (ref 0–1.9)
BILIRUB SERPL-MCNC: 0.6 MG/DL (ref 0.1–1)
BLD PROD TYP BPU: NORMAL
BLOOD UNIT EXPIRATION DATE: NORMAL
BLOOD UNIT TYPE CODE: 6200
BLOOD UNIT TYPE: NORMAL
BUN SERPL-MCNC: 13 MG/DL (ref 6–20)
CALCIUM SERPL-MCNC: 8.5 MG/DL (ref 8.7–10.5)
CHLORIDE SERPL-SCNC: 103 MMOL/L (ref 95–110)
CO2 SERPL-SCNC: 23 MMOL/L (ref 23–29)
CODING SYSTEM: NORMAL
CREAT SERPL-MCNC: 0.9 MG/DL (ref 0.5–1.4)
CROSSMATCH INTERPRETATION: NORMAL
DIFFERENTIAL METHOD: ABNORMAL
DISPENSE STATUS: NORMAL
EOSINOPHIL # BLD AUTO: 0 K/UL (ref 0–0.5)
EOSINOPHIL NFR BLD: 0 % (ref 0–8)
ERYTHROCYTE [DISTWIDTH] IN BLOOD BY AUTOMATED COUNT: 17.5 % (ref 11.5–14.5)
EST. GFR  (NO RACE VARIABLE): >60 ML/MIN/1.73 M^2
GLUCOSE SERPL-MCNC: 120 MG/DL (ref 70–110)
HCT VFR BLD AUTO: 23.9 % (ref 40–54)
HGB BLD-MCNC: 7.6 G/DL (ref 14–18)
IMM GRANULOCYTES # BLD AUTO: 0 K/UL (ref 0–0.04)
IMM GRANULOCYTES NFR BLD AUTO: 0 % (ref 0–0.5)
LYMPHOCYTES # BLD AUTO: 0.3 K/UL (ref 1–4.8)
LYMPHOCYTES NFR BLD: 100 % (ref 18–48)
MAGNESIUM SERPL-MCNC: 1.6 MG/DL (ref 1.6–2.6)
MCH RBC QN AUTO: 23.7 PG (ref 27–31)
MCHC RBC AUTO-ENTMCNC: 31.8 G/DL (ref 32–36)
MCV RBC AUTO: 75 FL (ref 82–98)
MONOCYTES # BLD AUTO: 0 K/UL (ref 0.3–1)
MONOCYTES NFR BLD: 0 % (ref 4–15)
NEUTROPHILS # BLD AUTO: 0 K/UL (ref 1.8–7.7)
NEUTROPHILS NFR BLD: 0 % (ref 38–73)
NRBC BLD-RTO: 0 /100 WBC
OVALOCYTES BLD QL SMEAR: ABNORMAL
PHOSPHATE SERPL-MCNC: 3 MG/DL (ref 2.7–4.5)
PLATELET # BLD AUTO: 7 K/UL (ref 150–450)
PLATELET BLD QL SMEAR: ABNORMAL
PMV BLD AUTO: ABNORMAL FL (ref 9.2–12.9)
POIKILOCYTOSIS BLD QL SMEAR: SLIGHT
POLYCHROMASIA BLD QL SMEAR: ABNORMAL
POTASSIUM SERPL-SCNC: 3.9 MMOL/L (ref 3.5–5.1)
PROT SERPL-MCNC: 6.5 G/DL (ref 6–8.4)
RBC # BLD AUTO: 3.21 M/UL (ref 4.6–6.2)
SODIUM SERPL-SCNC: 137 MMOL/L (ref 136–145)
UNIT NUMBER: NORMAL
WBC # BLD AUTO: 0.27 K/UL (ref 3.9–12.7)

## 2023-06-24 PROCEDURE — 85025 COMPLETE CBC W/AUTO DIFF WBC: CPT | Performed by: NURSE PRACTITIONER

## 2023-06-24 PROCEDURE — 84100 ASSAY OF PHOSPHORUS: CPT | Performed by: NURSE PRACTITIONER

## 2023-06-24 PROCEDURE — 99232 SBSQ HOSP IP/OBS MODERATE 35: CPT | Mod: ,,, | Performed by: INTERNAL MEDICINE

## 2023-06-24 PROCEDURE — A4216 STERILE WATER/SALINE, 10 ML: HCPCS | Performed by: INTERNAL MEDICINE

## 2023-06-24 PROCEDURE — P9037 PLATE PHERES LEUKOREDU IRRAD: HCPCS

## 2023-06-24 PROCEDURE — 83735 ASSAY OF MAGNESIUM: CPT | Performed by: NURSE PRACTITIONER

## 2023-06-24 PROCEDURE — 25000003 PHARM REV CODE 250: Performed by: INTERNAL MEDICINE

## 2023-06-24 PROCEDURE — 25000003 PHARM REV CODE 250: Performed by: STUDENT IN AN ORGANIZED HEALTH CARE EDUCATION/TRAINING PROGRAM

## 2023-06-24 PROCEDURE — 25000003 PHARM REV CODE 250: Performed by: NURSE PRACTITIONER

## 2023-06-24 PROCEDURE — 36430 TRANSFUSION BLD/BLD COMPNT: CPT

## 2023-06-24 PROCEDURE — 25000003 PHARM REV CODE 250

## 2023-06-24 PROCEDURE — 80053 COMPREHEN METABOLIC PANEL: CPT | Performed by: NURSE PRACTITIONER

## 2023-06-24 PROCEDURE — 99232 PR SUBSEQUENT HOSPITAL CARE,LEVL II: ICD-10-PCS | Mod: ,,, | Performed by: INTERNAL MEDICINE

## 2023-06-24 PROCEDURE — 20600001 HC STEP DOWN PRIVATE ROOM

## 2023-06-24 RX ORDER — HYDROCODONE BITARTRATE AND ACETAMINOPHEN 500; 5 MG/1; MG/1
TABLET ORAL
Status: DISCONTINUED | OUTPATIENT
Start: 2023-06-24 | End: 2023-06-25

## 2023-06-24 RX ADMIN — LEVOFLOXACIN 500 MG: 500 TABLET, FILM COATED ORAL at 08:06

## 2023-06-24 RX ADMIN — ALLOPURINOL 300 MG: 300 TABLET ORAL at 08:06

## 2023-06-24 RX ADMIN — OXYCODONE HYDROCHLORIDE 5 MG: 5 TABLET ORAL at 12:06

## 2023-06-24 RX ADMIN — OXYCODONE HYDROCHLORIDE 5 MG: 5 TABLET ORAL at 11:06

## 2023-06-24 RX ADMIN — SEVELAMER CARBONATE 800 MG: 800 TABLET, FILM COATED ORAL at 05:06

## 2023-06-24 RX ADMIN — Medication 400 MG: at 03:06

## 2023-06-24 RX ADMIN — OXYCODONE HYDROCHLORIDE 5 MG: 5 TABLET ORAL at 08:06

## 2023-06-24 RX ADMIN — ACYCLOVIR 800 MG: 200 CAPSULE ORAL at 08:06

## 2023-06-24 RX ADMIN — Medication 10 ML: at 06:06

## 2023-06-24 RX ADMIN — GUAIFENESIN AND DEXTROMETHORPHAN HYDROBROMIDE 1 TABLET: 600; 30 TABLET, EXTENDED RELEASE ORAL at 08:06

## 2023-06-24 RX ADMIN — SEVELAMER CARBONATE 800 MG: 800 TABLET, FILM COATED ORAL at 11:06

## 2023-06-24 RX ADMIN — PONATINIB HYDROCHLORIDE 15 MG: 15 TABLET, FILM COATED ORAL at 09:06

## 2023-06-24 RX ADMIN — Medication 10 ML: at 12:06

## 2023-06-24 RX ADMIN — OXYCODONE HYDROCHLORIDE 5 MG: 5 TABLET ORAL at 07:06

## 2023-06-24 RX ADMIN — PANTOPRAZOLE SODIUM 40 MG: 40 TABLET, DELAYED RELEASE ORAL at 08:06

## 2023-06-24 RX ADMIN — Medication 400 MG: at 11:06

## 2023-06-24 RX ADMIN — SEVELAMER CARBONATE 800 MG: 800 TABLET, FILM COATED ORAL at 08:06

## 2023-06-24 RX ADMIN — ALUMINUM HYDROXIDE, MAGNESIUM HYDROXIDE, AND DIMETHICONE 10 ML: 400; 400; 40 SUSPENSION ORAL at 08:06

## 2023-06-24 RX ADMIN — NIFEDIPINE 60 MG: 60 TABLET, FILM COATED, EXTENDED RELEASE ORAL at 08:06

## 2023-06-24 RX ADMIN — SODIUM ZIRCONIUM CYCLOSILICATE 10 G: 5 POWDER, FOR SUSPENSION ORAL at 08:06

## 2023-06-24 RX ADMIN — POSACONAZOLE 300 MG: 100 TABLET, DELAYED RELEASE ORAL at 08:06

## 2023-06-24 RX ADMIN — ALUMINUM HYDROXIDE, MAGNESIUM HYDROXIDE, AND DIMETHICONE 10 ML: 400; 400; 40 SUSPENSION ORAL at 12:06

## 2023-06-24 RX ADMIN — OXYCODONE HYDROCHLORIDE 5 MG: 5 TABLET ORAL at 03:06

## 2023-06-24 RX ADMIN — ALUMINUM HYDROXIDE, MAGNESIUM HYDROXIDE, AND DIMETHICONE 10 ML: 400; 400; 40 SUSPENSION ORAL at 05:06

## 2023-06-24 RX ADMIN — Medication 400 MG: at 08:06

## 2023-06-24 NOTE — ASSESSMENT & PLAN NOTE
- Temp fo 101.4 at [facility] ED. Fevers persist since transfer.  - CXR neg. Blood culture ngtd. CDiff negative. U/a neg  - Strep, flu, and COVID neg at OSH  - Throat red with exudates. Tonsils mildly swollen. CT neck showing adenopathy but no abscesses.  - CT chest without consoidations  - Last temp was 6/12  RESOLVED   Discharge instructions and prescriptions provided and explained to the pt. Med side effect sheet reviewed. Opportunity for questions provided. Pt leaving floor via wheelchair.

## 2023-06-24 NOTE — PLAN OF CARE
Pt involved in plan of care and communicating needs throughout shift. PRN oxy given x2. Pt received 1 unit platelets w/o issue. Pt c/o severe mouth and throat pain. Oxy and [facility]'s given for pain. All VSS; no acute events so far this shift.  Pt remaining free from falls or injury throughout shift; bed locked and in lowest position; call light within reach.  Pt instructed to call for assistance as needed.  Q1H rounding done on pt.

## 2023-06-24 NOTE — ASSESSMENT & PLAN NOTE
- Uric acid and LDH elevated on presentation to [place] ED. Kidney function and electrolytes normal.  - Not G6PD deficient  - Allopurinol 300 daily  - Lokelma daily for hyperkalemia   - Daily TLS labs wnl  - IVF stopped

## 2023-06-24 NOTE — SUBJECTIVE & OBJECTIVE
Subjective:     Interval History: Day 9 CLIA + Ponatinib for CML/AML. Afebrile. Continues to have main from oropharyngeal mucositis. Able to eat/drink.     Objective:     Vital Signs (Most Recent):  Temp: 98.6 °F (37 °C) (06/24/23 0738)  Pulse: 100 (06/24/23 0738)  Resp: 14 (06/24/23 0848)  BP: 124/69 (06/24/23 0738)  SpO2: 97 % (06/24/23 0738) Vital Signs (24h Range):  Temp:  [98.6 °F (37 °C)-99.9 °F (37.7 °C)] 98.6 °F (37 °C)  Pulse:  [100-112] 100  Resp:  [14-17] 14  SpO2:  [97 %-100 %] 97 %  BP: (124-136)/(69-85) 124/69     Weight: 82.6 kg (182 lb 1.6 oz)  Body mass index is 24.03 kg/m².  Body surface area is 2.06 meters squared.    ECOG SCORE           ECOG PS 1    Intake/Output - Last 3 Shifts         06/22 0700  06/23 0659 06/23 0700  06/24 0659 06/24 0700  06/25 0659    P.O. 930 1800     I.V. (mL/kg)       Blood       Total Intake(mL/kg) 930 (11.2) 1800 (21.8)     Urine (mL/kg/hr) 800 (0.4) 1400 (0.7)     Stool 0      Total Output 800 1400     Net +130 +400            Urine Occurrence 3 x 2 x     Stool Occurrence 4 x               Physical Exam  Vitals and nursing note reviewed.   Constitutional:       Appearance: He is well-developed.   HENT:      Head: Normocephalic and atraumatic.      Mouth/Throat:      Pharynx: No oropharyngeal exudate or posterior oropharyngeal erythema.   Eyes:      General: No scleral icterus.     Conjunctiva/sclera: Conjunctivae normal.   Cardiovascular:      Rate and Rhythm: Normal rate.   Pulmonary:      Effort: Pulmonary effort is normal. No respiratory distress.   Abdominal:      General: There is no distension.      Palpations: Abdomen is soft.      Tenderness: There is no abdominal tenderness.   Musculoskeletal:         General: Normal range of motion.      Cervical back: Normal range of motion and neck supple.   Skin:     General: Skin is warm and dry.   Neurological:      Mental Status: He is alert and oriented to person, place, and time.      Cranial Nerves: No cranial  nerve deficit.   Psychiatric:         Behavior: Behavior normal.          Significant Labs:   CBC:   Recent Labs   Lab 06/23/23  0346 06/24/23  0344   WBC 0.34* 0.27*   HGB 7.5* 7.6*   HCT 23.7* 23.9*   PLT 12* 7*    and CMP:   Recent Labs   Lab 06/23/23  0346 06/24/23  0344    137   K 4.2 3.9    103   CO2 22* 23   * 120*   BUN 12 13   CREATININE 0.8 0.9   CALCIUM 8.6* 8.5*   PROT 6.4 6.5   ALBUMIN 3.2* 3.2*   BILITOT 0.7 0.6   ALKPHOS 60 54*   AST 51* 30   ALT 85* 72*   ANIONGAP 9 11       Diagnostic Results:  None

## 2023-06-24 NOTE — ASSESSMENT & PLAN NOTE
- Continue [place] and full liquid diet due to pain with swallowing  - Pain well-controlled with IV Morphine  - ID signed off, continue ppx antimicrobials  - RIP throat swab, strep, flu, and COVID neg  - Leukemia infiltration may be playing a role

## 2023-06-24 NOTE — ASSESSMENT & PLAN NOTE
- Patient with CML diagnosed 8/2022 Follows locally in [place] with Dr. Brett Hogan. He has been on dasatinib outpatient since 10/2022. Some question regarding his compliance over the course of treatment, however. Presented to [facility] with c/o gum swelling on 6/7/23. Labs on presentation remarkable for WBC count of 138K, with 80% blasts indicating blast crisis. WBC count from approximately a week and a half earlier was normal. Reported recent compliance with his TKI.     Transfer[place] [facility] [place] higher level of care.    - Bone marrow Biopsy performed 6/9/23 c/w progression for blast phase CML.   - BCR/ABL p210 collected on admission, >55%  - BCR/ABL mutation negative  - Echo with 65% EF and mild-moderate pulmonary hypertension.  - Picc line in place  - Patient decided to not do fertility presentation  - Stared on Cladribine Idarubicin Cytarabine, CLIA and transitioned from dasatinb to ponatinib. Day 9  - plan for day 21 bone marrow biopsy for restaging   - TLS labs wnl. TLS labs changed to twice weekly  - Allopurinol continued.

## 2023-06-24 NOTE — PLAN OF CARE
Plan of care reviewed with the pt at the beginning of the shift. Pt has remained free from injury and falls. Pt ambulating independently without difficulty. Pt reports have generalized fatigue but does not require assistance with ambulation. Fall precautions maintained. Bed locked in lowest position, side rails up x2, non skid footwear on, personal belongings and call light within reach. Instructed pt to call for assistance as needed. Pt has remained afebrile at this time.  Pt given oxycodone for mouth pain.

## 2023-06-24 NOTE — ASSESSMENT & PLAN NOTE
- Takes amlodipine at home  - Given hypertension, amlodipine stopped and nifedipine and losartan started in [place]. Continued on transfer.  - BP now improved

## 2023-06-24 NOTE — PROGRESS NOTES
Esdras Cowan - Oncology ([facility])  Hematology  Bone Marrow Transplant  Progress Note    Patient Name: Magdaleno Hirsch  Admission Date: 6/9/2023  Hospital Length of Stay: 15 days  Code Status: Full Code    Subjective:     Interval History: Day 9 CLIA + Ponatinib for CML/AML. Afebrile. Continues to have main from oropharyngeal mucositis. Able to eat/drink.     Objective:     Vital Signs (Most Recent):  Temp: 98.6 °F (37 °C) (06/24/23 0738)  Pulse: 100 (06/24/23 0738)  Resp: 14 (06/24/23 0848)  BP: 124/69 (06/24/23 0738)  SpO2: 97 % (06/24/23 0738) Vital Signs (24h Range):  Temp:  [98.6 °F (37 °C)-99.9 °F (37.7 °C)] 98.6 °F (37 °C)  Pulse:  [100-112] 100  Resp:  [14-17] 14  SpO2:  [97 %-100 %] 97 %  BP: (124-136)/(69-85) 124/69     Weight: 82.6 kg (182 lb 1.6 oz)  Body mass index is 24.03 kg/m².  Body surface area is 2.06 meters squared.    ECOG SCORE           ECOG PS 1    Intake/Output - Last 3 Shifts         06/22 0700  06/23 0659 06/23 0700  06/24 0659 06/24 0700  06/25 0659    P.O. 930 1800     I.V. (mL/kg)       Blood       Total Intake(mL/kg) 930 (11.2) 1800 (21.8)     Urine (mL/kg/hr) 800 (0.4) 1400 (0.7)     Stool 0      Total Output 800 1400     Net +130 +400            Urine Occurrence 3 x 2 x     Stool Occurrence 4 x               Physical Exam  Vitals and nursing note reviewed.   Constitutional:       Appearance: He is well-developed.   HENT:      Head: Normocephalic and atraumatic.      Mouth/Throat:      Pharynx: No oropharyngeal exudate or posterior oropharyngeal erythema.   Eyes:      General: No scleral icterus.     Conjunctiva/sclera: Conjunctivae normal.   Cardiovascular:      Rate and Rhythm: Normal rate.   Pulmonary:      Effort: Pulmonary effort is normal. No respiratory distress.   Abdominal:      General: There is no distension.      Palpations: Abdomen is soft.      Tenderness: There is no abdominal tenderness.   Musculoskeletal:         General: Normal range of motion.      Cervical back: Normal  range of motion and neck supple.   Skin:     General: Skin is warm and dry.   Neurological:      Mental Status: He is alert and oriented to person, place, and time.      Cranial Nerves: No cranial nerve deficit.   Psychiatric:         Behavior: Behavior normal.          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 Brett Hogan. He has been on dasatinib outpatient since 10/2022. Some question regarding his compliance over the course of treatment, however. Presented to [facility] with c/o gum swelling on 6/7/23. Labs on presentation remarkable for WBC count of 138K, with 80% blasts indicating blast crisis. WBC count from approximately a week and a half earlier was normal. Reported recent compliance with his TKI.     Transferred to [facility] for higher level of care.    - Bone marrow Biopsy performed 6/9/23 c/w progression for blast phase CML.   - BCR/ABL p210 collected on admission, >55%  - BCR/ABL mutation negative  - Echo with 65% EF and mild-moderate pulmonary hypertension.  - Picc line in place  - Patient decided to not do fertility presentation  - Stared on Cladribine Idarubicin Cytarabine, CLIA and transitioned from dasatinb to ponatinib. Day 9  - plan for day 21 bone marrow biopsy for restaging   - TLS labs wnl. TLS labs changed to twice weekly  - Allopurinol continued.       Hyperkalemia  - shifted last 6/21 with insulin and D10  - K was 3.9  today  - started daily Lokalema on 6/21     Constipation  - daily Miralax added 6/21, changed to PRN after BM    Pharyngitis  - Continue Dukes and full liquid diet due to pain with swallowing  - Pain well-controlled with IV Morphine  - ID signed off, continue ppx antimicrobials  - RIP throat swab, strep, flu, and COVID neg  - Leukemia infiltration may be playing a role    Leukocytosis  - See blast crisis phase of CML    Diarrhea  - Reports multiple days of diarrhea prior to transfer  - C diff negative  - PRN imodium started  RESOLVED      Neutropenic fever  - Temp fo 101.4 at [place] ED. Fevers persist since transfer.  - CXR neg. Blood culture ngtd. CDiff negative. U/a neg  - Strep, flu, and COVID neg at OSH  - Throat red with exudates. Tonsils mildly swollen. CT neck showing adenopathy but no abscesses.  - CT chest without consoidations  - Last temp was 6/12  RESOLVED    Pancytopenia  - Functionally neutropenic despite leukocytosis, given low neutrophil count  - 2/2 disease and hydrea  - Daily CBC while inpatient  - Transfuse for hgb < 7 or plts < 10K  - Now on ppx bactrim levaquin fluconazole and acyclovir    Hypertension  - Takes amlodipine at home  - Given hypertension, amlodipine stopped and nifedipine and losartan started in [place]. Continued on transfer.  - BP now improved    Tumor lysis syndrome  - Uric acid and LDH elevated on presentation to [place] ED. Kidney function and electrolytes normal.  - Not G6PD deficient  - Allopurinol 300 daily  - Lokelma daily for hyperkalemia   - Daily TLS labs wnl  - IVF stopped        VTE Risk Mitigation (From admission, onward)         Ordered     heparin, porcine (PF) 100 unit/mL injection flush 300 Units  As needed (PRN)         06/16/23 1550     IP VTE HIGH RISK PATIENT  Once         06/09/23 0049     Place sequential compression device  Until discontinued         06/09/23 0049                Disposition: inpatient chemotherapy    Ade Do MD  Bone Marrow  Transplant  Esdras Cowan - Oncology ([facility])

## 2023-06-25 LAB
ABO + RH BLD: NORMAL
ALBUMIN SERPL BCP-MCNC: 3.1 G/DL (ref 3.5–5.2)
ALP SERPL-CCNC: 52 U/L (ref 55–135)
ALT SERPL W/O P-5'-P-CCNC: 51 U/L (ref 10–44)
ANION GAP SERPL CALC-SCNC: 9 MMOL/L (ref 8–16)
ANISOCYTOSIS BLD QL SMEAR: SLIGHT
AST SERPL-CCNC: 19 U/L (ref 10–40)
BASOPHILS # BLD AUTO: 0 K/UL (ref 0–0.2)
BASOPHILS NFR BLD: 0 % (ref 0–1.9)
BILIRUB SERPL-MCNC: 0.6 MG/DL (ref 0.1–1)
BLD GP AB SCN CELLS X3 SERPL QL: NORMAL
BLD PROD TYP BPU: NORMAL
BLOOD UNIT EXPIRATION DATE: NORMAL
BLOOD UNIT TYPE CODE: 1700
BLOOD UNIT TYPE: NORMAL
BUN SERPL-MCNC: 13 MG/DL (ref 6–20)
CALCIUM SERPL-MCNC: 8.9 MG/DL (ref 8.7–10.5)
CHLORIDE SERPL-SCNC: 102 MMOL/L (ref 95–110)
CO2 SERPL-SCNC: 25 MMOL/L (ref 23–29)
CODING SYSTEM: NORMAL
CREAT SERPL-MCNC: 0.8 MG/DL (ref 0.5–1.4)
CROSSMATCH INTERPRETATION: NORMAL
DIFFERENTIAL METHOD: ABNORMAL
DISPENSE STATUS: NORMAL
EOSINOPHIL # BLD AUTO: 0 K/UL (ref 0–0.5)
EOSINOPHIL NFR BLD: 0 % (ref 0–8)
ERYTHROCYTE [DISTWIDTH] IN BLOOD BY AUTOMATED COUNT: 17.2 % (ref 11.5–14.5)
EST. GFR  (NO RACE VARIABLE): >60 ML/MIN/1.73 M^2
GLUCOSE SERPL-MCNC: 92 MG/DL (ref 70–110)
HCT VFR BLD AUTO: 21.8 % (ref 40–54)
HGB BLD-MCNC: 6.9 G/DL (ref 14–18)
IMM GRANULOCYTES # BLD AUTO: 0 K/UL (ref 0–0.04)
IMM GRANULOCYTES NFR BLD AUTO: 0 % (ref 0–0.5)
LYMPHOCYTES # BLD AUTO: 0.2 K/UL (ref 1–4.8)
LYMPHOCYTES NFR BLD: 100 % (ref 18–48)
MAGNESIUM SERPL-MCNC: 2 MG/DL (ref 1.6–2.6)
MCH RBC QN AUTO: 23.4 PG (ref 27–31)
MCHC RBC AUTO-ENTMCNC: 31.7 G/DL (ref 32–36)
MCV RBC AUTO: 74 FL (ref 82–98)
MONOCYTES # BLD AUTO: 0 K/UL (ref 0.3–1)
MONOCYTES NFR BLD: 0 % (ref 4–15)
NEUTROPHILS # BLD AUTO: 0 K/UL (ref 1.8–7.7)
NEUTROPHILS NFR BLD: 0 % (ref 38–73)
NRBC BLD-RTO: 0 /100 WBC
NUM UNITS TRANS PACKED RBC: NORMAL
OVALOCYTES BLD QL SMEAR: ABNORMAL
PHOSPHATE SERPL-MCNC: 3.3 MG/DL (ref 2.7–4.5)
PLATELET # BLD AUTO: 17 K/UL (ref 150–450)
PLATELET BLD QL SMEAR: ABNORMAL
PMV BLD AUTO: ABNORMAL FL (ref 9.2–12.9)
POIKILOCYTOSIS BLD QL SMEAR: SLIGHT
POLYCHROMASIA BLD QL SMEAR: ABNORMAL
POTASSIUM SERPL-SCNC: 4.6 MMOL/L (ref 3.5–5.1)
PROT SERPL-MCNC: 6.4 G/DL (ref 6–8.4)
RBC # BLD AUTO: 2.95 M/UL (ref 4.6–6.2)
SODIUM SERPL-SCNC: 136 MMOL/L (ref 136–145)
SPECIMEN OUTDATE: NORMAL
WBC # BLD AUTO: 0.21 K/UL (ref 3.9–12.7)

## 2023-06-25 PROCEDURE — 25000003 PHARM REV CODE 250: Performed by: STUDENT IN AN ORGANIZED HEALTH CARE EDUCATION/TRAINING PROGRAM

## 2023-06-25 PROCEDURE — 20600001 HC STEP DOWN PRIVATE ROOM

## 2023-06-25 PROCEDURE — 99232 PR SUBSEQUENT HOSPITAL CARE,LEVL II: ICD-10-PCS | Mod: ,,, | Performed by: INTERNAL MEDICINE

## 2023-06-25 PROCEDURE — 99232 SBSQ HOSP IP/OBS MODERATE 35: CPT | Mod: ,,, | Performed by: INTERNAL MEDICINE

## 2023-06-25 PROCEDURE — 85025 COMPLETE CBC W/AUTO DIFF WBC: CPT | Performed by: NURSE PRACTITIONER

## 2023-06-25 PROCEDURE — 63600175 PHARM REV CODE 636 W HCPCS: Performed by: NURSE PRACTITIONER

## 2023-06-25 PROCEDURE — 80053 COMPREHEN METABOLIC PANEL: CPT | Performed by: NURSE PRACTITIONER

## 2023-06-25 PROCEDURE — 25000003 PHARM REV CODE 250: Performed by: NURSE PRACTITIONER

## 2023-06-25 PROCEDURE — 86900 BLOOD TYPING SEROLOGIC ABO: CPT | Performed by: STUDENT IN AN ORGANIZED HEALTH CARE EDUCATION/TRAINING PROGRAM

## 2023-06-25 PROCEDURE — 86920 COMPATIBILITY TEST SPIN: CPT | Performed by: NURSE PRACTITIONER

## 2023-06-25 PROCEDURE — P9040 RBC LEUKOREDUCED IRRADIATED: HCPCS | Performed by: STUDENT IN AN ORGANIZED HEALTH CARE EDUCATION/TRAINING PROGRAM

## 2023-06-25 PROCEDURE — 25000003 PHARM REV CODE 250

## 2023-06-25 PROCEDURE — 25000003 PHARM REV CODE 250: Performed by: INTERNAL MEDICINE

## 2023-06-25 PROCEDURE — 27201040 HC RC 50 FILTER: Performed by: STUDENT IN AN ORGANIZED HEALTH CARE EDUCATION/TRAINING PROGRAM

## 2023-06-25 PROCEDURE — 84100 ASSAY OF PHOSPHORUS: CPT | Performed by: NURSE PRACTITIONER

## 2023-06-25 PROCEDURE — 86920 COMPATIBILITY TEST SPIN: CPT | Performed by: STUDENT IN AN ORGANIZED HEALTH CARE EDUCATION/TRAINING PROGRAM

## 2023-06-25 PROCEDURE — A4216 STERILE WATER/SALINE, 10 ML: HCPCS | Performed by: INTERNAL MEDICINE

## 2023-06-25 PROCEDURE — 83735 ASSAY OF MAGNESIUM: CPT | Performed by: NURSE PRACTITIONER

## 2023-06-25 RX ORDER — HYDROCODONE BITARTRATE AND ACETAMINOPHEN 500; 5 MG/1; MG/1
TABLET ORAL
Status: DISCONTINUED | OUTPATIENT
Start: 2023-06-25 | End: 2023-06-29

## 2023-06-25 RX ADMIN — ALUMINUM HYDROXIDE, MAGNESIUM HYDROXIDE, AND DIMETHICONE 10 ML: 400; 400; 40 SUSPENSION ORAL at 05:06

## 2023-06-25 RX ADMIN — ACYCLOVIR 800 MG: 200 CAPSULE ORAL at 08:06

## 2023-06-25 RX ADMIN — OXYCODONE HYDROCHLORIDE 5 MG: 5 TABLET ORAL at 02:06

## 2023-06-25 RX ADMIN — GUAIFENESIN AND DEXTROMETHORPHAN HYDROBROMIDE 1 TABLET: 600; 30 TABLET, EXTENDED RELEASE ORAL at 08:06

## 2023-06-25 RX ADMIN — ALUMINUM HYDROXIDE, MAGNESIUM HYDROXIDE, AND DIMETHICONE 10 ML: 400; 400; 40 SUSPENSION ORAL at 08:06

## 2023-06-25 RX ADMIN — PONATINIB HYDROCHLORIDE 15 MG: 15 TABLET, FILM COATED ORAL at 09:06

## 2023-06-25 RX ADMIN — SEVELAMER CARBONATE 800 MG: 800 TABLET, FILM COATED ORAL at 12:06

## 2023-06-25 RX ADMIN — SEVELAMER CARBONATE 800 MG: 800 TABLET, FILM COATED ORAL at 05:06

## 2023-06-25 RX ADMIN — OXYCODONE HYDROCHLORIDE 5 MG: 5 TABLET ORAL at 06:06

## 2023-06-25 RX ADMIN — Medication 10 ML: at 12:06

## 2023-06-25 RX ADMIN — OXYCODONE HYDROCHLORIDE 5 MG: 5 TABLET ORAL at 11:06

## 2023-06-25 RX ADMIN — SODIUM ZIRCONIUM CYCLOSILICATE 10 G: 5 POWDER, FOR SUSPENSION ORAL at 08:06

## 2023-06-25 RX ADMIN — DIPHENHYDRAMINE HYDROCHLORIDE 12.5 MG: 50 INJECTION, SOLUTION INTRAMUSCULAR; INTRAVENOUS at 12:06

## 2023-06-25 RX ADMIN — Medication 10 ML: at 05:06

## 2023-06-25 RX ADMIN — OXYCODONE HYDROCHLORIDE 5 MG: 5 TABLET ORAL at 03:06

## 2023-06-25 RX ADMIN — POSACONAZOLE 300 MG: 100 TABLET, DELAYED RELEASE ORAL at 08:06

## 2023-06-25 RX ADMIN — NIFEDIPINE 60 MG: 60 TABLET, FILM COATED, EXTENDED RELEASE ORAL at 08:06

## 2023-06-25 RX ADMIN — ACYCLOVIR 800 MG: 200 CAPSULE ORAL at 09:06

## 2023-06-25 RX ADMIN — PANTOPRAZOLE SODIUM 40 MG: 40 TABLET, DELAYED RELEASE ORAL at 08:06

## 2023-06-25 RX ADMIN — ALLOPURINOL 300 MG: 300 TABLET ORAL at 08:06

## 2023-06-25 RX ADMIN — Medication 10 ML: at 06:06

## 2023-06-25 RX ADMIN — OXYCODONE HYDROCHLORIDE 5 MG: 5 TABLET ORAL at 08:06

## 2023-06-25 RX ADMIN — LEVOFLOXACIN 500 MG: 500 TABLET, FILM COATED ORAL at 08:06

## 2023-06-25 RX ADMIN — SEVELAMER CARBONATE 800 MG: 800 TABLET, FILM COATED ORAL at 08:06

## 2023-06-25 RX ADMIN — ACETAMINOPHEN 650 MG: 650 SOLUTION ORAL at 12:06

## 2023-06-25 RX ADMIN — GUAIFENESIN AND DEXTROMETHORPHAN HYDROBROMIDE 1 TABLET: 600; 30 TABLET, EXTENDED RELEASE ORAL at 09:06

## 2023-06-25 NOTE — SUBJECTIVE & OBJECTIVE
Subjective:     Interval History: Day 10 CLIA+Ponatinib, notes oropharynx mucositis feels better. Afebrile. 1 unit PRBC.     Objective:     Vital Signs (Most Recent):  Temp: 99.5 °F (37.5 °C) (06/25/23 0743)  Pulse: 107 (06/25/23 0743)  Resp: 16 (06/25/23 0809)  BP: 136/84 (06/25/23 0743)  SpO2: 100 % (06/25/23 0743) Vital Signs (24h Range):  Temp:  [98.2 °F (36.8 °C)-100 °F (37.8 °C)] 99.5 °F (37.5 °C)  Pulse:  [] 107  Resp:  [14-20] 16  SpO2:  [97 %-100 %] 100 %  BP: (134-144)/(70-92) 136/84     Weight: 81.6 kg (179 lb 14.3 oz)  Body mass index is 23.73 kg/m².  Body surface area is 2.05 meters squared.    ECOG SCORE           [unfilled]    Intake/Output - Last 3 Shifts         06/23 0700  06/24 0659 06/24 0700  06/25 0659 06/25 0700  06/26 0659    P.O. 1800 1430     Blood  456     Total Intake(mL/kg) 1800 (21.8) 1886 (23.1)     Urine (mL/kg/hr) 1400 (0.7) 1500 (0.8)     Stool  0     Total Output 1400 1500     Net +400 +386            Urine Occurrence 2 x 5 x     Stool Occurrence  1 x              Physical Exam  Vitals and nursing note reviewed.   Constitutional:       Appearance: He is well-developed.   HENT:      Head: Normocephalic and atraumatic.   Eyes:      General: No scleral icterus.     Conjunctiva/sclera: Conjunctivae normal.   Cardiovascular:      Rate and Rhythm: Normal rate.   Pulmonary:      Effort: Pulmonary effort is normal. No respiratory distress.   Abdominal:      General: There is no distension.      Palpations: Abdomen is soft.      Tenderness: There is no abdominal tenderness.   Musculoskeletal:         General: Normal range of motion.      Cervical back: Normal range of motion and neck supple.   Skin:     General: Skin is warm and dry.   Neurological:      Mental Status: He is alert and oriented to person, place, and time.      Cranial Nerves: No cranial nerve deficit.   Psychiatric:         Behavior: Behavior normal.          Significant Labs:   CBC:   Recent Labs   Lab 06/24/23  8015  06/25/23  0308   WBC 0.27* 0.21*   HGB 7.6* 6.9*   HCT 23.9* 21.8*   PLT 7* 17*    and CMP:   Recent Labs   Lab 06/24/23  0344 06/25/23  0308    136   K 3.9 4.6    102   CO2 23 25   * 92   BUN 13 13   CREATININE 0.9 0.8   CALCIUM 8.5* 8.9   PROT 6.5 6.4   ALBUMIN 3.2* 3.1*   BILITOT 0.6 0.6   ALKPHOS 54* 52*   AST 30 19   ALT 72* 51*   ANIONGAP 11 9       Diagnostic Results:  None

## 2023-06-25 NOTE — ASSESSMENT & PLAN NOTE
- Continue [place] and full liquid diet due to pain with swallowing  - Pain well-controlled with IV Morphine  - ID signed off, continue ppx antimicrobials  - RIP throat swab, strep, flu, and COVID neg  - Leukemia infiltration may be playing a role

## 2023-06-25 NOTE — ASSESSMENT & PLAN NOTE
- Uric acid and LDH elevated on presentation to [place] ED. Kidney function and electrolytes normal.  - Not G6PD deficient  - Allopurinol 300 daily  - Lokelma daily for hyperkalemia   - IVF stopped

## 2023-06-25 NOTE — ASSESSMENT & PLAN NOTE
- Takes amlodipine at home  - Given hypertension, amlodipine stopped and nifedipine and losartan started in [place]. Continued on transfer.  - BP now improved

## 2023-06-25 NOTE — ASSESSMENT & PLAN NOTE
- Patient with CML diagnosed 8/2022 Follows locally in [place] with Dr. Brett Hogan. He has been on dasatinib outpatient since 10/2022. Some question regarding his compliance over the course of treatment, however. Presented to [facility] with c/o gum swelling on 6/7/23. Labs on presentation remarkable for WBC count of 138K, with 80% blasts indicating blast crisis. WBC count from approximately a week and a half earlier was normal. Reported recent compliance with his TKI.     [facility] for higher level of care.    - Bone marrow Biopsy performed 6/9/23 c/w progression for blast phase CML.   - BCR/ABL p210 collected on admission, >55%  - BCR/ABL mutation negative  - Echo with 65% EF and mild-moderate pulmonary hypertension.  - Picc line in place  - Patient decided to not do fertility presentation  - Stared on Cladribine Idarubicin Cytarabine, CLIA and transitioned from dasatinb to ponatinib. Day 10  - plan for day 21 bone marrow biopsy for restaging   - TLS labs wnl. TLS labs changed to twice weekly  - Allopurinol continued.

## 2023-06-25 NOTE — PROGRESS NOTES
Esdras Cowan - Oncology ([facility])  Hematology  Bone Marrow Transplant  Progress Note    Patient Name: Magdaleno Hirsch  Admission Date: 6/9/2023  Hospital Length of Stay: 16 days  Code Status: Full Code    Subjective:     Interval History: Day 10 CLIA+Ponatinib, notes oropharynx mucositis feels better. Afebrile. 1 unit PRBC.     Objective:     Vital Signs (Most Recent):  Temp: 99.5 °F (37.5 °C) (06/25/23 0743)  Pulse: 107 (06/25/23 0743)  Resp: 16 (06/25/23 0809)  BP: 136/84 (06/25/23 0743)  SpO2: 100 % (06/25/23 0743) Vital Signs (24h Range):  Temp:  [98.2 °F (36.8 °C)-100 °F (37.8 °C)] 99.5 °F (37.5 °C)  Pulse:  [] 107  Resp:  [14-20] 16  SpO2:  [97 %-100 %] 100 %  BP: (134-144)/(70-92) 136/84     Weight: 81.6 kg (179 lb 14.3 oz)  Body mass index is 23.73 kg/m².  Body surface area is 2.05 meters squared.    ECOG SCORE           [unfilled]    Intake/Output - Last 3 Shifts         06/23 0700  06/24 0659 06/24 0700 06/25 0659 06/25 0700  06/26 0659    P.O. 1800 1430     Blood  456     Total Intake(mL/kg) 1800 (21.8) 1886 (23.1)     Urine (mL/kg/hr) 1400 (0.7) 1500 (0.8)     Stool  0     Total Output 1400 1500     Net +400 +386            Urine Occurrence 2 x 5 x     Stool Occurrence  1 x              Physical Exam  Vitals and nursing note reviewed.   Constitutional:       Appearance: He is well-developed.   HENT:      Head: Normocephalic and atraumatic.   Eyes:      General: No scleral icterus.     Conjunctiva/sclera: Conjunctivae normal.   Cardiovascular:      Rate and Rhythm: Normal rate.   Pulmonary:      Effort: Pulmonary effort is normal. No respiratory distress.   Abdominal:      General: There is no distension.      Palpations: Abdomen is soft.      Tenderness: There is no abdominal tenderness.   Musculoskeletal:         General: Normal range of motion.      Cervical back: Normal range of motion and neck supple.   Skin:     General: Skin is warm and dry.   Neurological:      Mental Status: He is alert and  oriented to person, place, and time.      Cranial Nerves: No cranial nerve deficit.   Psychiatric:         Behavior: Behavior normal.          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 Brett Hogan. He has been on dasatinib outpatient since 10/2022. Some question regarding his compliance over the course of treatment, however. Presented to [facility] with c/o gum swelling on 6/7/23. Labs on presentation remarkable for WBC count of 138K, with 80% blasts indicating blast crisis. WBC count from approximately a week and a half earlier was normal. Reported recent compliance with his TKI.     Transferred to [facility] for higher level of care.    - Bone marrow Biopsy performed 6/9/23 c/w progression for blast phase CML.   - BCR/ABL p210 collected on admission, >55%  - BCR/ABL mutation negative  - Echo with 65% EF and mild-moderate pulmonary hypertension.  - Picc line in place  - Patient decided to not do fertility presentation  - Stared on Cladribine Idarubicin Cytarabine, CLIA and transitioned from dasatinb to ponatinib. Day 10  - plan for day 21 bone marrow biopsy for restaging   - TLS labs wnl. TLS labs changed to twice weekly  - Allopurinol continued.       Hyperkalemia  - shifted last 6/21 with insulin and D10  - started daily Lokalema on 6/21     Constipation  - daily Miralax added 6/21, changed to PRN after BM    Pharyngitis  - Continue Dukes and full  liquid diet due to pain with swallowing  - Pain well-controlled with IV Morphine  - ID signed off, continue ppx antimicrobials  - RIP throat swab, strep, flu, and COVID neg  - Leukemia infiltration may be playing a role    Leukocytosis  - See blast crisis phase of CML    Diarrhea  - Reports multiple days of diarrhea prior to transfer  - C diff negative  - PRN imodium started  RESOLVED      Neutropenic fever  - Temp fo 101.4 at [place] ED. Fevers persist since transfer.  - CXR neg. Blood culture ngtd. CDiff negative. U/a neg  - Strep, flu, and COVID neg at OSH  - Throat red with exudates. Tonsils mildly swollen. CT neck showing adenopathy but no abscesses.  - CT chest without consoidations  - Last temp was 6/12  RESOLVED    Pancytopenia  - Functionally neutropenic despite leukocytosis, given low neutrophil count  - 2/2 disease and hydrea  - Daily CBC while inpatient  - Transfuse for hgb < 7 or plts < 10K  - Now on ppx bactrim levaquin fluconazole and acyclovir    Hypertension  - Takes amlodipine at home  - Given hypertension, amlodipine stopped and nifedipine and losartan started in [place]. Continued on transfer.  - BP now improved    Tumor lysis syndrome  - Uric acid and LDH elevated on presentation to [place] ED. Kidney function and electrolytes normal.  - Not G6PD deficient  - Allopurinol 300 daily  - Lokelma daily for hyperkalemia   - IVF stopped        VTE Risk Mitigation (From admission, onward)         Ordered     heparin, porcine (PF) 100 unit/mL injection flush 300 Units  As needed (PRN)         06/16/23 1550     IP VTE HIGH RISK PATIENT  Once         06/09/23 0049     Place sequential compression device  Until discontinued         06/09/23 0049                Disposition: inpatient chemotherapy    Ade Do MD  Bone Marrow Transplant  [facility] - Oncology ([facility])

## 2023-06-25 NOTE — ASSESSMENT & PLAN NOTE
- Temp fo 101.4 at [place] ED. Fevers persist since transfer.  - CXR neg. Blood culture ngtd. CDiff negative. U/a neg  - Strep, flu, and COVID neg at OSH  - Throat red with exudates. Tonsils mildly swollen. CT neck showing adenopathy but no abscesses.  - CT chest without consoidations  - Last temp was 6/12  RESOLVED

## 2023-06-25 NOTE — PLAN OF CARE
No acute events this shift. Afebrile & VSS on room air. Mouth/throat pain controlled with scheduled dukes solution and PRN oxy x2. Pt received 1 U RBCs, tolerated well. Ambulates independently to bathroom; re-educated on importance of I/O recording. CHG wipes provided and encouraged use. POC reviewed with patient and family at bedside. All needs addressed. Will continue to monitor with frequent rounds and clustered care to promote rest.

## 2023-06-26 PROBLEM — E44.1 MILD MALNUTRITION: Status: ACTIVE | Noted: 2023-06-26

## 2023-06-26 PROBLEM — D72.829 LEUKOCYTOSIS: Status: RESOLVED | Noted: 2023-06-09 | Resolved: 2023-06-26

## 2023-06-26 LAB
ALBUMIN SERPL BCP-MCNC: 3 G/DL (ref 3.5–5.2)
ALP SERPL-CCNC: 64 U/L (ref 55–135)
ALT SERPL W/O P-5'-P-CCNC: 46 U/L (ref 10–44)
ANION GAP SERPL CALC-SCNC: 9 MMOL/L (ref 8–16)
ANISOCYTOSIS BLD QL SMEAR: SLIGHT
APTT PPP: 26.3 SEC (ref 21–32)
AST SERPL-CCNC: 16 U/L (ref 10–40)
BASOPHILS # BLD AUTO: 0 K/UL (ref 0–0.2)
BASOPHILS NFR BLD: 0 % (ref 0–1.9)
BILIRUB SERPL-MCNC: 1.1 MG/DL (ref 0.1–1)
BLD PROD TYP BPU: NORMAL
BLOOD UNIT EXPIRATION DATE: NORMAL
BLOOD UNIT TYPE CODE: 6200
BLOOD UNIT TYPE: NORMAL
BUN SERPL-MCNC: 13 MG/DL (ref 6–20)
CALCIUM SERPL-MCNC: 9.1 MG/DL (ref 8.7–10.5)
CHLORIDE SERPL-SCNC: 102 MMOL/L (ref 95–110)
CO2 SERPL-SCNC: 25 MMOL/L (ref 23–29)
CODING SYSTEM: NORMAL
CREAT SERPL-MCNC: 0.8 MG/DL (ref 0.5–1.4)
CROSSMATCH INTERPRETATION: NORMAL
DACRYOCYTES BLD QL SMEAR: ABNORMAL
DIFFERENTIAL METHOD: ABNORMAL
DISPENSE STATUS: NORMAL
EOSINOPHIL # BLD AUTO: 0 K/UL (ref 0–0.5)
EOSINOPHIL NFR BLD: 0 % (ref 0–8)
ERYTHROCYTE [DISTWIDTH] IN BLOOD BY AUTOMATED COUNT: 17.5 % (ref 11.5–14.5)
EST. GFR  (NO RACE VARIABLE): >60 ML/MIN/1.73 M^2
FIBRINOGEN PPP-MCNC: 597 MG/DL (ref 182–400)
GLUCOSE SERPL-MCNC: 106 MG/DL (ref 70–110)
HCT VFR BLD AUTO: 22 % (ref 40–54)
HGB BLD-MCNC: 7.2 G/DL (ref 14–18)
HYPOCHROMIA BLD QL SMEAR: ABNORMAL
IMM GRANULOCYTES # BLD AUTO: 0 K/UL (ref 0–0.04)
IMM GRANULOCYTES NFR BLD AUTO: 0 % (ref 0–0.5)
INR PPP: 1 (ref 0.8–1.2)
LDH SERPL L TO P-CCNC: 208 U/L (ref 110–260)
LYMPHOCYTES # BLD AUTO: 0.2 K/UL (ref 1–4.8)
LYMPHOCYTES NFR BLD: 100 % (ref 18–48)
MAGNESIUM SERPL-MCNC: 1.8 MG/DL (ref 1.6–2.6)
MCH RBC QN AUTO: 24.7 PG (ref 27–31)
MCHC RBC AUTO-ENTMCNC: 32.7 G/DL (ref 32–36)
MCV RBC AUTO: 75 FL (ref 82–98)
MONOCYTES # BLD AUTO: 0 K/UL (ref 0.3–1)
MONOCYTES NFR BLD: 0 % (ref 4–15)
NEUTROPHILS # BLD AUTO: 0 K/UL (ref 1.8–7.7)
NEUTROPHILS NFR BLD: 0 % (ref 38–73)
NRBC BLD-RTO: 0 /100 WBC
OVALOCYTES BLD QL SMEAR: ABNORMAL
PHOSPHATE SERPL-MCNC: 3.4 MG/DL (ref 2.7–4.5)
PLATELET # BLD AUTO: 9 K/UL (ref 150–450)
PLATELET BLD QL SMEAR: ABNORMAL
PMV BLD AUTO: ABNORMAL FL (ref 9.2–12.9)
POIKILOCYTOSIS BLD QL SMEAR: SLIGHT
POLYCHROMASIA BLD QL SMEAR: ABNORMAL
POTASSIUM SERPL-SCNC: 4.4 MMOL/L (ref 3.5–5.1)
PROT SERPL-MCNC: 6.4 G/DL (ref 6–8.4)
PROTHROMBIN TIME: 10.9 SEC (ref 9–12.5)
RBC # BLD AUTO: 2.92 M/UL (ref 4.6–6.2)
SODIUM SERPL-SCNC: 136 MMOL/L (ref 136–145)
UNIT NUMBER: NORMAL
URATE SERPL-MCNC: 2.9 MG/DL (ref 3.4–7)
WBC # BLD AUTO: 0.22 K/UL (ref 3.9–12.7)

## 2023-06-26 PROCEDURE — P9037 PLATE PHERES LEUKOREDU IRRAD: HCPCS | Performed by: NURSE PRACTITIONER

## 2023-06-26 PROCEDURE — 80053 COMPREHEN METABOLIC PANEL: CPT | Performed by: NURSE PRACTITIONER

## 2023-06-26 PROCEDURE — 25000003 PHARM REV CODE 250: Performed by: STUDENT IN AN ORGANIZED HEALTH CARE EDUCATION/TRAINING PROGRAM

## 2023-06-26 PROCEDURE — 85384 FIBRINOGEN ACTIVITY: CPT | Performed by: NURSE PRACTITIONER

## 2023-06-26 PROCEDURE — 83735 ASSAY OF MAGNESIUM: CPT | Performed by: NURSE PRACTITIONER

## 2023-06-26 PROCEDURE — A4216 STERILE WATER/SALINE, 10 ML: HCPCS | Performed by: INTERNAL MEDICINE

## 2023-06-26 PROCEDURE — 25000003 PHARM REV CODE 250: Performed by: NURSE PRACTITIONER

## 2023-06-26 PROCEDURE — 20600001 HC STEP DOWN PRIVATE ROOM

## 2023-06-26 PROCEDURE — 84550 ASSAY OF BLOOD/URIC ACID: CPT | Performed by: NURSE PRACTITIONER

## 2023-06-26 PROCEDURE — 99232 PR SUBSEQUENT HOSPITAL CARE,LEVL II: ICD-10-PCS | Mod: ,,, | Performed by: INTERNAL MEDICINE

## 2023-06-26 PROCEDURE — 63600175 PHARM REV CODE 636 W HCPCS: Performed by: NURSE PRACTITIONER

## 2023-06-26 PROCEDURE — 63600175 PHARM REV CODE 636 W HCPCS: Performed by: STUDENT IN AN ORGANIZED HEALTH CARE EDUCATION/TRAINING PROGRAM

## 2023-06-26 PROCEDURE — 85610 PROTHROMBIN TIME: CPT | Performed by: NURSE PRACTITIONER

## 2023-06-26 PROCEDURE — 85730 THROMBOPLASTIN TIME PARTIAL: CPT | Performed by: NURSE PRACTITIONER

## 2023-06-26 PROCEDURE — 25000003 PHARM REV CODE 250

## 2023-06-26 PROCEDURE — 25000003 PHARM REV CODE 250: Performed by: INTERNAL MEDICINE

## 2023-06-26 PROCEDURE — 99232 SBSQ HOSP IP/OBS MODERATE 35: CPT | Mod: ,,, | Performed by: INTERNAL MEDICINE

## 2023-06-26 PROCEDURE — 36430 TRANSFUSION BLD/BLD COMPNT: CPT

## 2023-06-26 PROCEDURE — 84100 ASSAY OF PHOSPHORUS: CPT | Performed by: NURSE PRACTITIONER

## 2023-06-26 PROCEDURE — 83615 LACTATE (LD) (LDH) ENZYME: CPT | Performed by: NURSE PRACTITIONER

## 2023-06-26 PROCEDURE — 85025 COMPLETE CBC W/AUTO DIFF WBC: CPT | Performed by: NURSE PRACTITIONER

## 2023-06-26 RX ORDER — HYDROCODONE BITARTRATE AND ACETAMINOPHEN 500; 5 MG/1; MG/1
TABLET ORAL
Status: DISCONTINUED | OUTPATIENT
Start: 2023-06-26 | End: 2023-06-29

## 2023-06-26 RX ORDER — DIPHENHYDRAMINE HYDROCHLORIDE 50 MG/ML
12.5 INJECTION INTRAMUSCULAR; INTRAVENOUS ONCE
Status: COMPLETED | OUTPATIENT
Start: 2023-06-26 | End: 2023-06-26

## 2023-06-26 RX ADMIN — DIPHENHYDRAMINE HYDROCHLORIDE 12.5 MG: 50 INJECTION, SOLUTION INTRAMUSCULAR; INTRAVENOUS at 10:06

## 2023-06-26 RX ADMIN — OXYCODONE HYDROCHLORIDE 5 MG: 5 TABLET ORAL at 06:06

## 2023-06-26 RX ADMIN — LEVOFLOXACIN 500 MG: 500 TABLET, FILM COATED ORAL at 09:06

## 2023-06-26 RX ADMIN — Medication 10 ML: at 05:06

## 2023-06-26 RX ADMIN — ACYCLOVIR 800 MG: 200 CAPSULE ORAL at 09:06

## 2023-06-26 RX ADMIN — MAGNESIUM OXIDE TAB 400 MG (241.3 MG ELEMENTAL MG) 400 MG: 400 (241.3 MG) TAB at 09:06

## 2023-06-26 RX ADMIN — MAGNESIUM OXIDE TAB 400 MG (241.3 MG ELEMENTAL MG) 400 MG: 400 (241.3 MG) TAB at 01:06

## 2023-06-26 RX ADMIN — SEVELAMER CARBONATE 800 MG: 800 TABLET, FILM COATED ORAL at 05:06

## 2023-06-26 RX ADMIN — SEVELAMER CARBONATE 800 MG: 800 TABLET, FILM COATED ORAL at 01:06

## 2023-06-26 RX ADMIN — ALUMINUM HYDROXIDE, MAGNESIUM HYDROXIDE, AND DIMETHICONE 10 ML: 400; 400; 40 SUSPENSION ORAL at 01:06

## 2023-06-26 RX ADMIN — PONATINIB HYDROCHLORIDE 15 MG: 15 TABLET, FILM COATED ORAL at 09:06

## 2023-06-26 RX ADMIN — DIPHENHYDRAMINE HYDROCHLORIDE 12.5 MG: 50 INJECTION, SOLUTION INTRAMUSCULAR; INTRAVENOUS at 12:06

## 2023-06-26 RX ADMIN — Medication 10 ML: at 06:06

## 2023-06-26 RX ADMIN — ALUMINUM HYDROXIDE, MAGNESIUM HYDROXIDE, AND DIMETHICONE 10 ML: 400; 400; 40 SUSPENSION ORAL at 09:06

## 2023-06-26 RX ADMIN — SULFAMETHOXAZOLE AND TRIMETHOPRIM 1 TABLET: 800; 160 TABLET ORAL at 05:06

## 2023-06-26 RX ADMIN — SEVELAMER CARBONATE 800 MG: 800 TABLET, FILM COATED ORAL at 09:06

## 2023-06-26 RX ADMIN — GUAIFENESIN AND DEXTROMETHORPHAN HYDROBROMIDE 1 TABLET: 600; 30 TABLET, EXTENDED RELEASE ORAL at 08:06

## 2023-06-26 RX ADMIN — POSACONAZOLE 300 MG: 100 TABLET, DELAYED RELEASE ORAL at 09:06

## 2023-06-26 RX ADMIN — GUAIFENESIN AND DEXTROMETHORPHAN HYDROBROMIDE 1 TABLET: 600; 30 TABLET, EXTENDED RELEASE ORAL at 09:06

## 2023-06-26 RX ADMIN — NIFEDIPINE 60 MG: 60 TABLET, FILM COATED, EXTENDED RELEASE ORAL at 09:06

## 2023-06-26 RX ADMIN — Medication 10 ML: at 11:06

## 2023-06-26 RX ADMIN — ACETAMINOPHEN 650 MG: 650 SOLUTION ORAL at 10:06

## 2023-06-26 RX ADMIN — OXYCODONE HYDROCHLORIDE 5 MG: 5 TABLET ORAL at 11:06

## 2023-06-26 RX ADMIN — ACYCLOVIR 800 MG: 200 CAPSULE ORAL at 08:06

## 2023-06-26 RX ADMIN — ALUMINUM HYDROXIDE, MAGNESIUM HYDROXIDE, AND DIMETHICONE 10 ML: 400; 400; 40 SUSPENSION ORAL at 05:06

## 2023-06-26 RX ADMIN — Medication 10 ML: at 12:06

## 2023-06-26 RX ADMIN — PANTOPRAZOLE SODIUM 40 MG: 40 TABLET, DELAYED RELEASE ORAL at 09:06

## 2023-06-26 RX ADMIN — SODIUM ZIRCONIUM CYCLOSILICATE 10 G: 5 POWDER, FOR SUSPENSION ORAL at 11:06

## 2023-06-26 RX ADMIN — OXYCODONE HYDROCHLORIDE 5 MG: 5 TABLET ORAL at 10:06

## 2023-06-26 NOTE — ASSESSMENT & PLAN NOTE
- Temp fo 101.4 at [place] ED. Fevers persist since transfer.  - CXR neg. Blood culture ngtd. CDiff negative. U/a neg  - Strep, flu, and COVID neg at OSH  - Throat red with exudates. Tonsils mildly swollen. CT neck showing adenopathy but no abscesses.  - CT chest without consoidations  - Last temp was 6/12  RESOLVED

## 2023-06-26 NOTE — ASSESSMENT & PLAN NOTE
Malnutrition Type:  Context: chronic illness  Level: mild    Related to (etiology):   Decreased ability to consume sufficient energy    Signs and Symptoms (as evidenced by):   Decreased PO intake ~0-25% at meals  Weight loss 8.6% x 2 weeks    Malnutrition Characteristic Summary:  Weight Loss (Malnutrition): other (see comments) (8.6% x 2 weeks)  Energy Intake (Malnutrition): less than 75% for greater than 7 days      Interventions/Recommendations (treatment strategy):  Collaboration of nutrition care with other providers  Com[employer] Bever[employer]  High-Calorie, high-protein diet  TPN    Nutrition Diagnosis Status:   New

## 2023-06-26 NOTE — PLAN OF CARE
No acute events this shift. Afebrile & VSS on room air. Mucositis pain controlled with scheduled dukes solution and PRN oxy x1. Pt received 1 U plts, pt with extreme itching during infusion despite pre-meds and previous transfusions. MD notified and resolved with dose of benadryl. Electrolytes closely monitored, magnesium replaced per PRN orders. Ambulates independently to bathroom; re-educated on importance of I/O recording. CHG wipes provided and encouraged use. POC reviewed with patient. All needs addressed. Will continue to monitor with frequent rounds and clustered care to promote rest.

## 2023-06-26 NOTE — PROGRESS NOTES
Esdras Cowan - Oncology ([facility])  Adult Nutrition  Consult Note    SUMMARY     Recommendations    1. Continue Regular Diet, add High-Calorie, high-protein modifier. Continue to encourage PO intake.   2. Continue Boost Plus Vanilla -TID   3. Electolyte replacement PRN.   4. RD to monitor and follow-up.     If TPN warranted recommend: 278 g d + 56 g AA + IV Lipids to provide 1671 kcal, 56 g PRO (GIR 2.36)    Goals: To meet % of EEN,EPN by next RD follow up  Nutrition Goal Status: progressing towards goal  Communication of RD Recs:  (Plan of care (POC))    Assessment and Plan    Endocrine  Mild malnutrition  Malnutrition Type:  Context: chronic illness  Level: mild    Related to (etiology):   Decreased ability to consume sufficient energy    Signs and Symptoms (as evidenced by):   Decreased PO intake ~0-25% at meals  Weight loss 8.6% x 2 weeks    Malnutrition Characteristic Summary:  Weight Loss (Malnutrition): other (see comments) (8.6% x 2 weeks)  Energy Intake (Malnutrition): less than 75% for greater than 7 days      Interventions/Recommendations (treatment strategy):  Collaboration of nutrition care with other providers  Com[employer] Bever[employer]  High-Calorie, high-protein diet  TPN    Nutrition Diagnosis Status:   New         Malnutrition Assessment  Malnutrition Context: chronic illness  Malnutrition Level: mild          Weight Loss (Malnutrition): other (see comments) (8.6% x 2 weeks)  Energy Intake (Malnutrition): less than 75% for greater than 7 days                         Reason for Assessment    Reason For Assessment: RD follow-up  Diagnosis: cancer diagnosis/related complications (Cancer Myeloid Leukemia)  Relevant Medical History: HTN and TKI-induced nausea (Tyrosine Kinase Inhibitors)  Interdisciplinary Rounds: did not attend  General Information Comments: RD follow-up. RD spoke with patient at bedside. No c/o N/V/C/D at this time; Denies difficulty chewing/swallowing. PO intake has declined 0-25% at  "meals, patient states he is drinking the Boost (3 unopened) at bedside. Patient states he has doordashed some meals, lastmeal was a from [employer] a grilled chicken sandwich. Currently on a Regular diet, recommend adding High-Calorie, high-protein. RD provided high-calorie, high-protein nutrition therapy and food safety education. Handout provided with RD contact information. All questions/concerns addressed. No other needs identified. Weight loss noted 17 lbs (8.6%) x 2 weeks.  Nutrition Discharge Planning: Pending Clinical Course, High-Calorie, high-protein and food safety education provided on 6/26    Nutrition Risk Screen    Nutrition Risk Screen: no indicators present    Nutrition/Diet History    Patient Reported Diet/Restrictions/Preferences: general  Typical Food/Fluid Intake: 3 meals a day  Food Preferences: Grilled fish and water  Spiritual, Cultural Beliefs, Roman Catholic Practices, Values that Affect Care: no  Food Allergies: NKFA  Factors Affecting Nutritional Intake: None identified at this time    Anthropometrics    Temp: 98.9 °F (37.2 °C)  Height Method: Stated  Height: 6' 1" (185.4 cm)  Height (inches): 73 in  Weight Method: Standard Scale  Weight: 81.6 kg (179 lb 14.3 oz)  Weight (lb): 179.9 lb  Ideal Body Weight (IBW), Male: 184 lb  % Ideal Body Weight, Male (lb): 106.76 %  BMI (Calculated): 23.7  BMI Grade: 25 - 29.9 - overweight       Lab/Procedures/Meds    Pertinent Labs Reviewed: reviewed  Pertinent Labs Comments: H/H: 7.2/22.0, uric acid 2.9, bilirubin 1.1, ALT 4.6  Pertinent Medications Reviewed: reviewed  Pertinent Medications Comments: Dexamethasone Injection, Ondansetron, Sevelamer Carbonate, Sodium Zirconium, Cyclosilicate      Estimated/Assessed Needs    Weight Used For Calorie Calculations: 89.1 kg (196 lb 6.9 oz)  Energy Calorie Requirements (kcal): 2228 kcals/day (25 kcal/kg)  Energy Need Method: Kcal/kg  Protein Requirements:  grams of protein/day  Weight Used For Protein " Calculations: 89.1 kg (196 lb 6.9 oz)  Fluid Requirements (mL): 1 mL/kcal or Per MD  Estimated Fluid Requirement Method: RDA Method  RDA Method (mL): 2228         Nutrition Prescription Ordered    Current Diet Order: Diet Adult Regular (IDDSI Level 7)  Oral Nutrition Supplement: Boost Plus    Evaluation of Received Nutrient/Fluid Intake    I/O: -582.5 mL  Energy Calories Required: not meeting needs  Protein Required: not meeting needs  Comments: LBM: 6/25  Tolerance: tolerating  % Intake of Estimated Energy Needs: 75 - 100 %  % Meal Intake: 25 - 50 %    Nutrition Risk    Level of Risk/Frequency of Follow-up: low - moderate       Monitor and Evaluation    Food and Nutrient Intake: energy intake, food and beverage intake  Food and Nutrient Adminstration: diet order  Knowledge/Beliefs/Attitudes: food and nutrition knowledge/skill, beliefs and attitudes  Physical Activity and Function: nutrition-related ADLs and IADLs, factors affecting access to physical activity  Anthropometric Measurements: height/length, weight, weight change, body mass index  Biochemical Data, Medical Tests and Procedures: gastrointestinal profile, electrolyte and renal panel, glucose/endocrine profile, inflammatory profile, lipid profile  Nutrition-Focused Physical Findings: overall appearance       Nutrition Follow-Up    RD Follow-up?: Yes    Mauro Cárdenas Registration Eligible, Provisional LDN

## 2023-06-26 NOTE — ASSESSMENT & PLAN NOTE
- 2/2 chemo  - Daily CBC while inpatient  - Transfuse for hgb < 7 or plts < 10K  - Now on ppx bactrim levaquin fluconazole and acyclovir

## 2023-06-26 NOTE — ASSESSMENT & PLAN NOTE
- Patient with CML diagnosed 8/2022 Follows locally in [place] with Dr. Brett Hogan. He has been on dasatinib outpatient since 10/2022. Some question regarding his compliance over the course of treatment, however. Presented to [facility] with c/o gum swelling on 6/7/23. Labs on presentation remarkable for WBC count of 138K, with 80% blasts indicating blast crisis. WBC count from approximately a week and a half earlier was normal. Reported recent compliance with his TKI.     Transferred to [facility] for higher level of care.    - Bone marrow Biopsy performed 6/9/23 c/w progression for blast phase CML.   - BCR/ABL p210 collected on admission, >55%  - BCR/ABL mutation negative  - Echo with 65% EF and mild-moderate pulmonary hypertension.  - Picc line in place  - Patient decided to not do fertility presentation  - Stared on Cladribine Idarubicin Cytarabine, CLIA and transitioned from dasatinb to ponatinib. Day 11  - plan for day 21 bone marrow biopsy for restaging   - TLS labs wnl. TLS labs stopped and allopurinol stopped

## 2023-06-26 NOTE — PROGRESS NOTES
[facility] - Oncology ([facility])  Hematology  Bone Marrow Transplant  Progress Note    Patient Name: Magdaleno Hirsch  Admission Date: 6/9/2023  Hospital Length of Stay: 17 days  Code Status: Full Code    Subjective:     Interval History: Day 11 CLIA + Ponatinib for blast phase CML/AML. TLS labs remain wnl, will stop allopurinol. Mucositis controlled with po Oxy. T.max 99.8. on ppx antimicrobials.     Objective:     Vital Signs (Most Recent):  Temp: 99.2 °F (37.3 °C) (06/26/23 0727)  Pulse: (Abnormal) 112 (06/26/23 0709)  Resp: 16 (06/26/23 1036)  BP: 132/76 (06/26/23 0709)  SpO2: 98 % (06/26/23 0709) Vital Signs (24h Range):  Temp:  [98.2 °F (36.8 °C)-100.9 °F (38.3 °C)] 99.2 °F (37.3 °C)  Pulse:  [] 112  Resp:  [16-20] 16  SpO2:  [94 %-100 %] 98 %  BP: (127-142)/(69-91) 132/76     Weight: 81.6 kg (179 lb 14.3 oz)  Body mass index is 23.73 kg/m².  Body surface area is 2.05 meters squared.    ECOG SCORE             Intake/Output - Last 3 Shifts       Intake/Output Category 06/24 0700 to 06/25 0659 06/25 0700 to 06/26 0659 06/26 0700 to 06/27 0659    P.O. 1430 425     Blood 456 192.5     Total Intake(mL/kg) 1886 (23.1) 617.5 (7.6)     Urine (mL/kg/hr) 1500 (0.8) 1200 (0.6) 600 (1.9)    Stool 0 0     Total Output 1500 1200 600    Net +386 -582.5 -600           Urine Occurrence 5 x 3 x     Stool Occurrence 1 x 1 x              Physical Exam  Vitals and nursing note reviewed.   Constitutional:       Appearance: He is well-developed.   HENT:      Head: Normocephalic and atraumatic.      Mouth/Throat:      Dentition: Gingival swelling present.      Pharynx: Pharyngeal swelling (improved) and posterior oropharyngeal erythema (improved) present.   Eyes:      General: No scleral icterus.     Conjunctiva/sclera: Conjunctivae normal.   Cardiovascular:      Rate and Rhythm: Normal rate and regular rhythm.      Heart sounds: Normal heart sounds. No murmur heard.  Pulmonary:      Effort: Pulmonary effort is normal. No respiratory  distress.      Breath sounds: Normal breath sounds. No wheezing.   Abdominal:      General: Bowel sounds are normal. There is no distension.      Palpations: Abdomen is soft.      Tenderness: There is no abdominal tenderness.   Musculoskeletal:         General: No tenderness. Normal range of motion.      Cervical back: Normal range of motion and neck supple.   Lymphadenopathy:      Cervical: No cervical adenopathy.   Skin:     General: Skin is warm and dry.      Findings: No rash.      Comments: PICC Intact to right arm with no redness or drainage   Neurological:      Mental Status: He is alert and oriented to person, place, and time.      Cranial Nerves: No cranial nerve deficit.      Coordination: Coordination normal.   Psychiatric:         Behavior: Behavior normal.      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 Brett Hogan. He has been on dasatinib outpatient since 10/2022. Some question regarding his compliance over the course of treatment, however. Presented to [facility] with c/o gum swelling on 6/7/23. Labs on presentation remarkable for WBC count of 138K, with 80% blasts indicating blast crisis. WBC count from approximately a week and a half earlier was normal. Reported recent compliance with his TKI.     Transferred to [facility] for higher level of care.    - Bone marrow Biopsy performed 6/9/23 c/w progression  for blast phase CML.   - BCR/ABL p210 collected on admission, >55%  - BCR/ABL mutation negative  - Echo with 65% EF and mild-moderate pulmonary hypertension.  - Picc line in place  - Patient decided to not do fertility presentation  - Stared on Cladribine Idarubicin Cytarabine, CLIA and transitioned from dasatinb to ponatinib. Day 11  - plan for day 21 bone marrow biopsy for restaging   - TLS labs wnl. TLS labs stopped and allopurinol stopped       Pancytopenia  - 2/2 chemo  - Daily CBC while inpatient  - Transfuse for hgb < 7 or plts < 10K  - Now on ppx bactrim levaquin fluconazole and acyclovir    Neutropenic fever  - Temp fo 101.4 at [place] ED. Fevers persist since transfer.  - CXR neg. Blood culture ngtd. CDiff negative. U/a neg  - Strep, flu, and COVID neg at OSH  - Throat red with exudates. Tonsils mildly swollen. CT neck showing adenopathy but no abscesses.  - CT chest without consoidations  - Last temp was 6/12  RESOLVED    Tumor lysis syndrome  - Uric acid and LDH elevated on presentation to [place] ED. Kidney function and electrolytes normal.  - Not G6PD deficient  - Allopurinol 300 daily, will stop today  - Lokelma daily for hyperkalemia   - IVF stopped    Pharyngitis  - Continue [place]   - Pain well-controlled with Po OXy, has IV Morphine available if needed (has not been using)  - ID signed off, continue ppx antimicrobials  - RIP throat swab, strep, flu, and COVID neg  - Leukemia infiltration may be playing a role  Much Improved    Hyperkalemia  - shifted last 6/21 with insulin and D10  - started daily Lokalema on 6/21   - K wnl today    Constipation  - daily Miralax added 6/21, changed to PRN after BM    Diarrhea  - Reports multiple days of diarrhea prior to transfer  - C diff negative  - PRN imodium started  RESOLVED      Hypertension  - Takes amlodipine at home  - Given hypertension, amlodipine stopped and nifedipine and losartan started in [place]. Continued on transfer.  - BP now  improved        VTE Risk Mitigation (From admission, onward)         Ordered     heparin, porcine (PF) 100 unit/mL injection flush 300 Units  As needed (PRN)         06/16/23 1550     IP VTE HIGH RISK PATIENT  Once         06/09/23 0049     Place sequential compression device  Until discontinued         06/09/23 0049                Disposition: inpatient     Suzan Carreon NP  Bone Marrow Transplant  [facility] - Oncology ([facility])

## 2023-06-26 NOTE — ASSESSMENT & PLAN NOTE
- Uric acid and LDH elevated on presentation to [place] ED. Kidney function and electrolytes normal.  - Not G6PD deficient  - Allopurinol 300 daily, will stop today  - Lokelma daily for hyperkalemia   - IVF stopped

## 2023-06-26 NOTE — SUBJECTIVE & OBJECTIVE
Subjective:     Interval History: Day 11 CLIA + Ponatinib for blast phase CML/AML. TLS labs remain wnl, will stop allopurinol. Mucositis controlled with po Oxy. T.max 99.8. on ppx antimicrobials.     Objective:     Vital Signs (Most Recent):  Temp: 99.2 °F (37.3 °C) (06/26/23 0727)  Pulse: (Abnormal) 112 (06/26/23 0709)  Resp: 16 (06/26/23 1036)  BP: 132/76 (06/26/23 0709)  SpO2: 98 % (06/26/23 0709) Vital Signs (24h Range):  Temp:  [98.2 °F (36.8 °C)-100.9 °F (38.3 °C)] 99.2 °F (37.3 °C)  Pulse:  [] 112  Resp:  [16-20] 16  SpO2:  [94 %-100 %] 98 %  BP: (127-142)/(69-91) 132/76     Weight: 81.6 kg (179 lb 14.3 oz)  Body mass index is 23.73 kg/m².  Body surface area is 2.05 meters squared.    ECOG SCORE             Intake/Output - Last 3 Shifts       Intake/Output Category 06/24 0700 to 06/25 0659 06/25 0700 to 06/26 0659 06/26 0700 to 06/27 0659    P.O. 1430 425     Blood 456 192.5     Total Intake(mL/kg) 1886 (23.1) 617.5 (7.6)     Urine (mL/kg/hr) 1500 (0.8) 1200 (0.6) 600 (1.9)    Stool 0 0     Total Output 1500 1200 600    Net +386 -582.5 -600           Urine Occurrence 5 x 3 x     Stool Occurrence 1 x 1 x              Physical Exam  Vitals and nursing note reviewed.   Constitutional:       Appearance: He is well-developed.   HENT:      Head: Normocephalic and atraumatic.      Mouth/Throat:      Dentition: Gingival swelling present.      Pharynx: Pharyngeal swelling (improved) and posterior oropharyngeal erythema (improved) present.   Eyes:      General: No scleral icterus.     Conjunctiva/sclera: Conjunctivae normal.   Cardiovascular:      Rate and Rhythm: Normal rate and regular rhythm.      Heart sounds: Normal heart sounds. No murmur heard.  Pulmonary:      Effort: Pulmonary effort is normal. No respiratory distress.      Breath sounds: Normal breath sounds. No wheezing.   Abdominal:      General: Bowel sounds are normal. There is no distension.      Palpations: Abdomen is soft.      Tenderness:  There is no abdominal tenderness.   Musculoskeletal:         General: No tenderness. Normal range of motion.      Cervical back: Normal range of motion and neck supple.   Lymphadenopathy:      Cervical: No cervical adenopathy.   Skin:     General: Skin is warm and dry.      Findings: No rash.      Comments: PICC Intact to right arm with no redness or drainage   Neurological:      Mental Status: He is alert and oriented to person, place, and time.      Cranial Nerves: No cranial nerve deficit.      Coordination: Coordination normal.   Psychiatric:         Behavior: Behavior normal.      Comments: Flat affect          Significant Labs:   CBC:   Recent Labs   Lab 06/25/23  0308 06/26/23  0441   WBC 0.21* 0.22*   HGB 6.9* 7.2*   HCT 21.8* 22.0*   PLT 17* 9*    and CMP:   Recent Labs   Lab 06/25/23  0308 06/26/23  0441    136   K 4.6 4.4    102   CO2 25 25   GLU 92 106   BUN 13 13   CREATININE 0.8 0.8   CALCIUM 8.9 9.1   PROT 6.4 6.4   ALBUMIN 3.1* 3.0*   BILITOT 0.6 1.1*   ALKPHOS 52* 64   AST 19 16   ALT 51* 46*   ANIONGAP 9 9       Diagnostic Results:  None

## 2023-06-26 NOTE — PLAN OF CARE
Recommendations    1. Continue Regular Diet, add High-Calorie, high-protein modifier. Continue to encourage PO intake.   2. Continue Boost Plus Vanilla -TID   3. Electolyte replacement PRN.   4. RD to monitor and follow-up.     If TPN warranted recommend: 278 g d + 56 g AA + IV Lipids to provide 1671 kcal, 56 g PRO (GIR 2.36)    Goals: To meet % of EEN,EPN by next RD follow up  Nutrition Goal Status: progressing towards goal  Communication of RD Recs:  (Plan of care (POC))

## 2023-06-26 NOTE — ASSESSMENT & PLAN NOTE
- Continue [place]   - Pain well-controlled with Po OXy, has IV Morphine available if needed (has not been using)  - ID signed off, continue ppx antimicrobials  - RIP throat swab, strep, flu, and COVID neg  - Leukemia infiltration may be playing a role  Much Improved

## 2023-06-26 NOTE — ASSESSMENT & PLAN NOTE
- Takes amlodipine at home  - Given hypertension, amlodipine stopped and nifedipine and losartan started in [place]. Continued on transfer.  - BP now improved

## 2023-06-27 PROBLEM — R19.7 DIARRHEA: Status: RESOLVED | Noted: 2023-06-09 | Resolved: 2023-06-27

## 2023-06-27 PROBLEM — D61.810 PANCYTOPENIA DUE TO ANTINEOPLASTIC CHEMOTHERAPY: Status: ACTIVE | Noted: 2023-06-08

## 2023-06-27 PROBLEM — T45.1X5A PANCYTOPENIA DUE TO ANTINEOPLASTIC CHEMOTHERAPY: Status: ACTIVE | Noted: 2023-06-08

## 2023-06-27 PROBLEM — E87.5 HYPERKALEMIA: Status: RESOLVED | Noted: 2023-06-21 | Resolved: 2023-06-27

## 2023-06-27 PROBLEM — K59.00 CONSTIPATION: Status: RESOLVED | Noted: 2023-06-21 | Resolved: 2023-06-27

## 2023-06-27 PROBLEM — E88.3 TUMOR LYSIS SYNDROME: Status: RESOLVED | Noted: 2023-06-08 | Resolved: 2023-06-27

## 2023-06-27 LAB
ALBUMIN SERPL BCP-MCNC: 3 G/DL (ref 3.5–5.2)
ALP SERPL-CCNC: 74 U/L (ref 55–135)
ALT SERPL W/O P-5'-P-CCNC: 40 U/L (ref 10–44)
ANION GAP SERPL CALC-SCNC: 8 MMOL/L (ref 8–16)
ANISOCYTOSIS BLD QL SMEAR: SLIGHT
AST SERPL-CCNC: 17 U/L (ref 10–40)
BASOPHILS # BLD AUTO: 0 K/UL (ref 0–0.2)
BASOPHILS NFR BLD: 0 % (ref 0–1.9)
BILIRUB SERPL-MCNC: 0.7 MG/DL (ref 0.1–1)
BLD PROD TYP BPU: NORMAL
BLD PROD TYP BPU: NORMAL
BLOOD UNIT EXPIRATION DATE: NORMAL
BLOOD UNIT EXPIRATION DATE: NORMAL
BLOOD UNIT TYPE CODE: 1700
BLOOD UNIT TYPE CODE: 6200
BLOOD UNIT TYPE: NORMAL
BLOOD UNIT TYPE: NORMAL
BUN SERPL-MCNC: 14 MG/DL (ref 6–20)
CALCIUM SERPL-MCNC: 8.8 MG/DL (ref 8.7–10.5)
CHLORIDE SERPL-SCNC: 101 MMOL/L (ref 95–110)
CO2 SERPL-SCNC: 26 MMOL/L (ref 23–29)
CODING SYSTEM: NORMAL
CODING SYSTEM: NORMAL
CREAT SERPL-MCNC: 0.8 MG/DL (ref 0.5–1.4)
CROSSMATCH INTERPRETATION: NORMAL
CROSSMATCH INTERPRETATION: NORMAL
DACRYOCYTES BLD QL SMEAR: ABNORMAL
DIFFERENTIAL METHOD: ABNORMAL
DISPENSE STATUS: NORMAL
DISPENSE STATUS: NORMAL
EOSINOPHIL # BLD AUTO: 0 K/UL (ref 0–0.5)
EOSINOPHIL NFR BLD: 0 % (ref 0–8)
ERYTHROCYTE [DISTWIDTH] IN BLOOD BY AUTOMATED COUNT: 16.9 % (ref 11.5–14.5)
EST. GFR  (NO RACE VARIABLE): >60 ML/MIN/1.73 M^2
GLUCOSE SERPL-MCNC: 99 MG/DL (ref 70–110)
HCT VFR BLD AUTO: 20.4 % (ref 40–54)
HGB BLD-MCNC: 6.6 G/DL (ref 14–18)
HYPOCHROMIA BLD QL SMEAR: ABNORMAL
IMM GRANULOCYTES # BLD AUTO: 0 K/UL (ref 0–0.04)
IMM GRANULOCYTES NFR BLD AUTO: 0 % (ref 0–0.5)
LYMPHOCYTES # BLD AUTO: 0.3 K/UL (ref 1–4.8)
LYMPHOCYTES NFR BLD: 96.3 % (ref 18–48)
MAGNESIUM SERPL-MCNC: 1.9 MG/DL (ref 1.6–2.6)
MCH RBC QN AUTO: 24.6 PG (ref 27–31)
MCHC RBC AUTO-ENTMCNC: 32.4 G/DL (ref 32–36)
MCV RBC AUTO: 76 FL (ref 82–98)
MONOCYTES # BLD AUTO: 0 K/UL (ref 0.3–1)
MONOCYTES NFR BLD: 0 % (ref 4–15)
NEUTROPHILS # BLD AUTO: 0 K/UL (ref 1.8–7.7)
NEUTROPHILS NFR BLD: 3.7 % (ref 38–73)
NRBC BLD-RTO: 0 /100 WBC
NUM UNITS TRANS PACKED RBC: NORMAL
OVALOCYTES BLD QL SMEAR: ABNORMAL
PHOSPHATE SERPL-MCNC: 3.5 MG/DL (ref 2.7–4.5)
PLATELET # BLD AUTO: 9 K/UL (ref 150–450)
PLATELET BLD QL SMEAR: ABNORMAL
PMV BLD AUTO: ABNORMAL FL (ref 9.2–12.9)
POIKILOCYTOSIS BLD QL SMEAR: SLIGHT
POLYCHROMASIA BLD QL SMEAR: ABNORMAL
POTASSIUM SERPL-SCNC: 4 MMOL/L (ref 3.5–5.1)
PROT SERPL-MCNC: 6.4 G/DL (ref 6–8.4)
RBC # BLD AUTO: 2.68 M/UL (ref 4.6–6.2)
SODIUM SERPL-SCNC: 135 MMOL/L (ref 136–145)
SPHEROCYTES BLD QL SMEAR: ABNORMAL
STOMATOCYTES BLD QL SMEAR: ABNORMAL
UNIT NUMBER: NORMAL
WBC # BLD AUTO: 0.27 K/UL (ref 3.9–12.7)

## 2023-06-27 PROCEDURE — 83735 ASSAY OF MAGNESIUM: CPT | Performed by: NURSE PRACTITIONER

## 2023-06-27 PROCEDURE — 20600001 HC STEP DOWN PRIVATE ROOM

## 2023-06-27 PROCEDURE — P9037 PLATE PHERES LEUKOREDU IRRAD: HCPCS | Performed by: NURSE PRACTITIONER

## 2023-06-27 PROCEDURE — 25000003 PHARM REV CODE 250: Performed by: NURSE PRACTITIONER

## 2023-06-27 PROCEDURE — 25000003 PHARM REV CODE 250: Performed by: INTERNAL MEDICINE

## 2023-06-27 PROCEDURE — 85025 COMPLETE CBC W/AUTO DIFF WBC: CPT | Performed by: NURSE PRACTITIONER

## 2023-06-27 PROCEDURE — 25000003 PHARM REV CODE 250: Performed by: STUDENT IN AN ORGANIZED HEALTH CARE EDUCATION/TRAINING PROGRAM

## 2023-06-27 PROCEDURE — 63600175 PHARM REV CODE 636 W HCPCS: Performed by: NURSE PRACTITIONER

## 2023-06-27 PROCEDURE — 80053 COMPREHEN METABOLIC PANEL: CPT | Performed by: NURSE PRACTITIONER

## 2023-06-27 PROCEDURE — 99232 PR SUBSEQUENT HOSPITAL CARE,LEVL II: ICD-10-PCS | Mod: ,,, | Performed by: INTERNAL MEDICINE

## 2023-06-27 PROCEDURE — 84100 ASSAY OF PHOSPHORUS: CPT | Performed by: NURSE PRACTITIONER

## 2023-06-27 PROCEDURE — P9038 RBC IRRADIATED: HCPCS | Performed by: NURSE PRACTITIONER

## 2023-06-27 PROCEDURE — A4216 STERILE WATER/SALINE, 10 ML: HCPCS | Performed by: INTERNAL MEDICINE

## 2023-06-27 PROCEDURE — 99232 SBSQ HOSP IP/OBS MODERATE 35: CPT | Mod: ,,, | Performed by: INTERNAL MEDICINE

## 2023-06-27 PROCEDURE — 25000003 PHARM REV CODE 250

## 2023-06-27 RX ADMIN — OXYCODONE HYDROCHLORIDE 5 MG: 5 TABLET ORAL at 02:06

## 2023-06-27 RX ADMIN — PONATINIB HYDROCHLORIDE 15 MG: 15 TABLET, FILM COATED ORAL at 09:06

## 2023-06-27 RX ADMIN — SEVELAMER CARBONATE 800 MG: 800 TABLET, FILM COATED ORAL at 12:06

## 2023-06-27 RX ADMIN — ALUMINUM HYDROXIDE, MAGNESIUM HYDROXIDE, AND DIMETHICONE 10 ML: 400; 400; 40 SUSPENSION ORAL at 12:06

## 2023-06-27 RX ADMIN — GUAIFENESIN AND DEXTROMETHORPHAN HYDROBROMIDE 1 TABLET: 600; 30 TABLET, EXTENDED RELEASE ORAL at 09:06

## 2023-06-27 RX ADMIN — ACYCLOVIR 800 MG: 200 CAPSULE ORAL at 09:06

## 2023-06-27 RX ADMIN — ALUMINUM HYDROXIDE, MAGNESIUM HYDROXIDE, AND DIMETHICONE 10 ML: 400; 400; 40 SUSPENSION ORAL at 08:06

## 2023-06-27 RX ADMIN — SODIUM ZIRCONIUM CYCLOSILICATE 10 G: 5 POWDER, FOR SUSPENSION ORAL at 11:06

## 2023-06-27 RX ADMIN — ALUMINUM HYDROXIDE, MAGNESIUM HYDROXIDE, AND DIMETHICONE 10 ML: 400; 400; 40 SUSPENSION ORAL at 09:06

## 2023-06-27 RX ADMIN — Medication 10 ML: at 12:06

## 2023-06-27 RX ADMIN — OXYCODONE HYDROCHLORIDE 5 MG: 5 TABLET ORAL at 08:06

## 2023-06-27 RX ADMIN — ACETAMINOPHEN 650 MG: 650 SOLUTION ORAL at 09:06

## 2023-06-27 RX ADMIN — LEVOFLOXACIN 500 MG: 500 TABLET, FILM COATED ORAL at 09:06

## 2023-06-27 RX ADMIN — ALUMINUM HYDROXIDE, MAGNESIUM HYDROXIDE, AND DIMETHICONE 10 ML: 400; 400; 40 SUSPENSION ORAL at 04:06

## 2023-06-27 RX ADMIN — MAGNESIUM OXIDE TAB 400 MG (241.3 MG ELEMENTAL MG) 400 MG: 400 (241.3 MG) TAB at 04:06

## 2023-06-27 RX ADMIN — Medication 10 ML: at 05:06

## 2023-06-27 RX ADMIN — GUAIFENESIN AND DEXTROMETHORPHAN HYDROBROMIDE 1 TABLET: 600; 30 TABLET, EXTENDED RELEASE ORAL at 08:06

## 2023-06-27 RX ADMIN — ACYCLOVIR 800 MG: 200 CAPSULE ORAL at 08:06

## 2023-06-27 RX ADMIN — Medication 10 ML: at 06:06

## 2023-06-27 RX ADMIN — NIFEDIPINE 60 MG: 60 TABLET, FILM COATED, EXTENDED RELEASE ORAL at 09:06

## 2023-06-27 RX ADMIN — PANTOPRAZOLE SODIUM 40 MG: 40 TABLET, DELAYED RELEASE ORAL at 09:06

## 2023-06-27 RX ADMIN — MAGNESIUM OXIDE TAB 400 MG (241.3 MG ELEMENTAL MG) 400 MG: 400 (241.3 MG) TAB at 01:06

## 2023-06-27 RX ADMIN — SEVELAMER CARBONATE 800 MG: 800 TABLET, FILM COATED ORAL at 09:06

## 2023-06-27 RX ADMIN — POSACONAZOLE 300 MG: 100 TABLET, DELAYED RELEASE ORAL at 09:06

## 2023-06-27 RX ADMIN — MAGNESIUM OXIDE TAB 400 MG (241.3 MG ELEMENTAL MG) 400 MG: 400 (241.3 MG) TAB at 09:06

## 2023-06-27 RX ADMIN — SEVELAMER CARBONATE 800 MG: 800 TABLET, FILM COATED ORAL at 04:06

## 2023-06-27 RX ADMIN — DIPHENHYDRAMINE HYDROCHLORIDE 12.5 MG: 50 INJECTION, SOLUTION INTRAMUSCULAR; INTRAVENOUS at 09:06

## 2023-06-27 NOTE — ASSESSMENT & PLAN NOTE
- Daily CBC while inpatient  - Transfuse for hgb < 7 or plts < 10K  - Now on ppx bactrim, levaquin, fluconazole, and acyclovir

## 2023-06-27 NOTE — ASSESSMENT & PLAN NOTE
- Continue [place]   - Pain well-controlled with PO Oxy, has IV Morphine available if needed (has not been using)  - ID signed off, continue ppx antimicrobials  - RIP throat swab, strep, flu, and COVID neg  - Leukemia infiltration may be playing a role

## 2023-06-27 NOTE — PLAN OF CARE
Patient A&Ox4, reporting mucositis plain that is well controlled with PRN pain medications. Vitals remained stable, no acute events overnight. Discharge planning is ongoing.

## 2023-06-27 NOTE — SUBJECTIVE & OBJECTIVE
Subjective:     Interval History: Day 12 of CLIA+Ponatinib for blast phase CML. Denies n/v/d/c today. Tmax 100.9F yesterday but unsustained, if fevers today will initiate infectious workup. Receiving 1unit PRBC and platelets today. No acute issues or concerns    Objective:     Vital Signs (Most Recent):  Temp: 98.9 °F (37.2 °C) (06/27/23 0743)  Pulse: 98 (06/27/23 0743)  Resp: 14 (06/27/23 0743)  BP: 129/79 (06/27/23 0743)  SpO2: 100 % (06/27/23 0743) Vital Signs (24h Range):  Temp:  [98.2 °F (36.8 °C)-100.2 °F (37.9 °C)] 98.9 °F (37.2 °C)  Pulse:  [] 98  Resp:  [14-20] 14  SpO2:  [96 %-100 %] 100 %  BP: (127-146)/(78-83) 129/79     Weight: 81.6 kg (179 lb 14.3 oz)  Body mass index is 23.73 kg/m².  Body surface area is 2.05 meters squared.      Intake/Output - Last 3 Shifts         06/25 0700  06/26 0659 06/26 0700  06/27 0659 06/27 0700  06/28 0659    P.O. 425 708     Blood 192.5 50     Total Intake(mL/kg) 617.5 (7.6) 758 (9.3)     Urine (mL/kg/hr) 1200 (0.6) 1500 (0.8)     Stool 0      Total Output 1200 1500     Net -582.5 -742            Urine Occurrence 3 x      Stool Occurrence 1 x 2 x              Physical Exam  Vitals and nursing note reviewed.   Constitutional:       Appearance: Normal appearance. He is well-developed.   HENT:      Head: Normocephalic and atraumatic.      Mouth/Throat:      Dentition: Gingival swelling present.      Pharynx: Pharyngeal swelling (improved) and posterior oropharyngeal erythema (improved) present.   Eyes:      General: No scleral icterus.     Extraocular Movements: Extraocular movements intact.      Conjunctiva/sclera: Conjunctivae normal.   Cardiovascular:      Rate and Rhythm: Normal rate and regular rhythm.      Pulses: Normal pulses.      Heart sounds: Normal heart sounds. No murmur heard.  Pulmonary:      Effort: Pulmonary effort is normal. No respiratory distress.      Breath sounds: Normal breath sounds. No wheezing.   Abdominal:      General: Bowel sounds are  normal. There is no distension.      Palpations: Abdomen is soft.      Tenderness: There is no abdominal tenderness.   Musculoskeletal:         General: No tenderness. Normal range of motion.      Cervical back: Normal range of motion and neck supple.      Right lower leg: No edema.      Left lower leg: No edema.   Lymphadenopathy:      Cervical: No cervical adenopathy.   Skin:     General: Skin is warm and dry.      Findings: No rash.      Comments: PICC Intact to right arm with no redness or drainage   Neurological:      General: No focal deficit present.      Mental Status: He is alert and oriented to person, place, and time.      Cranial Nerves: No cranial nerve deficit.      Coordination: Coordination normal.   Psychiatric:         Mood and Affect: Mood normal.         Behavior: Behavior normal.         Thought Content: Thought content normal.         Judgment: Judgment normal.      Comments: Flat affect          Significant Labs:   CBC:   Recent Labs   Lab 06/26/23  0441 06/27/23  0522   WBC 0.22* 0.27*   HGB 7.2* 6.6*   HCT 22.0* 20.4*   PLT 9* 9*    and CMP:   Recent Labs   Lab 06/26/23  0441 06/27/23  0522    135*   K 4.4 4.0    101   CO2 25 26    99   BUN 13 14   CREATININE 0.8 0.8   CALCIUM 9.1 8.8   PROT 6.4 6.4   ALBUMIN 3.0* 3.0*   BILITOT 1.1* 0.7   ALKPHOS 64 74   AST 16 17   ALT 46* 40   ANIONGAP 9 8       Diagnostic Results:  I have reviewed all pertinent imaging results/findings within the past 24 hours.

## 2023-06-27 NOTE — ASSESSMENT & PLAN NOTE
- Patient with CML diagnosed 8/2022 Follows locally in [place] with Dr. Brett Hogan. He has been on dasatinib outpatient since 10/2022. Some question regarding his compliance over the course of treatment, however. Presented to [facility] with c/o gum swelling on 6/7/23. Labs on presentation remarkable for WBC count of 138K, with 80% blasts indicating blast crisis. WBC count from approximately a week and a half earlier was normal. Reported recent compliance with his TKI.     Transferred to [facility] for higher level of care.    - Bone marrow Biopsy performed 6/9/23 c/w progression for blast phase CML.   - BCR/ABL p210 collected on admission, >55%  - BCR/ABL mutation negative  - Echo with 65% EF and mild-moderate pulmonary hypertension.  - Picc line in place  - Patient decided to not do fertility presentation  - Stared on Cladribine Idarubicin Cytarabine, CLIA and transitioned from dasatinb to ponatinib. Day 12  - plan for day 21 bone marrow biopsy for restaging   - TLS labs wnl. TLS labs stopped and allopurinol stopped

## 2023-06-27 NOTE — PROGRESS NOTES
[facility] - Oncology ([facility])  Hematology  Bone Marrow Transplant  Progress Note    Patient Name: Magdaleno Hirsch  Admission Date: 6/9/2023  Hospital Length of Stay: 18 days  Code Status: Full Code    Subjective:     Interval History: Day 12 of CLIA+Ponatinib for blast phase CML. Denies n/v/d/c today. Tmax 100.9F yesterday but unsustained, if fevers today will initiate infectious workup. Receiving 1unit PRBC and platelets today. No acute issues or concerns    Objective:     Vital Signs (Most Recent):  Temp: 98.9 °F (37.2 °C) (06/27/23 0743)  Pulse: 98 (06/27/23 0743)  Resp: 14 (06/27/23 0743)  BP: 129/79 (06/27/23 0743)  SpO2: 100 % (06/27/23 0743) Vital Signs (24h Range):  Temp:  [98.2 °F (36.8 °C)-100.2 °F (37.9 °C)] 98.9 °F (37.2 °C)  Pulse:  [] 98  Resp:  [14-20] 14  SpO2:  [96 %-100 %] 100 %  BP: (127-146)/(78-83) 129/79     Weight: 81.6 kg (179 lb 14.3 oz)  Body mass index is 23.73 kg/m².  Body surface area is 2.05 meters squared.      Intake/Output - Last 3 Shifts         06/25 0700  06/26 0659 06/26 0700  06/27 0659 06/27 0700  06/28 0659    P.O. 425 708     Blood 192.5 50     Total Intake(mL/kg) 617.5 (7.6) 758 (9.3)     Urine (mL/kg/hr) 1200 (0.6) 1500 (0.8)     Stool 0      Total Output 1200 1500     Net -582.5 -742            Urine Occurrence 3 x      Stool Occurrence 1 x 2 x              Physical Exam  Vitals and nursing note reviewed.   Constitutional:       Appearance: Normal appearance. He is well-developed.   HENT:      Head: Normocephalic and atraumatic.      Mouth/Throat:      Dentition: Gingival swelling present.      Pharynx: Pharyngeal swelling (improved) and posterior oropharyngeal erythema (improved) present.   Eyes:      General: No scleral icterus.     Extraocular Movements: Extraocular movements intact.      Conjunctiva/sclera: Conjunctivae normal.   Cardiovascular:      Rate and Rhythm: Normal rate and regular rhythm.      Pulses: Normal pulses.      Heart sounds: Normal heart sounds.  No murmur heard.  Pulmonary:      Effort: Pulmonary effort is normal. No respiratory distress.      Breath sounds: Normal breath sounds. No wheezing.   Abdominal:      General: Bowel sounds are normal. There is no distension.      Palpations: Abdomen is soft.      Tenderness: There is no abdominal tenderness.   Musculoskeletal:         General: No tenderness. Normal range of motion.      Cervical back: Normal range of motion and neck supple.      Right lower leg: No edema.      Left lower leg: No edema.   Lymphadenopathy:      Cervical: No cervical adenopathy.   Skin:     General: Skin is warm and dry.      Findings: No rash.      Comments: PICC Intact to right arm with no redness or drainage   Neurological:      General: No focal deficit present.      Mental Status: He is alert and oriented to person, place, and time.      Cranial Nerves: No cranial nerve deficit.      Coordination: Coordination normal.   Psychiatric:         Mood and Affect: Mood normal.         Behavior: Behavior normal.         Thought Content: Thought content normal.         Judgment: Judgment normal.      Comments: Flat affect          Significant Labs:   CBC:   Recent Labs   Lab 06/26/23  0441 06/27/23  0522   WBC 0.22* 0.27*   HGB 7.2* 6.6*   HCT 22.0* 20.4*   PLT 9* 9*    and CMP:   Recent Labs   Lab 06/26/23  0441 06/27/23  0522    135*   K 4.4 4.0    101   CO2 25 26    99   BUN 13 14   CREATININE 0.8 0.8   CALCIUM 9.1 8.8   PROT 6.4 6.4   ALBUMIN 3.0* 3.0*   BILITOT 1.1* 0.7   ALKPHOS 64 74   AST 16 17   ALT 46* 40   ANIONGAP 9 8       Diagnostic Results:  I have reviewed all pertinent imaging results/findings within the past 24 hours.    Assessment/Plan:     * 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  - Patient with CML diagnosed 8/2022 Follows locally in [place] with Dr. Brett Hogan. He has been on dasatinib outpatient since 10/2022. Some question regarding his compliance over the course of treatment, however.  Presented to [facility] with c/o gum swelling on 6/7/23. Labs on presentation remarkable for WBC count of 138K, with 80% blasts indicating blast crisis. WBC count from approximately a week and a half earlier was normal. Reported recent compliance with his TKI.     [place] [facility] [place].    - Bone marrow Biopsy performed 6/9/23 c/w progression for blast phase CML.   - BCR/ABL p210 collected on admission, >55%  - BCR/ABL mutation negative  - Echo with 65% EF and mild-moderate pulmonary hypertension.  - Picc line in place  - Patient decided to not do fertility presentation  - Stared on Cladribine Idarubicin Cytarabine, CLIA and transitioned from dasatinb to ponatinib. Day 12  - plan for day 21 bone marrow biopsy for restaging   - TLS labs wnl. TLS labs stopped and allopurinol stopped    Pancytopenia due to antineoplastic chemotherapy  - Daily CBC while inpatient  - Transfuse for hgb < 7 or plts < 10K  - Now on ppx bactrim, levaquin, fluconazole, and acyclovir    Mild malnutrition  - continue PO intake as tolerated  - nutrition following    Pharyngitis  - Continue [place]   - Pain well-controlled with PO Oxy, has IV Morphine available if needed (has not been using)  - ID signed off, continue ppx antimicrobials  - RIP throat swab, strep, flu, and COVID neg  - Leukemia infiltration may be playing a role    Neutropenic fever  - Temp fo 101.4 at [place] ED. Persisted upon transfer.  - CXR neg. Blood culture ngtd. CDiff negative. U/a neg  - Strep, flu, and COVID neg at OSH  - Throat red with exudates. Tonsils mildly swollen. CT neck showing adenopathy but no abscesses.  - CT chest without consolidations  - tmax 100.9F on 6/26 but unsustained, continue to monitor VS Q4h; will repeat infectious workup if febrile again today    Hypertension  - Takes amlodipine at home  - Given hypertension, amlodipine stopped and nifedipine and losartan started in [place]. Continued on transfer.  - BP now  improved        VTE Risk Mitigation (From admission, onward)         Ordered     heparin, porcine (PF) 100 unit/mL injection flush 300 Units  As needed (PRN)         06/16/23 1550     IP VTE HIGH RISK PATIENT  Once         06/09/23 0049     Place sequential compression device  Until discontinued         06/09/23 0049                Disposition: Remains inpatient    Ginger Jacob NP  Bone Marrow Transplant  [facility] - Oncology ([facility])

## 2023-06-27 NOTE — ASSESSMENT & PLAN NOTE
- Takes amlodipine at home  - Given hypertension, amlodipine stopped and nifedipine and losartan started in [place]. Continued on transfer.  - BP now improved

## 2023-06-27 NOTE — ASSESSMENT & PLAN NOTE
- Temp fo 101.4 at [facility]. Persisted upon transfer.  - CXR neg. Blood culture ngtd. CDiff negative. U/a neg  - Strep, flu, and COVID neg at OSH  - Throat red with exudates. Tonsils mildly swollen. CT neck showing adenopathy but no abscesses.  - CT chest without consolidations  - tmax 100.9F on 6/26 but unsustained, continue to monitor VS Q4h; will repeat infectious workup if febrile again today

## 2023-06-27 NOTE — PLAN OF CARE
No acute events this shift. Afebrile & VSS on room air. Mucositis pain controlled with scheduled dukes and PRN oxy x1. Pt received 1 U plts and 1 U RBC's, tolerated well. Electrolytes closely monitored, magnesium replaced per PRN orders. Pt remains with good appetite. Ambulates independently to bathroom; re-educated on importance of I/O recording. Bed alarm on throughout the shift and fall risk reviewed with patient due to plt count of 9. CHG wipes provided and encouraged use. POC reviewed with patient. All needs addressed. Will continue to monitor with frequent rounds and clustered care to promote rest.

## 2023-06-28 LAB
ALBUMIN SERPL BCP-MCNC: 3 G/DL (ref 3.5–5.2)
ALP SERPL-CCNC: 81 U/L (ref 55–135)
ALT SERPL W/O P-5'-P-CCNC: 35 U/L (ref 10–44)
ANION GAP SERPL CALC-SCNC: 7 MMOL/L (ref 8–16)
ANISOCYTOSIS BLD QL SMEAR: SLIGHT
AST SERPL-CCNC: 16 U/L (ref 10–40)
BASOPHILS # BLD AUTO: 0 K/UL (ref 0–0.2)
BASOPHILS NFR BLD: 0 % (ref 0–1.9)
BILIRUB SERPL-MCNC: 1.5 MG/DL (ref 0.1–1)
BLD PROD TYP BPU: NORMAL
BLD PROD TYP BPU: NORMAL
BLOOD UNIT EXPIRATION DATE: NORMAL
BLOOD UNIT EXPIRATION DATE: NORMAL
BLOOD UNIT TYPE CODE: 1700
BLOOD UNIT TYPE CODE: 600
BLOOD UNIT TYPE: NORMAL
BLOOD UNIT TYPE: NORMAL
BUN SERPL-MCNC: 13 MG/DL (ref 6–20)
CALCIUM SERPL-MCNC: 9.4 MG/DL (ref 8.7–10.5)
CHLORIDE SERPL-SCNC: 104 MMOL/L (ref 95–110)
CO2 SERPL-SCNC: 25 MMOL/L (ref 23–29)
CODING SYSTEM: NORMAL
CODING SYSTEM: NORMAL
CREAT SERPL-MCNC: 0.8 MG/DL (ref 0.5–1.4)
CROSSMATCH INTERPRETATION: NORMAL
CROSSMATCH INTERPRETATION: NORMAL
DACRYOCYTES BLD QL SMEAR: ABNORMAL
DIFFERENTIAL METHOD: ABNORMAL
DISPENSE STATUS: NORMAL
DISPENSE STATUS: NORMAL
EOSINOPHIL # BLD AUTO: 0 K/UL (ref 0–0.5)
EOSINOPHIL NFR BLD: 0 % (ref 0–8)
ERYTHROCYTE [DISTWIDTH] IN BLOOD BY AUTOMATED COUNT: 17.3 % (ref 11.5–14.5)
EST. GFR  (NO RACE VARIABLE): >60 ML/MIN/1.73 M^2
GLUCOSE SERPL-MCNC: 92 MG/DL (ref 70–110)
HCT VFR BLD AUTO: 21.3 % (ref 40–54)
HGB BLD-MCNC: 6.9 G/DL (ref 14–18)
HLA HI RES SEQUNCE BASED TYPING TEST DATE: NORMAL
HLA HR DPA1: NORMAL
HLA HR DPA2: NORMAL
HLA HR DPB1: NORMAL
HLA HR DPB2: NORMAL
HLA HR DQA1: NORMAL
HLA HR DQA2: NORMAL
HLA-A1 HI RES: NORMAL
HLA-A2 HI RES: NORMAL
HLA-B1 HI RES: NORMAL
HLA-B2 HI RES: NORMAL
HLA-C1 HI RES: NORMAL
HLA-C2 HI RES: NORMAL
HLA-DQB1 1 HI RES: NORMAL
HLA-DQB1 2 HI RES: NORMAL
HLA-DRB1 1 HI RES: NORMAL
HLA-DRB1 2 HI RES: NORMAL
HLA-DRB3 1 HI RES: NORMAL
HLA-DRB3 2 HI RES: NORMAL
HLA-DRB4 1 HI RES: NORMAL
HLA-DRB4 2 HI RES: NORMAL
HLA-DRB5 1 HI RES: NORMAL
HLA-DRB5 2 HI RES: NORMAL
HYPOCHROMIA BLD QL SMEAR: ABNORMAL
IMM GRANULOCYTES # BLD AUTO: 0 K/UL (ref 0–0.04)
IMM GRANULOCYTES NFR BLD AUTO: 0 % (ref 0–0.5)
LYMPHOCYTES # BLD AUTO: 0.3 K/UL (ref 1–4.8)
LYMPHOCYTES NFR BLD: 96.3 % (ref 18–48)
MAGNESIUM SERPL-MCNC: 2 MG/DL (ref 1.6–2.6)
MCH RBC QN AUTO: 24.7 PG (ref 27–31)
MCHC RBC AUTO-ENTMCNC: 32.4 G/DL (ref 32–36)
MCV RBC AUTO: 76 FL (ref 82–98)
MONOCYTES # BLD AUTO: 0 K/UL (ref 0.3–1)
MONOCYTES NFR BLD: 0 % (ref 4–15)
NEUTROPHILS # BLD AUTO: 0 K/UL (ref 1.8–7.7)
NEUTROPHILS NFR BLD: 3.7 % (ref 38–73)
NRBC BLD-RTO: 0 /100 WBC
NUM UNITS TRANS PACKED RBC: NORMAL
OVALOCYTES BLD QL SMEAR: ABNORMAL
PHOSPHATE SERPL-MCNC: 3.3 MG/DL (ref 2.7–4.5)
PLATELET # BLD AUTO: 21 K/UL (ref 150–450)
PLATELET # BLD AUTO: 9 K/UL (ref 150–450)
PLATELET BLD QL SMEAR: ABNORMAL
PLATELET BLD QL SMEAR: ABNORMAL
PMV BLD AUTO: 10.6 FL (ref 9.2–12.9)
PMV BLD AUTO: 9.4 FL (ref 9.2–12.9)
POIKILOCYTOSIS BLD QL SMEAR: SLIGHT
POLYCHROMASIA BLD QL SMEAR: ABNORMAL
POTASSIUM SERPL-SCNC: 4.3 MMOL/L (ref 3.5–5.1)
PROT SERPL-MCNC: 6.6 G/DL (ref 6–8.4)
RBC # BLD AUTO: 2.79 M/UL (ref 4.6–6.2)
SCHISTOCYTES BLD QL SMEAR: ABNORMAL
SCHISTOCYTES BLD QL SMEAR: PRESENT
SODIUM SERPL-SCNC: 136 MMOL/L (ref 136–145)
SPHEROCYTES BLD QL SMEAR: ABNORMAL
UNIT NUMBER: NORMAL
WBC # BLD AUTO: 0.27 K/UL (ref 3.9–12.7)

## 2023-06-28 PROCEDURE — 25000003 PHARM REV CODE 250: Performed by: INTERNAL MEDICINE

## 2023-06-28 PROCEDURE — A4216 STERILE WATER/SALINE, 10 ML: HCPCS | Performed by: INTERNAL MEDICINE

## 2023-06-28 PROCEDURE — 85025 COMPLETE CBC W/AUTO DIFF WBC: CPT | Performed by: NURSE PRACTITIONER

## 2023-06-28 PROCEDURE — 81001 URINALYSIS AUTO W/SCOPE: CPT

## 2023-06-28 PROCEDURE — 63600175 PHARM REV CODE 636 W HCPCS: Performed by: NURSE PRACTITIONER

## 2023-06-28 PROCEDURE — 84100 ASSAY OF PHOSPHORUS: CPT | Performed by: NURSE PRACTITIONER

## 2023-06-28 PROCEDURE — 25000003 PHARM REV CODE 250: Performed by: STUDENT IN AN ORGANIZED HEALTH CARE EDUCATION/TRAINING PROGRAM

## 2023-06-28 PROCEDURE — 85049 AUTOMATED PLATELET COUNT: CPT | Performed by: NURSE PRACTITIONER

## 2023-06-28 PROCEDURE — 25000003 PHARM REV CODE 250

## 2023-06-28 PROCEDURE — 99232 PR SUBSEQUENT HOSPITAL CARE,LEVL II: ICD-10-PCS | Mod: ,,, | Performed by: INTERNAL MEDICINE

## 2023-06-28 PROCEDURE — P9037 PLATE PHERES LEUKOREDU IRRAD: HCPCS | Performed by: NURSE PRACTITIONER

## 2023-06-28 PROCEDURE — 99232 SBSQ HOSP IP/OBS MODERATE 35: CPT | Mod: ,,, | Performed by: INTERNAL MEDICINE

## 2023-06-28 PROCEDURE — P9038 RBC IRRADIATED: HCPCS | Performed by: NURSE PRACTITIONER

## 2023-06-28 PROCEDURE — 25000003 PHARM REV CODE 250: Performed by: NURSE PRACTITIONER

## 2023-06-28 PROCEDURE — 20600001 HC STEP DOWN PRIVATE ROOM

## 2023-06-28 PROCEDURE — 36430 TRANSFUSION BLD/BLD COMPNT: CPT

## 2023-06-28 PROCEDURE — 83735 ASSAY OF MAGNESIUM: CPT | Performed by: NURSE PRACTITIONER

## 2023-06-28 PROCEDURE — 80053 COMPREHEN METABOLIC PANEL: CPT | Performed by: NURSE PRACTITIONER

## 2023-06-28 RX ADMIN — LEVOFLOXACIN 500 MG: 500 TABLET, FILM COATED ORAL at 09:06

## 2023-06-28 RX ADMIN — OXYCODONE HYDROCHLORIDE 5 MG: 5 TABLET ORAL at 08:06

## 2023-06-28 RX ADMIN — OXYCODONE HYDROCHLORIDE 5 MG: 5 TABLET ORAL at 10:06

## 2023-06-28 RX ADMIN — DIPHENHYDRAMINE HYDROCHLORIDE 12.5 MG: 50 INJECTION, SOLUTION INTRAMUSCULAR; INTRAVENOUS at 09:06

## 2023-06-28 RX ADMIN — Medication 10 ML: at 06:06

## 2023-06-28 RX ADMIN — ACYCLOVIR 800 MG: 200 CAPSULE ORAL at 08:06

## 2023-06-28 RX ADMIN — ACETAMINOPHEN 650 MG: 650 SOLUTION ORAL at 09:06

## 2023-06-28 RX ADMIN — ALUMINUM HYDROXIDE, MAGNESIUM HYDROXIDE, AND DIMETHICONE 10 ML: 400; 400; 40 SUSPENSION ORAL at 09:06

## 2023-06-28 RX ADMIN — Medication 10 ML: at 11:06

## 2023-06-28 RX ADMIN — PONATINIB HYDROCHLORIDE 15 MG: 15 TABLET, FILM COATED ORAL at 09:06

## 2023-06-28 RX ADMIN — SULFAMETHOXAZOLE AND TRIMETHOPRIM 1 TABLET: 800; 160 TABLET ORAL at 04:06

## 2023-06-28 RX ADMIN — POSACONAZOLE 300 MG: 100 TABLET, DELAYED RELEASE ORAL at 09:06

## 2023-06-28 RX ADMIN — NIFEDIPINE 60 MG: 60 TABLET, FILM COATED, EXTENDED RELEASE ORAL at 09:06

## 2023-06-28 RX ADMIN — PANTOPRAZOLE SODIUM 40 MG: 40 TABLET, DELAYED RELEASE ORAL at 09:06

## 2023-06-28 RX ADMIN — ALUMINUM HYDROXIDE, MAGNESIUM HYDROXIDE, AND DIMETHICONE 10 ML: 400; 400; 40 SUSPENSION ORAL at 08:06

## 2023-06-28 RX ADMIN — ACYCLOVIR 800 MG: 200 CAPSULE ORAL at 09:06

## 2023-06-28 RX ADMIN — ALUMINUM HYDROXIDE, MAGNESIUM HYDROXIDE, AND DIMETHICONE 10 ML: 400; 400; 40 SUSPENSION ORAL at 12:06

## 2023-06-28 RX ADMIN — Medication 10 ML: at 12:06

## 2023-06-28 RX ADMIN — OXYCODONE HYDROCHLORIDE 5 MG: 5 TABLET ORAL at 06:06

## 2023-06-28 NOTE — PROGRESS NOTES
[facility] - Oncology ([facility])  Hematology  Bone Marrow Transplant  Progress Note    Patient Name: Magdaleno Hirsch  Admission Date: 6/9/2023  Hospital Length of Stay: 19 days  Code Status: Full Code    Subjective:     Interval History: Day 13 of CLIA+ponatinib for blast phase CML. Continues feeling well. Denies n/v/d/c. Flat affect. Will receive 1unit PRBC and platelets today. Will check post platelet given third consecutive day at 9K. Afebrile, VSS    Objective:     Vital Signs (Most Recent):  Temp: 99.9 °F (37.7 °C) (06/28/23 0806)  Pulse: 107 (06/28/23 0806)  Resp: 18 (06/28/23 0806)  BP: 128/71 (06/28/23 0806)  SpO2: (!) 94 % (06/28/23 0806) Vital Signs (24h Range):  Temp:  [98.2 °F (36.8 °C)-99.9 °F (37.7 °C)] 99.9 °F (37.7 °C)  Pulse:  [] 107  Resp:  [14-18] 18  SpO2:  [94 %-100 %] 94 %  BP: (104-140)/(58-83) 128/71     Weight: 81.6 kg (179 lb 14.3 oz)  Body mass index is 23.73 kg/m².  Body surface area is 2.05 meters squared.      Intake/Output - Last 3 Shifts         06/26 0700  06/27 0659 06/27 0700  06/28 0659 06/28 0700  06/29 0659    P.O. 708 999     Blood 50 317     Total Intake(mL/kg) 758 (9.3) 1316 (16.1)     Urine (mL/kg/hr) 1500 (0.8) 1100 (0.6)     Stool  0     Total Output 1500 1100     Net -742 +216            Urine Occurrence  200 x     Stool Occurrence 2 x 0 x             Physical Exam  Vitals and nursing note reviewed.   Constitutional:       Appearance: Normal appearance. He is well-developed.   HENT:      Head: Normocephalic and atraumatic.      Mouth/Throat:      Mouth: Mucous membranes are moist.      Pharynx: No posterior oropharyngeal erythema.   Eyes:      General: No scleral icterus.     Extraocular Movements: Extraocular movements intact.      Conjunctiva/sclera: Conjunctivae normal.   Cardiovascular:      Rate and Rhythm: Normal rate and regular rhythm.      Pulses: Normal pulses.      Heart sounds: Normal heart sounds. No murmur heard.  Pulmonary:      Effort: Pulmonary effort is  normal. No respiratory distress.      Breath sounds: Normal breath sounds. No wheezing.   Abdominal:      General: Bowel sounds are normal. There is no distension.      Palpations: Abdomen is soft.      Tenderness: There is no abdominal tenderness.   Musculoskeletal:         General: No tenderness. Normal range of motion.      Cervical back: Normal range of motion and neck supple.      Right lower leg: No edema.      Left lower leg: No edema.   Lymphadenopathy:      Cervical: No cervical adenopathy.   Skin:     General: Skin is warm and dry.      Findings: No rash.      Comments: PICC Intact to right arm with no redness or drainage   Neurological:      General: No focal deficit present.      Mental Status: He is alert and oriented to person, place, and time.      Cranial Nerves: No cranial nerve deficit.      Coordination: Coordination normal.   Psychiatric:         Mood and Affect: Mood normal.         Behavior: Behavior normal.         Thought Content: Thought content normal.         Judgment: Judgment normal.      Comments: Flat affect          Significant Labs:   CBC:   Recent Labs   Lab 06/27/23  0522 06/28/23  0508   WBC 0.27* 0.27*   HGB 6.6* 6.9*   HCT 20.4* 21.3*   PLT 9* 9*      and CMP:   Recent Labs   Lab 06/27/23  0522 06/28/23  0508   * 136   K 4.0 4.3    104   CO2 26 25   GLU 99 92   BUN 14 13   CREATININE 0.8 0.8   CALCIUM 8.8 9.4   PROT 6.4 6.6   ALBUMIN 3.0* 3.0*   BILITOT 0.7 1.5*   ALKPHOS 74 81   AST 17 16   ALT 40 35   ANIONGAP 8 7*         Diagnostic Results:  I have reviewed all pertinent imaging results/findings within the past 24 hours.    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 Brett Hogan. He has been on dasatinib outpatient since 10/2022. Some question regarding his compliance over the course of treatment, however. Presented to [facility] with c/o gum  swelling on 6/7/23. Labs on presentation remarkable for WBC count of 138K, with 80% blasts indicating blast crisis. WBC count from approximately a week and a half earlier was normal. Reported recent compliance with his TKI.     Transfer[facility] [place] [facility] [place] higher level of care.    - Bone marrow Biopsy performed 6/9/23 c/w progression for blast phase CML.   - BCR/ABL p210 collected on admission, >55%  - BCR/ABL mutation negative  - Echo with 65% EF and mild-moderate pulmonary hypertension.  - Picc line in place  - Patient decided to not do fertility presentation  - Stared on Cladribine Idarubicin Cytarabine, CLIA and transitioned from dasatinb to ponatinib. Day 13  - plan for day 21 bone marrow biopsy for restaging   - TLS labs wnl. TLS labs stopped and allopurinol stopped    Pancytopenia due to antineoplastic chemotherapy  - Daily CBC while inpatient  - Transfuse for hgb < 7 or plts < 10K  - Now on ppx bactrim, levaquin, fluconazole, and acyclovir  - will check 30min post platelet today as level 9K for three days    Mild malnutrition  - continue PO intake as tolerated  - nutrition following    Pharyngitis  - Continue [place]   - Pain well-controlled with PO Oxy, has IV Morphine available if needed (has not been using)  - ID signed off, continue ppx antimicrobials  - RIP throat swab, strep, flu, and COVID neg  - Leukemia infiltration may be playing a role; improving    Neutropenic fever  - Temp fo 101.4 at [place] [facility]. Persisted upon transfer.  - CXR neg. Blood culture ngtd. CDiff negative. U/a neg  - Strep, flu, and COVID neg at OSH  - Throat red with exudates. Tonsils mildly swollen. CT neck showing adenopathy but no abscesses.  - CT chest without consolidations  - tmax 100.9F on 6/26 but unsustained, continue to monitor VS Q4h; will repeat infectious workup if febrile again today    Hypertension  - Takes amlodipine at home  - Given hypertension, amlodipine stopped and nifedipine and losartan started in [place].  Continued on transfer.  - BP now improved        VTE Risk Mitigation (From admission, onward)         Ordered     heparin, porcine (PF) 100 unit/mL injection flush 300 Units  As needed (PRN)         06/16/23 1550     IP VTE HIGH RISK PATIENT  Once         06/09/23 0049     Place sequential compression device  Until discontinued         06/09/23 0049                Disposition: Remains inpatient    Ginger Jacob NP  Bone Marrow Transplant  [facility] - Oncology ([facility])

## 2023-06-28 NOTE — SUBJECTIVE & OBJECTIVE
Subjective:     Interval History: Day 13 of CLIA+ponatinib for blast phase CML. Continues feeling well. Denies n/v/d/c. Flat affect. Will receive 1unit PRBC and platelets today. Will check post platelet given third consecutive day at 9K. Afebrile, VSS    Objective:     Vital Signs (Most Recent):  Temp: 99.9 °F (37.7 °C) (06/28/23 0806)  Pulse: 107 (06/28/23 0806)  Resp: 18 (06/28/23 0806)  BP: 128/71 (06/28/23 0806)  SpO2: (!) 94 % (06/28/23 0806) Vital Signs (24h Range):  Temp:  [98.2 °F (36.8 °C)-99.9 °F (37.7 °C)] 99.9 °F (37.7 °C)  Pulse:  [] 107  Resp:  [14-18] 18  SpO2:  [94 %-100 %] 94 %  BP: (104-140)/(58-83) 128/71     Weight: 81.6 kg (179 lb 14.3 oz)  Body mass index is 23.73 kg/m².  Body surface area is 2.05 meters squared.      Intake/Output - Last 3 Shifts         06/26 0700  06/27 0659 06/27 0700 06/28 0659 06/28 0700  06/29 0659    P.O. 708 999     Blood 50 317     Total Intake(mL/kg) 758 (9.3) 1316 (16.1)     Urine (mL/kg/hr) 1500 (0.8) 1100 (0.6)     Stool  0     Total Output 1500 1100     Net -742 +216            Urine Occurrence  200 x     Stool Occurrence 2 x 0 x              Physical Exam  Vitals and nursing note reviewed.   Constitutional:       Appearance: Normal appearance. He is well-developed.   HENT:      Head: Normocephalic and atraumatic.      Mouth/Throat:      Mouth: Mucous membranes are moist.      Pharynx: No posterior oropharyngeal erythema.   Eyes:      General: No scleral icterus.     Extraocular Movements: Extraocular movements intact.      Conjunctiva/sclera: Conjunctivae normal.   Cardiovascular:      Rate and Rhythm: Normal rate and regular rhythm.      Pulses: Normal pulses.      Heart sounds: Normal heart sounds. No murmur heard.  Pulmonary:      Effort: Pulmonary effort is normal. No respiratory distress.      Breath sounds: Normal breath sounds. No wheezing.   Abdominal:      General: Bowel sounds are normal. There is no distension.      Palpations: Abdomen is  soft.      Tenderness: There is no abdominal tenderness.   Musculoskeletal:         General: No tenderness. Normal range of motion.      Cervical back: Normal range of motion and neck supple.      Right lower leg: No edema.      Left lower leg: No edema.   Lymphadenopathy:      Cervical: No cervical adenopathy.   Skin:     General: Skin is warm and dry.      Findings: No rash.      Comments: PICC Intact to right arm with no redness or drainage   Neurological:      General: No focal deficit present.      Mental Status: He is alert and oriented to person, place, and time.      Cranial Nerves: No cranial nerve deficit.      Coordination: Coordination normal.   Psychiatric:         Mood and Affect: Mood normal.         Behavior: Behavior normal.         Thought Content: Thought content normal.         Judgment: Judgment normal.      Comments: Flat affect          Significant Labs:   CBC:   Recent Labs   Lab 06/27/23  0522 06/28/23  0508   WBC 0.27* 0.27*   HGB 6.6* 6.9*   HCT 20.4* 21.3*   PLT 9* 9*      and CMP:   Recent Labs   Lab 06/27/23  0522 06/28/23  0508   * 136   K 4.0 4.3    104   CO2 26 25   GLU 99 92   BUN 14 13   CREATININE 0.8 0.8   CALCIUM 8.8 9.4   PROT 6.4 6.6   ALBUMIN 3.0* 3.0*   BILITOT 0.7 1.5*   ALKPHOS 74 81   AST 17 16   ALT 40 35   ANIONGAP 8 7*         Diagnostic Results:  I have reviewed all pertinent imaging results/findings within the past 24 hours.

## 2023-06-28 NOTE — PLAN OF CARE
Patient A&Ox4, reports mucositis pain that is well controlled with PRN pain medications. Vitals remained stable. No acute events overnight. Discharge planning is ongoing.

## 2023-06-28 NOTE — ASSESSMENT & PLAN NOTE
- Temp fo 101.4 at [facility]. Persisted upon transfer.  - CXR neg. Blood culture ngtd. CDiff negative. U/a neg  - Strep, flu, and COVID neg at OSH  - Throat red with exudates. Tonsils mildly swollen. CT neck showing adenopathy but no abscesses.  - CT chest without consolidations  - tmax 100.9F on 6/26 but unsustained, continue to monitor VS Q4h; will repeat infectious workup if febrile again today

## 2023-06-28 NOTE — ASSESSMENT & PLAN NOTE
- Patient with CML diagnosed 8/2022 Follows locally in [place] with Dr. Brett Hogan. He has been on dasatinib outpatient since 10/2022. Some question regarding his compliance over the course of treatment, however. Presented to [facility] with c/o gum swelling on 6/7/23. Labs on presentation remarkable for WBC count of 138K, with 80% blasts indicating blast crisis. WBC count from approximately a week and a half earlier was normal. Reported recent compliance with his TKI.     Transferred to [facility] for higher level of care.    - Bone marrow Biopsy performed 6/9/23 c/w progression for blast phase CML.   - BCR/ABL p210 collected on admission, >55%  - BCR/ABL mutation negative  - Echo with 65% EF and mild-moderate pulmonary hypertension.  - Picc line in place  - Patient decided to not do fertility presentation  - Stared on Cladribine Idarubicin Cytarabine, CLIA and transitioned from dasatinb to ponatinib. Day 13  - plan for day 21 bone marrow biopsy for restaging   - TLS labs wnl. TLS labs stopped and allopurinol stopped

## 2023-06-28 NOTE — ASSESSMENT & PLAN NOTE
- Continue [place]   - Pain well-controlled with PO Oxy, has IV Morphine available if needed (has not been using)  - ID signed off, continue ppx antimicrobials  - RIP throat swab, strep, flu, and COVID neg  - Leukemia infiltration may be playing a role; improving

## 2023-06-28 NOTE — ASSESSMENT & PLAN NOTE
- Daily CBC while inpatient  - Transfuse for hgb < 7 or plts < 10K  - Now on ppx bactrim, levaquin, fluconazole, and acyclovir  - will check 30min post platelet today as level 9K for three days

## 2023-06-28 NOTE — PLAN OF CARE
Patient is AAOX4. Up, independent. Call light and personal items within reach. Regular diet with low intake. 1U platelet and 1U PRBC transfused, premedications provided, tolerated well. Post transfusion platelet: 21. Complain of pain, prn oxycodone provided. Patient stable at this time.

## 2023-06-28 NOTE — ASSESSMENT & PLAN NOTE
- Takes amlodipine at home  - Given hypertension, amlodipine stopped and nifedipine and losartan started in [place]. Continued on transfer.  - BP now improved

## 2023-06-29 LAB
ABO + RH BLD: NORMAL
ACINETOBACTER CALCOACETICUS/BAUMANNII COMPLEX: NOT DETECTED
ALBUMIN SERPL BCP-MCNC: 3 G/DL (ref 3.5–5.2)
ALP SERPL-CCNC: 91 U/L (ref 55–135)
ALT SERPL W/O P-5'-P-CCNC: 35 U/L (ref 10–44)
ANION GAP SERPL CALC-SCNC: 8 MMOL/L (ref 8–16)
ANISOCYTOSIS BLD QL SMEAR: SLIGHT
AST SERPL-CCNC: 18 U/L (ref 10–40)
BACTEROIDES FRAGILIS: NOT DETECTED
BASOPHILS # BLD AUTO: 0 K/UL (ref 0–0.2)
BASOPHILS NFR BLD: 0 % (ref 0–1.9)
BILIRUB SERPL-MCNC: 1 MG/DL (ref 0.1–1)
BILIRUB UR QL STRIP: NEGATIVE
BLD GP AB SCN CELLS X3 SERPL QL: NORMAL
BUN SERPL-MCNC: 12 MG/DL (ref 6–20)
CALCIUM SERPL-MCNC: 8.9 MG/DL (ref 8.7–10.5)
CANDIDA ALBICANS: NOT DETECTED
CANDIDA AURIS: NOT DETECTED
CANDIDA GLABRATA: NOT DETECTED
CANDIDA KRUSEI: NOT DETECTED
CANDIDA PARAPSILOSIS: NOT DETECTED
CANDIDA TROPICALIS: NOT DETECTED
CHLORIDE SERPL-SCNC: 104 MMOL/L (ref 95–110)
CLARITY UR REFRACT.AUTO: CLEAR
CO2 SERPL-SCNC: 24 MMOL/L (ref 23–29)
COLOR UR AUTO: YELLOW
CREAT SERPL-MCNC: 0.9 MG/DL (ref 0.5–1.4)
CRYPTOCOCCUS NEOFORMANS/GATTII: NOT DETECTED
CTX-M GENE: ABNORMAL
DIFFERENTIAL METHOD: ABNORMAL
ENTEROBACTER CLOACAE COMPLEX: NOT DETECTED
ENTEROBACTERALES: NOT DETECTED
ENTEROCOCCUS FAECALIS: NOT DETECTED
ENTEROCOCCUS FAECIUM: NOT DETECTED
EOSINOPHIL # BLD AUTO: 0 K/UL (ref 0–0.5)
EOSINOPHIL NFR BLD: 0 % (ref 0–8)
ERYTHROCYTE [DISTWIDTH] IN BLOOD BY AUTOMATED COUNT: 16.8 % (ref 11.5–14.5)
ESCHERICHIA COLI: NOT DETECTED
EST. GFR  (NO RACE VARIABLE): >60 ML/MIN/1.73 M^2
GLUCOSE SERPL-MCNC: 99 MG/DL (ref 70–110)
GLUCOSE UR QL STRIP: NEGATIVE
HAEMOPHILUS INFLUENZAE: NOT DETECTED
HCT VFR BLD AUTO: 22.2 % (ref 40–54)
HGB BLD-MCNC: 7.3 G/DL (ref 14–18)
HGB UR QL STRIP: ABNORMAL
HYPOCHROMIA BLD QL SMEAR: ABNORMAL
IMM GRANULOCYTES # BLD AUTO: 0 K/UL (ref 0–0.04)
IMM GRANULOCYTES NFR BLD AUTO: 0 % (ref 0–0.5)
IMP GENE: ABNORMAL
KETONES UR QL STRIP: NEGATIVE
KLEBSIELLA AEROGENES: NOT DETECTED
KLEBSIELLA OXYTOCA: NOT DETECTED
KLEBSIELLA PNEUMONIAE GROUP: NOT DETECTED
KPC: ABNORMAL
LEUKOCYTE ESTERASE UR QL STRIP: NEGATIVE
LISTERIA MONOCYTOGENES: NOT DETECTED
LYMPHOCYTES # BLD AUTO: 0.2 K/UL (ref 1–4.8)
LYMPHOCYTES NFR BLD: 95.8 % (ref 18–48)
MAGNESIUM SERPL-MCNC: 2 MG/DL (ref 1.6–2.6)
MCH RBC QN AUTO: 25.3 PG (ref 27–31)
MCHC RBC AUTO-ENTMCNC: 32.9 G/DL (ref 32–36)
MCR-1: ABNORMAL
MCV RBC AUTO: 77 FL (ref 82–98)
MEC A/C AND MREJ (MRSA): ABNORMAL
MEC A/C: ABNORMAL
MICROSCOPIC COMMENT: NORMAL
MONOCYTES # BLD AUTO: 0 K/UL (ref 0.3–1)
MONOCYTES NFR BLD: 0 % (ref 4–15)
NDM GENE: ABNORMAL
NEISSERIA MENINGITIDIS: NOT DETECTED
NEUTROPHILS # BLD AUTO: 0 K/UL (ref 1.8–7.7)
NEUTROPHILS NFR BLD: 4.2 % (ref 38–73)
NITRITE UR QL STRIP: NEGATIVE
NRBC BLD-RTO: 0 /100 WBC
OVALOCYTES BLD QL SMEAR: ABNORMAL
OXA-48-LIKE: ABNORMAL
PH UR STRIP: 6 [PH] (ref 5–8)
PHOSPHATE SERPL-MCNC: 3.4 MG/DL (ref 2.7–4.5)
PLATELET # BLD AUTO: 17 K/UL (ref 150–450)
PMV BLD AUTO: ABNORMAL FL (ref 9.2–12.9)
POIKILOCYTOSIS BLD QL SMEAR: SLIGHT
POLYCHROMASIA BLD QL SMEAR: ABNORMAL
POTASSIUM SERPL-SCNC: 4.1 MMOL/L (ref 3.5–5.1)
PROT SERPL-MCNC: 6.7 G/DL (ref 6–8.4)
PROT UR QL STRIP: NEGATIVE
PROTEUS SPECIES: NOT DETECTED
PSEUDOMONAS AERUGINOSA: NOT DETECTED
RBC # BLD AUTO: 2.89 M/UL (ref 4.6–6.2)
RBC #/AREA URNS AUTO: 3 /HPF (ref 0–4)
SALMONELLA SP: NOT DETECTED
SERRATIA MARCESCENS: NOT DETECTED
SODIUM SERPL-SCNC: 136 MMOL/L (ref 136–145)
SP GR UR STRIP: 1.01 (ref 1–1.03)
SPECIMEN OUTDATE: NORMAL
SQUAMOUS #/AREA URNS AUTO: 0 /HPF
STAPHYLOCOCCUS AUREUS: NOT DETECTED
STAPHYLOCOCCUS EPIDERMIDIS: NOT DETECTED
STAPHYLOCOCCUS LUGDUNESIS: NOT DETECTED
STAPHYLOCOCCUS SPECIES: NOT DETECTED
STENOTROPHOMONAS MALTOPHILIA: NOT DETECTED
STREPTOCOCCUS AGALACTIAE: NOT DETECTED
STREPTOCOCCUS PNEUMONIAE: NOT DETECTED
STREPTOCOCCUS PYOGENES: NOT DETECTED
STREPTOCOCCUS SPECIES: DETECTED
URN SPEC COLLECT METH UR: ABNORMAL
VAN A/B: ABNORMAL
VIM GENE: ABNORMAL
WBC # BLD AUTO: 0.24 K/UL (ref 3.9–12.7)
WBC #/AREA URNS AUTO: 1 /HPF (ref 0–5)

## 2023-06-29 PROCEDURE — 83735 ASSAY OF MAGNESIUM: CPT | Performed by: NURSE PRACTITIONER

## 2023-06-29 PROCEDURE — 25000003 PHARM REV CODE 250: Performed by: INTERNAL MEDICINE

## 2023-06-29 PROCEDURE — 86920 COMPATIBILITY TEST SPIN: CPT | Performed by: NURSE PRACTITIONER

## 2023-06-29 PROCEDURE — 20600001 HC STEP DOWN PRIVATE ROOM

## 2023-06-29 PROCEDURE — A4216 STERILE WATER/SALINE, 10 ML: HCPCS | Performed by: INTERNAL MEDICINE

## 2023-06-29 PROCEDURE — 25000003 PHARM REV CODE 250: Performed by: NURSE PRACTITIONER

## 2023-06-29 PROCEDURE — 63600175 PHARM REV CODE 636 W HCPCS

## 2023-06-29 PROCEDURE — 27201040 HC RC 50 FILTER: Performed by: INTERNAL MEDICINE

## 2023-06-29 PROCEDURE — 80053 COMPREHEN METABOLIC PANEL: CPT | Performed by: NURSE PRACTITIONER

## 2023-06-29 PROCEDURE — 85025 COMPLETE CBC W/AUTO DIFF WBC: CPT | Performed by: NURSE PRACTITIONER

## 2023-06-29 PROCEDURE — 36415 COLL VENOUS BLD VENIPUNCTURE: CPT | Performed by: INTERNAL MEDICINE

## 2023-06-29 PROCEDURE — 99232 SBSQ HOSP IP/OBS MODERATE 35: CPT | Mod: ,,, | Performed by: INTERNAL MEDICINE

## 2023-06-29 PROCEDURE — 86900 BLOOD TYPING SEROLOGIC ABO: CPT | Performed by: INTERNAL MEDICINE

## 2023-06-29 PROCEDURE — 25000003 PHARM REV CODE 250: Performed by: STUDENT IN AN ORGANIZED HEALTH CARE EDUCATION/TRAINING PROGRAM

## 2023-06-29 PROCEDURE — 87040 BLOOD CULTURE FOR BACTERIA: CPT | Mod: 59 | Performed by: INTERNAL MEDICINE

## 2023-06-29 PROCEDURE — 25000003 PHARM REV CODE 250

## 2023-06-29 PROCEDURE — 87154 CUL TYP ID BLD PTHGN 6+ TRGT: CPT | Performed by: INTERNAL MEDICINE

## 2023-06-29 PROCEDURE — 99232 PR SUBSEQUENT HOSPITAL CARE,LEVL II: ICD-10-PCS | Mod: ,,, | Performed by: INTERNAL MEDICINE

## 2023-06-29 PROCEDURE — 84100 ASSAY OF PHOSPHORUS: CPT | Performed by: NURSE PRACTITIONER

## 2023-06-29 RX ORDER — OXYCODONE HYDROCHLORIDE 5 MG/1
5 TABLET ORAL EVERY 4 HOURS PRN
Status: DISCONTINUED | OUTPATIENT
Start: 2023-06-29 | End: 2023-07-12 | Stop reason: HOSPADM

## 2023-06-29 RX ADMIN — OXYCODONE HYDROCHLORIDE 5 MG: 5 TABLET ORAL at 04:06

## 2023-06-29 RX ADMIN — Medication 10 ML: at 06:06

## 2023-06-29 RX ADMIN — PANTOPRAZOLE SODIUM 40 MG: 40 TABLET, DELAYED RELEASE ORAL at 08:06

## 2023-06-29 RX ADMIN — ACYCLOVIR 800 MG: 200 CAPSULE ORAL at 08:06

## 2023-06-29 RX ADMIN — CEFEPIME 2 G: 2 INJECTION, POWDER, FOR SOLUTION INTRAVENOUS at 08:06

## 2023-06-29 RX ADMIN — CEFEPIME 2 G: 2 INJECTION, POWDER, FOR SOLUTION INTRAVENOUS at 12:06

## 2023-06-29 RX ADMIN — ALUMINUM HYDROXIDE, MAGNESIUM HYDROXIDE, AND DIMETHICONE 10 ML: 400; 400; 40 SUSPENSION ORAL at 08:06

## 2023-06-29 RX ADMIN — OXYCODONE HYDROCHLORIDE 5 MG: 5 TABLET ORAL at 08:06

## 2023-06-29 RX ADMIN — PONATINIB HYDROCHLORIDE 15 MG: 15 TABLET, FILM COATED ORAL at 09:06

## 2023-06-29 RX ADMIN — ALUMINUM HYDROXIDE, MAGNESIUM HYDROXIDE, AND DIMETHICONE 10 ML: 400; 400; 40 SUSPENSION ORAL at 01:06

## 2023-06-29 RX ADMIN — ALUMINUM HYDROXIDE, MAGNESIUM HYDROXIDE, AND DIMETHICONE 10 ML: 400; 400; 40 SUSPENSION ORAL at 09:06

## 2023-06-29 RX ADMIN — NIFEDIPINE 60 MG: 60 TABLET, FILM COATED, EXTENDED RELEASE ORAL at 08:06

## 2023-06-29 RX ADMIN — OXYCODONE HYDROCHLORIDE 5 MG: 5 TABLET ORAL at 09:06

## 2023-06-29 RX ADMIN — ALUMINUM HYDROXIDE, MAGNESIUM HYDROXIDE, AND DIMETHICONE 10 ML: 400; 400; 40 SUSPENSION ORAL at 05:06

## 2023-06-29 RX ADMIN — POSACONAZOLE 300 MG: 100 TABLET, DELAYED RELEASE ORAL at 08:06

## 2023-06-29 RX ADMIN — Medication 10 ML: at 12:06

## 2023-06-29 RX ADMIN — CEFEPIME 2 G: 2 INJECTION, POWDER, FOR SOLUTION INTRAVENOUS at 05:06

## 2023-06-29 NOTE — ASSESSMENT & PLAN NOTE
- Patient with CML diagnosed 8/2022 Follows locally in [place] with Dr. Brett Hogan. He has been on dasatinib outpatient since 10/2022. Some question regarding his compliance over the course of treatment, however. Presented to [facility] with c/o gum swelling on 6/7/23. Labs on presentation remarkable for WBC count of 138K, with 80% blasts indicating blast crisis. WBC count from approximately a week and a half earlier was normal. Reported recent compliance with his TKI.     Transferred to [facility] for higher level of care    - Bone marrow Biopsy performed 6/9/23 c/w progression for blast phase CML   - BCR/ABL p210 collected on admission, >55%  - BCR/ABL mutation negative  - Echo with 65% EF and mild-moderate pulmonary hypertension.  - Picc line in place  - Patient decided to not do fertility presentation  - Stared on Cladribine Idarubicin Cytarabine, CLIA and transitioned from dasatinb to ponatinib. Day 14  - plan for day 21 bone marrow biopsy for restaging   - TLS labs wnl. TLS labs stopped and allopurinol stopped

## 2023-06-29 NOTE — PROGRESS NOTES
Attempted to see patient seen for follow up. Resting comfortably in room at this writing.  [facility] disability application meeting scheduled for Monday 7/3 0900; they are aware he will remain inpatient and come to him.  Will re-attempt.  Will continue to follow and assist as needed.

## 2023-06-29 NOTE — PLAN OF CARE
Patient A&Ox4, Reports mucositis pain that is well controlled with PRN medications. Tmax overnight was 101.8, full fever workup completed d/t neutropenia. IV ABX initiated. Refused bed alarm overnight but called appropriately for nursing staff. No other acute events overnight.

## 2023-06-29 NOTE — SUBJECTIVE & OBJECTIVE
Subjective:     Interval History: Day 14 of CLIA+ponatinib for blast phase CML. Fever last night, tmax 101.8F@2330. Started on cefepime. Blood cultures NGTD. CXR and UA negative. Denies any chills or rigors with fever. Throat pain controlled with prn oxy. Denies n/v/d/c. Afebrile, VSS    Objective:     Vital Signs (Most Recent):  Temp: 100 °F (37.8 °C) (06/29/23 0720)  Pulse: 102 (06/29/23 0720)  Resp: 17 (06/29/23 0845)  BP: 121/65 (06/29/23 0720)  SpO2: 99 % (06/29/23 0720) Vital Signs (24h Range):  Temp:  [98.3 °F (36.8 °C)-101.8 °F (38.8 °C)] 100 °F (37.8 °C)  Pulse:  [] 102  Resp:  [17-18] 17  SpO2:  [97 %-100 %] 99 %  BP: (105-142)/(63-81) 121/65     Weight: 81.6 kg (179 lb 14.3 oz)  Body mass index is 23.73 kg/m².  Body surface area is 2.05 meters squared.      Intake/Output - Last 3 Shifts         06/27 0700  06/28 0659 06/28 0700  06/29 0659 06/29 0700  06/30 0659    P.O. 999 1354     I.V. (mL/kg)  10 (0.1)     Blood 317 501     Total Intake(mL/kg) 1316 (16.1) 1865 (22.9)     Urine (mL/kg/hr) 1100 (0.6) 1500 (0.8)     Stool 0 0     Total Output 1100 1500     Net +216 +365            Urine Occurrence 200 x      Stool Occurrence 0 x 1 x              Physical Exam  Vitals and nursing note reviewed.   Constitutional:       Appearance: Normal appearance. He is well-developed.   HENT:      Head: Normocephalic and atraumatic.      Mouth/Throat:      Mouth: Mucous membranes are moist.      Pharynx: No posterior oropharyngeal erythema.   Eyes:      General: No scleral icterus.     Extraocular Movements: Extraocular movements intact.      Conjunctiva/sclera: Conjunctivae normal.   Cardiovascular:      Rate and Rhythm: Normal rate and regular rhythm.      Pulses: Normal pulses.      Heart sounds: Normal heart sounds. No murmur heard.  Pulmonary:      Effort: Pulmonary effort is normal. No respiratory distress.      Breath sounds: Normal breath sounds. No wheezing.   Abdominal:      General: Bowel sounds are  normal. There is no distension.      Palpations: Abdomen is soft.      Tenderness: There is no abdominal tenderness.   Musculoskeletal:         General: No tenderness. Normal range of motion.      Cervical back: Normal range of motion and neck supple.      Right lower leg: No edema.      Left lower leg: No edema.   Lymphadenopathy:      Cervical: No cervical adenopathy.   Skin:     General: Skin is warm and dry.      Findings: No rash.      Comments: PICC Intact to right arm with no redness or drainage   Neurological:      General: No focal deficit present.      Mental Status: He is alert and oriented to person, place, and time.      Cranial Nerves: No cranial nerve deficit.      Coordination: Coordination normal.   Psychiatric:         Mood and Affect: Mood normal.         Behavior: Behavior normal.         Thought Content: Thought content normal.         Judgment: Judgment normal.      Comments: Flat affect          Significant Labs:   CBC:   Recent Labs   Lab 06/28/23  0508 06/28/23  1238 06/29/23  0419   WBC 0.27*  --  0.24*   HGB 6.9*  --  7.3*   HCT 21.3*  --  22.2*   PLT 9* 21* 17*    and CMP:   Recent Labs   Lab 06/28/23  0508 06/29/23  0419    136   K 4.3 4.1    104   CO2 25 24   GLU 92 99   BUN 13 12   CREATININE 0.8 0.9   CALCIUM 9.4 8.9   PROT 6.6 6.7   ALBUMIN 3.0* 3.0*   BILITOT 1.5* 1.0   ALKPHOS 81 91   AST 16 18   ALT 35 35   ANIONGAP 7* 8       Diagnostic Results:  I have reviewed all pertinent imaging results/findings within the past 24 hours.

## 2023-06-29 NOTE — ASSESSMENT & PLAN NOTE
- Continue [place]   - Pain well-controlled with PO Oxy  - ID signed off, continue ppx antimicrobials  - RIP throat swab, strep, flu, and COVID neg  - Leukemia infiltration may be playing a role; improving

## 2023-06-29 NOTE — ASSESSMENT & PLAN NOTE
- Temp of 101.4 at [facility]. Persisted upon transfer.  - CXR neg. Blood culture NGTD. C-Diff negative. U/a neg  - Strep, flu, and COVID neg at OSH  - Throat red with exudates. Tonsils mildly swollen. CT neck showing adenopathy but no abscesses.  - CT chest without consolidations  - Repeat fever 6/28 @2330. Restarted cefepime. BC NGTD. Repeat CXR and UA negative

## 2023-06-29 NOTE — PROGRESS NOTES
[facility] ([facility])  Hematology  Bone Marrow Transplant  Progress Note    Patient Name: Magdaleno Hirsch  Admission Date: 6/9/2023  Hospital Length of Stay: 20 days  Code Status: Full Code    Subjective:     Interval History: Day 14 of CLIA+ponatinib for blast phase CML. Fever last night, tmax 101.8F@2330. Started on cefepime. Blood cultures NGTD. CXR and UA negative. Denies any chills or rigors with fever. Throat pain controlled with prn oxy. Denies n/v/d/c. Afebrile, VSS    Objective:     Vital Signs (Most Recent):  Temp: 100 °F (37.8 °C) (06/29/23 0720)  Pulse: 102 (06/29/23 0720)  Resp: 17 (06/29/23 0845)  BP: 121/65 (06/29/23 0720)  SpO2: 99 % (06/29/23 0720) Vital Signs (24h Range):  Temp:  [98.3 °F (36.8 °C)-101.8 °F (38.8 °C)] 100 °F (37.8 °C)  Pulse:  [] 102  Resp:  [17-18] 17  SpO2:  [97 %-100 %] 99 %  BP: (105-142)/(63-81) 121/65     Weight: 81.6 kg (179 lb 14.3 oz)  Body mass index is 23.73 kg/m².  Body surface area is 2.05 meters squared.      Intake/Output - Last 3 Shifts         06/27 0700  06/28 0659 06/28 0700  06/29 0659 06/29 0700  06/30 0659    P.O. 999 1354     I.V. (mL/kg)  10 (0.1)     Blood 317 501     Total Intake(mL/kg) 1316 (16.1) 1865 (22.9)     Urine (mL/kg/hr) 1100 (0.6) 1500 (0.8)     Stool 0 0     Total Output 1100 1500     Net +216 +365            Urine Occurrence 200 x      Stool Occurrence 0 x 1 x              Physical Exam  Vitals and nursing note reviewed.   Constitutional:       Appearance: Normal appearance. He is well-developed.   HENT:      Head: Normocephalic and atraumatic.      Mouth/Throat:      Mouth: Mucous membranes are moist.      Pharynx: No posterior oropharyngeal erythema.   Eyes:      General: No scleral icterus.     Extraocular Movements: Extraocular movements intact.      Conjunctiva/sclera: Conjunctivae normal.   Cardiovascular:      Rate and Rhythm: Normal rate and regular rhythm.      Pulses: Normal pulses.      Heart sounds: Normal heart sounds.  No murmur heard.  Pulmonary:      Effort: Pulmonary effort is normal. No respiratory distress.      Breath sounds: Normal breath sounds. No wheezing.   Abdominal:      General: Bowel sounds are normal. There is no distension.      Palpations: Abdomen is soft.      Tenderness: There is no abdominal tenderness.   Musculoskeletal:         General: No tenderness. Normal range of motion.      Cervical back: Normal range of motion and neck supple.      Right lower leg: No edema.      Left lower leg: No edema.   Lymphadenopathy:      Cervical: No cervical adenopathy.   Skin:     General: Skin is warm and dry.      Findings: No rash.      Comments: PICC Intact to right arm with no redness or drainage   Neurological:      General: No focal deficit present.      Mental Status: He is alert and oriented to person, place, and time.      Cranial Nerves: No cranial nerve deficit.      Coordination: Coordination normal.   Psychiatric:         Mood and Affect: Mood normal.         Behavior: Behavior normal.         Thought Content: Thought content normal.         Judgment: Judgment normal.      Comments: Flat affect          Significant Labs:   CBC:   Recent Labs   Lab 06/28/23  0508 06/28/23  1238 06/29/23  0419   WBC 0.27*  --  0.24*   HGB 6.9*  --  7.3*   HCT 21.3*  --  22.2*   PLT 9* 21* 17*    and CMP:   Recent Labs   Lab 06/28/23  0508 06/29/23  0419    136   K 4.3 4.1    104   CO2 25 24   GLU 92 99   BUN 13 12   CREATININE 0.8 0.9   CALCIUM 9.4 8.9   PROT 6.6 6.7   ALBUMIN 3.0* 3.0*   BILITOT 1.5* 1.0   ALKPHOS 81 91   AST 16 18   ALT 35 35   ANIONGAP 7* 8       Diagnostic Results:  I have reviewed all pertinent imaging results/findings within the past 24 hours.    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 Brett Hogan. He has been on dasatinib outpatient since 10/2022. Some question regarding his compliance over  the course of treatment, however. Presented to [facility] with c/o gum swelling on 6/7/23. Labs on presentation remarkable for WBC count of 138K, with 80% blasts indicating blast crisis. WBC count from approximately a week and a half earlier was normal. Reported recent compliance with his TKI.     Transferred to [facility] for higher level of care    - Bone marrow Biopsy performed 6/9/23 c/w progression for blast phase CML   - BCR/ABL p210 collected on admission, >55%  - BCR/ABL mutation negative  - Echo with 65% EF and mild-moderate pulmonary hypertension.  - Picc line in place  - Patient decided to not do fertility presentation  - Stared on Cladribine Idarubicin Cytarabine, CLIA and transitioned from dasatinb to ponatinib. Day 14  - plan for day 21 bone marrow biopsy for restaging   - TLS labs wnl. TLS labs stopped and allopurinol stopped    Pancytopenia due to antineoplastic chemotherapy  - Daily CBC while inpatient  - Transfuse for hgb < 7 or plts < 10K  - Now on ppx bactrim, levaquin, fluconazole, and acyclovir    Neutropenic fever  - Temp of 101.4 at [place] ED. Persisted upon transfer.  - CXR neg. Blood culture NGTD. C-Diff negative. U/a neg  - Strep, flu, and COVID neg at OSH  - Throat red with exudates. Tonsils mildly swollen. CT neck showing adenopathy but no abscesses.  - CT chest without consolidations  - Repeat fever 6/28 @2330. Restarted cefepime. BC NGTD. Repeat CXR and UA negative    Mild malnutrition  - continue PO intake as tolerated  - nutrition following    Pharyngitis  - Continue [place]   - Pain well-controlled with PO Oxy  - ID signed off, continue ppx antimicrobials  - RIP throat swab, strep, flu, and COVID neg  - Leukemia infiltration may be playing a role; improving    Hypertension  - Takes amlodipine at home  - Given hypertension, amlodipine stopped and nifedipine and losartan started in [place]. Continued on transfer.  - BP now improved      VTE Risk Mitigation (From admission,  onward)         Ordered     heparin, porcine (PF) 100 unit/mL injection flush 300 Units  As needed (PRN)         06/16/23 1550     IP VTE HIGH RISK PATIENT  Once         06/09/23 0049     Place sequential compression device  Until discontinued         06/09/23 0049                Disposition: Remains inpatient    Ginger Jacob NP  Bone Marrow Transplant  [facility] ([facility])

## 2023-06-29 NOTE — ASSESSMENT & PLAN NOTE
- Takes amlodipine at home  - Given hypertension, amlodipine stopped and nifedipine and losartan started in [place]. Continued on transfer.  - BP now improved

## 2023-06-30 LAB
ALBUMIN SERPL BCP-MCNC: 3 G/DL (ref 3.5–5.2)
ALP SERPL-CCNC: 100 U/L (ref 55–135)
ALT SERPL W/O P-5'-P-CCNC: 36 U/L (ref 10–44)
ANION GAP SERPL CALC-SCNC: 9 MMOL/L (ref 8–16)
ANISOCYTOSIS BLD QL SMEAR: SLIGHT
AST SERPL-CCNC: 17 U/L (ref 10–40)
BASOPHILS # BLD AUTO: 0 K/UL (ref 0–0.2)
BASOPHILS NFR BLD: 0 % (ref 0–1.9)
BILIRUB SERPL-MCNC: 0.8 MG/DL (ref 0.1–1)
BLD PROD TYP BPU: NORMAL
BLOOD UNIT EXPIRATION DATE: NORMAL
BLOOD UNIT TYPE CODE: 1700
BLOOD UNIT TYPE: NORMAL
BUN SERPL-MCNC: 11 MG/DL (ref 6–20)
CALCIUM SERPL-MCNC: 8.9 MG/DL (ref 8.7–10.5)
CHLORIDE SERPL-SCNC: 104 MMOL/L (ref 95–110)
CO2 SERPL-SCNC: 24 MMOL/L (ref 23–29)
CODING SYSTEM: NORMAL
CREAT SERPL-MCNC: 0.9 MG/DL (ref 0.5–1.4)
CROSSMATCH INTERPRETATION: NORMAL
DIFFERENTIAL METHOD: ABNORMAL
DISPENSE STATUS: NORMAL
EOSINOPHIL # BLD AUTO: 0 K/UL (ref 0–0.5)
EOSINOPHIL NFR BLD: 0 % (ref 0–8)
ERYTHROCYTE [DISTWIDTH] IN BLOOD BY AUTOMATED COUNT: 16.9 % (ref 11.5–14.5)
EST. GFR  (NO RACE VARIABLE): >60 ML/MIN/1.73 M^2
GLUCOSE SERPL-MCNC: 101 MG/DL (ref 70–110)
HCT VFR BLD AUTO: 20.7 % (ref 40–54)
HGB BLD-MCNC: 6.8 G/DL (ref 14–18)
IMM GRANULOCYTES # BLD AUTO: 0 K/UL (ref 0–0.04)
IMM GRANULOCYTES NFR BLD AUTO: 0 % (ref 0–0.5)
LYMPHOCYTES # BLD AUTO: 0.3 K/UL (ref 1–4.8)
LYMPHOCYTES NFR BLD: 100 % (ref 18–48)
MAGNESIUM SERPL-MCNC: 2 MG/DL (ref 1.6–2.6)
MCH RBC QN AUTO: 25.8 PG (ref 27–31)
MCHC RBC AUTO-ENTMCNC: 32.9 G/DL (ref 32–36)
MCV RBC AUTO: 78 FL (ref 82–98)
MONOCYTES # BLD AUTO: 0 K/UL (ref 0.3–1)
MONOCYTES NFR BLD: 0 % (ref 4–15)
NEUTROPHILS # BLD AUTO: 0 K/UL (ref 1.8–7.7)
NEUTROPHILS NFR BLD: 0 % (ref 38–73)
NRBC BLD-RTO: 0 /100 WBC
NUM UNITS TRANS PACKED RBC: NORMAL
OVALOCYTES BLD QL SMEAR: ABNORMAL
PHOSPHATE SERPL-MCNC: 3.8 MG/DL (ref 2.7–4.5)
PLATELET # BLD AUTO: 12 K/UL (ref 150–450)
PLATELET BLD QL SMEAR: ABNORMAL
PMV BLD AUTO: ABNORMAL FL (ref 9.2–12.9)
POIKILOCYTOSIS BLD QL SMEAR: SLIGHT
POLYCHROMASIA BLD QL SMEAR: ABNORMAL
POTASSIUM SERPL-SCNC: 4 MMOL/L (ref 3.5–5.1)
PROT SERPL-MCNC: 6.6 G/DL (ref 6–8.4)
RBC # BLD AUTO: 2.64 M/UL (ref 4.6–6.2)
SODIUM SERPL-SCNC: 137 MMOL/L (ref 136–145)
WBC # BLD AUTO: 0.3 K/UL (ref 3.9–12.7)

## 2023-06-30 PROCEDURE — 99233 PR SUBSEQUENT HOSPITAL CARE,LEVL III: ICD-10-PCS | Mod: ,,, | Performed by: INTERNAL MEDICINE

## 2023-06-30 PROCEDURE — P9038 RBC IRRADIATED: HCPCS | Performed by: NURSE PRACTITIONER

## 2023-06-30 PROCEDURE — 25000003 PHARM REV CODE 250: Performed by: INTERNAL MEDICINE

## 2023-06-30 PROCEDURE — 25000003 PHARM REV CODE 250

## 2023-06-30 PROCEDURE — 99233 SBSQ HOSP IP/OBS HIGH 50: CPT | Mod: ,,, | Performed by: INTERNAL MEDICINE

## 2023-06-30 PROCEDURE — 85025 COMPLETE CBC W/AUTO DIFF WBC: CPT | Performed by: NURSE PRACTITIONER

## 2023-06-30 PROCEDURE — 25000003 PHARM REV CODE 250: Performed by: STUDENT IN AN ORGANIZED HEALTH CARE EDUCATION/TRAINING PROGRAM

## 2023-06-30 PROCEDURE — 63600175 PHARM REV CODE 636 W HCPCS

## 2023-06-30 PROCEDURE — 25000003 PHARM REV CODE 250: Performed by: NURSE PRACTITIONER

## 2023-06-30 PROCEDURE — 80053 COMPREHEN METABOLIC PANEL: CPT | Performed by: NURSE PRACTITIONER

## 2023-06-30 PROCEDURE — 63600175 PHARM REV CODE 636 W HCPCS: Performed by: NURSE PRACTITIONER

## 2023-06-30 PROCEDURE — 84100 ASSAY OF PHOSPHORUS: CPT | Performed by: NURSE PRACTITIONER

## 2023-06-30 PROCEDURE — 83735 ASSAY OF MAGNESIUM: CPT | Performed by: NURSE PRACTITIONER

## 2023-06-30 PROCEDURE — A4216 STERILE WATER/SALINE, 10 ML: HCPCS | Performed by: INTERNAL MEDICINE

## 2023-06-30 PROCEDURE — 20600001 HC STEP DOWN PRIVATE ROOM

## 2023-06-30 RX ADMIN — ALUMINUM HYDROXIDE, MAGNESIUM HYDROXIDE, AND DIMETHICONE 10 ML: 400; 400; 40 SUSPENSION ORAL at 08:06

## 2023-06-30 RX ADMIN — POSACONAZOLE 300 MG: 100 TABLET, DELAYED RELEASE ORAL at 08:06

## 2023-06-30 RX ADMIN — CEFEPIME 2 G: 2 INJECTION, POWDER, FOR SOLUTION INTRAVENOUS at 04:06

## 2023-06-30 RX ADMIN — CEFEPIME 2 G: 2 INJECTION, POWDER, FOR SOLUTION INTRAVENOUS at 12:06

## 2023-06-30 RX ADMIN — NIFEDIPINE 60 MG: 60 TABLET, FILM COATED, EXTENDED RELEASE ORAL at 08:06

## 2023-06-30 RX ADMIN — ALUMINUM HYDROXIDE, MAGNESIUM HYDROXIDE, AND DIMETHICONE 10 ML: 400; 400; 40 SUSPENSION ORAL at 01:06

## 2023-06-30 RX ADMIN — SULFAMETHOXAZOLE AND TRIMETHOPRIM 1 TABLET: 800; 160 TABLET ORAL at 05:06

## 2023-06-30 RX ADMIN — ALUMINUM HYDROXIDE, MAGNESIUM HYDROXIDE, AND DIMETHICONE 10 ML: 400; 400; 40 SUSPENSION ORAL at 09:06

## 2023-06-30 RX ADMIN — ALUMINUM HYDROXIDE, MAGNESIUM HYDROXIDE, AND DIMETHICONE 10 ML: 400; 400; 40 SUSPENSION ORAL at 05:06

## 2023-06-30 RX ADMIN — ACYCLOVIR 800 MG: 200 CAPSULE ORAL at 09:06

## 2023-06-30 RX ADMIN — PANTOPRAZOLE SODIUM 40 MG: 40 TABLET, DELAYED RELEASE ORAL at 08:06

## 2023-06-30 RX ADMIN — Medication 10 ML: at 12:06

## 2023-06-30 RX ADMIN — Medication 10 ML: at 06:06

## 2023-06-30 RX ADMIN — PONATINIB HYDROCHLORIDE 15 MG: 15 TABLET, FILM COATED ORAL at 08:06

## 2023-06-30 RX ADMIN — ACYCLOVIR 800 MG: 200 CAPSULE ORAL at 08:06

## 2023-06-30 RX ADMIN — DIPHENHYDRAMINE HYDROCHLORIDE 12.5 MG: 50 INJECTION, SOLUTION INTRAMUSCULAR; INTRAVENOUS at 10:06

## 2023-06-30 RX ADMIN — ACETAMINOPHEN 650 MG: 650 SOLUTION ORAL at 10:06

## 2023-06-30 RX ADMIN — OXYCODONE HYDROCHLORIDE 5 MG: 5 TABLET ORAL at 06:06

## 2023-06-30 RX ADMIN — CEFEPIME 2 G: 2 INJECTION, POWDER, FOR SOLUTION INTRAVENOUS at 08:06

## 2023-06-30 RX ADMIN — OXYCODONE HYDROCHLORIDE 5 MG: 5 TABLET ORAL at 10:06

## 2023-06-30 NOTE — PLAN OF CARE
AAOX4, verbal and able to make needs known. Independent. C/o pain 5/10 pain. Gave prn oxy, moderate relief. Iv abx given. Family at bedside. Bed in lowest position, side rails up x2, call light in reach.

## 2023-06-30 NOTE — ASSESSMENT & PLAN NOTE
- Temp of 101.4 at [facility]. Persisted upon transfer.  - CXR neg. Blood culture NGTD. C-Diff negative. U/a neg  - Strep, flu, and COVID neg at OSH  - Throat red with exudates. Tonsils mildly swollen. CT neck showing adenopathy but no abscesses.  - CT chest without consolidations  - Repeat fever 6/28 @2330. Restarted cefepime. BC 1/4 GPC in chains resembling strep; other 3/4 NGTD. Repeat CXR and UA negative  - awaiting speciation/susceptibilities of blood culture; possible contaminant; afebrile since

## 2023-06-30 NOTE — PROGRESS NOTES
[facility] - On[facility] ([facility])  Hematology  Bone Marrow Transplant  Progress Note    Patient Name: Magdaleno Hirsch  Admission Date: 6/9/2023  Hospital Length of Stay: 21 days  Code Status: Full Code    Subjective:     Interval History: Day 15 of CLIA+ponatinib for blast phase CML. Afebrile since 6/28 @2330. Remains on cefepime. 1/4 blood cultures GPC in chains resembling strep. Awaiting speciation/susceptibilities. Reports no acute issues or problems this AM. Will receive 1unit PRBC today    Objective:     Vital Signs (Most Recent):  Temp: 98.5 °F (36.9 °C) (06/30/23 0729)  Pulse: 98 (06/30/23 0729)  Resp: 17 (06/30/23 0729)  BP: 123/80 (06/30/23 0729)  SpO2: 98 % (06/30/23 0729) Vital Signs (24h Range):  Temp:  [98.3 °F (36.8 °C)-99.8 °F (37.7 °C)] 98.5 °F (36.9 °C)  Pulse:  [] 98  Resp:  [16-20] 17  SpO2:  [98 %-100 %] 98 %  BP: (103-143)/(61-83) 123/80     Weight: 81.6 kg (179 lb 14.3 oz)  Body mass index is 23.73 kg/m².  Body surface area is 2.05 meters squared.      Intake/Output - Last 3 Shifts         06/28 0700  06/29 0659 06/29 0700 06/30 0659 06/30 0700  07/01 0659    P.O. 1354 800     I.V. (mL/kg) 10 (0.1)      Blood 501      Total Intake(mL/kg) 1865 (22.9) 800 (9.8)     Urine (mL/kg/hr) 1500 (0.8) 1350 (0.7)     Stool 0      Total Output 1500 1350     Net +365 -550            Stool Occurrence 1 x              Physical Exam  Vitals and nursing note reviewed.   Constitutional:       Appearance: Normal appearance. He is well-developed.   HENT:      Head: Normocephalic and atraumatic.      Mouth/Throat:      Mouth: Mucous membranes are moist.      Pharynx: No posterior oropharyngeal erythema.   Eyes:      General: No scleral icterus.     Extraocular Movements: Extraocular movements intact.      Conjunctiva/sclera: Conjunctivae normal.   Cardiovascular:      Rate and Rhythm: Normal rate and regular rhythm.      Pulses: Normal pulses.      Heart sounds: Normal heart sounds. No murmur heard.  Pulmonary:       Effort: Pulmonary effort is normal. No respiratory distress.      Breath sounds: Normal breath sounds. No wheezing.   Abdominal:      General: Bowel sounds are normal. There is no distension.      Palpations: Abdomen is soft.      Tenderness: There is no abdominal tenderness.   Musculoskeletal:         General: No tenderness. Normal range of motion.      Cervical back: Normal range of motion and neck supple.      Right lower leg: No edema.      Left lower leg: No edema.   Lymphadenopathy:      Cervical: No cervical adenopathy.   Skin:     General: Skin is warm and dry.      Findings: No rash.      Comments: PICC Intact to right arm with no redness or drainage   Neurological:      General: No focal deficit present.      Mental Status: He is alert and oriented to person, place, and time.      Cranial Nerves: No cranial nerve deficit.      Coordination: Coordination normal.   Psychiatric:         Mood and Affect: Mood normal.         Behavior: Behavior normal.         Thought Content: Thought content normal.         Judgment: Judgment normal.      Comments: Flat affect          Significant Labs:   CBC:   Recent Labs   Lab 06/28/23  1238 06/29/23  0419 06/30/23  0547   WBC  --  0.24* 0.30*   HGB  --  7.3* 6.8*   HCT  --  22.2* 20.7*   PLT 21* 17* 12*      and CMP:   Recent Labs   Lab 06/29/23  0419 06/30/23  0547    137   K 4.1 4.0    104   CO2 24 24   GLU 99 101   BUN 12 11   CREATININE 0.9 0.9   CALCIUM 8.9 8.9   PROT 6.7 6.6   ALBUMIN 3.0* 3.0*   BILITOT 1.0 0.8   ALKPHOS 91 100   AST 18 17   ALT 35 36   ANIONGAP 8 9         Diagnostic Results:  I have reviewed all pertinent imaging results/findings within the past 24 hours.    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 Brett Hogan. He has been on dasatinib outpatient since 10/2022. Some question regarding his compliance over the course of treatment,  however. Presented to [facility] with c/o gum swelling on 6/7/23. Labs on presentation remarkable for WBC count of 138K, with 80% blasts indicating blast crisis. WBC count from approximately a week and a half earlier was normal. Reported recent compliance with his TKI.     Transferred to [facility] for higher level of care    - Bone marrow Biopsy performed 6/9/23 c/w progression for blast phase CML   - BCR/ABL p210 collected on admission, >55%  - BCR/ABL mutation negative  - Echo with 65% EF and mild-moderate pulmonary hypertension.  - Picc line in place  - Patient decided to not do fertility presentation  - Stared on Cladribine Idarubicin Cytarabine, CLIA and transitioned from dasatinb to ponatinib. Day 15  - plan for day 21 bone marrow biopsy for restaging   - TLS labs wnl. TLS labs stopped and allopurinol stopped    Pancytopenia due to antineoplastic chemotherapy  - Daily CBC while inpatient  - Transfuse for hgb < 7 or plts < 10K  - Now on ppx bactrim, levaquin, fluconazole, and acyclovir    Neutropenic fever  - Temp of 101.4 at [place] ED. Persisted upon transfer.  - CXR neg. Blood culture NGTD. C-Diff negative. U/a neg  - Strep, flu, and COVID neg at OSH  - Throat red with exudates. Tonsils mildly swollen. CT neck showing adenopathy but no abscesses.  - CT chest without consolidations  - Repeat fever 6/28 @2330. Restarted cefepime. BC 1/4 GPC in chains resembling strep; other 3/4 NGTD. Repeat CXR and UA negative  - awaiting speciation/susceptibilities of blood culture; possible contaminant; afebrile since    Mild malnutrition  - continue PO intake as tolerated  - nutrition following    Pharyngitis  - Continue [place]   - Pain well-controlled with PO Oxy  - ID signed off, continue ppx antimicrobials  - RIP throat swab, strep, flu, and COVID neg  - Leukemia infiltration may be playing a role; improving    Hypertension  - Takes amlodipine at home  - Given hypertension, amlodipine stopped and nifedipine and  losartan started in [place]. Continued on transfer.  - BP now improved        VTE Risk Mitigation (From admission, onward)         Ordered     heparin, porcine (PF) 100 unit/mL injection flush 300 Units  As needed (PRN)         06/16/23 1550     IP VTE HIGH RISK PATIENT  Once         06/09/23 0049     Place sequential compression device  Until discontinued         06/09/23 0049                Disposition: Remains inpatient    Ginger Jacob NP  Bone Marrow Transplant  [facility] - On[facility] ([facility])

## 2023-06-30 NOTE — ASSESSMENT & PLAN NOTE
- Continue [place]   - Pain well-controlled with PO Oxy  - ID signed off, continue ppx antimicrobials  - RIP throat swab, strep, flu, and COVID neg  - Leukemia infiltration may be playing a role; improving

## 2023-06-30 NOTE — PROGRESS NOTES
Pt seen for follow up. In good spirits with friend and girlfriend visiting today.  Aware of [facility] meeting Monday 0900.  No other needs identified at this time. Will continue to follow and assist as needed.

## 2023-06-30 NOTE — ASSESSMENT & PLAN NOTE
- Takes amlodipine at home  - Given hypertension, amlodipine stopped and nifedipine and losartan started in [place]. Continued on transfer.  - BP now improved

## 2023-06-30 NOTE — ASSESSMENT & PLAN NOTE
- Patient with CML diagnosed 8/2022 Follows locally in [place] with Dr. Brett Hogan. He has been on dasatinib outpatient since 10/2022. Some question regarding his compliance over the course of treatment, however. Presented to [facility] with c/o gum swelling on 6/7/23. Labs on presentation remarkable for WBC count of 138K, with 80% blasts indicating blast crisis. WBC count from approximately a week and a half earlier was normal. Reported recent compliance with his TKI.     Transferred to [facility] for higher level of care    - Bone marrow Biopsy performed 6/9/23 c/w progression for blast phase CML   - BCR/ABL p210 collected on admission, >55%  - BCR/ABL mutation negative  - Echo with 65% EF and mild-moderate pulmonary hypertension.  - Picc line in place  - Patient decided to not do fertility presentation  - Stared on Cladribine Idarubicin Cytarabine, CLIA and transitioned from dasatinb to ponatinib. Day 15  - plan for day 21 bone marrow biopsy for restaging   - TLS labs wnl. TLS labs stopped and allopurinol stopped

## 2023-06-30 NOTE — SUBJECTIVE & OBJECTIVE
Subjective:     Interval History: Day 15 of CLIA+ponatinib for blast phase CML. Afebrile since 6/28 @2330. Remains on cefepime. 1/4 blood cultures GPC in chains resembling strep. Awaiting speciation/susceptibilities. Reports no acute issues or problems this AM. Will receive 1unit PRBC today    Objective:     Vital Signs (Most Recent):  Temp: 98.5 °F (36.9 °C) (06/30/23 0729)  Pulse: 98 (06/30/23 0729)  Resp: 17 (06/30/23 0729)  BP: 123/80 (06/30/23 0729)  SpO2: 98 % (06/30/23 0729) Vital Signs (24h Range):  Temp:  [98.3 °F (36.8 °C)-99.8 °F (37.7 °C)] 98.5 °F (36.9 °C)  Pulse:  [] 98  Resp:  [16-20] 17  SpO2:  [98 %-100 %] 98 %  BP: (103-143)/(61-83) 123/80     Weight: 81.6 kg (179 lb 14.3 oz)  Body mass index is 23.73 kg/m².  Body surface area is 2.05 meters squared.      Intake/Output - Last 3 Shifts         06/28 0700  06/29 0659 06/29 0700  06/30 0659 06/30 0700  07/01 0659    P.O. 1354 800     I.V. (mL/kg) 10 (0.1)      Blood 501      Total Intake(mL/kg) 1865 (22.9) 800 (9.8)     Urine (mL/kg/hr) 1500 (0.8) 1350 (0.7)     Stool 0      Total Output 1500 1350     Net +365 -550            Stool Occurrence 1 x               Physical Exam  Vitals and nursing note reviewed.   Constitutional:       Appearance: Normal appearance. He is well-developed.   HENT:      Head: Normocephalic and atraumatic.      Mouth/Throat:      Mouth: Mucous membranes are moist.      Pharynx: No posterior oropharyngeal erythema.   Eyes:      General: No scleral icterus.     Extraocular Movements: Extraocular movements intact.      Conjunctiva/sclera: Conjunctivae normal.   Cardiovascular:      Rate and Rhythm: Normal rate and regular rhythm.      Pulses: Normal pulses.      Heart sounds: Normal heart sounds. No murmur heard.  Pulmonary:      Effort: Pulmonary effort is normal. No respiratory distress.      Breath sounds: Normal breath sounds. No wheezing.   Abdominal:      General: Bowel sounds are normal. There is no distension.       Palpations: Abdomen is soft.      Tenderness: There is no abdominal tenderness.   Musculoskeletal:         General: No tenderness. Normal range of motion.      Cervical back: Normal range of motion and neck supple.      Right lower leg: No edema.      Left lower leg: No edema.   Lymphadenopathy:      Cervical: No cervical adenopathy.   Skin:     General: Skin is warm and dry.      Findings: No rash.      Comments: PICC Intact to right arm with no redness or drainage   Neurological:      General: No focal deficit present.      Mental Status: He is alert and oriented to person, place, and time.      Cranial Nerves: No cranial nerve deficit.      Coordination: Coordination normal.   Psychiatric:         Mood and Affect: Mood normal.         Behavior: Behavior normal.         Thought Content: Thought content normal.         Judgment: Judgment normal.      Comments: Flat affect          Significant Labs:   CBC:   Recent Labs   Lab 06/28/23  1238 06/29/23  0419 06/30/23  0547   WBC  --  0.24* 0.30*   HGB  --  7.3* 6.8*   HCT  --  22.2* 20.7*   PLT 21* 17* 12*      and CMP:   Recent Labs   Lab 06/29/23  0419 06/30/23  0547    137   K 4.1 4.0    104   CO2 24 24   GLU 99 101   BUN 12 11   CREATININE 0.9 0.9   CALCIUM 8.9 8.9   PROT 6.7 6.6   ALBUMIN 3.0* 3.0*   BILITOT 1.0 0.8   ALKPHOS 91 100   AST 18 17   ALT 35 36   ANIONGAP 8 9         Diagnostic Results:  I have reviewed all pertinent imaging results/findings within the past 24 hours.

## 2023-06-30 NOTE — PLAN OF CARE
Pt received an unit of blood.  POC reviewed with patient; understanding verbalized. Pt. with nonskid footwear on, bed in lowest position, and locked with bed rails up x2.  Pt. instructed to call prior to getting OOB.  Pt. has call light and personal items within reach. Patient ambulates in room independently. VSS and afebrile this shift. All questions and concerns addressed at this time. Will continue to monitor.

## 2023-07-01 LAB
ALBUMIN SERPL BCP-MCNC: 2.8 G/DL (ref 3.5–5.2)
ALP SERPL-CCNC: 98 U/L (ref 55–135)
ALT SERPL W/O P-5'-P-CCNC: 39 U/L (ref 10–44)
ANION GAP SERPL CALC-SCNC: 5 MMOL/L (ref 8–16)
ANISOCYTOSIS BLD QL SMEAR: SLIGHT
AST SERPL-CCNC: 19 U/L (ref 10–40)
BACTERIA BLD CULT: ABNORMAL
BASOPHILS # BLD AUTO: 0 K/UL (ref 0–0.2)
BASOPHILS NFR BLD: 0 % (ref 0–1.9)
BILIRUB SERPL-MCNC: 0.6 MG/DL (ref 0.1–1)
BLD PROD TYP BPU: NORMAL
BLOOD UNIT EXPIRATION DATE: NORMAL
BLOOD UNIT TYPE CODE: 5100
BLOOD UNIT TYPE: NORMAL
BUN SERPL-MCNC: 13 MG/DL (ref 6–20)
CALCIUM SERPL-MCNC: 8.8 MG/DL (ref 8.7–10.5)
CHLORIDE SERPL-SCNC: 106 MMOL/L (ref 95–110)
CO2 SERPL-SCNC: 24 MMOL/L (ref 23–29)
CODING SYSTEM: NORMAL
CREAT SERPL-MCNC: 0.8 MG/DL (ref 0.5–1.4)
CROSSMATCH INTERPRETATION: NORMAL
DACRYOCYTES BLD QL SMEAR: ABNORMAL
DIFFERENTIAL METHOD: ABNORMAL
DISPENSE STATUS: NORMAL
EOSINOPHIL # BLD AUTO: 0 K/UL (ref 0–0.5)
EOSINOPHIL NFR BLD: 0 % (ref 0–8)
ERYTHROCYTE [DISTWIDTH] IN BLOOD BY AUTOMATED COUNT: 16.6 % (ref 11.5–14.5)
EST. GFR  (NO RACE VARIABLE): >60 ML/MIN/1.73 M^2
GLUCOSE SERPL-MCNC: 98 MG/DL (ref 70–110)
HCT VFR BLD AUTO: 21.1 % (ref 40–54)
HGB BLD-MCNC: 7.1 G/DL (ref 14–18)
IMM GRANULOCYTES # BLD AUTO: 0 K/UL (ref 0–0.04)
IMM GRANULOCYTES NFR BLD AUTO: 0 % (ref 0–0.5)
LYMPHOCYTES # BLD AUTO: 0.3 K/UL (ref 1–4.8)
LYMPHOCYTES NFR BLD: 96.8 % (ref 18–48)
MAGNESIUM SERPL-MCNC: 1.9 MG/DL (ref 1.6–2.6)
MCH RBC QN AUTO: 26.1 PG (ref 27–31)
MCHC RBC AUTO-ENTMCNC: 33.6 G/DL (ref 32–36)
MCV RBC AUTO: 78 FL (ref 82–98)
MONOCYTES # BLD AUTO: 0 K/UL (ref 0.3–1)
MONOCYTES NFR BLD: 3.2 % (ref 4–15)
NEUTROPHILS # BLD AUTO: 0 K/UL (ref 1.8–7.7)
NEUTROPHILS NFR BLD: 0 % (ref 38–73)
NRBC BLD-RTO: 0 /100 WBC
OVALOCYTES BLD QL SMEAR: ABNORMAL
PHOSPHATE SERPL-MCNC: 3.2 MG/DL (ref 2.7–4.5)
PLATELET # BLD AUTO: 7 K/UL (ref 150–450)
PLATELET BLD QL SMEAR: ABNORMAL
PMV BLD AUTO: ABNORMAL FL (ref 9.2–12.9)
POIKILOCYTOSIS BLD QL SMEAR: SLIGHT
POTASSIUM SERPL-SCNC: 4 MMOL/L (ref 3.5–5.1)
PROT SERPL-MCNC: 6.2 G/DL (ref 6–8.4)
RBC # BLD AUTO: 2.72 M/UL (ref 4.6–6.2)
SODIUM SERPL-SCNC: 135 MMOL/L (ref 136–145)
SPHEROCYTES BLD QL SMEAR: ABNORMAL
UNIT NUMBER: NORMAL
WBC # BLD AUTO: 0.31 K/UL (ref 3.9–12.7)

## 2023-07-01 PROCEDURE — 80053 COMPREHEN METABOLIC PANEL: CPT | Performed by: NURSE PRACTITIONER

## 2023-07-01 PROCEDURE — 25000003 PHARM REV CODE 250: Performed by: STUDENT IN AN ORGANIZED HEALTH CARE EDUCATION/TRAINING PROGRAM

## 2023-07-01 PROCEDURE — 25000003 PHARM REV CODE 250: Performed by: INTERNAL MEDICINE

## 2023-07-01 PROCEDURE — 25000003 PHARM REV CODE 250: Performed by: NURSE PRACTITIONER

## 2023-07-01 PROCEDURE — 99233 PR SUBSEQUENT HOSPITAL CARE,LEVL III: ICD-10-PCS | Mod: ,,, | Performed by: INTERNAL MEDICINE

## 2023-07-01 PROCEDURE — 84100 ASSAY OF PHOSPHORUS: CPT | Performed by: NURSE PRACTITIONER

## 2023-07-01 PROCEDURE — 87040 BLOOD CULTURE FOR BACTERIA: CPT | Mod: 59 | Performed by: INTERNAL MEDICINE

## 2023-07-01 PROCEDURE — 99223 PR INITIAL HOSPITAL CARE,LEVL III: ICD-10-PCS | Mod: ,,, | Performed by: INTERNAL MEDICINE

## 2023-07-01 PROCEDURE — 99233 SBSQ HOSP IP/OBS HIGH 50: CPT | Mod: ,,, | Performed by: INTERNAL MEDICINE

## 2023-07-01 PROCEDURE — A4216 STERILE WATER/SALINE, 10 ML: HCPCS | Performed by: INTERNAL MEDICINE

## 2023-07-01 PROCEDURE — 63600175 PHARM REV CODE 636 W HCPCS: Performed by: NURSE PRACTITIONER

## 2023-07-01 PROCEDURE — 99223 1ST HOSP IP/OBS HIGH 75: CPT | Mod: ,,, | Performed by: INTERNAL MEDICINE

## 2023-07-01 PROCEDURE — 20600001 HC STEP DOWN PRIVATE ROOM

## 2023-07-01 PROCEDURE — P9037 PLATE PHERES LEUKOREDU IRRAD: HCPCS | Performed by: STUDENT IN AN ORGANIZED HEALTH CARE EDUCATION/TRAINING PROGRAM

## 2023-07-01 PROCEDURE — 36415 COLL VENOUS BLD VENIPUNCTURE: CPT | Performed by: INTERNAL MEDICINE

## 2023-07-01 PROCEDURE — 25000003 PHARM REV CODE 250

## 2023-07-01 PROCEDURE — 83735 ASSAY OF MAGNESIUM: CPT | Performed by: NURSE PRACTITIONER

## 2023-07-01 PROCEDURE — 85025 COMPLETE CBC W/AUTO DIFF WBC: CPT | Performed by: NURSE PRACTITIONER

## 2023-07-01 PROCEDURE — 63600175 PHARM REV CODE 636 W HCPCS

## 2023-07-01 RX ORDER — HYDROCODONE BITARTRATE AND ACETAMINOPHEN 500; 5 MG/1; MG/1
TABLET ORAL
Status: DISCONTINUED | OUTPATIENT
Start: 2023-07-01 | End: 2023-07-03

## 2023-07-01 RX ADMIN — ALUMINUM HYDROXIDE, MAGNESIUM HYDROXIDE, AND DIMETHICONE 10 ML: 400; 400; 40 SUSPENSION ORAL at 05:07

## 2023-07-01 RX ADMIN — PONATINIB HYDROCHLORIDE 15 MG: 15 TABLET, FILM COATED ORAL at 09:07

## 2023-07-01 RX ADMIN — PANTOPRAZOLE SODIUM 40 MG: 40 TABLET, DELAYED RELEASE ORAL at 09:07

## 2023-07-01 RX ADMIN — CEFEPIME 2 G: 2 INJECTION, POWDER, FOR SOLUTION INTRAVENOUS at 11:07

## 2023-07-01 RX ADMIN — CEFEPIME: 2 INJECTION, POWDER, FOR SOLUTION INTRAVENOUS at 05:07

## 2023-07-01 RX ADMIN — CEFEPIME 2 G: 2 INJECTION, POWDER, FOR SOLUTION INTRAVENOUS at 08:07

## 2023-07-01 RX ADMIN — NIFEDIPINE 60 MG: 60 TABLET, FILM COATED, EXTENDED RELEASE ORAL at 09:07

## 2023-07-01 RX ADMIN — POSACONAZOLE 300 MG: 100 TABLET, DELAYED RELEASE ORAL at 09:07

## 2023-07-01 RX ADMIN — OXYCODONE HYDROCHLORIDE 5 MG: 5 TABLET ORAL at 08:07

## 2023-07-01 RX ADMIN — Medication 10 ML: at 11:07

## 2023-07-01 RX ADMIN — Medication 10 ML: at 05:07

## 2023-07-01 RX ADMIN — ACYCLOVIR 800 MG: 200 CAPSULE ORAL at 08:07

## 2023-07-01 RX ADMIN — ALUMINUM HYDROXIDE, MAGNESIUM HYDROXIDE, AND DIMETHICONE 10 ML: 400; 400; 40 SUSPENSION ORAL at 08:07

## 2023-07-01 RX ADMIN — ALUMINUM HYDROXIDE, MAGNESIUM HYDROXIDE, AND DIMETHICONE 10 ML: 400; 400; 40 SUSPENSION ORAL at 12:07

## 2023-07-01 RX ADMIN — DIPHENHYDRAMINE HYDROCHLORIDE 12.5 MG: 50 INJECTION, SOLUTION INTRAMUSCULAR; INTRAVENOUS at 12:07

## 2023-07-01 RX ADMIN — MAGNESIUM OXIDE TAB 400 MG (241.3 MG ELEMENTAL MG) 400 MG: 400 (241.3 MG) TAB at 12:07

## 2023-07-01 RX ADMIN — Medication 10 ML: at 12:07

## 2023-07-01 RX ADMIN — ALUMINUM HYDROXIDE, MAGNESIUM HYDROXIDE, AND DIMETHICONE 10 ML: 400; 400; 40 SUSPENSION ORAL at 09:07

## 2023-07-01 RX ADMIN — ACETAMINOPHEN 650 MG: 650 SOLUTION ORAL at 12:07

## 2023-07-01 RX ADMIN — OXYCODONE HYDROCHLORIDE 5 MG: 5 TABLET ORAL at 09:07

## 2023-07-01 RX ADMIN — Medication 10 ML: at 06:07

## 2023-07-01 RX ADMIN — ACYCLOVIR 800 MG: 200 CAPSULE ORAL at 09:07

## 2023-07-01 RX ADMIN — MAGNESIUM OXIDE TAB 400 MG (241.3 MG ELEMENTAL MG) 400 MG: 400 (241.3 MG) TAB at 09:07

## 2023-07-01 RX ADMIN — CEFEPIME 2 G: 2 INJECTION, POWDER, FOR SOLUTION INTRAVENOUS at 12:07

## 2023-07-01 NOTE — ASSESSMENT & PLAN NOTE
22M admitted w/ blast phase CML recently seen by ID, now re-consulted for blood cultures w/ 2/4 bottles + strep spp. Pt endorses improvement in throat pain, now able to eat. Healing oral ulcers on palate. No GI symptoms.    Recommendations:   - continue cefepime  - repeat blood cx to ensure clearance  - source of bacteremia likely related to oropharyngeal translocation  - ppx as per BMT protocol    Will follow

## 2023-07-01 NOTE — SUBJECTIVE & OBJECTIVE
Interval History: called back for strep bacteremia. Pt developed fever 6/28 to 101.8 prompting new blood cx. 2/4 + for viridans strep. Patient denies any new complaints. States throat pain is slowly improving, only having pain when he eats. Repeat blood cx pending. No other GI symptoms. No issue w/ line.     Review of Systems   All other systems reviewed and are negative.  Objective:     Vital Signs (Most Recent):  Temp: 98.9 °F (37.2 °C) (07/01/23 1359)  Pulse: 95 (07/01/23 1359)  Resp: 17 (07/01/23 1359)  BP: 125/70 (07/01/23 1359)  SpO2: 100 % (07/01/23 1359) Vital Signs (24h Range):  Temp:  [98 °F (36.7 °C)-99.4 °F (37.4 °C)] 98.9 °F (37.2 °C)  Pulse:  [] 95  Resp:  [17-18] 17  SpO2:  [96 %-100 %] 100 %  BP: (116-143)/(61-88) 125/70     Weight: 81.6 kg (179 lb 14.3 oz)  Body mass index is 23.73 kg/m².    Estimated Creatinine Clearance: 159.5 mL/min (based on SCr of 0.8 mg/dL).     Physical Exam  Constitutional:       General: He is not in acute distress.     Appearance: Normal appearance. He is well-developed. He is ill-appearing. He is not diaphoretic.   HENT:      Head: Normocephalic and atraumatic.      Right Ear: External ear normal.      Left Ear: External ear normal.      Nose: Nose normal.      Mouth/Throat:      Comments: Healing mucositis on palate  Eyes:      General: No scleral icterus.        Right eye: No discharge.         Left eye: No discharge.      Extraocular Movements: Extraocular movements intact.      Conjunctiva/sclera: Conjunctivae normal.   Pulmonary:      Effort: Pulmonary effort is normal. No respiratory distress.      Breath sounds: No stridor.   Abdominal:      General: There is no distension.      Tenderness: There is no abdominal tenderness.   Musculoskeletal:      Comments: PICC line RUE nontender   Skin:     General: Skin is dry.      Coloration: Skin is not jaundiced or pale.      Findings: No erythema.   Neurological:      General: No focal deficit present.      Mental  Status: He is alert and oriented to person, place, and time. Mental status is at baseline.   Psychiatric:         Mood and Affect: Mood normal.         Behavior: Behavior normal.         Thought Content: Thought content normal.         Judgment: Judgment normal.        Significant Labs: CBC:   Recent Labs   Lab 06/30/23  0547 07/01/23  0509   WBC 0.30* 0.31*   HGB 6.8* 7.1*   HCT 20.7* 21.1*   PLT 12* 7*     CMP:   Recent Labs   Lab 06/30/23  0547 07/01/23  0509    135*   K 4.0 4.0    106   CO2 24 24    98   BUN 11 13   CREATININE 0.9 0.8   CALCIUM 8.9 8.8   PROT 6.6 6.2   ALBUMIN 3.0* 2.8*   BILITOT 0.8 0.6   ALKPHOS 100 98   AST 17 19   ALT 36 39   ANIONGAP 9 5*     Microbiology Results (last 7 days)       Procedure Component Value Units Date/Time    Blood culture [[id number]] Collected: 07/01/23 1325    Order Status: Sent Specimen: Blood from Antecubital, Left Updated: 07/01/23 1338    Blood culture [[id number]] Collected: 07/01/23 1335    Order Status: Sent Specimen: Blood from Peripheral, Left Hand Updated: 07/01/23 1338    Blood culture [[id number]]  (Abnormal) Collected: 06/29/23 0011    Order Status: Completed Specimen: Blood Updated: 07/01/23 1321     Blood Culture, Routine Gram stain aer bottle: Gram positive cocci in chains resembling Strep      Results called to and read back by: Srini BARRAGAN RN 06/29/2023  13:51      Gram stain brenda bottle: Gram positive cocci in chains resembling Strep      VIRIDANS STREPTOCOCCUS GROUP  Susceptibility testing not routinely performed      Blood culture [[id number]] Collected: 06/29/23 0011    Order Status: Completed Specimen: Blood Updated: 07/01/23 0612     Blood Culture, Routine No Growth to date      No Growth to date      No Growth to date    Rapid Organism ID by PCR (from Blood culture) [[id number]]  (Abnormal) Collected: 06/29/23 0011    Order Status: Completed Updated: 06/29/23 1515     Enterococcus faecalis Not Detected     Enterococcus faecium Not  Detected     Listeria monocytogenes Not Detected     Staphylococcus spp. Not Detected     Staphylococcus aureus Not Detected     Staphylococcus epidermidis Not Detected     Staphylococcus lugdunensis Not Detected     Streptococcus species Detected     Streptococcus agalactiae Not Detected     Streptococcus pneumoniae Not Detected     Streptococcus pyogenes Not Detected     Acinetobacter calcoaceticus/baumannii complex Not Detected     Bacteroides fragilis Not Detected     Enterobacterales Not Detected     Enterobacter cloacae complex Not Detected     Escherichia coli Not Detected     Klebsiella aerogenes Not Detected     Klebsiella oxytoca Not Detected     Klebsiella pneumoniae group Not Detected     Proteus Not Detected     Salmonella sp Not Detected     Serratia marcescens Not Detected     Haemophilus influenzae Not Detected     Neisseria meningtidis Not Detected     Pseudomonas aeruginosa Not Detected     Stenotrophomonas maltophilia Not Detected     Candida albicans Not Detected     Candida auris Not Detected     Candida glabrata Not Detected     Candida krusei Not Detected     Candida parapsilosis Not Detected     Candida tropicalis Not Detected     Cryptococcus neoformans/gattii Not Detected     CTX-M (ESBL ) Test Not Applicable     IMP (Carbapenem resistant) Test Not Applicable     KPC resistance gene (Carbapenem resistant) Test Not Applicable     mcr-1  Test Not Applicable     mec A/C  Test Not Applicable     mec A/C and MREJ (MRSA) gene Test Not Applicable     NDM (Carbapenem resistant) Test Not Applicable     OXA-48-like (Carbapenem resistant) Test Not Applicable     van A/B (VRE gene) Test Not Applicable     VIM (Carbapenem resistant) Test Not Applicable    Blood culture [[id number]]     Order Status: Canceled Specimen: Blood     Blood culture [[id number]]     Order Status: Canceled Specimen: Blood             Significant Imaging: I have reviewed all pertinent imaging results/findings within the  past 24 hours.

## 2023-07-01 NOTE — NURSING
Patient received 1 unit of platelets at this time, premeds of tylenol 650mg po ang benadryl 25mg po given prior to platelets .  No signs/symptoms of reaction noted.

## 2023-07-01 NOTE — ASSESSMENT & PLAN NOTE
- Patient with CML diagnosed 8/2022 Follows locally in [place] with Dr. Brett Hogan. He has been on dasatinib outpatient since 10/2022. Some question regarding his compliance over the course of treatment, however. Presented to [facility] with c/o gum swelling on 6/7/23. Labs on presentation remarkable for WBC count of 138K, with 80% blasts indicating blast crisis. WBC count from approximately a week and a half earlier was normal. Reported recent compliance with his TKI.     [place] [facility] [place]    - Bone marrow Biopsy performed 6/9/23 c/w progression for blast phase CML   - BCR/ABL p210 collected on admission, >55%  - BCR/ABL mutation negative  - Echo with 65% EF and mild-moderate pulmonary hypertension.  - Picc line in place  - Patient decided to not do fertility presentation  - Stared on Cladribine Idarubicin Cytarabine, CLIA and transitioned from dasatinb to ponatinib. Day 16  - plan for day 21 bone marrow biopsy for restaging   - TLS labs wnl. TLS labs stopped and allopurinol stopped

## 2023-07-01 NOTE — PLAN OF CARE
Plan of care reviewed with patient and family.  Fall precautiions maintained, side rails upx 2, call light in reach, bed in low position and locked, nonskid socks on.  Ambulating, voiding and tolerating a regular diet without difficulty.  FAmily at the bedside.  Patient received 1 unit of platelets today.  Received oxycodone today for back pain and got relief.

## 2023-07-01 NOTE — ASSESSMENT & PLAN NOTE
- Continue [place]   - Pain well-controlled with PO Oxy  - Continue ppx antimicrobials  - RIP throat swab, strep, flu, and COVID neg  - Leukemia infiltration may be playing a role; improving

## 2023-07-01 NOTE — PROGRESS NOTES
[facility] On[facility] ([facility])  Infectious Disease  Progress Note    Patient Name: Magdaleno Hirsch  MRN: [id number]  Admission Date: 6/9/2023  Length of Stay: 22 days  Attending Physician: Rocio Waterman MD  Primary Care Provider: Primary Doctor No    Isolation Status: No active isolations  Assessment/Plan:      Oncology  Neutropenic fever  22M admitted w/ blast phase CML recently seen by ID, now re-consulted for blood cultures w/ 2/4 bottles + strep spp. Pt endorses improvement in throat pain, now able to eat. Healing oral ulcers on palate. No GI symptoms.    Recommendations:   - continue cefepime  - repeat blood cx to ensure clearance  - source of bacteremia likely related to oropharyngeal translocation  - ppx as per BMT protocol    Will follow      Anticipated Disposition: TBD    Thank you for your consult. I will follow-up with patient. Please contact us if you have any additional questions.    Shaye Tracy DO  Transplant Infectious Disease    Time: 75 minutes   50% of time spent on face-to-face counseling and coordination of care. Counseling included review of test results, diagnosis, and treatment plan with patient and/or family.        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 Labs remarkable for WBC count of 138K, with 80% blasts (blast crisis), also functional neutropenic fevers, curve downtrending, CT chest with 3 small pulmonary nodules, CT neck negative, RIP negative, all cultures NGTD, most significant complaint is severe throat pain and oral uclers    Interval History: called back for strep bacteremia. Pt developed fever 6/28 to 101.8 prompting new blood cx. 2/4 + for viridans strep. Patient denies any new complaints. States throat pain is slowly improving, only having pain when he eats. Repeat blood cx pending. No other GI  symptoms. No issue w/ line.     Review of Systems   All other systems reviewed and are negative.  Objective:     Vital Signs (Most Recent):  Temp: 98.9 °F (37.2 °C) (07/01/23 1359)  Pulse: 95 (07/01/23 1359)  Resp: 17 (07/01/23 1359)  BP: 125/70 (07/01/23 1359)  SpO2: 100 % (07/01/23 1359) Vital Signs (24h Range):  Temp:  [98 °F (36.7 °C)-99.4 °F (37.4 °C)] 98.9 °F (37.2 °C)  Pulse:  [] 95  Resp:  [17-18] 17  SpO2:  [96 %-100 %] 100 %  BP: (116-143)/(61-88) 125/70     Weight: 81.6 kg (179 lb 14.3 oz)  Body mass index is 23.73 kg/m².    Estimated Creatinine Clearance: 159.5 mL/min (based on SCr of 0.8 mg/dL).     Physical Exam  Constitutional:       General: He is not in acute distress.     Appearance: Normal appearance. He is well-developed. He is ill-appearing. He is not diaphoretic.   HENT:      Head: Normocephalic and atraumatic.      Right Ear: External ear normal.      Left Ear: External ear normal.      Nose: Nose normal.      Mouth/Throat:      Comments: Healing mucositis on palate  Eyes:      General: No scleral icterus.        Right eye: No discharge.         Left eye: No discharge.      Extraocular Movements: Extraocular movements intact.      Conjunctiva/sclera: Conjunctivae normal.   Pulmonary:      Effort: Pulmonary effort is normal. No respiratory distress.      Breath sounds: No stridor.   Abdominal:      General: There is no distension.      Tenderness: There is no abdominal tenderness.   Musculoskeletal:      Comments: PICC line RUE nontender   Skin:     General: Skin is dry.      Coloration: Skin is not jaundiced or pale.      Findings: No erythema.   Neurological:      General: No focal deficit present.      Mental Status: He is alert and oriented to person, place, and time. Mental status is at baseline.   Psychiatric:         Mood and Affect: Mood normal.         Behavior: Behavior normal.         Thought Content: Thought content normal.         Judgment: Judgment normal.        Significant  Labs: CBC:   Recent Labs   Lab 06/30/23  0547 07/01/23  0509   WBC 0.30* 0.31*   HGB 6.8* 7.1*   HCT 20.7* 21.1*   PLT 12* 7*     CMP:   Recent Labs   Lab 06/30/23  0547 07/01/23  0509    135*   K 4.0 4.0    106   CO2 24 24    98   BUN 11 13   CREATININE 0.9 0.8   CALCIUM 8.9 8.8   PROT 6.6 6.2   ALBUMIN 3.0* 2.8*   BILITOT 0.8 0.6   ALKPHOS 100 98   AST 17 19   ALT 36 39   ANIONGAP 9 5*     Microbiology Results (last 7 days)       Procedure Component Value Units Date/Time    Blood culture [[id number]] Collected: 07/01/23 1325    Order Status: Sent Specimen: Blood from Antecubital, Left Updated: 07/01/23 1338    Blood culture [[id number]] Collected: 07/01/23 1335    Order Status: Sent Specimen: Blood from Peripheral, Left Hand Updated: 07/01/23 1338    Blood culture [[id number]]  (Abnormal) Collected: 06/29/23 0011    Order Status: Completed Specimen: Blood Updated: 07/01/23 1321     Blood Culture, Routine Gram stain aer bottle: Gram positive cocci in chains resembling Strep      Results called to and read back by: Srini BARRAGAN RN 06/29/2023  13:51      Gram stain brenda bottle: Gram positive cocci in chains resembling Strep      VIRIDANS STREPTOCOCCUS GROUP  Susceptibility testing not routinely performed      Blood culture [[id number]] Collected: 06/29/23 0011    Order Status: Completed Specimen: Blood Updated: 07/01/23 0612     Blood Culture, Routine No Growth to date      No Growth to date      No Growth to date    Rapid Organism ID by PCR (from Blood culture) [[id number]]  (Abnormal) Collected: 06/29/23 0011    Order Status: Completed Updated: 06/29/23 1515     Enterococcus faecalis Not Detected     Enterococcus faecium Not Detected     Listeria monocytogenes Not Detected     Staphylococcus spp. Not Detected     Staphylococcus aureus Not Detected     Staphylococcus epidermidis Not Detected     Staphylococcus lugdunensis Not Detected     Streptococcus species Detected     Streptococcus agalactiae Not  Detected     Streptococcus pneumoniae Not Detected     Streptococcus pyogenes Not Detected     Acinetobacter calcoaceticus/baumannii complex Not Detected     Bacteroides fragilis Not Detected     Enterobacterales Not Detected     Enterobacter cloacae complex Not Detected     Escherichia coli Not Detected     Klebsiella aerogenes Not Detected     Klebsiella oxytoca Not Detected     Klebsiella pneumoniae group Not Detected     Proteus Not Detected     Salmonella sp Not Detected     Serratia marcescens Not Detected     Haemophilus influenzae Not Detected     Neisseria meningtidis Not Detected     Pseudomonas aeruginosa Not Detected     Stenotrophomonas maltophilia Not Detected     Candida albicans Not Detected     Candida auris Not Detected     Candida glabrata Not Detected     Candida krusei Not Detected     Candida parapsilosis Not Detected     Candida tropicalis Not Detected     Cryptococcus neoformans/gattii Not Detected     CTX-M (ESBL ) Test Not Applicable     IMP (Carbapenem resistant) Test Not Applicable     KPC resistance gene (Carbapenem resistant) Test Not Applicable     mcr-1  Test Not Applicable     mec A/C  Test Not Applicable     mec A/C and MREJ (MRSA) gene Test Not Applicable     NDM (Carbapenem resistant) Test Not Applicable     OXA-48-like (Carbapenem resistant) Test Not Applicable     van A/B (VRE gene) Test Not Applicable     VIM (Carbapenem resistant) Test Not Applicable    Blood culture [[id number]]     Order Status: Canceled Specimen: Blood     Blood culture [[id number]]     Order Status: Canceled Specimen: Blood             Significant Imaging: I have reviewed all pertinent imaging results/findings within the past 24 hours.

## 2023-07-01 NOTE — PROGRESS NOTES
[facility] - Oncology ([facility])  Hematology  Bone Marrow Transplant  Progress Note    Patient Name: Magdaleno Hirsch  Admission Date: 6/9/2023  Hospital Length of Stay: 22 days  Code Status: Full Code    Subjective:     Interval History: Day 16 of CLIA+ponatinib for blast phase CML. Has been afebrile in the past 24 hours. Received 1U platelets. 1/4 blood culture showed viridans streptococcus. Denies acute issues last night or this morning.    Objective:     Vital Signs (Most Recent):  Temp: 98.9 °F (37.2 °C) (07/01/23 1359)  Pulse: 95 (07/01/23 1359)  Resp: 17 (07/01/23 1359)  BP: 125/70 (07/01/23 1359)  SpO2: 100 % (07/01/23 1359) Vital Signs (24h Range):  Temp:  [98 °F (36.7 °C)-99.4 °F (37.4 °C)] 98.9 °F (37.2 °C)  Pulse:  [] 95  Resp:  [17-18] 17  SpO2:  [96 %-100 %] 100 %  BP: (116-143)/(61-88) 125/70     Weight: 81.6 kg (179 lb 14.3 oz)  Body mass index is 23.73 kg/m².  Body surface area is 2.05 meters squared.    ECOG SCORE           [unfilled]    Intake/Output - Last 3 Shifts         06/29 0700  06/30 0659 06/30 0700  07/01 0659 07/01 0700  07/02 0659    P.O. 800 400 897    I.V. (mL/kg)       Blood  250     Total Intake(mL/kg) 800 (9.8) 650 (8) 897 (11)    Urine (mL/kg/hr) 1350 (0.7)  800 (1)    Stool       Total Output 1350  800    Net -550 +650 +97                    Physical Exam  Vitals and nursing note reviewed.   Constitutional:       Appearance: Normal appearance. He is well-developed.   HENT:      Head: Normocephalic and atraumatic.      Nose: Nose normal. No congestion.   Eyes:      General: No scleral icterus.     Extraocular Movements: Extraocular movements intact.      Conjunctiva/sclera: Conjunctivae normal.   Cardiovascular:      Rate and Rhythm: Normal rate and regular rhythm.      Pulses: Normal pulses.      Heart sounds: Normal heart sounds. No murmur heard.  Pulmonary:      Effort: Pulmonary effort is normal. No respiratory distress.      Breath sounds: Normal breath sounds. No wheezing.    Abdominal:      General: Bowel sounds are normal. There is no distension.      Palpations: Abdomen is soft.      Tenderness: There is no abdominal tenderness.   Musculoskeletal:         General: No tenderness. Normal range of motion.      Cervical back: Normal range of motion and neck supple.      Right lower leg: No edema.      Left lower leg: No edema.   Lymphadenopathy:      Cervical: No cervical adenopathy.   Skin:     General: Skin is warm and dry.      Findings: No rash.      Comments: PICC Intact to right arm with no redness or drainage   Neurological:      General: No focal deficit present.      Mental Status: He is alert and oriented to person, place, and time.      Cranial Nerves: No cranial nerve deficit.      Coordination: Coordination normal.   Psychiatric:         Behavior: Behavior normal.         Thought Content: Thought content normal.         Judgment: Judgment normal.          Significant Labs:   CBC:   Recent Labs   Lab 06/30/23  0547 07/01/23  0509   WBC 0.30* 0.31*   HGB 6.8* 7.1*   HCT 20.7* 21.1*   PLT 12* 7*    and CMP:   Recent Labs   Lab 06/30/23  0547 07/01/23  0509    135*   K 4.0 4.0    106   CO2 24 24    98   BUN 11 13   CREATININE 0.9 0.8   CALCIUM 8.9 8.8   PROT 6.6 6.2   ALBUMIN 3.0* 2.8*   BILITOT 0.8 0.6   ALKPHOS 100 98   AST 17 19   ALT 36 39   ANIONGAP 9 5*       Diagnostic Results:  I have reviewed all pertinent imaging results/findings within the past 24 hours.    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 Brett Hogan. He has been on dasatinib outpatient since 10/2022. Some question regarding his compliance over the course of treatment, however. Presented to [facility] with c/o gum swelling on 6/7/23. Labs on presentation remarkable for WBC count of 138K, with 80% blasts indicating blast crisis. WBC count from approximately a week and a  half earlier was normal. Reported recent compliance with his TKI.     Transferred to [facility] for higher level of care    - Bone marrow Biopsy performed 6/9/23 c/w progression for blast phase CML   - BCR/ABL p210 collected on admission, >55%  - BCR/ABL mutation negative  - Echo with 65% EF and mild-moderate pulmonary hypertension.  - Picc line in place  - Patient decided to not do fertility presentation  - Stared on Cladribine Idarubicin Cytarabine, CLIA and transitioned from dasatinb to ponatinib. Day 16  - plan for day 21 bone marrow biopsy for restaging   - TLS labs wnl. TLS labs stopped and allopurinol stopped    Mild malnutrition  - continue PO intake as tolerated  - nutrition following    Pharyngitis  - Continue [place]   - Pain well-controlled with PO Oxy  - Continue ppx antimicrobials  - RIP throat swab, strep, flu, and COVID neg  - Leukemia infiltration may be playing a role; improving    Neutropenic fever  - Temp of 101.4 at [place] [facility]. Persisted upon transfer.  - CXR neg. Blood culture NGTD. C-Diff negative. U/a neg  - Strep, flu, and COVID neg at OSH  - Throat red with exudates. Tonsils mildly swollen. CT neck showing adenopathy but no abscesses.  - CT chest without consolidations  - Repeat fever 6/28 @2330. BC 1/4 GPC with viridans strep; other 3/4 NGTD. Repeat CXR and UA negative  - ID consulted, appreciate assistance: continue cefepime and follow repeat cultures    Pancytopenia due to antineoplastic chemotherapy  - Daily CBC while inpatient  - Transfuse for hgb < 7 or plts < 10K  - Now on ppx bactrim, levaquin, fluconazole, and acyclovir    Hypertension  - Takes amlodipine at home  - Given hypertension, amlodipine stopped and nifedipine and losartan started in [place]. Continued on transfer.  - BP now improved      VTE Risk Mitigation (From admission, onward)         Ordered     heparin, porcine (PF) 100 unit/mL injection flush 300 Units  As needed (PRN)         06/16/23 1550     IP VTE HIGH RISK PATIENT   Once         06/09/23 0049     Place sequential compression device  Until discontinued         06/09/23 0049                Disposition: Remain inpatient    Ching Albrecht MD  Bone Marrow Transplant  [facility] - Oncology ([facility])

## 2023-07-01 NOTE — ASSESSMENT & PLAN NOTE
- Temp of 101.4 at [facility]. Persisted upon transfer.  - CXR neg. Blood culture NGTD. C-Diff negative. U/a neg  - Strep, flu, and COVID neg at OSH  - Throat red with exudates. Tonsils mildly swollen. CT neck showing adenopathy but no abscesses.  - CT chest without consolidations  - Repeat fever 6/28 @2330. BC 1/4 GPC with viridans strep; other 3/4 NGTD. Repeat CXR and UA negative  - ID consulted, appreciate assistance: continue cefepime and follow repeat cultures

## 2023-07-01 NOTE — SUBJECTIVE & OBJECTIVE
Subjective:     Interval History: Day 16 of CLIA+ponatinib for blast phase CML. Has been afebrile in the past 24 hours. Received 1U platelets. 1/4 blood culture showed viridans streptococcus. Denies acute issues last night or this morning.    Objective:     Vital Signs (Most Recent):  Temp: 98.9 °F (37.2 °C) (07/01/23 1359)  Pulse: 95 (07/01/23 1359)  Resp: 17 (07/01/23 1359)  BP: 125/70 (07/01/23 1359)  SpO2: 100 % (07/01/23 1359) Vital Signs (24h Range):  Temp:  [98 °F (36.7 °C)-99.4 °F (37.4 °C)] 98.9 °F (37.2 °C)  Pulse:  [] 95  Resp:  [17-18] 17  SpO2:  [96 %-100 %] 100 %  BP: (116-143)/(61-88) 125/70     Weight: 81.6 kg (179 lb 14.3 oz)  Body mass index is 23.73 kg/m².  Body surface area is 2.05 meters squared.    ECOG SCORE           [unfilled]    Intake/Output - Last 3 Shifts         06/29 0700  06/30 0659 06/30 0700 07/01 0659 07/01 0700  07/02 0659    P.O. 800 400 897    I.V. (mL/kg)       Blood  250     Total Intake(mL/kg) 800 (9.8) 650 (8) 897 (11)    Urine (mL/kg/hr) 1350 (0.7)  800 (1)    Stool       Total Output 1350  800    Net -550 +650 +97                    Physical Exam  Vitals and nursing note reviewed.   Constitutional:       Appearance: Normal appearance. He is well-developed.   HENT:      Head: Normocephalic and atraumatic.      Nose: Nose normal. No congestion.   Eyes:      General: No scleral icterus.     Extraocular Movements: Extraocular movements intact.      Conjunctiva/sclera: Conjunctivae normal.   Cardiovascular:      Rate and Rhythm: Normal rate and regular rhythm.      Pulses: Normal pulses.      Heart sounds: Normal heart sounds. No murmur heard.  Pulmonary:      Effort: Pulmonary effort is normal. No respiratory distress.      Breath sounds: Normal breath sounds. No wheezing.   Abdominal:      General: Bowel sounds are normal. There is no distension.      Palpations: Abdomen is soft.      Tenderness: There is no abdominal tenderness.   Musculoskeletal:         General: No  tenderness. Normal range of motion.      Cervical back: Normal range of motion and neck supple.      Right lower leg: No edema.      Left lower leg: No edema.   Lymphadenopathy:      Cervical: No cervical adenopathy.   Skin:     General: Skin is warm and dry.      Findings: No rash.      Comments: PICC Intact to right arm with no redness or drainage   Neurological:      General: No focal deficit present.      Mental Status: He is alert and oriented to person, place, and time.      Cranial Nerves: No cranial nerve deficit.      Coordination: Coordination normal.   Psychiatric:         Behavior: Behavior normal.         Thought Content: Thought content normal.         Judgment: Judgment normal.          Significant Labs:   CBC:   Recent Labs   Lab 06/30/23  0547 07/01/23  0509   WBC 0.30* 0.31*   HGB 6.8* 7.1*   HCT 20.7* 21.1*   PLT 12* 7*    and CMP:   Recent Labs   Lab 06/30/23  0547 07/01/23  0509    135*   K 4.0 4.0    106   CO2 24 24    98   BUN 11 13   CREATININE 0.9 0.8   CALCIUM 8.9 8.8   PROT 6.6 6.2   ALBUMIN 3.0* 2.8*   BILITOT 0.8 0.6   ALKPHOS 100 98   AST 17 19   ALT 36 39   ANIONGAP 9 5*       Diagnostic Results:  I have reviewed all pertinent imaging results/findings within the past 24 hours.

## 2023-07-02 LAB
ABO + RH BLD: NORMAL
ALBUMIN SERPL BCP-MCNC: 3 G/DL (ref 3.5–5.2)
ALP SERPL-CCNC: 103 U/L (ref 55–135)
ALT SERPL W/O P-5'-P-CCNC: 37 U/L (ref 10–44)
ANION GAP SERPL CALC-SCNC: 8 MMOL/L (ref 8–16)
ANISOCYTOSIS BLD QL SMEAR: SLIGHT
AST SERPL-CCNC: 21 U/L (ref 10–40)
BASOPHILS # BLD AUTO: 0 K/UL (ref 0–0.2)
BASOPHILS NFR BLD: 0 % (ref 0–1.9)
BILIRUB SERPL-MCNC: 0.6 MG/DL (ref 0.1–1)
BLD GP AB SCN CELLS X3 SERPL QL: NORMAL
BUN SERPL-MCNC: 10 MG/DL (ref 6–20)
CALCIUM SERPL-MCNC: 8.9 MG/DL (ref 8.7–10.5)
CHLORIDE SERPL-SCNC: 106 MMOL/L (ref 95–110)
CO2 SERPL-SCNC: 24 MMOL/L (ref 23–29)
CREAT SERPL-MCNC: 0.8 MG/DL (ref 0.5–1.4)
DIFFERENTIAL METHOD: ABNORMAL
EOSINOPHIL # BLD AUTO: 0 K/UL (ref 0–0.5)
EOSINOPHIL NFR BLD: 0 % (ref 0–8)
ERYTHROCYTE [DISTWIDTH] IN BLOOD BY AUTOMATED COUNT: 16.7 % (ref 11.5–14.5)
EST. GFR  (NO RACE VARIABLE): >60 ML/MIN/1.73 M^2
GLUCOSE SERPL-MCNC: 89 MG/DL (ref 70–110)
HCT VFR BLD AUTO: 21.1 % (ref 40–54)
HGB BLD-MCNC: 7.2 G/DL (ref 14–18)
HYPOCHROMIA BLD QL SMEAR: ABNORMAL
IMM GRANULOCYTES # BLD AUTO: 0 K/UL (ref 0–0.04)
IMM GRANULOCYTES NFR BLD AUTO: 0 % (ref 0–0.5)
LYMPHOCYTES # BLD AUTO: 0.3 K/UL (ref 1–4.8)
LYMPHOCYTES NFR BLD: 100 % (ref 18–48)
MAGNESIUM SERPL-MCNC: 1.9 MG/DL (ref 1.6–2.6)
MCH RBC QN AUTO: 26.6 PG (ref 27–31)
MCHC RBC AUTO-ENTMCNC: 34.1 G/DL (ref 32–36)
MCV RBC AUTO: 78 FL (ref 82–98)
MONOCYTES # BLD AUTO: 0 K/UL (ref 0.3–1)
MONOCYTES NFR BLD: 0 % (ref 4–15)
NEUTROPHILS # BLD AUTO: 0 K/UL (ref 1.8–7.7)
NEUTROPHILS NFR BLD: 0 % (ref 38–73)
NRBC BLD-RTO: 0 /100 WBC
PHOSPHATE SERPL-MCNC: 2.4 MG/DL (ref 2.7–4.5)
PLATELET # BLD AUTO: 22 K/UL (ref 150–450)
PLATELET BLD QL SMEAR: ABNORMAL
PMV BLD AUTO: ABNORMAL FL (ref 9.2–12.9)
POTASSIUM SERPL-SCNC: 4.2 MMOL/L (ref 3.5–5.1)
PROT SERPL-MCNC: 6.6 G/DL (ref 6–8.4)
RBC # BLD AUTO: 2.71 M/UL (ref 4.6–6.2)
SODIUM SERPL-SCNC: 138 MMOL/L (ref 136–145)
SPECIMEN OUTDATE: NORMAL
WBC # BLD AUTO: 0.3 K/UL (ref 3.9–12.7)

## 2023-07-02 PROCEDURE — 63600175 PHARM REV CODE 636 W HCPCS

## 2023-07-02 PROCEDURE — 85025 COMPLETE CBC W/AUTO DIFF WBC: CPT | Performed by: NURSE PRACTITIONER

## 2023-07-02 PROCEDURE — 99233 SBSQ HOSP IP/OBS HIGH 50: CPT | Mod: ,,, | Performed by: INTERNAL MEDICINE

## 2023-07-02 PROCEDURE — 20600001 HC STEP DOWN PRIVATE ROOM

## 2023-07-02 PROCEDURE — 25000003 PHARM REV CODE 250: Performed by: STUDENT IN AN ORGANIZED HEALTH CARE EDUCATION/TRAINING PROGRAM

## 2023-07-02 PROCEDURE — 83735 ASSAY OF MAGNESIUM: CPT | Performed by: NURSE PRACTITIONER

## 2023-07-02 PROCEDURE — 99233 PR SUBSEQUENT HOSPITAL CARE,LEVL III: ICD-10-PCS | Mod: ,,, | Performed by: INTERNAL MEDICINE

## 2023-07-02 PROCEDURE — 25000003 PHARM REV CODE 250

## 2023-07-02 PROCEDURE — 25000003 PHARM REV CODE 250: Performed by: NURSE PRACTITIONER

## 2023-07-02 PROCEDURE — 86900 BLOOD TYPING SEROLOGIC ABO: CPT | Performed by: INTERNAL MEDICINE

## 2023-07-02 PROCEDURE — 86920 COMPATIBILITY TEST SPIN: CPT

## 2023-07-02 PROCEDURE — 25000003 PHARM REV CODE 250: Performed by: INTERNAL MEDICINE

## 2023-07-02 PROCEDURE — 80053 COMPREHEN METABOLIC PANEL: CPT | Performed by: NURSE PRACTITIONER

## 2023-07-02 PROCEDURE — 84100 ASSAY OF PHOSPHORUS: CPT | Performed by: NURSE PRACTITIONER

## 2023-07-02 PROCEDURE — A4216 STERILE WATER/SALINE, 10 ML: HCPCS | Performed by: INTERNAL MEDICINE

## 2023-07-02 RX ADMIN — Medication 1 TABLET: at 05:07

## 2023-07-02 RX ADMIN — CEFEPIME 2 G: 2 INJECTION, POWDER, FOR SOLUTION INTRAVENOUS at 08:07

## 2023-07-02 RX ADMIN — Medication 10 ML: at 12:07

## 2023-07-02 RX ADMIN — PANTOPRAZOLE SODIUM 40 MG: 40 TABLET, DELAYED RELEASE ORAL at 09:07

## 2023-07-02 RX ADMIN — PONATINIB HYDROCHLORIDE 15 MG: 15 TABLET, FILM COATED ORAL at 09:07

## 2023-07-02 RX ADMIN — ACYCLOVIR 800 MG: 200 CAPSULE ORAL at 08:07

## 2023-07-02 RX ADMIN — Medication 10 ML: at 11:07

## 2023-07-02 RX ADMIN — Medication 10 ML: at 05:07

## 2023-07-02 RX ADMIN — POSACONAZOLE 300 MG: 100 TABLET, DELAYED RELEASE ORAL at 09:07

## 2023-07-02 RX ADMIN — Medication 1 TABLET: at 09:07

## 2023-07-02 RX ADMIN — MAGNESIUM OXIDE TAB 400 MG (241.3 MG ELEMENTAL MG) 400 MG: 400 (241.3 MG) TAB at 01:07

## 2023-07-02 RX ADMIN — CEFEPIME 2 G: 2 INJECTION, POWDER, FOR SOLUTION INTRAVENOUS at 04:07

## 2023-07-02 RX ADMIN — ACYCLOVIR 800 MG: 200 CAPSULE ORAL at 09:07

## 2023-07-02 RX ADMIN — MAGNESIUM OXIDE TAB 400 MG (241.3 MG ELEMENTAL MG) 400 MG: 400 (241.3 MG) TAB at 09:07

## 2023-07-02 RX ADMIN — ALUMINUM HYDROXIDE, MAGNESIUM HYDROXIDE, AND DIMETHICONE 10 ML: 400; 400; 40 SUSPENSION ORAL at 09:07

## 2023-07-02 RX ADMIN — ALUMINUM HYDROXIDE, MAGNESIUM HYDROXIDE, AND DIMETHICONE 10 ML: 400; 400; 40 SUSPENSION ORAL at 08:07

## 2023-07-02 RX ADMIN — Medication 1 TABLET: at 01:07

## 2023-07-02 RX ADMIN — NIFEDIPINE 60 MG: 60 TABLET, FILM COATED, EXTENDED RELEASE ORAL at 09:07

## 2023-07-02 RX ADMIN — ALUMINUM HYDROXIDE, MAGNESIUM HYDROXIDE, AND DIMETHICONE 10 ML: 400; 400; 40 SUSPENSION ORAL at 01:07

## 2023-07-02 NOTE — ASSESSMENT & PLAN NOTE
- Patient with CML diagnosed 8/2022 Follows locally in [place] with Dr. Brett Hogan. He has been on dasatinib outpatient since 10/2022. Some question regarding his compliance over the course of treatment, however. Presented to [facility] with c/o gum swelling on 6/7/23. Labs on presentation remarkable for WBC count of 138K, with 80% blasts indicating blast crisis. WBC count from approximately a week and a half earlier was normal. Reported recent compliance with his TKI.     [place] [facility] [place]    - Bone marrow Biopsy performed 6/9/23 c/w progression for blast phase CML   - BCR/ABL p210 collected on admission, >55%  - BCR/ABL mutation negative  - Echo with 65% EF and mild-moderate pulmonary hypertension.  - Picc line in place  - Patient decided to not do fertility presentation  - Stared on Cladribine Idarubicin Cytarabine, CLIA and transitioned from dasatinb to ponatinib. Day 17  - plan for day 21 bone marrow biopsy for restaging   - TLS labs wnl. TLS labs stopped and allopurinol stopped

## 2023-07-02 NOTE — PROGRESS NOTES
[facility] - Oncology ([facility])  Infectious Disease  Progress Note    Patient Name: Magdaleno Hirsch  MRN: [id number]  Admission Date: 6/9/2023  Length of Stay: 23 days  Attending Physician: Rocio Waterman MD  Primary Care Provider: Primary Doctor No    Isolation Status: No active isolations  Assessment/Plan:      Oncology  Neutropenic fever  22M admitted w/ blast phase CML recently seen by ID, now re-consulted for blood cultures w/ 2/4 bottles + strep spp. Pt endorses improvement in throat pain, now able to eat. Healing oral ulcers on palate. No GI symptoms.    Recommendations:   - continue cefepime for 14d duration, end date 7/15/23  - repeat blood cx NGTD - please call if positive  - source of bacteremia likely related to oropharyngeal translocation  - ppx as per BMT protocol    If plans for discharge prior to completing of antibiotics, please call for discharge recommendations    Will sign off      Anticipated Disposition: TBD    Thank you for your consult. I will sign off. Please contact us if you have any additional questions.    Shaye Tracy DO  Transplant Infectious Disease    Time: 50 minutes   50% of time spent on face-to-face counseling and coordination of care. Counseling included review of test results, diagnosis, and treatment plan with patient and/or family.        Subjective:     Principal Problem: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    HPI: 21 y/o M h/o CML diagnosed in August 2022 and was on dasatinib presented to [facility] with c/o gum swelling on 6/7/23. Labs remarkable for WBC count of 138K, with 80% blasts (blast crisis), also functional neutropenic fevers, curve downtrending, CT chest with 3 small pulmonary nodules, CT neck negative, RIP negative, all cultures NGTD, most significant complaint is severe throat pain and oral uclers    Interval History: symptoms continue to improve, repeat blood cx NGTD    Review of Systems   All other systems reviewed and are  negative.  Objective:     Vital Signs (Most Recent):  Temp: 98.8 °F (37.1 °C) (07/02/23 1255)  Pulse: 103 (07/02/23 1255)  Resp: 17 (07/02/23 1255)  BP: 134/81 (07/02/23 1255)  SpO2: 99 % (07/02/23 1255) Vital Signs (24h Range):  Temp:  [98.8 °F (37.1 °C)-99.7 °F (37.6 °C)] 98.8 °F (37.1 °C)  Pulse:  [100-113] 103  Resp:  [16-18] 17  SpO2:  [99 %-100 %] 99 %  BP: (119-137)/(74-81) 134/81     Weight: 81.1 kg (178 lb 14.5 oz)  Body mass index is 23.6 kg/m².    Estimated Creatinine Clearance: 159.5 mL/min (based on SCr of 0.8 mg/dL).     Physical Exam  Constitutional:       General: He is not in acute distress.     Appearance: Normal appearance. He is well-developed. He is ill-appearing. He is not diaphoretic.   HENT:      Head: Normocephalic and atraumatic.      Right Ear: External ear normal.      Left Ear: External ear normal.      Nose: Nose normal.      Mouth/Throat:      Comments: Healing mucositis on palate  Eyes:      General: No scleral icterus.        Right eye: No discharge.         Left eye: No discharge.      Extraocular Movements: Extraocular movements intact.      Conjunctiva/sclera: Conjunctivae normal.   Pulmonary:      Effort: Pulmonary effort is normal. No respiratory distress.      Breath sounds: No stridor.   Abdominal:      General: There is no distension.      Tenderness: There is no abdominal tenderness.   Musculoskeletal:      Comments: PICC line RUE nontender   Skin:     General: Skin is dry.      Coloration: Skin is not jaundiced or pale.      Findings: No erythema.   Neurological:      General: No focal deficit present.      Mental Status: He is alert and oriented to person, place, and time. Mental status is at baseline.   Psychiatric:         Mood and Affect: Mood normal.         Behavior: Behavior normal.         Thought Content: Thought content normal.         Judgment: Judgment normal.        Significant Labs: CBC:   Recent Labs   Lab 07/01/23  0509 07/02/23  0301   WBC 0.31* 0.30*   HGB  7.1* 7.2*   HCT 21.1* 21.1*   PLT 7* 22*       CMP:   Recent Labs   Lab 07/01/23  0509 07/02/23  0301   * 138   K 4.0 4.2    106   CO2 24 24   GLU 98 89   BUN 13 10   CREATININE 0.8 0.8   CALCIUM 8.8 8.9   PROT 6.2 6.6   ALBUMIN 2.8* 3.0*   BILITOT 0.6 0.6   ALKPHOS 98 103   AST 19 21   ALT 39 37   ANIONGAP 5* 8       Microbiology Results (last 7 days)       Procedure Component Value Units Date/Time    Blood culture [[id number]] Collected: 07/01/23 1335    Order Status: Completed Specimen: Blood from Peripheral, Left Hand Updated: 07/02/23 1412     Blood Culture, Routine No Growth to date      No Growth to date    Blood culture [[id number]] Collected: 07/01/23 1325    Order Status: Completed Specimen: Blood from Antecubital, Left Updated: 07/02/23 1412     Blood Culture, Routine No Growth to date      No Growth to date    Blood culture [[id number]] Collected: 06/29/23 0011    Order Status: Completed Specimen: Blood Updated: 07/02/23 0612     Blood Culture, Routine No Growth to date      No Growth to date      No Growth to date      No Growth to date    Blood culture [[id number]]  (Abnormal) Collected: 06/29/23 0011    Order Status: Completed Specimen: Blood Updated: 07/01/23 1321     Blood Culture, Routine Gram stain aer bottle: Gram positive cocci in chains resembling Strep      Results called to and read back by: Srini BARRAGAN RN 06/29/2023  13:51      Gram stain brenda bottle: Gram positive cocci in chains resembling Strep      VIRIDANS STREPTOCOCCUS GROUP  Susceptibility testing not routinely performed      Rapid Organism ID by PCR (from Blood culture) [[id number]]  (Abnormal) Collected: 06/29/23 0011    Order Status: Completed Updated: 06/29/23 1515     Enterococcus faecalis Not Detected     Enterococcus faecium Not Detected     Listeria monocytogenes Not Detected     Staphylococcus spp. Not Detected     Staphylococcus aureus Not Detected     Staphylococcus epidermidis Not Detected     Staphylococcus lugdunensis  Not Detected     Streptococcus species Detected     Streptococcus agalactiae Not Detected     Streptococcus pneumoniae Not Detected     Streptococcus pyogenes Not Detected     Acinetobacter calcoaceticus/baumannii complex Not Detected     Bacteroides fragilis Not Detected     Enterobacterales Not Detected     Enterobacter cloacae complex Not Detected     Escherichia coli Not Detected     Klebsiella aerogenes Not Detected     Klebsiella oxytoca Not Detected     Klebsiella pneumoniae group Not Detected     Proteus Not Detected     Salmonella sp Not Detected     Serratia marcescens Not Detected     Haemophilus influenzae Not Detected     Neisseria meningtidis Not Detected     Pseudomonas aeruginosa Not Detected     Stenotrophomonas maltophilia Not Detected     Candida albicans Not Detected     Candida auris Not Detected     Candida glabrata Not Detected     Candida krusei Not Detected     Candida parapsilosis Not Detected     Candida tropicalis Not Detected     Cryptococcus neoformans/gattii Not Detected     CTX-M (ESBL ) Test Not Applicable     IMP (Carbapenem resistant) Test Not Applicable     KPC resistance gene (Carbapenem resistant) Test Not Applicable     mcr-1  Test Not Applicable     mec A/C  Test Not Applicable     mec A/C and MREJ (MRSA) gene Test Not Applicable     NDM (Carbapenem resistant) Test Not Applicable     OXA-48-like (Carbapenem resistant) Test Not Applicable     van A/B (VRE gene) Test Not Applicable     VIM (Carbapenem resistant) Test Not Applicable    Blood culture [[id number]]     Order Status: Canceled Specimen: Blood     Blood culture [[id number]]     Order Status: Canceled Specimen: Blood             Significant Imaging: I have reviewed all pertinent imaging results/findings within the past 24 hours.

## 2023-07-02 NOTE — SUBJECTIVE & OBJECTIVE
Subjective:     Interval History: Day 17 of CLIA+ponatinib for blast phase CML. No acute overnight events. He denies acute issues. ID consulted for viridans streptococcus 1/4 blood culture, continue cefepime.    Objective:     Vital Signs (Most Recent):  Temp: 98.8 °F (37.1 °C) (07/02/23 1255)  Pulse: 103 (07/02/23 1255)  Resp: 17 (07/02/23 1255)  BP: 134/81 (07/02/23 1255)  SpO2: 99 % (07/02/23 1255) Vital Signs (24h Range):  Temp:  [98.8 °F (37.1 °C)-99.7 °F (37.6 °C)] 98.8 °F (37.1 °C)  Pulse:  [100-113] 103  Resp:  [16-18] 17  SpO2:  [99 %-100 %] 99 %  BP: (119-137)/(74-81) 134/81     Weight: 81.1 kg (178 lb 14.5 oz)  Body mass index is 23.6 kg/m².  Body surface area is 2.04 meters squared.    ECOG SCORE           [unfilled]    Intake/Output - Last 3 Shifts         06/30 0700  07/01 0659 07/01 0700  07/02 0659 07/02 0700  07/03 0659    P.O. 400 2051     Blood 250      IV Piggyback  895.8     Total Intake(mL/kg) 650 (8) 2946.8 (36.3)     Urine (mL/kg/hr)  1600 (0.8)     Total Output  1600     Net +650 +1346.8            Urine Occurrence  3 x              Physical Exam  Vitals and nursing note reviewed.   Constitutional:       Appearance: Normal appearance. He is well-developed.   HENT:      Head: Normocephalic and atraumatic.      Nose: Nose normal. No congestion.   Eyes:      General: No scleral icterus.     Extraocular Movements: Extraocular movements intact.      Conjunctiva/sclera: Conjunctivae normal.   Cardiovascular:      Rate and Rhythm: Normal rate and regular rhythm.      Pulses: Normal pulses.      Heart sounds: Normal heart sounds. No murmur heard.  Pulmonary:      Effort: Pulmonary effort is normal. No respiratory distress.      Breath sounds: Normal breath sounds. No wheezing.   Abdominal:      General: Bowel sounds are normal. There is no distension.      Palpations: Abdomen is soft.      Tenderness: There is no abdominal tenderness.   Musculoskeletal:         General: No tenderness. Normal range  of motion.      Cervical back: Normal range of motion and neck supple.      Right lower leg: No edema.      Left lower leg: No edema.   Lymphadenopathy:      Cervical: No cervical adenopathy.   Skin:     General: Skin is warm and dry.      Findings: No rash.      Comments: PICC Intact to right arm with no redness or drainage   Neurological:      General: No focal deficit present.      Mental Status: He is alert and oriented to person, place, and time.      Cranial Nerves: No cranial nerve deficit.      Coordination: Coordination normal.   Psychiatric:         Behavior: Behavior normal.         Thought Content: Thought content normal.         Judgment: Judgment normal.          Significant Labs:   CBC:   Recent Labs   Lab 07/01/23  0509 07/02/23  0301   WBC 0.31* 0.30*   HGB 7.1* 7.2*   HCT 21.1* 21.1*   PLT 7* 22*    and CMP:   Recent Labs   Lab 07/01/23  0509 07/02/23  0301   * 138   K 4.0 4.2    106   CO2 24 24   GLU 98 89   BUN 13 10   CREATININE 0.8 0.8   CALCIUM 8.8 8.9   PROT 6.2 6.6   ALBUMIN 2.8* 3.0*   BILITOT 0.6 0.6   ALKPHOS 98 103   AST 19 21   ALT 39 37   ANIONGAP 5* 8       Diagnostic Results:  I have reviewed all pertinent imaging results/findings within the past 24 hours.

## 2023-07-02 NOTE — SUBJECTIVE & OBJECTIVE
Interval History: symptoms continue to improve, repeat blood cx NGTD    Review of Systems   All other systems reviewed and are negative.  Objective:     Vital Signs (Most Recent):  Temp: 98.8 °F (37.1 °C) (07/02/23 1255)  Pulse: 103 (07/02/23 1255)  Resp: 17 (07/02/23 1255)  BP: 134/81 (07/02/23 1255)  SpO2: 99 % (07/02/23 1255) Vital Signs (24h Range):  Temp:  [98.8 °F (37.1 °C)-99.7 °F (37.6 °C)] 98.8 °F (37.1 °C)  Pulse:  [100-113] 103  Resp:  [16-18] 17  SpO2:  [99 %-100 %] 99 %  BP: (119-137)/(74-81) 134/81     Weight: 81.1 kg (178 lb 14.5 oz)  Body mass index is 23.6 kg/m².    Estimated Creatinine Clearance: 159.5 mL/min (based on SCr of 0.8 mg/dL).     Physical Exam  Constitutional:       General: He is not in acute distress.     Appearance: Normal appearance. He is well-developed. He is ill-appearing. He is not diaphoretic.   HENT:      Head: Normocephalic and atraumatic.      Right Ear: External ear normal.      Left Ear: External ear normal.      Nose: Nose normal.      Mouth/Throat:      Comments: Healing mucositis on palate  Eyes:      General: No scleral icterus.        Right eye: No discharge.         Left eye: No discharge.      Extraocular Movements: Extraocular movements intact.      Conjunctiva/sclera: Conjunctivae normal.   Pulmonary:      Effort: Pulmonary effort is normal. No respiratory distress.      Breath sounds: No stridor.   Abdominal:      General: There is no distension.      Tenderness: There is no abdominal tenderness.   Musculoskeletal:      Comments: PICC line RUE nontender   Skin:     General: Skin is dry.      Coloration: Skin is not jaundiced or pale.      Findings: No erythema.   Neurological:      General: No focal deficit present.      Mental Status: He is alert and oriented to person, place, and time. Mental status is at baseline.   Psychiatric:         Mood and Affect: Mood normal.         Behavior: Behavior normal.         Thought Content: Thought content normal.          Judgment: Judgment normal.        Significant Labs: CBC:   Recent Labs   Lab 07/01/23  0509 07/02/23  0301   WBC 0.31* 0.30*   HGB 7.1* 7.2*   HCT 21.1* 21.1*   PLT 7* 22*       CMP:   Recent Labs   Lab 07/01/23  0509 07/02/23  0301   * 138   K 4.0 4.2    106   CO2 24 24   GLU 98 89   BUN 13 10   CREATININE 0.8 0.8   CALCIUM 8.8 8.9   PROT 6.2 6.6   ALBUMIN 2.8* 3.0*   BILITOT 0.6 0.6   ALKPHOS 98 103   AST 19 21   ALT 39 37   ANIONGAP 5* 8       Microbiology Results (last 7 days)       Procedure Component Value Units Date/Time    Blood culture [[id number]] Collected: 07/01/23 1335    Order Status: Completed Specimen: Blood from Peripheral, Left Hand Updated: 07/02/23 1412     Blood Culture, Routine No Growth to date      No Growth to date    Blood culture [[id number]] Collected: 07/01/23 1325    Order Status: Completed Specimen: Blood from Antecubital, Left Updated: 07/02/23 1412     Blood Culture, Routine No Growth to date      No Growth to date    Blood culture [[id number]] Collected: 06/29/23 0011    Order Status: Completed Specimen: Blood Updated: 07/02/23 0612     Blood Culture, Routine No Growth to date      No Growth to date      No Growth to date      No Growth to date    Blood culture [[id number]]  (Abnormal) Collected: 06/29/23 0011    Order Status: Completed Specimen: Blood Updated: 07/01/23 1321     Blood Culture, Routine Gram stain aer bottle: Gram positive cocci in chains resembling Strep      Results called to and read back by: Srini BARRAGAN RN 06/29/2023  13:51      Gram stain brenda bottle: Gram positive cocci in chains resembling Strep      VIRIDANS STREPTOCOCCUS GROUP  Susceptibility testing not routinely performed      Rapid Organism ID by PCR (from Blood culture) [[id number]]  (Abnormal) Collected: 06/29/23 0011    Order Status: Completed Updated: 06/29/23 1515     Enterococcus faecalis Not Detected     Enterococcus faecium Not Detected     Listeria monocytogenes Not Detected     Staphylococcus  spp. Not Detected     Staphylococcus aureus Not Detected     Staphylococcus epidermidis Not Detected     Staphylococcus lugdunensis Not Detected     Streptococcus species Detected     Streptococcus agalactiae Not Detected     Streptococcus pneumoniae Not Detected     Streptococcus pyogenes Not Detected     Acinetobacter calcoaceticus/baumannii complex Not Detected     Bacteroides fragilis Not Detected     Enterobacterales Not Detected     Enterobacter cloacae complex Not Detected     Escherichia coli Not Detected     Klebsiella aerogenes Not Detected     Klebsiella oxytoca Not Detected     Klebsiella pneumoniae group Not Detected     Proteus Not Detected     Salmonella sp Not Detected     Serratia marcescens Not Detected     Haemophilus influenzae Not Detected     Neisseria meningtidis Not Detected     Pseudomonas aeruginosa Not Detected     Stenotrophomonas maltophilia Not Detected     Candida albicans Not Detected     Candida auris Not Detected     Candida glabrata Not Detected     Candida krusei Not Detected     Candida parapsilosis Not Detected     Candida tropicalis Not Detected     Cryptococcus neoformans/gattii Not Detected     CTX-M (ESBL ) Test Not Applicable     IMP (Carbapenem resistant) Test Not Applicable     KPC resistance gene (Carbapenem resistant) Test Not Applicable     mcr-1  Test Not Applicable     mec A/C  Test Not Applicable     mec A/C and MREJ (MRSA) gene Test Not Applicable     NDM (Carbapenem resistant) Test Not Applicable     OXA-48-like (Carbapenem resistant) Test Not Applicable     van A/B (VRE gene) Test Not Applicable     VIM (Carbapenem resistant) Test Not Applicable    Blood culture [[id number]]     Order Status: Canceled Specimen: Blood     Blood culture [[id number]]     Order Status: Canceled Specimen: Blood             Significant Imaging: I have reviewed all pertinent imaging results/findings within the past 24 hours.

## 2023-07-02 NOTE — PROGRESS NOTES
[facility] - Oncology ([facility])  Hematology  Bone Marrow Transplant  Progress Note    Patient Name: Magdaleno Hirsch  Admission Date: 6/9/2023  Hospital Length of Stay: 23 days  Code Status: Full Code    Subjective:     Interval History: Day 17 of CLIA+ponatinib for blast phase CML. No acute overnight events. He denies acute issues. ID consulted for viridans streptococcus 1/4 blood culture, continue cefepime.    Objective:     Vital Signs (Most Recent):  Temp: 98.8 °F (37.1 °C) (07/02/23 1255)  Pulse: 103 (07/02/23 1255)  Resp: 17 (07/02/23 1255)  BP: 134/81 (07/02/23 1255)  SpO2: 99 % (07/02/23 1255) Vital Signs (24h Range):  Temp:  [98.8 °F (37.1 °C)-99.7 °F (37.6 °C)] 98.8 °F (37.1 °C)  Pulse:  [100-113] 103  Resp:  [16-18] 17  SpO2:  [99 %-100 %] 99 %  BP: (119-137)/(74-81) 134/81     Weight: 81.1 kg (178 lb 14.5 oz)  Body mass index is 23.6 kg/m².  Body surface area is 2.04 meters squared.    ECOG SCORE           [unfilled]    Intake/Output - Last 3 Shifts         06/30 0700  07/01 0659 07/01 0700  07/02 0659 07/02 0700  07/03 0659    P.O. 400 2051     Blood 250      IV Piggyback  895.8     Total Intake(mL/kg) 650 (8) 2946.8 (36.3)     Urine (mL/kg/hr)  1600 (0.8)     Total Output  1600     Net +650 +1346.8            Urine Occurrence  3 x              Physical Exam  Vitals and nursing note reviewed.   Constitutional:       Appearance: Normal appearance. He is well-developed.   HENT:      Head: Normocephalic and atraumatic.      Nose: Nose normal. No congestion.   Eyes:      General: No scleral icterus.     Extraocular Movements: Extraocular movements intact.      Conjunctiva/sclera: Conjunctivae normal.   Cardiovascular:      Rate and Rhythm: Normal rate and regular rhythm.      Pulses: Normal pulses.      Heart sounds: Normal heart sounds. No murmur heard.  Pulmonary:      Effort: Pulmonary effort is normal. No respiratory distress.      Breath sounds: Normal breath sounds. No wheezing.   Abdominal:      General:  Bowel sounds are normal. There is no distension.      Palpations: Abdomen is soft.      Tenderness: There is no abdominal tenderness.   Musculoskeletal:         General: No tenderness. Normal range of motion.      Cervical back: Normal range of motion and neck supple.      Right lower leg: No edema.      Left lower leg: No edema.   Lymphadenopathy:      Cervical: No cervical adenopathy.   Skin:     General: Skin is warm and dry.      Findings: No rash.      Comments: PICC Intact to right arm with no redness or drainage   Neurological:      General: No focal deficit present.      Mental Status: He is alert and oriented to person, place, and time.      Cranial Nerves: No cranial nerve deficit.      Coordination: Coordination normal.   Psychiatric:         Behavior: Behavior normal.         Thought Content: Thought content normal.         Judgment: Judgment normal.          Significant Labs:   CBC:   Recent Labs   Lab 07/01/23  0509 07/02/23  0301   WBC 0.31* 0.30*   HGB 7.1* 7.2*   HCT 21.1* 21.1*   PLT 7* 22*    and CMP:   Recent Labs   Lab 07/01/23  0509 07/02/23  0301   * 138   K 4.0 4.2    106   CO2 24 24   GLU 98 89   BUN 13 10   CREATININE 0.8 0.8   CALCIUM 8.8 8.9   PROT 6.2 6.6   ALBUMIN 2.8* 3.0*   BILITOT 0.6 0.6   ALKPHOS 98 103   AST 19 21   ALT 39 37   ANIONGAP 5* 8       Diagnostic Results:  I have reviewed all pertinent imaging results/findings within the past 24 hours.    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 Brett Hogan. He has been on dasatinib outpatient since 10/2022. Some question regarding his compliance over the course of treatment, however. Presented to [facility] with c/o gum swelling on 6/7/23. Labs on presentation remarkable for WBC count of 138K, with 80% blasts indicating blast crisis. WBC count from approximately a week and a half earlier was normal.  Reported recent compliance with his TKI.     Transferred to [facility] for higher level of care    - Bone marrow Biopsy performed 6/9/23 c/w progression for blast phase CML   - BCR/ABL p210 collected on admission, >55%  - BCR/ABL mutation negative  - Echo with 65% EF and mild-moderate pulmonary hypertension.  - Picc line in place  - Patient decided to not do fertility presentation  - Stared on Cladribine Idarubicin Cytarabine, CLIA and transitioned from dasatinb to ponatinib. Day 17  - plan for day 21 bone marrow biopsy for restaging   - TLS labs wnl. TLS labs stopped and allopurinol stopped    Mild malnutrition  - continue PO intake as tolerated  - nutrition following    Pharyngitis  - Continue [place]   - Pain well-controlled with PO Oxy  - Continue ppx antimicrobials  - RIP throat swab, strep, flu, and COVID neg  - Leukemia infiltration may be playing a role; improving    Neutropenic fever  - Temp of 101.4 at [place] ED. Persisted upon transfer.  - CXR neg. Blood culture NGTD. C-Diff negative. U/a neg  - Strep, flu, and COVID neg at OSH  - Throat red with exudates. Tonsils mildly swollen. CT neck showing adenopathy but no abscesses.  - CT chest without consolidations  - Repeat fever 6/28 @2330. BC 1/4 GPC with viridans strep; other 3/4 NGTD. Repeat CXR and UA negative  - ID consulted, appreciate assistance: continue cefepime and follow repeat cultures    Pancytopenia due to antineoplastic chemotherapy  - Daily CBC while inpatient  - Transfuse for hgb < 7 or plts < 10K  - Now on ppx bactrim, levaquin, fluconazole, and acyclovir    Hypertension  - Takes amlodipine at home  - Given hypertension, amlodipine stopped and nifedipine and losartan started in [place]. Continued on transfer.  - BP now improved        VTE Risk Mitigation (From admission, onward)         Ordered     heparin, porcine (PF) 100 unit/mL injection flush 300 Units  As needed (PRN)         06/16/23 1550     IP VTE HIGH RISK PATIENT  Once         06/09/23  0049     Place sequential compression device  Until discontinued         06/09/23 0049                Disposition: Remain inpatient    Ching Albrecht MD  Bone Marrow Transplant  [facility] - Oncology ([facility])

## 2023-07-02 NOTE — PLAN OF CARE
Plan of care discussed with patient at start of shift. Free from falls and injuries. Resting quietly with eyes closed. Respirations even, unlabored. Skin warm and dry. Pain managed with oxycodone 5 mg PO x's 1. Denies nausea. Frequent checks for pain and safety maintained. Bed in lowest position, wheels locked, side rails up x's 2, call light in reach. Instructed to call for assistance as needed, verbalizes understanding.

## 2023-07-02 NOTE — ASSESSMENT & PLAN NOTE
22M admitted w/ blast phase CML recently seen by ID, now re-consulted for blood cultures w/ 2/4 bottles + strep spp. Pt endorses improvement in throat pain, now able to eat. Healing oral ulcers on palate. No GI symptoms.    Recommendations:   - continue cefepime for 14d duration, end date 7/15/23  - repeat blood cx NGTD - please call if positive  - source of bacteremia likely related to oropharyngeal translocation  - ppx as per BMT protocol    If plans for discharge prior to completing of antibiotics, please call for discharge recommendations    Will sign off

## 2023-07-02 NOTE — PLAN OF CARE
Patient AAOx4. No complaints this shift. Day 17 CLIA. No N/V/D. Plan for repeat bone marrow biopsy on day 21. Electrolytes replaced. T-max 101.7; fellow notified. No new orders. Bed in low locked position, call light and personal items within reach. Instructed to call if needed.

## 2023-07-02 NOTE — PROGRESS NOTES
07/02/23 1549 07/02/23 1557   Vital Signs   Temp (!) 101.7 °F (38.7 °C) (!) 100.6 °F (38.1 °C)   Temp Source Oral Oral     Dr. Albrecht notified of patients fever. Nurse instructed to re-check temp in 1 hour.

## 2023-07-03 PROBLEM — R78.81 STREPTOCOCCAL BACTEREMIA: Status: ACTIVE | Noted: 2023-07-03

## 2023-07-03 PROBLEM — B95.5 STREPTOCOCCAL BACTEREMIA: Status: ACTIVE | Noted: 2023-07-03

## 2023-07-03 LAB
ALBUMIN SERPL BCP-MCNC: 3 G/DL (ref 3.5–5.2)
ALP SERPL-CCNC: 95 U/L (ref 55–135)
ALT SERPL W/O P-5'-P-CCNC: 28 U/L (ref 10–44)
ANION GAP SERPL CALC-SCNC: 8 MMOL/L (ref 8–16)
ANISOCYTOSIS BLD QL SMEAR: SLIGHT
ANNOTATION COMMENT IMP: NORMAL
AST SERPL-CCNC: 12 U/L (ref 10–40)
BASOPHILS # BLD AUTO: 0 K/UL (ref 0–0.2)
BASOPHILS NFR BLD: 0 % (ref 0–1.9)
BILIRUB SERPL-MCNC: 0.8 MG/DL (ref 0.1–1)
BUN SERPL-MCNC: 10 MG/DL (ref 6–20)
CALCIUM SERPL-MCNC: 8.7 MG/DL (ref 8.7–10.5)
CHLORIDE SERPL-SCNC: 105 MMOL/L (ref 95–110)
CO2 SERPL-SCNC: 24 MMOL/L (ref 23–29)
CREAT SERPL-MCNC: 0.8 MG/DL (ref 0.5–1.4)
DIFFERENTIAL METHOD: ABNORMAL
EOSINOPHIL # BLD AUTO: 0 K/UL (ref 0–0.5)
EOSINOPHIL NFR BLD: 0 % (ref 0–8)
ERYTHROCYTE [DISTWIDTH] IN BLOOD BY AUTOMATED COUNT: 16.4 % (ref 11.5–14.5)
EST. GFR  (NO RACE VARIABLE): >60 ML/MIN/1.73 M^2
GLUCOSE SERPL-MCNC: 103 MG/DL (ref 70–110)
HCT VFR BLD AUTO: 21.2 % (ref 40–54)
HGB BLD-MCNC: 7.1 G/DL (ref 14–18)
IMM GRANULOCYTES # BLD AUTO: 0 K/UL (ref 0–0.04)
IMM GRANULOCYTES NFR BLD AUTO: 0 % (ref 0–0.5)
LYMPHOCYTES # BLD AUTO: 0.3 K/UL (ref 1–4.8)
LYMPHOCYTES NFR BLD: 100 % (ref 18–48)
MAGNESIUM SERPL-MCNC: 1.8 MG/DL (ref 1.6–2.6)
MCH RBC QN AUTO: 25.9 PG (ref 27–31)
MCHC RBC AUTO-ENTMCNC: 33.5 G/DL (ref 32–36)
MCV RBC AUTO: 77 FL (ref 82–98)
MONOCYTES # BLD AUTO: 0 K/UL (ref 0.3–1)
MONOCYTES NFR BLD: 0 % (ref 4–15)
NEUTROPHILS # BLD AUTO: 0 K/UL (ref 1.8–7.7)
NEUTROPHILS NFR BLD: 0 % (ref 38–73)
NGS CLINCIAL TRIALS: NORMAL
NGS INDICATION OF TEST: NORMAL
NGS INTERPRETATION: NORMAL
NGS ONCOHEME PANEL GENE LIST: NORMAL
NGS PATHOGENIC MUTATIONS DETECTED: NORMAL
NGS REVIEWED BY:: NORMAL
NGS VARIANTS OF UNKNOWN SIGNIFICANCE: NORMAL
NGSHM RESULT, BONE MARROW: NORMAL
NRBC BLD-RTO: 0 /100 WBC
PHOSPHATE SERPL-MCNC: 3 MG/DL (ref 2.7–4.5)
PLATELET # BLD AUTO: 13 K/UL (ref 150–450)
PLATELET BLD QL SMEAR: ABNORMAL
PMV BLD AUTO: ABNORMAL FL (ref 9.2–12.9)
POTASSIUM SERPL-SCNC: 4 MMOL/L (ref 3.5–5.1)
PROT SERPL-MCNC: 6.5 G/DL (ref 6–8.4)
RBC # BLD AUTO: 2.74 M/UL (ref 4.6–6.2)
REF LAB TEST METHOD: NORMAL
SODIUM SERPL-SCNC: 137 MMOL/L (ref 136–145)
SPECIMEN SOURCE: NORMAL
TEST PERFORMANCE INFO SPEC: NORMAL
WBC # BLD AUTO: 0.26 K/UL (ref 3.9–12.7)

## 2023-07-03 PROCEDURE — 87040 BLOOD CULTURE FOR BACTERIA: CPT | Performed by: NURSE PRACTITIONER

## 2023-07-03 PROCEDURE — 25000003 PHARM REV CODE 250: Performed by: STUDENT IN AN ORGANIZED HEALTH CARE EDUCATION/TRAINING PROGRAM

## 2023-07-03 PROCEDURE — 25000003 PHARM REV CODE 250: Performed by: INTERNAL MEDICINE

## 2023-07-03 PROCEDURE — 99233 SBSQ HOSP IP/OBS HIGH 50: CPT | Mod: ,,, | Performed by: INTERNAL MEDICINE

## 2023-07-03 PROCEDURE — A4216 STERILE WATER/SALINE, 10 ML: HCPCS | Performed by: INTERNAL MEDICINE

## 2023-07-03 PROCEDURE — 84100 ASSAY OF PHOSPHORUS: CPT | Performed by: INTERNAL MEDICINE

## 2023-07-03 PROCEDURE — 25000003 PHARM REV CODE 250: Performed by: NURSE PRACTITIONER

## 2023-07-03 PROCEDURE — 25000003 PHARM REV CODE 250

## 2023-07-03 PROCEDURE — 63600175 PHARM REV CODE 636 W HCPCS: Performed by: INTERNAL MEDICINE

## 2023-07-03 PROCEDURE — 85025 COMPLETE CBC W/AUTO DIFF WBC: CPT | Performed by: INTERNAL MEDICINE

## 2023-07-03 PROCEDURE — 63600175 PHARM REV CODE 636 W HCPCS

## 2023-07-03 PROCEDURE — 99233 PR SUBSEQUENT HOSPITAL CARE,LEVL III: ICD-10-PCS | Mod: ,,, | Performed by: INTERNAL MEDICINE

## 2023-07-03 PROCEDURE — 83735 ASSAY OF MAGNESIUM: CPT | Performed by: INTERNAL MEDICINE

## 2023-07-03 PROCEDURE — 63600175 PHARM REV CODE 636 W HCPCS: Mod: JZ,JG | Performed by: INTERNAL MEDICINE

## 2023-07-03 PROCEDURE — 80053 COMPREHEN METABOLIC PANEL: CPT | Performed by: INTERNAL MEDICINE

## 2023-07-03 PROCEDURE — 20600001 HC STEP DOWN PRIVATE ROOM

## 2023-07-03 RX ADMIN — PONATINIB HYDROCHLORIDE 15 MG: 15 TABLET, FILM COATED ORAL at 08:07

## 2023-07-03 RX ADMIN — CEFEPIME 2 G: 2 INJECTION, POWDER, FOR SOLUTION INTRAVENOUS at 05:07

## 2023-07-03 RX ADMIN — ALUMINUM HYDROXIDE, MAGNESIUM HYDROXIDE, AND DIMETHICONE 10 ML: 400; 400; 40 SUSPENSION ORAL at 05:07

## 2023-07-03 RX ADMIN — ACYCLOVIR 800 MG: 200 CAPSULE ORAL at 09:07

## 2023-07-03 RX ADMIN — MAGNESIUM OXIDE TAB 400 MG (241.3 MG ELEMENTAL MG) 400 MG: 400 (241.3 MG) TAB at 12:07

## 2023-07-03 RX ADMIN — SULFAMETHOXAZOLE AND TRIMETHOPRIM 1 TABLET: 800; 160 TABLET ORAL at 05:07

## 2023-07-03 RX ADMIN — CEFEPIME 2 G: 2 INJECTION, POWDER, FOR SOLUTION INTRAVENOUS at 08:07

## 2023-07-03 RX ADMIN — ACETAMINOPHEN 650 MG: 650 SOLUTION ORAL at 05:07

## 2023-07-03 RX ADMIN — ALUMINUM HYDROXIDE, MAGNESIUM HYDROXIDE, AND DIMETHICONE 10 ML: 400; 400; 40 SUSPENSION ORAL at 08:07

## 2023-07-03 RX ADMIN — ALUMINUM HYDROXIDE, MAGNESIUM HYDROXIDE, AND DIMETHICONE 10 ML: 400; 400; 40 SUSPENSION ORAL at 09:07

## 2023-07-03 RX ADMIN — ACETAMINOPHEN 650 MG: 650 SOLUTION ORAL at 08:07

## 2023-07-03 RX ADMIN — ALTEPLASE 2 MG: 2.2 INJECTION, POWDER, LYOPHILIZED, FOR SOLUTION INTRAVENOUS at 06:07

## 2023-07-03 RX ADMIN — ALUMINUM HYDROXIDE, MAGNESIUM HYDROXIDE, AND DIMETHICONE 10 ML: 400; 400; 40 SUSPENSION ORAL at 01:07

## 2023-07-03 RX ADMIN — PANTOPRAZOLE SODIUM 40 MG: 40 TABLET, DELAYED RELEASE ORAL at 08:07

## 2023-07-03 RX ADMIN — MAGNESIUM OXIDE TAB 400 MG (241.3 MG ELEMENTAL MG) 400 MG: 400 (241.3 MG) TAB at 04:07

## 2023-07-03 RX ADMIN — Medication 10 ML: at 12:07

## 2023-07-03 RX ADMIN — NIFEDIPINE 60 MG: 60 TABLET, FILM COATED, EXTENDED RELEASE ORAL at 08:07

## 2023-07-03 RX ADMIN — Medication 10 ML: at 05:07

## 2023-07-03 RX ADMIN — POSACONAZOLE 300 MG: 100 TABLET, DELAYED RELEASE ORAL at 08:07

## 2023-07-03 RX ADMIN — CEFEPIME 2 G: 2 INJECTION, POWDER, FOR SOLUTION INTRAVENOUS at 12:07

## 2023-07-03 RX ADMIN — ACYCLOVIR 800 MG: 200 CAPSULE ORAL at 08:07

## 2023-07-03 NOTE — PROGRESS NOTES
[facility] Cowan - Oncology ([facility])  Adult Nutrition  Progress Note    SUMMARY       Recommendations    1. Continue Regular Diet with High-Calorie, High-Protein modification.   2. Modify Boost Plus Vanilla -QD   3. RD to monitor and follow    Goals: To meet % of EEN,EPN by next RD follow up  Nutrition Goal Status: progressing towards goal  Communication of RD Recs:  (Plan of care (POC))    Assessment and Plan    Endocrine  Mild malnutrition  Malnutrition Type:  Context: chronic illness  Level: mild    Related to (etiology):   Decreased ability to consume sufficient energy    Signs and Symptoms (as evidenced by):   Decreased PO intake ~0-25% at meals  Weight loss 8.6% x 2 weeks    Malnutrition Characteristic Summary:  Weight Loss (Malnutrition): other (see comments) (8.6% x 2 weeks)  Energy Intake (Malnutrition): less than 75% for greater than 7 days      Interventions/Recommendations (treatment strategy):  Collaboration of nutrition care with other providers  Commercial Beverages  High-Calorie, high-protein diet  TPN    Nutrition Diagnosis Status:   Continues         Malnutrition Assessment  Malnutrition Context: chronic illness  Malnutrition Level: mild          Weight Loss (Malnutrition): other (see comments) (8.6% x 2 weeks)  Energy Intake (Malnutrition): less than 75% for greater than 7 days                         Reason for Assessment    Reason For Assessment: RD follow-up  Diagnosis: cancer diagnosis/related complications (Cancer Myeloid Leukemia)  Relevant Medical History: HTN and TKI-induced nausea (Tyrosine Kinase Inhibitors)  Interdisciplinary Rounds: did not attend  General Information Comments: RD follow-up. RD spoke with patient at bedside. Denies N/V/C/D. Denies difficulty chewing/swallowing. PO intake varies 0-100% at meals. Drinks at least 1 ONS/ day. RD to decreased frequency of ONS delivery.  Nutrition Discharge Planning: Pending Clinical Course, High-Calorie, high-protein and food safety  "education provided on 6/26    Nutrition Risk Screen    Nutrition Risk Screen: no indicators present    Nutrition/Diet History    Patient Reported Diet/Restrictions/Preferences: general  Typical Food/Fluid Intake: 3 meals a day  Food Preferences: Grilled fish and water  Spiritual, Cultural Beliefs, Hoahaoism Practices, Values that Affect Care: no  Food Allergies: NKFA  Factors Affecting Nutritional Intake: None identified at this time    Anthropometrics    Temp: (!) 101.2 °F (38.4 °C)  Height Method: Stated  Height: 6' 1" (185.4 cm)  Height (inches): 73 in  Weight Method: Standard Scale  Weight: 81 kg (178 lb 9.2 oz)  Weight (lb): 178.57 lb  Ideal Body Weight (IBW), Male: 184 lb  % Ideal Body Weight, Male (lb): 106.76 %  BMI (Calculated): 23.6  BMI Grade: 25 - 29.9 - overweight       Lab/Procedures/Meds    Pertinent Labs Reviewed: reviewed  Pertinent Labs Comments: H/H: 7.1/21.2  Pertinent Medications Reviewed: reviewed  Pertinent Medications Comments: Dexamethasone Injection, Ondansetron, Sevelamer Carbonate, Sodium Zirconium, Cyclosilicate        Estimated/Assessed Needs    Weight Used For Calorie Calculations: 89.1 kg (196 lb 6.9 oz)  Energy Calorie Requirements (kcal): 2228 kcals/day (25 kcal/kg)  Energy Need Method: Kcal/kg  Protein Requirements:  grams of protein/day  Weight Used For Protein Calculations: 89.1 kg (196 lb 6.9 oz)  Fluid Requirements (mL): 1 mL/kcal or Per MD  Estimated Fluid Requirement Method: RDA Method  RDA Method (mL): 2228         Nutrition Prescription Ordered    Current Diet Order: Diet Adult Regular (IDDSI Level 7)  Oral Nutrition Supplement: Boost Plus    Evaluation of Received Nutrient/Fluid Intake    I/O: +117.8 mL  Energy Calories Required: not meeting needs  Protein Required: not meeting needs  Comments: LBM: 7/3  Tolerance: tolerating  % Intake of Estimated Energy Needs: 75 - 100 %  % Meal Intake: 50 - 75 %    Nutrition Risk    Level of Risk/Frequency of Follow-up: low - " moderate     Monitor and Evaluation    Food and Nutrient Intake: energy intake, food and beverage intake  Food and Nutrient Adminstration: diet order  Knowledge/Beliefs/Attitudes: food and nutrition knowledge/skill, beliefs and attitudes  Physical Activity and Function: nutrition-related ADLs and IADLs, factors affecting access to physical activity  Anthropometric Measurements: height/length, weight, weight change, body mass index  Biochemical Data, Medical Tests and Procedures: gastrointestinal profile, electrolyte and renal panel, glucose/endocrine profile, inflammatory profile, lipid profile  Nutrition-Focused Physical Findings: overall appearance     Nutrition Follow-Up    RD Follow-up?: Yes    Mauro Cárdenas Registration Eligible, Provisional LDN

## 2023-07-03 NOTE — PROGRESS NOTES
[facility]wan - Oncology ([facility])  Hematology  Bone Marrow Transplant  Progress Note    Patient Name: Magdaleno Hirsch  Admission Date: 6/9/2023  Hospital Length of Stay: 24 days  Code Status: Full Code    Subjective:     Interval History: Day 18 of CLIA+ponatinib for blast phase CML. Continues to feel well. Tmax this .6F, remains on cefepime per ID for strep viridans. Repeat BC remain NGTD. Denies pain, n/v/d/c. Encouraged more physical activity/ambulation today. Plt count pending this AM. Vitals otherwise stable    Objective:     Vital Signs (Most Recent):  Temp: (!) 101.7 °F (38.7 °C) (07/03/23 0802)  Pulse: 99 (07/03/23 0802)  Resp: 17 (07/03/23 0802)  BP: 126/72 (07/03/23 0802)  SpO2: 98 % (07/03/23 0802) Vital Signs (24h Range):  Temp:  [98.8 °F (37.1 °C)-101.7 °F (38.7 °C)] 101.7 °F (38.7 °C)  Pulse:  [] 99  Resp:  [16-18] 17  SpO2:  [98 %-100 %] 98 %  BP: (114-134)/(61-81) 126/72     Weight: 81 kg (178 lb 9.2 oz)  Body mass index is 23.56 kg/m².  Body surface area is 2.04 meters squared.      Intake/Output - Last 3 Shifts         07/01 0700  07/02 0659 07/02 0700 07/03 0659 07/03 0700  07/04 0659    P.O. 2051 845 180    Blood       IV Piggyback 895.8 272.8     Total Intake(mL/kg) 2946.8 (36.3) 1117.8 (13.8) 180 (2.2)    Urine (mL/kg/hr) 1600 (0.8) 1000 (0.5)     Stool  0     Total Output 1600 1000     Net +1346.8 +117.8 +180           Urine Occurrence 3 x 2 x     Stool Occurrence  1 x             Physical Exam  Vitals and nursing note reviewed.   Constitutional:       Appearance: Normal appearance. He is well-developed.   HENT:      Head: Normocephalic and atraumatic.      Mouth/Throat:      Mouth: Mucous membranes are moist.      Pharynx: No posterior oropharyngeal erythema.   Eyes:      General: No scleral icterus.     Extraocular Movements: Extraocular movements intact.      Conjunctiva/sclera: Conjunctivae normal.   Cardiovascular:      Rate and Rhythm: Normal rate and regular rhythm.      Pulses:  Normal pulses.      Heart sounds: Normal heart sounds. No murmur heard.  Pulmonary:      Effort: Pulmonary effort is normal. No respiratory distress.      Breath sounds: Normal breath sounds. No wheezing.   Abdominal:      General: Bowel sounds are normal. There is no distension.      Palpations: Abdomen is soft.      Tenderness: There is no abdominal tenderness.   Musculoskeletal:         General: No tenderness. Normal range of motion.      Cervical back: Normal range of motion and neck supple.      Right lower leg: No edema.      Left lower leg: No edema.   Lymphadenopathy:      Cervical: No cervical adenopathy.   Skin:     General: Skin is warm and dry.      Findings: No rash.      Comments: PICC Intact to right arm with no redness or drainage   Neurological:      General: No focal deficit present.      Mental Status: He is alert and oriented to person, place, and time.      Cranial Nerves: No cranial nerve deficit.      Coordination: Coordination normal.   Psychiatric:         Mood and Affect: Mood normal.         Behavior: Behavior normal.         Thought Content: Thought content normal.         Judgment: Judgment normal.      Comments: Flat affect          Significant Labs:   CBC:   Recent Labs   Lab 07/02/23  0301 07/03/23  0551   WBC 0.30* 0.26*   HGB 7.2* 7.1*   HCT 21.1* 21.2*   PLT 22*  --       and CMP:   Recent Labs   Lab 07/02/23  0301 07/03/23  0551    137   K 4.2 4.0    105   CO2 24 24   GLU 89 103   BUN 10 10   CREATININE 0.8 0.8   CALCIUM 8.9 8.7   PROT 6.6 6.5   ALBUMIN 3.0* 3.0*   BILITOT 0.6 0.8   ALKPHOS 103 95   AST 21 12   ALT 37 28   ANIONGAP 8 8         Diagnostic Results:  I have reviewed all pertinent imaging results/findings within the past 24 hours.    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 Brett Hogan. He has been on dasatinib outpatient since 10/2022. Some question  regarding his compliance over the course of treatment, however. Presented to [facility] with c/o gum swelling on 6/7/23. Labs on presentation remarkable for WBC count of 138K, with 80% blasts indicating blast crisis. WBC count from approximately a week and a half earlier was normal. Reported recent compliance with his TKI.     Transferred to [facility] for higher level of care    - Bone marrow Biopsy performed 6/9/23 c/w progression for blast phase CML   - BCR/ABL p210 collected on admission, >55%  - BCR/ABL mutation negative  - Echo with 65% EF and mild-moderate pulmonary hypertension.  - Picc line in place  - Patient decided to not do fertility presentation  - Stared on Cladribine Idarubicin Cytarabine, CLIA and transitioned from dasatinb to ponatinib. Day 18  - plan for day 21 bone marrow biopsy for restaging   - TLS labs wnl. TLS labs stopped and allopurinol stopped    Pancytopenia due to antineoplastic chemotherapy  - Daily CBC while inpatient  - Transfuse for hgb < 7 or plts < 10K  - Now on ppx bactrim, fluconazole, and acyclovir; on cefepime due to NF    Neutropenic fever  - Temp of 101.4 at [place] ED. Persisted upon transfer.  - CXR neg. Blood culture NGTD. C-Diff negative. U/a neg  - Strep, flu, and COVID neg at OSH  - Throat red with exudates. Tonsils mildly swollen. CT neck showing adenopathy but no abscesses.  - CT chest without consolidations  - Repeat fever 6/28 @2330. BC 1/4 GPC with viridans strep; other 3/4 NGTD. Repeat CXR and UA negative  - repeat blood cultures 7/1 NGTD  - ID consulted, appreciate assistance: continue cefepime; EOT 7/15    Streptococcal bacteremia  - see neutropenic fever    Mild malnutrition  - continue PO intake as tolerated  - nutrition following    Pharyngitis  - Continue [place]   - Pain well-controlled with PO Oxy  - Continue ppx antimicrobials  - RIP throat swab, strep, flu, and COVID neg  - Leukemia infiltration may be playing a role; improving    Hypertension  -  Takes amlodipine at home  - Given hypertension, amlodipine stopped and nifedipine and losartan started in [place]. Continued on transfer.  - BP now improved        VTE Risk Mitigation (From admission, onward)         Ordered     heparin, porcine (PF) 100 unit/mL injection flush 300 Units  As needed (PRN)         06/16/23 1550     IP VTE HIGH RISK PATIENT  Once         06/09/23 0049     Place sequential compression device  Until discontinued         06/09/23 0049                Disposition: Remains inpatient    Ginger Jacob NP  Bone Marrow Transplant  [facility] - Oncology ([facility])

## 2023-07-03 NOTE — SUBJECTIVE & OBJECTIVE
Subjective:     Interval History: Day 18 of CLIA+ponatinib for blast phase CML. Continues to feel well. Tmax this .6F, remains on cefepime per ID for strep viridans. Repeat BC remain NGTD. Denies pain, n/v/d/c. Encouraged more physical activity/ambulation today. Plt count pending this AM. Vitals otherwise stable    Objective:     Vital Signs (Most Recent):  Temp: (!) 101.7 °F (38.7 °C) (07/03/23 0802)  Pulse: 99 (07/03/23 0802)  Resp: 17 (07/03/23 0802)  BP: 126/72 (07/03/23 0802)  SpO2: 98 % (07/03/23 0802) Vital Signs (24h Range):  Temp:  [98.8 °F (37.1 °C)-101.7 °F (38.7 °C)] 101.7 °F (38.7 °C)  Pulse:  [] 99  Resp:  [16-18] 17  SpO2:  [98 %-100 %] 98 %  BP: (114-134)/(61-81) 126/72     Weight: 81 kg (178 lb 9.2 oz)  Body mass index is 23.56 kg/m².  Body surface area is 2.04 meters squared.      Intake/Output - Last 3 Shifts         07/01 0700  07/02 0659 07/02 0700  07/03 0659 07/03 0700  07/04 0659    P.O. 2051 845 180    Blood       IV Piggyback 895.8 272.8     Total Intake(mL/kg) 2946.8 (36.3) 1117.8 (13.8) 180 (2.2)    Urine (mL/kg/hr) 1600 (0.8) 1000 (0.5)     Stool  0     Total Output 1600 1000     Net +1346.8 +117.8 +180           Urine Occurrence 3 x 2 x     Stool Occurrence  1 x              Physical Exam  Vitals and nursing note reviewed.   Constitutional:       Appearance: Normal appearance. He is well-developed.   HENT:      Head: Normocephalic and atraumatic.      Mouth/Throat:      Mouth: Mucous membranes are moist.      Pharynx: No posterior oropharyngeal erythema.   Eyes:      General: No scleral icterus.     Extraocular Movements: Extraocular movements intact.      Conjunctiva/sclera: Conjunctivae normal.   Cardiovascular:      Rate and Rhythm: Normal rate and regular rhythm.      Pulses: Normal pulses.      Heart sounds: Normal heart sounds. No murmur heard.  Pulmonary:      Effort: Pulmonary effort is normal. No respiratory distress.      Breath sounds: Normal breath sounds. No  wheezing.   Abdominal:      General: Bowel sounds are normal. There is no distension.      Palpations: Abdomen is soft.      Tenderness: There is no abdominal tenderness.   Musculoskeletal:         General: No tenderness. Normal range of motion.      Cervical back: Normal range of motion and neck supple.      Right lower leg: No edema.      Left lower leg: No edema.   Lymphadenopathy:      Cervical: No cervical adenopathy.   Skin:     General: Skin is warm and dry.      Findings: No rash.      Comments: PICC Intact to right arm with no redness or drainage   Neurological:      General: No focal deficit present.      Mental Status: He is alert and oriented to person, place, and time.      Cranial Nerves: No cranial nerve deficit.      Coordination: Coordination normal.   Psychiatric:         Mood and Affect: Mood normal.         Behavior: Behavior normal.         Thought Content: Thought content normal.         Judgment: Judgment normal.      Comments: Flat affect          Significant Labs:   CBC:   Recent Labs   Lab 07/02/23  0301 07/03/23  0551   WBC 0.30* 0.26*   HGB 7.2* 7.1*   HCT 21.1* 21.2*   PLT 22*  --       and CMP:   Recent Labs   Lab 07/02/23  0301 07/03/23  0551    137   K 4.2 4.0    105   CO2 24 24   GLU 89 103   BUN 10 10   CREATININE 0.8 0.8   CALCIUM 8.9 8.7   PROT 6.6 6.5   ALBUMIN 3.0* 3.0*   BILITOT 0.6 0.8   ALKPHOS 103 95   AST 21 12   ALT 37 28   ANIONGAP 8 8         Diagnostic Results:  I have reviewed all pertinent imaging results/findings within the past 24 hours.

## 2023-07-03 NOTE — ASSESSMENT & PLAN NOTE
- Patient with CML diagnosed 8/2022 Follows locally in [place] with Dr. Brett Hogan. He has been on dasatinib outpatient since 10/2022. Some question regarding his compliance over the course of treatment, however. Presented to [facility] with c/o gum swelling on 6/7/23. Labs on presentation remarkable for WBC count of 138K, with 80% blasts indicating blast crisis. WBC count from approximately a week and a half earlier was normal. Reported recent compliance with his TKI.     Transferred to [facility] [place] [place] for higher level of care    - Bone marrow Biopsy performed 6/9/23 c/w progression for blast phase CML   - BCR/ABL p210 collected on admission, >55%  - BCR/ABL mutation negative  - Echo with 65% EF and mild-moderate pulmonary hypertension.  - Picc line in place  - Patient decided to not do fertility presentation  - Stared on Cladribine Idarubicin Cytarabine, CLIA and transitioned from dasatinb to ponatinib. Day 18  - plan for day 21 bone marrow biopsy for restaging   - TLS labs wnl. TLS labs stopped and allopurinol stopped

## 2023-07-03 NOTE — ASSESSMENT & PLAN NOTE
- Continue [place]   - Pain well-controlled with PO Oxy  - Continue ppx antimicrobials  - RIP throat swab, strep, flu, and COVID neg  - Leukemia infiltration may be playing a role; improving

## 2023-07-03 NOTE — PLAN OF CARE
Day 18 of CLIA. Plan of care discussed with patient at start of shift. Free from falls and injuries. Resting quietly with eyes closed. Respirations even, unlabored. Skin warm and dry. TMAX 100.2, unsustained. Denies pain. Denies nausea. Frequent checks for pain and safety maintained. Bed in lowest position, wheels locked, side rails up x's 2, call light in reach. Instructed to call for assistance as needed, verbalizes understanding.

## 2023-07-03 NOTE — ASSESSMENT & PLAN NOTE
- Takes amlodipine at home  - Given hypertension, amlodipine stopped and nifedipine and losartan started in [place]. Continued on transfer.  - BP now improved

## 2023-07-03 NOTE — PLAN OF CARE
Recommendations    1. Continue Regular Diet with High-Calorie, High-Protein modification.   2. Modify Boost Plus Vanilla -QD   3. RD to monitor and follow    Goals: To meet % of EEN,EPN by next RD follow up  Nutrition Goal Status: progressing towards goal  Communication of RD Recs:  (Plan of care (POC))

## 2023-07-03 NOTE — ASSESSMENT & PLAN NOTE
- Daily CBC while inpatient  - Transfuse for hgb < 7 or plts < 10K  - Now on ppx bactrim, fluconazole, and acyclovir; on cefepime due to NF

## 2023-07-03 NOTE — ASSESSMENT & PLAN NOTE
- Temp of 101.4 at [place] ED. Persisted upon transfer.  - CXR neg. Blood culture NGTD. C-Diff negative. U/a neg  - Strep, flu, and COVID neg at OSH  - Throat red with exudates. Tonsils mildly swollen. CT neck showing adenopathy but no abscesses.  - CT chest without consolidations  - Repeat fever 6/28 @2330. BC 1/4 GPC with viridans strep; other 3/4 NGTD. Repeat CXR and UA negative  - repeat blood cultures 7/1 NGTD  - ID consulted, appreciate assistance: continue cefepime; EOT 7/15

## 2023-07-04 LAB
ALBUMIN SERPL BCP-MCNC: 3 G/DL (ref 3.5–5.2)
ALP SERPL-CCNC: 91 U/L (ref 55–135)
ALT SERPL W/O P-5'-P-CCNC: 24 U/L (ref 10–44)
ANION GAP SERPL CALC-SCNC: 12 MMOL/L (ref 8–16)
AST SERPL-CCNC: 11 U/L (ref 10–40)
BACTERIA BLD CULT: NORMAL
BASOPHILS # BLD AUTO: 0 K/UL (ref 0–0.2)
BASOPHILS NFR BLD: 0 % (ref 0–1.9)
BILIRUB SERPL-MCNC: 0.7 MG/DL (ref 0.1–1)
BLD PROD TYP BPU: NORMAL
BLD PROD TYP BPU: NORMAL
BLOOD UNIT EXPIRATION DATE: NORMAL
BLOOD UNIT EXPIRATION DATE: NORMAL
BLOOD UNIT TYPE CODE: 1700
BLOOD UNIT TYPE CODE: 5100
BLOOD UNIT TYPE: NORMAL
BLOOD UNIT TYPE: NORMAL
BUN SERPL-MCNC: 9 MG/DL (ref 6–20)
CALCIUM SERPL-MCNC: 8.5 MG/DL (ref 8.7–10.5)
CHLORIDE SERPL-SCNC: 101 MMOL/L (ref 95–110)
CO2 SERPL-SCNC: 23 MMOL/L (ref 23–29)
CODING SYSTEM: NORMAL
CODING SYSTEM: NORMAL
CREAT SERPL-MCNC: 0.9 MG/DL (ref 0.5–1.4)
CROSSMATCH INTERPRETATION: NORMAL
CROSSMATCH INTERPRETATION: NORMAL
DIFFERENTIAL METHOD: ABNORMAL
DISPENSE STATUS: NORMAL
DISPENSE STATUS: NORMAL
EOSINOPHIL # BLD AUTO: 0 K/UL (ref 0–0.5)
EOSINOPHIL NFR BLD: 0 % (ref 0–8)
ERYTHROCYTE [DISTWIDTH] IN BLOOD BY AUTOMATED COUNT: 16.2 % (ref 11.5–14.5)
EST. GFR  (NO RACE VARIABLE): >60 ML/MIN/1.73 M^2
GLUCOSE SERPL-MCNC: 99 MG/DL (ref 70–110)
HCT VFR BLD AUTO: 19.8 % (ref 40–54)
HGB BLD-MCNC: 6.7 G/DL (ref 14–18)
IMM GRANULOCYTES # BLD AUTO: 0 K/UL (ref 0–0.04)
IMM GRANULOCYTES NFR BLD AUTO: 0 % (ref 0–0.5)
LYMPHOCYTES # BLD AUTO: 0.3 K/UL (ref 1–4.8)
LYMPHOCYTES NFR BLD: 100 % (ref 18–48)
MAGNESIUM SERPL-MCNC: 1.7 MG/DL (ref 1.6–2.6)
MCH RBC QN AUTO: 25.9 PG (ref 27–31)
MCHC RBC AUTO-ENTMCNC: 33.8 G/DL (ref 32–36)
MCV RBC AUTO: 76 FL (ref 82–98)
MONOCYTES # BLD AUTO: 0 K/UL (ref 0.3–1)
MONOCYTES NFR BLD: 0 % (ref 4–15)
NEUTROPHILS # BLD AUTO: 0 K/UL (ref 1.8–7.7)
NEUTROPHILS NFR BLD: 0 % (ref 38–73)
NRBC BLD-RTO: 0 /100 WBC
NUM UNITS TRANS PACKED RBC: NORMAL
PHOSPHATE SERPL-MCNC: 2.4 MG/DL (ref 2.7–4.5)
PLATELET # BLD AUTO: 8 K/UL (ref 150–450)
PLATELET BLD QL SMEAR: ABNORMAL
PMV BLD AUTO: ABNORMAL FL (ref 9.2–12.9)
POTASSIUM SERPL-SCNC: 4.1 MMOL/L (ref 3.5–5.1)
PROT SERPL-MCNC: 6.4 G/DL (ref 6–8.4)
RBC # BLD AUTO: 2.59 M/UL (ref 4.6–6.2)
SODIUM SERPL-SCNC: 136 MMOL/L (ref 136–145)
UNIT NUMBER: NORMAL
WBC # BLD AUTO: 0.32 K/UL (ref 3.9–12.7)

## 2023-07-04 PROCEDURE — A4216 STERILE WATER/SALINE, 10 ML: HCPCS | Performed by: INTERNAL MEDICINE

## 2023-07-04 PROCEDURE — 25000003 PHARM REV CODE 250: Performed by: NURSE PRACTITIONER

## 2023-07-04 PROCEDURE — P9040 RBC LEUKOREDUCED IRRADIATED: HCPCS

## 2023-07-04 PROCEDURE — 25000003 PHARM REV CODE 250: Performed by: INTERNAL MEDICINE

## 2023-07-04 PROCEDURE — 84100 ASSAY OF PHOSPHORUS: CPT | Performed by: NURSE PRACTITIONER

## 2023-07-04 PROCEDURE — 63600175 PHARM REV CODE 636 W HCPCS: Performed by: NURSE PRACTITIONER

## 2023-07-04 PROCEDURE — 63600175 PHARM REV CODE 636 W HCPCS: Performed by: INTERNAL MEDICINE

## 2023-07-04 PROCEDURE — 81001 URINALYSIS AUTO W/SCOPE: CPT | Performed by: STUDENT IN AN ORGANIZED HEALTH CARE EDUCATION/TRAINING PROGRAM

## 2023-07-04 PROCEDURE — 85025 COMPLETE CBC W/AUTO DIFF WBC: CPT | Performed by: NURSE PRACTITIONER

## 2023-07-04 PROCEDURE — 25000003 PHARM REV CODE 250: Performed by: STUDENT IN AN ORGANIZED HEALTH CARE EDUCATION/TRAINING PROGRAM

## 2023-07-04 PROCEDURE — 25000003 PHARM REV CODE 250

## 2023-07-04 PROCEDURE — 99233 SBSQ HOSP IP/OBS HIGH 50: CPT | Mod: ,,, | Performed by: INTERNAL MEDICINE

## 2023-07-04 PROCEDURE — 36430 TRANSFUSION BLD/BLD COMPNT: CPT

## 2023-07-04 PROCEDURE — 20600001 HC STEP DOWN PRIVATE ROOM

## 2023-07-04 PROCEDURE — P9037 PLATE PHERES LEUKOREDU IRRAD: HCPCS

## 2023-07-04 PROCEDURE — 83735 ASSAY OF MAGNESIUM: CPT | Performed by: NURSE PRACTITIONER

## 2023-07-04 PROCEDURE — 99233 PR SUBSEQUENT HOSPITAL CARE,LEVL III: ICD-10-PCS | Mod: ,,, | Performed by: INTERNAL MEDICINE

## 2023-07-04 PROCEDURE — 36415 COLL VENOUS BLD VENIPUNCTURE: CPT | Performed by: STUDENT IN AN ORGANIZED HEALTH CARE EDUCATION/TRAINING PROGRAM

## 2023-07-04 PROCEDURE — 87040 BLOOD CULTURE FOR BACTERIA: CPT | Performed by: STUDENT IN AN ORGANIZED HEALTH CARE EDUCATION/TRAINING PROGRAM

## 2023-07-04 PROCEDURE — 80053 COMPREHEN METABOLIC PANEL: CPT | Performed by: NURSE PRACTITIONER

## 2023-07-04 RX ORDER — ACETAMINOPHEN 325 MG/1
650 TABLET ORAL ONCE
Status: COMPLETED | OUTPATIENT
Start: 2023-07-04 | End: 2023-07-04

## 2023-07-04 RX ORDER — HYDROCODONE BITARTRATE AND ACETAMINOPHEN 500; 5 MG/1; MG/1
TABLET ORAL
Status: DISCONTINUED | OUTPATIENT
Start: 2023-07-04 | End: 2023-07-05

## 2023-07-04 RX ORDER — ACETAMINOPHEN 650 MG/20.3ML
325 LIQUID ORAL ONCE AS NEEDED
Status: COMPLETED | OUTPATIENT
Start: 2023-07-04 | End: 2023-07-04

## 2023-07-04 RX ADMIN — Medication 1 TABLET: at 05:07

## 2023-07-04 RX ADMIN — ALUMINUM HYDROXIDE, MAGNESIUM HYDROXIDE, AND DIMETHICONE 10 ML: 400; 400; 40 SUSPENSION ORAL at 09:07

## 2023-07-04 RX ADMIN — Medication 1 TABLET: at 11:07

## 2023-07-04 RX ADMIN — OXYCODONE HYDROCHLORIDE 5 MG: 5 TABLET ORAL at 11:07

## 2023-07-04 RX ADMIN — Medication 10 ML: at 11:07

## 2023-07-04 RX ADMIN — NIFEDIPINE 60 MG: 60 TABLET, FILM COATED, EXTENDED RELEASE ORAL at 08:07

## 2023-07-04 RX ADMIN — ACETAMINOPHEN 650 MG: 325 TABLET ORAL at 06:07

## 2023-07-04 RX ADMIN — ALUMINUM HYDROXIDE, MAGNESIUM HYDROXIDE, AND DIMETHICONE 10 ML: 400; 400; 40 SUSPENSION ORAL at 01:07

## 2023-07-04 RX ADMIN — CEFEPIME 2 G: 2 INJECTION, POWDER, FOR SOLUTION INTRAVENOUS at 08:07

## 2023-07-04 RX ADMIN — ALUMINUM HYDROXIDE, MAGNESIUM HYDROXIDE, AND DIMETHICONE 10 ML: 400; 400; 40 SUSPENSION ORAL at 08:07

## 2023-07-04 RX ADMIN — POSACONAZOLE 300 MG: 100 TABLET, DELAYED RELEASE ORAL at 08:07

## 2023-07-04 RX ADMIN — VANCOMYCIN HYDROCHLORIDE 2000 MG: 500 INJECTION, POWDER, LYOPHILIZED, FOR SOLUTION INTRAVENOUS at 07:07

## 2023-07-04 RX ADMIN — MAGNESIUM OXIDE TAB 400 MG (241.3 MG ELEMENTAL MG) 400 MG: 400 (241.3 MG) TAB at 01:07

## 2023-07-04 RX ADMIN — PONATINIB HYDROCHLORIDE 15 MG: 15 TABLET, FILM COATED ORAL at 10:07

## 2023-07-04 RX ADMIN — ACYCLOVIR 800 MG: 200 CAPSULE ORAL at 08:07

## 2023-07-04 RX ADMIN — CEFEPIME 2 G: 2 INJECTION, POWDER, FOR SOLUTION INTRAVENOUS at 05:07

## 2023-07-04 RX ADMIN — Medication 10 ML: at 05:07

## 2023-07-04 RX ADMIN — Medication 10 ML: at 06:07

## 2023-07-04 RX ADMIN — ACYCLOVIR 800 MG: 200 CAPSULE ORAL at 09:07

## 2023-07-04 RX ADMIN — ACETAMINOPHEN 326.6 MG: 650 SOLUTION ORAL at 11:07

## 2023-07-04 RX ADMIN — PANTOPRAZOLE SODIUM 40 MG: 40 TABLET, DELAYED RELEASE ORAL at 08:07

## 2023-07-04 RX ADMIN — CEFEPIME 2 G: 2 INJECTION, POWDER, FOR SOLUTION INTRAVENOUS at 12:07

## 2023-07-04 RX ADMIN — DIPHENHYDRAMINE HYDROCHLORIDE 12.5 MG: 50 INJECTION, SOLUTION INTRAMUSCULAR; INTRAVENOUS at 08:07

## 2023-07-04 RX ADMIN — MAGNESIUM OXIDE TAB 400 MG (241.3 MG ELEMENTAL MG) 400 MG: 400 (241.3 MG) TAB at 05:07

## 2023-07-04 RX ADMIN — ACETAMINOPHEN 650 MG: 325 TABLET ORAL at 04:07

## 2023-07-04 RX ADMIN — Medication 10 ML: at 12:07

## 2023-07-04 NOTE — PLAN OF CARE
Plan of care reviewed with patient.  Fall precautions maintained, side rails up x2, call light in reach, bed in low position and locked, nonskid socks on .  Patient getting cefepime ivpb.  Patient sat up for several hours today in his chair.  Ambulating, voiding and tolerating a regular diet without difficulty.  No complaints voiced today.

## 2023-07-04 NOTE — SUBJECTIVE & OBJECTIVE
Subjective:     Interval History: Day 19 of CLIA+ponatinib induction for CML in blast phase. Tmax 103. Afebrile since this morning. Reports some mouth soreness but otherwise denies discomfort. No nausea/vomiting. No diarrhea. No abdominal tenderness. No shortness of breath.     Objective:     Vital Signs (Most Recent):  Temp: 99.2 °F (37.3 °C) (07/04/23 1147)  Pulse: 103 (07/04/23 1147)  Resp: 17 (07/04/23 1147)  BP: 121/67 (07/04/23 1147)  SpO2: 99 % (07/04/23 1147) Vital Signs (24h Range):  Temp:  [98.5 °F (36.9 °C)-103 °F (39.4 °C)] 99.2 °F (37.3 °C)  Pulse:  [] 103  Resp:  [16-18] 17  SpO2:  [95 %-100 %] 99 %  BP: (107-137)/(55-79) 121/67     Weight: 80.6 kg (177 lb 11.1 oz)  Body mass index is 23.44 kg/m².  Body surface area is 2.04 meters squared.    ECOG SCORE           [unfilled]    Intake/Output - Last 3 Shifts         07/02 0700  07/03 0659 07/03 0700 07/04 0659 07/04 0700  07/05 0659    P.O. 845 1560 50    Blood   2204.1    IV Piggyback 272.8      Total Intake(mL/kg) 1117.8 (13.8) 1560 (19.4) 2254.1 (28)    Urine (mL/kg/hr) 1000 (0.5) 1350 (0.7) 0 (0)    Stool 0  0    Total Output 1000 1350 0    Net +117.8 +210 +2254.1           Urine Occurrence 2 x 4 x 0 x    Stool Occurrence 1 x 1 x 0 x             Physical Exam  Vitals and nursing note reviewed.   Constitutional:       Appearance: Normal appearance. He is well-developed.   HENT:      Head: Normocephalic and atraumatic.      Mouth/Throat:      Mouth: Mucous membranes are moist.      Pharynx: No posterior oropharyngeal erythema.   Eyes:      General: No scleral icterus.     Extraocular Movements: Extraocular movements intact.      Conjunctiva/sclera: Conjunctivae normal.   Cardiovascular:      Rate and Rhythm: Normal rate and regular rhythm.      Pulses: Normal pulses.      Heart sounds: Normal heart sounds. No murmur heard.  Pulmonary:      Effort: Pulmonary effort is normal. No respiratory distress.      Breath sounds: Normal breath sounds. No  wheezing.   Abdominal:      General: Bowel sounds are normal. There is no distension.      Palpations: Abdomen is soft.      Tenderness: There is no abdominal tenderness.   Musculoskeletal:         General: No tenderness. Normal range of motion.      Cervical back: Normal range of motion and neck supple.      Right lower leg: No edema.      Left lower leg: No edema.   Lymphadenopathy:      Cervical: No cervical adenopathy.   Skin:     General: Skin is warm and dry.      Findings: No rash.      Comments: PICC Intact to right arm with no redness or drainage   Neurological:      General: No focal deficit present.      Mental Status: He is alert and oriented to person, place, and time.      Cranial Nerves: No cranial nerve deficit.      Coordination: Coordination normal.   Psychiatric:         Mood and Affect: Mood normal.         Behavior: Behavior normal.         Thought Content: Thought content normal.         Judgment: Judgment normal.      Comments: Flat affect          Significant Labs:   All pertinent labs from the last 24 hours have been reviewed.    Diagnostic Results:  I have reviewed all pertinent imaging results/findings within the past 24 hours.

## 2023-07-04 NOTE — PROGRESS NOTES
[facility] - Oncology ([facility])  Hematology  Bone Marrow Transplant  Progress Note    Patient Name: Magdaleno Hirsch  Admission Date: 6/9/2023  Hospital Length of Stay: 25 days  Code Status: Full Code    Subjective:     Interval History: Day 19 of CLIA+ponatinib induction for CML in blast phase. Tmax 103. Afebrile since this morning. Reports some mouth soreness but otherwise denies discomfort. No nausea/vomiting. No diarrhea. No abdominal tenderness. No shortness of breath.     Objective:     Vital Signs (Most Recent):  Temp: 99.2 °F (37.3 °C) (07/04/23 1147)  Pulse: 103 (07/04/23 1147)  Resp: 17 (07/04/23 1147)  BP: 121/67 (07/04/23 1147)  SpO2: 99 % (07/04/23 1147) Vital Signs (24h Range):  Temp:  [98.5 °F (36.9 °C)-103 °F (39.4 °C)] 99.2 °F (37.3 °C)  Pulse:  [] 103  Resp:  [16-18] 17  SpO2:  [95 %-100 %] 99 %  BP: (107-137)/(55-79) 121/67     Weight: 80.6 kg (177 lb 11.1 oz)  Body mass index is 23.44 kg/m².  Body surface area is 2.04 meters squared.    ECOG SCORE           [unfilled]    Intake/Output - Last 3 Shifts         07/02 0700  07/03 0659 07/03 0700  07/04 0659 07/04 0700  07/05 0659    P.O. 845 1560 50    Blood   2204.1    IV Piggyback 272.8      Total Intake(mL/kg) 1117.8 (13.8) 1560 (19.4) 2254.1 (28)    Urine (mL/kg/hr) 1000 (0.5) 1350 (0.7) 0 (0)    Stool 0  0    Total Output 1000 1350 0    Net +117.8 +210 +2254.1           Urine Occurrence 2 x 4 x 0 x    Stool Occurrence 1 x 1 x 0 x             Physical Exam  Vitals and nursing note reviewed.   Constitutional:       Appearance: Normal appearance. He is well-developed.   HENT:      Head: Normocephalic and atraumatic.      Mouth/Throat:      Mouth: Mucous membranes are moist.      Pharynx: No posterior oropharyngeal erythema.   Eyes:      General: No scleral icterus.     Extraocular Movements: Extraocular movements intact.      Conjunctiva/sclera: Conjunctivae normal.   Cardiovascular:      Rate and Rhythm: Normal rate and regular rhythm.      Pulses:  Normal pulses.      Heart sounds: Normal heart sounds. No murmur heard.  Pulmonary:      Effort: Pulmonary effort is normal. No respiratory distress.      Breath sounds: Normal breath sounds. No wheezing.   Abdominal:      General: Bowel sounds are normal. There is no distension.      Palpations: Abdomen is soft.      Tenderness: There is no abdominal tenderness.   Musculoskeletal:         General: No tenderness. Normal range of motion.      Cervical back: Normal range of motion and neck supple.      Right lower leg: No edema.      Left lower leg: No edema.   Lymphadenopathy:      Cervical: No cervical adenopathy.   Skin:     General: Skin is warm and dry.      Findings: No rash.      Comments: PICC Intact to right arm with no redness or drainage   Neurological:      General: No focal deficit present.      Mental Status: He is alert and oriented to person, place, and time.      Cranial Nerves: No cranial nerve deficit.      Coordination: Coordination normal.   Psychiatric:         Mood and Affect: Mood normal.         Behavior: Behavior normal.         Thought Content: Thought content normal.         Judgment: Judgment normal.      Comments: Flat affect          Significant Labs:   All pertinent labs from the last 24 hours have been reviewed.    Diagnostic Results:  I have reviewed all pertinent imaging results/findings within the past 24 hours.    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 Brett Hogan. He has been on dasatinib outpatient since 10/2022. Some question regarding his compliance over the course of treatment, however. Presented to [facility] with c/o gum swelling on 6/7/23. Labs on presentation remarkable for WBC count of 138K, with 80% blasts indicating blast crisis. WBC count from approximately a week and a half earlier was normal. Reported recent compliance with his TKI.     [place] [facility] [place]    - Bone marrow Biopsy performed 6/9/23 c/w progression for blast phase CML   - BCR/ABL p210 collected on admission, >55%  - BCR/ABL mutation negative  - Echo with 65% EF and mild-moderate pulmonary hypertension.  - Picc line in place  - Patient decided to not do fertility presentation  - Stared on Cladribine Idarubicin Cytarabine, CLIA and transitioned from dasatinb to ponatinib. Day 19  - plan for day 21 bone marrow biopsy for restaging   - TLS labs wnl. TLS labs stopped and allopurinol stopped    Streptococcal bacteremia  - see neutropenic fever    Mild malnutrition  - continue PO intake as tolerated  - nutrition following    Pharyngitis  - Continue [place]   - Pain well-controlled with PO Oxy  - Continue ppx antimicrobials  - RIP throat swab, strep, flu, and COVID neg  - Leukemia infiltration may be playing a role; improving    Neutropenic fever  - Temp of 101.4 at [place] ED. Persisted upon transfer.  - CXR neg. Blood culture NGTD. C-Diff negative. U/a neg  - Strep, flu, and COVID neg at OSH  - Throat red with exudates. Tonsils mildly swollen. CT neck showing adenopathy but no abscesses.  - CT chest without consolidations  - Repeat fever 6/28 @2330. BC 1/4 GPC with viridans strep; other 3/4 NGTD. Repeat CXR and UA negative  - repeat blood cultures 7/1 NGTD   - ID consulted, appreciate assistance: continue cefepime; EOT 7/15  - Fevers recurrent since 7/2. Consider escalating antibiotic therapy if fever persists.    Pancytopenia due to antineoplastic chemotherapy  - Daily CBC while inpatient  - Transfuse for hgb < 7 or plts < 10K  - Now on ppx bactrim, fluconazole, and acyclovir; on cefepime due to NF    Hypertension  - Takes amlodipine at home  - Given hypertension, amlodipine stopped and nifedipine and losartan started in [place]. Continued on transfer.  - BP now improved        VTE Risk Mitigation (From admission, onward)         Ordered     heparin, porcine (PF) 100 unit/mL  injection flush 300 Units  As needed (PRN)         06/16/23 1550     IP VTE HIGH RISK PATIENT  Once         06/09/23 0049     Place sequential compression device  Until discontinued         06/09/23 0049                Disposition: pending recovery from induction chemotherapy and resolution of neutropenic fever    Jonas Alonzo MD  Bone Marrow Transplant  [facility] - Oncology ([facility])

## 2023-07-04 NOTE — ASSESSMENT & PLAN NOTE
- Patient with CML diagnosed 8/2022 Follows locally in [place] with Dr. Brett Hogan. He has been on dasatinib outpatient since 10/2022. Some question regarding his compliance over the course of treatment, however. Presented to [facility] with c/o gum swelling on 6/7/23. Labs on presentation remarkable for WBC count of 138K, with 80% blasts indicating blast crisis. WBC count from approximately a week and a half earlier was normal. Reported recent compliance with his TKI.     [place] [facility] [place]    - Bone marrow Biopsy performed 6/9/23 c/w progression for blast phase CML   - BCR/ABL p210 collected on admission, >55%  - BCR/ABL mutation negative  - Echo with 65% EF and mild-moderate pulmonary hypertension.  - Picc line in place  - Patient decided to not do fertility presentation  - Stared on Cladribine Idarubicin Cytarabine, CLIA and transitioned from dasatinb to ponatinib. Day 19  - plan for day 21 bone marrow biopsy for restaging   - TLS labs wnl. TLS labs stopped and allopurinol stopped

## 2023-07-04 NOTE — ASSESSMENT & PLAN NOTE
- Continue Garr[place]   - Pain well-controlled with PO Oxy  - Continue ppx antimicrobials  - RIP throat swab, strep, flu, and COVID neg  - Leukemia infiltration may be playing a role; improving

## 2023-07-04 NOTE — PROGRESS NOTES
Pharmacokinetic Initial Assessment: IV Vancomycin    Assessment/Plan:    Initiate intravenous vancomycin with loading dose of 2000 mg once followed by a maintenance dose of vancomycin 1500 mg IV every 8 hours  Desired empiric serum trough concentration is 15 to 20 mcg/mL  Draw vancomycin trough level 60 min prior to fourth dose on 7/5/23 at approximately 1830  Pharmacy will continue to follow and monitor vancomycin.      Please contact pharmacy at extension [phone] with any questions regarding this assessment.     Thank you for the consult,   Kaylee Pandey, PharmD       Patient brief summary:  Magdaleno Hirsch is a 23 y.o. male initiated on antimicrobial therapy with IV Vancomycin for treatment of suspected sepsis    Drug Allergies:   Review of patient's allergies indicates:  No Known Allergies    Actual Body Weight:   80.6 kg    Renal Function:   Estimated Creatinine Clearance: 141.8 mL/min (based on SCr of 0.9 mg/dL).,     Dialysis Method (if applicable):  N/A    CBC (last 72 hours):  Recent Labs   Lab Result Units 07/02/23  0301 07/03/23  0551 07/04/23  0423   WBC K/uL 0.30* 0.26* 0.32*   Hemoglobin g/dL 7.2* 7.1* 6.7*   Hematocrit % 21.1* 21.2* 19.8*   Platelets K/uL 22* 13* 8*   Gran % % 0.0* 0.0* 0.0*   Lymph % % 100.0* 100.0* 100.0*   Mono % % 0.0* 0.0* 0.0*   Eosinophil % % 0.0 0.0 0.0   Basophil % % 0.0 0.0 0.0   Differential Method  Automated Automated Automated       Metabolic Panel (last 72 hours):  Recent Labs   Lab Result Units 07/02/23  0301 07/03/23  0551 07/04/23  0423   Sodium mmol/L 138 137 136   Potassium mmol/L 4.2 4.0 4.1   Chloride mmol/L 106 105 101   CO2 mmol/L 24 24 23   Glucose mg/dL 89 103 99   BUN mg/dL 10 10 9   Creatinine mg/dL 0.8 0.8 0.9   Albumin g/dL 3.0* 3.0* 3.0*   Total Bilirubin mg/dL 0.6 0.8 0.7   Alkaline Phosphatase U/L 103 95 91   AST U/L 21 12 11   ALT U/L 37 28 24   Magnesium mg/dL 1.9 1.8 1.7   Phosphorus mg/dL 2.4* 3.0 2.4*       Drug levels (last 3 results):  No results for  input(s): VANCOMYCINRA, VANCORANDOM, VANCOMYCINPE, VANCOPEAK, VANCOMYCINTR, VANCOTROUGH in the last 72 hours.    Microbiologic Results:  Microbiology Results (last 7 days)       Procedure Component Value Units Date/Time    Blood culture [[id number]]     Order Status: Sent Specimen: Blood     Blood culture [[id number]]     Order Status: Sent Specimen: Blood     Blood culture [[id number]] Collected: 07/01/23 1335    Order Status: Completed Specimen: Blood from Peripheral, Left Hand Updated: 07/04/23 1412     Blood Culture, Routine No Growth to date      No Growth to date      No Growth to date      No Growth to date    Blood culture [[id number]] Collected: 07/01/23 1325    Order Status: Completed Specimen: Blood from Antecubital, Left Updated: 07/04/23 1412     Blood Culture, Routine No Growth to date      No Growth to date      No Growth to date      No Growth to date    Blood culture [[id number]] Collected: 07/03/23 1727    Order Status: Completed Specimen: Blood Updated: 07/04/23 0915     Blood Culture, Routine No Growth to date    Blood culture [[id number]] Collected: 07/03/23 1722    Order Status: Completed Specimen: Blood Updated: 07/04/23 0915     Blood Culture, Routine No Growth to date    Blood culture [[id number]] Collected: 06/29/23 0011    Order Status: Completed Specimen: Blood Updated: 07/04/23 0612     Blood Culture, Routine No growth after 5 days.    Blood culture [[id number]]  (Abnormal) Collected: 06/29/23 0011    Order Status: Completed Specimen: Blood Updated: 07/01/23 1321     Blood Culture, Routine Gram stain aer bottle: Gram positive cocci in chains resembling Strep      Results called to and read back by: Srini BARRAGAN RN 06/29/2023  13:51      Gram stain brenda bottle: Gram positive cocci in chains resembling Strep      VIRIDANS STREPTOCOCCUS GROUP  Susceptibility testing not routinely performed      Rapid Organism ID by PCR (from Blood culture) [[id number]]  (Abnormal) Collected: 06/29/23 0011    Order Status:  Completed Updated: 06/29/23 1515     Enterococcus faecalis Not Detected     Enterococcus faecium Not Detected     Listeria monocytogenes Not Detected     Staphylococcus spp. Not Detected     Staphylococcus aureus Not Detected     Staphylococcus epidermidis Not Detected     Staphylococcus lugdunensis Not Detected     Streptococcus species Detected     Streptococcus agalactiae Not Detected     Streptococcus pneumoniae Not Detected     Streptococcus pyogenes Not Detected     Acinetobacter calcoaceticus/baumannii complex Not Detected     Bacteroides fragilis Not Detected     Enterobacterales Not Detected     Enterobacter cloacae complex Not Detected     Escherichia coli Not Detected     Klebsiella aerogenes Not Detected     Klebsiella oxytoca Not Detected     Klebsiella pneumoniae group Not Detected     Proteus Not Detected     Salmonella sp Not Detected     Serratia marcescens Not Detected     Haemophilus influenzae Not Detected     Neisseria meningtidis Not Detected     Pseudomonas aeruginosa Not Detected     Stenotrophomonas maltophilia Not Detected     Candida albicans Not Detected     Candida auris Not Detected     Candida glabrata Not Detected     Candida krusei Not Detected     Candida parapsilosis Not Detected     Candida tropicalis Not Detected     Cryptococcus neoformans/gattii Not Detected     CTX-M (ESBL ) Test Not Applicable     IMP (Carbapenem resistant) Test Not Applicable     KPC resistance gene (Carbapenem resistant) Test Not Applicable     mcr-1  Test Not Applicable     mec A/C  Test Not Applicable     mec A/C and MREJ (MRSA) gene Test Not Applicable     NDM (Carbapenem resistant) Test Not Applicable     OXA-48-like (Carbapenem resistant) Test Not Applicable     van A/B (VRE gene) Test Not Applicable     VIM (Carbapenem resistant) Test Not Applicable    Blood culture [[id number]]     Order Status: Canceled Specimen: Blood     Blood culture [[id number]]     Order Status: Canceled Specimen: Blood

## 2023-07-04 NOTE — ASSESSMENT & PLAN NOTE
- Takes amlodipine at home  - Given hypertension, amlodipine stopped and nifedipine and losartan started in [place]. Continued on transfer.  - BP now improved

## 2023-07-04 NOTE — PLAN OF CARE
No acute events this shift. Patient AAOx4. T-max of 101.8. MD notified and gave RN verbal order for one x1 dose of 650 mg Tylenol. Tylenol administered to patient. Patient did not sustain fever.Temp of 98.8. All other VSS. No complaints of pain or nausea. Abx infused as ordered. Bed in lowest position and locked. Side rails up x2. All possessions and call light within reach. Non-skid socks worn when out of bed. Instructed to call for assistance and voiced understanding. All needs of patient currently met. Will continue to monitor with frequent rounding.

## 2023-07-04 NOTE — PROGRESS NOTES
07/04/23 1749   Vital Signs   Temp (!) 102 °F (38.9 °C)       Temperature reported to Ching Albrceht MD. Tylenol given once. Vancomycin, Blood cultures,UA and CXR ordered per MD. No additional orders provided. Will monitor.

## 2023-07-04 NOTE — ASSESSMENT & PLAN NOTE
- Temp of 101.4 at [place] ED. Persisted upon transfer.  - CXR neg. Blood culture NGTD. C-Diff negative. U/a neg  - Strep, flu, and COVID neg at OSH  - Throat red with exudates. Tonsils mildly swollen. CT neck showing adenopathy but no abscesses.  - CT chest without consolidations  - Repeat fever 6/28 @2330. BC 1/4 GPC with viridans strep; other 3/4 NGTD. Repeat CXR and UA negative  - repeat blood cultures 7/1 NGTD   - ID consulted, appreciate assistance: continue cefepime; EOT 7/15  - Fevers recurrent since 7/2. Consider escalating antibiotic therapy if fever persists.

## 2023-07-05 ENCOUNTER — TELEPHONE (OUTPATIENT)
Dept: HEMATOLOGY/ONCOLOGY | Facility: CLINIC | Age: 23
End: 2023-07-05
Payer: COMMERCIAL

## 2023-07-05 LAB
ABO + RH BLD: NORMAL
ALBUMIN SERPL BCP-MCNC: 2.9 G/DL (ref 3.5–5.2)
ALP SERPL-CCNC: 86 U/L (ref 55–135)
ALT SERPL W/O P-5'-P-CCNC: 19 U/L (ref 10–44)
ANION GAP SERPL CALC-SCNC: 8 MMOL/L (ref 8–16)
ANISOCYTOSIS BLD QL SMEAR: SLIGHT
AST SERPL-CCNC: 12 U/L (ref 10–40)
BACTERIA #/AREA URNS AUTO: NORMAL /HPF
BASOPHILS # BLD AUTO: 0 K/UL (ref 0–0.2)
BASOPHILS NFR BLD: 0 % (ref 0–1.9)
BILIRUB SERPL-MCNC: 0.9 MG/DL (ref 0.1–1)
BILIRUB UR QL STRIP: NEGATIVE
BLD GP AB SCN CELLS X3 SERPL QL: NORMAL
BLD PROD TYP BPU: NORMAL
BLOOD UNIT EXPIRATION DATE: NORMAL
BLOOD UNIT TYPE CODE: 9500
BLOOD UNIT TYPE: NORMAL
BUN SERPL-MCNC: 10 MG/DL (ref 6–20)
CALCIUM SERPL-MCNC: 8.4 MG/DL (ref 8.7–10.5)
CHLORIDE SERPL-SCNC: 105 MMOL/L (ref 95–110)
CLARITY UR REFRACT.AUTO: CLEAR
CO2 SERPL-SCNC: 24 MMOL/L (ref 23–29)
CODING SYSTEM: NORMAL
COLOR UR AUTO: YELLOW
COMMENT: NORMAL
CREAT SERPL-MCNC: 0.9 MG/DL (ref 0.5–1.4)
CROSSMATCH INTERPRETATION: NORMAL
DIFFERENTIAL METHOD: ABNORMAL
DISPENSE STATUS: NORMAL
EOSINOPHIL # BLD AUTO: 0 K/UL (ref 0–0.5)
EOSINOPHIL NFR BLD: 0 % (ref 0–8)
ERYTHROCYTE [DISTWIDTH] IN BLOOD BY AUTOMATED COUNT: 15.9 % (ref 11.5–14.5)
EST. GFR  (NO RACE VARIABLE): >60 ML/MIN/1.73 M^2
FINAL PATHOLOGIC DIAGNOSIS: NORMAL
GLUCOSE SERPL-MCNC: 98 MG/DL (ref 70–110)
GLUCOSE UR QL STRIP: NEGATIVE
GROSS: NORMAL
HCT VFR BLD AUTO: 20.3 % (ref 40–54)
HGB BLD-MCNC: 6.9 G/DL (ref 14–18)
HGB UR QL STRIP: NEGATIVE
HYALINE CASTS UR QL AUTO: 0 /LPF
IMM GRANULOCYTES # BLD AUTO: 0 K/UL (ref 0–0.04)
IMM GRANULOCYTES NFR BLD AUTO: 0 % (ref 0–0.5)
KETONES UR QL STRIP: NEGATIVE
LEUKOCYTE ESTERASE UR QL STRIP: NEGATIVE
LYMPHOCYTES # BLD AUTO: 0.3 K/UL (ref 1–4.8)
LYMPHOCYTES NFR BLD: 97.1 % (ref 18–48)
Lab: NORMAL
MAGNESIUM SERPL-MCNC: 1.9 MG/DL (ref 1.6–2.6)
MCH RBC QN AUTO: 26.8 PG (ref 27–31)
MCHC RBC AUTO-ENTMCNC: 34 G/DL (ref 32–36)
MCV RBC AUTO: 79 FL (ref 82–98)
MICROSCOPIC COMMENT: NORMAL
MICROSCOPIC EXAM: NORMAL
MONOCYTES # BLD AUTO: 0 K/UL (ref 0.3–1)
MONOCYTES NFR BLD: 0 % (ref 4–15)
NEUTROPHILS # BLD AUTO: 0 K/UL (ref 1.8–7.7)
NEUTROPHILS NFR BLD: 2.9 % (ref 38–73)
NITRITE UR QL STRIP: NEGATIVE
NRBC BLD-RTO: 0 /100 WBC
NUM UNITS TRANS PACKED RBC: NORMAL
PH UR STRIP: 6 [PH] (ref 5–8)
PHOSPHATE SERPL-MCNC: 3.3 MG/DL (ref 2.7–4.5)
PLATELET # BLD AUTO: 17 K/UL (ref 150–450)
PLATELET BLD QL SMEAR: ABNORMAL
PMV BLD AUTO: 9.1 FL (ref 9.2–12.9)
POTASSIUM SERPL-SCNC: 4.2 MMOL/L (ref 3.5–5.1)
PROT SERPL-MCNC: 6.4 G/DL (ref 6–8.4)
PROT UR QL STRIP: ABNORMAL
RBC # BLD AUTO: 2.57 M/UL (ref 4.6–6.2)
RBC #/AREA URNS AUTO: 2 /HPF (ref 0–4)
SODIUM SERPL-SCNC: 137 MMOL/L (ref 136–145)
SP GR UR STRIP: 1.01 (ref 1–1.03)
SPECIMEN OUTDATE: NORMAL
SUPPLEMENTAL DIAGNOSIS: NORMAL
URN SPEC COLLECT METH UR: ABNORMAL
WBC # BLD AUTO: 0.34 K/UL (ref 3.9–12.7)
WBC #/AREA URNS AUTO: 1 /HPF (ref 0–5)

## 2023-07-05 PROCEDURE — A4216 STERILE WATER/SALINE, 10 ML: HCPCS | Performed by: INTERNAL MEDICINE

## 2023-07-05 PROCEDURE — 83735 ASSAY OF MAGNESIUM: CPT | Performed by: NURSE PRACTITIONER

## 2023-07-05 PROCEDURE — P9040 RBC LEUKOREDUCED IRRADIATED: HCPCS | Performed by: INTERNAL MEDICINE

## 2023-07-05 PROCEDURE — 86920 COMPATIBILITY TEST SPIN: CPT | Performed by: INTERNAL MEDICINE

## 2023-07-05 PROCEDURE — 25000003 PHARM REV CODE 250: Performed by: NURSE PRACTITIONER

## 2023-07-05 PROCEDURE — 86900 BLOOD TYPING SEROLOGIC ABO: CPT | Performed by: INTERNAL MEDICINE

## 2023-07-05 PROCEDURE — 80053 COMPREHEN METABOLIC PANEL: CPT | Performed by: NURSE PRACTITIONER

## 2023-07-05 PROCEDURE — 63600175 PHARM REV CODE 636 W HCPCS: Performed by: INTERNAL MEDICINE

## 2023-07-05 PROCEDURE — 25000003 PHARM REV CODE 250: Performed by: INTERNAL MEDICINE

## 2023-07-05 PROCEDURE — 20600001 HC STEP DOWN PRIVATE ROOM

## 2023-07-05 PROCEDURE — 99233 PR SUBSEQUENT HOSPITAL CARE,LEVL III: ICD-10-PCS | Mod: ,,, | Performed by: INTERNAL MEDICINE

## 2023-07-05 PROCEDURE — 85025 COMPLETE CBC W/AUTO DIFF WBC: CPT | Performed by: NURSE PRACTITIONER

## 2023-07-05 PROCEDURE — 25000003 PHARM REV CODE 250: Performed by: STUDENT IN AN ORGANIZED HEALTH CARE EDUCATION/TRAINING PROGRAM

## 2023-07-05 PROCEDURE — 63600175 PHARM REV CODE 636 W HCPCS: Performed by: NURSE PRACTITIONER

## 2023-07-05 PROCEDURE — 84100 ASSAY OF PHOSPHORUS: CPT | Performed by: NURSE PRACTITIONER

## 2023-07-05 PROCEDURE — 99233 SBSQ HOSP IP/OBS HIGH 50: CPT | Mod: ,,, | Performed by: INTERNAL MEDICINE

## 2023-07-05 RX ORDER — HYDROCODONE BITARTRATE AND ACETAMINOPHEN 500; 5 MG/1; MG/1
TABLET ORAL
Status: DISCONTINUED | OUTPATIENT
Start: 2023-07-05 | End: 2023-07-07

## 2023-07-05 RX ADMIN — VANCOMYCIN HYDROCHLORIDE 1500 MG: 1.5 INJECTION, POWDER, LYOPHILIZED, FOR SOLUTION INTRAVENOUS at 12:07

## 2023-07-05 RX ADMIN — MAGNESIUM OXIDE TAB 400 MG (241.3 MG ELEMENTAL MG) 400 MG: 400 (241.3 MG) TAB at 08:07

## 2023-07-05 RX ADMIN — Medication 1 TABLET: at 03:07

## 2023-07-05 RX ADMIN — ACETAMINOPHEN 650 MG: 650 SOLUTION ORAL at 10:07

## 2023-07-05 RX ADMIN — ALUMINUM HYDROXIDE, MAGNESIUM HYDROXIDE, AND DIMETHICONE 10 ML: 400; 400; 40 SUSPENSION ORAL at 04:07

## 2023-07-05 RX ADMIN — DIPHENHYDRAMINE HYDROCHLORIDE 12.5 MG: 50 INJECTION, SOLUTION INTRAMUSCULAR; INTRAVENOUS at 10:07

## 2023-07-05 RX ADMIN — ALUMINUM HYDROXIDE, MAGNESIUM HYDROXIDE, AND DIMETHICONE 10 ML: 400; 400; 40 SUSPENSION ORAL at 12:07

## 2023-07-05 RX ADMIN — OXYCODONE HYDROCHLORIDE 5 MG: 5 TABLET ORAL at 08:07

## 2023-07-05 RX ADMIN — CEFEPIME 2 G: 2 INJECTION, POWDER, FOR SOLUTION INTRAVENOUS at 08:07

## 2023-07-05 RX ADMIN — SULFAMETHOXAZOLE AND TRIMETHOPRIM 1 TABLET: 800; 160 TABLET ORAL at 04:07

## 2023-07-05 RX ADMIN — CEFEPIME 2 G: 2 INJECTION, POWDER, FOR SOLUTION INTRAVENOUS at 11:07

## 2023-07-05 RX ADMIN — VANCOMYCIN HYDROCHLORIDE 1500 MG: 1.5 INJECTION, POWDER, LYOPHILIZED, FOR SOLUTION INTRAVENOUS at 03:07

## 2023-07-05 RX ADMIN — CEFEPIME 2 G: 2 INJECTION, POWDER, FOR SOLUTION INTRAVENOUS at 12:07

## 2023-07-05 RX ADMIN — NIFEDIPINE 60 MG: 60 TABLET, FILM COATED, EXTENDED RELEASE ORAL at 08:07

## 2023-07-05 RX ADMIN — PONATINIB HYDROCHLORIDE 15 MG: 15 TABLET, FILM COATED ORAL at 08:07

## 2023-07-05 RX ADMIN — MAGNESIUM OXIDE TAB 400 MG (241.3 MG ELEMENTAL MG) 400 MG: 400 (241.3 MG) TAB at 12:07

## 2023-07-05 RX ADMIN — PANTOPRAZOLE SODIUM 40 MG: 40 TABLET, DELAYED RELEASE ORAL at 08:07

## 2023-07-05 RX ADMIN — ACYCLOVIR 800 MG: 200 CAPSULE ORAL at 08:07

## 2023-07-05 RX ADMIN — ALUMINUM HYDROXIDE, MAGNESIUM HYDROXIDE, AND DIMETHICONE 10 ML: 400; 400; 40 SUSPENSION ORAL at 09:07

## 2023-07-05 RX ADMIN — Medication 10 ML: at 06:07

## 2023-07-05 RX ADMIN — POSACONAZOLE 300 MG: 100 TABLET, DELAYED RELEASE ORAL at 08:07

## 2023-07-05 RX ADMIN — Medication 10 ML: at 12:07

## 2023-07-05 RX ADMIN — ALUMINUM HYDROXIDE, MAGNESIUM HYDROXIDE, AND DIMETHICONE 10 ML: 400; 400; 40 SUSPENSION ORAL at 08:07

## 2023-07-05 RX ADMIN — CEFEPIME 2 G: 2 INJECTION, POWDER, FOR SOLUTION INTRAVENOUS at 04:07

## 2023-07-05 RX ADMIN — ACYCLOVIR 800 MG: 200 CAPSULE ORAL at 09:07

## 2023-07-05 NOTE — ASSESSMENT & PLAN NOTE
- Takes amlodipine at home  - Given hypertension, amlodipine stopped and nifedipine and losartan started in [place]. Continued on transfer.  - BP now improved

## 2023-07-05 NOTE — SUBJECTIVE & OBJECTIVE
Subjective:     Interval History: Day 20 of CLIA+ponatinib for blast phase CML. Tmax in last 24 hours was 102 F. On cefepime.    Objective:     Vital Signs (Most Recent):  Temp: 98.6 °F (37 °C) (07/05/23 0815)  Pulse: 91 (07/05/23 0815)  Resp: 16 (07/05/23 0830)  BP: 127/64 (07/05/23 0815)  SpO2: 100 % (07/05/23 0815) Vital Signs (24h Range):  Temp:  [98.4 °F (36.9 °C)-102 °F (38.9 °C)] 98.6 °F (37 °C)  Pulse:  [] 91  Resp:  [16-18] 16  SpO2:  [95 %-100 %] 100 %  BP: (107-138)/(55-78) 127/64     Weight: 79.9 kg (176 lb 0.6 oz)  Body mass index is 23.23 kg/m².  Body surface area is 2.03 meters squared.    ECOG SCORE           2    Intake/Output - Last 3 Shifts         07/03 0700  07/04 0659 07/04 0700  07/05 0659 07/05 0700  07/06 0659    P.O. 1560 998     Blood  2204.1     IV Piggyback       Total Intake(mL/kg) 1560 (19.4) 3202.1 (40.1)     Urine (mL/kg/hr) 1350 (0.7) 2500 (1.3)     Stool  0     Total Output 1350 2500     Net +210 +702.1            Urine Occurrence 4 x 0 x     Stool Occurrence 1 x 1 x              Physical Exam   Alert awake oriented x3  Regular rate and rhythm  Sore in posterior oral cavity  Lungs clear to auscultation bilaterally  Abdomen soft nontender  No lower extremity edema    Significant Labs:   All pertinent labs from the last 24 hours have been reviewed.    Diagnostic Results:  I have reviewed all pertinent imaging results/findings within the past 24 hours.

## 2023-07-05 NOTE — PLAN OF CARE
No acute events this shift. Patient AAOx4. Afebrile and VSS. No complaints of pain or nausea. Abx infused as ordered. Patient complaint of throat pain. PRN oxycodone administered x1. Moderate relief obtained. Significant other remains at bedside. Bed in lowest position and locked. Side rails up x2. All possessions and call light within reach. Non-skid socks worn when out of bed. Instructed to call for assistance and voiced understanding. All needs of patient currently met. Will continue to monitor with frequent rounding.

## 2023-07-05 NOTE — ASSESSMENT & PLAN NOTE
- Temp of 101.4 at [place] ED. Persisted upon transfer.  - CXR neg. Blood culture NGTD. C-Diff negative. U/a neg  - Strep, flu, and COVID neg at OSH  - Throat red with exudates. Tonsils mildly swollen. CT neck showing adenopathy but no abscesses.  - CT chest without consolidations  - Repeat fever 6/28 @2330. BC 1/4 GPC with viridans strep; other 3/4 NGTD. Repeat CXR and UA negative  - repeat blood cultures 7/1 NGTD   - ID consulted, appreciate assistance: continue cefepime; EOT 7/15  - Fevers recurrent since 7/2. Consider escalating antibiotic therapy if fever persists.

## 2023-07-05 NOTE — NURSING
Day 19 of CLIA+ponatinib induction. Patient remained AAOX4 throughout shift. T-max 102 today;MD aware(see orders). No complaints of pain. One unit of blood and platelets given. Magnesium and phosphorus replaced. Ambulates independently. Voids per urinal.No BM noted today. Tolerating a regular diet.Fall and safety precautions maintained throughout day. Care plan explained to patient; no additional complaints at this time. Will continue routine plan of care.

## 2023-07-05 NOTE — ASSESSMENT & PLAN NOTE
- Patient with CML diagnosed 8/2022 Follows locally in [place] with Dr. Brett Hogan. He has been on dasatinib outpatient since 10/2022. Some question regarding his compliance over the course of treatment, however. Presented to [facility] with c/o gum swelling on 6/7/23. Labs on presentation remarkable for WBC count of 138K, with 80% blasts indicating blast crisis. WBC count from approximately a week and a half earlier was normal. Reported recent compliance with his TKI.     [place] [facility] [place]    - Bone marrow Biopsy performed 6/9/23 c/w progression for blast phase CML   - BCR/ABL p210 collected on admission, >55%  - BCR/ABL mutation negative  - Echo with 65% EF and mild-moderate pulmonary hypertension.  - Picc line in place  - Patient decided to not do fertility presentation  - Stared on Cladribine Idarubicin Cytarabine, CLIA and transitioned from dasatinb to ponatinib. Day 20  - plan for day 21 bone marrow biopsy for restaging   - TLS labs wnl. TLS labs stopped and allopurinol stopped

## 2023-07-05 NOTE — PLAN OF CARE
Day 20 of CLIA+ponatinib. 1 unit of RBCs given. Patient tolerated transfusion well. Afebrile throughout shift. VSS. Patient reports throat and lower back pain. Oxycodone given x1 and duke's solution scheduled QID.       Problem: Adult Inpatient Plan of Care  Goal: Plan of Care Review  Outcome: Ongoing, Progressing  Goal: Optimal Comfort and Wellbeing  Outcome: Ongoing, Progressing     Problem: Infection  Goal: Absence of Infection Signs and Symptoms  Outcome: Ongoing, Progressing

## 2023-07-05 NOTE — ASSESSMENT & PLAN NOTE
- Continue [place]   - Pain well-controlled with PO Oxy  - Continue ppx antimicrobials  - RIP throat swab, strep, flu, and COVID neg  - Leukemia infiltration may be playing a role; improving

## 2023-07-05 NOTE — PROGRESS NOTES
[facility] - Oncology ([facility])  Hematology  Bone Marrow Transplant  Progress Note    Patient Name: Magdaleno Hirsch  Admission Date: 6/9/2023  Hospital Length of Stay: 26 days  Code Status: Full Code    Subjective:     Interval History: Day 20 of CLIA+ponatinib for blast phase CML. Tmax in last 24 hours was 102 F. On cefepime.    Objective:     Vital Signs (Most Recent):  Temp: 98.6 °F (37 °C) (07/05/23 0815)  Pulse: 91 (07/05/23 0815)  Resp: 16 (07/05/23 0830)  BP: 127/64 (07/05/23 0815)  SpO2: 100 % (07/05/23 0815) Vital Signs (24h Range):  Temp:  [98.4 °F (36.9 °C)-102 °F (38.9 °C)] 98.6 °F (37 °C)  Pulse:  [] 91  Resp:  [16-18] 16  SpO2:  [95 %-100 %] 100 %  BP: (107-138)/(55-78) 127/64     Weight: 79.9 kg (176 lb 0.6 oz)  Body mass index is 23.23 kg/m².  Body surface area is 2.03 meters squared.    ECOG SCORE           2    Intake/Output - Last 3 Shifts         07/03 0700  07/04 0659 07/04 0700  07/05 0659 07/05 0700  07/06 0659    P.O. 1560 998     Blood  2204.1     IV Piggyback       Total Intake(mL/kg) 1560 (19.4) 3202.1 (40.1)     Urine (mL/kg/hr) 1350 (0.7) 2500 (1.3)     Stool  0     Total Output 1350 2500     Net +210 +702.1            Urine Occurrence 4 x 0 x     Stool Occurrence 1 x 1 x              Physical Exam   Alert awake oriented x3  Regular rate and rhythm  Sore in posterior oral cavity  Lungs clear to auscultation bilaterally  Abdomen soft nontender  No lower extremity edema    Significant Labs:   All pertinent labs from the last 24 hours have been reviewed.    Diagnostic Results:  I have reviewed all pertinent imaging results/findings within the past 24 hours.    Assessment/Plan:     * 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  - Patient with CML diagnosed 8/2022 Follows locally in [place] with Dr. Brett Hogan. He has been on dasatinib outpatient since 10/2022. Some question regarding his compliance over the course of treatment, however. Presented to [facility] with c/o  gum swelling on 6/7/23. Labs on presentation remarkable for WBC count of 138K, with 80% blasts indicating blast crisis. WBC count from approximately a week and a half earlier was normal. Reported recent compliance with his TKI.     Transferred to [facility] for higher level of care    - Bone marrow Biopsy performed 6/9/23 c/w progression for blast phase CML   - BCR/ABL p210 collected on admission, >55%  - BCR/ABL mutation negative  - Echo with 65% EF and mild-moderate pulmonary hypertension.  - Picc line in place  - Patient decided to not do fertility presentation  - Stared on Cladribine Idarubicin Cytarabine, CLIA and transitioned from dasatinb to ponatinib. Day 20  - plan for day 21 bone marrow biopsy for restaging   - TLS labs wnl. TLS labs stopped and allopurinol stopped    Streptococcal bacteremia  - see neutropenic fever    Mild malnutrition  - continue PO intake as tolerated  - nutrition following    Pharyngitis  - Continue [place]   - Pain well-controlled with PO Oxy  - Continue ppx antimicrobials  - RIP throat swab, strep, flu, and COVID neg  - Leukemia infiltration may be playing a role; improving    Neutropenic fever  - Temp of 101.4 at [facility]. Persisted upon transfer.  - CXR neg. Blood culture NGTD. C-Diff negative. U/a neg  - Strep, flu, and COVID neg at OSH  - Throat red with exudates. Tonsils mildly swollen. CT neck showing adenopathy but no abscesses.  - CT chest without consolidations  - Repeat fever 6/28 @2330. BC 1/4 GPC with viridans strep; other 3/4 NGTD. Repeat CXR and UA negative  - repeat blood cultures 7/1 NGTD   - ID consulted, appreciate assistance: continue cefepime; EOT 7/15  - Fevers recurrent since 7/2. Consider escalating antibiotic therapy if fever persists.    Pancytopenia due to antineoplastic chemotherapy  - Daily CBC while inpatient  - Transfuse for hgb < 7 or plts < 10K  - Now on ppx bactrim, fluconazole, and acyclovir; on cefepime due to NF    Hypertension  - Takes amlodipine at  home  - Given hypertension, amlodipine stopped and nifedipine and losartan started in [place]. Continued on transfer.  - BP now improved        VTE Risk Mitigation (From admission, onward)         Ordered     heparin, porcine (PF) 100 unit/mL injection flush 300 Units  As needed (PRN)         06/16/23 1550     IP VTE HIGH RISK PATIENT  Once         06/09/23 0049     Place sequential compression device  Until discontinued         06/09/23 0049                Disposition: BMT    Carlee Lechuga MD  Bone Marrow Transplant  [facility] - Oncology ([facility])

## 2023-07-05 NOTE — PROGRESS NOTES
VANCOMYCIN DOSING BY PHARMACY DISCONTINUATION NOTE    Magdaleno Hirsch is a 23 y.o. male who had been consulted for vancomycin dosing.    The pharmacy consult for vancomycin dosing has been discontinued.     Vancomycin Dosing by Pharmacy Consult will sign-off. Please reconsult if necessary. Thank you for allowing us to participate in this patient's care.     Thank you for the consult,   Charles Green, Pharm.D.  Inpatient Pharmacist  EXT [phone]

## 2023-07-06 LAB
ALBUMIN SERPL BCP-MCNC: 2.9 G/DL (ref 3.5–5.2)
ALP SERPL-CCNC: 82 U/L (ref 55–135)
ALT SERPL W/O P-5'-P-CCNC: 18 U/L (ref 10–44)
ANION GAP SERPL CALC-SCNC: 6 MMOL/L (ref 8–16)
ANISOCYTOSIS BLD QL SMEAR: SLIGHT
AST SERPL-CCNC: 9 U/L (ref 10–40)
BACTERIA BLD CULT: NORMAL
BACTERIA BLD CULT: NORMAL
BASOPHILS # BLD AUTO: 0 K/UL (ref 0–0.2)
BASOPHILS NFR BLD: 0 % (ref 0–1.9)
BILIRUB SERPL-MCNC: 0.9 MG/DL (ref 0.1–1)
BUN SERPL-MCNC: 9 MG/DL (ref 6–20)
CALCIUM SERPL-MCNC: 8.8 MG/DL (ref 8.7–10.5)
CHLORIDE SERPL-SCNC: 108 MMOL/L (ref 95–110)
CO2 SERPL-SCNC: 25 MMOL/L (ref 23–29)
CREAT SERPL-MCNC: 0.8 MG/DL (ref 0.5–1.4)
DIFFERENTIAL METHOD: ABNORMAL
EOSINOPHIL # BLD AUTO: 0 K/UL (ref 0–0.5)
EOSINOPHIL NFR BLD: 0 % (ref 0–8)
ERYTHROCYTE [DISTWIDTH] IN BLOOD BY AUTOMATED COUNT: 15.8 % (ref 11.5–14.5)
EST. GFR  (NO RACE VARIABLE): >60 ML/MIN/1.73 M^2
GLUCOSE SERPL-MCNC: 90 MG/DL (ref 70–110)
HCT VFR BLD AUTO: 22.5 % (ref 40–54)
HGB BLD-MCNC: 7.5 G/DL (ref 14–18)
HYPOCHROMIA BLD QL SMEAR: ABNORMAL
IMM GRANULOCYTES # BLD AUTO: 0 K/UL (ref 0–0.04)
IMM GRANULOCYTES NFR BLD AUTO: 0 % (ref 0–0.5)
LYMPHOCYTES # BLD AUTO: 0.4 K/UL (ref 1–4.8)
LYMPHOCYTES NFR BLD: 95 % (ref 18–48)
MAGNESIUM SERPL-MCNC: 1.9 MG/DL (ref 1.6–2.6)
MCH RBC QN AUTO: 26.2 PG (ref 27–31)
MCHC RBC AUTO-ENTMCNC: 33.3 G/DL (ref 32–36)
MCV RBC AUTO: 79 FL (ref 82–98)
MONOCYTES # BLD AUTO: 0 K/UL (ref 0.3–1)
MONOCYTES NFR BLD: 0 % (ref 4–15)
NEUTROPHILS # BLD AUTO: 0 K/UL (ref 1.8–7.7)
NEUTROPHILS NFR BLD: 5 % (ref 38–73)
NRBC BLD-RTO: 0 /100 WBC
OVALOCYTES BLD QL SMEAR: ABNORMAL
PHOSPHATE SERPL-MCNC: 3 MG/DL (ref 2.7–4.5)
PLATELET # BLD AUTO: 13 K/UL (ref 150–450)
PLATELET BLD QL SMEAR: ABNORMAL
PMV BLD AUTO: ABNORMAL FL (ref 9.2–12.9)
POIKILOCYTOSIS BLD QL SMEAR: SLIGHT
POLYCHROMASIA BLD QL SMEAR: ABNORMAL
POTASSIUM SERPL-SCNC: 4.4 MMOL/L (ref 3.5–5.1)
PROT SERPL-MCNC: 6.4 G/DL (ref 6–8.4)
RBC # BLD AUTO: 2.86 M/UL (ref 4.6–6.2)
SODIUM SERPL-SCNC: 139 MMOL/L (ref 136–145)
WBC # BLD AUTO: 0.4 K/UL (ref 3.9–12.7)

## 2023-07-06 PROCEDURE — 85097 BONE MARROW INTERPRETATION: CPT | Mod: ,,, | Performed by: PATHOLOGY

## 2023-07-06 PROCEDURE — 25000003 PHARM REV CODE 250: Performed by: INTERNAL MEDICINE

## 2023-07-06 PROCEDURE — 20600001 HC STEP DOWN PRIVATE ROOM

## 2023-07-06 PROCEDURE — 99233 SBSQ HOSP IP/OBS HIGH 50: CPT | Mod: ,,, | Performed by: INTERNAL MEDICINE

## 2023-07-06 PROCEDURE — 88237 TISSUE CULTURE BONE MARROW: CPT | Performed by: NURSE PRACTITIONER

## 2023-07-06 PROCEDURE — 88313 SPECIAL STAINS GROUP 2: CPT | Mod: 59 | Performed by: PATHOLOGY

## 2023-07-06 PROCEDURE — 63600175 PHARM REV CODE 636 W HCPCS: Performed by: NURSE PRACTITIONER

## 2023-07-06 PROCEDURE — 88305 TISSUE EXAM BY PATHOLOGIST: CPT | Mod: 59 | Performed by: PATHOLOGY

## 2023-07-06 PROCEDURE — 99233 PR SUBSEQUENT HOSPITAL CARE,LEVL III: ICD-10-PCS | Mod: ,,, | Performed by: INTERNAL MEDICINE

## 2023-07-06 PROCEDURE — 63600175 PHARM REV CODE 636 W HCPCS: Performed by: INTERNAL MEDICINE

## 2023-07-06 PROCEDURE — 88299 UNLISTED CYTOGENETIC STUDY: CPT | Performed by: NURSE PRACTITIONER

## 2023-07-06 PROCEDURE — 85097 PR  BONE MARROW,SMEAR INTERPRETATION: ICD-10-PCS | Mod: ,,, | Performed by: PATHOLOGY

## 2023-07-06 PROCEDURE — A4216 STERILE WATER/SALINE, 10 ML: HCPCS | Performed by: INTERNAL MEDICINE

## 2023-07-06 PROCEDURE — 25000003 PHARM REV CODE 250: Performed by: NURSE PRACTITIONER

## 2023-07-06 PROCEDURE — 88342 IMHCHEM/IMCYTCHM 1ST ANTB: CPT | Performed by: PATHOLOGY

## 2023-07-06 PROCEDURE — 88311 DECALCIFY TISSUE: CPT | Mod: 26,,, | Performed by: PATHOLOGY

## 2023-07-06 PROCEDURE — 38222 DX BONE MARROW BX & ASPIR: CPT | Mod: LT,,, | Performed by: NURSE PRACTITIONER

## 2023-07-06 PROCEDURE — 83735 ASSAY OF MAGNESIUM: CPT | Performed by: NURSE PRACTITIONER

## 2023-07-06 PROCEDURE — 88342 CHG IMMUNOCYTOCHEMISTRY: ICD-10-PCS | Mod: 26,59,, | Performed by: PATHOLOGY

## 2023-07-06 PROCEDURE — 88341 PR IHC OR ICC EACH ADD'L SINGLE ANTIBODY  STAINPR: ICD-10-PCS | Mod: 26,,, | Performed by: PATHOLOGY

## 2023-07-06 PROCEDURE — 85025 COMPLETE CBC W/AUTO DIFF WBC: CPT | Performed by: NURSE PRACTITIONER

## 2023-07-06 PROCEDURE — 88313 SPECIAL STAINS GROUP 2: CPT | Mod: 26,,, | Performed by: PATHOLOGY

## 2023-07-06 PROCEDURE — 88264 CHROMOSOME ANALYSIS 20-25: CPT | Performed by: NURSE PRACTITIONER

## 2023-07-06 PROCEDURE — 88185 FLOWCYTOMETRY/TC ADD-ON: CPT | Mod: 59 | Performed by: PATHOLOGY

## 2023-07-06 PROCEDURE — 88341 IMHCHEM/IMCYTCHM EA ADD ANTB: CPT | Mod: 26,,, | Performed by: PATHOLOGY

## 2023-07-06 PROCEDURE — 25000003 PHARM REV CODE 250: Performed by: STUDENT IN AN ORGANIZED HEALTH CARE EDUCATION/TRAINING PROGRAM

## 2023-07-06 PROCEDURE — 88184 FLOWCYTOMETRY/ TC 1 MARKER: CPT | Performed by: PATHOLOGY

## 2023-07-06 PROCEDURE — 84100 ASSAY OF PHOSPHORUS: CPT | Performed by: NURSE PRACTITIONER

## 2023-07-06 PROCEDURE — 88305 TISSUE EXAM BY PATHOLOGIST: ICD-10-PCS | Mod: 26,,, | Performed by: PATHOLOGY

## 2023-07-06 PROCEDURE — 88342 IMHCHEM/IMCYTCHM 1ST ANTB: CPT | Mod: 26,59,, | Performed by: PATHOLOGY

## 2023-07-06 PROCEDURE — 88313 PR  SPECIAL STAINS,GROUP II: ICD-10-PCS | Mod: 26,,, | Performed by: PATHOLOGY

## 2023-07-06 PROCEDURE — 80053 COMPREHEN METABOLIC PANEL: CPT | Performed by: NURSE PRACTITIONER

## 2023-07-06 PROCEDURE — 88305 TISSUE EXAM BY PATHOLOGIST: CPT | Mod: 26,,, | Performed by: PATHOLOGY

## 2023-07-06 PROCEDURE — 38222 PR BONE MARROW BIOPSY(IES) W/ASPIRATION(S); DIAGNOSTIC: ICD-10-PCS | Mod: LT,,, | Performed by: NURSE PRACTITIONER

## 2023-07-06 PROCEDURE — 36415 COLL VENOUS BLD VENIPUNCTURE: CPT | Performed by: NURSE PRACTITIONER

## 2023-07-06 PROCEDURE — 88189 FLOWCYTOMETRY/READ 16 & >: CPT | Mod: ,,, | Performed by: PATHOLOGY

## 2023-07-06 PROCEDURE — 88275 CYTOGENETICS 100-300: CPT | Performed by: NURSE PRACTITIONER

## 2023-07-06 PROCEDURE — 38222 DX BONE MARROW BX & ASPIR: CPT

## 2023-07-06 PROCEDURE — 88341 IMHCHEM/IMCYTCHM EA ADD ANTB: CPT | Performed by: PATHOLOGY

## 2023-07-06 PROCEDURE — 88271 CYTOGENETICS DNA PROBE: CPT | Performed by: NURSE PRACTITIONER

## 2023-07-06 PROCEDURE — 88311 PR  DECALCIFY TISSUE: ICD-10-PCS | Mod: 26,,, | Performed by: PATHOLOGY

## 2023-07-06 PROCEDURE — 88189 PR  FLOWCYTOMETRY/READ, 16 & > MARKERS: ICD-10-PCS | Mod: ,,, | Performed by: PATHOLOGY

## 2023-07-06 PROCEDURE — 88311 DECALCIFY TISSUE: CPT | Performed by: PATHOLOGY

## 2023-07-06 RX ORDER — LIDOCAINE HYDROCHLORIDE 20 MG/ML
10 INJECTION, SOLUTION EPIDURAL; INFILTRATION; INTRACAUDAL; PERINEURAL ONCE
Status: COMPLETED | OUTPATIENT
Start: 2023-07-06 | End: 2023-07-06

## 2023-07-06 RX ORDER — HYDROMORPHONE HYDROCHLORIDE 1 MG/ML
0.5 INJECTION, SOLUTION INTRAMUSCULAR; INTRAVENOUS; SUBCUTANEOUS ONCE
Status: COMPLETED | OUTPATIENT
Start: 2023-07-06 | End: 2023-07-06

## 2023-07-06 RX ADMIN — PONATINIB HYDROCHLORIDE 15 MG: 15 TABLET, FILM COATED ORAL at 08:07

## 2023-07-06 RX ADMIN — Medication 10 ML: at 12:07

## 2023-07-06 RX ADMIN — MAGNESIUM OXIDE TAB 400 MG (241.3 MG ELEMENTAL MG) 400 MG: 400 (241.3 MG) TAB at 08:07

## 2023-07-06 RX ADMIN — MAGNESIUM OXIDE TAB 400 MG (241.3 MG ELEMENTAL MG) 400 MG: 400 (241.3 MG) TAB at 12:07

## 2023-07-06 RX ADMIN — Medication 10 ML: at 05:07

## 2023-07-06 RX ADMIN — Medication 10 ML: at 06:07

## 2023-07-06 RX ADMIN — NIFEDIPINE 60 MG: 60 TABLET, FILM COATED, EXTENDED RELEASE ORAL at 08:07

## 2023-07-06 RX ADMIN — Medication 10 ML: at 11:07

## 2023-07-06 RX ADMIN — HYDROMORPHONE HYDROCHLORIDE 0.5 MG: 1 INJECTION, SOLUTION INTRAMUSCULAR; INTRAVENOUS; SUBCUTANEOUS at 11:07

## 2023-07-06 RX ADMIN — ACYCLOVIR 800 MG: 200 CAPSULE ORAL at 08:07

## 2023-07-06 RX ADMIN — CEFEPIME 2 G: 2 INJECTION, POWDER, FOR SOLUTION INTRAVENOUS at 07:07

## 2023-07-06 RX ADMIN — POSACONAZOLE 300 MG: 100 TABLET, DELAYED RELEASE ORAL at 08:07

## 2023-07-06 RX ADMIN — ALUMINUM HYDROXIDE, MAGNESIUM HYDROXIDE, AND DIMETHICONE 10 ML: 400; 400; 40 SUSPENSION ORAL at 05:07

## 2023-07-06 RX ADMIN — ALUMINUM HYDROXIDE, MAGNESIUM HYDROXIDE, AND DIMETHICONE 10 ML: 400; 400; 40 SUSPENSION ORAL at 12:07

## 2023-07-06 RX ADMIN — ALUMINUM HYDROXIDE, MAGNESIUM HYDROXIDE, AND DIMETHICONE 10 ML: 400; 400; 40 SUSPENSION ORAL at 08:07

## 2023-07-06 RX ADMIN — PANTOPRAZOLE SODIUM 40 MG: 40 TABLET, DELAYED RELEASE ORAL at 08:07

## 2023-07-06 RX ADMIN — CEFEPIME 2 G: 2 INJECTION, POWDER, FOR SOLUTION INTRAVENOUS at 05:07

## 2023-07-06 RX ADMIN — LIDOCAINE HYDROCHLORIDE 200 MG: 20 INJECTION, SOLUTION EPIDURAL; INFILTRATION; INTRACAUDAL at 11:07

## 2023-07-06 NOTE — ASSESSMENT & PLAN NOTE
- Takes amlodipine at home  - Given hypertension, amlodipine stopped and nifedipine and losartan started in [place]. Continued on transfer.  - BP now improved

## 2023-07-06 NOTE — PLAN OF CARE
Day 21 of CLIA+ponatinib. Afebrile throughout shift. VSS. Patient reports throat pain today and refuses pain medication at this time. Bone marrow biopsy done today. Low PO intake.       Problem: Infection  Goal: Absence of Infection Signs and Symptoms  Outcome: Ongoing, Progressing     Problem: Coping Ineffective (Oncology Care)  Goal: Effective Coping  Outcome: Ongoing, Progressing     Problem: Fatigue (Oncology Care)  Goal: Improved Activity Tolerance  Outcome: Ongoing, Progressing

## 2023-07-06 NOTE — ASSESSMENT & PLAN NOTE
- Patient with CML diagnosed 8/2022 Follows locally in [place] with Dr. Brett Hogan. He has been on dasatinib outpatient since 10/2022. Some question regarding his compliance over the course of treatment, however. Presented to [facility] with c/o gum swelling on 6/7/23. Labs on presentation remarkable for WBC count of 138K, with 80% blasts indicating blast crisis. WBC count from approximately a week and a half earlier was normal. Reported recent compliance with his TKI.     Transferred to [facility] for higher level of care    - Bone marrow Biopsy performed 6/9/23 c/w progression for blast phase CML   - BCR/ABL p210 collected on admission, >55%  - BCR/ABL mutation negative  - Echo with 65% EF and mild-moderate pulmonary hypertension.  - Picc line in place  - Patient decided to not do fertility presentation  - Stared on Cladribine Idarubicin Cytarabine, CLIA and transitioned from dasatinb to ponatinib. Day 21  - Restaging day 21 bone marrow biopsy done today 7/6-- results pending  - TLS labs wnl. TLS labs stopped and allopurinol stopped

## 2023-07-06 NOTE — ASSESSMENT & PLAN NOTE
- Temp of 101.4 at [facility]. Persisted upon transfer.  - CXR neg. Blood culture NGTD. C-Diff negative. U/a neg  - Strep, flu, and COVID neg at OSH  - Throat red with exudates. Tonsils mildly swollen. CT neck showing adenopathy but no abscesses.  - CT chest without consolidations  - Repeat fever 6/28 @2330. BC 1/4 GPC with viridans strep; other 3/4 NGTD. Repeat CXR and UA negative  - repeat blood cultures 7/1 NGTD   - ID consulted, appreciate assistance: continue cefepime; EOT 7/15  - Last fever 7/4 @1749

## 2023-07-06 NOTE — SUBJECTIVE & OBJECTIVE
Subjective:     Interval History: Day 21 of CLIA+ponatinib for blast phase CML. Bone marrow biopsy done at bedside today. Afebrile last 24hours. Remains on cefepime, all repeat cultures NGTD. Continues using prn oxy for throat pain. Denies n/v/d/c. Low PO intake but drinking fluids.    Objective:     Vital Signs (Most Recent):  Temp: 98.5 °F (36.9 °C) (07/06/23 1220)  Pulse: 76 (07/06/23 1220)  Resp: 18 (07/06/23 1220)  BP: 128/82 (07/06/23 1220)  SpO2: 96 % (07/06/23 1220) Vital Signs (24h Range):  Temp:  [98.4 °F (36.9 °C)-99.2 °F (37.3 °C)] 98.5 °F (36.9 °C)  Pulse:  [] 76  Resp:  [14-22] 18  SpO2:  [96 %-100 %] 96 %  BP: (125-139)/(71-82) 128/82     Weight: 79.9 kg (176 lb 0.6 oz)  Body mass index is 23.23 kg/m².  Body surface area is 2.03 meters squared.      Intake/Output - Last 3 Shifts         07/04 0700  07/05 0659 07/05 0700  07/06 0659 07/06 0700  07/07 0659    P.O. 998 900 480    I.V. (mL/kg)  40 (0.5) 20 (0.3)    Blood 2204.1 882     IV Piggyback  450     Total Intake(mL/kg) 3202.1 (40.1) 2272 (28.5) 500 (6.3)    Urine (mL/kg/hr) 2500 (1.3)      Stool 0      Total Output 2500      Net +702.1 +2272 +500           Urine Occurrence 0 x 3 x 2 x    Stool Occurrence 1 x 0 x              Physical Exam  Vitals and nursing note reviewed.   Constitutional:       Appearance: Normal appearance. He is well-developed.   HENT:      Head: Normocephalic and atraumatic.      Mouth/Throat:      Mouth: Mucous membranes are moist.      Pharynx: No posterior oropharyngeal erythema.   Eyes:      General: No scleral icterus.     Extraocular Movements: Extraocular movements intact.      Conjunctiva/sclera: Conjunctivae normal.   Cardiovascular:      Rate and Rhythm: Normal rate and regular rhythm.      Pulses: Normal pulses.      Heart sounds: Normal heart sounds. No murmur heard.  Pulmonary:      Effort: Pulmonary effort is normal. No respiratory distress.      Breath sounds: Normal breath sounds. No wheezing.    Abdominal:      General: Bowel sounds are normal. There is no distension.      Palpations: Abdomen is soft.      Tenderness: There is no abdominal tenderness.   Musculoskeletal:         General: No tenderness. Normal range of motion.      Cervical back: Normal range of motion and neck supple.      Right lower leg: No edema.      Left lower leg: No edema.   Lymphadenopathy:      Cervical: No cervical adenopathy.   Skin:     General: Skin is warm and dry.      Findings: No rash.      Comments: PICC Intact to right arm with no redness or drainage   Neurological:      General: No focal deficit present.      Mental Status: He is alert and oriented to person, place, and time.      Cranial Nerves: No cranial nerve deficit.      Coordination: Coordination normal.   Psychiatric:         Mood and Affect: Mood normal.         Behavior: Behavior normal.         Thought Content: Thought content normal.         Judgment: Judgment normal.      Comments: Flat affect          Significant Labs:   CBC:   Recent Labs   Lab 07/05/23  0527 07/06/23  0315   WBC 0.34* 0.40*   HGB 6.9* 7.5*   HCT 20.3* 22.5*   PLT 17* 13*    and CMP:   Recent Labs   Lab 07/05/23  0526 07/06/23  0315    139   K 4.2 4.4    108   CO2 24 25   GLU 98 90   BUN 10 9   CREATININE 0.9 0.8   CALCIUM 8.4* 8.8   PROT 6.4 6.4   ALBUMIN 2.9* 2.9*   BILITOT 0.9 0.9   ALKPHOS 86 82   AST 12 9*   ALT 19 18   ANIONGAP 8 6*       Diagnostic Results:  I have reviewed all pertinent imaging results/findings within the past 24 hours.

## 2023-07-06 NOTE — ASSESSMENT & PLAN NOTE
- Continue [place]   - Pain well-controlled with PO Oxy  - Continue ppx antimicrobials  - RIP throat swab, strep, flu, and COVID neg  - Leukemia infiltration may be playing a role; improving

## 2023-07-06 NOTE — PROGRESS NOTES
[facility] - Oncology ([facility])  Hematology  Bone Marrow Transplant  Progress Note    Patient Name: Magdaleno Hirsch  Admission Date: 6/9/2023  Hospital Length of Stay: 27 days  Code Status: Full Code    Subjective:     Interval History: Day 21 of CLIA+ponatinib for blast phase CML. Bone marrow biopsy done at bedside today. Afebrile last 24hours. Remains on cefepime, all repeat cultures NGTD. Continues using prn oxy for throat pain. Denies n/v/d/c. Low PO intake but drinking fluids.    Objective:     Vital Signs (Most Recent):  Temp: 98.5 °F (36.9 °C) (07/06/23 1220)  Pulse: 76 (07/06/23 1220)  Resp: 18 (07/06/23 1220)  BP: 128/82 (07/06/23 1220)  SpO2: 96 % (07/06/23 1220) Vital Signs (24h Range):  Temp:  [98.4 °F (36.9 °C)-99.2 °F (37.3 °C)] 98.5 °F (36.9 °C)  Pulse:  [] 76  Resp:  [14-22] 18  SpO2:  [96 %-100 %] 96 %  BP: (125-139)/(71-82) 128/82     Weight: 79.9 kg (176 lb 0.6 oz)  Body mass index is 23.23 kg/m².  Body surface area is 2.03 meters squared.      Intake/Output - Last 3 Shifts         07/04 0700  07/05 0659 07/05 0700  07/06 0659 07/06 0700  07/07 0659    P.O. 998 900 480    I.V. (mL/kg)  40 (0.5) 20 (0.3)    Blood 2204.1 882     IV Piggyback  450     Total Intake(mL/kg) 3202.1 (40.1) 2272 (28.5) 500 (6.3)    Urine (mL/kg/hr) 2500 (1.3)      Stool 0      Total Output 2500      Net +702.1 +2272 +500           Urine Occurrence 0 x 3 x 2 x    Stool Occurrence 1 x 0 x              Physical Exam  Vitals and nursing note reviewed.   Constitutional:       Appearance: Normal appearance. He is well-developed.   HENT:      Head: Normocephalic and atraumatic.      Mouth/Throat:      Mouth: Mucous membranes are moist.      Pharynx: No posterior oropharyngeal erythema.   Eyes:      General: No scleral icterus.     Extraocular Movements: Extraocular movements intact.      Conjunctiva/sclera: Conjunctivae normal.   Cardiovascular:      Rate and Rhythm: Normal rate and regular rhythm.      Pulses: Normal pulses.       Heart sounds: Normal heart sounds. No murmur heard.  Pulmonary:      Effort: Pulmonary effort is normal. No respiratory distress.      Breath sounds: Normal breath sounds. No wheezing.   Abdominal:      General: Bowel sounds are normal. There is no distension.      Palpations: Abdomen is soft.      Tenderness: There is no abdominal tenderness.   Musculoskeletal:         General: No tenderness. Normal range of motion.      Cervical back: Normal range of motion and neck supple.      Right lower leg: No edema.      Left lower leg: No edema.   Lymphadenopathy:      Cervical: No cervical adenopathy.   Skin:     General: Skin is warm and dry.      Findings: No rash.      Comments: PICC Intact to right arm with no redness or drainage   Neurological:      General: No focal deficit present.      Mental Status: He is alert and oriented to person, place, and time.      Cranial Nerves: No cranial nerve deficit.      Coordination: Coordination normal.   Psychiatric:         Mood and Affect: Mood normal.         Behavior: Behavior normal.         Thought Content: Thought content normal.         Judgment: Judgment normal.      Comments: Flat affect          Significant Labs:   CBC:   Recent Labs   Lab 07/05/23  0527 07/06/23  0315   WBC 0.34* 0.40*   HGB 6.9* 7.5*   HCT 20.3* 22.5*   PLT 17* 13*    and CMP:   Recent Labs   Lab 07/05/23  0526 07/06/23  0315    139   K 4.2 4.4    108   CO2 24 25   GLU 98 90   BUN 10 9   CREATININE 0.9 0.8   CALCIUM 8.4* 8.8   PROT 6.4 6.4   ALBUMIN 2.9* 2.9*   BILITOT 0.9 0.9   ALKPHOS 86 82   AST 12 9*   ALT 19 18   ANIONGAP 8 6*       Diagnostic Results:  I have reviewed all pertinent imaging results/findings within the past 24 hours.    Assessment/Plan:     * 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  - Patient with CML diagnosed 8/2022 Follows locally in [place] with Dr. Brett Hogan. He has been on dasatinib outpatient since 10/2022. Some question regarding his compliance  over the course of treatment, however. Presented to [facility] with c/o gum swelling on 6/7/23. Labs on presentation remarkable for WBC count of 138K, with 80% blasts indicating blast crisis. WBC count from approximately a week and a half earlier was normal. Reported recent compliance with his TKI.     Transferred to [facility] for higher level of care    - Bone marrow Biopsy performed 6/9/23 c/w progression for blast phase CML   - BCR/ABL p210 collected on admission, >55%  - BCR/ABL mutation negative  - Echo with 65% EF and mild-moderate pulmonary hypertension.  - Picc line in place  - Patient decided to not do fertility presentation  - Stared on Cladribine Idarubicin Cytarabine, CLIA and transitioned from dasatinb to ponatinib. Day 21  - Restaging day 21 bone marrow biopsy done today 7/6-- results pending  - TLS labs wnl. TLS labs stopped and allopurinol stopped    Pancytopenia due to antineoplastic chemotherapy  - Daily CBC while inpatient  - Transfuse for hgb < 7 or plts < 10K  - Now on ppx bactrim, fluconazole, and acyclovir; on cefepime due to NF    Neutropenic fever  - Temp of 101.4 at [place] [facility]. Persisted upon transfer.  - CXR neg. Blood culture NGTD. C-Diff negative. U/a neg  - Strep, flu, and COVID neg at OSH  - Throat red with exudates. Tonsils mildly swollen. CT neck showing adenopathy but no abscesses.  - CT chest without consolidations  - Repeat fever 6/28 @2330. BC 1/4 GPC with viridans strep; other 3/4 NGTD. Repeat CXR and UA negative  - repeat blood cultures 7/1 NGTD   - ID consulted, appreciate assistance: continue cefepime; EOT 7/15  - Last fever 7/4 @1749    Streptococcal bacteremia  - see neutropenic fever    Mild malnutrition  - continue PO intake as tolerated  - nutrition following    Pharyngitis  - Continue [place]   - Pain well-controlled with PO Oxy  - Continue ppx antimicrobials  - RIP throat swab, strep, flu, and COVID neg  - Leukemia infiltration may be playing a role;  improving    Hypertension  - Takes amlodipine at home  - Given hypertension, amlodipine stopped and nifedipine and losartan started in [place]. Continued on transfer.  - BP now improved        VTE Risk Mitigation (From admission, onward)         Ordered     heparin, porcine (PF) 100 unit/mL injection flush 300 Units  As needed (PRN)         06/16/23 1550     IP VTE HIGH RISK PATIENT  Once         06/09/23 0049     Place sequential compression device  Until discontinued         06/09/23 0049                Disposition: Remains inpatient    Ginger Jacob NP  Bone Marrow Transplant  [facility] - Oncology ([facility])

## 2023-07-07 PROBLEM — I10 HYPERTENSION: Chronic | Status: ACTIVE | Noted: 2023-06-08

## 2023-07-07 LAB
ALBUMIN SERPL BCP-MCNC: 3 G/DL (ref 3.5–5.2)
ALP SERPL-CCNC: 84 U/L (ref 55–135)
ALT SERPL W/O P-5'-P-CCNC: 14 U/L (ref 10–44)
ANION GAP SERPL CALC-SCNC: 7 MMOL/L (ref 8–16)
ANISOCYTOSIS BLD QL SMEAR: SLIGHT
AST SERPL-CCNC: 8 U/L (ref 10–40)
BASOPHILS # BLD AUTO: 0 K/UL (ref 0–0.2)
BASOPHILS NFR BLD: 0 % (ref 0–1.9)
BILIRUB SERPL-MCNC: 0.6 MG/DL (ref 0.1–1)
BUN SERPL-MCNC: 9 MG/DL (ref 6–20)
CALCIUM SERPL-MCNC: 8.8 MG/DL (ref 8.7–10.5)
CHLORIDE SERPL-SCNC: 105 MMOL/L (ref 95–110)
CO2 SERPL-SCNC: 25 MMOL/L (ref 23–29)
CREAT SERPL-MCNC: 0.8 MG/DL (ref 0.5–1.4)
DIFFERENTIAL METHOD: ABNORMAL
EOSINOPHIL # BLD AUTO: 0 K/UL (ref 0–0.5)
EOSINOPHIL NFR BLD: 0 % (ref 0–8)
ERYTHROCYTE [DISTWIDTH] IN BLOOD BY AUTOMATED COUNT: 15.4 % (ref 11.5–14.5)
EST. GFR  (NO RACE VARIABLE): >60 ML/MIN/1.73 M^2
GLUCOSE SERPL-MCNC: 90 MG/DL (ref 70–110)
HCT VFR BLD AUTO: 22.3 % (ref 40–54)
HGB BLD-MCNC: 7.2 G/DL (ref 14–18)
HYPOCHROMIA BLD QL SMEAR: ABNORMAL
IMM GRANULOCYTES # BLD AUTO: 0 K/UL (ref 0–0.04)
IMM GRANULOCYTES NFR BLD AUTO: 0 % (ref 0–0.5)
LYMPHOCYTES # BLD AUTO: 0.5 K/UL (ref 1–4.8)
LYMPHOCYTES NFR BLD: 96.4 % (ref 18–48)
MAGNESIUM SERPL-MCNC: 2 MG/DL (ref 1.6–2.6)
MCH RBC QN AUTO: 25.4 PG (ref 27–31)
MCHC RBC AUTO-ENTMCNC: 32.3 G/DL (ref 32–36)
MCV RBC AUTO: 79 FL (ref 82–98)
MONOCYTES # BLD AUTO: 0 K/UL (ref 0.3–1)
MONOCYTES NFR BLD: 0 % (ref 4–15)
NEUTROPHILS # BLD AUTO: 0 K/UL (ref 1.8–7.7)
NEUTROPHILS NFR BLD: 3.6 % (ref 38–73)
NRBC BLD-RTO: 0 /100 WBC
OVALOCYTES BLD QL SMEAR: ABNORMAL
PHOSPHATE SERPL-MCNC: 3.2 MG/DL (ref 2.7–4.5)
PLATELET # BLD AUTO: 11 K/UL (ref 150–450)
PLATELET BLD QL SMEAR: ABNORMAL
PMV BLD AUTO: 9.2 FL (ref 9.2–12.9)
POIKILOCYTOSIS BLD QL SMEAR: SLIGHT
POLYCHROMASIA BLD QL SMEAR: ABNORMAL
POTASSIUM SERPL-SCNC: 4.6 MMOL/L (ref 3.5–5.1)
PROT SERPL-MCNC: 6.6 G/DL (ref 6–8.4)
RBC # BLD AUTO: 2.84 M/UL (ref 4.6–6.2)
SODIUM SERPL-SCNC: 137 MMOL/L (ref 136–145)
WBC # BLD AUTO: 0.55 K/UL (ref 3.9–12.7)

## 2023-07-07 PROCEDURE — 25000003 PHARM REV CODE 250: Performed by: STUDENT IN AN ORGANIZED HEALTH CARE EDUCATION/TRAINING PROGRAM

## 2023-07-07 PROCEDURE — 83735 ASSAY OF MAGNESIUM: CPT | Performed by: NURSE PRACTITIONER

## 2023-07-07 PROCEDURE — 80053 COMPREHEN METABOLIC PANEL: CPT | Performed by: NURSE PRACTITIONER

## 2023-07-07 PROCEDURE — 99233 SBSQ HOSP IP/OBS HIGH 50: CPT | Mod: FS,,, | Performed by: INTERNAL MEDICINE

## 2023-07-07 PROCEDURE — A4216 STERILE WATER/SALINE, 10 ML: HCPCS | Performed by: INTERNAL MEDICINE

## 2023-07-07 PROCEDURE — 36415 COLL VENOUS BLD VENIPUNCTURE: CPT | Performed by: NURSE PRACTITIONER

## 2023-07-07 PROCEDURE — 20600001 HC STEP DOWN PRIVATE ROOM

## 2023-07-07 PROCEDURE — 25000003 PHARM REV CODE 250: Performed by: INTERNAL MEDICINE

## 2023-07-07 PROCEDURE — 85025 COMPLETE CBC W/AUTO DIFF WBC: CPT | Performed by: NURSE PRACTITIONER

## 2023-07-07 PROCEDURE — 25000003 PHARM REV CODE 250: Performed by: NURSE PRACTITIONER

## 2023-07-07 PROCEDURE — 84100 ASSAY OF PHOSPHORUS: CPT | Performed by: NURSE PRACTITIONER

## 2023-07-07 PROCEDURE — 63600175 PHARM REV CODE 636 W HCPCS: Performed by: INTERNAL MEDICINE

## 2023-07-07 PROCEDURE — 99233 PR SUBSEQUENT HOSPITAL CARE,LEVL III: ICD-10-PCS | Mod: FS,,, | Performed by: INTERNAL MEDICINE

## 2023-07-07 RX ADMIN — POSACONAZOLE 300 MG: 100 TABLET, DELAYED RELEASE ORAL at 09:07

## 2023-07-07 RX ADMIN — ALUMINUM HYDROXIDE, MAGNESIUM HYDROXIDE, AND DIMETHICONE 10 ML: 400; 400; 40 SUSPENSION ORAL at 08:07

## 2023-07-07 RX ADMIN — CEFEPIME 2 G: 2 INJECTION, POWDER, FOR SOLUTION INTRAVENOUS at 12:07

## 2023-07-07 RX ADMIN — Medication 10 ML: at 05:07

## 2023-07-07 RX ADMIN — NIFEDIPINE 60 MG: 60 TABLET, FILM COATED, EXTENDED RELEASE ORAL at 09:07

## 2023-07-07 RX ADMIN — PANTOPRAZOLE SODIUM 40 MG: 40 TABLET, DELAYED RELEASE ORAL at 09:07

## 2023-07-07 RX ADMIN — ACYCLOVIR 800 MG: 200 CAPSULE ORAL at 08:07

## 2023-07-07 RX ADMIN — ALUMINUM HYDROXIDE, MAGNESIUM HYDROXIDE, AND DIMETHICONE 10 ML: 400; 400; 40 SUSPENSION ORAL at 09:07

## 2023-07-07 RX ADMIN — CEFEPIME 2 G: 2 INJECTION, POWDER, FOR SOLUTION INTRAVENOUS at 09:07

## 2023-07-07 RX ADMIN — ALUMINUM HYDROXIDE, MAGNESIUM HYDROXIDE, AND DIMETHICONE 10 ML: 400; 400; 40 SUSPENSION ORAL at 04:07

## 2023-07-07 RX ADMIN — CEFEPIME 2 G: 2 INJECTION, POWDER, FOR SOLUTION INTRAVENOUS at 04:07

## 2023-07-07 RX ADMIN — CEFEPIME 2 G: 2 INJECTION, POWDER, FOR SOLUTION INTRAVENOUS at 11:07

## 2023-07-07 RX ADMIN — ACYCLOVIR 800 MG: 200 CAPSULE ORAL at 09:07

## 2023-07-07 RX ADMIN — PONATINIB HYDROCHLORIDE 15 MG: 15 TABLET, FILM COATED ORAL at 09:07

## 2023-07-07 RX ADMIN — SULFAMETHOXAZOLE AND TRIMETHOPRIM 1 TABLET: 800; 160 TABLET ORAL at 04:07

## 2023-07-07 RX ADMIN — ALUMINUM HYDROXIDE, MAGNESIUM HYDROXIDE, AND DIMETHICONE 10 ML: 400; 400; 40 SUSPENSION ORAL at 01:07

## 2023-07-07 RX ADMIN — OXYCODONE HYDROCHLORIDE 5 MG: 5 TABLET ORAL at 08:07

## 2023-07-07 NOTE — SUBJECTIVE & OBJECTIVE
Subjective:   Interval History: Day 22 of CLIA+ponatinib for blast phase CML. Bone marrow biopsy on 7/06, results pending. Remains afebrile. Last fever 7/04 at 1800. Remains on Cefepime until 7/15 per ID for pharyngitis and strep viridans bacteremia. Repeat Bcx remain NGTD. Labs significant for WBC 0.5, ANC 20; hgb 7.2, and PLT 11 - will likely require transfusions of both tomorrow.  Objective:     Vital Signs (Most Recent):  Temp: 98 °F (36.7 °C) (07/07/23 0451)  Pulse: 71 (07/07/23 0451)  Resp: 20 (07/07/23 0451)  BP: 134/82 (07/07/23 0451)  SpO2: 100 % (07/07/23 0451) Vital Signs (24h Range):  Temp:  [97.2 °F (36.2 °C)-98.5 °F (36.9 °C)] 98 °F (36.7 °C)  Pulse:  [70-78] 71  Resp:  [16-20] 20  SpO2:  [96 %-100 %] 100 %  BP: (122-136)/(76-85) 134/82     Weight: 79.1 kg (174 lb 4.4 oz)  Body mass index is 22.99 kg/m².  Body surface area is 2.02 meters squared.    Intake/Output - Last 3 Shifts         07/05 0700  07/06 0659 07/06 0700  07/07 0659 07/07 0700  07/08 0659    P.O. 900 2400     I.V. (mL/kg) 40 (0.5) 20 (0.3)     Blood 882      IV Piggyback 548.1 297.4     Total Intake(mL/kg) 2370.1 (29.7) 2717.4 (34.4)     Urine (mL/kg/hr)  300 (0.2)     Stool  0     Total Output  300     Net +2370.1 +2417.4            Urine Occurrence 3 x 6 x     Stool Occurrence 0 x 1 x            Physical Exam  Vitals and nursing note reviewed.   Constitutional:       General: He is not in acute distress.     Appearance: Normal appearance.   HENT:      Head: Normocephalic.      Mouth/Throat:      Mouth: Mucous membranes are moist.   Eyes:      Extraocular Movements: Extraocular movements intact.      Conjunctiva/sclera: Conjunctivae normal.      Pupils: Pupils are equal, round, and reactive to light.   Cardiovascular:      Rate and Rhythm: Normal rate and regular rhythm.      Pulses: Normal pulses.      Heart sounds: Normal heart sounds.   Pulmonary:      Effort: Pulmonary effort is normal.      Breath sounds: Normal breath sounds. No  wheezing.   Abdominal:      General: Bowel sounds are normal. There is no distension.      Palpations: Abdomen is soft.      Tenderness: There is no abdominal tenderness.   Musculoskeletal:         General: Normal range of motion.      Cervical back: Normal range of motion and neck supple.      Right lower leg: No edema.      Left lower leg: No edema.   Skin:     General: Skin is warm and dry.      Capillary Refill: Capillary refill takes less than 2 seconds.      Comments: Right upper arm PICC clean, dry, intact. No erythema, edema, exudate.    Neurological:      General: No focal deficit present.      Mental Status: He is alert and oriented to person, place, and time.   Psychiatric:         Mood and Affect: Mood normal.         Behavior: Behavior normal.         Thought Content: Thought content normal.         Judgment: Judgment normal.          Significant Labs:   CBC:   Recent Labs   Lab 07/06/23 0315 07/07/23 0315   WBC 0.40* 0.55*   HGB 7.5* 7.2*   HCT 22.5* 22.3*   PLT 13* 11*   , CMP:   Recent Labs   Lab 07/06/23 0315 07/07/23 0315    137   K 4.4 4.6    105   CO2 25 25   GLU 90 90   BUN 9 9   CREATININE 0.8 0.8   CALCIUM 8.8 8.8   PROT 6.4 6.6   ALBUMIN 2.9* 3.0*   BILITOT 0.9 0.6   ALKPHOS 82 84   AST 9* 8*   ALT 18 14   ANIONGAP 6* 7*   , and LFTs:   Recent Labs   Lab 07/06/23 0315 07/07/23 0315   ALT 18 14   AST 9* 8*   ALKPHOS 82 84   BILITOT 0.9 0.6   PROT 6.4 6.6   ALBUMIN 2.9* 3.0*     Diagnostic Results:  None

## 2023-07-07 NOTE — ASSESSMENT & PLAN NOTE
- Continue [place]   - Pain well-controlled with PO Oxy  - Continue ppx antimicrobials; Cefepime through 7/15 per ID as above  - RIP throat swab, strep, flu, and COVID neg  - Leukemia infiltration may be playing a role; improving

## 2023-07-07 NOTE — PROGRESS NOTES
Pt seen for follow up. Relaxing without issue and in good spirits.  No needs identified at this time. Will continue to follow and assist as needed.

## 2023-07-07 NOTE — PLAN OF CARE
Day 22 of CLIA. Plan of care discussed with patient at start of shift. Free from falls and injuries. Resting quietly with eyes closed. Respirations even, unlabored. Skin warm and dry. Denies pain. Denies nausea. Frequent checks for pain and safety maintained. Bed in lowest position, wheels locked, side rails up x's 2, call light in reach. Instructed to call for assistance as needed, verbalizes understanding. Will continue to monitor.    Detail Level: Zone Detail Level: Detailed

## 2023-07-07 NOTE — PROGRESS NOTES
[facility] - Oncology ([facility])  Hematology  Bone Marrow Transplant  Progress Note    Patient Name: Magdaleno Hirsch  Admission Date: 6/9/2023  Hospital Length of Stay: 28 days  Code Status: Full Code  Subjective:   Interval History: Day 22 of CLIA+ponatinib for blast phase CML. Bone marrow biopsy on 7/06, results pending. Remains afebrile. Last fever 7/04 at 1800. Remains on Cefepime until 7/15 per ID for pharyngitis and strep viridans bacteremia. Repeat Bcx remain NGTD. Labs significant for WBC 0.5, ANC 20; hgb 7.2, and PLT 11 - will likely require transfusions of both tomorrow.  Objective:     Vital Signs (Most Recent):  Temp: 98 °F (36.7 °C) (07/07/23 0451)  Pulse: 71 (07/07/23 0451)  Resp: 20 (07/07/23 0451)  BP: 134/82 (07/07/23 0451)  SpO2: 100 % (07/07/23 0451) Vital Signs (24h Range):  Temp:  [97.2 °F (36.2 °C)-98.5 °F (36.9 °C)] 98 °F (36.7 °C)  Pulse:  [70-78] 71  Resp:  [16-20] 20  SpO2:  [96 %-100 %] 100 %  BP: (122-136)/(76-85) 134/82     Weight: 79.1 kg (174 lb 4.4 oz)  Body mass index is 22.99 kg/m².  Body surface area is 2.02 meters squared.    Intake/Output - Last 3 Shifts         07/05 0700 07/06 0659 07/06 0700 07/07 0659 07/07 0700 07/08 0659    P.O. 900 2400     I.V. (mL/kg) 40 (0.5) 20 (0.3)     Blood 882      IV Piggyback 548.1 297.4     Total Intake(mL/kg) 2370.1 (29.7) 2717.4 (34.4)     Urine (mL/kg/hr)  300 (0.2)     Stool  0     Total Output  300     Net +2370.1 +2417.4            Urine Occurrence 3 x 6 x     Stool Occurrence 0 x 1 x            Physical Exam  Vitals and nursing note reviewed.   Constitutional:       General: He is not in acute distress.     Appearance: Normal appearance.   HENT:      Head: Normocephalic.      Mouth/Throat:      Mouth: Mucous membranes are moist.   Eyes:      Extraocular Movements: Extraocular movements intact.      Conjunctiva/sclera: Conjunctivae normal.      Pupils: Pupils are equal, round, and reactive to light.   Cardiovascular:      Rate and Rhythm:  Normal rate and regular rhythm.      Pulses: Normal pulses.      Heart sounds: Normal heart sounds.   Pulmonary:      Effort: Pulmonary effort is normal.      Breath sounds: Normal breath sounds. No wheezing.   Abdominal:      General: Bowel sounds are normal. There is no distension.      Palpations: Abdomen is soft.      Tenderness: There is no abdominal tenderness.   Musculoskeletal:         General: Normal range of motion.      Cervical back: Normal range of motion and neck supple.      Right lower leg: No edema.      Left lower leg: No edema.   Skin:     General: Skin is warm and dry.      Capillary Refill: Capillary refill takes less than 2 seconds.      Comments: Right upper arm PICC clean, dry, intact. No erythema, edema, exudate.    Neurological:      General: No focal deficit present.      Mental Status: He is alert and oriented to person, place, and time.   Psychiatric:         Mood and Affect: Mood normal.         Behavior: Behavior normal.         Thought Content: Thought content normal.         Judgment: Judgment normal.          Significant Labs:   CBC:   Recent Labs   Lab 07/06/23 0315 07/07/23 0315   WBC 0.40* 0.55*   HGB 7.5* 7.2*   HCT 22.5* 22.3*   PLT 13* 11*   , CMP:   Recent Labs   Lab 07/06/23 0315 07/07/23 0315    137   K 4.4 4.6    105   CO2 25 25   GLU 90 90   BUN 9 9   CREATININE 0.8 0.8   CALCIUM 8.8 8.8   PROT 6.4 6.6   ALBUMIN 2.9* 3.0*   BILITOT 0.9 0.6   ALKPHOS 82 84   AST 9* 8*   ALT 18 14   ANIONGAP 6* 7*   , and LFTs:   Recent Labs   Lab 07/06/23 0315 07/07/23 0315   ALT 18 14   AST 9* 8*   ALKPHOS 82 84   BILITOT 0.9 0.6   PROT 6.4 6.6   ALBUMIN 2.9* 3.0*     Diagnostic Results:  None    Assessment/Plan:   * 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 Brett Hogan. He has been on dasatinib outpatient since 10/2022. Some question regarding his compliance over the course of treatment,  however. Presented to [facility] with c/o gum swelling on 6/7/23. Labs on presentation remarkable for WBC count of 138K, with 80% blasts indicating blast crisis. WBC count from approximately a week and a half earlier was normal. Reported recent compliance with his TKI.     [place] [facility] [place]    - 6/09/23: Bone marrow Biopsy c/w progression for blast phase CML   - BCR/ABL p210 collected on admission, >55%  - BCR/ABL mutation negative  - Echo with 65% EF and mild-moderate pulmonary hypertension.  - Patient decided to not do fertility presentation  - Day 22 of Cladribine Idarubicin Cytarabine, CLIA and transitioned from dasatinb to ponatinib.  - 7/06/23: Restaging day 21 bone marrow biopsy done today, results pending  - ANC 20 today     Neutropenic fever  - Temp of 101.4 at [place] [facility]. Persisted upon transfer.  - CXR neg. Blood culture NGTD. C-Diff negative. U/a neg  - Strep, flu, and COVID neg at OSH  - Throat red with exudates. Tonsils mildly swollen. CT neck showing adenopathy but no abscesses.  - CT chest without consolidations  - Repeat fever 6/28 @2330. BC 1/4 GPC with viridans strep; other 3/4 NGTD. Repeat CXR and UA negative  - Repeat blood cultures 7/1, 7/3, and 7/4 NGTD   - ID consulted, appreciate assistance: continue cefepime; EOT 7/15  - Last fever 7/4 @1749    Streptococcal bacteremia  - See neutropenic fever    Pharyngitis  - Continue [place]   - Pain well-controlled with PO Oxy  - Continue ppx antimicrobials; Cefepime through 7/15 per ID as above  - RIP throat swab, strep, flu, and COVID neg  - Leukemia infiltration may be playing a role; improving    Mild malnutrition  - continue PO intake as tolerated  - nutrition following    Pancytopenia due to antineoplastic chemotherapy  - Daily CBC while inpatient  - Transfuse for hgb < 7, PLTS < 10K or <50k if bleeding  - Ppx bactrim, fluconazole, and acyclovir; on cefepime due to NF    Hypertension  - Takes amlodipine at  home  - Given hypertension, amlodipine stopped and nifedipine and losartan started in [place]. Continued on transfer.  - BP now improved      VTE Risk Mitigation (From admission, onward)         Ordered     heparin, porcine (PF) 100 unit/mL injection flush 300 Units  As needed (PRN)         06/16/23 1550     IP VTE HIGH RISK PATIENT  Once         06/09/23 0049     Place sequential compression device  Until discontinued         06/09/23 0049              Disposition: remain inpatient pending count recovery     Rocio Mckeon NP  Bone Marrow Transplant  [facility] - Oncology ([facility])

## 2023-07-07 NOTE — PLAN OF CARE
Pt involved in plan of care and communicating needs throughout shift. Day 22 of Clia + ponatinib; Ambulates in room & to RR independently; Tolerating diet, voiding without difficulty. q1h patient rounds. All VSS; no acute events so far this shift. Non skid socks on; Pt. Instructed to call if any assistance is needed. Pt remaining free from falls or injury; Bed locked in lowest position with side rails up x3 & all belongings including call light within reach; All needs met at this time.

## 2023-07-07 NOTE — ASSESSMENT & PLAN NOTE
- Daily CBC while inpatient  - Transfuse for hgb < 7, PLTS < 10K or <50k if bleeding  - Ppx bactrim, fluconazole, and acyclovir; on cefepime due to NF

## 2023-07-07 NOTE — ASSESSMENT & PLAN NOTE
- Takes amlodipine at home  - Given hypertension, amlodipine stopped and nifedipine and losartan started in [place]. Continued on transfer.  - BP now improved

## 2023-07-07 NOTE — ASSESSMENT & PLAN NOTE
- Temp of 101.4 at [facility]. Persisted upon transfer.  - CXR neg. Blood culture NGTD. C-Diff negative. U/a neg  - Strep, flu, and COVID neg at OSH  - Throat red with exudates. Tonsils mildly swollen. CT neck showing adenopathy but no abscesses.  - CT chest without consolidations  - Repeat fever 6/28 @2330. BC 1/4 GPC with viridans strep; other 3/4 NGTD. Repeat CXR and UA negative  - Repeat blood cultures 7/1, 7/3, and 7/4 NGTD   - ID consulted, appreciate assistance: continue cefepime; EOT 7/15  - Last fever 7/4 @1749

## 2023-07-07 NOTE — ASSESSMENT & PLAN NOTE
- Patient with CML diagnosed 8/2022 Follows locally in [place] with Dr. Brett Hogan. He has been on dasatinib outpatient since 10/2022. Some question regarding his compliance over the course of treatment, however. Presented to [facility] with c/o gum swelling on 6/7/23. Labs on presentation remarkable for WBC count of 138K, with 80% blasts indicating blast crisis. WBC count from approximately a week and a half earlier was normal. Reported recent compliance with his TKI.     Transferred to [facility] for higher level of care    - 6/09/23: Bone marrow Biopsy c/w progression for blast phase CML   - BCR/ABL p210 collected on admission, >55%  - BCR/ABL mutation negative  - Echo with 65% EF and mild-moderate pulmonary hypertension.  - Patient decided to not do fertility presentation  - Day 22 of Cladribine Idarubicin Cytarabine, CLIA and transitioned from dasatinb to ponatinib.  - 7/06/23: Restaging day 21 bone marrow biopsy done today, results pending  - ANC 20 today

## 2023-07-08 PROBLEM — J02.9 PHARYNGITIS: Status: ACTIVE | Noted: 2023-07-08

## 2023-07-08 PROBLEM — A49.1 INFECTION BY STREPTOCOCCUS, VIRIDANS GROUP: Status: ACTIVE | Noted: 2023-07-08

## 2023-07-08 LAB
ABO + RH BLD: NORMAL
ALBUMIN SERPL BCP-MCNC: 3.1 G/DL (ref 3.5–5.2)
ALP SERPL-CCNC: 89 U/L (ref 55–135)
ALT SERPL W/O P-5'-P-CCNC: 12 U/L (ref 10–44)
ANION GAP SERPL CALC-SCNC: 7 MMOL/L (ref 8–16)
ANISOCYTOSIS BLD QL SMEAR: SLIGHT
AST SERPL-CCNC: 8 U/L (ref 10–40)
BASOPHILS # BLD AUTO: 0 K/UL (ref 0–0.2)
BASOPHILS NFR BLD: 0 % (ref 0–1.9)
BILIRUB SERPL-MCNC: 0.5 MG/DL (ref 0.1–1)
BLD GP AB SCN CELLS X3 SERPL QL: NORMAL
BUN SERPL-MCNC: 11 MG/DL (ref 6–20)
CALCIUM SERPL-MCNC: 9 MG/DL (ref 8.7–10.5)
CHLORIDE SERPL-SCNC: 106 MMOL/L (ref 95–110)
CO2 SERPL-SCNC: 24 MMOL/L (ref 23–29)
CREAT SERPL-MCNC: 0.7 MG/DL (ref 0.5–1.4)
DIFFERENTIAL METHOD: ABNORMAL
EOSINOPHIL # BLD AUTO: 0 K/UL (ref 0–0.5)
EOSINOPHIL NFR BLD: 0 % (ref 0–8)
ERYTHROCYTE [DISTWIDTH] IN BLOOD BY AUTOMATED COUNT: 15 % (ref 11.5–14.5)
EST. GFR  (NO RACE VARIABLE): >60 ML/MIN/1.73 M^2
GLUCOSE SERPL-MCNC: 86 MG/DL (ref 70–110)
HCT VFR BLD AUTO: 23.4 % (ref 40–54)
HGB BLD-MCNC: 7.6 G/DL (ref 14–18)
HYPOCHROMIA BLD QL SMEAR: ABNORMAL
IMM GRANULOCYTES # BLD AUTO: 0 K/UL (ref 0–0.04)
IMM GRANULOCYTES NFR BLD AUTO: 0 % (ref 0–0.5)
LYMPHOCYTES # BLD AUTO: 0.6 K/UL (ref 1–4.8)
LYMPHOCYTES NFR BLD: 96.7 % (ref 18–48)
MAGNESIUM SERPL-MCNC: 1.8 MG/DL (ref 1.6–2.6)
MCH RBC QN AUTO: 25.9 PG (ref 27–31)
MCHC RBC AUTO-ENTMCNC: 32.5 G/DL (ref 32–36)
MCV RBC AUTO: 80 FL (ref 82–98)
MONOCYTES # BLD AUTO: 0 K/UL (ref 0.3–1)
MONOCYTES NFR BLD: 0 % (ref 4–15)
NEUTROPHILS # BLD AUTO: 0 K/UL (ref 1.8–7.7)
NEUTROPHILS NFR BLD: 3.3 % (ref 38–73)
NRBC BLD-RTO: 0 /100 WBC
PHOSPHATE SERPL-MCNC: 3.6 MG/DL (ref 2.7–4.5)
PLATELET # BLD AUTO: 10 K/UL (ref 150–450)
PLATELET BLD QL SMEAR: ABNORMAL
PMV BLD AUTO: 8.3 FL (ref 9.2–12.9)
POTASSIUM SERPL-SCNC: 4.1 MMOL/L (ref 3.5–5.1)
PROT SERPL-MCNC: 6.7 G/DL (ref 6–8.4)
RBC # BLD AUTO: 2.94 M/UL (ref 4.6–6.2)
SODIUM SERPL-SCNC: 137 MMOL/L (ref 136–145)
SPECIMEN OUTDATE: NORMAL
WBC # BLD AUTO: 0.61 K/UL (ref 3.9–12.7)

## 2023-07-08 PROCEDURE — 85025 COMPLETE CBC W/AUTO DIFF WBC: CPT | Performed by: NURSE PRACTITIONER

## 2023-07-08 PROCEDURE — 25000003 PHARM REV CODE 250: Performed by: NURSE PRACTITIONER

## 2023-07-08 PROCEDURE — 83735 ASSAY OF MAGNESIUM: CPT | Performed by: NURSE PRACTITIONER

## 2023-07-08 PROCEDURE — 99233 PR SUBSEQUENT HOSPITAL CARE,LEVL III: ICD-10-PCS | Mod: ,,, | Performed by: INTERNAL MEDICINE

## 2023-07-08 PROCEDURE — 63600175 PHARM REV CODE 636 W HCPCS: Performed by: INTERNAL MEDICINE

## 2023-07-08 PROCEDURE — 84100 ASSAY OF PHOSPHORUS: CPT | Performed by: NURSE PRACTITIONER

## 2023-07-08 PROCEDURE — 25000003 PHARM REV CODE 250: Performed by: STUDENT IN AN ORGANIZED HEALTH CARE EDUCATION/TRAINING PROGRAM

## 2023-07-08 PROCEDURE — 80053 COMPREHEN METABOLIC PANEL: CPT | Performed by: NURSE PRACTITIONER

## 2023-07-08 PROCEDURE — 99233 SBSQ HOSP IP/OBS HIGH 50: CPT | Mod: ,,, | Performed by: INTERNAL MEDICINE

## 2023-07-08 PROCEDURE — 25000003 PHARM REV CODE 250: Performed by: INTERNAL MEDICINE

## 2023-07-08 PROCEDURE — 86900 BLOOD TYPING SEROLOGIC ABO: CPT | Performed by: INTERNAL MEDICINE

## 2023-07-08 PROCEDURE — 20600001 HC STEP DOWN PRIVATE ROOM

## 2023-07-08 RX ADMIN — ALUMINUM HYDROXIDE, MAGNESIUM HYDROXIDE, AND DIMETHICONE 10 ML: 400; 400; 40 SUSPENSION ORAL at 09:07

## 2023-07-08 RX ADMIN — OXYCODONE HYDROCHLORIDE 5 MG: 5 TABLET ORAL at 09:07

## 2023-07-08 RX ADMIN — ALUMINUM HYDROXIDE, MAGNESIUM HYDROXIDE, AND DIMETHICONE 10 ML: 400; 400; 40 SUSPENSION ORAL at 05:07

## 2023-07-08 RX ADMIN — MAGNESIUM OXIDE TAB 400 MG (241.3 MG ELEMENTAL MG) 400 MG: 400 (241.3 MG) TAB at 08:07

## 2023-07-08 RX ADMIN — ALUMINUM HYDROXIDE, MAGNESIUM HYDROXIDE, AND DIMETHICONE 10 ML: 400; 400; 40 SUSPENSION ORAL at 12:07

## 2023-07-08 RX ADMIN — CEFEPIME 2 G: 2 INJECTION, POWDER, FOR SOLUTION INTRAVENOUS at 08:07

## 2023-07-08 RX ADMIN — NIFEDIPINE 60 MG: 60 TABLET, FILM COATED, EXTENDED RELEASE ORAL at 08:07

## 2023-07-08 RX ADMIN — ACYCLOVIR 800 MG: 200 CAPSULE ORAL at 08:07

## 2023-07-08 RX ADMIN — MAGNESIUM OXIDE TAB 400 MG (241.3 MG ELEMENTAL MG) 400 MG: 400 (241.3 MG) TAB at 05:07

## 2023-07-08 RX ADMIN — ACYCLOVIR 800 MG: 200 CAPSULE ORAL at 09:07

## 2023-07-08 RX ADMIN — ALUMINUM HYDROXIDE, MAGNESIUM HYDROXIDE, AND DIMETHICONE 10 ML: 400; 400; 40 SUSPENSION ORAL at 08:07

## 2023-07-08 RX ADMIN — CEFEPIME 2 G: 2 INJECTION, POWDER, FOR SOLUTION INTRAVENOUS at 05:07

## 2023-07-08 RX ADMIN — PANTOPRAZOLE SODIUM 40 MG: 40 TABLET, DELAYED RELEASE ORAL at 08:07

## 2023-07-08 RX ADMIN — PONATINIB HYDROCHLORIDE 15 MG: 15 TABLET, FILM COATED ORAL at 08:07

## 2023-07-08 RX ADMIN — POSACONAZOLE 300 MG: 100 TABLET, DELAYED RELEASE ORAL at 08:07

## 2023-07-08 NOTE — ASSESSMENT & PLAN NOTE
- Temp of 101.4 at [facility]. Persisted upon transfer.  - CXR neg. Blood culture NGTD. C-Diff negative. U/a neg  - Strep, flu, and COVID neg at OSH  - Throat red with exudates. Tonsils mildly swollen. CT neck showing adenopathy but no abscesses.  - CT chest without consolidations  - Repeat fever 6/28 @2330. BC 1/4 GPC with viridans strep; other 3/4 NGTD. Repeat CXR and UA negative  - Repeat blood cultures 7/1, 7/3, and 7/4 NGTD   - ID consulted, appreciate assistance: continue cefepime; EOT 7/15  - Last fever 7/4 @1749

## 2023-07-08 NOTE — PLAN OF CARE
Day 23 of CLIA+ponatinib. Pt involved in plan of care and communicating needs throughout shift. Up in room and to bathroom independently; no c/o pain. Tolerating diet, voiding without difficulty. Electrolytes replaced as per protocol. Bath complete. Cefepime given as scheduled. All VSS; no acute events so far this shift.  Pt remaining free from falls or injury throughout shift; bed locked and in lowest position; call light within reach.  Pt instructed to call for assistance as needed.  Q1H rounding done on pt. P resting comfortably at this time.

## 2023-07-08 NOTE — ASSESSMENT & PLAN NOTE
- Continue [place]   - Pain well-controlled with PO Oxy  - Continue ppx antimicrobials; Cefepime through 7/15 per ID as above  - RIP throat swab, strep, flu, and COVID neg

## 2023-07-08 NOTE — ASSESSMENT & PLAN NOTE
- Daily CBC while inpatient  - Transfuse for hgb < 7, PLTS < 10K or <50k if bleeding  - Ppx bactrim, fluconazole, and acyclovir; on cefepime

## 2023-07-08 NOTE — ASSESSMENT & PLAN NOTE
- Takes amlodipine at home  - Given hypertension, amlodipine stopped and nifedipine and losartan started in [place]. Continued on transfer.  - BP now improved

## 2023-07-08 NOTE — PROGRESS NOTES
[facility] - Oncology ([facility])  Hematology  Bone Marrow Transplant  Progress Note    Patient Name: Magdaleno Hirsch  Admission Date: 6/9/2023  Hospital Length of Stay: 29 days  Code Status: Full Code    Subjective:     Interval History: Day 23 of CLIA+ponatinib for blast phase CML. Bone marrow biopsy on 7/06, preliminary report with no evidence of leukemia. Afebrile, VSS, platelets 10k this am, denies bleeding therefore will hold on transfusion today. Throat pain improving.       Objective:     Vital Signs (Most Recent):  Temp: 97.9 °F (36.6 °C) (07/08/23 1138)  Pulse: 64 (07/08/23 1138)  Resp: 16 (07/08/23 1138)  BP: 133/84 (07/08/23 1138)  SpO2: 100 % (07/08/23 1138) Vital Signs (24h Range):  Temp:  [97.8 °F (36.6 °C)-98.5 °F (36.9 °C)] 97.9 °F (36.6 °C)  Pulse:  [62-89] 64  Resp:  [16-20] 16  SpO2:  [98 %-100 %] 100 %  BP: (117-141)/(66-90) 133/84     Weight: 79 kg (174 lb 2.6 oz)  Body mass index is 22.98 kg/m².  Body surface area is 2.02 meters squared.    ECOG SCORE             Intake/Output - Last 3 Shifts       Intake/Output Category 07/06 0700 to 07/07 0659 07/07 0700 to 07/08 0659 07/08 0700 to 07/09 0659    P.O. 2400 720 600    I.V. (mL/kg) 20 (0.3)      Blood       IV Piggyback 297.4 176.7     Total Intake(mL/kg) 2717.4 (34.4) 896.7 (11.4) 600 (7.6)    Urine (mL/kg/hr) 300 (0.2)      Stool 0      Total Output 300      Net +2417.4 +896.7 +600           Urine Occurrence 6 x 3 x 2 x    Stool Occurrence 1 x 0 x              Physical Exam  Vitals and nursing note reviewed.   Constitutional:       General: He is not in acute distress.     Appearance: Normal appearance.   HENT:      Head: Normocephalic.      Mouth/Throat: red lesions th pharynx with surrounding pustules.      Mouth: Mucous membranes are moist.   Eyes:      Extraocular Movements: Extraocular movements intact.      Conjunctiva/sclera: Conjunctivae normal.      Pupils: Pupils are equal, round, and reactive to light.   Cardiovascular:      Rate and  Rhythm: Normal rate and regular rhythm.      Pulses: Normal pulses.      Heart sounds: Normal heart sounds.   Pulmonary:      Effort: Pulmonary effort is normal.      Breath sounds: Normal breath sounds. No wheezing.   Abdominal:      General: Bowel sounds are normal. There is no distension.      Palpations: Abdomen is soft.      Tenderness: There is no abdominal tenderness.   Musculoskeletal:         General: Normal range of motion.      Cervical back: Normal range of motion and neck supple.      Right lower leg: No edema.      Left lower leg: No edema.   Skin:     General: Skin is warm and dry.      Capillary Refill: Capillary refill takes less than 2 seconds.      Comments: Right upper arm PICC clean, dry, intact. No erythema, edema, exudate.    Neurological:      General: No focal deficit present.      Mental Status: He is alert and oriented to person, place, and time.   Psychiatric:         Mood and Affect: Mood normal.         Behavior: Behavior normal.         Thought Content: Thought content normal.         Judgment: Judgment normal.          Significant Labs:   CBC:   Recent Labs   Lab 07/07/23  0315 07/08/23  0436   WBC 0.55* 0.61*   HGB 7.2* 7.6*   HCT 22.3* 23.4*   PLT 11* 10*    and CMP:   Recent Labs   Lab 07/07/23  0315 07/08/23  0436    137   K 4.6 4.1    106   CO2 25 24   GLU 90 86   BUN 9 11   CREATININE 0.8 0.7   CALCIUM 8.8 9.0   PROT 6.6 6.7   ALBUMIN 3.0* 3.1*   BILITOT 0.6 0.5   ALKPHOS 84 89   AST 8* 8*   ALT 14 12   ANIONGAP 7* 7*       Diagnostic Results:  None    Assessment/Plan:     * 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  - Patient with CML diagnosed 8/2022 Follows locally in [place] with Dr. Brett Hogan. He has been on dasatinib outpatient since 10/2022. Some question regarding his compliance over the course of treatment, however. Presented to [facility] with c/o gum swelling on 6/7/23. Labs on presentation remarkable for WBC count of 138K, with 80%  blasts indicating blast crisis. WBC count from approximately a week and a half earlier was normal. Reported recent compliance with his TKI.     Transferred to [facility] for higher level of care    - 6/09/23: Bone marrow Biopsy c/w progression for blast phase CML   - BCR/ABL p210 collected on admission, >55%  - BCR/ABL mutation negative  - Echo with 65% EF and mild-moderate pulmonary hypertension.  - Patient decided to not do fertility presentation  - Day 23 of Cladribine Idarubicin Cytarabine, CLIA and transitioned from dasatinb to ponatinib.  - 7/06/23: Restaging day 21 bone marrow biopsy done today, preliminary with no evidence of leukemia  - ANC 18 today     Neutropenic fever  - Temp of 101.4 at [place] [facility]. Persisted upon transfer.  - CXR neg. Blood culture NGTD. C-Diff negative. U/a neg  - Strep, flu, and COVID neg at OSH  - Throat red with exudates. Tonsils mildly swollen. CT neck showing adenopathy but no abscesses.  - CT chest without consolidations  - Repeat fever 6/28 @2330. BC 1/4 GPC with viridans strep; other 3/4 NGTD. Repeat CXR and UA negative  - Repeat blood cultures 7/1, 7/3, and 7/4 NGTD   - ID consulted, appreciate assistance: continue cefepime; EOT 7/15  - Last fever 7/4 @1749    Infection by Streptococcus, viridans group  -see Neutropenic fever    Pharyngitis  - continues [place] and po Oxy  - On Cefepime til 7/15 per ID recs    Pancytopenia due to antineoplastic chemotherapy  - Daily CBC while inpatient  - Transfuse for hgb < 7, PLTS < 10K or <50k if bleeding  - Ppx bactrim, fluconazole, and acyclovir; on cefepime     Hypertension  - Takes amlodipine at home  - Given hypertension, amlodipine stopped and nifedipine and losartan started in [place]. Continued on transfer.  - BP now improved    Mild malnutrition  - continue PO intake as tolerated  - nutrition following        VTE Risk Mitigation (From admission, onward)           Ordered     heparin, porcine (PF) 100 unit/mL injection flush 300 Units   As needed (PRN)         06/16/23 1550     IP VTE HIGH RISK PATIENT  Once         06/09/23 0049     Place sequential compression device  Until discontinued         06/09/23 0049                    Disposition: inpatient     Suzan Carreon NP  Bone Marrow Transplant  [facility] - Oncology ([facility])

## 2023-07-08 NOTE — PLAN OF CARE
Day 23 of CLIA. Plan of care discussed with patient at start of shift. Free from falls and injuries. Resting quietly with eyes closed. Respirations even, unlabored. Skin warm and dry. Pain managed with oxycodone 5 mg PO x's 1. Denies nausea. Frequent checks for pain and safety maintained. Bed in lowest position, wheels locked, side rails up x's 2, call light in reach. Instructed to call for assistance as needed, verbalizes understanding. Will continue to monitor.

## 2023-07-08 NOTE — SUBJECTIVE & OBJECTIVE
Subjective:     Interval History: Day 23 of CLIA+ponatinib for blast phase CML. Bone marrow biopsy on 7/06, preliminary report with no evidence of leukemia. Afebrile, VSS, platelets 10k this am, denies bleeding therefore will hold on transfusion today. Throat pain improving.       Objective:     Vital Signs (Most Recent):  Temp: 97.9 °F (36.6 °C) (07/08/23 1138)  Pulse: 64 (07/08/23 1138)  Resp: 16 (07/08/23 1138)  BP: 133/84 (07/08/23 1138)  SpO2: 100 % (07/08/23 1138) Vital Signs (24h Range):  Temp:  [97.8 °F (36.6 °C)-98.5 °F (36.9 °C)] 97.9 °F (36.6 °C)  Pulse:  [62-89] 64  Resp:  [16-20] 16  SpO2:  [98 %-100 %] 100 %  BP: (117-141)/(66-90) 133/84     Weight: 79 kg (174 lb 2.6 oz)  Body mass index is 22.98 kg/m².  Body surface area is 2.02 meters squared.    ECOG SCORE             Intake/Output - Last 3 Shifts       Intake/Output Category 07/06 0700 to 07/07 0659 07/07 0700 to 07/08 0659 07/08 0700 to 07/09 0659    P.O. 2400 720 600    I.V. (mL/kg) 20 (0.3)      Blood       IV Piggyback 297.4 176.7     Total Intake(mL/kg) 2717.4 (34.4) 896.7 (11.4) 600 (7.6)    Urine (mL/kg/hr) 300 (0.2)      Stool 0      Total Output 300      Net +2417.4 +896.7 +600           Urine Occurrence 6 x 3 x 2 x    Stool Occurrence 1 x 0 x              Physical Exam  Vitals and nursing note reviewed.   Constitutional:       General: He is not in acute distress.     Appearance: Normal appearance.   HENT:      Head: Normocephalic.      Mouth/Throat:      Mouth: Mucous membranes are moist.   Eyes:      Extraocular Movements: Extraocular movements intact.      Conjunctiva/sclera: Conjunctivae normal.      Pupils: Pupils are equal, round, and reactive to light.   Cardiovascular:      Rate and Rhythm: Normal rate and regular rhythm.      Pulses: Normal pulses.      Heart sounds: Normal heart sounds.   Pulmonary:      Effort: Pulmonary effort is normal.      Breath sounds: Normal breath sounds. No wheezing.   Abdominal:      General: Bowel  sounds are normal. There is no distension.      Palpations: Abdomen is soft.      Tenderness: There is no abdominal tenderness.   Musculoskeletal:         General: Normal range of motion.      Cervical back: Normal range of motion and neck supple.      Right lower leg: No edema.      Left lower leg: No edema.   Skin:     General: Skin is warm and dry.      Capillary Refill: Capillary refill takes less than 2 seconds.      Comments: Right upper arm PICC clean, dry, intact. No erythema, edema, exudate.    Neurological:      General: No focal deficit present.      Mental Status: He is alert and oriented to person, place, and time.   Psychiatric:         Mood and Affect: Mood normal.         Behavior: Behavior normal.         Thought Content: Thought content normal.         Judgment: Judgment normal.          Significant Labs:   CBC:   Recent Labs   Lab 07/07/23 0315 07/08/23  0436   WBC 0.55* 0.61*   HGB 7.2* 7.6*   HCT 22.3* 23.4*   PLT 11* 10*    and CMP:   Recent Labs   Lab 07/07/23 0315 07/08/23  0436    137   K 4.6 4.1    106   CO2 25 24   GLU 90 86   BUN 9 11   CREATININE 0.8 0.7   CALCIUM 8.8 9.0   PROT 6.6 6.7   ALBUMIN 3.0* 3.1*   BILITOT 0.6 0.5   ALKPHOS 84 89   AST 8* 8*   ALT 14 12   ANIONGAP 7* 7*       Diagnostic Results:  None

## 2023-07-08 NOTE — ASSESSMENT & PLAN NOTE
- Patient with CML diagnosed 8/2022 Follows locally in [place] with Dr. Brett Hogan. He has been on dasatinib outpatient since 10/2022. Some question regarding his compliance over the course of treatment, however. Presented to [facility] with c/o gum swelling on 6/7/23. Labs on presentation remarkable for WBC count of 138K, with 80% blasts indicating blast crisis. WBC count from approximately a week and a half earlier was normal. Reported recent compliance with his TKI.     Transferred to [facility] for higher level of care    - 6/09/23: Bone marrow Biopsy c/w progression for blast phase CML   - BCR/ABL p210 collected on admission, >55%  - BCR/ABL mutation negative  - Echo with 65% EF and mild-moderate pulmonary hypertension.  - Patient decided to not do fertility presentation  - Day 23 of Cladribine Idarubicin Cytarabine, CLIA and transitioned from dasatinb to ponatinib.  - 7/06/23: Restaging day 21 bone marrow biopsy done today, preliminary with no evidence of leukemia  - ANC 18 today

## 2023-07-09 LAB
ALBUMIN SERPL BCP-MCNC: 3.2 G/DL (ref 3.5–5.2)
ALP SERPL-CCNC: 93 U/L (ref 55–135)
ALT SERPL W/O P-5'-P-CCNC: 14 U/L (ref 10–44)
ANION GAP SERPL CALC-SCNC: 9 MMOL/L (ref 8–16)
AST SERPL-CCNC: 9 U/L (ref 10–40)
BACTERIA BLD CULT: NORMAL
BASOPHILS # BLD AUTO: 0 K/UL (ref 0–0.2)
BASOPHILS NFR BLD: 0 % (ref 0–1.9)
BILIRUB SERPL-MCNC: 0.4 MG/DL (ref 0.1–1)
BUN SERPL-MCNC: 12 MG/DL (ref 6–20)
CALCIUM SERPL-MCNC: 9.1 MG/DL (ref 8.7–10.5)
CHLORIDE SERPL-SCNC: 107 MMOL/L (ref 95–110)
CO2 SERPL-SCNC: 25 MMOL/L (ref 23–29)
CREAT SERPL-MCNC: 0.7 MG/DL (ref 0.5–1.4)
DIFFERENTIAL METHOD: ABNORMAL
EOSINOPHIL # BLD AUTO: 0 K/UL (ref 0–0.5)
EOSINOPHIL NFR BLD: 0 % (ref 0–8)
ERYTHROCYTE [DISTWIDTH] IN BLOOD BY AUTOMATED COUNT: 14.9 % (ref 11.5–14.5)
EST. GFR  (NO RACE VARIABLE): >60 ML/MIN/1.73 M^2
GLUCOSE SERPL-MCNC: 90 MG/DL (ref 70–110)
HCT VFR BLD AUTO: 22.9 % (ref 40–54)
HGB BLD-MCNC: 7.6 G/DL (ref 14–18)
IMM GRANULOCYTES # BLD AUTO: 0 K/UL (ref 0–0.04)
IMM GRANULOCYTES NFR BLD AUTO: 0 % (ref 0–0.5)
LYMPHOCYTES # BLD AUTO: 0.9 K/UL (ref 1–4.8)
LYMPHOCYTES NFR BLD: 97.7 % (ref 18–48)
MAGNESIUM SERPL-MCNC: 1.9 MG/DL (ref 1.6–2.6)
MCH RBC QN AUTO: 25.9 PG (ref 27–31)
MCHC RBC AUTO-ENTMCNC: 33.2 G/DL (ref 32–36)
MCV RBC AUTO: 78 FL (ref 82–98)
MONOCYTES # BLD AUTO: 0 K/UL (ref 0.3–1)
MONOCYTES NFR BLD: 0 % (ref 4–15)
NEUTROPHILS # BLD AUTO: 0 K/UL (ref 1.8–7.7)
NEUTROPHILS NFR BLD: 2.3 % (ref 38–73)
NRBC BLD-RTO: 0 /100 WBC
PHOSPHATE SERPL-MCNC: 4.2 MG/DL (ref 2.7–4.5)
PLATELET # BLD AUTO: 12 K/UL (ref 150–450)
PLATELET BLD QL SMEAR: ABNORMAL
PMV BLD AUTO: ABNORMAL FL (ref 9.2–12.9)
POTASSIUM SERPL-SCNC: 4.3 MMOL/L (ref 3.5–5.1)
PROT SERPL-MCNC: 6.8 G/DL (ref 6–8.4)
RBC # BLD AUTO: 2.94 M/UL (ref 4.6–6.2)
SODIUM SERPL-SCNC: 141 MMOL/L (ref 136–145)
WBC # BLD AUTO: 0.87 K/UL (ref 3.9–12.7)

## 2023-07-09 PROCEDURE — 99233 PR SUBSEQUENT HOSPITAL CARE,LEVL III: ICD-10-PCS | Mod: ,,, | Performed by: INTERNAL MEDICINE

## 2023-07-09 PROCEDURE — 85025 COMPLETE CBC W/AUTO DIFF WBC: CPT | Performed by: NURSE PRACTITIONER

## 2023-07-09 PROCEDURE — 25000003 PHARM REV CODE 250: Performed by: STUDENT IN AN ORGANIZED HEALTH CARE EDUCATION/TRAINING PROGRAM

## 2023-07-09 PROCEDURE — 63600175 PHARM REV CODE 636 W HCPCS: Performed by: INTERNAL MEDICINE

## 2023-07-09 PROCEDURE — 83735 ASSAY OF MAGNESIUM: CPT | Performed by: NURSE PRACTITIONER

## 2023-07-09 PROCEDURE — 99233 SBSQ HOSP IP/OBS HIGH 50: CPT | Mod: ,,, | Performed by: INTERNAL MEDICINE

## 2023-07-09 PROCEDURE — 20600001 HC STEP DOWN PRIVATE ROOM

## 2023-07-09 PROCEDURE — 25000003 PHARM REV CODE 250: Performed by: INTERNAL MEDICINE

## 2023-07-09 PROCEDURE — 25000003 PHARM REV CODE 250: Performed by: NURSE PRACTITIONER

## 2023-07-09 PROCEDURE — 84100 ASSAY OF PHOSPHORUS: CPT | Performed by: NURSE PRACTITIONER

## 2023-07-09 PROCEDURE — 80053 COMPREHEN METABOLIC PANEL: CPT | Performed by: NURSE PRACTITIONER

## 2023-07-09 RX ADMIN — NIFEDIPINE 60 MG: 60 TABLET, FILM COATED, EXTENDED RELEASE ORAL at 08:07

## 2023-07-09 RX ADMIN — POSACONAZOLE 300 MG: 100 TABLET, DELAYED RELEASE ORAL at 08:07

## 2023-07-09 RX ADMIN — ALUMINUM HYDROXIDE, MAGNESIUM HYDROXIDE, AND DIMETHICONE 10 ML: 400; 400; 40 SUSPENSION ORAL at 01:07

## 2023-07-09 RX ADMIN — CEFEPIME 2 G: 2 INJECTION, POWDER, FOR SOLUTION INTRAVENOUS at 05:07

## 2023-07-09 RX ADMIN — CEFEPIME 2 G: 2 INJECTION, POWDER, FOR SOLUTION INTRAVENOUS at 09:07

## 2023-07-09 RX ADMIN — PONATINIB HYDROCHLORIDE 15 MG: 15 TABLET, FILM COATED ORAL at 08:07

## 2023-07-09 RX ADMIN — ALUMINUM HYDROXIDE, MAGNESIUM HYDROXIDE, AND DIMETHICONE 10 ML: 400; 400; 40 SUSPENSION ORAL at 09:07

## 2023-07-09 RX ADMIN — MAGNESIUM OXIDE TAB 400 MG (241.3 MG ELEMENTAL MG) 400 MG: 400 (241.3 MG) TAB at 01:07

## 2023-07-09 RX ADMIN — CEFEPIME 2 G: 2 INJECTION, POWDER, FOR SOLUTION INTRAVENOUS at 12:07

## 2023-07-09 RX ADMIN — PANTOPRAZOLE SODIUM 40 MG: 40 TABLET, DELAYED RELEASE ORAL at 08:07

## 2023-07-09 RX ADMIN — MAGNESIUM OXIDE TAB 400 MG (241.3 MG ELEMENTAL MG) 400 MG: 400 (241.3 MG) TAB at 08:07

## 2023-07-09 RX ADMIN — ALUMINUM HYDROXIDE, MAGNESIUM HYDROXIDE, AND DIMETHICONE 10 ML: 400; 400; 40 SUSPENSION ORAL at 08:07

## 2023-07-09 RX ADMIN — ALUMINUM HYDROXIDE, MAGNESIUM HYDROXIDE, AND DIMETHICONE 10 ML: 400; 400; 40 SUSPENSION ORAL at 05:07

## 2023-07-09 RX ADMIN — ACYCLOVIR 800 MG: 200 CAPSULE ORAL at 08:07

## 2023-07-09 RX ADMIN — ACYCLOVIR 800 MG: 200 CAPSULE ORAL at 09:07

## 2023-07-09 NOTE — ASSESSMENT & PLAN NOTE
- Temp of 101.4 at [facility]. Persisted upon transfer.  - CXR neg. Blood culture NGTD. C-Diff negative. U/a neg  - Strep, flu, and COVID neg at OSH  - Throat red with exudates. Tonsils mildly swollen. CT neck showing adenopathy but no abscesses.  - CT chest without consolidations  - Repeat fever 6/28 @2330. BC 1/4 GPC with viridans strep; other 3/4 NGTD. Repeat CXR and UA negative  - Repeat blood cultures 7/1, 7/3, and 7/4 NGTD   - ID consulted, appreciate assistance: continue cefepime; EOT 7/15  - Last fever 7/4 @1749

## 2023-07-09 NOTE — SUBJECTIVE & OBJECTIVE
Subjective:     Interval History: Day 24 of CLIA+ponatinib for blast phase CML. Afebrile, VSS. No new complaints today.    Objective:     Vital Signs (Most Recent):  Temp: 97.7 °F (36.5 °C) (07/09/23 0729)  Pulse: (Abnormal) 58 (07/09/23 0729)  Resp: 17 (07/09/23 0729)  BP: 129/60 (07/09/23 0729)  SpO2: 100 % (07/09/23 0729) Vital Signs (24h Range):  Temp:  [97.7 °F (36.5 °C)-98.6 °F (37 °C)] 97.7 °F (36.5 °C)  Pulse:  [58-71] 58  Resp:  [16-20] 17  SpO2:  [98 %-100 %] 100 %  BP: (122-134)/(60-86) 129/60     Weight: 78.6 kg (173 lb 4.5 oz)  Body mass index is 22.86 kg/m².  Body surface area is 2.01 meters squared.    ECOG SCORE             Intake/Output - Last 3 Shifts       Intake/Output Category 07/07 0700 to 07/08 0659 07/08 0700 to 07/09 0659 07/09 0700 to 07/10 0659    P.O. 720 1560     I.V. (mL/kg)       IV Piggyback 176.7 286.7     Total Intake(mL/kg) 896.7 (11.4) 1846.7 (23.5)     Urine (mL/kg/hr)       Stool       Total Output       Net +896.7 +1846.7            Urine Occurrence 3 x 5 x     Stool Occurrence 0 x 1 x              Physical Exam  Vitals and nursing note reviewed.   Constitutional:       General: He is not in acute distress.     Appearance: Normal appearance.   HENT:      Head: Normocephalic.      Mouth/Throat:      Mouth: Mucous membranes are moist.      Pharynx: Oropharyngeal exudate and posterior oropharyngeal erythema present.   Eyes:      Extraocular Movements: Extraocular movements intact.      Conjunctiva/sclera: Conjunctivae normal.      Pupils: Pupils are equal, round, and reactive to light.   Cardiovascular:      Rate and Rhythm: Normal rate and regular rhythm.      Pulses: Normal pulses.      Heart sounds: Normal heart sounds.   Pulmonary:      Effort: Pulmonary effort is normal.      Breath sounds: Normal breath sounds. No wheezing.   Abdominal:      General: Bowel sounds are normal. There is no distension.      Palpations: Abdomen is soft.      Tenderness: There is no abdominal  tenderness.   Musculoskeletal:         General: Normal range of motion.      Cervical back: Normal range of motion and neck supple.      Right lower leg: No edema.      Left lower leg: No edema.   Skin:     General: Skin is warm and dry.      Capillary Refill: Capillary refill takes less than 2 seconds.      Comments: Right upper arm PICC clean, dry, intact. No erythema, edema, exudate.    Neurological:      General: No focal deficit present.      Mental Status: He is alert and oriented to person, place, and time.   Psychiatric:         Mood and Affect: Mood normal.         Behavior: Behavior normal.         Thought Content: Thought content normal.         Judgment: Judgment normal.          Significant Labs:   CBC:   Recent Labs   Lab 07/08/23  0436 07/09/23  0454   WBC 0.61* 0.87*   HGB 7.6* 7.6*   HCT 23.4* 22.9*   PLT 10* 12*    and CMP:   Recent Labs   Lab 07/08/23  0436 07/09/23  0454    141   K 4.1 4.3    107   CO2 24 25   GLU 86 90   BUN 11 12   CREATININE 0.7 0.7   CALCIUM 9.0 9.1   PROT 6.7 6.8   ALBUMIN 3.1* 3.2*   BILITOT 0.5 0.4   ALKPHOS 89 93   AST 8* 9*   ALT 12 14   ANIONGAP 7* 9       Diagnostic Results:  None

## 2023-07-09 NOTE — ASSESSMENT & PLAN NOTE
- Patient with CML diagnosed 8/2022 Follows locally in [place] with Dr. Brett Hogan. He has been on dasatinib outpatient since 10/2022. Some question regarding his compliance over the course of treatment, however. Presented to [facility] with c/o gum swelling on 6/7/23. Labs on presentation remarkable for WBC count of 138K, with 80% blasts indicating blast crisis. WBC count from approximately a week and a half earlier was normal. Reported recent compliance with his TKI.     Transferred to [facility] for higher level of care    - 6/09/23: Bone marrow Biopsy c/w progression for blast phase CML   - BCR/ABL p210 collected on admission, >55%  - BCR/ABL mutation negative  - Echo with 65% EF and mild-moderate pulmonary hypertension.  - Patient decided to not do fertility presentation  - Day 24 of Cladribine Idarubicin Cytarabine, CLIA and transitioned from dasatinb to ponatinib.  - 7/06/23: Restaging day 21 bone marrow biopsy done today, preliminary with no evidence of leukemia  - ANC 17 today

## 2023-07-09 NOTE — ASSESSMENT & PLAN NOTE
- Takes amlodipine at home  - Given hypertension, amlodipine stopped and nifedipine and losartan started in [place]. Continued on transfer.  - BP now improved

## 2023-07-09 NOTE — PLAN OF CARE
Day 24 of CLIA+ponatinib. Pt involved in plan of care and communicating needs throughout shift. Up in room and to bathroom independently; no c/o pain. Tolerating diet, voiding without difficulty. Mag replaced per protocol. Bath complete. Cefepime given as scheduled. All VSS; no acute events so far this shift.  Pt remaining free from falls or injury throughout shift; bed locked and in lowest position; call light within reach.  Pt instructed to call for assistance as needed.  Q1H rounding done on pt. P resting comfortably at this time.

## 2023-07-09 NOTE — PLAN OF CARE
No acute events overnight. VSS; remained afebrile. PRN oxy given x1. Pt with nonskid footwear on with bed in lowest position and locked. Pt ambulates independently and instructed to call if assistance is needed. Call light within reach. Will continue to monitor pt.       Problem: Adult Inpatient Plan of Care  Goal: Plan of Care Review  Outcome: Ongoing, Progressing  Goal: Patient-Specific Goal (Individualized)  Outcome: Ongoing, Progressing  Goal: Absence of Hospital-Acquired Illness or Injury  Outcome: Ongoing, Progressing  Goal: Optimal Comfort and Wellbeing  Outcome: Ongoing, Progressing  Intervention: Monitor Pain and Promote Comfort  Flowsheets (Taken 7/9/2023 0624)  Pain Management Interventions:   care clustered   quiet environment facilitated  Intervention: Provide Person-Centered Care  Flowsheets (Taken 7/9/2023 0624)  Trust Relationship/Rapport:   care explained   choices provided   questions answered  Goal: Readiness for Transition of Care  Outcome: Ongoing, Progressing     Problem: Infection  Goal: Absence of Infection Signs and Symptoms  Outcome: Ongoing, Progressing     Problem: Coping Ineffective (Oncology Care)  Goal: Effective Coping  Outcome: Ongoing, Progressing     Problem: Fatigue (Oncology Care)  Goal: Improved Activity Tolerance  Outcome: Ongoing, Progressing     Problem: Oral Intake Altered (Oncology Care)  Goal: Optimal Oral Intake  Outcome: Ongoing, Progressing     Problem: Oral Mucositis (Oncology Care)  Goal: Improved Oral Mucous Membrane Integrity  Outcome: Ongoing, Progressing     Problem: Pain Acute (Oncology Care)  Goal: Optimal Pain Control  Outcome: Ongoing, Progressing     Problem: Fall Injury Risk  Goal: Absence of Fall and Fall-Related Injury  Outcome: Ongoing, Progressing

## 2023-07-09 NOTE — PROGRESS NOTES
[facility] - Oncology ([facility])  Hematology  Bone Marrow Transplant  Progress Note    Patient Name: Magdaleno Hirsch  Admission Date: 6/9/2023  Hospital Length of Stay: 30 days  Code Status: Full Code    Subjective:     Interval History: Day 24 of CLIA+ponatinib for blast phase CML. Afebrile, VSS. No new complaints today.    Objective:     Vital Signs (Most Recent):  Temp: 97.7 °F (36.5 °C) (07/09/23 0729)  Pulse: (Abnormal) 58 (07/09/23 0729)  Resp: 17 (07/09/23 0729)  BP: 129/60 (07/09/23 0729)  SpO2: 100 % (07/09/23 0729) Vital Signs (24h Range):  Temp:  [97.7 °F (36.5 °C)-98.6 °F (37 °C)] 97.7 °F (36.5 °C)  Pulse:  [58-71] 58  Resp:  [16-20] 17  SpO2:  [98 %-100 %] 100 %  BP: (122-134)/(60-86) 129/60     Weight: 78.6 kg (173 lb 4.5 oz)  Body mass index is 22.86 kg/m².  Body surface area is 2.01 meters squared.    ECOG SCORE             Intake/Output - Last 3 Shifts       Intake/Output Category 07/07 0700 to 07/08 0659 07/08 0700 to 07/09 0659 07/09 0700 to 07/10 0659    P.O. 720 1560     I.V. (mL/kg)       IV Piggyback 176.7 286.7     Total Intake(mL/kg) 896.7 (11.4) 1846.7 (23.5)     Urine (mL/kg/hr)       Stool       Total Output       Net +896.7 +1846.7            Urine Occurrence 3 x 5 x     Stool Occurrence 0 x 1 x              Physical Exam  Vitals and nursing note reviewed.   Constitutional:       General: He is not in acute distress.     Appearance: Normal appearance.   HENT:      Head: Normocephalic.      Mouth/Throat:      Mouth: Mucous membranes are moist.      Pharynx: Oropharyngeal exudate and posterior oropharyngeal erythema present.   Eyes:      Extraocular Movements: Extraocular movements intact.      Conjunctiva/sclera: Conjunctivae normal.      Pupils: Pupils are equal, round, and reactive to light.   Cardiovascular:      Rate and Rhythm: Normal rate and regular rhythm.      Pulses: Normal pulses.      Heart sounds: Normal heart sounds.   Pulmonary:      Effort: Pulmonary effort is normal.       Breath sounds: Normal breath sounds. No wheezing.   Abdominal:      General: Bowel sounds are normal. There is no distension.      Palpations: Abdomen is soft.      Tenderness: There is no abdominal tenderness.   Musculoskeletal:         General: Normal range of motion.      Cervical back: Normal range of motion and neck supple.      Right lower leg: No edema.      Left lower leg: No edema.   Skin:     General: Skin is warm and dry.      Capillary Refill: Capillary refill takes less than 2 seconds.      Comments: Right upper arm PICC clean, dry, intact. No erythema, edema, exudate.    Neurological:      General: No focal deficit present.      Mental Status: He is alert and oriented to person, place, and time.   Psychiatric:         Mood and Affect: Mood normal.         Behavior: Behavior normal.         Thought Content: Thought content normal.         Judgment: Judgment normal.          Significant Labs:   CBC:   Recent Labs   Lab 07/08/23 0436 07/09/23  0454   WBC 0.61* 0.87*   HGB 7.6* 7.6*   HCT 23.4* 22.9*   PLT 10* 12*    and CMP:   Recent Labs   Lab 07/08/23 0436 07/09/23  0454    141   K 4.1 4.3    107   CO2 24 25   GLU 86 90   BUN 11 12   CREATININE 0.7 0.7   CALCIUM 9.0 9.1   PROT 6.7 6.8   ALBUMIN 3.1* 3.2*   BILITOT 0.5 0.4   ALKPHOS 89 93   AST 8* 9*   ALT 12 14   ANIONGAP 7* 9       Diagnostic Results:  None    Assessment/Plan:     * 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  - Patient with CML diagnosed 8/2022 Follows locally in [place] with Dr. Brett Hogan. He has been on dasatinib outpatient since 10/2022. Some question regarding his compliance over the course of treatment, however. Presented to [facility] with c/o gum swelling on 6/7/23. Labs on presentation remarkable for WBC count of 138K, with 80% blasts indicating blast crisis. WBC count from approximately a week and a half earlier was normal. Reported recent compliance with his TKI.     [place] [facility] [place]    - 6/09/23: Bone marrow Biopsy c/w progression for blast phase CML   - BCR/ABL p210 collected on admission, >55%  - BCR/ABL mutation negative  - Echo with 65% EF and mild-moderate pulmonary hypertension.  - Patient decided to not do fertility presentation  - Day 24 of Cladribine Idarubicin Cytarabine, CLIA and transitioned from dasatinb to ponatinib.  - 7/06/23: Restaging day 21 bone marrow biopsy done today, preliminary with no evidence of leukemia  - ANC 17 today     Neutropenic fever  - Temp of 101.4 at [place] ED. Persisted upon transfer.  - CXR neg. Blood culture NGTD. C-Diff negative. U/a neg  - Strep, flu, and COVID neg at OSH  - Throat red with exudates. Tonsils mildly swollen. CT neck showing adenopathy but no abscesses.  - CT chest without consolidations  - Repeat fever 6/28 @2330. BC 1/4 GPC with viridans strep; other 3/4 NGTD. Repeat CXR and UA negative  - Repeat blood cultures 7/1, 7/3, and 7/4 NGTD   - ID consulted, appreciate assistance: continue cefepime; EOT 7/15  - Last fever 7/4 @1749    Infection by Streptococcus, viridans group  -see Neutropenic fever    Pharyngitis  - continues [facility] and po Oxy  - On Cefepime til 7/15 per ID recs    Pancytopenia due to antineoplastic chemotherapy  - Daily CBC while inpatient  - Transfuse for hgb < 7, PLTS < 10K or <50k if bleeding  - Ppx bactrim, fluconazole, and acyclovir; on cefepime     Hypertension  - Takes amlodipine at home  - Given hypertension, amlodipine stopped and nifedipine and losartan started in [place]. Continued on transfer.  - BP now improved    Mild malnutrition  - continue PO intake as tolerated  - nutrition following        VTE Risk Mitigation (From admission, onward)         Ordered     heparin, porcine (PF) 100 unit/mL injection flush 300 Units  As needed (PRN)         06/16/23 1550     IP VTE HIGH RISK PATIENT  Once         06/09/23 0049     Place sequential compression device  Until discontinued          06/09/23 0049                Disposition: inpatient     Suzan Carreon NP  Bone Marrow Transplant  [facility] - Oncology ([facility])

## 2023-07-10 ENCOUNTER — SPECIALTY PHARMACY (OUTPATIENT)
Dept: PHARMACY | Facility: CLINIC | Age: 23
End: 2023-07-10
Payer: COMMERCIAL

## 2023-07-10 DIAGNOSIS — C92.12 CML (CHRONIC MYELOID LEUKEMIA) IN RELAPSE: Primary | ICD-10-CM

## 2023-07-10 LAB
ALBUMIN SERPL BCP-MCNC: 3.2 G/DL (ref 3.5–5.2)
ALP SERPL-CCNC: 92 U/L (ref 55–135)
ALT SERPL W/O P-5'-P-CCNC: 12 U/L (ref 10–44)
ANION GAP SERPL CALC-SCNC: 7 MMOL/L (ref 8–16)
ANISOCYTOSIS BLD QL SMEAR: SLIGHT
AST SERPL-CCNC: 10 U/L (ref 10–40)
BASOPHILS # BLD AUTO: 0 K/UL (ref 0–0.2)
BASOPHILS NFR BLD: 0 % (ref 0–1.9)
BILIRUB SERPL-MCNC: 0.4 MG/DL (ref 0.1–1)
BODY SITE - BONE MARROW: NORMAL
BUN SERPL-MCNC: 15 MG/DL (ref 6–20)
CALCIUM SERPL-MCNC: 9.2 MG/DL (ref 8.7–10.5)
CHLORIDE SERPL-SCNC: 107 MMOL/L (ref 95–110)
CLINICAL DIAGNOSIS - BONE MARROW: NORMAL
CO2 SERPL-SCNC: 27 MMOL/L (ref 23–29)
CREAT SERPL-MCNC: 0.9 MG/DL (ref 0.5–1.4)
DIFFERENTIAL METHOD: ABNORMAL
DNA/RNA EXTRACT AND HOLD RESULT: NORMAL
DNA/RNA EXTRACTION: NORMAL
EOSINOPHIL # BLD AUTO: 0 K/UL (ref 0–0.5)
EOSINOPHIL NFR BLD: 0 % (ref 0–8)
ERYTHROCYTE [DISTWIDTH] IN BLOOD BY AUTOMATED COUNT: 14.7 % (ref 11.5–14.5)
EST. GFR  (NO RACE VARIABLE): >60 ML/MIN/1.73 M^2
EXHR SPECIMEN TYPE: NORMAL
FLOW CYTOMETRY ANTIBODIES ANALYZED - BONE MARROW: NORMAL
FLOW CYTOMETRY COMMENT - BONE MARROW: NORMAL
FLOW CYTOMETRY INTERPRETATION - BONE MARROW: NORMAL
GLUCOSE SERPL-MCNC: 93 MG/DL (ref 70–110)
HCT VFR BLD AUTO: 23.2 % (ref 40–54)
HGB BLD-MCNC: 7.7 G/DL (ref 14–18)
HYPOCHROMIA BLD QL SMEAR: ABNORMAL
IMM GRANULOCYTES # BLD AUTO: 0 K/UL (ref 0–0.04)
IMM GRANULOCYTES NFR BLD AUTO: 0 % (ref 0–0.5)
LYMPHOCYTES # BLD AUTO: 0.8 K/UL (ref 1–4.8)
LYMPHOCYTES NFR BLD: 96.2 % (ref 18–48)
MAGNESIUM SERPL-MCNC: 1.9 MG/DL (ref 1.6–2.6)
MCH RBC QN AUTO: 25.6 PG (ref 27–31)
MCHC RBC AUTO-ENTMCNC: 33.2 G/DL (ref 32–36)
MCV RBC AUTO: 77 FL (ref 82–98)
MONOCYTES # BLD AUTO: 0 K/UL (ref 0.3–1)
MONOCYTES NFR BLD: 0 % (ref 4–15)
NEUTROPHILS # BLD AUTO: 0 K/UL (ref 1.8–7.7)
NEUTROPHILS NFR BLD: 3.8 % (ref 38–73)
NRBC BLD-RTO: 0 /100 WBC
OVALOCYTES BLD QL SMEAR: ABNORMAL
PHOSPHATE SERPL-MCNC: 4 MG/DL (ref 2.7–4.5)
PLATELET # BLD AUTO: 13 K/UL (ref 150–450)
PLATELET BLD QL SMEAR: ABNORMAL
PMV BLD AUTO: 9.2 FL (ref 9.2–12.9)
POIKILOCYTOSIS BLD QL SMEAR: SLIGHT
POLYCHROMASIA BLD QL SMEAR: ABNORMAL
POTASSIUM SERPL-SCNC: 4.4 MMOL/L (ref 3.5–5.1)
PROT SERPL-MCNC: 6.7 G/DL (ref 6–8.4)
RBC # BLD AUTO: 3.01 M/UL (ref 4.6–6.2)
SODIUM SERPL-SCNC: 141 MMOL/L (ref 136–145)
SPHEROCYTES BLD QL SMEAR: ABNORMAL
WBC # BLD AUTO: 0.78 K/UL (ref 3.9–12.7)

## 2023-07-10 PROCEDURE — 25000003 PHARM REV CODE 250: Performed by: NURSE PRACTITIONER

## 2023-07-10 PROCEDURE — 20600001 HC STEP DOWN PRIVATE ROOM

## 2023-07-10 PROCEDURE — 99233 SBSQ HOSP IP/OBS HIGH 50: CPT | Mod: ,,, | Performed by: INTERNAL MEDICINE

## 2023-07-10 PROCEDURE — 25000003 PHARM REV CODE 250: Performed by: INTERNAL MEDICINE

## 2023-07-10 PROCEDURE — 83735 ASSAY OF MAGNESIUM: CPT | Performed by: NURSE PRACTITIONER

## 2023-07-10 PROCEDURE — 85025 COMPLETE CBC W/AUTO DIFF WBC: CPT | Performed by: NURSE PRACTITIONER

## 2023-07-10 PROCEDURE — 25000003 PHARM REV CODE 250: Performed by: STUDENT IN AN ORGANIZED HEALTH CARE EDUCATION/TRAINING PROGRAM

## 2023-07-10 PROCEDURE — 99233 PR SUBSEQUENT HOSPITAL CARE,LEVL III: ICD-10-PCS | Mod: ,,, | Performed by: INTERNAL MEDICINE

## 2023-07-10 PROCEDURE — 84100 ASSAY OF PHOSPHORUS: CPT | Performed by: NURSE PRACTITIONER

## 2023-07-10 PROCEDURE — 80053 COMPREHEN METABOLIC PANEL: CPT | Performed by: NURSE PRACTITIONER

## 2023-07-10 PROCEDURE — 63600175 PHARM REV CODE 636 W HCPCS: Performed by: INTERNAL MEDICINE

## 2023-07-10 RX ADMIN — PANTOPRAZOLE SODIUM 40 MG: 40 TABLET, DELAYED RELEASE ORAL at 09:07

## 2023-07-10 RX ADMIN — ACYCLOVIR 800 MG: 200 CAPSULE ORAL at 08:07

## 2023-07-10 RX ADMIN — MAGNESIUM OXIDE TAB 400 MG (241.3 MG ELEMENTAL MG) 400 MG: 400 (241.3 MG) TAB at 12:07

## 2023-07-10 RX ADMIN — CEFEPIME 2 G: 2 INJECTION, POWDER, FOR SOLUTION INTRAVENOUS at 01:07

## 2023-07-10 RX ADMIN — ALUMINUM HYDROXIDE, MAGNESIUM HYDROXIDE, AND DIMETHICONE 10 ML: 400; 400; 40 SUSPENSION ORAL at 08:07

## 2023-07-10 RX ADMIN — ALUMINUM HYDROXIDE, MAGNESIUM HYDROXIDE, AND DIMETHICONE 10 ML: 400; 400; 40 SUSPENSION ORAL at 12:07

## 2023-07-10 RX ADMIN — CEFEPIME 2 G: 2 INJECTION, POWDER, FOR SOLUTION INTRAVENOUS at 04:07

## 2023-07-10 RX ADMIN — ALUMINUM HYDROXIDE, MAGNESIUM HYDROXIDE, AND DIMETHICONE 10 ML: 400; 400; 40 SUSPENSION ORAL at 09:07

## 2023-07-10 RX ADMIN — OXYCODONE HYDROCHLORIDE 5 MG: 5 TABLET ORAL at 09:07

## 2023-07-10 RX ADMIN — SULFAMETHOXAZOLE AND TRIMETHOPRIM 1 TABLET: 800; 160 TABLET ORAL at 04:07

## 2023-07-10 RX ADMIN — ACYCLOVIR 800 MG: 200 CAPSULE ORAL at 09:07

## 2023-07-10 RX ADMIN — MAGNESIUM OXIDE TAB 400 MG (241.3 MG ELEMENTAL MG) 400 MG: 400 (241.3 MG) TAB at 09:07

## 2023-07-10 RX ADMIN — NIFEDIPINE 60 MG: 60 TABLET, FILM COATED, EXTENDED RELEASE ORAL at 09:07

## 2023-07-10 RX ADMIN — CEFEPIME 2 G: 2 INJECTION, POWDER, FOR SOLUTION INTRAVENOUS at 09:07

## 2023-07-10 RX ADMIN — POSACONAZOLE 300 MG: 100 TABLET, DELAYED RELEASE ORAL at 09:07

## 2023-07-10 RX ADMIN — PONATINIB HYDROCHLORIDE 15 MG: 15 TABLET, FILM COATED ORAL at 09:07

## 2023-07-10 RX ADMIN — ALUMINUM HYDROXIDE, MAGNESIUM HYDROXIDE, AND DIMETHICONE 10 ML: 400; 400; 40 SUSPENSION ORAL at 04:07

## 2023-07-10 NOTE — PLAN OF CARE
No acute events overnight. VSS; remained afebrile. Pt with nonskid footwear on with bed in lowest position and locked. Pt ambulates independently and instructed to call if assistance is needed. Call light within reach. Will continue to monitor pt.       Problem: Adult Inpatient Plan of Care  Goal: Plan of Care Review  Outcome: Ongoing, Progressing  Goal: Patient-Specific Goal (Individualized)  Outcome: Ongoing, Progressing  Goal: Absence of Hospital-Acquired Illness or Injury  Outcome: Ongoing, Progressing  Goal: Optimal Comfort and Wellbeing  Outcome: Ongoing, Progressing  Intervention: Provide Person-Centered Care  Flowsheets (Taken 7/10/2023 0510)  Trust Relationship/Rapport:   care explained   choices provided   questions answered  Goal: Readiness for Transition of Care  Outcome: Ongoing, Progressing     Problem: Infection  Goal: Absence of Infection Signs and Symptoms  Outcome: Ongoing, Progressing     Problem: Coping Ineffective (Oncology Care)  Goal: Effective Coping  Outcome: Ongoing, Progressing     Problem: Fatigue (Oncology Care)  Goal: Improved Activity Tolerance  Outcome: Ongoing, Progressing     Problem: Oral Intake Altered (Oncology Care)  Goal: Optimal Oral Intake  Outcome: Ongoing, Progressing  Intervention: Minimize and Manage Barriers to Oral Intake  Flowsheets (Taken 7/10/2023 0510)  Oral Care: oral rinse provided     Problem: Oral Mucositis (Oncology Care)  Goal: Improved Oral Mucous Membrane Integrity  Outcome: Ongoing, Progressing  Intervention: Promote Oral Comfort and Health  Flowsheets (Taken 7/10/2023 0510)  Oral Care: oral rinse provided     Problem: Pain Acute (Oncology Care)  Goal: Optimal Pain Control  Outcome: Ongoing, Progressing     Problem: Fall Injury Risk  Goal: Absence of Fall and Fall-Related Injury  Outcome: Ongoing, Progressing

## 2023-07-10 NOTE — SUBJECTIVE & OBJECTIVE
Subjective:   Interval History: Day 25 of CLIA+ponatinib for blast phase CML. NAEON, afebrile, VSS. Doing well. Remains on Cefepime through 7/15, but per ID, can transition to [place] if plans to discharge. ANC slowly recovering, at 30 today. Transfusion requirements minimal.   Objective:     Vital Signs (Most Recent):  Temp: 97.9 °F (36.6 °C) (07/10/23 0759)  Pulse: (!) 59 (07/10/23 0759)  Resp: 17 (07/10/23 0759)  BP: 130/75 (07/10/23 0759)  SpO2: 100 % (07/10/23 0759) Vital Signs (24h Range):  Temp:  [97.5 °F (36.4 °C)-98.3 °F (36.8 °C)] 97.9 °F (36.6 °C)  Pulse:  [59-74] 59  Resp:  [16-19] 17  SpO2:  [99 %-100 %] 100 %  BP: (121-134)/(71-85) 130/75     Weight: 78.3 kg (172 lb 8.2 oz)  Body mass index is 22.76 kg/m².  Body surface area is 2.01 meters squared.    Intake/Output - Last 3 Shifts         07/08 0700  07/09 0659 07/09 0700  07/10 0659 07/10 0700  07/11 0659    P.O. 1560 1860     IV Piggyback 286.7 369.9     Total Intake(mL/kg) 1846.7 (23.5) 2229.9 (28.5)     Net +1846.7 +2229.9            Urine Occurrence 5 x 3 x     Stool Occurrence 1 x 1 x            Physical Exam  Vitals and nursing note reviewed.   Constitutional:       General: He is not in acute distress.     Appearance: Normal appearance.   HENT:      Head: Normocephalic.      Mouth/Throat:      Mouth: Mucous membranes are moist.   Eyes:      Extraocular Movements: Extraocular movements intact.      Conjunctiva/sclera: Conjunctivae normal.      Pupils: Pupils are equal, round, and reactive to light.   Cardiovascular:      Rate and Rhythm: Normal rate and regular rhythm.      Pulses: Normal pulses.      Heart sounds: Normal heart sounds.   Pulmonary:      Effort: Pulmonary effort is normal.      Breath sounds: Normal breath sounds. No wheezing.   Abdominal:      General: Bowel sounds are normal. There is no distension.      Palpations: Abdomen is soft.      Tenderness: There is no abdominal tenderness.   Musculoskeletal:         General: Normal  range of motion.      Cervical back: Normal range of motion and neck supple.      Right lower leg: No edema.      Left lower leg: No edema.   Skin:     General: Skin is warm and dry.      Capillary Refill: Capillary refill takes less than 2 seconds.      Comments: Right upper arm PICC clean, dry, intact. No erythema, edema, exudate.    Neurological:      General: No focal deficit present.      Mental Status: He is alert and oriented to person, place, and time.   Psychiatric:         Mood and Affect: Mood normal.         Behavior: Behavior normal.         Thought Content: Thought content normal.         Judgment: Judgment normal.          Significant Labs:   CBC:   Recent Labs   Lab 07/09/23  0454 07/10/23  0349   WBC 0.87* 0.78*   HGB 7.6* 7.7*   HCT 22.9* 23.2*   PLT 12* 13*   , CMP:   Recent Labs   Lab 07/09/23  0454 07/10/23  0349    141   K 4.3 4.4    107   CO2 25 27   GLU 90 93   BUN 12 15   CREATININE 0.7 0.9   CALCIUM 9.1 9.2   PROT 6.8 6.7   ALBUMIN 3.2* 3.2*   BILITOT 0.4 0.4   ALKPHOS 93 92   AST 9* 10   ALT 14 12   ANIONGAP 9 7*   , and LFTs:   Recent Labs   Lab 07/09/23  0454 07/10/23  0349   ALT 14 12   AST 9* 10   ALKPHOS 93 92   BILITOT 0.4 0.4   PROT 6.8 6.7   ALBUMIN 3.2* 3.2*     Diagnostic Results:  None

## 2023-07-10 NOTE — ASSESSMENT & PLAN NOTE
- Takes amlodipine at home  - Given hypertension, amlodipine stopped and nifedipine and losartan started in [place]. Continued on transfer.  - BP now improved

## 2023-07-10 NOTE — PLAN OF CARE
Recommendations    1. Continue Regular Diet with High-Calorie, High-Protein modification. Encourage PO intake.   2. Continue Boost Plus Vanilla -QD   3. RD to monitor and follow-up.    Goals: To meet % of EEN,EPN by next RD follow up  Nutrition Goal Status: progressing towards goal  Communication of RD Recs:  (Plan of care (POC))

## 2023-07-10 NOTE — PLAN OF CARE
Day 25 of CLIA+ponatinib. Pt involved in plan of care and communicating needs throughout shift. Up in room and to bathroom independently; no c/o pain. Tolerating diet, voiding without difficulty. Mag replaced per protocol. Cefepime given as scheduled. All VSS; no acute events so far this shift.  Pt remaining free from falls or injury throughout shift; bed locked and in lowest position; call light within reach.  Pt instructed to call for assistance as needed.  Q1H rounding done on pt. P resting comfortably at this time.
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
## 2023-07-10 NOTE — PROGRESS NOTES
[facility] - Oncology ([facility])  Hematology  Bone Marrow Transplant  Progress Note    Patient Name: Magdaleno Hirsch  Admission Date: 6/9/2023  Hospital Length of Stay: 31 days  Code Status: Full Code  Subjective:   Interval History: Day 25 of CLIA+ponatinib for blast phase CML. NAEON, afebrile, VSS. Doing well. Remains on Cefepime through 7/15, but per ID, can transition to Rocpehin if plans to discharge. ANC slowly recovering, at 30 today. Transfusion requirements minimal.   Objective:     Vital Signs (Most Recent):  Temp: 97.9 °F (36.6 °C) (07/10/23 0759)  Pulse: (!) 59 (07/10/23 0759)  Resp: 17 (07/10/23 0759)  BP: 130/75 (07/10/23 0759)  SpO2: 100 % (07/10/23 0759) Vital Signs (24h Range):  Temp:  [97.5 °F (36.4 °C)-98.3 °F (36.8 °C)] 97.9 °F (36.6 °C)  Pulse:  [59-74] 59  Resp:  [16-19] 17  SpO2:  [99 %-100 %] 100 %  BP: (121-134)/(71-85) 130/75     Weight: 78.3 kg (172 lb 8.2 oz)  Body mass index is 22.76 kg/m².  Body surface area is 2.01 meters squared.    Intake/Output - Last 3 Shifts         07/08 0700 07/09 0659 07/09 0700  07/10 0659 07/10 0700 07/11 0659    P.O. 1560 1860     IV Piggyback 286.7 369.9     Total Intake(mL/kg) 1846.7 (23.5) 2229.9 (28.5)     Net +1846.7 +2229.9            Urine Occurrence 5 x 3 x     Stool Occurrence 1 x 1 x            Physical Exam  Vitals and nursing note reviewed.   Constitutional:       General: He is not in acute distress.     Appearance: Normal appearance.   HENT:      Head: Normocephalic.      Mouth/Throat:      Mouth: Mucous membranes are moist.   Eyes:      Extraocular Movements: Extraocular movements intact.      Conjunctiva/sclera: Conjunctivae normal.      Pupils: Pupils are equal, round, and reactive to light.   Cardiovascular:      Rate and Rhythm: Normal rate and regular rhythm.      Pulses: Normal pulses.      Heart sounds: Normal heart sounds.   Pulmonary:      Effort: Pulmonary effort is normal.      Breath sounds: Normal breath sounds. No wheezing.    Abdominal:      General: Bowel sounds are normal. There is no distension.      Palpations: Abdomen is soft.      Tenderness: There is no abdominal tenderness.   Musculoskeletal:         General: Normal range of motion.      Cervical back: Normal range of motion and neck supple.      Right lower leg: No edema.      Left lower leg: No edema.   Skin:     General: Skin is warm and dry.      Capillary Refill: Capillary refill takes less than 2 seconds.      Comments: Right upper arm PICC clean, dry, intact. No erythema, edema, exudate.    Neurological:      General: No focal deficit present.      Mental Status: He is alert and oriented to person, place, and time.   Psychiatric:         Mood and Affect: Mood normal.         Behavior: Behavior normal.         Thought Content: Thought content normal.         Judgment: Judgment normal.          Significant Labs:   CBC:   Recent Labs   Lab 07/09/23  0454 07/10/23  0349   WBC 0.87* 0.78*   HGB 7.6* 7.7*   HCT 22.9* 23.2*   PLT 12* 13*   , CMP:   Recent Labs   Lab 07/09/23  0454 07/10/23  0349    141   K 4.3 4.4    107   CO2 25 27   GLU 90 93   BUN 12 15   CREATININE 0.7 0.9   CALCIUM 9.1 9.2   PROT 6.8 6.7   ALBUMIN 3.2* 3.2*   BILITOT 0.4 0.4   ALKPHOS 93 92   AST 9* 10   ALT 14 12   ANIONGAP 9 7*   , and LFTs:   Recent Labs   Lab 07/09/23  0454 07/10/23  0349   ALT 14 12   AST 9* 10   ALKPHOS 93 92   BILITOT 0.4 0.4   PROT 6.8 6.7   ALBUMIN 3.2* 3.2*     Diagnostic Results:  None    Assessment/Plan:     * 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 Brett Hogan. He has been on dasatinib outpatient since 10/2022. Some question regarding his compliance over the course of treatment, however. Presented to [facility] with c/o gum swelling on 6/7/23. Labs on presentation remarkable for WBC count of 138K, with 80% blasts indicating blast crisis. WBC count from approximately a  week and a half earlier was normal. Reported recent compliance with his TKI.     Transferred to [facility] for higher level of care    - 6/09/23: Bone marrow Biopsy c/w progression for blast phase CML   - BCR/ABL p210 collected on admission, >55%  - BCR/ABL mutation negative  - Echo with 65% EF and mild-moderate pulmonary hypertension.  - Patient decided to not do fertility presentation  - Day 25 of Cladribine Idarubicin Cytarabine, CLIA and transitioned from dasatinb to ponatinib.  - 7/06/23: Restaging day 21 bone marrow biopsy done 7/06, preliminary with no evidence of leukemia. Final pending.  - ANC 30 today     Neutropenic fever  - Temp of 101.4 at [place] ED. Persisted upon transfer.  - CXR neg. Blood culture NGTD. C-Diff negative. U/a neg  - Strep, flu, and COVID neg at OSH  - Throat red with exudates. Tonsils mildly swollen. CT neck showing adenopathy but no abscesses.  - CT chest without consolidations  - Repeat fever 6/28 @2330. BC 1/4 GPC with viridans strep; other 3/4 NGTD. Repeat CXR and UA negative  - Repeat blood cultures 7/1, 7/3, and 7/4 NGTD   - ID consulted, appreciate assistance: continue cefepime; EOT 7/15. If d/c prior, can change to Rocephin 2g Q24 and restart ppx Levaquin if needed.   - Last fever 7/4 @1749    Pharyngitis  - Continues [facility] and PO Oxy  - On Cefepime til 7/15 per ID recs    Infection by Streptococcus, viridans group  -see Neutropenic fever    Mild malnutrition  - continue PO intake as tolerated  - nutrition following    Pancytopenia due to antineoplastic chemotherapy  - Daily CBC while inpatient  - Transfuse for hgb < 7, PLTS < 10K or <50k if bleeding  - Ppx bactrim, fluconazole, and acyclovir; on cefepime     Hypertension  - Takes amlodipine at home  - Given hypertension, amlodipine stopped and nifedipine and losartan started in [place]. Continued on transfer.  - BP now improved      VTE Risk Mitigation (From admission, onward)         Ordered     heparin, porcine (PF) 100  unit/mL injection flush 300 Units  As needed (PRN)         06/16/23 1550     IP VTE HIGH RISK PATIENT  Once         06/09/23 0049     Place sequential compression device  Until discontinued         06/09/23 0049              Disposition: remain inpatient pending ANC recovery; possibly earlier pending approval from outpatient oncologist     Rocio Mckeon NP  Bone Marrow Transplant  [facility] - Oncology ([facility])

## 2023-07-10 NOTE — ASSESSMENT & PLAN NOTE
- Patient with CML diagnosed 8/2022 Follows locally in [place] with Dr. Brett Hogan. He has been on dasatinib outpatient since 10/2022. Some question regarding his compliance over the course of treatment, however. Presented to [facility] with c/o gum swelling on 6/7/23. Labs on presentation remarkable for WBC count of 138K, with 80% blasts indicating blast crisis. WBC count from approximately a week and a half earlier was normal. Reported recent compliance with his TKI.     Transferred to [facility] for higher level of care    - 6/09/23: Bone marrow Biopsy c/w progression for blast phase CML   - BCR/ABL p210 collected on admission, >55%  - BCR/ABL mutation negative  - Echo with 65% EF and mild-moderate pulmonary hypertension.  - Patient decided to not do fertility presentation  - Day 25 of Cladribine Idarubicin Cytarabine, CLIA and transitioned from dasatinb to ponatinib.  - 7/06/23: Restaging day 21 bone marrow biopsy done 7/06, preliminary with no evidence of leukemia. Final pending.  - ANC 30 today

## 2023-07-10 NOTE — TELEPHONE ENCOUNTER
Patient is still inpatient, needs Iclusig by 7/16. Will pend refill call to 6/12 for possible bedside delivery on 6/13

## 2023-07-10 NOTE — ASSESSMENT & PLAN NOTE
- Temp of 101.4 at [facility]. Persisted upon transfer.  - CXR neg. Blood culture NGTD. C-Diff negative. U/a neg  - Strep, flu, and COVID neg at OSH  - Throat red with exudates. Tonsils mildly swollen. CT neck showing adenopathy but no abscesses.  - CT chest without consolidations  - Repeat fever 6/28 @2330. BC 1/4 GPC with viridans strep; other 3/4 NGTD. Repeat CXR and UA negative  - Repeat blood cultures 7/1, 7/3, and 7/4 NGTD   - ID consulted, appreciate assistance: continue cefepime; EOT 7/15. If d/c prior, can change to Rocephin 2g Q24 and restart ppx Levaquin if needed.   - Last fever 7/4 @1749

## 2023-07-11 LAB
ABO + RH BLD: NORMAL
ALBUMIN SERPL BCP-MCNC: 3.3 G/DL (ref 3.5–5.2)
ALP SERPL-CCNC: 96 U/L (ref 55–135)
ALT SERPL W/O P-5'-P-CCNC: 15 U/L (ref 10–44)
ANION GAP SERPL CALC-SCNC: 6 MMOL/L (ref 8–16)
ANISOCYTOSIS BLD QL SMEAR: SLIGHT
AST SERPL-CCNC: 10 U/L (ref 10–40)
BASOPHILS # BLD AUTO: 0 K/UL (ref 0–0.2)
BASOPHILS NFR BLD: 0 % (ref 0–1.9)
BILIRUB SERPL-MCNC: 0.4 MG/DL (ref 0.1–1)
BLD GP AB SCN CELLS X3 SERPL QL: NORMAL
BUN SERPL-MCNC: 16 MG/DL (ref 6–20)
CALCIUM SERPL-MCNC: 9 MG/DL (ref 8.7–10.5)
CHLORIDE SERPL-SCNC: 108 MMOL/L (ref 95–110)
CO2 SERPL-SCNC: 25 MMOL/L (ref 23–29)
CREAT SERPL-MCNC: 0.8 MG/DL (ref 0.5–1.4)
DIFFERENTIAL METHOD: ABNORMAL
EOSINOPHIL # BLD AUTO: 0 K/UL (ref 0–0.5)
EOSINOPHIL NFR BLD: 0 % (ref 0–8)
ERYTHROCYTE [DISTWIDTH] IN BLOOD BY AUTOMATED COUNT: 14.7 % (ref 11.5–14.5)
EST. GFR  (NO RACE VARIABLE): >60 ML/MIN/1.73 M^2
GLUCOSE SERPL-MCNC: 92 MG/DL (ref 70–110)
HCT VFR BLD AUTO: 23.1 % (ref 40–54)
HGB BLD-MCNC: 7.8 G/DL (ref 14–18)
HYPOCHROMIA BLD QL SMEAR: ABNORMAL
IMM GRANULOCYTES # BLD AUTO: 0 K/UL (ref 0–0.04)
IMM GRANULOCYTES NFR BLD AUTO: 0 % (ref 0–0.5)
LYMPHOCYTES # BLD AUTO: 1 K/UL (ref 1–4.8)
LYMPHOCYTES NFR BLD: 93.5 % (ref 18–48)
MAGNESIUM SERPL-MCNC: 1.9 MG/DL (ref 1.6–2.6)
MCH RBC QN AUTO: 26.4 PG (ref 27–31)
MCHC RBC AUTO-ENTMCNC: 33.8 G/DL (ref 32–36)
MCV RBC AUTO: 78 FL (ref 82–98)
MONOCYTES # BLD AUTO: 0 K/UL (ref 0.3–1)
MONOCYTES NFR BLD: 0.9 % (ref 4–15)
NEUTROPHILS # BLD AUTO: 0.1 K/UL (ref 1.8–7.7)
NEUTROPHILS NFR BLD: 5.6 % (ref 38–73)
NRBC BLD-RTO: 0 /100 WBC
OVALOCYTES BLD QL SMEAR: ABNORMAL
PHOSPHATE SERPL-MCNC: 4 MG/DL (ref 2.7–4.5)
PLATELET # BLD AUTO: 15 K/UL (ref 150–450)
PLATELET BLD QL SMEAR: ABNORMAL
PMV BLD AUTO: ABNORMAL FL (ref 9.2–12.9)
POIKILOCYTOSIS BLD QL SMEAR: SLIGHT
POLYCHROMASIA BLD QL SMEAR: ABNORMAL
POTASSIUM SERPL-SCNC: 4.1 MMOL/L (ref 3.5–5.1)
PROT SERPL-MCNC: 6.6 G/DL (ref 6–8.4)
RBC # BLD AUTO: 2.96 M/UL (ref 4.6–6.2)
SODIUM SERPL-SCNC: 139 MMOL/L (ref 136–145)
SPECIMEN OUTDATE: NORMAL
WBC # BLD AUTO: 1.08 K/UL (ref 3.9–12.7)

## 2023-07-11 PROCEDURE — 80053 COMPREHEN METABOLIC PANEL: CPT | Performed by: NURSE PRACTITIONER

## 2023-07-11 PROCEDURE — 20600001 HC STEP DOWN PRIVATE ROOM

## 2023-07-11 PROCEDURE — 25000003 PHARM REV CODE 250: Performed by: STUDENT IN AN ORGANIZED HEALTH CARE EDUCATION/TRAINING PROGRAM

## 2023-07-11 PROCEDURE — 84100 ASSAY OF PHOSPHORUS: CPT | Performed by: NURSE PRACTITIONER

## 2023-07-11 PROCEDURE — 99233 SBSQ HOSP IP/OBS HIGH 50: CPT | Mod: ,,, | Performed by: INTERNAL MEDICINE

## 2023-07-11 PROCEDURE — 25000003 PHARM REV CODE 250: Performed by: NURSE PRACTITIONER

## 2023-07-11 PROCEDURE — 86900 BLOOD TYPING SEROLOGIC ABO: CPT | Performed by: INTERNAL MEDICINE

## 2023-07-11 PROCEDURE — 25000003 PHARM REV CODE 250: Performed by: INTERNAL MEDICINE

## 2023-07-11 PROCEDURE — 85025 COMPLETE CBC W/AUTO DIFF WBC: CPT | Performed by: NURSE PRACTITIONER

## 2023-07-11 PROCEDURE — 99233 PR SUBSEQUENT HOSPITAL CARE,LEVL III: ICD-10-PCS | Mod: ,,, | Performed by: INTERNAL MEDICINE

## 2023-07-11 PROCEDURE — 63600175 PHARM REV CODE 636 W HCPCS: Performed by: INTERNAL MEDICINE

## 2023-07-11 PROCEDURE — 83735 ASSAY OF MAGNESIUM: CPT | Performed by: NURSE PRACTITIONER

## 2023-07-11 RX ORDER — OXYCODONE HYDROCHLORIDE 5 MG/1
5 TABLET ORAL EVERY 4 HOURS PRN
Qty: 30 TABLET | Refills: 0 | Status: ON HOLD | OUTPATIENT
Start: 2023-07-11 | End: 2023-08-28 | Stop reason: CLARIF

## 2023-07-11 RX ORDER — ACYCLOVIR 800 MG/1
800 TABLET ORAL 2 TIMES DAILY
Qty: 60 TABLET | Refills: 11 | Status: SHIPPED | OUTPATIENT
Start: 2023-07-11 | End: 2023-12-04 | Stop reason: SDUPTHER

## 2023-07-11 RX ORDER — NIFEDIPINE 60 MG/1
60 TABLET, EXTENDED RELEASE ORAL DAILY
Qty: 30 TABLET | Refills: 11 | Status: SHIPPED | OUTPATIENT
Start: 2023-07-12 | End: 2023-12-04 | Stop reason: SDUPTHER

## 2023-07-11 RX ORDER — SULFAMETHOXAZOLE AND TRIMETHOPRIM 800; 160 MG/1; MG/1
1 TABLET ORAL
Qty: 15 TABLET | Refills: 3 | Status: SHIPPED | OUTPATIENT
Start: 2023-07-14 | End: 2023-12-04 | Stop reason: SDUPTHER

## 2023-07-11 RX ORDER — POSACONAZOLE 100 MG/1
300 TABLET, DELAYED RELEASE ORAL DAILY
Qty: 90 TABLET | Refills: 3 | Status: ON HOLD | OUTPATIENT
Start: 2023-07-13 | End: 2023-11-20

## 2023-07-11 RX ORDER — ACYCLOVIR 200 MG/1
800 CAPSULE ORAL 2 TIMES DAILY
Qty: 240 CAPSULE | Refills: 11 | Status: SHIPPED | OUTPATIENT
Start: 2023-07-11 | End: 2023-07-11 | Stop reason: HOSPADM

## 2023-07-11 RX ORDER — PANTOPRAZOLE SODIUM 40 MG/1
40 TABLET, DELAYED RELEASE ORAL DAILY
Qty: 30 TABLET | Refills: 11 | Status: SHIPPED | OUTPATIENT
Start: 2023-07-12 | End: 2023-12-04 | Stop reason: SDUPTHER

## 2023-07-11 RX ADMIN — NIFEDIPINE 60 MG: 60 TABLET, FILM COATED, EXTENDED RELEASE ORAL at 09:07

## 2023-07-11 RX ADMIN — CEFEPIME 2 G: 2 INJECTION, POWDER, FOR SOLUTION INTRAVENOUS at 01:07

## 2023-07-11 RX ADMIN — ACYCLOVIR 800 MG: 200 CAPSULE ORAL at 08:07

## 2023-07-11 RX ADMIN — PONATINIB HYDROCHLORIDE 15 MG: 15 TABLET, FILM COATED ORAL at 09:07

## 2023-07-11 RX ADMIN — CEFEPIME 2 G: 2 INJECTION, POWDER, FOR SOLUTION INTRAVENOUS at 09:07

## 2023-07-11 RX ADMIN — MAGNESIUM OXIDE TAB 400 MG (241.3 MG ELEMENTAL MG) 400 MG: 400 (241.3 MG) TAB at 05:07

## 2023-07-11 RX ADMIN — ACYCLOVIR 800 MG: 200 CAPSULE ORAL at 09:07

## 2023-07-11 RX ADMIN — MAGNESIUM OXIDE TAB 400 MG (241.3 MG ELEMENTAL MG) 400 MG: 400 (241.3 MG) TAB at 10:07

## 2023-07-11 RX ADMIN — ALUMINUM HYDROXIDE, MAGNESIUM HYDROXIDE, AND DIMETHICONE 10 ML: 400; 400; 40 SUSPENSION ORAL at 05:07

## 2023-07-11 RX ADMIN — OXYCODONE HYDROCHLORIDE 5 MG: 5 TABLET ORAL at 08:07

## 2023-07-11 RX ADMIN — POSACONAZOLE 300 MG: 100 TABLET, DELAYED RELEASE ORAL at 09:07

## 2023-07-11 RX ADMIN — ALUMINUM HYDROXIDE, MAGNESIUM HYDROXIDE, AND DIMETHICONE 10 ML: 400; 400; 40 SUSPENSION ORAL at 08:07

## 2023-07-11 RX ADMIN — PANTOPRAZOLE SODIUM 40 MG: 40 TABLET, DELAYED RELEASE ORAL at 09:07

## 2023-07-11 RX ADMIN — ALUMINUM HYDROXIDE, MAGNESIUM HYDROXIDE, AND DIMETHICONE 10 ML: 400; 400; 40 SUSPENSION ORAL at 09:07

## 2023-07-11 RX ADMIN — CEFEPIME 2 G: 2 INJECTION, POWDER, FOR SOLUTION INTRAVENOUS at 05:07

## 2023-07-11 NOTE — SUBJECTIVE & OBJECTIVE
Subjective:   Interval History: Day 26 of CLIA+ponatinib for blast phase CML. NAEON, afebrile, VSS. No concerns or complaints this morning. ANC uptrending, now at 60. No transfusion requirements.   Objective:     Vital Signs (Most Recent):  Temp: 98.5 °F (36.9 °C) (07/11/23 0744)  Pulse: 60 (07/11/23 0744)  Resp: 16 (07/11/23 0744)  BP: 127/75 (07/11/23 0744)  SpO2: 98 % (07/11/23 0744) Vital Signs (24h Range):  Temp:  [97.6 °F (36.4 °C)-98.5 °F (36.9 °C)] 98.5 °F (36.9 °C)  Pulse:  [58-72] 60  Resp:  [14-20] 16  SpO2:  [98 %-100 %] 98 %  BP: (106-147)/(53-85) 127/75     Weight: 77.6 kg (170 lb 15.5 oz)  Body mass index is 22.56 kg/m².  Body surface area is 2 meters squared.    Intake/Output - Last 3 Shifts         07/09 0700  07/10 0659 07/10 0700 07/11 0659 07/11 0700  07/12 0659    P.O. 1860 2434     IV Piggyback 369.9      Total Intake(mL/kg) 2229.9 (28.5) 2434 (31.4)     Net +2229.9 +2434            Urine Occurrence 3 x 8 x     Stool Occurrence 1 x             Physical Exam  Vitals and nursing note reviewed.   Constitutional:       General: He is not in acute distress.     Appearance: Normal appearance.   HENT:      Head: Normocephalic.      Mouth/Throat:      Mouth: Mucous membranes are moist.   Eyes:      Extraocular Movements: Extraocular movements intact.      Conjunctiva/sclera: Conjunctivae normal.      Pupils: Pupils are equal, round, and reactive to light.   Cardiovascular:      Rate and Rhythm: Normal rate and regular rhythm.      Pulses: Normal pulses.      Heart sounds: Normal heart sounds.   Pulmonary:      Effort: Pulmonary effort is normal.      Breath sounds: Normal breath sounds. No wheezing.   Abdominal:      General: Bowel sounds are normal. There is no distension.      Palpations: Abdomen is soft.      Tenderness: There is no abdominal tenderness.   Musculoskeletal:         General: Normal range of motion.      Cervical back: Normal range of motion and neck supple.      Right lower leg: No  edema.      Left lower leg: No edema.      Comments: Right upper arm PICC clean, dry, intact. No erythema, edema, exudate.      Skin:     General: Skin is warm and dry.      Capillary Refill: Capillary refill takes less than 2 seconds.   Neurological:      General: No focal deficit present.      Mental Status: He is alert and oriented to person, place, and time.   Psychiatric:         Mood and Affect: Mood normal.         Behavior: Behavior normal.         Thought Content: Thought content normal.         Judgment: Judgment normal.          Significant Labs:   CBC:   Recent Labs   Lab 07/10/23  0349 07/11/23  0421   WBC 0.78* 1.08*   HGB 7.7* 7.8*   HCT 23.2* 23.1*   PLT 13* 15*   , CMP:   Recent Labs   Lab 07/10/23  0349 07/11/23  0421    139   K 4.4 4.1    108   CO2 27 25   GLU 93 92   BUN 15 16   CREATININE 0.9 0.8   CALCIUM 9.2 9.0   PROT 6.7 6.6   ALBUMIN 3.2* 3.3*   BILITOT 0.4 0.4   ALKPHOS 92 96   AST 10 10   ALT 12 15   ANIONGAP 7* 6*   , and LFTs:   Recent Labs   Lab 07/10/23  0349 07/11/23  0421   ALT 12 15   AST 10 10   ALKPHOS 92 96   BILITOT 0.4 0.4   PROT 6.7 6.6   ALBUMIN 3.2* 3.3*     Diagnostic Results:  None

## 2023-07-11 NOTE — PLAN OF CARE
Plan of care reviewed with patient. Day26 CLIA+Ponatinib. ANC continues to increase. Per team, if ANC increases again tomorrow, even if still neutropenic, pt can d/c with PICC line to complete course of IV ABX. No acute events and no complaints today. VSS, q1h patient rounds, bed in low position and wheels locked, rails up x2, call light and personal belongings within reach.    Clinton Lester   7/11/2023   6:24 PM

## 2023-07-11 NOTE — ASSESSMENT & PLAN NOTE
- Patient with CML diagnosed 8/2022 Follows locally in [place] with Dr. Brett Hogan. He has been on dasatinib outpatient since 10/2022. Some question regarding his compliance over the course of treatment, however. Presented to [facility] with c/o gum swelling on 6/7/23. Labs on presentation remarkable for WBC count of 138K, with 80% blasts indicating blast crisis. WBC count from approximately a week and a half earlier was normal. Reported recent compliance with his TKI.     [place] [facility] [place]    - 6/09/23: Bone marrow Biopsy c/w progression for blast phase CML   - BCR/ABL p210 collected on admission, >55%  - BCR/ABL mutation negative  - Echo with 65% EF and mild-moderate pulmonary hypertension.  - Patient decided to not do fertility presentation  - Day 27 of Cladribine Idarubicin Cytarabine, CLIA and transitioned from dasatinb to ponatinib.  - 7/06/23: Restaging day 21 bone marrow biopsy done 7/06, no evidence of leukemia.  - ANC 70 today     Requested:   - 7/17: appointment with Dr. Hogan requeseted   - 7/17 - labs, possible transfusion support with Dr. Hogan in [place] requested  - 7/20 - labs, possible transfusion support with Dr. Hogan in [place] requested  - Week of 7/24: requested bone marrow biopsy in [place] with Dr. Hogan   - 8/07: requested labs, COVID, appointment with Dr. Green, and possible admission

## 2023-07-11 NOTE — ASSESSMENT & PLAN NOTE
- Patient with CML diagnosed 8/2022 Follows locally in [place] with Dr. Brett Hogan. He has been on dasatinib outpatient since 10/2022. Some question regarding his compliance over the course of treatment, however. Presented to [facility] with c/o gum swelling on 6/7/23. Labs on presentation remarkable for WBC count of 138K, with 80% blasts indicating blast crisis. WBC count from approximately a week and a half earlier was normal. Reported recent compliance with his TKI.     Transferred to [facility] for higher level of care    - 6/09/23: Bone marrow Biopsy c/w progression for blast phase CML   - BCR/ABL p210 collected on admission, >55%  - BCR/ABL mutation negative  - Echo with 65% EF and mild-moderate pulmonary hypertension.  - Patient decided to not do fertility presentation  - Day 26 of Cladribine Idarubicin Cytarabine, CLIA and transitioned from dasatinb to ponatinib.  - 7/06/23: Restaging day 21 bone marrow biopsy done 7/06, no evidence of leukemia.  - ANC 60 today     Dispo:  - 7/13: f/u with Dr. Nj

## 2023-07-11 NOTE — PROCEDURES
PROCEDURE NOTE:  Date of Procedure: 07/11/2023  Bone Marrow Biopsy and Aspiration  Indication: Day 21 miguel a bone marrow biopsy following induction with Clia+ponatinib for blast phase CML  Consent: Informed consent was obtained from patient.  Timeout: Done and documented.  Position: prone  Site: Left posterior illiac crest.  Prep: Betadine.  Needle used: 11 gauge Jamshidi needle.  Anesthetic: 2% lidocaine 8 cc.  Biopsy: The biopsy needle was introduced into the marrow cavity and an aspirate was obtained without complications and sent for flow cytometry, DNA/RNA hold, and cytogenetics. Core biopsy obtained without difficulty and sent for routine histologic examination.  Complications: None.  Disposition: The patient was placed supine for 15min following procedure. RN to assess bandaid for bleeding. Patient aware to keep bandaid dry and intact for 24hrs and to notify staff for any bleeding, fevers, pain, or signs of infection. Can remove dressing in 24hours  Blood loss: Minimal.     Ginger Jacob NP  Hematology/Oncology/BMT

## 2023-07-11 NOTE — PLAN OF CARE
[facility] - Oncology (Hospital)      HOME HEALTH ORDERS  FACE TO FACE ENCOUNTER    Patient Name: Magdaleno Hirsch  YOB: 2000    [facility]: Dr. Bijan Green  [facility] Phone: [phone]   [facility] Fax: [phone]    Encounter Date: 6/8/23    Admit to Home Health    Diagnoses:  Active Hospital Problems    Diagnosis  POA    *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 [C92.10]  Yes     Priority: 1 - High    Neutropenic fever [D70.9, R50.81]  Yes     Priority: 2     Infection by Streptococcus, viridans group [A49.1]  No     Priority: 3     Pharyngitis [J02.9]  Yes     Priority: 3     Mild malnutrition [E44.1]  Yes     Priority: 5     Pancytopenia due to antineoplastic chemotherapy [D61.810, T45.1X5A]  Yes     Priority: 6     Hypertension [I10]  Yes     Chronic      Resolved Hospital Problems    Diagnosis Date Resolved POA    Constipation [K59.00] 06/27/2023 Yes    Hyperkalemia [E87.5] 06/27/2023 Yes    Diarrhea [R19.7] 06/27/2023 Yes    Hypokalemia [E87.6] 06/17/2023 Yes    Leukocytosis [D72.829] 06/26/2023 Yes    Tumor lysis syndrome [E88.3] 06/27/2023 Yes       Follow Up Appointments:  Future Appointments   Date Time Provider Department Center   7/13/2023  1:40 PM [facility] II[facility]   7/20/2023  2:00 PM Ria Tracy DO [facility] ID [facility]       Allergies:Review of patient's allergies indicates:  No Known Allergies    Medications: Review discharge medications with patient and family and provide education.    Current Facility-Administered Medications   Medication Dose Route Frequency Provider Last Rate Last Admin    acetaminophen oral solution 650 mg  650 mg Oral Q6H PRN Suzan Carreon NP   650 mg at 07/05/23 1000    acyclovir capsule 800 mg  800 mg Oral BID Jerry English DO   800 mg at 07/11/23 0953    alteplase injection 2 mg  2 mg Intra-Catheter PRN Tani Driver MD   2 mg at 07/03/23 0623    calcium gluconate 1 g in NS IVPB  (premixed)  1 g Intravenous Q10 Min PRN Gus Posadas MD        ceFEPIme (MAXIPIME) 2 g in dextrose 5 % in water (D5W) 5 % 100 mL IVPB (MB+)  2 g Intravenous Q8H Rocio Waterman  mL/hr at 07/11/23 1722 2 g at 07/11/23 1722    [START ON 7/12/2023] cefTRIAXone (ROCEPHIN) 2 g in dextrose 5 % in water (D5W) 5 % 100 mL IVPB (MB+)  2 g Intravenous Q24H Rocio Mckeon NP        dextromethorphan-guaiFENesin  mg per 12 hr tablet 1 tablet  1 tablet Oral BID PRN Ginger Jacob NP        dextrose 10% bolus 125 mL 125 mL  12.5 g Intravenous PRN Suzan Carreon NP        dextrose 10% bolus 250 mL 250 mL  25 g Intravenous PRN Suzan Carreon NP   Stopped at 06/21/23 0950    dextrose 40 % gel 15,000 mg  15 g Oral PRN Brittany Hammond NP        dextrose 40 % gel 30,000 mg  30 g Oral PRN Brittany Hammond NP        diphenhydrAMINE injection 12.5 mg  12.5 mg Intravenous Q6H PRN Suzan Carreon NP   12.5 mg at 07/05/23 1001    duke's soln (benadryl 30 mL, mylanta 30 mL, LIDOcaine 30 mL, nystatin 30 mL) 120 mL  10 mL Oral QID Brittany Hammond NP   10 mL at 07/11/23 1721    glucagon (human recombinant) injection 1 mg  1 mg Intramuscular PRN Brittany Hammond NP        heparin, porcine (PF) 100 unit/mL injection flush 300 Units  300 Units Intravenous PRN Tani Driver MD        k phos di & mono-sod phos mono 250 mg tablet 1 tablet  1 tablet Oral Q4H PRN Suzan Carreon NP   1 tablet at 07/05/23 0320    k phos di & mono-sod phos mono 250 mg tablet 2 tablet  2 tablet Oral Q4H PRN Suzan Carreon NP        magnesium oxide tablet 400 mg  400 mg Oral Q4H PRN Suzan Carreon NP   400 mg at 07/11/23 1053    magnesium oxide tablet 400 mg  400 mg Oral Q4H PRN Suzan Carreon NP   400 mg at 06/24/23 1510    magnesium oxide tablet 800 mg  800 mg Oral Q4H PRN Suzan Carreon, JEMAL        naloxone 0.4 mg/mL injection 0.02 mg  0.02 mg Intravenous PRN Brittany Hammond, JEMAL        NIFEdipine 24 hr tablet 60 mg   60 mg Oral Daily Brittany Hammond NP   60 mg at 07/11/23 0953    ondansetron disintegrating tablet 8 mg  8 mg Oral Q8H PRN Brittany Hammond NP   8 mg at 06/18/23 1810    oxyCODONE immediate release tablet 5 mg  5 mg Oral Q4H PRN Ginger Jacob NP   5 mg at 07/10/23 2105    pantoprazole EC tablet 40 mg  40 mg Oral Daily Gus Posadas MD   40 mg at 07/11/23 0954    polyethylene glycol packet 17 g  17 g Oral BID PRN Suzan Carreon NP        PONATinib Tab 15 mg  15 mg Oral Daily Tani Drvier MD   15 mg at 07/11/23 0954    posaconazole EC tablet 300 mg  300 mg Oral Daily Gus Posadas MD   300 mg at 07/11/23 0953    potassium chloride SA CR tablet 20 mEq  20 mEq Oral PRN Suzan Carreon NP        potassium chloride SA CR tablet 20 mEq  20 mEq Oral Q2H PRN Suzan Carreon NP        potassium chloride SA CR tablet 20 mEq  20 mEq Oral Q2H PRN Suzan Carreon NP        prochlorperazine injection Soln 10 mg  10 mg Intravenous Q6H PRN Tani Driver MD        sodium chloride 0.9% flush 10 mL  10 mL Intravenous PRN Denny Barajas MD        sodium chloride 0.9% flush 10 mL  10 mL Intravenous PRN Tani Driver MD   10 mL at 06/18/23 0112    sulfamethoxazole-trimethoprim 800-160mg per tablet 1 tablet  1 tablet Oral Every Mon, Wed, Fri Tani Driver MD   1 tablet at 07/10/23 1640     Current Discharge Medication List        START taking these medications    Details   acyclovir (ZOVIRAX) 200 MG capsule Take 4 capsules (800 mg total) by mouth 2 (two) times daily.  Qty: 240 capsule, Refills: 11      dextrose 5 % in water (D5W) 5 % PgBk 100 mL with cefTRIAXone 2 gram SolR 2 g Inject 2 g into the vein Daily. for 3 doses      duke's soln (benadryl 30 mL, mylanta 30 mL, LIDOcaine 30 mL, nystatin 30 mL) 120mL Take 10 mLs by mouth 4 (four) times daily.  Qty: 120 mL, Refills: 0      NIFEdipine (PROCARDIA-XL) 60 MG (OSM) 24 hr tablet Take 1 tablet (60 mg total) by mouth once daily.  Qty: 30  tablet, Refills: 11    Comments: .      oxyCODONE (ROXICODONE) 5 MG immediate release tablet Take 1 tablet (5 mg total) by mouth every 4 (four) hours as needed for Pain.  Qty: 30 tablet, Refills: 0    Comments: Quantity prescribed more than 7 day supply? Yes, quantity medically necessary      pantoprazole (PROTONIX) 40 MG tablet Take 1 tablet (40 mg total) by mouth once daily.  Qty: 30 tablet, Refills: 11      posaconazole (NOXAFIL) 100 mg TbEC tablet Take 3 tablets (300 mg total) by mouth once daily.  Qty: 90 tablet, Refills: 3      sulfamethoxazole-trimethoprim 800-160mg (BACTRIM DS) 800-160 mg Tab Take 1 tablet by mouth every Mon, Wed, Fri.  Qty: 15 tablet, Refills: 3           CONTINUE these medications which have NOT CHANGED    Details   ondansetron (ZOFRAN) 8 MG tablet Take 1 tablet (8 mg total) by mouth every 6 (six) hours as needed for Nausea.  Qty: 60 tablet, Refills: 2    Associated Diagnoses: CML (chronic myelocytic leukemia); Nausea      PONATinib 15 mg Tab Take 1 tablet (15 mg total) by mouth Daily.  Qty: 30 tablet, Refills: 0    Associated Diagnoses: Blastic phase chronic myeloid leukemia           STOP taking these medications       amLODIPine (NORVASC) 10 MG tablet Comments:   Reason for Stopping:                 I have seen and examined this patient within the last 30 days. My clinical findings that support the need for the home health skilled services and home bound status are the following:no   Weakness/numbness causing balance and gait disturbance due to Anemia and Malignancy/Cancer making it taxing to leave home.     Diet:   regular diet    Activities:   activity as tolerated    Nursing:   Agency to admit patient within 24 hours of hospital discharge unless specified on physician order or at patient request    SN to complete comprehensive assessment including routine vital signs. Instruct on disease process and s/s of complications to report to MD. Review/verify medication list sent home with  the patient at time of discharge  and instruct patient/caregiver as needed. Frequency may be adjusted depending on start of care date.     Skilled nurse to perform up to 3 visits PRN for symptoms related to diagnosis    Notify MD if SBP > 160 or < 90; DBP > 90 or < 50; HR > 120 or < 50; Temp > 100.4; O2 < 88%    Ok to schedule additional visits based on staff availability and patient request on consecutive days within the home health episode.    When multiple disciplines ordered:    Start of Care occurs on Sunday - Wednesday schedule remaining discipline evaluations as ordered on separate consecutive days following the start of care.    Thursday SOC -schedule subsequent evaluations Friday and Monday the following week.     Friday - Saturday SOC - schedule subsequent discipline evaluations on consecutive days starting Monday of the following week.    For all post-discharge communication and subsequent orders please contact patient's primary care physician. If unable to reach primary care physician or do not receive response within 30 minutes, please contact Dr. Bijan Green at [phone] (phone) or [phone] (fax) for clinical staff order clarification    Miscellaneous   Home Infusion Therapy:  SN to perform Infusion Therapy/Central Line Care.  Review Central Line Care & Central Line Flush with patient.    Administer (drug and dose): Ceftriaxone 2g Q24 hours  x3 doses.  (Administer last dose on 7/15/23)  Last dose given: 7/12/23 at 0900                   Home dose due: 7/13/23 at 0900     Scrub the Hub: Prior to accessing the line, always perform a 30 second alcohol scrub  Each lumen of the central line is to be flushed at least daily with 10 mL Normal Saline and 3 mL Heparin flush (10 units/mL)  Skilled Nurse (SN) may draw blood from IV access  Blood Draw Procedure:   - Aspirate at least 5 mL of blood   - Discard   - Obtain specimen   - Change injection cap   - Flush with 20 mL Normal Saline followed by a                  3-5 mL Heparin flush (10 units/mL)  Central :   - Sterile dressing changes are done weekly and as needed.   - Use chlor-hexadine scrub to cleanse site, apply Biopatch to insertion site,       apply securement device dressing   - Injection caps are changed weekly and after EVERY lab draw.   - If sterile gauze is under dressing to control oozing,                 dressing change must be performed every 24 hours until gauze is not needed.    PICC can be removed by trained RN after last dose of Ceftriaxone administered on 7/15/23.     Home Health Aide:  Nursing Daily x3 days     I certify that this patient is confined to his home and needs intermittent skilled nursing care.      Rocio Mckeon NP  Hematology/Oncology/Bone Marrow Transplant

## 2023-07-11 NOTE — PROGRESS NOTES
[facility] Oncology ([facility])  Hematology  Bone Marrow Transplant  Progress Note    Patient Name: Magdaleno Hirsch  Admission Date: 6/9/2023  Hospital Length of Stay: 32 days  Code Status: Full Code  Subjective:   Interval History: Day 26 of CLIA+ponatinib for blast phase CML. NAEON, afebrile, VSS. No concerns or complaints this morning. ANC uptrending, now at 60. No transfusion requirements.   Objective:     Vital Signs (Most Recent):  Temp: 98.5 °F (36.9 °C) (07/11/23 0744)  Pulse: 60 (07/11/23 0744)  Resp: 16 (07/11/23 0744)  BP: 127/75 (07/11/23 0744)  SpO2: 98 % (07/11/23 0744) Vital Signs (24h Range):  Temp:  [97.6 °F (36.4 °C)-98.5 °F (36.9 °C)] 98.5 °F (36.9 °C)  Pulse:  [58-72] 60  Resp:  [14-20] 16  SpO2:  [98 %-100 %] 98 %  BP: (106-147)/(53-85) 127/75     Weight: 77.6 kg (170 lb 15.5 oz)  Body mass index is 22.56 kg/m².  Body surface area is 2 meters squared.    Intake/Output - Last 3 Shifts         07/09 0700  07/10 0659 07/10 0700  07/11 0659 07/11 0700  07/12 0659    P.O. 1860 2434     IV Piggyback 369.9      Total Intake(mL/kg) 2229.9 (28.5) 2434 (31.4)     Net +2229.9 +2434            Urine Occurrence 3 x 8 x     Stool Occurrence 1 x             Physical Exam  Vitals and nursing note reviewed.   Constitutional:       General: He is not in acute distress.     Appearance: Normal appearance.   HENT:      Head: Normocephalic.      Mouth/Throat:      Mouth: Mucous membranes are moist.   Eyes:      Extraocular Movements: Extraocular movements intact.      Conjunctiva/sclera: Conjunctivae normal.      Pupils: Pupils are equal, round, and reactive to light.   Cardiovascular:      Rate and Rhythm: Normal rate and regular rhythm.      Pulses: Normal pulses.      Heart sounds: Normal heart sounds.   Pulmonary:      Effort: Pulmonary effort is normal.      Breath sounds: Normal breath sounds. No wheezing.   Abdominal:      General: Bowel sounds are normal. There is no distension.      Palpations: Abdomen is soft.       Tenderness: There is no abdominal tenderness.   Musculoskeletal:         General: Normal range of motion.      Cervical back: Normal range of motion and neck supple.      Right lower leg: No edema.      Left lower leg: No edema.      Comments: Right upper arm PICC clean, dry, intact. No erythema, edema, exudate.      Skin:     General: Skin is warm and dry.      Capillary Refill: Capillary refill takes less than 2 seconds.   Neurological:      General: No focal deficit present.      Mental Status: He is alert and oriented to person, place, and time.   Psychiatric:         Mood and Affect: Mood normal.         Behavior: Behavior normal.         Thought Content: Thought content normal.         Judgment: Judgment normal.          Significant Labs:   CBC:   Recent Labs   Lab 07/10/23  0349 07/11/23  0421   WBC 0.78* 1.08*   HGB 7.7* 7.8*   HCT 23.2* 23.1*   PLT 13* 15*   , CMP:   Recent Labs   Lab 07/10/23  0349 07/11/23  0421    139   K 4.4 4.1    108   CO2 27 25   GLU 93 92   BUN 15 16   CREATININE 0.9 0.8   CALCIUM 9.2 9.0   PROT 6.7 6.6   ALBUMIN 3.2* 3.3*   BILITOT 0.4 0.4   ALKPHOS 92 96   AST 10 10   ALT 12 15   ANIONGAP 7* 6*   , and LFTs:   Recent Labs   Lab 07/10/23  0349 07/11/23  0421   ALT 12 15   AST 10 10   ALKPHOS 92 96   BILITOT 0.4 0.4   PROT 6.7 6.6   ALBUMIN 3.2* 3.3*     Diagnostic Results:  None  Assessment/Plan:     * 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  - Patient with CML diagnosed 8/2022 Follows locally in [place] with Dr. Brett Hogan. He has been on dasatinib outpatient since 10/2022. Some question regarding his compliance over the course of treatment, however. Presented to [facility] with c/o gum swelling on 6/7/23. Labs on presentation remarkable for WBC count of 138K, with 80% blasts indicating blast crisis. WBC count from approximately a week and a half earlier was normal. Reported recent compliance with his TKI.     Transferred to [facility] for higher  level of care    - 6/09/23: Bone marrow Biopsy c/w progression for blast phase CML   - BCR/ABL p210 collected on admission, >55%  - BCR/ABL mutation negative  - Echo with 65% EF and mild-moderate pulmonary hypertension.  - Patient decided to not do fertility presentation  - Day 26 of Cladribine Idarubicin Cytarabine, CLIA and transitioned from dasatinb to ponatinib.  - 7/06/23: Restaging day 21 bone marrow biopsy done 7/06, no evidence of leukemia.  - ANC 60 today     Dispo:  - 7/13: f/u with Dr. Nj     Neutropenic fever  - Temp of 101.4 at [place] ED. Persisted upon transfer.  - CXR neg. Blood culture NGTD. C-Diff negative. U/a neg  - Strep, flu, and COVID neg at OSH  - Throat red with exudates. Tonsils mildly swollen. CT neck showing adenopathy but no abscesses.  - CT chest without consolidations  - Repeat fever 6/28 @2330. BC 1/4 GPC with viridans strep; other 3/4 NGTD. Repeat CXR and UA negative  - Repeat blood cultures 7/1, 7/3, and 7/4 NGTD   - ID consulted, appreciate assistance: continue cefepime; EOT 7/15. If d/c prior, can change to Rocephin 2g Q24 and restart ppx Levaquin if needed.   - Last fever 7/4 @1749    Pharyngitis  - Continues [facility] and PO Oxy  - On Cefepime til 7/15 per ID recs    Infection by Streptococcus, viridans group  - See Neutropenic fever    Mild malnutrition  - Continue PO intake as tolerated  - Nutrition following    Pancytopenia due to antineoplastic chemotherapy  - Daily CBC while inpatient  - Transfuse for hgb < 7, PLTS < 10K or <50k if bleeding  - Ppx bactrim, fluconazole, and acyclovir; on cefepime     Hypertension  - Takes amlodipine at home  - Given hypertension, amlodipine stopped and nifedipine and losartan started in [place]. Continued on transfer.  - BP now improved        VTE Risk Mitigation (From admission, onward)           Ordered     heparin, porcine (PF) 100 unit/mL injection flush 300 Units  As needed (PRN)         06/16/23 1550     IP VTE HIGH RISK PATIENT   Once         06/09/23 0049     Place sequential compression device  Until discontinued         06/09/23 0049                  Disposition: remain inpatient pending uprtrending of ANC; potentially discharge Wednesday if ANC continues to increase     Rocio Mckeon NP  Bone Marrow Transplant  [facility] - Oncology ([facility])

## 2023-07-11 NOTE — ASSESSMENT & PLAN NOTE
- Temp of 101.4 at [facility]. Persisted upon transfer.  - CXR neg. Blood culture NGTD. C-Diff negative. U/a neg  - Strep, flu, and COVID neg at OSH  - Throat red with exudates. Tonsils mildly swollen. CT neck showing adenopathy but no abscesses.  - CT chest without consolidations  - Repeat fever 6/28 @2330. BC 1/4 GPC with viridans strep; other 3/4 NGTD. Repeat CXR and UA negative  - Repeat blood cultures 7/1, 7/3, and 7/4 NGTD   - ID consulted, appreciate assistance: continue cefepime; EOT 7/15. If d/c prior, can change to Rocephin 2g Q24 and restart ppx Levaquin if needed.   - Last fever 7/4 @1749

## 2023-07-11 NOTE — PLAN OF CARE
Problem: Adult Inpatient Plan of Care  Goal: Plan of Care Review  Outcome: Ongoing, Progressing     Problem: Coping Ineffective (Oncology Care)  Goal: Effective Coping  Outcome: Ongoing, Progressing     Problem: Pain Acute (Oncology Care)  Goal: Optimal Pain Control  Outcome: Ongoing, Progressing     Day 26 of CLIA+ponatinib for blast phase CML. Plan of care reviewed. Patient is oriented X4. Ambulates without difficulty. СВЕТЛАНА PICC patent. Throat pain; PRN oxycodone administered once. Remained afebrile. All questions and concerns addressed. All safety precautions in place. Remains free of injury. All needs met at this time.

## 2023-07-12 VITALS
HEIGHT: 73 IN | TEMPERATURE: 98 F | SYSTOLIC BLOOD PRESSURE: 126 MMHG | RESPIRATION RATE: 15 BRPM | HEART RATE: 81 BPM | OXYGEN SATURATION: 100 % | BODY MASS INDEX: 22.67 KG/M2 | DIASTOLIC BLOOD PRESSURE: 67 MMHG | WEIGHT: 171.06 LBS

## 2023-07-12 DIAGNOSIS — C92.10 CML (CHRONIC MYELOCYTIC LEUKEMIA): ICD-10-CM

## 2023-07-12 DIAGNOSIS — C92.10 BLASTIC PHASE CHRONIC MYELOID LEUKEMIA: Primary | ICD-10-CM

## 2023-07-12 LAB
ALBUMIN SERPL BCP-MCNC: 3.3 G/DL (ref 3.5–5.2)
ALP SERPL-CCNC: 94 U/L (ref 55–135)
ALT SERPL W/O P-5'-P-CCNC: 12 U/L (ref 10–44)
ANION GAP SERPL CALC-SCNC: 6 MMOL/L (ref 8–16)
ANISOCYTOSIS BLD QL SMEAR: SLIGHT
AST SERPL-CCNC: 10 U/L (ref 10–40)
BASOPHILS # BLD AUTO: 0 K/UL (ref 0–0.2)
BASOPHILS NFR BLD: 0 % (ref 0–1.9)
BILIRUB SERPL-MCNC: 0.4 MG/DL (ref 0.1–1)
BUN SERPL-MCNC: 15 MG/DL (ref 6–20)
CALCIUM SERPL-MCNC: 9 MG/DL (ref 8.7–10.5)
CHLORIDE SERPL-SCNC: 106 MMOL/L (ref 95–110)
CO2 SERPL-SCNC: 26 MMOL/L (ref 23–29)
CREAT SERPL-MCNC: 0.8 MG/DL (ref 0.5–1.4)
DIFFERENTIAL METHOD: ABNORMAL
EOSINOPHIL # BLD AUTO: 0 K/UL (ref 0–0.5)
EOSINOPHIL NFR BLD: 0 % (ref 0–8)
ERYTHROCYTE [DISTWIDTH] IN BLOOD BY AUTOMATED COUNT: 14.3 % (ref 11.5–14.5)
EST. GFR  (NO RACE VARIABLE): >60 ML/MIN/1.73 M^2
GLUCOSE SERPL-MCNC: 87 MG/DL (ref 70–110)
HCT VFR BLD AUTO: 23.5 % (ref 40–54)
HGB BLD-MCNC: 7.9 G/DL (ref 14–18)
HYPOCHROMIA BLD QL SMEAR: ABNORMAL
IMM GRANULOCYTES # BLD AUTO: 0 K/UL (ref 0–0.04)
IMM GRANULOCYTES NFR BLD AUTO: 0 % (ref 0–0.5)
LYMPHOCYTES # BLD AUTO: 1.1 K/UL (ref 1–4.8)
LYMPHOCYTES NFR BLD: 92.9 % (ref 18–48)
MAGNESIUM SERPL-MCNC: 1.8 MG/DL (ref 1.6–2.6)
MCH RBC QN AUTO: 26.7 PG (ref 27–31)
MCHC RBC AUTO-ENTMCNC: 33.6 G/DL (ref 32–36)
MCV RBC AUTO: 79 FL (ref 82–98)
MONOCYTES # BLD AUTO: 0 K/UL (ref 0.3–1)
MONOCYTES NFR BLD: 0.9 % (ref 4–15)
NEUTROPHILS # BLD AUTO: 0.1 K/UL (ref 1.8–7.7)
NEUTROPHILS NFR BLD: 6.2 % (ref 38–73)
NRBC BLD-RTO: 0 /100 WBC
OVALOCYTES BLD QL SMEAR: ABNORMAL
PHOSPHATE SERPL-MCNC: 4.2 MG/DL (ref 2.7–4.5)
PLATELET # BLD AUTO: 17 K/UL (ref 150–450)
PMV BLD AUTO: 10.2 FL (ref 9.2–12.9)
POIKILOCYTOSIS BLD QL SMEAR: SLIGHT
POLYCHROMASIA BLD QL SMEAR: ABNORMAL
POTASSIUM SERPL-SCNC: 4.4 MMOL/L (ref 3.5–5.1)
PROT SERPL-MCNC: 6.7 G/DL (ref 6–8.4)
RBC # BLD AUTO: 2.96 M/UL (ref 4.6–6.2)
SODIUM SERPL-SCNC: 138 MMOL/L (ref 136–145)
SPHEROCYTES BLD QL SMEAR: ABNORMAL
WBC # BLD AUTO: 1.13 K/UL (ref 3.9–12.7)

## 2023-07-12 PROCEDURE — 63600175 PHARM REV CODE 636 W HCPCS: Performed by: NURSE PRACTITIONER

## 2023-07-12 PROCEDURE — 25000003 PHARM REV CODE 250: Performed by: INTERNAL MEDICINE

## 2023-07-12 PROCEDURE — 99238 HOSP IP/OBS DSCHRG MGMT 30/<: CPT | Mod: ,,, | Performed by: INTERNAL MEDICINE

## 2023-07-12 PROCEDURE — 85025 COMPLETE CBC W/AUTO DIFF WBC: CPT | Performed by: NURSE PRACTITIONER

## 2023-07-12 PROCEDURE — 80053 COMPREHEN METABOLIC PANEL: CPT | Performed by: NURSE PRACTITIONER

## 2023-07-12 PROCEDURE — 99238 PR HOSPITAL DISCHARGE DAY,<30 MIN: ICD-10-PCS | Mod: ,,, | Performed by: INTERNAL MEDICINE

## 2023-07-12 PROCEDURE — 25000003 PHARM REV CODE 250: Performed by: STUDENT IN AN ORGANIZED HEALTH CARE EDUCATION/TRAINING PROGRAM

## 2023-07-12 PROCEDURE — 84100 ASSAY OF PHOSPHORUS: CPT | Performed by: NURSE PRACTITIONER

## 2023-07-12 PROCEDURE — 25000003 PHARM REV CODE 250: Performed by: NURSE PRACTITIONER

## 2023-07-12 PROCEDURE — 83735 ASSAY OF MAGNESIUM: CPT | Performed by: NURSE PRACTITIONER

## 2023-07-12 RX ORDER — LEVOFLOXACIN 500 MG/1
500 TABLET, FILM COATED ORAL DAILY
Qty: 14 TABLET | Refills: 0 | Status: ON HOLD | OUTPATIENT
Start: 2023-07-15 | End: 2023-08-28 | Stop reason: CLARIF

## 2023-07-12 RX ADMIN — CEFTRIAXONE 2 G: 2 INJECTION, POWDER, FOR SOLUTION INTRAMUSCULAR; INTRAVENOUS at 08:07

## 2023-07-12 RX ADMIN — NIFEDIPINE 60 MG: 60 TABLET, FILM COATED, EXTENDED RELEASE ORAL at 08:07

## 2023-07-12 RX ADMIN — PONATINIB HYDROCHLORIDE 15 MG: 15 TABLET, FILM COATED ORAL at 09:07

## 2023-07-12 RX ADMIN — ALUMINUM HYDROXIDE, MAGNESIUM HYDROXIDE, AND DIMETHICONE 10 ML: 400; 400; 40 SUSPENSION ORAL at 09:07

## 2023-07-12 RX ADMIN — POSACONAZOLE 300 MG: 100 TABLET, DELAYED RELEASE ORAL at 08:07

## 2023-07-12 RX ADMIN — CEFEPIME 2 G: 2 INJECTION, POWDER, FOR SOLUTION INTRAVENOUS at 01:07

## 2023-07-12 RX ADMIN — ALUMINUM HYDROXIDE, MAGNESIUM HYDROXIDE, AND DIMETHICONE 10 ML: 400; 400; 40 SUSPENSION ORAL at 02:07

## 2023-07-12 RX ADMIN — PANTOPRAZOLE SODIUM 40 MG: 40 TABLET, DELAYED RELEASE ORAL at 08:07

## 2023-07-12 RX ADMIN — ACYCLOVIR 800 MG: 200 CAPSULE ORAL at 08:07

## 2023-07-12 NOTE — PLAN OF CARE
Problem: Adult Inpatient Plan of Care  Goal: Plan of Care Review  Outcome: Ongoing, Progressing     Problem: Adult Inpatient Plan of Care  Goal: Absence of Hospital-Acquired Illness or Injury  Outcome: Ongoing, Progressing     Problem: Oral Mucositis (Oncology Care)  Goal: Improved Oral Mucous Membrane Integrity  Outcome: Ongoing, Progressing     Day 27 of CLIA+ponatinib for blast phase CML. Plan of care reviewed. Patient is oriented X4. Ambulates without difficulty. СВЕТЛАНА PICC patent. PRN oxycodone administered for mouth pain once. Consumed larger percentage of meals. Remained afebrile. All questions and concerns addressed. All safety precautions in place. Remains free of injury. All needs met at this time.

## 2023-07-12 NOTE — PLAN OF CARE
Pt discharged to home per MD order. Discharge instructions and prescriptions given and explained to pt. PICC line lumens flushed with NS and capped; pt d/c from fluids; PICC to stay in place for home antibiotics. PICC will be removed at follow up appointment next week.  Pt ambulating in room with steady gait; tolerating regular diet; voiding without difficulty; pain well-controlled with PO pain meds.  All VSS; O2 sats WNL. Pt left floor by wheelchair via hospital transportation.

## 2023-07-12 NOTE — DISCHARGE SUMMARY
[facility] ([facility])  Hematology  Bone Marrow Transplant  Discharge Summary      Patient Name: Magdaleno Hirsch  MRN: [id number]  Admission Date: 6/9/2023  Hospital Length of Stay: 33 days  Discharge Date and Time:  07/12/2023   Attending Physician: Tani Driver MD   Discharging Provider: Rocio Mckeon NP  Primary Care Provider: Primary Doctor No    HPI: Mr. Hirsch is a 23 y.o. patient with CML diagnosed 8/2022. Follows locally in [place] with Dr. Brett Hogan. He has been on dasatinib outpatient since 10/2022. Some question regarding his compliance over the course of treatment, however. Other medical history includes HTN and TKI-induced nausea. He presented to [facility] with c/o gum swelling on 6/7/23. Labs remarkable for WBC count of 138K, with 80% blasts indicating blast crisis. WBC count from approximately a week and a half earlier was normal. He reports recent compliance with his TKI. He was transferred to [facility] for higher level of care.      Of note, he had a temp of 101.4 in the ED at [place] and was started on broad spectrum abx with Cefepime. Blood cx and u/a are pending collection, and CXR is neg. He was also hypertensive at [place]. Takes amlodipine at home. Amlodipine was held, and nifedipine and losartan were started [place].      Hospital Course: Transferred  from [place] over night due to concern for blast phase CML (WBC 138k with 80% blasts). Follows locally outpatient with Dr. Hogan. On dasatinib. Reports that he has been compliant with dasatinib. Started on Hydrea. Bone marrow biopsy 6/09 flow suggestive of transformation of CML to AML (blast phase CML/AML). Started CLIA+Ponatinib on 6/16/26 (D1=6/16/23) after patient denied fertility preservation.     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 Since d/c prior to 7/15, okayed to change to Rocephin per ID to also complete on 7/15.   Course also complicated by poor oral intake secondary to mucositis (improving, d/c with Rx for oxy), C.diff negative diarrhea (now resolved), hyperkalemia (now resolved), expected electrolyte derangements, and expected cytopenias.     Requested Dispo:   - 7/17: appointment with Dr. Hogan requeseted   - 7/17 - labs, possible transfusion support with Dr. Hogan in [place] requested  - 7/20 - labs, possible transfusion support with Dr. Hogan in [place] requested  - Week of 7/24: requested bone marrow biopsy in [place] with Dr. Hogan   - 8/07: requested labs, COVID, appointment with Dr. 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 Brett Hogan. He has been on dasatinib outpatient since 10/2022. Some question regarding his compliance over the course of treatment, however. Presented to [facility] with c/o gum swelling on 6/7/23. Labs on presentation remarkable for WBC count of 138K, with 80% blasts indicating blast crisis. WBC count from approximately a week and a half earlier was normal. Reported recent compliance with his TKI.     Transferred to [facility] for higher level of care    - 6/09/23: Bone marrow Biopsy c/w progression for blast phase CML   - BCR/ABL p210 collected on admission, >55%  - BCR/ABL mutation negative  - Echo with 65% EF and mild-moderate pulmonary hypertension.  - Patient decided to not do fertility presentation  - Day 27 of Cladribine Idarubicin Cytarabine, CLIA and transitioned from dasatinb to ponatinib.  - 7/06/23: Restaging day 21 bone marrow biopsy done 7/06, no evidence of leukemia.  - ANC 70 today     Requested:   - 7/17: appointment with Dr. Hogan requeseted   - 7/17 - labs, possible transfusion support with Dr. Hogan in [place]  requested  - 7/20 - labs, possible transfusion support with Dr. Hogan in [place] requested  - Week of 7/24: requested bone marrow biopsy in [place] with Dr. Hogan   - 8/07: requested labs, COVID, appointment with Dr. Green, and possible admission     Neutropenic fever  - Temp of 101.4 at [place] ED. Persisted upon transfer.  - CXR neg. Blood culture NGTD. C-Diff negative. U/a neg  - Strep, flu, and COVID neg at OSH  - Throat red with exudates. Tonsils mildly swollen. CT neck showing adenopathy but no abscesses.  - CT chest without consolidations  - Repeat fever 6/28 @2330. BC 1/4 GPC with viridans strep; other 3/4 NGTD. Repeat CXR and UA negative  - Repeat blood cultures 7/1, 7/3, and 7/4 NGTD   - ID consulted, appreciate assistance:cefepime; EOT 7/15. Since d/c prior, changing to Rocephin 2g Q24 and restart ppx Levaquin. Will complete Rocephin on 7/15. Appointment requested with Dr. Hogan on Monday 7/17 for labs, f/u, and PICC line removal (unable to get  care).   - Last fever 7/4 @1749    Pharyngitis  - Continues Dukes and PO Oxy  - On Cefepime, changed to Rocephin until 7/15 per ID recs. Requesting appointment for PICC removal.     Infection by Streptococcus, viridans group  - See Neutropenic fever    Mild malnutrition  - Continue PO intake as tolerated  - Nutrition following    Pancytopenia due to antineoplastic chemotherapy  - Twice weekly CBC outpatient in [place]   - Transfuse for hgb < 7, PLTS < 10K or <50k if bleeding  - Ppx bactrim, posaconazole, and acyclovir; on Rocephin to complete 7/15, then resume ppx Levaquin     Hypertension  - Previously took amlodipine at home  - Given hypertension, amlodipine stopped and nifedipine and losartan started in [place]. Continued on transfer.  - BP now improved. Continue nifedipine     Goals of Care Treatment Preferences:  Code Status: Full Code      Consults (From admission, onward)        Status Ordering Provider     Inpatient consult to Infectious  Diseases  Once        Provider:  (Not yet assigned)    Completed CHANTELL BELLA     Inpatient consult to Psychology  Once        Provider:  (Not yet assigned)    Completed VOLODYMYR COOPER     Inpatient consult to [facility] Oncology  Once        Provider:  (Not yet assigned)    Acknowledged CHANTELL BELLA     Inpatient consult to PICC team ([facility])  Once        Provider:  (Not yet assigned)    Completed CHANTELL BELLA     Inpatient consult to Urology  Once        Provider:  (Not yet assigned)    Completed CHANTELL BELLA     Inpatient consult to Infectious Diseases  Once        Provider:  (Not yet assigned)    Completed CHANTELL BELLA          Significant Diagnostic Studies: Labs:   CMP   Recent Labs   Lab 07/11/23 0421 07/12/23  0558    138   K 4.1 4.4    106   CO2 25 26   GLU 92 87   BUN 16 15   CREATININE 0.8 0.8   CALCIUM 9.0 9.0   PROT 6.6 6.7   ALBUMIN 3.3* 3.3*   BILITOT 0.4 0.4   ALKPHOS 96 94   AST 10 10   ALT 15 12   ANIONGAP 6* 6*    and CBC   Recent Labs   Lab 07/11/23 0421 07/12/23  0558   WBC 1.08* 1.13*   HGB 7.8* 7.9*   HCT 23.1* 23.5*   PLT 15* 17*       Pending Diagnostic Studies:     Procedure Component Value Units Date/Time    Basic metabolic panel [[id number]] Collected: 06/18/23 1541    Order Status: Sent Lab Status: In process Updated: 06/18/23 1543    Specimen: Blood     CBC auto differential [[id number]] Collected: 06/10/23 0901    Order Status: Sent Lab Status: In process Updated: 06/10/23 0901    Specimen: Blood     CBC with Automated Differential [[id number]] Collected: 07/03/23 0551    Order Status: Sent Lab Status: In process Updated: 07/03/23 0552    Specimen: Blood     COVID-19 Rapid Screening [[id number]] Collected: 01/25/23 0125    Order Status: Sent Lab Status: No result     Specimen: Nasal Swab     Comprehensive Metabolic Panel (CMP) [[id number]] Collected: 07/03/23 0551    Order Status: Sent Lab Status: In process Updated: 07/03/23 0552    Specimen: Blood     Magnesium  [[id number]] Collected: 07/03/23 0551    Order Status: Sent Lab Status: In process Updated: 07/03/23 0552    Specimen: Blood     Phosphorus [[id number]] Collected: 07/03/23 0551    Order Status: Sent Lab Status: In process Updated: 07/03/23 0552    Specimen: Blood         Final Active Diagnoses:    Diagnosis Date Noted POA    PRINCIPAL PROBLEM:  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 [C92.10] 06/08/2023 Yes    Neutropenic fever [D70.9, R50.81] 06/08/2023 Yes    Infection by Streptococcus, viridans group [A49.1] 07/08/2023 No    Pharyngitis [J02.9] 07/08/2023 Yes    Mild malnutrition [E44.1] 06/26/2023 Yes    Pancytopenia due to antineoplastic chemotherapy [D61.810, T45.1X5A] 06/08/2023 Yes    Hypertension [I10] 06/08/2023 Yes     Chronic      Problems Resolved During this Admission:    Diagnosis Date Noted Date Resolved POA    Constipation [K59.00] 06/21/2023 06/27/2023 Yes    Hyperkalemia [E87.5] 06/21/2023 06/27/2023 Yes    Diarrhea [R19.7] 06/09/2023 06/27/2023 Yes    Hypokalemia [E87.6] 06/09/2023 06/17/2023 Yes    Leukocytosis [D72.829] 06/09/2023 06/26/2023 Yes    Tumor lysis syndrome [E88.3] 06/08/2023 06/27/2023 Yes      Discharged Condition: stable    Disposition: Home or Self Care    Follow Up:    Patient Instructions:      CBC Auto Differential   Standing Status: Standing Number of Occurrences: 6 Standing Exp. Date: 09/11/24     Comprehensive Metabolic Panel   Standing Status: Standing Number of Occurrences: 6 Standing Exp. Date: 09/11/24     Magnesium   Standing Status: Standing Number of Occurrences: 6 Standing Exp. Date: 09/11/24     Phosphorus   Standing Status: Standing Number of Occurrences: 6 Standing Exp. Date: 09/11/24     Ambulatory referral/consult to A Clinic   Standing Status: Future   Referral Priority: Routine Referral Type: Consultation   Referral Reason: Specialty Services Required   Requested Specialty: Pediatric Hematology and Oncology   Number of Visits Requested:  1     Diet Adult Regular     Notify your health care provider if you experience any of the following:  temperature >100.4     Notify your health care provider if you experience any of the following:  persistent nausea and vomiting or diarrhea     Notify your health care provider if you experience any of the following:  severe uncontrolled pain     Notify your health care provider if you experience any of the following:  redness, tenderness, or signs of infection (pain, swelling, redness, odor or green/yellow discharge around incision site)     Type & Screen   Standing Status: Standing Number of Occurrences: 6 Standing Exp. Date: 09/11/24     Activity as tolerated     Medications:  Reconciled Home Medications:      Medication List      START taking these medications    acyclovir 800 MG Tab  Commonly known as: ZOVIRAX  Take 1 tablet (800 mg total) by mouth 2 (two) times daily.     dextrose 5 % in water (D5W) 5 % PgBk 100 mL with cefTRIAXone 2 gram SolR 2 g  Inject 2 g into the vein Daily. for 3 doses  Start taking on: July 13, 2023     levoFLOXacin 500 MG tablet  Commonly known as: LEVAQUIN  Take 1 tablet (500 mg total) by mouth once daily.  Start taking on: July 15, 2023     magic mouthwash diphen/antac/nysta  Take 10 mLs by mouth 4 (four) times daily.     NIFEdipine 60 MG (OSM) 24 hr tablet  Commonly known as: PROCARDIA-XL  Take 1 tablet (60 mg total) by mouth once daily.     oxyCODONE 5 MG immediate release tablet  Commonly known as: ROXICODONE  Take 1 tablet (5 mg total) by mouth every 4 (four) hours as needed for Pain.     pantoprazole 40 MG tablet  Commonly known as: PROTONIX  Take 1 tablet (40 mg total) by mouth once daily.     PONATinib 15 mg Tab  Take 1 tablet (15 mg total) by mouth Daily.     posaconazole 100 mg Tbec tablet  Commonly known as: NOXAFIL  Take 3 tablets (300 mg total) by mouth once daily.  Start taking on: July 13, 2023     sulfamethoxazole-trimethoprim 800-160mg 800-160 mg Tab  Commonly known  as: BACTRIM DS  Take 1 tablet by mouth every Mon, Wed, Fri.  Start taking on: July 14, 2023        CONTINUE taking these medications    ondansetron 8 MG tablet  Commonly known as: ZOFRAN  Take 1 tablet (8 mg total) by mouth every 6 (six) hours as needed for Nausea.        STOP taking these medications    amLODIPine 10 MG tablet  Commonly known as: KENISHA Mckeon NP  Bone Marrow Transplant  [facility] - Oncology ([facility])

## 2023-07-12 NOTE — TELEPHONE ENCOUNTER
Incoming call from Vale at [facility] inquiring when Ponatinib will be deliverted to patient.  Informed her that this should be arriving at patient's home tomorrow and if he does not receive by 8 pm tomorrow to call [facility] to alert [facility] of this.

## 2023-07-12 NOTE — TELEPHONE ENCOUNTER
Specialty Pharmacy - Refill Coordination    Specialty Medication Orders Linked to Encounter      Flowsheet Row Most Recent Value   Medication #1 PONATinib 15 mg Tab (Order#[id number], Rx#[id number])        Patient is being discharged today, 7/12    Refill Questions - Documented Responses      Flowsheet Row Most Recent Value   Patient Availability and HIPAA Verification    Does patient want to proceed with activity? Yes   HIPAA/medical authority confirmed? Yes   Relationship to patient of person spoken to? Self   Refill Screening Questions    Changes to allergies? No   Changes to medications? No   New conditions since last clinic visit? No   Unplanned office visit, urgent care, ED, or hospital admission in the last 4 weeks? No   How does patient/caregiver feel medication is working? Good   Financial problems or insurance changes? No   How many doses of your specialty medications were missed in the last 4 weeks? 0   Would patient like to speak to a pharmacist? No   When does the patient need to receive the medication? 07/16/23   Refill Delivery Questions    How will the patient receive the medication? MEDRx   When does the patient need to receive the medication? 07/16/23   Shipping Address Home   Address in [facility] confirmed and updated if neccessary? Yes   Expected Copay ($) 3   Is the patient able to afford the medication copay? Yes   Payment Method CC on file   Days supply of Refill 30   Supplies needed? No supplies needed   Refill activity completed? Yes   Refill activity plan Refill scheduled   Shipment/Pickup Date: 07/12/23            Current Outpatient Medications   Medication Sig    acyclovir (ZOVIRAX) 800 MG Tab Take 1 tablet (800 mg total) by mouth 2 (two) times daily.    [START ON 7/13/2023] dextrose 5 % in water (D5W) 5 % PgBk 100 mL with cefTRIAXone 2 gram SolR 2 g Inject 2 g into the vein Daily. for 3 doses    magic mouthwash diphen/antac/nysta Take 10 mLs by mouth 4 (four) times daily.     [START ON 7/15/2023] levoFLOXacin (LEVAQUIN) 500 MG tablet Take 1 tablet (500 mg total) by mouth once daily.    NIFEdipine (PROCARDIA-XL) 60 MG (OSM) 24 hr tablet Take 1 tablet (60 mg total) by mouth once daily.    oxyCODONE (ROXICODONE) 5 MG immediate release tablet Take 1 tablet (5 mg total) by mouth every 4 (four) hours as needed for Pain.    pantoprazole (PROTONIX) 40 MG tablet Take 1 tablet (40 mg total) by mouth once daily.    PONATinib 15 mg Tab Take 1 tablet (15 mg total) by mouth Daily.    [START ON 7/13/2023] posaconazole (NOXAFIL) 100 mg TbEC tablet Take 3 tablets (300 mg total) by mouth once daily.    [START ON 7/14/2023] sulfamethoxazole-trimethoprim 800-160mg (BACTRIM DS) 800-160 mg Tab Take 1 tablet by mouth every Mon, Wed, Fri.   Last reviewed on 7/12/2023  9:47 AM by Marnie Pollard PharmD    Review of patient's allergies indicates:  No Known Allergies Last reviewed on  7/12/2023 9:47 AM by Marnie Pollard      Tasks added this encounter   No tasks added.   Tasks due within next 3 months   7/12/2023 - Refill Coordination Outreach (1 time occurrence)     Marnie Pollard, PharmD  [facility]  [address] [place], [place] [address]  [facility] [address]  Phone: [phone]  Fax: [phone]

## 2023-07-12 NOTE — PLAN OF CARE
[facility]    [facility] educator met with the Patient and discussed the  discharge plan for home IVABX. Madgaleno Hirsch will discharge home with family support. Patient will infuse medication via Elastomeric Pump. The Patient was educated on the  S.A.S.H procedure. S.A.S.H mat provided. Patient education checklist reviewed and acknowledged by above person and he is agreeable to discharge with home infusion plan of care. IV administration process using aspetic technique was reviewed with successful return demonstration. Patient feels comfortable with infusion. Patient will discharge home with Ceftriaxone 2 gm IV every 24 hours for estimated end of therapy date 7/15/23. Dosing schedule times are 0900. Extension set has been placed to double lumen PICC. The patient is scheduled on 7/17/23 to see his Hematologist and they will pull his line. Time allotted for questions. Patients nurse and case management team notified teaching has been completed.     Medication delivery will be made to bedside  I spoke to Nils at [facility] [phone] and he confirmed the appointment on 7/17/23 for labs and PICC line removal.    [facility]  Asia Rogers RN, Clinical Educator  Cell [phone]  Office [phone]  Fax [phone]

## 2023-07-12 NOTE — ASSESSMENT & PLAN NOTE
- Temp of 101.4 at [facility]. Persisted upon transfer.  - CXR neg. Blood culture NGTD. C-Diff negative. U/a neg  - Strep, flu, and COVID neg at OSH  - Throat red with exudates. Tonsils mildly swollen. CT neck showing adenopathy but no abscesses.  - CT chest without consolidations  - Repeat fever 6/28 @2330. BC 1/4 GPC with viridans strep; other 3/4 NGTD. Repeat CXR and UA negative  - Repeat blood cultures 7/1, 7/3, and 7/4 NGTD   - ID consulted, appreciate assistance:cefepime; EOT 7/15. Since d/c prior, changing to Rocephin 2g Q24 and restart ppx Levaquin. Will complete Rocephin on 7/15. Appointment requested with Dr. Hogan on Monday 7/17 for labs, f/u, and PICC line removal (unable to get  care).   - Last fever 7/4 @1749

## 2023-07-12 NOTE — PROGRESS NOTES
Received request to assist with IV antibiotics.  Referral sent via [facility] to [facility] for 3 doses cefetriaxone and line care supplies until line removal on 7/17.  Accepted by Birgit; medications and supplies delivered by [facility], who also completed teaching.  Safe discharge plan in place; will follow.

## 2023-07-13 ENCOUNTER — INFUSION (OUTPATIENT)
Dept: INFUSION THERAPY | Facility: HOSPITAL | Age: 23
End: 2023-07-13
Attending: NURSE PRACTITIONER
Payer: MEDICAID

## 2023-07-13 VITALS
RESPIRATION RATE: 18 BRPM | HEART RATE: 66 BPM | OXYGEN SATURATION: 100 % | SYSTOLIC BLOOD PRESSURE: 135 MMHG | DIASTOLIC BLOOD PRESSURE: 81 MMHG | TEMPERATURE: 98 F

## 2023-07-13 DIAGNOSIS — C92.10 CML (CHRONIC MYELOCYTIC LEUKEMIA): Primary | ICD-10-CM

## 2023-07-13 DIAGNOSIS — C92.10 CML (CHRONIC MYELOCYTIC LEUKEMIA): ICD-10-CM

## 2023-07-13 LAB
ABO + RH BLD: NORMAL
BLD PROD TYP BPU: NORMAL
BLOOD UNIT EXPIRATION DATE: NORMAL
BLOOD UNIT TYPE CODE: 6200
CROSSMATCH INTERPRETATION: NORMAL
DISPENSE STATUS: NORMAL
UNIT NUMBER: NORMAL

## 2023-07-13 PROCEDURE — 96374 THER/PROPH/DIAG INJ IV PUSH: CPT

## 2023-07-13 PROCEDURE — P9037 PLATE PHERES LEUKOREDU IRRAD: HCPCS | Performed by: NURSE PRACTITIONER

## 2023-07-13 PROCEDURE — 25000003 PHARM REV CODE 250: Performed by: NURSE PRACTITIONER

## 2023-07-13 PROCEDURE — 36430 TRANSFUSION BLD/BLD COMPNT: CPT

## 2023-07-13 PROCEDURE — 63600175 PHARM REV CODE 636 W HCPCS: Performed by: NURSE PRACTITIONER

## 2023-07-13 RX ORDER — ACETAMINOPHEN 325 MG/1
650 TABLET ORAL ONCE
Status: CANCELLED | OUTPATIENT
Start: 2023-07-13

## 2023-07-13 RX ORDER — DIPHENHYDRAMINE HYDROCHLORIDE 50 MG/ML
25 INJECTION INTRAMUSCULAR; INTRAVENOUS ONCE
Status: CANCELLED | OUTPATIENT
Start: 2023-07-13

## 2023-07-13 RX ORDER — HYDROCODONE BITARTRATE AND ACETAMINOPHEN 500; 5 MG/1; MG/1
TABLET ORAL ONCE
Status: CANCELLED | OUTPATIENT
Start: 2023-07-13 | End: 2023-07-13

## 2023-07-13 RX ORDER — DIPHENHYDRAMINE HYDROCHLORIDE 50 MG/ML
25 INJECTION INTRAMUSCULAR; INTRAVENOUS ONCE
Status: COMPLETED | OUTPATIENT
Start: 2023-07-13 | End: 2023-07-13

## 2023-07-13 RX ORDER — HYDROCODONE BITARTRATE AND ACETAMINOPHEN 500; 5 MG/1; MG/1
TABLET ORAL ONCE
Status: DISCONTINUED | OUTPATIENT
Start: 2023-07-13 | End: 2023-07-13 | Stop reason: HOSPADM

## 2023-07-13 RX ORDER — ACETAMINOPHEN 325 MG/1
650 TABLET ORAL ONCE
Status: COMPLETED | OUTPATIENT
Start: 2023-07-13 | End: 2023-07-13

## 2023-07-13 RX ADMIN — ACETAMINOPHEN 650 MG: 325 TABLET, FILM COATED ORAL at 09:07

## 2023-07-13 RX ADMIN — DIPHENHYDRAMINE HYDROCHLORIDE 25 MG: 50 INJECTION INTRAMUSCULAR; INTRAVENOUS at 09:07

## 2023-07-13 NOTE — PLAN OF CARE
Patient received 1 unit of PLT, tolerated well. No reaction noted. Discharge in stable condition.

## 2023-07-14 ENCOUNTER — TELEPHONE (OUTPATIENT)
Dept: PHARMACY | Facility: CLINIC | Age: 23
End: 2023-07-14
Payer: COMMERCIAL

## 2023-07-14 DIAGNOSIS — C92.10 CML (CHRONIC MYELOCYTIC LEUKEMIA): Primary | ICD-10-CM

## 2023-07-14 NOTE — TELEPHONE ENCOUNTER
DOCUMENTATION ONLY  Posaconazole 100mg DR tablets  Approval date: 7/13/2023 to 10/13/2023   Case ID# [id number]

## 2023-07-17 ENCOUNTER — INFUSION (OUTPATIENT)
Dept: INFUSION THERAPY | Facility: HOSPITAL | Age: 23
End: 2023-07-17
Attending: NURSE PRACTITIONER
Payer: MEDICAID

## 2023-07-17 VITALS
TEMPERATURE: 98 F | HEART RATE: 79 BPM | OXYGEN SATURATION: 100 % | DIASTOLIC BLOOD PRESSURE: 88 MMHG | RESPIRATION RATE: 18 BRPM | SYSTOLIC BLOOD PRESSURE: 145 MMHG

## 2023-07-17 DIAGNOSIS — C92.10 CML (CHRONIC MYELOCYTIC LEUKEMIA): Primary | ICD-10-CM

## 2023-07-17 LAB
CHROM BANDING METHOD: NORMAL
CHROMOSOME ANALYSIS BM ADDITIONAL INFORMATION: NORMAL
CHROMOSOME ANALYSIS BM RELEASED BY: NORMAL
CHROMOSOME ANALYSIS BM RESULT SUMMARY: NORMAL
CLINICAL CYTOGENETICIST REVIEW: NORMAL
KARYOTYP MAR: NORMAL
REASON FOR REFERRAL (NARRATIVE): NORMAL
REF LAB TEST METHOD: NORMAL
SPECIMEN SOURCE: NORMAL
SPECIMEN: NORMAL

## 2023-07-17 PROCEDURE — 86923 COMPATIBILITY TEST ELECTRIC: CPT | Performed by: INTERNAL MEDICINE

## 2023-07-17 RX ORDER — ACETAMINOPHEN 325 MG/1
650 TABLET ORAL ONCE
Status: CANCELLED | OUTPATIENT
Start: 2023-07-17

## 2023-07-17 RX ORDER — HYDROCODONE BITARTRATE AND ACETAMINOPHEN 500; 5 MG/1; MG/1
TABLET ORAL ONCE
Status: CANCELLED | OUTPATIENT
Start: 2023-07-17 | End: 2023-07-17

## 2023-07-17 RX ORDER — DIPHENHYDRAMINE HYDROCHLORIDE 50 MG/ML
25 INJECTION INTRAMUSCULAR; INTRAVENOUS ONCE
Status: CANCELLED | OUTPATIENT
Start: 2023-07-17

## 2023-07-17 NOTE — NURSING
Removed picc line, with catheter tip intact measuring 41 cm. Applied sterile dressing and coban to area. Tolerated well.

## 2023-07-18 ENCOUNTER — INFUSION (OUTPATIENT)
Dept: INFUSION THERAPY | Facility: HOSPITAL | Age: 23
End: 2023-07-18
Attending: NURSE PRACTITIONER
Payer: MEDICAID

## 2023-07-18 VITALS
OXYGEN SATURATION: 100 % | WEIGHT: 171 LBS | DIASTOLIC BLOOD PRESSURE: 94 MMHG | HEART RATE: 73 BPM | BODY MASS INDEX: 22.66 KG/M2 | SYSTOLIC BLOOD PRESSURE: 153 MMHG | RESPIRATION RATE: 18 BRPM | TEMPERATURE: 99 F | HEIGHT: 73 IN

## 2023-07-18 DIAGNOSIS — C92.10 CML (CHRONIC MYELOCYTIC LEUKEMIA): ICD-10-CM

## 2023-07-18 LAB
ABO + RH BLD: NORMAL
BLD PROD TYP BPU: NORMAL
BLOOD UNIT EXPIRATION DATE: NORMAL
BLOOD UNIT TYPE CODE: 1700
CROSSMATCH INTERPRETATION: NORMAL
DISPENSE STATUS: NORMAL
UNIT NUMBER: NORMAL

## 2023-07-18 PROCEDURE — P9040 RBC LEUKOREDUCED IRRADIATED: HCPCS | Performed by: INTERNAL MEDICINE

## 2023-07-18 PROCEDURE — 25000003 PHARM REV CODE 250: Performed by: INTERNAL MEDICINE

## 2023-07-18 PROCEDURE — 36430 TRANSFUSION BLD/BLD COMPNT: CPT

## 2023-07-18 PROCEDURE — 63600175 PHARM REV CODE 636 W HCPCS: Performed by: INTERNAL MEDICINE

## 2023-07-18 RX ORDER — DIPHENHYDRAMINE HYDROCHLORIDE 50 MG/ML
25 INJECTION INTRAMUSCULAR; INTRAVENOUS ONCE
Status: COMPLETED | OUTPATIENT
Start: 2023-07-18 | End: 2023-07-18

## 2023-07-18 RX ORDER — HYDROCODONE BITARTRATE AND ACETAMINOPHEN 500; 5 MG/1; MG/1
TABLET ORAL ONCE
Status: COMPLETED | OUTPATIENT
Start: 2023-07-18 | End: 2023-07-18

## 2023-07-18 RX ORDER — ACETAMINOPHEN 325 MG/1
650 TABLET ORAL ONCE
Status: COMPLETED | OUTPATIENT
Start: 2023-07-18 | End: 2023-07-18

## 2023-07-18 RX ADMIN — SODIUM CHLORIDE: 9 INJECTION, SOLUTION INTRAVENOUS at 11:07

## 2023-07-18 RX ADMIN — ACETAMINOPHEN 650 MG: 325 TABLET ORAL at 11:07

## 2023-07-18 RX ADMIN — DIPHENHYDRAMINE HYDROCHLORIDE 25 MG: 50 INJECTION INTRAMUSCULAR; INTRAVENOUS at 11:07

## 2023-07-20 ENCOUNTER — OFFICE VISIT (OUTPATIENT)
Dept: INFECTIOUS DISEASES | Facility: CLINIC | Age: 23
End: 2023-07-20
Payer: MEDICAID

## 2023-07-20 ENCOUNTER — LAB VISIT (OUTPATIENT)
Dept: LAB | Facility: HOSPITAL | Age: 23
End: 2023-07-20
Attending: INTERNAL MEDICINE
Payer: MEDICAID

## 2023-07-20 VITALS
BODY MASS INDEX: 26.16 KG/M2 | DIASTOLIC BLOOD PRESSURE: 95 MMHG | WEIGHT: 193.13 LBS | SYSTOLIC BLOOD PRESSURE: 148 MMHG | HEIGHT: 72 IN | HEART RATE: 97 BPM | TEMPERATURE: 99 F

## 2023-07-20 DIAGNOSIS — A49.1 INFECTION BY STREPTOCOCCUS, VIRIDANS GROUP: Primary | ICD-10-CM

## 2023-07-20 DIAGNOSIS — C92.10 BLAST CRISIS PHASE OF CHRONIC MYELOID LEUKEMIA: Primary | ICD-10-CM

## 2023-07-20 DIAGNOSIS — D64.9 ANEMIA, UNSPECIFIED TYPE: ICD-10-CM

## 2023-07-20 DIAGNOSIS — C92.10 BLAST CRISIS PHASE OF CHRONIC MYELOID LEUKEMIA: ICD-10-CM

## 2023-07-20 DIAGNOSIS — C92.10 BLASTIC PHASE CHRONIC MYELOID LEUKEMIA: ICD-10-CM

## 2023-07-20 DIAGNOSIS — C92.10 CML (CHRONIC MYELOCYTIC LEUKEMIA): Primary | ICD-10-CM

## 2023-07-20 LAB
ALBUMIN SERPL-MCNC: 3.5 G/DL (ref 3.5–5)
ALBUMIN/GLOB SERPL: 1.4 RATIO (ref 1.1–2)
ALP SERPL-CCNC: 91 UNIT/L (ref 40–150)
ALT SERPL-CCNC: 19 UNIT/L (ref 0–55)
AST SERPL-CCNC: 16 UNIT/L (ref 5–34)
BASOPHILS # BLD AUTO: 0 X10(3)/MCL
BASOPHILS NFR BLD AUTO: 0 %
BILIRUBIN DIRECT+TOT PNL SERPL-MCNC: 0.5 MG/DL
BUN SERPL-MCNC: 13.6 MG/DL (ref 8.9–20.6)
CALCIUM SERPL-MCNC: 8.6 MG/DL (ref 8.4–10.2)
CHLORIDE SERPL-SCNC: 107 MMOL/L (ref 98–107)
CO2 SERPL-SCNC: 28 MMOL/L (ref 22–29)
CREAT SERPL-MCNC: 0.97 MG/DL (ref 0.73–1.18)
EOSINOPHIL # BLD AUTO: 0 X10(3)/MCL (ref 0–0.9)
EOSINOPHIL NFR BLD AUTO: 0 %
ERYTHROCYTE [DISTWIDTH] IN BLOOD BY AUTOMATED COUNT: 14.9 % (ref 11.5–17)
GFR SERPLBLD CREATININE-BSD FMLA CKD-EPI: >60 MLS/MIN/1.73/M2
GLOBULIN SER-MCNC: 2.5 GM/DL (ref 2.4–3.5)
GLUCOSE SERPL-MCNC: 90 MG/DL (ref 74–100)
GROUP & RH: NORMAL
HCT VFR BLD AUTO: 22.6 % (ref 42–52)
HGB BLD-MCNC: 7.2 G/DL (ref 14–18)
IMM GRANULOCYTES # BLD AUTO: 0.01 X10(3)/MCL (ref 0–0.04)
IMM GRANULOCYTES NFR BLD AUTO: 1.2 %
INDIRECT COOMBS GEL: NORMAL
LYMPHOCYTES # BLD AUTO: 0.75 X10(3)/MCL (ref 0.6–4.6)
LYMPHOCYTES NFR BLD AUTO: 91.5 %
MAGNESIUM SERPL-MCNC: 1.7 MG/DL (ref 1.6–2.6)
MCH RBC QN AUTO: 26.2 PG (ref 27–31)
MCHC RBC AUTO-ENTMCNC: 31.9 G/DL (ref 33–36)
MCV RBC AUTO: 82.2 FL (ref 80–94)
MONOCYTES # BLD AUTO: 0.02 X10(3)/MCL (ref 0.1–1.3)
MONOCYTES NFR BLD AUTO: 2.4 %
NEUTROPHILS # BLD AUTO: 0.04 X10(3)/MCL (ref 2.1–9.2)
NEUTROPHILS NFR BLD AUTO: 4.9 %
PHOSPHATE SERPL-MCNC: 4.7 MG/DL (ref 2.3–4.7)
PLATELET # BLD AUTO: 19 X10(3)/MCL (ref 130–400)
PMV BLD AUTO: 8.9 FL (ref 7.4–10.4)
POTASSIUM SERPL-SCNC: 4.4 MMOL/L (ref 3.5–5.1)
PROT SERPL-MCNC: 6 GM/DL (ref 6.4–8.3)
RBC # BLD AUTO: 2.75 X10(6)/MCL (ref 4.7–6.1)
SODIUM SERPL-SCNC: 144 MMOL/L (ref 136–145)
SPECIMEN OUTDATE: NORMAL
WBC # SPEC AUTO: 0.82 X10(3)/MCL (ref 4.5–11.5)

## 2023-07-20 PROCEDURE — 99215 PR OFFICE/OUTPT VISIT, EST, LEVL V, 40-54 MIN: ICD-10-PCS | Mod: S$PBB,,, | Performed by: INTERNAL MEDICINE

## 2023-07-20 PROCEDURE — 86923 COMPATIBILITY TEST ELECTRIC: CPT | Performed by: INTERNAL MEDICINE

## 2023-07-20 PROCEDURE — 99999 PR PBB SHADOW E&M-EST. PATIENT-LVL III: CPT | Mod: PBBFAC,,, | Performed by: INTERNAL MEDICINE

## 2023-07-20 PROCEDURE — 1111F PR DISCHARGE MEDS RECONCILED W/ CURRENT OUTPATIENT MED LIST: ICD-10-PCS | Mod: CPTII,,, | Performed by: INTERNAL MEDICINE

## 2023-07-20 PROCEDURE — 1159F MED LIST DOCD IN RCRD: CPT | Mod: CPTII,,, | Performed by: INTERNAL MEDICINE

## 2023-07-20 PROCEDURE — 80053 COMPREHEN METABOLIC PANEL: CPT

## 2023-07-20 PROCEDURE — 36415 COLL VENOUS BLD VENIPUNCTURE: CPT

## 2023-07-20 PROCEDURE — 99213 OFFICE O/P EST LOW 20 MIN: CPT | Mod: PBBFAC | Performed by: INTERNAL MEDICINE

## 2023-07-20 PROCEDURE — 99999 PR PBB SHADOW E&M-EST. PATIENT-LVL III: ICD-10-PCS | Mod: PBBFAC,,, | Performed by: INTERNAL MEDICINE

## 2023-07-20 PROCEDURE — 1111F DSCHRG MED/CURRENT MED MERGE: CPT | Mod: CPTII,,, | Performed by: INTERNAL MEDICINE

## 2023-07-20 PROCEDURE — 86900 BLOOD TYPING SEROLOGIC ABO: CPT | Performed by: NURSE PRACTITIONER

## 2023-07-20 PROCEDURE — 99215 OFFICE O/P EST HI 40 MIN: CPT | Mod: S$PBB,,, | Performed by: INTERNAL MEDICINE

## 2023-07-20 PROCEDURE — 3008F BODY MASS INDEX DOCD: CPT | Mod: CPTII,,, | Performed by: INTERNAL MEDICINE

## 2023-07-20 PROCEDURE — 3077F SYST BP >= 140 MM HG: CPT | Mod: CPTII,,, | Performed by: INTERNAL MEDICINE

## 2023-07-20 PROCEDURE — 83735 ASSAY OF MAGNESIUM: CPT

## 2023-07-20 PROCEDURE — 3008F PR BODY MASS INDEX (BMI) DOCUMENTED: ICD-10-PCS | Mod: CPTII,,, | Performed by: INTERNAL MEDICINE

## 2023-07-20 PROCEDURE — 84100 ASSAY OF PHOSPHORUS: CPT

## 2023-07-20 PROCEDURE — 3077F PR MOST RECENT SYSTOLIC BLOOD PRESSURE >= 140 MM HG: ICD-10-PCS | Mod: CPTII,,, | Performed by: INTERNAL MEDICINE

## 2023-07-20 PROCEDURE — 85025 COMPLETE CBC W/AUTO DIFF WBC: CPT

## 2023-07-20 PROCEDURE — 3080F DIAST BP >= 90 MM HG: CPT | Mod: CPTII,,, | Performed by: INTERNAL MEDICINE

## 2023-07-20 PROCEDURE — 1159F PR MEDICATION LIST DOCUMENTED IN MEDICAL RECORD: ICD-10-PCS | Mod: CPTII,,, | Performed by: INTERNAL MEDICINE

## 2023-07-20 PROCEDURE — 3080F PR MOST RECENT DIASTOLIC BLOOD PRESSURE >= 90 MM HG: ICD-10-PCS | Mod: CPTII,,, | Performed by: INTERNAL MEDICINE

## 2023-07-20 RX ORDER — HYDROCODONE BITARTRATE AND ACETAMINOPHEN 500; 5 MG/1; MG/1
TABLET ORAL ONCE
Status: CANCELLED | OUTPATIENT
Start: 2023-07-20 | End: 2023-07-20

## 2023-07-20 RX ORDER — ACETAMINOPHEN 325 MG/1
650 TABLET ORAL ONCE
Status: CANCELLED | OUTPATIENT
Start: 2023-07-20

## 2023-07-20 RX ORDER — DIPHENHYDRAMINE HYDROCHLORIDE 50 MG/ML
25 INJECTION INTRAMUSCULAR; INTRAVENOUS ONCE
Status: CANCELLED | OUTPATIENT
Start: 2023-07-20

## 2023-07-20 NOTE — PROGRESS NOTES
Subjective:     Patient ID: Magdaleno Hirsch is a 23 y.o. male    Chief Complaint: strep bacteremia    HPI: 22M w/ blast phase CML/AML, recently admitted with gum swelling, found to have blast crisis. Started on CLIA + ponatinib. Course complicated by neutropenic fevers, pharyngitis, mucositis and viridans strep bacteremiaHe completed a course of cefepime / ceftriaxone for bacteremia (end date 7/17, PICC line removed at end of care), and is currently on prophylactic regimen. Plans for bone marrow biopsy next week, and following up with Dr. Green in clinic w/ possible admission for consolidation therapy if in remission.     He states pharyngitis has completely resolved. No fevers. No issues w/ prior PICC line site. Endorses some ongoing pain at last bone marrow biopsy site when laying on that side, but denies swelling or redness in the area.       There is no immunization history on file for this patient.     Review of Systems   All other systems reviewed and are negative.     Past Medical History:   Diagnosis Date    CML (chronic myelocytic leukemia)     HVD (hypertensive vascular disease)     Oropharyngeal candidiasis      Past Surgical History:   Procedure Laterality Date    BONE MARROW BIOPSY N/A 8/22/2022    Procedure: Biopsy-bone marrow;  Surgeon: Getachew Chen MD;  Location: [facility] OR;  Service: General;  Laterality: N/A;    KNEE SURGERY Left      Family History   Problem Relation Age of Onset    Hypertension Maternal Grandmother     Cancer Maternal Grandmother      Social History     Tobacco Use    Smoking status: Never    Smokeless tobacco: Never   Substance Use Topics    Alcohol use: Never    Drug use: Yes     Types: Marijuana       Objective:     Physical Exam  Constitutional:       General: He is not in acute distress.     Appearance: Normal appearance. He is well-developed. He is not ill-appearing or diaphoretic.   HENT:      Head: Normocephalic and atraumatic.      Right Ear: External ear normal.      Left  Ear: External ear normal.      Nose: Nose normal.   Eyes:      General: No scleral icterus.        Right eye: No discharge.         Left eye: No discharge.      Extraocular Movements: Extraocular movements intact.      Conjunctiva/sclera: Conjunctivae normal.   Pulmonary:      Effort: Pulmonary effort is normal. No respiratory distress.      Breath sounds: No stridor.   Skin:     General: Skin is dry.      Coloration: Skin is not jaundiced or pale.      Findings: No erythema.   Neurological:      General: No focal deficit present.      Mental Status: He is alert and oriented to person, place, and time. Mental status is at baseline.   Psychiatric:         Mood and Affect: Mood normal.         Behavior: Behavior normal.         Thought Content: Thought content normal.         Judgment: Judgment normal.       Data:    All data, including recent labs, radiology, and pathology, has been independently reviewed.    Labs:    Recent Labs   Lab Result Units 06/10/23  0901 06/11/23  0548 06/26/23  0441 06/27/23  0522 07/10/23  0349 07/11/23  0421 07/12/23  0558 07/13/23  0837 07/17/23  0841 07/20/23  0841   WBC x10(3)/mcL 40.46*   < > 0.22*   < > 0.78* 1.08* 1.13* 1.05* 0.93* 0.82*   Hgb g/dL  --   --   --   --   --   --   --  8.2* 7.0* 7.2*   Hemoglobin g/dL 11.6*   < > 7.2*   < > 7.7* 7.8* 7.9*  --   --   --    Hematocrit % 37.4*   < > 22.0*   < > 23.2* 23.1* 23.5*  --   --   --    Hct %  --   --   --   --   --   --   --  25.3* 22.2* 22.6*   Sodium mmol/L 138  139   < > 136   < > 141 139 138  --   --   --    Sodium Level mmol/L  --   --   --   --   --   --   --  142 145 144   Potassium mmol/L 4.0  4.0   < > 4.4   < > 4.4 4.1 4.4  --   --   --    Potassium Level mmol/L  --   --   --   --   --   --   --  4.2 4.3 4.4   Chloride mmol/L 105  106   < > 102   < > 107 108 106  --   --   --    BUN mg/dL 14  15   < > 13   < > 15 16 15  --   --   --    Blood Urea Nitrogen mg/dL  --   --   --   --   --   --   --  14.9 8.0* 13.6    Creatinine mg/dL 1.1  1.0   < > 0.8   < > 0.9 0.8 0.8 0.83 0.79 0.97   AST U/L 39  40   < > 16   < > 10 10 10  --   --   --    Aspartate Aminotransferase unit/L  --   --   --   --   --   --   --  11 14 16   ALT U/L 10  11   < > 46*   < > 12 15 12  --   --   --    Alanine Aminotransferase unit/L  --   --   --   --   --   --   --  15 17 19   Alkaline Phosphatase unit/L 61  62   < > 64   < > 92 96 94 107 94 91   Total Bilirubin mg/dL 0.9  0.9   < > 1.1*   < > 0.4 0.4 0.4  --   --   --    Bilirubin Total mg/dL  --   --   --   --   --   --   --  0.3 0.4 0.5   INR  1.4*   < > 1.0  --   --   --   --   --   --   --    HIV 1/2 Ag/Ab  Non-reactive  --   --   --   --   --   --   --   --   --     < > = values in this interval not displayed.        Radiology:    No results found in the last 24 hours.     Assessment:    1. Infection by Streptococcus, viridans group  Completed IV antibiotics on 7/17, PICC line removed  Continue acyclovir / levaquin / posa / bactrim ppx per protocol  Follow up with ID as needed  Discussed w/ oncology staff         Follow up as needed    The total time for evaluation and management services performed on 7/20/23 was greater than 40 minutes.     Shaye Tracy DO  Transplant Infectious Disease

## 2023-07-21 ENCOUNTER — INFUSION (OUTPATIENT)
Dept: INFUSION THERAPY | Facility: HOSPITAL | Age: 23
End: 2023-07-21
Attending: NURSE PRACTITIONER
Payer: MEDICAID

## 2023-07-21 VITALS
SYSTOLIC BLOOD PRESSURE: 173 MMHG | TEMPERATURE: 98 F | HEART RATE: 64 BPM | DIASTOLIC BLOOD PRESSURE: 104 MMHG | RESPIRATION RATE: 17 BRPM

## 2023-07-21 DIAGNOSIS — C92.10 CML (CHRONIC MYELOCYTIC LEUKEMIA): ICD-10-CM

## 2023-07-21 LAB
ABO + RH BLD: NORMAL
BLD PROD TYP BPU: NORMAL
BLOOD UNIT EXPIRATION DATE: NORMAL
BLOOD UNIT TYPE CODE: 1700
BLOOD UNIT TYPE CODE: 6200
BLOOD UNIT TYPE CODE: 8400
CROSSMATCH INTERPRETATION: NORMAL
DISPENSE STATUS: NORMAL
UNIT NUMBER: NORMAL

## 2023-07-21 PROCEDURE — P9040 RBC LEUKOREDUCED IRRADIATED: HCPCS | Performed by: INTERNAL MEDICINE

## 2023-07-21 PROCEDURE — 25000003 PHARM REV CODE 250: Performed by: INTERNAL MEDICINE

## 2023-07-21 PROCEDURE — P9037 PLATE PHERES LEUKOREDU IRRAD: HCPCS | Performed by: INTERNAL MEDICINE

## 2023-07-21 PROCEDURE — 36430 TRANSFUSION BLD/BLD COMPNT: CPT

## 2023-07-21 PROCEDURE — 63600175 PHARM REV CODE 636 W HCPCS: Performed by: INTERNAL MEDICINE

## 2023-07-21 PROCEDURE — 96374 THER/PROPH/DIAG INJ IV PUSH: CPT

## 2023-07-21 RX ORDER — HYDROCODONE BITARTRATE AND ACETAMINOPHEN 500; 5 MG/1; MG/1
TABLET ORAL ONCE
Status: DISCONTINUED | OUTPATIENT
Start: 2023-07-21 | End: 2023-07-21 | Stop reason: HOSPADM

## 2023-07-21 RX ORDER — DIPHENHYDRAMINE HYDROCHLORIDE 50 MG/ML
25 INJECTION INTRAMUSCULAR; INTRAVENOUS ONCE
Status: COMPLETED | OUTPATIENT
Start: 2023-07-21 | End: 2023-07-21

## 2023-07-21 RX ORDER — ACETAMINOPHEN 325 MG/1
650 TABLET ORAL ONCE
Status: COMPLETED | OUTPATIENT
Start: 2023-07-21 | End: 2023-07-21

## 2023-07-21 RX ADMIN — ACETAMINOPHEN 650 MG: 325 TABLET ORAL at 08:07

## 2023-07-21 RX ADMIN — DIPHENHYDRAMINE HYDROCHLORIDE 25 MG: 50 INJECTION INTRAMUSCULAR; INTRAVENOUS at 08:07

## 2023-07-21 NOTE — NURSING
Pt tolerated transfusion with no complaints. He will take his blood pressure meds when he gets home. States he feels fine now and monitors blood pressure at home

## 2023-07-24 NOTE — PRE-PROCEDURE INSTRUCTIONS
[facility]: Outpatient Surgery  Preprocedure Check-In Instructions     Your arrival time for your surgery or procedure is ___0800___.  We ask patients to arrive about 2 hours before surgery to allow for enough time to review your health history & medications, start your IV, complete any outstanding labwork or tests, and meet your Anesthesiologist.  You will arrive at [facility], [address] [place] [address].  Enter through the [place] entrance next to the Emergency Room, and come to the 6th floor to the Outpatient Surgery Department.     Visitory Policy:  You are allowed 2 adult visitors to be with you in the hospital.  Any additional visitors may switch places at the hospital entrance (not in the waiting rooms).  All hospital visitors should be in good current health.  No small children.     What to Bring:  Please have your ID, insurance cards, and all home medication bottles with you at check in.  Bring your CPAP machine if one is used at home.     Fasting:  Nothing to eat or drink after midnight the night before your procedure. This includes no ice, gum, hard candies, and/or tobacco products.  Follow your doctor's instructions for taking any medications on the morning of your procedure.  If no instructions for taking medications were given, do not take any medications but bring your medications in their bottles to your procedure check in.     Follow your doctor's preoperative instructions regarding skin prep, bowel prep, bathing, or showering prior to your procedure.  If any special soaps were provided to you, please use according to your doctor's instructions. If no instructions were given from your doctor, take a good bath or shower with antibacterial soap the night before and the morning of your procedure.  On the morning of procedure, wear loose, comfortable clothing.  No lotions, makeup, perfumes, colognes, deodorant, or jewelry to your procedure.  Removable items  (glasses, contact lenses, dentures, retainers, hearing aids) need to be removed for your procedure.  Bring your storage containers for these items if you wear them.     Artificial nails, body jewelry, eyelash extensions, and/or hair extensions with metal clips are not allowed during your surgery.  If you currently wear any of these items, please arrange for them to be removed prior to your arrival to the hospital.     Outpatient or Same Day Surgeries:  Any patients receiving sedation/anesthesia are advised not to drive for 24 hours after their procedure.  We do not allow patients to drive themselves home after discharge.  If you are going home after your procedure, please have someone available to drive you home from the hospital.        You may call the Outpatient Surgery Department at [phone] with any questions or concerns.  We are looking forward to meeting you and taking great care of you for your procedure.  Thank you for choosing [facility] for your surgical needs.

## 2023-07-25 ENCOUNTER — HOSPITAL ENCOUNTER (OUTPATIENT)
Facility: HOSPITAL | Age: 23
Discharge: HOME | End: 2023-07-25
Attending: PATHOLOGY | Admitting: PATHOLOGY
Payer: MEDICAID

## 2023-07-25 DIAGNOSIS — C92.10 BLASTIC PHASE CHRONIC MYELOID LEUKEMIA: ICD-10-CM

## 2023-07-25 DIAGNOSIS — C92.10 CML (CHRONIC MYELOCYTIC LEUKEMIA): ICD-10-CM

## 2023-07-25 LAB
BASOPHILS # BLD AUTO: 0 X10(3)/MCL
BASOPHILS NFR BLD AUTO: 0 %
EOSINOPHIL # BLD AUTO: 0 X10(3)/MCL (ref 0–0.9)
EOSINOPHIL NFR BLD AUTO: 0 %
ERYTHROCYTE [DISTWIDTH] IN BLOOD BY AUTOMATED COUNT: 16 % (ref 11.5–17)
HCT VFR BLD AUTO: 25.1 % (ref 42–52)
HGB BLD-MCNC: 8.4 G/DL (ref 14–18)
IMM GRANULOCYTES # BLD AUTO: 0 X10(3)/MCL (ref 0–0.04)
IMM GRANULOCYTES NFR BLD AUTO: 0 %
LYMPHOCYTES # BLD AUTO: 0.51 X10(3)/MCL (ref 0.6–4.6)
LYMPHOCYTES NFR BLD AUTO: 78.5 %
MCH RBC QN AUTO: 25.8 PG (ref 27–31)
MCHC RBC AUTO-ENTMCNC: 33.5 G/DL (ref 33–36)
MCV RBC AUTO: 77.2 FL (ref 80–94)
MONOCYTES # BLD AUTO: 0.05 X10(3)/MCL (ref 0.1–1.3)
MONOCYTES NFR BLD AUTO: 7.7 %
NEUTROPHILS # BLD AUTO: 0.09 X10(3)/MCL (ref 2.1–9.2)
NEUTROPHILS NFR BLD AUTO: 13.8 %
NRBC BLD AUTO-RTO: 3.1 %
PLATELET # BLD AUTO: 41 X10(3)/MCL (ref 130–400)
PMV BLD AUTO: 10.7 FL (ref 7.4–10.4)
RBC # BLD AUTO: 3.25 X10(6)/MCL (ref 4.7–6.1)
WBC # SPEC AUTO: 0.65 X10(3)/MCL (ref 4.5–11.5)

## 2023-07-25 PROCEDURE — 38222 DX BONE MARROW BX & ASPIR: CPT | Performed by: PATHOLOGY

## 2023-07-25 PROCEDURE — 88342 IMHCHEM/IMCYTCHM 1ST ANTB: CPT

## 2023-07-25 PROCEDURE — 88311 DECALCIFY TISSUE: CPT

## 2023-07-25 PROCEDURE — 63600175 PHARM REV CODE 636 W HCPCS: Performed by: PATHOLOGY

## 2023-07-25 PROCEDURE — 85025 COMPLETE CBC W/AUTO DIFF WBC: CPT | Performed by: INTERNAL MEDICINE

## 2023-07-25 PROCEDURE — 88305 TISSUE EXAM BY PATHOLOGIST: CPT | Performed by: INTERNAL MEDICINE

## 2023-07-25 PROCEDURE — 88313 SPECIAL STAINS GROUP 2: CPT

## 2023-07-25 PROCEDURE — 88341 IMHCHEM/IMCYTCHM EA ADD ANTB: CPT

## 2023-07-25 RX ORDER — FLUMAZENIL 0.1 MG/ML
0.2 INJECTION INTRAVENOUS
Status: DISCONTINUED | OUTPATIENT
Start: 2023-07-25 | End: 2023-07-25 | Stop reason: HOSPADM

## 2023-07-25 RX ORDER — SODIUM CHLORIDE 9 MG/ML
INJECTION, SOLUTION INTRAVENOUS CONTINUOUS
Status: DISCONTINUED | OUTPATIENT
Start: 2023-07-25 | End: 2023-07-25 | Stop reason: HOSPADM

## 2023-07-25 RX ORDER — MIDAZOLAM HYDROCHLORIDE 1 MG/ML
1 INJECTION INTRAMUSCULAR; INTRAVENOUS
Status: DISCONTINUED | OUTPATIENT
Start: 2023-07-25 | End: 2023-07-25 | Stop reason: HOSPADM

## 2023-07-25 RX ADMIN — MIDAZOLAM HYDROCHLORIDE 2 MG: 1 INJECTION, SOLUTION INTRAMUSCULAR; INTRAVENOUS at 10:07

## 2023-07-25 RX ADMIN — MIDAZOLAM HYDROCHLORIDE 3 MG: 1 INJECTION, SOLUTION INTRAMUSCULAR; INTRAVENOUS at 10:07

## 2023-07-25 NOTE — DISCHARGE SUMMARY
[facility] - Periop Services  Discharge Note  Short Stay    Procedure(s) (LRB):  Biopsy-bone marrow (N/A)      OUTCOME: Patient tolerated treatment/procedure well without complication and is now ready for discharge.    DISPOSITION: Home or Self Care    FINAL DIAGNOSIS:  pending    FOLLOWUP: In clinic    DISCHARGE INSTRUCTIONS:  No discharge procedures on file.      Clinical Reference Documents Added to Patient Instructions         Document    BONE MARROW ASPIRATION OR BIOPSY (ENGLISH)            TIME SPENT ON DISCHARGE: 10 minutes

## 2023-07-25 NOTE — PLAN OF CARE
Dc instructions given verbal and written to pt/fm for pain,infection,n/v,bleeding pt/fm verbalized understanding, questions answered

## 2023-07-25 NOTE — PROCEDURES
Magdaleno Hirsch is a 23 y.o. male patient.    Temp: 98.1 °F (36.7 °C) (07/25/23 0842)  Pulse: 70 (07/25/23 1029)  Resp: 17 (07/25/23 1026)  BP: (!) 141/91 (07/25/23 1029)  SpO2: 100 % (07/25/23 1029)  Weight: 86 kg (189 lb 9.5 oz) (07/25/23 0920)  Height: 6' (182.9 cm) (07/25/23 0920)  Mallampati Scale: Class II  ASA Classification: Class 2    Bone marrow    Date/Time: 7/25/2023 10:50 AM  Performed by: Edi Carty MD  Authorized by: Edi Carty MD     Consent Done?: Yes (Verbal) and Yes (Written)   Immediately prior to procedure a time out was called to verify the correct patient, procedure, equipment, support staff and site/side marked as required. .      Assistants?: Yes    List of assistants: Lisa MEJIA was present for the entire procedure.    Position: left lateral  Anesthesia: local infiltration  Local anesthetic: lidocaine 1% without epinephrine  Aspiration?: Yes   Biopsy?: Yes    Specimen source: posterior iliac crest  Number of Specimens: 2  Estimated blood loss (cc):1  Patient tolerated the procedure well with no immediate complications.  Post-operative instructions were provided for the patient.  Patient was discharged and will follow up if any complications occur.     7/25/2023

## 2023-07-26 VITALS
RESPIRATION RATE: 17 BRPM | HEIGHT: 72 IN | HEART RATE: 63 BPM | WEIGHT: 189.63 LBS | BODY MASS INDEX: 25.68 KG/M2 | OXYGEN SATURATION: 100 % | DIASTOLIC BLOOD PRESSURE: 94 MMHG | SYSTOLIC BLOOD PRESSURE: 147 MMHG | TEMPERATURE: 98 F

## 2023-07-28 LAB
COMMENT: NORMAL
FINAL PATHOLOGIC DIAGNOSIS: NORMAL
GROSS: NORMAL
Lab: NORMAL
MICROSCOPIC EXAM: NORMAL
SUPPLEMENTAL DIAGNOSIS: NORMAL

## 2023-08-04 ENCOUNTER — LAB VISIT (OUTPATIENT)
Dept: LAB | Facility: HOSPITAL | Age: 23
End: 2023-08-04
Attending: INTERNAL MEDICINE
Payer: MEDICAID

## 2023-08-04 DIAGNOSIS — C92.10 CML (CHRONIC MYELOCYTIC LEUKEMIA): ICD-10-CM

## 2023-08-04 LAB
ABS NEUT CALC (OHS): 0.15 X10(3)/MCL (ref 2.1–9.2)
ALBUMIN SERPL-MCNC: 4.2 G/DL (ref 3.5–5)
ALBUMIN/GLOB SERPL: 1.6 RATIO (ref 1.1–2)
ALP SERPL-CCNC: 78 UNIT/L (ref 40–150)
ALT SERPL-CCNC: 12 UNIT/L (ref 0–55)
ANISOCYTOSIS BLD QL SMEAR: ABNORMAL
AST SERPL-CCNC: 14 UNIT/L (ref 5–34)
B-HCG SERPL QL: 2 %
BILIRUBIN DIRECT+TOT PNL SERPL-MCNC: 1.7 MG/DL
BUN SERPL-MCNC: 10.5 MG/DL (ref 8.9–20.6)
CALCIUM SERPL-MCNC: 9.1 MG/DL (ref 8.4–10.2)
CHLORIDE SERPL-SCNC: 105 MMOL/L (ref 98–107)
CO2 SERPL-SCNC: 27 MMOL/L (ref 22–29)
CREAT SERPL-MCNC: 0.86 MG/DL (ref 0.73–1.18)
ERYTHROCYTE [DISTWIDTH] IN BLOOD BY AUTOMATED COUNT: 17.6 % (ref 11.5–17)
GFR SERPLBLD CREATININE-BSD FMLA CKD-EPI: >60 MLS/MIN/1.73/M2
GLOBULIN SER-MCNC: 2.6 GM/DL (ref 2.4–3.5)
GLUCOSE SERPL-MCNC: 91 MG/DL (ref 74–100)
HCT VFR BLD AUTO: 24.4 % (ref 42–52)
HGB BLD-MCNC: 7.9 G/DL (ref 14–18)
LYMPHOCYTES NFR BLD MANUAL: 0.65 X10(3)/MCL
LYMPHOCYTES NFR BLD MANUAL: 76 % (ref 13–40)
MCH RBC QN AUTO: 26.2 PG (ref 27–31)
MCHC RBC AUTO-ENTMCNC: 32.4 G/DL (ref 33–36)
MCV RBC AUTO: 81.1 FL (ref 80–94)
MICROCYTES BLD QL SMEAR: ABNORMAL
MONOCYTES NFR BLD MANUAL: 0.04 X10(3)/MCL (ref 0.1–1.3)
MONOCYTES NFR BLD MANUAL: 5 % (ref 2–11)
NEUTROPHILS NFR BLD MANUAL: 17 % (ref 47–80)
NRBC BLD MANUAL-RTO: 5 %
PLATELET # BLD AUTO: 60 X10(3)/MCL (ref 130–400)
PLATELET # BLD EST: ABNORMAL 10*3/UL
PMV BLD AUTO: 9.9 FL (ref 7.4–10.4)
POIKILOCYTOSIS BLD QL SMEAR: ABNORMAL
POTASSIUM SERPL-SCNC: 4.2 MMOL/L (ref 3.5–5.1)
PROT SERPL-MCNC: 6.8 GM/DL (ref 6.4–8.3)
RBC # BLD AUTO: 3.01 X10(6)/MCL (ref 4.7–6.1)
RBC MORPH BLD: ABNORMAL
SODIUM SERPL-SCNC: 138 MMOL/L (ref 136–145)
WBC # SPEC AUTO: 0.86 X10(3)/MCL (ref 4.5–11.5)

## 2023-08-04 PROCEDURE — 81206 BCR/ABL1 GENE MAJOR BP: CPT

## 2023-08-04 PROCEDURE — 80053 COMPREHEN METABOLIC PANEL: CPT

## 2023-08-04 PROCEDURE — 85027 COMPLETE CBC AUTOMATED: CPT

## 2023-08-04 PROCEDURE — 36415 COLL VENOUS BLD VENIPUNCTURE: CPT

## 2023-08-07 ENCOUNTER — OFFICE VISIT (OUTPATIENT)
Dept: HEMATOLOGY/ONCOLOGY | Facility: CLINIC | Age: 23
End: 2023-08-07
Payer: MEDICAID

## 2023-08-07 DIAGNOSIS — C92.10 CML (CHRONIC MYELOCYTIC LEUKEMIA): ICD-10-CM

## 2023-08-07 DIAGNOSIS — Z76.82 STEM CELL TRANSPLANT CANDIDATE: ICD-10-CM

## 2023-08-07 DIAGNOSIS — T45.1X5A PANCYTOPENIA DUE TO ANTINEOPLASTIC CHEMOTHERAPY: ICD-10-CM

## 2023-08-07 DIAGNOSIS — C92.10 BLASTIC PHASE CHRONIC MYELOID LEUKEMIA: Primary | ICD-10-CM

## 2023-08-07 DIAGNOSIS — D61.810 PANCYTOPENIA DUE TO ANTINEOPLASTIC CHEMOTHERAPY: ICD-10-CM

## 2023-08-07 DIAGNOSIS — D84.81 IMMUNODEFICIENCY DUE TO CONDITIONS CLASSIFIED ELSEWHERE: ICD-10-CM

## 2023-08-07 PROCEDURE — 1111F PR DISCHARGE MEDS RECONCILED W/ CURRENT OUTPATIENT MED LIST: ICD-10-PCS | Mod: CPTII,95,, | Performed by: INTERNAL MEDICINE

## 2023-08-07 PROCEDURE — 1160F PR REVIEW ALL MEDS BY PRESCRIBER/CLIN PHARMACIST DOCUMENTED: ICD-10-PCS | Mod: CPTII,95,, | Performed by: INTERNAL MEDICINE

## 2023-08-07 PROCEDURE — 1111F DSCHRG MED/CURRENT MED MERGE: CPT | Mod: CPTII,95,, | Performed by: INTERNAL MEDICINE

## 2023-08-07 PROCEDURE — 99215 OFFICE O/P EST HI 40 MIN: CPT | Mod: 95,,, | Performed by: INTERNAL MEDICINE

## 2023-08-07 PROCEDURE — 1160F RVW MEDS BY RX/DR IN RCRD: CPT | Mod: CPTII,95,, | Performed by: INTERNAL MEDICINE

## 2023-08-07 PROCEDURE — 1159F MED LIST DOCD IN RCRD: CPT | Mod: CPTII,95,, | Performed by: INTERNAL MEDICINE

## 2023-08-07 PROCEDURE — 1159F PR MEDICATION LIST DOCUMENTED IN MEDICAL RECORD: ICD-10-PCS | Mod: CPTII,95,, | Performed by: INTERNAL MEDICINE

## 2023-08-07 PROCEDURE — 99215 PR OFFICE/OUTPT VISIT, EST, LEVL V, 40-54 MIN: ICD-10-PCS | Mod: 95,,, | Performed by: INTERNAL MEDICINE

## 2023-08-07 NOTE — Clinical Note
Have y'all talked to him yet? I met him virtually on Monday. I know he was presented by Jenn or Jorje previously, and I will be taking over now. He's a blast phase CML. Possibly now in remission (or at least chronic phase CML). He has 2 brother who we could type (18 and 21), as well as his mom. Looks like HLA typing is back, so I would also like to search for a MUD. Thanks!

## 2023-08-07 NOTE — Clinical Note
Julian West,  Looks like he still pretty pancytopenic so I held his admission for consolidation CLIA+Deven. Do you mind continuing to monitor labs weekly, and once we see some recovery repeat a bone marrow biopsy? Maybe tentatively plan for a repeat bone marrow biopsy in like 2-3 weeks? Looks like he still has 23% BCR/ABL so at least MRD+...  Thanks, Bijan

## 2023-08-08 ENCOUNTER — SPECIALTY PHARMACY (OUTPATIENT)
Dept: PHARMACY | Facility: CLINIC | Age: 23
End: 2023-08-08
Payer: MEDICAID

## 2023-08-08 DIAGNOSIS — C92.10 BLAST CRISIS PHASE OF CHRONIC MYELOID LEUKEMIA: Primary | ICD-10-CM

## 2023-08-08 NOTE — TELEPHONE ENCOUNTER
Specialty Pharmacy - Refill Coordination    Specialty Medication Orders Linked to Encounter      Flowsheet Row Most Recent Value   Medication #1 PONATinib 15 mg Tab (Order#[id number], Rx#[id number])            Refill Questions - Documented Responses      Flowsheet Row Most Recent Value   Patient Availability and HIPAA Verification    Does patient want to proceed with activity? Yes   HIPAA/medical authority confirmed? Yes   Relationship to patient of person spoken to? Self   Refill Screening Questions    Changes to allergies? No   Changes to medications? No   New conditions since last clinic visit? No   Unplanned office visit, urgent care, ED, or hospital admission in the last 4 weeks? No   How does patient/caregiver feel medication is working? Good   Financial problems or insurance changes? No   How many doses of your specialty medications were missed in the last 4 weeks? 0   Would patient like to speak to a pharmacist? No   When does the patient need to receive the medication? 08/15/23   Refill Delivery Questions    How will the patient receive the medication? MEDRx   When does the patient need to receive the medication? 08/15/23   Shipping Address Home   Address in [facility] confirmed and updated if neccessary? Yes   Expected Copay ($) 3   Is the patient able to afford the medication copay? Yes   Payment Method CC on file   Days supply of Refill 30   Supplies needed? No supplies needed   Refill activity completed? Yes   Refill activity plan Refill scheduled   Shipment/Pickup Date: 08/09/23            Current Outpatient Medications   Medication Sig    acyclovir (ZOVIRAX) 800 MG Tab Take 1 tablet (800 mg total) by mouth 2 (two) times daily.    levoFLOXacin (LEVAQUIN) 500 MG tablet Take 1 tablet (500 mg total) by mouth once daily.    magic mouthwash diphen/antac/nysta Take 10 mLs by mouth 4 (four) times daily.    NIFEdipine (PROCARDIA-XL) 60 MG (OSM) 24 hr tablet Take 1 tablet (60 mg total) by mouth once  daily.    ondansetron (ZOFRAN) 8 MG tablet Take 1 tablet (8 mg total) by mouth every 6 (six) hours as needed for Nausea.    oxyCODONE (ROXICODONE) 5 MG immediate release tablet Take 1 tablet (5 mg total) by mouth every 4 (four) hours as needed for Pain.    pantoprazole (PROTONIX) 40 MG tablet Take 1 tablet (40 mg total) by mouth once daily.    PONATinib 15 mg Tab Take 1 tablet (15 mg total) by mouth Daily.    posaconazole (NOXAFIL) 100 mg TbEC tablet Take 3 tablets (300 mg total) by mouth once daily.    sulfamethoxazole-trimethoprim 800-160mg (BACTRIM DS) 800-160 mg Tab Take 1 tablet by mouth every Mon, Wed, Fri.   Last reviewed on 8/8/2023  9:29 AM by Marnie Pollard, PharmD    Review of patient's allergies indicates:  No Known Allergies Last reviewed on  8/8/2023 9:29 AM by Marnie Pollard      Tasks added this encounter   No tasks added.   Tasks due within next 3 months   8/8/2023 - Refill Coordination Outreach (1 time occurrence)     Marnie Pollard, PharmD  [facility]  [address] [place] [id number]  Phone: [phone]  Fax: [phone]

## 2023-08-11 DIAGNOSIS — C92.10 BLASTIC PHASE CHRONIC MYELOID LEUKEMIA: Primary | ICD-10-CM

## 2023-08-11 NOTE — PROGRESS NOTES
Section of Hematology and Stem Cell Transplantation  Follow Up Visit     The patient location is: [place], [place]  The chief complaint leading to consultation is: BP-CML    Visit type: audiovisual    Face to Face time with patient: 20 minutes  40 minutes of total time spent on the encounter, which includes face to face time and non-face to face time preparing to see the patient (eg, review of tests), Obtaining and/or reviewing separately obtained history, Documenting clinical information in the electronic or other health record, Independently interpreting results (not separately reported) and communicating results to the patient/family/caregiver, or Care coordination (not separately reported).     Each patient to whom he or she provides medical services by telemedicine is:  (1) informed of the relationship between the physician and patient and the respective role of any other health care provider with respect to management of the patient; and (2) notified that he or she may decline to receive medical services by telemedicine and may withdraw from such care at any time.    Notes:     Date of visit: 8/7/23  Visit diagnosis: Blastic phase chronic myeloid leukemia [C92.10]  Referred by:  Brett Hogan MD    Oncologic History:     Primary Oncologic Diagnosis: Chronic myeloid leukemia, blast phase    8/2022: Diagnosed with chronic phase CML.  10/2022: Started dasatinib but had questionable compliance.  6/7/23: He presented to the ER at [facility] with gum swelling. Labs notable for  (80% blasts). He was transferred to [facility] [place] management.  6/9/23: Bone marrow biopsy revealed blast phase chronic myeloid leukemia; reticulin fibrosis 2 of 3. 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 FISH with t(9;22) and MLLT3/MLL (KMT2A) fusion. NGS with NRAS (7%), PTPN11 (4%). BCR/ABL1:ABL1 (p210) >55%.  6/16/23: Started induction with CLIA + ponatinib. Course complicated by neutropenic fever, pharyngitis/mucositis, and strep  viridans bacteremia.   7/6/23: Day 21 bone marrow biopsy revealed a markedly hypocellular marrow (<5%) with trilineage hypoplasia. CG 46,XY[5].  7/25/23: Bone marrow biopsy revealed a hypocellular marrow but suboptimal specimen for evaluation.   8/4/23: BCR/ABL1:ABL1 (p210) 23.6%.     History of Present Ilness:   Magdaleno Moore) is a pleasant 23 y.o.male with chronic myeloid leukemia blast phase who presents for follow up. He states that overall he is doing well. He is still having labs monitored regularly with Dr. Hogan. He feels much better. Denies fevers and chills.     PAST MEDICAL HISTORY:   Past Medical History:   Diagnosis Date    CML (chronic myelocytic leukemia)     HVD (hypertensive vascular disease)     Oropharyngeal candidiasis        PAST SURGICAL HISTORY:   Past Surgical History:   Procedure Laterality Date    BONE MARROW BIOPSY N/A 8/22/2022    Procedure: Biopsy-bone marrow;  Surgeon: Getachew Chen MD;  Location: [facility] OR;  Service: General;  Laterality: N/A;    BONE MARROW BIOPSY N/A 7/25/2023    Procedure: Biopsy-bone marrow;  Surgeon: Edi Carty MD;  Location: [facility] OR;  Service: General;  Laterality: N/A;    KNEE SURGERY Left        PAST SOCIAL HISTORY:  Social History     Tobacco Use    Smoking status: Never    Smokeless tobacco: Never   Substance Use Topics    Alcohol use: Never    Drug use: Yes     Types: Marijuana       FAMILY HISTORY:  Family History   Problem Relation Age of Onset    Hypertension Maternal Grandmother     Cancer Maternal Grandmother        CURRENT MEDICATIONS:   Current Outpatient Medications   Medication Sig    acyclovir (ZOVIRAX) 800 MG Tab Take 1 tablet (800 mg total) by mouth 2 (two) times daily.    levoFLOXacin (LEVAQUIN) 500 MG tablet Take 1 tablet (500 mg total) by mouth once daily.    magic mouthwash diphen/antac/nysta Take 10 mLs by mouth 4 (four) times daily.    NIFEdipine (PROCARDIA-XL) 60 MG (OSM) 24 hr tablet Take 1 tablet (60 mg total) by mouth once  daily.    ondansetron (ZOFRAN) 8 MG tablet Take 1 tablet (8 mg total) by mouth every 6 (six) hours as needed for Nausea.    oxyCODONE (ROXICODONE) 5 MG immediate release tablet Take 1 tablet (5 mg total) by mouth every 4 (four) hours as needed for Pain.    pantoprazole (PROTONIX) 40 MG tablet Take 1 tablet (40 mg total) by mouth once daily.    PONATinib 15 mg Tab Take 1 tablet (15 mg total) by mouth Daily.    posaconazole (NOXAFIL) 100 mg TbEC tablet Take 3 tablets (300 mg total) by mouth once daily.    sulfamethoxazole-trimethoprim 800-160mg (BACTRIM DS) 800-160 mg Tab Take 1 tablet by mouth every Mon, Wed, Fri.     No current facility-administered medications for this visit.       ALLERGIES:   Review of patient's allergies indicates:  No Known Allergies    Review of Systems:     Pertinent positives and negatives included in the HPI. Otherwise a complete review of systems is negative.    Physical Exam:     There were no vitals filed for this visit.    ECOG Performance Status: (foot note - ECOG PS provided by Eastern Cooperative Oncology Group) 0 - Asymptomatic    Karnofsky Performance Score:  100%- Normal, No Complaints, No Evidence of Disease    Labs:   Lab Results   Component Value Date    WBC 0.86 (LL) 08/04/2023    RBC 3.01 (L) 08/04/2023    HGB 7.9 (L) 08/04/2023    HCT 24.4 (L) 08/04/2023    MCV 81.1 08/04/2023    MCH 26.2 (L) 08/04/2023    MCHC 32.4 (L) 08/04/2023    RDW 17.6 (H) 08/04/2023    PLT 60 (L) 08/04/2023    MPV 9.9 08/04/2023    GRAN 0.1 (L) 07/12/2023    GRAN 6.2 (L) 07/12/2023    LYMPH 1.1 07/12/2023    LYMPH 92.9 (H) 07/12/2023    MONO 0.0 (L) 07/12/2023    MONO 0.9 (L) 07/12/2023    EOS 0.0 07/12/2023    BASO 0.00 07/12/2023    EOSINOPHIL 0.0 07/12/2023    BASOPHIL 0.0 07/12/2023       CMP  Sodium   Date Value Ref Range Status   07/12/2023 138 136 - 145 mmol/L Final     Sodium Level   Date Value Ref Range Status   08/04/2023 138 136 - 145 mmol/L Final     Potassium   Date Value Ref Range  Status   07/12/2023 4.4 3.5 - 5.1 mmol/L Final     Potassium Level   Date Value Ref Range Status   08/04/2023 4.2 3.5 - 5.1 mmol/L Final     Chloride   Date Value Ref Range Status   07/12/2023 106 95 - 110 mmol/L Final     CO2   Date Value Ref Range Status   07/12/2023 26 23 - 29 mmol/L Final     Carbon Dioxide   Date Value Ref Range Status   08/04/2023 27 22 - 29 mmol/L Final     Glucose   Date Value Ref Range Status   07/12/2023 87 70 - 110 mg/dL Final     BUN   Date Value Ref Range Status   07/12/2023 15 6 - 20 mg/dL Final     Blood Urea Nitrogen   Date Value Ref Range Status   08/04/2023 10.5 8.9 - 20.6 mg/dL Final     Creatinine   Date Value Ref Range Status   08/04/2023 0.86 0.73 - 1.18 mg/dL Final   07/12/2023 0.8 0.5 - 1.4 mg/dL Final     Calcium   Date Value Ref Range Status   07/12/2023 9.0 8.7 - 10.5 mg/dL Final     Calcium Level Total   Date Value Ref Range Status   08/04/2023 9.1 8.4 - 10.2 mg/dL Final     Total Protein   Date Value Ref Range Status   07/12/2023 6.7 6.0 - 8.4 g/dL Final     Albumin   Date Value Ref Range Status   07/12/2023 3.3 (L) 3.5 - 5.2 g/dL Final     Albumin Level   Date Value Ref Range Status   08/04/2023 4.2 3.5 - 5.0 g/dL Final     Total Bilirubin   Date Value Ref Range Status   07/12/2023 0.4 0.1 - 1.0 mg/dL Final     Comment:     For infants and newborns, interpretation of results should be based  on gestational age, weight and in agreement with clinical  observations.    Premature Infant recommended reference ranges:  Up to 24 hours.............<8.0 mg/dL  Up to 48 hours............<12.0 mg/dL  3-5 days..................<15.0 mg/dL  6-29 days.................<15.0 mg/dL       Bilirubin Total   Date Value Ref Range Status   08/04/2023 1.7 (H) <=1.5 mg/dL Final     Alkaline Phosphatase   Date Value Ref Range Status   08/04/2023 78 40 - 150 unit/L Final   07/12/2023 94 55 - 135 U/L Final     AST   Date Value Ref Range Status   07/12/2023 10 10 - 40 U/L Final     Aspartate  Aminotransferase   Date Value Ref Range Status   08/04/2023 14 5 - 34 unit/L Final     ALT   Date Value Ref Range Status   07/12/2023 12 10 - 44 U/L Final     Alanine Aminotransferase   Date Value Ref Range Status   08/04/2023 12 0 - 55 unit/L Final     Anion Gap   Date Value Ref Range Status   07/12/2023 6 (L) 8 - 16 mmol/L Final       Imaging:   No results found for this or any previous visit (from the past 2160 hour(s)).    Pathology:  Reviewed     Assessment and Plan:   Magdaleno Moore) is a pleasant 23 y.o.male with chronic myeloid leukemia blast phase who presents for follow up.    Chronic myeloid leukemia, blast phase:  He was initially diagnosed with CP-CML in 8/2022. In 6/2023, he presented to the ER and later diagnosed with BP-CML. Bone marrow biopsy at that time revealed a t(9;22) as well as KMT2A fusion. NGS with NRAS (7%), PTPN11 (4%). He was induced with CLIA plus ponatinib, and his day 21 bone marrow biopsy revealed aplasia. Follow up bone marrow biopsy on 7/25/23 noted persistent aplasia, although the sample was limited. BCR/ABL on 8/4/23 was 23.6% which indicates at least measurable residual disease.   - Repeat bone marrow biopsy in 2-3 weeks once counts improve.   - Will delay consolidation until count recovery.     Stem cell transplant candidate:  We discussed the role for allogeneic stem cell transplantation in this disease process as a potentially curative option. We had an extensive discussion about the rationale, logistics, risks, and benefits. We reviewed the requirement to stay in the [place] area for 100 days with a caregiver at all times. We discussed the risks, including infection, graft failure, organ toxicity, graft versus host disease, relapse of disease, and secondary cancers. We reviewed the need for long-term immunosuppression and need for close monitoring. Will proceed with expedited transplant workup if CP-CML confirmed. He is young and fit (HCT-CI 0).   - Coordinator:  TBD  - Regimen: MAC (?[id number])  - Donor: TBD  - Graft source: TBD  - GVHD Ppx: PTCy, Tac, MMF  - Disease status at time of transplant: TBD    Stem cell donors:  He has 3 brothers (8, 18, 21). His mother is 44. Will type his 18 and 21 year old brothers, his mother, and search for a MUD.    Pancytopenia:  Monitor labs 1-2x weekly with Dr. Hogan. Transfuse for Hgb <7 and Plts <10.    Immunosuppression due to conditions classified elsewhere:  Continue acyclovir, levofloxacin, posaconazole.     Orders/Follow Up:           Route Chart for Scheduling    BMT Chart Routing  Urgent    Follow up with physician 3 weeks. Follow up 3 weeks with tentative admission for C1 consolidation CLIA + ponatinib   Follow up with JOSE ANTONIO    Provider visit type Admit prep   Infusion scheduling note    Injection scheduling note    Labs CBC, CMP, phosphorus, type and screen, uric acid, magnesium and LDH   Scheduling:  Preferred lab:  Lab interval:  prior to visit   Imaging None      Pharmacy appointment No pharmacy appointment needed      Other referrals no referral to Oncology Primary Care needed -    No additional referrals needed           Treatment Plan Information   IP LEUKEMIA: CLIA + PONATINIB FOR BLAST PHASE CML   Denny Barajas MD   Upcoming Treatment Dates - IP LEUKEMIA: CLIA + PONATINIB FOR BLAST PHASE CML    8/14/2023       Pre-Medications       ondansetron tablet 8 mg       dexAMETHasone tablet 8 mg       Chemotherapy       cladribine (LEUSTATIN) 10.9 mg in sodium chloride 0.9% 510.9 mL chemo infusion       CYTarabine (PF) (ZAHRA-C) 1,000 mg/m2 = 2,170 mg in sodium chloride 0.9% 521.7 mL chemo infusion       IDArubicin (IDAMYCIN) 17 mg in sodium chloride 0.9% 67 mL chemo infusion       PONATinib Tab 15 mg       Supportive Care       dexAMETHasone 0.1 % ophthalmic solution 1 drop      Advance Care Planning   Date: 08/07/2023  We reviewed his underlying diagnosis including natural history, prognosis, and various treatment options. He  remains interested in pursuing any and all treatment options in an effort to improve his quality and quantity of life. Will continue all treatment as recommended above.         Bijan Green MD  Hematology, Oncology, and Stem Cell Transplantation  [facility]

## 2023-08-14 ENCOUNTER — INFUSION (OUTPATIENT)
Dept: INFUSION THERAPY | Facility: HOSPITAL | Age: 23
End: 2023-08-14
Attending: NURSE PRACTITIONER
Payer: MEDICAID

## 2023-08-14 VITALS
RESPIRATION RATE: 18 BRPM | TEMPERATURE: 99 F | DIASTOLIC BLOOD PRESSURE: 107 MMHG | HEART RATE: 47 BPM | SYSTOLIC BLOOD PRESSURE: 178 MMHG

## 2023-08-14 DIAGNOSIS — C92.10 BLAST CRISIS PHASE OF CHRONIC MYELOID LEUKEMIA: ICD-10-CM

## 2023-08-14 DIAGNOSIS — D64.9 ANEMIA, UNSPECIFIED TYPE: ICD-10-CM

## 2023-08-14 PROCEDURE — P9040 RBC LEUKOREDUCED IRRADIATED: HCPCS | Performed by: NURSE PRACTITIONER

## 2023-08-14 PROCEDURE — 86923 COMPATIBILITY TEST ELECTRIC: CPT | Performed by: NURSE PRACTITIONER

## 2023-08-14 PROCEDURE — 25000003 PHARM REV CODE 250: Performed by: NURSE PRACTITIONER

## 2023-08-14 PROCEDURE — 36430 TRANSFUSION BLD/BLD COMPNT: CPT

## 2023-08-14 RX ORDER — DIPHENHYDRAMINE HCL 25 MG
25 CAPSULE ORAL ONCE
Status: COMPLETED | OUTPATIENT
Start: 2023-08-14 | End: 2023-08-14

## 2023-08-14 RX ORDER — DIPHENHYDRAMINE HCL 25 MG
25 CAPSULE ORAL ONCE
Status: CANCELLED | OUTPATIENT
Start: 2023-08-14 | End: 2023-08-14

## 2023-08-14 RX ORDER — ACETAMINOPHEN 325 MG/1
650 TABLET ORAL ONCE
Status: COMPLETED | OUTPATIENT
Start: 2023-08-14 | End: 2023-08-14

## 2023-08-14 RX ORDER — ACETAMINOPHEN 325 MG/1
650 TABLET ORAL ONCE
Status: CANCELLED | OUTPATIENT
Start: 2023-08-14 | End: 2023-08-14

## 2023-08-14 RX ORDER — HYDROCODONE BITARTRATE AND ACETAMINOPHEN 500; 5 MG/1; MG/1
TABLET ORAL ONCE
Status: CANCELLED | OUTPATIENT
Start: 2023-08-14 | End: 2023-08-14

## 2023-08-14 RX ORDER — HYDROCODONE BITARTRATE AND ACETAMINOPHEN 500; 5 MG/1; MG/1
TABLET ORAL ONCE
Status: COMPLETED | OUTPATIENT
Start: 2023-08-14 | End: 2023-08-14

## 2023-08-14 RX ADMIN — SODIUM CHLORIDE: 9 INJECTION, SOLUTION INTRAVENOUS at 10:08

## 2023-08-14 RX ADMIN — DIPHENHYDRAMINE HYDROCHLORIDE 25 MG: 25 CAPSULE ORAL at 10:08

## 2023-08-14 RX ADMIN — ACETAMINOPHEN 650 MG: 325 TABLET ORAL at 10:08

## 2023-08-14 NOTE — NURSING
Pt added for 1 unit prbcs. Pt mitch transfusion well. BP went up after transfusion,. Pt notes not taking his BP med this AM/ spoke with Alexsandra JOAQUIN. Ok to  Dc home as long as pt takes his med when he gets home. Pt verbalized understanding

## 2023-08-18 NOTE — PROGRESS NOTES
Subjective:       Patient ID: Magdaleno Hirsch is a 23 y.o. male.    Chief Complaint: Boredom    Diagnosis:  1. CML, chronic phase  2. 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  3. Conjuctival hemorrhage secondary to 1.    Current Treatment:   Patient received CLIA and ponatinib on 06/16/2023.  Planning on AlloSCT.    Treatment History:  Hydroxyurea 2g every 12 hours  Dasatinib 100 mg po daily, unsure on start date as patient was lost to follow up.    HPI:  Patient with no real medical history who went in for a routine eye exam on 08/18/2022 to update his eye glasses prescription.  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  He had bilateral Valdez spots with vessel tortuosity.  The optometrist recommended that the patient have blood work including testing for sickle cell disease.  The patient presented to the emergency department.  CBC in the emergency department revealed a white blood cell count of 411,900. Hemoglobin was 8.3, platelets were normal at 375,000 hundred and seventy five thousand.  Differential was suggestive of CML.  Coagulation studies revealed an INR of 1.38, PTT of 36.4 and a fibrinogen of 292.  Patient was going to present to [place], however they were on diversion.  He was transferred from [facility] to [facility].  Hematology consulted.  Patient underwent bone marrow biopsy on 08/22/2022, this revealed CML, chronic phase, BCR/ABL1 positive.  Ordered dasatinib 100 mg p.o. daily as of 10/10/2022, unsure when patient started taking medication due to him being lost to follow-up.  Patient went into blast crisis, received CLIA and ponatinib on 06/16/2023    Interval History:   Patient presents to clinic for follow up for CML. He has been treated in [place], working up for alloSCT.  He states that today he is feeling normal.     Past Medical History:   Diagnosis Date    CML (chronic myelocytic leukemia)     D  (hypertensive vascular disease)     Oropharyngeal candidiasis       Past Surgical History:   Procedure Laterality Date    BONE MARROW BIOPSY N/A 8/22/2022    Procedure: Biopsy-bone marrow;  Surgeon: Getachew Chen MD;  Location: [facility] OR;  Service: General;  Laterality: N/A;    BONE MARROW BIOPSY N/A 7/25/2023    Procedure: Biopsy-bone marrow;  Surgeon: Edi Carty MD;  Location: [facility] OR;  Service: General;  Laterality: N/A;    KNEE SURGERY Left      Social History     Socioeconomic History    Marital status: Single   Tobacco Use    Smoking status: Never    Smokeless tobacco: Never   Substance and Sexual Activity    Alcohol use: Never    Drug use: Yes     Types: Marijuana      Family History   Problem Relation Age of Onset    Hypertension Maternal Grandmother     Cancer Maternal Grandmother            Review of Systems   Constitutional:  Negative for chills, diaphoresis, fatigue and unexpected weight change.   HENT:  Negative for nasal congestion and sore throat.    Eyes:  Negative for pain.   Respiratory:  Negative for cough, chest tightness and shortness of breath.    Cardiovascular:  Negative for chest pain, palpitations and leg swelling.   Gastrointestinal:  Negative for abdominal distention, abdominal pain, blood in stool, constipation and diarrhea.   Genitourinary:  Negative for dysuria, frequency and hematuria.   Musculoskeletal:  Negative for arthralgias and back pain.   Integumentary:  Negative for rash.   Neurological:  Negative for dizziness, weakness, numbness and headaches.   Hematological:  Negative for adenopathy. Bruises/bleeds easily.   Psychiatric/Behavioral:  Negative for confusion.          Objective:      Physical Exam  Vitals reviewed.   Constitutional:       General: He is awake.      Appearance: Normal appearance.   HENT:      Head: Normocephalic and atraumatic.      Right Ear: Hearing normal.      Left Ear: Hearing normal.      Nose: Nose normal.      Mouth/Throat:      Comments:     Eyes:       General: Lids are normal. Vision grossly intact.      Extraocular Movements: Extraocular movements intact.      Conjunctiva/sclera: Conjunctivae normal.   Cardiovascular:      Rate and Rhythm: Normal rate and regular rhythm.      Pulses: Normal pulses.      Heart sounds: Normal heart sounds.   Pulmonary:      Effort: Pulmonary effort is normal.      Breath sounds: Normal breath sounds. No wheezing, rhonchi or rales.   Chest:      Comments: Soft mass in area of the sternum  Abdominal:      General: Bowel sounds are normal.      Palpations: Abdomen is soft. There is splenomegaly.      Tenderness: There is no abdominal tenderness.   Musculoskeletal:      Cervical back: Full passive range of motion without pain.      Right lower leg: No edema.      Left lower leg: No edema.   Lymphadenopathy:      Cervical: No cervical adenopathy.      Upper Body:      Right upper body: No supraclavicular or axillary adenopathy.      Left upper body: No supraclavicular or axillary adenopathy.   Skin:     General: Skin is warm.   Neurological:      General: No focal deficit present.      Mental Status: He is alert and oriented to person, place, and time.   Psychiatric:         Attention and Perception: Attention normal.         Mood and Affect: Mood and affect normal.         Behavior: Behavior is cooperative.         LABS AND IMAGING REVIEWED IN [facility]  Lab Review:        Assessment:   CML, chronic phase  Pancytopenia secondary to Hydrea  Refractory thrombocytopenia - improving     Plan:     Patient went into blast crisis, received CLIA ponatinib.  Planning on alloSCT.    Repeat bone marrow biopsy scheduled on 08/28/2023    Continue weekly labs/PRN transfusion    Return to clinic in  4-5 weeks to review results     Paul Hogan II, MD I, Jaelyn Giles LPN, acted solely as a scribe for and in the presence of, Dr. Paul Hogan who performed the service.

## 2023-08-21 ENCOUNTER — OFFICE VISIT (OUTPATIENT)
Dept: HEMATOLOGY/ONCOLOGY | Facility: CLINIC | Age: 23
End: 2023-08-21
Payer: MEDICAID

## 2023-08-21 VITALS
DIASTOLIC BLOOD PRESSURE: 94 MMHG | HEART RATE: 100 BPM | WEIGHT: 194.31 LBS | OXYGEN SATURATION: 98 % | RESPIRATION RATE: 18 BRPM | SYSTOLIC BLOOD PRESSURE: 138 MMHG | TEMPERATURE: 98 F | HEIGHT: 72 IN | BODY MASS INDEX: 26.32 KG/M2

## 2023-08-21 DIAGNOSIS — C92.10 BLAST CRISIS PHASE OF CHRONIC MYELOID LEUKEMIA: Primary | ICD-10-CM

## 2023-08-21 PROCEDURE — 1159F PR MEDICATION LIST DOCUMENTED IN MEDICAL RECORD: ICD-10-PCS | Mod: CPTII,,, | Performed by: INTERNAL MEDICINE

## 2023-08-21 PROCEDURE — 3008F BODY MASS INDEX DOCD: CPT | Mod: CPTII,,, | Performed by: INTERNAL MEDICINE

## 2023-08-21 PROCEDURE — 3075F SYST BP GE 130 - 139MM HG: CPT | Mod: CPTII,,, | Performed by: INTERNAL MEDICINE

## 2023-08-21 PROCEDURE — 99214 OFFICE O/P EST MOD 30 MIN: CPT | Mod: PBBFAC | Performed by: INTERNAL MEDICINE

## 2023-08-21 PROCEDURE — 99999 PR PBB SHADOW E&M-EST. PATIENT-LVL IV: ICD-10-PCS | Mod: PBBFAC,,, | Performed by: INTERNAL MEDICINE

## 2023-08-21 PROCEDURE — 3080F DIAST BP >= 90 MM HG: CPT | Mod: CPTII,,, | Performed by: INTERNAL MEDICINE

## 2023-08-21 PROCEDURE — 1160F PR REVIEW ALL MEDS BY PRESCRIBER/CLIN PHARMACIST DOCUMENTED: ICD-10-PCS | Mod: CPTII,,, | Performed by: INTERNAL MEDICINE

## 2023-08-21 PROCEDURE — 1159F MED LIST DOCD IN RCRD: CPT | Mod: CPTII,,, | Performed by: INTERNAL MEDICINE

## 2023-08-21 PROCEDURE — 99999 PR PBB SHADOW E&M-EST. PATIENT-LVL IV: CPT | Mod: PBBFAC,,, | Performed by: INTERNAL MEDICINE

## 2023-08-21 PROCEDURE — 3075F PR MOST RECENT SYSTOLIC BLOOD PRESS GE 130-139MM HG: ICD-10-PCS | Mod: CPTII,,, | Performed by: INTERNAL MEDICINE

## 2023-08-21 PROCEDURE — 1160F RVW MEDS BY RX/DR IN RCRD: CPT | Mod: CPTII,,, | Performed by: INTERNAL MEDICINE

## 2023-08-21 PROCEDURE — 3008F PR BODY MASS INDEX (BMI) DOCUMENTED: ICD-10-PCS | Mod: CPTII,,, | Performed by: INTERNAL MEDICINE

## 2023-08-21 PROCEDURE — 99214 OFFICE O/P EST MOD 30 MIN: CPT | Mod: S$PBB,,, | Performed by: INTERNAL MEDICINE

## 2023-08-21 PROCEDURE — 3080F PR MOST RECENT DIASTOLIC BLOOD PRESSURE >= 90 MM HG: ICD-10-PCS | Mod: CPTII,,, | Performed by: INTERNAL MEDICINE

## 2023-08-21 PROCEDURE — 99214 PR OFFICE/OUTPT VISIT, EST, LEVL IV, 30-39 MIN: ICD-10-PCS | Mod: S$PBB,,, | Performed by: INTERNAL MEDICINE

## 2023-08-25 NOTE — PRE-PROCEDURE INSTRUCTIONS
[facility]: Outpatient Surgery  Preprocedure Check-In Instructions     Your arrival time for your surgery or procedure is ___0800___.  We ask patients to arrive about 2 hours before surgery to allow for enough time to review your health history & medications, start your IV, complete any outstanding labwork or tests, and meet your Anesthesiologist.  You will arrive at [facility], [address] [place] [address].  Enter through the [place] entrance next to the Emergency Room, and come to the 6th floor to the Outpatient Surgery Department.     Visitory Policy:  You are allowed 2 adult visitors to be with you in the hospital. All hospital visitors should be in good current health.  No small children.     What to Bring:  Please have your ID, insurance cards, and all home medication bottles with you at check in.  Bring your CPAP machine if one is used at home.     Fasting:  Nothing to eat or drink after midnight the night before your procedure. This includes no ice, gum, hard candies, and/or tobacco products.  Follow your doctor's instructions for taking any medications on the morning of your procedure.  If no instructions for taking medications were given, do not take any medications but bring your medications in their bottles to your procedure check in.     Follow your doctor's preoperative instructions regarding skin prep, bowel prep, bathing, or showering prior to your procedure.  If any special soaps were provided to you, please use according to your doctor's instructions. If no instructions were given from your doctor, take a good bath or shower with antibacterial soap the night before and the morning of your procedure.  On the morning of procedure, wear loose, comfortable clothing.  No lotions, makeup, perfumes, colognes, deodorant, or jewelry to your procedure.  Removable items (glasses, contact lenses, dentures, retainers, hearing aids) need to be removed for your  procedure.  Bring your storage containers for these items if you wear them.     Artificial nails, body jewelry, eyelash extensions, and/or hair extensions with metal clips are not allowed during your surgery.  If you currently wear any of these items, please arrange for them to be removed prior to your arrival to the hospital.     Outpatient or Same Day Surgeries:  Any patients receiving sedation/anesthesia are advised not to drive for 24 hours after their procedure.  We do not allow patients to drive themselves home after discharge.  If you are going home after your procedure, please have someone available to drive you home from the hospital.        You may call the Outpatient Surgery Department at [phone] with any questions or concerns.  We are looking forward to meeting you and taking great care of you for your procedure.  Thank you for choosing [facility] for your surgical needs.

## 2023-08-28 ENCOUNTER — HOSPITAL ENCOUNTER (OUTPATIENT)
Facility: HOSPITAL | Age: 23
Discharge: HOME | End: 2023-08-28
Attending: PATHOLOGY | Admitting: PATHOLOGY
Payer: MEDICAID

## 2023-08-28 DIAGNOSIS — C92.10 BLASTIC PHASE CHRONIC MYELOID LEUKEMIA: ICD-10-CM

## 2023-08-28 LAB
BASOPHILS # BLD AUTO: 0.01 X10(3)/MCL
BASOPHILS NFR BLD AUTO: 0.4 %
EOSINOPHIL # BLD AUTO: 0.04 X10(3)/MCL (ref 0–0.9)
EOSINOPHIL NFR BLD AUTO: 1.5 %
ERYTHROCYTE [DISTWIDTH] IN BLOOD BY AUTOMATED COUNT: 21.9 % (ref 11.5–17)
HCT VFR BLD AUTO: 29.4 % (ref 42–52)
HGB BLD-MCNC: 9.5 G/DL (ref 14–18)
IMM GRANULOCYTES # BLD AUTO: 0.01 X10(3)/MCL (ref 0–0.04)
IMM GRANULOCYTES NFR BLD AUTO: 0.4 %
LYMPHOCYTES # BLD AUTO: 1.06 X10(3)/MCL (ref 0.6–4.6)
LYMPHOCYTES NFR BLD AUTO: 39.4 %
MCH RBC QN AUTO: 27.1 PG (ref 27–31)
MCHC RBC AUTO-ENTMCNC: 32.3 G/DL (ref 33–36)
MCV RBC AUTO: 83.8 FL (ref 80–94)
MONOCYTES # BLD AUTO: 0.23 X10(3)/MCL (ref 0.1–1.3)
MONOCYTES NFR BLD AUTO: 8.6 %
NEUTROPHILS # BLD AUTO: 1.34 X10(3)/MCL (ref 2.1–9.2)
NEUTROPHILS NFR BLD AUTO: 49.7 %
NRBC BLD AUTO-RTO: 1.9 %
PLATELET # BLD AUTO: 57 X10(3)/MCL (ref 130–400)
PMV BLD AUTO: ABNORMAL FL
RBC # BLD AUTO: 3.51 X10(6)/MCL (ref 4.7–6.1)
WBC # SPEC AUTO: 2.69 X10(3)/MCL (ref 4.5–11.5)

## 2023-08-28 PROCEDURE — 88341 IMHCHEM/IMCYTCHM EA ADD ANTB: CPT

## 2023-08-28 PROCEDURE — 88313 SPECIAL STAINS GROUP 2: CPT | Mod: 59

## 2023-08-28 PROCEDURE — 38221 DX BONE MARROW BIOPSIES: CPT | Performed by: PATHOLOGY

## 2023-08-28 PROCEDURE — 88311 DECALCIFY TISSUE: CPT

## 2023-08-28 PROCEDURE — 88342 IMHCHEM/IMCYTCHM 1ST ANTB: CPT | Mod: 59

## 2023-08-28 PROCEDURE — 88325 CONSLTJ COMPRE RVW REC REPRT: CPT

## 2023-08-28 PROCEDURE — 25000003 PHARM REV CODE 250: Performed by: PATHOLOGY

## 2023-08-28 PROCEDURE — 63600175 PHARM REV CODE 636 W HCPCS: Performed by: PATHOLOGY

## 2023-08-28 PROCEDURE — 99152 MOD SED SAME PHYS/QHP 5/>YRS: CPT | Performed by: PATHOLOGY

## 2023-08-28 PROCEDURE — 88305 TISSUE EXAM BY PATHOLOGIST: CPT | Mod: 59 | Performed by: INTERNAL MEDICINE

## 2023-08-28 PROCEDURE — 88313 SPECIAL STAINS GROUP 2: CPT

## 2023-08-28 PROCEDURE — 85025 COMPLETE CBC W/AUTO DIFF WBC: CPT | Performed by: PATHOLOGY

## 2023-08-28 RX ORDER — MIDAZOLAM HYDROCHLORIDE 5 MG/ML
1 INJECTION INTRAMUSCULAR; INTRAVENOUS
Status: DISCONTINUED | OUTPATIENT
Start: 2023-08-28 | End: 2023-08-28 | Stop reason: HOSPADM

## 2023-08-28 RX ORDER — FLUMAZENIL 0.1 MG/ML
0.2 INJECTION INTRAVENOUS
Status: DISCONTINUED | OUTPATIENT
Start: 2023-08-28 | End: 2023-08-28 | Stop reason: HOSPADM

## 2023-08-28 RX ORDER — SODIUM CHLORIDE 9 MG/ML
INJECTION, SOLUTION INTRAVENOUS CONTINUOUS
Status: DISCONTINUED | OUTPATIENT
Start: 2023-08-28 | End: 2023-08-28 | Stop reason: HOSPADM

## 2023-08-28 RX ADMIN — SODIUM CHLORIDE: 9 INJECTION, SOLUTION INTRAVENOUS at 10:08

## 2023-08-28 RX ADMIN — MIDAZOLAM HYDROCHLORIDE 5 MG: 5 INJECTION, SOLUTION INTRAMUSCULAR; INTRAVENOUS at 10:08

## 2023-08-28 RX ADMIN — MIDAZOLAM HYDROCHLORIDE 2 MG: 5 INJECTION, SOLUTION INTRAMUSCULAR; INTRAVENOUS at 10:08

## 2023-08-28 NOTE — PROCEDURES
"Magdaleno Hirsch is a 23 y.o. male patient.    Temp: 98.5 °F (36.9 °C) (08/28/23 0904)  Pulse: 69 (08/28/23 1038)  Resp: 16 (08/28/23 1042)  BP: (!) 143/76 (08/28/23 1026)  SpO2: 100 % (08/28/23 1038)  Height: 6' 1" (185.4 cm) (08/28/23 0849)  Mallampati Scale: Class II  ASA Classification: Class 2    Bone marrow    Date/Time: 8/28/2023 11:27 AM    Performed by: Moo Pina MD  Authorized by: Moo Pina MD    Consent Done?: Yes (Verbal) and Yes (Written)   Immediately prior to procedure a time out was called to verify the correct patient, procedure, equipment, support staff and site/side marked as required.   Patient was prepped and draped in the usual sterile fashion.      Assistants?: Yes    List of assistants: Lilia Durán I was present for the entire procedure.    Position: left lateral  Anesthesia: local infiltration and see MAR for details  Local anesthetic: lidocaine 1% without epinephrine  Aspiration?: No    Biopsy?: Yes    Specimen source: right posterior iliac crest  Number of Specimens: 1  Estimated blood loss (cc):5  Patient tolerated the procedure well with no immediate complications.  Post-operative instructions were provided for the patient.  Patient was discharged and will follow up if any complications occur.       8/28/2023    "

## 2023-08-28 NOTE — DISCHARGE SUMMARY
[facility]  Discharge Note  Short Stay    Procedure(s) (LRB):  Biopsy-bone marrow (N/A)      OUTCOME: Patient tolerated treatment/procedure well without complication and is now ready for discharge.    DISPOSITION: Home or Self Care    FINAL DIAGNOSIS:  Pending    FOLLOWUP: In clinic    DISCHARGE INSTRUCTIONS:  No discharge procedures on file.      Clinical Reference Documents Added to Patient Instructions         Document    BONE MARROW ASPIRATION OR BIOPSY (ENGLISH)            TIME SPENT ON DISCHARGE: 15 minutes

## 2023-08-29 ENCOUNTER — TELEPHONE (OUTPATIENT)
Dept: HEMATOLOGY/ONCOLOGY | Facility: CLINIC | Age: 23
End: 2023-08-29
Payer: MEDICAID

## 2023-08-29 NOTE — TELEPHONE ENCOUNTER
Mr. Hirsch was scheduled to see me today in clinic. He had a follow up bone marrow biopsy yesterday, so results won't be available to review yet. I called to let him know we will push his appt back 2 weeks. He verbalized understanding and agreement. He will continue ponatinib 15mg daily.

## 2023-08-30 VITALS
RESPIRATION RATE: 16 BRPM | OXYGEN SATURATION: 100 % | HEART RATE: 48 BPM | BODY MASS INDEX: 25.64 KG/M2 | SYSTOLIC BLOOD PRESSURE: 162 MMHG | TEMPERATURE: 99 F | DIASTOLIC BLOOD PRESSURE: 88 MMHG | HEIGHT: 73 IN

## 2023-09-01 ENCOUNTER — TELEPHONE (OUTPATIENT)
Dept: HEMATOLOGY/ONCOLOGY | Facility: CLINIC | Age: 23
End: 2023-09-01
Payer: MEDICAID

## 2023-09-01 ENCOUNTER — LAB VISIT (OUTPATIENT)
Dept: LAB | Facility: HOSPITAL | Age: 23
End: 2023-09-01
Attending: INTERNAL MEDICINE
Payer: MEDICAID

## 2023-09-01 DIAGNOSIS — C92.10 BLAST CRISIS PHASE OF CHRONIC MYELOID LEUKEMIA: ICD-10-CM

## 2023-09-01 DIAGNOSIS — C92.10 BLASTIC PHASE CHRONIC MYELOID LEUKEMIA: ICD-10-CM

## 2023-09-01 LAB
ALBUMIN SERPL-MCNC: 4.3 G/DL (ref 3.5–5)
ALBUMIN/GLOB SERPL: 1.7 RATIO (ref 1.1–2)
ALP SERPL-CCNC: 71 UNIT/L (ref 40–150)
ALT SERPL-CCNC: 13 UNIT/L (ref 0–55)
AST SERPL-CCNC: 17 UNIT/L (ref 5–34)
BASOPHILS # BLD AUTO: 0 X10(3)/MCL
BASOPHILS NFR BLD AUTO: 0 %
BILIRUB SERPL-MCNC: 1.3 MG/DL
BUN SERPL-MCNC: 10.6 MG/DL (ref 8.9–20.6)
CALCIUM SERPL-MCNC: 9.1 MG/DL (ref 8.4–10.2)
CHLORIDE SERPL-SCNC: 107 MMOL/L (ref 98–107)
CO2 SERPL-SCNC: 26 MMOL/L (ref 22–29)
CREAT SERPL-MCNC: 0.87 MG/DL (ref 0.73–1.18)
EOSINOPHIL # BLD AUTO: 0.04 X10(3)/MCL (ref 0–0.9)
EOSINOPHIL NFR BLD AUTO: 1.8 %
ERYTHROCYTE [DISTWIDTH] IN BLOOD BY AUTOMATED COUNT: 21 % (ref 11.5–17)
GFR SERPLBLD CREATININE-BSD FMLA CKD-EPI: >60 MLS/MIN/1.73/M2
GLOBULIN SER-MCNC: 2.6 GM/DL (ref 2.4–3.5)
GLUCOSE SERPL-MCNC: 93 MG/DL (ref 74–100)
HCT VFR BLD AUTO: 30.6 % (ref 42–52)
HGB BLD-MCNC: 9.4 G/DL (ref 14–18)
IMM GRANULOCYTES # BLD AUTO: 0 X10(3)/MCL (ref 0–0.04)
IMM GRANULOCYTES NFR BLD AUTO: 0 %
LYMPHOCYTES # BLD AUTO: 0.84 X10(3)/MCL (ref 0.6–4.6)
LYMPHOCYTES NFR BLD AUTO: 38 %
MCH RBC QN AUTO: 27 PG (ref 27–31)
MCHC RBC AUTO-ENTMCNC: 30.7 G/DL (ref 33–36)
MCV RBC AUTO: 87.9 FL (ref 80–94)
MONOCYTES # BLD AUTO: 0.25 X10(3)/MCL (ref 0.1–1.3)
MONOCYTES NFR BLD AUTO: 11.3 %
NEUTROPHILS # BLD AUTO: 1.08 X10(3)/MCL (ref 2.1–9.2)
NEUTROPHILS NFR BLD AUTO: 48.9 %
PLATELET # BLD AUTO: 51 X10(3)/MCL (ref 130–400)
PMV BLD AUTO: ABNORMAL FL
POTASSIUM SERPL-SCNC: 4 MMOL/L (ref 3.5–5.1)
PROT SERPL-MCNC: 6.9 GM/DL (ref 6.4–8.3)
RBC # BLD AUTO: 3.48 X10(6)/MCL (ref 4.7–6.1)
SODIUM SERPL-SCNC: 140 MMOL/L (ref 136–145)
WBC # SPEC AUTO: 2.21 X10(3)/MCL (ref 4.5–11.5)

## 2023-09-01 PROCEDURE — 80053 COMPREHEN METABOLIC PANEL: CPT

## 2023-09-01 PROCEDURE — 36415 COLL VENOUS BLD VENIPUNCTURE: CPT

## 2023-09-01 PROCEDURE — 85025 COMPLETE CBC W/AUTO DIFF WBC: CPT

## 2023-09-01 RX ORDER — PONATINIB HYDROCHLORIDE 15 MG/1
15 TABLET, FILM COATED ORAL DAILY
Qty: 30 TABLET | Refills: 11 | Status: ACTIVE | OUTPATIENT
Start: 2023-09-01 | End: 2023-10-02

## 2023-09-01 NOTE — TELEPHONE ENCOUNTER
Spoke with patient on phone to discuss allogeneic stem cell transplant. Discussed with patient pre-screening requirements. Educated patient on the evaluation process, line placement, stem cell collection, and the hospitalization including current visitor's policy. Reviewed with patient lodging and caregiver requirements following transplant as well as the need for follow-up and immunizations.  Stated his 2 brothers are incarcerated at this time and will need to discuss with mother prior to giving me her information.  Provided him with my number to call back once he approves for me to contact her.   patient instructed to reach out with further questions.

## 2023-09-08 ENCOUNTER — INFUSION (OUTPATIENT)
Dept: INFUSION THERAPY | Facility: HOSPITAL | Age: 23
End: 2023-09-08
Attending: NURSE PRACTITIONER
Payer: MEDICAID

## 2023-09-08 ENCOUNTER — LAB VISIT (OUTPATIENT)
Dept: LAB | Facility: HOSPITAL | Age: 23
End: 2023-09-08
Attending: INTERNAL MEDICINE
Payer: MEDICAID

## 2023-09-08 DIAGNOSIS — C92.10 BLASTIC PHASE CHRONIC MYELOID LEUKEMIA: ICD-10-CM

## 2023-09-08 LAB
ALBUMIN SERPL-MCNC: 4.1 G/DL (ref 3.5–5)
ALBUMIN/GLOB SERPL: 1.5 RATIO (ref 1.1–2)
ALP SERPL-CCNC: 74 UNIT/L (ref 40–150)
ALT SERPL-CCNC: 13 UNIT/L (ref 0–55)
AST SERPL-CCNC: 15 UNIT/L (ref 5–34)
BASOPHILS # BLD AUTO: 0 X10(3)/MCL
BASOPHILS NFR BLD AUTO: 0 %
BILIRUB SERPL-MCNC: 1.7 MG/DL
BUN SERPL-MCNC: 11.8 MG/DL (ref 8.9–20.6)
CALCIUM SERPL-MCNC: 8.9 MG/DL (ref 8.4–10.2)
CHLORIDE SERPL-SCNC: 107 MMOL/L (ref 98–107)
CO2 SERPL-SCNC: 25 MMOL/L (ref 22–29)
CREAT SERPL-MCNC: 0.85 MG/DL (ref 0.73–1.18)
EOSINOPHIL # BLD AUTO: 0.04 X10(3)/MCL (ref 0–0.9)
EOSINOPHIL NFR BLD AUTO: 2.2 %
ERYTHROCYTE [DISTWIDTH] IN BLOOD BY AUTOMATED COUNT: 19.6 % (ref 11.5–17)
GFR SERPLBLD CREATININE-BSD FMLA CKD-EPI: >60 MLS/MIN/1.73/M2
GLOBULIN SER-MCNC: 2.7 GM/DL (ref 2.4–3.5)
GLUCOSE SERPL-MCNC: 96 MG/DL (ref 74–100)
HCT VFR BLD AUTO: 30.1 % (ref 42–52)
HGB BLD-MCNC: 9.4 G/DL (ref 14–18)
IMM GRANULOCYTES # BLD AUTO: 0 X10(3)/MCL (ref 0–0.04)
IMM GRANULOCYTES NFR BLD AUTO: 0 %
LYMPHOCYTES # BLD AUTO: 0.96 X10(3)/MCL (ref 0.6–4.6)
LYMPHOCYTES NFR BLD AUTO: 52.2 %
MCH RBC QN AUTO: 26.9 PG (ref 27–31)
MCHC RBC AUTO-ENTMCNC: 31.2 G/DL (ref 33–36)
MCV RBC AUTO: 86.2 FL (ref 80–94)
MONOCYTES # BLD AUTO: 0.21 X10(3)/MCL (ref 0.1–1.3)
MONOCYTES NFR BLD AUTO: 11.4 %
NEUTROPHILS # BLD AUTO: 0.63 X10(3)/MCL (ref 2.1–9.2)
NEUTROPHILS NFR BLD AUTO: 34.2 %
PLATELET # BLD AUTO: 44 X10(3)/MCL (ref 130–400)
PMV BLD AUTO: ABNORMAL FL
POTASSIUM SERPL-SCNC: 4 MMOL/L (ref 3.5–5.1)
PROT SERPL-MCNC: 6.8 GM/DL (ref 6.4–8.3)
RBC # BLD AUTO: 3.49 X10(6)/MCL (ref 4.7–6.1)
SODIUM SERPL-SCNC: 139 MMOL/L (ref 136–145)
WBC # SPEC AUTO: 1.84 X10(3)/MCL (ref 4.5–11.5)

## 2023-09-08 PROCEDURE — 36415 COLL VENOUS BLD VENIPUNCTURE: CPT

## 2023-09-08 PROCEDURE — 85025 COMPLETE CBC W/AUTO DIFF WBC: CPT

## 2023-09-08 PROCEDURE — 80053 COMPREHEN METABOLIC PANEL: CPT

## 2023-09-10 ENCOUNTER — PATIENT MESSAGE (OUTPATIENT)
Dept: HEMATOLOGY/ONCOLOGY | Facility: CLINIC | Age: 23
End: 2023-09-10
Payer: MEDICAID

## 2023-09-12 LAB — PSYCHE PATHOLOGY RESULT: NORMAL

## 2023-09-15 ENCOUNTER — LAB VISIT (OUTPATIENT)
Dept: LAB | Facility: HOSPITAL | Age: 23
End: 2023-09-15
Attending: INTERNAL MEDICINE
Payer: MEDICAID

## 2023-09-15 ENCOUNTER — INFUSION (OUTPATIENT)
Dept: INFUSION THERAPY | Facility: HOSPITAL | Age: 23
End: 2023-09-15
Attending: NURSE PRACTITIONER
Payer: MEDICAID

## 2023-09-15 DIAGNOSIS — C92.10 BLAST CRISIS PHASE OF CHRONIC MYELOID LEUKEMIA: ICD-10-CM

## 2023-09-15 LAB
ALBUMIN SERPL-MCNC: 4.2 G/DL (ref 3.5–5)
ALBUMIN/GLOB SERPL: 1.7 RATIO (ref 1.1–2)
ALP SERPL-CCNC: 71 UNIT/L (ref 40–150)
ALT SERPL-CCNC: 13 UNIT/L (ref 0–55)
AST SERPL-CCNC: 21 UNIT/L (ref 5–34)
BASOPHILS # BLD AUTO: 0 X10(3)/MCL
BASOPHILS NFR BLD AUTO: 0 %
BILIRUB SERPL-MCNC: 1 MG/DL
BUN SERPL-MCNC: 8.9 MG/DL (ref 8.9–20.6)
CALCIUM SERPL-MCNC: 8.9 MG/DL (ref 8.4–10.2)
CHLORIDE SERPL-SCNC: 107 MMOL/L (ref 98–107)
CO2 SERPL-SCNC: 29 MMOL/L (ref 22–29)
CREAT SERPL-MCNC: 0.88 MG/DL (ref 0.73–1.18)
EOSINOPHIL # BLD AUTO: 0.06 X10(3)/MCL (ref 0–0.9)
EOSINOPHIL NFR BLD AUTO: 2.9 %
ERYTHROCYTE [DISTWIDTH] IN BLOOD BY AUTOMATED COUNT: 19.2 % (ref 11.5–17)
GFR SERPLBLD CREATININE-BSD FMLA CKD-EPI: >60 MLS/MIN/1.73/M2
GLOBULIN SER-MCNC: 2.5 GM/DL (ref 2.4–3.5)
GLUCOSE SERPL-MCNC: 91 MG/DL (ref 74–100)
HCT VFR BLD AUTO: 30.6 % (ref 42–52)
HGB BLD-MCNC: 9.4 G/DL (ref 14–18)
IMM GRANULOCYTES # BLD AUTO: 0 X10(3)/MCL (ref 0–0.04)
IMM GRANULOCYTES NFR BLD AUTO: 0 %
LYMPHOCYTES # BLD AUTO: 0.92 X10(3)/MCL (ref 0.6–4.6)
LYMPHOCYTES NFR BLD AUTO: 44.4 %
MCH RBC QN AUTO: 26.9 PG (ref 27–31)
MCHC RBC AUTO-ENTMCNC: 30.7 G/DL (ref 33–36)
MCV RBC AUTO: 87.7 FL (ref 80–94)
MONOCYTES # BLD AUTO: 0.2 X10(3)/MCL (ref 0.1–1.3)
MONOCYTES NFR BLD AUTO: 9.7 %
NEUTROPHILS # BLD AUTO: 0.89 X10(3)/MCL (ref 2.1–9.2)
NEUTROPHILS NFR BLD AUTO: 43 %
PLATELET # BLD AUTO: 36 X10(3)/MCL (ref 130–400)
PMV BLD AUTO: ABNORMAL FL
POTASSIUM SERPL-SCNC: 4.2 MMOL/L (ref 3.5–5.1)
PROT SERPL-MCNC: 6.7 GM/DL (ref 6.4–8.3)
PSYCHE PATHOLOGY RESULT: NORMAL
RBC # BLD AUTO: 3.49 X10(6)/MCL (ref 4.7–6.1)
SODIUM SERPL-SCNC: 143 MMOL/L (ref 136–145)
WBC # SPEC AUTO: 2.07 X10(3)/MCL (ref 4.5–11.5)

## 2023-09-15 PROCEDURE — 80053 COMPREHEN METABOLIC PANEL: CPT

## 2023-09-15 PROCEDURE — 36415 COLL VENOUS BLD VENIPUNCTURE: CPT

## 2023-09-15 PROCEDURE — 85025 COMPLETE CBC W/AUTO DIFF WBC: CPT

## 2023-09-15 NOTE — PLAN OF CARE
Hgb 9.4 and Plt 36 on today's labs. NP notified of results. No blood products needs. Next appt confirmed with pt on Friday 9/22 for blood check and possible transfusion. Pt dc'd home in stable condition.

## 2023-09-21 ENCOUNTER — OFFICE VISIT (OUTPATIENT)
Dept: HEMATOLOGY/ONCOLOGY | Facility: CLINIC | Age: 23
End: 2023-09-21
Payer: MEDICAID

## 2023-09-21 DIAGNOSIS — Z76.82 STEM CELL TRANSPLANT CANDIDATE: ICD-10-CM

## 2023-09-21 DIAGNOSIS — T45.1X5A PANCYTOPENIA DUE TO ANTINEOPLASTIC CHEMOTHERAPY: ICD-10-CM

## 2023-09-21 DIAGNOSIS — C92.10 BLAST CRISIS PHASE OF CHRONIC MYELOID LEUKEMIA: Primary | ICD-10-CM

## 2023-09-21 DIAGNOSIS — D61.810 PANCYTOPENIA DUE TO ANTINEOPLASTIC CHEMOTHERAPY: ICD-10-CM

## 2023-09-21 DIAGNOSIS — D84.81 IMMUNODEFICIENCY DUE TO CONDITIONS CLASSIFIED ELSEWHERE: ICD-10-CM

## 2023-09-21 PROCEDURE — 99215 OFFICE O/P EST HI 40 MIN: CPT | Mod: 95,,, | Performed by: INTERNAL MEDICINE

## 2023-09-21 PROCEDURE — 1160F RVW MEDS BY RX/DR IN RCRD: CPT | Mod: CPTII,95,, | Performed by: INTERNAL MEDICINE

## 2023-09-21 PROCEDURE — 1160F PR REVIEW ALL MEDS BY PRESCRIBER/CLIN PHARMACIST DOCUMENTED: ICD-10-PCS | Mod: CPTII,95,, | Performed by: INTERNAL MEDICINE

## 2023-09-21 PROCEDURE — 99215 PR OFFICE/OUTPT VISIT, EST, LEVL V, 40-54 MIN: ICD-10-PCS | Mod: 95,,, | Performed by: INTERNAL MEDICINE

## 2023-09-21 PROCEDURE — 1159F MED LIST DOCD IN RCRD: CPT | Mod: CPTII,95,, | Performed by: INTERNAL MEDICINE

## 2023-09-21 PROCEDURE — 1159F PR MEDICATION LIST DOCUMENTED IN MEDICAL RECORD: ICD-10-PCS | Mod: CPTII,95,, | Performed by: INTERNAL MEDICINE

## 2023-09-21 NOTE — Clinical Note
Let me know if you need me to order any of the oral agents (ponatinib 30mg or venetoclax 50mg). Here is the reference if need be: https://ashpublications.org/blood/article/140/Supplement%157/3880/503620/A-Phase-II-Study-of-the-Combination-of-Decitabine

## 2023-09-21 NOTE — PROGRESS NOTES
Section of Hematology and Stem Cell Transplantation  Follow Up Visit     The patient location is: [place], [place]  The chief complaint leading to consultation is: BP-CML    Visit type: audiovisual    Face to Face time with patient: 15 minutes  45 minutes of total time spent on the encounter, which includes face to face time and non-face to face time preparing to see the patient (eg, review of tests), Obtaining and/or reviewing separately obtained history, Documenting clinical information in the electronic or other health record, Independently interpreting results (not separately reported) and communicating results to the patient/family/caregiver, or Care coordination (not separately reported).     Each patient to whom he or she provides medical services by telemedicine is:  (1) informed of the relationship between the physician and patient and the respective role of any other health care provider with respect to management of the patient; and (2) notified that he or she may decline to receive medical services by telemedicine and may withdraw from such care at any time.    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  10/2022: Started dasatinib but had questionable compliance.  6/7/23: He presented to the ER at [facility] with gum swelling. Labs notable for  (80% blasts). He was transferred to [facility] for further management.  6/9/23: Bone marrow biopsy revealed blast phase chronic myeloid leukemia; reticulin fibrosis 2 of 3. 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 FISH with t(9;22) and MLLT3/MLL (KMT2A) fusion. NGS with NRAS (7%), PTPN11 (4%). BCR/ABL1:ABL1 (p210) >55%.  6/16/23: Started induction with CLIA + ponatinib. Course complicated by neutropenic fever, pharyngitis/mucositis,  and strep viridans bacteremia.   7/6/23: Day 21 bone marrow biopsy revealed a markedly hypocellular marrow (<5%) with trilineage hypoplasia. CG 46,XY[5].  7/25/23: Bone marrow biopsy revealed a hypocellular marrow but suboptimal specimen for evaluation.   8/4/23: BCR/ABL1:ABL1 (p210) 23.6%.   8/28/23: Bone marrow biopsy revealed a hypocellular marrow (30%) with persistent blast phase with 10-15% blasts.     History of Present Ilness:   Magdaleno Moore) is a pleasant 23 y.o.male with chronic myeloid leukemia blast phase who presents for follow up. He feels well. He is at his baseline. No recent fevers, chills, weight loss.     PAST MEDICAL HISTORY:   Past Medical History:   Diagnosis Date    CML (chronic myelocytic leukemia)     HVD (hypertensive vascular disease)     Oropharyngeal candidiasis        PAST SURGICAL HISTORY:   Past Surgical History:   Procedure Laterality Date    BONE MARROW BIOPSY N/A 8/22/2022    Procedure: Biopsy-bone marrow;  Surgeon: Getachew Chen MD;  Location: [facility] OR;  Service: General;  Laterality: N/A;    BONE MARROW BIOPSY N/A 7/25/2023    Procedure: Biopsy-bone marrow;  Surgeon: Edi Carty MD;  Location: [facility] OR;  Service: General;  Laterality: N/A;    BONE MARROW BIOPSY N/A 8/28/2023    Procedure: Biopsy-bone marrow;  Surgeon: Moo Pina MD;  Location: [facility] OR;  Service: General;  Laterality: N/A;    KNEE SURGERY Left        PAST SOCIAL HISTORY:  Social History     Tobacco Use    Smoking status: Never    Smokeless tobacco: Never   Substance Use Topics    Alcohol use: Never    Drug use: Yes     Types: Marijuana       FAMILY HISTORY:  Family History   Problem Relation Age of Onset    Hypertension Maternal Grandmother     Cancer Maternal Grandmother        CURRENT MEDICATIONS:   Current Outpatient Medications   Medication Sig    acyclovir (ZOVIRAX) 800 MG Tab Take 1 tablet (800 mg total) by mouth 2 (two) times daily.    NIFEdipine (PROCARDIA-XL) 60 MG (OSM) 24 hr tablet Take 1  tablet (60 mg total) by mouth once daily.    ondansetron (ZOFRAN) 8 MG tablet Take 1 tablet (8 mg total) by mouth every 6 (six) hours as needed for Nausea.    pantoprazole (PROTONIX) 40 MG tablet Take 1 tablet (40 mg total) by mouth once daily.    PONATinib (ICLUSIG) 15 mg Tab Take 1 tablet (15 mg total) by mouth Daily.    posaconazole (NOXAFIL) 100 mg TbEC tablet Take 3 tablets (300 mg total) by mouth once daily.    sulfamethoxazole-trimethoprim 800-160mg (BACTRIM DS) 800-160 mg Tab Take 1 tablet by mouth every Mon, Wed, Fri.     No current facility-administered medications for this visit.       ALLERGIES:   Review of patient's allergies indicates:  No Known Allergies    Review of Systems:     Pertinent positives and negatives included in the HPI. Otherwise a complete review of systems is negative.    Physical Exam:     There were no vitals filed for this visit.    ECOG Performance Status: (foot note - ECOG PS provided by Eastern Cooperative Oncology Group) 0 - Asymptomatic    Karnofsky Performance Score:  100%- Normal, No Complaints, No Evidence of Disease    Labs:   Lab Results   Component Value Date    WBC 2.07 (L) 09/15/2023    RBC 3.49 (L) 09/15/2023    HGB 9.4 (L) 09/15/2023    HCT 30.6 (L) 09/15/2023    MCV 87.7 09/15/2023    MCH 26.9 (L) 09/15/2023    MCHC 30.7 (L) 09/15/2023    RDW 19.2 (H) 09/15/2023    PLT 36 (LL) 09/15/2023    MPV  09/15/2023      Comment:      Unable to calculate MPV      GRAN 0.1 (L) 07/12/2023    GRAN 6.2 (L) 07/12/2023    LYMPH 1.1 07/12/2023    LYMPH 92.9 (H) 07/12/2023    MONO 0.0 (L) 07/12/2023    MONO 0.9 (L) 07/12/2023    EOS 0.0 07/12/2023    BASO 0.00 07/12/2023    EOSINOPHIL 0.0 07/12/2023    BASOPHIL 0.0 07/12/2023       CMP  Sodium   Date Value Ref Range Status   07/12/2023 138 136 - 145 mmol/L Final     Sodium Level   Date Value Ref Range Status   09/15/2023 143 136 - 145 mmol/L Final     Potassium   Date Value Ref Range Status   07/12/2023 4.4 3.5 - 5.1 mmol/L Final      Potassium Level   Date Value Ref Range Status   09/15/2023 4.2 3.5 - 5.1 mmol/L Final     Chloride   Date Value Ref Range Status   07/12/2023 106 95 - 110 mmol/L Final     CO2   Date Value Ref Range Status   07/12/2023 26 23 - 29 mmol/L Final     Carbon Dioxide   Date Value Ref Range Status   09/15/2023 29 22 - 29 mmol/L Final     Glucose   Date Value Ref Range Status   07/12/2023 87 70 - 110 mg/dL Final     BUN   Date Value Ref Range Status   07/12/2023 15 6 - 20 mg/dL Final     Blood Urea Nitrogen   Date Value Ref Range Status   09/15/2023 8.9 8.9 - 20.6 mg/dL Final     Creatinine   Date Value Ref Range Status   09/15/2023 0.88 0.73 - 1.18 mg/dL Final   07/12/2023 0.8 0.5 - 1.4 mg/dL Final     Calcium   Date Value Ref Range Status   07/12/2023 9.0 8.7 - 10.5 mg/dL Final     Calcium Level Total   Date Value Ref Range Status   09/15/2023 8.9 8.4 - 10.2 mg/dL Final     Total Protein   Date Value Ref Range Status   07/12/2023 6.7 6.0 - 8.4 g/dL Final     Albumin   Date Value Ref Range Status   07/12/2023 3.3 (L) 3.5 - 5.2 g/dL Final     Albumin Level   Date Value Ref Range Status   09/15/2023 4.2 3.5 - 5.0 g/dL Final     Total Bilirubin   Date Value Ref Range Status   07/12/2023 0.4 0.1 - 1.0 mg/dL Final     Comment:     For infants and newborns, interpretation of results should be based  on gestational age, weight and in agreement with clinical  observations.    Premature Infant recommended reference ranges:  Up to 24 hours.............<8.0 mg/dL  Up to 48 hours............<12.0 mg/dL  3-5 days..................<15.0 mg/dL  6-29 days.................<15.0 mg/dL       Bilirubin Total   Date Value Ref Range Status   09/15/2023 1.0 <=1.5 mg/dL Final     Alkaline Phosphatase   Date Value Ref Range Status   09/15/2023 71 40 - 150 unit/L Final   07/12/2023 94 55 - 135 U/L Final     AST   Date Value Ref Range Status   07/12/2023 10 10 - 40 U/L Final     Aspartate Aminotransferase   Date Value Ref Range Status    09/15/2023 21 5 - 34 unit/L Final     ALT   Date Value Ref Range Status   07/12/2023 12 10 - 44 U/L Final     Alanine Aminotransferase   Date Value Ref Range Status   09/15/2023 13 0 - 55 unit/L Final     Anion Gap   Date Value Ref Range Status   07/12/2023 6 (L) 8 - 16 mmol/L Final       Imaging:   No results found for this or any previous visit (from the past 2160 hour(s)).    Pathology:  Reviewed     Assessment and Plan:   Magdaleno Moore) is a pleasant 23 y.o.male with chronic myeloid leukemia blast phase who presents for follow up.    Chronic myeloid leukemia, blast phase:  He was initially diagnosed with CP-CML in 8/2022. In 6/2023, he presented to the ER and later diagnosed with BP-CML. Bone marrow biopsy at that time revealed a t(9;22) as well as KMT2A fusion. NGS with NRAS (7%), PTPN11 (4%). He was induced with CLIA plus ponatinib, and his day 21 bone marrow biopsy revealed aplasia. Follow up bone marrow biopsy on 7/25/23 noted persistent aplasia, although the sample was limited. BCR/ABL on 8/4/23 was 23.6%. Bone marrow biopsy on 8/28/23 showed persistent disease with 10-15% blasts. Now that counts are starting to improve, ok to start next line of treatment. Will plan to move forward with azacitidine + venetoclax + ponatininb (Brayden et al, Blood 2022).  - Azacitidine 75 mg/m2 daily x5 days + venetoclax 50mg daily x10 days + ponatininb 30mg daily (28 day cycle).  - Bone marrow biopsy in week 4 of cycle 1.  - Discussed with Dr. Hogan.    Stem cell transplant candidate:  We discussed the role for allogeneic stem cell transplantation in this disease process as a potentially curative option. We had an extensive discussion about the rationale, logistics, risks, and benefits. We reviewed the requirement to stay in the [place] area for 100 days with a caregiver at all times. We discussed the risks, including infection, graft failure, organ toxicity, graft versus host disease, relapse of disease, and  secondary cancers. We reviewed the need for long-term immunosuppression and need for close monitoring. Will proceed with expedited transplant workup if CP-CML confirmed. He is young and fit (HCT-CI 0).   - Coordinator: VICTORINA  - Regimen: MAC (?Bu4Flu)  - Donor: TBD  - Graft source: TBD  - GVHD Ppx: PTCy, Tac, MMF  - Disease status at time of transplant: TBD    Stem cell donors:  He has 3 brothers (8, 18, 21). His mother is 44. Awaiting mother's typing. Brothers unavailable for typing.     Pancytopenia:  Monitor labs 1-2x weekly with Dr. Hogan. Transfuse for Hgb <7 and Plts <10.    Immunosuppression due to conditions classified elsewhere:  Continue acyclovir, levofloxacin, posaconazole.     Orders/Follow Up:           Route Chart for Scheduling    BMT Chart Routing  Urgent    Follow up with physician 3 weeks. virtual visit   Follow up with JOSE ANTONIO    Provider visit type    Infusion scheduling note    Injection scheduling note    Labs    Imaging    Pharmacy appointment    Other referrals                Treatment Plan Information   IP LEUKEMIA: CLIA + PONATINIB FOR BLAST PHASE CML   Denny Barajas MD   Upcoming Treatment Dates - IP LEUKEMIA: CLIA + PONATINIB FOR BLAST PHASE CML    8/14/2023       Pre-Medications       ondansetron tablet 8 mg       dexAMETHasone tablet 8 mg       Chemotherapy       cladribine (LEUSTATIN) 10.9 mg in sodium chloride 0.9% 510.9 mL chemo infusion       CYTarabine (PF) (ZAHRA-C) 1,000 mg/m2 = 2,170 mg in sodium chloride 0.9% 521.7 mL chemo infusion       IDArubicin (IDAMYCIN) 17 mg in sodium chloride 0.9% 67 mL chemo infusion       PONATinib Tab 15 mg       Supportive Care       dexAMETHasone 0.1 % ophthalmic solution 1 drop      Advance Care Planning   Date: 08/07/2023  We reviewed his underlying diagnosis including natural history, prognosis, and various treatment options. He remains interested in pursuing any and all treatment options in an effort to improve his quality and quantity of life. Will  continue all treatment as recommended above.         Bijan Green MD  Hematology, Oncology, and Stem Cell Transplantation  [facility]

## 2023-09-22 ENCOUNTER — INFUSION (OUTPATIENT)
Dept: INFUSION THERAPY | Facility: HOSPITAL | Age: 23
End: 2023-09-22
Attending: NURSE PRACTITIONER
Payer: MEDICAID

## 2023-09-22 ENCOUNTER — LAB VISIT (OUTPATIENT)
Dept: LAB | Facility: HOSPITAL | Age: 23
End: 2023-09-22
Attending: INTERNAL MEDICINE
Payer: MEDICAID

## 2023-09-22 DIAGNOSIS — C92.10 BLAST CRISIS PHASE OF CHRONIC MYELOID LEUKEMIA: ICD-10-CM

## 2023-09-22 LAB
ALBUMIN SERPL-MCNC: 4.3 G/DL (ref 3.5–5)
ALBUMIN/GLOB SERPL: 1.7 RATIO (ref 1.1–2)
ALP SERPL-CCNC: 76 UNIT/L (ref 40–150)
ALT SERPL-CCNC: 11 UNIT/L (ref 0–55)
AST SERPL-CCNC: 16 UNIT/L (ref 5–34)
BASOPHILS # BLD AUTO: 0 X10(3)/MCL
BASOPHILS NFR BLD AUTO: 0 %
BILIRUB SERPL-MCNC: 1 MG/DL
BUN SERPL-MCNC: 10.8 MG/DL (ref 8.9–20.6)
CALCIUM SERPL-MCNC: 9 MG/DL (ref 8.4–10.2)
CHLORIDE SERPL-SCNC: 108 MMOL/L (ref 98–107)
CO2 SERPL-SCNC: 27 MMOL/L (ref 22–29)
CREAT SERPL-MCNC: 0.99 MG/DL (ref 0.73–1.18)
EOSINOPHIL # BLD AUTO: 0.06 X10(3)/MCL (ref 0–0.9)
EOSINOPHIL NFR BLD AUTO: 3.8 %
ERYTHROCYTE [DISTWIDTH] IN BLOOD BY AUTOMATED COUNT: 18 % (ref 11.5–17)
GFR SERPLBLD CREATININE-BSD FMLA CKD-EPI: >60 MLS/MIN/1.73/M2
GLOBULIN SER-MCNC: 2.5 GM/DL (ref 2.4–3.5)
GLUCOSE SERPL-MCNC: 93 MG/DL (ref 74–100)
HCT VFR BLD AUTO: 31.7 % (ref 42–52)
HGB BLD-MCNC: 9.8 G/DL (ref 14–18)
IMM GRANULOCYTES # BLD AUTO: 0 X10(3)/MCL (ref 0–0.04)
IMM GRANULOCYTES NFR BLD AUTO: 0 %
LYMPHOCYTES # BLD AUTO: 0.74 X10(3)/MCL (ref 0.6–4.6)
LYMPHOCYTES NFR BLD AUTO: 46.8 %
MCH RBC QN AUTO: 27.2 PG (ref 27–31)
MCHC RBC AUTO-ENTMCNC: 30.9 G/DL (ref 33–36)
MCV RBC AUTO: 88.1 FL (ref 80–94)
MONOCYTES # BLD AUTO: 0.2 X10(3)/MCL (ref 0.1–1.3)
MONOCYTES NFR BLD AUTO: 12.7 %
NEUTROPHILS # BLD AUTO: 0.58 X10(3)/MCL (ref 2.1–9.2)
NEUTROPHILS NFR BLD AUTO: 36.7 %
PLATELET # BLD AUTO: 30 X10(3)/MCL (ref 130–400)
PMV BLD AUTO: ABNORMAL FL
POTASSIUM SERPL-SCNC: 4.4 MMOL/L (ref 3.5–5.1)
PROT SERPL-MCNC: 6.8 GM/DL (ref 6.4–8.3)
RBC # BLD AUTO: 3.6 X10(6)/MCL (ref 4.7–6.1)
SODIUM SERPL-SCNC: 143 MMOL/L (ref 136–145)
WBC # SPEC AUTO: 1.58 X10(3)/MCL (ref 4.5–11.5)

## 2023-09-22 PROCEDURE — 85025 COMPLETE CBC W/AUTO DIFF WBC: CPT

## 2023-09-22 PROCEDURE — 80053 COMPREHEN METABOLIC PANEL: CPT

## 2023-09-22 PROCEDURE — 36415 COLL VENOUS BLD VENIPUNCTURE: CPT

## 2023-09-28 NOTE — PROGRESS NOTES
"Subjective:       Patient ID: Magdaleno Hirsch is a 23 y.o. male.    Chief Complaint: "I feel fine"    Diagnosis:  1. 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  2. 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  3. Conjuctival hemorrhage secondary to 1.    Current Treatment:   Planning to start azacitidine 5/28 days, venetoclax 50 mg p.o. daily for 10/28 days, and ponatinib 30 mg p.o. daily every day on 10/09/2023    Treatment History:  Hydroxyurea 2g every 12 hours  Dasatinib 100 mg po daily, unsure on start date as patient was lost to follow up.  Patient received CLIA and ponatinib on 06/16/2023.  Planning on AlloSCT.    HPI:  Patient with no real medical history who went in for a routine eye exam on 08/18/2022 to update his eye glasses prescription.  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  He had bilateral Valdez spots with vessel tortuosity.  The optometrist recommended that the patient have blood work including testing for sickle cell disease.  The patient presented to the emergency department.  CBC in the emergency department revealed a white blood cell count of 411,900. Hemoglobin was 8.3, platelets were normal at 375,000 hundred and seventy five thousand.  Differential was suggestive of CML.  Coagulation studies revealed an INR of 1.38, PTT of 36.4 and a fibrinogen of 292.  Patient was going to present to [place], however they were on diversion.  He was transferred from [facility] to [facility].  Hematology consulted.  Patient underwent bone marrow biopsy on 08/22/2022, this revealed CML, chronic phase, BCR/ABL1 positive.  Ordered dasatinib 100 mg p.o. daily as of 10/10/2022, unsure when patient started taking medication due to him being lost to follow-up.  Patient went into blast crisis, received CLIA and ponatinib on 06/16/2023.  He had a repeat bone marrow biopsy on 08/28/2023, this had to be sent off to [facility], " however the final diagnosis was persistent/recurrent myeloid neoplasm with 10-15% bone marrow blasts and 8% circulating blasts.  We will be starting azacitidine, venetoclax, ponatinib as mentioned above on 10/09/2023.    Interval History:   Patient presents to clinic for follow up for CML. He has been treated in [place], working up for alloSCT.  Unfortunately, his bone marrow biopsy on 08/28 showed persistent disease.  He states that he was feeling well.    Past Medical History:   Diagnosis Date    CML (chronic myelocytic leukemia)     HVD (hypertensive vascular disease)     Oropharyngeal candidiasis       Past Surgical History:   Procedure Laterality Date    BONE MARROW BIOPSY N/A 8/22/2022    Procedure: Biopsy-bone marrow;  Surgeon: Getachew Chen MD;  Location: [facility] OR;  Service: General;  Laterality: N/A;    BONE MARROW BIOPSY N/A 7/25/2023    Procedure: Biopsy-bone marrow;  Surgeon: Edi Carty MD;  Location: [facility] OR;  Service: General;  Laterality: N/A;    BONE MARROW BIOPSY N/A 8/28/2023    Procedure: Biopsy-bone marrow;  Surgeon: Moo Pina MD;  Location: [facility] OR;  Service: General;  Laterality: N/A;    KNEE SURGERY Left      Social History     Socioeconomic History    Marital status: Single   Tobacco Use    Smoking status: Never    Smokeless tobacco: Never   Substance and Sexual Activity    Alcohol use: Never    Drug use: Yes     Types: Marijuana      Family History   Problem Relation Age of Onset    Hypertension Maternal Grandmother     Cancer Maternal Grandmother            Review of Systems   Constitutional:  Negative for chills, diaphoresis, fatigue and unexpected weight change.   HENT:  Negative for nasal congestion and sore throat.    Eyes:  Negative for pain.   Respiratory:  Negative for cough, chest tightness and shortness of breath.    Cardiovascular:  Negative for chest pain, palpitations and leg swelling.   Gastrointestinal:  Negative for abdominal distention, abdominal pain, blood in  stool, constipation and diarrhea.   Genitourinary:  Negative for dysuria, frequency and hematuria.   Musculoskeletal:  Negative for arthralgias and back pain.   Integumentary:  Negative for rash.   Neurological:  Negative for dizziness, weakness, numbness and headaches.   Hematological:  Negative for adenopathy. Bruises/bleeds easily.   Psychiatric/Behavioral:  Negative for confusion.          Objective:      Physical Exam  Vitals reviewed.   Constitutional:       General: He is awake.      Appearance: Normal appearance.   HENT:      Head: Normocephalic and atraumatic.      Right Ear: Hearing normal.      Left Ear: Hearing normal.      Nose: Nose normal.      Mouth/Throat:      Comments:     Eyes:      General: Lids are normal. Vision grossly intact.      Extraocular Movements: Extraocular movements intact.      Conjunctiva/sclera: Conjunctivae normal.   Cardiovascular:      Rate and Rhythm: Normal rate and regular rhythm.      Pulses: Normal pulses.      Heart sounds: Normal heart sounds.   Pulmonary:      Effort: Pulmonary effort is normal.      Breath sounds: Normal breath sounds. No wheezing, rhonchi or rales.   Chest:      Comments: Soft mass in area of the sternum  Abdominal:      General: Bowel sounds are normal.      Palpations: Abdomen is soft. There is splenomegaly.      Tenderness: There is no abdominal tenderness.   Musculoskeletal:      Cervical back: Full passive range of motion without pain.      Right lower leg: No edema.      Left lower leg: No edema.   Lymphadenopathy:      Cervical: No cervical adenopathy.      Upper Body:      Right upper body: No supraclavicular or axillary adenopathy.      Left upper body: No supraclavicular or axillary adenopathy.   Skin:     General: Skin is warm.   Neurological:      General: No focal deficit present.      Mental Status: He is alert and oriented to person, place, and time.   Psychiatric:         Attention and Perception: Attention normal.         Mood and  Affect: Mood and affect normal.         Behavior: Behavior is cooperative.         LABS AND IMAGING REVIEWED IN [facility]  Lab Review:        Assessment:   CML, chronic phase, transformed to blast crisis  Pancytopenia secondary to Hydrea  Refractory thrombocytopenia - improving     Plan:     Patient went into blast crisis, received CLIA ponatinib.  Planning on alloSCT.    Repeat bone marrow biopsy scheduled on 08/28/2023 unfortunately showed persistent disease    Continue weekly labs/PRN transfusion    Planning to start azacitidine 5/28 days, venetoclax 50 mg p.o. daily for 10/28 days, and ponatinib 30 mg p.o. daily every day on 10/09/2023    Return to clinic in 5 weeks for TD visit, same day labs    Paul Hogan II, MD I, Jaelyn Giles LPN, acted solely as a scribe for and in the presence of, Dr. Paul Hogan who performed the service.

## 2023-09-29 ENCOUNTER — OFFICE VISIT (OUTPATIENT)
Dept: HEMATOLOGY/ONCOLOGY | Facility: CLINIC | Age: 23
End: 2023-09-29
Payer: COMMERCIAL

## 2023-09-29 VITALS
HEIGHT: 73 IN | DIASTOLIC BLOOD PRESSURE: 99 MMHG | RESPIRATION RATE: 20 BRPM | WEIGHT: 197.31 LBS | SYSTOLIC BLOOD PRESSURE: 160 MMHG | BODY MASS INDEX: 26.15 KG/M2 | OXYGEN SATURATION: 100 % | HEART RATE: 58 BPM

## 2023-09-29 DIAGNOSIS — C92.10 BLAST CRISIS PHASE OF CHRONIC MYELOID LEUKEMIA: Primary | ICD-10-CM

## 2023-09-29 PROCEDURE — 99215 OFFICE O/P EST HI 40 MIN: CPT | Mod: S$PBB,,, | Performed by: INTERNAL MEDICINE

## 2023-09-29 PROCEDURE — 99999 PR PBB SHADOW E&M-EST. PATIENT-LVL IV: CPT | Mod: PBBFAC,,, | Performed by: INTERNAL MEDICINE

## 2023-09-29 PROCEDURE — 99215 PR OFFICE/OUTPT VISIT, EST, LEVL V, 40-54 MIN: ICD-10-PCS | Mod: S$PBB,,, | Performed by: INTERNAL MEDICINE

## 2023-09-29 PROCEDURE — 99999 PR PBB SHADOW E&M-EST. PATIENT-LVL IV: ICD-10-PCS | Mod: PBBFAC,,, | Performed by: INTERNAL MEDICINE

## 2023-09-29 RX ORDER — ONDANSETRON 4 MG/1
16 TABLET, FILM COATED ORAL
Status: CANCELLED | OUTPATIENT
Start: 2023-10-12

## 2023-09-29 RX ORDER — AZACITIDINE 100 MG/1
75 INJECTION, POWDER, LYOPHILIZED, FOR SOLUTION INTRAVENOUS; SUBCUTANEOUS
Status: CANCELLED | OUTPATIENT
Start: 2023-10-10

## 2023-09-29 RX ORDER — ONDANSETRON 4 MG/1
16 TABLET, FILM COATED ORAL
Status: CANCELLED | OUTPATIENT
Start: 2023-10-09

## 2023-09-29 RX ORDER — ONDANSETRON 4 MG/1
16 TABLET, FILM COATED ORAL
Status: CANCELLED | OUTPATIENT
Start: 2023-10-11

## 2023-09-29 RX ORDER — ONDANSETRON 4 MG/1
16 TABLET, FILM COATED ORAL
Status: CANCELLED | OUTPATIENT
Start: 2023-10-10

## 2023-09-29 RX ORDER — AZACITIDINE 100 MG/1
75 INJECTION, POWDER, LYOPHILIZED, FOR SOLUTION INTRAVENOUS; SUBCUTANEOUS
Status: CANCELLED | OUTPATIENT
Start: 2023-10-11

## 2023-09-29 RX ORDER — AZACITIDINE 100 MG/1
75 INJECTION, POWDER, LYOPHILIZED, FOR SOLUTION INTRAVENOUS; SUBCUTANEOUS
Status: CANCELLED | OUTPATIENT
Start: 2023-10-09

## 2023-09-29 RX ORDER — AZACITIDINE 100 MG/1
75 INJECTION, POWDER, LYOPHILIZED, FOR SOLUTION INTRAVENOUS; SUBCUTANEOUS
Status: CANCELLED | OUTPATIENT
Start: 2023-10-12

## 2023-09-29 RX ORDER — ONDANSETRON 4 MG/1
16 TABLET, FILM COATED ORAL
Status: CANCELLED | OUTPATIENT
Start: 2023-10-13

## 2023-09-29 RX ORDER — AZACITIDINE 100 MG/1
75 INJECTION, POWDER, LYOPHILIZED, FOR SOLUTION INTRAVENOUS; SUBCUTANEOUS
Status: CANCELLED | OUTPATIENT
Start: 2023-10-13

## 2023-10-02 ENCOUNTER — TELEPHONE (OUTPATIENT)
Dept: HEMATOLOGY/ONCOLOGY | Facility: CLINIC | Age: 23
End: 2023-10-02
Payer: MEDICAID

## 2023-10-05 ENCOUNTER — CLINICAL SUPPORT (OUTPATIENT)
Dept: HEMATOLOGY/ONCOLOGY | Facility: CLINIC | Age: 23
End: 2023-10-05
Payer: MEDICAID

## 2023-10-05 DIAGNOSIS — C92.10 BLAST CRISIS PHASE OF CHRONIC MYELOID LEUKEMIA: Primary | ICD-10-CM

## 2023-10-06 ENCOUNTER — INFUSION (OUTPATIENT)
Dept: INFUSION THERAPY | Facility: HOSPITAL | Age: 23
End: 2023-10-06
Attending: NURSE PRACTITIONER
Payer: MEDICAID

## 2023-10-06 VITALS
TEMPERATURE: 97 F | RESPIRATION RATE: 16 BRPM | DIASTOLIC BLOOD PRESSURE: 98 MMHG | OXYGEN SATURATION: 100 % | HEART RATE: 50 BPM | SYSTOLIC BLOOD PRESSURE: 165 MMHG

## 2023-10-06 DIAGNOSIS — C92.10 BLAST CRISIS PHASE OF CHRONIC MYELOID LEUKEMIA: Primary | ICD-10-CM

## 2023-10-06 DIAGNOSIS — C92.10 BLAST CRISIS PHASE OF CHRONIC MYELOID LEUKEMIA: ICD-10-CM

## 2023-10-06 LAB
ABO + RH BLD: NORMAL
BLD PROD TYP BPU: NORMAL
BLOOD UNIT EXPIRATION DATE: NORMAL
BLOOD UNIT TYPE CODE: 8400
CROSSMATCH INTERPRETATION: NORMAL
DISPENSE STATUS: NORMAL
UNIT NUMBER: NORMAL

## 2023-10-06 PROCEDURE — 36430 TRANSFUSION BLD/BLD COMPNT: CPT

## 2023-10-06 PROCEDURE — P9037 PLATE PHERES LEUKOREDU IRRAD: HCPCS | Performed by: INTERNAL MEDICINE

## 2023-10-06 PROCEDURE — 25000003 PHARM REV CODE 250: Performed by: INTERNAL MEDICINE

## 2023-10-06 RX ORDER — HYDROCODONE BITARTRATE AND ACETAMINOPHEN 500; 5 MG/1; MG/1
TABLET ORAL ONCE
Status: CANCELLED | OUTPATIENT
Start: 2023-10-06 | End: 2023-10-06

## 2023-10-06 RX ORDER — DIPHENHYDRAMINE HCL 25 MG
25 CAPSULE ORAL ONCE
Status: COMPLETED | OUTPATIENT
Start: 2023-10-06 | End: 2023-10-06

## 2023-10-06 RX ORDER — HYDROCODONE BITARTRATE AND ACETAMINOPHEN 500; 5 MG/1; MG/1
TABLET ORAL ONCE
Status: COMPLETED | OUTPATIENT
Start: 2023-10-06 | End: 2023-10-06

## 2023-10-06 RX ORDER — ACETAMINOPHEN 325 MG/1
650 TABLET ORAL ONCE
Status: COMPLETED | OUTPATIENT
Start: 2023-10-06 | End: 2023-10-06

## 2023-10-06 RX ORDER — ACETAMINOPHEN 325 MG/1
650 TABLET ORAL ONCE
Status: CANCELLED | OUTPATIENT
Start: 2023-10-06

## 2023-10-06 RX ORDER — DIPHENHYDRAMINE HCL 25 MG
25 CAPSULE ORAL ONCE
Status: CANCELLED | OUTPATIENT
Start: 2023-10-06

## 2023-10-06 RX ADMIN — ACETAMINOPHEN 650 MG: 325 TABLET ORAL at 10:10

## 2023-10-06 RX ADMIN — DIPHENHYDRAMINE HYDROCHLORIDE 25 MG: 25 CAPSULE ORAL at 10:10

## 2023-10-06 RX ADMIN — SODIUM CHLORIDE: 9 INJECTION, SOLUTION INTRAVENOUS at 10:10

## 2023-10-09 ENCOUNTER — INFUSION (OUTPATIENT)
Dept: INFUSION THERAPY | Facility: HOSPITAL | Age: 23
End: 2023-10-09
Attending: NURSE PRACTITIONER
Payer: MEDICAID

## 2023-10-09 VITALS
OXYGEN SATURATION: 100 % | HEART RATE: 50 BPM | SYSTOLIC BLOOD PRESSURE: 160 MMHG | TEMPERATURE: 99 F | RESPIRATION RATE: 16 BRPM | DIASTOLIC BLOOD PRESSURE: 85 MMHG

## 2023-10-09 DIAGNOSIS — C92.10 BLAST CRISIS PHASE OF CHRONIC MYELOID LEUKEMIA: Primary | ICD-10-CM

## 2023-10-09 PROCEDURE — 63600175 PHARM REV CODE 636 W HCPCS: Performed by: INTERNAL MEDICINE

## 2023-10-09 PROCEDURE — 96401 CHEMO ANTI-NEOPL SQ/IM: CPT

## 2023-10-09 PROCEDURE — 25000003 PHARM REV CODE 250: Performed by: INTERNAL MEDICINE

## 2023-10-09 RX ORDER — ONDANSETRON 4 MG/1
16 TABLET, ORALLY DISINTEGRATING ORAL
Status: COMPLETED | OUTPATIENT
Start: 2023-10-09 | End: 2023-10-09

## 2023-10-09 RX ORDER — AZACITIDINE 100 MG/1
75 INJECTION, POWDER, LYOPHILIZED, FOR SOLUTION INTRAVENOUS; SUBCUTANEOUS
Status: COMPLETED | OUTPATIENT
Start: 2023-10-09 | End: 2023-10-09

## 2023-10-09 RX ADMIN — ONDANSETRON 16 MG: 8 TABLET, ORALLY DISINTEGRATING ORAL at 12:10

## 2023-10-09 RX ADMIN — AZACITIDINE 160 MG: 100 INJECTION, POWDER, LYOPHILIZED, FOR SOLUTION INTRAVENOUS; SUBCUTANEOUS at 12:10

## 2023-10-09 NOTE — PLAN OF CARE
Problem: Adult Inpatient Plan of Care  Goal: Plan of Care Review  Outcome: Met  Flowsheets (Taken 10/9/2023 1300)  Plan of Care Reviewed With: patient  Goal: Absence of Hospital-Acquired Illness or Injury  Outcome: Met  Intervention: Identify and Manage Fall Risk  Flowsheets (Taken 10/9/2023 1300)  Safety Promotion/Fall Prevention:   assistive device/personal item within reach   Fall Risk reviewed with patient/family   in recliner, wheels locked     Pt tolerated C1D1 injections well. Next appt reviewed; pt denied questions or further needs at the time of discharge.

## 2023-10-10 ENCOUNTER — INFUSION (OUTPATIENT)
Dept: INFUSION THERAPY | Facility: HOSPITAL | Age: 23
End: 2023-10-10
Attending: NURSE PRACTITIONER
Payer: MEDICAID

## 2023-10-10 VITALS
RESPIRATION RATE: 18 BRPM | HEART RATE: 63 BPM | DIASTOLIC BLOOD PRESSURE: 85 MMHG | SYSTOLIC BLOOD PRESSURE: 149 MMHG | TEMPERATURE: 99 F

## 2023-10-10 DIAGNOSIS — C92.10 BLAST CRISIS PHASE OF CHRONIC MYELOID LEUKEMIA: Primary | ICD-10-CM

## 2023-10-10 PROCEDURE — 63600175 PHARM REV CODE 636 W HCPCS: Performed by: INTERNAL MEDICINE

## 2023-10-10 PROCEDURE — 25000003 PHARM REV CODE 250: Performed by: INTERNAL MEDICINE

## 2023-10-10 PROCEDURE — 96401 CHEMO ANTI-NEOPL SQ/IM: CPT

## 2023-10-10 RX ORDER — ONDANSETRON 4 MG/1
4 TABLET, ORALLY DISINTEGRATING ORAL
Status: CANCELLED
Start: 2023-10-11

## 2023-10-10 RX ORDER — ONDANSETRON 4 MG/1
16 TABLET, ORALLY DISINTEGRATING ORAL
Status: CANCELLED
Start: 2023-10-12

## 2023-10-10 RX ORDER — ONDANSETRON 4 MG/1
16 TABLET, ORALLY DISINTEGRATING ORAL
Status: COMPLETED | OUTPATIENT
Start: 2023-10-10 | End: 2023-10-10

## 2023-10-10 RX ORDER — ONDANSETRON 4 MG/1
16 TABLET, ORALLY DISINTEGRATING ORAL
Status: CANCELLED
Start: 2023-10-11

## 2023-10-10 RX ORDER — ONDANSETRON 4 MG/1
16 TABLET, ORALLY DISINTEGRATING ORAL
Status: CANCELLED
Start: 2023-10-13

## 2023-10-10 RX ORDER — AZACITIDINE 100 MG/1
75 INJECTION, POWDER, LYOPHILIZED, FOR SOLUTION INTRAVENOUS; SUBCUTANEOUS
Status: COMPLETED | OUTPATIENT
Start: 2023-10-10 | End: 2023-10-10

## 2023-10-10 RX ADMIN — ONDANSETRON 16 MG: 8 TABLET, ORALLY DISINTEGRATING ORAL at 08:10

## 2023-10-10 RX ADMIN — AZACITIDINE 160 MG: 100 INJECTION, POWDER, LYOPHILIZED, FOR SOLUTION INTRAVENOUS; SUBCUTANEOUS at 08:10

## 2023-10-10 NOTE — NURSING
C1d2 vidaza given as ordered.  Leon well.  C/o low back pain last night after 1st treatment.  Will monitor.  Instructed to call if any problems.

## 2023-10-11 ENCOUNTER — INFUSION (OUTPATIENT)
Dept: INFUSION THERAPY | Facility: HOSPITAL | Age: 23
End: 2023-10-11
Attending: NURSE PRACTITIONER
Payer: MEDICAID

## 2023-10-11 VITALS
TEMPERATURE: 99 F | SYSTOLIC BLOOD PRESSURE: 172 MMHG | HEART RATE: 71 BPM | DIASTOLIC BLOOD PRESSURE: 75 MMHG | RESPIRATION RATE: 18 BRPM

## 2023-10-11 DIAGNOSIS — C92.10 BLAST CRISIS PHASE OF CHRONIC MYELOID LEUKEMIA: Primary | ICD-10-CM

## 2023-10-11 PROCEDURE — 25000003 PHARM REV CODE 250: Performed by: NURSE PRACTITIONER

## 2023-10-11 PROCEDURE — 96401 CHEMO ANTI-NEOPL SQ/IM: CPT

## 2023-10-11 PROCEDURE — 63600175 PHARM REV CODE 636 W HCPCS: Performed by: INTERNAL MEDICINE

## 2023-10-11 RX ORDER — ONDANSETRON 4 MG/1
16 TABLET, ORALLY DISINTEGRATING ORAL
Status: COMPLETED | OUTPATIENT
Start: 2023-10-11 | End: 2023-10-11

## 2023-10-11 RX ORDER — AZACITIDINE 100 MG/1
75 INJECTION, POWDER, LYOPHILIZED, FOR SOLUTION INTRAVENOUS; SUBCUTANEOUS
Status: COMPLETED | OUTPATIENT
Start: 2023-10-11 | End: 2023-10-11

## 2023-10-11 RX ADMIN — AZACITIDINE 160 MG: 100 INJECTION, POWDER, LYOPHILIZED, FOR SOLUTION INTRAVENOUS; SUBCUTANEOUS at 03:10

## 2023-10-11 RX ADMIN — ONDANSETRON 16 MG: 4 TABLET, ORALLY DISINTEGRATING ORAL at 03:10

## 2023-10-12 ENCOUNTER — INFUSION (OUTPATIENT)
Dept: INFUSION THERAPY | Facility: HOSPITAL | Age: 23
End: 2023-10-12
Attending: NURSE PRACTITIONER
Payer: MEDICAID

## 2023-10-12 VITALS
BODY MASS INDEX: 26.11 KG/M2 | HEIGHT: 73 IN | TEMPERATURE: 98 F | RESPIRATION RATE: 18 BRPM | SYSTOLIC BLOOD PRESSURE: 156 MMHG | WEIGHT: 197 LBS | HEART RATE: 52 BPM | OXYGEN SATURATION: 100 % | DIASTOLIC BLOOD PRESSURE: 81 MMHG

## 2023-10-12 DIAGNOSIS — C92.10 BLAST CRISIS PHASE OF CHRONIC MYELOID LEUKEMIA: Primary | ICD-10-CM

## 2023-10-12 PROCEDURE — 25000003 PHARM REV CODE 250: Performed by: NURSE PRACTITIONER

## 2023-10-12 PROCEDURE — 63600175 PHARM REV CODE 636 W HCPCS: Performed by: INTERNAL MEDICINE

## 2023-10-12 PROCEDURE — 96401 CHEMO ANTI-NEOPL SQ/IM: CPT

## 2023-10-12 RX ORDER — ONDANSETRON 4 MG/1
16 TABLET, ORALLY DISINTEGRATING ORAL
Status: COMPLETED | OUTPATIENT
Start: 2023-10-12 | End: 2023-10-12

## 2023-10-12 RX ORDER — AZACITIDINE 100 MG/1
75 INJECTION, POWDER, LYOPHILIZED, FOR SOLUTION INTRAVENOUS; SUBCUTANEOUS
Status: COMPLETED | OUTPATIENT
Start: 2023-10-12 | End: 2023-10-12

## 2023-10-12 RX ADMIN — AZACITIDINE 160 MG: 100 INJECTION, POWDER, LYOPHILIZED, FOR SOLUTION INTRAVENOUS; SUBCUTANEOUS at 11:10

## 2023-10-12 RX ADMIN — ONDANSETRON 16 MG: 4 TABLET, ORALLY DISINTEGRATING ORAL at 11:10

## 2023-10-13 ENCOUNTER — INFUSION (OUTPATIENT)
Dept: INFUSION THERAPY | Facility: HOSPITAL | Age: 23
End: 2023-10-13
Attending: NURSE PRACTITIONER
Payer: MEDICAID

## 2023-10-13 ENCOUNTER — CLINICAL SUPPORT (OUTPATIENT)
Dept: HEMATOLOGY/ONCOLOGY | Facility: CLINIC | Age: 23
End: 2023-10-13
Payer: MEDICAID

## 2023-10-13 VITALS
DIASTOLIC BLOOD PRESSURE: 83 MMHG | SYSTOLIC BLOOD PRESSURE: 134 MMHG | BODY MASS INDEX: 26.11 KG/M2 | WEIGHT: 197 LBS | OXYGEN SATURATION: 100 % | HEART RATE: 53 BPM | HEIGHT: 73 IN | TEMPERATURE: 98 F | RESPIRATION RATE: 16 BRPM

## 2023-10-13 DIAGNOSIS — C92.10 CML (CHRONIC MYELOCYTIC LEUKEMIA): ICD-10-CM

## 2023-10-13 DIAGNOSIS — C92.10 CML (CHRONIC MYELOCYTIC LEUKEMIA): Primary | ICD-10-CM

## 2023-10-13 DIAGNOSIS — C92.10 BLAST CRISIS PHASE OF CHRONIC MYELOID LEUKEMIA: Primary | ICD-10-CM

## 2023-10-13 PROCEDURE — 25000003 PHARM REV CODE 250: Performed by: INTERNAL MEDICINE

## 2023-10-13 PROCEDURE — P9037 PLATE PHERES LEUKOREDU IRRAD: HCPCS | Performed by: INTERNAL MEDICINE

## 2023-10-13 PROCEDURE — 25000003 PHARM REV CODE 250: Performed by: NURSE PRACTITIONER

## 2023-10-13 PROCEDURE — 96401 CHEMO ANTI-NEOPL SQ/IM: CPT

## 2023-10-13 PROCEDURE — 63600175 PHARM REV CODE 636 W HCPCS: Performed by: INTERNAL MEDICINE

## 2023-10-13 PROCEDURE — 36430 TRANSFUSION BLD/BLD COMPNT: CPT

## 2023-10-13 RX ORDER — ONDANSETRON 4 MG/1
16 TABLET, ORALLY DISINTEGRATING ORAL
Status: COMPLETED | OUTPATIENT
Start: 2023-10-13 | End: 2023-10-13

## 2023-10-13 RX ORDER — ACETAMINOPHEN 325 MG/1
650 TABLET ORAL ONCE
Status: CANCELLED | OUTPATIENT
Start: 2023-10-13

## 2023-10-13 RX ORDER — DIPHENHYDRAMINE HCL 25 MG
25 CAPSULE ORAL ONCE
Status: COMPLETED | OUTPATIENT
Start: 2023-10-13 | End: 2023-10-13

## 2023-10-13 RX ORDER — DIPHENHYDRAMINE HCL 25 MG
25 CAPSULE ORAL ONCE
Status: CANCELLED | OUTPATIENT
Start: 2023-10-13

## 2023-10-13 RX ORDER — HYDROCODONE BITARTRATE AND ACETAMINOPHEN 500; 5 MG/1; MG/1
TABLET ORAL ONCE
Status: CANCELLED | OUTPATIENT
Start: 2023-10-13 | End: 2023-10-13

## 2023-10-13 RX ORDER — AZACITIDINE 100 MG/1
75 INJECTION, POWDER, LYOPHILIZED, FOR SOLUTION INTRAVENOUS; SUBCUTANEOUS
Status: COMPLETED | OUTPATIENT
Start: 2023-10-13 | End: 2023-10-13

## 2023-10-13 RX ORDER — ACETAMINOPHEN 325 MG/1
650 TABLET ORAL ONCE
Status: COMPLETED | OUTPATIENT
Start: 2023-10-13 | End: 2023-10-13

## 2023-10-13 RX ORDER — HYDROCODONE BITARTRATE AND ACETAMINOPHEN 500; 5 MG/1; MG/1
TABLET ORAL ONCE
Status: COMPLETED | OUTPATIENT
Start: 2023-10-13 | End: 2023-10-13

## 2023-10-13 RX ADMIN — AZACITIDINE 160 MG: 100 INJECTION, POWDER, LYOPHILIZED, FOR SOLUTION INTRAVENOUS; SUBCUTANEOUS at 08:10

## 2023-10-13 RX ADMIN — SODIUM CHLORIDE: 9 INJECTION, SOLUTION INTRAVENOUS at 09:10

## 2023-10-13 RX ADMIN — ACETAMINOPHEN 650 MG: 325 TABLET ORAL at 09:10

## 2023-10-13 RX ADMIN — DIPHENHYDRAMINE HYDROCHLORIDE 25 MG: 25 CAPSULE ORAL at 09:10

## 2023-10-13 RX ADMIN — ONDANSETRON 16 MG: 4 TABLET, ORALLY DISINTEGRATING ORAL at 08:10

## 2023-10-13 NOTE — PROGRESS NOTES
"Oncology Nutrition Evaluation      Magdaleno Hirsch   2000    Oncology Provider:   Paul Hogan MD    Reason for Visit:  New Treatment Education    Oncology/Hematology Diagnosis:   CML    Treatment Plan:  azacitidine 5/28 days, venetoclax 50 mg p.o. daily for 10/28 days, and ponatinib 30 mg p.o. daily every day    Nutrition Recommendations:  1. Regular diet as tolerated    Nutrition Assessment    10/13/23: This is a 23 y.o.male with a medical diagnosis of CML. He reports good appetite and po intake. No c/o n/v/c/d. Wt has been stable.     Nutrition Factors Affecting Intake  none identified    PMHx: HVD    Allergies: Patient has no known allergies.    Current Medications:    Current Outpatient Medications:     acyclovir (ZOVIRAX) 800 MG Tab, Take 1 tablet (800 mg total) by mouth 2 (two) times daily., Disp: 60 tablet, Rfl: 11    NIFEdipine (PROCARDIA-XL) 60 MG (OSM) 24 hr tablet, Take 1 tablet (60 mg total) by mouth once daily., Disp: 30 tablet, Rfl: 11    ondansetron (ZOFRAN) 8 MG tablet, Take 1 tablet (8 mg total) by mouth every 6 (six) hours as needed for Nausea., Disp: 60 tablet, Rfl: 2    pantoprazole (PROTONIX) 40 MG tablet, Take 1 tablet (40 mg total) by mouth once daily., Disp: 30 tablet, Rfl: 11    PONATinib 30 mg Tab, Take 30 mg by mouth once daily., Disp: 30 tablet, Rfl: 3    posaconazole (NOXAFIL) 100 mg TbEC tablet, Take 3 tablets (300 mg total) by mouth once daily., Disp: 90 tablet, Rfl: 3    sulfamethoxazole-trimethoprim 800-160mg (BACTRIM DS) 800-160 mg Tab, Take 1 tablet by mouth every Mon, Wed, Fri., Disp: 15 tablet, Rfl: 3    venetoclax (VENCLEXTA) 50 mg Tab, Take 50 mg by mouth once daily on days 1 through 10 only of each 28 day chemotherapy cycle., Disp: 10 tablet, Rfl: 3  No current facility-administered medications for this visit.    Labs: 10/13/23 WNL    Anthropometrics    Height:   Ht Readings from Last 1 Encounters:   10/13/23 6' 0.99" (1.854 m)      Weight:   Wt Readings from Last 1 " Encounters:   10/13/23 89.4 kg (197 lb)        Usual Body Weight: 89.4 kg (197 lb)  % Weight Change: 0    BMI: 26 (overweight)    Ideal Weight: 83.6 kg (184 lb)  % Ideal Weight: 107%      Nutrition Diagnosis    PES: Food and nutrition related knowledge deficit related to chronic illness as evidenced by lack of prior exposure to onc nutrition. (resolved)     Nutrition Risk  low    Nutrition Intervention    Interventions(treatment strategy):  general/healthful diet and purpose of nutrition education    Education Provided: food safety guidelines and general healthy diet  Teaching Method: explanation and printed materials  Comprehension: verbalizes understanding  Barriers to Learning: none evident  Expected Compliance: good  Comments: All questions were answered and dietitian's contact information was provided.      Nutrition Monitoring and Evaluation    Ongoing monitoring not warranted at this time. Please consult [facility] prn.        Gifty Batista MS, RD, , LDN

## 2023-10-13 NOTE — NURSING
Pt here today for labs and c1d5 vidaza. Platelet count was 11. Gave pt vidaza and 1 unit platelets. Pt mitch well. Will rtc on Friday for labs and blood if needed

## 2023-10-20 ENCOUNTER — INFUSION (OUTPATIENT)
Dept: INFUSION THERAPY | Facility: HOSPITAL | Age: 23
End: 2023-10-20
Attending: NURSE PRACTITIONER
Payer: MEDICAID

## 2023-10-20 VITALS
SYSTOLIC BLOOD PRESSURE: 142 MMHG | OXYGEN SATURATION: 100 % | HEIGHT: 72 IN | RESPIRATION RATE: 18 BRPM | DIASTOLIC BLOOD PRESSURE: 85 MMHG | HEART RATE: 56 BPM | BODY MASS INDEX: 26.67 KG/M2 | TEMPERATURE: 99 F | WEIGHT: 196.88 LBS

## 2023-10-20 DIAGNOSIS — C92.10 CML (CHRONIC MYELOCYTIC LEUKEMIA): Primary | ICD-10-CM

## 2023-10-20 DIAGNOSIS — C92.10 CML (CHRONIC MYELOCYTIC LEUKEMIA): ICD-10-CM

## 2023-10-20 PROCEDURE — 96374 THER/PROPH/DIAG INJ IV PUSH: CPT

## 2023-10-20 PROCEDURE — P9037 PLATE PHERES LEUKOREDU IRRAD: HCPCS | Performed by: INTERNAL MEDICINE

## 2023-10-20 PROCEDURE — 36430 TRANSFUSION BLD/BLD COMPNT: CPT

## 2023-10-20 PROCEDURE — 63600175 PHARM REV CODE 636 W HCPCS: Performed by: NURSE PRACTITIONER

## 2023-10-20 PROCEDURE — 25000003 PHARM REV CODE 250: Performed by: INTERNAL MEDICINE

## 2023-10-20 RX ORDER — HYDROCODONE BITARTRATE AND ACETAMINOPHEN 500; 5 MG/1; MG/1
TABLET ORAL ONCE
Status: CANCELLED | OUTPATIENT
Start: 2023-10-20 | End: 2023-10-20

## 2023-10-20 RX ORDER — DIPHENHYDRAMINE HCL 25 MG
25 CAPSULE ORAL ONCE
Status: CANCELLED | OUTPATIENT
Start: 2023-10-20

## 2023-10-20 RX ORDER — ACETAMINOPHEN 325 MG/1
650 TABLET ORAL ONCE
Status: COMPLETED | OUTPATIENT
Start: 2023-10-20 | End: 2023-10-20

## 2023-10-20 RX ORDER — ONDANSETRON 2 MG/ML
8 INJECTION INTRAMUSCULAR; INTRAVENOUS ONCE
Status: COMPLETED | OUTPATIENT
Start: 2023-10-20 | End: 2023-10-20

## 2023-10-20 RX ORDER — DIPHENHYDRAMINE HCL 25 MG
25 CAPSULE ORAL ONCE
Status: COMPLETED | OUTPATIENT
Start: 2023-10-20 | End: 2023-10-20

## 2023-10-20 RX ORDER — HYDROCODONE BITARTRATE AND ACETAMINOPHEN 500; 5 MG/1; MG/1
TABLET ORAL ONCE
Status: DISCONTINUED | OUTPATIENT
Start: 2023-10-20 | End: 2023-11-20

## 2023-10-20 RX ORDER — ACETAMINOPHEN 325 MG/1
650 TABLET ORAL ONCE
Status: CANCELLED | OUTPATIENT
Start: 2023-10-20

## 2023-10-20 RX ADMIN — ONDANSETRON 8 MG: 2 INJECTION INTRAMUSCULAR; INTRAVENOUS at 09:10

## 2023-10-20 RX ADMIN — DIPHENHYDRAMINE HYDROCHLORIDE 25 MG: 25 CAPSULE ORAL at 09:10

## 2023-10-20 RX ADMIN — ACETAMINOPHEN 650 MG: 325 TABLET ORAL at 09:10

## 2023-10-20 NOTE — NURSING
1 unit plt given as ordered.  C/o nausea.  Zofran 8mg ivp given as ordered.  Symptoms resolved.  Rtc on Monday for repeat lab as ordered.

## 2023-10-23 ENCOUNTER — INFUSION (OUTPATIENT)
Dept: INFUSION THERAPY | Facility: HOSPITAL | Age: 23
End: 2023-10-23
Attending: NURSE PRACTITIONER
Payer: MEDICAID

## 2023-10-23 ENCOUNTER — OFFICE VISIT (OUTPATIENT)
Dept: HEMATOLOGY/ONCOLOGY | Facility: CLINIC | Age: 23
End: 2023-10-23
Payer: MEDICAID

## 2023-10-23 VITALS
RESPIRATION RATE: 16 BRPM | OXYGEN SATURATION: 100 % | TEMPERATURE: 99 F | SYSTOLIC BLOOD PRESSURE: 155 MMHG | DIASTOLIC BLOOD PRESSURE: 92 MMHG | HEART RATE: 72 BPM

## 2023-10-23 DIAGNOSIS — T45.1X5A PANCYTOPENIA DUE TO ANTINEOPLASTIC CHEMOTHERAPY: ICD-10-CM

## 2023-10-23 DIAGNOSIS — C92.10 BLAST CRISIS PHASE OF CHRONIC MYELOID LEUKEMIA: ICD-10-CM

## 2023-10-23 DIAGNOSIS — C92.10 BLAST CRISIS PHASE OF CHRONIC MYELOID LEUKEMIA: Primary | ICD-10-CM

## 2023-10-23 DIAGNOSIS — D84.81 IMMUNODEFICIENCY DUE TO CONDITIONS CLASSIFIED ELSEWHERE: ICD-10-CM

## 2023-10-23 DIAGNOSIS — D61.810 PANCYTOPENIA DUE TO ANTINEOPLASTIC CHEMOTHERAPY: ICD-10-CM

## 2023-10-23 DIAGNOSIS — Z76.82 STEM CELL TRANSPLANT CANDIDATE: ICD-10-CM

## 2023-10-23 DIAGNOSIS — C92.12 CML (CHRONIC MYELOID LEUKEMIA) IN RELAPSE: ICD-10-CM

## 2023-10-23 DIAGNOSIS — C92.12 CML (CHRONIC MYELOID LEUKEMIA) IN RELAPSE: Primary | ICD-10-CM

## 2023-10-23 LAB
ABO + RH BLD: NORMAL
BLD PROD TYP BPU: NORMAL
BLOOD UNIT EXPIRATION DATE: NORMAL
BLOOD UNIT TYPE CODE: 7300
CROSSMATCH INTERPRETATION: NORMAL
DISPENSE STATUS: NORMAL
UNIT NUMBER: NORMAL

## 2023-10-23 PROCEDURE — P9037 PLATE PHERES LEUKOREDU IRRAD: HCPCS | Performed by: NURSE PRACTITIONER

## 2023-10-23 PROCEDURE — 1159F MED LIST DOCD IN RCRD: CPT | Mod: CPTII,95,, | Performed by: INTERNAL MEDICINE

## 2023-10-23 PROCEDURE — 1160F RVW MEDS BY RX/DR IN RCRD: CPT | Mod: CPTII,95,, | Performed by: INTERNAL MEDICINE

## 2023-10-23 PROCEDURE — 25000003 PHARM REV CODE 250: Performed by: NURSE PRACTITIONER

## 2023-10-23 PROCEDURE — 99215 PR OFFICE/OUTPT VISIT, EST, LEVL V, 40-54 MIN: ICD-10-PCS | Mod: 95,,, | Performed by: INTERNAL MEDICINE

## 2023-10-23 PROCEDURE — 36430 TRANSFUSION BLD/BLD COMPNT: CPT

## 2023-10-23 PROCEDURE — 1159F PR MEDICATION LIST DOCUMENTED IN MEDICAL RECORD: ICD-10-PCS | Mod: CPTII,95,, | Performed by: INTERNAL MEDICINE

## 2023-10-23 PROCEDURE — 99215 OFFICE O/P EST HI 40 MIN: CPT | Mod: 95,,, | Performed by: INTERNAL MEDICINE

## 2023-10-23 PROCEDURE — 1160F PR REVIEW ALL MEDS BY PRESCRIBER/CLIN PHARMACIST DOCUMENTED: ICD-10-PCS | Mod: CPTII,95,, | Performed by: INTERNAL MEDICINE

## 2023-10-23 RX ORDER — HYDROCODONE BITARTRATE AND ACETAMINOPHEN 500; 5 MG/1; MG/1
TABLET ORAL ONCE
Status: COMPLETED | OUTPATIENT
Start: 2023-10-23 | End: 2023-10-23

## 2023-10-23 RX ORDER — DIPHENHYDRAMINE HCL 25 MG
25 CAPSULE ORAL ONCE
Status: COMPLETED | OUTPATIENT
Start: 2023-10-23 | End: 2023-10-23

## 2023-10-23 RX ORDER — HYDROCODONE BITARTRATE AND ACETAMINOPHEN 500; 5 MG/1; MG/1
TABLET ORAL ONCE
Status: CANCELLED | OUTPATIENT
Start: 2023-10-23 | End: 2023-10-23

## 2023-10-23 RX ORDER — DIPHENHYDRAMINE HCL 25 MG
25 CAPSULE ORAL ONCE
Status: CANCELLED | OUTPATIENT
Start: 2023-10-23 | End: 2023-10-23

## 2023-10-23 RX ORDER — ACETAMINOPHEN 325 MG/1
650 TABLET ORAL ONCE
Status: CANCELLED | OUTPATIENT
Start: 2023-10-23 | End: 2023-10-23

## 2023-10-23 RX ORDER — ACETAMINOPHEN 325 MG/1
650 TABLET ORAL ONCE
Status: COMPLETED | OUTPATIENT
Start: 2023-10-23 | End: 2023-10-23

## 2023-10-23 RX ADMIN — DIPHENHYDRAMINE HYDROCHLORIDE 25 MG: 25 CAPSULE ORAL at 01:10

## 2023-10-23 RX ADMIN — ACETAMINOPHEN 650 MG: 325 TABLET ORAL at 01:10

## 2023-10-23 RX ADMIN — SODIUM CHLORIDE: 9 INJECTION, SOLUTION INTRAVENOUS at 01:10

## 2023-10-23 NOTE — PROGRESS NOTES
Section of Hematology and Stem Cell Transplantation  Follow Up Visit     The patient location is: [place], [place]  The chief complaint leading to consultation is: BP-CML    Visit type: audiovisual    Face to Face time with patient: 15 minutes  45 minutes of total time spent on the encounter, which includes face to face time and non-face to face time preparing to see the patient (eg, review of tests), Obtaining and/or reviewing separately obtained history, Documenting clinical information in the electronic or other health record, Independently interpreting results (not separately reported) and communicating results to the patient/family/caregiver, or Care coordination (not separately reported).     Each patient to whom he or she provides medical services by telemedicine is:  (1) informed of the relationship between the physician and patient and the respective role of any other health care provider with respect to management of the patient; and (2) notified that he or she may decline to receive medical services by telemedicine and may withdraw from such care at any time.    Notes:     Date of visit: 10/23/23  Visit diagnosis: 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 [C92.10]  Referred by:  Brett Hogan MD    Oncologic History:     Primary Oncologic Diagnosis: Chronic myeloid leukemia, blast phase    8/2022: Diagnosed with chronic phase CML.  10/2022: Started dasatinib but had questionable compliance.  6/7/23: [place] [facility] [place] Labs notable for  (80% blasts). [place] [facility] [place].  6/9/23: Bone marrow biopsy revealed blast phase chronic myeloid leukemia; reticulin fibrosis 2 of 3. 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 FISH with t(9;22) and MLLT3/MLL (KMT2A) fusion. NGS with NRAS (7%), PTPN11 (4%). BCR/ABL1:ABL1 (p210) >55%.  6/16/23: Started induction with CLIA + ponatinib. Course complicated by neutropenic fever, pharyngitis/mucositis,  and strep viridans bacteremia.   7/6/23: Day 21 bone marrow biopsy revealed a markedly hypocellular marrow (<5%) with trilineage hypoplasia. CG 46,XY[5].  7/25/23: Bone marrow biopsy revealed a hypocellular marrow but suboptimal specimen for evaluation.   8/4/23: BCR/ABL1:ABL1 (p210) 23.6%.   8/28/23: Bone marrow biopsy revealed a hypocellular marrow (30%) with persistent blast phase with 10-15% blasts.   10/9/23: Cycle 1 day 1 of azacitidine 75 mg/m2 x5 days plus venetoclax 50mg daily x10 days + ponatininb 30mg daily (28 day cycle).     History of Present Ilness:   Magdaleno Moore) is a pleasant 23 y.o.male with chronic myeloid leukemia blast phase who presents for follow up. He is tolerating this treatment well so far. His hair is starting to grow back. No new side effects.     PAST MEDICAL HISTORY:   Past Medical History:   Diagnosis Date    CML (chronic myelocytic leukemia)     HVD (hypertensive vascular disease)     Oropharyngeal candidiasis        PAST SURGICAL HISTORY:   Past Surgical History:   Procedure Laterality Date    BONE MARROW BIOPSY N/A 8/22/2022    Procedure: Biopsy-bone marrow;  Surgeon: Getachew Chen MD;  Location: [facility] OR;  Service: General;  Laterality: N/A;    BONE MARROW BIOPSY N/A 7/25/2023    Procedure: Biopsy-bone marrow;  Surgeon: Edi Carty MD;  Location: [facility] OR;  Service: General;  Laterality: N/A;    BONE MARROW BIOPSY N/A 8/28/2023    Procedure: Biopsy-bone marrow;  Surgeon: Moo Pina MD;  Location: [facility] OR;  Service: General;  Laterality: N/A;    KNEE SURGERY Left        PAST SOCIAL HISTORY:  Social History     Tobacco Use    Smoking status: Never    Smokeless tobacco: Never   Substance Use Topics    Alcohol use: Never    Drug use: Yes     Types: Marijuana       FAMILY HISTORY:  Family History   Problem Relation Age of Onset    Hypertension Maternal Grandmother     Cancer Maternal Grandmother        CURRENT MEDICATIONS:   Current Outpatient Medications   Medication  Sig    acyclovir (ZOVIRAX) 800 MG Tab Take 1 tablet (800 mg total) by mouth 2 (two) times daily.    NIFEdipine (PROCARDIA-XL) 60 MG (OSM) 24 hr tablet Take 1 tablet (60 mg total) by mouth once daily.    ondansetron (ZOFRAN) 8 MG tablet Take 1 tablet (8 mg total) by mouth every 6 (six) hours as needed for Nausea.    pantoprazole (PROTONIX) 40 MG tablet Take 1 tablet (40 mg total) by mouth once daily.    PONATinib 30 mg Tab Take 30 mg by mouth once daily.    posaconazole (NOXAFIL) 100 mg TbEC tablet Take 3 tablets (300 mg total) by mouth once daily.    sulfamethoxazole-trimethoprim 800-160mg (BACTRIM DS) 800-160 mg Tab Take 1 tablet by mouth every Mon, Wed, Fri.    venetoclax (VENCLEXTA) 50 mg Tab Take 50 mg by mouth once daily on days 1 through 10 only of each 28 day chemotherapy cycle.     Current Facility-Administered Medications   Medication    0.9%  NaCl infusion (for blood administration)       ALLERGIES:   Review of patient's allergies indicates:  No Known Allergies    Review of Systems:     Pertinent positives and negatives included in the HPI. Otherwise a complete review of systems is negative.    Physical Exam:     There were no vitals filed for this visit.    ECOG Performance Status: (foot note - ECOG PS provided by Eastern Cooperative Oncology Group) 0 - Asymptomatic    Karnofsky Performance Score:  100%- Normal, No Complaints, No Evidence of Disease    Labs:   Lab Results   Component Value Date    WBC 0.49 (LL) 10/23/2023    RBC 2.91 (L) 10/23/2023    HGB 8.0 (L) 10/23/2023    HCT 25.0 (L) 10/23/2023    MCV 85.9 10/23/2023    MCH 27.5 10/23/2023    MCHC 32.0 (L) 10/23/2023    RDW 13.5 10/23/2023    PLT 10 (LL) 10/23/2023    MPV  10/23/2023      Comment:      Unable to calculate MPV      GRAN 0.1 (L) 07/12/2023    GRAN 6.2 (L) 07/12/2023    LYMPH 1.1 07/12/2023    LYMPH 92.9 (H) 07/12/2023    MONO 0.0 (L) 07/12/2023    MONO 0.9 (L) 07/12/2023    EOS 0.0 07/12/2023    BASO 0.00 07/12/2023    EOSINOPHIL 0.0  07/12/2023    BASOPHIL 0.0 07/12/2023       CMP  Sodium   Date Value Ref Range Status   07/12/2023 138 136 - 145 mmol/L Final     Sodium Level   Date Value Ref Range Status   10/23/2023 142 136 - 145 mmol/L Final     Potassium   Date Value Ref Range Status   07/12/2023 4.4 3.5 - 5.1 mmol/L Final     Potassium Level   Date Value Ref Range Status   10/23/2023 4.3 3.5 - 5.1 mmol/L Final     Chloride   Date Value Ref Range Status   07/12/2023 106 95 - 110 mmol/L Final     CO2   Date Value Ref Range Status   07/12/2023 26 23 - 29 mmol/L Final     Carbon Dioxide   Date Value Ref Range Status   10/23/2023 27 22 - 29 mmol/L Final     Glucose   Date Value Ref Range Status   07/12/2023 87 70 - 110 mg/dL Final     BUN   Date Value Ref Range Status   07/12/2023 15 6 - 20 mg/dL Final     Blood Urea Nitrogen   Date Value Ref Range Status   10/23/2023 12.1 8.9 - 20.6 mg/dL Final     Creatinine   Date Value Ref Range Status   10/23/2023 0.88 0.73 - 1.18 mg/dL Final   07/12/2023 0.8 0.5 - 1.4 mg/dL Final     Calcium   Date Value Ref Range Status   07/12/2023 9.0 8.7 - 10.5 mg/dL Final     Calcium Level Total   Date Value Ref Range Status   10/23/2023 9.1 8.4 - 10.2 mg/dL Final     Total Protein   Date Value Ref Range Status   07/12/2023 6.7 6.0 - 8.4 g/dL Final     Albumin   Date Value Ref Range Status   07/12/2023 3.3 (L) 3.5 - 5.2 g/dL Final     Albumin Level   Date Value Ref Range Status   10/23/2023 3.9 3.5 - 5.0 g/dL Final     Total Bilirubin   Date Value Ref Range Status   07/12/2023 0.4 0.1 - 1.0 mg/dL Final     Comment:     For infants and newborns, interpretation of results should be based  on gestational age, weight and in agreement with clinical  observations.    Premature Infant recommended reference ranges:  Up to 24 hours.............<8.0 mg/dL  Up to 48 hours............<12.0 mg/dL  3-5 days..................<15.0 mg/dL  6-29 days.................<15.0 mg/dL       Bilirubin Total   Date Value Ref Range Status    10/23/2023 0.9 <=1.5 mg/dL Final     Alkaline Phosphatase   Date Value Ref Range Status   10/23/2023 77 40 - 150 unit/L Final   07/12/2023 94 55 - 135 U/L Final     AST   Date Value Ref Range Status   07/12/2023 10 10 - 40 U/L Final     Aspartate Aminotransferase   Date Value Ref Range Status   10/23/2023 22 5 - 34 unit/L Final     ALT   Date Value Ref Range Status   07/12/2023 12 10 - 44 U/L Final     Alanine Aminotransferase   Date Value Ref Range Status   10/23/2023 23 0 - 55 unit/L Final     Anion Gap   Date Value Ref Range Status   07/12/2023 6 (L) 8 - 16 mmol/L Final       Imaging:   No results found for this or any previous visit (from the past 2160 hour(s)).    Pathology:  Reviewed     Assessment and Plan:   Magdaleno Moore) is a pleasant 23 y.o.male with chronic myeloid leukemia blast phase who presents for follow up.    Chronic myeloid leukemia, blast phase:  He was initially diagnosed with CP-CML in 8/2022. In 6/2023, he presented to the ER and later diagnosed with BP-CML. Bone marrow biopsy at that time revealed a t(9;22) as well as KMT2A fusion. NGS with NRAS (7%), PTPN11 (4%). He was induced with CLIA plus ponatinib, and his day 21 bone marrow biopsy revealed aplasia. Follow up bone marrow biopsy on 7/25/23 noted persistent aplasia, although the sample was limited. BCR/ABL on 8/4/23 was 23.6%. Bone marrow biopsy on 8/28/23 showed persistent disease with 10-15% blasts. He started salvage therapy with azacitidine + venetoclax + ponatininb (Brayden et al, Blood 2022) on 10/9/23.  - Continue Azacitidine 75 mg/m2 daily x5 days + venetoclax 50mg daily x10 days + ponatininb 30mg daily (28 day cycle).  - Bone marrow biopsy next week to assess response.   - Discussed with Dr. Hogan.    Stem cell transplant candidate:  We discussed the role for allogeneic stem cell transplantation in this disease process as a potentially curative option. We had an extensive discussion about the rationale, logistics,  risks, and benefits. We reviewed the requirement to stay in the [place] area for 100 days with a caregiver at all times. We discussed the risks, including infection, graft failure, organ toxicity, graft versus host disease, relapse of disease, and secondary cancers. We reviewed the need for long-term immunosuppression and need for close monitoring. Will proceed with expedited transplant workup if CP-CML confirmed. He is young and fit (HCT-CI 0).   - Coordinator: TBD  - Regimen: MAC (?Bu4Flu)  - Donor: TBD  - Graft source: TBD  - GVHD Ppx: PTCy, Tac, MMF  - Disease status at time of transplant: TBD    Stem cell donors:  He has 3 brothers (8, 18, 21). His mother is 44. Awaiting mother's typing. Brothers unavailable for typing.     Pancytopenia:  Monitor labs 2x weekly with Dr. Hogan. Transfuse for Hgb <7 and Plts <10.    Immunosuppression due to conditions classified elsewhere:  Continue acyclovir, levofloxacin, posaconazole.     Orders/Follow Up:           Route Chart for Scheduling    BMT Chart Routing  Urgent    Follow up with physician 3 weeks. virtual   Follow up with JOSE ANTONIO    Provider visit type    Infusion scheduling note    Injection scheduling note    Labs    Imaging    Pharmacy appointment    Other referrals                Treatment Plan Information   OP AZACITIDINE 5-DAY (SUB-Q) + VENETOCLAX + PONATINIB    Paul Hogan II, MD   Upcoming Treatment Dates - OP AZACITIDINE 5-DAY (SUB-Q) + VENETOCLAX + PONATINIB     11/6/2023       Pre-Medications       ondansetron disintegrating tablet 16 mg       Chemotherapy       azaCITIDine (VIDAZA) chemo injection  11/7/2023       Pre-Medications       ondansetron disintegrating tablet 16 mg       Chemotherapy       azaCITIDine (VIDAZA) chemo injection  11/8/2023       Pre-Medications       ondansetron disintegrating tablet 16 mg       Chemotherapy       azaCITIDine (VIDAZA) chemo injection  11/9/2023       Pre-Medications       ondansetron disintegrating tablet 16  mg       Chemotherapy       azaCITIDine (VIDAZA) chemo injection      Advance Care Planning   Date: 08/07/2023  We reviewed his underlying diagnosis including natural history, prognosis, and various treatment options. He remains interested in pursuing any and all treatment options in an effort to improve his quality and quantity of life. Will continue all treatment as recommended above.         Bijan Green MD  Hematology, Oncology, and Stem Cell Transplantation  [facility]

## 2023-10-23 NOTE — Clinical Note
I saw him today. He is doing very well with this regimen. Would you mind setting him up for a marrow next week so we can see his response to treatment? Pending those results, we may be able to cut back the venetoclax. Thanks!

## 2023-10-24 DIAGNOSIS — C92.12 CML (CHRONIC MYELOID LEUKEMIA) IN RELAPSE: Primary | ICD-10-CM

## 2023-10-24 DIAGNOSIS — C92.10 CML (CHRONIC MYELOCYTIC LEUKEMIA): ICD-10-CM

## 2023-10-25 ENCOUNTER — HOSPITAL ENCOUNTER (EMERGENCY)
Facility: HOSPITAL | Age: 23
Discharge: HOME | End: 2023-10-25
Attending: EMERGENCY MEDICINE
Payer: MEDICAID

## 2023-10-25 VITALS
HEIGHT: 73 IN | OXYGEN SATURATION: 99 % | SYSTOLIC BLOOD PRESSURE: 144 MMHG | DIASTOLIC BLOOD PRESSURE: 111 MMHG | RESPIRATION RATE: 20 BRPM | BODY MASS INDEX: 23.86 KG/M2 | WEIGHT: 180 LBS | HEART RATE: 67 BPM | TEMPERATURE: 99 F

## 2023-10-25 DIAGNOSIS — R50.81 FEBRILE NEUTROPENIA: Primary | ICD-10-CM

## 2023-10-25 DIAGNOSIS — D84.81 IMMUNODEFICIENCY DUE TO CONDITIONS CLASSIFIED ELSEWHERE: Primary | ICD-10-CM

## 2023-10-25 DIAGNOSIS — D70.9 FEBRILE NEUTROPENIA: Primary | ICD-10-CM

## 2023-10-25 LAB
ABS NEUT CALC (OHS): 0.05 X10(3)/MCL (ref 2.1–9.2)
ALBUMIN SERPL-MCNC: 3.8 G/DL (ref 3.5–5)
ALBUMIN/GLOB SERPL: 1 RATIO (ref 1.1–2)
ALP SERPL-CCNC: 74 UNIT/L (ref 40–150)
ALT SERPL-CCNC: 15 UNIT/L (ref 0–55)
APPEARANCE UR: CLEAR
AST SERPL-CCNC: 14 UNIT/L (ref 5–34)
BACTERIA #/AREA URNS AUTO: NORMAL /HPF
BILIRUB SERPL-MCNC: 0.7 MG/DL
BILIRUB UR QL STRIP.AUTO: NEGATIVE
BUN SERPL-MCNC: 6.9 MG/DL (ref 8.9–20.6)
CALCIUM SERPL-MCNC: 9.5 MG/DL (ref 8.4–10.2)
CHLORIDE SERPL-SCNC: 108 MMOL/L (ref 98–107)
CO2 SERPL-SCNC: 26 MMOL/L (ref 22–29)
COLOR UR AUTO: ABNORMAL
CREAT SERPL-MCNC: 0.91 MG/DL (ref 0.73–1.18)
ERYTHROCYTE [DISTWIDTH] IN BLOOD BY AUTOMATED COUNT: 13.3 % (ref 11.5–17)
FLUAV AG UPPER RESP QL IA.RAPID: NOT DETECTED
FLUBV AG UPPER RESP QL IA.RAPID: NOT DETECTED
GFR SERPLBLD CREATININE-BSD FMLA CKD-EPI: >60 MLS/MIN/1.73/M2
GLOBULIN SER-MCNC: 3.8 GM/DL (ref 2.4–3.5)
GLUCOSE SERPL-MCNC: 100 MG/DL (ref 74–100)
GLUCOSE UR QL STRIP.AUTO: NEGATIVE
HCT VFR BLD AUTO: 25 % (ref 42–52)
HGB BLD-MCNC: 8.5 G/DL (ref 14–18)
KETONES UR QL STRIP.AUTO: NEGATIVE
LACTATE SERPL-SCNC: 1.2 MMOL/L (ref 0.5–2.2)
LEUKOCYTE ESTERASE UR QL STRIP.AUTO: NEGATIVE
LIPASE SERPL-CCNC: 10 U/L
LYMPHOCYTES NFR BLD MANUAL: 0.6 X10(3)/MCL
LYMPHOCYTES NFR BLD MANUAL: 91 % (ref 13–40)
MAGNESIUM SERPL-MCNC: 2 MG/DL (ref 1.6–2.6)
MCH RBC QN AUTO: 28.3 PG (ref 27–31)
MCHC RBC AUTO-ENTMCNC: 34 G/DL (ref 33–36)
MCV RBC AUTO: 83.3 FL (ref 80–94)
MONOCYTES NFR BLD MANUAL: 0.01 X10(3)/MCL (ref 0.1–1.3)
MONOCYTES NFR BLD MANUAL: 2 % (ref 2–11)
NEUTROPHILS NFR BLD MANUAL: 7 % (ref 47–80)
NITRITE UR QL STRIP.AUTO: NEGATIVE
NRBC BLD AUTO-RTO: 0 %
PH UR STRIP.AUTO: 7 [PH]
PLATELET # BLD AUTO: 22 X10(3)/MCL (ref 130–400)
PLATELET # BLD EST: ABNORMAL 10*3/UL
PLATELETS.RETICULATED NFR BLD AUTO: 0.6 % (ref 0.9–11.2)
PMV BLD AUTO: ABNORMAL FL
POTASSIUM SERPL-SCNC: 4.1 MMOL/L (ref 3.5–5.1)
PROT SERPL-MCNC: 7.6 GM/DL (ref 6.4–8.3)
PROT UR QL STRIP.AUTO: 100
RBC # BLD AUTO: 3 X10(6)/MCL (ref 4.7–6.1)
RBC #/AREA URNS AUTO: NORMAL /HPF
RBC MORPH BLD: NORMAL
RBC UR QL AUTO: ABNORMAL
RSV A 5' UTR RNA NPH QL NAA+PROBE: NOT DETECTED
SARS-COV-2 RNA RESP QL NAA+PROBE: NOT DETECTED
SODIUM SERPL-SCNC: 144 MMOL/L (ref 136–145)
SP GR UR STRIP.AUTO: 1.02 (ref 1–1.03)
SQUAMOUS #/AREA URNS AUTO: NORMAL /HPF
STREP A PCR (OHS): NOT DETECTED
UROBILINOGEN UR STRIP-ACNC: 0.2
WBC # SPEC AUTO: 0.66 X10(3)/MCL (ref 4.5–11.5)
WBC #/AREA URNS AUTO: NORMAL /HPF

## 2023-10-25 PROCEDURE — 87040 BLOOD CULTURE FOR BACTERIA: CPT | Performed by: EMERGENCY MEDICINE

## 2023-10-25 PROCEDURE — 80053 COMPREHEN METABOLIC PANEL: CPT | Performed by: EMERGENCY MEDICINE

## 2023-10-25 PROCEDURE — 83605 ASSAY OF LACTIC ACID: CPT | Performed by: EMERGENCY MEDICINE

## 2023-10-25 PROCEDURE — 83690 ASSAY OF LIPASE: CPT | Performed by: EMERGENCY MEDICINE

## 2023-10-25 PROCEDURE — 81001 URINALYSIS AUTO W/SCOPE: CPT | Performed by: EMERGENCY MEDICINE

## 2023-10-25 PROCEDURE — 87651 STREP A DNA AMP PROBE: CPT | Performed by: EMERGENCY MEDICINE

## 2023-10-25 PROCEDURE — 0241U COVID/RSV/FLU A&B PCR: CPT | Performed by: EMERGENCY MEDICINE

## 2023-10-25 PROCEDURE — 99283 EMERGENCY DEPT VISIT LOW MDM: CPT

## 2023-10-25 PROCEDURE — 85027 COMPLETE CBC AUTOMATED: CPT | Performed by: EMERGENCY MEDICINE

## 2023-10-25 PROCEDURE — 83735 ASSAY OF MAGNESIUM: CPT | Performed by: EMERGENCY MEDICINE

## 2023-10-25 PROCEDURE — 25000003 PHARM REV CODE 250: Performed by: EMERGENCY MEDICINE

## 2023-10-25 RX ORDER — LEVOFLOXACIN 500 MG/1
500 TABLET, FILM COATED ORAL DAILY
Qty: 30 TABLET | Refills: 11 | Status: SHIPPED | OUTPATIENT
Start: 2023-10-25 | End: 2023-11-14 | Stop reason: SDUPTHER

## 2023-10-25 RX ORDER — LEVOFLOXACIN 500 MG/1
500 TABLET, FILM COATED ORAL ONCE
Status: COMPLETED | OUTPATIENT
Start: 2023-10-25 | End: 2023-10-25

## 2023-10-25 RX ADMIN — LEVOFLOXACIN 500 MG: 500 TABLET, FILM COATED ORAL at 01:10

## 2023-10-25 NOTE — DISCHARGE INSTRUCTIONS
You do need to have a thermometer at home and return to the emergency room immediately should you have a temperature greater than 100.6 or should you develop with her symptoms.  Your doctor has sent levofloxacin to the pharmacy.  You should continue your other medications.  Follow-up with your doctor as scheduled in 2 days.

## 2023-10-25 NOTE — ED PROVIDER NOTES
Encounter Date: 10/25/2023       History     Chief Complaint   Patient presents with    Fever     Pt c/o fever and chills onset two days ago.     23-year-old male with a history of CML, HVD, oral candidiasis, currently on chemotherapy for blast crisis of CML (10/9/23: Cycle 1 day 1 of azacitidine 75 mg/m2 x5 days plus venetoclax 50mg daily x10 days + ponatininb 30mg daily, for a 28 day cycle) presents to the ER with a complaint of fever.  He complains of fever which occurred last night.  He did not check his temperature but he felt hot, was sweaty, and had chills and is certain he had fever.  He has no runny nose, sore throat, cough, chest pain, abdominal pain, dysuria, or other complaint. He went back to sleep last night and he feels fine this morning.        Review of patient's allergies indicates:  No Known Allergies  Past Medical History:   Diagnosis Date    CML (chronic myelocytic leukemia)     HVD (hypertensive vascular disease)     Oropharyngeal candidiasis      Past Surgical History:   Procedure Laterality Date    BONE MARROW BIOPSY N/A 8/22/2022    Procedure: Biopsy-bone marrow;  Surgeon: Getachew Chen MD;  Location: [facility];  Service: General;  Laterality: N/A;    BONE MARROW BIOPSY N/A 7/25/2023    Procedure: Biopsy-bone marrow;  Surgeon: Edi Carty MD;  Location: [facility] OR;  Service: General;  Laterality: N/A;    BONE MARROW BIOPSY N/A 8/28/2023    Procedure: Biopsy-bone marrow;  Surgeon: Moo Pina MD;  Location: [facility] OR;  Service: General;  Laterality: N/A;    KNEE SURGERY Left      Family History   Problem Relation Age of Onset    Hypertension Maternal Grandmother     Cancer Maternal Grandmother      Social History     Tobacco Use    Smoking status: Never    Smokeless tobacco: Never   Substance Use Topics    Alcohol use: Never    Drug use: Yes     Types: Marijuana     Review of Systems   Constitutional:  Positive for chills, diaphoresis and fever.   All other systems reviewed and are  negative.      Physical Exam     Initial Vitals [10/25/23 0959]   BP Pulse Resp Temp SpO2   (!) 147/106 100 18 97.9 °F (36.6 °C) 98 %      MAP       --         Physical Exam    Nursing note and vitals reviewed.  Constitutional: Vital signs are normal. He appears well-developed and well-nourished.   HENT:   Head: Normocephalic and atraumatic.   Mouth/Throat: Oropharynx is clear and moist.   Tms are normal   Eyes: Pupils are equal, round, and reactive to light.   Neck: Neck supple. No JVD present.   Cardiovascular:  Normal rate, regular rhythm and normal heart sounds.           Pulmonary/Chest: Breath sounds normal. No respiratory distress.   Abdominal: Abdomen is soft. There is no abdominal tenderness.   Musculoskeletal:         General: No edema.      Cervical back: Neck supple. No edema or erythema.     Lymphadenopathy:     He has no cervical adenopathy.   Neurological: He is alert and oriented to person, place, and time. No cranial nerve deficit. GCS score is 15. GCS eye subscore is 4. GCS verbal subscore is 5. GCS motor subscore is 6.   Skin: Skin is warm and dry. Capillary refill takes less than 2 seconds.         ED Course   Procedures  Labs Reviewed   CBC WITH DIFFERENTIAL - Abnormal; Notable for the following components:       Result Value    WBC 0.66 (*)     RBC 3.00 (*)     Hgb 8.5 (*)     Hct 25.0 (*)     Platelet 22 (*)     IPF 0.6 (*)     All other components within normal limits   COMPREHENSIVE METABOLIC PANEL - Abnormal; Notable for the following components:    Chloride 108 (*)     Blood Urea Nitrogen 6.9 (*)     Globulin 3.8 (*)     Albumin/Globulin Ratio 1.0 (*)     All other components within normal limits   URINALYSIS, REFLEX TO URINE CULTURE - Abnormal; Notable for the following components:    Protein,  (*)     Blood, UA Small (*)     All other components within normal limits   MANUAL DIFFERENTIAL - Abnormal; Notable for the following components:    Neutrophils % 7 (*)     Lymphs % 91 (*)      Neutrophils Abs Calc 0.0462 (*)     Monocytes Abs 0.0132 (*)     Platelets Decreased (*)     All other components within normal limits   COVID/RSV/FLU A&B PCR - Normal    Narrative:     The Xpert Xpress SARS-CoV-2/FLU/RSV plus is a rapid, multiplexed real-time PCR test intended for the simultaneous qualitative detection and differentiation of SARS-CoV-2, Influenza A, Influenza B, and respiratory syncytial virus (RSV) viral RNA in either nasopharyngeal swab or nasal swab specimens.         STREP GROUP A BY PCR - Normal    Narrative:     The Xpert Xpress Strep A test is a rapid, qualitative in vitro diagnostic test for the detection of Streptococcus pyogenes (Group A ß-hemolytic Streptococcus, Strep A) in throat swab specimens from patients with signs and symptoms of pharyngitis.     LIPASE - Normal   MAGNESIUM - Normal   LACTIC ACID, PLASMA - Normal   URINALYSIS, MICROSCOPIC - Normal   BLOOD CULTURE OLG   BLOOD CULTURE OLG          Imaging Results    None          Medications   levoFLOXacin tablet 500 mg (500 mg Oral Given 10/25/23 1332)     Medical Decision Making  23-year-old male with a history of CML, HVD, oral candidiasis, currently on chemotherapy for blast crisis of CML (10/9/23: Cycle 1 day 1 of azacitidine 75 mg/m2 x5 days plus venetoclax 50mg daily x10 days + ponatininb 30mg daily, for a 28 day cycle) presents to the ER with a complaint of fever.  He complains of fever which occurred last night.  He did not check his temperature but he felt hot, was sweaty, and had chills and is certain he had fever.  He has no runny nose, sore throat, cough, chest pain, abdominal pain, dysuria, or other complaint. He went back to sleep last night and he feels fine this morning.      Differential diagnosis includes but is not limited to medication side effect, fever which would be concerning for febrile neutropenia, sepsis, UTI, viral syndrome    Amount and/or Complexity of Data Reviewed  Labs: ordered. Decision-making  details documented in ED Course.  Discussion of management or test interpretation with external provider(s): Patient woken middle the night with an episode of sweating and felt hot.  It is unclear whether not he actually had fever but he believes he has fever.  He is had no symptoms since that time and is feeling fine.  He is very neutropenic with an ANC of 46.  Case was discussed with his oncologist Dr Green via secure chat. Of course, he is not seeing the patient but was kind enough to discuss the case with me and offer guidance. I will give patient a dose of Levaquin in the ER and Dr Green has sent Levaquin to the pharmacy.  Patient is to continue the rest of his medications.  On my evaluation, patient is well-appearing, asymptomatic, no fever documented here in the emergency room.  I strongly recommended that he get a thermometer to help clarify whether not he is actually having fever or just waking up a little sweaty for some other reason which could be medication side effect or something else.  Patient verbalized his understanding and agrees to return to the emergency room immediately should he experience fever or call his doctor for any concerns.    Risk  Prescription drug management.               ED Course as of 10/26/23 0742   Wed Oct 25, 2023   1128 WBC(!!): 0.66 [SH]   1128 Hemoglobin(!): 8.5 [SH]   1128 Hematocrit(!): 25.0 [SH]   1128 Platelet Count(!!): 22 [SH]      ED Course User Index  [SH] Jes Archer MD                    Clinical Impression:   Final diagnoses:  [D70.9, R50.81] Febrile neutropenia (Primary)        ED Disposition Condition    Discharge Stable          ED Prescriptions    None       Follow-up Information       Follow up With Specialties Details Why Contact Info    Dr Green   as scheduled              Jes Archer MD  10/26/23 0742

## 2023-10-27 ENCOUNTER — INFUSION (OUTPATIENT)
Dept: INFUSION THERAPY | Facility: HOSPITAL | Age: 23
End: 2023-10-27
Attending: NURSE PRACTITIONER
Payer: MEDICAID

## 2023-10-27 VITALS
SYSTOLIC BLOOD PRESSURE: 137 MMHG | DIASTOLIC BLOOD PRESSURE: 82 MMHG | RESPIRATION RATE: 18 BRPM | TEMPERATURE: 98 F | HEART RATE: 64 BPM

## 2023-10-27 DIAGNOSIS — C92.12 CML (CHRONIC MYELOID LEUKEMIA) IN RELAPSE: ICD-10-CM

## 2023-10-27 DIAGNOSIS — C92.12 CML (CHRONIC MYELOID LEUKEMIA) IN RELAPSE: Primary | ICD-10-CM

## 2023-10-27 PROCEDURE — 25000003 PHARM REV CODE 250: Performed by: INTERNAL MEDICINE

## 2023-10-27 PROCEDURE — P9037 PLATE PHERES LEUKOREDU IRRAD: HCPCS | Performed by: INTERNAL MEDICINE

## 2023-10-27 PROCEDURE — 96374 THER/PROPH/DIAG INJ IV PUSH: CPT

## 2023-10-27 PROCEDURE — 63600175 PHARM REV CODE 636 W HCPCS: Performed by: INTERNAL MEDICINE

## 2023-10-27 PROCEDURE — 36430 TRANSFUSION BLD/BLD COMPNT: CPT

## 2023-10-27 RX ORDER — HYDROCODONE BITARTRATE AND ACETAMINOPHEN 500; 5 MG/1; MG/1
TABLET ORAL ONCE
Status: DISCONTINUED | OUTPATIENT
Start: 2023-10-27 | End: 2023-11-20

## 2023-10-27 RX ORDER — ACETAMINOPHEN 325 MG/1
650 TABLET ORAL ONCE
Status: COMPLETED | OUTPATIENT
Start: 2023-10-27 | End: 2023-10-27

## 2023-10-27 RX ORDER — DIPHENHYDRAMINE HYDROCHLORIDE 50 MG/ML
25 INJECTION INTRAMUSCULAR; INTRAVENOUS ONCE
Status: COMPLETED | OUTPATIENT
Start: 2023-10-27 | End: 2023-10-27

## 2023-10-27 RX ORDER — DIPHENHYDRAMINE HYDROCHLORIDE 50 MG/ML
25 INJECTION INTRAMUSCULAR; INTRAVENOUS ONCE
Status: CANCELLED | OUTPATIENT
Start: 2023-10-27

## 2023-10-27 RX ORDER — HYDROCODONE BITARTRATE AND ACETAMINOPHEN 500; 5 MG/1; MG/1
TABLET ORAL ONCE
Status: CANCELLED | OUTPATIENT
Start: 2023-10-27 | End: 2023-10-27

## 2023-10-27 RX ORDER — ACETAMINOPHEN 325 MG/1
650 TABLET ORAL ONCE
Status: CANCELLED | OUTPATIENT
Start: 2023-10-27

## 2023-10-27 RX ADMIN — ACETAMINOPHEN 650 MG: 325 TABLET ORAL at 09:10

## 2023-10-27 RX ADMIN — DIPHENHYDRAMINE HYDROCHLORIDE 25 MG: 50 INJECTION INTRAMUSCULAR; INTRAVENOUS at 09:10

## 2023-10-30 ENCOUNTER — INFUSION (OUTPATIENT)
Dept: INFUSION THERAPY | Facility: HOSPITAL | Age: 23
End: 2023-10-30
Attending: NURSE PRACTITIONER
Payer: MEDICAID

## 2023-10-30 LAB
BACTERIA BLD CULT: NORMAL
BACTERIA BLD CULT: NORMAL

## 2023-10-31 ENCOUNTER — INFUSION (OUTPATIENT)
Dept: INFUSION THERAPY | Facility: HOSPITAL | Age: 23
End: 2023-10-31
Attending: NURSE PRACTITIONER
Payer: MEDICAID

## 2023-10-31 VITALS
HEART RATE: 68 BPM | HEIGHT: 73 IN | WEIGHT: 192.88 LBS | BODY MASS INDEX: 25.56 KG/M2 | OXYGEN SATURATION: 100 % | SYSTOLIC BLOOD PRESSURE: 156 MMHG | TEMPERATURE: 48 F | RESPIRATION RATE: 16 BRPM | DIASTOLIC BLOOD PRESSURE: 98 MMHG

## 2023-10-31 DIAGNOSIS — D69.6 THROMBOCYTOPENIA: ICD-10-CM

## 2023-10-31 DIAGNOSIS — D69.6 THROMBOCYTOPENIA: Primary | ICD-10-CM

## 2023-10-31 PROCEDURE — P9037 PLATE PHERES LEUKOREDU IRRAD: HCPCS | Performed by: NURSE PRACTITIONER

## 2023-10-31 PROCEDURE — 25000003 PHARM REV CODE 250: Performed by: NURSE PRACTITIONER

## 2023-10-31 PROCEDURE — 36430 TRANSFUSION BLD/BLD COMPNT: CPT

## 2023-10-31 RX ORDER — ACETAMINOPHEN 325 MG/1
650 TABLET ORAL ONCE
Status: CANCELLED | OUTPATIENT
Start: 2023-10-31 | End: 2023-10-31

## 2023-10-31 RX ORDER — HYDROCODONE BITARTRATE AND ACETAMINOPHEN 500; 5 MG/1; MG/1
TABLET ORAL ONCE
Status: CANCELLED | OUTPATIENT
Start: 2023-10-31 | End: 2023-10-31

## 2023-10-31 RX ORDER — HYDROCODONE BITARTRATE AND ACETAMINOPHEN 500; 5 MG/1; MG/1
TABLET ORAL ONCE
Status: DISCONTINUED | OUTPATIENT
Start: 2023-10-31 | End: 2023-11-20

## 2023-10-31 RX ORDER — DIPHENHYDRAMINE HCL 25 MG
25 CAPSULE ORAL ONCE
Status: CANCELLED | OUTPATIENT
Start: 2023-10-31 | End: 2023-10-31

## 2023-10-31 RX ORDER — DIPHENHYDRAMINE HCL 25 MG
25 CAPSULE ORAL ONCE
Status: COMPLETED | OUTPATIENT
Start: 2023-10-31 | End: 2023-10-31

## 2023-10-31 RX ORDER — ACETAMINOPHEN 325 MG/1
650 TABLET ORAL ONCE
Status: COMPLETED | OUTPATIENT
Start: 2023-10-31 | End: 2023-10-31

## 2023-10-31 RX ADMIN — DIPHENHYDRAMINE HYDROCHLORIDE 25 MG: 25 CAPSULE ORAL at 01:10

## 2023-10-31 RX ADMIN — ACETAMINOPHEN 650 MG: 325 TABLET ORAL at 01:10

## 2023-11-03 ENCOUNTER — LAB VISIT (OUTPATIENT)
Dept: LAB | Facility: HOSPITAL | Age: 23
End: 2023-11-03
Attending: INTERNAL MEDICINE
Payer: MEDICAID

## 2023-11-03 DIAGNOSIS — C92.10 BLAST CRISIS PHASE OF CHRONIC MYELOID LEUKEMIA: ICD-10-CM

## 2023-11-03 LAB
ALBUMIN SERPL-MCNC: 3.9 G/DL (ref 3.5–5)
ALBUMIN/GLOB SERPL: 1.3 RATIO (ref 1.1–2)
ALP SERPL-CCNC: 89 UNIT/L (ref 40–150)
ALT SERPL-CCNC: 15 UNIT/L (ref 0–55)
AST SERPL-CCNC: 19 UNIT/L (ref 5–34)
BASOPHILS # BLD AUTO: 0 X10(3)/MCL
BASOPHILS NFR BLD AUTO: 0 %
BILIRUB SERPL-MCNC: 0.6 MG/DL
BUN SERPL-MCNC: 11.2 MG/DL (ref 8.9–20.6)
CALCIUM SERPL-MCNC: 9.1 MG/DL (ref 8.4–10.2)
CHLORIDE SERPL-SCNC: 110 MMOL/L (ref 98–107)
CO2 SERPL-SCNC: 26 MMOL/L (ref 22–29)
CREAT SERPL-MCNC: 0.93 MG/DL (ref 0.73–1.18)
EOSINOPHIL # BLD AUTO: 0 X10(3)/MCL (ref 0–0.9)
EOSINOPHIL NFR BLD AUTO: 0 %
ERYTHROCYTE [DISTWIDTH] IN BLOOD BY AUTOMATED COUNT: 13.2 % (ref 11.5–17)
GFR SERPLBLD CREATININE-BSD FMLA CKD-EPI: >60 MLS/MIN/1.73/M2
GLOBULIN SER-MCNC: 3 GM/DL (ref 2.4–3.5)
GLUCOSE SERPL-MCNC: 94 MG/DL (ref 74–100)
HCT VFR BLD AUTO: 22.5 % (ref 42–52)
HGB BLD-MCNC: 7.3 G/DL (ref 14–18)
IMM GRANULOCYTES # BLD AUTO: 0 X10(3)/MCL (ref 0–0.04)
IMM GRANULOCYTES NFR BLD AUTO: 0 %
LYMPHOCYTES # BLD AUTO: 0.57 X10(3)/MCL (ref 0.6–4.6)
LYMPHOCYTES NFR BLD AUTO: 55.3 %
MCH RBC QN AUTO: 27.9 PG (ref 27–31)
MCHC RBC AUTO-ENTMCNC: 32.4 G/DL (ref 33–36)
MCV RBC AUTO: 85.9 FL (ref 80–94)
MONOCYTES # BLD AUTO: 0.4 X10(3)/MCL (ref 0.1–1.3)
MONOCYTES NFR BLD AUTO: 38.8 %
NEUTROPHILS # BLD AUTO: 0.06 X10(3)/MCL (ref 2.1–9.2)
NEUTROPHILS NFR BLD AUTO: 5.9 %
PLATELET # BLD AUTO: 13 X10(3)/MCL (ref 130–400)
PMV BLD AUTO: ABNORMAL FL
POTASSIUM SERPL-SCNC: 4.4 MMOL/L (ref 3.5–5.1)
PROT SERPL-MCNC: 6.9 GM/DL (ref 6.4–8.3)
RBC # BLD AUTO: 2.62 X10(6)/MCL (ref 4.7–6.1)
SODIUM SERPL-SCNC: 142 MMOL/L (ref 136–145)
WBC # SPEC AUTO: 1.03 X10(3)/MCL (ref 4.5–11.5)

## 2023-11-03 PROCEDURE — 85025 COMPLETE CBC W/AUTO DIFF WBC: CPT

## 2023-11-03 PROCEDURE — 80053 COMPREHEN METABOLIC PANEL: CPT

## 2023-11-03 PROCEDURE — 36415 COLL VENOUS BLD VENIPUNCTURE: CPT

## 2023-11-06 ENCOUNTER — INFUSION (OUTPATIENT)
Dept: INFUSION THERAPY | Facility: HOSPITAL | Age: 23
End: 2023-11-06
Attending: NURSE PRACTITIONER
Payer: MEDICAID

## 2023-11-06 ENCOUNTER — OFFICE VISIT (OUTPATIENT)
Dept: HEMATOLOGY/ONCOLOGY | Facility: CLINIC | Age: 23
End: 2023-11-06
Payer: MEDICAID

## 2023-11-06 VITALS
SYSTOLIC BLOOD PRESSURE: 148 MMHG | HEIGHT: 73 IN | RESPIRATION RATE: 18 BRPM | HEART RATE: 80 BPM | OXYGEN SATURATION: 100 % | BODY MASS INDEX: 26.06 KG/M2 | WEIGHT: 196.63 LBS | DIASTOLIC BLOOD PRESSURE: 93 MMHG | TEMPERATURE: 98 F

## 2023-11-06 VITALS
OXYGEN SATURATION: 100 % | DIASTOLIC BLOOD PRESSURE: 76 MMHG | RESPIRATION RATE: 16 BRPM | TEMPERATURE: 99 F | SYSTOLIC BLOOD PRESSURE: 144 MMHG | HEART RATE: 72 BPM

## 2023-11-06 DIAGNOSIS — C92.10 BLAST CRISIS PHASE OF CHRONIC MYELOID LEUKEMIA: Primary | ICD-10-CM

## 2023-11-06 DIAGNOSIS — C92.10 BLAST CRISIS PHASE OF CHRONIC MYELOID LEUKEMIA: ICD-10-CM

## 2023-11-06 DIAGNOSIS — C92.12 CML (CHRONIC MYELOID LEUKEMIA) IN RELAPSE: Primary | ICD-10-CM

## 2023-11-06 LAB
ABO + RH BLD: NORMAL
ABO + RH BLD: NORMAL
BLD PROD TYP BPU: NORMAL
BLD PROD TYP BPU: NORMAL
BLOOD UNIT EXPIRATION DATE: NORMAL
BLOOD UNIT EXPIRATION DATE: NORMAL
BLOOD UNIT TYPE CODE: 1700
BLOOD UNIT TYPE CODE: 5100
CROSSMATCH INTERPRETATION: NORMAL
CROSSMATCH INTERPRETATION: NORMAL
DISPENSE STATUS: NORMAL
DISPENSE STATUS: NORMAL
UNIT NUMBER: NORMAL
UNIT NUMBER: NORMAL

## 2023-11-06 PROCEDURE — 99214 OFFICE O/P EST MOD 30 MIN: CPT | Mod: PBBFAC,25 | Performed by: NURSE PRACTITIONER

## 2023-11-06 PROCEDURE — 1159F PR MEDICATION LIST DOCUMENTED IN MEDICAL RECORD: ICD-10-PCS | Mod: CPTII,,, | Performed by: NURSE PRACTITIONER

## 2023-11-06 PROCEDURE — 99999 PR PBB SHADOW E&M-EST. PATIENT-LVL IV: CPT | Mod: PBBFAC,,, | Performed by: NURSE PRACTITIONER

## 2023-11-06 PROCEDURE — 96401 CHEMO ANTI-NEOPL SQ/IM: CPT

## 2023-11-06 PROCEDURE — 63600175 PHARM REV CODE 636 W HCPCS: Performed by: NURSE PRACTITIONER

## 2023-11-06 PROCEDURE — 96375 TX/PRO/DX INJ NEW DRUG ADDON: CPT

## 2023-11-06 PROCEDURE — P9040 RBC LEUKOREDUCED IRRADIATED: HCPCS | Performed by: NURSE PRACTITIONER

## 2023-11-06 PROCEDURE — 3080F DIAST BP >= 90 MM HG: CPT | Mod: CPTII,,, | Performed by: NURSE PRACTITIONER

## 2023-11-06 PROCEDURE — 25000003 PHARM REV CODE 250: Performed by: INTERNAL MEDICINE

## 2023-11-06 PROCEDURE — 63600175 PHARM REV CODE 636 W HCPCS: Performed by: INTERNAL MEDICINE

## 2023-11-06 PROCEDURE — 3077F PR MOST RECENT SYSTOLIC BLOOD PRESSURE >= 140 MM HG: ICD-10-PCS | Mod: CPTII,,, | Performed by: NURSE PRACTITIONER

## 2023-11-06 PROCEDURE — 99214 OFFICE O/P EST MOD 30 MIN: CPT | Mod: S$PBB,,, | Performed by: NURSE PRACTITIONER

## 2023-11-06 PROCEDURE — 3080F PR MOST RECENT DIASTOLIC BLOOD PRESSURE >= 90 MM HG: ICD-10-PCS | Mod: CPTII,,, | Performed by: NURSE PRACTITIONER

## 2023-11-06 PROCEDURE — 3008F BODY MASS INDEX DOCD: CPT | Mod: CPTII,,, | Performed by: NURSE PRACTITIONER

## 2023-11-06 PROCEDURE — 99999 PR PBB SHADOW E&M-EST. PATIENT-LVL IV: ICD-10-PCS | Mod: PBBFAC,,, | Performed by: NURSE PRACTITIONER

## 2023-11-06 PROCEDURE — 86923 COMPATIBILITY TEST ELECTRIC: CPT | Performed by: NURSE PRACTITIONER

## 2023-11-06 PROCEDURE — 3008F PR BODY MASS INDEX (BMI) DOCUMENTED: ICD-10-PCS | Mod: CPTII,,, | Performed by: NURSE PRACTITIONER

## 2023-11-06 PROCEDURE — 1160F PR REVIEW ALL MEDS BY PRESCRIBER/CLIN PHARMACIST DOCUMENTED: ICD-10-PCS | Mod: CPTII,,, | Performed by: NURSE PRACTITIONER

## 2023-11-06 PROCEDURE — 99214 PR OFFICE/OUTPT VISIT, EST, LEVL IV, 30-39 MIN: ICD-10-PCS | Mod: S$PBB,,, | Performed by: NURSE PRACTITIONER

## 2023-11-06 PROCEDURE — 1160F RVW MEDS BY RX/DR IN RCRD: CPT | Mod: CPTII,,, | Performed by: NURSE PRACTITIONER

## 2023-11-06 PROCEDURE — 25000003 PHARM REV CODE 250: Performed by: NURSE PRACTITIONER

## 2023-11-06 PROCEDURE — 1159F MED LIST DOCD IN RCRD: CPT | Mod: CPTII,,, | Performed by: NURSE PRACTITIONER

## 2023-11-06 PROCEDURE — 96374 THER/PROPH/DIAG INJ IV PUSH: CPT

## 2023-11-06 PROCEDURE — 3077F SYST BP >= 140 MM HG: CPT | Mod: CPTII,,, | Performed by: NURSE PRACTITIONER

## 2023-11-06 PROCEDURE — P9037 PLATE PHERES LEUKOREDU IRRAD: HCPCS | Performed by: NURSE PRACTITIONER

## 2023-11-06 PROCEDURE — 36430 TRANSFUSION BLD/BLD COMPNT: CPT

## 2023-11-06 RX ORDER — HYDROCODONE BITARTRATE AND ACETAMINOPHEN 500; 5 MG/1; MG/1
TABLET ORAL ONCE
Status: DISCONTINUED | OUTPATIENT
Start: 2023-11-06 | End: 2023-11-20

## 2023-11-06 RX ORDER — ONDANSETRON 4 MG/1
16 TABLET, ORALLY DISINTEGRATING ORAL
Status: CANCELLED
Start: 2023-11-09

## 2023-11-06 RX ORDER — AZACITIDINE 100 MG/1
75 INJECTION, POWDER, LYOPHILIZED, FOR SOLUTION INTRAVENOUS; SUBCUTANEOUS
Status: CANCELLED | OUTPATIENT
Start: 2023-11-06

## 2023-11-06 RX ORDER — AZACITIDINE 100 MG/1
75 INJECTION, POWDER, LYOPHILIZED, FOR SOLUTION INTRAVENOUS; SUBCUTANEOUS
Status: CANCELLED | OUTPATIENT
Start: 2023-11-09

## 2023-11-06 RX ORDER — AZACITIDINE 100 MG/1
75 INJECTION, POWDER, LYOPHILIZED, FOR SOLUTION INTRAVENOUS; SUBCUTANEOUS
Status: COMPLETED | OUTPATIENT
Start: 2023-11-06 | End: 2023-11-06

## 2023-11-06 RX ORDER — ONDANSETRON 4 MG/1
16 TABLET, ORALLY DISINTEGRATING ORAL
Status: CANCELLED
Start: 2023-11-06

## 2023-11-06 RX ORDER — DIPHENHYDRAMINE HYDROCHLORIDE 50 MG/ML
25 INJECTION INTRAMUSCULAR; INTRAVENOUS ONCE
Status: CANCELLED | OUTPATIENT
Start: 2023-11-06

## 2023-11-06 RX ORDER — HYDROCODONE BITARTRATE AND ACETAMINOPHEN 500; 5 MG/1; MG/1
TABLET ORAL ONCE
Status: CANCELLED | OUTPATIENT
Start: 2023-11-06 | End: 2023-11-06

## 2023-11-06 RX ORDER — AZACITIDINE 100 MG/1
75 INJECTION, POWDER, LYOPHILIZED, FOR SOLUTION INTRAVENOUS; SUBCUTANEOUS
Status: CANCELLED | OUTPATIENT
Start: 2023-11-10

## 2023-11-06 RX ORDER — DIPHENHYDRAMINE HYDROCHLORIDE 50 MG/ML
25 INJECTION INTRAMUSCULAR; INTRAVENOUS ONCE
Status: COMPLETED | OUTPATIENT
Start: 2023-11-06 | End: 2023-11-06

## 2023-11-06 RX ORDER — ONDANSETRON 4 MG/1
16 TABLET, ORALLY DISINTEGRATING ORAL
Status: CANCELLED
Start: 2023-11-07

## 2023-11-06 RX ORDER — ONDANSETRON 4 MG/1
16 TABLET, ORALLY DISINTEGRATING ORAL
Status: CANCELLED
Start: 2023-11-08

## 2023-11-06 RX ORDER — ACETAMINOPHEN 325 MG/1
650 TABLET ORAL ONCE
Status: COMPLETED | OUTPATIENT
Start: 2023-11-06 | End: 2023-11-06

## 2023-11-06 RX ORDER — ACETAMINOPHEN 325 MG/1
650 TABLET ORAL ONCE
Status: CANCELLED | OUTPATIENT
Start: 2023-11-06

## 2023-11-06 RX ORDER — AZACITIDINE 100 MG/1
75 INJECTION, POWDER, LYOPHILIZED, FOR SOLUTION INTRAVENOUS; SUBCUTANEOUS
Status: CANCELLED | OUTPATIENT
Start: 2023-11-08

## 2023-11-06 RX ORDER — ONDANSETRON 4 MG/1
16 TABLET, ORALLY DISINTEGRATING ORAL
Status: COMPLETED | OUTPATIENT
Start: 2023-11-06 | End: 2023-11-06

## 2023-11-06 RX ORDER — AZACITIDINE 100 MG/1
75 INJECTION, POWDER, LYOPHILIZED, FOR SOLUTION INTRAVENOUS; SUBCUTANEOUS
Status: CANCELLED | OUTPATIENT
Start: 2023-11-07

## 2023-11-06 RX ORDER — ONDANSETRON 4 MG/1
16 TABLET, ORALLY DISINTEGRATING ORAL
Status: CANCELLED
Start: 2023-11-10

## 2023-11-06 RX ADMIN — AZACITIDINE 160 MG: 100 INJECTION, POWDER, LYOPHILIZED, FOR SOLUTION INTRAVENOUS; SUBCUTANEOUS at 10:11

## 2023-11-06 RX ADMIN — DIPHENHYDRAMINE HYDROCHLORIDE 25 MG: 50 INJECTION INTRAMUSCULAR; INTRAVENOUS at 11:11

## 2023-11-06 RX ADMIN — ACETAMINOPHEN 650 MG: 325 TABLET ORAL at 11:11

## 2023-11-06 RX ADMIN — ONDANSETRON 16 MG: 4 TABLET, ORALLY DISINTEGRATING ORAL at 10:11

## 2023-11-06 NOTE — NURSING
Pt here for vidaza injection. Labs revealed hgb 6.8 and plat 6. Pt added for 1 platelet and 1 prbc, pt mitch transfusion well, will rtc tomorrow for next vidaza

## 2023-11-06 NOTE — PROGRESS NOTES
"Subjective:       Patient ID: Magdaleno Hirsch is a 23 y.o. male.    Chief Complaint: "I feel fine"    Diagnosis:  1. 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  2. 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  3. Conjuctival hemorrhage secondary to 1.    Current Treatment:   Azacitidine 5/28 days, venetoclax 50 mg p.o. daily for 10/28 days, and ponatinib 30 mg p.o. daily every day on 10/09/2023.  Increased ponatinib to 45 mg p.o. daily on 11/06/2023.    Treatment History:  Hydroxyurea 2g every 12 hours  Dasatinib 100 mg po daily, unsure on start date as patient was lost to follow up.  Patient received CLIA and ponatinib on 06/16/2023.  Planning on AlloSCT.    HPI:  Patient with no real medical history who went in for a routine eye exam on 08/18/2022 to update his eye glasses prescription.  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  He had bilateral Valdez spots with vessel tortuosity.  The optometrist recommended that the patient have blood work including testing for sickle cell disease.  The patient presented to the emergency department.  CBC in the emergency department revealed a white blood cell count of 411,900. Hemoglobin was 8.3, platelets were normal at 375,000 hundred and seventy five thousand.  Differential was suggestive of CML.  Coagulation studies revealed an INR of 1.38, PTT of 36.4 and a fibrinogen of 292.  Patient was going to present to [place], however they were on diversion.  He was transferred from [facility] to [facility].  Hematology consulted.  Patient underwent bone marrow biopsy on 08/22/2022, this revealed CML, chronic phase, BCR/ABL1 positive.  Ordered dasatinib 100 mg p.o. daily as of 10/10/2022, unsure when patient started taking medication due to him being lost to follow-up.  Patient went into blast crisis, received CLIA and ponatinib on 06/16/2023.  He had a repeat bone marrow biopsy on 08/28/2023, this " had to be sent off to [facility], however the final diagnosis was persistent/recurrent myeloid neoplasm with 10-15% bone marrow blasts and 8% circulating blasts.  Started azacitidine, venetoclax, ponatinib as mentioned above on 10/09/2023.  Bone marrow biopsy done after 1 cycle done on 10/31/2023 revealed relapsed/persistent acute myeloid leukemia with 11% circulating blasts and 70-80% blasts by IHC in the bone marrow.    Interval History:   Patient presents to clinic for follow up visit.  He still has blast phase CML/AML.  Overall, he states that he feels okay.  Denies any fevers, bleeding, or overall weakness.    Past Medical History:   Diagnosis Date    CML (chronic myelocytic leukemia)     HVD (hypertensive vascular disease)     Oropharyngeal candidiasis       Past Surgical History:   Procedure Laterality Date    BONE MARROW BIOPSY N/A 8/22/2022    Procedure: Biopsy-bone marrow;  Surgeon: Getachew Chen MD;  Location: [facility] OR;  Service: General;  Laterality: N/A;    BONE MARROW BIOPSY N/A 7/25/2023    Procedure: Biopsy-bone marrow;  Surgeon: Edi Carty MD;  Location: [facility] OR;  Service: General;  Laterality: N/A;    BONE MARROW BIOPSY N/A 8/28/2023    Procedure: Biopsy-bone marrow;  Surgeon: Moo Pina MD;  Location: [facility] OR;  Service: General;  Laterality: N/A;    BONE MARROW BIOPSY N/A 10/31/2023    Procedure: Biopsy-bone marrow;  Surgeon: Edi Carty MD;  Location: [facility] OR;  Service: General;  Laterality: N/A;    KNEE SURGERY Left      Social History     Socioeconomic History    Marital status: Single   Tobacco Use    Smoking status: Never    Smokeless tobacco: Never   Substance and Sexual Activity    Alcohol use: Never    Drug use: Yes     Types: Marijuana      Family History   Problem Relation Age of Onset    Hypertension Maternal Grandmother     Cancer Maternal Grandmother            Review of Systems   Constitutional:  Negative for chills, diaphoresis, fatigue and unexpected  weight change.   HENT:  Negative for nasal congestion and sore throat.    Eyes:  Negative for pain.   Respiratory:  Negative for cough, chest tightness and shortness of breath.    Cardiovascular:  Negative for chest pain, palpitations and leg swelling.   Gastrointestinal:  Negative for abdominal distention, abdominal pain, blood in stool, constipation and diarrhea.   Genitourinary:  Negative for dysuria, frequency and hematuria.   Musculoskeletal:  Negative for arthralgias and back pain.   Integumentary:  Negative for rash.   Neurological:  Negative for dizziness, weakness, numbness and headaches.   Hematological:  Negative for adenopathy. Bruises/bleeds easily.   Psychiatric/Behavioral:  Negative for confusion.          Objective:      Physical Exam  Vitals reviewed.   Constitutional:       General: He is awake.      Appearance: Normal appearance.   HENT:      Head: Normocephalic and atraumatic.      Right Ear: Hearing normal.      Left Ear: Hearing normal.      Nose: Nose normal.      Mouth/Throat:      Comments:     Eyes:      General: Lids are normal. Vision grossly intact.      Extraocular Movements: Extraocular movements intact.      Conjunctiva/sclera: Conjunctivae normal.   Cardiovascular:      Rate and Rhythm: Normal rate and regular rhythm.      Pulses: Normal pulses.      Heart sounds: Normal heart sounds.   Pulmonary:      Effort: Pulmonary effort is normal.      Breath sounds: Normal breath sounds. No wheezing, rhonchi or rales.   Chest:      Comments: Soft mass in area of the sternum  Abdominal:      General: Bowel sounds are normal.      Palpations: Abdomen is soft. There is splenomegaly.      Tenderness: There is no abdominal tenderness.   Musculoskeletal:      Cervical back: Full passive range of motion without pain.      Right lower leg: No edema.      Left lower leg: No edema.   Lymphadenopathy:      Cervical: No cervical adenopathy.      Upper Body:      Right upper body: No supraclavicular or  axillary adenopathy.      Left upper body: No supraclavicular or axillary adenopathy.   Skin:     General: Skin is warm.   Neurological:      General: No focal deficit present.      Mental Status: He is alert and oriented to person, place, and time.   Psychiatric:         Attention and Perception: Attention normal.         Mood and Affect: Mood and affect normal.         Behavior: Behavior is cooperative.       LABS AND IMAGING REVIEWED IN [facility]  Lab Review:        Assessment:   CML, chronic phase, transformed to blast crisis  Pancytopenia secondary to Hydrea  Refractory thrombocytopenia - improving     Plan:     Patient went into blast crisis, received CLIA ponatinib.  Hopes were for Allogeneic stem cell transplant, unfortunately the patient had no unrelated donors and his 2 brothers are incarcerated.    Repeat bone marrow biopsy scheduled on 10/31/2023 showed persistent disease.    Continue azacitidine 5/28 days, venetoclax 50 mg p.o. daily for 10/28 days.  Increased ponatinib to 45 mg p.o. daily on 11/06/2023.    Continue weekly labs/PRN transfusion    Will set up for 1 unit of blood and 1 unit of platelets today    Return to clinic in 4 weeks for TD visit, same day labs    Paul Hogan II, MD I, Jaelyn Giles LPN, acted solely as a scribe for and in the presence of, Dr. Paul Hogan who performed the service.

## 2023-11-06 NOTE — PROGRESS NOTES
"Subjective:       Patient ID: Magdaleno Hirsch is a 23 y.o. male.    Chief Complaint: "I feel fine"    Diagnosis:  1. 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  2. 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  3. Conjuctival hemorrhage secondary to 1.    Current Treatment:   Azacitidine 5/28 days, venetoclax 50 mg p.o. daily for 10/28 days, and ponatinib 30 mg p.o. daily every day on 10/09/2023.  Increased ponatinib to 45 mg p.o. daily on 11/06/2023.    Treatment History:  Hydroxyurea 2g every 12 hours  Dasatinib 100 mg po daily, unsure on start date as patient was lost to follow up.  Patient received CLIA and ponatinib on 06/16/2023.  Planning on AlloSCT.    HPI:  Patient with no real medical history who went in for a routine eye exam on 08/18/2022 to update his eye glasses prescription.  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  He had bilateral Valdez spots with vessel tortuosity.  The optometrist recommended that the patient have blood work including testing for sickle cell disease.  The patient presented to the emergency department.  CBC in the emergency department revealed a white blood cell count of 411,900. Hemoglobin was 8.3, platelets were normal at 375,000 hundred and seventy five thousand.  Differential was suggestive of CML.  Coagulation studies revealed an INR of 1.38, PTT of 36.4 and a fibrinogen of 292.  Patient was going to present to [place], however they were on diversion.  He was transferred from [facility] to [facility].  Hematology consulted.  Patient underwent bone marrow biopsy on 08/22/2022, this revealed CML, chronic phase, BCR/ABL1 positive.  Ordered dasatinib 100 mg p.o. daily as of 10/10/2022, unsure when patient started taking medication due to him being lost to follow-up.  Patient went into blast crisis, received CLIA and ponatinib on 06/16/2023.  He had a repeat bone marrow biopsy on 08/28/2023, this " had to be sent off to [facility], however the final diagnosis was persistent/recurrent myeloid neoplasm with 10-15% bone marrow blasts and 8% circulating blasts.  Started azacitidine, venetoclax, ponatinib as mentioned above on 10/09/2023.  Bone marrow biopsy done after 1 cycle done on 10/31/2023 revealed relapsed/persistent acute myeloid leukemia with 11% circulating blasts and 70-80% blasts by IHC in the bone marrow.    Interval History:   Patient presents to clinic for follow up visit.  He still has blast phase CML/AML.  Overall, he states that he feels okay.  Denies any fevers, bleeding, or overall weakness.    Past Medical History:   Diagnosis Date    CML (chronic myelocytic leukemia)     HVD (hypertensive vascular disease)     Oropharyngeal candidiasis       Past Surgical History:   Procedure Laterality Date    BONE MARROW BIOPSY N/A 8/22/2022    Procedure: Biopsy-bone marrow;  Surgeon: Getachew Chen MD;  Location: [facility] OR;  Service: General;  Laterality: N/A;    BONE MARROW BIOPSY N/A 7/25/2023    Procedure: Biopsy-bone marrow;  Surgeon: Edi Carty MD;  Location: [facility] OR;  Service: General;  Laterality: N/A;    BONE MARROW BIOPSY N/A 8/28/2023    Procedure: Biopsy-bone marrow;  Surgeon: Moo Pina MD;  Location: [facility] OR;  Service: General;  Laterality: N/A;    BONE MARROW BIOPSY N/A 10/31/2023    Procedure: Biopsy-bone marrow;  Surgeon: Edi Carty MD;  Location: [facility] OR;  Service: General;  Laterality: N/A;    KNEE SURGERY Left      Social History     Socioeconomic History    Marital status: Single   Tobacco Use    Smoking status: Never    Smokeless tobacco: Never   Substance and Sexual Activity    Alcohol use: Never    Drug use: Yes     Types: Marijuana      Family History   Problem Relation Age of Onset    Hypertension Maternal Grandmother     Cancer Maternal Grandmother            Review of Systems   Constitutional:  Negative for chills, diaphoresis, fatigue and unexpected weight change.    HENT:  Negative for nasal congestion and sore throat.    Eyes:  Negative for pain.   Respiratory:  Negative for cough, chest tightness and shortness of breath.    Cardiovascular:  Negative for chest pain, palpitations and leg swelling.   Gastrointestinal:  Negative for abdominal distention, abdominal pain, blood in stool, constipation and diarrhea.   Genitourinary:  Negative for dysuria, frequency and hematuria.   Musculoskeletal:  Negative for arthralgias and back pain.   Integumentary:  Negative for rash.   Neurological:  Negative for dizziness, weakness, numbness and headaches.   Hematological:  Negative for adenopathy. Bruises/bleeds easily.   Psychiatric/Behavioral:  Negative for confusion.          Objective:      Physical Exam  Vitals reviewed.   Constitutional:       General: He is awake.      Appearance: Normal appearance.   HENT:      Head: Normocephalic and atraumatic.      Right Ear: Hearing normal.      Left Ear: Hearing normal.      Nose: Nose normal.      Mouth/Throat:      Comments:     Eyes:      General: Lids are normal. Vision grossly intact.      Extraocular Movements: Extraocular movements intact.      Conjunctiva/sclera: Conjunctivae normal.   Cardiovascular:      Rate and Rhythm: Normal rate and regular rhythm.      Pulses: Normal pulses.      Heart sounds: Normal heart sounds.   Pulmonary:      Effort: Pulmonary effort is normal.      Breath sounds: Normal breath sounds. No wheezing, rhonchi or rales.   Chest:      Comments: Soft mass in area of the sternum  Abdominal:      General: Bowel sounds are normal.      Palpations: Abdomen is soft. There is splenomegaly.      Tenderness: There is no abdominal tenderness.   Musculoskeletal:      Cervical back: Full passive range of motion without pain.      Right lower leg: No edema.      Left lower leg: No edema.   Lymphadenopathy:      Cervical: No cervical adenopathy.      Upper Body:      Right upper body: No supraclavicular or axillary  adenopathy.      Left upper body: No supraclavicular or axillary adenopathy.   Skin:     General: Skin is warm.   Neurological:      General: No focal deficit present.      Mental Status: He is alert and oriented to person, place, and time.   Psychiatric:         Attention and Perception: Attention normal.         Mood and Affect: Mood and affect normal.         Behavior: Behavior is cooperative.         LABS AND IMAGING REVIEWED IN [facility]  Lab Review:        Assessment:   CML, chronic phase, transformed to blast crisis  Pancytopenia secondary to Hydrea  Refractory thrombocytopenia - improving     Plan:     Patient went into blast crisis, received CLIA ponatinib.  Hopes were for Allogeneic stem cell transplant, unfortunately the patient had no unrelated donors and his 2 brothers are incarcerated.    Repeat bone marrow biopsy scheduled on 10/31/2023 showed persistent disease.    Continue azacitidine 5/28 days, venetoclax 50 mg p.o. daily for 10/28 days.  Increased ponatinib to 45 mg p.o. daily on 11/06/2023.    Continue weekly labs/PRN transfusion    Will set up for 1 unit of blood and 1 unit of platelets today    Return to clinic in 4 weeks for TD visit, same day labs    Paul Hogan II, MD    I, Jaelyn Giles LPN, acted solely as a scribe for and in the presence of, Dr. Paul Hogan who performed the service.

## 2023-11-07 ENCOUNTER — INFUSION (OUTPATIENT)
Dept: INFUSION THERAPY | Facility: HOSPITAL | Age: 23
End: 2023-11-07
Attending: NURSE PRACTITIONER
Payer: MEDICAID

## 2023-11-07 VITALS
TEMPERATURE: 99 F | SYSTOLIC BLOOD PRESSURE: 145 MMHG | HEART RATE: 75 BPM | OXYGEN SATURATION: 98 % | DIASTOLIC BLOOD PRESSURE: 96 MMHG

## 2023-11-07 DIAGNOSIS — C92.10 BLAST CRISIS PHASE OF CHRONIC MYELOID LEUKEMIA: Primary | ICD-10-CM

## 2023-11-07 PROCEDURE — 63600175 PHARM REV CODE 636 W HCPCS: Performed by: INTERNAL MEDICINE

## 2023-11-07 PROCEDURE — 25000003 PHARM REV CODE 250: Performed by: INTERNAL MEDICINE

## 2023-11-07 PROCEDURE — 96401 CHEMO ANTI-NEOPL SQ/IM: CPT

## 2023-11-07 RX ORDER — AZACITIDINE 100 MG/1
75 INJECTION, POWDER, LYOPHILIZED, FOR SOLUTION INTRAVENOUS; SUBCUTANEOUS
Status: COMPLETED | OUTPATIENT
Start: 2023-11-07 | End: 2023-11-07

## 2023-11-07 RX ORDER — ONDANSETRON 4 MG/1
16 TABLET, ORALLY DISINTEGRATING ORAL
Status: COMPLETED | OUTPATIENT
Start: 2023-11-07 | End: 2023-11-07

## 2023-11-07 RX ADMIN — ONDANSETRON 16 MG: 4 TABLET, ORALLY DISINTEGRATING ORAL at 04:11

## 2023-11-07 RX ADMIN — AZACITIDINE 160 MG: 100 INJECTION, POWDER, LYOPHILIZED, FOR SOLUTION INTRAVENOUS; SUBCUTANEOUS at 04:11

## 2023-11-08 ENCOUNTER — INFUSION (OUTPATIENT)
Dept: INFUSION THERAPY | Facility: HOSPITAL | Age: 23
End: 2023-11-08
Attending: NURSE PRACTITIONER
Payer: MEDICAID

## 2023-11-08 VITALS
RESPIRATION RATE: 16 BRPM | HEIGHT: 73 IN | BODY MASS INDEX: 26.06 KG/M2 | TEMPERATURE: 98 F | WEIGHT: 196.63 LBS | HEART RATE: 91 BPM | DIASTOLIC BLOOD PRESSURE: 90 MMHG | SYSTOLIC BLOOD PRESSURE: 152 MMHG

## 2023-11-08 DIAGNOSIS — C92.10 BLAST CRISIS PHASE OF CHRONIC MYELOID LEUKEMIA: Primary | ICD-10-CM

## 2023-11-08 PROCEDURE — 96401 CHEMO ANTI-NEOPL SQ/IM: CPT

## 2023-11-08 PROCEDURE — 25000003 PHARM REV CODE 250: Performed by: INTERNAL MEDICINE

## 2023-11-08 PROCEDURE — 63600175 PHARM REV CODE 636 W HCPCS: Performed by: INTERNAL MEDICINE

## 2023-11-08 RX ORDER — ONDANSETRON 4 MG/1
16 TABLET, ORALLY DISINTEGRATING ORAL
Status: COMPLETED | OUTPATIENT
Start: 2023-11-08 | End: 2023-11-08

## 2023-11-08 RX ORDER — AZACITIDINE 100 MG/1
75 INJECTION, POWDER, LYOPHILIZED, FOR SOLUTION INTRAVENOUS; SUBCUTANEOUS
Status: COMPLETED | OUTPATIENT
Start: 2023-11-08 | End: 2023-11-08

## 2023-11-08 RX ADMIN — AZACITIDINE 160 MG: 100 INJECTION, POWDER, LYOPHILIZED, FOR SOLUTION INTRAVENOUS; SUBCUTANEOUS at 03:11

## 2023-11-08 RX ADMIN — ONDANSETRON 16 MG: 4 TABLET, ORALLY DISINTEGRATING ORAL at 03:11

## 2023-11-09 ENCOUNTER — INFUSION (OUTPATIENT)
Dept: INFUSION THERAPY | Facility: HOSPITAL | Age: 23
End: 2023-11-09
Attending: NURSE PRACTITIONER
Payer: MEDICAID

## 2023-11-09 VITALS
DIASTOLIC BLOOD PRESSURE: 75 MMHG | RESPIRATION RATE: 18 BRPM | HEART RATE: 93 BPM | BODY MASS INDEX: 26.06 KG/M2 | WEIGHT: 196.63 LBS | SYSTOLIC BLOOD PRESSURE: 143 MMHG | TEMPERATURE: 98 F | HEIGHT: 73 IN

## 2023-11-09 DIAGNOSIS — C92.10 BLAST CRISIS PHASE OF CHRONIC MYELOID LEUKEMIA: Primary | ICD-10-CM

## 2023-11-09 PROCEDURE — 63600175 PHARM REV CODE 636 W HCPCS: Performed by: INTERNAL MEDICINE

## 2023-11-09 PROCEDURE — 25000003 PHARM REV CODE 250: Performed by: INTERNAL MEDICINE

## 2023-11-09 PROCEDURE — 96401 CHEMO ANTI-NEOPL SQ/IM: CPT

## 2023-11-09 RX ORDER — AZACITIDINE 100 MG/1
75 INJECTION, POWDER, LYOPHILIZED, FOR SOLUTION INTRAVENOUS; SUBCUTANEOUS
Status: COMPLETED | OUTPATIENT
Start: 2023-11-09 | End: 2023-11-09

## 2023-11-09 RX ORDER — ONDANSETRON 4 MG/1
16 TABLET, ORALLY DISINTEGRATING ORAL
Status: COMPLETED | OUTPATIENT
Start: 2023-11-09 | End: 2023-11-09

## 2023-11-09 RX ADMIN — ONDANSETRON 16 MG: 4 TABLET, ORALLY DISINTEGRATING ORAL at 03:11

## 2023-11-09 RX ADMIN — AZACITIDINE 160 MG: 100 INJECTION, POWDER, LYOPHILIZED, FOR SOLUTION INTRAVENOUS; SUBCUTANEOUS at 03:11

## 2023-11-10 ENCOUNTER — INFUSION (OUTPATIENT)
Dept: INFUSION THERAPY | Facility: HOSPITAL | Age: 23
End: 2023-11-10
Attending: NURSE PRACTITIONER
Payer: MEDICAID

## 2023-11-10 VITALS
OXYGEN SATURATION: 100 % | DIASTOLIC BLOOD PRESSURE: 64 MMHG | SYSTOLIC BLOOD PRESSURE: 153 MMHG | HEIGHT: 73 IN | TEMPERATURE: 98 F | WEIGHT: 196 LBS | BODY MASS INDEX: 25.98 KG/M2 | HEART RATE: 84 BPM | RESPIRATION RATE: 18 BRPM

## 2023-11-10 DIAGNOSIS — C92.10 BLAST CRISIS PHASE OF CHRONIC MYELOID LEUKEMIA: Primary | ICD-10-CM

## 2023-11-10 PROCEDURE — 96401 CHEMO ANTI-NEOPL SQ/IM: CPT

## 2023-11-10 PROCEDURE — 25000003 PHARM REV CODE 250: Performed by: INTERNAL MEDICINE

## 2023-11-10 PROCEDURE — 63600175 PHARM REV CODE 636 W HCPCS: Performed by: INTERNAL MEDICINE

## 2023-11-10 RX ORDER — AZACITIDINE 100 MG/1
75 INJECTION, POWDER, LYOPHILIZED, FOR SOLUTION INTRAVENOUS; SUBCUTANEOUS
Status: COMPLETED | OUTPATIENT
Start: 2023-11-10 | End: 2023-11-10

## 2023-11-10 RX ORDER — ONDANSETRON 4 MG/1
16 TABLET, ORALLY DISINTEGRATING ORAL
Status: COMPLETED | OUTPATIENT
Start: 2023-11-10 | End: 2023-11-10

## 2023-11-10 RX ADMIN — AZACITIDINE 160 MG: 100 INJECTION, POWDER, LYOPHILIZED, FOR SOLUTION INTRAVENOUS; SUBCUTANEOUS at 11:11

## 2023-11-10 RX ADMIN — ONDANSETRON 16 MG: 4 TABLET, ORALLY DISINTEGRATING ORAL at 11:11

## 2023-11-10 NOTE — PLAN OF CARE
Cycle 2 day 5 Vidaza given.  Tolerated well.  Discharged to home with copy of upcoming appointments.

## 2023-11-13 ENCOUNTER — INFUSION (OUTPATIENT)
Dept: INFUSION THERAPY | Facility: HOSPITAL | Age: 23
End: 2023-11-13
Attending: NURSE PRACTITIONER
Payer: MEDICAID

## 2023-11-13 VITALS
HEIGHT: 72 IN | TEMPERATURE: 98 F | DIASTOLIC BLOOD PRESSURE: 74 MMHG | HEART RATE: 69 BPM | SYSTOLIC BLOOD PRESSURE: 127 MMHG | WEIGHT: 196.19 LBS | BODY MASS INDEX: 26.57 KG/M2 | RESPIRATION RATE: 18 BRPM

## 2023-11-13 DIAGNOSIS — C92.12 CML (CHRONIC MYELOID LEUKEMIA) IN RELAPSE: Primary | ICD-10-CM

## 2023-11-13 DIAGNOSIS — C92.12 CML (CHRONIC MYELOID LEUKEMIA) IN RELAPSE: ICD-10-CM

## 2023-11-13 LAB
ABO + RH BLD: NORMAL
ABO + RH BLD: NORMAL
BLD PROD TYP BPU: NORMAL
BLD PROD TYP BPU: NORMAL
BLOOD UNIT EXPIRATION DATE: NORMAL
BLOOD UNIT EXPIRATION DATE: NORMAL
BLOOD UNIT TYPE CODE: 1700
BLOOD UNIT TYPE CODE: 6200
CROSSMATCH INTERPRETATION: NORMAL
CROSSMATCH INTERPRETATION: NORMAL
DISPENSE STATUS: NORMAL
DISPENSE STATUS: NORMAL
UNIT NUMBER: NORMAL
UNIT NUMBER: NORMAL

## 2023-11-13 PROCEDURE — 25000003 PHARM REV CODE 250: Performed by: INTERNAL MEDICINE

## 2023-11-13 PROCEDURE — P9037 PLATE PHERES LEUKOREDU IRRAD: HCPCS | Performed by: INTERNAL MEDICINE

## 2023-11-13 PROCEDURE — 36430 TRANSFUSION BLD/BLD COMPNT: CPT

## 2023-11-13 PROCEDURE — 63600175 PHARM REV CODE 636 W HCPCS: Performed by: INTERNAL MEDICINE

## 2023-11-13 PROCEDURE — 96374 THER/PROPH/DIAG INJ IV PUSH: CPT

## 2023-11-13 RX ORDER — ACETAMINOPHEN 325 MG/1
650 TABLET ORAL ONCE
Status: COMPLETED | OUTPATIENT
Start: 2023-11-13 | End: 2023-11-13

## 2023-11-13 RX ORDER — ACETAMINOPHEN 325 MG/1
650 TABLET ORAL ONCE
Status: CANCELLED | OUTPATIENT
Start: 2023-11-13

## 2023-11-13 RX ORDER — DIPHENHYDRAMINE HYDROCHLORIDE 50 MG/ML
25 INJECTION INTRAMUSCULAR; INTRAVENOUS ONCE
Status: CANCELLED | OUTPATIENT
Start: 2023-11-13

## 2023-11-13 RX ORDER — HYDROCODONE BITARTRATE AND ACETAMINOPHEN 500; 5 MG/1; MG/1
TABLET ORAL ONCE
Status: DISCONTINUED | OUTPATIENT
Start: 2023-11-13 | End: 2023-12-14 | Stop reason: HOSPADM

## 2023-11-13 RX ORDER — HYDROCODONE BITARTRATE AND ACETAMINOPHEN 500; 5 MG/1; MG/1
TABLET ORAL ONCE
Status: DISCONTINUED | OUTPATIENT
Start: 2023-11-13 | End: 2023-11-20

## 2023-11-13 RX ORDER — HYDROCODONE BITARTRATE AND ACETAMINOPHEN 500; 5 MG/1; MG/1
TABLET ORAL ONCE
Status: CANCELLED | OUTPATIENT
Start: 2023-11-13 | End: 2023-11-13

## 2023-11-13 RX ORDER — DIPHENHYDRAMINE HYDROCHLORIDE 50 MG/ML
25 INJECTION INTRAMUSCULAR; INTRAVENOUS ONCE
Status: COMPLETED | OUTPATIENT
Start: 2023-11-13 | End: 2023-11-13

## 2023-11-13 RX ADMIN — ACETAMINOPHEN 650 MG: 325 TABLET ORAL at 01:11

## 2023-11-13 RX ADMIN — DIPHENHYDRAMINE HYDROCHLORIDE 25 MG: 50 INJECTION INTRAMUSCULAR; INTRAVENOUS at 01:11

## 2023-11-14 DIAGNOSIS — D84.81 IMMUNODEFICIENCY DUE TO CONDITIONS CLASSIFIED ELSEWHERE: ICD-10-CM

## 2023-11-14 RX ORDER — LEVOFLOXACIN 500 MG/1
500 TABLET, FILM COATED ORAL DAILY
Qty: 30 TABLET | Refills: 11 | Status: SHIPPED | OUTPATIENT
Start: 2023-11-14 | End: 2023-12-04 | Stop reason: SDUPTHER

## 2023-11-20 ENCOUNTER — INFUSION (OUTPATIENT)
Dept: INFUSION THERAPY | Facility: HOSPITAL | Age: 23
End: 2023-11-20
Attending: NURSE PRACTITIONER
Payer: MEDICAID

## 2023-11-20 ENCOUNTER — HOSPITAL ENCOUNTER (INPATIENT)
Facility: HOSPITAL | Age: 23
LOS: 3 days | Discharge: HOME | DRG: 809 | End: 2023-11-23
Attending: EMERGENCY MEDICINE | Admitting: STUDENT IN AN ORGANIZED HEALTH CARE EDUCATION/TRAINING PROGRAM
Payer: MEDICAID

## 2023-11-20 VITALS — DIASTOLIC BLOOD PRESSURE: 86 MMHG | TEMPERATURE: 101 F | HEART RATE: 118 BPM | SYSTOLIC BLOOD PRESSURE: 140 MMHG

## 2023-11-20 DIAGNOSIS — R50.81 NEUTROPENIC FEVER: Primary | ICD-10-CM

## 2023-11-20 DIAGNOSIS — D61.818 PANCYTOPENIA: ICD-10-CM

## 2023-11-20 DIAGNOSIS — D70.9 NEUTROPENIC FEVER: Primary | ICD-10-CM

## 2023-11-20 DIAGNOSIS — R65.10 SIRS (SYSTEMIC INFLAMMATORY RESPONSE SYNDROME): ICD-10-CM

## 2023-11-20 LAB
ABO + RH BLD: NORMAL
ALBUMIN SERPL-MCNC: 4.2 G/DL (ref 3.5–5)
ALBUMIN/GLOB SERPL: 1.3 RATIO (ref 1.1–2)
ALP SERPL-CCNC: 90 UNIT/L (ref 40–150)
ALT SERPL-CCNC: 9 UNIT/L (ref 0–55)
ANISOCYTOSIS BLD QL SMEAR: ABNORMAL
APPEARANCE UR: CLEAR
AST SERPL-CCNC: 22 UNIT/L (ref 5–34)
B PERT.PT PRMT NPH QL NAA+NON-PROBE: NOT DETECTED
B-HCG SERPL QL: 31 %
BACTERIA #/AREA URNS AUTO: ABNORMAL /HPF
BILIRUB SERPL-MCNC: 1.6 MG/DL
BILIRUB UR QL STRIP.AUTO: NEGATIVE
BLD PROD TYP BPU: NORMAL
BLOOD UNIT EXPIRATION DATE: NORMAL
BLOOD UNIT TYPE CODE: 1700
BLOOD UNIT TYPE CODE: 6200
BLOOD UNIT TYPE CODE: 7300
BUN SERPL-MCNC: 11 MG/DL (ref 8.9–20.6)
C PNEUM DNA NPH QL NAA+NON-PROBE: NOT DETECTED
CALCIUM SERPL-MCNC: 8.8 MG/DL (ref 8.4–10.2)
CHLORIDE SERPL-SCNC: 104 MMOL/L (ref 98–107)
CO2 SERPL-SCNC: 24 MMOL/L (ref 22–29)
COLOR UR AUTO: ABNORMAL
CREAT SERPL-MCNC: 1.01 MG/DL (ref 0.73–1.18)
CROSSMATCH INTERPRETATION: NORMAL
DISPENSE STATUS: NORMAL
ERYTHROCYTE [DISTWIDTH] IN BLOOD BY AUTOMATED COUNT: 13 % (ref 11.5–17)
FLUAV AG UPPER RESP QL IA.RAPID: NOT DETECTED
FLUBV AG UPPER RESP QL IA.RAPID: NOT DETECTED
GFR SERPLBLD CREATININE-BSD FMLA CKD-EPI: >60 MLS/MIN/1.73/M2
GLOBULIN SER-MCNC: 3.3 GM/DL (ref 2.4–3.5)
GLUCOSE SERPL-MCNC: 96 MG/DL (ref 74–100)
GLUCOSE UR QL STRIP.AUTO: NORMAL
GROUP & RH: NORMAL
HADV DNA NPH QL NAA+NON-PROBE: NOT DETECTED
HCOV 229E RNA NPH QL NAA+NON-PROBE: NOT DETECTED
HCOV HKU1 RNA NPH QL NAA+NON-PROBE: NOT DETECTED
HCOV NL63 RNA NPH QL NAA+NON-PROBE: NOT DETECTED
HCOV OC43 RNA NPH QL NAA+NON-PROBE: NOT DETECTED
HCT VFR BLD AUTO: 19.7 % (ref 42–52)
HGB BLD-MCNC: 6.9 G/DL (ref 14–18)
HMPV RNA NPH QL NAA+NON-PROBE: NOT DETECTED
HPIV1 RNA NPH QL NAA+NON-PROBE: NOT DETECTED
HPIV2 RNA NPH QL NAA+NON-PROBE: NOT DETECTED
HPIV3 RNA NPH QL NAA+NON-PROBE: NOT DETECTED
HPIV4 RNA NPH QL NAA+NON-PROBE: NOT DETECTED
INDIRECT COOMBS GEL: NORMAL
INSTRUMENT WBC (OLG): 1.55 X10(3)/MCL
KETONES UR QL STRIP.AUTO: NEGATIVE
LACTATE SERPL-SCNC: 1.1 MMOL/L (ref 0.5–2.2)
LEUKOCYTE ESTERASE UR QL STRIP.AUTO: NEGATIVE
LYMPHOCYTES NFR BLD MANUAL: 1.05 X10(3)/MCL
LYMPHOCYTES NFR BLD MANUAL: 68 %
M PNEUMO DNA NPH QL NAA+NON-PROBE: NOT DETECTED
MCH RBC QN AUTO: 29 PG (ref 27–31)
MCHC RBC AUTO-ENTMCNC: 35 G/DL (ref 33–36)
MCV RBC AUTO: 82.8 FL (ref 80–94)
MICROCYTES BLD QL SMEAR: ABNORMAL
MONOCYTES NFR BLD MANUAL: 0.02 X10(3)/MCL (ref 0.1–1.3)
MONOCYTES NFR BLD MANUAL: 1 %
NITRITE UR QL STRIP.AUTO: NEGATIVE
NRBC BLD AUTO-RTO: 0 %
OVALOCYTES (OLG): ABNORMAL
PH UR STRIP.AUTO: 6 [PH]
PLATELET # BLD AUTO: 3 X10(3)/MCL (ref 130–400)
PLATELET # BLD EST: ABNORMAL 10*3/UL
PLATELETS.RETICULATED NFR BLD AUTO: 1 % (ref 0.9–11.2)
PMV BLD AUTO: ABNORMAL FL
POIKILOCYTOSIS BLD QL SMEAR: ABNORMAL
POTASSIUM SERPL-SCNC: 3.8 MMOL/L (ref 3.5–5.1)
PROT SERPL-MCNC: 7.5 GM/DL (ref 6.4–8.3)
PROT UR QL STRIP.AUTO: ABNORMAL
RBC # BLD AUTO: 2.38 X10(6)/MCL (ref 4.7–6.1)
RBC #/AREA URNS AUTO: ABNORMAL /HPF
RBC MORPH BLD: ABNORMAL
RBC UR QL AUTO: ABNORMAL
RSV RNA NPH QL NAA+NON-PROBE: NOT DETECTED
RV+EV RNA NPH QL NAA+NON-PROBE: DETECTED
SARS-COV-2 RNA RESP QL NAA+PROBE: NOT DETECTED
SODIUM SERPL-SCNC: 138 MMOL/L (ref 136–145)
SP GR UR STRIP.AUTO: 1.01 (ref 1–1.03)
SPECIMEN OUTDATE: NORMAL
SQUAMOUS #/AREA URNS LPF: ABNORMAL /HPF
STREP A PCR (OHS): NOT DETECTED
UNIT NUMBER: NORMAL
UROBILINOGEN UR STRIP-ACNC: NORMAL
WBC # SPEC AUTO: 1.55 X10(3)/MCL (ref 4.5–11.5)
WBC #/AREA URNS AUTO: ABNORMAL /HPF

## 2023-11-20 PROCEDURE — 36430 TRANSFUSION BLD/BLD COMPNT: CPT

## 2023-11-20 PROCEDURE — 11000001 HC ACUTE MED/SURG PRIVATE ROOM

## 2023-11-20 PROCEDURE — 99291 CRITICAL CARE FIRST HOUR: CPT

## 2023-11-20 PROCEDURE — 63600175 PHARM REV CODE 636 W HCPCS: Performed by: EMERGENCY MEDICINE

## 2023-11-20 PROCEDURE — 25000003 PHARM REV CODE 250: Performed by: PHYSICIAN ASSISTANT

## 2023-11-20 PROCEDURE — 25000003 PHARM REV CODE 250: Performed by: EMERGENCY MEDICINE

## 2023-11-20 PROCEDURE — 25000003 PHARM REV CODE 250: Performed by: NURSE PRACTITIONER

## 2023-11-20 PROCEDURE — P9037 PLATE PHERES LEUKOREDU IRRAD: HCPCS | Performed by: EMERGENCY MEDICINE

## 2023-11-20 PROCEDURE — 85027 COMPLETE CBC AUTOMATED: CPT | Performed by: NURSE PRACTITIONER

## 2023-11-20 PROCEDURE — P9040 RBC LEUKOREDUCED IRRADIATED: HCPCS | Performed by: EMERGENCY MEDICINE

## 2023-11-20 PROCEDURE — 87651 STREP A DNA AMP PROBE: CPT | Performed by: NURSE PRACTITIONER

## 2023-11-20 PROCEDURE — 0240U COVID/FLU A&B PCR: CPT | Performed by: NURSE PRACTITIONER

## 2023-11-20 PROCEDURE — 85007 BL SMEAR W/DIFF WBC COUNT: CPT | Performed by: NURSE PRACTITIONER

## 2023-11-20 PROCEDURE — 86923 COMPATIBILITY TEST ELECTRIC: CPT | Performed by: EMERGENCY MEDICINE

## 2023-11-20 PROCEDURE — 63600175 PHARM REV CODE 636 W HCPCS: Performed by: PHYSICIAN ASSISTANT

## 2023-11-20 PROCEDURE — 80053 COMPREHEN METABOLIC PANEL: CPT | Performed by: NURSE PRACTITIONER

## 2023-11-20 PROCEDURE — 21400001 HC TELEMETRY ROOM

## 2023-11-20 PROCEDURE — 83605 ASSAY OF LACTIC ACID: CPT | Performed by: NURSE PRACTITIONER

## 2023-11-20 PROCEDURE — 87040 BLOOD CULTURE FOR BACTERIA: CPT | Performed by: NURSE PRACTITIONER

## 2023-11-20 PROCEDURE — 81001 URINALYSIS AUTO W/SCOPE: CPT | Performed by: NURSE PRACTITIONER

## 2023-11-20 PROCEDURE — 87798 DETECT AGENT NOS DNA AMP: CPT | Performed by: EMERGENCY MEDICINE

## 2023-11-20 PROCEDURE — 86850 RBC ANTIBODY SCREEN: CPT | Performed by: EMERGENCY MEDICINE

## 2023-11-20 RX ORDER — ACETAMINOPHEN 325 MG/1
650 TABLET ORAL EVERY 8 HOURS PRN
Status: DISCONTINUED | OUTPATIENT
Start: 2023-11-20 | End: 2023-11-23 | Stop reason: HOSPADM

## 2023-11-20 RX ORDER — ACETAMINOPHEN 500 MG
1000 TABLET ORAL
Status: COMPLETED | OUTPATIENT
Start: 2023-11-20 | End: 2023-11-20

## 2023-11-20 RX ORDER — IBUPROFEN 200 MG
16 TABLET ORAL
Status: DISCONTINUED | OUTPATIENT
Start: 2023-11-20 | End: 2023-11-23 | Stop reason: HOSPADM

## 2023-11-20 RX ORDER — SODIUM CHLORIDE 0.9 % (FLUSH) 0.9 %
10 SYRINGE (ML) INJECTION EVERY 12 HOURS PRN
Status: DISCONTINUED | OUTPATIENT
Start: 2023-11-20 | End: 2023-11-23 | Stop reason: HOSPADM

## 2023-11-20 RX ORDER — ACETAMINOPHEN 325 MG/1
650 TABLET ORAL EVERY 4 HOURS PRN
Status: DISCONTINUED | OUTPATIENT
Start: 2023-11-20 | End: 2023-11-23 | Stop reason: HOSPADM

## 2023-11-20 RX ORDER — IBUPROFEN 200 MG
24 TABLET ORAL
Status: DISCONTINUED | OUTPATIENT
Start: 2023-11-20 | End: 2023-11-23 | Stop reason: HOSPADM

## 2023-11-20 RX ORDER — ONDANSETRON 2 MG/ML
4 INJECTION INTRAMUSCULAR; INTRAVENOUS EVERY 4 HOURS PRN
Status: DISCONTINUED | OUTPATIENT
Start: 2023-11-20 | End: 2023-11-23 | Stop reason: HOSPADM

## 2023-11-20 RX ORDER — HYDROCODONE BITARTRATE AND ACETAMINOPHEN 500; 5 MG/1; MG/1
TABLET ORAL
Status: DISCONTINUED | OUTPATIENT
Start: 2023-11-20 | End: 2023-11-23 | Stop reason: HOSPADM

## 2023-11-20 RX ORDER — GLUCAGON 1 MG
1 KIT INJECTION
Status: DISCONTINUED | OUTPATIENT
Start: 2023-11-20 | End: 2023-11-23 | Stop reason: HOSPADM

## 2023-11-20 RX ADMIN — CEFEPIME 2 G: 2 INJECTION, POWDER, FOR SOLUTION INTRAVENOUS at 11:11

## 2023-11-20 RX ADMIN — ACETAMINOPHEN 650 MG: 325 TABLET, FILM COATED ORAL at 07:11

## 2023-11-20 RX ADMIN — CEFEPIME 1 G: 1 INJECTION, POWDER, FOR SOLUTION INTRAMUSCULAR; INTRAVENOUS at 05:11

## 2023-11-20 RX ADMIN — ACETAMINOPHEN 1000 MG: 500 TABLET ORAL at 09:11

## 2023-11-20 RX ADMIN — ONDANSETRON 4 MG: 2 INJECTION INTRAMUSCULAR; INTRAVENOUS at 12:11

## 2023-11-20 RX ADMIN — SODIUM CHLORIDE, POTASSIUM CHLORIDE, SODIUM LACTATE AND CALCIUM CHLORIDE 2586 ML: 600; 310; 30; 20 INJECTION, SOLUTION INTRAVENOUS at 11:11

## 2023-11-20 NOTE — FIRST PROVIDER EVALUATION
Medical screening examination initiated.  I have conducted a focused provider triage encounter, findings are as follows:    Brief history of present illness:  24 y/o male who has pmhx CML and is on chemo (last 11/13/23) presents with fever over the last 1-2 days. Mild cough. No meds taken for fever.     There were no vitals filed for this visit.    Pertinent physical exam:  alert, nonlabored, ambulatory     Brief workup plan:  swabs, labs, imaging    Preliminary workup initiated; this workup will be continued and followed by the physician or advanced practice provider that is assigned to the patient when roomed.

## 2023-11-20 NOTE — H&P
[facility] Hospital Medicine History & Physical Examination       Patient Name: Magdaleno Hirsch  MRN: [id number]  Patient Class: IP- Inpatient   Admission Date: 11/20/2023   Admitting Physician: Adwoa Wyatt DO   Length of Stay: 0  Attending Physician: Adwoa Wyatt DO   Primary Care Provider: Rocio Howard NP  Face-to-Face encounter date: 11/20/2023  Code Status: Full Code   Chief Complaint: Fever (C/o fever (Tmax 101.0) since 11/18 with dry cough; reports hx of CML, currently on chemo, last round 11/13, oncologist Dr. Hogan.)        Patient information was obtained from patient, patient's family, past medical records and ER records.     HISTORY OF PRESENT ILLNESS:   Magdaleno Hirsch is a 23 y.o. Black or  male with a past medical history of hypertension and CML followed by Dr. Hogan undergoing daily oral chemotherapy with his last dose on 11/19/2023. The patient presented to [facility] on 11/20/2023 with a primary complaint of subjective fever for the last two days.  Patient reports having shortness a breath with movement and right-sided chest pain without radiation described as sharp/stabbing in nature, cough with intermittent sputum production and urinary frequency.  He denies complaints burning with urination, nausea, vomiting, diarrhea and exposure sick contacts.  Patient reports on 11/13/2023 he required 2 units transfusion of platelets.    Upon presentation to the ED, temperature 101.8° F, heart rate 112, blood pressure 136/78, respiratory rate 20 and SpO2 100% on room air.  Labs with WBC 1.55, H&H 6.9/19.7, platelets 3, lactic acid 1.1.  Influenza a and B and SARS-CoV-2 PCR negative. Strep a PCR negative.  Chest x-ray with no acute cardiopulmonary abnormality.  Oncology was consulted and recommended transfusing 2 units of platelets and 1 unit of leuko reduced packed red blood cells in addition to starting cefepime.  Patient was admitted to hospital medicine  services for further medical management.    PAST MEDICAL HISTORY:     Past Medical History:   Diagnosis Date    CML (chronic myelocytic leukemia)     HVD (hypertensive vascular disease)     Oropharyngeal candidiasis    Hypertension    PAST SURGICAL HISTORY:     Past Surgical History:   Procedure Laterality Date    BONE MARROW BIOPSY N/A 8/22/2022    Procedure: Biopsy-bone marrow;  Surgeon: Getachew Chen MD;  Location: [facility] OR;  Service: General;  Laterality: N/A;    BONE MARROW BIOPSY N/A 7/25/2023    Procedure: Biopsy-bone marrow;  Surgeon: Edi Carty MD;  Location: [facility] OR;  Service: General;  Laterality: N/A;    BONE MARROW BIOPSY N/A 8/28/2023    Procedure: Biopsy-bone marrow;  Surgeon: Moo Pina MD;  Location: [facility] OR;  Service: General;  Laterality: N/A;    BONE MARROW BIOPSY N/A 10/31/2023    Procedure: Biopsy-bone marrow;  Surgeon: Edi Carty MD;  Location: [facility] OR;  Service: General;  Laterality: N/A;    KNEE SURGERY Left        ALLERGIES:   Patient has no known allergies.    FAMILY HISTORY:   Reviewed and negative    SOCIAL HISTORY:   Denies tobacco, alcohol and illicit drug use    HOME MEDICATIONS:     Prior to Admission medications    Medication Sig Start Date End Date Taking? Authorizing Provider   acyclovir (ZOVIRAX) 800 MG Tab Take 1 tablet (800 mg total) by mouth 2 (two) times daily. 7/11/23 7/10/24  Rocio Howard NP   levoFLOXacin (LEVAQUIN) 500 MG tablet Take 1 tablet (500 mg total) by mouth once daily. 11/14/23   Jaren Green MD   NIFEdipine (PROCARDIA-XL) 60 MG (OSM) 24 hr tablet Take 1 tablet (60 mg total) by mouth once daily. 7/12/23 7/11/24  Rocio Howard NP   ondansetron (ZOFRAN) 8 MG tablet Take 1 tablet (8 mg total) by mouth every 6 (six) hours as needed for Nausea. 10/11/22   Paul Hogan II, MD   pantoprazole (PROTONIX) 40 MG tablet Take 1 tablet (40 mg total) by mouth once daily. 7/12/23 7/11/24  Rocio Howard NP   PONATinib (ICLUSIG) 45 mg Tab  tablet Take 1 tablet (45 mg total) by mouth once daily. 11/6/23   Alexsandra Rojas JENI, FNP   posaconazole (NOXAFIL) 100 mg TbEC tablet Take 3 tablets (300 mg total) by mouth once daily. 7/13/23   Rocio Howard, NP   sulfamethoxazole-trimethoprim 800-160mg (BACTRIM DS) 800-160 mg Tab Take 1 tablet by mouth every Mon, Wed, Fri. 7/14/23   Rocio Howard, NP   venetoclax (VENCLEXTA) 50 mg Tab Take 1 tablet (50 mg) by mouth once daily on days 1 through 10 only of each 28 day chemotherapy cycle. 10/2/23   Paul Hogan II, MD       REVIEW OF SYSTEMS:   Except as documented, all other systems reviewed and negative     PHYSICAL EXAM:     VITAL SIGNS: 24 HRS MIN & MAX LAST   Temp  Min: 99.2 °F (37.3 °C)  Max: 101.8 °F (38.8 °C) 99.2 °F (37.3 °C)   BP  Min: 136/78  Max: 140/86 (!) 140/87   Pulse  Min: 102  Max: 118  102   Resp  Min: 20  Max: 20 20   SpO2  Min: 100 %  Max: 100 % 100 %       General appearance: Well-developed, well-nourished male in no apparent distress.  No family at bedside.  HEENT: Atraumatic head. Moist mucous membranes of oral cavity.  Wearing glasses.  Lungs: Clear to auscultation bilaterally.   Heart: Regular rate and rhythm.   Abdomen: Soft, non-distended, non-tender. Bowel sounds are normal.   Extremities: No cyanosis, clubbing. No deformities.  Skin: No Rash. Warm and dry.  Neuro: Awake, alert and oriented. Motor and sensory exams grossly intact.  Psych/mental status: Appropriate mood and affect. Cooperative. Responds appropriately to questions.       LABS AND IMAGING:     Recent Labs   Lab 11/20/23  0809 11/20/23  0940   WBC 1.07* 1.55*   RBC 2.31* 2.38*   HGB 6.4* 6.9*   HCT 19.3* 19.7*   MCV 83.5 82.8   MCH 27.7 29.0   MCHC 33.2 35.0   RDW 12.9 13.0   PLT 4* 3*       Recent Labs   Lab 11/20/23  0809 11/20/23  0940    138   K 4.2 3.8   CO2 24 24   BUN 11.3 11.0   CREATININE 1.07 1.01   CALCIUM 8.5 8.8   ALBUMIN 3.9 4.2   ALKPHOS 87 90   ALT 9 9   AST 20 22   BILITOT 1.5 1.6*        Microbiology Results (last 7 days)       Procedure Component Value Units Date/Time    Blood Culture #2 **CANNOT BE ORDERED STAT** [[id number]] Collected: 11/20/23 0940    Order Status: Sent Specimen: Blood Updated: 11/20/23 1053    Blood Culture #1 **CANNOT BE ORDERED STAT** [[id number]] Collected: 11/20/23 0940    Order Status: Resulted Specimen: Blood Updated: 11/20/23 0958             X-Ray Chest 1 View  Narrative: EXAMINATION:  XR CHEST 1 VIEW    CLINICAL HISTORY:  fever;    TECHNIQUE:  Single frontal view of the chest was performed.    COMPARISON:  07/04/2023    FINDINGS:  LINES AND TUBES: None    MEDIASTINUM AND SONYA: The cardiac silhouette is normal.    LUNGS: No lobar consolidation. No edema.    PLEURA:No pleural effusion. No pneumothorax.    OTHER: No acute osseous abnormality.  Impression: No acute cardiopulmonary abnormality.    Electronically signed by: Kya Dolan  Date:    11/20/2023  Time:    09:32        ASSESSMENT & PLAN:   Assessment:  Neutropenic fever  Pancytopenia requiring transfusions  History of CML and hypertension    Plan:  - Oncology consulted.  Continue with recommendations   - Continue with transfusion of 2 units of platelets and 1 unit of leuko reduced packed red blood cells  - Telemetry monitoring  - Continue with cefepime   - Follow results of blood cultures  - UA ordered to rule out UTI  - Resume appropriate home medications when deemed necessary   - Labs in AM      VTE Prophylaxis: will be placed on SCD for DVT prophylaxis and will be advised to be as mobile as possible and sit in a chair as tolerated      __________________________________________________________________________  INPATIENT LIST OF MEDICATIONS     Current Facility-Administered Medications:     0.9%  NaCl infusion (for blood administration), , Intravenous, Once, Paul Hogan II, MD    0.9%  NaCl infusion (for blood administration), , Intravenous, Once, Paul Hogan II, MD    0.9%  NaCl infusion  (for blood administration), , Intravenous, Once, Alexsandra Rojas L, FNP, Stopped at 10/31/23 1400    0.9%  NaCl infusion (for blood administration), , Intravenous, Once, Alexsandra Rojas L, FNP    0.9%  NaCl infusion (for blood administration), , Intravenous, Once, Alexsandra Rojas L, FNP    0.9%  NaCl infusion (for blood administration), , Intravenous, Once, Paul Hogan II, MD    0.9%  NaCl infusion (for blood administration), , Intravenous, Once, Paul Hogan II, MD    0.9%  NaCl infusion (for blood administration), , Intravenous, Q24H PRN, Danuta Hwang MD    0.9%  NaCl infusion (for blood administration), , Intravenous, Q24H PRN, Danuta Hwang MD    0.9%  NaCl infusion (for blood administration), , Intravenous, Q24H PRN, Danuta Hwang MD    acetaminophen tablet 650 mg, 650 mg, Oral, Q8H PRN, Katty Barber, PA-C    acetaminophen tablet 650 mg, 650 mg, Oral, Q4H PRN, Katty Barber, PA-C    ceFEPIme (MAXIPIME) 2 g in dextrose 5 % in water (D5W) 100 mL IVPB (MB+), 2 g, Intravenous, ED 1 Time, Danuta Hwang MD, Last Rate: 200 mL/hr at 11/20/23 1124, 2 g at 11/20/23 1124    dextrose 10% bolus 125 mL 125 mL, 12.5 g, Intravenous, PRNSunil Kallie E., PA-C    dextrose 10% bolus 250 mL 250 mL, 25 g, Intravenous, PRN, Katty Barber, PA-C    glucagon (human recombinant) injection 1 mg, 1 mg, Intramuscular, PRNSunil Kallie E., PA-C    glucose chewable tablet 16 g, 16 g, Oral, PRN, Katty Barber, PA-C    glucose chewable tablet 24 g, 24 g, Oral, PRNSunil Kallie E., PA-C    lactated ringers bolus 2,586 mL, 30 mL/kg, Intravenous, ED 1 Time, Danuta Hwang MD, Last Rate: 2,586 mL/hr at 11/20/23 1126, 2,586 mL at 11/20/23 1126    ondansetron injection 4 mg, 4 mg, Intravenous, Q4H PRN, Katty Barber, JANET    sodium chloride 0.9% flush 10 mL, 10 mL, Intravenous, Q12H PRN, Katty Barber, JANET    Current Outpatient Medications:     acyclovir (ZOVIRAX) 800 MG Tab, Take 1  tablet (800 mg total) by mouth 2 (two) times daily., Disp: 60 tablet, Rfl: 11    levoFLOXacin (LEVAQUIN) 500 MG tablet, Take 1 tablet (500 mg total) by mouth once daily., Disp: 30 tablet, Rfl: 11    NIFEdipine (PROCARDIA-XL) 60 MG (OSM) 24 hr tablet, Take 1 tablet (60 mg total) by mouth once daily., Disp: 30 tablet, Rfl: 11    ondansetron (ZOFRAN) 8 MG tablet, Take 1 tablet (8 mg total) by mouth every 6 (six) hours as needed for Nausea., Disp: 60 tablet, Rfl: 2    pantoprazole (PROTONIX) 40 MG tablet, Take 1 tablet (40 mg total) by mouth once daily., Disp: 30 tablet, Rfl: 11    PONATinib (ICLUSIG) 45 mg Tab tablet, Take 1 tablet (45 mg total) by mouth once daily., Disp: 30 tablet, Rfl: 3    posaconazole (NOXAFIL) 100 mg TbEC tablet, Take 3 tablets (300 mg total) by mouth once daily., Disp: 90 tablet, Rfl: 3    sulfamethoxazole-trimethoprim 800-160mg (BACTRIM DS) 800-160 mg Tab, Take 1 tablet by mouth every Mon, Wed, Fri., Disp: 15 tablet, Rfl: 3    venetoclax (VENCLEXTA) 50 mg Tab, Take 1 tablet (50 mg) by mouth once daily on days 1 through 10 only of each 28 day chemotherapy cycle., Disp: 10 tablet, Rfl: 3      Scheduled Meds:   0.9%  NaCl infusion (for blood administration)   Intravenous Once    0.9%  NaCl infusion (for blood administration)   Intravenous Once    0.9%  NaCl infusion (for blood administration)   Intravenous Once    0.9%  NaCl infusion (for blood administration)   Intravenous Once    0.9%  NaCl infusion (for blood administration)   Intravenous Once    0.9%  NaCl infusion (for blood administration)   Intravenous Once    0.9%  NaCl infusion (for blood administration)   Intravenous Once    ceFEPime (MAXIPIME) IVPB  2 g Intravenous ED 1 Time    lactated ringers  30 mL/kg Intravenous ED 1 Time     Continuous Infusions:  PRN Meds:.0.9%  NaCl infusion (for blood administration), 0.9%  NaCl infusion (for blood administration), 0.9%  NaCl infusion (for blood administration), acetaminophen, acetaminophen,  dextrose 10%, dextrose 10%, glucagon (human recombinant), glucose, glucose, ondansetron, sodium chloride 0.9%      Discharge Planning and Disposition: Anticipated discharge to be determined.    I, MAIKEL Claudio, have reviewed and discussed the case with Dr. Adwoa Wyatt, DO    Please see the following addendum for further assessment and plan from there attending MD.    Katty Barber PA-C  11/20/2023        MD Addendum:  I, Dr. Wyatt assumed care of this patient today.  For the patient encounter, I performed the substantive portion of the visit, I reviewed the NP/PA documentation, treatment plan, and medical decision making.  I had face to face time with this patient     Patient presents for fever and generalized weakness---etiology is not well defined at this time however it is likely neutropenic in nature, will order viral panel and blood cultures.  Consider ct chest if respiratory status declines or increased cough   Reviewed and restarted appropriate home medications. ----including nifedipine and acyclovir---hold bactrim for now.  Provide gentle IV hydration and monitor urinary output and volume status appropriately.       All diagnosis and differential diagnosis have been reviewed; assessment and plan has been documented; I have personally reviewed the labs and test results that are presently available; I have reviewed the patients medication list; I have reviewed the consulting providers response and recommendations. I have reviewed or attempted to review medical records based upon their availability.    All of the patient and family questions have been addressed and answered. Patient's is agreeable to the above stated plan. I will continue to monitor closely and make adjustments to medical management as needed.     Dr. Adwoa Wyatt DO    I have spent >30 minutes on the day of the visit; time spent includes face to face time and non-face to face time preparing to see the patient (eg, review of  tests), independently reviewing and interpreting medical records, both past and current; documenting clinical information in the electronic or other health record, and communicating results to the patient/family/caregiver and care coordinator and nursing team.

## 2023-11-20 NOTE — NURSING
Pt reports fever last night.  Temp 101.2 this am.  B/p 140/86, pulse 118.  V/s and labs reviewed with Alexsandra Rojas NP.  Instructed to go to ER per Alexsandra for neutropenic fever work up.  Verb understanding.  D/C to ER in stable condition with girlfriend present.

## 2023-11-20 NOTE — ED PROVIDER NOTES
Encounter Date: 11/20/2023    SCRIBE #1 NOTE: I, Elida Nolan, am scribing for, and in the presence of,  Danuta Sanchez MD. I have scribed the following portions of the note - Other sections scribed: HPI, ROS and physical.       History     Chief Complaint   Patient presents with    Fever     C/o fever (Tmax 101.0) since 11/18 with dry cough; reports hx of CML, currently on chemo, last round 11/13, oncologist Dr. Hogan.     This is a 24 y/o male with a medical hx of CML (chemo po) that presents to the ED for fever onset two days. Pt reports that he went get lab work done today, but was not aware of his results. Pt states that he was supposed to receive a transfusion today, and that his last transfusion was a week ago. Pt denies runny nose, congestion, n/v/d, dysuria. Reports fever, cough, headache, SOB with exertion.     Oncologist: Dr. Gardner.     The history is provided by the patient. No  was used.     Review of patient's allergies indicates:  No Known Allergies  Past Medical History:   Diagnosis Date    CML (chronic myelocytic leukemia)     HVD (hypertensive vascular disease)     Oropharyngeal candidiasis      Past Surgical History:   Procedure Laterality Date    BONE MARROW BIOPSY N/A 8/22/2022    Procedure: Biopsy-bone marrow;  Surgeon: Getachew Chen MD;  Location: [facility];  Service: General;  Laterality: N/A;    BONE MARROW BIOPSY N/A 7/25/2023    Procedure: Biopsy-bone marrow;  Surgeon: Edi Carty MD;  Location: [facility] OR;  Service: General;  Laterality: N/A;    BONE MARROW BIOPSY N/A 8/28/2023    Procedure: Biopsy-bone marrow;  Surgeon: Moo Pina MD;  Location: [facility] OR;  Service: General;  Laterality: N/A;    BONE MARROW BIOPSY N/A 10/31/2023    Procedure: Biopsy-bone marrow;  Surgeon: Edi Carty MD;  Location: [facility] OR;  Service: General;  Laterality: N/A;    KNEE SURGERY Left      Family History   Problem Relation Age of Onset    Hypertension Maternal Grandmother      Cancer Maternal Grandmother      Social History     Tobacco Use    Smoking status: Never    Smokeless tobacco: Never   Substance Use Topics    Alcohol use: Never    Drug use: Yes     Types: Marijuana     Review of Systems   Constitutional:  Positive for fever.   HENT:  Negative for congestion and rhinorrhea.    Respiratory:  Positive for cough and shortness of breath.    Gastrointestinal:  Negative for diarrhea, nausea and vomiting.   Genitourinary:  Negative for dysuria.   Neurological:  Positive for headaches.       Physical Exam     Initial Vitals [11/20/23 0915]   BP Pulse Resp Temp SpO2   136/78 (!) 112 20 (!) 101.8 °F (38.8 °C) 100 %      MAP       --         Physical Exam    Nursing note and vitals reviewed.  Constitutional: He appears well-developed and well-nourished. He is not diaphoretic. No distress.   HENT:   Head: Normocephalic and atraumatic.   Nose: Nose normal.   Mouth/Throat: Oropharynx is clear and moist.   Eyes: Conjunctivae and EOM are normal. Pupils are equal, round, and reactive to light.   Neck: Trachea normal. Neck supple.   Normal range of motion.  Cardiovascular:  Normal rate, regular rhythm, normal heart sounds and intact distal pulses.     Exam reveals no gallop and no friction rub.       No murmur heard.  Pulmonary/Chest: Breath sounds normal. No respiratory distress. He has no wheezes. He has no rhonchi. He has no rales. He exhibits no tenderness.   Abdominal: Abdomen is soft. Bowel sounds are normal. He exhibits no distension and no mass. There is no abdominal tenderness. There is no rebound.   Musculoskeletal:         General: No tenderness or edema. Normal range of motion.      Cervical back: Normal range of motion and neck supple.      Lumbar back: Normal. No tenderness. Normal range of motion.     Neurological: He is alert and oriented to person, place, and time. He has normal strength. No cranial nerve deficit or sensory deficit.   Skin: Skin is warm and dry. Capillary refill  takes less than 2 seconds. No rash and no abscess noted. No erythema. No pallor.   Psychiatric: He has a normal mood and affect. His behavior is normal. Judgment and thought content normal.         ED Course   Critical Care    Date/Time: 11/20/2023 10:45 AM    Performed by: Danuta Hwang MD  Authorized by: Danuta Hwang MD  Direct patient critical care time: 30 minutes  Total critical care time (exclusive of procedural time) : 30 minutes  Critical care was necessary to treat or prevent imminent or life-threatening deterioration of the following conditions: sepsis.  Critical care was time spent personally by me on the following activities: development of treatment plan with patient or surrogate, discussions with consultants, evaluation of patient's response to treatment, ordering and performing treatments and interventions, examination of patient, obtaining history from patient or surrogate, ordering and review of radiographic studies, ordering and review of laboratory studies, pulse oximetry, re-evaluation of patient's condition and review of old charts.        Labs Reviewed   CBC WITH DIFFERENTIAL - Abnormal; Notable for the following components:       Result Value    WBC 1.55 (*)     RBC 2.38 (*)     Hgb 6.9 (*)     Hct 19.7 (*)     Platelet 3 (*)     All other components within normal limits   COMPREHENSIVE METABOLIC PANEL - Abnormal; Notable for the following components:    Bilirubin Total 1.6 (*)     All other components within normal limits   LACTIC ACID, PLASMA - Normal   COVID/FLU A&B PCR - Normal    Narrative:     The Xpert Xpress SARS-CoV-2/FLU/RSV plus is a rapid, multiplexed real-time PCR test intended for the simultaneous qualitative detection and differentiation of SARS-CoV-2, Influenza A, Influenza B, and respiratory syncytial virus (RSV) viral RNA in either nasopharyngeal swab or nasal swab specimens.         STREP GROUP A BY PCR - Normal    Narrative:     The Xpert Xpress Strep A test is  a rapid, qualitative in vitro diagnostic test for the detection of Streptococcus pyogenes (Group A ß-hemolytic Streptococcus, Strep A) in throat swab specimens from patients with signs and symptoms of pharyngitis.     BLOOD CULTURE [facility]   BLOOD CULTURE [facility]   URINALYSIS, REFLEX TO URINE CULTURE   RESPIRATORY PANEL   MANUAL DIFFERENTIAL   TYPE & SCREEN   PREPARE PLATELETS (DOSE) SOFT   PREPARE PLATELETS (DOSE) SOFT   PREPARE RBC SOFT          Imaging Results              X-Ray Chest 1 View (Final result)  Result time 11/20/23 09:32:43      Final result by Kya Dolan MD (11/20/23 09:32:43)                   Impression:      No acute cardiopulmonary abnormality.      Electronically signed by: Kya Dolan  Date:    11/20/2023  Time:    09:32               Narrative:    EXAMINATION:  XR CHEST 1 VIEW    CLINICAL HISTORY:  fever;    TECHNIQUE:  Single frontal view of the chest was performed.    COMPARISON:  07/04/2023    FINDINGS:  LINES AND TUBES: None    MEDIASTINUM AND SONYA: The cardiac silhouette is normal.    LUNGS: No lobar consolidation. No edema.    PLEURA:No pleural effusion. No pneumothorax.    OTHER: No acute osseous abnormality.                                    X-Rays:   Independently Interpreted Readings:   Chest X-Ray: Normal heart size.  No infiltrates.  No acute abnormalities.     Medications   ceFEPIme (MAXIPIME) 2 g in dextrose 5 % in water (D5W) 100 mL IVPB (MB+) (2 g Intravenous New Bag 11/20/23 1124)   lactated ringers bolus 2,586 mL (2,586 mLs Intravenous New Bag 11/20/23 1126)   0.9%  NaCl infusion (for blood administration) (has no administration in time range)   0.9%  NaCl infusion (for blood administration) (has no administration in time range)   0.9%  NaCl infusion (for blood administration) (has no administration in time range)   acetaminophen tablet 1,000 mg (1,000 mg Oral Given 11/20/23 0918)     Medical Decision Making  Differential Diagnosis includes, but is not limited to:    Sepsis, URI, neutropenia, pneumonia.   Cbc, cmp, type and screen, lactic, covid/flu, respiratory panel, cxr ordered and reviewed  Severe pancyotpenia noted  Neutropenic fever, cefepime, ivf, oncology consult and admission  Nontoxic appearing    Problems Addressed:  Neutropenic fever: acute illness or injury that poses a threat to life or bodily functions  Pancytopenia: acute illness or injury that poses a threat to life or bodily functions  SIRS (systemic inflammatory response syndrome): acute illness or injury that poses a threat to life or bodily functions    Amount and/or Complexity of Data Reviewed  External Data Reviewed: labs and notes.  Labs: ordered.  Radiology: ordered and independent interpretation performed.    Risk  OTC drugs.  Prescription drug management.  Decision regarding hospitalization.    Critical Care  Total time providing critical care: 30 minutes            Scribe Attestation:   Scribe #1: I performed the above scribed service and the documentation accurately describes the services I performed. I attest to the accuracy of the note.  Comments: Attending:   Physician Attestation Statement for Scribe #1: IDanuta MD, personally performed the services described in this documentation. All medical record entries made by the scribe were at my direction and in my presence.  I have reviewed the chart and agree that the record reflects my personal performance and is accurate and complete.        Attending Attestation:           Physician Attestation for Scribe:  Physician Attestation Statement for Scribe #1: Danuta MEJIA MD, reviewed documentation, as scribed by Elida Nolan in my presence, and it is both accurate and complete.             ED Course as of 11/20/23 1133   Mon Nov 20, 2023   1028 Dr. Gardner paged [SJ]   1035 Dr Orellana out of town, on call paged [BS]   1043 Discussed with np with dr orellana, 2 u platelets, 1 u prbc, leukoreduced, agrees with cefepime and  "hospitalist admission [BS]      ED Course User Index  [BS] Danuta Hwang MD  [SJ] Elida Nolan               Medical Decision Making:   History:   Old Medical Records: I decided to obtain old medical records.  Old Records Summarized: records from clinic visits.       <> Summary of Records: Cml, typically pancytopenic  Initial Assessment:   See hpi  Independently Interpreted Test(s):   I have ordered and independently interpreted X-rays - see prior notes.  Clinical Tests:   Lab Tests: Ordered and Reviewed  Radiological Study: Ordered and Reviewed  Sepsis Perfusion Assessment: "I attest a sepsis perfusion exam was performed within 6 hours of sepsis, severe sepsis, or septic shock presentation, following fluid resuscitation."    Sepsis Perfusion Assessment Complete: 11/20/2023 11:33 AM    Other:   I have discussed this case with another health care provider.          Clinical Impression:  Final diagnoses:  [D70.9, R50.81] Neutropenic fever (Primary)  [D61.818] Pancytopenia  [R65.10] SIRS (systemic inflammatory response syndrome)          ED Disposition Condition    Admit Stable                Danuta Hwang MD  11/20/23 1133    "

## 2023-11-21 LAB
ABO + RH BLD: NORMAL
ABO + RH BLD: NORMAL
ALBUMIN SERPL-MCNC: 3.5 G/DL (ref 3.5–5)
ALBUMIN/GLOB SERPL: 1.1 RATIO (ref 1.1–2)
ALP SERPL-CCNC: 74 UNIT/L (ref 40–150)
ALT SERPL-CCNC: 8 UNIT/L (ref 0–55)
ANISOCYTOSIS BLD QL SMEAR: ABNORMAL
AST SERPL-CCNC: 18 UNIT/L (ref 5–34)
B-HCG SERPL QL: 63 %
BILIRUB SERPL-MCNC: 1.6 MG/DL
BLD PROD TYP BPU: NORMAL
BLD PROD TYP BPU: NORMAL
BLOOD UNIT EXPIRATION DATE: NORMAL
BLOOD UNIT EXPIRATION DATE: NORMAL
BLOOD UNIT TYPE CODE: 1700
BLOOD UNIT TYPE CODE: 1700
BUN SERPL-MCNC: 8.7 MG/DL (ref 8.9–20.6)
CALCIUM SERPL-MCNC: 8.9 MG/DL (ref 8.4–10.2)
CHLORIDE SERPL-SCNC: 108 MMOL/L (ref 98–107)
CO2 SERPL-SCNC: 21 MMOL/L (ref 22–29)
CREAT SERPL-MCNC: 0.87 MG/DL (ref 0.73–1.18)
CROSSMATCH INTERPRETATION: NORMAL
CROSSMATCH INTERPRETATION: NORMAL
DISPENSE STATUS: NORMAL
DISPENSE STATUS: NORMAL
ERYTHROCYTE [DISTWIDTH] IN BLOOD BY AUTOMATED COUNT: 13.2 % (ref 11.5–17)
GFR SERPLBLD CREATININE-BSD FMLA CKD-EPI: >60 MLS/MIN/1.73/M2
GLOBULIN SER-MCNC: 3.3 GM/DL (ref 2.4–3.5)
GLUCOSE SERPL-MCNC: 104 MG/DL (ref 74–100)
HAPTOGLOB SERPL-MCNC: 131 MG/DL (ref 14–258)
HCT VFR BLD AUTO: 17 % (ref 42–52)
HGB BLD-MCNC: 5.9 G/DL (ref 14–18)
INSTRUMENT WBC (OLG): 1.07 X10(3)/MCL
LYMPHOCYTES NFR BLD MANUAL: 0.37 X10(3)/MCL
LYMPHOCYTES NFR BLD MANUAL: 35 %
MCH RBC QN AUTO: 28.8 PG (ref 27–31)
MCHC RBC AUTO-ENTMCNC: 34.7 G/DL (ref 33–36)
MCV RBC AUTO: 82.9 FL (ref 80–94)
MICROCYTES BLD QL SMEAR: ABNORMAL
MONOCYTES NFR BLD MANUAL: 0.02 X10(3)/MCL (ref 0.1–1.3)
MONOCYTES NFR BLD MANUAL: 2 %
NEUTROPHILS NFR BLD MANUAL: 0 %
NRBC BLD AUTO-RTO: 0 %
OVALOCYTES (OLG): ABNORMAL
PLATELET # BLD AUTO: 45 X10(3)/MCL (ref 130–400)
PLATELET # BLD EST: ABNORMAL 10*3/UL
PMV BLD AUTO: 10.4 FL (ref 7.4–10.4)
POIKILOCYTOSIS BLD QL SMEAR: ABNORMAL
POTASSIUM SERPL-SCNC: 3.8 MMOL/L (ref 3.5–5.1)
PROT SERPL-MCNC: 6.8 GM/DL (ref 6.4–8.3)
RBC # BLD AUTO: 2.05 X10(6)/MCL (ref 4.7–6.1)
RBC MORPH BLD: ABNORMAL
SODIUM SERPL-SCNC: 138 MMOL/L (ref 136–145)
TEAR DROP CELL (OLG): ABNORMAL
UNIT NUMBER: NORMAL
UNIT NUMBER: NORMAL
WBC # SPEC AUTO: 1.07 X10(3)/MCL (ref 4.5–11.5)

## 2023-11-21 PROCEDURE — 80053 COMPREHEN METABOLIC PANEL: CPT | Performed by: PHYSICIAN ASSISTANT

## 2023-11-21 PROCEDURE — 27000207 HC ISOLATION

## 2023-11-21 PROCEDURE — 25000003 PHARM REV CODE 250: Performed by: STUDENT IN AN ORGANIZED HEALTH CARE EDUCATION/TRAINING PROGRAM

## 2023-11-21 PROCEDURE — 63600175 PHARM REV CODE 636 W HCPCS: Performed by: PHYSICIAN ASSISTANT

## 2023-11-21 PROCEDURE — 25000003 PHARM REV CODE 250: Performed by: PHYSICIAN ASSISTANT

## 2023-11-21 PROCEDURE — 83010 ASSAY OF HAPTOGLOBIN QUANT: CPT | Performed by: HOSPITALIST

## 2023-11-21 PROCEDURE — 85027 COMPLETE CBC AUTOMATED: CPT | Performed by: PHYSICIAN ASSISTANT

## 2023-11-21 PROCEDURE — 36430 TRANSFUSION BLD/BLD COMPNT: CPT

## 2023-11-21 PROCEDURE — 86923 COMPATIBILITY TEST ELECTRIC: CPT | Mod: 91 | Performed by: HOSPITALIST

## 2023-11-21 PROCEDURE — P9040 RBC LEUKOREDUCED IRRADIATED: HCPCS | Performed by: HOSPITALIST

## 2023-11-21 PROCEDURE — 21400001 HC TELEMETRY ROOM

## 2023-11-21 RX ORDER — HYDROCODONE BITARTRATE AND ACETAMINOPHEN 500; 5 MG/1; MG/1
TABLET ORAL
Status: DISCONTINUED | OUTPATIENT
Start: 2023-11-21 | End: 2023-11-23 | Stop reason: HOSPADM

## 2023-11-21 RX ORDER — NIFEDIPINE 60 MG/1
60 TABLET, EXTENDED RELEASE ORAL DAILY
Status: DISCONTINUED | OUTPATIENT
Start: 2023-11-21 | End: 2023-11-23 | Stop reason: HOSPADM

## 2023-11-21 RX ORDER — SODIUM CHLORIDE 9 MG/ML
INJECTION, SOLUTION INTRAVENOUS CONTINUOUS
Status: DISCONTINUED | OUTPATIENT
Start: 2023-11-21 | End: 2023-11-23 | Stop reason: HOSPADM

## 2023-11-21 RX ORDER — ACYCLOVIR 800 MG/1
800 TABLET ORAL 2 TIMES DAILY
Status: DISCONTINUED | OUTPATIENT
Start: 2023-11-21 | End: 2023-11-23 | Stop reason: HOSPADM

## 2023-11-21 RX ADMIN — ACYCLOVIR 800 MG: 800 TABLET ORAL at 09:11

## 2023-11-21 RX ADMIN — CEFEPIME 1 G: 1 INJECTION, POWDER, FOR SOLUTION INTRAMUSCULAR; INTRAVENOUS at 06:11

## 2023-11-21 RX ADMIN — ACYCLOVIR 800 MG: 800 TABLET ORAL at 07:11

## 2023-11-21 RX ADMIN — SODIUM CHLORIDE: 9 INJECTION, SOLUTION INTRAVENOUS at 09:11

## 2023-11-21 RX ADMIN — CEFEPIME 1 G: 1 INJECTION, POWDER, FOR SOLUTION INTRAMUSCULAR; INTRAVENOUS at 09:11

## 2023-11-21 RX ADMIN — NIFEDIPINE 60 MG: 60 TABLET, FILM COATED, EXTENDED RELEASE ORAL at 09:11

## 2023-11-21 NOTE — PROGRESS NOTES
[facility] Hospital Medicine Progress Note        Chief Complaint: Inpatient Follow-up     HPI:    23 y.o. Black or  male with a past medical history of hypertension and CML followed by Dr. Hogan undergoing daily oral chemotherapy with his last dose on 11/19/2023. The patient presented to [facility] on 11/20/2023 with a primary complaint of subjective fever for the last two days.  Patient reports having shortness a breath with movement and right-sided chest pain without radiation described as sharp/stabbing in nature, cough with intermittent sputum production and urinary frequency.  He denies complaints burning with urination, nausea, vomiting, diarrhea and exposure sick contacts.  Patient reports on 11/13/2023 he required 2 units transfusion of platelets.     Upon presentation to the ED, temperature 101.8° F, heart rate 112, blood pressure 136/78, respiratory rate 20 and SpO2 100% on room air.  Labs with WBC 1.55, H&H 6.9/19.7, platelets 3, lactic acid 1.1.  Influenza a and B and SARS-CoV-2 PCR negative. Strep a PCR negative.  Chest x-ray with no acute cardiopulmonary abnormality.  Oncology was consulted and recommended transfusing 2 units of platelets and 1 unit of leuko reduced packed red blood cells in addition to starting cefepime.  Patient was admitted to hospital medicine services for further medical management.    Interval Hx:  Patient doing okay.  Still with significant fevers.  Got up to 102.  Denies any current complaints though.  Asking when he can go home.  Discussed that he was on IV antibiotics.  We need to monitored till he was least fever free for 24 hours.    Objective/physical exam:  General: In no acute distress, afebrile  Chest: Clear to auscultation bilaterally  Heart: RRR, +S1, S2, no appreciable murmur  Abdomen: Soft, nontender, BS +  MSK: Warm, no lower extremity edema, no clubbing or cyanosis  Neurologic: Alert and oriented x4, Cranial nerve II-XII  intact, Strength 5/5 in all 4 extremities    VITAL SIGNS: 24 HRS MIN & MAX LAST   Temp  Min: 98.6 °F (37 °C)  Max: 102.7 °F (39.3 °C) 99.3 °F (37.4 °C)   BP  Min: 105/59  Max: 171/101 (!) 105/59   Pulse  Min: 78  Max: 118  90   Resp  Min: 15  Max: 20 18   SpO2  Min: 99 %  Max: 100 % 99 %       Recent Labs   Lab 11/20/23  0809 11/20/23 0940 11/21/23 0418   WBC 1.07* 1.55  1.55* 1.07*   RBC 2.31* 2.38* 2.05*   HGB 6.4* 6.9* 5.9*   HCT 19.3* 19.7* 17.0*   MCV 83.5 82.8 82.9   MCH 27.7 29.0 28.8   MCHC 33.2 35.0 34.7   RDW 12.9 13.0 13.2   PLT 4* 3* 45*   MPV  --   --  10.4       Recent Labs   Lab 11/20/23 0809 11/20/23 0940 11/21/23 0418    138 138   K 4.2 3.8 3.8   CO2 24 24 21*   BUN 11.3 11.0 8.7*   CREATININE 1.07 1.01 0.87   CALCIUM 8.5 8.8 8.9   ALBUMIN 3.9 4.2 3.5   ALKPHOS 87 90 74   ALT 9 9 8   AST 20 22 18   BILITOT 1.5 1.6* 1.6*          Microbiology Results (last 7 days)       Procedure Component Value Units Date/Time    Blood Culture #2 **CANNOT BE ORDERED STAT** [[id number]] Collected: 11/20/23 0940    Order Status: Resulted Specimen: Blood Updated: 11/20/23 1053    Blood Culture #1 **CANNOT BE ORDERED STAT** [[id number]] Collected: 11/20/23 0940    Order Status: Resulted Specimen: Blood Updated: 11/20/23 0958             Radiology:  X-Ray Chest 1 View  Narrative: EXAMINATION:  XR CHEST 1 VIEW    CLINICAL HISTORY:  fever;    TECHNIQUE:  Single frontal view of the chest was performed.    COMPARISON:  07/04/2023    FINDINGS:  LINES AND TUBES: None    MEDIASTINUM AND SONYA: The cardiac silhouette is normal.    LUNGS: No lobar consolidation. No edema.    PLEURA:No pleural effusion. No pneumothorax.    OTHER: No acute osseous abnormality.  Impression: No acute cardiopulmonary abnormality.    Electronically signed by: Kya Dolan  Date:    11/20/2023  Time:    09:32      Scheduled Med:   acyclovir  800 mg Oral BID    ceFEPime (MAXIPIME) IVPB  1 g Intravenous Q8H    NIFEdipine  60 mg Oral Daily         Continuous Infusions:   sodium chloride 0.9%          PRN Meds:  0.9%  NaCl infusion (for blood administration), 0.9%  NaCl infusion (for blood administration), 0.9%  NaCl infusion (for blood administration), acetaminophen, acetaminophen, dextrose 10%, dextrose 10%, glucagon (human recombinant), glucose, glucose, ondansetron, sodium chloride 0.9%       Assessment/Plan:   Neutropenic fever  Pancytopenia requiring transfusions  History of CML and hypertension    Patient received 1 unit of packed red blood cells yesterday but actually decreased.  Hemoglobin now 5.9.  Will transfuse another 2 units today.  Needs to be irradiated and leukoreduced.  Platelet counts did improve.  Forty-five this morning.  No evidence of acute bleeding.  This likely represents bone marrow failure but would expect his some improvement in his hemoglobin after transfusion yesterday.  Will see if Oncology feels like a hemolytic component could be at play.  Will check to happen urgent level this morning.  He remains on empiric cefepime for neutropenic fevers.  Still with fevers last night getting as high as 102.  Cultures are pending at this time.  He was not appear to be septic and is otherwise feeling well.    Critical care diagnosis: critical anemia requiring blood transfusion  Critical care interventions: hands on evaluation, review of labs/radiographs/records and discussions with family  Critical care time spent: >32 minutes        Soto Leonard MD   11/21/2023     All diagnosis and differential diagnosis have been reviewed; assessment and plan has been documented; I have personally reviewed the labs and test results that are presently available; I have reviewed the patients medication list; I have reviewed the consulting providers response and recommendations. I have reviewed or attempted to review medical records based upon their availability    All of the patient's questions have been  addressed and answered. Patient's is  agreeable to the above stated plan. I will continue to monitor closely and make adjustments to medical management as needed.  _____________________________________________________________________

## 2023-11-21 NOTE — PLAN OF CARE
11/21/23 0932   Discharge Assessment   Assessment Type Discharge Planning Assessment   Confirmed/corrected address, phone number and insurance Yes   Confirmed Demographics Correct on Facesheet   Source of Information patient   Communicated LISA with patient/caregiver Date not available/Unable to determine   Reason For Admission Fever   People in Home significant other   Do you expect to return to your current living situation? Yes   Do you have help at home or someone to help you manage your care at home? Yes   Current cognitive status: Alert/Oriented   Equipment Currently Used at Home none   Patient currently being followed by outpatient case management? No   Do you currently have service(s) that help you manage your care at home? No   Do you take prescription medications? Yes   Do you have prescription coverage? Yes   Coverage [employer]   Do you have any problems affording any of your prescribed medications? No   Is the patient taking medications as prescribed? yes   How do you get to doctors appointments? car, drives self   Are you on dialysis? No   Do you take coumadin? No   DME Needed Upon Discharge  none   Discharge Plan discussed with: Patient   Transition of Care Barriers None   Discharge Plan A Home   Discharge Plan B Home   OTHER   Name(s) of People in Home Lives with significant other

## 2023-11-22 LAB
ANION GAP SERPL CALC-SCNC: 10 MEQ/L
ANISOCYTOSIS BLD QL SMEAR: ABNORMAL
B-HCG SERPL QL: 61 %
BUN SERPL-MCNC: 9.6 MG/DL (ref 8.9–20.6)
CALCIUM SERPL-MCNC: 8.9 MG/DL (ref 8.4–10.2)
CHLORIDE SERPL-SCNC: 106 MMOL/L (ref 98–107)
CO2 SERPL-SCNC: 21 MMOL/L (ref 22–29)
CREAT SERPL-MCNC: 0.83 MG/DL (ref 0.73–1.18)
CREAT/UREA NIT SERPL: 12
ERYTHROCYTE [DISTWIDTH] IN BLOOD BY AUTOMATED COUNT: 15.2 % (ref 11.5–17)
GFR SERPLBLD CREATININE-BSD FMLA CKD-EPI: >60 MLS/MIN/1.73/M2
GLUCOSE SERPL-MCNC: 105 MG/DL (ref 74–100)
HCT VFR BLD AUTO: 21 % (ref 42–52)
HGB BLD-MCNC: 7.5 G/DL (ref 14–18)
INSTRUMENT WBC (OLG): 1.69 X10(3)/MCL
LYMPHOCYTES NFR BLD MANUAL: 0.61 X10(3)/MCL
LYMPHOCYTES NFR BLD MANUAL: 36 %
MCH RBC QN AUTO: 27.7 PG (ref 27–31)
MCHC RBC AUTO-ENTMCNC: 35.7 G/DL (ref 33–36)
MCV RBC AUTO: 77.5 FL (ref 80–94)
MICROCYTES BLD QL SMEAR: ABNORMAL
MONOCYTES NFR BLD MANUAL: 0.05 X10(3)/MCL (ref 0.1–1.3)
MONOCYTES NFR BLD MANUAL: 3 %
NRBC BLD AUTO-RTO: 1.8 %
PLATELET # BLD AUTO: 27 X10(3)/MCL (ref 130–400)
PLATELET # BLD EST: ABNORMAL 10*3/UL
PLATELETS.RETICULATED NFR BLD AUTO: 0.9 % (ref 0.9–11.2)
PMV BLD AUTO: 9.2 FL (ref 7.4–10.4)
POIKILOCYTOSIS BLD QL SMEAR: ABNORMAL
POTASSIUM SERPL-SCNC: 3.9 MMOL/L (ref 3.5–5.1)
RBC # BLD AUTO: 2.71 X10(6)/MCL (ref 4.7–6.1)
RBC MORPH BLD: ABNORMAL
SODIUM SERPL-SCNC: 137 MMOL/L (ref 136–145)
STOMATOCYTES (OLG): ABNORMAL
WBC # SPEC AUTO: 1.69 X10(3)/MCL (ref 4.5–11.5)

## 2023-11-22 PROCEDURE — 25000003 PHARM REV CODE 250: Performed by: STUDENT IN AN ORGANIZED HEALTH CARE EDUCATION/TRAINING PROGRAM

## 2023-11-22 PROCEDURE — 80048 BASIC METABOLIC PNL TOTAL CA: CPT | Performed by: HOSPITALIST

## 2023-11-22 PROCEDURE — 63600175 PHARM REV CODE 636 W HCPCS: Performed by: HOSPITALIST

## 2023-11-22 PROCEDURE — 21400001 HC TELEMETRY ROOM

## 2023-11-22 PROCEDURE — 27000207 HC ISOLATION

## 2023-11-22 PROCEDURE — 25000003 PHARM REV CODE 250: Performed by: PHYSICIAN ASSISTANT

## 2023-11-22 PROCEDURE — 85027 COMPLETE CBC AUTOMATED: CPT | Performed by: HOSPITALIST

## 2023-11-22 PROCEDURE — 25000003 PHARM REV CODE 250: Performed by: HOSPITALIST

## 2023-11-22 PROCEDURE — 63600175 PHARM REV CODE 636 W HCPCS: Performed by: PHYSICIAN ASSISTANT

## 2023-11-22 RX ADMIN — ACYCLOVIR 800 MG: 800 TABLET ORAL at 08:11

## 2023-11-22 RX ADMIN — CEFEPIME 1 G: 1 INJECTION, POWDER, FOR SOLUTION INTRAMUSCULAR; INTRAVENOUS at 05:11

## 2023-11-22 RX ADMIN — NIFEDIPINE 60 MG: 60 TABLET, FILM COATED, EXTENDED RELEASE ORAL at 08:11

## 2023-11-22 RX ADMIN — VANCOMYCIN HYDROCHLORIDE 1250 MG: 1.25 INJECTION, POWDER, LYOPHILIZED, FOR SOLUTION INTRAVENOUS at 08:11

## 2023-11-22 RX ADMIN — ACETAMINOPHEN 650 MG: 325 TABLET, FILM COATED ORAL at 08:11

## 2023-11-22 RX ADMIN — VANCOMYCIN HYDROCHLORIDE 1750 MG: 500 INJECTION, POWDER, LYOPHILIZED, FOR SOLUTION INTRAVENOUS at 04:11

## 2023-11-22 RX ADMIN — ACETAMINOPHEN 650 MG: 325 TABLET, FILM COATED ORAL at 03:11

## 2023-11-22 RX ADMIN — VANCOMYCIN HYDROCHLORIDE 1250 MG: 1.25 INJECTION, POWDER, LYOPHILIZED, FOR SOLUTION INTRAVENOUS at 12:11

## 2023-11-22 RX ADMIN — CEFEPIME 1 G: 1 INJECTION, POWDER, FOR SOLUTION INTRAMUSCULAR; INTRAVENOUS at 02:11

## 2023-11-22 RX ADMIN — CEFEPIME 1 G: 1 INJECTION, POWDER, FOR SOLUTION INTRAMUSCULAR; INTRAVENOUS at 10:11

## 2023-11-22 NOTE — PROGRESS NOTES
Date Consent signed: 12/17/2020    Sponsor: [employer]    Study Title/IRB Number: [id number]    Principle Investigator: Chepe Molina MD    Present for Discussion: Stephanie Bates [facility]     Is LAR Consenting for Subject: NA    Prior to the Informed Consent (IC) being signed, or any study protocol required data collection, testing, procedure, or intervention being performed, the following was done and/or discussed:   Patient was given a copy of the IC for review    Purpose of the study and qualifications to participate    Study design, Follow up schedule, and tests or procedures done at each visit   Confidentiality and HIPAA Authorization for Release of Medical Records for the research trial/ subject's rights/research related injury   Risk, Benefits, Alternative Treatments, Compensation and Costs   Participation in the research trial is voluntary and patient may withdraw at anytime   Contact information for study related questions    Patient verbalizes understanding of the above: Yes  Contact information for CRC and PI given to patient: Yes  Patient able to adequately summarize: the purpose of the study, the risks associated with the study, and all procedures, testing, and follow-ups associated with the study: Yes    Derrick Oden signed the informed consent form for the [employer] research study with an IRB approval date of 1/23/2019.  Each page of the consent form was reviewed with the patient (and pts family) and all questions answered satisfactorily. Derrick Oden signed the consent form and received a copy of same. The original consent was scanned into electronic medical records (EPIC) and filed into the subject's research study binder.    Eligibility confirmed by Dr Molina.  When patient finished the consent process and necessary surveys he was randomized to oral lasix. Dr Gonsalez was notified of the randomization for discharge purposes.      Pharmacokinetic Initial Assessment: IV Vancomycin    Assessment/Plan:    Initiate intravenous vancomycin with loading dose of 1750 mg once followed by a maintenance dose of vancomycin 1250 mg IV every 8 hours  Desired empiric serum trough concentration is 15 to 20 mcg/mL  Draw vancomycin trough level 60 min prior to fourth dose on 11/23 at approximately 0400  Pharmacy will continue to follow and monitor vancomycin.      Please contact pharmacy at extension [phone] with any questions regarding this assessment.     Thank you for the consult,   Abilio Douglas, PharmD       Patient brief summary:  Magdaleno Hirsch is a 23 y.o. male initiated on antimicrobial therapy with IV Vancomycin for treatment of suspected neutropenic fever    Drug Allergies:   Review of patient's allergies indicates:  No Known Allergies    Actual Body Weight:   86.2 kg    Renal Function:   Estimated Creatinine Clearance: 142.5 mL/min (based on SCr of 0.87 mg/dL).,     Dialysis Method (if applicable):  N/A    CBC (last 72 hours):  Recent Labs   Lab Result Units 11/20/23  0809 11/20/23  0940 11/21/23  0418   WBC x10(3)/mcL 1.07* 1.55  1.55* 1.07  1.07*   Hgb g/dL 6.4* 6.9* 5.9*   Hct % 19.3* 19.7* 17.0*   Platelet x10(3)/mcL 4* 3* 45*   Monocytes % %  --  1 2       Metabolic Panel (last 72 hours):  Recent Labs   Lab Result Units 11/20/23  0809 11/20/23  0940 11/20/23  1417 11/21/23  0418   Sodium Level mmol/L 139 138  --  138   Potassium Level mmol/L 4.2 3.8  --  3.8   Chloride mmol/L 107 104  --  108*   Carbon Dioxide mmol/L 24 24  --  21*   Glucose Level mg/dL 138* 96  --  104*   Glucose, UA   --   --  Normal  --    Blood Urea Nitrogen mg/dL 11.3 11.0  --  8.7*   Creatinine mg/dL 1.07 1.01  --  0.87   Albumin Level g/dL 3.9 4.2  --  3.5   Bilirubin Total mg/dL 1.5 1.6*  --  1.6*   Alkaline Phosphatase unit/L 87 90  --  74   Aspartate Aminotransferase unit/L 20 22  --  18   Alanine Aminotransferase unit/L 9 9  --  8       Drug levels (last 3 results):  No  "results for input(s): "VANCOMYCINRA", "VANCORANDOM", "VANCOMYCINPE", "VANCOPEAK", "VANCOMYCINTR", "VANCOTROUGH" in the last 72 hours.    Microbiologic Results:  Microbiology Results (last 7 days)       Procedure Component Value Units Date/Time    Blood Culture #2 **CANNOT BE ORDERED STAT** [[id number]]  (Normal) Collected: 11/20/23 0940    Order Status: Completed Specimen: Blood Updated: 11/21/23 1400     CULTURE, BLOOD ([facility]) No Growth At 24 Hours    Blood Culture #1 **CANNOT BE ORDERED STAT** [[id number]]  (Normal) Collected: 11/20/23 0940    Order Status: Completed Specimen: Blood Updated: 11/21/23 1100     CULTURE, BLOOD ([facility]) No Growth At 24 Hours            "

## 2023-11-22 NOTE — PROGRESS NOTES
[facility] Hospital Medicine Progress Note        Chief Complaint: Inpatient Follow-up     HPI:   23 y.o. Black or  male with a past medical history of hypertension and CML followed by Dr. Hogan undergoing daily oral chemotherapy with his last dose on 11/19/2023. The patient presented to [facility] on 11/20/2023 with a primary complaint of subjective fever for the last two days.  Patient reports having shortness a breath with movement and right-sided chest pain without radiation described as sharp/stabbing in nature, cough with intermittent sputum production and urinary frequency.  He denies complaints burning with urination, nausea, vomiting, diarrhea and exposure sick contacts.  Patient reports on 11/13/2023 he required 2 units transfusion of platelets.     Upon presentation to the ED, temperature 101.8° F, heart rate 112, blood pressure 136/78, respiratory rate 20 and SpO2 100% on room air.  Labs with WBC 1.55, H&H 6.9/19.7, platelets 3, lactic acid 1.1.  Influenza a and B and SARS-CoV-2 PCR negative. Strep a PCR negative.  Chest x-ray with no acute cardiopulmonary abnormality.  Oncology was consulted and recommended transfusing 2 units of platelets and 1 unit of leuko reduced packed red blood cells in addition to starting cefepime.  Patient was admitted to hospital medicine services for further medical management.     Interval Hx:  Patient with continued fevers overnight.  I went ahead and added vancomycin this morning to his regimen.  Fever got as high as 102.9.  He was still feeling okay and ambulatory.  He got 2 units of blood yesterday with improvement in his hemoglobin but still remains low.  Platelets down to 27.     Objective/physical exam:  General: In no acute distress, afebrile  Chest: Clear to auscultation bilaterally  Heart: RRR, +S1, S2, no appreciable murmur  Abdomen: Soft, nontender, BS +  MSK: Warm, no lower extremity edema, no clubbing or  cyanosis  Neurologic: Alert and oriented x4, Cranial nerve II-XII intact, Strength 5/5 in all 4 extremities    VITAL SIGNS: 24 HRS MIN & MAX LAST   Temp  Min: 98.9 °F (37.2 °C)  Max: 102.9 °F (39.4 °C) (!) 102.9 °F (39.4 °C)   BP  Min: 138/88  Max: 172/102 (!) 151/81   Pulse  Min: 88  Max: 135  105   Resp  Min: 18  Max: 20 20   SpO2  Min: 98 %  Max: 100 % 98 %       Recent Labs   Lab 11/20/23  0940 11/21/23 0418 11/22/23 0422   WBC 1.55  1.55* 1.07  1.07* 1.69*   RBC 2.38* 2.05* 2.71*   HGB 6.9* 5.9* 7.5*   HCT 19.7* 17.0* 21.0*   MCV 82.8 82.9 77.5*   MCH 29.0 28.8 27.7   MCHC 35.0 34.7 35.7   RDW 13.0 13.2 15.2   PLT 3* 45* 27*   MPV  --  10.4 9.2       Recent Labs   Lab 11/20/23  0809 11/20/23  0940 11/21/23 0418 11/22/23 0422    138 138 137   K 4.2 3.8 3.8 3.9   CO2 24 24 21* 21*   BUN 11.3 11.0 8.7* 9.6   CREATININE 1.07 1.01 0.87 0.83   CALCIUM 8.5 8.8 8.9 8.9   ALBUMIN 3.9 4.2 3.5  --    ALKPHOS 87 90 74  --    ALT 9 9 8  --    AST 20 22 18  --    BILITOT 1.5 1.6* 1.6*  --           Microbiology Results (last 7 days)       Procedure Component Value Units Date/Time    Blood Culture #2 **CANNOT BE ORDERED STAT** [[id number]]  (Normal) Collected: 11/20/23 0940    Order Status: Completed Specimen: Blood Updated: 11/21/23 1400     CULTURE, BLOOD ([facility]) No Growth At 24 Hours    Blood Culture #1 **CANNOT BE ORDERED STAT** [[id number]]  (Normal) Collected: 11/20/23 0940    Order Status: Completed Specimen: Blood Updated: 11/21/23 1100     CULTURE, BLOOD ([facility]) No Growth At 24 Hours             Radiology:  X-Ray Chest 1 View  Narrative: EXAMINATION:  XR CHEST 1 VIEW    CLINICAL HISTORY:  fever;    TECHNIQUE:  Single frontal view of the chest was performed.    COMPARISON:  07/04/2023    FINDINGS:  LINES AND TUBES: None    MEDIASTINUM AND SONYA: The cardiac silhouette is normal.    LUNGS: No lobar consolidation. No edema.    PLEURA:No pleural effusion. No pneumothorax.    OTHER: No acute osseous  abnormality.  Impression: No acute cardiopulmonary abnormality.    Electronically signed by: Kya Dolan  Date:    11/20/2023  Time:    09:32      Scheduled Med:   acyclovir  800 mg Oral BID    ceFEPime (MAXIPIME) IVPB  1 g Intravenous Q8H    NIFEdipine  60 mg Oral Daily    vancomycin (VANCOCIN) IV (PEDS and ADULTS)  20 mg/kg Intravenous Once    vancomycin (VANCOCIN) IV (PEDS and ADULTS)  15 mg/kg Intravenous Q8H        Continuous Infusions:   sodium chloride 0.9% 50 mL/hr at 11/21/23 0911        PRN Meds:  0.9%  NaCl infusion (for blood administration), 0.9%  NaCl infusion (for blood administration), 0.9%  NaCl infusion (for blood administration), 0.9%  NaCl infusion (for blood administration), acetaminophen, acetaminophen, dextrose 10%, dextrose 10%, glucagon (human recombinant), glucose, glucose, ondansetron, sodium chloride 0.9%       Assessment/Plan:   Neutropenic fever  Pancytopenia requiring transfusions  History of CML and hypertension    Patient with continued temperature spikes despite broad-spectrum antibiotics.  Currently on cefepime and vancomycin.  Would like to add some antifungal.  Will consult Infectious Disease this morning.  All cultures have remained negative.  He denies any focal symptoms.  He was on room air and ambulatory.  No confusion or neck pain.  He was on acyclovir for prophylaxis.  Will follow up any further recommendations from Oncology.  Patient is asking about resuming his CML medications but will defer to Oncology.  His blood counts are stable this morning.  Hemoglobin increased to 7.5 but platelets dropped to 27.  No evidence of acute bleeding.  Remains significantly neutropenic.  We can likely hold off on blood products for today will continue to monitor closely    Critical care diagnosis: sepsis, iv antibiotics  Critical care interventions: hands on evaluation, review of labs/radiographs/records and discussions with family  Critical care time spent: >32 minutes        Soto ACEVES  MD Aisha   11/22/2023     All diagnosis and differential diagnosis have been reviewed; assessment and plan has been documented; I have personally reviewed the labs and test results that are presently available; I have reviewed the patients medication list; I have reviewed the consulting providers response and recommendations. I have reviewed or attempted to review medical records based upon their availability    All of the patient's questions have been  addressed and answered. Patient's is agreeable to the above stated plan. I will continue to monitor closely and make adjustments to medical management as needed.  _____________________________________________________________________

## 2023-11-23 VITALS
WEIGHT: 190 LBS | OXYGEN SATURATION: 99 % | SYSTOLIC BLOOD PRESSURE: 150 MMHG | TEMPERATURE: 98 F | DIASTOLIC BLOOD PRESSURE: 91 MMHG | HEART RATE: 91 BPM | HEIGHT: 72 IN | RESPIRATION RATE: 18 BRPM | BODY MASS INDEX: 25.73 KG/M2

## 2023-11-23 LAB
ANION GAP SERPL CALC-SCNC: 8 MEQ/L
ANISOCYTOSIS BLD QL SMEAR: ABNORMAL
B-HCG SERPL QL: 66 %
BUN SERPL-MCNC: 8 MG/DL (ref 8.9–20.6)
CALCIUM SERPL-MCNC: 8.8 MG/DL (ref 8.4–10.2)
CHLORIDE SERPL-SCNC: 107 MMOL/L (ref 98–107)
CO2 SERPL-SCNC: 23 MMOL/L (ref 22–29)
CREAT SERPL-MCNC: 0.79 MG/DL (ref 0.73–1.18)
CREAT/UREA NIT SERPL: 10
ERYTHROCYTE [DISTWIDTH] IN BLOOD BY AUTOMATED COUNT: 15.4 % (ref 11.5–17)
GFR SERPLBLD CREATININE-BSD FMLA CKD-EPI: >60 MLS/MIN/1.73/M2
GLUCOSE SERPL-MCNC: 121 MG/DL (ref 74–100)
HCT VFR BLD AUTO: 20.9 % (ref 42–52)
HGB BLD-MCNC: 7.4 G/DL (ref 14–18)
INSTRUMENT WBC (OLG): 1.39 X10(3)/MCL
LYMPHOCYTES NFR BLD MANUAL: 0.47 X10(3)/MCL
LYMPHOCYTES NFR BLD MANUAL: 34 %
MCH RBC QN AUTO: 28.2 PG (ref 27–31)
MCHC RBC AUTO-ENTMCNC: 35.4 G/DL (ref 33–36)
MCV RBC AUTO: 79.8 FL (ref 80–94)
MICROCYTES BLD QL SMEAR: ABNORMAL
NRBC BLD AUTO-RTO: 0 %
PLATELET # BLD AUTO: 20 X10(3)/MCL (ref 130–400)
PLATELET # BLD EST: ABNORMAL 10*3/UL
PLATELETS.RETICULATED NFR BLD AUTO: 0.6 % (ref 0.9–11.2)
PMV BLD AUTO: ABNORMAL FL
POIKILOCYTOSIS BLD QL SMEAR: ABNORMAL
POTASSIUM SERPL-SCNC: 3.6 MMOL/L (ref 3.5–5.1)
RBC # BLD AUTO: 2.62 X10(6)/MCL (ref 4.7–6.1)
RBC MORPH BLD: ABNORMAL
SODIUM SERPL-SCNC: 138 MMOL/L (ref 136–145)
TEAR DROP CELL (OLG): ABNORMAL
VANCOMYCIN TROUGH SERPL-MCNC: 12.2 UG/ML (ref 15–20)
WBC # SPEC AUTO: 1.39 X10(3)/MCL (ref 4.5–11.5)

## 2023-11-23 PROCEDURE — 85027 COMPLETE CBC AUTOMATED: CPT | Performed by: HOSPITALIST

## 2023-11-23 PROCEDURE — 63600175 PHARM REV CODE 636 W HCPCS: Performed by: PHYSICIAN ASSISTANT

## 2023-11-23 PROCEDURE — 80048 BASIC METABOLIC PNL TOTAL CA: CPT | Performed by: HOSPITALIST

## 2023-11-23 PROCEDURE — 25000003 PHARM REV CODE 250: Performed by: PHYSICIAN ASSISTANT

## 2023-11-23 PROCEDURE — 80202 ASSAY OF VANCOMYCIN: CPT | Performed by: HOSPITALIST

## 2023-11-23 RX ADMIN — CEFEPIME 1 G: 1 INJECTION, POWDER, FOR SOLUTION INTRAMUSCULAR; INTRAVENOUS at 02:11

## 2023-11-23 NOTE — PROGRESS NOTES
Pharmacokinetic Assessment Follow Up: IV Vancomycin    Vancomycin serum concentration assessment(s):    The trough level was drawn correctly and can be used to guide therapy at this time. The measurement is below the desired definitive target range of 15 to 20 mcg/mL.    Vancomycin Regimen Plan:    Change regimen to Vancomycin 1500 mg IV every 8 hours with next serum trough concentration measured at 0600 prior to the dose on 11/24    Drug levels (last 3 results):  Recent Labs   Lab Result Units 11/23/23  0435   Vancomycin Trough ug/ml 12.2*       Pharmacy will continue to follow and monitor vancomycin.    Please contact pharmacy at extension [phone] for questions regarding this assessment.    Thank you for the consult,   Abilio Douglas, PharmD       Patient brief summary:  Magdaleno Hirsch is a 23 y.o. male initiated on antimicrobial therapy with IV Vancomycin for treatment of neutropenic fever    The patient's current regimen is vancomycin 1250 mg IV Q8H and is being changed at this time to vancomycin 1500 mg IV Q8H    Drug Allergies:   Review of patient's allergies indicates:  No Known Allergies    Actual Body Weight:   86.2 kg    Renal Function:   Estimated Creatinine Clearance: 156.9 mL/min (based on SCr of 0.79 mg/dL).,     Dialysis Method (if applicable):  N/A    CBC (last 72 hours):  Recent Labs   Lab Result Units 11/20/23  0809 11/20/23  0940 11/21/23 0418 11/22/23  0422   WBC x10(3)/mcL 1.07* 1.55  1.55* 1.07  1.07* 1.69  1.69*   Hgb g/dL 6.4* 6.9* 5.9* 7.5*   Hct % 19.3* 19.7* 17.0* 21.0*   Platelet x10(3)/mcL 4* 3* 45* 27*   Monocytes % %  --  1 2 3       Metabolic Panel (last 72 hours):  Recent Labs   Lab Result Units 11/20/23  0809 11/20/23  0940 11/20/23  1417 11/21/23  0418 11/22/23  0422 11/23/23  0435   Sodium Level mmol/L 139 138  --  138 137 138   Potassium Level mmol/L 4.2 3.8  --  3.8 3.9 3.6   Chloride mmol/L 107 104  --  108* 106 107   Carbon Dioxide mmol/L 24 24  --  21* 21* 23   Glucose Level  mg/dL 138* 96  --  104* 105* 121*   Glucose, UA   --   --  Normal  --   --   --    Blood Urea Nitrogen mg/dL 11.3 11.0  --  8.7* 9.6 8.0*   Creatinine mg/dL 1.07 1.01  --  0.87 0.83 0.79   Albumin Level g/dL 3.9 4.2  --  3.5  --   --    Bilirubin Total mg/dL 1.5 1.6*  --  1.6*  --   --    Alkaline Phosphatase unit/L 87 90  --  74  --   --    Aspartate Aminotransferase unit/L 20 22  --  18  --   --    Alanine Aminotransferase unit/L 9 9  --  8  --   --        Vancomycin Administrations:  vancomycin given in the last 96 hours                     vancomycin 1.25 g in dextrose 5% 250 mL IVPB (ready to mix) (mg) 1,250 mg New Bag 11/22/23 2048     1,250 mg New Bag  1256    vancomycin (VANCOCIN) 1,750 mg in dextrose 5 % (D5W) 500 mL IVPB (mg) 1,750 mg New Bag 11/22/23 0422                    Microbiologic Results:  Microbiology Results (last 7 days)       Procedure Component Value Units Date/Time    Blood Culture #2 **CANNOT BE ORDERED STAT** [[id number]]  (Normal) Collected: 11/20/23 0940    Order Status: Completed Specimen: Blood Updated: 11/22/23 1400     CULTURE, BLOOD ([facility]) No Growth At 48 Hours    Blood Culture #1 **CANNOT BE ORDERED STAT** [[id number]]  (Normal) Collected: 11/20/23 0940    Order Status: Completed Specimen: Blood Updated: 11/22/23 1101     CULTURE, BLOOD ([facility]) No Growth At 48 Hours

## 2023-11-25 ENCOUNTER — HOSPITAL ENCOUNTER (EMERGENCY)
Facility: HOSPITAL | Age: 23
Discharge: HOME | End: 2023-11-25
Attending: EMERGENCY MEDICINE
Payer: MEDICAID

## 2023-11-25 VITALS
TEMPERATURE: 99 F | OXYGEN SATURATION: 100 % | HEART RATE: 92 BPM | WEIGHT: 180.13 LBS | DIASTOLIC BLOOD PRESSURE: 86 MMHG | RESPIRATION RATE: 19 BRPM | BODY MASS INDEX: 24.42 KG/M2 | SYSTOLIC BLOOD PRESSURE: 148 MMHG

## 2023-11-25 DIAGNOSIS — D61.810 PANCYTOPENIA DUE TO ANTINEOPLASTIC CHEMOTHERAPY: ICD-10-CM

## 2023-11-25 DIAGNOSIS — R07.9 CHEST PAIN: ICD-10-CM

## 2023-11-25 DIAGNOSIS — T45.1X5A PANCYTOPENIA DUE TO ANTINEOPLASTIC CHEMOTHERAPY: ICD-10-CM

## 2023-11-25 DIAGNOSIS — C92.12 CML (CHRONIC MYELOID LEUKEMIA) IN RELAPSE: ICD-10-CM

## 2023-11-25 DIAGNOSIS — R07.89 CHEST WALL PAIN: Primary | ICD-10-CM

## 2023-11-25 DIAGNOSIS — C92.10 BLAST CRISIS PHASE OF CHRONIC MYELOID LEUKEMIA: ICD-10-CM

## 2023-11-25 LAB
ABO + RH BLD: NORMAL
ABS NEUT (OLG): 0.03 X10(3)/MCL (ref 2.1–9.2)
ALBUMIN SERPL-MCNC: 3.6 G/DL (ref 3.5–5)
ALBUMIN/GLOB SERPL: 0.9 RATIO (ref 1.1–2)
ALP SERPL-CCNC: 60 UNIT/L (ref 40–150)
ALT SERPL-CCNC: 11 UNIT/L (ref 0–55)
ANISOCYTOSIS BLD QL SMEAR: ABNORMAL
AST SERPL-CCNC: 21 UNIT/L (ref 5–34)
B-HCG SERPL QL: 63 %
BACTERIA BLD CULT: NORMAL
BACTERIA BLD CULT: NORMAL
BILIRUB SERPL-MCNC: 0.7 MG/DL
BLD PROD TYP BPU: NORMAL
BLOOD UNIT EXPIRATION DATE: NORMAL
BLOOD UNIT TYPE CODE: 6200
BUN SERPL-MCNC: 10.1 MG/DL (ref 8.9–20.6)
CALCIUM SERPL-MCNC: 9.2 MG/DL (ref 8.4–10.2)
CHLORIDE SERPL-SCNC: 106 MMOL/L (ref 98–107)
CO2 SERPL-SCNC: 22 MMOL/L (ref 22–29)
CREAT SERPL-MCNC: 0.84 MG/DL (ref 0.73–1.18)
CROSSMATCH INTERPRETATION: NORMAL
D DIMER PPP IA.FEU-MCNC: 1.05 UG/ML FEU (ref 0–0.5)
DISPENSE STATUS: NORMAL
ERYTHROCYTE [DISTWIDTH] IN BLOOD BY AUTOMATED COUNT: 15 % (ref 11.5–17)
FLUAV AG UPPER RESP QL IA.RAPID: NOT DETECTED
FLUBV AG UPPER RESP QL IA.RAPID: NOT DETECTED
GFR SERPLBLD CREATININE-BSD FMLA CKD-EPI: >60 MLS/MIN/1.73/M2
GLOBULIN SER-MCNC: 4 GM/DL (ref 2.4–3.5)
GLUCOSE SERPL-MCNC: 94 MG/DL (ref 74–100)
GROUP & RH: NORMAL
HCT VFR BLD AUTO: 24.3 % (ref 42–52)
HGB BLD-MCNC: 8.1 G/DL (ref 14–18)
INDIRECT COOMBS GEL: NORMAL
INSTRUMENT WBC (OLG): 3.39 X10(3)/MCL
LACTATE SERPL-SCNC: 1 MMOL/L (ref 0.5–2.2)
LYMPHOCYTES NFR BLD MANUAL: 1.15 X10(3)/MCL
LYMPHOCYTES NFR BLD MANUAL: 34 %
MCH RBC QN AUTO: 27.2 PG (ref 27–31)
MCHC RBC AUTO-ENTMCNC: 33.3 G/DL (ref 33–36)
MCV RBC AUTO: 81.5 FL (ref 80–94)
MICROCYTES BLD QL SMEAR: ABNORMAL
MONOCYTES NFR BLD MANUAL: 0.07 X10(3)/MCL (ref 0.1–1.3)
MONOCYTES NFR BLD MANUAL: 2 %
NEUTROPHILS NFR BLD MANUAL: 1 %
NRBC BLD AUTO-RTO: 0 %
PLATELET # BLD AUTO: 9 X10(3)/MCL (ref 130–400)
PLATELET # BLD EST: ABNORMAL 10*3/UL
PLATELETS.RETICULATED NFR BLD AUTO: 1.3 % (ref 0.9–11.2)
PMV BLD AUTO: ABNORMAL FL
POIKILOCYTOSIS BLD QL SMEAR: ABNORMAL
POTASSIUM SERPL-SCNC: 3.7 MMOL/L (ref 3.5–5.1)
PROT SERPL-MCNC: 7.6 GM/DL (ref 6.4–8.3)
RBC # BLD AUTO: 2.98 X10(6)/MCL (ref 4.7–6.1)
RBC MORPH BLD: ABNORMAL
SARS-COV-2 RNA RESP QL NAA+PROBE: NOT DETECTED
SODIUM SERPL-SCNC: 139 MMOL/L (ref 136–145)
SPECIMEN OUTDATE: NORMAL
TROPONIN I SERPL-MCNC: <0.01 NG/ML (ref 0–0.04)
UNIT NUMBER: NORMAL
WBC # SPEC AUTO: 3.39 X10(3)/MCL (ref 4.5–11.5)

## 2023-11-25 PROCEDURE — 0240U COVID/FLU A&B PCR: CPT | Performed by: PHYSICIAN ASSISTANT

## 2023-11-25 PROCEDURE — 93010 EKG 12-LEAD: ICD-10-PCS | Mod: ,,, | Performed by: INTERNAL MEDICINE

## 2023-11-25 PROCEDURE — 96360 HYDRATION IV INFUSION INIT: CPT

## 2023-11-25 PROCEDURE — 85027 COMPLETE CBC AUTOMATED: CPT | Performed by: PHYSICIAN ASSISTANT

## 2023-11-25 PROCEDURE — P9037 PLATE PHERES LEUKOREDU IRRAD: HCPCS | Performed by: EMERGENCY MEDICINE

## 2023-11-25 PROCEDURE — 93010 ELECTROCARDIOGRAM REPORT: CPT | Mod: ,,, | Performed by: INTERNAL MEDICINE

## 2023-11-25 PROCEDURE — 93005 ELECTROCARDIOGRAM TRACING: CPT

## 2023-11-25 PROCEDURE — 25500020 PHARM REV CODE 255: Performed by: EMERGENCY MEDICINE

## 2023-11-25 PROCEDURE — 25000003 PHARM REV CODE 250: Performed by: PHYSICIAN ASSISTANT

## 2023-11-25 PROCEDURE — 86901 BLOOD TYPING SEROLOGIC RH(D): CPT | Performed by: NURSE PRACTITIONER

## 2023-11-25 PROCEDURE — 84484 ASSAY OF TROPONIN QUANT: CPT | Performed by: PHYSICIAN ASSISTANT

## 2023-11-25 PROCEDURE — 80053 COMPREHEN METABOLIC PANEL: CPT | Performed by: PHYSICIAN ASSISTANT

## 2023-11-25 PROCEDURE — 36430 TRANSFUSION BLD/BLD COMPNT: CPT

## 2023-11-25 PROCEDURE — 99285 EMERGENCY DEPT VISIT HI MDM: CPT | Mod: 25

## 2023-11-25 PROCEDURE — 25000003 PHARM REV CODE 250: Performed by: EMERGENCY MEDICINE

## 2023-11-25 PROCEDURE — 83605 ASSAY OF LACTIC ACID: CPT | Performed by: PHYSICIAN ASSISTANT

## 2023-11-25 PROCEDURE — 85379 FIBRIN DEGRADATION QUANT: CPT | Performed by: EMERGENCY MEDICINE

## 2023-11-25 RX ORDER — ACETAMINOPHEN 325 MG/1
650 TABLET ORAL
Status: COMPLETED | OUTPATIENT
Start: 2023-11-25 | End: 2023-11-25

## 2023-11-25 RX ORDER — HYDROCODONE BITARTRATE AND ACETAMINOPHEN 500; 5 MG/1; MG/1
TABLET ORAL
Status: DISCONTINUED | OUTPATIENT
Start: 2023-11-25 | End: 2023-11-25 | Stop reason: HOSPADM

## 2023-11-25 RX ADMIN — ACETAMINOPHEN 650 MG: 325 TABLET, FILM COATED ORAL at 09:11

## 2023-11-25 RX ADMIN — IOPAMIDOL 100 ML: 755 INJECTION, SOLUTION INTRAVENOUS at 11:11

## 2023-11-25 RX ADMIN — SODIUM CHLORIDE 1000 ML: 9 INJECTION, SOLUTION INTRAVENOUS at 09:11

## 2023-11-25 RX ADMIN — ACETAMINOPHEN 650 MG: 325 TABLET, FILM COATED ORAL at 01:11

## 2023-11-25 NOTE — FIRST PROVIDER EVALUATION
Medical screening examination initiated.  I have conducted a focused provider triage encounter, findings are as follows:    Brief history of present illness:  23-year-old male presents to ED for evaluation of chest pain worsening today.  Patient AMA with similar symptoms from inpatient 2 days ago where received multiple transfusions of platelets and blood.  Patient with a history of CML and hypertension.    Vitals:    11/25/23 0915   BP: (!) 191/91   Pulse: 96   Resp: (!) 21   Temp: 99.7 °F (37.6 °C)   TempSrc: Oral   SpO2: 99%   Weight: 81.7 kg (180 lb 1.9 oz)       Pertinent physical exam:  Patient is awake and alert and oriented.  Ambulatory to triage.  In no acute distress.      Brief workup plan:  labs, EKG, CXR, lactic, blood cultures, COVID/FLU    Preliminary workup initiated; this workup will be continued and followed by the physician or advanced practice provider that is assigned to the patient when roomed.

## 2023-11-25 NOTE — ED PROVIDER NOTES
"Encounter Date: 11/25/2023    SCRIBE #1 NOTE: IEdda, jillian scribing for, and in the presence of,  Janusz Lugo MD. I have scribed the following portions of the note - the EKG reading. Other sections scribed: HPI, ROS, PE.       History     Chief Complaint   Patient presents with    Chest Pain     Pt reports midsternal chest pain that goes from "side to side" starting this AM. Pt denies SOB/trauma/coughing/congestion. Hx CML on chemo. Recently discharged x2 days ago for neutropenic fever, left AMA.      23 year old male with a pmhx of hypertensive vasular disease and myelocytic leukemia presents to the ED for chest pain onset this morning.  The patient reports that it feels like a "stabbing" sensation, and states that it is worse when breathing.  The patient denies any fever, chills, cough, leg swelling, or pain radiating to his neck, back, or shoulders.  The patient reports being here on 11/20 for a neutropenic fever where he was given antibiotics and left AMA.  The patient reports that he has been compliant with his medications.  The patient reports that his oncologist is Dr. Paul Hogan.    The history is provided by the patient. No  was used.     Review of patient's allergies indicates:  No Known Allergies  Past Medical History:   Diagnosis Date    CML (chronic myelocytic leukemia)     HVD (hypertensive vascular disease)     Oropharyngeal candidiasis      Past Surgical History:   Procedure Laterality Date    BONE MARROW BIOPSY N/A 8/22/2022    Procedure: Biopsy-bone marrow;  Surgeon: Getachew Chen MD;  Location: [facility] OR;  Service: General;  Laterality: N/A;    BONE MARROW BIOPSY N/A 7/25/2023    Procedure: Biopsy-bone marrow;  Surgeon: Edi Carty MD;  Location: [facility] OR;  Service: General;  Laterality: N/A;    BONE MARROW BIOPSY N/A 8/28/2023    Procedure: Biopsy-bone marrow;  Surgeon: Moo Pina MD;  Location: [facility] OR;  Service: General;  Laterality: N/A;    " BONE MARROW BIOPSY N/A 10/31/2023    Procedure: Biopsy-bone marrow;  Surgeon: Edi Carty MD;  Location: [facility] OR;  Service: General;  Laterality: N/A;    KNEE SURGERY Left      Family History   Problem Relation Age of Onset    Hypertension Maternal Grandmother     Cancer Maternal Grandmother      Social History     Tobacco Use    Smoking status: Never    Smokeless tobacco: Never   Substance Use Topics    Alcohol use: Never    Drug use: Yes     Types: Marijuana     Review of Systems   Constitutional:  Negative for chills, fatigue, fever and unexpected weight change.   HENT:  Negative for congestion and rhinorrhea.    Eyes:  Negative for pain.   Respiratory:  Negative for chest tightness, shortness of breath and wheezing.    Cardiovascular:  Positive for chest pain. Negative for leg swelling.   Gastrointestinal:  Negative for abdominal pain, constipation, diarrhea, nausea and vomiting.   Genitourinary:  Negative for dysuria.   Musculoskeletal:  Negative for back pain and neck pain.   Skin:  Negative for rash.   Allergic/Immunologic: Negative for environmental allergies, food allergies and immunocompromised state.   Neurological:  Negative for dizziness and speech difficulty.   Hematological:  Does not bruise/bleed easily.   Psychiatric/Behavioral:  Negative for sleep disturbance and suicidal ideas.        Physical Exam     Initial Vitals [11/25/23 0915]   BP Pulse Resp Temp SpO2   (!) 191/91 96 (!) 21 99.7 °F (37.6 °C) 99 %      MAP       --         Physical Exam    Nursing note and vitals reviewed.  Constitutional: He appears well-developed and well-nourished.   HENT:   Head: Normocephalic and atraumatic.   Eyes: EOM are normal. Pupils are equal, round, and reactive to light.   Neck:   Normal range of motion.  Cardiovascular:  Normal rate, regular rhythm, normal heart sounds and intact distal pulses.           Pulmonary/Chest: Breath sounds normal.   Mild chest tenderness to palpation   Abdominal: Abdomen is soft.  Bowel sounds are normal.   Musculoskeletal:         General: Normal range of motion.      Cervical back: Normal range of motion.     Neurological: He is alert and oriented to person, place, and time. He has normal strength. GCS score is 15. GCS eye subscore is 4. GCS verbal subscore is 5. GCS motor subscore is 6.   Skin: Skin is warm and dry. Capillary refill takes less than 2 seconds.   Psychiatric: He has a normal mood and affect. Judgment normal.         ED Course   Procedures  Labs Reviewed   COMPREHENSIVE METABOLIC PANEL - Abnormal; Notable for the following components:       Result Value    Globulin 4.0 (*)     Albumin/Globulin Ratio 0.9 (*)     All other components within normal limits   CBC WITH DIFFERENTIAL - Abnormal; Notable for the following components:    WBC 3.39 (*)     RBC 2.98 (*)     Hgb 8.1 (*)     Hct 24.3 (*)     Platelet 9 (*)     All other components within normal limits   D DIMER, QUANTITATIVE - Abnormal; Notable for the following components:    D-Dimer 1.05 (*)     All other components within normal limits   MANUAL DIFFERENTIAL - Abnormal; Notable for the following components:    Blasts % 63 (*)     Neutrophils Abs 0.0339 (*)     Monocytes Abs 0.0678 (*)     Platelets Decreased (*)     RBC Morph Abnormal (*)     Poikilocytosis 1+ (*)     Anisocytosis 1+ (*)     Microcytosis 1+ (*)     All other components within normal limits   COVID/FLU A&B PCR - Normal    Narrative:     The Xpert Xpress SARS-CoV-2/FLU/RSV plus is a rapid, multiplexed real-time PCR test intended for the simultaneous qualitative detection and differentiation of SARS-CoV-2, Influenza A, Influenza B, and respiratory syncytial virus (RSV) viral RNA in either nasopharyngeal swab or nasal swab specimens.         LACTIC ACID, PLASMA - Normal   TROPONIN I - Normal   CBC W/ AUTO DIFFERENTIAL    Narrative:     The following orders were created for panel order CBC auto differential.  Procedure                               Abnormality          Status                     ---------                               -----------         ------                     CBC with Differential[[id number]]       Abnormal            Final result               Manual Differential[[id number]]         Abnormal            Final result                 Please view results for these tests on the individual orders.   TYPE & SCREEN   PREPARE PLATELETS (DOSE) SOFT     EKG Readings: (Independently Interpreted)   Initial Reading: No STEMI. Rhythm: Normal Sinus Rhythm. Heart Rate: 98. Ectopy: No Ectopy. Conduction: Normal. ST Segments: Normal ST Segments. T Waves: Normal. Axis: Normal. Clinical Impression: Normal Sinus Rhythm   0913     ECG Results              EKG 12-lead (Final result)  Result time 11/25/23 13:06:26      Final result by Interface, Lab In [facility] (11/25/23 13:06:26)                   Narrative:    Test Reason : R07.9,    Vent. Rate : 098 BPM     Atrial Rate : 098 BPM     P-R Int : 120 ms          QRS Dur : 090 ms      QT Int : 334 ms       P-R-T Axes : 074 069 033 degrees     QTc Int : 426 ms    Normal sinus rhythm  Nonspecific ST abnormality  Abnormal ECG  When compared with ECG of 18-JUN-2023 14:20,  ST now depressed in Inferior leads  Nonspecific T wave abnormality no longer evident in Lateral leads  Confirmed by Jason Miranda MD ([id number]) on 11/25/2023 1:06:21 PM    Referred By:             Confirmed By:Jason Miranda MD                                  Imaging Results              CTA Chest Non-Coronary (PE Studies) (Final result)  Result time 11/25/23 11:40:21      Final result by Malu Weaver MD (11/25/23 11:40:21)                   Impression:      No pulmonary embolism identified.      Electronically signed by: Malu Weaver  Date:    11/25/2023  Time:    11:40               Narrative:    EXAMINATION:  CTA CHEST NON CORONARY (PE STUDIES)    CLINICAL HISTORY:  Pulmonary embolism (PE) suspected, positive D-dimer;    TECHNIQUE:  Helical-acquisition CT  images were obtained prior to and following the intravenous administration of iodine-based contrast media.    The post-contrast images were timed to coincide with arterial opacification for purpose of CT angiography.    Multiplanar reconstructions, to include MIP and volume-rendered (3D) images, were accomplished by the CT technologist at a separate workstation and pushed to PACS for physician review.    Total DLP (mGy-cm) 188 (value may include radiation due to concomitantly performed CT imaging)    Automated tube current modulation and/or weight-based exposure dosing is utilized, when appropriate, to reach lowest reasonably achievable exposure rate.    COMPARISON:  CT chest dated 06/11/2023    FINDINGS:  The heart is normal in size.  There is no pericardial effusion.  The RV to LV ratio is less than 1.  There is no pulmonary embolism identified.    There is no focal airspace consolidation.  There is no pleural effusion or pneumothorax.    There are no acute findings in the upper abdomen.    There is no acute bony abnormality.                                       X-Ray Chest 1 View (Final result)  Result time 11/25/23 09:37:57      Final result by Malu Weaver MD (11/25/23 09:37:57)                   Impression:      No acute abnormality of the chest.      Electronically signed by: Malu Weaver  Date:    11/25/2023  Time:    09:37               Narrative:    EXAMINATION:  XR CHEST 1 VIEW    CLINICAL HISTORY:  Chest pain, unspecified    TECHNIQUE:  AP chest    COMPARISON:  Chest x-ray dated 11/20/2023    FINDINGS:  The heart is normal in size.  The lungs are clear.  There is no pleural effusion or visible pneumothorax.                                       Medications   0.9%  NaCl infusion (for blood administration) (has no administration in time range)   sodium chloride 0.9% bolus 1,000 mL 1,000 mL (0 mLs Intravenous Stopped 11/25/23 1030)   acetaminophen tablet 650 mg (650 mg Oral Given 11/25/23 0955)    iopamidoL (ISOVUE-370) injection 100 mL (100 mLs Intravenous Given 11/25/23 1134)   acetaminophen tablet 650 mg (650 mg Oral Given 11/25/23 1329)     Medical Decision Making  The differential diagnoses includes but is not limited to: acute coronary syndrome, pleurisy, reflux, pulmonary embolism.    CBC chemistry without abnormality patient given Tylenol with significant relief and chest pain chart review showed patient was recently admitted for neutropenic fever afebrile here although did have a temp of 99.7° compliant with his antibiotics CBC shows no leukopenia mildly depressed H&H of 8 and 24 which is improved.  Patient with a platelet count of 9 cardiac enzymes CT chest normal patient care discussed with Oncology because of boarding situation will transfuse 1 unit platelets and discharge he will follow up with Dr. Gardner return emergency room as needed      Problems Addressed:  Chest pain: complicated acute illness or injury that poses a threat to life or bodily functions    Amount and/or Complexity of Data Reviewed  Labs: ordered. Decision-making details documented in ED Course.  Radiology: ordered and independent interpretation performed. Decision-making details documented in ED Course.  ECG/medicine tests: ordered and independent interpretation performed. Decision-making details documented in ED Course.    Risk  OTC drugs.  Prescription drug management.            Scribe Attestation:   Scribe #1: I performed the above scribed service and the documentation accurately describes the services I performed. I attest to the accuracy of the note.    Attending Attestation:           Physician Attestation for Scribe:  Physician Attestation Statement for Scribe #1: I, Janusz Lugo MD, reviewed documentation, as scribed by Edda Walsh in my presence, and it is both accurate and complete.             ED Course as of 11/25/23 1443   Sat Nov 25, 2023   1236 Paged Dr. Hogan [MB]   5621 Platelet Count(!!):  9 [FK]      ED Course User Index  [FK] Janusz Lugo III, MD  [MB] Edda Walsh                        Clinical Impression:  Final diagnoses:  [R07.9] Chest pain  [R07.89] Chest wall pain (Primary)          ED Disposition Condition    Discharge Stable          ED Prescriptions    None       Follow-up Information       Follow up With Specialties Details Why Contact Info    Paul Hogan II, MD Oncology, Hematology and Oncology In 3 days  [address]  [address]  [place] [address]  [phone]               Janusz Lugo III, MD  11/25/23 6243

## 2023-11-27 DIAGNOSIS — C92.10 CML (CHRONIC MYELOCYTIC LEUKEMIA): Primary | ICD-10-CM

## 2023-11-27 DIAGNOSIS — C92.12 CML (CHRONIC MYELOID LEUKEMIA) IN RELAPSE: Primary | ICD-10-CM

## 2023-11-27 DIAGNOSIS — C92.10 BLAST CRISIS PHASE OF CHRONIC MYELOID LEUKEMIA: ICD-10-CM

## 2023-11-28 ENCOUNTER — INFUSION (OUTPATIENT)
Dept: INFUSION THERAPY | Facility: HOSPITAL | Age: 23
End: 2023-11-28
Attending: NURSE PRACTITIONER
Payer: MEDICAID

## 2023-11-28 VITALS
SYSTOLIC BLOOD PRESSURE: 147 MMHG | DIASTOLIC BLOOD PRESSURE: 87 MMHG | TEMPERATURE: 99 F | HEART RATE: 80 BPM | OXYGEN SATURATION: 100 % | RESPIRATION RATE: 18 BRPM

## 2023-11-28 DIAGNOSIS — C92.12 CML (CHRONIC MYELOID LEUKEMIA) IN RELAPSE: Primary | ICD-10-CM

## 2023-11-28 DIAGNOSIS — C92.12 CML (CHRONIC MYELOID LEUKEMIA) IN RELAPSE: ICD-10-CM

## 2023-11-28 LAB
ABO + RH BLD: NORMAL
ABO + RH BLD: NORMAL
BLD PROD TYP BPU: NORMAL
BLD PROD TYP BPU: NORMAL
BLOOD UNIT EXPIRATION DATE: NORMAL
BLOOD UNIT EXPIRATION DATE: NORMAL
BLOOD UNIT TYPE CODE: 1700
BLOOD UNIT TYPE CODE: 7300
CROSSMATCH INTERPRETATION: NORMAL
CROSSMATCH INTERPRETATION: NORMAL
DISPENSE STATUS: NORMAL
DISPENSE STATUS: NORMAL
UNIT NUMBER: NORMAL
UNIT NUMBER: NORMAL

## 2023-11-28 PROCEDURE — 36430 TRANSFUSION BLD/BLD COMPNT: CPT

## 2023-11-28 PROCEDURE — 86923 COMPATIBILITY TEST ELECTRIC: CPT | Performed by: INTERNAL MEDICINE

## 2023-11-28 PROCEDURE — P9040 RBC LEUKOREDUCED IRRADIATED: HCPCS | Performed by: INTERNAL MEDICINE

## 2023-11-28 PROCEDURE — P9037 PLATE PHERES LEUKOREDU IRRAD: HCPCS | Performed by: INTERNAL MEDICINE

## 2023-11-28 PROCEDURE — 63600175 PHARM REV CODE 636 W HCPCS: Performed by: INTERNAL MEDICINE

## 2023-11-28 PROCEDURE — 96374 THER/PROPH/DIAG INJ IV PUSH: CPT

## 2023-11-28 PROCEDURE — 25000003 PHARM REV CODE 250: Performed by: INTERNAL MEDICINE

## 2023-11-28 RX ORDER — HYDROCODONE BITARTRATE AND ACETAMINOPHEN 500; 5 MG/1; MG/1
TABLET ORAL ONCE
Status: CANCELLED | OUTPATIENT
Start: 2023-11-28 | End: 2023-11-28

## 2023-11-28 RX ORDER — HYDROCODONE BITARTRATE AND ACETAMINOPHEN 500; 5 MG/1; MG/1
TABLET ORAL ONCE
Status: DISCONTINUED | OUTPATIENT
Start: 2023-11-28 | End: 2023-12-14 | Stop reason: HOSPADM

## 2023-11-28 RX ORDER — DIPHENHYDRAMINE HYDROCHLORIDE 50 MG/ML
25 INJECTION INTRAMUSCULAR; INTRAVENOUS ONCE
Status: COMPLETED | OUTPATIENT
Start: 2023-11-28 | End: 2023-11-28

## 2023-11-28 RX ORDER — DIPHENHYDRAMINE HYDROCHLORIDE 50 MG/ML
25 INJECTION INTRAMUSCULAR; INTRAVENOUS ONCE
Status: CANCELLED | OUTPATIENT
Start: 2023-11-28

## 2023-11-28 RX ORDER — ACETAMINOPHEN 325 MG/1
650 TABLET ORAL ONCE
Status: CANCELLED | OUTPATIENT
Start: 2023-11-28

## 2023-11-28 RX ORDER — ACETAMINOPHEN 325 MG/1
650 TABLET ORAL ONCE
Status: COMPLETED | OUTPATIENT
Start: 2023-11-28 | End: 2023-11-28

## 2023-11-28 RX ADMIN — ACETAMINOPHEN 650 MG: 325 TABLET ORAL at 11:11

## 2023-11-28 RX ADMIN — DIPHENHYDRAMINE HYDROCHLORIDE 25 MG: 50 INJECTION INTRAMUSCULAR; INTRAVENOUS at 11:11

## 2023-11-30 ENCOUNTER — HOSPITAL ENCOUNTER (EMERGENCY)
Facility: HOSPITAL | Age: 23
Discharge: HOME | End: 2023-11-30
Attending: EMERGENCY MEDICINE
Payer: MEDICAID

## 2023-11-30 VITALS
HEIGHT: 72 IN | RESPIRATION RATE: 16 BRPM | TEMPERATURE: 99 F | DIASTOLIC BLOOD PRESSURE: 89 MMHG | SYSTOLIC BLOOD PRESSURE: 159 MMHG | WEIGHT: 180 LBS | BODY MASS INDEX: 24.38 KG/M2 | OXYGEN SATURATION: 100 % | HEART RATE: 74 BPM

## 2023-11-30 DIAGNOSIS — M54.50 ACUTE BILATERAL LOW BACK PAIN WITHOUT SCIATICA: Primary | ICD-10-CM

## 2023-11-30 DIAGNOSIS — D69.6 THROMBOCYTOPENIA: ICD-10-CM

## 2023-11-30 DIAGNOSIS — D64.9 ANEMIA, UNSPECIFIED TYPE: ICD-10-CM

## 2023-11-30 DIAGNOSIS — Z85.6 HISTORY OF LEUKEMIA: ICD-10-CM

## 2023-11-30 LAB
ALBUMIN SERPL-MCNC: 3.6 G/DL (ref 3.5–5)
ALBUMIN/GLOB SERPL: 1.1 RATIO (ref 1.1–2)
ALP SERPL-CCNC: 68 UNIT/L (ref 40–150)
ALT SERPL-CCNC: 9 UNIT/L (ref 0–55)
APPEARANCE UR: CLEAR
AST SERPL-CCNC: 23 UNIT/L (ref 5–34)
BACTERIA #/AREA URNS AUTO: ABNORMAL /HPF
BASOPHILS # BLD AUTO: 0 X10(3)/MCL
BASOPHILS NFR BLD AUTO: 0 %
BILIRUB SERPL-MCNC: 0.8 MG/DL
BILIRUB UR QL STRIP.AUTO: NEGATIVE
BUN SERPL-MCNC: 7.2 MG/DL (ref 8.9–20.6)
CALCIUM SERPL-MCNC: 8.7 MG/DL (ref 8.4–10.2)
CHLORIDE SERPL-SCNC: 105 MMOL/L (ref 98–107)
CK SERPL-CCNC: 106 U/L (ref 30–200)
CO2 SERPL-SCNC: 22 MMOL/L (ref 22–29)
COLOR UR AUTO: ABNORMAL
CREAT SERPL-MCNC: 0.83 MG/DL (ref 0.73–1.18)
EOSINOPHIL # BLD AUTO: 0 X10(3)/MCL (ref 0–0.9)
EOSINOPHIL NFR BLD AUTO: 0 %
ERYTHROCYTE [DISTWIDTH] IN BLOOD BY AUTOMATED COUNT: 15.9 % (ref 11.5–17)
GFR SERPLBLD CREATININE-BSD FMLA CKD-EPI: >60 MLS/MIN/1.73/M2
GLOBULIN SER-MCNC: 3.3 GM/DL (ref 2.4–3.5)
GLUCOSE SERPL-MCNC: 121 MG/DL (ref 74–100)
GLUCOSE UR QL STRIP.AUTO: NEGATIVE
HCT VFR BLD AUTO: 24.8 % (ref 42–52)
HGB BLD-MCNC: 8.2 G/DL (ref 14–18)
IMM GRANULOCYTES # BLD AUTO: 0.01 X10(3)/MCL (ref 0–0.04)
IMM GRANULOCYTES NFR BLD AUTO: 0.1 %
KETONES UR QL STRIP.AUTO: NEGATIVE
LEUKOCYTE ESTERASE UR QL STRIP.AUTO: NEGATIVE
LYMPHOCYTES # BLD AUTO: 2.4 X10(3)/MCL (ref 0.6–4.6)
LYMPHOCYTES NFR BLD AUTO: 22.7 %
MCH RBC QN AUTO: 27.5 PG (ref 27–31)
MCHC RBC AUTO-ENTMCNC: 33.1 G/DL (ref 33–36)
MCV RBC AUTO: 83.2 FL (ref 80–94)
MONOCYTES # BLD AUTO: 8.13 X10(3)/MCL (ref 0.1–1.3)
MONOCYTES NFR BLD AUTO: 76.9 %
NEUTROPHILS # BLD AUTO: 0.03 X10(3)/MCL (ref 2.1–9.2)
NEUTROPHILS NFR BLD AUTO: 0.3 %
NITRITE UR QL STRIP.AUTO: NEGATIVE
NRBC BLD AUTO-RTO: 0 %
PH UR STRIP.AUTO: 7 [PH]
PLATELET # BLD AUTO: 21 X10(3)/MCL (ref 130–400)
PLATELETS.RETICULATED NFR BLD AUTO: 1.4 % (ref 0.9–11.2)
PMV BLD AUTO: ABNORMAL FL
POTASSIUM SERPL-SCNC: 3.4 MMOL/L (ref 3.5–5.1)
PROT SERPL-MCNC: 6.9 GM/DL (ref 6.4–8.3)
PROT UR QL STRIP.AUTO: ABNORMAL
RBC # BLD AUTO: 2.98 X10(6)/MCL (ref 4.7–6.1)
RBC #/AREA URNS AUTO: ABNORMAL /HPF
RBC UR QL AUTO: ABNORMAL
SODIUM SERPL-SCNC: 137 MMOL/L (ref 136–145)
SP GR UR STRIP.AUTO: 1.02 (ref 1–1.03)
SQUAMOUS #/AREA URNS LPF: ABNORMAL /HPF
UROBILINOGEN UR STRIP-ACNC: 2
WBC # SPEC AUTO: 10.57 X10(3)/MCL (ref 4.5–11.5)
WBC #/AREA URNS AUTO: ABNORMAL /HPF

## 2023-11-30 PROCEDURE — 81001 URINALYSIS AUTO W/SCOPE: CPT | Performed by: EMERGENCY MEDICINE

## 2023-11-30 PROCEDURE — 82550 ASSAY OF CK (CPK): CPT | Performed by: EMERGENCY MEDICINE

## 2023-11-30 PROCEDURE — 99284 EMERGENCY DEPT VISIT MOD MDM: CPT

## 2023-11-30 PROCEDURE — 80053 COMPREHEN METABOLIC PANEL: CPT | Performed by: EMERGENCY MEDICINE

## 2023-11-30 PROCEDURE — 25000003 PHARM REV CODE 250: Performed by: EMERGENCY MEDICINE

## 2023-11-30 PROCEDURE — 85025 COMPLETE CBC W/AUTO DIFF WBC: CPT | Performed by: EMERGENCY MEDICINE

## 2023-11-30 RX ORDER — HYDROCODONE BITARTRATE AND ACETAMINOPHEN 10; 325 MG/1; MG/1
1 TABLET ORAL
Status: COMPLETED | OUTPATIENT
Start: 2023-11-30 | End: 2023-11-30

## 2023-11-30 RX ORDER — TRAMADOL HYDROCHLORIDE 50 MG/1
50 TABLET ORAL EVERY 6 HOURS PRN
Qty: 15 TABLET | Refills: 0 | Status: SHIPPED | OUTPATIENT
Start: 2023-11-30 | End: 2023-12-04 | Stop reason: SDUPTHER

## 2023-11-30 RX ADMIN — HYDROCODONE BITARTRATE AND ACETAMINOPHEN 1 TABLET: 10; 325 TABLET ORAL at 02:11

## 2023-11-30 NOTE — FIRST PROVIDER EVALUATION
Medical screening examination initiated.  I have conducted a focused provider triage encounter, findings are as follows:    Brief history of present illness:  EMS reports that patient began with lower back pain that radiates down bilateral lower extremities. Currently being treated for Leukemia.     Vitals:    11/30/23 1244   BP: (!) 170/87   Pulse: 104   Resp: 19   Temp: 99.1 °F (37.3 °C)   SpO2: 100%   Weight: 81.6 kg (180 lb)   Height: 6' (1.829 m)       Pertinent physical exam:  Awake, alert    Brief workup plan:  Imaging    Preliminary workup initiated; this workup will be continued and followed by the physician or advanced practice provider that is assigned to the patient when roomed.

## 2023-11-30 NOTE — ED PROVIDER NOTES
Encounter Date: 11/30/2023    SCRIBE #1 NOTE: I, Mine Ferguson, am scribing for, and in the presence of,  Dalton Blackwell MD. I have scribed the following portions of the note - Other sections scribed: HPI, ROS, PE, MDM.       History     Chief Complaint   Patient presents with    Back Pain     Presents via [facility] with c/o bilateral LE pain and back pain that began today. Hx of leukemia, last treatment yesterday. Denies fevers. Given 100,cg fentanyl en route.     23 Y.O. male with a history of chronic myelocytic leukemia and HVD presents to the ED via EMS for lower back pain onset yesterday. Pt associates the pain with the change in weather and denies having similar pain prior. Also complains of mild lower extremity weakness. Further denies dysuria, nausea, vomiting, and bowel/bladder incontinence. Pt takes PO chemo daily after August 2022 CML diagnosis. Pt's oncologist is Paul Almaguer MD. No PCP.     The history is provided by the patient.     Review of patient's allergies indicates:  No Known Allergies  Past Medical History:   Diagnosis Date    CML (chronic myelocytic leukemia)     HVD (hypertensive vascular disease)     Oropharyngeal candidiasis      Past Surgical History:   Procedure Laterality Date    BONE MARROW BIOPSY N/A 8/22/2022    Procedure: Biopsy-bone marrow;  Surgeon: Getachew Chen MD;  Location: [facility] OR;  Service: General;  Laterality: N/A;    BONE MARROW BIOPSY N/A 7/25/2023    Procedure: Biopsy-bone marrow;  Surgeon: Edi Carty MD;  Location: [facility] OR;  Service: General;  Laterality: N/A;    BONE MARROW BIOPSY N/A 8/28/2023    Procedure: Biopsy-bone marrow;  Surgeon: Moo Pina MD;  Location: [facility] OR;  Service: General;  Laterality: N/A;    BONE MARROW BIOPSY N/A 10/31/2023    Procedure: Biopsy-bone marrow;  Surgeon: Edi Carty MD;  Location: [facility] OR;  Service: General;  Laterality: N/A;    KNEE SURGERY Left      Family History   Problem Relation Age of Onset    Hypertension  Maternal Grandmother     Cancer Maternal Grandmother      Social History     Tobacco Use    Smoking status: Never    Smokeless tobacco: Never   Substance Use Topics    Alcohol use: Never    Drug use: Yes     Types: Marijuana     Review of Systems   Constitutional:  Negative for appetite change, chills, diaphoresis, fatigue and fever.   HENT:  Negative for congestion, ear discharge, ear pain, postnasal drip, rhinorrhea, sinus pressure, sneezing, sore throat and voice change.    Eyes:  Negative for discharge, itching and visual disturbance.   Respiratory:  Negative for cough, shortness of breath and wheezing.    Cardiovascular:  Negative for chest pain, palpitations and leg swelling.   Gastrointestinal:  Negative for abdominal pain, nausea and vomiting.   Endocrine: Negative for polydipsia, polyphagia and polyuria.   Genitourinary:  Negative for difficulty urinating, dysuria, frequency, hematuria, penile discharge, penile pain, penile swelling and urgency.   Musculoskeletal:  Positive for back pain. Negative for arthralgias and myalgias.   Skin:  Negative for rash and wound.   Neurological:  Positive for weakness. Negative for dizziness, seizures and syncope.   Hematological:  Negative for adenopathy. Does not bruise/bleed easily.   Psychiatric/Behavioral:  Negative for agitation and self-injury. The patient is not nervous/anxious.        Physical Exam     Initial Vitals [11/30/23 1244]   BP Pulse Resp Temp SpO2   (!) 170/87 104 19 99.1 °F (37.3 °C) 100 %      MAP       --         Physical Exam    Constitutional: He appears well-developed and well-nourished.   HENT:   Head: Normocephalic and atraumatic.   Mouth/Throat: Oropharynx is clear and moist.   Eyes: Pupils are equal, round, and reactive to light.   Neck: Neck supple.   Normal range of motion.  Cardiovascular:  Normal rate, regular rhythm, normal heart sounds and intact distal pulses.           Pulmonary/Chest: Breath sounds normal. No respiratory distress.    Abdominal: Abdomen is soft. Bowel sounds are normal. There is no abdominal tenderness. There is no rebound and no guarding.   Musculoskeletal:         General: No tenderness or edema. Normal range of motion.      Cervical back: Normal range of motion and neck supple.     Neurological: He is alert and oriented to person, place, and time. He has normal strength. No sensory deficit. GCS score is 15. GCS eye subscore is 4. GCS verbal subscore is 5. GCS motor subscore is 6.   Skin: Skin is warm and dry. Capillary refill takes less than 2 seconds.   Psychiatric: He has a normal mood and affect.         ED Course   Procedures  Labs Reviewed   COMPREHENSIVE METABOLIC PANEL - Abnormal; Notable for the following components:       Result Value    Potassium Level 3.4 (*)     Glucose Level 121 (*)     Blood Urea Nitrogen 7.2 (*)     All other components within normal limits   CBC WITH DIFFERENTIAL - Abnormal; Notable for the following components:    RBC 2.98 (*)     Hgb 8.2 (*)     Hct 24.8 (*)     Platelet 21 (*)     Neut # 0.03 (*)     Mono # 8.13 (*)     All other components within normal limits   URINALYSIS, REFLEX TO URINE CULTURE - Abnormal; Notable for the following components:    Protein, UA 2+ (*)     Blood, UA Trace-Intact (*)     Urobilinogen, UA 2.0 (*)     All other components within normal limits   CK - Normal   CBC W/ AUTO DIFFERENTIAL    Narrative:     The following orders were created for panel order CBC auto differential.  Procedure                               Abnormality         Status                     ---------                               -----------         ------                     CBC with Differential[[id number]]       Abnormal            Final result                 Please view results for these tests on the individual orders.          Imaging Results              X-Ray Lumbar Spine Ap And Lateral (Final result)  Result time 11/30/23 13:52:06      Final result by Malu Weaver MD  (11/30/23 13:52:06)                   Impression:      No acute abnormality identified.      Electronically signed by: Malu Padillay  Date:    11/30/2023  Time:    13:52               Narrative:    EXAMINATION:  XR LUMBAR SPINE AP AND LATERAL    CLINICAL HISTORY:  Back Pain;    COMPARISON:  None.    FINDINGS:  There are 5 non-rib-bearing lumbar type vertebra is.  Alignment is normal.  The vertebral body heights and disc spaces are maintained.  The soft tissues are unremarkable.                                       Medications   HYDROcodone-acetaminophen  mg per tablet 1 tablet (1 tablet Oral Given 11/30/23 1418)     Medical Decision Making  The differential diagnosis includes, but is not limited to: metastatic bone disease, lumbar strain, and vertebral fracture.    Amount and/or Complexity of Data Reviewed  Labs: ordered.     Details: All labs are stable.  H&H is 8.2 and 24.8.  Platelet level is 21,000, increased from previous of 6000.  Radiology: ordered and independent interpretation performed.  Discussion of management or test interpretation with external provider(s): Patient was given Norco 10 while in the ER.  X-ray showed no acute changes.  Patient will be discharged with a prescription for Ultram.    Risk  Prescription drug management.            Scribe Attestation:   Scribe #1: I performed the above scribed service and the documentation accurately describes the services I performed. I attest to the accuracy of the note.    Attending Attestation:           Physician Attestation for Scribe:  Physician Attestation Statement for Scribe #1: I, Dalton Blackwell MD, reviewed documentation, as scribed by Mine Ferguson in my presence, and it is both accurate and complete.             ED Course as of 11/30/23 1625   Thu Nov 30, 2023   1624 Patient remains in no apparent distress. [KG]      ED Course User Index  [KG] Dalton Blackwell MD                           Clinical Impression:  Final  diagnoses:  [M54.50] Acute bilateral low back pain without sciatica (Primary)  [Z85.6] History of leukemia  [D64.9] Anemia, unspecified type  [D69.6] Thrombocytopenia          ED Disposition Condition    Discharge Stable          ED Prescriptions       Medication Sig Dispense Start Date End Date Auth. Provider    traMADoL (ULTRAM) 50 mg tablet Take 1 tablet (50 mg total) by mouth every 6 (six) hours as needed for Pain. 15 tablet 11/30/2023 -- Dalton Blackwell MD          Follow-up Information    None          Dalton Blackwell MD  11/30/23 5389

## 2023-11-30 NOTE — ED NOTES
Pt aaox4. Respirations equal and nonlabored. Skin warm and dry. Pt report bilateral lower extremity pain. Pt received 100 mcg fentanyl IV per EMS with mild relief of pain. Pt family at bedside.

## 2023-12-04 ENCOUNTER — OFFICE VISIT (OUTPATIENT)
Dept: HEMATOLOGY/ONCOLOGY | Facility: CLINIC | Age: 23
End: 2023-12-04
Payer: MEDICAID

## 2023-12-04 ENCOUNTER — INFUSION (OUTPATIENT)
Dept: INFUSION THERAPY | Facility: HOSPITAL | Age: 23
End: 2023-12-04
Attending: NURSE PRACTITIONER
Payer: MEDICAID

## 2023-12-04 VITALS
BODY MASS INDEX: 24.76 KG/M2 | SYSTOLIC BLOOD PRESSURE: 156 MMHG | HEIGHT: 73 IN | OXYGEN SATURATION: 99 % | HEART RATE: 117 BPM | WEIGHT: 186.81 LBS | DIASTOLIC BLOOD PRESSURE: 90 MMHG | RESPIRATION RATE: 18 BRPM

## 2023-12-04 VITALS
TEMPERATURE: 99 F | HEART RATE: 94 BPM | OXYGEN SATURATION: 100 % | SYSTOLIC BLOOD PRESSURE: 142 MMHG | RESPIRATION RATE: 16 BRPM | DIASTOLIC BLOOD PRESSURE: 89 MMHG

## 2023-12-04 DIAGNOSIS — I10 HYPERTENSION, UNSPECIFIED TYPE: ICD-10-CM

## 2023-12-04 DIAGNOSIS — D69.6 THROMBOCYTOPENIA: ICD-10-CM

## 2023-12-04 DIAGNOSIS — T45.1X5A PANCYTOPENIA DUE TO ANTINEOPLASTIC CHEMOTHERAPY: ICD-10-CM

## 2023-12-04 DIAGNOSIS — C92.10 BLAST CRISIS PHASE OF CHRONIC MYELOID LEUKEMIA: Primary | ICD-10-CM

## 2023-12-04 DIAGNOSIS — D84.81 IMMUNODEFICIENCY DUE TO CONDITIONS CLASSIFIED ELSEWHERE: ICD-10-CM

## 2023-12-04 DIAGNOSIS — G89.3 CANCER ASSOCIATED PAIN: ICD-10-CM

## 2023-12-04 DIAGNOSIS — C92.10 CML (CHRONIC MYELOID LEUKEMIA): ICD-10-CM

## 2023-12-04 DIAGNOSIS — C92.10 CML (CHRONIC MYELOCYTIC LEUKEMIA): ICD-10-CM

## 2023-12-04 DIAGNOSIS — Z79.899 ON ANTINEOPLASTIC CHEMOTHERAPY: ICD-10-CM

## 2023-12-04 DIAGNOSIS — D61.810 PANCYTOPENIA DUE TO ANTINEOPLASTIC CHEMOTHERAPY: ICD-10-CM

## 2023-12-04 DIAGNOSIS — C92.12 CML (CHRONIC MYELOID LEUKEMIA) IN RELAPSE: ICD-10-CM

## 2023-12-04 DIAGNOSIS — K21.9 GASTROESOPHAGEAL REFLUX DISEASE, UNSPECIFIED WHETHER ESOPHAGITIS PRESENT: ICD-10-CM

## 2023-12-04 LAB
ABO + RH BLD: NORMAL
ABO + RH BLD: NORMAL
BLD PROD TYP BPU: NORMAL
BLD PROD TYP BPU: NORMAL
BLOOD UNIT EXPIRATION DATE: NORMAL
BLOOD UNIT EXPIRATION DATE: NORMAL
BLOOD UNIT TYPE CODE: 6200
BLOOD UNIT TYPE CODE: 6200
CROSSMATCH INTERPRETATION: NORMAL
CROSSMATCH INTERPRETATION: NORMAL
DISPENSE STATUS: NORMAL
DISPENSE STATUS: NORMAL
UNIT NUMBER: NORMAL
UNIT NUMBER: NORMAL

## 2023-12-04 PROCEDURE — 1111F DSCHRG MED/CURRENT MED MERGE: CPT | Mod: CPTII,95,, | Performed by: INTERNAL MEDICINE

## 2023-12-04 PROCEDURE — 25000003 PHARM REV CODE 250: Performed by: NURSE PRACTITIONER

## 2023-12-04 PROCEDURE — 99215 PR OFFICE/OUTPT VISIT, EST, LEVL V, 40-54 MIN: ICD-10-PCS | Mod: 95,,, | Performed by: INTERNAL MEDICINE

## 2023-12-04 PROCEDURE — 3080F PR MOST RECENT DIASTOLIC BLOOD PRESSURE >= 90 MM HG: ICD-10-PCS | Mod: CPTII,,, | Performed by: NURSE PRACTITIONER

## 2023-12-04 PROCEDURE — 1159F MED LIST DOCD IN RCRD: CPT | Mod: CPTII,95,, | Performed by: INTERNAL MEDICINE

## 2023-12-04 PROCEDURE — 99215 OFFICE O/P EST HI 40 MIN: CPT | Mod: 95,,, | Performed by: INTERNAL MEDICINE

## 2023-12-04 PROCEDURE — P9035 PLATELET PHERES LEUKOREDUCED: HCPCS | Performed by: NURSE PRACTITIONER

## 2023-12-04 PROCEDURE — 99999 PR PBB SHADOW E&M-EST. PATIENT-LVL V: CPT | Mod: PBBFAC,,, | Performed by: NURSE PRACTITIONER

## 2023-12-04 PROCEDURE — 1159F PR MEDICATION LIST DOCUMENTED IN MEDICAL RECORD: ICD-10-PCS | Mod: CPTII,95,, | Performed by: INTERNAL MEDICINE

## 2023-12-04 PROCEDURE — 99215 OFFICE O/P EST HI 40 MIN: CPT | Mod: PBBFAC,25 | Performed by: NURSE PRACTITIONER

## 2023-12-04 PROCEDURE — 3077F PR MOST RECENT SYSTOLIC BLOOD PRESSURE >= 140 MM HG: ICD-10-PCS | Mod: CPTII,,, | Performed by: NURSE PRACTITIONER

## 2023-12-04 PROCEDURE — 99999 PR PBB SHADOW E&M-EST. PATIENT-LVL V: ICD-10-PCS | Mod: PBBFAC,,, | Performed by: NURSE PRACTITIONER

## 2023-12-04 PROCEDURE — 36430 TRANSFUSION BLD/BLD COMPNT: CPT

## 2023-12-04 PROCEDURE — 3080F DIAST BP >= 90 MM HG: CPT | Mod: CPTII,,, | Performed by: NURSE PRACTITIONER

## 2023-12-04 PROCEDURE — 3008F PR BODY MASS INDEX (BMI) DOCUMENTED: ICD-10-PCS | Mod: CPTII,,, | Performed by: NURSE PRACTITIONER

## 2023-12-04 PROCEDURE — 1111F PR DISCHARGE MEDS RECONCILED W/ CURRENT OUTPATIENT MED LIST: ICD-10-PCS | Mod: CPTII,95,, | Performed by: INTERNAL MEDICINE

## 2023-12-04 PROCEDURE — 3077F SYST BP >= 140 MM HG: CPT | Mod: CPTII,,, | Performed by: NURSE PRACTITIONER

## 2023-12-04 PROCEDURE — 1160F PR REVIEW ALL MEDS BY PRESCRIBER/CLIN PHARMACIST DOCUMENTED: ICD-10-PCS | Mod: CPTII,95,, | Performed by: INTERNAL MEDICINE

## 2023-12-04 PROCEDURE — 63600175 PHARM REV CODE 636 W HCPCS: Performed by: NURSE PRACTITIONER

## 2023-12-04 PROCEDURE — 99215 OFFICE O/P EST HI 40 MIN: CPT | Mod: S$PBB,,, | Performed by: NURSE PRACTITIONER

## 2023-12-04 PROCEDURE — 1111F DSCHRG MED/CURRENT MED MERGE: CPT | Mod: CPTII,,, | Performed by: NURSE PRACTITIONER

## 2023-12-04 PROCEDURE — 1160F RVW MEDS BY RX/DR IN RCRD: CPT | Mod: CPTII,95,, | Performed by: INTERNAL MEDICINE

## 2023-12-04 PROCEDURE — 99215 PR OFFICE/OUTPT VISIT, EST, LEVL V, 40-54 MIN: ICD-10-PCS | Mod: S$PBB,,, | Performed by: NURSE PRACTITIONER

## 2023-12-04 PROCEDURE — 3008F BODY MASS INDEX DOCD: CPT | Mod: CPTII,,, | Performed by: NURSE PRACTITIONER

## 2023-12-04 PROCEDURE — 1111F PR DISCHARGE MEDS RECONCILED W/ CURRENT OUTPATIENT MED LIST: ICD-10-PCS | Mod: CPTII,,, | Performed by: NURSE PRACTITIONER

## 2023-12-04 PROCEDURE — 96401 CHEMO ANTI-NEOPL SQ/IM: CPT

## 2023-12-04 RX ORDER — HYDROCODONE BITARTRATE AND ACETAMINOPHEN 500; 5 MG/1; MG/1
TABLET ORAL ONCE
Status: DISCONTINUED | OUTPATIENT
Start: 2023-12-04 | End: 2023-12-14 | Stop reason: HOSPADM

## 2023-12-04 RX ORDER — TRAMADOL HYDROCHLORIDE 50 MG/1
50 TABLET ORAL EVERY 6 HOURS PRN
Qty: 20 TABLET | Refills: 0 | Status: ON HOLD | OUTPATIENT
Start: 2023-12-04 | End: 2023-12-14 | Stop reason: HOSPADM

## 2023-12-04 RX ORDER — AZACITIDINE 100 MG/1
75 INJECTION, POWDER, LYOPHILIZED, FOR SOLUTION INTRAVENOUS; SUBCUTANEOUS
Status: CANCELLED | OUTPATIENT
Start: 2023-12-04

## 2023-12-04 RX ORDER — ONDANSETRON 4 MG/1
16 TABLET, ORALLY DISINTEGRATING ORAL
Status: CANCELLED
Start: 2023-12-08

## 2023-12-04 RX ORDER — HYDROCODONE BITARTRATE AND ACETAMINOPHEN 500; 5 MG/1; MG/1
TABLET ORAL ONCE
Status: CANCELLED | OUTPATIENT
Start: 2023-12-04 | End: 2023-12-04

## 2023-12-04 RX ORDER — AZACITIDINE 100 MG/1
75 INJECTION, POWDER, LYOPHILIZED, FOR SOLUTION INTRAVENOUS; SUBCUTANEOUS
Status: CANCELLED | OUTPATIENT
Start: 2023-12-07

## 2023-12-04 RX ORDER — ACETAMINOPHEN 325 MG/1
650 TABLET ORAL ONCE
Status: COMPLETED | OUTPATIENT
Start: 2023-12-04 | End: 2023-12-04

## 2023-12-04 RX ORDER — ONDANSETRON 4 MG/1
16 TABLET, ORALLY DISINTEGRATING ORAL
Status: CANCELLED
Start: 2023-12-07

## 2023-12-04 RX ORDER — AZACITIDINE 100 MG/1
75 INJECTION, POWDER, LYOPHILIZED, FOR SOLUTION INTRAVENOUS; SUBCUTANEOUS
Status: CANCELLED | OUTPATIENT
Start: 2023-12-06

## 2023-12-04 RX ORDER — ONDANSETRON 4 MG/1
16 TABLET, ORALLY DISINTEGRATING ORAL
Status: CANCELLED
Start: 2023-12-06

## 2023-12-04 RX ORDER — ONDANSETRON 4 MG/1
16 TABLET, ORALLY DISINTEGRATING ORAL
Status: CANCELLED
Start: 2023-12-04

## 2023-12-04 RX ORDER — TRAMADOL HYDROCHLORIDE 50 MG/1
50 TABLET ORAL EVERY 6 HOURS PRN
Qty: 20 TABLET | Refills: 0 | Status: SHIPPED | OUTPATIENT
Start: 2023-12-04 | End: 2023-12-04 | Stop reason: SDUPTHER

## 2023-12-04 RX ORDER — NIFEDIPINE 60 MG/1
60 TABLET, EXTENDED RELEASE ORAL DAILY
Qty: 30 TABLET | Refills: 11 | Status: ON HOLD | OUTPATIENT
Start: 2023-12-04 | End: 2023-12-14 | Stop reason: HOSPADM

## 2023-12-04 RX ORDER — SULFAMETHOXAZOLE AND TRIMETHOPRIM 800; 160 MG/1; MG/1
1 TABLET ORAL
Qty: 15 TABLET | Refills: 3 | Status: SHIPPED | OUTPATIENT
Start: 2023-12-04 | End: 2023-12-04

## 2023-12-04 RX ORDER — AZACITIDINE 100 MG/1
75 INJECTION, POWDER, LYOPHILIZED, FOR SOLUTION INTRAVENOUS; SUBCUTANEOUS
Status: CANCELLED | OUTPATIENT
Start: 2023-12-08

## 2023-12-04 RX ORDER — AZACITIDINE 100 MG/1
75 INJECTION, POWDER, LYOPHILIZED, FOR SOLUTION INTRAVENOUS; SUBCUTANEOUS
Status: CANCELLED | OUTPATIENT
Start: 2023-12-05

## 2023-12-04 RX ORDER — LEVOFLOXACIN 500 MG/1
500 TABLET, FILM COATED ORAL DAILY
Qty: 30 TABLET | Refills: 11 | Status: ON HOLD | OUTPATIENT
Start: 2023-12-04 | End: 2023-12-14 | Stop reason: HOSPADM

## 2023-12-04 RX ORDER — ONDANSETRON 4 MG/1
16 TABLET, ORALLY DISINTEGRATING ORAL
Status: CANCELLED
Start: 2023-12-05

## 2023-12-04 RX ORDER — DIPHENHYDRAMINE HCL 25 MG
25 CAPSULE ORAL ONCE
Status: CANCELLED | OUTPATIENT
Start: 2023-12-04 | End: 2023-12-04

## 2023-12-04 RX ORDER — AZACITIDINE 100 MG/1
75 INJECTION, POWDER, LYOPHILIZED, FOR SOLUTION INTRAVENOUS; SUBCUTANEOUS
Status: COMPLETED | OUTPATIENT
Start: 2023-12-04 | End: 2023-12-04

## 2023-12-04 RX ORDER — HYDROCODONE BITARTRATE AND ACETAMINOPHEN 500; 5 MG/1; MG/1
TABLET ORAL ONCE
Status: COMPLETED | OUTPATIENT
Start: 2023-12-04 | End: 2023-12-04

## 2023-12-04 RX ORDER — ACYCLOVIR 800 MG/1
800 TABLET ORAL 2 TIMES DAILY
Qty: 60 TABLET | Refills: 11 | Status: ON HOLD | OUTPATIENT
Start: 2023-12-04 | End: 2024-01-11 | Stop reason: HOSPADM

## 2023-12-04 RX ORDER — ONDANSETRON 4 MG/1
16 TABLET, ORALLY DISINTEGRATING ORAL
Status: COMPLETED | OUTPATIENT
Start: 2023-12-04 | End: 2023-12-04

## 2023-12-04 RX ORDER — DIPHENHYDRAMINE HCL 25 MG
25 CAPSULE ORAL ONCE
Status: COMPLETED | OUTPATIENT
Start: 2023-12-04 | End: 2023-12-04

## 2023-12-04 RX ORDER — PANTOPRAZOLE SODIUM 40 MG/1
40 TABLET, DELAYED RELEASE ORAL DAILY
Qty: 30 TABLET | Refills: 11 | Status: SHIPPED | OUTPATIENT
Start: 2023-12-04 | End: 2023-12-25 | Stop reason: SDUPTHER

## 2023-12-04 RX ORDER — ACETAMINOPHEN 325 MG/1
650 TABLET ORAL ONCE
Status: CANCELLED | OUTPATIENT
Start: 2023-12-04 | End: 2023-12-04

## 2023-12-04 RX ADMIN — DIPHENHYDRAMINE HYDROCHLORIDE 25 MG: 25 CAPSULE ORAL at 11:12

## 2023-12-04 RX ADMIN — ACETAMINOPHEN 650 MG: 325 TABLET ORAL at 11:12

## 2023-12-04 RX ADMIN — AZACITIDINE 155 MG: 100 INJECTION, POWDER, LYOPHILIZED, FOR SOLUTION INTRAVENOUS; SUBCUTANEOUS at 01:12

## 2023-12-04 RX ADMIN — ONDANSETRON 16 MG: 4 TABLET, ORALLY DISINTEGRATING ORAL at 12:12

## 2023-12-04 RX ADMIN — SODIUM CHLORIDE: 9 INJECTION, SOLUTION INTRAVENOUS at 11:12

## 2023-12-04 NOTE — PROGRESS NOTES
Section of Hematology and Stem Cell Transplantation  Follow Up Visit     The patient location is: [place], [place]  The chief complaint leading to consultation is: BP-CML    Visit type: audiovisual    Face to Face time with patient: 15 minutes  45 minutes of total time spent on the encounter, which includes face to face time and non-face to face time preparing to see the patient (eg, review of tests), Obtaining and/or reviewing separately obtained history, Documenting clinical information in the electronic or other health record, Independently interpreting results (not separately reported) and communicating results to the patient/family/caregiver, or Care coordination (not separately reported).     Each patient to whom he or she provides medical services by telemedicine is:  (1) informed of the relationship between the physician and patient and the respective role of any other health care provider with respect to management of the patient; and (2) notified that he or she may decline to receive medical services by telemedicine and may withdraw from such care at any time.    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  10/2022: Started dasatinib but had questionable compliance.  6/7/23: He presented to the ER at [facility] with gum swelling. Labs notable for  (80% blasts). He was transferred to [facility] [place] [facility] for further management.  6/9/23: Bone marrow biopsy revealed blast phase chronic myeloid leukemia; reticulin fibrosis 2 of 3. 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 FISH with t(9;22) and MLLT3/MLL (KMT2A) fusion. NGS with NRAS (7%), PTPN11 (4%). BCR/ABL1:ABL1 (p210) >55%.  6/16/23: Started induction with CLIA + ponatinib. Course complicated by neutropenic fever, pharyngitis/mucositis,  and strep viridans bacteremia.   7/6/23: Day 21 bone marrow biopsy revealed a markedly hypocellular marrow (<5%) with trilineage hypoplasia. CG 46,XY[5].  7/25/23: Bone marrow biopsy revealed a hypocellular marrow but suboptimal specimen for evaluation.   8/4/23: BCR/ABL1:ABL1 (p210) 23.6%.   8/28/23: Bone marrow biopsy revealed a hypocellular marrow (30%) with persistent blast phase with 10-15% blasts.   10/9/23: Cycle 1 day 1 of azacitidine 75 mg/m2 x5 days plus venetoclax 50mg daily x10 days + ponatininb 30mg daily (28 day cycle).   10/31/23: Bone marrow biopsy at the end of cycle 1 revealed persistent myeloid blast crisis (11% circulating blasts).   11/6/23: Cycle 2 day 1 of azacitidine, venetoclax, and ponatinib. Ponatinib increased to 45mg daily.     History of Present Ilness:   Magdaleno Moore) is a pleasant 23 y.o.male with chronic myeloid leukemia blast phase who presents for follow up. He feels well at this time. He has had new back and chest pain for which he was seen in the ER. CTA chest was unremarkable. His pain resolved. He feels well at this time.     PAST MEDICAL HISTORY:   Past Medical History:   Diagnosis Date    CML (chronic myelocytic leukemia)     HVD (hypertensive vascular disease)     Oropharyngeal candidiasis        PAST SURGICAL HISTORY:   Past Surgical History:   Procedure Laterality Date    BONE MARROW BIOPSY N/A 8/22/2022    Procedure: Biopsy-bone marrow;  Surgeon: Getachew Chen MD;  Location: [facility] OR;  Service: General;  Laterality: N/A;    BONE MARROW BIOPSY N/A 7/25/2023    Procedure: Biopsy-bone marrow;  Surgeon: Edi Carty MD;  Location: [facility] OR;  Service: General;  Laterality: N/A;    BONE MARROW BIOPSY N/A 8/28/2023    Procedure: Biopsy-bone marrow;  Surgeon: Moo Pina MD;  Location: [facility] OR;  Service: General;  Laterality: N/A;    BONE MARROW BIOPSY N/A 10/31/2023    Procedure: Biopsy-bone marrow;  Surgeon: Edi Carty MD;  Location: [facility] OR;  Service: General;   Laterality: N/A;    KNEE SURGERY Left        PAST SOCIAL HISTORY:  Social History     Tobacco Use    Smoking status: Never    Smokeless tobacco: Never   Substance Use Topics    Alcohol use: Never    Drug use: Yes     Types: Marijuana       FAMILY HISTORY:  Family History   Problem Relation Age of Onset    Hypertension Maternal Grandmother     Cancer Maternal Grandmother        CURRENT MEDICATIONS:   Current Outpatient Medications   Medication Sig    (Magic mouthwash) 1:1:1 diphenhydrAMINE(Benadryl) 12.5mg/5ml liq, aluminum & magnesium hydroxide-simethicone (Maalox), LIDOcaine viscous 2% Swish and spit 15 mLs every 4 (four) hours as needed (mouth pain). for mouth sores    acyclovir (ZOVIRAX) 800 MG Tab Take 1 tablet (800 mg total) by mouth 2 (two) times daily.    levoFLOXacin (LEVAQUIN) 500 MG tablet Take 1 tablet (500 mg total) by mouth once daily.    NIFEdipine (PROCARDIA-XL) 60 MG (OSM) 24 hr tablet Take 1 tablet (60 mg total) by mouth once daily.    pantoprazole (PROTONIX) 40 MG tablet Take 1 tablet (40 mg total) by mouth once daily.    PONATinib (ICLUSIG) 45 mg Tab tablet Take 1 tablet (45 mg total) by mouth once daily.    traMADoL (ULTRAM) 50 mg tablet Take 1 tablet (50 mg total) by mouth every 6 (six) hours as needed for Pain.    venetoclax 50 mg Tab Take 1 tablet (50 mg) by mouth once daily on days 1 through 10 only of each 28 day chemotherapy cycle.     Current Facility-Administered Medications   Medication    0.9%  NaCl infusion (for blood administration)    0.9%  NaCl infusion (for blood administration)       ALLERGIES:   Review of patient's allergies indicates:  No Known Allergies    Review of Systems:     Pertinent positives and negatives included in the HPI. Otherwise a complete review of systems is negative.    Physical Exam:     There were no vitals filed for this visit.    ECOG Performance Status: (foot note - ECOG PS provided by Eastern Cooperative Oncology Group) 0 - Asymptomatic    Karnofsky  Performance Score:  100%- Normal, No Complaints, No Evidence of Disease    Labs:   Lab Results   Component Value Date    WBC 10.57 11/30/2023    RBC 2.98 (L) 11/30/2023    HGB 8.2 (L) 11/30/2023    HCT 24.8 (L) 11/30/2023    MCV 83.2 11/30/2023    MCH 27.5 11/30/2023    MCHC 33.1 11/30/2023    RDW 15.9 11/30/2023    PLT 21 (LL) 11/30/2023    MPV  11/30/2023      Comment:      Unable to calculate.     GRAN 0.1 (L) 07/12/2023    GRAN 6.2 (L) 07/12/2023    LYMPH 1.1 07/12/2023    LYMPH 92.9 (H) 07/12/2023    MONO 0.0 (L) 07/12/2023    MONO 0.9 (L) 07/12/2023    EOS 0.0 07/12/2023    BASO 0.00 07/12/2023    EOSINOPHIL 0.0 07/12/2023    BASOPHIL 0.0 07/12/2023       CMP  Sodium   Date Value Ref Range Status   07/12/2023 138 136 - 145 mmol/L Final     Sodium Level   Date Value Ref Range Status   11/30/2023 137 136 - 145 mmol/L Final     Potassium   Date Value Ref Range Status   07/12/2023 4.4 3.5 - 5.1 mmol/L Final     Potassium Level   Date Value Ref Range Status   11/30/2023 3.4 (L) 3.5 - 5.1 mmol/L Final     Chloride   Date Value Ref Range Status   07/12/2023 106 95 - 110 mmol/L Final     CO2   Date Value Ref Range Status   07/12/2023 26 23 - 29 mmol/L Final     Carbon Dioxide   Date Value Ref Range Status   11/30/2023 22 22 - 29 mmol/L Final     Glucose   Date Value Ref Range Status   07/12/2023 87 70 - 110 mg/dL Final     BUN   Date Value Ref Range Status   07/12/2023 15 6 - 20 mg/dL Final     Blood Urea Nitrogen   Date Value Ref Range Status   11/30/2023 7.2 (L) 8.9 - 20.6 mg/dL Final     Creatinine   Date Value Ref Range Status   11/30/2023 0.83 0.73 - 1.18 mg/dL Final   07/12/2023 0.8 0.5 - 1.4 mg/dL Final     Calcium   Date Value Ref Range Status   07/12/2023 9.0 8.7 - 10.5 mg/dL Final     Calcium Level Total   Date Value Ref Range Status   11/30/2023 8.7 8.4 - 10.2 mg/dL Final     Total Protein   Date Value Ref Range Status   07/12/2023 6.7 6.0 - 8.4 g/dL Final     Albumin   Date Value Ref Range Status    07/12/2023 3.3 (L) 3.5 - 5.2 g/dL Final     Albumin Level   Date Value Ref Range Status   11/30/2023 3.6 3.5 - 5.0 g/dL Final     Total Bilirubin   Date Value Ref Range Status   07/12/2023 0.4 0.1 - 1.0 mg/dL Final     Comment:     For infants and newborns, interpretation of results should be based  on gestational age, weight and in agreement with clinical  observations.    Premature Infant recommended reference ranges:  Up to 24 hours.............<8.0 mg/dL  Up to 48 hours............<12.0 mg/dL  3-5 days..................<15.0 mg/dL  6-29 days.................<15.0 mg/dL       Bilirubin Total   Date Value Ref Range Status   11/30/2023 0.8 <=1.5 mg/dL Final     Alkaline Phosphatase   Date Value Ref Range Status   11/30/2023 68 40 - 150 unit/L Final   07/12/2023 94 55 - 135 U/L Final     AST   Date Value Ref Range Status   07/12/2023 10 10 - 40 U/L Final     Aspartate Aminotransferase   Date Value Ref Range Status   11/30/2023 23 5 - 34 unit/L Final     ALT   Date Value Ref Range Status   07/12/2023 12 10 - 44 U/L Final     Alanine Aminotransferase   Date Value Ref Range Status   11/30/2023 9 0 - 55 unit/L Final     Anion Gap   Date Value Ref Range Status   07/12/2023 6 (L) 8 - 16 mmol/L Final       Imaging:   No results found for this or any previous visit (from the past 2160 hour(s)).    Pathology:  Reviewed     Assessment and Plan:   Magdaleno Moore) is a pleasant 23 y.o.male with chronic myeloid leukemia blast phase who presents for follow up.    Chronic myeloid leukemia, blast phase:  He was initially diagnosed with CP-CML in 8/2022. In 6/2023, he presented to the ER and later diagnosed with BP-CML. Bone marrow biopsy at that time revealed a t(9;22) as well as KMT2A fusion. NGS with NRAS (7%), PTPN11 (4%). He was induced with CLIA plus ponatinib, and his day 21 bone marrow biopsy revealed aplasia. Follow up bone marrow biopsy on 7/25/23 noted persistent aplasia, although the sample was limited. BCR/ABL  on 8/4/23 was 23.6%. Bone marrow biopsy on 8/28/23 showed persistent disease with 10-15% blasts. He started salvage therapy with azacitidine + venetoclax + ponatininb (Brayden et al, Blood 2022) on 10/9/23. Bone marrow biopsy after cycle 1 revealed persistent myeloid blast phased (11% blasts). Ponatinib increased to 45mg daily for cycle 2.  - Continue Azacitidine 75 mg/m2 daily x5 days + venetoclax 50mg daily x10 days + ponatininb 45mg daily (28 day cycle).  - Bone marrow biopsy during week 4 of this cycle.    Stem cell transplant candidate:  We discussed the role for allogeneic stem cell transplantation in this disease process as a potentially curative option. We had an extensive discussion about the rationale, logistics, risks, and benefits. We reviewed the requirement to stay in the [place] area for 100 days with a caregiver at all times. We discussed the risks, including infection, graft failure, organ toxicity, graft versus host disease, relapse of disease, and secondary cancers. We reviewed the need for long-term immunosuppression and need for close monitoring. Will proceed with transplant once he is able to achieve remission.  - Coordinator: TBD  - Regimen: MAC (?Bu4Flu)  - Donor: TBD  - Graft source: TBD  - GVHD Ppx: PTCy, Tac, MMF  - Disease status at time of transplant: TBD    Stem cell donors:  He has 3 brothers (8, 18, 21). His mother is 44. Awaiting mother's typing. Brothers unavailable for typing.     Pancytopenia:  Monitor labs 2x weekly with Dr. Hogan. Transfuse for Hgb <7 and Plts <10.    Immunosuppression due to conditions classified elsewhere:  Continue acyclovir, levofloxacin, posaconazole. Stopped Bactrim.    Orders/Follow Up:      Orders Placed This Encounter    pantoprazole (PROTONIX) 40 MG tablet    NIFEdipine (PROCARDIA-XL) 60 MG (OSM) 24 hr tablet    levoFLOXacin (LEVAQUIN) 500 MG tablet    acyclovir (ZOVIRAX) 800 MG Tab    (Magic mouthwash) 1:1:1 diphenhydrAMINE(Benadryl) 12.5mg/5ml  liq, aluminum & magnesium hydroxide-simethicone (Maalox), LIDOcaine viscous 2%    traMADoL (ULTRAM) 50 mg tablet       Route Chart for Scheduling    BMT Chart Routing      Follow up with physician Other. 5 weeks (first week of January) - virtual   Follow up with JOSE ANTONIO    Provider visit type    Infusion scheduling note    Injection scheduling note    Labs   Scheduling:  Preferred lab:  Lab interval:  No labs needed for this visit   Imaging    Pharmacy appointment    Other referrals                Treatment Plan Information   OP AZACITIDINE 5-DAY (SUB-Q) + VENETOCLAX + PONATINIB    Paul Hogan II, MD   Upcoming Treatment Dates - OP AZACITIDINE 5-DAY (SUB-Q) + VENETOCLAX + PONATINIB     12/4/2023       Pre-Medications       ondansetron disintegrating tablet 16 mg       Chemotherapy       azaCITIDine (VIDAZA) chemo injection  12/5/2023       Pre-Medications       ondansetron disintegrating tablet 16 mg       Chemotherapy       azaCITIDine (VIDAZA) chemo injection  12/6/2023       Pre-Medications       ondansetron disintegrating tablet 16 mg       Chemotherapy       azaCITIDine (VIDAZA) chemo injection  12/7/2023       Pre-Medications       ondansetron disintegrating tablet 16 mg       Chemotherapy       azaCITIDine (VIDAZA) chemo injection      Advance Care Planning   Date: 08/07/2023  We reviewed his underlying diagnosis including natural history, prognosis, and various treatment options. He remains interested in pursuing any and all treatment options in an effort to improve his quality and quantity of life. Will continue all treatment as recommended above.         Bijan Green MD  Hematology, Oncology, and Stem Cell Transplantation  [facility]

## 2023-12-04 NOTE — PROGRESS NOTES
"Subjective:       Patient ID: Magdaleno Hirsch is a 23 y.o. male.    Chief Complaint: "I feel fine"    Diagnosis:  1. 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  2. 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  3. Conjuctival hemorrhage secondary to 1.    Current Treatment:   Azacitidine 5/28 days, venetoclax 50 mg p.o. daily for 10/28 days, and ponatinib 30 mg p.o. daily every day on 10/09/2023.  Increased ponatinib to 45 mg p.o. daily on 11/06/2023.    Treatment History:  Hydroxyurea 2g every 12 hours  Dasatinib 100 mg po daily, unsure on start date as patient was lost to follow up.  Patient received CLIA and ponatinib on 06/16/2023.  Planning on AlloSCT.    HPI:  Patient with no real medical history who went in for a routine eye exam on 08/18/2022 to update his eye glasses prescription.  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  He had bilateral Valdez spots with vessel tortuosity.  The optometrist recommended that the patient have blood work including testing for sickle cell disease.  The patient presented to the emergency department.  CBC in the emergency department revealed a white blood cell count of 411,900. Hemoglobin was 8.3, platelets were normal at 375,000 hundred and seventy five thousand.  Differential was suggestive of CML.  Coagulation studies revealed an INR of 1.38, PTT of 36.4 and a fibrinogen of 292.  Patient was going to present to [place], however they were on diversion.  He was transferred from [facility] to [facility].  Hematology consulted.  Patient underwent bone marrow biopsy on 08/22/2022, this revealed CML, chronic phase, BCR/ABL1 positive.  Ordered dasatinib 100 mg p.o. daily as of 10/10/2022, unsure when patient started taking medication due to him being lost to follow-up.  Patient went into blast crisis, received CLIA and ponatinib on 06/16/2023.  He had a repeat bone marrow biopsy on 08/28/2023, this " had to be sent off to [facility], however the final diagnosis was persistent/recurrent myeloid neoplasm with 10-15% bone marrow blasts and 8% circulating blasts.  Started azacitidine, venetoclax, ponatinib as mentioned above on 10/09/2023.  Bone marrow biopsy done after 1 cycle done on 10/31/2023 revealed relapsed/persistent acute myeloid leukemia with 11% circulating blasts and 70-80% blasts by IHC in the bone marrow.    Interval History:   Patient presents to clinic for follow up visit, accompanied by his mother. He still has blast phase CML/AML. Denies any fever, chest pain, shortness of breath, back pain, and recent infection. He does report bleeding from his gums that started on Friday. He also had diarrhea over the weekend but this has resolved. He had a recent ER visit on 11/30/2023 for back pain, this has resolved. He complains of fatigue that is relieved with rest.     Past Medical History:   Diagnosis Date    CML (chronic myelocytic leukemia)     HVD (hypertensive vascular disease)     Oropharyngeal candidiasis       Past Surgical History:   Procedure Laterality Date    BONE MARROW BIOPSY N/A 8/22/2022    Procedure: Biopsy-bone marrow;  Surgeon: Getachew Chen MD;  Location: [facility] OR;  Service: General;  Laterality: N/A;    BONE MARROW BIOPSY N/A 7/25/2023    Procedure: Biopsy-bone marrow;  Surgeon: Edi Carty MD;  Location: [facility] OR;  Service: General;  Laterality: N/A;    BONE MARROW BIOPSY N/A 8/28/2023    Procedure: Biopsy-bone marrow;  Surgeon: Moo Pina MD;  Location: [facility] OR;  Service: General;  Laterality: N/A;    BONE MARROW BIOPSY N/A 10/31/2023    Procedure: Biopsy-bone marrow;  Surgeon: Edi Carty MD;  Location: [facility] OR;  Service: General;  Laterality: N/A;    KNEE SURGERY Left      Social History     Socioeconomic History    Marital status: Single   Tobacco Use    Smoking status: Never    Smokeless tobacco: Never   Substance and Sexual Activity    Alcohol use: Never    Drug use:  Yes     Types: Marijuana      Family History   Problem Relation Age of Onset    Hypertension Maternal Grandmother     Cancer Maternal Grandmother            Review of Systems   Constitutional:  Positive for fatigue. Negative for chills, diaphoresis and unexpected weight change.   HENT:  Negative for nasal congestion and sore throat.    Eyes:  Negative for pain.   Respiratory:  Negative for cough, chest tightness and shortness of breath.    Cardiovascular:  Negative for chest pain, palpitations and leg swelling.   Gastrointestinal:  Negative for abdominal distention, abdominal pain, blood in stool, constipation and diarrhea.   Genitourinary:  Negative for dysuria, frequency and hematuria.   Musculoskeletal:  Negative for arthralgias and back pain.   Integumentary:  Negative for rash.   Neurological:  Negative for dizziness, weakness, numbness and headaches.   Hematological:  Negative for adenopathy. Bruises/bleeds easily.   Psychiatric/Behavioral:  Negative for confusion.          Objective:      Physical Exam  Vitals reviewed.   Constitutional:       General: He is awake.      Appearance: Normal appearance.   HENT:      Head: Normocephalic and atraumatic.      Right Ear: Hearing normal.      Left Ear: Hearing normal.      Nose: Nose normal.      Mouth/Throat:      Comments:     Eyes:      General: Lids are normal. Vision grossly intact.      Extraocular Movements: Extraocular movements intact.      Conjunctiva/sclera: Conjunctivae normal.   Cardiovascular:      Rate and Rhythm: Normal rate and regular rhythm.      Pulses: Normal pulses.      Heart sounds: Normal heart sounds.   Pulmonary:      Effort: Pulmonary effort is normal.      Breath sounds: Normal breath sounds. No wheezing, rhonchi or rales.   Abdominal:      General: Bowel sounds are normal.      Palpations: Abdomen is soft.      Tenderness: There is no abdominal tenderness.   Musculoskeletal:      Cervical back: Full passive range of motion without pain.       Right lower leg: No edema.      Left lower leg: No edema.   Skin:     General: Skin is warm.   Neurological:      General: No focal deficit present.      Mental Status: He is alert and oriented to person, place, and time.   Psychiatric:         Attention and Perception: Attention normal.         Mood and Affect: Mood and affect normal.         Behavior: Behavior is cooperative.         LABS AND IMAGING REVIEWED IN [facility]  Lab Review:        Assessment:   CML, chronic phase, transformed to blast crisis  Pancytopenia secondary to Hydrea  Refractory thrombocytopenia - improving     Plan:     Patient went into blast crisis, received CLIA ponatinib.  Hopes were for Allogeneic stem cell transplant, unfortunately the patient had no unrelated donors and his 2 brothers are incarcerated.    Repeat bone marrow biopsy scheduled on 10/31/2023 showed persistent disease, repeat bone marrow biopsy scheduled for 12/27/2023.    Continue azacitidine 5/28 days, venetoclax 50 mg p.o. daily for 10/28 days.  Increased ponatinib to 45 mg p.o. daily on 11/06/2023.    ER visit on 11/30/2023 for acute bilateral low back pain     Continue weekly labs/PRN transfusion    Will set up for 2 units of platelets today    Return to clinic in 4 weeks for TD visit, same day labs      CHUY Mayo

## 2023-12-04 NOTE — Clinical Note
Hey y'all, He looks great. Very exciting to see his WBC come up... I didn't see any blasts/others listed in the differential? Do you mind arranging a bone marrow biopsy at the end of this cycle (last week in December)? Hopefully we're going to get the response we need. I am also working on getting his mom typed and set up ASAP. Let me know if y'all need anything.  Thanks, Bijan

## 2023-12-04 NOTE — PLAN OF CARE
C3 D1 Vidaza and 2 Platelets given. Tolerated well. Next appts confirmed with pt. Dc'd home in stable condition.

## 2023-12-05 ENCOUNTER — PATIENT MESSAGE (OUTPATIENT)
Dept: HEMATOLOGY/ONCOLOGY | Facility: CLINIC | Age: 23
End: 2023-12-05
Payer: MEDICAID

## 2023-12-05 ENCOUNTER — INFUSION (OUTPATIENT)
Dept: INFUSION THERAPY | Facility: HOSPITAL | Age: 23
End: 2023-12-05
Attending: NURSE PRACTITIONER
Payer: MEDICAID

## 2023-12-05 VITALS
TEMPERATURE: 99 F | HEART RATE: 110 BPM | DIASTOLIC BLOOD PRESSURE: 95 MMHG | RESPIRATION RATE: 16 BRPM | OXYGEN SATURATION: 100 % | SYSTOLIC BLOOD PRESSURE: 160 MMHG

## 2023-12-05 DIAGNOSIS — C92.10 BLAST CRISIS PHASE OF CHRONIC MYELOID LEUKEMIA: Primary | ICD-10-CM

## 2023-12-05 DIAGNOSIS — C92.12 CML (CHRONIC MYELOID LEUKEMIA) IN RELAPSE: ICD-10-CM

## 2023-12-05 PROCEDURE — 96401 CHEMO ANTI-NEOPL SQ/IM: CPT

## 2023-12-05 PROCEDURE — 63600175 PHARM REV CODE 636 W HCPCS: Performed by: NURSE PRACTITIONER

## 2023-12-05 PROCEDURE — 25000003 PHARM REV CODE 250: Performed by: NURSE PRACTITIONER

## 2023-12-05 RX ORDER — AZACITIDINE 100 MG/1
75 INJECTION, POWDER, LYOPHILIZED, FOR SOLUTION INTRAVENOUS; SUBCUTANEOUS
Status: COMPLETED | OUTPATIENT
Start: 2023-12-05 | End: 2023-12-05

## 2023-12-05 RX ORDER — ONDANSETRON 4 MG/1
16 TABLET, ORALLY DISINTEGRATING ORAL
Status: COMPLETED | OUTPATIENT
Start: 2023-12-05 | End: 2023-12-05

## 2023-12-05 RX ADMIN — ONDANSETRON 16 MG: 4 TABLET, ORALLY DISINTEGRATING ORAL at 10:12

## 2023-12-05 RX ADMIN — AZACITIDINE 155 MG: 100 INJECTION, POWDER, LYOPHILIZED, FOR SOLUTION INTRAVENOUS; SUBCUTANEOUS at 10:12

## 2023-12-06 ENCOUNTER — INFUSION (OUTPATIENT)
Dept: INFUSION THERAPY | Facility: HOSPITAL | Age: 23
End: 2023-12-06
Attending: NURSE PRACTITIONER
Payer: MEDICAID

## 2023-12-06 VITALS
HEART RATE: 112 BPM | RESPIRATION RATE: 16 BRPM | SYSTOLIC BLOOD PRESSURE: 152 MMHG | DIASTOLIC BLOOD PRESSURE: 94 MMHG | OXYGEN SATURATION: 100 % | TEMPERATURE: 98 F

## 2023-12-06 DIAGNOSIS — C92.10 BLAST CRISIS PHASE OF CHRONIC MYELOID LEUKEMIA: Primary | ICD-10-CM

## 2023-12-06 DIAGNOSIS — G89.3 CANCER ASSOCIATED PAIN: ICD-10-CM

## 2023-12-06 PROCEDURE — 96401 CHEMO ANTI-NEOPL SQ/IM: CPT

## 2023-12-06 PROCEDURE — 63600175 PHARM REV CODE 636 W HCPCS: Performed by: NURSE PRACTITIONER

## 2023-12-06 PROCEDURE — 25000003 PHARM REV CODE 250: Performed by: NURSE PRACTITIONER

## 2023-12-06 RX ORDER — ONDANSETRON 4 MG/1
16 TABLET, ORALLY DISINTEGRATING ORAL
Status: COMPLETED | OUTPATIENT
Start: 2023-12-06 | End: 2023-12-06

## 2023-12-06 RX ORDER — OXYCODONE AND ACETAMINOPHEN 5; 325 MG/1; MG/1
1 TABLET ORAL EVERY 6 HOURS PRN
Qty: 120 TABLET | Refills: 0 | Status: SHIPPED | OUTPATIENT
Start: 2023-12-06 | End: 2023-12-07

## 2023-12-06 RX ORDER — AZACITIDINE 100 MG/1
75 INJECTION, POWDER, LYOPHILIZED, FOR SOLUTION INTRAVENOUS; SUBCUTANEOUS
Status: COMPLETED | OUTPATIENT
Start: 2023-12-06 | End: 2023-12-06

## 2023-12-06 RX ADMIN — AZACITIDINE 155 MG: 100 INJECTION, POWDER, LYOPHILIZED, FOR SOLUTION INTRAVENOUS; SUBCUTANEOUS at 03:12

## 2023-12-06 RX ADMIN — ONDANSETRON 16 MG: 4 TABLET, ORALLY DISINTEGRATING ORAL at 03:12

## 2023-12-07 ENCOUNTER — INFUSION (OUTPATIENT)
Dept: INFUSION THERAPY | Facility: HOSPITAL | Age: 23
End: 2023-12-07
Attending: NURSE PRACTITIONER
Payer: MEDICAID

## 2023-12-07 VITALS
TEMPERATURE: 98 F | OXYGEN SATURATION: 100 % | RESPIRATION RATE: 16 BRPM | SYSTOLIC BLOOD PRESSURE: 164 MMHG | DIASTOLIC BLOOD PRESSURE: 92 MMHG | HEART RATE: 111 BPM

## 2023-12-07 DIAGNOSIS — C92.12 CML (CHRONIC MYELOID LEUKEMIA) IN RELAPSE: ICD-10-CM

## 2023-12-07 DIAGNOSIS — G89.3 CANCER RELATED PAIN: Primary | ICD-10-CM

## 2023-12-07 DIAGNOSIS — C92.10 BLAST CRISIS PHASE OF CHRONIC MYELOID LEUKEMIA: Primary | ICD-10-CM

## 2023-12-07 PROCEDURE — 25000003 PHARM REV CODE 250: Performed by: INTERNAL MEDICINE

## 2023-12-07 PROCEDURE — 96375 TX/PRO/DX INJ NEW DRUG ADDON: CPT

## 2023-12-07 PROCEDURE — 63600175 PHARM REV CODE 636 W HCPCS: Performed by: NURSE PRACTITIONER

## 2023-12-07 PROCEDURE — 96401 CHEMO ANTI-NEOPL SQ/IM: CPT

## 2023-12-07 PROCEDURE — 63600175 PHARM REV CODE 636 W HCPCS: Performed by: INTERNAL MEDICINE

## 2023-12-07 PROCEDURE — 25000003 PHARM REV CODE 250: Performed by: NURSE PRACTITIONER

## 2023-12-07 PROCEDURE — 96360 HYDRATION IV INFUSION INIT: CPT

## 2023-12-07 RX ORDER — HYDROMORPHONE HYDROCHLORIDE 2 MG/ML
2 INJECTION, SOLUTION INTRAMUSCULAR; INTRAVENOUS; SUBCUTANEOUS ONCE
Status: COMPLETED | OUTPATIENT
Start: 2023-12-07 | End: 2023-12-07

## 2023-12-07 RX ORDER — SODIUM CHLORIDE 9 MG/ML
1000 INJECTION, SOLUTION INTRAVENOUS ONCE
Status: COMPLETED | OUTPATIENT
Start: 2023-12-07 | End: 2023-12-07

## 2023-12-07 RX ORDER — ONDANSETRON 4 MG/1
16 TABLET, ORALLY DISINTEGRATING ORAL
Status: COMPLETED | OUTPATIENT
Start: 2023-12-07 | End: 2023-12-07

## 2023-12-07 RX ORDER — OXYCODONE AND ACETAMINOPHEN 10; 325 MG/1; MG/1
1 TABLET ORAL EVERY 4 HOURS PRN
Qty: 60 TABLET | Refills: 0 | Status: ON HOLD | OUTPATIENT
Start: 2023-12-07 | End: 2023-12-14 | Stop reason: SDUPTHER

## 2023-12-07 RX ORDER — AZACITIDINE 100 MG/1
75 INJECTION, POWDER, LYOPHILIZED, FOR SOLUTION INTRAVENOUS; SUBCUTANEOUS
Status: COMPLETED | OUTPATIENT
Start: 2023-12-07 | End: 2023-12-07

## 2023-12-07 RX ADMIN — HYDROMORPHONE HYDROCHLORIDE 2 MG: 2 INJECTION INTRAMUSCULAR; INTRAVENOUS; SUBCUTANEOUS at 09:12

## 2023-12-07 RX ADMIN — ONDANSETRON 16 MG: 4 TABLET, ORALLY DISINTEGRATING ORAL at 09:12

## 2023-12-07 RX ADMIN — SODIUM CHLORIDE 1000 ML: 9 INJECTION, SOLUTION INTRAVENOUS at 09:12

## 2023-12-07 RX ADMIN — AZACITIDINE 155 MG: 100 INJECTION, POWDER, LYOPHILIZED, FOR SOLUTION INTRAVENOUS; SUBCUTANEOUS at 09:12

## 2023-12-08 ENCOUNTER — HOSPITAL ENCOUNTER (INPATIENT)
Facility: HOSPITAL | Age: 23
LOS: 6 days | Discharge: HOME | DRG: 834 | End: 2023-12-14
Attending: STUDENT IN AN ORGANIZED HEALTH CARE EDUCATION/TRAINING PROGRAM | Admitting: STUDENT IN AN ORGANIZED HEALTH CARE EDUCATION/TRAINING PROGRAM
Payer: MEDICAID

## 2023-12-08 ENCOUNTER — INFUSION (OUTPATIENT)
Dept: INFUSION THERAPY | Facility: HOSPITAL | Age: 23
End: 2023-12-08
Attending: NURSE PRACTITIONER
Payer: MEDICAID

## 2023-12-08 VITALS
RESPIRATION RATE: 18 BRPM | OXYGEN SATURATION: 100 % | HEART RATE: 130 BPM | TEMPERATURE: 100 F | SYSTOLIC BLOOD PRESSURE: 134 MMHG | DIASTOLIC BLOOD PRESSURE: 88 MMHG

## 2023-12-08 DIAGNOSIS — C92.10 BLAST CRISIS PHASE OF CHRONIC MYELOID LEUKEMIA: Primary | ICD-10-CM

## 2023-12-08 DIAGNOSIS — D69.6 THROMBOCYTOPENIA: ICD-10-CM

## 2023-12-08 DIAGNOSIS — C92.12 CML (CHRONIC MYELOID LEUKEMIA) IN RELAPSE: ICD-10-CM

## 2023-12-08 DIAGNOSIS — G89.3 CANCER RELATED PAIN: ICD-10-CM

## 2023-12-08 DIAGNOSIS — C92.00 ACUTE MYELOID LEUKEMIA NOT HAVING ACHIEVED REMISSION: ICD-10-CM

## 2023-12-08 DIAGNOSIS — A41.9 SEPSIS: ICD-10-CM

## 2023-12-08 DIAGNOSIS — D64.9 ANEMIA, UNSPECIFIED TYPE: ICD-10-CM

## 2023-12-08 LAB
ABO + RH BLD: NORMAL
ABO + RH BLD: NORMAL
ALBUMIN SERPL-MCNC: 3.3 G/DL (ref 3.5–5)
ALBUMIN/GLOB SERPL: 0.9 RATIO (ref 1.1–2)
ALP SERPL-CCNC: 91 UNIT/L (ref 40–150)
ALT SERPL-CCNC: 10 UNIT/L (ref 0–55)
APPEARANCE UR: CLEAR
AST SERPL-CCNC: 33 UNIT/L (ref 5–34)
BACTERIA #/AREA URNS AUTO: ABNORMAL /HPF
BASOPHILS # BLD AUTO: 0 X10(3)/MCL
BASOPHILS # BLD AUTO: 0.01 X10(3)/MCL
BASOPHILS NFR BLD AUTO: 0 %
BASOPHILS NFR BLD AUTO: 0 %
BILIRUB SERPL-MCNC: 1.8 MG/DL
BILIRUB UR QL STRIP.AUTO: NEGATIVE
BLD PROD TYP BPU: NORMAL
BLD PROD TYP BPU: NORMAL
BLOOD UNIT EXPIRATION DATE: NORMAL
BLOOD UNIT EXPIRATION DATE: NORMAL
BLOOD UNIT TYPE CODE: 1700
BLOOD UNIT TYPE CODE: 5100
BUN SERPL-MCNC: 7.8 MG/DL (ref 8.9–20.6)
CALCIUM SERPL-MCNC: 8.8 MG/DL (ref 8.4–10.2)
CHLORIDE SERPL-SCNC: 98 MMOL/L (ref 98–107)
CO2 SERPL-SCNC: 20 MMOL/L (ref 22–29)
COLOR UR AUTO: YELLOW
CREAT SERPL-MCNC: 0.87 MG/DL (ref 0.73–1.18)
CROSSMATCH INTERPRETATION: NORMAL
CROSSMATCH INTERPRETATION: NORMAL
DISPENSE STATUS: NORMAL
DISPENSE STATUS: NORMAL
EOSINOPHIL # BLD AUTO: 0 X10(3)/MCL (ref 0–0.9)
EOSINOPHIL # BLD AUTO: 0 X10(3)/MCL (ref 0–0.9)
EOSINOPHIL NFR BLD AUTO: 0 %
EOSINOPHIL NFR BLD AUTO: 0 %
ERYTHROCYTE [DISTWIDTH] IN BLOOD BY AUTOMATED COUNT: 15.8 % (ref 11.5–17)
ERYTHROCYTE [DISTWIDTH] IN BLOOD BY AUTOMATED COUNT: 16 % (ref 11.5–17)
FLUAV AG UPPER RESP QL IA.RAPID: NOT DETECTED
FLUBV AG UPPER RESP QL IA.RAPID: NOT DETECTED
GFR SERPLBLD CREATININE-BSD FMLA CKD-EPI: >60 MLS/MIN/1.73/M2
GLOBULIN SER-MCNC: 3.8 GM/DL (ref 2.4–3.5)
GLUCOSE SERPL-MCNC: 102 MG/DL (ref 74–100)
GLUCOSE UR QL STRIP.AUTO: NORMAL
GROUP & RH: NORMAL
HCT VFR BLD AUTO: 20.2 % (ref 42–52)
HCT VFR BLD AUTO: 21.8 % (ref 42–52)
HGB BLD-MCNC: 6.6 G/DL (ref 14–18)
HGB BLD-MCNC: 7.3 G/DL (ref 14–18)
IMM GRANULOCYTES # BLD AUTO: 0 X10(3)/MCL (ref 0–0.04)
IMM GRANULOCYTES # BLD AUTO: 0 X10(3)/MCL (ref 0–0.04)
IMM GRANULOCYTES NFR BLD AUTO: 0 %
IMM GRANULOCYTES NFR BLD AUTO: 0 %
INDIRECT COOMBS: NORMAL
KETONES UR QL STRIP.AUTO: ABNORMAL
LACTATE SERPL-SCNC: 1.5 MMOL/L (ref 0.5–2.2)
LEUKOCYTE ESTERASE UR QL STRIP.AUTO: NEGATIVE
LYMPHOCYTES # BLD AUTO: 5.43 X10(3)/MCL (ref 0.6–4.6)
LYMPHOCYTES # BLD AUTO: 5.63 X10(3)/MCL (ref 0.6–4.6)
LYMPHOCYTES NFR BLD AUTO: 14.1 %
LYMPHOCYTES NFR BLD AUTO: 16.2 %
MCH RBC QN AUTO: 26.6 PG (ref 27–31)
MCH RBC QN AUTO: 27.1 PG (ref 27–31)
MCHC RBC AUTO-ENTMCNC: 32.7 G/DL (ref 33–36)
MCHC RBC AUTO-ENTMCNC: 33.5 G/DL (ref 33–36)
MCV RBC AUTO: 81 FL (ref 80–94)
MCV RBC AUTO: 81.5 FL (ref 80–94)
MONOCYTES # BLD AUTO: 28.12 X10(3)/MCL (ref 0.1–1.3)
MONOCYTES # BLD AUTO: 34.17 X10(3)/MCL (ref 0.1–1.3)
MONOCYTES NFR BLD AUTO: 83.7 %
MONOCYTES NFR BLD AUTO: 85.6 %
NEUTROPHILS # BLD AUTO: 0.02 X10(3)/MCL (ref 2.1–9.2)
NEUTROPHILS # BLD AUTO: 0.1 X10(3)/MCL (ref 2.1–9.2)
NEUTROPHILS NFR BLD AUTO: 0.1 %
NEUTROPHILS NFR BLD AUTO: 0.3 %
NITRITE UR QL STRIP.AUTO: NEGATIVE
NRBC BLD AUTO-RTO: 0 %
PH UR STRIP.AUTO: 6 [PH]
PLATELET # BLD AUTO: 3 X10(3)/MCL (ref 130–400)
PLATELET # BLD AUTO: 4 X10(3)/MCL (ref 130–400)
PLATELETS.RETICULATED NFR BLD AUTO: 4.1 % (ref 0.9–11.2)
PMV BLD AUTO: ABNORMAL FL
PMV BLD AUTO: ABNORMAL FL
POTASSIUM SERPL-SCNC: 4 MMOL/L (ref 3.5–5.1)
PROT SERPL-MCNC: 7.1 GM/DL (ref 6.4–8.3)
PROT UR QL STRIP.AUTO: ABNORMAL
RBC # BLD AUTO: 2.48 X10(6)/MCL (ref 4.7–6.1)
RBC # BLD AUTO: 2.69 X10(6)/MCL (ref 4.7–6.1)
RBC #/AREA URNS AUTO: ABNORMAL /HPF
RBC UR QL AUTO: ABNORMAL
RSV A 5' UTR RNA NPH QL NAA+PROBE: NOT DETECTED
SARS-COV-2 RNA RESP QL NAA+PROBE: NOT DETECTED
SODIUM SERPL-SCNC: 131 MMOL/L (ref 136–145)
SP GR UR STRIP.AUTO: 1.01 (ref 1–1.03)
SPECIMEN OUTDATE: NORMAL
SQUAMOUS #/AREA URNS LPF: ABNORMAL /HPF
UNIT NUMBER: NORMAL
UNIT NUMBER: NORMAL
UROBILINOGEN UR STRIP-ACNC: 2
WBC # SPEC AUTO: 33.58 X10(3)/MCL (ref 4.5–11.5)
WBC # SPEC AUTO: 39.9 X10(3)/MCL (ref 4.5–11.5)
WBC #/AREA URNS AUTO: ABNORMAL /HPF

## 2023-12-08 PROCEDURE — 21400001 HC TELEMETRY ROOM

## 2023-12-08 PROCEDURE — 63600175 PHARM REV CODE 636 W HCPCS: Performed by: NURSE PRACTITIONER

## 2023-12-08 PROCEDURE — 30233N1 TRANSFUSION OF NONAUTOLOGOUS RED BLOOD CELLS INTO PERIPHERAL VEIN, PERCUTANEOUS APPROACH: ICD-10-PCS | Performed by: INTERNAL MEDICINE

## 2023-12-08 PROCEDURE — 96365 THER/PROPH/DIAG IV INF INIT: CPT

## 2023-12-08 PROCEDURE — 86923 COMPATIBILITY TEST ELECTRIC: CPT | Performed by: STUDENT IN AN ORGANIZED HEALTH CARE EDUCATION/TRAINING PROGRAM

## 2023-12-08 PROCEDURE — 63600175 PHARM REV CODE 636 W HCPCS: Performed by: STUDENT IN AN ORGANIZED HEALTH CARE EDUCATION/TRAINING PROGRAM

## 2023-12-08 PROCEDURE — 36415 COLL VENOUS BLD VENIPUNCTURE: CPT

## 2023-12-08 PROCEDURE — 25000003 PHARM REV CODE 250: Performed by: STUDENT IN AN ORGANIZED HEALTH CARE EDUCATION/TRAINING PROGRAM

## 2023-12-08 PROCEDURE — 87040 BLOOD CULTURE FOR BACTERIA: CPT | Performed by: NURSE PRACTITIONER

## 2023-12-08 PROCEDURE — 83605 ASSAY OF LACTIC ACID: CPT | Performed by: NURSE PRACTITIONER

## 2023-12-08 PROCEDURE — P9037 PLATE PHERES LEUKOREDU IRRAD: HCPCS | Performed by: STUDENT IN AN ORGANIZED HEALTH CARE EDUCATION/TRAINING PROGRAM

## 2023-12-08 PROCEDURE — 25000003 PHARM REV CODE 250: Performed by: NURSE PRACTITIONER

## 2023-12-08 PROCEDURE — 30233R1 TRANSFUSION OF NONAUTOLOGOUS PLATELETS INTO PERIPHERAL VEIN, PERCUTANEOUS APPROACH: ICD-10-PCS | Performed by: INTERNAL MEDICINE

## 2023-12-08 PROCEDURE — 25000003 PHARM REV CODE 250: Performed by: INTERNAL MEDICINE

## 2023-12-08 PROCEDURE — 36430 TRANSFUSION BLD/BLD COMPNT: CPT

## 2023-12-08 PROCEDURE — P9040 RBC LEUKOREDUCED IRRADIATED: HCPCS | Performed by: STUDENT IN AN ORGANIZED HEALTH CARE EDUCATION/TRAINING PROGRAM

## 2023-12-08 PROCEDURE — 81001 URINALYSIS AUTO W/SCOPE: CPT | Performed by: NURSE PRACTITIONER

## 2023-12-08 PROCEDURE — 0241U COVID/RSV/FLU A&B PCR: CPT | Performed by: NURSE PRACTITIONER

## 2023-12-08 PROCEDURE — 85025 COMPLETE CBC W/AUTO DIFF WBC: CPT | Performed by: NURSE PRACTITIONER

## 2023-12-08 PROCEDURE — 96375 TX/PRO/DX INJ NEW DRUG ADDON: CPT

## 2023-12-08 PROCEDURE — 86901 BLOOD TYPING SEROLOGIC RH(D): CPT | Performed by: NURSE PRACTITIONER

## 2023-12-08 PROCEDURE — 96372 THER/PROPH/DIAG INJ SC/IM: CPT | Mod: 59

## 2023-12-08 PROCEDURE — 80053 COMPREHEN METABOLIC PANEL: CPT | Performed by: NURSE PRACTITIONER

## 2023-12-08 PROCEDURE — 99285 EMERGENCY DEPT VISIT HI MDM: CPT | Mod: 25

## 2023-12-08 PROCEDURE — 85025 COMPLETE CBC W/AUTO DIFF WBC: CPT

## 2023-12-08 PROCEDURE — 96401 CHEMO ANTI-NEOPL SQ/IM: CPT

## 2023-12-08 RX ORDER — HYDROMORPHONE HYDROCHLORIDE 2 MG/ML
2 INJECTION, SOLUTION INTRAMUSCULAR; INTRAVENOUS; SUBCUTANEOUS ONCE
Status: DISCONTINUED | OUTPATIENT
Start: 2023-12-08 | End: 2023-12-08 | Stop reason: HOSPADM

## 2023-12-08 RX ORDER — ONDANSETRON 2 MG/ML
4 INJECTION INTRAMUSCULAR; INTRAVENOUS
Status: COMPLETED | OUTPATIENT
Start: 2023-12-08 | End: 2023-12-08

## 2023-12-08 RX ORDER — ACYCLOVIR 800 MG/1
800 TABLET ORAL 2 TIMES DAILY
Status: DISCONTINUED | OUTPATIENT
Start: 2023-12-08 | End: 2023-12-15 | Stop reason: HOSPADM

## 2023-12-08 RX ORDER — SODIUM CHLORIDE 450 MG/100ML
INJECTION, SOLUTION INTRAVENOUS CONTINUOUS
Status: DISCONTINUED | OUTPATIENT
Start: 2023-12-08 | End: 2023-12-10

## 2023-12-08 RX ORDER — MORPHINE SULFATE 4 MG/ML
4 INJECTION, SOLUTION INTRAMUSCULAR; INTRAVENOUS
Status: COMPLETED | OUTPATIENT
Start: 2023-12-08 | End: 2023-12-08

## 2023-12-08 RX ORDER — ACETAMINOPHEN 325 MG/1
650 TABLET ORAL EVERY 4 HOURS PRN
Status: DISCONTINUED | OUTPATIENT
Start: 2023-12-08 | End: 2023-12-15 | Stop reason: HOSPADM

## 2023-12-08 RX ORDER — ONDANSETRON 4 MG/1
16 TABLET, ORALLY DISINTEGRATING ORAL
Status: COMPLETED | OUTPATIENT
Start: 2023-12-08 | End: 2023-12-08

## 2023-12-08 RX ORDER — ONDANSETRON 2 MG/ML
4 INJECTION INTRAMUSCULAR; INTRAVENOUS EVERY 6 HOURS PRN
Status: DISCONTINUED | OUTPATIENT
Start: 2023-12-08 | End: 2023-12-15 | Stop reason: HOSPADM

## 2023-12-08 RX ORDER — HYDROCODONE BITARTRATE AND ACETAMINOPHEN 500; 5 MG/1; MG/1
TABLET ORAL
Status: DISCONTINUED | OUTPATIENT
Start: 2023-12-08 | End: 2023-12-15 | Stop reason: HOSPADM

## 2023-12-08 RX ORDER — PANTOPRAZOLE SODIUM 40 MG/1
40 TABLET, DELAYED RELEASE ORAL DAILY
Status: DISCONTINUED | OUTPATIENT
Start: 2023-12-09 | End: 2023-12-15 | Stop reason: HOSPADM

## 2023-12-08 RX ORDER — OXYCODONE AND ACETAMINOPHEN 10; 325 MG/1; MG/1
1 TABLET ORAL EVERY 4 HOURS PRN
Status: DISCONTINUED | OUTPATIENT
Start: 2023-12-08 | End: 2023-12-15 | Stop reason: HOSPADM

## 2023-12-08 RX ORDER — ACETAMINOPHEN 500 MG
1000 TABLET ORAL
Status: COMPLETED | OUTPATIENT
Start: 2023-12-08 | End: 2023-12-08

## 2023-12-08 RX ORDER — TRAMADOL HYDROCHLORIDE 50 MG/1
50 TABLET ORAL EVERY 6 HOURS PRN
Status: DISCONTINUED | OUTPATIENT
Start: 2023-12-08 | End: 2023-12-15 | Stop reason: HOSPADM

## 2023-12-08 RX ORDER — AZACITIDINE 100 MG/1
75 INJECTION, POWDER, LYOPHILIZED, FOR SOLUTION INTRAVENOUS; SUBCUTANEOUS
Status: COMPLETED | OUTPATIENT
Start: 2023-12-08 | End: 2023-12-08

## 2023-12-08 RX ADMIN — PIPERACILLIN AND TAZOBACTAM 4.5 G: 4; .5 INJECTION, POWDER, LYOPHILIZED, FOR SOLUTION INTRAVENOUS; PARENTERAL at 02:12

## 2023-12-08 RX ADMIN — VANCOMYCIN HYDROCHLORIDE 1500 MG: 1.5 INJECTION, POWDER, LYOPHILIZED, FOR SOLUTION INTRAVENOUS at 03:12

## 2023-12-08 RX ADMIN — ACETAMINOPHEN 1000 MG: 500 TABLET ORAL at 02:12

## 2023-12-08 RX ADMIN — HYDROMORPHONE HYDROCHLORIDE 2 MG: 2 INJECTION INTRAMUSCULAR; INTRAVENOUS; SUBCUTANEOUS at 12:12

## 2023-12-08 RX ADMIN — ONDANSETRON 4 MG: 2 INJECTION INTRAMUSCULAR; INTRAVENOUS at 02:12

## 2023-12-08 RX ADMIN — SODIUM CHLORIDE: 4.5 INJECTION, SOLUTION INTRAVENOUS at 06:12

## 2023-12-08 RX ADMIN — ACETAMINOPHEN 325 MG: 325 TABLET, FILM COATED ORAL at 10:12

## 2023-12-08 RX ADMIN — ONDANSETRON 16 MG: 4 TABLET, ORALLY DISINTEGRATING ORAL at 12:12

## 2023-12-08 RX ADMIN — ACYCLOVIR 800 MG: 800 TABLET ORAL at 08:12

## 2023-12-08 RX ADMIN — SODIUM CHLORIDE 1000 ML: 9 INJECTION, SOLUTION INTRAVENOUS at 02:12

## 2023-12-08 RX ADMIN — SODIUM CHLORIDE: 9 INJECTION, SOLUTION INTRAVENOUS at 02:12

## 2023-12-08 RX ADMIN — CEFEPIME 1 G: 1 INJECTION, POWDER, FOR SOLUTION INTRAMUSCULAR; INTRAVENOUS at 06:12

## 2023-12-08 RX ADMIN — MORPHINE SULFATE 4 MG: 4 INJECTION, SOLUTION INTRAMUSCULAR; INTRAVENOUS at 02:12

## 2023-12-08 RX ADMIN — OXYCODONE AND ACETAMINOPHEN 1 TABLET: 10; 325 TABLET ORAL at 06:12

## 2023-12-08 RX ADMIN — OXYCODONE AND ACETAMINOPHEN 1 TABLET: 10; 325 TABLET ORAL at 09:12

## 2023-12-08 RX ADMIN — AZACITIDINE 155 MG: 100 INJECTION, POWDER, LYOPHILIZED, FOR SOLUTION INTRAVENOUS; SUBCUTANEOUS at 12:12

## 2023-12-08 NOTE — ED PROVIDER NOTES
Encounter Date: 12/8/2023    SCRIBE #1 NOTE: I, Elida Nolan, am scribing for, and in the presence of,  Kenneth Dudley MD. I have scribed the following portions of the note - Other sections scribed: HPI, ROS and physical.       History     Chief Complaint   Patient presents with    Abnormal Lab     Presents from infusion center for low h/h. Hx AML on chemo. Febrile in triage.      Patient is a 24 y/o male with a medical hx of CML that presents to the ED for low H&H onset this morning. Pt is coming from Dr. Hogan's office, where he was funning a fever. The pt states that he has not had any bleeding,n/v,  or recent wounds. He reports back pain. Pt has been taking percocet for pain.         The history is provided by the patient. No  was used.   Fever  Primary symptoms of the febrile illness include fever. Primary symptoms do not include shortness of breath, abdominal pain, nausea, vomiting, dysuria or rash. The current episode started today. This is a new problem. The problem has been gradually improving.   The maximum temperature recorded prior to his arrival was unknown. The temperature was taken by no thermometer.   Risk factors for febrile illness include history of cancer.    Review of patient's allergies indicates:  No Known Allergies  Past Medical History:   Diagnosis Date    CML (chronic myelocytic leukemia)     HVD (hypertensive vascular disease)     Oropharyngeal candidiasis      Past Surgical History:   Procedure Laterality Date    BONE MARROW BIOPSY N/A 8/22/2022    Procedure: Biopsy-bone marrow;  Surgeon: Getachew Chen MD;  Location: [facility] OR;  Service: General;  Laterality: N/A;    BONE MARROW BIOPSY N/A 7/25/2023    Procedure: Biopsy-bone marrow;  Surgeon: Edi Carty MD;  Location: [facility] OR;  Service: General;  Laterality: N/A;    BONE MARROW BIOPSY N/A 8/28/2023    Procedure: Biopsy-bone marrow;  Surgeon: Moo Pina MD;  Location: [facility] OR;  Service: General;  Laterality:  N/A;    BONE MARROW BIOPSY N/A 10/31/2023    Procedure: Biopsy-bone marrow;  Surgeon: Edi Carty MD;  Location: [facility] OR;  Service: General;  Laterality: N/A;    KNEE SURGERY Left      Family History   Problem Relation Age of Onset    Hypertension Maternal Grandmother     Cancer Maternal Grandmother      Social History     Tobacco Use    Smoking status: Never    Smokeless tobacco: Never   Substance Use Topics    Alcohol use: Never    Drug use: Yes     Types: Marijuana     Review of Systems   Constitutional:  Positive for fever.   HENT:  Negative for sore throat.    Eyes:  Negative for visual disturbance.   Respiratory:  Negative for shortness of breath.    Cardiovascular:  Negative for chest pain.   Gastrointestinal:  Negative for abdominal pain, nausea and vomiting.   Genitourinary:  Negative for dysuria.   Musculoskeletal:  Positive for back pain. Negative for joint swelling.   Skin:  Negative for rash.   Neurological:  Negative for weakness.   Psychiatric/Behavioral:  Negative for confusion.    All other systems reviewed and are negative.      Physical Exam     Initial Vitals   BP Pulse Resp Temp SpO2   12/08/23 1312 12/08/23 1312 12/08/23 1312 12/08/23 1311 12/08/23 1312   (!) 131/92 (!) 127 20 (!) 102.2 °F (39 °C) 96 %      MAP       --                Physical Exam    Nursing note and vitals reviewed.  Constitutional: He appears well-developed and well-nourished. He is not diaphoretic. No distress.   HENT:   Head: Normocephalic and atraumatic.   Eyes: Conjunctivae and EOM are normal. Pupils are equal, round, and reactive to light.   Neck:   Normal range of motion.  Cardiovascular:  Regular rhythm, normal heart sounds and intact distal pulses.   Tachycardia present.         No murmur heard.  Pulmonary/Chest: Breath sounds normal. No respiratory distress. He has no wheezes. He has no rales.   Abdominal: Abdomen is soft. He exhibits no distension. There is no abdominal tenderness.   Musculoskeletal:          General: No tenderness or edema. Normal range of motion.      Cervical back: Normal range of motion.     Neurological: He is alert and oriented to person, place, and time. No cranial nerve deficit.   Skin: Skin is warm and dry. Capillary refill takes less than 2 seconds. No rash noted. No erythema.   Psychiatric: He has a normal mood and affect.         ED Course   Critical Care    Date/Time: 12/8/2023 1:52 PM    Performed by: Kenneth Dudley MD  Authorized by: Kenneth Dudley MD  Direct patient critical care time: 10 minutes  Additional history critical care time: 5 minutes  Ordering / reviewing critical care time: 6 minutes  Documentation critical care time: 5 minutes  Consulting other physicians critical care time: 5 minutes  Total critical care time (exclusive of procedural time) : 31 minutes  Critical care time was exclusive of separately billable procedures and treating other patients and teaching time.  Critical care was necessary to treat or prevent imminent or life-threatening deterioration of the following conditions: circulatory failure, sepsis, cardiac failure and metabolic crisis.  Critical care was time spent personally by me on the following activities: blood draw for specimens, development of treatment plan with patient or surrogate, discussions with consultants, discussions with primary provider, interpretation of cardiac output measurements, evaluation of patient's response to treatment, examination of patient, obtaining history from patient or surrogate, ordering and performing treatments and interventions, ordering and review of laboratory studies, ordering and review of radiographic studies, pulse oximetry, re-evaluation of patient's condition and review of old charts.        Labs Reviewed   COMPREHENSIVE METABOLIC PANEL - Abnormal; Notable for the following components:       Result Value    Sodium Level 131 (*)     Carbon Dioxide 20 (*)     Glucose Level 102 (*)     Blood Urea Nitrogen 7.8  (*)     Albumin Level 3.3 (*)     Globulin 3.8 (*)     Albumin/Globulin Ratio 0.9 (*)     Bilirubin Total 1.8 (*)     All other components within normal limits   URINALYSIS, REFLEX TO URINE CULTURE - Abnormal; Notable for the following components:    Protein, UA 2+ (*)     Ketones, UA 1+ (*)     Blood, UA 3+ (*)     Urobilinogen, UA 2.0 (*)     All other components within normal limits   CBC WITH DIFFERENTIAL - Abnormal; Notable for the following components:    WBC 39.90 (*)     RBC 2.69 (*)     Hgb 7.3 (*)     Hct 21.8 (*)     Platelet 3 (*)     Lymph # 5.63 (*)     Neut # 0.10 (*)     Mono # 34.17 (*)     All other components within normal limits   COVID/RSV/FLU A&B PCR - Normal    Narrative:     The Xpert Xpress SARS-CoV-2/FLU/RSV plus is a rapid, multiplexed real-time PCR test intended for the simultaneous qualitative detection and differentiation of SARS-CoV-2, Influenza A, Influenza B, and respiratory syncytial virus (RSV) viral RNA in either nasopharyngeal swab or nasal swab specimens.         LACTIC ACID, PLASMA - Normal   CBC W/ AUTO DIFFERENTIAL    Narrative:     The following orders were created for panel order CBC Auto Differential.  Procedure                               Abnormality         Status                     ---------                               -----------         ------                     CBC with Differential[[id number]]       Abnormal            Final result               Manual Differential[[id number]]                                                          Please view results for these tests on the individual orders.   TYPE & SCREEN          Imaging Results              X-Ray Chest PA And Lateral (Final result)  Result time 12/08/23 14:14:01      Final result by Juan Adler MD (12/08/23 14:14:01)                   Impression:      No acute chest disease is identified.      Electronically signed by: Juan Adler  Date:    12/08/2023  Time:    14:14                Narrative:    EXAMINATION:  XR CHEST PA AND LATERAL    CLINICAL HISTORY:  Fever;, .    FINDINGS:  No alveolar consolidation, effusion, or pneumothorax is seen.   The thoracic aorta is normal  cardiac silhouette, central pulmonary vessels and mediastinum are normal in size and are grossly unremarkable.   visualized osseous structures are grossly unremarkable.                                       Medications   0.45% NaCl infusion ( Intravenous New Bag 12/10/23 1718)   acetaminophen tablet 1,000 mg (1,000 mg Oral Given 12/8/23 1410)   vancomycin 1.5 g in dextrose 5 % 250 mL IVPB (ready to mix) (0 mg Intravenous Stopped 12/8/23 1630)   piperacillin-tazobactam (ZOSYN) 4.5 g in dextrose 5 % in water (D5W) 100 mL IVPB (MB+) (0 g Intravenous Stopped 12/8/23 1500)   morphine injection 4 mg (4 mg Intravenous Given 12/8/23 1406)   ondansetron injection 4 mg (4 mg Intravenous Given 12/8/23 1406)   sodium chloride 0.9% bolus 1,000 mL 1,000 mL (0 mLs Intravenous Stopped 12/8/23 1508)   sodium chloride 0.9% bolus 1,000 mL 1,000 mL (0 mLs Intravenous Stopped 12/8/23 1508)   diphenhydrAMINE capsule 25 mg (25 mg Oral Given 12/9/23 1254)   acetaminophen tablet 650 mg (650 mg Oral Given 12/9/23 1254)   potassium, sodium phosphates 280-160-250 mg packet 2 packet (2 packets Oral Given 12/9/23 2016)   sodium phosphate 30 mmol in dextrose 5 % (D5W) 250 mL IVPB (0 mmol Intravenous Stopped 12/9/23 1748)   potassium chloride SA CR tablet 40 mEq (40 mEq Oral Given 12/11/23 0920)   iopamidoL (ISOVUE-370) injection 100 mL (100 mLs Intravenous Given 12/12/23 1212)   diphenhydrAMINE capsule 25 mg (25 mg Oral Given 12/14/23 1612)     Medical Decision Making  Problems Addressed:  Acute myeloid leukemia not having achieved remission: acute illness or injury that poses a threat to life or bodily functions  Anemia, unspecified type: acute illness or injury that poses a threat to life or bodily functions  Sepsis: acute illness or injury that poses a  threat to life or bodily functions  Thrombocytopenia: acute illness or injury that poses a threat to life or bodily functions    Amount and/or Complexity of Data Reviewed  External Data Reviewed: notes.     Details: Reviewed old records including oncology notes.  Labs: ordered.  Radiology: ordered and independent interpretation performed.    Risk  Prescription drug management.  Parenteral controlled substances.  Decision regarding hospitalization.            Scribe Attestation:   Scribe #1: I performed the above scribed service and the documentation accurately describes the services I performed. I attest to the accuracy of the note.    Attending Attestation:           Physician Attestation for Scribe:  Physician Attestation Statement for Scribe #1: I, Kenneth Dudley MD, reviewed documentation, as scribed by Elida Nolan in my presence, and it is both accurate and complete.                        Medical Decision Making:   History:   I obtained history from: someone other than patient and primary care / consultant.       <> Summary of History: Collateral from oncology.  Old Medical Records: I decided to obtain old medical records.  Old Records Summarized: records from clinic visits, records from previous admission(s) and records from another hospital.       <> Summary of Records: Reviewed old records, patient recently admitted for sepsis  Initial Assessment:   Fever, weakness  Differential Diagnosis:   Judging by the patient's chief complaint and pertinent history, the patient has the following possible differential diagnoses, including but not limited to the following.  Some of these are deemed to be lower likelihood and some more likely based on my physical exam and history combined with possible lab work and/or imaging studies.   Please see the pertinent studies, and refer to the HPI.  Some of these diagnoses will take further evaluation to fully rule out, perhaps as an outpatient and the patient was encouraged  "to follow up when discharged for more comprehensive evaluation.    Viral syndrome, COVID, flu, otitis media UTI, intraabdominal infection, bacterial pharyngitis, pneumonia, sepsis,   Independently Interpreted Test(s):   I have ordered and independently interpreted X-rays - see prior notes.  Clinical Tests:   Lab Tests: Reviewed and Ordered  Radiological Study: Ordered and Reviewed  Sepsis Perfusion Assessment: "I attest a sepsis perfusion exam was performed within 6 hours of sepsis, severe sepsis, or septic shock presentation, following fluid resuscitation."    Sepsis Perfusion Assessment Complete: 12/15/2023 2:30 PM    ED Management:  Patient is a 23-year-old male presents to emergency department for oncology office for low H&H, low platelets, fever.  See HPI.  See physical exam.  Unfortunately the patient has a history of CML, likely progressing AML.  He has a leukocytosis, fever.  Blood cultures have been obtained.  Started on broad-spectrum antibiotics.  Will transfuse blood and platelets after discussion with oncology.  Discussed with hospital medicine who will admit the patient.  All results discussed with the patient.  Answered all questions at this time.  Verbalized understanding agreed to plan.  Other:   I have discussed this case with another health care provider.       <> Summary of the Discussion: Discussed with oncology.  Discussed with hospital medicine.             Clinical Impression:  Final diagnoses:  [D64.9] Anemia, unspecified type  [C92.00] Acute myeloid leukemia not having achieved remission  [A41.9] Sepsis  [D69.6] Thrombocytopenia          ED Disposition Condition    Admit                 Kenneth Dudley MD  12/15/23 0922    "

## 2023-12-08 NOTE — NURSING
Nurses Note -- 4 Eyes      12/8/2023   5:31 PM      Skin assessed during: Admit      [x] No Altered Skin Integrity Present    [x]Prevention Measures Documented      [] Yes- Altered Skin Integrity Present or Discovered   [] LDA Added if Not in Epic (Describe Wound)   [] New Altered Skin Integrity was Present on Admit and Documented in LDA   [] Wound Image Taken    Wound Care Consulted? No    Attending Nurse:  Claudio Lyle RN     Second RN/Staff Member:  Rojas Cali RN

## 2023-12-08 NOTE — NURSING
Gave meds as ordered, cbc drawn per Alexsandra Rojas, JEMAL orders and plts 4k and hgb 6.6. Dr. Hogan called and instructed to send pt to ER for blood and platelets and evaluation. Pt wheeled out via w/c per Paul Dressler, RN.

## 2023-12-08 NOTE — Clinical Note
Diagnosis: Sepsis [957105]   Future Attending Provider: NICKO SABILLON [[id number]]   Admitting Provider:: NICKO SABILLON [[id number]]   Admit to which facility:: [facility] [[id number]]   Reason for IP Medical Treatment  (Clinical interventions that can only be accomplished in the IP setting? ) :: Sepsis, leukocytosis, AML   I certify that Inpatient services for greater than or equal to 2 midnights are medically necessary:: Yes   Plans for Post-Acute care--if anticipated (pick the single best option):: A. No post acute care anticipated at this time

## 2023-12-08 NOTE — FIRST PROVIDER EVALUATION
Medical screening examination initiated.  I have conducted a focused provider triage encounter, findings are as follows:    Brief history of present illness:  Patient is a chemo patient for AML. Patient brought from the infusion center due fever, anemia, and weakness.     There were no vitals filed for this visit.    Pertinent physical exam:  Awake, alert    Brief workup plan:  Labs    Preliminary workup initiated; this workup will be continued and followed by the physician or advanced practice provider that is assigned to the patient when roomed.

## 2023-12-09 PROBLEM — D63.0 ANEMIA IN NEOPLASTIC DISEASE: Status: ACTIVE | Noted: 2023-12-09

## 2023-12-09 PROBLEM — D69.6 THROMBOCYTOPENIA: Status: ACTIVE | Noted: 2023-12-09

## 2023-12-09 LAB
ABO + RH BLD: NORMAL
ABO + RH BLD: NORMAL
ABS NEUT (OLG): 0.37 X10(3)/MCL (ref 2.1–9.2)
ANISOCYTOSIS BLD QL SMEAR: ABNORMAL
B-HCG SERPL QL: 70 %
BILIRUBIN DIRECT+TOT PNL SERPL-MCNC: 0.5 MG/DL (ref 0–?)
BLD PROD TYP BPU: NORMAL
BLD PROD TYP BPU: NORMAL
BLOOD UNIT EXPIRATION DATE: NORMAL
BLOOD UNIT EXPIRATION DATE: NORMAL
BLOOD UNIT TYPE CODE: 1700
BLOOD UNIT TYPE CODE: 1700
CROSSMATCH INTERPRETATION: NORMAL
CROSSMATCH INTERPRETATION: NORMAL
DISPENSE STATUS: NORMAL
DISPENSE STATUS: NORMAL
ERYTHROCYTE [DISTWIDTH] IN BLOOD BY AUTOMATED COUNT: 15.7 % (ref 11.5–17)
HAPTOGLOB SERPL-MCNC: 308 MG/DL (ref 14–258)
HCT VFR BLD AUTO: 19.9 % (ref 42–52)
HGB BLD-MCNC: 6.9 G/DL (ref 14–18)
INSTRUMENT WBC (OLG): 37.15 X10(3)/MCL
LDH SERPL-CCNC: 797 U/L (ref 125–220)
LYMPHOCYTES NFR BLD MANUAL: 12 %
LYMPHOCYTES NFR BLD MANUAL: 4.46 X10(3)/MCL
MACROCYTES BLD QL SMEAR: ABNORMAL
MAGNESIUM SERPL-MCNC: 2 MG/DL (ref 1.6–2.6)
MCH RBC QN AUTO: 28.5 PG (ref 27–31)
MCHC RBC AUTO-ENTMCNC: 34.7 G/DL (ref 33–36)
MCV RBC AUTO: 82.2 FL (ref 80–94)
MICROCYTES BLD QL SMEAR: ABNORMAL
MONOCYTES NFR BLD MANUAL: 18 %
MONOCYTES NFR BLD MANUAL: 6.69 X10(3)/MCL (ref 0.1–1.3)
NEUTROPHILS NFR BLD MANUAL: 1 %
NRBC BLD AUTO-RTO: 0 %
NRBC BLD MANUAL-RTO: 1 %
OVALOCYTES (OLG): ABNORMAL
PHOSPHATE SERPL-MCNC: 1.6 MG/DL (ref 2.3–4.7)
PLATELET # BLD AUTO: 25 X10(3)/MCL (ref 130–400)
PLATELET # BLD EST: ABNORMAL 10*3/UL
PLATELETS.RETICULATED NFR BLD AUTO: 1.6 % (ref 0.9–11.2)
PMV BLD AUTO: 11.6 FL (ref 7.4–10.4)
POIKILOCYTOSIS BLD QL SMEAR: ABNORMAL
RBC # BLD AUTO: 2.42 X10(6)/MCL (ref 4.7–6.1)
RBC MORPH BLD: ABNORMAL
STOMATOCYTES (OLG): ABNORMAL
UNIT NUMBER: NORMAL
UNIT NUMBER: NORMAL
WBC # SPEC AUTO: 37.15 X10(3)/MCL (ref 4.5–11.5)

## 2023-12-09 PROCEDURE — 83735 ASSAY OF MAGNESIUM: CPT | Performed by: INTERNAL MEDICINE

## 2023-12-09 PROCEDURE — 82248 BILIRUBIN DIRECT: CPT | Performed by: INTERNAL MEDICINE

## 2023-12-09 PROCEDURE — 63600175 PHARM REV CODE 636 W HCPCS: Performed by: STUDENT IN AN ORGANIZED HEALTH CARE EDUCATION/TRAINING PROGRAM

## 2023-12-09 PROCEDURE — 87641 MR-STAPH DNA AMP PROBE: CPT | Performed by: INTERNAL MEDICINE

## 2023-12-09 PROCEDURE — 21400001 HC TELEMETRY ROOM

## 2023-12-09 PROCEDURE — 99223 1ST HOSP IP/OBS HIGH 75: CPT | Mod: ,,, | Performed by: INTERNAL MEDICINE

## 2023-12-09 PROCEDURE — 25000003 PHARM REV CODE 250: Performed by: INTERNAL MEDICINE

## 2023-12-09 PROCEDURE — 84100 ASSAY OF PHOSPHORUS: CPT | Performed by: INTERNAL MEDICINE

## 2023-12-09 PROCEDURE — 25000003 PHARM REV CODE 250: Performed by: STUDENT IN AN ORGANIZED HEALTH CARE EDUCATION/TRAINING PROGRAM

## 2023-12-09 PROCEDURE — 83615 LACTATE (LD) (LDH) ENZYME: CPT | Performed by: INTERNAL MEDICINE

## 2023-12-09 PROCEDURE — 36430 TRANSFUSION BLD/BLD COMPNT: CPT

## 2023-12-09 PROCEDURE — 85027 COMPLETE CBC AUTOMATED: CPT | Performed by: INTERNAL MEDICINE

## 2023-12-09 PROCEDURE — 83010 ASSAY OF HAPTOGLOBIN QUANT: CPT | Performed by: INTERNAL MEDICINE

## 2023-12-09 PROCEDURE — 86923 COMPATIBILITY TEST ELECTRIC: CPT | Performed by: INTERNAL MEDICINE

## 2023-12-09 PROCEDURE — P9040 RBC LEUKOREDUCED IRRADIATED: HCPCS | Performed by: INTERNAL MEDICINE

## 2023-12-09 RX ORDER — HYDROCODONE BITARTRATE AND ACETAMINOPHEN 500; 5 MG/1; MG/1
TABLET ORAL
Status: DISCONTINUED | OUTPATIENT
Start: 2023-12-09 | End: 2023-12-15 | Stop reason: HOSPADM

## 2023-12-09 RX ORDER — DIPHENHYDRAMINE HCL 25 MG
25 CAPSULE ORAL ONCE
Status: COMPLETED | OUTPATIENT
Start: 2023-12-09 | End: 2023-12-09

## 2023-12-09 RX ORDER — ACETAMINOPHEN 325 MG/1
650 TABLET ORAL ONCE
Status: COMPLETED | OUTPATIENT
Start: 2023-12-09 | End: 2023-12-09

## 2023-12-09 RX ORDER — SODIUM,POTASSIUM PHOSPHATES 280-250MG
2 POWDER IN PACKET (EA) ORAL 3 TIMES DAILY
Status: COMPLETED | OUTPATIENT
Start: 2023-12-09 | End: 2023-12-09

## 2023-12-09 RX ORDER — DILTIAZEM HYDROCHLORIDE 120 MG/1
120 CAPSULE, COATED, EXTENDED RELEASE ORAL NIGHTLY
Status: DISCONTINUED | OUTPATIENT
Start: 2023-12-09 | End: 2023-12-15 | Stop reason: HOSPADM

## 2023-12-09 RX ORDER — METOPROLOL TARTRATE 25 MG/1
25 TABLET, FILM COATED ORAL 2 TIMES DAILY
Status: DISCONTINUED | OUTPATIENT
Start: 2023-12-09 | End: 2023-12-15 | Stop reason: HOSPADM

## 2023-12-09 RX ADMIN — CEFEPIME 1 G: 1 INJECTION, POWDER, FOR SOLUTION INTRAMUSCULAR; INTRAVENOUS at 09:12

## 2023-12-09 RX ADMIN — OXYCODONE AND ACETAMINOPHEN 1 TABLET: 10; 325 TABLET ORAL at 04:12

## 2023-12-09 RX ADMIN — DILTIAZEM HYDROCHLORIDE 120 MG: 120 CAPSULE, COATED, EXTENDED RELEASE ORAL at 10:12

## 2023-12-09 RX ADMIN — VANCOMYCIN HYDROCHLORIDE 1500 MG: 1.5 INJECTION, POWDER, LYOPHILIZED, FOR SOLUTION INTRAVENOUS at 03:12

## 2023-12-09 RX ADMIN — ACYCLOVIR 800 MG: 800 TABLET ORAL at 08:12

## 2023-12-09 RX ADMIN — METOPROLOL TARTRATE 25 MG: 25 TABLET, FILM COATED ORAL at 08:12

## 2023-12-09 RX ADMIN — POTASSIUM & SODIUM PHOSPHATES POWDER PACK 280-160-250 MG 2 PACKET: 280-160-250 PACK at 03:12

## 2023-12-09 RX ADMIN — PANTOPRAZOLE SODIUM 40 MG: 40 TABLET, DELAYED RELEASE ORAL at 08:12

## 2023-12-09 RX ADMIN — CEFEPIME 1 G: 1 INJECTION, POWDER, FOR SOLUTION INTRAMUSCULAR; INTRAVENOUS at 01:12

## 2023-12-09 RX ADMIN — POTASSIUM & SODIUM PHOSPHATES POWDER PACK 280-160-250 MG 2 PACKET: 280-160-250 PACK at 08:12

## 2023-12-09 RX ADMIN — OXYCODONE AND ACETAMINOPHEN 1 TABLET: 10; 325 TABLET ORAL at 05:12

## 2023-12-09 RX ADMIN — POTASSIUM & SODIUM PHOSPHATES POWDER PACK 280-160-250 MG 2 PACKET: 280-160-250 PACK at 12:12

## 2023-12-09 RX ADMIN — SODIUM PHOSPHATE, MONOBASIC, MONOHYDRATE AND SODIUM PHOSPHATE, DIBASIC, ANHYDROUS 30 MMOL: 142; 276 INJECTION, SOLUTION INTRAVENOUS at 01:12

## 2023-12-09 RX ADMIN — OXYCODONE AND ACETAMINOPHEN 1 TABLET: 10; 325 TABLET ORAL at 10:12

## 2023-12-09 RX ADMIN — OXYCODONE AND ACETAMINOPHEN 1 TABLET: 10; 325 TABLET ORAL at 01:12

## 2023-12-09 RX ADMIN — OXYCODONE AND ACETAMINOPHEN 1 TABLET: 10; 325 TABLET ORAL at 09:12

## 2023-12-09 RX ADMIN — ACETAMINOPHEN 650 MG: 325 TABLET, FILM COATED ORAL at 12:12

## 2023-12-09 RX ADMIN — DIPHENHYDRAMINE HYDROCHLORIDE 25 MG: 25 CAPSULE ORAL at 12:12

## 2023-12-09 RX ADMIN — SODIUM CHLORIDE: 4.5 INJECTION, SOLUTION INTRAVENOUS at 05:12

## 2023-12-09 RX ADMIN — CEFEPIME 1 G: 1 INJECTION, POWDER, FOR SOLUTION INTRAMUSCULAR; INTRAVENOUS at 05:12

## 2023-12-09 NOTE — H&P
[facility] Hospital Medicine History & Physical Examination       Patient Name: Magdaleno Hirsch  MRN: [id number]  Patient Class: IP- Inpatient   Admission Date: 12/8/2023   Admitting Physician: RYAN Service   Length of Stay: 0  Attending Physician: Dr. Geneva Navarro  Primary Care Provider: Rocio FULLER NP  Face-to-Face encounter date: 12/08/2023  Code Status: Full code  Chief Complaint: Abnormal Lab (Presents from infusion center for low h/h. Hx AML on chemo. Febrile in triage. )        Patient information was obtained from patient, patient's family, past medical records and ER records.     HISTORY OF PRESENT ILLNESS:   Magdaleno Hirsch is a 23 y.o. male who PMH includes hypertension and CML followed by Dr. Hogan undergoing  chemotherapy with plans for treatment today; presents to the ED at [facility] on 12/8/2023 sent from the infusion center with reports of abnormal labs with low HH.Pt reports fatigue and some wakenss. It is reported he was also febrile in triage. No reports of C  , SOB, fever, no dysuria,  nausea, vomiting, diarrhea and exposure sick contacts. Patient was recently admitted to our services from 11/20-11/23/2023; which he was treated for neutropenic fever at that time.  Labs reviewed demonstrated WBC 39.90, HH 7.3/21.8, Na 131, CO2 20 glucose 102, other indices unremarkable.  Chest x-ray impression reviewed demonstrated no acute chest disease is identified.  Initial vital signs /92 pulse 129 respirations 20 temperature 100.2° F O2 saturation 96% on room air.  Blood cultures were collected x2 sets.  Patient was started on IV vancomycin and cefepime therapy with acyclovir 800 mg p.o. b.i.d..  Patient is admitted to hospital medicine services for further management.  Oncology Services were consulted.    PAST MEDICAL HISTORY:     Past Medical History:   Diagnosis Date    CML (chronic myelocytic leukemia)     HVD (hypertensive vascular disease)     Oropharyngeal candidiasis         PAST SURGICAL HISTORY:     Past Surgical History:   Procedure Laterality Date    BONE MARROW BIOPSY N/A 8/22/2022    Procedure: Biopsy-bone marrow;  Surgeon: Getachew Chen MD;  Location: [facility] OR;  Service: General;  Laterality: N/A;    BONE MARROW BIOPSY N/A 7/25/2023    Procedure: Biopsy-bone marrow;  Surgeon: Edi Carty MD;  Location: [facility] OR;  Service: General;  Laterality: N/A;    BONE MARROW BIOPSY N/A 8/28/2023    Procedure: Biopsy-bone marrow;  Surgeon: Moo Pina MD;  Location: [facility] OR;  Service: General;  Laterality: N/A;    BONE MARROW BIOPSY N/A 10/31/2023    Procedure: Biopsy-bone marrow;  Surgeon: Edi Carty MD;  Location: [facility] OR;  Service: General;  Laterality: N/A;    KNEE SURGERY Left        ALLERGIES:   Patient has no known allergies.    FAMILY HISTORY:   Reviewed and negative    SOCIAL HISTORY:     Social History     Tobacco Use    Smoking status: Never    Smokeless tobacco: Never   Substance Use Topics    Alcohol use: Never        HOME MEDICATIONS:     Prior to Admission medications    Medication Sig Start Date End Date Taking? Authorizing Provider   (Magic mouthwash) 1:1:1 diphenhydrAMINE(Benadryl) 12.5mg/5ml liq, aluminum & magnesium hydroxide-simethicone (Maalox), LIDOcaine viscous 2% Swish and spit 15 mLs every 4 (four) hours as needed (mouth pain). for mouth sores 12/5/23  Yes Valencia Loza, JEMAL   acyclovir (ZOVIRAX) 800 MG Tab Take 1 tablet (800 mg total) by mouth 2 (two) times daily. 12/4/23 12/3/24 Yes Jaren Green MD   levoFLOXacin (LEVAQUIN) 500 MG tablet Take 1 tablet (500 mg total) by mouth once daily. 12/4/23  Yes Jaren Green MD   NIFEdipine (PROCARDIA-XL) 60 MG (OSM) 24 hr tablet Take 1 tablet (60 mg total) by mouth once daily. 12/4/23 12/3/24 Yes Jaren Green MD   oxyCODONE-acetaminophen (PERCOCET)  mg per tablet Take 1 tablet by mouth every 4 (four) hours as needed for Pain. 12/7/23 12/6/24 Yes Alexsandra Rojas, FNP   pantoprazole  (PROTONIX) 40 MG tablet Take 1 tablet (40 mg total) by mouth once daily. 12/4/23 12/3/24 Yes Jaren Green MD   PONATinib (ICLUSIG) 45 mg Tab tablet Take 1 tablet (45 mg total) by mouth once daily. 11/6/23  Yes Alexsandra Rojas FNP   venetoclax 50 mg Tab Take 1 tablet (50 mg) by mouth once daily on days 1 through 10 only of each 28 day chemotherapy cycle. 11/27/23  Yes Paul Hogan II, MD   traMADoL (ULTRAM) 50 mg tablet Take 1 tablet (50 mg total) by mouth every 6 (six) hours as needed for Pain. 12/4/23   Jaren Green MD       REVIEW OF SYSTEMS:   Except as documented, all other systems reviewed and negative     PHYSICAL EXAM:     VITAL SIGNS: 24 HRS MIN & MAX LAST   Temp  Min: 98.5 °F (36.9 °C)  Max: 102.2 °F (39 °C) 98.5 °F (36.9 °C)   BP  Min: 131/92  Max: 163/97 (!) 143/86   Pulse  Min: 113  Max: 130  (!) 113   Resp  Min: 18  Max: 24 18   SpO2  Min: 96 %  Max: 100 % 100 %       General appearance:  Chronically ill-appearing young male appears stated age, fatigued; nontoxic.  HENT: Atraumatic head. Moist mucous membranes of oral cavity.  Eyes: PERRL  Lungs: diminished bilaterally. No wheezing present.   Heart: Regular rate and rhythm. S1 and S2 present cap refill brisk  Abdomen: Soft, non-distended, non-tender. No rebound tenderness/guarding. Bowel sounds are normal.   Extremities: No cyanosis, clubbing, MELO; generalized weakness  Skin:warm and dry  Neuro: oriented x 3, no focal deficits  Psych/mental status: Appropriate mood and affect. Responds appropriately to questions.     LABS AND IMAGING:     Recent Labs   Lab 12/04/23  1031 12/08/23  1216 12/08/23  1434   WBC 26.93* 33.58* 39.90*   RBC 3.03* 2.48* 2.69*   HGB 8.2* 6.6* 7.3*   HCT 25.8* 20.2* 21.8*   MCV 85.1 81.5 81.0   MCH 27.1 26.6* 27.1   MCHC 31.8* 32.7* 33.5   RDW 16.0 15.8 16.0   PLT 3* 4* 3*       Recent Labs   Lab 12/04/23  1031 12/08/23  1434    131*   K 3.9 4.0   CO2 26 20*   BUN 9.4 7.8*   CREATININE 0.90 0.87    CALCIUM 9.0 8.8   ALBUMIN 3.4* 3.3*   ALKPHOS 83 91   ALT 14 10   AST 29 33   BILITOT 0.9 1.8*       Microbiology Results (last 7 days)       Procedure Component Value Units Date/Time    Blood Culture [[id number]] Collected: 12/08/23 1434    Order Status: Sent Specimen: Blood     Blood Culture [[id number]] Collected: 12/08/23 1434    Order Status: Sent Specimen: Blood              X-Ray Chest PA And Lateral  Narrative: EXAMINATION:  XR CHEST PA AND LATERAL    CLINICAL HISTORY:  Fever;, .    FINDINGS:  No alveolar consolidation, effusion, or pneumothorax is seen.   The thoracic aorta is normal  cardiac silhouette, central pulmonary vessels and mediastinum are normal in size and are grossly unremarkable.   visualized osseous structures are grossly unremarkable.  Impression: No acute chest disease is identified.    Electronically signed by: Juan Adler  Date:    12/08/2023  Time:    14:14        ASSESSMENT & PLAN:   ASSESSMENT:  Symptomatic anemia-chronic disease-POA   Sepsis- immunocompromised state, unknown bacterial etiology-POA   Fever-secondary to sepsis and immunocompromised state-POA   Electrolyte imbalance-with hyponatremia-POA   Weakness-POA       PLAN:  Consult Oncology Services appreciate assistance and recommendations   Follow cultures and sensitivities   Continue with IV hydration   Continue with IV antibiotic therapy  Resume home medication as deemed necessary  Repeat lab work in a.m.   Accurate I&O   Daily weights  Fall precautions       VTE Prophylaxis: SCD for DVT prophylaxis and will be advised to be as mobile as possible and sit in a chair as tolerated    Patient condition:  Stable  __________________________________________________________________________  INPATIENT LIST OF MEDICATIONS     Scheduled Meds:   acyclovir  800 mg Oral BID    ceFEPime (MAXIPIME) IVPB  1 g Intravenous Q8H    [START ON 12/9/2023] pantoprazole  40 mg Oral Daily     Continuous Infusions:   sodium chloride 0.45% 100  mL/hr at 12/08/23 1804     PRN Meds:.(Magic mouthwash) 1:1:1 diphenhydrAMINE(Benadryl) 12.5mg/5ml liq, aluminum & magnesium hydroxide-simethicone (Maalox), LIDOcaine viscous 2%, 0.9%  NaCl infusion (for blood administration), 0.9%  NaCl infusion (for blood administration), ondansetron, oxyCODONE-acetaminophen, traMADoL, vancomycin - pharmacy to dose      I, TERRI Rodriguez have reviewed and discussed the case with Dr. Geneva Navarro.  Please see the following addendum for further assessment and plan from there attending MD.  TERRI Lares   12/08/2023      A. History:  Patient is a 23-year-old male with past medical history of CML follow up by Dr. Hogan undergoing current chemotherapy with plans for treatment today.  Presented to the ED after being sent from the infusion center due to low hemoglobin.  Patient also was febrile in the office.  Denies any history of fevers or chills prior.  Patient denies any fatigue either.  He was recently admitted to our service where he was treated for neutropenic fever at that time.  Patient was given 1 pack of platelets and packed red blood cell and was started on broad-spectrum antibiotics and admitted.  He denies any abdominal pain, nausea, vomiting, headache, chest pain, or shortness a breath.  He also denies any sputum production.    B. Physical exam:  Vitals reviewed.  General: In no acute distress, afebrile  Chest: Clear to auscultation bilaterally  Heart: S1, S2, no appreciable murmur  Abdomen: Soft, nontender, BS +  MSK: Warm, no lower extremity edema, no clubbing or cyanosis  Neurologic: Alert and oriented x4, moving all extremities with good strength       C. Medical decision making:  Symptomatic anemia    thrombocytopenia likely secondary to current infusions and chemotherapy.  Sepsis, unknown etiology at this time    Blood cultures that were obtained along with UA  Continue with IV fluids at this time, patient is currently receiving 1 unit of packed  red blood cells and 1 unit platelets, recheck and replete if necessary   Continue broad-spectrum IV antibiotic at this time with cefepime and vancomycin,  We will deescalate as necessary   Resume home acyclovir   Repeat lab work in a.m.   Monitor vitals    VTE Prophylaxis:    SCDs      Patient condition:   Fair      Discharge Planning and Disposition/Anticipated discharge: TBD        Advanced Directives:  I spent 15 mins of face to face discussion regarding advanced directives and end of life planning which included the patient and patients family, who       All diagnosis and differential diagnosis have been reviewed; at least 55 min was spent on this history and physical exam, assessment and plan has been documented; I have personally reviewed the labs and test results that are presently available; I have reviewed the patients medication list; I have reviewed the consulting providers response and recommendations. I have reviewed or attempted to review medical records based upon their availability.    All of the patient and family questions have been addressed and answered. Patient's is agreeable to the above stated plan. I will continue to monitor closely and make adjustments to medical management as needed.      Geneva Navarro MD  Department of Hospital Medicine   [facility]   12/09/2023 9:18 AM

## 2023-12-09 NOTE — PROGRESS NOTES
"Pharmacokinetic Initial Assessment: IV Vancomycin    Assessment/Plan:    Vancomycin 1500 mg was given in the emergency department.  Maintenance dose will be 1500mg q12h.  Pharmacy will continue to follow and monitor vancomycin.        Patient brief summary:  Magdaleno Hirsch is a 23 y.o. male initiated on antimicrobial therapy with IV Vancomycin for treatment of suspected sepsis    Drug Allergies:   Review of patient's allergies indicates:  No Known Allergies    Actual Body Weight:   83.9 KG    Renal Function:   Estimated Creatinine Clearance: 146.7 mL/min (based on SCr of 0.87 mg/dL).,     Dialysis Method (if applicable):  N/A    CBC (last 72 hours):  Recent Labs   Lab Result Units 12/08/23  1216 12/08/23  1434   WBC x10(3)/mcL 33.58* 39.90*   Hgb g/dL 6.6* 7.3*   Hct % 20.2* 21.8*   Platelet x10(3)/mcL 4* 3*   Mono % % 83.7 85.6   Eos % % 0.0 0.0   Basophil % % 0.0 0.0       Metabolic Panel (last 72 hours):  Recent Labs   Lab Result Units 12/08/23  1434 12/08/23  1548   Sodium Level mmol/L 131*  --    Potassium Level mmol/L 4.0  --    Chloride mmol/L 98  --    Carbon Dioxide mmol/L 20*  --    Glucose Level mg/dL 102*  --    Glucose, UA   --  Normal   Blood Urea Nitrogen mg/dL 7.8*  --    Creatinine mg/dL 0.87  --    Albumin Level g/dL 3.3*  --    Bilirubin Total mg/dL 1.8*  --    Alkaline Phosphatase unit/L 91  --    Aspartate Aminotransferase unit/L 33  --    Alanine Aminotransferase unit/L 10  --        Drug levels (last 3 results):  No results for input(s): "VANCOMYCINRA", "VANCORANDOM", "VANCOMYCINPE", "VANCOPEAK", "VANCOMYCINTR", "VANCOTROUGH" in the last 72 hours.    Microbiologic Results:  Microbiology Results (last 7 days)       Procedure Component Value Units Date/Time    Blood Culture [[id number]] Collected: 12/08/23 1434    Order Status: Sent Specimen: Blood     Blood Culture [[id number]] Collected: 12/08/23 1434    Order Status: Sent Specimen: Blood             "

## 2023-12-09 NOTE — PLAN OF CARE
Problem: Adult Inpatient Plan of Care  Goal: Plan of Care Review  Outcome: Ongoing, Progressing  Flowsheets (Taken 12/8/2023 2014)  Plan of Care Reviewed With: patient  Goal: Patient-Specific Goal (Individualized)  Outcome: Ongoing, Progressing  Goal: Absence of Hospital-Acquired Illness or Injury  Outcome: Ongoing, Progressing  Intervention: Identify and Manage Fall Risk  Flowsheets (Taken 12/8/2023 2014)  Safety Promotion/Fall Prevention:   assistive device/personal item within reach   medications reviewed   nonskid shoes/socks when out of bed   side rails raised x 2  Intervention: Prevent Skin Injury  Flowsheets (Taken 12/8/2023 2014)  Body Position: position changed independently  Skin Protection:   adhesive use limited   incontinence pads utilized   tubing/devices free from skin contact  Intervention: Prevent and Manage VTE (Venous Thromboembolism) Risk  Flowsheets (Taken 12/8/2023 2014)  Activity Management: Ambulated to bathroom - L4  VTE Prevention/Management:   ambulation promoted   bleeding risk assessed   ROM (active) performed  Range of Motion:   active ROM (range of motion) encouraged   ROM (range of motion) performed  Intervention: Prevent Infection  Flowsheets (Taken 12/8/2023 2014)  Infection Prevention:   rest/sleep promoted   single patient room provided  Goal: Optimal Comfort and Wellbeing  Outcome: Ongoing, Progressing  Intervention: Monitor Pain and Promote Comfort  Flowsheets (Taken 12/8/2023 2014)  Pain Management Interventions:   care clustered   medication offered   pain management plan reviewed with patient/caregiver   pillow support provided   position adjusted  Intervention: Provide Person-Centered Care  Flowsheets (Taken 12/8/2023 2014)  Trust Relationship/Rapport: care explained  Goal: Readiness for Transition of Care  Outcome: Ongoing, Progressing     Problem: Fall Injury Risk  Goal: Absence of Fall and Fall-Related Injury  Outcome: Ongoing, Progressing  Intervention: Identify and  Manage Contributors  Flowsheets (Taken 12/8/2023 2014)  Self-Care Promotion: independence encouraged  Medication Review/Management: medications reviewed  Intervention: Promote Injury-Free Environment  Flowsheets (Taken 12/8/2023 2014)  Safety Promotion/Fall Prevention:   assistive device/personal item within reach   medications reviewed   nonskid shoes/socks when out of bed   side rails raised x 2

## 2023-12-09 NOTE — CONSULTS
[facility] - Oncology Acute  Hematology/Oncology  Consult Note    Patient Name: Magdaleno Hirsch  MRN: [id number]  Admission Date: 12/8/2023  Hospital Length of Stay: 1 days  Attending Provider: Brayden Batista MD  Consulting Provider: Karma Mckeon MD  Principal Problem:<principal problem not specified>    Inpatient consult to Oncology  Consult performed by: Karma Mckeon MD  Consult ordered by: Kenneth Dudley MD        Subjective:     HPI: 22 yo male with h/o 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 patient of Dr. Hogan, currently undergoing treatment with Azacitidine/Venetoclax presented to clinic on 12/8/23 for treatment, feeling poorly, noted to have severe anemia and thrombocytopenia. He was directed to ED for admission. Patient noted to be febrile and admitted for transfusion support and IV antibiotics to hospitalist services. CBC with WBC 39.9, H/H 7.3/21.8, Plt 3K. Patient given 1 unit of platelets and 1 unit PRBC. Started on Vancomycin and Cefepime. CXR, UA negative, Negative for flu, RSV and Covid. Oncology consulted for evaluation.     Today: Patient lying in bed. Family at bedside. He is sleeping but easily arousable. He reports feeling fatigued and achy. Denies any bleeding. Continues with fever. Tmax 102.2. Blood cultures pending. Repeat labs this morning with anemia Hgb 6.9g/dL and platelets 25K.    Oncology Treatment Plan:   OP AZACITIDINE 5-DAY (SUB-Q) + VENETOCLAX + PONATINIB     Medications:  Continuous Infusions:   sodium chloride 0.45% 100 mL/hr at 12/08/23 1804     Scheduled Meds:   acyclovir  800 mg Oral BID    ceFEPime (MAXIPIME) IVPB  1 g Intravenous Q8H    metoprolol tartrate  25 mg Oral BID    pantoprazole  40 mg Oral Daily    vancomycin (VANCOCIN) IV (PEDS and ADULTS)  1,500 mg Intravenous Q12H     PRN Meds:(Magic mouthwash) 1:1:1 diphenhydrAMINE(Benadryl) 12.5mg/5ml liq, aluminum & magnesium hydroxide-simethicone (Maalox), LIDOcaine viscous 2%, 0.9%  NaCl infusion (for blood  administration), 0.9%  NaCl infusion (for blood administration), acetaminophen, ondansetron, oxyCODONE-acetaminophen, traMADoL, vancomycin - pharmacy to dose     Review of patient's allergies indicates:  No Known Allergies     Past Medical History:   Diagnosis Date    CML (chronic myelocytic leukemia)     HVD (hypertensive vascular disease)     Oropharyngeal candidiasis      Past Surgical History:   Procedure Laterality Date    BONE MARROW BIOPSY N/A 8/22/2022    Procedure: Biopsy-bone marrow;  Surgeon: Getachew Chen MD;  Location: [facility] OR;  Service: General;  Laterality: N/A;    BONE MARROW BIOPSY N/A 7/25/2023    Procedure: Biopsy-bone marrow;  Surgeon: Edi Carty MD;  Location: [facility] OR;  Service: General;  Laterality: N/A;    BONE MARROW BIOPSY N/A 8/28/2023    Procedure: Biopsy-bone marrow;  Surgeon: Moo Pina MD;  Location: [facility] OR;  Service: General;  Laterality: N/A;    BONE MARROW BIOPSY N/A 10/31/2023    Procedure: Biopsy-bone marrow;  Surgeon: Edi Carty MD;  Location: [facility] OR;  Service: General;  Laterality: N/A;    KNEE SURGERY Left      Family History       Problem Relation (Age of Onset)    Cancer Maternal Grandmother    Hypertension Maternal Grandmother          Tobacco Use    Smoking status: Never    Smokeless tobacco: Never   Substance and Sexual Activity    Alcohol use: Never    Drug use: Yes     Types: Marijuana    Sexual activity: Not on file       Review of Systems   All other systems reviewed and are negative.    Objective:     Vital Signs (Most Recent):  Temp: 98.6 °F (37 °C) (12/09/23 0700)  Pulse: (!) 128 (12/09/23 0700)  Resp: 20 (12/09/23 0700)  BP: 117/68 (12/09/23 0856)  SpO2: 98 % (12/09/23 0700) Vital Signs (24h Range):  Temp:  [98.4 °F (36.9 °C)-102.2 °F (39 °C)] 98.6 °F (37 °C)  Pulse:  [113-132] 128  Resp:  [18-24] 20  SpO2:  [96 %-100 %] 98 %  BP: (117-163)/() 117/68     Weight: 83.9 kg (185 lb)  Body mass index is 24.41 kg/m².  Body surface area is 2.08 meters  squared.      Intake/Output Summary (Last 24 hours) at 12/9/2023 0944  Last data filed at 12/8/2023 1830  Gross per 24 hour   Intake 167.5 ml   Output --   Net 167.5 ml       Physical Exam  Constitutional:       General: He is awake.      Comments: Young ill appearing male   HENT:      Head: Normocephalic and atraumatic.      Nose: Nose normal.      Mouth/Throat:      Mouth: Mucous membranes are moist.   Eyes:      General: Vision grossly intact.      Extraocular Movements: Extraocular movements intact.      Conjunctiva/sclera: Conjunctivae normal.   Cardiovascular:      Rate and Rhythm: Regular rhythm. Tachycardia present.      Heart sounds: Normal heart sounds.   Pulmonary:      Effort: Pulmonary effort is normal.      Breath sounds: Normal breath sounds.   Abdominal:      General: Bowel sounds are normal. There is no distension.      Palpations: Abdomen is soft.      Tenderness: There is no abdominal tenderness.   Musculoskeletal:      Cervical back: Normal range of motion and neck supple.      Right lower leg: No edema.      Left lower leg: No edema.   Lymphadenopathy:      Cervical: No cervical adenopathy.      Upper Body:      Right upper body: No supraclavicular adenopathy.      Left upper body: No supraclavicular adenopathy.   Skin:     General: Skin is warm.   Neurological:      Mental Status: He is alert and oriented to person, place, and time.      Motor: Motor function is intact.   Psychiatric:         Mood and Affect: Mood normal.         Speech: Speech normal.         Behavior: Behavior is cooperative.         Judgment: Judgment normal.         Significant Labs:   CBC:   Recent Labs   Lab 12/08/23  1216 12/08/23  1434 12/09/23  0901   WBC 33.58* 39.90* 37.15*   HGB 6.6* 7.3* 6.9*   HCT 20.2* 21.8* 19.9*   PLT 4* 3* 25*    and CMP:   Recent Labs   Lab 12/08/23  1434   *   K 4.0   CO2 20*   BUN 7.8*   CREATININE 0.87   CALCIUM 8.8   ALBUMIN 3.3*   BILITOT 1.8*   ALKPHOS 91   AST 33   ALT 10        Diagnostic Results:  I have reviewed all pertinent imaging results/findings within the past 24 hours.    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  Transfuse 2 units PRBC for Hgb 6.9g/dL.  Continue IV antibiotics.  F/u cultures.    Continue transfusion support:  Transfuse PRBC for Hgb <7g/dL and transfuse platelets for Platelets <10K.  Repeat labs in AM.    Thank you for your consult. I will follow-up with patient. Please contact us if you have any additional questions.    Dr. Hogan to return on Monday.   Prognosis is poor, unfortunately.       Karma Mckeon MD  Hematology/Oncology  [facility]  Oncology [facility]

## 2023-12-10 LAB
ABS NEUT (OLG): 0.78 X10(3)/MCL (ref 2.1–9.2)
ALBUMIN SERPL-MCNC: 2.7 G/DL (ref 3.5–5)
ALBUMIN/GLOB SERPL: 0.8 RATIO (ref 1.1–2)
ALP SERPL-CCNC: 97 UNIT/L (ref 40–150)
ALT SERPL-CCNC: 17 UNIT/L (ref 0–55)
AST SERPL-CCNC: 30 UNIT/L (ref 5–34)
B-HCG SERPL QL: 67 %
BILIRUB SERPL-MCNC: 1 MG/DL
BUN SERPL-MCNC: 7.7 MG/DL (ref 8.9–20.6)
CALCIUM SERPL-MCNC: 8.2 MG/DL (ref 8.4–10.2)
CHLORIDE SERPL-SCNC: 102 MMOL/L (ref 98–107)
CO2 SERPL-SCNC: 23 MMOL/L (ref 22–29)
CREAT SERPL-MCNC: 0.8 MG/DL (ref 0.73–1.18)
ERYTHROCYTE [DISTWIDTH] IN BLOOD BY AUTOMATED COUNT: 15.7 % (ref 11.5–17)
GFR SERPLBLD CREATININE-BSD FMLA CKD-EPI: >60 MLS/MIN/1.73/M2
GLOBULIN SER-MCNC: 3.5 GM/DL (ref 2.4–3.5)
GLUCOSE SERPL-MCNC: 112 MG/DL (ref 74–100)
HCT VFR BLD AUTO: 26.1 % (ref 42–52)
HEMATOLOGIST REVIEW: NORMAL
HGB BLD-MCNC: 9.1 G/DL (ref 14–18)
INSTRUMENT WBC (OLG): 39.07 X10(3)/MCL
LYMPHOCYTES NFR BLD MANUAL: 1.95 X10(3)/MCL
LYMPHOCYTES NFR BLD MANUAL: 5 %
MAGNESIUM SERPL-MCNC: 2.2 MG/DL (ref 1.6–2.6)
MCH RBC QN AUTO: 28.6 PG (ref 27–31)
MCHC RBC AUTO-ENTMCNC: 34.9 G/DL (ref 33–36)
MCV RBC AUTO: 82.1 FL (ref 80–94)
MONOCYTES NFR BLD MANUAL: 10.16 X10(3)/MCL (ref 0.1–1.3)
MONOCYTES NFR BLD MANUAL: 26 %
MRSA PCR SCRN (OHS): NOT DETECTED
NEUTROPHILS NFR BLD MANUAL: 2 %
NRBC BLD AUTO-RTO: 0 %
PHOSPHATE SERPL-MCNC: 2.2 MG/DL (ref 2.3–4.7)
PLATELET # BLD AUTO: 16 X10(3)/MCL (ref 130–400)
PLATELET # BLD EST: ABNORMAL 10*3/UL
PLATELETS.RETICULATED NFR BLD AUTO: 1.8 % (ref 0.9–11.2)
PMV BLD AUTO: 8.7 FL (ref 7.4–10.4)
POTASSIUM SERPL-SCNC: 3.5 MMOL/L (ref 3.5–5.1)
PROMYELOCYTES # BLD MANUAL: 1 %
PROT SERPL-MCNC: 6.2 GM/DL (ref 6.4–8.3)
RBC # BLD AUTO: 3.18 X10(6)/MCL (ref 4.7–6.1)
RBC MORPH BLD: NORMAL
SODIUM SERPL-SCNC: 134 MMOL/L (ref 136–145)
VANCOMYCIN TROUGH SERPL-MCNC: 8.3 UG/ML (ref 15–20)
WBC # SPEC AUTO: 39.07 X10(3)/MCL (ref 4.5–11.5)

## 2023-12-10 PROCEDURE — 63600175 PHARM REV CODE 636 W HCPCS: Performed by: INTERNAL MEDICINE

## 2023-12-10 PROCEDURE — 80053 COMPREHEN METABOLIC PANEL: CPT | Performed by: INTERNAL MEDICINE

## 2023-12-10 PROCEDURE — 88185 FLOWCYTOMETRY/TC ADD-ON: CPT

## 2023-12-10 PROCEDURE — 25000003 PHARM REV CODE 250: Performed by: STUDENT IN AN ORGANIZED HEALTH CARE EDUCATION/TRAINING PROGRAM

## 2023-12-10 PROCEDURE — 99233 SBSQ HOSP IP/OBS HIGH 50: CPT | Mod: ,,, | Performed by: INTERNAL MEDICINE

## 2023-12-10 PROCEDURE — 80202 ASSAY OF VANCOMYCIN: CPT | Performed by: STUDENT IN AN ORGANIZED HEALTH CARE EDUCATION/TRAINING PROGRAM

## 2023-12-10 PROCEDURE — 21400001 HC TELEMETRY ROOM

## 2023-12-10 PROCEDURE — 25000003 PHARM REV CODE 250: Performed by: NURSE PRACTITIONER

## 2023-12-10 PROCEDURE — 63600175 PHARM REV CODE 636 W HCPCS: Performed by: STUDENT IN AN ORGANIZED HEALTH CARE EDUCATION/TRAINING PROGRAM

## 2023-12-10 PROCEDURE — 85027 COMPLETE CBC AUTOMATED: CPT | Performed by: INTERNAL MEDICINE

## 2023-12-10 PROCEDURE — 88184 FLOWCYTOMETRY/ TC 1 MARKER: CPT | Performed by: INTERNAL MEDICINE

## 2023-12-10 PROCEDURE — 25000003 PHARM REV CODE 250: Performed by: INTERNAL MEDICINE

## 2023-12-10 PROCEDURE — 83735 ASSAY OF MAGNESIUM: CPT | Performed by: INTERNAL MEDICINE

## 2023-12-10 PROCEDURE — 84100 ASSAY OF PHOSPHORUS: CPT | Performed by: INTERNAL MEDICINE

## 2023-12-10 RX ORDER — SODIUM CHLORIDE 450 MG/100ML
INJECTION, SOLUTION INTRAVENOUS CONTINUOUS
Status: ACTIVE | OUTPATIENT
Start: 2023-12-10 | End: 2023-12-11

## 2023-12-10 RX ORDER — ACETAMINOPHEN 500 MG
1000 TABLET ORAL EVERY 6 HOURS PRN
Status: DISCONTINUED | OUTPATIENT
Start: 2023-12-10 | End: 2023-12-15 | Stop reason: HOSPADM

## 2023-12-10 RX ADMIN — ACYCLOVIR 800 MG: 800 TABLET ORAL at 09:12

## 2023-12-10 RX ADMIN — ACETAMINOPHEN 1000 MG: 500 TABLET ORAL at 06:12

## 2023-12-10 RX ADMIN — DILTIAZEM HYDROCHLORIDE 120 MG: 120 CAPSULE, COATED, EXTENDED RELEASE ORAL at 08:12

## 2023-12-10 RX ADMIN — VANCOMYCIN HYDROCHLORIDE 1500 MG: 1.5 INJECTION, POWDER, LYOPHILIZED, FOR SOLUTION INTRAVENOUS at 03:12

## 2023-12-10 RX ADMIN — SODIUM CHLORIDE: 4.5 INJECTION, SOLUTION INTRAVENOUS at 05:12

## 2023-12-10 RX ADMIN — CEFEPIME 1 G: 1 INJECTION, POWDER, FOR SOLUTION INTRAMUSCULAR; INTRAVENOUS at 02:12

## 2023-12-10 RX ADMIN — VANCOMYCIN HYDROCHLORIDE 1500 MG: 1.5 INJECTION, POWDER, LYOPHILIZED, FOR SOLUTION INTRAVENOUS at 05:12

## 2023-12-10 RX ADMIN — ACYCLOVIR 800 MG: 800 TABLET ORAL at 08:12

## 2023-12-10 RX ADMIN — OXYCODONE AND ACETAMINOPHEN 1 TABLET: 10; 325 TABLET ORAL at 08:12

## 2023-12-10 RX ADMIN — CEFEPIME 1 G: 1 INJECTION, POWDER, FOR SOLUTION INTRAMUSCULAR; INTRAVENOUS at 06:12

## 2023-12-10 RX ADMIN — METOPROLOL TARTRATE 25 MG: 25 TABLET, FILM COATED ORAL at 08:12

## 2023-12-10 RX ADMIN — PANTOPRAZOLE SODIUM 40 MG: 40 TABLET, DELAYED RELEASE ORAL at 09:12

## 2023-12-10 RX ADMIN — CEFEPIME 1 G: 1 INJECTION, POWDER, FOR SOLUTION INTRAMUSCULAR; INTRAVENOUS at 11:12

## 2023-12-10 RX ADMIN — OXYCODONE AND ACETAMINOPHEN 1 TABLET: 10; 325 TABLET ORAL at 11:12

## 2023-12-10 RX ADMIN — METOPROLOL TARTRATE 25 MG: 25 TABLET, FILM COATED ORAL at 09:12

## 2023-12-10 NOTE — PROGRESS NOTES
[facility] Medicine Progress Note        Chief Complaint: Inpatient Follow-up for abnormal labs    HPI:   Magdaleno Hirsch is a 23 y.o. 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 Hogan undergoing  chemotherapy with Azacitidine/Venetoclax presented to the clinic on 12/8/23 for treatment, however noted to have severe anemia with Hgb 6.6 and thrombocytopenia with plt of 4. Pt was subsequently referred to the ED. Pt reports fatigue and some wakenss. It is reported he was  febrile in triage with temp of 102. No reports of fever, chills, nausea, vomiting, diarrhea  at home and denies exposure to sick contacts.     Patient was recently admitted to our services from 11/20-11/23/2023 for neutropenic fever. Additional workup in the ED include Chest x-ray showed no acute chest disease. UA neg for infection. Negative for flu, RSV and Covid     Initial vital signs /92 pulse 129 respirations 20 temperature 100.2° F O2 saturation 96% on room air. Blood cultures were collected x2 sets.  Patient was started on IV vancomycin and cefepime therapy with acyclovir 800 mg p.o. b.i.d. One unit of P-RBC and one unit of Platelet ordered and  admitted to  services for further management.  Oncology Services were consulted to assist with management       Interval Hx:   Tmax 101 early this morning .  Remains tachycardic , ST on telemetry   BP is elevated , On RA  Pt denies any specific c/o today   Labs showed WBC 37.1, , Blasts  70 Hgb down to 6.9, Plt 25 , Na 131, Cr 0.8, Phos 1.6      Case was discussed with patient's nurse and  on the floor.    Objective/physical exam:  General: In no acute distress, afebrile  Chest: Clear to auscultation bilaterally  Heart: RRR, +S1, S2, no appreciable murmur  Abdomen: Soft, nontender, BS +  MSK: Warm, no lower extremity edema, no clubbing or cyanosis  Neurologic: Alert and oriented x4, Cranial nerve II-XII  intact, Strength 5/5 in all 4 extremities    VITAL SIGNS: 24 HRS MIN & MAX LAST   Temp  Min: 98.5 °F (36.9 °C)  Max: 101.1 °F (38.4 °C) 99.6 °F (37.6 °C)   BP  Min: 117/68  Max: 150/97 (!) 142/87   Pulse  Min: 105  Max: 132  (!) 121   Resp  Min: 18  Max: 20 20   SpO2  Min: 97 %  Max: 100 % 99 %     I have reviewed the following labs:  Recent Labs   Lab 12/08/23  1216 12/08/23  1434 12/09/23  0901   WBC 33.58* 39.90* 37.15  37.15*   RBC 2.48* 2.69* 2.42*   HGB 6.6* 7.3* 6.9*   HCT 20.2* 21.8* 19.9*   MCV 81.5 81.0 82.2   MCH 26.6* 27.1 28.5   MCHC 32.7* 33.5 34.7   RDW 15.8 16.0 15.7   PLT 4* 3* 25*   MPV  --   --  11.6*     Recent Labs   Lab 12/04/23  1031 12/08/23  1434 12/09/23  0901    131*  --    K 3.9 4.0  --    CO2 26 20*  --    BUN 9.4 7.8*  --    CREATININE 0.90 0.87  --    CALCIUM 9.0 8.8  --    MG  --   --  2.00   ALBUMIN 3.4* 3.3*  --    ALKPHOS 83 91  --    ALT 14 10  --    AST 29 33  --    BILITOT 0.9 1.8*  --      Microbiology Results (last 7 days)       Procedure Component Value Units Date/Time    Blood Culture [[id number]]  (Normal) Collected: 12/08/23 1434    Order Status: Completed Specimen: Blood Updated: 12/09/23 2000     CULTURE, BLOOD ([facility]) No Growth At 24 Hours    Blood Culture [[id number]]  (Normal) Collected: 12/08/23 1434    Order Status: Completed Specimen: Blood Updated: 12/09/23 2000     CULTURE, BLOOD ([facility]) No Growth At 24 Hours             See below for Radiology    Scheduled Med:   acyclovir  800 mg Oral BID    ceFEPime (MAXIPIME) IVPB  1 g Intravenous Q8H    metoprolol tartrate  25 mg Oral BID    pantoprazole  40 mg Oral Daily    potassium, sodium phosphates  2 packet Oral TID    vancomycin (VANCOCIN) IV (PEDS and ADULTS)  1,500 mg Intravenous Q12H      Continuous Infusions:   sodium chloride 0.45% 100 mL/hr at 12/09/23 1752      PRN Meds:  (Magic mouthwash) 1:1:1 diphenhydrAMINE(Benadryl) 12.5mg/5ml liq, aluminum & magnesium hydroxide-simethicone (Maalox), LIDOcaine viscous  2%, 0.9%  NaCl infusion (for blood administration), 0.9%  NaCl infusion (for blood administration), 0.9%  NaCl infusion (for blood administration), acetaminophen, ondansetron, oxyCODONE-acetaminophen, traMADoL, vancomycin - pharmacy to dose     Assessment/Plan:  Febrile Neutropenia with    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 BP is stable   No changes in mental status , respiratory dysfunction or oliguria  Monitor clinical course closely   2 units of P-RBC ordered per Oncology today   Plt count improved to 25. Currently no signs of active bleeding   Replete and correct electrolytes   Phosphorus replaced IV and orally for level 1.6   Continue supportive treatment as indicated   Monitor blood cell counts   Review home meds and resume as appropriate   BB and CCB initiated for BP and HR control     Critical care note:  Critical care diagnosis: sepsis and neutropenia requiring IV antibiotic   Severe hypophosphatemia requiring IV replacement     Critical care interventions: Hands-on evaluation, review of labs/radiographs/records and discussion with patient and family if present  Critical care time spent: 35 minutes        VTE prophylaxis: SCDs    Patient condition:  poor long term prognosis     Anticipated discharge and Disposition:     Home with family     All diagnosis and differential diagnosis have been reviewed; assessment and plan has been documented; I have  personally reviewed the labs and test results that are presently available; I have reviewed the patients medication list; I have reviewed the consulting providers response and recommendations. I have reviewed or attempted to review medical records based upon their availability    All of the patient's questions have been  addressed and answered. Patient's is agreeable to the above stated plan. I will continue to monitor closely and make adjustments to medical management as needed.  ___________________    Brayden Batista MD   12/09/2023

## 2023-12-10 NOTE — PROGRESS NOTES
[facility] Medicine Progress Note        Chief Complaint: Inpatient Follow-up for abnormal labs     HPI:   Magdaleno Hirsch is a 23 y.o. 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 Hogan undergoing chemotherapy with Azacitidine/Venetoclax presented to the clinic on 12/8/23 for treatment, however noted to have severe anemia with Hgb 6.6 and thrombocytopenia with plt of 4. Pt was subsequently referred to the ED. Pt reports fatigue and some wakenss. It is reported he was  febrile in triage with temp of 102. No reports of fever, chills, nausea, vomiting, sore mouth, abd pain or diarrhea  at home and denies exposure to sick contacts.      Patient was recently admitted to our services from 11/20-11/23/2023 for neutropenic fever. Additional workup in the ED include Chest x-ray showed no acute chest disease. UA neg for infection. Negative for flu, RSV and Covid      Initial vital signs /92 pulse 129 respirations 20 temperature 100.2° F O2 saturation 96% on room air. Blood cultures were collected x2 sets.  Patient was started on IV vancomycin and cefepime therapy with acyclovir 800 mg p.o. b.i.d. One unit of P-RBC and one unit of Platelet ordered and  admitted to  services for further management.  Oncology Services were consulted to assist with managemen        Interval Hx:   Pt is lying in the bed and watching a movie on his computer. No distress noted.  Denies mouth sores , nausea , vomiting or diarrhea   Fever trended down. Tmax 100 early this morning   Tachycardia is better.   BP control is fair   Blood cultures x2- NG 24h  Labs - WBC 39K with , 67% blast, Hgb 9.1 after 2 units of P-RBC yesterday, Plt 16, Na 134, K 3.5, Cr 0.8, Phos 2.2    Case was discussed with patient's nurse and  on the floor.    Objective/physical exam:  General: In no acute distress, afebrile, On RA  Chest: Clear to auscultation bilaterally  Heart:  RRR, +S1, S2, no appreciable murmur  Abdomen: Soft, nontender, BS +  MSK: Warm, no lower extremity edema, no clubbing or cyanosis  Neurologic: Alert and oriented x4, Cranial nerve II-XII intact, Strength 5/5 in all 4 extremities    VITAL SIGNS: 24 HRS MIN & MAX LAST   Temp  Min: 98.5 °F (36.9 °C)  Max: 100 °F (37.8 °C) 98.5 °F (36.9 °C)   BP  Min: 130/83  Max: 147/96 130/83   Pulse  Min: 100  Max: 126  100   Resp  Min: 17  Max: 20 18   SpO2  Min: 96 %  Max: 99 % 98 %     I have reviewed the following labs:  Recent Labs   Lab 12/08/23  1434 12/09/23  0901 12/10/23  0457   WBC 39.90* 37.15  37.15* 39.07  39.07*   RBC 2.69* 2.42* 3.18*   HGB 7.3* 6.9* 9.1*   HCT 21.8* 19.9* 26.1*   MCV 81.0 82.2 82.1   MCH 27.1 28.5 28.6   MCHC 33.5 34.7 34.9   RDW 16.0 15.7 15.7   PLT 3* 25* 16*   MPV  --  11.6* 8.7     Recent Labs   Lab 12/04/23  1031 12/08/23  1434 12/09/23  0901 12/10/23  0457    131*  --  134*   K 3.9 4.0  --  3.5   CO2 26 20*  --  23   BUN 9.4 7.8*  --  7.7*   CREATININE 0.90 0.87  --  0.80   CALCIUM 9.0 8.8  --  8.2*   MG  --   --  2.00 2.20   ALBUMIN 3.4* 3.3*  --  2.7*   ALKPHOS 83 91  --  97   ALT 14 10  --  17   AST 29 33  --  30   BILITOT 0.9 1.8*  --  1.0     Microbiology Results (last 7 days)       Procedure Component Value Units Date/Time    Blood Culture [[id number]]  (Normal) Collected: 12/08/23 1434    Order Status: Completed Specimen: Blood Updated: 12/09/23 2000     CULTURE, BLOOD ([facility]) No Growth At 24 Hours    Blood Culture [[id number]]  (Normal) Collected: 12/08/23 1434    Order Status: Completed Specimen: Blood Updated: 12/09/23 2000     CULTURE, BLOOD ([facility]) No Growth At 24 Hours             See below for Radiology    Scheduled Med:   acyclovir  800 mg Oral BID    ceFEPime (MAXIPIME) IVPB  1 g Intravenous Q8H    diltiaZEM  120 mg Oral Nightly    metoprolol tartrate  25 mg Oral BID    pantoprazole  40 mg Oral Daily    vancomycin (VANCOCIN) IV (PEDS and ADULTS)  1,500 mg Intravenous Q12H       Continuous Infusions:   sodium chloride 0.45% 100 mL/hr at 12/09/23 1752      PRN Meds:  (Magic mouthwash) 1:1:1 diphenhydrAMINE(Benadryl) 12.5mg/5ml liq, aluminum & magnesium hydroxide-simethicone (Maalox), LIDOcaine viscous 2%, 0.9%  NaCl infusion (for blood administration), 0.9%  NaCl infusion (for blood administration), 0.9%  NaCl infusion (for blood administration), acetaminophen, acetaminophen, ondansetron, oxyCODONE-acetaminophen, traMADoL, vancomycin - pharmacy to dose     Assessment/Plan:  Febrile Neutropenia with ANC improved to 780 today   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 BB and CCB initiated for BP and HR control. Continue gentle hydration to mitigate insensible water loss due to fever  No changes in mental status , respiratory dysfunction or oliguria noted  Monitor clinical course closely   2 additional units of P-RBC transfused yesterday with improved Hgb to 9.1 today  Plt decreased to 16 today. Currently no signs of active bleeding reported.  Monitor blood cell counts  Continue transfusion support for Hgb 7 or under and Plt under 10.   Oncologist Dr. Mckeon suggested Pt may need repeat BM biopsy   Flow cytometry panel ordered per Oncology   Further plan per Primary Oncologist Dr. Hogan.     Review home meds and resume as appropriate   Continue supportive treatment         VTE  prophylaxis: SCSs. No pharmacologic prophylaxis given severe thrombocytopenia     Patient condition:  Poor prognosis     Anticipated discharge and Disposition:     Home with family     All diagnosis and differential diagnosis have been reviewed; assessment and plan has been documented; I have personally reviewed the labs and test results that are presently available; I have reviewed the patients medication list; I have reviewed the consulting providers response and recommendations. I have reviewed or attempted to review medical records based upon their availability    All of the patient's questions have been  addressed and answered. Patient's is agreeable to the above stated plan. I will continue to monitor closely and make adjustments to medical management as needed.  _____________________    Brayden Batista MD   12/10/2023

## 2023-12-10 NOTE — PROGRESS NOTES
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 Hogan, currently undergoing treatment with Azacitidine/Venetoclax presented to clinic on 12/8/23 for treatment, feeling poorly, noted to have severe anemia and thrombocytopenia. He was directed to ED for admission. Patient noted to be febrile and admitted for transfusion support and IV antibiotics to hospitalist services. CBC with WBC 39.9, H/H 7.3/21.8, Plt 3K. Patient given 1 unit of platelets and 1 unit PRBC. Started on Vancomycin and Cefepime. CXR, UA negative, Negative for flu, RSV and Covid. Oncology consulted for evaluation.     Today: Patient sitting up in bed this morning watching a movie. Fever is improved. Tmax 100.3. Culture with no growth. Labs today with improved H/H, WBC 39K, automated counter with 67% blasts, platelets improved to 16K. Path review of peripheral smear pending.    Oncology Treatment Plan:   OP AZACITIDINE 5-DAY (SUB-Q) + VENETOCLAX + PONATINIB     Medications:  Continuous Infusions:   sodium chloride 0.45% 100 mL/hr at 12/09/23 1752     Scheduled Meds:   acyclovir  800 mg Oral BID    ceFEPime (MAXIPIME) IVPB  1 g Intravenous Q8H    diltiaZEM  120 mg Oral Nightly    metoprolol tartrate  25 mg Oral BID    pantoprazole  40 mg Oral Daily    vancomycin (VANCOCIN) IV (PEDS and ADULTS)  1,500 mg Intravenous Q12H     PRN Meds:(Magic mouthwash) 1:1:1 diphenhydrAMINE(Benadryl) 12.5mg/5ml liq, aluminum & magnesium hydroxide-simethicone (Maalox), LIDOcaine viscous 2%, 0.9%  NaCl infusion (for blood administration), 0.9%  NaCl infusion (for blood administration), 0.9%  NaCl infusion (for blood  administration), acetaminophen, acetaminophen, ondansetron, oxyCODONE-acetaminophen, traMADoL, vancomycin - pharmacy to dose     Review of patient's allergies indicates:  No Known Allergies     Past Medical History:   Diagnosis Date    CML (chronic myelocytic leukemia)     HVD (hypertensive vascular disease)     Oropharyngeal candidiasis      Past Surgical History:   Procedure Laterality Date    BONE MARROW BIOPSY N/A 8/22/2022    Procedure: Biopsy-bone marrow;  Surgeon: Getachew Chen MD;  Location: [facility] OR;  Service: General;  Laterality: N/A;    BONE MARROW BIOPSY N/A 7/25/2023    Procedure: Biopsy-bone marrow;  Surgeon: Edi Carty MD;  Location: [facility] OR;  Service: General;  Laterality: N/A;    BONE MARROW BIOPSY N/A 8/28/2023    Procedure: Biopsy-bone marrow;  Surgeon: Moo Pina MD;  Location: [facility] OR;  Service: General;  Laterality: N/A;    BONE MARROW BIOPSY N/A 10/31/2023    Procedure: Biopsy-bone marrow;  Surgeon: Edi Carty MD;  Location: [facility] OR;  Service: General;  Laterality: N/A;    KNEE SURGERY Left      Family History       Problem Relation (Age of Onset)    Cancer Maternal Grandmother    Hypertension Maternal Grandmother          Tobacco Use    Smoking status: Never    Smokeless tobacco: Never   Substance and Sexual Activity    Alcohol use: Never    Drug use: Yes     Types: Marijuana    Sexual activity: Not on file       Review of Systems   All other systems reviewed and are negative.    Objective:     Vital Signs (Most Recent):  Temp: 99.2 °F (37.3 °C) (12/10/23 0746)  Pulse: 106 (12/10/23 0746)  Resp: 18 (12/10/23 0746)  BP: (!) 145/83 (12/10/23 0925)  SpO2: 96 % (12/10/23 0746) Vital Signs (24h Range):  Temp:  [98.5 °F (36.9 °C)-100.3 °F (37.9 °C)] 99.2 °F (37.3 °C)  Pulse:  [105-126] 106  Resp:  [17-20] 18  SpO2:  [96 %-99 %] 96 %  BP: (129-147)/(72-96) 145/83     Weight: 83.9 kg (185 lb)  Body mass index is 24.41 kg/m².  Body surface area is 2.08 meters squared.      Intake/Output  Summary (Last 24 hours) at 12/10/2023 0928  Last data filed at 12/9/2023 2227  Gross per 24 hour   Intake 3130 ml   Output --   Net 3130 ml       Physical Exam  Constitutional:       General: He is awake.      Comments: Young ill appearing male   HENT:      Head: Normocephalic and atraumatic.      Nose: Nose normal.      Mouth/Throat:      Mouth: Mucous membranes are moist.   Eyes:      General: Vision grossly intact.      Extraocular Movements: Extraocular movements intact.      Conjunctiva/sclera: Conjunctivae normal.   Cardiovascular:      Rate and Rhythm: Regular rhythm. Tachycardia present.      Heart sounds: Normal heart sounds.   Pulmonary:      Effort: Pulmonary effort is normal.      Breath sounds: Normal breath sounds.   Abdominal:      General: Bowel sounds are normal. There is no distension.      Palpations: Abdomen is soft.      Tenderness: There is no abdominal tenderness.   Musculoskeletal:      Cervical back: Normal range of motion and neck supple.      Right lower leg: No edema.      Left lower leg: No edema.   Lymphadenopathy:      Cervical: No cervical adenopathy.      Upper Body:      Right upper body: No supraclavicular adenopathy.      Left upper body: No supraclavicular adenopathy.   Skin:     General: Skin is warm.   Neurological:      Mental Status: He is alert and oriented to person, place, and time.      Motor: Motor function is intact.   Psychiatric:         Mood and Affect: Mood normal.         Speech: Speech normal.         Behavior: Behavior is cooperative.         Judgment: Judgment normal.         Significant Labs:   CBC:   Recent Labs   Lab 12/08/23  1434 12/09/23  0901 12/10/23  0457   WBC 39.90* 37.15  37.15* 39.07  39.07*   HGB 7.3* 6.9* 9.1*   HCT 21.8* 19.9* 26.1*   PLT 3* 25* 16*      and CMP:   Recent Labs   Lab 12/08/23  1434 12/10/23  0457   * 134*   K 4.0 3.5   CO2 20* 23   BUN 7.8* 7.7*   CREATININE 0.87 0.80   CALCIUM 8.8 8.2*   ALBUMIN 3.3* 2.7*   BILITOT 1.8*  1.0   ALKPHOS 91 97   AST 33 30   ALT 10 17         Diagnostic Results:  I have reviewed all pertinent imaging results/findings within the past 24 hours.    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  No transfusions today.  Continue IV antibiotics.  F/u cultures, negative thus far.  Fever improved.    Continue transfusion support:  Transfuse PRBC for Hgb <7g/dL and transfuse platelets for Platelets <10K.  Repeat labs in AM.    Dr. Hogan to return tomorrow.   Prognosis is poor, unfortunately. Will need to discuss goals of care.  Suspect progressive/recurrent acute leukemia. Peripheral smear and flow pending.  May need a repeat bone marrow biopsy.      Karma Mckeon MD  Hematology/Oncology  [facility] - Oncology [facility]

## 2023-12-10 NOTE — PROGRESS NOTES
Pharmacokinetic Assessment Follow Up: IV Vancomycin    Vancomycin serum concentration assessment(s):    The trough level was drawn correctly and can be used to guide therapy at this time. The measurement is below the desired definitive target range of 15 to 20 mcg/mL.    Vancomycin Regimen Plan:    Change to 1500 mg q8h and check trough at 0700 on 12/12/23.    Drug levels (last 3 results):  Recent Labs   Lab Result Units 12/10/23  1424   Vancomycin Trough ug/ml 8.3*          Patient brief summary:  Magdaleno Hirsch is a 23 y.o. male initiated on antimicrobial therapy with IV Vancomycin for treatment of sepsis      Drug Allergies:   Review of patient's allergies indicates:  No Known Allergies    Actual Body Weight:   83.9 kg    Renal Function:   Estimated Creatinine Clearance: 159.5 mL/min (based on SCr of 0.8 mg/dL).,     Dialysis Method (if applicable):  N/A    CBC (last 72 hours):  Recent Labs   Lab Result Units 12/08/23  1216 12/08/23  1434 12/09/23  0901 12/10/23  0457   WBC x10(3)/mcL 33.58* 39.90* 37.15  37.15* 39.07  39.07*   Hgb g/dL 6.6* 7.3* 6.9* 9.1*   Hct % 20.2* 21.8* 19.9* 26.1*   Platelet x10(3)/mcL 4* 3* 25* 16*   Mono % % 83.7 85.6  --   --    Monocytes % %  --   --  18 26   Eos % % 0.0 0.0  --   --    Basophil % % 0.0 0.0  --   --        Metabolic Panel (last 72 hours):  Recent Labs   Lab Result Units 12/08/23  1434 12/08/23  1548 12/09/23  0901 12/10/23  0457   Sodium Level mmol/L 131*  --   --  134*   Potassium Level mmol/L 4.0  --   --  3.5   Chloride mmol/L 98  --   --  102   Carbon Dioxide mmol/L 20*  --   --  23   Glucose Level mg/dL 102*  --   --  112*   Glucose, UA   --  Normal  --   --    Blood Urea Nitrogen mg/dL 7.8*  --   --  7.7*   Creatinine mg/dL 0.87  --   --  0.80   Albumin Level g/dL 3.3*  --   --  2.7*   Bilirubin Total mg/dL 1.8*  --   --  1.0   Alkaline Phosphatase unit/L 91  --   --  97   Aspartate Aminotransferase unit/L 33  --   --  30   Alanine Aminotransferase unit/L 10  --    --  17   Magnesium Level mg/dL  --   --  2.00 2.20   Phosphorus Level mg/dL  --   --  1.6* 2.2*       Vancomycin Administrations:  vancomycin given in the last 96 hours                     vancomycin 1.5 g in dextrose 5 % 250 mL IVPB (ready to mix) (mg) 1,500 mg New Bag 12/10/23 0330     1,500 mg New Bag 12/09/23 1550     1,500 mg New Bag  0300    vancomycin 1.5 g in dextrose 5 % 250 mL IVPB (ready to mix) (mg) 1,500 mg New Bag 12/08/23 1500                    Microbiologic Results:  Microbiology Results (last 7 days)       Procedure Component Value Units Date/Time    Blood Culture [[id number]]  (Normal) Collected: 12/08/23 1434    Order Status: Completed Specimen: Blood Updated: 12/09/23 2000     CULTURE, BLOOD ([facility]) No Growth At 24 Hours    Blood Culture [[id number]]  (Normal) Collected: 12/08/23 1434    Order Status: Completed Specimen: Blood Updated: 12/09/23 2000     CULTURE, BLOOD ([facility]) No Growth At 24 Hours

## 2023-12-11 LAB
ABO + RH BLD: NORMAL
ABO + RH BLD: NORMAL
ABS NEUT (OLG): 0.34 X10(3)/MCL (ref 2.1–9.2)
ALBUMIN SERPL-MCNC: 2.5 G/DL (ref 3.5–5)
ALBUMIN/GLOB SERPL: 0.8 RATIO (ref 1.1–2)
ALP SERPL-CCNC: 115 UNIT/L (ref 40–150)
ALT SERPL-CCNC: 26 UNIT/L (ref 0–55)
ANISOCYTOSIS BLD QL SMEAR: ABNORMAL
AST SERPL-CCNC: 43 UNIT/L (ref 5–34)
B-HCG SERPL QL: 60 %
BILIRUB SERPL-MCNC: 0.9 MG/DL
BLD PROD TYP BPU: NORMAL
BLD PROD TYP BPU: NORMAL
BLOOD UNIT EXPIRATION DATE: NORMAL
BLOOD UNIT EXPIRATION DATE: NORMAL
BLOOD UNIT TYPE CODE: 6200
BLOOD UNIT TYPE CODE: 8400
BUN SERPL-MCNC: 5.3 MG/DL (ref 8.9–20.6)
CALCIUM SERPL-MCNC: 7.9 MG/DL (ref 8.4–10.2)
CHLORIDE SERPL-SCNC: 102 MMOL/L (ref 98–107)
CO2 SERPL-SCNC: 24 MMOL/L (ref 22–29)
CREAT SERPL-MCNC: 0.72 MG/DL (ref 0.73–1.18)
CROSSMATCH INTERPRETATION: NORMAL
CROSSMATCH INTERPRETATION: NORMAL
DISPENSE STATUS: NORMAL
DISPENSE STATUS: NORMAL
ERYTHROCYTE [DISTWIDTH] IN BLOOD BY AUTOMATED COUNT: 16.1 % (ref 11.5–17)
GFR SERPLBLD CREATININE-BSD FMLA CKD-EPI: >60 MLS/MIN/1.73/M2
GLOBULIN SER-MCNC: 3.3 GM/DL (ref 2.4–3.5)
GLUCOSE SERPL-MCNC: 106 MG/DL (ref 74–100)
HCT VFR BLD AUTO: 23.7 % (ref 42–52)
HGB BLD-MCNC: 8 G/DL (ref 14–18)
INSTRUMENT WBC (OLG): 33.54 X10(3)/MCL
LYMPHOCYTES NFR BLD MANUAL: 15 %
LYMPHOCYTES NFR BLD MANUAL: 5.03 X10(3)/MCL
MACROCYTES BLD QL SMEAR: ABNORMAL
MAGNESIUM SERPL-MCNC: 1.8 MG/DL (ref 1.6–2.6)
MCH RBC QN AUTO: 29.1 PG (ref 27–31)
MCHC RBC AUTO-ENTMCNC: 33.8 G/DL (ref 33–36)
MCV RBC AUTO: 86.2 FL (ref 80–94)
MICROCYTES BLD QL SMEAR: ABNORMAL
MONOCYTES NFR BLD MANUAL: 17 %
MONOCYTES NFR BLD MANUAL: 5.7 X10(3)/MCL (ref 0.1–1.3)
MYELOCYTES NFR BLD MANUAL: 6 %
NEUTROPHILS NFR BLD MANUAL: 1 %
NRBC BLD AUTO-RTO: 0 %
PHOSPHATE SERPL-MCNC: 2.3 MG/DL (ref 2.3–4.7)
PLATELET # BLD AUTO: 9 X10(3)/MCL (ref 130–400)
PLATELET # BLD EST: ABNORMAL 10*3/UL
PLATELETS.RETICULATED NFR BLD AUTO: 2.1 % (ref 0.9–11.2)
PMV BLD AUTO: 9.5 FL (ref 7.4–10.4)
POIKILOCYTOSIS BLD QL SMEAR: ABNORMAL
POTASSIUM SERPL-SCNC: 3.1 MMOL/L (ref 3.5–5.1)
PROT SERPL-MCNC: 5.8 GM/DL (ref 6.4–8.3)
RBC # BLD AUTO: 2.75 X10(6)/MCL (ref 4.7–6.1)
RBC MORPH BLD: ABNORMAL
SODIUM SERPL-SCNC: 135 MMOL/L (ref 136–145)
UNIT NUMBER: NORMAL
UNIT NUMBER: NORMAL
WBC # SPEC AUTO: 33.54 X10(3)/MCL (ref 4.5–11.5)

## 2023-12-11 PROCEDURE — 87040 BLOOD CULTURE FOR BACTERIA: CPT | Performed by: INTERNAL MEDICINE

## 2023-12-11 PROCEDURE — 84100 ASSAY OF PHOSPHORUS: CPT | Performed by: INTERNAL MEDICINE

## 2023-12-11 PROCEDURE — 25000003 PHARM REV CODE 250: Performed by: INTERNAL MEDICINE

## 2023-12-11 PROCEDURE — 63600175 PHARM REV CODE 636 W HCPCS: Performed by: INTERNAL MEDICINE

## 2023-12-11 PROCEDURE — 83735 ASSAY OF MAGNESIUM: CPT | Performed by: INTERNAL MEDICINE

## 2023-12-11 PROCEDURE — P9037 PLATE PHERES LEUKOREDU IRRAD: HCPCS | Performed by: INTERNAL MEDICINE

## 2023-12-11 PROCEDURE — 36430 TRANSFUSION BLD/BLD COMPNT: CPT

## 2023-12-11 PROCEDURE — 21400001 HC TELEMETRY ROOM

## 2023-12-11 PROCEDURE — 80053 COMPREHEN METABOLIC PANEL: CPT | Performed by: INTERNAL MEDICINE

## 2023-12-11 PROCEDURE — 27000207 HC ISOLATION

## 2023-12-11 PROCEDURE — 25000003 PHARM REV CODE 250: Performed by: STUDENT IN AN ORGANIZED HEALTH CARE EDUCATION/TRAINING PROGRAM

## 2023-12-11 PROCEDURE — 99232 SBSQ HOSP IP/OBS MODERATE 35: CPT | Mod: ,,, | Performed by: INTERNAL MEDICINE

## 2023-12-11 PROCEDURE — 85027 COMPLETE CBC AUTOMATED: CPT | Performed by: INTERNAL MEDICINE

## 2023-12-11 PROCEDURE — 63600175 PHARM REV CODE 636 W HCPCS: Performed by: STUDENT IN AN ORGANIZED HEALTH CARE EDUCATION/TRAINING PROGRAM

## 2023-12-11 RX ORDER — POTASSIUM CHLORIDE 20 MEQ/1
40 TABLET, EXTENDED RELEASE ORAL ONCE
Status: COMPLETED | OUTPATIENT
Start: 2023-12-11 | End: 2023-12-11

## 2023-12-11 RX ORDER — HYDROCODONE BITARTRATE AND ACETAMINOPHEN 500; 5 MG/1; MG/1
TABLET ORAL
Status: DISCONTINUED | OUTPATIENT
Start: 2023-12-11 | End: 2023-12-15 | Stop reason: HOSPADM

## 2023-12-11 RX ORDER — CLONIDINE HYDROCHLORIDE 0.1 MG/1
0.1 TABLET ORAL 3 TIMES DAILY PRN
Status: DISCONTINUED | OUTPATIENT
Start: 2023-12-11 | End: 2023-12-15 | Stop reason: HOSPADM

## 2023-12-11 RX ADMIN — CEFEPIME 1 G: 1 INJECTION, POWDER, FOR SOLUTION INTRAMUSCULAR; INTRAVENOUS at 07:12

## 2023-12-11 RX ADMIN — PANTOPRAZOLE SODIUM 40 MG: 40 TABLET, DELAYED RELEASE ORAL at 09:12

## 2023-12-11 RX ADMIN — POTASSIUM CHLORIDE 40 MEQ: 1500 TABLET, EXTENDED RELEASE ORAL at 09:12

## 2023-12-11 RX ADMIN — OXYCODONE AND ACETAMINOPHEN 1 TABLET: 10; 325 TABLET ORAL at 01:12

## 2023-12-11 RX ADMIN — VANCOMYCIN HYDROCHLORIDE 1500 MG: 1.5 INJECTION, POWDER, LYOPHILIZED, FOR SOLUTION INTRAVENOUS at 09:12

## 2023-12-11 RX ADMIN — CEFEPIME 1 G: 1 INJECTION, POWDER, FOR SOLUTION INTRAMUSCULAR; INTRAVENOUS at 11:12

## 2023-12-11 RX ADMIN — VANCOMYCIN HYDROCHLORIDE 1500 MG: 1.5 INJECTION, POWDER, LYOPHILIZED, FOR SOLUTION INTRAVENOUS at 01:12

## 2023-12-11 RX ADMIN — METOPROLOL TARTRATE 25 MG: 25 TABLET, FILM COATED ORAL at 09:12

## 2023-12-11 RX ADMIN — OXYCODONE AND ACETAMINOPHEN 1 TABLET: 10; 325 TABLET ORAL at 08:12

## 2023-12-11 RX ADMIN — CEFEPIME 1 G: 1 INJECTION, POWDER, FOR SOLUTION INTRAMUSCULAR; INTRAVENOUS at 03:12

## 2023-12-11 RX ADMIN — ACETAMINOPHEN 650 MG: 325 TABLET, FILM COATED ORAL at 07:12

## 2023-12-11 RX ADMIN — ACYCLOVIR 800 MG: 800 TABLET ORAL at 08:12

## 2023-12-11 RX ADMIN — METOPROLOL TARTRATE 25 MG: 25 TABLET, FILM COATED ORAL at 08:12

## 2023-12-11 RX ADMIN — VANCOMYCIN HYDROCHLORIDE 1500 MG: 1.5 INJECTION, POWDER, LYOPHILIZED, FOR SOLUTION INTRAVENOUS at 05:12

## 2023-12-11 RX ADMIN — DILTIAZEM HYDROCHLORIDE 120 MG: 120 CAPSULE, COATED, EXTENDED RELEASE ORAL at 08:12

## 2023-12-11 RX ADMIN — ACYCLOVIR 800 MG: 800 TABLET ORAL at 09:12

## 2023-12-11 RX ADMIN — OXYCODONE AND ACETAMINOPHEN 1 TABLET: 10; 325 TABLET ORAL at 02:12

## 2023-12-11 NOTE — PLAN OF CARE
12/11/23 1120   Discharge Assessment   Assessment Type Discharge Planning Assessment   Confirmed/corrected address, phone number and insurance Yes   Confirmed Demographics Correct on Facesheet   Source of Information patient   When was your last doctors appointment?   (N/A)   Communicated LISA with patient/caregiver Yes   Reason For Admission Sepsis   People in Home other relative(s)   Do you expect to return to your current living situation? Yes   Do you have help at home or someone to help you manage your care at home? Yes   Who are your caregiver(s) and their phone number(s)? Grandparent: Kristy eKller: [phone]   Prior to hospitilization cognitive status: Unable to Assess   Current cognitive status: Alert/Oriented   Walking or Climbing Stairs Difficulty no   Dressing/Bathing Difficulty no   Home Accessibility wheelchair accessible   Home Layout Able to live on 1st floor   Equipment Currently Used at Home none   Readmission within 30 days? No   Patient currently being followed by outpatient case management? No   Do you currently have service(s) that help you manage your care at home? No   Do you take prescription medications? Yes   Do you have prescription coverage? Yes   Coverage Medicaid   Do you have any problems affording any of your prescribed medications? No   Is the patient taking medications as prescribed? yes   Who is going to help you get home at discharge? Patient reports his girlfriend.   How do you get to doctors appointments? car, drives self   Are you on dialysis? No   Do you take coumadin? No   Discharge Plan A Home with family   Discharge Plan B Home   DME Needed Upon Discharge  none   Discharge Plan discussed with: Patient   Transition of Care Barriers None   OTHER   Name(s) of People in Home Patient resides with his aunt and uncle at home.       Patient reports he does not have a PCP but would like assistance with getting established with a PCP at discharge.  Patient reports his  girlfriend will transport him at discharge.  No barriers to discharge at this time.

## 2023-12-11 NOTE — PLAN OF CARE
Problem: Adult Inpatient Plan of Care  Goal: Plan of Care Review  Outcome: Ongoing, Progressing  Flowsheets (Taken 12/11/2023 1454)  Plan of Care Reviewed With: patient  Goal: Patient-Specific Goal (Individualized)  Outcome: Ongoing, Progressing  Goal: Absence of Hospital-Acquired Illness or Injury  Outcome: Ongoing, Progressing  Intervention: Identify and Manage Fall Risk  Flowsheets (Taken 12/11/2023 1454)  Safety Promotion/Fall Prevention:   assistive device/personal item within reach   medications reviewed   nonskid shoes/socks when out of bed   side rails raised x 2  Intervention: Prevent Skin Injury  Flowsheets (Taken 12/11/2023 1454)  Body Position: position changed independently  Skin Protection:   adhesive use limited   incontinence pads utilized   tubing/devices free from skin contact  Intervention: Prevent and Manage VTE (Venous Thromboembolism) Risk  Flowsheets (Taken 12/11/2023 1454)  Activity Management: Ambulated in room - L4  VTE Prevention/Management:   ambulation promoted   bleeding risk assessed   ROM (active) performed  Range of Motion:   active ROM (range of motion) encouraged   ROM (range of motion) performed  Intervention: Prevent Infection  Flowsheets (Taken 12/11/2023 1454)  Infection Prevention:   rest/sleep promoted   single patient room provided  Goal: Optimal Comfort and Wellbeing  Outcome: Ongoing, Progressing  Intervention: Monitor Pain and Promote Comfort  Flowsheets (Taken 12/11/2023 1454)  Pain Management Interventions:   care clustered   medication offered   pain management plan reviewed with patient/caregiver   pillow support provided   position adjusted  Intervention: Provide Person-Centered Care  Flowsheets (Taken 12/11/2023 1454)  Trust Relationship/Rapport: care explained  Goal: Readiness for Transition of Care  Outcome: Ongoing, Progressing     Problem: Fall Injury Risk  Goal: Absence of Fall and Fall-Related Injury  Outcome: Ongoing, Progressing  Intervention: Identify and  Manage Contributors  Flowsheets (Taken 12/11/2023 1454)  Self-Care Promotion: independence encouraged  Medication Review/Management: medications reviewed  Intervention: Promote Injury-Free Environment  Flowsheets (Taken 12/11/2023 1454)  Safety Promotion/Fall Prevention:   assistive device/personal item within reach   medications reviewed   nonskid shoes/socks when out of bed   side rails raised x 2

## 2023-12-11 NOTE — PROGRESS NOTES
[facility] Hospital Medicine Progress Note        Chief Complaint: Inpatient Follow-up for abnormal labs     HPI:     Magdaleno Hirsch is a 23 y.o. 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 Hogan undergoing chemotherapy with Azacitidine/Venetoclax presented to the clinic on 12/8/23 for treatment, however noted to have severe anemia with Hgb 6.6 and thrombocytopenia with plt of 4. Pt was subsequently referred to the ED. Pt reports fatigue and some wakenss. It is reported he was  febrile in triage with temp of 102. No reports of fever, chills, nausea, vomiting, sore mouth, abd pain or diarrhea  at home and denies exposure to sick contacts.      Patient was recently admitted to our services from 11/20-11/23/2023 for neutropenic fever. Additional workup in the ED include Chest x-ray showed no acute chest disease. UA neg for infection. Negative for flu, RSV and Covid      Initial vital signs /92 pulse 129 respirations 20 temperature 100.2° F O2 saturation 96% on room air. Blood cultures were collected x2 sets.  Patient was started on IV vancomycin and cefepime therapy with acyclovir 800 mg p.o. b.i.d. One unit of P-RBC and one unit of Platelet ordered and  admitted to  services for further management.  Oncology Services were consulted to assist with managemen      Interval Hx:   Fever of 101.5 early this morning  along with tachycardia   WBC 33K with KHE024, Plt 9, Blast 60  Blood cultures repeated today   Previous blood cultures x 2 from 12/8- NG 48h     Case was discussed with patient's nurse and  on the floor.    Objective/physical exam:  General: In no acute distress, afebrile  Chest: Clear to auscultation bilaterally  Heart: RRR, +S1, S2, no appreciable murmur  Abdomen: Soft, nontender, BS +  MSK: Warm, no lower extremity edema, no clubbing or cyanosis  Neurologic: Alert and oriented x4, Cranial nerve II-XII intact, Strength 5/5  in all 4 extremities    VITAL SIGNS: 24 HRS MIN & MAX LAST   Temp  Min: 98.4 °F (36.9 °C)  Max: 102.6 °F (39.2 °C) 99.3 °F (37.4 °C)   BP  Min: 126/76  Max: 164/85 (!) 158/96   Pulse  Min: 78  Max: 114  110   Resp  Min: 17  Max: 20 20   SpO2  Min: 95 %  Max: 99 % 97 %     I have reviewed the following labs:  Recent Labs   Lab 12/09/23  0901 12/10/23  0457 12/11/23  0354   WBC 37.15  37.15* 39.07  39.07* 33.54  33.54*   RBC 2.42* 3.18* 2.75*   HGB 6.9* 9.1* 8.0*   HCT 19.9* 26.1* 23.7*   MCV 82.2 82.1 86.2   MCH 28.5 28.6 29.1   MCHC 34.7 34.9 33.8   RDW 15.7 15.7 16.1   PLT 25* 16* 9*   MPV 11.6* 8.7 9.5     Recent Labs   Lab 12/08/23  1434 12/09/23  0901 12/10/23  0457 12/11/23  0354   *  --  134* 135*   K 4.0  --  3.5 3.1*   CO2 20*  --  23 24   BUN 7.8*  --  7.7* 5.3*   CREATININE 0.87  --  0.80 0.72*   CALCIUM 8.8  --  8.2* 7.9*   MG  --  2.00 2.20 1.80   ALBUMIN 3.3*  --  2.7* 2.5*   ALKPHOS 91  --  97 115   ALT 10  --  17 26   AST 33  --  30 43*   BILITOT 1.8*  --  1.0 0.9     Microbiology Results (last 7 days)       Procedure Component Value Units Date/Time    Blood Culture [[id number]] Collected: 12/11/23 0816    Order Status: Resulted Specimen: Blood Updated: 12/11/23 0821    Blood Culture [[id number]] Collected: 12/11/23 0816    Order Status: Resulted Specimen: Blood Updated: 12/11/23 0820    Blood Culture [[id number]]  (Normal) Collected: 12/08/23 1434    Order Status: Completed Specimen: Blood Updated: 12/10/23 2000     CULTURE, BLOOD ([facility]) No Growth At 48 Hours    Blood Culture [[id number]]  (Normal) Collected: 12/08/23 1434    Order Status: Completed Specimen: Blood Updated: 12/10/23 2000     CULTURE, BLOOD ([facility]) No Growth At 48 Hours             See below for Radiology    Scheduled Med:   acyclovir  800 mg Oral BID    ceFEPime (MAXIPIME) IVPB  1 g Intravenous Q8H    diltiaZEM  120 mg Oral Nightly    metoprolol tartrate  25 mg Oral BID    pantoprazole  40 mg Oral Daily    vancomycin (VANCOCIN)  IV (PEDS and ADULTS)  1,500 mg Intravenous Q8H      Continuous Infusions:     PRN Meds:  (Magic mouthwash) 1:1:1 diphenhydrAMINE(Benadryl) 12.5mg/5ml liq, aluminum & magnesium hydroxide-simethicone (Maalox), LIDOcaine viscous 2%, 0.9%  NaCl infusion (for blood administration), 0.9%  NaCl infusion (for blood administration), 0.9%  NaCl infusion (for blood administration), 0.9%  NaCl infusion (for blood administration), acetaminophen, acetaminophen, ondansetron, oxyCODONE-acetaminophen, traMADoL, vancomycin - pharmacy to dose     Assessment/Plan:  Febrile Neutropenia with ANC down to 335 today   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  Sepsis due to unspecified infection   Anemia in neoplastic disease requiring P-RBC transfusion- 1 unit on 12/8, 2 units on 12/9  Severe Thrombocytopenia requiring transfusion support- 1 unit on 12/8, 1 unit on 12/11/23  Chemotherapy induced cytopenias   Hyponatremia , mild -POA  Severe hypophosphatemia , POA- improved   Hyperbilirubinemia , mild- resolved      Hx- HTN, Oropharyngeal candidiasis      Plan-  Hold Azacitidine and Venetoclax   Pt remains significantly neutropenic with intermittent fever  Continue empiric antibiotic include Cefepime and Vancomycin while severely neutropenic   Continue Neutropenia precaution  Continue transfusion support for Hgb 7 or under and Plt under 10.  1 unit of platelet ordered today    Oncologist Dr. Mckeon suggested Pt may need repeat BM biopsy   Flow cytometry panel ordered per Oncology   Pt's Primary Oncologist Dr. Hogan visited pt today and plans to discuss case with Dr. Bijan Green at [facility] regarding failed current treatment.      Review home meds and resume as appropriate   Continue supportive treatment           VTE prophylaxis: SCDs    Patient condition:  Very poor prognosis     Anticipated discharge and Disposition:     TBD     All diagnosis and differential diagnosis have been reviewed; assessment  and plan has been documented; I have personally reviewed the labs and test results that are presently available; I have reviewed the patients medication list; I have reviewed the consulting providers response and recommendations. I have reviewed or attempted to review medical records based upon their availability    All of the patient's questions have been  addressed and answered. Patient's is agreeable to the above stated plan. I will continue to monitor closely and make adjustments to medical management as needed.  ________________    Brayden Batista MD   12/11/2023

## 2023-12-11 NOTE — PROGRESS NOTES
"Subjective:       Patient ID: Magdaleno Hirsch is a 23 y.o. male.    Chief Complaint: "I feel fine"    Diagnosis:  1. 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  2. 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  3. Conjuctival hemorrhage secondary to 1.    Current Treatment:   Azacitidine 5/28 days, venetoclax 50 mg p.o. daily for 10/28 days, and ponatinib 30 mg p.o. daily every day on 10/09/2023.  Increased ponatinib to 45 mg p.o. daily on 11/06/2023.    Treatment History:  Hydroxyurea 2g every 12 hours  Dasatinib 100 mg po daily, unsure on start date as patient was lost to follow up.  Patient received CLIA and ponatinib on 06/16/2023.  Planning on AlloSCT.    HPI:  Patient with no real medical history who went in for a routine eye exam on 08/18/2022 to update his eye glasses prescription.  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  He had bilateral Valdez spots with vessel tortuosity.  The optometrist recommended that the patient have blood work including testing for sickle cell disease.  The patient presented to the emergency department.  CBC in the emergency department revealed a white blood cell count of 411,900. Hemoglobin was 8.3, platelets were normal at 375,000 hundred and seventy five thousand.  Differential was suggestive of CML.  Coagulation studies revealed an INR of 1.38, PTT of 36.4 and a fibrinogen of 292.  Patient was going to present to [place], however they were on diversion.  He was transferred from [facility] to [facility].  Hematology consulted.  Patient underwent bone marrow biopsy on 08/22/2022, this revealed CML, chronic phase, BCR/ABL1 positive.  Ordered dasatinib 100 mg p.o. daily as of 10/10/2022, unsure when patient started taking medication due to him being lost to follow-up.  Patient went into blast crisis, received CLIA and ponatinib on 06/16/2023.  He had a repeat bone marrow biopsy on 08/28/2023, this " had to be sent off to [facility], however the final diagnosis was persistent/recurrent myeloid neoplasm with 10-15% bone marrow blasts and 8% circulating blasts.  Started azacitidine, venetoclax, ponatinib as mentioned above on 10/09/2023.  Bone marrow biopsy done after 1 cycle done on 10/31/2023 revealed relapsed/persistent acute myeloid leukemia with 11% circulating blasts and 70-80% blasts by IHC in the bone marrow.    Interval History:   Patient admitted to the hospital on Friday, 12/08/2023.  He was having weakness and was requiring blood transfusion and platelet transfusion.  His peripheral blood smear on 12/09/2023 showed innumerable blasts.    Past Medical History:   Diagnosis Date    CML (chronic myelocytic leukemia)     HVD (hypertensive vascular disease)     Oropharyngeal candidiasis       Past Surgical History:   Procedure Laterality Date    BONE MARROW BIOPSY N/A 8/22/2022    Procedure: Biopsy-bone marrow;  Surgeon: Getachew Chen MD;  Location: [facility] OR;  Service: General;  Laterality: N/A;    BONE MARROW BIOPSY N/A 7/25/2023    Procedure: Biopsy-bone marrow;  Surgeon: Edi Carty MD;  Location: [facility] OR;  Service: General;  Laterality: N/A;    BONE MARROW BIOPSY N/A 8/28/2023    Procedure: Biopsy-bone marrow;  Surgeon: Moo Pina MD;  Location: [facility] OR;  Service: General;  Laterality: N/A;    BONE MARROW BIOPSY N/A 10/31/2023    Procedure: Biopsy-bone marrow;  Surgeon: Edi Carty MD;  Location: [facility] OR;  Service: General;  Laterality: N/A;    KNEE SURGERY Left      Social History     Socioeconomic History    Marital status: Single   Tobacco Use    Smoking status: Never    Smokeless tobacco: Never   Substance and Sexual Activity    Alcohol use: Never    Drug use: Yes     Types: Marijuana      Family History   Problem Relation Age of Onset    Hypertension Maternal Grandmother     Cancer Maternal Grandmother            Review of Systems   Constitutional:  Positive for fatigue. Negative for  chills, diaphoresis and unexpected weight change.   HENT:  Negative for nasal congestion and sore throat.    Eyes:  Negative for pain.   Respiratory:  Negative for cough, chest tightness and shortness of breath.    Cardiovascular:  Negative for chest pain, palpitations and leg swelling.   Gastrointestinal:  Negative for abdominal distention, abdominal pain, blood in stool, constipation and diarrhea.   Genitourinary:  Negative for dysuria, frequency and hematuria.   Musculoskeletal:  Negative for arthralgias and back pain.   Integumentary:  Negative for rash.   Neurological:  Negative for dizziness, weakness, numbness and headaches.   Hematological:  Negative for adenopathy. Bruises/bleeds easily.   Psychiatric/Behavioral:  Negative for confusion.          Objective:      Physical Exam  Vitals reviewed.   Constitutional:       General: He is awake.      Appearance: Normal appearance.   HENT:      Head: Normocephalic and atraumatic.      Right Ear: Hearing normal.      Left Ear: Hearing normal.      Nose: Nose normal.      Mouth/Throat:      Comments:     Eyes:      General: Lids are normal. Vision grossly intact.      Extraocular Movements: Extraocular movements intact.      Conjunctiva/sclera: Conjunctivae normal.   Cardiovascular:      Rate and Rhythm: Normal rate and regular rhythm.      Pulses: Normal pulses.      Heart sounds: Normal heart sounds.   Pulmonary:      Effort: Pulmonary effort is normal.      Breath sounds: Normal breath sounds. No wheezing, rhonchi or rales.   Abdominal:      General: Bowel sounds are normal.      Palpations: Abdomen is soft.      Tenderness: There is no abdominal tenderness.   Musculoskeletal:      Cervical back: Full passive range of motion without pain.      Right lower leg: No edema.      Left lower leg: No edema.   Skin:     General: Skin is warm.   Neurological:      General: No focal deficit present.      Mental Status: He is alert and oriented to person, place, and time.    Psychiatric:         Attention and Perception: Attention normal.         Mood and Affect: Mood and affect normal.         Behavior: Behavior is cooperative.         LABS AND IMAGING REVIEWED IN EPIC  Lab Review:        Assessment:   CML, chronic phase, transformed to blast crisis  Pancytopenia secondary to Hydrea  Refractory thrombocytopenia - improving     Plan:     Patient on azacitidine, venetoclax, ponatinib.  Unfortunately, it looks as though his disease is progressing.    Continue transfusion support, packed red blood cells for hemoglobin of less than 7 and platelets for platelet count of less than 10,000. Would require a platelet transfusion today.    I will discuss with Dr. Bijan Green, but I feel that he is failing current therapy.    Paul Hogan II, MD

## 2023-12-12 LAB
ABS NEUT (OLG): 0.32 X10(3)/MCL (ref 2.1–9.2)
ANION GAP SERPL CALC-SCNC: 8 MEQ/L
ANISOCYTOSIS BLD QL SMEAR: ABNORMAL
B-HCG SERPL QL: 82 %
BUN SERPL-MCNC: 4.6 MG/DL (ref 8.9–20.6)
CALCIUM SERPL-MCNC: 8.1 MG/DL (ref 8.4–10.2)
CHLORIDE SERPL-SCNC: 103 MMOL/L (ref 98–107)
CO2 SERPL-SCNC: 26 MMOL/L (ref 22–29)
CREAT SERPL-MCNC: 0.76 MG/DL (ref 0.73–1.18)
CREAT/UREA NIT SERPL: 6
ERYTHROCYTE [DISTWIDTH] IN BLOOD BY AUTOMATED COUNT: 16.3 % (ref 11.5–17)
GFR SERPLBLD CREATININE-BSD FMLA CKD-EPI: >60 MLS/MIN/1.73/M2
GLUCOSE SERPL-MCNC: 104 MG/DL (ref 74–100)
HCT VFR BLD AUTO: 21.3 % (ref 42–52)
HGB BLD-MCNC: 7.3 G/DL (ref 14–18)
INSTRUMENT WBC (OLG): 32.38 X10(3)/MCL
LYMPHOCYTES NFR BLD MANUAL: 1.3 X10(3)/MCL
LYMPHOCYTES NFR BLD MANUAL: 4 %
MAGNESIUM SERPL-MCNC: 1.8 MG/DL (ref 1.6–2.6)
MCH RBC QN AUTO: 28.5 PG (ref 27–31)
MCHC RBC AUTO-ENTMCNC: 34.3 G/DL (ref 33–36)
MCV RBC AUTO: 83.2 FL (ref 80–94)
MICROCYTES BLD QL SMEAR: ABNORMAL
MONOCYTES NFR BLD MANUAL: 13 %
MONOCYTES NFR BLD MANUAL: 4.21 X10(3)/MCL (ref 0.1–1.3)
NEUTROPHILS NFR BLD MANUAL: 1 %
NRBC BLD AUTO-RTO: 0 %
PHOSPHATE SERPL-MCNC: 2 MG/DL (ref 2.3–4.7)
PLATELET # BLD AUTO: 24 X10(3)/MCL (ref 130–400)
PLATELET # BLD EST: ABNORMAL 10*3/UL
PLATELETS.RETICULATED NFR BLD AUTO: 3.1 % (ref 0.9–11.2)
PMV BLD AUTO: 10.4 FL (ref 7.4–10.4)
POIKILOCYTOSIS BLD QL SMEAR: ABNORMAL
POTASSIUM SERPL-SCNC: 3.3 MMOL/L (ref 3.5–5.1)
RBC # BLD AUTO: 2.56 X10(6)/MCL (ref 4.7–6.1)
RBC MORPH BLD: ABNORMAL
SODIUM SERPL-SCNC: 137 MMOL/L (ref 136–145)
VANCOMYCIN TROUGH SERPL-MCNC: 12.3 UG/ML (ref 15–20)
WBC # SPEC AUTO: 32.38 X10(3)/MCL (ref 4.5–11.5)

## 2023-12-12 PROCEDURE — 25000003 PHARM REV CODE 250: Performed by: NURSE PRACTITIONER

## 2023-12-12 PROCEDURE — 25000003 PHARM REV CODE 250: Performed by: INTERNAL MEDICINE

## 2023-12-12 PROCEDURE — 63600175 PHARM REV CODE 636 W HCPCS: Performed by: INTERNAL MEDICINE

## 2023-12-12 PROCEDURE — 25500020 PHARM REV CODE 255: Performed by: INTERNAL MEDICINE

## 2023-12-12 PROCEDURE — 25000003 PHARM REV CODE 250: Performed by: STUDENT IN AN ORGANIZED HEALTH CARE EDUCATION/TRAINING PROGRAM

## 2023-12-12 PROCEDURE — 99232 SBSQ HOSP IP/OBS MODERATE 35: CPT | Mod: ,,, | Performed by: INTERNAL MEDICINE

## 2023-12-12 PROCEDURE — 63600175 PHARM REV CODE 636 W HCPCS: Performed by: STUDENT IN AN ORGANIZED HEALTH CARE EDUCATION/TRAINING PROGRAM

## 2023-12-12 PROCEDURE — 27000207 HC ISOLATION

## 2023-12-12 PROCEDURE — 84100 ASSAY OF PHOSPHORUS: CPT | Performed by: INTERNAL MEDICINE

## 2023-12-12 PROCEDURE — 85027 COMPLETE CBC AUTOMATED: CPT | Performed by: INTERNAL MEDICINE

## 2023-12-12 PROCEDURE — 83735 ASSAY OF MAGNESIUM: CPT | Performed by: INTERNAL MEDICINE

## 2023-12-12 PROCEDURE — 21400001 HC TELEMETRY ROOM

## 2023-12-12 PROCEDURE — 80202 ASSAY OF VANCOMYCIN: CPT | Performed by: INTERNAL MEDICINE

## 2023-12-12 PROCEDURE — 80048 BASIC METABOLIC PNL TOTAL CA: CPT | Performed by: INTERNAL MEDICINE

## 2023-12-12 RX ORDER — LABETALOL HYDROCHLORIDE 5 MG/ML
20 INJECTION, SOLUTION INTRAVENOUS EVERY 4 HOURS PRN
Status: DISCONTINUED | OUTPATIENT
Start: 2023-12-12 | End: 2023-12-15 | Stop reason: HOSPADM

## 2023-12-12 RX ORDER — SODIUM,POTASSIUM PHOSPHATES 280-250MG
1 POWDER IN PACKET (EA) ORAL
Status: DISCONTINUED | OUTPATIENT
Start: 2023-12-12 | End: 2023-12-15 | Stop reason: HOSPADM

## 2023-12-12 RX ADMIN — OXYCODONE AND ACETAMINOPHEN 1 TABLET: 10; 325 TABLET ORAL at 09:12

## 2023-12-12 RX ADMIN — LABETALOL HYDROCHLORIDE 20 MG: 5 INJECTION, SOLUTION INTRAVENOUS at 05:12

## 2023-12-12 RX ADMIN — DILTIAZEM HYDROCHLORIDE 120 MG: 120 CAPSULE, COATED, EXTENDED RELEASE ORAL at 08:12

## 2023-12-12 RX ADMIN — CLONIDINE HYDROCHLORIDE 0.1 MG: 0.1 TABLET ORAL at 03:12

## 2023-12-12 RX ADMIN — POTASSIUM & SODIUM PHOSPHATES POWDER PACK 280-160-250 MG 1 PACKET: 280-160-250 PACK at 04:12

## 2023-12-12 RX ADMIN — CEFEPIME 1 G: 1 INJECTION, POWDER, FOR SOLUTION INTRAMUSCULAR; INTRAVENOUS at 02:12

## 2023-12-12 RX ADMIN — VANCOMYCIN HYDROCHLORIDE 1750 MG: 500 INJECTION, POWDER, LYOPHILIZED, FOR SOLUTION INTRAVENOUS at 05:12

## 2023-12-12 RX ADMIN — OXYCODONE AND ACETAMINOPHEN 1 TABLET: 10; 325 TABLET ORAL at 08:12

## 2023-12-12 RX ADMIN — POTASSIUM & SODIUM PHOSPHATES POWDER PACK 280-160-250 MG 1 PACKET: 280-160-250 PACK at 12:12

## 2023-12-12 RX ADMIN — VANCOMYCIN HYDROCHLORIDE 1750 MG: 500 INJECTION, POWDER, LYOPHILIZED, FOR SOLUTION INTRAVENOUS at 09:12

## 2023-12-12 RX ADMIN — VANCOMYCIN HYDROCHLORIDE 1500 MG: 1.5 INJECTION, POWDER, LYOPHILIZED, FOR SOLUTION INTRAVENOUS at 01:12

## 2023-12-12 RX ADMIN — ACYCLOVIR 800 MG: 800 TABLET ORAL at 09:12

## 2023-12-12 RX ADMIN — PANTOPRAZOLE SODIUM 40 MG: 40 TABLET, DELAYED RELEASE ORAL at 09:12

## 2023-12-12 RX ADMIN — ACYCLOVIR 800 MG: 800 TABLET ORAL at 08:12

## 2023-12-12 RX ADMIN — OXYCODONE AND ACETAMINOPHEN 1 TABLET: 10; 325 TABLET ORAL at 01:12

## 2023-12-12 RX ADMIN — IOPAMIDOL 100 ML: 755 INJECTION, SOLUTION INTRAVENOUS at 12:12

## 2023-12-12 RX ADMIN — METOPROLOL TARTRATE 25 MG: 25 TABLET, FILM COATED ORAL at 09:12

## 2023-12-12 RX ADMIN — CEFEPIME 2 G: 2 INJECTION, POWDER, FOR SOLUTION INTRAVENOUS at 08:12

## 2023-12-12 RX ADMIN — METOPROLOL TARTRATE 25 MG: 25 TABLET, FILM COATED ORAL at 08:12

## 2023-12-12 RX ADMIN — CEFEPIME 2 G: 2 INJECTION, POWDER, FOR SOLUTION INTRAVENOUS at 01:12

## 2023-12-12 NOTE — PROGRESS NOTES
"Subjective:       Patient ID: Magdaleno Hirsch is a 23 y.o. male.    Chief Complaint: "I feel fine"    Diagnosis:  1. 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  2. 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  3. Conjuctival hemorrhage secondary to 1.    Current Treatment:   Azacitidine 5/28 days, venetoclax 50 mg p.o. daily for 10/28 days, and ponatinib 30 mg p.o. daily every day on 10/09/2023.  Increased ponatinib to 45 mg p.o. daily on 11/06/2023.    Treatment History:  Hydroxyurea 2g every 12 hours  Dasatinib 100 mg po daily, unsure on start date as patient was lost to follow up.  Patient received CLIA and ponatinib on 06/16/2023.  Planning on AlloSCT.    HPI:  Patient with no real medical history who went in for a routine eye exam on 08/18/2022 to update his eye glasses prescription.  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  He had bilateral Valdez spots with vessel tortuosity.  The optometrist recommended that the patient have blood work including testing for sickle cell disease.  The patient presented to the emergency department.  CBC in the emergency department revealed a white blood cell count of 411,900. Hemoglobin was 8.3, platelets were normal at 375,000 hundred and seventy five thousand.  Differential was suggestive of CML.  Coagulation studies revealed an INR of 1.38, PTT of 36.4 and a fibrinogen of 292.  Patient was going to present to [place], however they were on diversion.  He was transferred from [facility] to [facility].  Hematology consulted.  Patient underwent bone marrow biopsy on 08/22/2022, this revealed CML, chronic phase, BCR/ABL1 positive.  Ordered dasatinib 100 mg p.o. daily as of 10/10/2022, unsure when patient started taking medication due to him being lost to follow-up.  Patient went into blast crisis, received CLIA and ponatinib on 06/16/2023.  He had a repeat bone marrow biopsy on 08/28/2023, this " had to be sent off to [facility], however the final diagnosis was persistent/recurrent myeloid neoplasm with 10-15% bone marrow blasts and 8% circulating blasts.  Started azacitidine, venetoclax, ponatinib as mentioned above on 10/09/2023.  Bone marrow biopsy done after 1 cycle done on 10/31/2023 revealed relapsed/persistent acute myeloid leukemia with 11% circulating blasts and 70-80% blasts by IHC in the bone marrow.    Interval History:   Patient admitted to the hospital on Friday, 12/08/2023.  He was having weakness and was requiring blood transfusion and platelet transfusion.  His peripheral blood smear on 12/09/2023 showed innumerable blasts.    Today, 12/12/2023:  Patient seen and examined on rounds.  The patient's girlfriend was in the room.  Overall, he was stable.  We did have a long discussion about his current state, past treatments and the limited amount of treatment options moving forward.    Past Medical History:   Diagnosis Date    CML (chronic myelocytic leukemia)     HVD (hypertensive vascular disease)     Oropharyngeal candidiasis       Past Surgical History:   Procedure Laterality Date    BONE MARROW BIOPSY N/A 8/22/2022    Procedure: Biopsy-bone marrow;  Surgeon: Getachew Chen MD;  Location: [facility];  Service: General;  Laterality: N/A;    BONE MARROW BIOPSY N/A 7/25/2023    Procedure: Biopsy-bone marrow;  Surgeon: Edi Carty MD;  Location: [facility] OR;  Service: General;  Laterality: N/A;    BONE MARROW BIOPSY N/A 8/28/2023    Procedure: Biopsy-bone marrow;  Surgeon: Moo Pina MD;  Location: [facility] OR;  Service: General;  Laterality: N/A;    BONE MARROW BIOPSY N/A 10/31/2023    Procedure: Biopsy-bone marrow;  Surgeon: Edi Carty MD;  Location: [facility] OR;  Service: General;  Laterality: N/A;    KNEE SURGERY Left      Social History     Socioeconomic History    Marital status: Single   Tobacco Use    Smoking status: Never    Smokeless tobacco: Never   Substance and Sexual Activity     Alcohol use: Never    Drug use: Yes     Types: Marijuana      Family History   Problem Relation Age of Onset    Hypertension Maternal Grandmother     Cancer Maternal Grandmother            Review of Systems   Constitutional:  Positive for fatigue. Negative for chills, diaphoresis and unexpected weight change.   HENT:  Negative for nasal congestion and sore throat.    Eyes:  Negative for pain.   Respiratory:  Negative for cough, chest tightness and shortness of breath.    Cardiovascular:  Negative for chest pain, palpitations and leg swelling.   Gastrointestinal:  Negative for abdominal distention, abdominal pain, blood in stool, constipation and diarrhea.   Genitourinary:  Negative for dysuria, frequency and hematuria.   Musculoskeletal:  Negative for arthralgias and back pain.   Integumentary:  Negative for rash.   Neurological:  Negative for dizziness, weakness, numbness and headaches.   Hematological:  Negative for adenopathy. Bruises/bleeds easily.   Psychiatric/Behavioral:  Negative for confusion.          Objective:      Physical Exam  Vitals reviewed.   Constitutional:       General: He is awake.      Appearance: Normal appearance.   HENT:      Head: Normocephalic and atraumatic.      Right Ear: Hearing normal.      Left Ear: Hearing normal.      Nose: Nose normal.      Mouth/Throat:      Comments:     Eyes:      General: Lids are normal. Vision grossly intact.      Extraocular Movements: Extraocular movements intact.      Conjunctiva/sclera: Conjunctivae normal.   Cardiovascular:      Rate and Rhythm: Normal rate and regular rhythm.      Pulses: Normal pulses.      Heart sounds: Normal heart sounds.   Pulmonary:      Effort: Pulmonary effort is normal.      Breath sounds: Normal breath sounds. No wheezing, rhonchi or rales.   Abdominal:      General: Bowel sounds are normal.      Palpations: Abdomen is soft.      Tenderness: There is no abdominal tenderness.   Musculoskeletal:      Cervical back: Full  passive range of motion without pain.      Right lower leg: No edema.      Left lower leg: No edema.   Skin:     General: Skin is warm.   Neurological:      General: No focal deficit present.      Mental Status: He is alert and oriented to person, place, and time.   Psychiatric:         Attention and Perception: Attention normal.         Mood and Affect: Mood and affect normal.         Behavior: Behavior is cooperative.         LABS AND IMAGING REVIEWED IN EPIC  Lab Review:        Assessment:   CML, chronic phase, transformed to blast crisis  Pancytopenia secondary to Hydrea  Refractory thrombocytopenia - improving     Plan:     Patient on azacitidine, venetoclax, ponatinib.  Unfortunately, it looks as though his disease is progressing.    Continue transfusion support, packed red blood cells for hemoglobin of less than 7 and platelets for platelet count of less than 10,000. Would require a platelet transfusion today.    I did discuss this morning at bedside that the patient is currently very ill with likely refractory leukemia.  I discussed with the family that if we do not find a match for stem cell transplant, that the overall prognosis is very poor.  I explained that even if they do find a match for stem cell transplant, his overall prognosis is still very poor.  His disease is very refractory to every treatment that we have tried.    The patient and his girlfriend did inquire about the possibility of going to [facility].  I explained that Dr. Bijan Green, who the patient currently sees in [place], had worked at [facility] in the past and has many colleagues who are involved in the case.    Dr. Green and I will discuss further treatment options again today or tomorrow to determine if there is anything that the patient would qualify for.     Continue supportive care.    Paul Hogan II, MD

## 2023-12-12 NOTE — PLAN OF CARE
Problem: Adult Inpatient Plan of Care  Goal: Plan of Care Review  12/12/2023 0224 by Rosette Shelton RN  Flowsheets (Taken 12/12/2023 0224)  Plan of Care Reviewed With:   patient   family  12/12/2023 0223 by Rosette Shelton RN  Outcome: Ongoing, Progressing  12/12/2023 0215 by Rosette Shelton RN  Outcome: Ongoing, Progressing  Flowsheets (Taken 12/12/2023 0215)  Plan of Care Reviewed With:   patient   family  Goal: Patient-Specific Goal (Individualized)  12/12/2023 0223 by Rosette Shelton RN  Outcome: Ongoing, Progressing  12/12/2023 0215 by Rosette Shelton RN  Outcome: Ongoing, Progressing  Goal: Absence of Hospital-Acquired Illness or Injury  12/12/2023 0223 by Rosette Shelton RN  Outcome: Ongoing, Progressing  12/12/2023 0215 by Rosette Shelton RN  Outcome: Ongoing, Progressing  Intervention: Identify and Manage Fall Risk  12/12/2023 0224 by Rosette Shelton RN  Flowsheets (Taken 12/12/2023 0224)  Safety Promotion/Fall Prevention:   assistive device/personal item within reach   Fall Risk reviewed with patient/family   medications reviewed   lighting adjusted   nonskid shoes/socks when out of bed   side rails raised x 2  12/12/2023 0215 by Rosette Shelton RN  Flowsheets (Taken 12/12/2023 0215)  Safety Promotion/Fall Prevention:   assistive device/personal item within reach   Fall Risk reviewed with patient/family   lighting adjusted   medications reviewed   nonskid shoes/socks when out of bed   side rails raised x 2  Intervention: Prevent Skin Injury  12/12/2023 0224 by Rosette Shelton RN  Flowsheets (Taken 12/12/2023 0224)  Body Position:   right   side-lying  Skin Protection:   adhesive use limited   protective footwear used   tubing/devices free from skin contact  12/12/2023 0215 by Rosette Shelton RN  Flowsheets (Taken 12/12/2023 0215)  Body Position:   right   side-lying  Skin Protection:   adhesive use limited   tubing/devices free from skin contact  Intervention: Prevent and Manage VTE (Venous Thromboembolism) Risk  Flowsheets  (Taken 12/12/2023 0215)  Activity Management:   Ambulated in room - L4   Ambulated to bathroom - L4  VTE Prevention/Management:   ambulation promoted   bleeding precations maintained   bleeding risk assessed  Intervention: Prevent Infection  Flowsheets (Taken 12/12/2023 0215)  Infection Prevention:   environmental surveillance performed   equipment surfaces disinfected   hand hygiene promoted   personal protective equipment utilized   rest/sleep promoted   single patient room provided  Goal: Optimal Comfort and Wellbeing  12/12/2023 0223 by Rosette Shelton RN  Outcome: Ongoing, Progressing  12/12/2023 0215 by Rosette Shelton RN  Outcome: Ongoing, Progressing  Intervention: Monitor Pain and Promote Comfort  Flowsheets (Taken 12/12/2023 0215)  Pain Management Interventions:   care clustered   pain management plan reviewed with patient/caregiver   quiet environment facilitated  Intervention: Provide Person-Centered Care  Flowsheets (Taken 12/12/2023 0215)  Trust Relationship/Rapport:   care explained   choices provided   emotional support provided   empathic listening provided   questions answered   questions encouraged   reassurance provided  Goal: Readiness for Transition of Care  12/12/2023 0223 by Rosette Shelton RN  Outcome: Ongoing, Progressing  12/12/2023 0215 by Rosette Shelton RN  Outcome: Ongoing, Progressing     Problem: Adult Inpatient Plan of Care  Goal: Patient-Specific Goal (Individualized)  12/12/2023 0223 by Rosette Shelton RN  Outcome: Ongoing, Progressing  12/12/2023 0215 by Rosette Shelton RN  Outcome: Ongoing, Progressing     Problem: Adult Inpatient Plan of Care  Goal: Absence of Hospital-Acquired Illness or Injury  12/12/2023 0223 by Rosette Shelton RN  Outcome: Ongoing, Progressing  12/12/2023 0215 by Rosette Shelton RN  Outcome: Ongoing, Progressing  Intervention: Identify and Manage Fall Risk  12/12/2023 0224 by Rosette Shelton RN  Flowsheets (Taken 12/12/2023 0224)  Safety Promotion/Fall Prevention:    assistive device/personal item within reach   Fall Risk reviewed with patient/family   medications reviewed   lighting adjusted   nonskid shoes/socks when out of bed   side rails raised x 2  12/12/2023 0215 by Rosette Shelton, RN  Flowsheets (Taken 12/12/2023 0215)  Safety Promotion/Fall Prevention:   assistive device/personal item within reach   Fall Risk reviewed with patient/family   lighting adjusted   medications reviewed   nonskid shoes/socks when out of bed   side rails raised x 2  Intervention: Prevent Skin Injury  12/12/2023 0224 by Rosette Shelton, RN  Flowsheets (Taken 12/12/2023 0224)  Body Position:   right   side-lying  Skin Protection:   adhesive use limited   protective footwear used   tubing/devices free from skin contact  12/12/2023 0215 by Rosette Shelton, RN  Flowsheets (Taken 12/12/2023 0215)  Body Position:   right   side-lying  Skin Protection:   adhesive use limited   tubing/devices free from skin contact  Intervention: Prevent and Manage VTE (Venous Thromboembolism) Risk  Flowsheets (Taken 12/12/2023 0215)  Activity Management:   Ambulated in room - L4   Ambulated to bathroom - L4  VTE Prevention/Management:   ambulation promoted   bleeding precations maintained   bleeding risk assessed  Intervention: Prevent Infection  Flowsheets (Taken 12/12/2023 0215)  Infection Prevention:   environmental surveillance performed   equipment surfaces disinfected   hand hygiene promoted   personal protective equipment utilized   rest/sleep promoted   single patient room provided     Problem: Fall Injury Risk  Goal: Absence of Fall and Fall-Related Injury  12/12/2023 0223 by Rosette Shelton, RN  Outcome: Ongoing, Progressing  12/12/2023 0215 by Rosette Shelton, RN  Outcome: Ongoing, Progressing  Intervention: Identify and Manage Contributors  Flowsheets (Taken 12/12/2023 0215)  Self-Care Promotion:   independence encouraged   BADL personal objects within reach  Medication Review/Management: medications  reviewed  Intervention: Promote Injury-Free Environment  Flowsheets (Taken 12/12/2023 0215)  Safety Promotion/Fall Prevention:   assistive device/personal item within reach   Fall Risk reviewed with patient/family   lighting adjusted   medications reviewed   nonskid shoes/socks when out of bed   side rails raised x 2

## 2023-12-12 NOTE — PROGRESS NOTES
Pharmacokinetic Assessment Follow Up: IV Vancomycin    Vancomycin serum concentration assessment(s):    The trough level was drawn correctly and can be used to guide therapy at this time. The measurement is below the desired definitive target range of 15 to 20 mcg/mL.    Vancomycin Regimen Plan:    Change regimen to Vancomycin 1750 mg IV every 8 hours with next serum trough concentration measured at 0900 prior to 4th dose on 12/13.    Drug levels (last 3 results):  Recent Labs   Lab Result Units 12/10/23  1424 12/12/23  0753   Vancomycin Trough ug/ml 8.3* 12.3*       Pharmacy will continue to follow and monitor vancomycin.    Please contact pharmacy at extension [phone] for questions regarding this assessment.    Thank you for the consult,   Jyoti Morgan       Patient brief summary:  Magdaleno Hirsch is a 23 y.o. male initiated on antimicrobial therapy with IV Vancomycin for treatment of neutropenic fever    The patient's current regimen is 1500mg q8h.    Drug Allergies:   Review of patient's allergies indicates:  No Known Allergies    Actual Body Weight:   83.9kg    Renal Function:   Estimated Creatinine Clearance: 167.9 mL/min (based on SCr of 0.76 mg/dL).,     Dialysis Method (if applicable):  N/A    CBC (last 72 hours):  Recent Labs   Lab Result Units 12/10/23  0457 12/11/23  0354 12/12/23  0419   WBC x10(3)/mcL 39.07  39.07* 33.54  33.54* 32.38  32.38*   Hgb g/dL 9.1* 8.0* 7.3*   Hct % 26.1* 23.7* 21.3*   Platelet x10(3)/mcL 16* 9* 24*   Monocytes % % 26 17 13       Metabolic Panel (last 72 hours):  Recent Labs   Lab Result Units 12/10/23  0457 12/11/23  0354 12/12/23  0422   Sodium Level mmol/L 134* 135* 137   Potassium Level mmol/L 3.5 3.1* 3.3*   Chloride mmol/L 102 102 103   Carbon Dioxide mmol/L 23 24 26   Glucose Level mg/dL 112* 106* 104*   Blood Urea Nitrogen mg/dL 7.7* 5.3* 4.6*   Creatinine mg/dL 0.80 0.72* 0.76   Albumin Level g/dL 2.7* 2.5*  --    Bilirubin Total mg/dL 1.0 0.9  --    Alkaline  Phosphatase unit/L 97 115  --    Aspartate Aminotransferase unit/L 30 43*  --    Alanine Aminotransferase unit/L 17 26  --    Magnesium Level mg/dL 2.20 1.80 1.80   Phosphorus Level mg/dL 2.2* 2.3 2.0*       Vancomycin Administrations:  vancomycin given in the last 96 hours                     vancomycin 1.5 g in dextrose 5 % 250 mL IVPB (ready to mix) ()  Restarted 12/12/23 0140     1,500 mg New Bag  0113      Restarted 12/11/23 1751      Restarted  1736     1,500 mg New Bag  1731     1,500 mg New Bag  0935    vancomycin 1.5 g in dextrose 5 % 250 mL IVPB (ready to mix) (mg) 1,500 mg New Bag 12/11/23 0102     1,500 mg New Bag 12/10/23 1719    vancomycin 1.5 g in dextrose 5 % 250 mL IVPB (ready to mix) (mg) 1,500 mg New Bag 12/10/23 0330     1,500 mg New Bag 12/09/23 1550     1,500 mg New Bag  0300    vancomycin 1.5 g in dextrose 5 % 250 mL IVPB (ready to mix) (mg) 1,500 mg New Bag 12/08/23 1500                    Microbiologic Results:  Microbiology Results (last 7 days)       Procedure Component Value Units Date/Time    Blood Culture [[id number]]  (Normal) Collected: 12/08/23 1434    Order Status: Completed Specimen: Blood Updated: 12/11/23 2000     CULTURE, BLOOD ([facility]) No Growth At 72 Hours    Blood Culture [[id number]]  (Normal) Collected: 12/08/23 1434    Order Status: Completed Specimen: Blood Updated: 12/11/23 2000     CULTURE, BLOOD ([facility]) No Growth At 72 Hours    Blood Culture [[id number]] Collected: 12/11/23 0816    Order Status: Resulted Specimen: Blood Updated: 12/11/23 0821    Blood Culture [[id number]] Collected: 12/11/23 0816    Order Status: Resulted Specimen: Blood Updated: 12/11/23 0820

## 2023-12-12 NOTE — PROGRESS NOTES
"Hospital Medicine  Progress Note    Patient Name: Magdaleno Hirsch  MRN: [id number]  Status: IP- Inpatient   Admission Date: 12/8/2023  Length of Stay: 4  Date of Service: 12/12/2023       CC: hospital follow-up for febrile neurtropenia       SUBJECTIVE      23 y.o. 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 Hogan, and undergoing chemotherapy with Azacitidine/Venetoclax, presented to the clinic on 12/8 for treatment, where he was noted to have severe anemia with a Hgb 6.6 and thrombocytopenia 4000.  He was subsequently referred to the ED for further evaluation.  He reports fatigue and generalized weakness.  Evalution in ED also noted temp of 102F.  Denied fever, chills, nausea/vomiting, cough/SOB, abd pain or diarrhea; no known sick sick contacts.      Patient was recently admitted to our services from 11/20-11/23/2023 for neutropenic fever. Additional workup in the ED include Chest x-ray showed no acute chest disease. UA neg for infection. Negative for flu, RSV and COVID.      Evaluation in ED noted patient febrile, but HDS. Labs with white count of 39,900 (85% monocytes), platelets 3,000, H&H 7.3/22.  Peripheral smear with innumerable blast.  Patient was admitted to  services and started on broad spectrum antimicrobials with vancomycin, cefepime and acyclovir.  Patient transfused 1 unit platelets.  Heme/Onc consulted.  Additionally transfused 2 units PRBCs.    Today: Patient seen and examined at bedside, and chart reviewed.  Blood counts reviewed, adequate today.  Continues with intermittent fevers, as high as 102.7F yesterday.  Cultures remain negative.  Reports feeling "ok" today, no new complaints.      MEDICATIONS   Scheduled   acyclovir  800 mg Oral BID    ceFEPime (MAXIPIME) IVPB  2 g Intravenous Q8H    diltiaZEM  120 mg Oral Nightly    metoprolol tartrate  25 mg Oral BID    pantoprazole  40 mg Oral Daily    vancomycin (VANCOCIN) IV (PEDS and ADULTS)  1,750 mg " Intravenous Q8H     Continuous Infusions  None      PHYSICAL EXAM   VITALS: T 99.8 °F (37.7 °C)   BP (!) 148/96   P 106   RR 20   O2 96 %    GENERAL: Awake and in NAD  LUNGS: CTA anteriorly  CVS: Normal rate  GI/: Soft, NT, bowel sounds positive.  EXTREMITIES: No peripheral edema  NEURO: AAOx3  PSYCH: Cooperative      LABS   CBC  Recent Labs     12/11/23 0354 12/12/23 0419   WBC 33.54  33.54* 32.38  32.38*   RBC 2.75* 2.56*   HGB 8.0* 7.3*   HCT 23.7* 21.3*   MCV 86.2 83.2   MCH 29.1 28.5   MCHC 33.8 34.3   RDW 16.1 16.3   PLT 9* 24*     CHEM  Recent Labs     12/10/23  0457 12/11/23 0354 12/12/23 0422   * 135* 137   K 3.5 3.1* 3.3*   CHLORIDE 102 102 103   CO2 23 24 26   BUN 7.7* 5.3* 4.6*   CREATININE 0.80 0.72* 0.76   GLUCOSE 112* 106* 104*   CALCIUM 8.2* 7.9* 8.1*   MG 2.20 1.80 1.80   PHOS 2.2* 2.3 2.0*   ALBUMIN 2.7* 2.5*  --    GLOBULIN 3.5 3.3  --    ALKPHOS 97 115  --    ALT 17 26  --    AST 30 43*  --    BILITOT 1.0 0.9  --          MICROBIOLOGY     Microbiology Results (last 7 days)       Procedure Component Value Units Date/Time    Blood Culture [[id number]]  (Normal) Collected: 12/11/23 0816    Order Status: Completed Specimen: Blood Updated: 12/12/23 1000     CULTURE, BLOOD ([facility]) No Growth At 24 Hours    Blood Culture [[id number]]  (Normal) Collected: 12/11/23 0816    Order Status: Completed Specimen: Blood Updated: 12/12/23 1000     CULTURE, BLOOD ([facility]) No Growth At 24 Hours    Blood Culture [[id number]]  (Normal) Collected: 12/08/23 1434    Order Status: Completed Specimen: Blood Updated: 12/11/23 2000     CULTURE, BLOOD ([facility]) No Growth At 72 Hours    Blood Culture [[id number]]  (Normal) Collected: 12/08/23 1434    Order Status: Completed Specimen: Blood Updated: 12/11/23 2000     CULTURE, BLOOD ([facility]) No Growth At 72 Hours            ASSESSMENT   Febrile Neutropenia   SIRS, no clear source of sepsis  Chronic Myeloid leukemia with blast crisis   Pancytopenia with severe  thrombocytopenia  Mild Hyponatremia, resolved  Severe hypophosphatemia, improved   Hypokalemia  h/o Essential HTN    PLAN   Continue current broad-spectrum antimicrobials  Continue to follow cultures  Will obtain CT C/A/P as work up of FUO  Will continue to follow blood counts and transfuse as indicated  Appreciate ongoing Heme/Onc input  Otherwise continue current management and monitoring in the interim        Prophylaxis: B/L SCDs        Wilman Rizvi MD  [facility]      Critical Care Time: 35 minutes spent in direct hands on care, review of labs, imaging and medical record, and discussion of diagnosis, treatment, prognosis with patient/family.  Patient remains at high-risk for clinical decompensation  Critical Care diagnosis: Severe Pancytopenia, Febrile neutropenia

## 2023-12-12 NOTE — PLAN OF CARE
Problem: Adult Inpatient Plan of Care  Goal: Plan of Care Review  Outcome: Ongoing, Progressing  Flowsheets (Taken 12/12/2023 1108)  Plan of Care Reviewed With:   patient   family  Goal: Patient-Specific Goal (Individualized)  Outcome: Ongoing, Progressing  Goal: Absence of Hospital-Acquired Illness or Injury  Outcome: Ongoing, Progressing  Intervention: Identify and Manage Fall Risk  Flowsheets (Taken 12/12/2023 1108)  Safety Promotion/Fall Prevention:   assistive device/personal item within reach   medications reviewed   nonskid shoes/socks when out of bed   side rails raised x 2  Intervention: Prevent Skin Injury  Flowsheets (Taken 12/12/2023 1108)  Body Position: position changed independently  Skin Protection:   adhesive use limited   incontinence pads utilized   tubing/devices free from skin contact  Intervention: Prevent and Manage VTE (Venous Thromboembolism) Risk  Flowsheets (Taken 12/12/2023 1108)  Activity Management: Ambulated in room - L4  VTE Prevention/Management:   ambulation promoted   bleeding risk assessed   ROM (active) performed  Range of Motion:   active ROM (range of motion) encouraged   ROM (range of motion) performed  Intervention: Prevent Infection  Flowsheets (Taken 12/12/2023 1108)  Infection Prevention:   rest/sleep promoted   single patient room provided  Goal: Optimal Comfort and Wellbeing  Outcome: Ongoing, Progressing  Intervention: Monitor Pain and Promote Comfort  Flowsheets (Taken 12/12/2023 1108)  Pain Management Interventions:   care clustered   medication offered   pain management plan reviewed with patient/caregiver   pillow support provided   position adjusted  Intervention: Provide Person-Centered Care  Flowsheets (Taken 12/12/2023 1108)  Trust Relationship/Rapport: care explained  Goal: Readiness for Transition of Care  Outcome: Ongoing, Progressing     Problem: Fall Injury Risk  Goal: Absence of Fall and Fall-Related Injury  Outcome: Ongoing, Progressing  Intervention:  Identify and Manage Contributors  Flowsheets (Taken 12/12/2023 1108)  Self-Care Promotion: independence encouraged  Medication Review/Management: medications reviewed  Intervention: Promote Injury-Free Environment  Flowsheets (Taken 12/12/2023 1108)  Safety Promotion/Fall Prevention:   assistive device/personal item within reach   medications reviewed   nonskid shoes/socks when out of bed   side rails raised x 2

## 2023-12-13 LAB
ANION GAP SERPL CALC-SCNC: 9 MEQ/L
ANISOCYTOSIS BLD QL SMEAR: ABNORMAL
B-HCG SERPL QL: 78 %
BACTERIA BLD CULT: NORMAL
BACTERIA BLD CULT: NORMAL
BUN SERPL-MCNC: 5.8 MG/DL (ref 8.9–20.6)
CALCIUM SERPL-MCNC: 8.1 MG/DL (ref 8.4–10.2)
CHLORIDE SERPL-SCNC: 102 MMOL/L (ref 98–107)
CO2 SERPL-SCNC: 27 MMOL/L (ref 22–29)
CREAT SERPL-MCNC: 0.79 MG/DL (ref 0.73–1.18)
CREAT/UREA NIT SERPL: 7
ERYTHROCYTE [DISTWIDTH] IN BLOOD BY AUTOMATED COUNT: 16.5 % (ref 11.5–17)
GFR SERPLBLD CREATININE-BSD FMLA CKD-EPI: >60 MLS/MIN/1.73/M2
GLUCOSE SERPL-MCNC: 122 MG/DL (ref 74–100)
HCT VFR BLD AUTO: 21.3 % (ref 42–52)
HGB BLD-MCNC: 7.2 G/DL (ref 14–18)
INSTRUMENT WBC (OLG): 25.6 X10(3)/MCL
LYMPHOCYTES NFR BLD MANUAL: 14 %
LYMPHOCYTES NFR BLD MANUAL: 3.58 X10(3)/MCL
MAGNESIUM SERPL-MCNC: 1.9 MG/DL (ref 1.6–2.6)
MAYO GENERIC ORDERABLE RESULT: NORMAL
MCH RBC QN AUTO: 28.9 PG (ref 27–31)
MCHC RBC AUTO-ENTMCNC: 33.8 G/DL (ref 33–36)
MCV RBC AUTO: 85.5 FL (ref 80–94)
MICROCYTES BLD QL SMEAR: ABNORMAL
MONOCYTES NFR BLD MANUAL: 2.3 X10(3)/MCL (ref 0.1–1.3)
MONOCYTES NFR BLD MANUAL: 9 %
NRBC BLD AUTO-RTO: 0 %
PHOSPHATE SERPL-MCNC: 3.4 MG/DL (ref 2.3–4.7)
PLATELET # BLD AUTO: 16 X10(3)/MCL (ref 130–400)
PLATELET # BLD EST: ABNORMAL 10*3/UL
PLATELETS.RETICULATED NFR BLD AUTO: 2.7 % (ref 0.9–11.2)
PMV BLD AUTO: 10.1 FL (ref 7.4–10.4)
POTASSIUM SERPL-SCNC: 3.3 MMOL/L (ref 3.5–5.1)
RBC # BLD AUTO: 2.49 X10(6)/MCL (ref 4.7–6.1)
RBC MORPH BLD: ABNORMAL
SODIUM SERPL-SCNC: 138 MMOL/L (ref 136–145)
VANCOMYCIN TROUGH SERPL-MCNC: 16.6 UG/ML (ref 15–20)
WBC # SPEC AUTO: 25.62 X10(3)/MCL (ref 4.5–11.5)

## 2023-12-13 PROCEDURE — 80048 BASIC METABOLIC PNL TOTAL CA: CPT | Performed by: INTERNAL MEDICINE

## 2023-12-13 PROCEDURE — 21400001 HC TELEMETRY ROOM

## 2023-12-13 PROCEDURE — 27000207 HC ISOLATION

## 2023-12-13 PROCEDURE — 25000003 PHARM REV CODE 250: Performed by: STUDENT IN AN ORGANIZED HEALTH CARE EDUCATION/TRAINING PROGRAM

## 2023-12-13 PROCEDURE — 84100 ASSAY OF PHOSPHORUS: CPT | Performed by: INTERNAL MEDICINE

## 2023-12-13 PROCEDURE — 63600175 PHARM REV CODE 636 W HCPCS: Performed by: INTERNAL MEDICINE

## 2023-12-13 PROCEDURE — 25000003 PHARM REV CODE 250: Performed by: INTERNAL MEDICINE

## 2023-12-13 PROCEDURE — 83735 ASSAY OF MAGNESIUM: CPT | Performed by: INTERNAL MEDICINE

## 2023-12-13 PROCEDURE — 80202 ASSAY OF VANCOMYCIN: CPT | Performed by: INTERNAL MEDICINE

## 2023-12-13 PROCEDURE — 85027 COMPLETE CBC AUTOMATED: CPT | Performed by: INTERNAL MEDICINE

## 2023-12-13 PROCEDURE — 99232 SBSQ HOSP IP/OBS MODERATE 35: CPT | Mod: ,,, | Performed by: INTERNAL MEDICINE

## 2023-12-13 RX ADMIN — OXYCODONE AND ACETAMINOPHEN 1 TABLET: 10; 325 TABLET ORAL at 01:12

## 2023-12-13 RX ADMIN — POTASSIUM & SODIUM PHOSPHATES POWDER PACK 280-160-250 MG 1 PACKET: 280-160-250 PACK at 08:12

## 2023-12-13 RX ADMIN — POTASSIUM & SODIUM PHOSPHATES POWDER PACK 280-160-250 MG 1 PACKET: 280-160-250 PACK at 06:12

## 2023-12-13 RX ADMIN — POTASSIUM & SODIUM PHOSPHATES POWDER PACK 280-160-250 MG 1 PACKET: 280-160-250 PACK at 01:12

## 2023-12-13 RX ADMIN — ACYCLOVIR 800 MG: 800 TABLET ORAL at 09:12

## 2023-12-13 RX ADMIN — CEFEPIME 2 G: 2 INJECTION, POWDER, FOR SOLUTION INTRAVENOUS at 01:12

## 2023-12-13 RX ADMIN — VANCOMYCIN HYDROCHLORIDE 1750 MG: 500 INJECTION, POWDER, LYOPHILIZED, FOR SOLUTION INTRAVENOUS at 02:12

## 2023-12-13 RX ADMIN — ACYCLOVIR 800 MG: 800 TABLET ORAL at 08:12

## 2023-12-13 RX ADMIN — METOPROLOL TARTRATE 25 MG: 25 TABLET, FILM COATED ORAL at 09:12

## 2023-12-13 RX ADMIN — CEFEPIME 2 G: 2 INJECTION, POWDER, FOR SOLUTION INTRAVENOUS at 03:12

## 2023-12-13 RX ADMIN — METOPROLOL TARTRATE 25 MG: 25 TABLET, FILM COATED ORAL at 08:12

## 2023-12-13 RX ADMIN — OXYCODONE AND ACETAMINOPHEN 1 TABLET: 10; 325 TABLET ORAL at 09:12

## 2023-12-13 RX ADMIN — VANCOMYCIN HYDROCHLORIDE 1750 MG: 500 INJECTION, POWDER, LYOPHILIZED, FOR SOLUTION INTRAVENOUS at 10:12

## 2023-12-13 RX ADMIN — ACETAMINOPHEN 650 MG: 325 TABLET, FILM COATED ORAL at 03:12

## 2023-12-13 RX ADMIN — DILTIAZEM HYDROCHLORIDE 120 MG: 120 CAPSULE, COATED, EXTENDED RELEASE ORAL at 09:12

## 2023-12-13 RX ADMIN — PANTOPRAZOLE SODIUM 40 MG: 40 TABLET, DELAYED RELEASE ORAL at 08:12

## 2023-12-13 RX ADMIN — CEFEPIME 2 G: 2 INJECTION, POWDER, FOR SOLUTION INTRAVENOUS at 09:12

## 2023-12-13 NOTE — PROGRESS NOTES
"Subjective:       Patient ID: Magdaleno Hirsch is a 23 y.o. male.    Chief Complaint: "I feel fine"    Diagnosis:  1. 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  2. 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  3. Conjuctival hemorrhage secondary to 1.    Current Treatment:   Azacitidine 5/28 days, venetoclax 50 mg p.o. daily for 10/28 days, and ponatinib 30 mg p.o. daily every day on 10/09/2023.  Increased ponatinib to 45 mg p.o. daily on 11/06/2023.    Treatment History:  Hydroxyurea 2g every 12 hours  Dasatinib 100 mg po daily, unsure on start date as patient was lost to follow up.  Patient received CLIA and ponatinib on 06/16/2023.  Planning on AlloSCT.    HPI:  Patient with no real medical history who went in for a routine eye exam on 08/18/2022 to update his eye glasses prescription.  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  He had bilateral Valdez spots with vessel tortuosity.  The optometrist recommended that the patient have blood work including testing for sickle cell disease.  The patient presented to the emergency department.  CBC in the emergency department revealed a white blood cell count of 411,900. Hemoglobin was 8.3, platelets were normal at 375,000 hundred and seventy five thousand.  Differential was suggestive of CML.  Coagulation studies revealed an INR of 1.38, PTT of 36.4 and a fibrinogen of 292.  Patient was going to present to [place], however they were on diversion.  He was [facility] to [facility].  Hematology consulted.  Patient underwent bone marrow biopsy on 08/22/2022, this revealed CML, chronic phase, BCR/ABL1 positive.  Ordered dasatinib 100 mg p.o. daily as of 10/10/2022, unsure when patient started taking medication due to him being lost to follow-up.  Patient went into blast crisis, received CLIA and ponatinib on 06/16/2023.  He had a repeat bone marrow biopsy on 08/28/2023, this " had to be sent off to [facility], however the final diagnosis was persistent/recurrent myeloid neoplasm with 10-15% bone marrow blasts and 8% circulating blasts.  Started azacitidine, venetoclax, ponatinib as mentioned above on 10/09/2023.  Bone marrow biopsy done after 1 cycle done on 10/31/2023 revealed relapsed/persistent acute myeloid leukemia with 11% circulating blasts and 70-80% blasts by IHC in the bone marrow.    Interval History:   Patient admitted to the hospital on Friday, 12/08/2023.  He was having weakness and was requiring blood transfusion and platelet transfusion.  His peripheral blood smear on 12/09/2023 showed innumerable blasts.    Today, 12/13/2023:  Patient seen and examined on rounds.  Had a long discussion with the patient, his girlfriend, and his aunt who were both present at bedside.  I then spoke with a separate aunt on the phone.  I explained that we are still awaiting to hear from [facility] in [place] to see if there are any options for treatment.    Past Medical History:   Diagnosis Date    CML (chronic myelocytic leukemia)     HVD (hypertensive vascular disease)     Oropharyngeal candidiasis       Past Surgical History:   Procedure Laterality Date    BONE MARROW BIOPSY N/A 8/22/2022    Procedure: Biopsy-bone marrow;  Surgeon: Getachew Chen MD;  Location: [facility];  Service: General;  Laterality: N/A;    BONE MARROW BIOPSY N/A 7/25/2023    Procedure: Biopsy-bone marrow;  Surgeon: Edi Carty MD;  Location: [facility] OR;  Service: General;  Laterality: N/A;    BONE MARROW BIOPSY N/A 8/28/2023    Procedure: Biopsy-bone marrow;  Surgeon: Moo Pina MD;  Location: [facility] OR;  Service: General;  Laterality: N/A;    BONE MARROW BIOPSY N/A 10/31/2023    Procedure: Biopsy-bone marrow;  Surgeon: Edi Carty MD;  Location: [facility] OR;  Service: General;  Laterality: N/A;    KNEE SURGERY Left      Social History     Socioeconomic History    Marital status: Single   Tobacco Use    Smoking  status: Never    Smokeless tobacco: Never   Substance and Sexual Activity    Alcohol use: Never    Drug use: Yes     Types: Marijuana      Family History   Problem Relation Age of Onset    Hypertension Maternal Grandmother     Cancer Maternal Grandmother            Review of Systems   Constitutional:  Positive for fatigue. Negative for chills, diaphoresis and unexpected weight change.   HENT:  Negative for nasal congestion and sore throat.    Eyes:  Negative for pain.   Respiratory:  Negative for cough, chest tightness and shortness of breath.    Cardiovascular:  Negative for chest pain, palpitations and leg swelling.   Gastrointestinal:  Negative for abdominal distention, abdominal pain, blood in stool, constipation and diarrhea.   Genitourinary:  Negative for dysuria, frequency and hematuria.   Musculoskeletal:  Negative for arthralgias and back pain.   Integumentary:  Negative for rash.   Neurological:  Negative for dizziness, weakness, numbness and headaches.   Hematological:  Negative for adenopathy. Bruises/bleeds easily.   Psychiatric/Behavioral:  Negative for confusion.          Objective:      Physical Exam  Vitals reviewed.   Constitutional:       General: He is awake.      Appearance: Normal appearance.   HENT:      Head: Normocephalic and atraumatic.      Right Ear: Hearing normal.      Left Ear: Hearing normal.      Nose: Nose normal.      Mouth/Throat:      Comments:     Eyes:      General: Lids are normal. Vision grossly intact.      Extraocular Movements: Extraocular movements intact.      Conjunctiva/sclera: Conjunctivae normal.   Cardiovascular:      Rate and Rhythm: Normal rate and regular rhythm.      Pulses: Normal pulses.      Heart sounds: Normal heart sounds.   Pulmonary:      Effort: Pulmonary effort is normal.      Breath sounds: Normal breath sounds. No wheezing, rhonchi or rales.   Abdominal:      General: Bowel sounds are normal.      Palpations: Abdomen is soft.      Tenderness: There  is no abdominal tenderness.   Musculoskeletal:      Cervical back: Full passive range of motion without pain.      Right lower leg: No edema.      Left lower leg: No edema.   Skin:     General: Skin is warm.   Neurological:      General: No focal deficit present.      Mental Status: He is alert and oriented to person, place, and time.   Psychiatric:         Attention and Perception: Attention normal.         Mood and Affect: Mood and affect normal.         Behavior: Behavior is cooperative.         LABS AND IMAGING REVIEWED IN EPIC  Lab Review:        Assessment:   CML, chronic phase, transformed to blast crisis  Pancytopenia secondary to Hydrea  Refractory thrombocytopenia - improving     Plan:     Patient on azacitidine, venetoclax, ponatinib.  Unfortunately, it looks as though his disease is progressing.    Continue transfusion support, packed red blood cells for hemoglobin of less than 7 and platelets for platelet count of less than 10,000. Would require a platelet transfusion today.    Awaiting discussion with Dr. Green.    The patient and his girlfriend did inquire about the possibility of going to [facility].  I explained that Dr. Bijan Green, who the patient currently sees in [place], had worked at [facility] in the past and has many colleagues who are involved in the case.    Continue supportive care.    Paul Hogan II, MD

## 2023-12-13 NOTE — PROGRESS NOTES
Pharmacokinetic Assessment Follow Up: IV Vancomycin    Vancomycin serum concentration assessment(s):    The trough level was drawn correctly and can be used to guide therapy at this time. The measurement is within the desired definitive target range of 15 to 20 mcg/mL.    Vancomycin Regimen Plan:    Continue regimen to Vancomycin 1750 mg IV every 8 hours with next serum trough concentration measured at 0900 prior to 4th dose on 12/14    Drug levels (last 3 results):  Recent Labs   Lab Result Units 12/10/23  1424 12/12/23  0753 12/13/23  0857   Vancomycin Trough ug/ml 8.3* 12.3* 16.6       Pharmacy will continue to follow and monitor vancomycin.    Please contact pharmacy at extension [phone] for questions regarding this assessment.    Thank you for the consult,   Kimberly Edmondson       Patient brief summary:  Magdaleno Hirsch is a 23 y.o. male initiated on antimicrobial therapy with IV Vancomycin for treatment of neutropenic fever      Drug Allergies:   Review of patient's allergies indicates:  No Known Allergies    Actual Body Weight:   83.9 kg    Renal Function:   Estimated Creatinine Clearance: 161.5 mL/min (based on SCr of 0.79 mg/dL).,     Dialysis Method (if applicable):  N/A    CBC (last 72 hours):  Recent Labs   Lab Result Units 12/11/23  0354 12/12/23  0419 12/13/23  0359   WBC x10(3)/mcL 33.54  33.54* 32.38  32.38* 25.6  25.62*   Hgb g/dL 8.0* 7.3* 7.2*   Hct % 23.7* 21.3* 21.3*   Platelet x10(3)/mcL 9* 24* 16*   Monocytes % % 17 13 9       Metabolic Panel (last 72 hours):  Recent Labs   Lab Result Units 12/11/23  0354 12/12/23  0422 12/13/23  0359   Sodium Level mmol/L 135* 137 138   Potassium Level mmol/L 3.1* 3.3* 3.3*   Chloride mmol/L 102 103 102   Carbon Dioxide mmol/L 24 26 27   Glucose Level mg/dL 106* 104* 122*   Blood Urea Nitrogen mg/dL 5.3* 4.6* 5.8*   Creatinine mg/dL 0.72* 0.76 0.79   Albumin Level g/dL 2.5*  --   --    Bilirubin Total mg/dL 0.9  --   --    Alkaline Phosphatase unit/L 115  --   --     Aspartate Aminotransferase unit/L 43*  --   --    Alanine Aminotransferase unit/L 26  --   --    Magnesium Level mg/dL 1.80 1.80 1.90   Phosphorus Level mg/dL 2.3 2.0* 3.4       Vancomycin Administrations:  vancomycin given in the last 96 hours                     vancomycin (VANCOCIN) 1,750 mg in dextrose 5 % (D5W) 500 mL IVPB (mg) 1,750 mg New Bag 12/13/23 1018     1,750 mg New Bag  0233     1,750 mg New Bag 12/12/23 1738      Restarted  1048     1,750 mg New Bag  0942    vancomycin 1.5 g in dextrose 5 % 250 mL IVPB (ready to mix) ()  Restarted 12/12/23 0140     1,500 mg New Bag  0113      Restarted 12/11/23 1751      Restarted  1736     1,500 mg New Bag  1731     1,500 mg New Bag  0935    vancomycin 1.5 g in dextrose 5 % 250 mL IVPB (ready to mix) (mg) 1,500 mg New Bag 12/11/23 0102     1,500 mg New Bag 12/10/23 1719    vancomycin 1.5 g in dextrose 5 % 250 mL IVPB (ready to mix) (mg) 1,500 mg New Bag 12/10/23 0330     1,500 mg New Bag 12/09/23 1550                    Microbiologic Results:  Microbiology Results (last 7 days)       Procedure Component Value Units Date/Time    Blood Culture [[id number]]  (Normal) Collected: 12/11/23 0816    Order Status: Completed Specimen: Blood Updated: 12/13/23 1001     CULTURE, BLOOD ([facility]) No Growth At 48 Hours    Blood Culture [[id number]]  (Normal) Collected: 12/11/23 0816    Order Status: Completed Specimen: Blood Updated: 12/13/23 1001     CULTURE, BLOOD ([facility]) No Growth At 48 Hours    Blood Culture [[id number]]  (Normal) Collected: 12/08/23 1434    Order Status: Completed Specimen: Blood Updated: 12/12/23 2001     CULTURE, BLOOD ([facility]) No Growth At 96 Hours    Blood Culture [[id number]]  (Normal) Collected: 12/08/23 1434    Order Status: Completed Specimen: Blood Updated: 12/12/23 2001     CULTURE, BLOOD ([facility]) No Growth At 96 Hours

## 2023-12-13 NOTE — PROGRESS NOTES
Hospital Medicine  Progress Note    Patient Name: Magdaleno Hirsch  MRN: [id number]  Status: IP- Inpatient   Admission Date: 12/8/2023  Length of Stay: 5  Date of Service: 12/13/2023       CC: hospital follow-up for febrile neurtropenia       SUBJECTIVE      23 y.o. 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 Hogan, and undergoing chemotherapy with Azacitidine/Venetoclax, presented to the clinic on 12/8 for treatment, where he was noted to have severe anemia with a Hgb 6.6 and thrombocytopenia 4000.  He was subsequently referred to the ED for further evaluation.  He reports fatigue and generalized weakness.  Evalution in ED also noted temp of 102F.  Denied fever, chills, nausea/vomiting, cough/SOB, abd pain or diarrhea; no known sick sick contacts.      Patient was recently admitted to our services from 11/20-11/23/2023 for neutropenic fever. Additional workup in the ED include Chest x-ray showed no acute chest disease. UA neg for infection. Negative for flu, RSV and COVID.      Evaluation in ED noted patient febrile, but HDS. Labs with white count of 39,900 (85% monocytes), platelets 3,000, H&H 7.3/22.  Peripheral smear with innumerable blast.  Patient was admitted to  services and started on broad spectrum antimicrobials with vancomycin, cefepime and acyclovir.  Patient transfused 1 unit platelets.  Heme/Onc consulted.  Additionally transfused 2 units PRBCs.    Today: Patient seen and examined at bedside, and chart reviewed.  Continued fever spikes, though cultures remain negative.  CT C/A/P did note SIXTO opacities.  MRSA swab was negative.  Platelets 16,000 today, Hgb 7.2.      MEDICATIONS   Scheduled   acyclovir  800 mg Oral BID    ceFEPime (MAXIPIME) IVPB  2 g Intravenous Q8H    diltiaZEM  120 mg Oral Nightly    metoprolol tartrate  25 mg Oral BID    pantoprazole  40 mg Oral Daily    potassium, sodium phosphates  1 packet Oral TID WM     Continuous  Infusions  None      PHYSICAL EXAM   VITALS: T 99.2 °F (37.3 °C)   BP (!) 155/91   P 101   RR 20   O2 96 %    GENERAL: Awake and in NAD  LUNGS: CTA anteriorly  CVS: Normal rate  GI/: Soft, NT, bowel sounds positive.  EXTREMITIES: No peripheral edema  NEURO: AAOx3  PSYCH: Cooperative      LABS   CBC  Recent Labs     12/12/23 0419 12/13/23 0359   WBC 32.38  32.38* 25.6  25.62*   RBC 2.56* 2.49*   HGB 7.3* 7.2*   HCT 21.3* 21.3*   MCV 83.2 85.5   MCH 28.5 28.9   MCHC 34.3 33.8   RDW 16.3 16.5   PLT 24* 16*       CHEM  Recent Labs     12/11/23 0354 12/12/23 0422 12/13/23 0359   * 137 138   K 3.1* 3.3* 3.3*   CHLORIDE 102 103 102   CO2 24 26 27   BUN 5.3* 4.6* 5.8*   CREATININE 0.72* 0.76 0.79   GLUCOSE 106* 104* 122*   CALCIUM 7.9* 8.1* 8.1*   MG 1.80 1.80 1.90   PHOS 2.3 2.0* 3.4   ALBUMIN 2.5*  --   --    GLOBULIN 3.3  --   --    ALKPHOS 115  --   --    ALT 26  --   --    AST 43*  --   --    BILITOT 0.9  --   --            MICROBIOLOGY     Microbiology Results (last 7 days)       Procedure Component Value Units Date/Time    Blood Culture [[id number]]  (Normal) Collected: 12/11/23 0816    Order Status: Completed Specimen: Blood Updated: 12/13/23 1001     CULTURE, BLOOD ([facility]) No Growth At 48 Hours    Blood Culture [[id number]]  (Normal) Collected: 12/11/23 0816    Order Status: Completed Specimen: Blood Updated: 12/13/23 1001     CULTURE, BLOOD ([facility]) No Growth At 48 Hours    Blood Culture [[id number]]  (Normal) Collected: 12/08/23 1434    Order Status: Completed Specimen: Blood Updated: 12/12/23 2001     CULTURE, BLOOD ([facility]) No Growth At 96 Hours    Blood Culture [[id number]]  (Normal) Collected: 12/08/23 1434    Order Status: Completed Specimen: Blood Updated: 12/12/23 2001     CULTURE, BLOOD ([facility]) No Growth At 96 Hours            DIAGNOSTICS   CT Chest Abdomen Pelvis With IV Contrast (XPD) NO Oral Contrast  Narrative:   The lungs are adequately aerated.  No mass is seen.  No nodule is seen.  No pleural  thickening is seen.  No pleural effusion is seen.  There is a reticulonodular tree-in-bud type infiltrate in the left upper lobe.  This is new since the prior examination.  The thoracic aorta is normal in caliber..  No mediastinal adenopathy is seen.  The heart appears normal in size..  The liver is enlarged in size.  No liver mass or lesion is seen.  Portal and hepatic veins appear normal..  The gallbladder appears normal.  No gallstones are seen.  The spleen is enlarged in size.  No splenic mass or lesion is seen.  The pancreas appears grossly unremarkable.  No pancreatic mass or lesion is seen.  No inflammation is seen.  No adrenal abnormality is seen.  No adrenal nodule is seen.  The kidneys are well perfused.  No hydronephrosis is seen.  No hydroureter is seen.  No retroperitoneal abnormality is seen..  Visualized portions of the bowel shows no acute abnormality.  No colitis is seen.  No diverticulitis is seen.  No colonic mass is seen.  There is some pockets of air and inflammatory changes seen along the anterior abdominal wall pannus likely representing injection sites.  No free fluid is seen.  Urinary bladder appears unremarkable.  No acute bony abnormality is seen.  Impression:   Reticulonodular tree-in-bud type infiltrate in the left upper lobe likely infectious in etiology  Hepatomegaly  Splenomegaly  Electronically signed by: Ian Jacobson  Date:    12/12/2023  Time:    13:17        ASSESSMENT   Febrile Neutropenia   SIRS, possibly related to leukemia vs  sepsis  Chronic Myeloid leukemia with blast crisis   Pancytopenia with severe thrombocytopenia  Mild Hyponatremia, resolved  Severe hypophosphatemia, improved   Hypokalemia  h/o Essential HTN    PLAN   Will continue with Cefepime, though will d/c Vanc and acyclovir  Will continue to follow blood counts and transfuse as indicated  Will need platelets today  Appreciate ongoing Heme/Onc input  Otherwise continue current management and monitoring in the  interim        Prophylaxis: B/L SCDs        Wilman Rizvi MD  [facility]      Critical Care Time: 37 minutes spent in direct hands on care, review of labs, imaging and medical record, and discussion of diagnosis, treatment, prognosis with patient/family.  Patient remains at high-risk for clinical decompensation  Critical Care diagnosis: Severe Pancytopenia, Febrile neutropenia

## 2023-12-14 VITALS
WEIGHT: 185 LBS | HEART RATE: 99 BPM | BODY MASS INDEX: 24.52 KG/M2 | DIASTOLIC BLOOD PRESSURE: 93 MMHG | OXYGEN SATURATION: 98 % | HEIGHT: 73 IN | SYSTOLIC BLOOD PRESSURE: 145 MMHG | TEMPERATURE: 99 F | RESPIRATION RATE: 20 BRPM

## 2023-12-14 DIAGNOSIS — C92.10 BLAST CRISIS PHASE OF CHRONIC MYELOID LEUKEMIA: Primary | ICD-10-CM

## 2023-12-14 PROBLEM — R50.81 NEUTROPENIC FEVER: Status: RESOLVED | Noted: 2023-06-08 | Resolved: 2023-12-14

## 2023-12-14 PROBLEM — D70.9 NEUTROPENIC FEVER: Status: RESOLVED | Noted: 2023-06-08 | Resolved: 2023-12-14

## 2023-12-14 LAB
ABO + RH BLD: NORMAL
ANISOCYTOSIS BLD QL SMEAR: ABNORMAL
B-HCG SERPL QL: 79 %
BLD PROD TYP BPU: NORMAL
BLOOD UNIT EXPIRATION DATE: NORMAL
BLOOD UNIT TYPE CODE: 5100
CROSSMATCH INTERPRETATION: NORMAL
DISPENSE STATUS: NORMAL
ERYTHROCYTE [DISTWIDTH] IN BLOOD BY AUTOMATED COUNT: 16.5 % (ref 11.5–17)
GROUP & RH: NORMAL
HCT VFR BLD AUTO: 22.9 % (ref 42–52)
HGB BLD-MCNC: 7.7 G/DL (ref 14–18)
INDIRECT COOMBS: NORMAL
INSTRUMENT WBC (OLG): 27.82 X10(3)/MCL
LYMPHOCYTES NFR BLD MANUAL: 16 %
LYMPHOCYTES NFR BLD MANUAL: 4.45 X10(3)/MCL
MCH RBC QN AUTO: 28.9 PG (ref 27–31)
MCHC RBC AUTO-ENTMCNC: 33.6 G/DL (ref 33–36)
MCV RBC AUTO: 86.1 FL (ref 80–94)
MICROCYTES BLD QL SMEAR: ABNORMAL
MONOCYTES NFR BLD MANUAL: 1.11 X10(3)/MCL (ref 0.1–1.3)
MONOCYTES NFR BLD MANUAL: 4 %
NRBC BLD AUTO-RTO: 0 %
PLATELET # BLD AUTO: 12 X10(3)/MCL (ref 130–400)
PLATELET # BLD EST: ABNORMAL 10*3/UL
PLATELETS.RETICULATED NFR BLD AUTO: 3.7 % (ref 0.9–11.2)
PMV BLD AUTO: ABNORMAL FL
PROMYELOCYTES # BLD MANUAL: 1 %
RBC # BLD AUTO: 2.66 X10(6)/MCL (ref 4.7–6.1)
RBC MORPH BLD: ABNORMAL
SPECIMEN OUTDATE: NORMAL
UNIT NUMBER: NORMAL
WBC # SPEC AUTO: 27.82 X10(3)/MCL (ref 4.5–11.5)

## 2023-12-14 PROCEDURE — 25000003 PHARM REV CODE 250: Performed by: INTERNAL MEDICINE

## 2023-12-14 PROCEDURE — 86923 COMPATIBILITY TEST ELECTRIC: CPT | Mod: 91 | Performed by: INTERNAL MEDICINE

## 2023-12-14 PROCEDURE — P9037 PLATE PHERES LEUKOREDU IRRAD: HCPCS | Performed by: INTERNAL MEDICINE

## 2023-12-14 PROCEDURE — 25000003 PHARM REV CODE 250: Performed by: NURSE PRACTITIONER

## 2023-12-14 PROCEDURE — 81170 ABL1 GENE: CPT | Performed by: INTERNAL MEDICINE

## 2023-12-14 PROCEDURE — 25000003 PHARM REV CODE 250: Performed by: STUDENT IN AN ORGANIZED HEALTH CARE EDUCATION/TRAINING PROGRAM

## 2023-12-14 PROCEDURE — 85027 COMPLETE CBC AUTOMATED: CPT | Performed by: INTERNAL MEDICINE

## 2023-12-14 PROCEDURE — 99232 SBSQ HOSP IP/OBS MODERATE 35: CPT | Mod: ,,, | Performed by: INTERNAL MEDICINE

## 2023-12-14 PROCEDURE — P9040 RBC LEUKOREDUCED IRRADIATED: HCPCS | Performed by: INTERNAL MEDICINE

## 2023-12-14 PROCEDURE — 63600175 PHARM REV CODE 636 W HCPCS: Performed by: INTERNAL MEDICINE

## 2023-12-14 PROCEDURE — 36430 TRANSFUSION BLD/BLD COMPNT: CPT

## 2023-12-14 PROCEDURE — 86850 RBC ANTIBODY SCREEN: CPT | Performed by: INTERNAL MEDICINE

## 2023-12-14 PROCEDURE — 27000207 HC ISOLATION

## 2023-12-14 RX ORDER — DIPHENHYDRAMINE HCL 25 MG
25 CAPSULE ORAL ONCE
Status: COMPLETED | OUTPATIENT
Start: 2023-12-14 | End: 2023-12-14

## 2023-12-14 RX ORDER — METOPROLOL TARTRATE 25 MG/1
25 TABLET, FILM COATED ORAL 2 TIMES DAILY
Qty: 180 TABLET | Refills: 0 | Status: SHIPPED | OUTPATIENT
Start: 2023-12-14 | End: 2024-12-13

## 2023-12-14 RX ORDER — CEFDINIR 300 MG/1
300 CAPSULE ORAL 2 TIMES DAILY
Qty: 10 CAPSULE | Refills: 0 | Status: SHIPPED | OUTPATIENT
Start: 2023-12-14 | End: 2023-12-19

## 2023-12-14 RX ORDER — HYDROCODONE BITARTRATE AND ACETAMINOPHEN 500; 5 MG/1; MG/1
TABLET ORAL
Status: DISCONTINUED | OUTPATIENT
Start: 2023-12-14 | End: 2023-12-15 | Stop reason: HOSPADM

## 2023-12-14 RX ORDER — OXYCODONE AND ACETAMINOPHEN 10; 325 MG/1; MG/1
1 TABLET ORAL EVERY 4 HOURS PRN
Qty: 30 TABLET | Refills: 0 | Status: ON HOLD | OUTPATIENT
Start: 2023-12-14 | End: 2023-12-30

## 2023-12-14 RX ORDER — SODIUM,POTASSIUM PHOSPHATES 280-250MG
1 POWDER IN PACKET (EA) ORAL
Qty: 90 PACKET | Refills: 0 | Status: ON HOLD | OUTPATIENT
Start: 2023-12-15 | End: 2024-01-11 | Stop reason: HOSPADM

## 2023-12-14 RX ORDER — DILTIAZEM HYDROCHLORIDE 120 MG/1
120 CAPSULE, COATED, EXTENDED RELEASE ORAL NIGHTLY
Qty: 30 CAPSULE | Refills: 0 | Status: ON HOLD | OUTPATIENT
Start: 2023-12-14 | End: 2024-01-11 | Stop reason: HOSPADM

## 2023-12-14 RX ADMIN — METOPROLOL TARTRATE 25 MG: 25 TABLET, FILM COATED ORAL at 09:12

## 2023-12-14 RX ADMIN — ACYCLOVIR 800 MG: 800 TABLET ORAL at 09:12

## 2023-12-14 RX ADMIN — DILTIAZEM HYDROCHLORIDE 120 MG: 120 CAPSULE, COATED, EXTENDED RELEASE ORAL at 09:12

## 2023-12-14 RX ADMIN — ACETAMINOPHEN 1000 MG: 500 TABLET ORAL at 06:12

## 2023-12-14 RX ADMIN — POTASSIUM & SODIUM PHOSPHATES POWDER PACK 280-160-250 MG 1 PACKET: 280-160-250 PACK at 04:12

## 2023-12-14 RX ADMIN — OXYCODONE AND ACETAMINOPHEN 1 TABLET: 10; 325 TABLET ORAL at 04:12

## 2023-12-14 RX ADMIN — ACETAMINOPHEN 650 MG: 325 TABLET, FILM COATED ORAL at 04:12

## 2023-12-14 RX ADMIN — POTASSIUM & SODIUM PHOSPHATES POWDER PACK 280-160-250 MG 1 PACKET: 280-160-250 PACK at 09:12

## 2023-12-14 RX ADMIN — PANTOPRAZOLE SODIUM 40 MG: 40 TABLET, DELAYED RELEASE ORAL at 09:12

## 2023-12-14 RX ADMIN — DIPHENHYDRAMINE HYDROCHLORIDE 25 MG: 25 CAPSULE ORAL at 04:12

## 2023-12-14 RX ADMIN — POTASSIUM & SODIUM PHOSPHATES POWDER PACK 280-160-250 MG 1 PACKET: 280-160-250 PACK at 12:12

## 2023-12-14 RX ADMIN — OXYCODONE AND ACETAMINOPHEN 1 TABLET: 10; 325 TABLET ORAL at 09:12

## 2023-12-14 RX ADMIN — CEFEPIME 2 G: 2 INJECTION, POWDER, FOR SOLUTION INTRAVENOUS at 12:12

## 2023-12-14 RX ADMIN — CEFEPIME 2 G: 2 INJECTION, POWDER, FOR SOLUTION INTRAVENOUS at 06:12

## 2023-12-14 NOTE — PROGRESS NOTES
Inpatient Nutrition Evaluation    Admit Date: 12/8/2023   Total duration of encounter: 6 days   Patient Age: 23 y.o.    Nutrition Recommendation/Prescription     Continue regular diet as tolerated  Continue Boost daily to provide 240 kcal and 10 g protein per serving   Continue Magic Mouthwash as medically feasible  RD to monitor po intake and weight    Nutrition Assessment     Chart Review    Reason Seen: length of stay    Malnutrition Screening Tool Results   Have you recently lost weight without trying?: No  Have you been eating poorly because of a decreased appetite?: No   MST Score: 0   Diagnosis:  Febrile Neutropenia   SIRS, possibly related to leukemia vs  sepsis  Chronic Myeloid leukemia with blast crisis   Pancytopenia with severe thrombocytopenia  Mild Hyponatremia, resolved  Severe hypophosphatemia, improved   Hypokalemia    Relevant Medical History: CML, HVD, oropharyngeal  candidiasis, HTN    Scheduled Medications:  acyclovir, 800 mg, BID  ceFEPime (MAXIPIME) IVPB, 2 g, Q8H  diltiaZEM, 120 mg, Nightly  metoprolol tartrate, 25 mg, BID  pantoprazole, 40 mg, Daily  potassium, sodium phosphates, 1 packet, TID WM    Continuous Infusions:   PRN Medications: (Magic mouthwash) 1:1:1 diphenhydrAMINE(Benadryl) 12.5mg/5ml liq, aluminum & magnesium hydroxide-simethicone (Maalox), LIDOcaine viscous 2%, 0.9%  NaCl infusion (for blood administration), 0.9%  NaCl infusion (for blood administration), 0.9%  NaCl infusion (for blood administration), 0.9%  NaCl infusion (for blood administration), acetaminophen, acetaminophen, cloNIDine, labetalol, ondansetron, oxyCODONE-acetaminophen, traMADoL    Recent Labs   Lab 12/08/23  1434 12/08/23  1434 12/09/23  0901 12/10/23  0457 12/11/23  0354 12/12/23  0419 12/12/23  0422 12/13/23  0359 12/14/23  0348   *  --   --  134* 135*  --  137 138  --    K 4.0  --   --  3.5 3.1*  --  3.3* 3.3*  --    CALCIUM 8.8  --   --  8.2* 7.9*  --  8.1* 8.1*  --    PHOS  --   --  1.6* 2.2*  "2.3  --  2.0* 3.4  --    MG  --   --  2.00 2.20 1.80  --  1.80 1.90  --    CHLORIDE 98  --   --  102 102  --  103 102  --    CO2 20*  --   --  23 24  --  26 27  --    BUN 7.8*  --   --  7.7* 5.3*  --  4.6* 5.8*  --    CREATININE 0.87  --   --  0.80 0.72*  --  0.76 0.79  --    EGFRNORACEVR >60  --   --  >60 >60  --  >60 >60  --    GLUCOSE 102*  --   --  112* 106*  --  104* 122*  --    BILITOT 1.8*  --   --  1.0 0.9  --   --   --   --    ALKPHOS 91  --   --  97 115  --   --   --   --    ALT 10  --   --  17 26  --   --   --   --    AST 33  --   --  30 43*  --   --   --   --    ALBUMIN 3.3*  --   --  2.7* 2.5*  --   --   --   --    WBC 39.90*   < > 37.15  37.15* 39.07  39.07* 33.54  33.54* 32.38  32.38*  --  25.6  25.62* 27.82  27.82*   HGB 7.3*  --  6.9* 9.1* 8.0* 7.3*  --  7.2* 7.7*   HCT 21.8*  --  19.9* 26.1* 23.7* 21.3*  --  21.3* 22.9*    < > = values in this interval not displayed.     Nutrition Orders:  Diet Adult Regular  Dietary nutrition supplements Boost Vanilla; Daily    Appetite/Oral Intake: good/% of meals  Factors Affecting Nutritional Intake: sore mouth  Food/Presybeterian/Cultural Preferences: none reported  Food Allergies: no known food allergies  Last Bowel Movement: 12/11/23  Wound(s):      Comments    12/14: pt reports good appetite, denies GI complaints. Good appetite prior admission, denies recent decreased appetite. Reports drinking ONS daily, declined need for increased quantity. -190 and denies unintentional weight loss. Per EMR weights, possible 5.6% weight loss in 1 month noted. Unable to take bed weight or perform NFPA at this time. Girlfriend and daughter sitting on bed with patient and patient getting vitals done.     Anthropometrics    Height: 6' 1" (185.4 cm),    Last Weight: 83.9 kg (185 lb) (12/08/23 1737), Weight Method: Standard Scale  BMI (Calculated): 24.4  BMI Classification: normal (BMI 18.5-24.9)        Ideal Body Weight (IBW), Male: 184 lb     % Ideal Body " Weight, Male (lb): 100.54 %                          Usual Weight Provided By: patient, EMR weight history, and patient denies unintentional weight loss    Wt Readings from Last 5 Encounters:   12/08/23 83.9 kg (185 lb)   12/04/23 84.8 kg (186 lb 13.4 oz)   11/30/23 81.6 kg (180 lb)   11/25/23 81.7 kg (180 lb 1.9 oz)   11/20/23 86.2 kg (189 lb 15.9 oz)     Weight Change(s) Since Admission:   Wt Readings from Last 1 Encounters:   12/08/23 1737 83.9 kg (185 lb)   12/08/23 1312 83.9 kg (185 lb)   Admit Weight: 83.9 kg (185 lb) (12/08/23 1312), Weight Method: Stated    Patient Education     Not applicable.    Nutrition Goals & Monitoring     Dietitian will monitor: food and beverage intake and weight    Nutrition Risk/Follow-Up: low (follow-up in 5-7 days)  Patients assigned 'low nutrition risk' status do not qualify for a full nutritional assessment but will be monitored and re-evaluated in a 5-7 day time period. Please consult if re-evaluation needed sooner.

## 2023-12-14 NOTE — PROGRESS NOTES
"Subjective:       Patient ID: Magdaleno Hirsch is a 23 y.o. male.    Chief Complaint: "My gums are swollen and hurt"    Diagnosis:  1. 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  2. 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  3. Conjuctival hemorrhage secondary to 1.    Current Treatment:   Azacitidine 5/28 days, venetoclax 50 mg p.o. daily for 10/28 days, and ponatinib 30 mg p.o. daily every day on 10/09/2023.  Increased ponatinib to 45 mg p.o. daily on 11/06/2023.    Treatment History:  Hydroxyurea 2g every 12 hours  Dasatinib 100 mg po daily, unsure on start date as patient was lost to follow up.  Patient received CLIA and ponatinib on 06/16/2023.  Planning on AlloSCT.    HPI:  Patient with no real medical history who went in for a routine eye exam on 08/18/2022 to update his eye glasses prescription.  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  He had bilateral Valdez spots with vessel tortuosity.  The optometrist recommended that the patient have blood work including testing for sickle cell disease.  The patient presented to the emergency department.  CBC in the emergency department revealed a white blood cell count of 411,900. Hemoglobin was 8.3, platelets were normal at 375,000 hundred and seventy five thousand.  Differential was suggestive of CML.  Coagulation studies revealed an INR of 1.38, PTT of 36.4 and a fibrinogen of 292.  Patient was going to present to [place], however they were on diversion.  He was transferred from [facility] to [facility].  Hematology consulted.  Patient underwent bone marrow biopsy on 08/22/2022, this revealed CML, chronic phase, BCR/ABL1 positive.  Ordered dasatinib 100 mg p.o. daily as of 10/10/2022, unsure when patient started taking medication due to him being lost to follow-up.  Patient went into blast crisis, received CLIA and ponatinib on 06/16/2023.  He had a repeat bone marrow biopsy on " 08/28/2023, this had to be sent off to [facility], however the final diagnosis was persistent/recurrent myeloid neoplasm with 10-15% bone marrow blasts and 8% circulating blasts.  Started azacitidine, venetoclax, ponatinib as mentioned above on 10/09/2023.  Bone marrow biopsy done after 1 cycle done on 10/31/2023 revealed relapsed/persistent acute myeloid leukemia with 11% circulating blasts and 70-80% blasts by IHC in the bone marrow.    Interval History:   Patient admitted to the hospital on Friday, 12/08/2023.  He was having weakness and was requiring blood transfusion and platelet transfusion.  His peripheral blood smear on 12/09/2023 showed innumerable blasts.    Today, 12/14/2023:  Patient seen and examined on rounds.  Overall, he was doing okay.  He does have swollen gums, he states that they hurt but his mouthwash helps.    Past Medical History:   Diagnosis Date    CML (chronic myelocytic leukemia)     HVD (hypertensive vascular disease)     Oropharyngeal candidiasis       Past Surgical History:   Procedure Laterality Date    BONE MARROW BIOPSY N/A 8/22/2022    Procedure: Biopsy-bone marrow;  Surgeon: Getachew Chen MD;  Location: [facility] OR;  Service: General;  Laterality: N/A;    BONE MARROW BIOPSY N/A 7/25/2023    Procedure: Biopsy-bone marrow;  Surgeon: Edi Carty MD;  Location: [facility] OR;  Service: General;  Laterality: N/A;    BONE MARROW BIOPSY N/A 8/28/2023    Procedure: Biopsy-bone marrow;  Surgeon: Moo Pina MD;  Location: [facility] OR;  Service: General;  Laterality: N/A;    BONE MARROW BIOPSY N/A 10/31/2023    Procedure: Biopsy-bone marrow;  Surgeon: Edi Carty MD;  Location: [facility] OR;  Service: General;  Laterality: N/A;    KNEE SURGERY Left      Social History     Socioeconomic History    Marital status: Single   Tobacco Use    Smoking status: Never    Smokeless tobacco: Never   Substance and Sexual Activity    Alcohol use: Never    Drug use: Yes     Types: Marijuana      Family History    Problem Relation Age of Onset    Hypertension Maternal Grandmother     Cancer Maternal Grandmother            Review of Systems   Constitutional:  Positive for fatigue. Negative for chills, diaphoresis and unexpected weight change.   HENT:  Negative for nasal congestion and sore throat.    Eyes:  Negative for pain.   Respiratory:  Negative for cough, chest tightness and shortness of breath.    Cardiovascular:  Negative for chest pain, palpitations and leg swelling.   Gastrointestinal:  Negative for abdominal distention, abdominal pain, blood in stool, constipation and diarrhea.   Genitourinary:  Negative for dysuria, frequency and hematuria.   Musculoskeletal:  Negative for arthralgias and back pain.   Integumentary:  Negative for rash.   Neurological:  Negative for dizziness, weakness, numbness and headaches.   Hematological:  Negative for adenopathy. Bruises/bleeds easily.   Psychiatric/Behavioral:  Negative for confusion.          Objective:      Physical Exam  Vitals reviewed.   Constitutional:       General: He is awake.      Appearance: Normal appearance.   HENT:      Head: Normocephalic and atraumatic.      Right Ear: Hearing normal.      Left Ear: Hearing normal.      Nose: Nose normal.      Mouth/Throat:      Comments:     Eyes:      General: Lids are normal. Vision grossly intact.      Extraocular Movements: Extraocular movements intact.      Conjunctiva/sclera: Conjunctivae normal.   Cardiovascular:      Rate and Rhythm: Normal rate and regular rhythm.      Pulses: Normal pulses.      Heart sounds: Normal heart sounds.   Pulmonary:      Effort: Pulmonary effort is normal.      Breath sounds: Normal breath sounds. No wheezing, rhonchi or rales.   Abdominal:      General: Bowel sounds are normal.      Palpations: Abdomen is soft.      Tenderness: There is no abdominal tenderness.   Musculoskeletal:      Cervical back: Full passive range of motion without pain.      Right lower leg: No edema.      Left  lower leg: No edema.   Skin:     General: Skin is warm.   Neurological:      General: No focal deficit present.      Mental Status: He is alert and oriented to person, place, and time.   Psychiatric:         Attention and Perception: Attention normal.         Mood and Affect: Mood and affect normal.         Behavior: Behavior is cooperative.         LABS AND IMAGING REVIEWED IN [facility]  Lab Review:        Assessment:   CML, chronic phase, transformed to blast crisis  Pancytopenia secondary to Hydrea  Refractory thrombocytopenia - improving     Plan:     Patient on azacitidine, venetoclax, ponatinib.  Unfortunately, it looks as though his disease is progressing.    Continue transfusion support, packed red blood cells for hemoglobin of less than 7 and platelets for platelet count of less than 10,000. Would require a platelet transfusion today.    Awaiting discussion with Dr. Green.    The patient and his girlfriend did inquire about the possibility of going to [facility].  I explained that Dr. Bijan Green, who the patient currently sees in [place], had worked at [facility] in the past and has many colleagues who are involved in the case.    We will check for TKI resistance.    We will likely keep him overnight, transfuse tomorrow morning before the weekend and let him be discharged home.    Continue supportive care.    Paul Hogan II, MD

## 2023-12-15 NOTE — DISCHARGE INSTRUCTIONS
Please return to the ER with any worsening of your symptoms (Dizziness, shortness of breath, profound weakness). Call your PCP with any further questions or concerns. Attend all follow up appointments.

## 2023-12-16 LAB
BACTERIA BLD CULT: NORMAL
BACTERIA BLD CULT: NORMAL

## 2023-12-18 ENCOUNTER — INFUSION (OUTPATIENT)
Dept: INFUSION THERAPY | Facility: HOSPITAL | Age: 23
End: 2023-12-18
Attending: NURSE PRACTITIONER
Payer: MEDICAID

## 2023-12-18 VITALS
HEART RATE: 95 BPM | DIASTOLIC BLOOD PRESSURE: 89 MMHG | TEMPERATURE: 100 F | SYSTOLIC BLOOD PRESSURE: 156 MMHG | RESPIRATION RATE: 18 BRPM

## 2023-12-18 DIAGNOSIS — C92.10 BLAST CRISIS PHASE OF CHRONIC MYELOID LEUKEMIA: Primary | ICD-10-CM

## 2023-12-18 DIAGNOSIS — C92.10 BLAST CRISIS PHASE OF CHRONIC MYELOID LEUKEMIA: ICD-10-CM

## 2023-12-18 LAB
ABO + RH BLD: NORMAL
BLD PROD TYP BPU: NORMAL
BLOOD UNIT EXPIRATION DATE: NORMAL
BLOOD UNIT TYPE CODE: 1700
BLOOD UNIT TYPE CODE: 6200
CROSSMATCH INTERPRETATION: NORMAL
DISPENSE STATUS: NORMAL
UNIT NUMBER: NORMAL

## 2023-12-18 PROCEDURE — 36430 TRANSFUSION BLD/BLD COMPNT: CPT

## 2023-12-18 PROCEDURE — P9037 PLATE PHERES LEUKOREDU IRRAD: HCPCS | Performed by: NURSE PRACTITIONER

## 2023-12-18 PROCEDURE — 25000003 PHARM REV CODE 250: Performed by: NURSE PRACTITIONER

## 2023-12-18 RX ORDER — HYDROCODONE BITARTRATE AND ACETAMINOPHEN 500; 5 MG/1; MG/1
TABLET ORAL ONCE
Status: CANCELLED | OUTPATIENT
Start: 2023-12-18 | End: 2023-12-18

## 2023-12-18 RX ORDER — DIPHENHYDRAMINE HCL 25 MG
25 CAPSULE ORAL ONCE
Status: COMPLETED | OUTPATIENT
Start: 2023-12-18 | End: 2023-12-18

## 2023-12-18 RX ORDER — DIPHENHYDRAMINE HCL 25 MG
25 CAPSULE ORAL ONCE
Status: CANCELLED | OUTPATIENT
Start: 2023-12-18 | End: 2023-12-18

## 2023-12-18 RX ORDER — ACETAMINOPHEN 325 MG/1
650 TABLET ORAL ONCE
Status: CANCELLED | OUTPATIENT
Start: 2023-12-18 | End: 2023-12-18

## 2023-12-18 RX ORDER — ACETAMINOPHEN 325 MG/1
650 TABLET ORAL ONCE
Status: COMPLETED | OUTPATIENT
Start: 2023-12-18 | End: 2023-12-18

## 2023-12-18 RX ORDER — HYDROCODONE BITARTRATE AND ACETAMINOPHEN 500; 5 MG/1; MG/1
TABLET ORAL ONCE
Status: COMPLETED | OUTPATIENT
Start: 2023-12-18 | End: 2024-01-02

## 2023-12-18 RX ADMIN — ACETAMINOPHEN 650 MG: 325 TABLET ORAL at 09:12

## 2023-12-18 RX ADMIN — DIPHENHYDRAMINE HYDROCHLORIDE 25 MG: 25 CAPSULE ORAL at 09:12

## 2023-12-20 ENCOUNTER — INFUSION (OUTPATIENT)
Dept: INFUSION THERAPY | Facility: HOSPITAL | Age: 23
End: 2023-12-20
Attending: NURSE PRACTITIONER
Payer: MEDICAID

## 2023-12-20 VITALS
OXYGEN SATURATION: 100 % | RESPIRATION RATE: 18 BRPM | TEMPERATURE: 98 F | HEART RATE: 79 BPM | DIASTOLIC BLOOD PRESSURE: 79 MMHG | WEIGHT: 183.63 LBS | BODY MASS INDEX: 24.34 KG/M2 | SYSTOLIC BLOOD PRESSURE: 125 MMHG | HEIGHT: 73 IN

## 2023-12-20 DIAGNOSIS — C92.10 BLAST CRISIS PHASE OF CHRONIC MYELOID LEUKEMIA: Primary | ICD-10-CM

## 2023-12-20 DIAGNOSIS — K13.79 MOUTH PAIN: ICD-10-CM

## 2023-12-20 DIAGNOSIS — C92.10 BLAST CRISIS PHASE OF CHRONIC MYELOID LEUKEMIA: ICD-10-CM

## 2023-12-20 LAB
ABO + RH BLD: NORMAL
ABO + RH BLD: NORMAL
BLD PROD TYP BPU: NORMAL
BLD PROD TYP BPU: NORMAL
BLOOD UNIT EXPIRATION DATE: NORMAL
BLOOD UNIT EXPIRATION DATE: NORMAL
BLOOD UNIT TYPE CODE: 5100
BLOOD UNIT TYPE CODE: 7300
CROSSMATCH INTERPRETATION: NORMAL
CROSSMATCH INTERPRETATION: NORMAL
DISPENSE STATUS: NORMAL
DISPENSE STATUS: NORMAL
MAYO GENERIC ORDERABLE RESULT: NORMAL
UNIT NUMBER: NORMAL
UNIT NUMBER: NORMAL

## 2023-12-20 PROCEDURE — P9037 PLATE PHERES LEUKOREDU IRRAD: HCPCS | Performed by: NURSE PRACTITIONER

## 2023-12-20 PROCEDURE — 25000003 PHARM REV CODE 250: Performed by: NURSE PRACTITIONER

## 2023-12-20 PROCEDURE — 36430 TRANSFUSION BLD/BLD COMPNT: CPT

## 2023-12-20 RX ORDER — DIPHENHYDRAMINE HCL 25 MG
25 CAPSULE ORAL ONCE
Status: CANCELLED | OUTPATIENT
Start: 2023-12-20 | End: 2023-12-20

## 2023-12-20 RX ORDER — HYDROCODONE BITARTRATE AND ACETAMINOPHEN 500; 5 MG/1; MG/1
TABLET ORAL ONCE
Status: COMPLETED | OUTPATIENT
Start: 2023-12-20 | End: 2023-12-20

## 2023-12-20 RX ORDER — HYDROCODONE BITARTRATE AND ACETAMINOPHEN 500; 5 MG/1; MG/1
TABLET ORAL ONCE
Status: DISCONTINUED | OUTPATIENT
Start: 2023-12-20 | End: 2024-01-11 | Stop reason: HOSPADM

## 2023-12-20 RX ORDER — ACETAMINOPHEN 325 MG/1
650 TABLET ORAL ONCE
Status: DISCONTINUED | OUTPATIENT
Start: 2023-12-20 | End: 2024-01-11 | Stop reason: HOSPADM

## 2023-12-20 RX ORDER — ACETAMINOPHEN 325 MG/1
650 TABLET ORAL ONCE
Status: COMPLETED | OUTPATIENT
Start: 2023-12-20 | End: 2023-12-20

## 2023-12-20 RX ORDER — DIPHENHYDRAMINE HCL 25 MG
25 CAPSULE ORAL ONCE
Status: DISCONTINUED | OUTPATIENT
Start: 2023-12-20 | End: 2024-01-11 | Stop reason: HOSPADM

## 2023-12-20 RX ORDER — ACETAMINOPHEN 325 MG/1
650 TABLET ORAL ONCE
Status: CANCELLED | OUTPATIENT
Start: 2023-12-20 | End: 2023-12-20

## 2023-12-20 RX ORDER — HYDROCODONE BITARTRATE AND ACETAMINOPHEN 500; 5 MG/1; MG/1
TABLET ORAL ONCE
Status: CANCELLED | OUTPATIENT
Start: 2023-12-20 | End: 2023-12-20

## 2023-12-20 RX ORDER — DIPHENHYDRAMINE HCL 25 MG
25 CAPSULE ORAL ONCE
Status: COMPLETED | OUTPATIENT
Start: 2023-12-20 | End: 2023-12-20

## 2023-12-20 RX ADMIN — SODIUM CHLORIDE: 9 INJECTION, SOLUTION INTRAVENOUS at 10:12

## 2023-12-20 RX ADMIN — DIPHENHYDRAMINE HYDROCHLORIDE 25 MG: 25 CAPSULE ORAL at 10:12

## 2023-12-20 RX ADMIN — ACETAMINOPHEN 650 MG: 325 TABLET ORAL at 10:12

## 2023-12-22 ENCOUNTER — LAB VISIT (OUTPATIENT)
Dept: LAB | Facility: HOSPITAL | Age: 23
End: 2023-12-22
Attending: INTERNAL MEDICINE
Payer: MEDICAID

## 2023-12-22 DIAGNOSIS — C92.10 BLAST CRISIS PHASE OF CHRONIC MYELOID LEUKEMIA: ICD-10-CM

## 2023-12-22 LAB
ALBUMIN SERPL-MCNC: 3.1 G/DL (ref 3.5–5)
ALBUMIN/GLOB SERPL: 0.9 RATIO (ref 1.1–2)
ALP SERPL-CCNC: 101 UNIT/L (ref 40–150)
ALT SERPL-CCNC: 17 UNIT/L (ref 0–55)
ANISOCYTOSIS BLD QL SMEAR: ABNORMAL
AST SERPL-CCNC: 21 UNIT/L (ref 5–34)
B-HCG SERPL QL: 88 %
BILIRUB SERPL-MCNC: 1.4 MG/DL
BUN SERPL-MCNC: 6.2 MG/DL (ref 8.9–20.6)
CALCIUM SERPL-MCNC: 8.8 MG/DL (ref 8.4–10.2)
CHLORIDE SERPL-SCNC: 100 MMOL/L (ref 98–107)
CO2 SERPL-SCNC: 25 MMOL/L (ref 22–29)
CREAT SERPL-MCNC: 0.77 MG/DL (ref 0.73–1.18)
ERYTHROCYTE [DISTWIDTH] IN BLOOD BY AUTOMATED COUNT: 15.4 % (ref 11.5–17)
GFR SERPLBLD CREATININE-BSD FMLA CKD-EPI: >60 MLS/MIN/1.73/M2
GLOBULIN SER-MCNC: 3.3 GM/DL (ref 2.4–3.5)
GLUCOSE SERPL-MCNC: 135 MG/DL (ref 74–100)
HCT VFR BLD AUTO: 25 % (ref 42–52)
HGB BLD-MCNC: 8.1 G/DL (ref 14–18)
LYMPHOCYTES NFR BLD MANUAL: 2.49 X10(3)/MCL
LYMPHOCYTES NFR BLD MANUAL: 5 % (ref 13–40)
MCH RBC QN AUTO: 28.4 PG (ref 27–31)
MCHC RBC AUTO-ENTMCNC: 32.4 G/DL (ref 33–36)
MCV RBC AUTO: 87.7 FL (ref 80–94)
MICROCYTES BLD QL SMEAR: ABNORMAL
MONOCYTES NFR BLD MANUAL: 3.48 X10(3)/MCL (ref 0.1–1.3)
MONOCYTES NFR BLD MANUAL: 7 % (ref 2–11)
PLATELET # BLD AUTO: 25 X10(3)/MCL (ref 130–400)
PLATELET # BLD EST: ABNORMAL 10*3/UL
PMV BLD AUTO: 9.4 FL (ref 7.4–10.4)
POTASSIUM SERPL-SCNC: 3.2 MMOL/L (ref 3.5–5.1)
PROT SERPL-MCNC: 6.4 GM/DL (ref 6.4–8.3)
RBC # BLD AUTO: 2.85 X10(6)/MCL (ref 4.7–6.1)
RBC MORPH BLD: ABNORMAL
SODIUM SERPL-SCNC: 135 MMOL/L (ref 136–145)
WBC # SPEC AUTO: 49.72 X10(3)/MCL (ref 4.5–11.5)

## 2023-12-22 PROCEDURE — 85027 COMPLETE CBC AUTOMATED: CPT

## 2023-12-22 PROCEDURE — 36415 COLL VENOUS BLD VENIPUNCTURE: CPT

## 2023-12-22 PROCEDURE — 80053 COMPREHEN METABOLIC PANEL: CPT

## 2023-12-25 ENCOUNTER — PATIENT MESSAGE (OUTPATIENT)
Dept: HEMATOLOGY/ONCOLOGY | Facility: CLINIC | Age: 23
End: 2023-12-25
Payer: MEDICAID

## 2023-12-25 DIAGNOSIS — K21.9 GASTROESOPHAGEAL REFLUX DISEASE, UNSPECIFIED WHETHER ESOPHAGITIS PRESENT: ICD-10-CM

## 2023-12-26 ENCOUNTER — HOSPITAL ENCOUNTER (INPATIENT)
Facility: HOSPITAL | Age: 23
LOS: 4 days | Discharge: HOME | DRG: 813 | End: 2023-12-30
Attending: EMERGENCY MEDICINE | Admitting: STUDENT IN AN ORGANIZED HEALTH CARE EDUCATION/TRAINING PROGRAM
Payer: MEDICAID

## 2023-12-26 DIAGNOSIS — T45.1X5A PANCYTOPENIA DUE TO ANTINEOPLASTIC CHEMOTHERAPY: Primary | ICD-10-CM

## 2023-12-26 DIAGNOSIS — C92.10 BLAST CRISIS PHASE OF CHRONIC MYELOID LEUKEMIA: ICD-10-CM

## 2023-12-26 DIAGNOSIS — G89.3 CANCER RELATED PAIN: ICD-10-CM

## 2023-12-26 DIAGNOSIS — D61.810 PANCYTOPENIA DUE TO ANTINEOPLASTIC CHEMOTHERAPY: Primary | ICD-10-CM

## 2023-12-26 LAB
ABO + RH BLD: NORMAL
ABO + RH BLD: NORMAL
ALBUMIN SERPL-MCNC: 3 G/DL (ref 3.5–5)
ALBUMIN/GLOB SERPL: 0.9 RATIO (ref 1.1–2)
ALP SERPL-CCNC: 123 UNIT/L (ref 40–150)
ALT SERPL-CCNC: 33 UNIT/L (ref 0–55)
ANISOCYTOSIS BLD QL SMEAR: ABNORMAL
AST SERPL-CCNC: 35 UNIT/L (ref 5–34)
B-HCG SERPL QL: 79 %
BILIRUB SERPL-MCNC: 1.7 MG/DL
BLD PROD TYP BPU: NORMAL
BLD PROD TYP BPU: NORMAL
BLOOD UNIT EXPIRATION DATE: NORMAL
BLOOD UNIT EXPIRATION DATE: NORMAL
BLOOD UNIT TYPE CODE: 5100
BLOOD UNIT TYPE CODE: 9500
BUN SERPL-MCNC: 4.9 MG/DL (ref 8.9–20.6)
CALCIUM SERPL-MCNC: 8.7 MG/DL (ref 8.4–10.2)
CHLORIDE SERPL-SCNC: 99 MMOL/L (ref 98–107)
CO2 SERPL-SCNC: 26 MMOL/L (ref 22–29)
CREAT SERPL-MCNC: 0.77 MG/DL (ref 0.73–1.18)
CROSSMATCH INTERPRETATION: NORMAL
CROSSMATCH INTERPRETATION: NORMAL
DISPENSE STATUS: NORMAL
DISPENSE STATUS: NORMAL
ERYTHROCYTE [DISTWIDTH] IN BLOOD BY AUTOMATED COUNT: 15.5 % (ref 11.5–17)
GFR SERPLBLD CREATININE-BSD FMLA CKD-EPI: >60 MLS/MIN/1.73/M2
GLOBULIN SER-MCNC: 3.3 GM/DL (ref 2.4–3.5)
GLUCOSE SERPL-MCNC: 115 MG/DL (ref 74–100)
GROUP & RH: NORMAL
HCT VFR BLD AUTO: 22.3 % (ref 42–52)
HGB BLD-MCNC: 7 G/DL (ref 14–18)
INDIRECT COOMBS: NORMAL
INSTRUMENT WBC (OLG): 89.73 X10(3)/MCL
LACTATE SERPL-SCNC: 0.8 MMOL/L (ref 0.5–2.2)
LYMPHOCYTES NFR BLD MANUAL: 4.49 X10(3)/MCL
LYMPHOCYTES NFR BLD MANUAL: 5 %
MCH RBC QN AUTO: 28.2 PG (ref 27–31)
MCHC RBC AUTO-ENTMCNC: 31.4 G/DL (ref 33–36)
MCV RBC AUTO: 89.9 FL (ref 80–94)
MICROCYTES BLD QL SMEAR: ABNORMAL
MONOCYTES NFR BLD MANUAL: 14.36 X10(3)/MCL (ref 0.1–1.3)
MONOCYTES NFR BLD MANUAL: 16 %
NRBC BLD AUTO-RTO: 0 %
PLATELET # BLD AUTO: 2 X10(3)/MCL (ref 130–400)
PLATELET # BLD EST: ABNORMAL 10*3/UL
PLATELETS.RETICULATED NFR BLD AUTO: 25.8 % (ref 0.9–11.2)
PMV BLD AUTO: 10.1 FL (ref 7.4–10.4)
POTASSIUM SERPL-SCNC: 3.7 MMOL/L (ref 3.5–5.1)
PROT SERPL-MCNC: 6.3 GM/DL (ref 6.4–8.3)
RBC # BLD AUTO: 2.48 X10(6)/MCL (ref 4.7–6.1)
RBC MORPH BLD: ABNORMAL
SODIUM SERPL-SCNC: 136 MMOL/L (ref 136–145)
SPECIMEN OUTDATE: NORMAL
UNIT NUMBER: NORMAL
UNIT NUMBER: NORMAL
WBC # SPEC AUTO: 89.73 X10(3)/MCL (ref 4.5–11.5)

## 2023-12-26 PROCEDURE — 87040 BLOOD CULTURE FOR BACTERIA: CPT | Performed by: NURSE PRACTITIONER

## 2023-12-26 PROCEDURE — 25000003 PHARM REV CODE 250: Performed by: STUDENT IN AN ORGANIZED HEALTH CARE EDUCATION/TRAINING PROGRAM

## 2023-12-26 PROCEDURE — 63600175 PHARM REV CODE 636 W HCPCS: Performed by: NURSE PRACTITIONER

## 2023-12-26 PROCEDURE — 85027 COMPLETE CBC AUTOMATED: CPT | Performed by: NURSE PRACTITIONER

## 2023-12-26 PROCEDURE — 63600175 PHARM REV CODE 636 W HCPCS: Performed by: STUDENT IN AN ORGANIZED HEALTH CARE EDUCATION/TRAINING PROGRAM

## 2023-12-26 PROCEDURE — 25000003 PHARM REV CODE 250: Performed by: PHYSICIAN ASSISTANT

## 2023-12-26 PROCEDURE — P9037 PLATE PHERES LEUKOREDU IRRAD: HCPCS | Performed by: NURSE PRACTITIONER

## 2023-12-26 PROCEDURE — 83605 ASSAY OF LACTIC ACID: CPT | Performed by: NURSE PRACTITIONER

## 2023-12-26 PROCEDURE — 99285 EMERGENCY DEPT VISIT HI MDM: CPT | Mod: 25

## 2023-12-26 PROCEDURE — 11000001 HC ACUTE MED/SURG PRIVATE ROOM

## 2023-12-26 PROCEDURE — 25000003 PHARM REV CODE 250: Performed by: INTERNAL MEDICINE

## 2023-12-26 PROCEDURE — 25000003 PHARM REV CODE 250: Performed by: NURSE PRACTITIONER

## 2023-12-26 PROCEDURE — 86850 RBC ANTIBODY SCREEN: CPT | Performed by: NURSE PRACTITIONER

## 2023-12-26 PROCEDURE — 30233R1 TRANSFUSION OF NONAUTOLOGOUS PLATELETS INTO PERIPHERAL VEIN, PERCUTANEOUS APPROACH: ICD-10-PCS | Performed by: STUDENT IN AN ORGANIZED HEALTH CARE EDUCATION/TRAINING PROGRAM

## 2023-12-26 PROCEDURE — 80053 COMPREHEN METABOLIC PANEL: CPT | Performed by: NURSE PRACTITIONER

## 2023-12-26 PROCEDURE — 36430 TRANSFUSION BLD/BLD COMPNT: CPT

## 2023-12-26 RX ORDER — PANTOPRAZOLE SODIUM 40 MG/1
40 TABLET, DELAYED RELEASE ORAL DAILY
Qty: 30 TABLET | Refills: 11 | Status: ON HOLD | OUTPATIENT
Start: 2023-12-26 | End: 2024-01-11 | Stop reason: HOSPADM

## 2023-12-26 RX ORDER — DIPHENHYDRAMINE HYDROCHLORIDE 50 MG/ML
50 INJECTION, SOLUTION INTRAMUSCULAR; INTRAVENOUS ONCE
Status: DISCONTINUED | OUTPATIENT
Start: 2023-12-26 | End: 2023-12-26

## 2023-12-26 RX ORDER — IBUPROFEN 200 MG
16 TABLET ORAL
Status: DISCONTINUED | OUTPATIENT
Start: 2023-12-26 | End: 2023-12-30 | Stop reason: HOSPADM

## 2023-12-26 RX ORDER — ONDANSETRON 2 MG/ML
4 INJECTION INTRAMUSCULAR; INTRAVENOUS EVERY 8 HOURS PRN
Status: DISCONTINUED | OUTPATIENT
Start: 2023-12-26 | End: 2023-12-30 | Stop reason: HOSPADM

## 2023-12-26 RX ORDER — HYDROCODONE BITARTRATE AND ACETAMINOPHEN 500; 5 MG/1; MG/1
TABLET ORAL
Status: DISCONTINUED | OUTPATIENT
Start: 2023-12-26 | End: 2023-12-27

## 2023-12-26 RX ORDER — ACETAMINOPHEN 325 MG/1
650 TABLET ORAL EVERY 8 HOURS PRN
Status: DISCONTINUED | OUTPATIENT
Start: 2023-12-26 | End: 2023-12-26

## 2023-12-26 RX ORDER — SODIUM CHLORIDE, SODIUM LACTATE, POTASSIUM CHLORIDE, CALCIUM CHLORIDE 600; 310; 30; 20 MG/100ML; MG/100ML; MG/100ML; MG/100ML
INJECTION, SOLUTION INTRAVENOUS CONTINUOUS
Status: DISCONTINUED | OUTPATIENT
Start: 2023-12-26 | End: 2023-12-30 | Stop reason: HOSPADM

## 2023-12-26 RX ORDER — IBUPROFEN 200 MG
24 TABLET ORAL
Status: DISCONTINUED | OUTPATIENT
Start: 2023-12-26 | End: 2023-12-30 | Stop reason: HOSPADM

## 2023-12-26 RX ORDER — GLUCAGON 1 MG
1 KIT INJECTION
Status: DISCONTINUED | OUTPATIENT
Start: 2023-12-26 | End: 2023-12-30 | Stop reason: HOSPADM

## 2023-12-26 RX ORDER — ACETAMINOPHEN 325 MG/1
650 TABLET ORAL EVERY 4 HOURS PRN
Status: DISCONTINUED | OUTPATIENT
Start: 2023-12-26 | End: 2023-12-26

## 2023-12-26 RX ORDER — DIPHENHYDRAMINE HYDROCHLORIDE 50 MG/ML
50 INJECTION, SOLUTION INTRAMUSCULAR; INTRAVENOUS
Status: COMPLETED | OUTPATIENT
Start: 2023-12-26 | End: 2023-12-26

## 2023-12-26 RX ORDER — HYDROXYUREA 500 MG/1
2000 CAPSULE ORAL
Status: DISCONTINUED | OUTPATIENT
Start: 2023-12-26 | End: 2023-12-29

## 2023-12-26 RX ORDER — HYDROMORPHONE HYDROCHLORIDE 2 MG/ML
0.5 INJECTION, SOLUTION INTRAMUSCULAR; INTRAVENOUS; SUBCUTANEOUS EVERY 6 HOURS PRN
Status: DISCONTINUED | OUTPATIENT
Start: 2023-12-26 | End: 2023-12-29

## 2023-12-26 RX ORDER — VANCOMYCIN HCL IN 5 % DEXTROSE 1G/250ML
1000 PLASTIC BAG, INJECTION (ML) INTRAVENOUS
Status: DISCONTINUED | OUTPATIENT
Start: 2023-12-27 | End: 2023-12-28

## 2023-12-26 RX ORDER — ACETAMINOPHEN 325 MG/1
650 TABLET ORAL EVERY 4 HOURS PRN
Status: DISCONTINUED | OUTPATIENT
Start: 2023-12-27 | End: 2023-12-30 | Stop reason: HOSPADM

## 2023-12-26 RX ADMIN — ACETAMINOPHEN 650 MG: 325 TABLET ORAL at 08:12

## 2023-12-26 RX ADMIN — HYDROXYUREA 2000 MG: 500 CAPSULE ORAL at 05:12

## 2023-12-26 RX ADMIN — SODIUM CHLORIDE, POTASSIUM CHLORIDE, SODIUM LACTATE AND CALCIUM CHLORIDE: 600; 310; 30; 20 INJECTION, SOLUTION INTRAVENOUS at 05:12

## 2023-12-26 RX ADMIN — CEFEPIME 2 G: 2 INJECTION, POWDER, FOR SOLUTION INTRAVENOUS at 04:12

## 2023-12-26 RX ADMIN — DIPHENHYDRAMINE HYDROCHLORIDE 50 MG: 50 INJECTION INTRAMUSCULAR; INTRAVENOUS at 05:12

## 2023-12-26 RX ADMIN — VANCOMYCIN HYDROCHLORIDE 2000 MG: 500 INJECTION, POWDER, LYOPHILIZED, FOR SOLUTION INTRAVENOUS at 08:12

## 2023-12-26 NOTE — FIRST PROVIDER EVALUATION
"Medical screening examination initiated.  I have conducted a focused provider triage encounter, findings are as follows:    Brief history of present illness:  24 y/o with CML who was on chemo but it was stopped is having worsening swelling to his upper and lower gums which is worsening. He was given mouthwash from dr orellana office but states not helping.     Vitals:    12/26/23 1233   BP: (!) 148/84   BP Location: Left arm   Patient Position: Sitting   Pulse: (!) 112   Resp: 19   Temp: 99.3 °F (37.4 °C)   TempSrc: Tympanic   SpO2: 100%   Weight: 83 kg (183 lb)   Height: 6' 1" (1.854 m)       Pertinent physical exam:  alert, very swollen upper and lower gums with foul odor and some drainage it appears.     Brief workup plan:  labs, exam    Preliminary workup initiated; this workup will be continued and followed by the physician or advanced practice provider that is assigned to the patient when roomed.  "

## 2023-12-26 NOTE — H&P
"[facility]  Hospital Medicine History & Physical Examination       Patient Name: Magdaleno Hirsch  MRN: [id number]  Patient Class: Emergency   Admission Date: 12/26/2023   Admitting Physician: Brody Khan MD   Length of Stay: 0  Attending Physician: Brody Khan MD   Primary Care Provider: Christine, Primary Doctor  Face-to-Face encounter date: 12/26/2023  Code Status:Full code   Chief Complaint: Oral Swelling (Presents with c/o Lower gum dental pain and infection that worsened "over night". Also reports foul breath. Denies fevers. Hx of cancer, not currently on chemo. Sees Dr. Gardner. )        Patient information was obtained from patient, patient's family, past medical records and ER records.     HISTORY OF PRESENT ILLNESS:   Magdaleno Hirsch is a 23 y.o. male with a past medical history of essential hypertension and CML presented to [facility] on 12/26/2023 for oral swelling.  Patient reported oral ulcers and swelling began 1 week ago.  Patient states he was prescribed magic mouthwash without relief.  Patient reported gum increased pain yesterday with passage of blood clots.  Patient also endorsed chills.  Patient denied fever, chest pain, shortness of breath, cough, congestion, nausea, vomiting, hematemesis, rectal bleeding, dark stools, diarrhea, and abdominal pain.  Patient's Oncologist is Dr. Almaguer and states his last chemo was last month.   Initial vital signs in ED were /84, pulse 112, respirations 19, temperature 37.4° C, and SpO2 100% on room air.  Labs revealed WBC 89.73, RBC 2.48, hemoglobin 7, hematocrit 22.3, MCV 89.9, platelets 2, blast 79, BUN 4.9, creatinine 0.77, glucose 115, bilirubin total 1.7, AST 35, ALT 33, and lactic acid 0.8.  Oncology was consulted with recommendations to transfuse 2 units of platelets and Cefepime. Patient was admitted to hospital medicine service for further medical management.     PAST MEDICAL HISTORY:   Essential hypertension  CML     PAST SURGICAL " HISTORY:     Past Surgical History:   Procedure Laterality Date    BONE MARROW BIOPSY N/A 8/22/2022    Procedure: Biopsy-bone marrow;  Surgeon: eGtachew Chen MD;  Location: [facility] OR;  Service: General;  Laterality: N/A;    BONE MARROW BIOPSY N/A 7/25/2023    Procedure: Biopsy-bone marrow;  Surgeon: Edi Carty MD;  Location: [facility] OR;  Service: General;  Laterality: N/A;    BONE MARROW BIOPSY N/A 8/28/2023    Procedure: Biopsy-bone marrow;  Surgeon: Moo Pina MD;  Location: [facility] OR;  Service: General;  Laterality: N/A;    BONE MARROW BIOPSY N/A 10/31/2023    Procedure: Biopsy-bone marrow;  Surgeon: Edi Carty MD;  Location: [facility] OR;  Service: General;  Laterality: N/A;    KNEE SURGERY Left        ALLERGIES:   Patient has no known allergies.    FAMILY HISTORY:   Reviewed and negative    SOCIAL HISTORY:   Former tobacco use  Denies illicit drug and alcohol use    HOME MEDICATIONS:     Prior to Admission medications    Medication Sig Start Date End Date Taking? Authorizing Provider   (Magic mouthwash) 1:1:1 diphenhydrAMINE(Benadryl) 12.5mg/5ml liq, aluminum & magnesium hydroxide-simethicone (Maalox), LIDOcaine viscous 2% Swish and spit 15 mLs every 4 (four) hours as needed (mouth pain). for mouth sores 12/5/23   Valencia Loza NP   (Magic mouthwash) 1:1:1 diphenhydrAMINE(Benadryl) 12.5mg/5ml liq, aluminum & magnesium hydroxide-simethicone (Maalox), LIDOcaine viscous 2% Swish and spit 15 mLs every 4 (four) hours as needed (mouth pain). 12/20/23   Paul Hogan II, MD   acyclovir (ZOVIRAX) 800 MG Tab Take 1 tablet (800 mg total) by mouth 2 (two) times daily. 12/4/23 12/3/24  Jaren Green MD   diltiaZEM (CARDIZEM CD) 120 MG Cp24 Take 1 capsule (120 mg total) by mouth nightly. 12/14/23 12/13/24  Wilman Rizvi MD   metoprolol tartrate (LOPRESSOR) 25 MG tablet Take 1 tablet (25 mg total) by mouth 2 (two) times daily. 12/14/23 12/13/24  Wilman Rizvi MD   oxyCODONE-acetaminophen (PERCOCET)   mg per tablet Take 1 tablet by mouth every 4 (four) hours as needed for Pain. 12/14/23 12/13/24  Wilman Rizvi MD   pantoprazole (PROTONIX) 40 MG tablet Take 1 tablet (40 mg total) by mouth once daily. 12/26/23 12/25/24  Jaren Green MD   potassium, sodium phosphates (PHOS-NAK) 280-160-250 mg PwPk Take 1 packet by mouth 3 (three) times daily with meals. 12/15/23   Wilman Rizvi MD       REVIEW OF SYSTEMS:   Except as documented, all other systems reviewed and negative     PHYSICAL EXAM:     VITAL SIGNS: 24 HRS MIN & MAX LAST   Temp  Min: 99.3 °F (37.4 °C)  Max: 99.3 °F (37.4 °C) 99.3 °F (37.4 °C)   BP  Min: 144/83  Max: 148/84 (!) 144/83   Pulse  Min: 110  Max: 112  110   Resp  Min: 19  Max: 20 20   SpO2  Min: 100 %  Max: 100 % 100 %       General appearance: Male in no apparent distress.  HEENNT:  Diffuse upper and lower gingival swelling with drainage and halitosis, poor dentition    Lungs: Clear to auscultation bilaterally.   Heart: Regular rate and rhythm.    Abdomen: Soft, non-distended, non-tender. Bowel sounds are normal.   Extremities: No cyanosis, clubbing, or edema. No deformities.   Skin: No Rash. Warm and dry.   Neuro: Awake, alert, and oriented. Motor and sensory exams grossly intact.   Psych/mental status: Appropriate mood and affect. Responds appropriately to questions.     LABS AND IMAGING:     Recent Labs   Lab 12/20/23  0933 12/22/23  0945 12/26/23  1256   WBC 39.46* 49.72* 89.73  89.73*   RBC 2.94* 2.85* 2.48*   HGB 8.5* 8.1* 7.0*   HCT 26.0* 25.0* 22.3*   MCV 88.4 87.7 89.9   MCH 28.9 28.4 28.2   MCHC 32.7* 32.4* 31.4*   RDW 15.7 15.4 15.5   PLT 5* 25* 2*   MPV 7.8 9.4 10.1       Recent Labs   Lab 12/20/23  0933 12/22/23  0945 12/26/23  1256    135* 136   K 3.4* 3.2* 3.7   CO2 28 25 26   BUN 4.2* 6.2* 4.9*   CREATININE 0.77 0.77 0.77   CALCIUM 8.7 8.8 8.7   ALBUMIN 3.0* 3.1* 3.0*   ALKPHOS 98 101 123   ALT 15 17 33   AST 20 21 35*   BILITOT 1.1 1.4 1.7*        Microbiology Results (last 7 days)       Procedure Component Value Units Date/Time    Blood Culture #1 **CANNOT BE ORDERED STAT** [[id number]] Collected: 12/26/23 1549    Order Status: Sent Specimen: Blood     Blood Culture #2 **CANNOT BE ORDERED STAT** [[id number]] Collected: 12/26/23 1549    Order Status: Sent Specimen: Blood              CT Chest Abdomen Pelvis With IV Contrast (XPD) NO Oral Contrast  Narrative: EXAMINATION:  CT CHEST ABDOMEN PELVIS WITH IV CONTRAST (XPD)    CLINICAL HISTORY:  Sepsis;FUO;    TECHNIQUE:  Low dose axial images, sagittal and coronal reformations were obtained from the thoracic inlet to the pubic symphysis following the IV and oral contrast administration.  Automatic exposure control is utilized to reduce patient radiation exposure.    COMPARISON:  CT scan of the chest from 11/25/2023    FINDINGS:  The lungs are adequately aerated.  No mass is seen.  No nodule is seen.  No pleural thickening is seen.  No pleural effusion is seen.  There is a reticulonodular tree-in-bud type infiltrate in the left upper lobe.  This is new since the prior examination.    The thoracic aorta is normal in caliber..    No mediastinal adenopathy is seen.    The heart appears normal in size..    The liver is enlarged in size.  No liver mass or lesion is seen.  Portal and hepatic veins appear normal..    The gallbladder appears normal.  No gallstones are seen.    The spleen is enlarged in size.  No splenic mass or lesion is seen.    The pancreas appears grossly unremarkable.  No pancreatic mass or lesion is seen.  No inflammation is seen.    No adrenal abnormality is seen.  No adrenal nodule is seen.    The kidneys are well perfused.  No hydronephrosis is seen.  No hydroureter is seen.  No retroperitoneal abnormality is seen..    Visualized portions of the bowel shows no acute abnormality.  No colitis is seen.  No diverticulitis is seen.  No colonic mass is seen.    There is some pockets of air and  inflammatory changes seen along the anterior abdominal wall pannus likely representing injection sites.  No free fluid is seen.    Urinary bladder appears unremarkable.    No acute bony abnormality is seen.  Impression: Reticulonodular tree-in-bud type infiltrate in the left upper lobe likely infectious in etiology    Hepatomegaly    Splenomegaly    Electronically signed by: Ian Harrisondestiny  Date:    12/12/2023  Time:    13:17        ASSESSMENT & PLAN:   Assessment:  Severe thrombocytopenia  Acute on chronic anemia   CML  Diffuse gingival swelling with ulcerations and drainage   History of  HTN       Plan:  2 units platelets ordered   Close CBC monitoring   IV Cefepime 2 g TID  Blood cultures pending, follow-up results  Oncology consulted, appreciate recommendations  Continue appropriate home medications once med rec updated   Labs in a.m.  Further recommendations per attending MD     VTE Prophylaxis:  SCDs    __________________________________________________________________________  INPATIENT LIST OF MEDICATIONS     Scheduled Meds:   0.9%  NaCl infusion (for blood administration)   Intravenous Once    0.9%  NaCl infusion (for blood administration)   Intravenous Once    acetaminophen  650 mg Oral Once    ceFEPime (MAXIPIME) IVPB  2 g Intravenous Q8H    diphenhydrAMINE  25 mg Oral Once    hydroxyurea  2,000 mg Oral Q6H     Continuous Infusions:  PRN Meds:.0.9%  NaCl infusion (for blood administration), 0.9%  NaCl infusion (for blood administration)      IKory PA-C, have reviewed and discussed the case with Dr. Brody Khan MD   Please see the following addendum for further assessment and plan from there attending MD.    12/26/2023    ________________________________________________________________________________    MD Addendum:  I,  assumed care of this patient today at ---am/pm  For the patient encounter, I performed the substantive portion of the visit, I reviewed the PA documentation, treatment plan, and  medical decision making.  I had face to face time with this patient     Seen and examined the patient. Agree with above history, physical exam and management.      Patient is in another blast crises and WBC is increasing comparing prior.   He presented to the hospital with generalized weakness and gum hyperplasia with mucositis and gum bleeding.     Discussed with Dr. Hogan.  Unfortunately at this point there is not much to do from oncology stand point since he Is unresponsive to chemotherapies and immunotherapies.   Will treat supportively.   Continue Hydroxyurea.     Discharge Planning and Disposition: No mobility needs. Ambulating well. Good social support system.   Anticipated discharge    All diagnosis and differential diagnosis have been reviewed; assessment and plan has been documented; I have personally reviewed the labs and test results that are presently available; I have reviewed the patients medication list; I have reviewed the consulting providers response and recommendations. I have reviewed or attempted to review medical records based upon their availability.    All of the patient and family questions have been addressed and answered. Patient's is agreeable to the above stated plan. I will continue to monitor closely and make adjustments to medical management as needed.      12/26/2023

## 2023-12-26 NOTE — ED PROVIDER NOTES
"Encounter Date: 12/26/2023       History     Chief Complaint   Patient presents with    Oral Swelling     Presents with c/o Lower gum dental pain and infection that worsened "over night". Also reports foul breath. Denies fevers. Hx of cancer, not currently on chemo. Sees Dr. Gardner.      Patient is a 23-year-old male with a history of CML presenting for pain and swelling of his gingiva.  Patient states this has worsened over the past week or so.  He states now there are some areas of pain and there is a very bad smell to the area.  Patient denies fever.  Patient is currently on no chemotherapy.  Alexsandra nurse practitioner has already consulted Dr. Gardner, patient's oncologist and orders were given.  Patient will be admitted to the hospitalist team      Review of patient's allergies indicates:  No Known Allergies  Past Medical History:   Diagnosis Date    CML (chronic myelocytic leukemia)     HVD (hypertensive vascular disease)     Oropharyngeal candidiasis      Past Surgical History:   Procedure Laterality Date    BONE MARROW BIOPSY N/A 8/22/2022    Procedure: Biopsy-bone marrow;  Surgeon: Getachew Chen MD;  Location: [facility] OR;  Service: General;  Laterality: N/A;    BONE MARROW BIOPSY N/A 7/25/2023    Procedure: Biopsy-bone marrow;  Surgeon: Edi Carty MD;  Location: [facility] OR;  Service: General;  Laterality: N/A;    BONE MARROW BIOPSY N/A 8/28/2023    Procedure: Biopsy-bone marrow;  Surgeon: Moo Pina MD;  Location: [facility] OR;  Service: General;  Laterality: N/A;    BONE MARROW BIOPSY N/A 10/31/2023    Procedure: Biopsy-bone marrow;  Surgeon: Edi Carty MD;  Location: [facility] OR;  Service: General;  Laterality: N/A;    KNEE SURGERY Left      Family History   Problem Relation Age of Onset    Hypertension Maternal Grandmother     Cancer Maternal Grandmother      Social History     Tobacco Use    Smoking status: Never    Smokeless tobacco: Never   Substance Use Topics    Alcohol use: Never    Drug use: Yes     " Types: Marijuana     Review of Systems   Constitutional:  Positive for appetite change. Negative for chills and fever.   HENT:  Positive for facial swelling and mouth sores.    Respiratory: Negative.     Cardiovascular: Negative.    Gastrointestinal: Negative.    Genitourinary: Negative.    Musculoskeletal: Negative.    Neurological: Negative.    Psychiatric/Behavioral: Negative.         Physical Exam     Initial Vitals [12/26/23 1233]   BP Pulse Resp Temp SpO2   (!) 148/84 (!) 112 19 99.3 °F (37.4 °C) 100 %      MAP       --         Physical Exam    Nursing note and vitals reviewed.  Constitutional: He appears well-developed and well-nourished.   HENT:   Head: Normocephalic.   Patient has engorgement of gingiva top and bottom encasing most of patient's teeth.  Some areas of the gingiva appears somewhat dark.  No patrice purulence seen.   Cardiovascular:  Regular rhythm and normal heart sounds.           Positive for tachycardia   Pulmonary/Chest: Breath sounds normal. No respiratory distress. He has no wheezes.   Abdominal: Abdomen is soft. There is no abdominal tenderness.   Musculoskeletal:         General: Normal range of motion.     Neurological: He is alert and oriented to person, place, and time. He has normal strength.   Psychiatric: He has a normal mood and affect. Thought content normal.         ED Course   Procedures  Labs Reviewed   CBC WITH DIFFERENTIAL - Abnormal; Notable for the following components:       Result Value    WBC 89.73 (*)     RBC 2.48 (*)     Hgb 7.0 (*)     Hct 22.3 (*)     MCHC 31.4 (*)     Platelet 2 (*)     IPF 25.8 (*)     All other components within normal limits   COMPREHENSIVE METABOLIC PANEL - Abnormal; Notable for the following components:    Glucose Level 115 (*)     Blood Urea Nitrogen 4.9 (*)     Protein Total 6.3 (*)     Albumin Level 3.0 (*)     Albumin/Globulin Ratio 0.9 (*)     Bilirubin Total 1.7 (*)     Aspartate Aminotransferase 35 (*)     All other components within  normal limits   MANUAL DIFFERENTIAL - Abnormal; Notable for the following components:    Blasts % 79 (*)     Monocytes Abs 14.3568 (*)     Platelets Decreased (*)     RBC Morph Abnormal (*)     Anisocytosis 1+ (*)     Microcytosis 1+ (*)     All other components within normal limits   LACTIC ACID, PLASMA - Normal   BLOOD CULTURE [facility]   BLOOD CULTURE [facility]   TYPE & SCREEN   PREPARE PLATELETS (DOSE) SOFT   PREPARE PLATELETS (DOSE) SOFT          Imaging Results    None          Medications   0.9%  NaCl infusion (for blood administration) (has no administration in time range)   ceFEPIme (MAXIPIME) 2 g in dextrose 5 % in water (D5W) 100 mL IVPB (MB+) (0 g Intravenous Stopped 12/26/23 1643)   0.9%  NaCl infusion (for blood administration) (has no administration in time range)   hydroxyurea capsule 2,000 mg (has no administration in time range)   ondansetron injection 4 mg (has no administration in time range)   acetaminophen tablet 650 mg (has no administration in time range)   acetaminophen tablet 650 mg (has no administration in time range)   glucose chewable tablet 16 g (has no administration in time range)   glucose chewable tablet 24 g (has no administration in time range)   glucagon (human recombinant) injection 1 mg (has no administration in time range)   dextrose 10% bolus 125 mL 125 mL (has no administration in time range)   dextrose 10% bolus 250 mL 250 mL (has no administration in time range)   lactated ringers infusion (has no administration in time range)   diphenhydrAMINE injection 50 mg (has no administration in time range)     Medical Decision Making  Differential diagnosis: Blast crisis, gingivitis    Amount and/or Complexity of Data Reviewed  Labs: ordered.     Details: Abnormal labs include [place] blood [place] [address], platelet count of 2000, H&H of 7 and 22 and blasts of 79%  Discussion of management or test interpretation with external provider(s): Patient will be admitted to the hospitalist team and  started on hydroxyurea, will get a platelet infusion, will start cefepime q.8 hours.  Dr. Gardner, oncology has been consult    Risk  Prescription drug management.  Decision regarding hospitalization.               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

## 2023-12-27 LAB
ABO + RH BLD: NORMAL
ALBUMIN SERPL-MCNC: 2.6 G/DL (ref 3.5–5)
ALBUMIN/GLOB SERPL: 0.9 RATIO (ref 1.1–2)
ALP SERPL-CCNC: 100 UNIT/L (ref 40–150)
ALT SERPL-CCNC: 29 UNIT/L (ref 0–55)
ANISOCYTOSIS BLD QL SMEAR: ABNORMAL
AST SERPL-CCNC: 31 UNIT/L (ref 5–34)
B-HCG SERPL QL: 95 %
BILIRUB SERPL-MCNC: 2.2 MG/DL
BLD PROD TYP BPU: NORMAL
BLOOD UNIT EXPIRATION DATE: NORMAL
BLOOD UNIT TYPE CODE: 1700
BUN SERPL-MCNC: 9.7 MG/DL (ref 8.9–20.6)
CALCIUM SERPL-MCNC: 8.3 MG/DL (ref 8.4–10.2)
CHLORIDE SERPL-SCNC: 100 MMOL/L (ref 98–107)
CO2 SERPL-SCNC: 27 MMOL/L (ref 22–29)
CREAT SERPL-MCNC: 0.72 MG/DL (ref 0.73–1.18)
CROSSMATCH INTERPRETATION: NORMAL
DISPENSE STATUS: NORMAL
ERYTHROCYTE [DISTWIDTH] IN BLOOD BY AUTOMATED COUNT: 15.4 % (ref 11.5–17)
GFR SERPLBLD CREATININE-BSD FMLA CKD-EPI: >60 MLS/MIN/1.73/M2
GLOBULIN SER-MCNC: 3 GM/DL (ref 2.4–3.5)
GLUCOSE SERPL-MCNC: 102 MG/DL (ref 74–100)
HCT VFR BLD AUTO: 16.4 % (ref 42–52)
HGB BLD-MCNC: 5.4 G/DL (ref 14–18)
INSTRUMENT WBC (OLG): 59.88 X10(3)/MCL
LYMPHOCYTES NFR BLD MANUAL: 2.4 X10(3)/MCL
LYMPHOCYTES NFR BLD MANUAL: 4 %
MAGNESIUM SERPL-MCNC: 1.6 MG/DL (ref 1.6–2.6)
MCH RBC QN AUTO: 28.7 PG (ref 27–31)
MCHC RBC AUTO-ENTMCNC: 32.9 G/DL (ref 33–36)
MCV RBC AUTO: 87.2 FL (ref 80–94)
MICROCYTES BLD QL SMEAR: ABNORMAL
MONOCYTES NFR BLD MANUAL: 1.2 X10(3)/MCL (ref 0.1–1.3)
MONOCYTES NFR BLD MANUAL: 2 %
MRSA PCR SCRN (OHS): NOT DETECTED
NRBC BLD AUTO-RTO: 0 %
PLATELET # BLD AUTO: 28 X10(3)/MCL (ref 130–400)
PLATELET # BLD EST: ABNORMAL 10*3/UL
PLATELETS.RETICULATED NFR BLD AUTO: 1.3 % (ref 0.9–11.2)
PMV BLD AUTO: 9.6 FL (ref 7.4–10.4)
POTASSIUM SERPL-SCNC: 3.3 MMOL/L (ref 3.5–5.1)
PROT SERPL-MCNC: 5.6 GM/DL (ref 6.4–8.3)
RBC # BLD AUTO: 1.88 X10(6)/MCL (ref 4.7–6.1)
RBC MORPH BLD: ABNORMAL
SODIUM SERPL-SCNC: 139 MMOL/L (ref 136–145)
UNIT NUMBER: NORMAL
WBC # SPEC AUTO: 59.88 X10(3)/MCL (ref 4.5–11.5)

## 2023-12-27 PROCEDURE — 86923 COMPATIBILITY TEST ELECTRIC: CPT | Mod: 91 | Performed by: INTERNAL MEDICINE

## 2023-12-27 PROCEDURE — 30233N1 TRANSFUSION OF NONAUTOLOGOUS RED BLOOD CELLS INTO PERIPHERAL VEIN, PERCUTANEOUS APPROACH: ICD-10-PCS | Performed by: STUDENT IN AN ORGANIZED HEALTH CARE EDUCATION/TRAINING PROGRAM

## 2023-12-27 PROCEDURE — P9040 RBC LEUKOREDUCED IRRADIATED: HCPCS | Performed by: INTERNAL MEDICINE

## 2023-12-27 PROCEDURE — 63600175 PHARM REV CODE 636 W HCPCS: Performed by: NURSE PRACTITIONER

## 2023-12-27 PROCEDURE — 25000003 PHARM REV CODE 250: Performed by: INTERNAL MEDICINE

## 2023-12-27 PROCEDURE — 83735 ASSAY OF MAGNESIUM: CPT | Performed by: STUDENT IN AN ORGANIZED HEALTH CARE EDUCATION/TRAINING PROGRAM

## 2023-12-27 PROCEDURE — 63600175 PHARM REV CODE 636 W HCPCS: Performed by: INTERNAL MEDICINE

## 2023-12-27 PROCEDURE — 36430 TRANSFUSION BLD/BLD COMPNT: CPT

## 2023-12-27 PROCEDURE — 99223 1ST HOSP IP/OBS HIGH 75: CPT | Mod: ,,, | Performed by: INTERNAL MEDICINE

## 2023-12-27 PROCEDURE — 25000003 PHARM REV CODE 250: Performed by: NURSE PRACTITIONER

## 2023-12-27 PROCEDURE — 80053 COMPREHEN METABOLIC PANEL: CPT | Performed by: PHYSICIAN ASSISTANT

## 2023-12-27 PROCEDURE — 87641 MR-STAPH DNA AMP PROBE: CPT | Performed by: STUDENT IN AN ORGANIZED HEALTH CARE EDUCATION/TRAINING PROGRAM

## 2023-12-27 PROCEDURE — 02HV33Z INSERTION OF INFUSION DEVICE INTO SUPERIOR VENA CAVA, PERCUTANEOUS APPROACH: ICD-10-PCS | Performed by: STUDENT IN AN ORGANIZED HEALTH CARE EDUCATION/TRAINING PROGRAM

## 2023-12-27 PROCEDURE — P9040 RBC LEUKOREDUCED IRRADIATED: HCPCS | Performed by: STUDENT IN AN ORGANIZED HEALTH CARE EDUCATION/TRAINING PROGRAM

## 2023-12-27 PROCEDURE — 11000001 HC ACUTE MED/SURG PRIVATE ROOM

## 2023-12-27 PROCEDURE — 25000003 PHARM REV CODE 250: Performed by: STUDENT IN AN ORGANIZED HEALTH CARE EDUCATION/TRAINING PROGRAM

## 2023-12-27 PROCEDURE — 63600175 PHARM REV CODE 636 W HCPCS: Performed by: STUDENT IN AN ORGANIZED HEALTH CARE EDUCATION/TRAINING PROGRAM

## 2023-12-27 PROCEDURE — 85027 COMPLETE CBC AUTOMATED: CPT | Performed by: PHYSICIAN ASSISTANT

## 2023-12-27 PROCEDURE — 36569 INSJ PICC 5 YR+ W/O IMAGING: CPT

## 2023-12-27 PROCEDURE — 63600175 PHARM REV CODE 636 W HCPCS: Performed by: PHYSICIAN ASSISTANT

## 2023-12-27 PROCEDURE — C1751 CATH, INF, PER/CENT/MIDLINE: HCPCS

## 2023-12-27 PROCEDURE — A4216 STERILE WATER/SALINE, 10 ML: HCPCS | Performed by: STUDENT IN AN ORGANIZED HEALTH CARE EDUCATION/TRAINING PROGRAM

## 2023-12-27 PROCEDURE — 86923 COMPATIBILITY TEST ELECTRIC: CPT | Performed by: STUDENT IN AN ORGANIZED HEALTH CARE EDUCATION/TRAINING PROGRAM

## 2023-12-27 RX ORDER — SODIUM CHLORIDE 0.9 % (FLUSH) 0.9 %
10 SYRINGE (ML) INJECTION
Status: DISCONTINUED | OUTPATIENT
Start: 2023-12-27 | End: 2023-12-30 | Stop reason: HOSPADM

## 2023-12-27 RX ORDER — NYSTATIN 100000 [USP'U]/ML
500000 SUSPENSION ORAL 4 TIMES DAILY
Status: DISCONTINUED | OUTPATIENT
Start: 2023-12-27 | End: 2023-12-30 | Stop reason: HOSPADM

## 2023-12-27 RX ORDER — SODIUM CHLORIDE 9 MG/ML
INJECTION, SOLUTION INTRAVENOUS
Status: DISCONTINUED | OUTPATIENT
Start: 2023-12-27 | End: 2023-12-29

## 2023-12-27 RX ORDER — MUPIROCIN 20 MG/G
OINTMENT TOPICAL 2 TIMES DAILY
Status: DISCONTINUED | OUTPATIENT
Start: 2023-12-27 | End: 2023-12-30 | Stop reason: HOSPADM

## 2023-12-27 RX ORDER — ACYCLOVIR 800 MG/1
800 TABLET ORAL 2 TIMES DAILY
Status: DISCONTINUED | OUTPATIENT
Start: 2023-12-27 | End: 2023-12-30 | Stop reason: HOSPADM

## 2023-12-27 RX ORDER — FLUCONAZOLE 200 MG/1
400 TABLET ORAL DAILY
Status: DISCONTINUED | OUTPATIENT
Start: 2023-12-27 | End: 2023-12-30

## 2023-12-27 RX ORDER — CHLORHEXIDINE GLUCONATE ORAL RINSE 1.2 MG/ML
15 SOLUTION DENTAL 2 TIMES DAILY
Status: DISCONTINUED | OUTPATIENT
Start: 2023-12-27 | End: 2023-12-30 | Stop reason: HOSPADM

## 2023-12-27 RX ORDER — SODIUM CHLORIDE 9 MG/ML
INJECTION, SOLUTION INTRAVENOUS
Status: DISCONTINUED | OUTPATIENT
Start: 2023-12-27 | End: 2023-12-30 | Stop reason: HOSPADM

## 2023-12-27 RX ORDER — SODIUM CHLORIDE 0.9 % (FLUSH) 0.9 %
10 SYRINGE (ML) INJECTION EVERY 6 HOURS
Status: DISCONTINUED | OUTPATIENT
Start: 2023-12-27 | End: 2023-12-30 | Stop reason: HOSPADM

## 2023-12-27 RX ORDER — HYDROCODONE BITARTRATE AND ACETAMINOPHEN 500; 5 MG/1; MG/1
TABLET ORAL
Status: DISCONTINUED | OUTPATIENT
Start: 2023-12-27 | End: 2023-12-29

## 2023-12-27 RX ORDER — LABETALOL HYDROCHLORIDE 5 MG/ML
10 INJECTION, SOLUTION INTRAVENOUS EVERY 4 HOURS PRN
Status: DISCONTINUED | OUTPATIENT
Start: 2023-12-27 | End: 2023-12-30 | Stop reason: HOSPADM

## 2023-12-27 RX ORDER — MAGNESIUM SULFATE HEPTAHYDRATE 40 MG/ML
2 INJECTION, SOLUTION INTRAVENOUS ONCE
Status: COMPLETED | OUTPATIENT
Start: 2023-12-27 | End: 2023-12-27

## 2023-12-27 RX ORDER — KETOROLAC TROMETHAMINE 30 MG/ML
30 INJECTION, SOLUTION INTRAMUSCULAR; INTRAVENOUS
Status: COMPLETED | OUTPATIENT
Start: 2023-12-27 | End: 2023-12-27

## 2023-12-27 RX ORDER — POTASSIUM CHLORIDE 14.9 MG/ML
20 INJECTION INTRAVENOUS
Status: DISPENSED | OUTPATIENT
Start: 2023-12-27 | End: 2023-12-27

## 2023-12-27 RX ADMIN — VANCOMYCIN HYDROCHLORIDE 1000 MG: 1 INJECTION, POWDER, LYOPHILIZED, FOR SOLUTION INTRAVENOUS at 03:12

## 2023-12-27 RX ADMIN — ONDANSETRON 4 MG: 2 INJECTION INTRAMUSCULAR; INTRAVENOUS at 08:12

## 2023-12-27 RX ADMIN — MAGNESIUM SULFATE HEPTAHYDRATE 2 G: 40 INJECTION, SOLUTION INTRAVENOUS at 08:12

## 2023-12-27 RX ADMIN — FLUCONAZOLE 400 MG: 200 TABLET ORAL at 08:12

## 2023-12-27 RX ADMIN — CEFEPIME 2 G: 2 INJECTION, POWDER, FOR SOLUTION INTRAVENOUS at 12:12

## 2023-12-27 RX ADMIN — MUPIROCIN: 20 OINTMENT TOPICAL at 08:12

## 2023-12-27 RX ADMIN — SODIUM CHLORIDE, PRESERVATIVE FREE 10 ML: 5 INJECTION INTRAVENOUS at 11:12

## 2023-12-27 RX ADMIN — NYSTATIN 500000 UNITS: 100000 SUSPENSION ORAL at 01:12

## 2023-12-27 RX ADMIN — ACETAMINOPHEN 325MG 650 MG: 325 TABLET ORAL at 08:12

## 2023-12-27 RX ADMIN — NYSTATIN 500000 UNITS: 100000 SUSPENSION ORAL at 10:12

## 2023-12-27 RX ADMIN — SODIUM CHLORIDE, PRESERVATIVE FREE 10 ML: 5 INJECTION INTRAVENOUS at 05:12

## 2023-12-27 RX ADMIN — KETOROLAC TROMETHAMINE 30 MG: 30 INJECTION, SOLUTION INTRAMUSCULAR; INTRAVENOUS at 12:12

## 2023-12-27 RX ADMIN — ACYCLOVIR 800 MG: 800 TABLET ORAL at 08:12

## 2023-12-27 RX ADMIN — CEFEPIME 2 G: 2 INJECTION, POWDER, FOR SOLUTION INTRAVENOUS at 08:12

## 2023-12-27 RX ADMIN — VANCOMYCIN HYDROCHLORIDE 1000 MG: 1 INJECTION, POWDER, LYOPHILIZED, FOR SOLUTION INTRAVENOUS at 04:12

## 2023-12-27 RX ADMIN — POTASSIUM CHLORIDE 20 MEQ: 200 INJECTION, SOLUTION INTRAVENOUS at 03:12

## 2023-12-27 RX ADMIN — HYDROMORPHONE HYDROCHLORIDE 0.5 MG: 2 INJECTION INTRAMUSCULAR; INTRAVENOUS; SUBCUTANEOUS at 08:12

## 2023-12-27 RX ADMIN — HYDROXYUREA 2000 MG: 500 CAPSULE ORAL at 04:12

## 2023-12-27 RX ADMIN — POTASSIUM CHLORIDE 20 MEQ: 200 INJECTION, SOLUTION INTRAVENOUS at 04:12

## 2023-12-27 RX ADMIN — HYDROXYUREA 2000 MG: 500 CAPSULE ORAL at 05:12

## 2023-12-27 RX ADMIN — HYDROMORPHONE HYDROCHLORIDE 0.5 MG: 2 INJECTION INTRAMUSCULAR; INTRAVENOUS; SUBCUTANEOUS at 05:12

## 2023-12-27 RX ADMIN — LABETALOL HYDROCHLORIDE 10 MG: 5 INJECTION, SOLUTION INTRAVENOUS at 06:12

## 2023-12-27 RX ADMIN — ACETAMINOPHEN 325MG 650 MG: 325 TABLET ORAL at 06:12

## 2023-12-27 RX ADMIN — VANCOMYCIN HYDROCHLORIDE 1000 MG: 1 INJECTION, POWDER, LYOPHILIZED, FOR SOLUTION INTRAVENOUS at 09:12

## 2023-12-27 RX ADMIN — SODIUM CHLORIDE, POTASSIUM CHLORIDE, SODIUM LACTATE AND CALCIUM CHLORIDE: 600; 310; 30; 20 INJECTION, SOLUTION INTRAVENOUS at 03:12

## 2023-12-27 RX ADMIN — NYSTATIN 500000 UNITS: 100000 SUSPENSION ORAL at 05:12

## 2023-12-27 RX ADMIN — POTASSIUM CHLORIDE 20 MEQ: 200 INJECTION, SOLUTION INTRAVENOUS at 10:12

## 2023-12-27 RX ADMIN — CHLORHEXIDINE GLUCONATE 0.12% ORAL RINSE 15 ML: 1.2 LIQUID ORAL at 10:12

## 2023-12-27 RX ADMIN — NYSTATIN 500000 UNITS: 100000 SUSPENSION ORAL at 08:12

## 2023-12-27 RX ADMIN — CEFEPIME 2 G: 2 INJECTION, POWDER, FOR SOLUTION INTRAVENOUS at 05:12

## 2023-12-27 RX ADMIN — CHLORHEXIDINE GLUCONATE 0.12% ORAL RINSE 15 ML: 1.2 LIQUID ORAL at 08:12

## 2023-12-27 NOTE — PROGRESS NOTES
"Pharmacokinetic Initial Assessment: IV Vancomycin    Assessment/Plan:    Initiate intravenous vancomycin with loading dose of 2000 mg once followed by a maintenance dose of vancomycin 1000mg IV every 8 hours  Desired empiric serum trough concentration is 15 to 20 mcg/mL  Draw vancomycin trough level 60 min prior to fourth dose on 12/27 at approximately 2000  Pharmacy will continue to follow and monitor vancomycin.      Please contact pharmacy at extension [phone] with any questions regarding this assessment.     Thank you for the consult,   Yany Castanon       Patient brief summary:  Magdaleno Hirsch is a 23 y.o. male initiated on antimicrobial therapy with IV Vancomycin for treatment of suspected bacteremia    Drug Allergies:   Review of patient's allergies indicates:  No Known Allergies    Actual Body Weight:   83 kg    Renal Function:   Estimated Creatinine Clearance: 165.7 mL/min (based on SCr of 0.77 mg/dL).,     Dialysis Method (if applicable):  N/A    CBC (last 72 hours):  Recent Labs   Lab Result Units 12/26/23  1256   WBC x10(3)/mcL 89.73  89.73*   Hgb g/dL 7.0*   Hct % 22.3*   Platelet x10(3)/mcL 2*   Monocytes % % 16       Metabolic Panel (last 72 hours):  Recent Labs   Lab Result Units 12/26/23  1256   Sodium Level mmol/L 136   Potassium Level mmol/L 3.7   Chloride mmol/L 99   Carbon Dioxide mmol/L 26   Glucose Level mg/dL 115*   Blood Urea Nitrogen mg/dL 4.9*   Creatinine mg/dL 0.77   Albumin Level g/dL 3.0*   Bilirubin Total mg/dL 1.7*   Alkaline Phosphatase unit/L 123   Aspartate Aminotransferase unit/L 35*   Alanine Aminotransferase unit/L 33       Drug levels (last 3 results):  No results for input(s): "VANCOMYCINRA", "VANCORANDOM", "VANCOMYCINPE", "VANCOPEAK", "VANCOMYCINTR", "VANCOTROUGH" in the last 72 hours.    Microbiologic Results:  Microbiology Results (last 7 days)       Procedure Component Value Units Date/Time    Blood Culture #1 **CANNOT BE ORDERED STAT** [[id number]] Collected: 12/26/23 1549    " Order Status: Resulted Specimen: Blood Updated: 12/26/23 1626    Blood Culture #2 **CANNOT BE ORDERED STAT** [[id number]] Collected: 12/26/23 1549    Order Status: Resulted Specimen: Blood Updated: 12/26/23 1626

## 2023-12-27 NOTE — PROCEDURES
"Magdaleno Hirsch is a 23 y.o. male patient.    Temp: 98.3 °F (36.8 °C) (12/27/23 1540)  Pulse: (!) 135 (12/27/23 1540)  Resp: 20 (12/27/23 1310)  BP: (!) 143/98 (12/27/23 1540)  SpO2: 100 % (12/27/23 1540)  Weight: 83 kg (182 lb 15.7 oz) (12/27/23 1310)  Height: 6' 1" (185.4 cm) (12/27/23 1310)    PICC  Date/Time: 12/27/2023 4:24 PM  Performed by: Darien Madrigal RN  Consent Done: Yes  Time out: Immediately prior to procedure a time out was called to verify the correct patient, procedure, equipment, support staff and site/side marked as required  Preparation: skin prepped with ChloraPrep  Sterile barriers: all five maximum sterile barriers used - cap, mask, sterile gown, sterile gloves, and large sterile sheet  Hand hygiene: hand hygiene performed prior to central venous catheter insertion  Catheter type: double lumen  Catheter size: 5 Fr  Catheter Length: 35cm    Ultrasound guidance: yes  Vessel Caliber: large and patent, compressibility normal  Number of attempts: 1  Post-procedure: blood return through all ports and sterile dressing applied    Assessment: placement verified by x-ray and successful placement          Name Darien Madrigal RN  12/27/2023    "

## 2023-12-27 NOTE — CONSULTS
"Subjective:       Patient ID: Magdaleno Hirsch is a 23 y.o. male.    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      Current Treatment:   Hydroxyurea started on 12/26/2023 for cytoreduction.  If appropriate cytoreduction, we will try asciminib.    Treatment History:  Hydroxyurea 2g every 12 hours  Dasatinib 100 mg po daily, unsure on start date as patient was lost to follow up.  Patient received CLIA and ponatinib on 06/16/2023.    Azacitidine 5/28 days, venetoclax 50 mg p.o. daily for 10/28 days, and ponatinib 30 mg p.o. daily every day on 10/09/2023.  Increased ponatinib to 45 mg p.o. daily on 11/06/2023.    HPI:  Patient with no real medical history who went in for a routine eye exam on 08/18/2022 to update his eye glasses prescription.  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  He had bilateral Valdez spots with vessel tortuosity.  The optometrist recommended that the patient have blood work including testing for sickle cell disease.  The patient presented to the emergency department.  CBC in the emergency department revealed a white blood cell count of 411,900. Hemoglobin was 8.3, platelets were normal at 375,000 hundred and seventy five thousand.  Differential was suggestive of CML.  Coagulation studies revealed an INR of 1.38, PTT of 36.4 and a fibrinogen of 292.  Patient was going to present to [place], however they were on diversion.  He was transferred from [facility] to [facility].  Hematology consulted.  Patient underwent bone marrow biopsy on 08/22/2022, this revealed CML, chronic phase, BCR/ABL1 positive.  Ordered dasatinib 100 mg p.o. daily as of 10/10/2022, unsure when patient started taking medication due to him being lost to follow-up.  Patient went into blast crisis, received CLIA and ponatinib on " 06/16/2023.  He had a repeat bone marrow biopsy on 08/28/2023, this had to be sent off to [facility], however the final diagnosis was persistent/recurrent myeloid neoplasm with 10-15% bone marrow blasts and 8% circulating blasts.  Started azacitidine, venetoclax, ponatinib as mentioned above on 10/09/2023.  Bone marrow biopsy done after 1 cycle done on 10/31/2023 revealed relapsed/persistent acute myeloid leukemia with 11% circulating blasts and 70-80% blasts by IHC in the bone marrow.   He was continued on treatment since he had only received 1 cycle.    Interval History:   Patient admitted to the hospital on Friday, 12/08/2023.  He was having weakness and was requiring blood transfusion and platelet transfusion.  His peripheral blood smear on 12/09/2023 showed innumerable blasts.  He was discharged on 12/14/2023 in stable condition.  We followed him with outpatient labs in the plan was for him to have a bone marrow biopsy on 12/27/2023.  Unfortunately, he presented to the emergency department on 12/26/2023 with swollen gums that were oozing blood.  He required blood and platelet transfusions.  His white count had increased to greater than 85,000.    Today, 12/27/2023:  Patient seen and examined on rounds.  I discussed with Dr. Bijan Green.  Unfortunately, the patient does not likely have any IV chemotherapy options, he was failed multiple lines of therapy.  He does not currently have a match for stem cell transplant, and he has persistent disease that has been resistant to multiple lines of therapy.  We will try asciminib after cytoreduction with hydroxyurea.  I did begin discussing quality and comfort care along with hospice care with the patient this morning.    Past Medical History:   Diagnosis Date    CML (chronic myelocytic leukemia)     HVD (hypertensive vascular disease)     Oropharyngeal candidiasis       Past Surgical History:   Procedure Laterality Date    BONE MARROW BIOPSY N/A 8/22/2022     Procedure: Biopsy-bone marrow;  Surgeon: Getachew Chen MD;  Location: [facility] OR;  Service: General;  Laterality: N/A;    BONE MARROW BIOPSY N/A 7/25/2023    Procedure: Biopsy-bone marrow;  Surgeon: Edi Carty MD;  Location: [facility] OR;  Service: General;  Laterality: N/A;    BONE MARROW BIOPSY N/A 8/28/2023    Procedure: Biopsy-bone marrow;  Surgeon: Moo Pina MD;  Location: [facility] OR;  Service: General;  Laterality: N/A;    BONE MARROW BIOPSY N/A 10/31/2023    Procedure: Biopsy-bone marrow;  Surgeon: Edi Carty MD;  Location: [facility] OR;  Service: General;  Laterality: N/A;    KNEE SURGERY Left      Social History     Socioeconomic History    Marital status: Single   Tobacco Use    Smoking status: Never    Smokeless tobacco: Never   Substance and Sexual Activity    Alcohol use: Never    Drug use: Yes     Types: Marijuana      Family History   Problem Relation Age of Onset    Hypertension Maternal Grandmother     Cancer Maternal Grandmother            Review of Systems   Constitutional:  Positive for fatigue. Negative for chills, diaphoresis and unexpected weight change.   HENT:  Negative for nasal congestion and sore throat.         Gums are significantly swollen and oozing blood   Eyes:  Negative for pain.   Respiratory:  Negative for cough, chest tightness and shortness of breath.    Cardiovascular:  Negative for chest pain, palpitations and leg swelling.   Gastrointestinal:  Negative for abdominal distention, abdominal pain, blood in stool, constipation and diarrhea.   Genitourinary:  Negative for dysuria, frequency and hematuria.   Musculoskeletal:  Negative for arthralgias and back pain.   Integumentary:  Negative for rash.   Neurological:  Negative for dizziness, weakness, numbness and headaches.   Hematological:  Negative for adenopathy. Bruises/bleeds easily.   Psychiatric/Behavioral:  Negative for confusion.          Objective:      Physical Exam  Vitals reviewed.   Constitutional:       General: He  is awake.      Appearance: Normal appearance.   HENT:      Head: Normocephalic and atraumatic.      Right Ear: Hearing normal.      Left Ear: Hearing normal.      Nose: Nose normal.      Mouth/Throat:      Comments: Gums are significantly swollen and oozing blood.    Eyes:      General: Lids are normal. Vision grossly intact.      Extraocular Movements: Extraocular movements intact.      Conjunctiva/sclera: Conjunctivae normal.   Cardiovascular:      Rate and Rhythm: Normal rate and regular rhythm.      Pulses: Normal pulses.      Heart sounds: Normal heart sounds.   Pulmonary:      Effort: Pulmonary effort is normal.      Breath sounds: Normal breath sounds. No wheezing, rhonchi or rales.   Abdominal:      General: Bowel sounds are normal.      Palpations: Abdomen is soft.      Tenderness: There is no abdominal tenderness.   Musculoskeletal:      Cervical back: Full passive range of motion without pain.      Right lower leg: No edema.      Left lower leg: No edema.   Skin:     General: Skin is warm.   Neurological:      General: No focal deficit present.      Mental Status: He is alert and oriented to person, place, and time.   Psychiatric:         Attention and Perception: Attention normal.         Mood and Affect: Mood and affect normal.         Behavior: Behavior is cooperative.         LABS AND IMAGING REVIEWED IN [facility]  Lab Review:        Assessment:   CML, chronic phase, transformed to blast crisis       Plan:     Patient was on azacitidine, venetoclax, ponatinib.  Unfortunately, it looks as though his disease is progressing.    We are going to have a bone marrow biopsy done on 12/27/2023, however the patient presented the day prior with platelets and hemoglobin requiring transfusions of both platelets and packed red blood cells.  His gums or swollen and oozing.    I discussed with Dr. Green, the patient has failed multiple lines of therapy. We will try  asciminib, however I discussed with the patient that  his disease is very resistant to therapy and that there are no guarantees.  He voiced understanding.     Continue transfusion support, packed red blood cells for hemoglobin of less than 7 and platelets for platelet count of less than 10,000. Would require a platelet transfusion today.    Continue supportive care.    Paul Hogan II, MD

## 2023-12-27 NOTE — NURSING
Nurses Note -- 4 Eyes      12/27/2023   4:45 PM      Skin assessed during: Admit      [x] No Altered Skin Integrity Present    [x]Prevention Measures Documented      [] Yes- Altered Skin Integrity Present or Discovered   [] LDA Added if Not in Epic (Describe Wound)   [] New Altered Skin Integrity was Present on Admit and Documented in LDA   [] Wound Image Taken    Wound Care Consulted? No    Attending Nurse:  Claudio Lyle RN     Second RN/Staff Member:  Jennifer Loredo RN

## 2023-12-28 DIAGNOSIS — C92.10 CML (CHRONIC MYELOID LEUKEMIA): Primary | ICD-10-CM

## 2023-12-28 LAB
ALBUMIN SERPL-MCNC: 2.6 G/DL (ref 3.5–5)
ALBUMIN/GLOB SERPL: 0.8 RATIO (ref 1.1–2)
ALP SERPL-CCNC: 135 UNIT/L (ref 40–150)
ALT SERPL-CCNC: 39 UNIT/L (ref 0–55)
ANISOCYTOSIS BLD QL SMEAR: ABNORMAL
AST SERPL-CCNC: 50 UNIT/L (ref 5–34)
B-HCG SERPL QL: 76 %
BILIRUB SERPL-MCNC: 2 MG/DL
BUN SERPL-MCNC: 10.7 MG/DL (ref 8.9–20.6)
CALCIUM SERPL-MCNC: 8.1 MG/DL (ref 8.4–10.2)
CHLORIDE SERPL-SCNC: 105 MMOL/L (ref 98–107)
CO2 SERPL-SCNC: 22 MMOL/L (ref 22–29)
CREAT SERPL-MCNC: 0.82 MG/DL (ref 0.73–1.18)
ERYTHROCYTE [DISTWIDTH] IN BLOOD BY AUTOMATED COUNT: 19 % (ref 11.5–17)
GFR SERPLBLD CREATININE-BSD FMLA CKD-EPI: >60 MLS/MIN/1.73/M2
GLOBULIN SER-MCNC: 3.1 GM/DL (ref 2.4–3.5)
GLUCOSE SERPL-MCNC: 119 MG/DL (ref 74–100)
HCT VFR BLD AUTO: 26.3 % (ref 42–52)
HGB BLD-MCNC: 9.3 G/DL (ref 14–18)
INSTRUMENT WBC (OLG): 51.31 X10(3)/MCL
LYMPHOCYTES NFR BLD MANUAL: 10 %
LYMPHOCYTES NFR BLD MANUAL: 5.13 X10(3)/MCL
MCH RBC QN AUTO: 28.9 PG (ref 27–31)
MCHC RBC AUTO-ENTMCNC: 35.4 G/DL (ref 33–36)
MCV RBC AUTO: 81.7 FL (ref 80–94)
MICROCYTES BLD QL SMEAR: ABNORMAL
MONOCYTES NFR BLD MANUAL: 14 %
MONOCYTES NFR BLD MANUAL: 7.18 X10(3)/MCL (ref 0.1–1.3)
NRBC BLD AUTO-RTO: 0 %
PLATELET # BLD AUTO: 15 X10(3)/MCL (ref 130–400)
PLATELET # BLD EST: ABNORMAL 10*3/UL
PLATELETS.RETICULATED NFR BLD AUTO: 1.5 % (ref 0.9–11.2)
PMV BLD AUTO: 9.4 FL (ref 7.4–10.4)
POIKILOCYTOSIS BLD QL SMEAR: ABNORMAL
POTASSIUM SERPL-SCNC: 4.2 MMOL/L (ref 3.5–5.1)
PROT SERPL-MCNC: 5.7 GM/DL (ref 6.4–8.3)
RBC # BLD AUTO: 3.22 X10(6)/MCL (ref 4.7–6.1)
RBC MORPH BLD: ABNORMAL
SODIUM SERPL-SCNC: 137 MMOL/L (ref 136–145)
TARGETS BLD QL SMEAR: ABNORMAL
WBC # SPEC AUTO: 51.31 X10(3)/MCL (ref 4.5–11.5)

## 2023-12-28 PROCEDURE — 25000003 PHARM REV CODE 250: Performed by: NURSE PRACTITIONER

## 2023-12-28 PROCEDURE — 63600175 PHARM REV CODE 636 W HCPCS: Performed by: STUDENT IN AN ORGANIZED HEALTH CARE EDUCATION/TRAINING PROGRAM

## 2023-12-28 PROCEDURE — 25000003 PHARM REV CODE 250: Performed by: INTERNAL MEDICINE

## 2023-12-28 PROCEDURE — 99233 SBSQ HOSP IP/OBS HIGH 50: CPT | Mod: ,,, | Performed by: INTERNAL MEDICINE

## 2023-12-28 PROCEDURE — 11000001 HC ACUTE MED/SURG PRIVATE ROOM

## 2023-12-28 PROCEDURE — 63600175 PHARM REV CODE 636 W HCPCS: Performed by: NURSE PRACTITIONER

## 2023-12-28 PROCEDURE — A4216 STERILE WATER/SALINE, 10 ML: HCPCS | Performed by: STUDENT IN AN ORGANIZED HEALTH CARE EDUCATION/TRAINING PROGRAM

## 2023-12-28 PROCEDURE — 85027 COMPLETE CBC AUTOMATED: CPT | Performed by: INTERNAL MEDICINE

## 2023-12-28 PROCEDURE — 80053 COMPREHEN METABOLIC PANEL: CPT | Performed by: INTERNAL MEDICINE

## 2023-12-28 PROCEDURE — 25000003 PHARM REV CODE 250: Performed by: STUDENT IN AN ORGANIZED HEALTH CARE EDUCATION/TRAINING PROGRAM

## 2023-12-28 RX ADMIN — HYDROMORPHONE HYDROCHLORIDE 0.5 MG: 2 INJECTION INTRAMUSCULAR; INTRAVENOUS; SUBCUTANEOUS at 09:12

## 2023-12-28 RX ADMIN — HYDROXYUREA 2000 MG: 500 CAPSULE ORAL at 04:12

## 2023-12-28 RX ADMIN — SODIUM CHLORIDE, PRESERVATIVE FREE 10 ML: 5 INJECTION INTRAVENOUS at 06:12

## 2023-12-28 RX ADMIN — CEFEPIME 2 G: 2 INJECTION, POWDER, FOR SOLUTION INTRAVENOUS at 12:12

## 2023-12-28 RX ADMIN — ACYCLOVIR 800 MG: 800 TABLET ORAL at 09:12

## 2023-12-28 RX ADMIN — FLUCONAZOLE 400 MG: 200 TABLET ORAL at 09:12

## 2023-12-28 RX ADMIN — NYSTATIN 500000 UNITS: 100000 SUSPENSION ORAL at 02:12

## 2023-12-28 RX ADMIN — CEFEPIME 2 G: 2 INJECTION, POWDER, FOR SOLUTION INTRAVENOUS at 11:12

## 2023-12-28 RX ADMIN — ACETAMINOPHEN 325MG 650 MG: 325 TABLET ORAL at 07:12

## 2023-12-28 RX ADMIN — CEFEPIME 2 G: 2 INJECTION, POWDER, FOR SOLUTION INTRAVENOUS at 09:12

## 2023-12-28 RX ADMIN — HYDROMORPHONE HYDROCHLORIDE 0.5 MG: 2 INJECTION INTRAMUSCULAR; INTRAVENOUS; SUBCUTANEOUS at 12:12

## 2023-12-28 RX ADMIN — HYDROMORPHONE HYDROCHLORIDE 0.5 MG: 2 INJECTION INTRAMUSCULAR; INTRAVENOUS; SUBCUTANEOUS at 06:12

## 2023-12-28 RX ADMIN — ACETAMINOPHEN 325MG 650 MG: 325 TABLET ORAL at 11:12

## 2023-12-28 RX ADMIN — SODIUM CHLORIDE, PRESERVATIVE FREE 10 ML: 5 INJECTION INTRAVENOUS at 11:12

## 2023-12-28 RX ADMIN — CHLORHEXIDINE GLUCONATE 0.12% ORAL RINSE 15 ML: 1.2 LIQUID ORAL at 09:12

## 2023-12-28 RX ADMIN — NYSTATIN 500000 UNITS: 100000 SUSPENSION ORAL at 09:12

## 2023-12-28 RX ADMIN — HYDROXYUREA 2000 MG: 500 CAPSULE ORAL at 06:12

## 2023-12-28 RX ADMIN — NYSTATIN 500000 UNITS: 100000 SUSPENSION ORAL at 04:12

## 2023-12-28 RX ADMIN — MUPIROCIN: 20 OINTMENT TOPICAL at 09:12

## 2023-12-28 RX ADMIN — HYDROMORPHONE HYDROCHLORIDE 0.5 MG: 2 INJECTION INTRAMUSCULAR; INTRAVENOUS; SUBCUTANEOUS at 03:12

## 2023-12-28 RX ADMIN — CHLORHEXIDINE GLUCONATE 0.12% ORAL RINSE 15 ML: 1.2 LIQUID ORAL at 10:12

## 2023-12-28 RX ADMIN — CEFEPIME 2 G: 2 INJECTION, POWDER, FOR SOLUTION INTRAVENOUS at 04:12

## 2023-12-28 RX ADMIN — VANCOMYCIN HYDROCHLORIDE 1000 MG: 1 INJECTION, POWDER, LYOPHILIZED, FOR SOLUTION INTRAVENOUS at 06:12

## 2023-12-28 RX ADMIN — SODIUM CHLORIDE, POTASSIUM CHLORIDE, SODIUM LACTATE AND CALCIUM CHLORIDE: 600; 310; 30; 20 INJECTION, SOLUTION INTRAVENOUS at 12:12

## 2023-12-28 NOTE — PROGRESS NOTES
Inpatient Nutrition Evaluation    Admit Date: 12/26/2023   Total duration of encounter: 2 days    Nutrition Recommendation/Prescription     Continue current diet as tolerated  Continue Boost Max (provides 160 kcal and 30 g protein per container)  Encouraged adequate PO intake  Continue Magic Mouthwash  Monitor appetite/PO intake, weight, and labs    Nutrition Assessment     Chart Review    Reason Seen: malnutrition screening tool (MST)    Malnutrition Screening Tool Results   Have you recently lost weight without trying?: Yes: 2-13 lbs  Have you been eating poorly because of a decreased appetite?: Yes   MST Score: 2     Diagnosis:  Severe thrombocytopenia  Acute on chronic anemia   CML  Diffuse gingival swelling with ulcerations and drainage   History of  HTN     Relevant Medical History:   Essential hypertension  CML     Nutrition-Related Medications:   Scheduled Meds:   acyclovir  800 mg Oral BID    ceFEPime (MAXIPIME) IVPB  2 g Intravenous Q8H    chlorhexidine  15 mL Mouth/Throat BID    fluconazole  400 mg Oral Daily    hydroxyurea  2,000 mg Oral Q12H    mupirocin   Nasal BID    nystatin  500,000 Units Mouth/Throat QID    sodium chloride 0.9%  10 mL Intravenous Q6H     Continuous Infusions:   lactated ringers 125 mL/hr at 12/28/23 0352     PRN Meds:.0.9%  NaCl infusion (for blood administration), 0.9%  NaCl infusion (for blood administration), sodium chloride 0.9%, sodium chloride 0.9%, sodium chloride 0.9%, sodium chloride 0.9%, acetaminophen, dextrose 10%, dextrose 10%, glucagon (human recombinant), glucose, glucose, HYDROmorphone, labetaloL, ondansetron, Flushing PICC/Midline Protocol **AND** sodium chloride 0.9% **AND** sodium chloride 0.9%      Nutrition-Related Labs:  12/28/2023: Ca 8.1, total protein 5.7, Alb  2.6, total bili 2.0, AST 50, Gluc 119    Diet Order: Diet Soft & Bite Sized (IDDSI Level 6)  Oral Supplement Order: Boost Max  Appetite/Oral Intake: poor/0-25% of meals  Factors Affecting Nutritional  "Intake: chewing difficulty and mouth ulcers  Food/Presybeterian/Cultural Preferences: none reported  Food Allergies: none reported       Wound(s):   none noted    Comments    2023: Pt reports a poor appetite/PO intake due to mouth ulcers and swelling for ~1 week prior to admit. Pt reports poor appetite/PO currently. Pt reports drinking Boost Max, only wants to keep Boost Max as ONS for now. Pt receiving Magic Mouthwash. Pt denies N/V/D/C and swallowing difficulties. Pt reports chewing difficulties. Per EMR, pt weighed 81.6 kg on 2023. Last BM documented as 2023. Will monitor.    Anthropometrics    Height: 6' 1" (185.4 cm) Height Method: Stated  Last Weight: 83 kg (182 lb 15.7 oz) (23 1310) Weight Method: Standard Scale  BMI (Calculated): 24.1  BMI Classification: normal (BMI 18.5-24.9)        Ideal Body Weight (IBW), Male: 184 lb     % Ideal Body Weight, Male (lb): 99.45 %                 Usual Body Weight (UBW), k.6 kg  % Usual Body Weight: 101.93     Usual Weight Provided By: EMR weight history    Wt Readings from Last 5 Encounters:   23 83 kg (182 lb 15.7 oz)   23 83.3 kg (183 lb 9.6 oz)   23 83.9 kg (185 lb)   23 84.8 kg (186 lb 13.4 oz)   23 81.6 kg (180 lb)     Weight Change(s) Since Admission:  Admit Weight: 83 kg (183 lb) (23 1233)  2023: 83 kf    Patient Education    Not applicable.    Monitoring & Evaluation     Dietitian will monitor food and beverage intake, weight, electrolyte/renal panel, glucose/endocrine profile, and gastrointestinal profile.  Nutrition Risk/Follow-Up: low (follow-up in 5-7 days)  Patients assigned 'low nutrition risk' status do not qualify for a full nutritional assessment but will be monitored and re-evaluated in a 5-7 day time period. Please consult if re-evaluation needed sooner.    "

## 2023-12-28 NOTE — PLAN OF CARE
12/28/23 0852   Discharge Assessment   Assessment Type Discharge Planning Assessment   Confirmed/corrected address, phone number and insurance Yes   Confirmed Demographics Correct on Facesheet   Source of Information patient;family   When was your last doctors appointment?   (Patient reports he does not have a PCP at this time.)   Communicated LISA with patient/caregiver Yes   Reason For Admission Blast Crisis   People in Home other relative(s)   Do you expect to return to your current living situation? Yes   Do you have help at home or someone to help you manage your care at home? Yes   Who are your caregiver(s) and their phone number(s)? Grandparents: Kristy Keller: [phone]   Prior to hospitilization cognitive status: Unable to Assess   Current cognitive status: Alert/Oriented   Walking or Climbing Stairs Difficulty no   Dressing/Bathing Difficulty no   Home Accessibility wheelchair accessible   Home Layout Able to live on 1st floor   Equipment Currently Used at Home none   Readmission within 30 days? No   Patient currently being followed by outpatient case management? No   Do you currently have service(s) that help you manage your care at home? No   Do you take prescription medications? Yes   Do you have prescription coverage? Yes   Coverage Medicaid   Do you have any problems affording any of your prescribed medications? No   Is the patient taking medications as prescribed? yes   Who is going to help you get home at discharge? Patient reports his girlfriend, Marcia ([phone]).   How do you get to doctors appointments? car, drives self;family or friend will provide   Are you on dialysis? No   Do you take coumadin? No   Discharge Plan A Home with family   Discharge Plan B Home   DME Needed Upon Discharge  none   Discharge Plan discussed with: Patient;Spouse/sig other   Name(s) and Number(s) Girlfriend: Marcia: [phone]   Transition of Care Barriers None   OTHER   Name(s) of People in Home  Patient resides with his aunt and uncle at home.       CM completed Discharge Assessment with patient at bedside. Patient's girlfriend, Marcia present on the couch.  Patient does not have a PCP but reports he sees his oncologist, Dr. Hogan.  Patient's girlfriend, Marcia will transport him at discharge.  No barriers to discharge at this time.

## 2023-12-28 NOTE — PROGRESS NOTES
"Subjective:       Patient ID: Magdaleno Hirsch is a 23 y.o. male.    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      Current Treatment:   Hydroxyurea started on 12/26/2023 for cytoreduction.  If appropriate cytoreduction, we will try asciminib.    Treatment History:  Hydroxyurea 2g every 12 hours  Dasatinib 100 mg po daily, unsure on start date as patient was lost to follow up.  Patient received CLIA and ponatinib on 06/16/2023.    Azacitidine 5/28 days, venetoclax 50 mg p.o. daily for 10/28 days, and ponatinib 30 mg p.o. daily every day on 10/09/2023.  Increased ponatinib to 45 mg p.o. daily on 11/06/2023.    HPI:  Patient with no real medical history who went in for a routine eye exam on 08/18/2022 to update his eye glasses prescription.  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  He had bilateral Valdez spots with vessel tortuosity.  The optometrist recommended that the patient have blood work including testing for sickle cell disease.  The patient presented to the emergency department.  CBC in the emergency department revealed a white blood cell count of 411,900. Hemoglobin was 8.3, platelets were normal at 375,000 hundred and seventy five thousand.  Differential was suggestive of CML.  Coagulation studies revealed an INR of 1.38, PTT of 36.4 and a fibrinogen of 292.  Patient was going to present to [place], however they were on diversion.  He was transferred from [facility] to [facility].  Hematology consulted.  Patient underwent bone marrow biopsy on 08/22/2022, this revealed CML, chronic phase, BCR/ABL1 positive.  Ordered dasatinib 100 mg p.o. daily as of 10/10/2022, unsure when patient started taking medication due to him being lost to follow-up.  Patient went into blast crisis, received CLIA and ponatinib on " 06/16/2023.  He had a repeat bone marrow biopsy on 08/28/2023, this had to be sent off to [facility], however the final diagnosis was persistent/recurrent myeloid neoplasm with 10-15% bone marrow blasts and 8% circulating blasts.  Started azacitidine, venetoclax, ponatinib as mentioned above on 10/09/2023.  Bone marrow biopsy done after 1 cycle done on 10/31/2023 revealed relapsed/persistent acute myeloid leukemia with 11% circulating blasts and 70-80% blasts by IHC in the bone marrow.   He was continued on treatment since he had only received 1 cycle.    Interval History:   Patient admitted to the hospital on Friday, 12/08/2023.  He was having weakness and was requiring blood transfusion and platelet transfusion.  His peripheral blood smear on 12/09/2023 showed innumerable blasts.  He was discharged on 12/14/2023 in stable condition.  We followed him with outpatient labs in the plan was for him to have a bone marrow biopsy on 12/27/2023.  Unfortunately, he presented to the emergency department on 12/26/2023 with swollen gums that were oozing blood.  He required blood and platelet transfusions.  His white count had increased to greater than 85,000.    Today, 12/28/2023:  Patient seen and examined on rounds.  He once again states to have pain due to his swollen gums.  He also continues to have bleeding from his gums.  No other major issues to discuss.    Past Medical History:   Diagnosis Date    CML (chronic myelocytic leukemia)     HVD (hypertensive vascular disease)     Oropharyngeal candidiasis       Past Surgical History:   Procedure Laterality Date    BONE MARROW BIOPSY N/A 8/22/2022    Procedure: Biopsy-bone marrow;  Surgeon: Getachew Chen MD;  Location: [facility] OR;  Service: General;  Laterality: N/A;    BONE MARROW BIOPSY N/A 7/25/2023    Procedure: Biopsy-bone marrow;  Surgeon: Edi Carty MD;  Location: [facility] OR;  Service: General;  Laterality: N/A;    BONE MARROW BIOPSY N/A 8/28/2023    Procedure:  Biopsy-bone marrow;  Surgeon: Moo Pina MD;  Location: [facility] OR;  Service: General;  Laterality: N/A;    BONE MARROW BIOPSY N/A 10/31/2023    Procedure: Biopsy-bone marrow;  Surgeon: Edi Carty MD;  Location: [facility] OR;  Service: General;  Laterality: N/A;    KNEE SURGERY Left      Social History     Socioeconomic History    Marital status: Single   Tobacco Use    Smoking status: Never    Smokeless tobacco: Never   Substance and Sexual Activity    Alcohol use: Never    Drug use: Yes     Types: Marijuana      Family History   Problem Relation Age of Onset    Hypertension Maternal Grandmother     Cancer Maternal Grandmother            Review of Systems   Constitutional:  Positive for fatigue. Negative for chills, diaphoresis and unexpected weight change.   HENT:  Negative for nasal congestion and sore throat.         Gums are significantly swollen and oozing blood   Eyes:  Negative for pain.   Respiratory:  Negative for cough, chest tightness and shortness of breath.    Cardiovascular:  Negative for chest pain, palpitations and leg swelling.   Gastrointestinal:  Negative for abdominal distention, abdominal pain, blood in stool, constipation and diarrhea.   Genitourinary:  Negative for dysuria, frequency and hematuria.   Musculoskeletal:  Negative for arthralgias and back pain.   Integumentary:  Negative for rash.   Neurological:  Negative for dizziness, weakness, numbness and headaches.   Hematological:  Negative for adenopathy. Bruises/bleeds easily.   Psychiatric/Behavioral:  Negative for confusion.          Objective:      Physical Exam  Vitals reviewed.   Constitutional:       General: He is awake.      Appearance: Normal appearance.   HENT:      Head: Normocephalic and atraumatic.      Right Ear: Hearing normal.      Left Ear: Hearing normal.      Nose: Nose normal.      Mouth/Throat:      Comments: Gums are significantly swollen and oozing blood.    Eyes:      General: Lids are normal. Vision grossly  intact.      Extraocular Movements: Extraocular movements intact.      Conjunctiva/sclera: Conjunctivae normal.   Cardiovascular:      Rate and Rhythm: Normal rate and regular rhythm.      Pulses: Normal pulses.      Heart sounds: Normal heart sounds.   Pulmonary:      Effort: Pulmonary effort is normal.      Breath sounds: Normal breath sounds. No wheezing, rhonchi or rales.   Abdominal:      General: Bowel sounds are normal.      Palpations: Abdomen is soft.      Tenderness: There is no abdominal tenderness.   Musculoskeletal:      Cervical back: Full passive range of motion without pain.      Right lower leg: No edema.      Left lower leg: No edema.   Skin:     General: Skin is warm.   Neurological:      General: No focal deficit present.      Mental Status: He is alert and oriented to person, place, and time.   Psychiatric:         Attention and Perception: Attention normal.         Mood and Affect: Mood and affect normal.         Behavior: Behavior is cooperative.         LABS AND IMAGING REVIEWED IN [facility]  Lab Review:        Assessment:   CML, chronic phase, transformed to blast crisis       Plan:     Patient was on azacitidine, venetoclax, ponatinib.  Unfortunately, it looks as though his disease is progressing.    We are going to have a bone marrow biopsy done on 12/27/2023, however the patient presented the day prior with platelets and hemoglobin requiring transfusions of both platelets and packed red blood cells.  His gums or swollen and oozing.    I discussed with Dr. Green, the patient has failed multiple lines of therapy. We will try  asciminib, however I discussed with the patient that his disease is very resistant to therapy and that there are no guarantees.  He voiced understanding.     Dr. Green was in touch with Dr. Wick at [facility], unfortunately, there are not really any good treatment options left.     We will discuss the possibility of intense chemotherapy followed by transplant,  however the issue with this is that he does not have a current match for transplant.    The best option at this point is, as mentioned above, treating with asciminib.    Continue transfusion support, packed red blood cells for hemoglobin of less than 7 and platelets for platelet count of less than 10,000. Would require a platelet transfusion today.    Continue supportive care.  Of note, I have started having goals of care discussions with the patient and his family.    Paul Hogan II, MD

## 2023-12-28 NOTE — PROGRESS NOTES
[facility] Hospital Medicine Progress Note        Chief Complaint: Inpatient Follow-up for anemia    Subjective:  Magdaleno Hirsch is a 23 y.o. male with a past medical history of essential hypertension and CML presented to [facility] on 12/26/2023 for oral swelling.  Patient reported oral ulcers and swelling began 1 week ago.  Patient states he was prescribed magic mouthwash without relief.  Patient reported gum increased pain yesterday with passage of blood clots.  Patient also endorsed chills.  Patient denied fever, chest pain, shortness of breath, cough, congestion, nausea, vomiting, hematemesis, rectal bleeding, dark stools, diarrhea, and abdominal pain.  Patient's Oncologist is Dr. Almaguer and states his last chemo was last month.   Initial vital signs in ED were /84, pulse 112, respirations 19, temperature 37.4° C, and SpO2 100% on room air.  Labs revealed WBC 89.73, RBC 2.48, hemoglobin 7, hematocrit 22.3, MCV 89.9, platelets 2, blast 79, BUN 4.9, creatinine 0.77, glucose 115, bilirubin total 1.7, AST 35, ALT 33, and lactic acid 0.8.  Oncology was consulted with recommendations to transfuse 2 units of platelets and Cefepime. Patient was admitted to hospital medicine service for further medical management.     Intermittent fevers. Bcx2 are negative.   Denies having any other symptoms.       Objective/physical exam:  General appearance: Male in no apparent distress.  HEENNT:  Diffuse upper and lower gingival swelling with drainage and halitosis, poor dentition    Lungs: Clear to auscultation bilaterally.   Heart: Regular rate and rhythm.    Abdomen: Soft, non-distended, non-tender. Bowel sounds are normal.   Extremities: No cyanosis, clubbing, or edema. No deformities.   Skin: No Rash. Warm and dry.   Neuro: Awake, alert, and oriented. Motor and sensory exams grossly intact.         VITAL SIGNS: 24 HRS MIN & MAX LAST   Temp  Min: 98.3 °F (36.8 °C)  Max: 102.5 °F (39.2 °C) (!) 101.4  °F (38.6 °C)   BP  Min: 132/76  Max: 155/109 (!) 155/109   Pulse  Min: 107  Max: 135  (!) 116   Resp  Min: 18  Max: 20 19   SpO2  Min: 97 %  Max: 100 % 100 %       Labs, Microbiology and Imaging were Reviewed.      Microbiology Results (last 7 days)       Procedure Component Value Units Date/Time    Blood Culture #1 **CANNOT BE ORDERED STAT** [[id number]]  (Normal) Collected: 12/26/23 1549    Order Status: Completed Specimen: Blood Updated: 12/27/23 1701     CULTURE, BLOOD ([facility]) No Growth At 24 Hours    Blood Culture #2 **CANNOT BE ORDERED STAT** [[id number]]  (Normal) Collected: 12/26/23 1549    Order Status: Completed Specimen: Blood Updated: 12/27/23 1701     CULTURE, BLOOD ([facility]) No Growth At 24 Hours               Medications   acyclovir  800 mg Oral BID    ceFEPime (MAXIPIME) IVPB  2 g Intravenous Q8H    chlorhexidine  15 mL Mouth/Throat BID    fluconazole  400 mg Oral Daily    hydroxyurea  2,000 mg Oral Q12H    mupirocin   Nasal BID    nystatin  500,000 Units Mouth/Throat QID    sodium chloride 0.9%  10 mL Intravenous Q6H        0.9%  NaCl infusion (for blood administration), 0.9%  NaCl infusion (for blood administration), sodium chloride 0.9%, sodium chloride 0.9%, sodium chloride 0.9%, sodium chloride 0.9%, acetaminophen, dextrose 10%, dextrose 10%, glucagon (human recombinant), glucose, glucose, HYDROmorphone, labetaloL, ondansetron, Flushing PICC/Midline Protocol **AND** sodium chloride 0.9% **AND** sodium chloride 0.9%     Radiology:  X-Ray Chest 1 View for Line/Tube Placement  Narrative: EXAMINATION:  XR CHEST 1 VIEW FOR LINE/TUBE PLACEMENT    CLINICAL HISTORY:  picc placement;    COMPARISON:  Chest x-ray dated 12/08/2023    FINDINGS:  Right sided PICC line has its tip over the superior vena cava.  The heart is normal size.  The lungs are clear.  There is no pleural effusion or visible pneumothorax.  Impression: Right-sided PICC line with tip over the superior vena cava.    Electronically signed  by: Malu Weaver  Date:    12/27/2023  Time:    17:21        Assessment/Plan:  Severe thrombocytopenia  Acute on chronic anemia   CML  Diffuse gingival swelling with ulcerations and drainage   History of  HTN     Will continue supportive care and transfusion as necessary.   - Continue hydroxyurea.   - Hem onc is onboard for possible option.   - continue empiric abx,   - Bcx2 are negative, CXR is negative,   - will get UA.      All diagnosis and differential diagnosis have been reviewed; assessment and plan has been documented; I have personally reviewed the labs and test results that are presently available; I have reviewed the patients medication list; I have reviewed the consulting providers response and recommendations. I have reviewed or attempted to review medical records based upon their availability.       Brody Khan MD   12/28/2023

## 2023-12-28 NOTE — PLAN OF CARE
Problem: Adult Inpatient Plan of Care  Goal: Plan of Care Review  Outcome: Ongoing, Progressing  Flowsheets (Taken 12/27/2023 2004)  Plan of Care Reviewed With: patient  Goal: Patient-Specific Goal (Individualized)  Outcome: Ongoing, Progressing  Goal: Absence of Hospital-Acquired Illness or Injury  Outcome: Ongoing, Progressing  Intervention: Identify and Manage Fall Risk  Flowsheets (Taken 12/27/2023 2004)  Safety Promotion/Fall Prevention:   assistive device/personal item within reach   medications reviewed   nonskid shoes/socks when out of bed   side rails raised x 2  Intervention: Prevent Skin Injury  Flowsheets (Taken 12/27/2023 2004)  Body Position: position changed independently  Skin Protection:   adhesive use limited   incontinence pads utilized   tubing/devices free from skin contact  Intervention: Prevent and Manage VTE (Venous Thromboembolism) Risk  Flowsheets (Taken 12/27/2023 2004)  Activity Management: Ambulated in room - L4  VTE Prevention/Management:   ambulation promoted   bleeding risk assessed   ROM (active) performed  Range of Motion:   active ROM (range of motion) encouraged   ROM (range of motion) performed  Intervention: Prevent Infection  Flowsheets (Taken 12/27/2023 2004)  Infection Prevention:   rest/sleep promoted   single patient room provided  Goal: Optimal Comfort and Wellbeing  Outcome: Ongoing, Progressing  Intervention: Monitor Pain and Promote Comfort  Flowsheets (Taken 12/27/2023 2004)  Pain Management Interventions:   care clustered   medication offered   pain management plan reviewed with patient/caregiver   pillow support provided   position adjusted  Intervention: Provide Person-Centered Care  Flowsheets (Taken 12/27/2023 2004)  Trust Relationship/Rapport: care explained  Goal: Readiness for Transition of Care  Outcome: Ongoing, Progressing     Problem: Infection  Goal: Absence of Infection Signs and Symptoms  Outcome: Ongoing, Progressing  Intervention: Prevent or Manage  Infection  Flowsheets (Taken 12/27/2023 2004)  Infection Management: aseptic technique maintained

## 2023-12-28 NOTE — PLAN OF CARE
Problem: Adult Inpatient Plan of Care  Goal: Plan of Care Review  Outcome: Ongoing, Progressing  Flowsheets (Taken 12/27/2023 2212)  Plan of Care Reviewed With: patient  Goal: Patient-Specific Goal (Individualized)  Outcome: Ongoing, Progressing  Goal: Absence of Hospital-Acquired Illness or Injury  Outcome: Ongoing, Progressing  Goal: Optimal Comfort and Wellbeing  Outcome: Ongoing, Progressing  Goal: Readiness for Transition of Care  Outcome: Ongoing, Progressing     Problem: Infection  Goal: Absence of Infection Signs and Symptoms  Outcome: Ongoing, Progressing

## 2023-12-28 NOTE — PROGRESS NOTES
[facility] Hospital Medicine Progress Note        Chief Complaint: Inpatient Follow-up for anemia    Subjective:  Magdaleno Hirsch is a 23 y.o. male with a past medical history of essential hypertension and CML presented to [facility] on 12/26/2023 for oral swelling.  Patient reported oral ulcers and swelling began 1 week ago.  Patient states he was prescribed magic mouthwash without relief.  Patient reported gum increased pain yesterday with passage of blood clots.  Patient also endorsed chills.  Patient denied fever, chest pain, shortness of breath, cough, congestion, nausea, vomiting, hematemesis, rectal bleeding, dark stools, diarrhea, and abdominal pain.  Patient's Oncologist is Dr. Almaguer and states his last chemo was last month.   Initial vital signs in ED were /84, pulse 112, respirations 19, temperature 37.4° C, and SpO2 100% on room air.  Labs revealed WBC 89.73, RBC 2.48, hemoglobin 7, hematocrit 22.3, MCV 89.9, platelets 2, blast 79, BUN 4.9, creatinine 0.77, glucose 115, bilirubin total 1.7, AST 35, ALT 33, and lactic acid 0.8.  Oncology was consulted with recommendations to transfuse 2 units of platelets and Cefepime. Patient was admitted to hospital medicine service for further medical management.     Intermittent fevers. Bcx2 are negative.   Denies having any other symptoms.     Objective/physical exam:  General appearance: Male in no apparent distress.  HEENNT:  Diffuse upper and lower gingival swelling with drainage and halitosis, poor dentition    Lungs: Clear to auscultation bilaterally.   Heart: Regular rate and rhythm.    Abdomen: Soft, non-distended, non-tender. Bowel sounds are normal.   Extremities: No cyanosis, clubbing, or edema. No deformities.   Skin: No Rash. Warm and dry.   Neuro: Awake, alert, and oriented. Motor and sensory exams grossly intact.         VITAL SIGNS: 24 HRS MIN & MAX LAST   Temp  Min: 98.3 °F (36.8 °C)  Max: 102.5 °F (39.2 °C) (!) 101.4 °F  (38.6 °C)   BP  Min: 132/76  Max: 155/109 (!) 155/109   Pulse  Min: 107  Max: 135  (!) 116   Resp  Min: 18  Max: 20 19   SpO2  Min: 97 %  Max: 100 % 100 %       Labs, Microbiology and Imaging were Reviewed.      Microbiology Results (last 7 days)       Procedure Component Value Units Date/Time    Blood Culture #1 **CANNOT BE ORDERED STAT** [[id number]]  (Normal) Collected: 12/26/23 1549    Order Status: Completed Specimen: Blood Updated: 12/27/23 1701     CULTURE, BLOOD ([facility]) No Growth At 24 Hours    Blood Culture #2 **CANNOT BE ORDERED STAT** [[id number]]  (Normal) Collected: 12/26/23 1549    Order Status: Completed Specimen: Blood Updated: 12/27/23 1701     CULTURE, BLOOD ([facility]) No Growth At 24 Hours               Medications   acyclovir  800 mg Oral BID    ceFEPime (MAXIPIME) IVPB  2 g Intravenous Q8H    chlorhexidine  15 mL Mouth/Throat BID    fluconazole  400 mg Oral Daily    hydroxyurea  2,000 mg Oral Q12H    mupirocin   Nasal BID    nystatin  500,000 Units Mouth/Throat QID    sodium chloride 0.9%  10 mL Intravenous Q6H        0.9%  NaCl infusion (for blood administration), 0.9%  NaCl infusion (for blood administration), sodium chloride 0.9%, sodium chloride 0.9%, sodium chloride 0.9%, sodium chloride 0.9%, acetaminophen, dextrose 10%, dextrose 10%, glucagon (human recombinant), glucose, glucose, HYDROmorphone, labetaloL, ondansetron, Flushing PICC/Midline Protocol **AND** sodium chloride 0.9% **AND** sodium chloride 0.9%     Radiology:  X-Ray Chest 1 View for Line/Tube Placement  Narrative: EXAMINATION:  XR CHEST 1 VIEW FOR LINE/TUBE PLACEMENT    CLINICAL HISTORY:  picc placement;    COMPARISON:  Chest x-ray dated 12/08/2023    FINDINGS:  Right sided PICC line has its tip over the superior vena cava.  The heart is normal size.  The lungs are clear.  There is no pleural effusion or visible pneumothorax.  Impression: Right-sided PICC line with tip over the superior vena cava.    Electronically signed by: Malu  [facility]  Date:    12/27/2023  Time:    17:21        Assessment/Plan:  Severe thrombocytopenia  Acute on chronic anemia   CML  Diffuse gingival swelling with ulcerations and drainage   History of  HTN     Will continue supportive care and transfusion as necessary.   - Continue hydroxyurea.   - Hem onc is onboard for possible option.   - continue empiric abx,       All diagnosis and differential diagnosis have been reviewed; assessment and plan has been documented; I have personally reviewed the labs and test results that are presently available; I have reviewed the patients medication list; I have reviewed the consulting providers response and recommendations. I have reviewed or attempted to review medical records based upon their availability.       Brody Khan MD   12/28/2023

## 2023-12-29 LAB
ABO + RH BLD: NORMAL
ALBUMIN SERPL-MCNC: 2.4 G/DL (ref 3.5–5)
ALBUMIN/GLOB SERPL: 0.7 RATIO (ref 1.1–2)
ALP SERPL-CCNC: 141 UNIT/L (ref 40–150)
ALT SERPL-CCNC: 45 UNIT/L (ref 0–55)
APPEARANCE UR: CLEAR
AST SERPL-CCNC: 61 UNIT/L (ref 5–34)
B-HCG SERPL QL: 74 %
BACTERIA #/AREA URNS AUTO: ABNORMAL /HPF
BILIRUB SERPL-MCNC: 1.4 MG/DL
BILIRUB UR QL STRIP.AUTO: NEGATIVE
BLD PROD TYP BPU: NORMAL
BLOOD UNIT EXPIRATION DATE: NORMAL
BLOOD UNIT TYPE CODE: 1700
BLOOD UNIT TYPE CODE: 6200
BLOOD UNIT TYPE CODE: 7300
BUN SERPL-MCNC: 10.4 MG/DL (ref 8.9–20.6)
CALCIUM SERPL-MCNC: 8.2 MG/DL (ref 8.4–10.2)
CHLORIDE SERPL-SCNC: 104 MMOL/L (ref 98–107)
CO2 SERPL-SCNC: 22 MMOL/L (ref 22–29)
COLOR UR AUTO: ABNORMAL
CREAT SERPL-MCNC: 0.68 MG/DL (ref 0.73–1.18)
CROSSMATCH INTERPRETATION: NORMAL
DISPENSE STATUS: NORMAL
ERYTHROCYTE [DISTWIDTH] IN BLOOD BY AUTOMATED COUNT: 18.9 % (ref 11.5–17)
FLUAV AG UPPER RESP QL IA.RAPID: NOT DETECTED
FLUBV AG UPPER RESP QL IA.RAPID: NOT DETECTED
GFR SERPLBLD CREATININE-BSD FMLA CKD-EPI: >60 MLS/MIN/1.73/M2
GLOBULIN SER-MCNC: 3.3 GM/DL (ref 2.4–3.5)
GLUCOSE SERPL-MCNC: 105 MG/DL (ref 74–100)
GLUCOSE UR QL STRIP.AUTO: NORMAL
HCT VFR BLD AUTO: 25.7 % (ref 42–52)
HGB BLD-MCNC: 8.5 G/DL (ref 14–18)
INSTRUMENT WBC (OLG): 31.42 X10(3)/MCL
KETONES UR QL STRIP.AUTO: NEGATIVE
LEUKOCYTE ESTERASE UR QL STRIP.AUTO: NEGATIVE
LYMPHOCYTES NFR BLD MANUAL: 21 %
LYMPHOCYTES NFR BLD MANUAL: 6.6 X10(3)/MCL
MCH RBC QN AUTO: 27.8 PG (ref 27–31)
MCHC RBC AUTO-ENTMCNC: 33.1 G/DL (ref 33–36)
MCV RBC AUTO: 84 FL (ref 80–94)
MICROCYTES BLD QL SMEAR: ABNORMAL
MONOCYTES NFR BLD MANUAL: 1.57 X10(3)/MCL (ref 0.1–1.3)
MONOCYTES NFR BLD MANUAL: 5 %
NITRITE UR QL STRIP.AUTO: NEGATIVE
NRBC BLD AUTO-RTO: 0 %
PH UR STRIP.AUTO: 6.5 [PH]
PLATELET # BLD AUTO: 7 X10(3)/MCL (ref 130–400)
PLATELET # BLD EST: ABNORMAL 10*3/UL
PLATELETS.RETICULATED NFR BLD AUTO: 1.7 % (ref 0.9–11.2)
PMV BLD AUTO: ABNORMAL FL
POIKILOCYTOSIS BLD QL SMEAR: ABNORMAL
POTASSIUM SERPL-SCNC: 3.9 MMOL/L (ref 3.5–5.1)
PROT SERPL-MCNC: 5.7 GM/DL (ref 6.4–8.3)
PROT UR QL STRIP.AUTO: NEGATIVE
RBC # BLD AUTO: 3.06 X10(6)/MCL (ref 4.7–6.1)
RBC #/AREA URNS AUTO: ABNORMAL /HPF
RBC MORPH BLD: ABNORMAL
RBC UR QL AUTO: ABNORMAL
SARS-COV-2 RNA RESP QL NAA+PROBE: NOT DETECTED
SODIUM SERPL-SCNC: 135 MMOL/L (ref 136–145)
SP GR UR STRIP.AUTO: 1.01 (ref 1–1.03)
SQUAMOUS #/AREA URNS LPF: ABNORMAL /HPF
STOMATOCYTES (OLG): ABNORMAL
TARGETS BLD QL SMEAR: ABNORMAL
UNIT NUMBER: NORMAL
UROBILINOGEN UR STRIP-ACNC: NORMAL
WBC # SPEC AUTO: 31.42 X10(3)/MCL (ref 4.5–11.5)
WBC #/AREA URNS AUTO: ABNORMAL /HPF

## 2023-12-29 PROCEDURE — 99233 SBSQ HOSP IP/OBS HIGH 50: CPT | Mod: ,,, | Performed by: INTERNAL MEDICINE

## 2023-12-29 PROCEDURE — 80053 COMPREHEN METABOLIC PANEL: CPT | Performed by: STUDENT IN AN ORGANIZED HEALTH CARE EDUCATION/TRAINING PROGRAM

## 2023-12-29 PROCEDURE — 25000003 PHARM REV CODE 250: Performed by: NURSE PRACTITIONER

## 2023-12-29 PROCEDURE — P9040 RBC LEUKOREDUCED IRRADIATED: HCPCS | Performed by: INTERNAL MEDICINE

## 2023-12-29 PROCEDURE — 0240U COVID/FLU A&B PCR: CPT | Performed by: STUDENT IN AN ORGANIZED HEALTH CARE EDUCATION/TRAINING PROGRAM

## 2023-12-29 PROCEDURE — 81015 MICROSCOPIC EXAM OF URINE: CPT | Performed by: STUDENT IN AN ORGANIZED HEALTH CARE EDUCATION/TRAINING PROGRAM

## 2023-12-29 PROCEDURE — 63600175 PHARM REV CODE 636 W HCPCS: Performed by: STUDENT IN AN ORGANIZED HEALTH CARE EDUCATION/TRAINING PROGRAM

## 2023-12-29 PROCEDURE — 63600175 PHARM REV CODE 636 W HCPCS: Performed by: NURSE PRACTITIONER

## 2023-12-29 PROCEDURE — 25000003 PHARM REV CODE 250: Performed by: STUDENT IN AN ORGANIZED HEALTH CARE EDUCATION/TRAINING PROGRAM

## 2023-12-29 PROCEDURE — P9037 PLATE PHERES LEUKOREDU IRRAD: HCPCS | Performed by: NURSE PRACTITIONER

## 2023-12-29 PROCEDURE — P9037 PLATE PHERES LEUKOREDU IRRAD: HCPCS | Performed by: INTERNAL MEDICINE

## 2023-12-29 PROCEDURE — 25000003 PHARM REV CODE 250: Performed by: INTERNAL MEDICINE

## 2023-12-29 PROCEDURE — 85027 COMPLETE CBC AUTOMATED: CPT | Performed by: STUDENT IN AN ORGANIZED HEALTH CARE EDUCATION/TRAINING PROGRAM

## 2023-12-29 PROCEDURE — 11000001 HC ACUTE MED/SURG PRIVATE ROOM

## 2023-12-29 PROCEDURE — 87040 BLOOD CULTURE FOR BACTERIA: CPT | Performed by: INTERNAL MEDICINE

## 2023-12-29 PROCEDURE — 36430 TRANSFUSION BLD/BLD COMPNT: CPT

## 2023-12-29 PROCEDURE — 86923 COMPATIBILITY TEST ELECTRIC: CPT | Performed by: INTERNAL MEDICINE

## 2023-12-29 PROCEDURE — A4216 STERILE WATER/SALINE, 10 ML: HCPCS | Performed by: STUDENT IN AN ORGANIZED HEALTH CARE EDUCATION/TRAINING PROGRAM

## 2023-12-29 RX ORDER — HYDROMORPHONE HYDROCHLORIDE 2 MG/ML
0.5 INJECTION, SOLUTION INTRAMUSCULAR; INTRAVENOUS; SUBCUTANEOUS EVERY 4 HOURS PRN
Status: DISCONTINUED | OUTPATIENT
Start: 2023-12-29 | End: 2023-12-30 | Stop reason: HOSPADM

## 2023-12-29 RX ORDER — HYDROMORPHONE HYDROCHLORIDE 2 MG/ML
0.5 INJECTION, SOLUTION INTRAMUSCULAR; INTRAVENOUS; SUBCUTANEOUS ONCE
Status: COMPLETED | OUTPATIENT
Start: 2023-12-29 | End: 2023-12-29

## 2023-12-29 RX ORDER — ACETAMINOPHEN 500 MG
1000 TABLET ORAL EVERY 8 HOURS
Status: DISCONTINUED | OUTPATIENT
Start: 2023-12-29 | End: 2023-12-30 | Stop reason: HOSPADM

## 2023-12-29 RX ORDER — HYDROXYUREA 500 MG/1
1000 CAPSULE ORAL DAILY
Status: DISCONTINUED | OUTPATIENT
Start: 2023-12-30 | End: 2023-12-30 | Stop reason: HOSPADM

## 2023-12-29 RX ORDER — HYDROCODONE BITARTRATE AND ACETAMINOPHEN 500; 5 MG/1; MG/1
TABLET ORAL
Status: DISCONTINUED | OUTPATIENT
Start: 2023-12-29 | End: 2023-12-29

## 2023-12-29 RX ORDER — HYDROCODONE BITARTRATE AND ACETAMINOPHEN 500; 5 MG/1; MG/1
TABLET ORAL
Status: DISCONTINUED | OUTPATIENT
Start: 2023-12-29 | End: 2023-12-30 | Stop reason: HOSPADM

## 2023-12-29 RX ORDER — ACETAMINOPHEN 500 MG
1000 TABLET ORAL ONCE
Status: COMPLETED | OUTPATIENT
Start: 2023-12-29 | End: 2023-12-29

## 2023-12-29 RX ADMIN — ACETAMINOPHEN 1000 MG: 500 TABLET ORAL at 09:12

## 2023-12-29 RX ADMIN — NYSTATIN 500000 UNITS: 100000 SUSPENSION ORAL at 01:12

## 2023-12-29 RX ADMIN — HYDROXYUREA 2000 MG: 500 CAPSULE ORAL at 04:12

## 2023-12-29 RX ADMIN — SODIUM CHLORIDE, PRESERVATIVE FREE 10 ML: 5 INJECTION INTRAVENOUS at 12:12

## 2023-12-29 RX ADMIN — ACYCLOVIR 800 MG: 800 TABLET ORAL at 10:12

## 2023-12-29 RX ADMIN — CHLORHEXIDINE GLUCONATE 0.12% ORAL RINSE 15 ML: 1.2 LIQUID ORAL at 09:12

## 2023-12-29 RX ADMIN — ACYCLOVIR 800 MG: 800 TABLET ORAL at 08:12

## 2023-12-29 RX ADMIN — SODIUM CHLORIDE, PRESERVATIVE FREE 10 ML: 5 INJECTION INTRAVENOUS at 06:12

## 2023-12-29 RX ADMIN — HYDROMORPHONE HYDROCHLORIDE 0.5 MG: 2 INJECTION INTRAMUSCULAR; INTRAVENOUS; SUBCUTANEOUS at 03:12

## 2023-12-29 RX ADMIN — CEFEPIME 2 G: 2 INJECTION, POWDER, FOR SOLUTION INTRAVENOUS at 10:12

## 2023-12-29 RX ADMIN — CEFEPIME 2 G: 2 INJECTION, POWDER, FOR SOLUTION INTRAVENOUS at 03:12

## 2023-12-29 RX ADMIN — Medication 10 ML: at 02:12

## 2023-12-29 RX ADMIN — MUPIROCIN: 20 OINTMENT TOPICAL at 10:12

## 2023-12-29 RX ADMIN — ACETAMINOPHEN 1000 MG: 500 TABLET ORAL at 03:12

## 2023-12-29 RX ADMIN — NYSTATIN 500000 UNITS: 100000 SUSPENSION ORAL at 10:12

## 2023-12-29 RX ADMIN — NYSTATIN 500000 UNITS: 100000 SUSPENSION ORAL at 05:12

## 2023-12-29 RX ADMIN — NYSTATIN 500000 UNITS: 100000 SUSPENSION ORAL at 08:12

## 2023-12-29 RX ADMIN — ACETAMINOPHEN 325MG 650 MG: 325 TABLET ORAL at 11:12

## 2023-12-29 RX ADMIN — LABETALOL HYDROCHLORIDE 10 MG: 5 INJECTION, SOLUTION INTRAVENOUS at 09:12

## 2023-12-29 RX ADMIN — ACETAMINOPHEN 325MG 650 MG: 325 TABLET ORAL at 07:12

## 2023-12-29 RX ADMIN — ACETAMINOPHEN 325MG 650 MG: 325 TABLET ORAL at 01:12

## 2023-12-29 RX ADMIN — MUPIROCIN: 20 OINTMENT TOPICAL at 08:12

## 2023-12-29 RX ADMIN — HYDROMORPHONE HYDROCHLORIDE 0.5 MG: 2 INJECTION INTRAMUSCULAR; INTRAVENOUS; SUBCUTANEOUS at 06:12

## 2023-12-29 RX ADMIN — CHLORHEXIDINE GLUCONATE 0.12% ORAL RINSE 15 ML: 1.2 LIQUID ORAL at 10:12

## 2023-12-29 RX ADMIN — FLUCONAZOLE 400 MG: 200 TABLET ORAL at 10:12

## 2023-12-29 RX ADMIN — HYDROMORPHONE HYDROCHLORIDE 0.5 MG: 2 INJECTION INTRAMUSCULAR; INTRAVENOUS; SUBCUTANEOUS at 08:12

## 2023-12-29 RX ADMIN — HYDROMORPHONE HYDROCHLORIDE 0.5 MG: 2 INJECTION INTRAMUSCULAR; INTRAVENOUS; SUBCUTANEOUS at 10:12

## 2023-12-29 NOTE — PROGRESS NOTES
"Subjective:       Patient ID: Magdaleno Hirsch is a 23 y.o. male.    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      Current Treatment:   Hydroxyurea started on 12/26/2023 for cytoreduction.  Asciminib ordered on 12/28/2023.    Treatment History:  Hydroxyurea 2g every 12 hours  Dasatinib 100 mg po daily, unsure on start date as patient was lost to follow up.  Patient received CLIA and ponatinib on 06/16/2023.    Azacitidine 5/28 days, venetoclax 50 mg p.o. daily for 10/28 days, and ponatinib 30 mg p.o. daily every day on 10/09/2023.  Increased ponatinib to 45 mg p.o. daily on 11/06/2023.    HPI:  Patient with no real medical history who went in for a routine eye exam on 08/18/2022 to update his eye glasses prescription.  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  He had bilateral Valdez spots with vessel tortuosity.  The optometrist recommended that the patient have blood work including testing for sickle cell disease.  The patient presented to the emergency department.  CBC in the emergency department revealed a white blood cell count of 411,900. Hemoglobin was 8.3, platelets were normal at 375,000 hundred and seventy five thousand.  Differential was suggestive of CML.  Coagulation studies revealed an INR of 1.38, PTT of 36.4 and a fibrinogen of 292.  Patient was going to present to [place], however they were on diversion.  He was transferred from [facility] to [facility].  Hematology consulted.  Patient underwent bone marrow biopsy on 08/22/2022, this revealed CML, chronic phase, BCR/ABL1 positive.  Ordered dasatinib 100 mg p.o. daily as of 10/10/2022, unsure when patient started taking medication due to him being lost to follow-up.  Patient went into blast crisis, received CLIA and ponatinib on 06/16/2023.  He had a " repeat bone marrow biopsy on 08/28/2023, this had to be sent off to [facility], however the final diagnosis was persistent/recurrent myeloid neoplasm with 10-15% bone marrow blasts and 8% circulating blasts.  Started azacitidine, venetoclax, ponatinib as mentioned above on 10/09/2023.  Bone marrow biopsy done after 1 cycle done on 10/31/2023 revealed relapsed/persistent acute myeloid leukemia with 11% circulating blasts and 70-80% blasts by IHC in the bone marrow.   He was continued on treatment since he had only received 1 cycle.    Interval History:   Patient admitted to the hospital on Friday, 12/08/2023.  He was having weakness and was requiring blood transfusion and platelet transfusion.  His peripheral blood smear on 12/09/2023 showed innumerable blasts.  He was discharged on 12/14/2023 in stable condition.  We followed him with outpatient labs in the plan was for him to have a bone marrow biopsy on 12/27/2023.  Unfortunately, he presented to the emergency department on 12/26/2023 with swollen gums that were oozing blood.  He required blood and platelet transfusions.  His white count had increased to greater than 85,000.    Today, 12/29/2023:  Patient seen and examined on rounds.  He once again states to have pain due to his swollen gums, however, the swelling is improving.  His bleeding is much better.  No other major issues to discuss.    Past Medical History:   Diagnosis Date    CML (chronic myelocytic leukemia)     HVD (hypertensive vascular disease)     Oropharyngeal candidiasis       Past Surgical History:   Procedure Laterality Date    BONE MARROW BIOPSY N/A 8/22/2022    Procedure: Biopsy-bone marrow;  Surgeon: Getachew Chen MD;  Location: [facility] OR;  Service: General;  Laterality: N/A;    BONE MARROW BIOPSY N/A 7/25/2023    Procedure: Biopsy-bone marrow;  Surgeon: Edi Carty MD;  Location: [facility] OR;  Service: General;  Laterality: N/A;    BONE MARROW BIOPSY N/A 8/28/2023    Procedure: Biopsy-bone  marrow;  Surgeon: Moo Pina MD;  Location: [facility] OR;  Service: General;  Laterality: N/A;    BONE MARROW BIOPSY N/A 10/31/2023    Procedure: Biopsy-bone marrow;  Surgeon: Edi Carty MD;  Location: [facility] OR;  Service: General;  Laterality: N/A;    KNEE SURGERY Left      Social History     Socioeconomic History    Marital status: Single   Tobacco Use    Smoking status: Never    Smokeless tobacco: Never   Substance and Sexual Activity    Alcohol use: Never    Drug use: Yes     Types: Marijuana      Family History   Problem Relation Age of Onset    Hypertension Maternal Grandmother     Cancer Maternal Grandmother            Review of Systems   Constitutional:  Positive for fatigue. Negative for chills, diaphoresis and unexpected weight change.   HENT:  Negative for nasal congestion and sore throat.         Gums are significantly swollen and oozing blood   Eyes:  Negative for pain.   Respiratory:  Negative for cough, chest tightness and shortness of breath.    Cardiovascular:  Negative for chest pain, palpitations and leg swelling.   Gastrointestinal:  Negative for abdominal distention, abdominal pain, blood in stool, constipation and diarrhea.   Genitourinary:  Negative for dysuria, frequency and hematuria.   Musculoskeletal:  Negative for arthralgias and back pain.   Integumentary:  Negative for rash.   Neurological:  Negative for dizziness, weakness, numbness and headaches.   Hematological:  Negative for adenopathy. Bruises/bleeds easily.   Psychiatric/Behavioral:  Negative for confusion.          Objective:      Physical Exam  Vitals reviewed.   Constitutional:       General: He is awake.      Appearance: Normal appearance.   HENT:      Head: Normocephalic and atraumatic.      Right Ear: Hearing normal.      Left Ear: Hearing normal.      Nose: Nose normal.      Mouth/Throat:      Comments: Gums are still swollen, but better and there is much less oozing of blood.    Eyes:      General: Lids are normal.  Vision grossly intact.      Extraocular Movements: Extraocular movements intact.      Conjunctiva/sclera: Conjunctivae normal.   Cardiovascular:      Rate and Rhythm: Normal rate and regular rhythm.      Pulses: Normal pulses.      Heart sounds: Normal heart sounds.   Pulmonary:      Effort: Pulmonary effort is normal.      Breath sounds: Normal breath sounds. No wheezing, rhonchi or rales.   Abdominal:      General: Bowel sounds are normal.      Palpations: Abdomen is soft.      Tenderness: There is no abdominal tenderness.   Musculoskeletal:      Cervical back: Full passive range of motion without pain.      Right lower leg: No edema.      Left lower leg: No edema.   Skin:     General: Skin is warm.   Neurological:      General: No focal deficit present.      Mental Status: He is alert and oriented to person, place, and time.   Psychiatric:         Attention and Perception: Attention normal.         Mood and Affect: Mood and affect normal.         Behavior: Behavior is cooperative.         LABS AND IMAGING REVIEWED IN [facility]  Lab Review:        Assessment:   CML, chronic phase, transformed to blast crisis       Plan:     Patient was on azacitidine, venetoclax, ponatinib.  Unfortunately, it looks as though his disease is progressing.    We were going to have a bone marrow biopsy done on 12/27/2023, however the patient presented the day prior with platelets and hemoglobin requiring transfusions of both platelets and packed red blood cells.  His gums were swollen and oozing.    I discussed with Dr. Green, the patient has failed multiple lines of therapy. We will try  asciminib, however I discussed with the patient that his disease is very resistant to therapy and that there are no guarantees.  He voiced understanding.     Dr. Green was in touch with Dr. Wick at [facility], unfortunately, there are not really any good treatment options left. As mentioned, we will give Asciminib a try.    We discussed the  possibility of intense chemotherapy followed by transplant, however the issue with this is that he does not have a current match for transplant.    The best option at this point is, as mentioned above, treating with asciminib.    Continue transfusion support, we will give 2 units of platelets today and 1 unit of packed red blood cells in preparation for him to go home tomorrow.    Continue supportive care.  Of note, I have started having goals of care discussions with the patient and his family.    I will decrease hydroxyurea to 1 gram daily, he will go home on this dose.  He can follow up with me in clinic on Tuesday, 01/02/2023 for blood work.    Paul Hogan II, MD

## 2023-12-29 NOTE — DISCHARGE SUMMARY
Hospital Medicine  Discharge Summary    Patient Name: Magdaleno Hirsch  MRN: [id number]  Admit Date: 12/8/2023  Discharge Date: 12/14/2023   Status: IP- Inpatient   Length of Stay: 6      PHYSICIANS   Admitting Physician: Geneva Navarro DO  Discharging Physician: Wilman Rizvi MD.  Primary Care Physician: Christine, Primary Doctor  Consults: Hematology/Oncology      DISCHARGE DIAGNOSES   Febrile Neutropenia   SIRS, possibly related to leukemia vs  sepsis  Chronic Myeloid leukemia with blast crisis   Pancytopenia with severe thrombocytopenia  Mild Hyponatremia, resolved  Severe hypophosphatemia, improved   Hypokalemia  h/o Essential HTN      PROCEDURES   None      HOSPITAL COURSE    23 y.o. 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 Hogan, and undergoing chemotherapy with Azacitidine/Venetoclax, presented to the clinic on 12/8 for treatment, where he was noted to have severe anemia with a Hgb 6.6 and thrombocytopenia 4000.  He was subsequently referred to the ED for further evaluation.  He reports fatigue and generalized weakness.  Evalution in ED also noted temp of 102F.  Denied fever, chills, nausea/vomiting, cough/SOB, abd pain or diarrhea; no known sick sick contacts.      Patient was recently admitted to our services from 11/20-11/23/2023 for neutropenic fever. Additional workup in the ED include Chest x-ray showed no acute chest disease. UA neg for infection. Negative for flu, RSV and COVID.      Evaluation in ED noted patient febrile, but HDS. Labs with white count of 39,900 (85% monocytes), platelets 3,000, H&H 7.3/22.  Peripheral smear with innumerable blast.  Patient was admitted to  services and started on broad spectrum antimicrobials with vancomycin, cefepime and acyclovir.  Patient transfused 1 unit platelets.  Heme/Onc consulted.  Additionally transfused 2 units PRBCs.   Continued fever spikes, though cultures remain negative.  CT C/A/P did note SIXTO opacities.  MRSA swab  was negative. Patient was subsequently treated with Cefepime, IV Vanc and Acyclovir discontinued.  Continued with fevers, but after discussion Oncology, suspected fevers related to leukemia, which Jerry/Onc notes is worsening despite aggressive therapy, felt to be resistant to treatment.  Jerry/Onc was discussing alternative therapies with his leukemia specialist at [facility].  Patient was approaching discharge, however in the inteirm the patient continued to require transfusions of platelets and PRBCs.  Patient was arranged for close outpatient follow-up, but appropriate for discharge home.      STATUS  Stable, guarded long-term diagnosis    DISPOSITION  Discharge to home    DIET  Regular    ACTIVITY  As tolerated      FOLLOW-UP      Paul Hogan II, MD Follow up in 4 day(s).    Specialties: Oncology, Hematology and Oncology  Contact information:  [address]  [address]  [place] [address]  [phone]               Nickolas, Patience, FNP. Go on 1/18/2024.    Specialty: Nurse Practitioner  Why: 8:40am  Contact information:  [address] [facility] [facility]  Internal Medicine Clinic  [place] [id number] [phone]                 DISCHARGE MEDICATION RECONCILIATION     PRESCRIBED     cefdinir 300 MG capsule  Commonly known as: OMNICEF  Take 1 capsule (300 mg total) by mouth 2 (two) times daily. for 5 days     diltiaZEM 120 MG Cp24  Commonly known as: CARDIZEM CD  Take 1 capsule (120 mg total) by mouth nightly.     metoprolol tartrate 25 MG tablet  Commonly known as: LOPRESSOR  Take 1 tablet (25 mg total) by mouth 2 (two) times daily.     potassium, sodium phosphates 280-160-250 mg Pwpk  Commonly known as: PHOS-NAK  Take 1 packet by mouth 3 (three) times daily with meals.            CONTINUE      (MAGIC MOUTHWASH) 1:1:1 BENADRYL 12.5MG/5ML LIQ, ALUMINUM & MAGNESIUM  Swish and spit 15 mLs every 4 (four) hours as needed (mouth pain). for mouth sores     acyclovir 800 MG Tab  Commonly  known as: ZOVIRAX  Take 1 tablet (800 mg total) by mouth 2 (two) times daily.     oxyCODONE-acetaminophen  mg per tablet  Commonly known as: PERCOCET  Take 1 tablet by mouth every 4 (four) hours as needed for Pain.            DISCONTINUE      ICLUSIG 45 mg Tab tablet  Generic drug: PONATinib     levoFLOXacin 500 MG tablet  Commonly known as: LEVAQUIN     NIFEdipine 60 MG (OSM) 24 hr tablet  Commonly known as: PROCARDIA-XL     traMADoL 50 mg tablet  Commonly known as: ULTRAM     VENCLEXTA 50 mg Tab  Generic drug: venetoclax            These medications were sent to   [id number] [address] [address]      Hours: 24-hours Phone: [phone]   cefdinir 300 MG capsule  diltiaZEM 120 MG Cp24  metoprolol tartrate 25 MG tablet  oxyCODONE-acetaminophen  mg per tablet  potassium, sodium phosphates 280-160-250 mg Pwpk        PHYSICAL EXAM   VITALS: T 99.1 °F (37.3 °C)   BP (!) 145/93   P 99   RR 20   O2 98 %    GENERAL: Awake and in NAD  LUNGS: CTA anteriorly  CVS: Normal rate  GI/: Soft, NT, bowel sounds positive.  EXTREMITIES: No peripheral edema  NEURO: AAOx3  PSYCH: Cooperative      Discharge time: 33 minutes     Wilman Rizvi MD  [facility]       DIAGNOSITCS   CT Chest Abdomen Pelvis With IV Contrast (XPD) NO Oral Contrast  Result Date: 12/12/2023  EXAMINATION: CT CHEST ABDOMEN PELVIS WITH IV CONTRAST (XPD) CLINICAL HISTORY: Sepsis;FUO; TECHNIQUE: Low dose axial images, sagittal and coronal reformations were obtained from the thoracic inlet to the pubic symphysis following the IV and oral contrast administration.  Automatic exposure control is utilized to reduce patient radiation exposure. COMPARISON: CT scan of the chest from 11/25/2023 FINDINGS: The lungs are adequately aerated.  No mass is seen.  No nodule is seen.  No pleural thickening is seen.  No pleural effusion is seen.  There is a reticulonodular tree-in-bud type infiltrate in the left upper lobe.   This is new since the prior examination. The thoracic aorta is normal in caliber.. No mediastinal adenopathy is seen. The heart appears normal in size.. The liver is enlarged in size.  No liver mass or lesion is seen.  Portal and hepatic veins appear normal.. The gallbladder appears normal.  No gallstones are seen. The spleen is enlarged in size.  No splenic mass or lesion is seen. The pancreas appears grossly unremarkable.  No pancreatic mass or lesion is seen.  No inflammation is seen. No adrenal abnormality is seen.  No adrenal nodule is seen. The kidneys are well perfused.  No hydronephrosis is seen.  No hydroureter is seen.  No retroperitoneal abnormality is seen.. Visualized portions of the bowel shows no acute abnormality.  No colitis is seen.  No diverticulitis is seen.  No colonic mass is seen. There is some pockets of air and inflammatory changes seen along the anterior abdominal wall pannus likely representing injection sites.  No free fluid is seen. Urinary bladder appears unremarkable. No acute bony abnormality is seen.   Impression:  Reticulonodular tree-in-bud type infiltrate in the left upper lobe likely infectious in etiology Hepatomegaly Splenomegaly   Electronically signed by: Ian Jacobson Date:    12/12/2023 Time:    13:17    X-Ray Chest PA And Lateral  Result Date: 12/8/2023  EXAMINATION: XR CHEST PA AND LATERAL CLINICAL HISTORY: Fever;, . FINDINGS: No alveolar consolidation, effusion, or pneumothorax is seen.   The thoracic aorta is normal  cardiac silhouette, central pulmonary vessels and mediastinum are normal in size and are grossly unremarkable.   visualized osseous structures are grossly unremarkable.   Impression:  No acute chest disease is identified.   Electronically signed by: Juan Adler Date:    12/08/2023 Time:    14:14    X-Ray Lumbar Spine Ap And Lateral  Result Date: 11/30/2023  EXAMINATION: XR LUMBAR SPINE AP AND LATERAL CLINICAL HISTORY: Back Pain; COMPARISON: None.  FINDINGS: There are 5 non-rib-bearing lumbar type vertebra is.  Alignment is normal.  The vertebral body heights and disc spaces are maintained.  The soft tissues are unremarkable.   Impression:  No acute abnormality identified.   Electronically signed by: Malu Weaver Date:    11/30/2023 Time:    13:52

## 2023-12-30 VITALS
BODY MASS INDEX: 24.25 KG/M2 | OXYGEN SATURATION: 98 % | HEART RATE: 94 BPM | DIASTOLIC BLOOD PRESSURE: 87 MMHG | TEMPERATURE: 100 F | RESPIRATION RATE: 18 BRPM | HEIGHT: 73 IN | WEIGHT: 183 LBS | SYSTOLIC BLOOD PRESSURE: 138 MMHG

## 2023-12-30 LAB
ABO + RH BLD: NORMAL
ALBUMIN SERPL-MCNC: 2.4 G/DL (ref 3.5–5)
ALBUMIN/GLOB SERPL: 0.7 RATIO (ref 1.1–2)
ALP SERPL-CCNC: 160 UNIT/L (ref 40–150)
ALT SERPL-CCNC: 67 UNIT/L (ref 0–55)
ANISOCYTOSIS BLD QL SMEAR: ABNORMAL
AST SERPL-CCNC: 74 UNIT/L (ref 5–34)
B-HCG SERPL QL: 85 %
BILIRUB SERPL-MCNC: 3 MG/DL
BLD PROD TYP BPU: NORMAL
BLOOD UNIT EXPIRATION DATE: NORMAL
BLOOD UNIT TYPE CODE: 1700
BUN SERPL-MCNC: 9.3 MG/DL (ref 8.9–20.6)
CALCIUM SERPL-MCNC: 8.4 MG/DL (ref 8.4–10.2)
CHLORIDE SERPL-SCNC: 99 MMOL/L (ref 98–107)
CO2 SERPL-SCNC: 23 MMOL/L (ref 22–29)
CREAT SERPL-MCNC: 0.7 MG/DL (ref 0.73–1.18)
CROSSMATCH INTERPRETATION: NORMAL
DISPENSE STATUS: NORMAL
ERYTHROCYTE [DISTWIDTH] IN BLOOD BY AUTOMATED COUNT: 17.9 % (ref 11.5–17)
GFR SERPLBLD CREATININE-BSD FMLA CKD-EPI: >60 MLS/MIN/1.73/M2
GLOBULIN SER-MCNC: 3.6 GM/DL (ref 2.4–3.5)
GLUCOSE SERPL-MCNC: 111 MG/DL (ref 74–100)
HCT VFR BLD AUTO: 26.4 % (ref 42–52)
HGB BLD-MCNC: 8.8 G/DL (ref 14–18)
INSTRUMENT WBC (OLG): 11.6 X10(3)/MCL
LYMPHOCYTES NFR BLD MANUAL: 0.93 X10(3)/MCL
LYMPHOCYTES NFR BLD MANUAL: 8 %
MCH RBC QN AUTO: 28 PG (ref 27–31)
MCHC RBC AUTO-ENTMCNC: 33.3 G/DL (ref 33–36)
MCV RBC AUTO: 84.1 FL (ref 80–94)
MICROCYTES BLD QL SMEAR: ABNORMAL
MONOCYTES NFR BLD MANUAL: 0.35 X10(3)/MCL (ref 0.1–1.3)
MONOCYTES NFR BLD MANUAL: 3 %
MYELOCYTES NFR BLD MANUAL: 5 %
NRBC BLD AUTO-RTO: 0 %
PLATELET # BLD AUTO: 19 X10(3)/MCL (ref 130–400)
PLATELET # BLD EST: ABNORMAL 10*3/UL
PLATELETS.RETICULATED NFR BLD AUTO: 2 % (ref 0.9–11.2)
PMV BLD AUTO: 9.1 FL (ref 7.4–10.4)
POTASSIUM SERPL-SCNC: 4 MMOL/L (ref 3.5–5.1)
PROT SERPL-MCNC: 6 GM/DL (ref 6.4–8.3)
RBC # BLD AUTO: 3.14 X10(6)/MCL (ref 4.7–6.1)
RBC MORPH BLD: ABNORMAL
SODIUM SERPL-SCNC: 132 MMOL/L (ref 136–145)
UNIT NUMBER: NORMAL
WBC # SPEC AUTO: 11.6 X10(3)/MCL (ref 4.5–11.5)

## 2023-12-30 PROCEDURE — 63600175 PHARM REV CODE 636 W HCPCS: Performed by: INTERNAL MEDICINE

## 2023-12-30 PROCEDURE — 63600175 PHARM REV CODE 636 W HCPCS: Performed by: STUDENT IN AN ORGANIZED HEALTH CARE EDUCATION/TRAINING PROGRAM

## 2023-12-30 PROCEDURE — A4216 STERILE WATER/SALINE, 10 ML: HCPCS | Performed by: STUDENT IN AN ORGANIZED HEALTH CARE EDUCATION/TRAINING PROGRAM

## 2023-12-30 PROCEDURE — 80053 COMPREHEN METABOLIC PANEL: CPT | Performed by: STUDENT IN AN ORGANIZED HEALTH CARE EDUCATION/TRAINING PROGRAM

## 2023-12-30 PROCEDURE — 25000003 PHARM REV CODE 250: Performed by: INTERNAL MEDICINE

## 2023-12-30 PROCEDURE — 25000003 PHARM REV CODE 250: Performed by: STUDENT IN AN ORGANIZED HEALTH CARE EDUCATION/TRAINING PROGRAM

## 2023-12-30 PROCEDURE — 63600175 PHARM REV CODE 636 W HCPCS: Performed by: NURSE PRACTITIONER

## 2023-12-30 PROCEDURE — 85027 COMPLETE CBC AUTOMATED: CPT | Performed by: STUDENT IN AN ORGANIZED HEALTH CARE EDUCATION/TRAINING PROGRAM

## 2023-12-30 PROCEDURE — 36430 TRANSFUSION BLD/BLD COMPNT: CPT

## 2023-12-30 PROCEDURE — 25000003 PHARM REV CODE 250: Performed by: NURSE PRACTITIONER

## 2023-12-30 PROCEDURE — P9037 PLATE PHERES LEUKOREDU IRRAD: HCPCS | Performed by: INTERNAL MEDICINE

## 2023-12-30 RX ORDER — DIPHENHYDRAMINE HYDROCHLORIDE 50 MG/ML
12.5 INJECTION, SOLUTION INTRAMUSCULAR; INTRAVENOUS ONCE
Status: COMPLETED | OUTPATIENT
Start: 2023-12-30 | End: 2023-12-30

## 2023-12-30 RX ORDER — DILTIAZEM HYDROCHLORIDE 120 MG/1
120 CAPSULE, COATED, EXTENDED RELEASE ORAL NIGHTLY
Status: DISCONTINUED | OUTPATIENT
Start: 2023-12-30 | End: 2023-12-30 | Stop reason: HOSPADM

## 2023-12-30 RX ORDER — NYSTATIN 100000 [USP'U]/ML
500000 SUSPENSION ORAL 4 TIMES DAILY
Qty: 200 ML | Refills: 0 | Status: ON HOLD | OUTPATIENT
Start: 2023-12-30 | End: 2024-01-11 | Stop reason: HOSPADM

## 2023-12-30 RX ORDER — HYDROXYUREA 500 MG/1
1000 CAPSULE ORAL DAILY
Qty: 60 CAPSULE | Refills: 0 | Status: SHIPPED | OUTPATIENT
Start: 2023-12-31 | End: 2024-01-02

## 2023-12-30 RX ORDER — ACETAMINOPHEN 325 MG/1
650 TABLET ORAL ONCE
Status: COMPLETED | OUTPATIENT
Start: 2023-12-30 | End: 2023-12-30

## 2023-12-30 RX ORDER — OXYCODONE AND ACETAMINOPHEN 10; 325 MG/1; MG/1
1 TABLET ORAL EVERY 6 HOURS PRN
Qty: 30 TABLET | Refills: 0 | Status: SHIPPED | OUTPATIENT
Start: 2023-12-30 | End: 2024-01-02

## 2023-12-30 RX ORDER — METOPROLOL TARTRATE 25 MG/1
25 TABLET, FILM COATED ORAL 2 TIMES DAILY
Status: DISCONTINUED | OUTPATIENT
Start: 2023-12-30 | End: 2023-12-30 | Stop reason: HOSPADM

## 2023-12-30 RX ORDER — HYDROCODONE BITARTRATE AND ACETAMINOPHEN 500; 5 MG/1; MG/1
TABLET ORAL
Status: DISCONTINUED | OUTPATIENT
Start: 2023-12-30 | End: 2023-12-30 | Stop reason: HOSPADM

## 2023-12-30 RX ADMIN — SODIUM CHLORIDE, PRESERVATIVE FREE 10 ML: 5 INJECTION INTRAVENOUS at 05:12

## 2023-12-30 RX ADMIN — DIPHENHYDRAMINE HYDROCHLORIDE 12.5 MG: 50 INJECTION INTRAMUSCULAR; INTRAVENOUS at 12:12

## 2023-12-30 RX ADMIN — CEFEPIME 2 G: 2 INJECTION, POWDER, FOR SOLUTION INTRAVENOUS at 09:12

## 2023-12-30 RX ADMIN — SODIUM CHLORIDE, PRESERVATIVE FREE 10 ML: 5 INJECTION INTRAVENOUS at 12:12

## 2023-12-30 RX ADMIN — ACETAMINOPHEN 1000 MG: 500 TABLET ORAL at 01:12

## 2023-12-30 RX ADMIN — METOPROLOL TARTRATE 25 MG: 25 TABLET, FILM COATED ORAL at 01:12

## 2023-12-30 RX ADMIN — ACETAMINOPHEN 1000 MG: 500 TABLET ORAL at 03:12

## 2023-12-30 RX ADMIN — ACETAMINOPHEN 650 MG: 325 TABLET, FILM COATED ORAL at 12:12

## 2023-12-30 RX ADMIN — HYDROMORPHONE HYDROCHLORIDE 0.5 MG: 2 INJECTION INTRAMUSCULAR; INTRAVENOUS; SUBCUTANEOUS at 05:12

## 2023-12-30 RX ADMIN — HYDROMORPHONE HYDROCHLORIDE 0.5 MG: 2 INJECTION INTRAMUSCULAR; INTRAVENOUS; SUBCUTANEOUS at 01:12

## 2023-12-30 RX ADMIN — CHLORHEXIDINE GLUCONATE 0.12% ORAL RINSE 15 ML: 1.2 LIQUID ORAL at 09:12

## 2023-12-30 RX ADMIN — HYDROXYUREA 1000 MG: 500 CAPSULE ORAL at 08:12

## 2023-12-30 RX ADMIN — FLUCONAZOLE 400 MG: 200 TABLET ORAL at 08:12

## 2023-12-30 RX ADMIN — LABETALOL HYDROCHLORIDE 10 MG: 5 INJECTION, SOLUTION INTRAVENOUS at 03:12

## 2023-12-30 RX ADMIN — NYSTATIN 500000 UNITS: 100000 SUSPENSION ORAL at 08:12

## 2023-12-30 RX ADMIN — NYSTATIN 500000 UNITS: 100000 SUSPENSION ORAL at 12:12

## 2023-12-30 RX ADMIN — HYDROMORPHONE HYDROCHLORIDE 0.5 MG: 2 INJECTION INTRAMUSCULAR; INTRAVENOUS; SUBCUTANEOUS at 09:12

## 2023-12-30 RX ADMIN — HYDROMORPHONE HYDROCHLORIDE 0.5 MG: 2 INJECTION INTRAMUSCULAR; INTRAVENOUS; SUBCUTANEOUS at 12:12

## 2023-12-30 RX ADMIN — ACYCLOVIR 800 MG: 800 TABLET ORAL at 08:12

## 2023-12-30 RX ADMIN — DILTIAZEM HYDROCHLORIDE 120 MG: 120 CAPSULE, COATED, EXTENDED RELEASE ORAL at 01:12

## 2023-12-30 RX ADMIN — MUPIROCIN: 20 OINTMENT TOPICAL at 08:12

## 2023-12-30 RX ADMIN — CEFEPIME 2 G: 2 INJECTION, POWDER, FOR SOLUTION INTRAVENOUS at 12:12

## 2023-12-30 NOTE — PROGRESS NOTES
[facility] Hospital Medicine Progress Note        Chief Complaint: Inpatient Follow-up for anemia    Subjective:  Magdaleno Hirsch is a 23 y.o. male with a past medical history of essential hypertension and CML presented to [facility] on 12/26/2023 for oral swelling.  Patient reported oral ulcers and swelling began 1 week ago.  Patient states he was prescribed magic mouthwash without relief.  Patient reported gum increased pain yesterday with passage of blood clots.  Patient also endorsed chills.  Patient denied fever, chest pain, shortness of breath, cough, congestion, nausea, vomiting, hematemesis, rectal bleeding, dark stools, diarrhea, and abdominal pain.  Patient's Oncologist is Dr. Almaguer and states his last chemo was last month.   Initial vital signs in ED were /84, pulse 112, respirations 19, temperature 37.4° C, and SpO2 100% on room air.  Labs revealed WBC 89.73, RBC 2.48, hemoglobin 7, hematocrit 22.3, MCV 89.9, platelets 2, blast 79, BUN 4.9, creatinine 0.77, glucose 115, bilirubin total 1.7, AST 35, ALT 33, and lactic acid 0.8.  Oncology was consulted with recommendations to transfuse 2 units of platelets and Cefepime. Patient was admitted to hospital medicine service for further medical management.     Intermittent fevers. Bcx2 are negative.   Patient is having fever and generalized pain.        Objective/physical exam:  General appearance: Male in no apparent distress.  HEENNT:  Diffuse upper and lower gingival swelling with drainage and halitosis, poor dentition    Lungs: Clear to auscultation bilaterally.   Heart: Regular rate and rhythm.    Abdomen: Soft, non-distended, non-tender. Bowel sounds are normal.   Extremities: No cyanosis, clubbing, or edema. No deformities.   Skin: No Rash. Warm and dry.   Neuro: Awake, alert, and oriented. Motor and sensory exams grossly intact.         VITAL SIGNS: 24 HRS MIN & MAX LAST   Temp  Min: 98.9 °F (37.2 °C)  Max: 103 °F (39.4 °C)  99.9 °F (37.7 °C)   BP  Min: 135/85  Max: 162/97 (!) 156/115   Pulse  Min: 105  Max: 244  (!) 112   Resp  Min: 16  Max: 20 20   SpO2  Min: 97 %  Max: 100 % 98 %       Labs, Microbiology and Imaging were Reviewed.      Microbiology Results (last 7 days)       Procedure Component Value Units Date/Time    Blood Culture [[id number]] Collected: 12/29/23 1830    Order Status: Resulted Specimen: Blood Updated: 12/29/23 1830    Blood Culture [[id number]] Collected: 12/29/23 1815    Order Status: Resulted Specimen: Blood Updated: 12/29/23 1816    Blood Culture #1 **CANNOT BE ORDERED STAT** [[id number]]  (Normal) Collected: 12/26/23 1549    Order Status: Completed Specimen: Blood Updated: 12/29/23 1701     CULTURE, BLOOD ([facility]) No Growth At 72 Hours    Blood Culture #2 **CANNOT BE ORDERED STAT** [[id number]]  (Normal) Collected: 12/26/23 1549    Order Status: Completed Specimen: Blood Updated: 12/29/23 1701     CULTURE, BLOOD ([facility]) No Growth At 72 Hours               Medications   acyclovir  800 mg Oral BID    ceFEPime (MAXIPIME) IVPB  2 g Intravenous Q8H    chlorhexidine  15 mL Mouth/Throat BID    fluconazole  400 mg Oral Daily    [START ON 12/30/2023] hydroxyurea  1,000 mg Oral Daily    mupirocin   Nasal BID    nystatin  500,000 Units Mouth/Throat QID    sodium chloride 0.9%  10 mL Intravenous Q6H        0.9%  NaCl infusion (for blood administration), 0.9%  NaCl infusion (for blood administration), 0.9%  NaCl infusion (for blood administration), 0.9%  NaCl infusion (for blood administration), 0.9%  NaCl infusion (for blood administration), sodium chloride 0.9%, sodium chloride 0.9%, sodium chloride 0.9%, sodium chloride 0.9%, acetaminophen, dextrose 10%, dextrose 10%, glucagon (human recombinant), glucose, glucose, HYDROmorphone, labetaloL, ondansetron, Flushing PICC/Midline Protocol **AND** sodium chloride 0.9% **AND** sodium chloride 0.9%     Radiology:  X-Ray Chest 1 View for Line/Tube Placement  Narrative:  EXAMINATION:  XR CHEST 1 VIEW FOR LINE/TUBE PLACEMENT    CLINICAL HISTORY:  picc placement;    COMPARISON:  Chest x-ray dated 12/08/2023    FINDINGS:  Right sided PICC line has its tip over the superior vena cava.  The heart is normal size.  The lungs are clear.  There is no pleural effusion or visible pneumothorax.  Impression: Right-sided PICC line with tip over the superior vena cava.    Electronically signed by: Malu Weaver  Date:    12/27/2023  Time:    17:21        Assessment/Plan:  Severe thrombocytopenia  Acute on chronic anemia   CML  Diffuse gingival swelling with ulcerations and drainage   History of  HTN      - Continue hydroxyurea.   - Hem onc is onboard for possible option.   - Continue empiric abx,   - Bcx2 are negative, CXR is negative.   - WBC is decreasing with hydroxyurea.    - will continue with dilaudid 0.5 mg q4 prn.  - Discussed with dr. Hogan today. He states that patient most likley can be discharged tomorrow after the transfusions.     CC diagnosis: transfusion  CC time was given 45 min.     All diagnosis and differential diagnosis have been reviewed; assessment and plan has been documented; I have personally reviewed the labs and test results that are presently available; I have reviewed the patients medication list; I have reviewed the consulting providers response and recommendations. I have reviewed or attempted to review medical records based upon their availability.       Brody Khan MD   12/29/2023

## 2023-12-31 LAB
BACTERIA BLD CULT: NORMAL
BACTERIA BLD CULT: NORMAL

## 2024-01-01 ENCOUNTER — PATIENT MESSAGE (OUTPATIENT)
Dept: HEMATOLOGY/ONCOLOGY | Facility: CLINIC | Age: 24
End: 2024-01-01
Payer: MEDICAID

## 2024-01-02 ENCOUNTER — OFFICE VISIT (OUTPATIENT)
Dept: HEMATOLOGY/ONCOLOGY | Facility: CLINIC | Age: 24
End: 2024-01-02
Payer: MEDICAID

## 2024-01-02 ENCOUNTER — INFUSION (OUTPATIENT)
Dept: INFUSION THERAPY | Facility: HOSPITAL | Age: 24
End: 2024-01-02
Attending: NURSE PRACTITIONER
Payer: MEDICAID

## 2024-01-02 VITALS
WEIGHT: 178.88 LBS | SYSTOLIC BLOOD PRESSURE: 146 MMHG | OXYGEN SATURATION: 100 % | RESPIRATION RATE: 18 BRPM | BODY MASS INDEX: 23.71 KG/M2 | DIASTOLIC BLOOD PRESSURE: 94 MMHG | HEART RATE: 101 BPM | HEIGHT: 73 IN

## 2024-01-02 VITALS
TEMPERATURE: 100 F | DIASTOLIC BLOOD PRESSURE: 76 MMHG | HEART RATE: 107 BPM | OXYGEN SATURATION: 97 % | SYSTOLIC BLOOD PRESSURE: 130 MMHG

## 2024-01-02 DIAGNOSIS — C92.10 BLAST CRISIS PHASE OF CHRONIC MYELOID LEUKEMIA: ICD-10-CM

## 2024-01-02 DIAGNOSIS — C92.12 CML (CHRONIC MYELOID LEUKEMIA) IN RELAPSE: Primary | ICD-10-CM

## 2024-01-02 DIAGNOSIS — C92.12 CML (CHRONIC MYELOID LEUKEMIA) IN RELAPSE: ICD-10-CM

## 2024-01-02 DIAGNOSIS — G89.3 CANCER RELATED PAIN: ICD-10-CM

## 2024-01-02 PROCEDURE — 1160F RVW MEDS BY RX/DR IN RCRD: CPT | Mod: CPTII,,, | Performed by: NURSE PRACTITIONER

## 2024-01-02 PROCEDURE — 99215 OFFICE O/P EST HI 40 MIN: CPT | Mod: S$PBB,,, | Performed by: NURSE PRACTITIONER

## 2024-01-02 PROCEDURE — 3080F DIAST BP >= 90 MM HG: CPT | Mod: CPTII,,, | Performed by: NURSE PRACTITIONER

## 2024-01-02 PROCEDURE — 1111F DSCHRG MED/CURRENT MED MERGE: CPT | Mod: CPTII,,, | Performed by: NURSE PRACTITIONER

## 2024-01-02 PROCEDURE — 99214 OFFICE O/P EST MOD 30 MIN: CPT | Mod: PBBFAC,25 | Performed by: INTERNAL MEDICINE

## 2024-01-02 PROCEDURE — 63600175 PHARM REV CODE 636 W HCPCS: Performed by: INTERNAL MEDICINE

## 2024-01-02 PROCEDURE — 99999 PR PBB SHADOW E&M-EST. PATIENT-LVL IV: CPT | Mod: PBBFAC,,, | Performed by: INTERNAL MEDICINE

## 2024-01-02 PROCEDURE — 3008F BODY MASS INDEX DOCD: CPT | Mod: CPTII,,, | Performed by: NURSE PRACTITIONER

## 2024-01-02 PROCEDURE — 36430 TRANSFUSION BLD/BLD COMPNT: CPT

## 2024-01-02 PROCEDURE — 1159F MED LIST DOCD IN RCRD: CPT | Mod: CPTII,,, | Performed by: NURSE PRACTITIONER

## 2024-01-02 PROCEDURE — 3077F SYST BP >= 140 MM HG: CPT | Mod: CPTII,,, | Performed by: NURSE PRACTITIONER

## 2024-01-02 PROCEDURE — P9037 PLATE PHERES LEUKOREDU IRRAD: HCPCS | Performed by: NURSE PRACTITIONER

## 2024-01-02 PROCEDURE — 96374 THER/PROPH/DIAG INJ IV PUSH: CPT

## 2024-01-02 PROCEDURE — 25000003 PHARM REV CODE 250: Performed by: NURSE PRACTITIONER

## 2024-01-02 RX ORDER — DIPHENHYDRAMINE HCL 25 MG
25 CAPSULE ORAL ONCE
Status: COMPLETED | OUTPATIENT
Start: 2024-01-02 | End: 2024-01-02

## 2024-01-02 RX ORDER — ACETAMINOPHEN 325 MG/1
650 TABLET ORAL ONCE
Status: COMPLETED | OUTPATIENT
Start: 2024-01-02 | End: 2024-01-02

## 2024-01-02 RX ORDER — OXYCODONE HYDROCHLORIDE 10 MG/1
10 TABLET ORAL EVERY 4 HOURS PRN
Qty: 120 TABLET | Refills: 0 | Status: ON HOLD | OUTPATIENT
Start: 2024-01-02 | End: 2024-01-11 | Stop reason: HOSPADM

## 2024-01-02 RX ORDER — HYDROCODONE BITARTRATE AND ACETAMINOPHEN 500; 5 MG/1; MG/1
TABLET ORAL ONCE
Status: CANCELLED | OUTPATIENT
Start: 2024-01-02 | End: 2024-01-02

## 2024-01-02 RX ORDER — HYDROCODONE BITARTRATE AND ACETAMINOPHEN 500; 5 MG/1; MG/1
TABLET ORAL ONCE
Status: DISCONTINUED | OUTPATIENT
Start: 2024-01-02 | End: 2024-01-11 | Stop reason: HOSPADM

## 2024-01-02 RX ORDER — ACETAMINOPHEN 325 MG/1
650 TABLET ORAL ONCE
Status: CANCELLED | OUTPATIENT
Start: 2024-01-02 | End: 2024-01-02

## 2024-01-02 RX ORDER — HYDROMORPHONE HYDROCHLORIDE 2 MG/ML
2 INJECTION, SOLUTION INTRAMUSCULAR; INTRAVENOUS; SUBCUTANEOUS ONCE
Status: COMPLETED | OUTPATIENT
Start: 2024-01-02 | End: 2024-01-02

## 2024-01-02 RX ORDER — DIPHENHYDRAMINE HCL 25 MG
25 CAPSULE ORAL ONCE
Status: CANCELLED | OUTPATIENT
Start: 2024-01-02 | End: 2024-01-02

## 2024-01-02 RX ADMIN — SODIUM CHLORIDE: 9 INJECTION, SOLUTION INTRAVENOUS at 11:01

## 2024-01-02 RX ADMIN — HYDROMORPHONE HYDROCHLORIDE 2 MG: 2 INJECTION INTRAMUSCULAR; INTRAVENOUS; SUBCUTANEOUS at 11:01

## 2024-01-02 RX ADMIN — ACETAMINOPHEN 650 MG: 325 TABLET ORAL at 11:01

## 2024-01-02 RX ADMIN — DIPHENHYDRAMINE HYDROCHLORIDE 25 MG: 25 CAPSULE ORAL at 11:01

## 2024-01-02 NOTE — PROGRESS NOTES
"Subjective:       Patient ID: Magdaleno Hirsch is a 23 y.o. male.    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      Current Treatment:   Hydroxyurea started on 12/26/2023 for cytoreduction.  Asciminib ordered on 12/28/2023.    Treatment History:  Hydroxyurea 2g every 12 hours  Dasatinib 100 mg po daily, unsure on start date as patient was lost to follow up.  Patient received CLIA and ponatinib on 06/16/2023.    Azacitidine 5/28 days, venetoclax 50 mg p.o. daily for 10/28 days, and ponatinib 30 mg p.o. daily every day on 10/09/2023.  Increased ponatinib to 45 mg p.o. daily on 11/06/2023.    HPI:  Patient with no real medical history who went in for a routine eye exam on 08/18/2022 to update his eye glasses prescription.  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  He had bilateral Valdez spots with vessel tortuosity.  The optometrist recommended that the patient have blood work including testing for sickle cell disease.  The patient presented to the emergency department.  CBC in the emergency department revealed a white blood cell count of 411,900. Hemoglobin was 8.3, platelets were normal at 375,000 hundred and seventy five thousand.  Differential was suggestive of CML.  Coagulation studies revealed an INR of 1.38, PTT of 36.4 and a fibrinogen of 292.  Patient was going to present to [facility], however they were on diversion.  [facility] was [facility].  Hematology consulted.  Patient underwent bone marrow biopsy on 08/22/2022, this revealed CML, chronic phase, BCR/ABL1 positive.  Ordered dasatinib 100 mg p.o. daily as of 10/10/2022, unsure when patient started taking medication due to him being lost to follow-up.  Patient went into blast crisis, received CLIA and ponatinib on 06/16/2023.  He had a " repeat bone marrow biopsy on 08/28/2023, this had to be sent off to [facility], however the final diagnosis was persistent/recurrent myeloid neoplasm with 10-15% bone marrow blasts and 8% circulating blasts.  Started azacitidine, venetoclax, ponatinib as mentioned above on 10/09/2023.  Bone marrow biopsy done after 1 cycle done on 10/31/2023 revealed relapsed/persistent acute myeloid leukemia with 11% circulating blasts and 70-80% blasts by IHC in the bone marrow.   He was continued on treatment since he had only received 1 cycle.    Interval History:   Patient is here today for hospital follow-up.  He is currently on Hydrea.  He states that his pain is not controlled at all by his current regimen of Percocet 6 times a day.  He continues with generalized achiness and mouth pain.  He is unable to eat solid foods due to the swelling of his gums.  He denies any bleeding other than bleeding from his gums.  He does have fevers on and off, including fever last night.    Past Medical History:   Diagnosis Date    CML (chronic myelocytic leukemia)     HVD (hypertensive vascular disease)     Oropharyngeal candidiasis       Past Surgical History:   Procedure Laterality Date    BONE MARROW BIOPSY N/A 8/22/2022    Procedure: Biopsy-bone marrow;  Surgeon: Getachew Chen MD;  Location: [facility] OR;  Service: General;  Laterality: N/A;    BONE MARROW BIOPSY N/A 7/25/2023    Procedure: Biopsy-bone marrow;  Surgeon: Edi Carty MD;  Location: [facility] OR;  Service: General;  Laterality: N/A;    BONE MARROW BIOPSY N/A 8/28/2023    Procedure: Biopsy-bone marrow;  Surgeon: Moo Pina MD;  Location: [facility] OR;  Service: General;  Laterality: N/A;    BONE MARROW BIOPSY N/A 10/31/2023    Procedure: Biopsy-bone marrow;  Surgeon: Edi Carty MD;  Location: [facility] OR;  Service: General;  Laterality: N/A;    KNEE SURGERY Left      Social History     Socioeconomic History    Marital status: Single   Tobacco Use    Smoking status: Never     Smokeless tobacco: Never   Substance and Sexual Activity    Alcohol use: Never    Drug use: Yes     Types: Marijuana      Family History   Problem Relation Age of Onset    Hypertension Maternal Grandmother     Cancer Maternal Grandmother            Review of Systems   Constitutional:  Positive for fatigue. Negative for chills, diaphoresis and unexpected weight change.   HENT:  Negative for nasal congestion and sore throat.         Gums are significantly swollen and oozing blood   Eyes:  Negative for pain.   Respiratory:  Negative for cough, chest tightness and shortness of breath.    Cardiovascular:  Negative for chest pain, palpitations and leg swelling.   Gastrointestinal:  Negative for abdominal distention, abdominal pain, blood in stool, constipation and diarrhea.   Genitourinary:  Negative for dysuria, frequency and hematuria.   Musculoskeletal:  Negative for arthralgias and back pain.   Integumentary:  Negative for rash.   Neurological:  Negative for dizziness, weakness, numbness and headaches.   Hematological:  Negative for adenopathy. Bruises/bleeds easily.   Psychiatric/Behavioral:  Negative for confusion.          Objective:      Physical Exam  Vitals reviewed.   Constitutional:       General: He is awake.      Appearance: Normal appearance.   HENT:      Head: Normocephalic and atraumatic.      Right Ear: Hearing normal.      Left Ear: Hearing normal.      Nose: Nose normal.      Mouth/Throat:      Comments: Gums are still swollen, but better and there is much less oozing of blood.    Eyes:      General: Lids are normal. Vision grossly intact.      Extraocular Movements: Extraocular movements intact.      Conjunctiva/sclera: Conjunctivae normal.   Cardiovascular:      Rate and Rhythm: Normal rate and regular rhythm.      Pulses: Normal pulses.      Heart sounds: Normal heart sounds.   Pulmonary:      Effort: Pulmonary effort is normal.      Breath sounds: Normal breath sounds. No wheezing, rhonchi or  rales.   Abdominal:      General: Bowel sounds are normal.      Palpations: Abdomen is soft.      Tenderness: There is no abdominal tenderness.   Musculoskeletal:      Cervical back: Full passive range of motion without pain.      Right lower leg: No edema.      Left lower leg: No edema.   Skin:     General: Skin is warm.   Neurological:      General: No focal deficit present.      Mental Status: He is alert and oriented to person, place, and time.   Psychiatric:         Attention and Perception: Attention normal.         Mood and Affect: Mood and affect normal.         Behavior: Behavior is cooperative.         LABS AND IMAGING REVIEWED IN EPIC  Lab Review:        Assessment:   CML, chronic phase, transformed to blast crisis       Plan:     Patient was on azacitidine, venetoclax, ponatinib.  Unfortunately, it looks as though his disease is progressing.    We were going to have a bone marrow biopsy done on 12/27/2023, however the patient presented the day prior with platelets and hemoglobin requiring transfusions of both platelets and packed red blood cells.  His gums were swollen and oozing.    I discussed with Dr. Green, the patient has failed multiple lines of therapy. We will try  asciminib, however I discussed with the patient that his disease is very resistant to therapy and that there are no guarantees.  He voiced understanding.     Dr. Green was in touch with Dr. Wick at [facility], unfortunately, there are not really any good treatment options left. As mentioned, we will give Asciminib a try.    We discussed the possibility of intense chemotherapy followed by transplant, however the issue with this is that he does not have a current match for transplant.    The best option at this point is, as mentioned above, treating with asciminib.    Continue supportive care.  Of note, I have started having goals of care discussions with the patient and his family.    We will plan for labs on Mondays and  Thursdays, patient knows to call for any increased bleeding.    We will give 1 unit of platelets today.    Instructed him to discontinue Hydrea and start asciminib tomorrow.    We will change his pain medication to try to improve his pain control.  We will also get nutritional supplements ensure/boost for mouth spread as he is unable to eat solid foods due to his gum swelling/bleeding.    Alexsandra Rojas, TERRI-C

## 2024-01-02 NOTE — PROGRESS NOTES
"Subjective:       Patient ID: Magdaleno Hirsch is a 23 y.o. male.    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      Current Treatment:   Hydroxyurea started on 12/26/2023 for cytoreduction.  Asciminib ordered on 12/28/2023.    Treatment History:  Hydroxyurea 2g every 12 hours  Dasatinib 100 mg po daily, unsure on start date as patient was lost to follow up.  Patient received CLIA and ponatinib on 06/16/2023.    Azacitidine 5/28 days, venetoclax 50 mg p.o. daily for 10/28 days, and ponatinib 30 mg p.o. daily every day on 10/09/2023.  Increased ponatinib to 45 mg p.o. daily on 11/06/2023.    HPI:  Patient with no real medical history who went in for a routine eye exam on 08/18/2022 to update his eye glasses prescription.  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  He had bilateral Valdez spots with vessel tortuosity.  The optometrist recommended that the patient have blood work including testing for sickle cell disease.  The patient presented to the emergency department.  CBC in the emergency department revealed a white blood cell count of 411,900. Hemoglobin was 8.3, platelets were normal at 375,000 hundred and seventy five thousand.  Differential was suggestive of CML.  Coagulation studies revealed an INR of 1.38, PTT of 36.4 and a fibrinogen of 292.  Patient was going to present to [place], however they were on diversion.  He was transferred from [facility] to [facility].  Hematology consulted.  Patient underwent bone marrow biopsy on 08/22/2022, this revealed CML, chronic phase, BCR/ABL1 positive.  Ordered dasatinib 100 mg p.o. daily as of 10/10/2022, unsure when patient started taking medication due to him being lost to follow-up.  Patient went into blast crisis, received CLIA and ponatinib on 06/16/2023.  He had a " repeat bone marrow biopsy on 08/28/2023, this had to be sent off to [facility], however the final diagnosis was persistent/recurrent myeloid neoplasm with 10-15% bone marrow blasts and 8% circulating blasts.  Started azacitidine, venetoclax, ponatinib as mentioned above on 10/09/2023.  Bone marrow biopsy done after 1 cycle done on 10/31/2023 revealed relapsed/persistent acute myeloid leukemia with 11% circulating blasts and 70-80% blasts by IHC in the bone marrow.   He was continued on treatment since he had only received 1 cycle.    Interval History:   Patient admitted to the hospital on Friday, 12/08/2023.  He was having weakness and was requiring blood transfusion and platelet transfusion.  His peripheral blood smear on 12/09/2023 showed innumerable blasts.  He was discharged on 12/14/2023 in stable condition.  We followed him with outpatient labs in the plan was for him to have a bone marrow biopsy on 12/27/2023.  Unfortunately, he presented to the emergency department on 12/26/2023 with swollen gums that were oozing blood.  He required blood and platelet transfusions.  His white count had increased to greater than 85,000.    Today, 12/29/2023:  Patient seen and examined on rounds.  He once again states to have pain due to his swollen gums, however, the swelling is improving.  His bleeding is much better.  No other major issues to discuss.    Past Medical History:   Diagnosis Date    CML (chronic myelocytic leukemia)     HVD (hypertensive vascular disease)     Oropharyngeal candidiasis       Past Surgical History:   Procedure Laterality Date    BONE MARROW BIOPSY N/A 8/22/2022    Procedure: Biopsy-bone marrow;  Surgeon: Getachew Chen MD;  Location: [facility] OR;  Service: General;  Laterality: N/A;    BONE MARROW BIOPSY N/A 7/25/2023    Procedure: Biopsy-bone marrow;  Surgeon: Edi Carty MD;  Location: [facility] OR;  Service: General;  Laterality: N/A;    BONE MARROW BIOPSY N/A 8/28/2023    Procedure: Biopsy-bone  marrow;  Surgeon: Moo Pina MD;  Location: [facility] OR;  Service: General;  Laterality: N/A;    BONE MARROW BIOPSY N/A 10/31/2023    Procedure: Biopsy-bone marrow;  Surgeon: Edi Carty MD;  Location: [facility] OR;  Service: General;  Laterality: N/A;    KNEE SURGERY Left      Social History     Socioeconomic History    Marital status: Single   Tobacco Use    Smoking status: Never    Smokeless tobacco: Never   Substance and Sexual Activity    Alcohol use: Never    Drug use: Yes     Types: Marijuana      Family History   Problem Relation Age of Onset    Hypertension Maternal Grandmother     Cancer Maternal Grandmother            Review of Systems   Constitutional:  Positive for fatigue. Negative for chills, diaphoresis and unexpected weight change.   HENT:  Negative for nasal congestion and sore throat.         Gums are significantly swollen and oozing blood   Eyes:  Negative for pain.   Respiratory:  Negative for cough, chest tightness and shortness of breath.    Cardiovascular:  Negative for chest pain, palpitations and leg swelling.   Gastrointestinal:  Negative for abdominal distention, abdominal pain, blood in stool, constipation and diarrhea.   Genitourinary:  Negative for dysuria, frequency and hematuria.   Musculoskeletal:  Negative for arthralgias and back pain.   Integumentary:  Negative for rash.   Neurological:  Negative for dizziness, weakness, numbness and headaches.   Hematological:  Negative for adenopathy. Bruises/bleeds easily.   Psychiatric/Behavioral:  Negative for confusion.          Objective:      Physical Exam  Vitals reviewed.   Constitutional:       General: He is awake.      Appearance: Normal appearance.   HENT:      Head: Normocephalic and atraumatic.      Right Ear: Hearing normal.      Left Ear: Hearing normal.      Nose: Nose normal.      Mouth/Throat:      Comments: Gums are still swollen, but better and there is much less oozing of blood.    Eyes:      General: Lids are normal.  Vision grossly intact.      Extraocular Movements: Extraocular movements intact.      Conjunctiva/sclera: Conjunctivae normal.   Cardiovascular:      Rate and Rhythm: Normal rate and regular rhythm.      Pulses: Normal pulses.      Heart sounds: Normal heart sounds.   Pulmonary:      Effort: Pulmonary effort is normal.      Breath sounds: Normal breath sounds. No wheezing, rhonchi or rales.   Abdominal:      General: Bowel sounds are normal.      Palpations: Abdomen is soft.      Tenderness: There is no abdominal tenderness.   Musculoskeletal:      Cervical back: Full passive range of motion without pain.      Right lower leg: No edema.      Left lower leg: No edema.   Skin:     General: Skin is warm.   Neurological:      General: No focal deficit present.      Mental Status: He is alert and oriented to person, place, and time.   Psychiatric:         Attention and Perception: Attention normal.         Mood and Affect: Mood and affect normal.         Behavior: Behavior is cooperative.         LABS AND IMAGING REVIEWED IN [facility]  Lab Review:        Assessment:   CML, chronic phase, transformed to blast crisis       Plan:     Patient was on azacitidine, venetoclax, ponatinib.  Unfortunately, it looks as though his disease is progressing.    We were going to have a bone marrow biopsy done on 12/27/2023, however the patient presented the day prior with platelets and hemoglobin requiring transfusions of both platelets and packed red blood cells.  His gums were swollen and oozing.    I discussed with Dr. Green, the patient has failed multiple lines of therapy. We will try  asciminib, however I discussed with the patient that his disease is very resistant to therapy and that there are no guarantees.  He voiced understanding.     Dr. Green was in touch with Dr. Wick at [facility], unfortunately, there are not really any good treatment options left. As mentioned, we will give Asciminib a try.    We discussed the  possibility of intense chemotherapy followed by transplant, however the issue with this is that he does not have a current match for transplant.    The best option at this point is, as mentioned above, treating with asciminib.    Continue transfusion support, we will give 2 units of platelets today and 1 unit of packed red blood cells in preparation for him to go home tomorrow.    Continue supportive care.  Of note, I have started having goals of care discussions with the patient and his family.    I will decrease hydroxyurea to 1 gram daily, he will go home on this dose.  He can follow up with me in clinic on Tuesday, 01/02/2023 for blood work.    Paul Hogan II, MD

## 2024-01-03 LAB
BACTERIA BLD CULT: NORMAL
BACTERIA BLD CULT: NORMAL

## 2024-01-04 ENCOUNTER — INFUSION (OUTPATIENT)
Dept: INFUSION THERAPY | Facility: HOSPITAL | Age: 24
End: 2024-01-04
Attending: NURSE PRACTITIONER
Payer: MEDICAID

## 2024-01-04 VITALS
OXYGEN SATURATION: 98 % | SYSTOLIC BLOOD PRESSURE: 143 MMHG | WEIGHT: 178 LBS | BODY MASS INDEX: 23.48 KG/M2 | TEMPERATURE: 100 F | DIASTOLIC BLOOD PRESSURE: 79 MMHG | HEART RATE: 120 BPM

## 2024-01-04 DIAGNOSIS — C92.12 CML (CHRONIC MYELOID LEUKEMIA) IN RELAPSE: Primary | ICD-10-CM

## 2024-01-04 DIAGNOSIS — C92.12 CML (CHRONIC MYELOID LEUKEMIA) IN RELAPSE: ICD-10-CM

## 2024-01-04 DIAGNOSIS — C92.10 BLAST CRISIS PHASE OF CHRONIC MYELOID LEUKEMIA: Primary | ICD-10-CM

## 2024-01-04 DIAGNOSIS — C92.10 BLAST CRISIS PHASE OF CHRONIC MYELOID LEUKEMIA: ICD-10-CM

## 2024-01-04 PROCEDURE — P9037 PLATE PHERES LEUKOREDU IRRAD: HCPCS | Performed by: INTERNAL MEDICINE

## 2024-01-04 PROCEDURE — 63600175 PHARM REV CODE 636 W HCPCS: Performed by: INTERNAL MEDICINE

## 2024-01-04 PROCEDURE — 36430 TRANSFUSION BLD/BLD COMPNT: CPT

## 2024-01-04 PROCEDURE — 25000003 PHARM REV CODE 250: Performed by: INTERNAL MEDICINE

## 2024-01-04 PROCEDURE — 96365 THER/PROPH/DIAG IV INF INIT: CPT

## 2024-01-04 PROCEDURE — 96366 THER/PROPH/DIAG IV INF ADDON: CPT

## 2024-01-04 PROCEDURE — 63600175 PHARM REV CODE 636 W HCPCS: Performed by: NURSE PRACTITIONER

## 2024-01-04 PROCEDURE — 96375 TX/PRO/DX INJ NEW DRUG ADDON: CPT

## 2024-01-04 PROCEDURE — 96361 HYDRATE IV INFUSION ADD-ON: CPT

## 2024-01-04 PROCEDURE — 96368 THER/DIAG CONCURRENT INF: CPT

## 2024-01-04 RX ORDER — POTASSIUM CHLORIDE 14.9 MG/ML
20 INJECTION INTRAVENOUS ONCE
Status: COMPLETED | OUTPATIENT
Start: 2024-01-04 | End: 2024-01-04

## 2024-01-04 RX ORDER — ACETAMINOPHEN 325 MG/1
650 TABLET ORAL ONCE
Status: CANCELLED | OUTPATIENT
Start: 2024-01-04

## 2024-01-04 RX ORDER — SODIUM CHLORIDE 0.9 % (FLUSH) 0.9 %
10 SYRINGE (ML) INJECTION
Status: DISCONTINUED | OUTPATIENT
Start: 2024-01-04 | End: 2024-01-04 | Stop reason: HOSPADM

## 2024-01-04 RX ORDER — HYDROCODONE BITARTRATE AND ACETAMINOPHEN 500; 5 MG/1; MG/1
TABLET ORAL ONCE
Status: DISCONTINUED | OUTPATIENT
Start: 2024-01-04 | End: 2024-01-11 | Stop reason: HOSPADM

## 2024-01-04 RX ORDER — SODIUM CHLORIDE 0.9 % (FLUSH) 0.9 %
10 SYRINGE (ML) INJECTION
Status: CANCELLED | OUTPATIENT
Start: 2024-01-04

## 2024-01-04 RX ORDER — POTASSIUM CHLORIDE 7.45 MG/ML
40 INJECTION INTRAVENOUS ONCE
Status: CANCELLED
Start: 2024-01-04

## 2024-01-04 RX ORDER — DIPHENHYDRAMINE HYDROCHLORIDE 50 MG/ML
25 INJECTION, SOLUTION INTRAMUSCULAR; INTRAVENOUS ONCE
Status: COMPLETED | OUTPATIENT
Start: 2024-01-04 | End: 2024-01-04

## 2024-01-04 RX ORDER — HEPARIN 100 UNIT/ML
500 SYRINGE INTRAVENOUS
Status: CANCELLED | OUTPATIENT
Start: 2024-01-04

## 2024-01-04 RX ORDER — HYDROMORPHONE HYDROCHLORIDE 2 MG/ML
2 INJECTION, SOLUTION INTRAMUSCULAR; INTRAVENOUS; SUBCUTANEOUS ONCE
Status: COMPLETED | OUTPATIENT
Start: 2024-01-04 | End: 2024-01-04

## 2024-01-04 RX ORDER — HYDROCODONE BITARTRATE AND ACETAMINOPHEN 500; 5 MG/1; MG/1
TABLET ORAL ONCE
Status: CANCELLED | OUTPATIENT
Start: 2024-01-04 | End: 2024-01-04

## 2024-01-04 RX ORDER — ACETAMINOPHEN 325 MG/1
650 TABLET ORAL ONCE
Status: COMPLETED | OUTPATIENT
Start: 2024-01-04 | End: 2024-01-04

## 2024-01-04 RX ORDER — DIPHENHYDRAMINE HYDROCHLORIDE 50 MG/ML
25 INJECTION, SOLUTION INTRAMUSCULAR; INTRAVENOUS ONCE
Status: CANCELLED | OUTPATIENT
Start: 2024-01-04

## 2024-01-04 RX ORDER — POTASSIUM CHLORIDE 14.9 MG/ML
40 INJECTION INTRAVENOUS ONCE
Status: DISCONTINUED | OUTPATIENT
Start: 2024-01-04 | End: 2024-01-04

## 2024-01-04 RX ORDER — HEPARIN 100 UNIT/ML
500 SYRINGE INTRAVENOUS
Status: DISCONTINUED | OUTPATIENT
Start: 2024-01-04 | End: 2024-01-04 | Stop reason: HOSPADM

## 2024-01-04 RX ADMIN — DIPHENHYDRAMINE HYDROCHLORIDE 25 MG: 50 INJECTION INTRAMUSCULAR; INTRAVENOUS at 03:01

## 2024-01-04 RX ADMIN — ACETAMINOPHEN 650 MG: 325 TABLET ORAL at 03:01

## 2024-01-04 RX ADMIN — POTASSIUM CHLORIDE 20 MEQ: 200 INJECTION, SOLUTION INTRAVENOUS at 02:01

## 2024-01-04 RX ADMIN — HYDROMORPHONE HYDROCHLORIDE 2 MG: 2 INJECTION INTRAMUSCULAR; INTRAVENOUS; SUBCUTANEOUS at 11:01

## 2024-01-04 RX ADMIN — ASCORBIC ACID, VITAMIN A PALMITATE, CHOLECALCIFEROL, THIAMINE HYDROCHLORIDE, RIBOFLAVIN-5 PHOSPHATE SODIUM, PYRIDOXINE HYDROCHLORIDE, NIACINAMIDE, DEXPANTHENOL, ALPHA-TOCOPHEROL ACETATE, VITAMIN K1, FOLIC ACID, BIOTIN, CYANOCOBALAMIN 500 ML/HR: 200; 3300; 200; 6; 3.6; 6; 40; 15; 10; 150; 600; 60; 5 INJECTION, SOLUTION INTRAVENOUS at 11:01

## 2024-01-04 RX ADMIN — POTASSIUM CHLORIDE 20 MEQ: 200 INJECTION, SOLUTION INTRAVENOUS at 12:01

## 2024-01-08 ENCOUNTER — INFUSION (OUTPATIENT)
Dept: INFUSION THERAPY | Facility: HOSPITAL | Age: 24
End: 2024-01-08
Attending: NURSE PRACTITIONER
Payer: MEDICAID

## 2024-01-08 ENCOUNTER — OFFICE VISIT (OUTPATIENT)
Dept: HEMATOLOGY/ONCOLOGY | Facility: CLINIC | Age: 24
End: 2024-01-08
Payer: MEDICAID

## 2024-01-08 VITALS
SYSTOLIC BLOOD PRESSURE: 125 MMHG | RESPIRATION RATE: 16 BRPM | DIASTOLIC BLOOD PRESSURE: 70 MMHG | HEART RATE: 120 BPM | OXYGEN SATURATION: 97 % | TEMPERATURE: 100 F

## 2024-01-08 DIAGNOSIS — D69.6 THROMBOCYTOPENIA: Primary | ICD-10-CM

## 2024-01-08 DIAGNOSIS — D49.9 IMMUNODEFICIENCY SECONDARY TO NEOPLASM: ICD-10-CM

## 2024-01-08 DIAGNOSIS — K06.1 GINGIVAL HYPERPLASIA: ICD-10-CM

## 2024-01-08 DIAGNOSIS — C92.12 CML (CHRONIC MYELOID LEUKEMIA) IN RELAPSE: ICD-10-CM

## 2024-01-08 DIAGNOSIS — C92.10 BLAST CRISIS PHASE OF CHRONIC MYELOID LEUKEMIA: ICD-10-CM

## 2024-01-08 DIAGNOSIS — D84.81 IMMUNODEFICIENCY SECONDARY TO NEOPLASM: ICD-10-CM

## 2024-01-08 DIAGNOSIS — D69.6 THROMBOCYTOPENIA: ICD-10-CM

## 2024-01-08 DIAGNOSIS — C92.10 CML (CHRONIC MYELOCYTIC LEUKEMIA): Primary | ICD-10-CM

## 2024-01-08 DIAGNOSIS — D61.818 PANCYTOPENIA: ICD-10-CM

## 2024-01-08 DIAGNOSIS — L27.0 DRUG RASH: ICD-10-CM

## 2024-01-08 DIAGNOSIS — C92.10 BLAST CRISIS PHASE OF CHRONIC MYELOID LEUKEMIA: Primary | ICD-10-CM

## 2024-01-08 LAB
ABO + RH BLD: NORMAL
ABO + RH BLD: NORMAL
BLD PROD TYP BPU: NORMAL
BLD PROD TYP BPU: NORMAL
BLOOD UNIT EXPIRATION DATE: NORMAL
BLOOD UNIT EXPIRATION DATE: NORMAL
BLOOD UNIT TYPE CODE: 5100
BLOOD UNIT TYPE CODE: 6200
CROSSMATCH INTERPRETATION: NORMAL
CROSSMATCH INTERPRETATION: NORMAL
DISPENSE STATUS: NORMAL
DISPENSE STATUS: NORMAL
UNIT NUMBER: NORMAL
UNIT NUMBER: NORMAL

## 2024-01-08 PROCEDURE — 25000003 PHARM REV CODE 250: Performed by: INTERNAL MEDICINE

## 2024-01-08 PROCEDURE — 36430 TRANSFUSION BLD/BLD COMPNT: CPT

## 2024-01-08 PROCEDURE — 99215 OFFICE O/P EST HI 40 MIN: CPT | Mod: 95,,, | Performed by: INTERNAL MEDICINE

## 2024-01-08 PROCEDURE — 96374 THER/PROPH/DIAG INJ IV PUSH: CPT

## 2024-01-08 PROCEDURE — 63600175 PHARM REV CODE 636 W HCPCS: Performed by: INTERNAL MEDICINE

## 2024-01-08 PROCEDURE — P9037 PLATE PHERES LEUKOREDU IRRAD: HCPCS | Performed by: INTERNAL MEDICINE

## 2024-01-08 PROCEDURE — 1160F RVW MEDS BY RX/DR IN RCRD: CPT | Mod: CPTII,95,, | Performed by: INTERNAL MEDICINE

## 2024-01-08 PROCEDURE — 1159F MED LIST DOCD IN RCRD: CPT | Mod: CPTII,95,, | Performed by: INTERNAL MEDICINE

## 2024-01-08 PROCEDURE — 1111F DSCHRG MED/CURRENT MED MERGE: CPT | Mod: CPTII,95,, | Performed by: INTERNAL MEDICINE

## 2024-01-08 RX ORDER — HYDROMORPHONE HYDROCHLORIDE 2 MG/ML
2 INJECTION, SOLUTION INTRAMUSCULAR; INTRAVENOUS; SUBCUTANEOUS ONCE
Status: COMPLETED | OUTPATIENT
Start: 2024-01-08 | End: 2024-01-08

## 2024-01-08 RX ORDER — ACETAMINOPHEN 325 MG/1
650 TABLET ORAL ONCE
Status: CANCELLED | OUTPATIENT
Start: 2024-01-08 | End: 2024-01-08

## 2024-01-08 RX ORDER — HYDROCODONE BITARTRATE AND ACETAMINOPHEN 500; 5 MG/1; MG/1
TABLET ORAL ONCE
Status: CANCELLED | OUTPATIENT
Start: 2024-01-08 | End: 2024-01-08

## 2024-01-08 RX ORDER — HYDROCODONE BITARTRATE AND ACETAMINOPHEN 500; 5 MG/1; MG/1
TABLET ORAL ONCE
Status: DISCONTINUED | OUTPATIENT
Start: 2024-01-08 | End: 2024-01-11 | Stop reason: HOSPADM

## 2024-01-08 RX ORDER — DIPHENHYDRAMINE HCL 25 MG
25 CAPSULE ORAL ONCE
Status: COMPLETED | OUTPATIENT
Start: 2024-01-08 | End: 2024-01-08

## 2024-01-08 RX ORDER — ACETAMINOPHEN 325 MG/1
650 TABLET ORAL ONCE
Status: COMPLETED | OUTPATIENT
Start: 2024-01-08 | End: 2024-01-08

## 2024-01-08 RX ORDER — DIPHENHYDRAMINE HCL 25 MG
25 CAPSULE ORAL ONCE
Status: CANCELLED | OUTPATIENT
Start: 2024-01-08 | End: 2024-01-08

## 2024-01-08 RX ORDER — HYDROCODONE BITARTRATE AND ACETAMINOPHEN 500; 5 MG/1; MG/1
TABLET ORAL ONCE
Status: COMPLETED | OUTPATIENT
Start: 2024-01-08 | End: 2024-01-08

## 2024-01-08 RX ORDER — TRIAMCINOLONE ACETONIDE 1 MG/G
CREAM TOPICAL 2 TIMES DAILY
Qty: 80 G | Refills: 2 | Status: SHIPPED | OUTPATIENT
Start: 2024-01-08 | End: 2024-01-30

## 2024-01-08 RX ORDER — DIPHENHYDRAMINE HCL 25 MG
25 CAPSULE ORAL ONCE
Status: DISCONTINUED | OUTPATIENT
Start: 2024-01-08 | End: 2024-01-11 | Stop reason: HOSPADM

## 2024-01-08 RX ORDER — ACETAMINOPHEN 325 MG/1
650 TABLET ORAL ONCE
Status: DISCONTINUED | OUTPATIENT
Start: 2024-01-08 | End: 2024-01-11 | Stop reason: HOSPADM

## 2024-01-08 RX ADMIN — DIPHENHYDRAMINE HYDROCHLORIDE 25 MG: 25 CAPSULE ORAL at 03:01

## 2024-01-08 RX ADMIN — ACETAMINOPHEN 650 MG: 325 TABLET ORAL at 03:01

## 2024-01-08 RX ADMIN — SODIUM CHLORIDE: 9 INJECTION, SOLUTION INTRAVENOUS at 03:01

## 2024-01-08 RX ADMIN — HYDROMORPHONE HYDROCHLORIDE 2 MG: 2 INJECTION INTRAMUSCULAR; INTRAVENOUS; SUBCUTANEOUS at 03:01

## 2024-01-08 NOTE — PROGRESS NOTES
Patient presented on 1/8/24, platelets 3,000, will get 2 units platelets today. Repeat labs 1/10/24.  In infusion, the patient was running a temperature.  He was also seen by Dr. Green earlier today.  We agree that the patient's fevers are likely leukemia.  He has had infectious workup extensively.  Most recent cultures on 12/29 23-.  Ask the patient if he would like to go to the emergency department for further workup, he stated he wanted to go home.

## 2024-01-08 NOTE — PLAN OF CARE
2 Platelets given. Dilaudid given for mouth/throat pain. Tolerated well. Next appts given to pt. Dc'd home in stable condition.

## 2024-01-08 NOTE — PROGRESS NOTES
Section of Hematology and Stem Cell Transplantation  Follow Up Visit     The patient location is: [place], [place]  The chief complaint leading to consultation is: BP-CML    Visit type: audiovisual    Face to Face time with patient: 15 minutes  45 minutes of total time spent on the encounter, which includes face to face time and non-face to face time preparing to see the patient (eg, review of tests), Obtaining and/or reviewing separately obtained history, Documenting clinical information in the electronic or other health record, Independently interpreting results (not separately reported) and communicating results to the patient/family/caregiver, or Care coordination (not separately reported).     Each patient to whom he or she provides medical services by telemedicine is:  (1) informed of the relationship between the physician and patient and the respective role of any other health care provider with respect to management of the patient; and (2) notified that he or she may decline to receive medical services by telemedicine and may withdraw from such care at any time.    Notes:     Date of visit: 1/8/24  Visit diagnosis: 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 [C92.10]  Referred by:  Brett Hogan MD    Oncologic History:     Primary Oncologic Diagnosis: Chronic myeloid leukemia, blast phase    8/2022: Diagnosed with chronic phase CML.  10/2022: Started dasatinib but had questionable compliance.  6/7/23: He presented to the ER at [facility] with gum swelling. Labs notable for  (80% blasts). He was transferred to [facility] for further management.  6/9/23: Bone marrow biopsy revealed blast phase chronic myeloid leukemia; reticulin fibrosis 2 of 3. 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 FISH with t(9;22) and MLLT3/MLL (KMT2A) fusion. NGS with NRAS (7%), PTPN11 (4%). BCR/ABL1:ABL1 (p210) >55%.  6/16/23: Started induction with CLIA + ponatinib. Course complicated by neutropenic fever, pharyngitis/mucositis, and  strep viridans bacteremia.   7/6/23: Day 21 bone marrow biopsy revealed a markedly hypocellular marrow (<5%) with trilineage hypoplasia. CG 46,XY[5].  7/25/23: Bone marrow biopsy revealed a hypocellular marrow but suboptimal specimen for evaluation.   8/4/23: BCR/ABL1:ABL1 (p210) 23.6%.   8/28/23: Bone marrow biopsy revealed a hypocellular marrow (30%) with persistent blast phase with 10-15% blasts.   10/9/23: Cycle 1 day 1 of azacitidine 75 mg/m2 x5 days plus venetoclax 50mg daily x10 days + ponatininb 30mg daily (28 day cycle).   10/31/23: Bone marrow biopsy at the end of cycle 1 revealed persistent myeloid blast crisis (11% circulating blasts).   11/6/23: Cycle 2 day 1 of azacitidine, venetoclax, and ponatinib. Ponatinib increased to 45mg daily.   1/2/24: Started asciminib 200mg daily.     History of Present Ilness:   Magdaleno Moore) is a pleasant 23 y.o.male with chronic myeloid leukemia blast phase who presents for follow up. He was admitted for gingival hyperplasia, failure to thrive, poor intake, and fevers. Infectious workup was negative. He then developed significant leukocytosis, so he was started on Hydrea. Ultimately the decision was made to start asciminib, which he started on 1/2/24. He developed a pruritic truncal rash the day after starting asciminib.     PAST MEDICAL HISTORY:   Past Medical History:   Diagnosis Date    CML (chronic myelocytic leukemia)     HVD (hypertensive vascular disease)     Oropharyngeal candidiasis        PAST SURGICAL HISTORY:   Past Surgical History:   Procedure Laterality Date    BONE MARROW BIOPSY N/A 8/22/2022    Procedure: Biopsy-bone marrow;  Surgeon: Getachew Chen MD;  Location: [facility] OR;  Service: General;  Laterality: N/A;    BONE MARROW BIOPSY N/A 7/25/2023    Procedure: Biopsy-bone marrow;  Surgeon: Edi Carty MD;  Location: [facility] OR;  Service: General;  Laterality: N/A;    BONE MARROW BIOPSY N/A 8/28/2023    Procedure: Biopsy-bone marrow;  Surgeon:  Moo Pina MD;  Location: [facility] OR;  Service: General;  Laterality: N/A;    BONE MARROW BIOPSY N/A 10/31/2023    Procedure: Biopsy-bone marrow;  Surgeon: Edi Carty MD;  Location: [facility] OR;  Service: General;  Laterality: N/A;    KNEE SURGERY Left        PAST SOCIAL HISTORY:  Social History     Tobacco Use    Smoking status: Never    Smokeless tobacco: Never   Substance Use Topics    Alcohol use: Never    Drug use: Yes     Types: Marijuana       FAMILY HISTORY:  Family History   Problem Relation Age of Onset    Hypertension Maternal Grandmother     Cancer Maternal Grandmother        CURRENT MEDICATIONS:   Current Outpatient Medications   Medication Sig    (Magic mouthwash) 1:1:1 diphenhydrAMINE(Benadryl) 12.5mg/5ml liq, aluminum & magnesium hydroxide-simethicone (Maalox), LIDOcaine viscous 2% Swish and spit 15 mLs every 4 (four) hours as needed (mouth pain). for mouth sores    acyclovir (ZOVIRAX) 800 MG Tab Take 1 tablet (800 mg total) by mouth 2 (two) times daily.    asciminib 40 mg Tab Take 5 tablets (200 mg total) by mouth twice daily. Take on an empty stomach; avoid food for at least 2 hours before and 1 hour after taking.    diltiaZEM (CARDIZEM CD) 120 MG Cp24 Take 1 capsule (120 mg total) by mouth nightly.    metoprolol tartrate (LOPRESSOR) 25 MG tablet Take 1 tablet (25 mg total) by mouth 2 (two) times daily.    nystatin (MYCOSTATIN) 100,000 unit/mL suspension Take 5 mLs (500,000 Units total) by mouth 4 (four) times daily. for 10 days    oxyCODONE (ROXICODONE) 10 mg Tab immediate release tablet Take 1 tablet (10 mg total) by mouth every 4 (four) hours as needed for Pain.    pantoprazole (PROTONIX) 40 MG tablet Take 1 tablet (40 mg total) by mouth once daily.    potassium, sodium phosphates (PHOS-NAK) 280-160-250 mg PwPk Take 1 packet by mouth 3 (three) times daily with meals.    triamcinolone acetonide 0.1% (KENALOG) 0.1 % cream Apply topically 2 (two) times daily. Apply to rash twice daily      Current Facility-Administered Medications   Medication    0.9%  NaCl infusion (for blood administration)    0.9%  NaCl infusion (for blood administration)    0.9%  NaCl infusion (for blood administration)    0.9%  NaCl infusion (for blood administration)    0.9%  NaCl infusion (for blood administration)    acetaminophen tablet 650 mg    acetaminophen tablet 650 mg    diphenhydrAMINE capsule 25 mg    diphenhydrAMINE capsule 25 mg       ALLERGIES:   Review of patient's allergies indicates:  No Known Allergies    Review of Systems:     Pertinent positives and negatives included in the HPI. Otherwise a complete review of systems is negative.    Physical Exam:     There were no vitals filed for this visit.    ECOG Performance Status: (foot note - ECOG PS provided by Eastern Cooperative Oncology Group) 0 - Asymptomatic    Karnofsky Performance Score:  100%- Normal, No Complaints, No Evidence of Disease    Labs:   Lab Results   Component Value Date    WBC 92.43 (HH) 01/08/2024    RBC 2.58 (L) 01/08/2024    HGB 7.2 (L) 01/08/2024    HCT 22.1 (L) 01/08/2024    MCV 85.7 01/08/2024    MCH 27.9 01/08/2024    MCHC 32.6 (L) 01/08/2024    RDW 17.7 (H) 01/08/2024    PLT 3 (LL) 01/08/2024    MPV  01/08/2024      Comment:      N/A      GRAN 0.1 (L) 07/12/2023    GRAN 6.2 (L) 07/12/2023    LYMPH 1.1 07/12/2023    LYMPH 92.9 (H) 07/12/2023    MONO 0.0 (L) 07/12/2023    MONO 0.9 (L) 07/12/2023    EOS 0.0 07/12/2023    BASO 0.00 07/12/2023    EOSINOPHIL 0.0 07/12/2023    BASOPHIL 0.0 07/12/2023       CMP  Sodium   Date Value Ref Range Status   07/12/2023 138 136 - 145 mmol/L Final     Sodium Level   Date Value Ref Range Status   01/08/2024 134 (L) 136 - 145 mmol/L Final     Potassium   Date Value Ref Range Status   07/12/2023 4.4 3.5 - 5.1 mmol/L Final     Potassium Level   Date Value Ref Range Status   01/08/2024 3.3 (L) 3.5 - 5.1 mmol/L Final     Chloride   Date Value Ref Range Status   07/12/2023 106 95 - 110 mmol/L Final     CO2    Date Value Ref Range Status   07/12/2023 26 23 - 29 mmol/L Final     Carbon Dioxide   Date Value Ref Range Status   01/08/2024 29 22 - 29 mmol/L Final     Glucose   Date Value Ref Range Status   07/12/2023 87 70 - 110 mg/dL Final     BUN   Date Value Ref Range Status   07/12/2023 15 6 - 20 mg/dL Final     Blood Urea Nitrogen   Date Value Ref Range Status   01/08/2024 7.5 (L) 8.9 - 20.6 mg/dL Final     Creatinine   Date Value Ref Range Status   01/08/2024 0.73 0.73 - 1.18 mg/dL Final   07/12/2023 0.8 0.5 - 1.4 mg/dL Final     Calcium   Date Value Ref Range Status   07/12/2023 9.0 8.7 - 10.5 mg/dL Final     Calcium Level Total   Date Value Ref Range Status   01/08/2024 8.4 8.4 - 10.2 mg/dL Final     Total Protein   Date Value Ref Range Status   07/12/2023 6.7 6.0 - 8.4 g/dL Final     Albumin   Date Value Ref Range Status   07/12/2023 3.3 (L) 3.5 - 5.2 g/dL Final     Albumin Level   Date Value Ref Range Status   01/08/2024 2.5 (L) 3.5 - 5.0 g/dL Final     Total Bilirubin   Date Value Ref Range Status   07/12/2023 0.4 0.1 - 1.0 mg/dL Final     Comment:     For infants and newborns, interpretation of results should be based  on gestational age, weight and in agreement with clinical  observations.    Premature Infant recommended reference ranges:  Up to 24 hours.............<8.0 mg/dL  Up to 48 hours............<12.0 mg/dL  3-5 days..................<15.0 mg/dL  6-29 days.................<15.0 mg/dL       Bilirubin Total   Date Value Ref Range Status   01/08/2024 2.9 (H) <=1.5 mg/dL Final     Alkaline Phosphatase   Date Value Ref Range Status   01/08/2024 409 (H) 40 - 150 unit/L Final   07/12/2023 94 55 - 135 U/L Final     AST   Date Value Ref Range Status   07/12/2023 10 10 - 40 U/L Final     Aspartate Aminotransferase   Date Value Ref Range Status   01/08/2024 138 (H) 5 - 34 unit/L Final     ALT   Date Value Ref Range Status   07/12/2023 12 10 - 44 U/L Final     Alanine Aminotransferase   Date Value Ref Range Status    01/08/2024 231 (H) 0 - 55 unit/L Final     Anion Gap   Date Value Ref Range Status   07/12/2023 6 (L) 8 - 16 mmol/L Final       Imaging:   No results found for this or any previous visit (from the past 2160 hour(s)).    Pathology:  Reviewed     Assessment and Plan:   Magdaleno Moore) is a pleasant 23 y.o.male with chronic myeloid leukemia blast phase who presents for follow up.    Chronic myeloid leukemia, blast phase:  He was initially diagnosed with CP-CML in 8/2022. In 6/2023, he presented to the ER and later diagnosed with BP-CML. Bone marrow biopsy at that time revealed a t(9;22) as well as KMT2A fusion. NGS with NRAS (7%), PTPN11 (4%). He was induced with CLIA plus ponatinib, and his day 21 bone marrow biopsy revealed aplasia. Follow up bone marrow biopsy on 7/25/23 noted persistent aplasia, although the sample was limited. BCR/ABL on 8/4/23 was 23.6%. Bone marrow biopsy on 8/28/23 showed persistent disease with 10-15% blasts. He started salvage therapy with azacitidine + venetoclax + ponatininb (Brayden et al, Blood 2022) on 10/9/23. Bone marrow biopsy after cycle 1 revealed persistent myeloid blast phased (11% blasts). Ponatinib increased to 45mg daily for cycle 2, but he continued to have disease progression. He was started on asciminib 200mg daily on 1/2/24. We reviewed our limited treatment options today. Will try single agent asciminib for now.  - Continue asciminib 200mg daily.   - Topical triamcinolone for drug rash.   - Discussed with Dr. Hogan.    Stem cell transplant candidate:  We discussed the role for allogeneic stem cell transplantation in this disease process as a potentially curative option. We had an extensive discussion about the rationale, logistics, risks, and benefits. We reviewed the requirement to stay in the [place] area for 100 days with a caregiver at all times. We discussed the risks, including infection, graft failure, organ toxicity, graft versus host disease, relapse  of disease, and secondary cancers. We reviewed the need for long-term immunosuppression and need for close monitoring. Will proceed with transplant if he is able to achieve complete remission.    Pancytopenia:  Monitor labs 2x weekly with Dr. Hogan. Transfuse for Hgb <7 and Plts <10.    Immunosuppression due to conditions classified elsewhere:  Continue acyclovir, levofloxacin, posaconazole.     Orders/Follow Up:      Orders Placed This Encounter    triamcinolone acetonide 0.1% (KENALOG) 0.1 % cream       Route Chart for Scheduling    BMT Chart Routing      Follow up with physician 1 month. virtual   Follow up with JOSE ANTONIO    Provider visit type    Infusion scheduling note    Injection scheduling note    Labs    Imaging    Pharmacy appointment    Other referrals                Treatment Plan Information   OP AZACITIDINE 5-DAY (SUB-Q) + VENETOCLAX + PONATINIB    Paul Hogan II, MD   Upcoming Treatment Dates - OP AZACITIDINE 5-DAY (SUB-Q) + VENETOCLAX + PONATINIB     1/1/2024       Pre-Medications       ondansetron disintegrating tablet 16 mg       Chemotherapy       azaCITIDine (VIDAZA) chemo injection  1/2/2024       Pre-Medications       ondansetron disintegrating tablet 16 mg       Chemotherapy       azaCITIDine (VIDAZA) chemo injection  1/3/2024       Pre-Medications       ondansetron disintegrating tablet 16 mg       Chemotherapy       azaCITIDine (VIDAZA) chemo injection  1/4/2024       Pre-Medications       ondansetron disintegrating tablet 16 mg       Chemotherapy       azaCITIDine (VIDAZA) chemo injection    Supportive Plan Information  IV FLUIDS AND ELECTROLYTES   Paul Hogan II, MD   Upcoming Treatment Dates - IV FLUIDS AND ELECTROLYTES    No upcoming days in selected categories.      Advance Care Planning   Date: 08/07/2023  We reviewed his underlying diagnosis including natural history, prognosis, and various treatment options. He remains interested in pursuing any and all treatment options in  an effort to improve his quality and quantity of life. Will continue all treatment as recommended above.         Bijan Green MD  Hematology, Oncology, and Stem Cell Transplantation  [facility]

## 2024-01-09 ENCOUNTER — PATIENT MESSAGE (OUTPATIENT)
Dept: HEMATOLOGY/ONCOLOGY | Facility: CLINIC | Age: 24
End: 2024-01-09
Payer: MEDICAID

## 2024-01-09 ENCOUNTER — HOSPITAL ENCOUNTER (INPATIENT)
Facility: HOSPITAL | Age: 24
LOS: 8 days | Discharge: HOSPICE | DRG: 872 | End: 2024-01-17
Attending: EMERGENCY MEDICINE | Admitting: STUDENT IN AN ORGANIZED HEALTH CARE EDUCATION/TRAINING PROGRAM
Payer: MEDICAID

## 2024-01-09 DIAGNOSIS — R00.0 TACHYCARDIA: ICD-10-CM

## 2024-01-09 DIAGNOSIS — R94.31 EKG ABNORMALITIES: ICD-10-CM

## 2024-01-09 DIAGNOSIS — Z71.89 ACP (ADVANCE CARE PLANNING): ICD-10-CM

## 2024-01-09 DIAGNOSIS — C92.10 BLAST CRISIS PHASE OF CHRONIC MYELOID LEUKEMIA: ICD-10-CM

## 2024-01-09 DIAGNOSIS — Z71.89 GOALS OF CARE, COUNSELING/DISCUSSION: ICD-10-CM

## 2024-01-09 DIAGNOSIS — J36 TONSIL, ABSCESS: ICD-10-CM

## 2024-01-09 DIAGNOSIS — R00.0 SINUS TACHYCARDIA: ICD-10-CM

## 2024-01-09 DIAGNOSIS — R07.9 CHEST PAIN: ICD-10-CM

## 2024-01-09 DIAGNOSIS — G89.3 CANCER RELATED PAIN: ICD-10-CM

## 2024-01-09 DIAGNOSIS — K12.1 MOUTH ULCERS: Primary | ICD-10-CM

## 2024-01-09 DIAGNOSIS — C92.12 CML (CHRONIC MYELOID LEUKEMIA) IN RELAPSE: ICD-10-CM

## 2024-01-09 DIAGNOSIS — Z51.5 PALLIATIVE CARE ENCOUNTER: ICD-10-CM

## 2024-01-09 PROBLEM — R74.01 TRANSAMINITIS: Status: ACTIVE | Noted: 2024-01-09

## 2024-01-09 PROBLEM — E80.6 HYPERBILIRUBINEMIA: Status: ACTIVE | Noted: 2024-01-09

## 2024-01-09 LAB
ALBUMIN SERPL BCP-MCNC: 2.7 G/DL (ref 3.5–5.2)
ALP SERPL-CCNC: 345 U/L (ref 55–135)
ALT SERPL W/O P-5'-P-CCNC: 132 U/L (ref 10–44)
ANION GAP SERPL CALC-SCNC: 14 MMOL/L (ref 8–16)
AST SERPL-CCNC: 58 U/L (ref 10–40)
BILIRUB SERPL-MCNC: 2.6 MG/DL (ref 0.1–1)
BUN SERPL-MCNC: 10 MG/DL (ref 6–20)
CALCIUM SERPL-MCNC: 8.8 MG/DL (ref 8.7–10.5)
CHLORIDE SERPL-SCNC: 93 MMOL/L (ref 95–110)
CO2 SERPL-SCNC: 27 MMOL/L (ref 23–29)
CREAT SERPL-MCNC: 0.9 MG/DL (ref 0.5–1.4)
EST. GFR  (NO RACE VARIABLE): >60 ML/MIN/1.73 M^2
GLUCOSE SERPL-MCNC: 110 MG/DL (ref 70–110)
LACTATE SERPL-SCNC: 1.4 MMOL/L (ref 0.5–2.2)
POTASSIUM SERPL-SCNC: 2.9 MMOL/L (ref 3.5–5.1)
PROT SERPL-MCNC: 7.5 G/DL (ref 6–8.4)
SODIUM SERPL-SCNC: 134 MMOL/L (ref 136–145)

## 2024-01-09 PROCEDURE — 85007 BL SMEAR W/DIFF WBC COUNT: CPT | Performed by: EMERGENCY MEDICINE

## 2024-01-09 PROCEDURE — 85027 COMPLETE CBC AUTOMATED: CPT | Performed by: EMERGENCY MEDICINE

## 2024-01-09 PROCEDURE — 85060 BLOOD SMEAR INTERPRETATION: CPT | Mod: ,,, | Performed by: PATHOLOGY

## 2024-01-09 PROCEDURE — 25000003 PHARM REV CODE 250: Performed by: EMERGENCY MEDICINE

## 2024-01-09 PROCEDURE — 93005 ELECTROCARDIOGRAM TRACING: CPT

## 2024-01-09 PROCEDURE — 12000002 HC ACUTE/MED SURGE SEMI-PRIVATE ROOM

## 2024-01-09 PROCEDURE — 99285 EMERGENCY DEPT VISIT HI MDM: CPT | Mod: 25

## 2024-01-09 PROCEDURE — 63600175 PHARM REV CODE 636 W HCPCS: Performed by: EMERGENCY MEDICINE

## 2024-01-09 PROCEDURE — U0002 COVID-19 LAB TEST NON-CDC: HCPCS | Performed by: EMERGENCY MEDICINE

## 2024-01-09 PROCEDURE — 96361 HYDRATE IV INFUSION ADD-ON: CPT

## 2024-01-09 PROCEDURE — 87040 BLOOD CULTURE FOR BACTERIA: CPT | Mod: 59 | Performed by: EMERGENCY MEDICINE

## 2024-01-09 PROCEDURE — 80053 COMPREHEN METABOLIC PANEL: CPT | Performed by: EMERGENCY MEDICINE

## 2024-01-09 PROCEDURE — 96375 TX/PRO/DX INJ NEW DRUG ADDON: CPT

## 2024-01-09 PROCEDURE — 83605 ASSAY OF LACTIC ACID: CPT | Performed by: EMERGENCY MEDICINE

## 2024-01-09 PROCEDURE — 93010 ELECTROCARDIOGRAM REPORT: CPT | Mod: ,,, | Performed by: INTERNAL MEDICINE

## 2024-01-09 RX ORDER — HYDROMORPHONE HYDROCHLORIDE 1 MG/ML
1 INJECTION, SOLUTION INTRAMUSCULAR; INTRAVENOUS; SUBCUTANEOUS
Status: COMPLETED | OUTPATIENT
Start: 2024-01-09 | End: 2024-01-09

## 2024-01-09 RX ORDER — LIDOCAINE HYDROCHLORIDE 20 MG/ML
5 SOLUTION OROPHARYNGEAL
Status: COMPLETED | OUTPATIENT
Start: 2024-01-09 | End: 2024-01-09

## 2024-01-09 RX ADMIN — LIDOCAINE HYDROCHLORIDE 5 ML: 20 SOLUTION ORAL; TOPICAL at 11:01

## 2024-01-09 RX ADMIN — SODIUM CHLORIDE, POTASSIUM CHLORIDE, SODIUM LACTATE AND CALCIUM CHLORIDE 1000 ML: 600; 310; 30; 20 INJECTION, SOLUTION INTRAVENOUS at 11:01

## 2024-01-09 RX ADMIN — IOHEXOL 75 ML: 350 INJECTION, SOLUTION INTRAVENOUS at 11:01

## 2024-01-09 RX ADMIN — HYDROMORPHONE HYDROCHLORIDE 1 MG: 1 INJECTION, SOLUTION INTRAMUSCULAR; INTRAVENOUS; SUBCUTANEOUS at 11:01

## 2024-01-09 RX ADMIN — POTASSIUM BICARBONATE 40 MEQ: 391 TABLET, EFFERVESCENT ORAL at 11:01

## 2024-01-09 NOTE — TELEPHONE ENCOUNTER
Spoke to pt regards to message. Pt stated they are heading to main campus ED due to sxs. Pt states throat, rash all over body, pain and fatigue. Inpatient team informed.

## 2024-01-10 PROBLEM — Z51.5 PALLIATIVE CARE ENCOUNTER: Status: ACTIVE | Noted: 2024-01-10

## 2024-01-10 PROBLEM — R13.10 ODYNOPHAGIA: Status: ACTIVE | Noted: 2024-01-10

## 2024-01-10 PROBLEM — A41.9 SEPSIS: Status: ACTIVE | Noted: 2024-01-10

## 2024-01-10 LAB
ABO + RH BLD: NORMAL
ALBUMIN SERPL BCP-MCNC: 2.4 G/DL (ref 3.5–5.2)
ALP SERPL-CCNC: 295 U/L (ref 55–135)
ALT SERPL W/O P-5'-P-CCNC: 91 U/L (ref 10–44)
ANION GAP SERPL CALC-SCNC: 9 MMOL/L (ref 8–16)
ANISOCYTOSIS BLD QL SMEAR: SLIGHT
APTT PPP: 29.3 SEC (ref 21–32)
ASCENDING AORTA: 2.71 CM
AST SERPL-CCNC: 42 U/L (ref 10–40)
AV INDEX (PROSTH): 0.68
AV MEAN GRADIENT: 9 MMHG
AV PEAK GRADIENT: 16 MMHG
AV VALVE AREA BY VELOCITY RATIO: 2.2 CM²
AV VALVE AREA: 2.21 CM²
AV VELOCITY RATIO: 0.68
BACTERIA #/AREA URNS AUTO: ABNORMAL /HPF
BASOPHILS # BLD AUTO: ABNORMAL K/UL (ref 0–0.2)
BASOPHILS NFR BLD: 0 % (ref 0–1.9)
BASOPHILS NFR BLD: 0 % (ref 0–1.9)
BILIRUB DIRECT SERPL-MCNC: 1.2 MG/DL (ref 0.1–0.3)
BILIRUB SERPL-MCNC: 2.4 MG/DL (ref 0.1–1)
BILIRUB UR QL STRIP: NEGATIVE
BLD GP AB SCN CELLS X3 SERPL QL: NORMAL
BLD PROD TYP BPU: NORMAL
BLOOD UNIT EXPIRATION DATE: NORMAL
BLOOD UNIT TYPE CODE: 1700
BLOOD UNIT TYPE: NORMAL
BNP SERPL-MCNC: 56 PG/ML (ref 0–99)
BSA FOR ECHO PROCEDURE: 2.02 M2
BUN SERPL-MCNC: 8 MG/DL (ref 6–20)
CALCIUM SERPL-MCNC: 8.5 MG/DL (ref 8.7–10.5)
CHLORIDE SERPL-SCNC: 98 MMOL/L (ref 95–110)
CLARITY UR REFRACT.AUTO: CLEAR
CO2 SERPL-SCNC: 26 MMOL/L (ref 23–29)
CODING SYSTEM: NORMAL
COLOR UR AUTO: YELLOW
CREAT SERPL-MCNC: 0.7 MG/DL (ref 0.5–1.4)
CROSSMATCH INTERPRETATION: NORMAL
CV ECHO LV RWT: 0.31 CM
DIFFERENTIAL METHOD BLD: ABNORMAL
DIFFERENTIAL METHOD BLD: ABNORMAL
DISPENSE STATUS: NORMAL
DOP CALC AO PEAK VEL: 2 M/S
DOP CALC AO VTI: 30.81 CM
DOP CALC LVOT AREA: 3.2 CM2
DOP CALC LVOT DIAMETER: 2.03 CM
DOP CALC LVOT PEAK VEL: 1.36 M/S
DOP CALC LVOT STROKE VOLUME: 68.19 CM3
DOP CALCLVOT PEAK VEL VTI: 21.08 CM
E/E' RATIO: 6.3 M/S
ECHO LV POSTERIOR WALL: 0.82 CM (ref 0.6–1.1)
EJECTION FRACTION: 63 %
EOSINOPHIL # BLD AUTO: ABNORMAL K/UL (ref 0–0.5)
EOSINOPHIL NFR BLD: 0 % (ref 0–8)
EOSINOPHIL NFR BLD: 0 % (ref 0–8)
ERYTHROCYTE [DISTWIDTH] IN BLOOD BY AUTOMATED COUNT: 18 % (ref 11.5–14.5)
ERYTHROCYTE [DISTWIDTH] IN BLOOD BY AUTOMATED COUNT: 18.2 % (ref 11.5–14.5)
EST. GFR  (NO RACE VARIABLE): >60 ML/MIN/1.73 M^2
FIBRINOGEN PPP-MCNC: 474 MG/DL (ref 182–400)
FRACTIONAL SHORTENING: 36 % (ref 28–44)
GLOBAL LONGITUIDAL STRAIN: 14.6 %
GLUCOSE SERPL-MCNC: 129 MG/DL (ref 70–110)
GLUCOSE UR QL STRIP: NEGATIVE
HAPTOGLOB SERPL-MCNC: 350 MG/DL (ref 30–250)
HCT VFR BLD AUTO: 18.1 % (ref 40–54)
HCT VFR BLD AUTO: 21.4 % (ref 40–54)
HGB BLD-MCNC: 6 G/DL (ref 14–18)
HGB BLD-MCNC: 7.1 G/DL (ref 14–18)
HGB UR QL STRIP: ABNORMAL
HYALINE CASTS UR QL AUTO: 3 /LPF
IMM GRANULOCYTES # BLD AUTO: ABNORMAL K/UL (ref 0–0.04)
IMM GRANULOCYTES # BLD AUTO: ABNORMAL K/UL (ref 0–0.04)
IMM GRANULOCYTES NFR BLD AUTO: ABNORMAL % (ref 0–0.5)
IMM GRANULOCYTES NFR BLD AUTO: ABNORMAL % (ref 0–0.5)
INR PPP: 1.4 (ref 0.8–1.2)
INTERVENTRICULAR SEPTUM: 0.79 CM (ref 0.6–1.1)
KETONES UR QL STRIP: NEGATIVE
LA MAJOR: 4.44 CM
LA MINOR: 3.88 CM
LA WIDTH: 4.45 CM
LDH SERPL L TO P-CCNC: 1014 U/L (ref 110–260)
LEFT ATRIUM SIZE: 3.48 CM
LEFT ATRIUM VOLUME INDEX MOD: 21.8 ML/M2
LEFT ATRIUM VOLUME INDEX: 26.9 ML/M2
LEFT ATRIUM VOLUME MOD: 44.34 CM3
LEFT ATRIUM VOLUME: 54.51 CM3
LEFT INTERNAL DIMENSION IN SYSTOLE: 3.39 CM (ref 2.1–4)
LEFT VENTRICLE DIASTOLIC VOLUME INDEX: 65.48 ML/M2
LEFT VENTRICLE DIASTOLIC VOLUME: 132.93 ML
LEFT VENTRICLE MASS INDEX: 74 G/M2
LEFT VENTRICLE SYSTOLIC VOLUME INDEX: 23.3 ML/M2
LEFT VENTRICLE SYSTOLIC VOLUME: 47.22 ML
LEFT VENTRICULAR INTERNAL DIMENSION IN DIASTOLE: 5.26 CM (ref 3.5–6)
LEFT VENTRICULAR MASS: 149.29 G
LEUKOCYTE ESTERASE UR QL STRIP: NEGATIVE
LV LATERAL E/E' RATIO: 6.54 M/S
LV SEPTAL E/E' RATIO: 6.07 M/S
LYMPHOCYTES # BLD AUTO: ABNORMAL K/UL (ref 1–4.8)
LYMPHOCYTES NFR BLD: 2 % (ref 18–48)
LYMPHOCYTES NFR BLD: 5 % (ref 18–48)
MAGNESIUM SERPL-MCNC: 1.4 MG/DL (ref 1.6–2.6)
MCH RBC QN AUTO: 27.4 PG (ref 27–31)
MCH RBC QN AUTO: 28.1 PG (ref 27–31)
MCHC RBC AUTO-ENTMCNC: 33.1 G/DL (ref 32–36)
MCHC RBC AUTO-ENTMCNC: 33.2 G/DL (ref 32–36)
MCV RBC AUTO: 83 FL (ref 82–98)
MCV RBC AUTO: 85 FL (ref 82–98)
MICROSCOPIC COMMENT: ABNORMAL
MONOCYTES NFR BLD: 3 % (ref 4–15)
MONOCYTES NFR BLD: 4 % (ref 4–15)
MV PEAK E VEL: 0.85 M/S
NEUTROPHILS NFR BLD: 0 % (ref 38–73)
NEUTROPHILS NFR BLD: 0.5 % (ref 38–73)
NITRITE UR QL STRIP: NEGATIVE
NRBC BLD-RTO: 0 /100 WBC
NRBC BLD-RTO: 0 /100 WBC
NUM UNITS TRANS PACKED RBC: NORMAL
OHS LV EJECTION FRACTION SIMPSONS BIPLANE MOD: 48 %
PATH REV BLD -IMP: NORMAL
PH UR STRIP: 6 [PH] (ref 5–8)
PHOSPHATE SERPL-MCNC: 1.6 MG/DL (ref 2.7–4.5)
PLATELET # BLD AUTO: 21 K/UL (ref 150–450)
PLATELET # BLD AUTO: 32 K/UL (ref 150–450)
PLATELET BLD QL SMEAR: ABNORMAL
PLATELET BLD QL SMEAR: ABNORMAL
PMV BLD AUTO: 9.8 FL (ref 9.2–12.9)
PMV BLD AUTO: 9.9 FL (ref 9.2–12.9)
POTASSIUM SERPL-SCNC: 3.7 MMOL/L (ref 3.5–5.1)
PROT SERPL-MCNC: 6.5 G/DL (ref 6–8.4)
PROT UR QL STRIP: ABNORMAL
PROTHROMBIN TIME: 14.5 SEC (ref 9–12.5)
RA MAJOR: 4.59 CM
RA PRESSURE ESTIMATED: 3 MMHG
RA WIDTH: 3.91 CM
RBC # BLD AUTO: 2.19 M/UL (ref 4.6–6.2)
RBC # BLD AUTO: 2.53 M/UL (ref 4.6–6.2)
RBC #/AREA URNS AUTO: 2 /HPF (ref 0–4)
RIGHT VENTRICULAR END-DIASTOLIC DIMENSION: 4.46 CM
SARS-COV-2 RDRP RESP QL NAA+PROBE: NEGATIVE
SINUS: 2.45 CM
SODIUM SERPL-SCNC: 133 MMOL/L (ref 136–145)
SP GR UR STRIP: 1.01 (ref 1–1.03)
SPECIMEN OUTDATE: NORMAL
SQUAMOUS #/AREA URNS AUTO: 0 /HPF
STJ: 2.31 CM
TDI LATERAL: 0.13 M/S
TDI SEPTAL: 0.14 M/S
TDI: 0.14 M/S
TRICUSPID ANNULAR PLANE SYSTOLIC EXCURSION: 3.6 CM
TROPONIN I SERPL DL<=0.01 NG/ML-MCNC: 0.01 NG/ML (ref 0–0.03)
URATE SERPL-MCNC: 3.8 MG/DL (ref 3.4–7)
URN SPEC COLLECT METH UR: ABNORMAL
WBC # BLD AUTO: 114.8 K/UL (ref 3.9–12.7)
WBC # BLD AUTO: 85.29 K/UL (ref 3.9–12.7)
WBC #/AREA URNS AUTO: 3 /HPF (ref 0–5)
WBC OTHER NFR BLD MANUAL: 91 %
WBC OTHER NFR BLD MANUAL: 95 %
Z-SCORE OF LEFT VENTRICULAR DIMENSION IN END DIASTOLE: -1.34
Z-SCORE OF LEFT VENTRICULAR DIMENSION IN END SYSTOLE: -0.67

## 2024-01-10 PROCEDURE — P9040 RBC LEUKOREDUCED IRRADIATED: HCPCS | Performed by: NURSE PRACTITIONER

## 2024-01-10 PROCEDURE — 96367 TX/PROPH/DG ADDL SEQ IV INF: CPT

## 2024-01-10 PROCEDURE — 20600001 HC STEP DOWN PRIVATE ROOM

## 2024-01-10 PROCEDURE — 84484 ASSAY OF TROPONIN QUANT: CPT | Performed by: NURSE PRACTITIONER

## 2024-01-10 PROCEDURE — 25500020 PHARM REV CODE 255: Performed by: EMERGENCY MEDICINE

## 2024-01-10 PROCEDURE — 84100 ASSAY OF PHOSPHORUS: CPT | Performed by: STUDENT IN AN ORGANIZED HEALTH CARE EDUCATION/TRAINING PROGRAM

## 2024-01-10 PROCEDURE — 30233N1 TRANSFUSION OF NONAUTOLOGOUS RED BLOOD CELLS INTO PERIPHERAL VEIN, PERCUTANEOUS APPROACH: ICD-10-PCS | Performed by: INTERNAL MEDICINE

## 2024-01-10 PROCEDURE — 85610 PROTHROMBIN TIME: CPT | Performed by: STUDENT IN AN ORGANIZED HEALTH CARE EDUCATION/TRAINING PROGRAM

## 2024-01-10 PROCEDURE — 81001 URINALYSIS AUTO W/SCOPE: CPT | Performed by: EMERGENCY MEDICINE

## 2024-01-10 PROCEDURE — 99223 1ST HOSP IP/OBS HIGH 75: CPT | Mod: ,,, | Performed by: INTERNAL MEDICINE

## 2024-01-10 PROCEDURE — 83735 ASSAY OF MAGNESIUM: CPT | Performed by: STUDENT IN AN ORGANIZED HEALTH CARE EDUCATION/TRAINING PROGRAM

## 2024-01-10 PROCEDURE — 99223 1ST HOSP IP/OBS HIGH 75: CPT | Mod: ,,, | Performed by: OTOLARYNGOLOGY

## 2024-01-10 PROCEDURE — C9113 INJ PANTOPRAZOLE SODIUM, VIA: HCPCS | Performed by: NURSE PRACTITIONER

## 2024-01-10 PROCEDURE — 80053 COMPREHEN METABOLIC PANEL: CPT | Performed by: STUDENT IN AN ORGANIZED HEALTH CARE EDUCATION/TRAINING PROGRAM

## 2024-01-10 PROCEDURE — 83880 ASSAY OF NATRIURETIC PEPTIDE: CPT | Performed by: NURSE PRACTITIONER

## 2024-01-10 PROCEDURE — 84550 ASSAY OF BLOOD/URIC ACID: CPT | Performed by: STUDENT IN AN ORGANIZED HEALTH CARE EDUCATION/TRAINING PROGRAM

## 2024-01-10 PROCEDURE — 85007 BL SMEAR W/DIFF WBC COUNT: CPT | Performed by: STUDENT IN AN ORGANIZED HEALTH CARE EDUCATION/TRAINING PROGRAM

## 2024-01-10 PROCEDURE — 82248 BILIRUBIN DIRECT: CPT | Performed by: STUDENT IN AN ORGANIZED HEALTH CARE EDUCATION/TRAINING PROGRAM

## 2024-01-10 PROCEDURE — 25000003 PHARM REV CODE 250: Performed by: INTERNAL MEDICINE

## 2024-01-10 PROCEDURE — 63600175 PHARM REV CODE 636 W HCPCS: Performed by: STUDENT IN AN ORGANIZED HEALTH CARE EDUCATION/TRAINING PROGRAM

## 2024-01-10 PROCEDURE — A4216 STERILE WATER/SALINE, 10 ML: HCPCS | Performed by: NURSE PRACTITIONER

## 2024-01-10 PROCEDURE — 25000003 PHARM REV CODE 250: Performed by: EMERGENCY MEDICINE

## 2024-01-10 PROCEDURE — 86901 BLOOD TYPING SEROLOGIC RH(D): CPT | Performed by: STUDENT IN AN ORGANIZED HEALTH CARE EDUCATION/TRAINING PROGRAM

## 2024-01-10 PROCEDURE — 36415 COLL VENOUS BLD VENIPUNCTURE: CPT | Performed by: STUDENT IN AN ORGANIZED HEALTH CARE EDUCATION/TRAINING PROGRAM

## 2024-01-10 PROCEDURE — 36430 TRANSFUSION BLD/BLD COMPNT: CPT

## 2024-01-10 PROCEDURE — 96365 THER/PROPH/DIAG IV INF INIT: CPT

## 2024-01-10 PROCEDURE — 83010 ASSAY OF HAPTOGLOBIN QUANT: CPT | Performed by: STUDENT IN AN ORGANIZED HEALTH CARE EDUCATION/TRAINING PROGRAM

## 2024-01-10 PROCEDURE — 25000003 PHARM REV CODE 250: Performed by: NURSE PRACTITIONER

## 2024-01-10 PROCEDURE — 63600175 PHARM REV CODE 636 W HCPCS: Performed by: NURSE PRACTITIONER

## 2024-01-10 PROCEDURE — 85384 FIBRINOGEN ACTIVITY: CPT | Performed by: STUDENT IN AN ORGANIZED HEALTH CARE EDUCATION/TRAINING PROGRAM

## 2024-01-10 PROCEDURE — 83615 LACTATE (LD) (LDH) ENZYME: CPT | Performed by: STUDENT IN AN ORGANIZED HEALTH CARE EDUCATION/TRAINING PROGRAM

## 2024-01-10 PROCEDURE — 63600175 PHARM REV CODE 636 W HCPCS: Performed by: EMERGENCY MEDICINE

## 2024-01-10 PROCEDURE — 25000003 PHARM REV CODE 250: Performed by: STUDENT IN AN ORGANIZED HEALTH CARE EDUCATION/TRAINING PROGRAM

## 2024-01-10 PROCEDURE — 86920 COMPATIBILITY TEST SPIN: CPT | Performed by: NURSE PRACTITIONER

## 2024-01-10 PROCEDURE — 85027 COMPLETE CBC AUTOMATED: CPT | Performed by: STUDENT IN AN ORGANIZED HEALTH CARE EDUCATION/TRAINING PROGRAM

## 2024-01-10 PROCEDURE — 63600175 PHARM REV CODE 636 W HCPCS: Performed by: INTERNAL MEDICINE

## 2024-01-10 PROCEDURE — 85730 THROMBOPLASTIN TIME PARTIAL: CPT | Performed by: STUDENT IN AN ORGANIZED HEALTH CARE EDUCATION/TRAINING PROGRAM

## 2024-01-10 RX ORDER — FENTANYL 12.5 UG/1
1 PATCH TRANSDERMAL
Status: DISCONTINUED | OUTPATIENT
Start: 2024-01-10 | End: 2024-01-17 | Stop reason: HOSPADM

## 2024-01-10 RX ORDER — GLUCAGON 1 MG
1 KIT INJECTION
Status: DISCONTINUED | OUTPATIENT
Start: 2024-01-10 | End: 2024-01-17 | Stop reason: HOSPADM

## 2024-01-10 RX ORDER — SODIUM CHLORIDE, SODIUM LACTATE, POTASSIUM CHLORIDE, CALCIUM CHLORIDE 600; 310; 30; 20 MG/100ML; MG/100ML; MG/100ML; MG/100ML
INJECTION, SOLUTION INTRAVENOUS CONTINUOUS
Status: DISCONTINUED | OUTPATIENT
Start: 2024-01-10 | End: 2024-01-16

## 2024-01-10 RX ORDER — PANTOPRAZOLE SODIUM 40 MG/1
40 TABLET, DELAYED RELEASE ORAL DAILY
Status: DISCONTINUED | OUTPATIENT
Start: 2024-01-10 | End: 2024-01-10

## 2024-01-10 RX ORDER — AMLODIPINE BESYLATE 5 MG/1
5 TABLET ORAL DAILY
Status: DISCONTINUED | OUTPATIENT
Start: 2024-01-10 | End: 2024-01-11

## 2024-01-10 RX ORDER — PANTOPRAZOLE SODIUM 40 MG/10ML
40 INJECTION, POWDER, LYOPHILIZED, FOR SOLUTION INTRAVENOUS DAILY
Status: DISCONTINUED | OUTPATIENT
Start: 2024-01-10 | End: 2024-01-17 | Stop reason: HOSPADM

## 2024-01-10 RX ORDER — SODIUM CHLORIDE 0.9 % (FLUSH) 0.9 %
10 SYRINGE (ML) INJECTION EVERY 12 HOURS PRN
Status: DISCONTINUED | OUTPATIENT
Start: 2024-01-10 | End: 2024-01-17 | Stop reason: HOSPADM

## 2024-01-10 RX ORDER — IBUPROFEN 200 MG
24 TABLET ORAL
Status: DISCONTINUED | OUTPATIENT
Start: 2024-01-10 | End: 2024-01-17 | Stop reason: HOSPADM

## 2024-01-10 RX ORDER — HYDROMORPHONE HYDROCHLORIDE 1 MG/ML
1 INJECTION, SOLUTION INTRAMUSCULAR; INTRAVENOUS; SUBCUTANEOUS
Status: COMPLETED | OUTPATIENT
Start: 2024-01-10 | End: 2024-01-10

## 2024-01-10 RX ORDER — ACETAMINOPHEN 650 MG/20.3ML
650 LIQUID ORAL EVERY 6 HOURS PRN
Status: DISCONTINUED | OUTPATIENT
Start: 2024-01-10 | End: 2024-01-10

## 2024-01-10 RX ORDER — DIPHENHYDRAMINE HCL 25 MG
25 CAPSULE ORAL EVERY 6 HOURS PRN
Status: DISCONTINUED | OUTPATIENT
Start: 2024-01-10 | End: 2024-01-17 | Stop reason: HOSPADM

## 2024-01-10 RX ORDER — ACETAMINOPHEN 650 MG/20.3ML
650 LIQUID ORAL
Status: COMPLETED | OUTPATIENT
Start: 2024-01-10 | End: 2024-01-10

## 2024-01-10 RX ORDER — IBUPROFEN 200 MG
16 TABLET ORAL
Status: DISCONTINUED | OUTPATIENT
Start: 2024-01-10 | End: 2024-01-17 | Stop reason: HOSPADM

## 2024-01-10 RX ORDER — HYDROMORPHONE HYDROCHLORIDE 1 MG/ML
1 INJECTION, SOLUTION INTRAMUSCULAR; INTRAVENOUS; SUBCUTANEOUS EVERY 4 HOURS PRN
Status: DISCONTINUED | OUTPATIENT
Start: 2024-01-10 | End: 2024-01-11

## 2024-01-10 RX ORDER — HYDRALAZINE HYDROCHLORIDE 20 MG/ML
10 INJECTION INTRAMUSCULAR; INTRAVENOUS EVERY 6 HOURS PRN
Status: DISCONTINUED | OUTPATIENT
Start: 2024-01-10 | End: 2024-01-17 | Stop reason: HOSPADM

## 2024-01-10 RX ORDER — ACETAMINOPHEN 650 MG/20.3ML
650 LIQUID ORAL EVERY 4 HOURS PRN
Status: DISCONTINUED | OUTPATIENT
Start: 2024-01-10 | End: 2024-01-14

## 2024-01-10 RX ORDER — HYDROMORPHONE HYDROCHLORIDE 1 MG/ML
1 INJECTION, SOLUTION INTRAMUSCULAR; INTRAVENOUS; SUBCUTANEOUS EVERY 6 HOURS PRN
Status: DISCONTINUED | OUTPATIENT
Start: 2024-01-10 | End: 2024-01-10

## 2024-01-10 RX ORDER — OXYCODONE HYDROCHLORIDE 10 MG/1
10 TABLET ORAL EVERY 4 HOURS PRN
Status: DISCONTINUED | OUTPATIENT
Start: 2024-01-10 | End: 2024-01-17 | Stop reason: HOSPADM

## 2024-01-10 RX ORDER — MAGNESIUM SULFATE HEPTAHYDRATE 40 MG/ML
2 INJECTION, SOLUTION INTRAVENOUS
Status: COMPLETED | OUTPATIENT
Start: 2024-01-10 | End: 2024-01-10

## 2024-01-10 RX ORDER — ACYCLOVIR 800 MG/1
800 TABLET ORAL 2 TIMES DAILY
Status: DISCONTINUED | OUTPATIENT
Start: 2024-01-10 | End: 2024-01-10

## 2024-01-10 RX ORDER — MAGNESIUM SULFATE HEPTAHYDRATE 40 MG/ML
4 INJECTION, SOLUTION INTRAVENOUS ONCE
Status: DISCONTINUED | OUTPATIENT
Start: 2024-01-10 | End: 2024-01-10

## 2024-01-10 RX ORDER — SODIUM CHLORIDE, SODIUM LACTATE, POTASSIUM CHLORIDE, CALCIUM CHLORIDE 600; 310; 30; 20 MG/100ML; MG/100ML; MG/100ML; MG/100ML
INJECTION, SOLUTION INTRAVENOUS CONTINUOUS
Status: DISCONTINUED | OUTPATIENT
Start: 2024-01-10 | End: 2024-01-10

## 2024-01-10 RX ORDER — SODIUM,POTASSIUM PHOSPHATES 280-250MG
1 POWDER IN PACKET (EA) ORAL
Status: DISCONTINUED | OUTPATIENT
Start: 2024-01-10 | End: 2024-01-17 | Stop reason: HOSPADM

## 2024-01-10 RX ORDER — NALOXONE HCL 0.4 MG/ML
0.02 VIAL (ML) INJECTION
Status: DISCONTINUED | OUTPATIENT
Start: 2024-01-10 | End: 2024-01-17 | Stop reason: HOSPADM

## 2024-01-10 RX ORDER — METOPROLOL TARTRATE 25 MG/1
25 TABLET, FILM COATED ORAL 2 TIMES DAILY
Status: DISCONTINUED | OUTPATIENT
Start: 2024-01-10 | End: 2024-01-10

## 2024-01-10 RX ORDER — ACYCLOVIR 200 MG/5ML
800 SUSPENSION ORAL 2 TIMES DAILY
Status: DISCONTINUED | OUTPATIENT
Start: 2024-01-10 | End: 2024-01-17 | Stop reason: HOSPADM

## 2024-01-10 RX ORDER — POTASSIUM CHLORIDE 7.45 MG/ML
10 INJECTION INTRAVENOUS
Status: COMPLETED | OUTPATIENT
Start: 2024-01-10 | End: 2024-01-10

## 2024-01-10 RX ADMIN — HYDROXYUREA 1000 MG: 500 CAPSULE ORAL at 08:01

## 2024-01-10 RX ADMIN — POTASSIUM CHLORIDE 10 MEQ: 7.46 INJECTION, SOLUTION INTRAVENOUS at 03:01

## 2024-01-10 RX ADMIN — ACETAMINOPHEN 650 MG: 650 SOLUTION ORAL at 08:01

## 2024-01-10 RX ADMIN — OXYCODONE HYDROCHLORIDE 10 MG: 10 TABLET ORAL at 10:01

## 2024-01-10 RX ADMIN — POTASSIUM & SODIUM PHOSPHATES POWDER PACK 280-160-250 MG 1 PACKET: 280-160-250 PACK at 12:01

## 2024-01-10 RX ADMIN — SODIUM CHLORIDE, POTASSIUM CHLORIDE, SODIUM LACTATE AND CALCIUM CHLORIDE 1000 ML: 600; 310; 30; 20 INJECTION, SOLUTION INTRAVENOUS at 12:01

## 2024-01-10 RX ADMIN — MAGNESIUM SULFATE HEPTAHYDRATE 2 G: 40 INJECTION, SOLUTION INTRAVENOUS at 01:01

## 2024-01-10 RX ADMIN — VANCOMYCIN HYDROCHLORIDE 2000 MG: 5 INJECTION, POWDER, LYOPHILIZED, FOR SOLUTION INTRAVENOUS at 01:01

## 2024-01-10 RX ADMIN — MAGNESIUM SULFATE HEPTAHYDRATE 2 G: 40 INJECTION, SOLUTION INTRAVENOUS at 04:01

## 2024-01-10 RX ADMIN — AMLODIPINE BESYLATE 5 MG: 5 TABLET ORAL at 03:01

## 2024-01-10 RX ADMIN — HYDROXYUREA 1000 MG: 500 CAPSULE ORAL at 12:01

## 2024-01-10 RX ADMIN — PANTOPRAZOLE SODIUM 40 MG: 40 INJECTION, POWDER, FOR SOLUTION INTRAVENOUS at 09:01

## 2024-01-10 RX ADMIN — ACYCLOVIR 800 MG: 200 SUSPENSION ORAL at 08:01

## 2024-01-10 RX ADMIN — CEFEPIME 1 G: 1 INJECTION, POWDER, FOR SOLUTION INTRAMUSCULAR; INTRAVENOUS at 01:01

## 2024-01-10 RX ADMIN — METOPROLOL TARTRATE 25 MG: 25 TABLET, FILM COATED ORAL at 12:01

## 2024-01-10 RX ADMIN — HYDROMORPHONE HYDROCHLORIDE 1 MG: 1 INJECTION, SOLUTION INTRAMUSCULAR; INTRAVENOUS; SUBCUTANEOUS at 01:01

## 2024-01-10 RX ADMIN — ACETAMINOPHEN 650 MG: 650 SOLUTION ORAL at 03:01

## 2024-01-10 RX ADMIN — PIPERACILLIN SODIUM AND TAZOBACTAM SODIUM 4.5 G: 4; .5 INJECTION, POWDER, FOR SOLUTION INTRAVENOUS at 08:01

## 2024-01-10 RX ADMIN — ACYCLOVIR 800 MG: 200 SUSPENSION ORAL at 12:01

## 2024-01-10 RX ADMIN — HYDROMORPHONE HYDROCHLORIDE 1 MG: 1 INJECTION, SOLUTION INTRAMUSCULAR; INTRAVENOUS; SUBCUTANEOUS at 08:01

## 2024-01-10 RX ADMIN — POTASSIUM CHLORIDE 10 MEQ: 7.46 INJECTION, SOLUTION INTRAVENOUS at 07:01

## 2024-01-10 RX ADMIN — POTASSIUM CHLORIDE 10 MEQ: 7.46 INJECTION, SOLUTION INTRAVENOUS at 04:01

## 2024-01-10 RX ADMIN — SODIUM CHLORIDE, POTASSIUM CHLORIDE, SODIUM LACTATE AND CALCIUM CHLORIDE: 600; 310; 30; 20 INJECTION, SOLUTION INTRAVENOUS at 03:01

## 2024-01-10 RX ADMIN — AMPICILLIN SODIUM AND SULBACTAM SODIUM 3 G: 2; 1 INJECTION, POWDER, FOR SOLUTION INTRAMUSCULAR; INTRAVENOUS at 04:01

## 2024-01-10 RX ADMIN — POTASSIUM CHLORIDE 10 MEQ: 7.46 INJECTION, SOLUTION INTRAVENOUS at 09:01

## 2024-01-10 RX ADMIN — HYDROMORPHONE HYDROCHLORIDE 1 MG: 1 INJECTION, SOLUTION INTRAMUSCULAR; INTRAVENOUS; SUBCUTANEOUS at 09:01

## 2024-01-10 RX ADMIN — DIPHENHYDRAMINE HYDROCHLORIDE 25 MG: 25 CAPSULE ORAL at 05:01

## 2024-01-10 RX ADMIN — VANCOMYCIN HYDROCHLORIDE 1000 MG: 1 INJECTION, POWDER, LYOPHILIZED, FOR SOLUTION INTRAVENOUS at 05:01

## 2024-01-10 RX ADMIN — PIPERACILLIN SODIUM AND TAZOBACTAM SODIUM 4.5 G: 4; .5 INJECTION, POWDER, FOR SOLUTION INTRAVENOUS at 12:01

## 2024-01-10 RX ADMIN — HYDROMORPHONE HYDROCHLORIDE 1 MG: 1 INJECTION, SOLUTION INTRAMUSCULAR; INTRAVENOUS; SUBCUTANEOUS at 03:01

## 2024-01-10 RX ADMIN — POTASSIUM CHLORIDE 10 MEQ: 7.46 INJECTION, SOLUTION INTRAVENOUS at 06:01

## 2024-01-10 RX ADMIN — ACETAMINOPHEN 650 MG: 650 SOLUTION ORAL at 12:01

## 2024-01-10 RX ADMIN — METOPROLOL TARTRATE 25 MG: 25 TABLET, FILM COATED ORAL at 08:01

## 2024-01-10 RX ADMIN — SODIUM CHLORIDE, SODIUM LACTATE, POTASSIUM CHLORIDE, AND CALCIUM CHLORIDE: 600; 310; 30; 20 INJECTION, SOLUTION INTRAVENOUS at 12:01

## 2024-01-10 RX ADMIN — FENTANYL 1 PATCH: 12.5 PATCH TRANSDERMAL at 05:01

## 2024-01-10 RX ADMIN — POTASSIUM CHLORIDE 10 MEQ: 7.46 INJECTION, SOLUTION INTRAVENOUS at 08:01

## 2024-01-10 RX ADMIN — HYDROMORPHONE HYDROCHLORIDE 1 MG: 1 INJECTION, SOLUTION INTRAMUSCULAR; INTRAVENOUS; SUBCUTANEOUS at 04:01

## 2024-01-10 RX ADMIN — POTASSIUM CHLORIDE 10 MEQ: 7.46 INJECTION, SOLUTION INTRAVENOUS at 05:01

## 2024-01-10 NOTE — ASSESSMENT & PLAN NOTE
K of 2.9. Mucositis and dysphagia will limit PO repletion. Likely 2/2 poor PO intake    Plan  - continue IV replacement  Repeat BMP in AM

## 2024-01-10 NOTE — CONSULTS
[facility] ([facility])  Otorhinolaryngology-Head & Neck Surgery  Consult Note    Patient Name: Magdaleno Hirsch  MRN: [id number]  Code Status: Full Code  Admission Date: 1/9/2024  Hospital Length of Stay: 0 days  Attending Physician: Denny Barajas MD  Primary Care Provider: Christine Primary Doctor    Patient information was obtained from patient, past medical records, and primary team.     Inpatient consult to ENT  Consult performed by: Kya Colin MD  Consult ordered by: Fidel Allen DO        Subjective:     Chief Complaint/Reason for Admission: po intolerance, fever, odynophagia     History of Present Illness: 24 yo w/ hx of CML on chemo presenting to ED with worsening mouth and throat pain x 1 week with poor po intake. CT neck soft tissue shows <1 cm L intratonsillar abscess. Patient denies any difficulty breathing. Does endorse pain with swallowing and neck pain.     Medications:  Continuous Infusions:   lactated ringers       Scheduled Meds:   acyclovir  800 mg Oral BID    hydroxyurea  1,000 mg Oral BID    magnesium sulfate IVPB  4 g Intravenous Once    metoprolol  25 mg Oral BID    pantoprazole  40 mg Intravenous Daily    piperacillin-tazobactam (Zosyn) IV (PEDS and ADULTS) (extended infusion is not appropriate)  4.5 g Intravenous Q8H    potassium, sodium phosphates  1 packet Oral QID (AC & HS)    vancomycin (VANCOCIN) IV (PEDS and ADULTS)  1,000 mg Intravenous Q12H     PRN Meds:(Magic mouthwash) 1:1:1 diphenhydrAMINE(Benadryl) 12.5mg/5ml liq, aluminum & magnesium hydroxide-simethicone (Maalox), LIDOcaine viscous 2%, acetaminophen, dextrose 10%, dextrose 10%, glucagon (human recombinant), glucose, glucose, HYDROmorphone, naloxone, oxyCODONE, sodium chloride 0.9%, Pharmacy to dose Vancomycin consult **AND** vancomycin - pharmacy to dose     No current facility-administered medications on file prior to encounter.     Current Outpatient Medications on File Prior to Encounter   Medication Sig     (Magic mouthwash) 1:1:1 diphenhydrAMINE(Benadryl) 12.5mg/5ml liq, aluminum & magnesium hydroxide-simethicone (Maalox), LIDOcaine viscous 2% Swish and spit 15 mLs every 4 (four) hours as needed (mouth pain). for mouth sores    acyclovir (ZOVIRAX) 800 MG Tab Take 1 tablet (800 mg total) by mouth 2 (two) times daily.    asciminib 40 mg Tab Take 5 tablets (200 mg total) by mouth twice daily. Take on an empty stomach; avoid food for at least 2 hours before and 1 hour after taking.    diltiaZEM (CARDIZEM CD) 120 MG Cp24 Take 1 capsule (120 mg total) by mouth nightly.    metoprolol tartrate (LOPRESSOR) 25 MG tablet Take 1 tablet (25 mg total) by mouth 2 (two) times daily.    [] nystatin (MYCOSTATIN) 100,000 unit/mL suspension Take 5 mLs (500,000 Units total) by mouth 4 (four) times daily. for 10 days    oxyCODONE (ROXICODONE) 10 mg Tab immediate release tablet Take 1 tablet (10 mg total) by mouth every 4 (four) hours as needed for Pain.    pantoprazole (PROTONIX) 40 MG tablet Take 1 tablet (40 mg total) by mouth once daily.    potassium, sodium phosphates (PHOS-NAK) 280-160-250 mg PwPk Take 1 packet by mouth 3 (three) times daily with meals.    triamcinolone acetonide 0.1% (KENALOG) 0.1 % cream Apply topically 2 (two) times daily. Apply to rash twice daily       Review of patient's allergies indicates:  No Known Allergies    Past Medical History:   Diagnosis Date    CML (chronic myelocytic leukemia)     HVD (hypertensive vascular disease)     Oropharyngeal candidiasis      Past Surgical History:   Procedure Laterality Date    BONE MARROW BIOPSY N/A 2022    Procedure: Biopsy-bone marrow;  Surgeon: Getachew Chen MD;  Location: [facility] OR;  Service: General;  Laterality: N/A;    BONE MARROW BIOPSY N/A 2023    Procedure: Biopsy-bone marrow;  Surgeon: Edi Carty MD;  Location: [facility] OR;  Service: General;  Laterality: N/A;    BONE MARROW BIOPSY N/A 2023    Procedure: Biopsy-bone marrow;  Surgeon: Yovani  Moo KEMP MD;  Location: [facility];  Service: General;  Laterality: N/A;    BONE MARROW BIOPSY N/A 10/31/2023    Procedure: Biopsy-bone marrow;  Surgeon: Edi Carty MD;  Location: [facility] OR;  Service: General;  Laterality: N/A;    KNEE SURGERY Left      Family History       Problem Relation (Age of Onset)    Cancer Maternal Grandmother    Hypertension Maternal Grandmother          Tobacco Use    Smoking status: Never    Smokeless tobacco: Never   Substance and Sexual Activity    Alcohol use: Never    Drug use: Yes     Types: Marijuana    Sexual activity: Not on file     Review of Systems  Focused ROS per HPI   Objective:     Vital Signs (Most Recent):  Temp: (!) 101.1 °F (38.4 °C) (01/10/24 0954)  Pulse: (!) 137 (01/10/24 0700)  Resp: 18 (01/10/24 0947)  BP: (!) 164/94 (01/10/24 0700)  SpO2: 95 % (01/10/24 0700) Vital Signs (24h Range):  Temp:  [98.6 °F (37 °C)-103.2 °F (39.6 °C)] 101.1 °F (38.4 °C)  Pulse:  [117-137] 137  Resp:  [16-20] 18  SpO2:  [91 %-100 %] 95 %  BP: (136-164)/(71-94) 164/94     Weight: 79.3 kg (174 lb 13.2 oz)  Body mass index is 23.07 kg/m².         Physical Exam  Ill appearing   Resting in bed  No acute distress  Significant diffuse gingival hypertrophy   Bilateral tonsillar enlargement   Dry mucous membranes   Breathing comfortably on room air      Significant Labs:  CBC:   Recent Labs   Lab 01/09/24  2052 01/10/24  0843   .80* 85.29*   RBC 2.53* 2.19*   HGB 7.1* 6.0*   HCT 21.4* 18.1*   PLT 32*  --    MCV 85 83   MCH 28.1 27.4   MCHC 33.2 33.1       Significant Diagnostics:  CT: I have reviewed all pertinent results/findings within the past 24 hours and my personal findings are:  small (<1 cm) ring enhancing collection in the superior left tonsil, bilateral tonsillar enlargement, patent airway     Assessment/Plan:     Odynophagia  24 yo M w/ hx of CML currently on chemotherapy who presents with worsening odynophagia and po intolerance x1 week. CT neck with <1 cm left sided  intratonsillar abscess. Patient also with significant gingival hypertrophy and severe thrombocytopenia. No indication for drainage as abscess is subcentimeter and intratonsillar.     - No acute ENT intervention  - Recommend unasyn or clindamycin IV, can transition to po once tolerating po   - Page ENT with questions or concerns       VTE Risk Mitigation (From admission, onward)           Ordered     Reason for No Pharmacological VTE Prophylaxis  Once        Question:  Reasons:  Answer:  Thrombocytopenia    01/10/24 0213     IP VTE HIGH RISK PATIENT  Once         01/10/24 0213     Place sequential compression device  Until discontinued         01/10/24 0213                    Thank you for your consult. I will sign off. Please contact us if you have any additional questions.    Kya Colin MD  Otorhinolaryngology-Head & Neck Surgery  [facility] ([facility])

## 2024-01-10 NOTE — FIRST PROVIDER EVALUATION
"Medical screening examination initiated.  I have conducted a focused provider triage encounter, findings are as follows:    Brief history of present illness:  hx of lymphoma, + mouth sores, fever  Has not been able to eat, + sore throat    On chemo      Note from yesterday;   "Patient presented on 1/8/24, platelets 3,000, will get 2 units platelets today. Repeat labs 1/10/24.  In infusion, the patient was running a temperature.  He was also seen by Dr. Green earlier today.  We agree that the patient's fevers are likely leukemia.  He has had infectious workup extensively.  Most recent cultures on 12/29 23-.  Ask the patient if he would like to go to the emergency department for further workup, he stated he wanted to go home. "  There were no vitals filed for this visit.    Pertinent physical exam:  acutely sick, muffled speech, gingival swelling, ulcers on the tonsils    Brief workup plan:  CT neck soft tissue, infectious work-up    Preliminary workup initiated; this workup will be continued and followed by the physician or advanced practice provider that is assigned to the patient when roomed.  "

## 2024-01-10 NOTE — ASSESSMENT & PLAN NOTE
See oncology history    Defer to team about continuation of hydrea for cytoreduction  TLS labs in AM   Progression on multiple lines of therapy and will consult palliative for further discussions given prognosis

## 2024-01-10 NOTE — ED NOTES
Telemetry Verification   Patient placed on Telemetry Box  Verified with War Room  Box # [id number]   Monitor Tech     Rate   122   Rhythm  Sinus tach

## 2024-01-10 NOTE — ASSESSMENT & PLAN NOTE
This patient does have evidence of infective focus  My overall impression is sepsis.  Source: Skin and Soft Tissue (location diffuse rash) and ENT  Antibiotics given-   Antibiotics (72h ago, onward)      None          Latest lactate reviewed-  Recent Labs   Lab 01/09/24 2052   LACTATE 1.4     Organ dysfunction indicated by Acute liver injury    Fluid challenge Ideal Body Weight- The patient's ideal body weight is Ideal body weight: 79.9 kg (176 lb 2.4 oz) which will be used to calculate fluid bolus of 30 ml/kg for treatment of septic shock.      Post- resuscitation assessment No - Post resuscitation assessment not needed       Will Not start Pressors- Levophed for MAP of 65  Source control achieved by:     Patient with persistent fevers since recent DC likely associated with CML but CT soft tissue demonstrated tonsillar abscess. Patient with significant leukocytosis likely related to blast phase. On hydrea and OI ppx at home. Baseline WBC of 11-13 K previously.     Plan   - will start vanc and unasyn given ongoing fevers. Can DC vanc if MRSA culture negative and fevers dont resolve  - ENT consult for tonsillar abscess and possible source control  - follow up blood cultures  - will continue IVF given tachycardia and volume status

## 2024-01-10 NOTE — PROGRESS NOTES
Pharmacokinetic Initial Assessment: IV Vancomycin    Assessment/Plan:    Mr. Hirsch received vancomycin with loading dose of 2000 mg once in the ED.  - Will schedule a maintenance dose of vancomycin 1000 mg IV every 12 hours.  - His renal function appears stable and at baseline. Scr on admit slightly elevated at 0.9 from baseline of ~0.6-0.8, but likely transient increase. Will continue to watch closely though.  - Desired empiric serum trough concentration is 10 to 20 mcg/mL.  - Draw vancomycin trough level 60 min prior to fourth dose on 1/11 at approximately 1300.  - Please draw random level sooner than scheduled trough if renal function changes significantly.    Pharmacy will continue to follow and monitor vancomycin.      Please contact pharmacy at extension [phone] with any questions regarding this assessment.     Thank you for the consult,   Gricelda Galarza       Patient brief summary:  Magdaleno Hirsch is a 23 y.o. male initiated on antimicrobial therapy with IV Vancomycin for treatment of suspected skin & soft tissue infection    Actual Body Weight:   79.4 kg    Renal Function:   Estimated Creatinine Clearance: 140.9 mL/min (based on SCr of 0.9 mg/dL).     Dialysis Method (if applicable):  N/A

## 2024-01-10 NOTE — ASSESSMENT & PLAN NOTE
24 yo M w/ hx of CML currently on chemotherapy who presents with worsening odynophagia and po intolerance x1 week. CT neck with <1 cm left sided intratonsillar abscess. Patient also with significant gingival hypertrophy and severe thrombocytopenia. No indication for drainage as abscess is subcentimeter and intratonsillar.     - No acute ENT intervention  - Recommend unasyn or clindamycin IV, can transition to po once tolerating po   - Page ENT with questions or concerns

## 2024-01-10 NOTE — CONSULTS
"[facility] ([facility])  Palliative Medicine  Consult Note    Patient Name: Magdaleno Hirsch  MRN: [id number]  Admission Date: 1/9/2024  Hospital Length of Stay: 0 days  Code Status: Full Code   Attending Provider: Denny Barajas MD  Consulting Provider: Roselyn Sanchez MD  Primary Care Physician: No, Primary Doctor  Principal Problem:<principal problem not specified>    Patient information was obtained from patient, parent, and primary team.      Inpatient consult to Palliative Care  Consult performed by: Roselyn Sanchez MD  Consult ordered by: Fidel Allen DO        Assessment/Plan:     Palliative Care  Palliative care encounter  23 year in blast crisis CML here with intratonsillar abscess. No longer a candidate for cancer directed therapies       Code status: Full. Not addressed     Surrogate decision maker: Mom. Need phone number     GOC/ACP  -Met with patient and mom at bedside. Patient very sleepy and did not participate much. Mom shared that they were told patient had "no more options and was at end of life" Provided support. Asked where patient would want to be at the end of his life. She said home. Discussed how hospice can support them at home and provide symptom management. Mom said it was ok for case management to send a referral.     -Appreciate case management sending hospice referral  -Palliative  following for additional support     Cancer associated pain   -Pt too sleepy to provide a pain hx. Mom says pt was taking oxycodone 10 frequently at home. No longer able to tolerate PO  -Continue magic mouth wash  -Start fentanyl patch 12.5mcg. Can continue IV dilaudid for breakthrough inpatient. Consider reducing dose to 0.5mg. Patient very sleepy. Breakthrough pain can likely be controlled with morphine 20mg/ml 0.5-1ml q2h prn once home with hospice     Discussed with JEMAL Jacob        Thank you for your consult. I will follow-up with patient. Please contact us if you have any " "additional questions.    Subjective:     HPI:   Per hematology H&P  "Mr. 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 Patient was alone for AV encounter with nurse at bedside. Patient with very limited talking 2/2 pain. Patient was previously admitted for failure to thrive, poor intake, and fevers. Infectious workup was negative.Patient with leukocytosis, so he was started on Hydrea. Started on asciminib on 1/2/24. He developed a pruritic truncal rash the day after starting asciminib.  Patient was seen in clinic on 1/8 for therapy and concern for worsening CML vs sepsis given overall clinical picture. Patient was instructed to go to the ER. Patient reports having acute on chronic neck pain localized to the R side with no radiation rated at 10/10. Patient reports taking home medication but reports no improvement. Patient denies any SOB but report ROSALES. Patient denies any coughing. Denies any sick contacts. Patient denies any abdominal pain, nausea or vomiting but reports diarrhea. Reports no episodes today and reports anorexia 2/2 pain. Patient reports rash has not changed since last visit. Patient denies any other ENT complaints including sinus pressure pain or ear pain. Patient reports headache localized to R side rated at 8/10.      In the ED patient was febrile to 103, hypertensive and tachycardic to 130s with SpO2 >95 on RA. Labs were significant for WBC 114K with 95% blast/other, hgb 7.1, PLT 32K, Lactic 1.4, Na 134, K 2.9, bili 2.6, AST 58 and . CXR was negative but CT soft tissue neck demonstrated sub centimeter left palatine tonsil abscess with mild associated left parapharyngeal edema. Patient was given IVF, vanc and cefepime and admitted to BMT for further management. "    Per primary team patient is not a candidate for further cancer directed therapies. Palliative consulted for end of life discussions "     Hospital Course:  No notes on file        Past Medical History:   Diagnosis Date    CML (chronic myelocytic leukemia)     HVD (hypertensive vascular disease)     Oropharyngeal candidiasis        Past Surgical History:   Procedure Laterality Date    BONE MARROW BIOPSY N/A 8/22/2022    Procedure: Biopsy-bone marrow;  Surgeon: Getachew Chen MD;  Location: [facility] OR;  Service: General;  Laterality: N/A;    BONE MARROW BIOPSY N/A 7/25/2023    Procedure: Biopsy-bone marrow;  Surgeon: Edi Carty MD;  Location: [facility] OR;  Service: General;  Laterality: N/A;    BONE MARROW BIOPSY N/A 8/28/2023    Procedure: Biopsy-bone marrow;  Surgeon: Moo Pina MD;  Location: [facility] OR;  Service: General;  Laterality: N/A;    BONE MARROW BIOPSY N/A 10/31/2023    Procedure: Biopsy-bone marrow;  Surgeon: Edi Carty MD;  Location: [facility] OR;  Service: General;  Laterality: N/A;    KNEE SURGERY Left        Review of patient's allergies indicates:  No Known Allergies    Medications:  Continuous Infusions:   lactated ringers 125 mL/hr at 01/10/24 1242     Scheduled Meds:   acyclovir  800 mg Oral BID    amLODIPine  5 mg Oral Daily    fentaNYL  1 patch Transdermal Q72H    hydroxyurea  1,000 mg Oral BID    magnesium sulfate IVPB  2 g Intravenous Q2H    metoprolol  25 mg Oral BID    pantoprazole  40 mg Intravenous Daily    piperacillin-tazobactam (Zosyn) IV (PEDS and ADULTS) (extended infusion is not appropriate)  4.5 g Intravenous Q8H    potassium, sodium phosphates  1 packet Oral QID (AC & HS)    vancomycin (VANCOCIN) IV (PEDS and ADULTS)  1,000 mg Intravenous Q12H     PRN Meds:(Magic mouthwash) 1:1:1 diphenhydrAMINE(Benadryl) 12.5mg/5ml liq, aluminum & magnesium hydroxide-simethicone (Maalox), LIDOcaine viscous 2%, acetaminophen, dextrose 10%, dextrose 10%, diphenhydrAMINE, glucagon (human recombinant), glucose, glucose, hydrALAZINE, HYDROmorphone, naloxone, oxyCODONE, sodium chloride 0.9%, Pharmacy to dose Vancomycin consult **AND**  vancomycin - pharmacy to dose    Family History       Problem Relation (Age of Onset)    Cancer Maternal Grandmother    Hypertension Maternal Grandmother          Tobacco Use    Smoking status: Never    Smokeless tobacco: Never   Substance and Sexual Activity    Alcohol use: Never    Drug use: Yes     Types: Marijuana    Sexual activity: Not on file       Review of Systems   Constitutional:  Positive for appetite change.   HENT:  Positive for mouth sores and sore throat.    Respiratory: Negative.     Cardiovascular: Negative.    Gastrointestinal: Negative.    Endocrine: Negative.    Musculoskeletal:  Positive for neck pain.   Neurological: Negative.      Objective:     Vital Signs (Most Recent):  Temp: 100.3 °F (37.9 °C) (01/10/24 1700)  Pulse: (!) 139 (01/10/24 1522)  Resp: 18 (01/10/24 1619)  BP: (!) 158/93 (01/10/24 1522)  SpO2: 97 % (01/10/24 1522) Vital Signs (24h Range):  Temp:  [98.6 °F (37 °C)-103.2 °F (39.6 °C)] 100.3 °F (37.9 °C)  Pulse:  [117-139] 139  Resp:  [16-20] 18  SpO2:  [91 %-100 %] 97 %  BP: (136-164)/(71-96) 158/93     Weight: 78.9 kg (174 lb)  Body mass index is 22.96 kg/m².       Physical Exam  Vitals and nursing note reviewed.   Constitutional:       General: He is not in acute distress.     Comments: drowsy   HENT:      Head: Normocephalic.      Right Ear: External ear normal.      Left Ear: External ear normal.      Nose: Nose normal.      Mouth/Throat:      Mouth: Mucous membranes are dry.   Eyes:      Pupils: Pupils are equal, round, and reactive to light.   Cardiovascular:      Rate and Rhythm: Tachycardia present.   Pulmonary:      Effort: No respiratory distress.   Abdominal:      General: There is no distension.   Musculoskeletal:      Cervical back: Normal range of motion.   Neurological:      Mental Status: He is oriented to person, place, and time.            Review of Symptoms      Symptom Assessment (ESAS 0-10 Scale)  Pain:  0  Dyspnea:  0  Anxiety:  0  Nausea:  0  Depression:   0  Anorexia:  0  Fatigue:  7  Insomnia:  0  Restlessness:  0  Agitation:  0     CAM / Delirium:  Negative  Constipation:  Negative  Diarrhea:  Negative      Modified [place] Scale:  0    ECOG Performance Status ndGndrndanddndend:nd nd2nd Living Arrangements:  Lives with family    Psychosocial/Cultural:   See Palliative Psychosocial Note: No  Dad is . Close with mom. Has 3 siblings (22, 19, and 8). The 19 year old is incarcerated  **Primary  to Follow**  Palliative Care  Consult: No    Spiritual:  F - Mel and Belief:  Yes  I - Importance:  Yes  C - Community:  Yes  A - Address in Care:  Yes        Advance Care Planning  Advance Directives:   Living Will: No    LaPOST: No    Do Not Resuscitate Status: No    Medical Power of : No      Decision Making:  Patient answered questions and Family answered questions  Goals of Care: The family endorses that what is most important right now is to focus on symptom/pain control and comfort and QOL     Accordingly, we have decided that the best plan to meet the patient's goals includes enrolling in hospice care         Significant Labs: All pertinent labs within the past 24 hours have been reviewed.  CBC:   Recent Labs   Lab 01/10/24  0843   WBC 85.29*   HGB 6.0*   HCT 18.1*   MCV 83   PLT 21*     BMP:  Recent Labs   Lab 01/10/24  0843   *   *   K 3.7   CL 98   CO2 26   BUN 8   CREATININE 0.7   CALCIUM 8.5*   MG 1.4*     LFT:  Lab Results   Component Value Date    AST 42 (H) 01/10/2024    ALKPHOS 295 (H) 01/10/2024    BILITOT 2.4 (H) 01/10/2024     Albumin:   Albumin   Date Value Ref Range Status   01/10/2024 2.4 (L) 3.5 - 5.2 g/dL Final     Protein:   Total Protein   Date Value Ref Range Status   01/10/2024 6.5 6.0 - 8.4 g/dL Final     Lactic acid:   Lab Results   Component Value Date    LACTATE 1.4 2024       Significant Imaging: I have reviewed all pertinent imaging results/findings within the past 24 hours.    CT soft tissue neck  1/9/24  Subcentimeter left palatine tonsil abscess with mild associated left parapharyngeal edema.         I spent a total of 85 minutes on the day of the visit. This includes face to face time in discussion of goals of care, symptom assessment, coordination of care and emotional support.  This also includes non-face to face time preparing to see the patient (eg, review of tests/imaging), obtaining and/or reviewing separately obtained history, documenting clinical information in the electronic or other health record, independently interpreting results and communicating results to the patient/family/caregiver, or care coordinator.    Roselyn Sanchez MD  Palliative Medicine  [facility] - Oncology ([facility])

## 2024-01-10 NOTE — ASSESSMENT & PLAN NOTE
Baseline hgb of 8-9. Associated with CML. No overt bleeding at this time    Transfuse hgb <7  Daily CBC

## 2024-01-10 NOTE — SUBJECTIVE & OBJECTIVE
Medications:  Continuous Infusions:   lactated ringers       Scheduled Meds:   acyclovir  800 mg Oral BID    hydroxyurea  1,000 mg Oral BID    magnesium sulfate IVPB  4 g Intravenous Once    metoprolol  25 mg Oral BID    pantoprazole  40 mg Intravenous Daily    piperacillin-tazobactam (Zosyn) IV (PEDS and ADULTS) (extended infusion is not appropriate)  4.5 g Intravenous Q8H    potassium, sodium phosphates  1 packet Oral QID (AC & HS)    vancomycin (VANCOCIN) IV (PEDS and ADULTS)  1,000 mg Intravenous Q12H     PRN Meds:(Magic mouthwash) 1:1:1 diphenhydrAMINE(Benadryl) 12.5mg/5ml liq, aluminum & magnesium hydroxide-simethicone (Maalox), LIDOcaine viscous 2%, acetaminophen, dextrose 10%, dextrose 10%, glucagon (human recombinant), glucose, glucose, HYDROmorphone, naloxone, oxyCODONE, sodium chloride 0.9%, Pharmacy to dose Vancomycin consult **AND** vancomycin - pharmacy to dose     No current facility-administered medications on file prior to encounter.     Current Outpatient Medications on File Prior to Encounter   Medication Sig    (Magic mouthwash) 1:1:1 diphenhydrAMINE(Benadryl) 12.5mg/5ml liq, aluminum & magnesium hydroxide-simethicone (Maalox), LIDOcaine viscous 2% Swish and spit 15 mLs every 4 (four) hours as needed (mouth pain). for mouth sores    acyclovir (ZOVIRAX) 800 MG Tab Take 1 tablet (800 mg total) by mouth 2 (two) times daily.    asciminib 40 mg Tab Take 5 tablets (200 mg total) by mouth twice daily. Take on an empty stomach; avoid food for at least 2 hours before and 1 hour after taking.    diltiaZEM (CARDIZEM CD) 120 MG Cp24 Take 1 capsule (120 mg total) by mouth nightly.    metoprolol tartrate (LOPRESSOR) 25 MG tablet Take 1 tablet (25 mg total) by mouth 2 (two) times daily.    [] nystatin (MYCOSTATIN) 100,000 unit/mL suspension Take 5 mLs (500,000 Units total) by mouth 4 (four) times daily. for 10 days    oxyCODONE (ROXICODONE) 10 mg Tab immediate release tablet Take 1 tablet (10 mg total)  by mouth every 4 (four) hours as needed for Pain.    pantoprazole (PROTONIX) 40 MG tablet Take 1 tablet (40 mg total) by mouth once daily.    potassium, sodium phosphates (PHOS-NAK) 280-160-250 mg PwPk Take 1 packet by mouth 3 (three) times daily with meals.    triamcinolone acetonide 0.1% (KENALOG) 0.1 % cream Apply topically 2 (two) times daily. Apply to rash twice daily       Review of patient's allergies indicates:  No Known Allergies    Past Medical History:   Diagnosis Date    CML (chronic myelocytic leukemia)     HVD (hypertensive vascular disease)     Oropharyngeal candidiasis      Past Surgical History:   Procedure Laterality Date    BONE MARROW BIOPSY N/A 8/22/2022    Procedure: Biopsy-bone marrow;  Surgeon: Getachew Chen MD;  Location: [facility] OR;  Service: General;  Laterality: N/A;    BONE MARROW BIOPSY N/A 7/25/2023    Procedure: Biopsy-bone marrow;  Surgeon: Edi Carty MD;  Location: [facility] OR;  Service: General;  Laterality: N/A;    BONE MARROW BIOPSY N/A 8/28/2023    Procedure: Biopsy-bone marrow;  Surgeon: Moo Pina MD;  Location: [facility] OR;  Service: General;  Laterality: N/A;    BONE MARROW BIOPSY N/A 10/31/2023    Procedure: Biopsy-bone marrow;  Surgeon: Edi Carty MD;  Location: [facility] OR;  Service: General;  Laterality: N/A;    KNEE SURGERY Left      Family History       Problem Relation (Age of Onset)    Cancer Maternal Grandmother    Hypertension Maternal Grandmother          Tobacco Use    Smoking status: Never    Smokeless tobacco: Never   Substance and Sexual Activity    Alcohol use: Never    Drug use: Yes     Types: Marijuana    Sexual activity: Not on file     Review of Systems  Focused ROS per HPI   Objective:     Vital Signs (Most Recent):  Temp: (!) 101.1 °F (38.4 °C) (01/10/24 0954)  Pulse: (!) 137 (01/10/24 0700)  Resp: 18 (01/10/24 0947)  BP: (!) 164/94 (01/10/24 0700)  SpO2: 95 % (01/10/24 0700) Vital Signs (24h Range):  Temp:  [98.6 °F (37 °C)-103.2 °F (39.6 °C)] 101.1 °F  (38.4 °C)  Pulse:  [117-137] 137  Resp:  [16-20] 18  SpO2:  [91 %-100 %] 95 %  BP: (136-164)/(71-94) 164/94     Weight: 79.3 kg (174 lb 13.2 oz)  Body mass index is 23.07 kg/m².         Physical Exam  Ill appearing   Resting in bed  No acute distress  Significant diffuse gingival hypertrophy   Bilateral tonsillar enlargement   Dry mucous membranes   Breathing comfortably on room air      Significant Labs:  CBC:   Recent Labs   Lab 01/09/24  2052 01/10/24  0843   .80* 85.29*   RBC 2.53* 2.19*   HGB 7.1* 6.0*   HCT 21.4* 18.1*   PLT 32*  --    MCV 85 83   MCH 28.1 27.4   MCHC 33.2 33.1       Significant Diagnostics:  CT: I have reviewed all pertinent results/findings within the past 24 hours and my personal findings are:  small (<1 cm) ring enhancing collection in the superior left tonsil, bilateral tonsillar enlargement, patent airway

## 2024-01-10 NOTE — PLAN OF CARE
Patient AAOx4. POC reviewed with patient; understanding verbalized. Tmax 103.1, MD notified tylenol given twice this shift. Cooling blanket applied with minimal relief of temp. Dilaudid given x3 for back and mouth pain with moderate relief. 2L nasal canula put on due to SOB, has been taken off. HR sustaining 120-130s throughout shift, MD aware. US of liver completed. 1 unit of RBCs transfusing, benadryl given as pre med. Up with 1 person assist to bathroom. LR @ 125 mL/hr. IV mag and potassium infused. Pt. with nonskid footwear on, bed in lowest position, and locked with bed rails up x2. Pt. instructed to call prior to getting OOB, Bed alarm refused. Pt. has call light and personal items within reach. All questions and concerns addressed at this time.

## 2024-01-10 NOTE — ASSESSMENT & PLAN NOTE
Significant pain with swallowing that has been ongoing issue since previous admission    Will continue dukes solution and other supportive measures

## 2024-01-10 NOTE — SUBJECTIVE & OBJECTIVE
Past Medical History:   Diagnosis Date    CML (chronic myelocytic leukemia)     HVD (hypertensive vascular disease)     Oropharyngeal candidiasis        Past Surgical History:   Procedure Laterality Date    BONE MARROW BIOPSY N/A 8/22/2022    Procedure: Biopsy-bone marrow;  Surgeon: Getachew Chen MD;  Location: [facility] OR;  Service: General;  Laterality: N/A;    BONE MARROW BIOPSY N/A 7/25/2023    Procedure: Biopsy-bone marrow;  Surgeon: Edi Carty MD;  Location: [facility] OR;  Service: General;  Laterality: N/A;    BONE MARROW BIOPSY N/A 8/28/2023    Procedure: Biopsy-bone marrow;  Surgeon: Moo Pina MD;  Location: [facility] OR;  Service: General;  Laterality: N/A;    BONE MARROW BIOPSY N/A 10/31/2023    Procedure: Biopsy-bone marrow;  Surgeon: Edi Carty MD;  Location: [facility] OR;  Service: General;  Laterality: N/A;    KNEE SURGERY Left        Review of patient's allergies indicates:  No Known Allergies    Current Facility-Administered Medications on File Prior to Encounter   Medication    0.9%  NaCl infusion (for blood administration)    0.9%  NaCl infusion (for blood administration)    0.9%  NaCl infusion (for blood administration)    0.9%  NaCl infusion (for blood administration)    0.9%  NaCl infusion (for blood administration)    acetaminophen tablet 650 mg    acetaminophen tablet 650 mg    diphenhydrAMINE capsule 25 mg    diphenhydrAMINE capsule 25 mg     Current Outpatient Medications on File Prior to Encounter   Medication Sig    (Magic mouthwash) 1:1:1 diphenhydrAMINE(Benadryl) 12.5mg/5ml liq, aluminum & magnesium hydroxide-simethicone (Maalox), LIDOcaine viscous 2% Swish and spit 15 mLs every 4 (four) hours as needed (mouth pain). for mouth sores    acyclovir (ZOVIRAX) 800 MG Tab Take 1 tablet (800 mg total) by mouth 2 (two) times daily.    asciminib 40 mg Tab Take 5 tablets (200 mg total) by mouth twice daily. Take on an empty stomach; avoid food for at least 2 hours before and 1 hour after taking.     diltiaZEM (CARDIZEM CD) 120 MG Cp24 Take 1 capsule (120 mg total) by mouth nightly.    metoprolol tartrate (LOPRESSOR) 25 MG tablet Take 1 tablet (25 mg total) by mouth 2 (two) times daily.    [] nystatin (MYCOSTATIN) 100,000 unit/mL suspension Take 5 mLs (500,000 Units total) by mouth 4 (four) times daily. for 10 days    oxyCODONE (ROXICODONE) 10 mg Tab immediate release tablet Take 1 tablet (10 mg total) by mouth every 4 (four) hours as needed for Pain.    pantoprazole (PROTONIX) 40 MG tablet Take 1 tablet (40 mg total) by mouth once daily.    potassium, sodium phosphates (PHOS-NAK) 280-160-250 mg PwPk Take 1 packet by mouth 3 (three) times daily with meals.    triamcinolone acetonide 0.1% (KENALOG) 0.1 % cream Apply topically 2 (two) times daily. Apply to rash twice daily     Family History       Problem Relation (Age of Onset)    Cancer Maternal Grandmother    Hypertension Maternal Grandmother          Tobacco Use    Smoking status: Never    Smokeless tobacco: Never   Substance and Sexual Activity    Alcohol use: Never    Drug use: Yes     Types: Marijuana    Sexual activity: Not on file     Review of Systems   Constitutional:  Positive for activity change, appetite change, fatigue and fever.   HENT:  Positive for sore throat, trouble swallowing and voice change. Negative for dental problem, ear pain, nosebleeds, sinus pressure and sinus pain.    Eyes:  Negative for pain and visual disturbance.   Respiratory:  Negative for cough, choking, shortness of breath and wheezing.    Cardiovascular:  Negative for chest pain, palpitations and leg swelling.   Gastrointestinal:  Positive for diarrhea. Negative for abdominal pain, blood in stool, constipation, nausea and vomiting.   Genitourinary:  Negative for difficulty urinating and hematuria.   Skin:  Positive for rash. Negative for wound.   Neurological:  Positive for weakness and headaches.     Objective:     Vital Signs (Most Recent):  Temp: (!) 102.8 °F (39.3  "°C) (01/10/24 0140)  Pulse: (!) 121 (01/10/24 0232)  Resp: 18 (01/10/24 0152)  BP: (!) 152/80 (01/10/24 0232)  SpO2: 97 % (01/10/24 0232) Vital Signs (24h Range):  Temp:  [100.2 °F (37.9 °C)-103.2 °F (39.6 °C)] 102.8 °F (39.3 °C)  Pulse:  [120-135] 121  Resp:  [16-20] 18  SpO2:  [95 %-100 %] 97 %  BP: (136-152)/(71-81) 152/80     Weight: 79.4 kg (175 lb)  Body mass index is 23.09 kg/m².     Physical Exam  Vitals and nursing note reviewed.   Constitutional:       General: He is in acute distress.      Appearance: He is ill-appearing and toxic-appearing. He is not diaphoretic.   HENT:      Nose:      Comments: Limited speaking and hoarse voice. Limited ROM of tongue and R mandibular swelling noted      Mouth/Throat:      Mouth: Mucous membranes are dry.      Pharynx: Pharyngeal swelling present.   Neck:      Comments: R sided mandibular swelling noted.  Cardiovascular:      Rate and Rhythm: Tachycardia present.   Pulmonary:      Effort: Pulmonary effort is normal. No respiratory distress.   Abdominal:      Tenderness: There is no abdominal tenderness (to patient palpation).   Musculoskeletal:      Cervical back: Edema present.      Right lower leg: No edema.      Left lower leg: No edema.   Skin:     Coloration: Skin is not jaundiced.      Findings: Rash present.   Neurological:      General: No focal deficit present.      Mental Status: He is oriented to person, place, and time.                Significant Labs: All pertinent labs within the past 24 hours have been reviewed.  Blood Culture: No results for input(s): "LABBLOO" in the last 48 hours.  CBC:   Recent Labs   Lab 01/08/24  1408 01/09/24 2052   WBC 92.43* 114.80*   HGB 7.2* 7.1*   HCT 22.1* 21.4*   PLT 3* 32*     CMP:   Recent Labs   Lab 01/08/24  1408 01/09/24 2052   * 134*   K 3.3* 2.9*   CL  --  93*   CO2 29 27   GLU  --  110   BUN 7.5* 10   CREATININE 0.73 0.9   CALCIUM 8.4 8.8   PROT  --  7.5   ALBUMIN 2.5* 2.7*   BILITOT 2.9* 2.6*   ALKPHOS 409* " 345*   * 58*   * 132*   ANIONGAP  --  14     Lactic Acid:   Recent Labs   Lab 01/09/24 2052   LACTATE 1.4       Significant Imaging: I have reviewed all pertinent imaging results/findings within the past 24 hours.

## 2024-01-10 NOTE — H&P
"[facility] - Emergency Dept  [facility] Medicine  History & Physical    Patient Name: Magdaleno Hirsch  MRN: [id number]  Admission Date: 1/9/2024  Attending Physician: Denny Barajas MD   Primary Care Provider: Christine Primary Doctor         Patient information was obtained from patient, past medical records, and ER records.       Subjective:     Principal Problem:<principal problem not specified>    Chief Complaint:   Chief Complaint   Patient presents with    Mouth Lesions     Reports mouth lesions and sore throat "caused from my chemo". Last received chemo yesterday. Hx of leukemia         HPI: Mr. 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 Patient was alone for AV encounter with nurse at bedside. Patient with very limited talking 2/2 pain. Patient was previously admitted for failure to thrive, poor intake, and fevers. Infectious workup was negative.Patient with leukocytosis, so he was started on Hydrea. Started on asciminib on 1/2/24. He developed a pruritic truncal rash the day after starting asciminib.  Patient was seen in clinic on 1/8 for therapy and concern for worsening CML vs sepsis given overall clinical picture. Patient was instructed to go to the ER. Patient reports having acute on chronic neck pain localized to the R side with no radiation rated at 10/10. Patient reports taking home medication but reports no improvement. Patient denies any SOB but report ROSALES. Patient denies any coughing. Denies any sick contacts. Patient denies any abdominal pain, nausea or vomiting but reports diarrhea. Reports no episodes today and reports anorexia 2/2 pain. Patient reports rash has not changed since last visit. Patient denies any other ENT complaints including sinus pressure pain or ear pain. Patient reports headache localized to R side rated at 8/10.     In the ED patient was febrile to 103, hypertensive and tachycardic to 130s " with SpO2 >95 on RA. Labs were significant for WBC 114K with 95% blast/other, hgb 7.1, PLT 32K, Lactic 1.4, Na 134, K 2.9, bili 2.6, AST 58 and . CXR was negative but CT soft tissue neck demonstrated sub centimeter left palatine tonsil abscess with mild associated left parapharyngeal edema. Patient was given IVF, vanc and cefepime and admitted to BMT for further management.     Oncology History  Primary Oncologic Diagnosis: Chronic myeloid leukemia, blast phase     8/2022: Diagnosed with chronic phase CML.  10/2022: Started dasatinib but had questionable compliance.  6/7/23: He presented to the ER at [facility] [place] with gum swelling. Labs notable for  (80% blasts). He was transferred to [facility] [place].  6/9/23: Bone marrow biopsy revealed blast phase chronic myeloid leukemia; reticulin fibrosis 2 of 3. 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 FISH with t(9;22) and MLLT3/MLL (KMT2A) fusion. NGS with NRAS (7%), PTPN11 (4%). BCR/ABL1:ABL1 (p210) >55%.  6/16/23: Started induction with CLIA + ponatinib. Course complicated by neutropenic fever, pharyngitis/mucositis, and strep viridans bacteremia.   7/6/23: Day 21 bone marrow biopsy revealed a markedly hypocellular marrow (<5%) with trilineage hypoplasia. CG 46,XY[5].  7/25/23: Bone marrow biopsy revealed a hypocellular marrow but suboptimal specimen for evaluation.   8/4/23: BCR/ABL1:ABL1 (p210) 23.6%.   8/28/23: Bone marrow biopsy revealed a hypocellular marrow (30%) with persistent blast phase with 10-15% blasts.   10/9/23: Cycle 1 day 1 of azacitidine 75 mg/m2 x5 days plus venetoclax 50mg daily x10 days + ponatininb 30mg daily (28 day cycle).   10/31/23: Bone marrow biopsy at the end of cycle 1 revealed persistent myeloid blast crisis (11% circulating blasts).   11/6/23: Cycle 2 day 1 of azacitidine, venetoclax, and ponatinib. Ponatinib increased to 45mg daily.   1/2/24: Started asciminib 200mg daily.           Past Medical History:   Diagnosis  Date    CML (chronic myelocytic leukemia)     HVD (hypertensive vascular disease)     Oropharyngeal candidiasis        Past Surgical History:   Procedure Laterality Date    BONE MARROW BIOPSY N/A 8/22/2022    Procedure: Biopsy-bone marrow;  Surgeon: Getachew Chen MD;  Location: [facility] OR;  Service: General;  Laterality: N/A;    BONE MARROW BIOPSY N/A 7/25/2023    Procedure: Biopsy-bone marrow;  Surgeon: Edi Carty MD;  Location: [facility] OR;  Service: General;  Laterality: N/A;    BONE MARROW BIOPSY N/A 8/28/2023    Procedure: Biopsy-bone marrow;  Surgeon: Moo Pina MD;  Location: [facility] OR;  Service: General;  Laterality: N/A;    BONE MARROW BIOPSY N/A 10/31/2023    Procedure: Biopsy-bone marrow;  Surgeon: Edi Carty MD;  Location: [facility] OR;  Service: General;  Laterality: N/A;    KNEE SURGERY Left        Review of patient's allergies indicates:  No Known Allergies    Current Facility-Administered Medications on File Prior to Encounter   Medication    0.9%  NaCl infusion (for blood administration)    0.9%  NaCl infusion (for blood administration)    0.9%  NaCl infusion (for blood administration)    0.9%  NaCl infusion (for blood administration)    0.9%  NaCl infusion (for blood administration)    acetaminophen tablet 650 mg    acetaminophen tablet 650 mg    diphenhydrAMINE capsule 25 mg    diphenhydrAMINE capsule 25 mg     Current Outpatient Medications on File Prior to Encounter   Medication Sig    (Magic mouthwash) 1:1:1 diphenhydrAMINE(Benadryl) 12.5mg/5ml liq, aluminum & magnesium hydroxide-simethicone (Maalox), LIDOcaine viscous 2% Swish and spit 15 mLs every 4 (four) hours as needed (mouth pain). for mouth sores    acyclovir (ZOVIRAX) 800 MG Tab Take 1 tablet (800 mg total) by mouth 2 (two) times daily.    asciminib 40 mg Tab Take 5 tablets (200 mg total) by mouth twice daily. Take on an empty stomach; avoid food for at least 2 hours before and 1 hour after taking.    diltiaZEM (CARDIZEM CD) 120 MG Cp24  Take 1 capsule (120 mg total) by mouth nightly.    metoprolol tartrate (LOPRESSOR) 25 MG tablet Take 1 tablet (25 mg total) by mouth 2 (two) times daily.    [] nystatin (MYCOSTATIN) 100,000 unit/mL suspension Take 5 mLs (500,000 Units total) by mouth 4 (four) times daily. for 10 days    oxyCODONE (ROXICODONE) 10 mg Tab immediate release tablet Take 1 tablet (10 mg total) by mouth every 4 (four) hours as needed for Pain.    pantoprazole (PROTONIX) 40 MG tablet Take 1 tablet (40 mg total) by mouth once daily.    potassium, sodium phosphates (PHOS-NAK) 280-160-250 mg PwPk Take 1 packet by mouth 3 (three) times daily with meals.    triamcinolone acetonide 0.1% (KENALOG) 0.1 % cream Apply topically 2 (two) times daily. Apply to rash twice daily     Family History       Problem Relation (Age of Onset)    Cancer Maternal Grandmother    Hypertension Maternal Grandmother          Tobacco Use    Smoking status: Never    Smokeless tobacco: Never   Substance and Sexual Activity    Alcohol use: Never    Drug use: Yes     Types: Marijuana    Sexual activity: Not on file     Review of Systems   Constitutional:  Positive for activity change, appetite change, fatigue and fever.   HENT:  Positive for sore throat, trouble swallowing and voice change. Negative for dental problem, ear pain, nosebleeds, sinus pressure and sinus pain.    Eyes:  Negative for pain and visual disturbance.   Respiratory:  Negative for cough, choking, shortness of breath and wheezing.    Cardiovascular:  Negative for chest pain, palpitations and leg swelling.   Gastrointestinal:  Positive for diarrhea. Negative for abdominal pain, blood in stool, constipation, nausea and vomiting.   Genitourinary:  Negative for difficulty urinating and hematuria.   Skin:  Positive for rash. Negative for wound.   Neurological:  Positive for weakness and headaches.     Objective:     Vital Signs (Most Recent):  Temp: (!) 102.8 °F (39.3 °C) (01/10/24 0140)  Pulse: (!) 121  "(01/10/24 0232)  Resp: 18 (01/10/24 0152)  BP: (!) 152/80 (01/10/24 0232)  SpO2: 97 % (01/10/24 0232) Vital Signs (24h Range):  Temp:  [100.2 °F (37.9 °C)-103.2 °F (39.6 °C)] 102.8 °F (39.3 °C)  Pulse:  [120-135] 121  Resp:  [16-20] 18  SpO2:  [95 %-100 %] 97 %  BP: (136-152)/(71-81) 152/80     Weight: 79.4 kg (175 lb)  Body mass index is 23.09 kg/m².     Physical Exam  Vitals and nursing note reviewed.   Constitutional:       General: He is in acute distress.      Appearance: He is ill-appearing and toxic-appearing. He is not diaphoretic.   HENT:      Nose:      Comments: Limited speaking and hoarse voice. Limited ROM of tongue and R mandibular swelling noted      Mouth/Throat:      Mouth: Mucous membranes are dry.      Pharynx: Pharyngeal swelling present.   Neck:      Comments: R sided mandibular swelling noted.  Cardiovascular:      Rate and Rhythm: Tachycardia present.   Pulmonary:      Effort: Pulmonary effort is normal. No respiratory distress.   Abdominal:      Tenderness: There is no abdominal tenderness (to patient palpation).   Musculoskeletal:      Cervical back: Edema present.      Right lower leg: No edema.      Left lower leg: No edema.   Skin:     Coloration: Skin is not jaundiced.      Findings: Rash present.   Neurological:      General: No focal deficit present.      Mental Status: He is oriented to person, place, and time.                Significant Labs: All pertinent labs within the past 24 hours have been reviewed.  Blood Culture: No results for input(s): "LABBLOO" in the last 48 hours.  CBC:   Recent Labs   Lab 01/08/24  1408 01/09/24 2052   WBC 92.43* 114.80*   HGB 7.2* 7.1*   HCT 22.1* 21.4*   PLT 3* 32*     CMP:   Recent Labs   Lab 01/08/24  1408 01/09/24 2052   * 134*   K 3.3* 2.9*   CL  --  93*   CO2 29 27   GLU  --  110   BUN 7.5* 10   CREATININE 0.73 0.9   CALCIUM 8.4 8.8   PROT  --  7.5   ALBUMIN 2.5* 2.7*   BILITOT 2.9* 2.6*   ALKPHOS 409* 345*   * 58*   * 132* "   ANIONGAP  --  14     Lactic Acid:   Recent Labs   Lab 01/09/24 2052   LACTATE 1.4       Significant Imaging: I have reviewed all pertinent imaging results/findings within the past 24 hours.  Assessment/Plan:     Sepsis  This patient does have evidence of infective focus  My overall impression is sepsis.  Source: Skin and Soft Tissue (location diffuse rash) and ENT  Antibiotics given-   Antibiotics (72h ago, onward)      None          Latest lactate reviewed-  Recent Labs   Lab 01/09/24 2052   LACTATE 1.4     Organ dysfunction indicated by Acute liver injury    Fluid challenge Ideal Body Weight- The patient's ideal body weight is Ideal body weight: 79.9 kg (176 lb 2.4 oz) which will be used to calculate fluid bolus of 30 ml/kg for treatment of septic shock.      Post- resuscitation assessment No - Post resuscitation assessment not needed       Will Not start Pressors- Levophed for MAP of 65  Source control achieved by:     Patient with persistent fevers since recent DC likely associated with CML but CT soft tissue demonstrated tonsillar abscess. Patient with significant leukocytosis likely related to blast phase. On hydrea and OI ppx at home. Baseline WBC of 11-13 K previously.     Plan   - will start vanc and unasyn given ongoing fevers. Can DC vanc if MRSA culture negative and fevers dont resolve  - ENT consult for tonsillar abscess and possible source control  - follow up blood cultures  - will continue IVF given tachycardia and volume status      Odynophagia  Significant pain with swallowing that has been ongoing issue since previous admission    Will continue dukes solution and other supportive measures      Hyperbilirubinemia  Elevated on admission no abdominal pain reported    Will check RUQ US and direct bili to fractionate  Follow up CMP      Thrombocytopenia  Baseline PLT of <50K. No overt bleeding    Transfuse PLT if <50K with bleeding or <10K  Daily CBC      Anemia in neoplastic disease  Baseline hgb  of 8-9. Associated with CML. No overt bleeding at this time    Transfuse hgb <7  Daily CBC      Hypokalemia  K of 2.9. Mucositis and dysphagia will limit PO repletion. Likely 2/2 poor PO intake    Plan  - continue IV replacement  Repeat BMP in AM      Hypertension  On lopressor and diltiazem    Slightly hypertensive on admission but given concern for sepsis will hold and add back as tolerated      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  See oncology history    Defer to team about continuation of hydrea for cytoreduction  TLS labs in AM   Progression on multiple lines of therapy and will consult palliative for further discussions given prognosis      CML (chronic myeloid leukemia) in relapse  See Oncology history        VTE Risk Mitigation (From admission, onward)           Ordered     Reason for No Pharmacological VTE Prophylaxis  Once        Question:  Reasons:  Answer:  Thrombocytopenia    01/10/24 0213     IP VTE HIGH RISK PATIENT  Once         01/10/24 0213     Place sequential compression device  Until discontinued         01/10/24 0213                         The attending portion of this evaluation, treatment, and documentation was performed per Bryanna Allen DO via Telemedicine AudioVisual using the secure [employer] software platform with 2 way audio/video. The provider was located off-site and the patient is located in the hospital. The aforementioned video software was utilized to document the relevant history and physical exam            Bryanna Allen DO  Department of Hospital Medicine   [facility] - Emergency Dept

## 2024-01-10 NOTE — HPI
Mr. 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 Patient was alone for AV encounter with nurse at bedside. Patient with very limited talking 2/2 pain. Patient was previously admitted for failure to thrive, poor intake, and fevers. Infectious workup was negative.Patient with leukocytosis, so he was started on Hydrea. Started on asciminib on 1/2/24. He developed a pruritic truncal rash the day after starting asciminib.  Patient was seen in clinic on 1/8 for therapy and concern for worsening CML vs sepsis given overall clinical picture. Patient was instructed to go to the ER. Patient reports having acute on chronic neck pain localized to the R side with no radiation rated at 10/10. Patient reports taking home medication but reports no improvement. Patient denies any SOB but report ROSALES. Patient denies any coughing. Denies any sick contacts. Patient denies any abdominal pain, nausea or vomiting but reports diarrhea. Reports no episodes today and reports anorexia 2/2 pain. Patient reports rash has not changed since last visit. Patient denies any other ENT complaints including sinus pressure pain or ear pain. Patient reports headache localized to R side rated at 8/10.     In the ED patient was febrile to 103, hypertensive and tachycardic to 130s with SpO2 >95 on RA. Labs were significant for WBC 114K with 95% blast/other, hgb 7.1, PLT 32K, Lactic 1.4, Na 134, K 2.9, bili 2.6, AST 58 and . CXR was negative but CT soft tissue neck demonstrated sub centimeter left palatine tonsil abscess with mild associated left parapharyngeal edema. Patient was given IVF, vanc and cefepime and admitted to BMT for further management.     Oncology History  Primary Oncologic Diagnosis: Chronic myeloid leukemia, blast phase     8/2022: Diagnosed with chronic phase CML.  10/2022: Started dasatinib but had questionable compliance.  6/7/23: He  presented to the ER at [facility] [place] with gum swelling. Labs notable for  (80% blasts). He was transferred to [facility] [place] for further management.  6/9/23: Bone marrow biopsy revealed blast phase chronic myeloid leukemia; reticulin fibrosis 2 of 3. 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 FISH with t(9;22) and MLLT3/MLL (KMT2A) fusion. NGS with NRAS (7%), PTPN11 (4%). BCR/ABL1:ABL1 (p210) >55%.  6/16/23: Started induction with CLIA + ponatinib. Course complicated by neutropenic fever, pharyngitis/mucositis, and strep viridans bacteremia.   7/6/23: Day 21 bone marrow biopsy revealed a markedly hypocellular marrow (<5%) with trilineage hypoplasia. CG 46,XY[5].  7/25/23: Bone marrow biopsy revealed a hypocellular marrow but suboptimal specimen for evaluation.   8/4/23: BCR/ABL1:ABL1 (p210) 23.6%.   8/28/23: Bone marrow biopsy revealed a hypocellular marrow (30%) with persistent blast phase with 10-15% blasts.   10/9/23: Cycle 1 day 1 of azacitidine 75 mg/m2 x5 days plus venetoclax 50mg daily x10 days + ponatininb 30mg daily (28 day cycle).   10/31/23: Bone marrow biopsy at the end of cycle 1 revealed persistent myeloid blast crisis (11% circulating blasts).   11/6/23: Cycle 2 day 1 of azacitidine, venetoclax, and ponatinib. Ponatinib increased to 45mg daily.   1/2/24: Started asciminib 200mg daily.

## 2024-01-10 NOTE — CHAPLAIN
"Palliative Care     Patient: Magdaleno Hirsch  MRN: [id number]  : 2000  Age: 23 y.o.  Hospital Length of Stay: 0  Code Status: Code Status Discussion Note  Attending Provider: Denny Barajas MD  Principal Problem: <principal problem not specified>    Non-clinical observations of patient/room: Visited pall med pt per request of [email or web address] med provider; pt was alone (evidence that mom or a female is in the hospital- purse bedside); pt was semi-sleeping, resting, still, on his back, seemed comfortable; brief but good intro meeting.   10 min.    Introduced myself to pt as he lay still; he opened his eyes to acknowledge; provided compassionate presence and tender touch as he rested, plenty of silence; told pt I was here to love on him and his mom and pt whispered with eyes still closed, "I need the love."     After providing more silence I confirmed that he identifies as [employer]ianism Spiritism (per Epic) and he said yes. Offered to pray over him and he agreed and said "amen" afterwards.More silence.    Told pt I'd like to come by tomorrow to see him again and perhaps his mom too and he whispered, "okay". Palliative  will continue to follow to provide emotional and spiritual support for both pt and family Lord, in your mercy.     **Spiritual Care Dept. Chaplains are available evenings, overnight and weekends **    In Peace,    Rev. Sherley Robison MDiv, [facility]  Board Certified   Palliative Medicine Department  [phone]  "

## 2024-01-10 NOTE — ASSESSMENT & PLAN NOTE
"23 year in blast crisis CML here with intratonsillar abscess. No longer a candidate for cancer directed therapies       Code status: Full. Not addressed     Surrogate decision maker: Mom. Need phone number     GOC/ACP  -Met with patient and mom at bedside. Patient very sleepy and did not participate much. Mom shared that they were told patient had "no more options and was at end of life" Provided support. Asked where patient would want to be at the end of his life. She said home. Discussed how hospice can support them at home and provide symptom management. Mom said it was ok for case management to send a referral.     -Appreciate case management sending hospice referral  -Palliative  following for additional support     Cancer associated pain   -Pt too sleepy to provide a pain hx. Mom says pt was taking oxycodone 10 frequently at home. No longer able to tolerate PO  -Continue magic mouth wash  -Start fentanyl patch 12.5mcg. Can continue IV dilaudid for breakthrough inpatient. Consider reducing dose to 0.5mg. Patient very sleepy. Breakthrough pain can likely be controlled with morphine 20mg/ml 0.5-1ml q2h prn once home with hospice     Discussed with JEMAL Jacob  "

## 2024-01-10 NOTE — HPI
"Per hematology H&P  "Mr. 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 Patient was alone for AV encounter with nurse at bedside. Patient with very limited talking 2/2 pain. Patient was previously admitted for failure to thrive, poor intake, and fevers. Infectious workup was negative.Patient with leukocytosis, so he was started on Hydrea. Started on asciminib on 1/2/24. He developed a pruritic truncal rash the day after starting asciminib.  Patient was seen in clinic on 1/8 for therapy and concern for worsening CML vs sepsis given overall clinical picture. Patient was instructed to go to the ER. Patient reports having acute on chronic neck pain localized to the R side with no radiation rated at 10/10. Patient reports taking home medication but reports no improvement. Patient denies any SOB but report ROSALES. Patient denies any coughing. Denies any sick contacts. Patient denies any abdominal pain, nausea or vomiting but reports diarrhea. Reports no episodes today and reports anorexia 2/2 pain. Patient reports rash has not changed since last visit. Patient denies any other ENT complaints including sinus pressure pain or ear pain. Patient reports headache localized to R side rated at 8/10.      In the ED patient was febrile to 103, hypertensive and tachycardic to 130s with SpO2 >95 on RA. Labs were significant for WBC 114K with 95% blast/other, hgb 7.1, PLT 32K, Lactic 1.4, Na 134, K 2.9, bili 2.6, AST 58 and . CXR was negative but CT soft tissue neck demonstrated sub centimeter left palatine tonsil abscess with mild associated left parapharyngeal edema. Patient was given IVF, vanc and cefepime and admitted to BMT for further management. "    Per primary team patient is not a candidate for further cancer directed therapies. Palliative consulted for end of life discussions   "

## 2024-01-10 NOTE — PROGRESS NOTES
Admit Assessment    Patient Identification  Magdaleno Hirsch   :  2000  Admit Date:  2024  Attending Provider:  Denny Barajas MD              Referral:   Pt was admitted to the BMT-UNIT  with a diagnosis of CML, and was admitted this hospital stay due to Tachycardia [R00.0]  Chest pain [R07.9].   is involved was referred to the Social Work Department via (Referal).  The patient presents as a 23 y.o. year old  male. The patient reported that he does not have any children, and he lives with his girlfriend, Marcia Irwin. aMrcia is the patient's caretaker. The couple lives in an apartment without steps to the entrance    Persons interviewed: The  spoke to the patient and his mother, Danisha Hirsch at bedside..    Living Situation:    The patient resides with his girlfriend at [address] [place] [address]  [place] [address] [place] [address].    The patient's phone: [phone].  The patient's mother Danisha Hirsch Phone: [phone].  The patient's girlfriend Marcia Irwin phone: [phone].  The patient's grandparent,Kristy Keller phone: [phone].      (RETIRED) Functional Status Prior  Ambulation Prior: 0-->independent  Transferrin-->independent  Toiletin-->independent    Current or Past Agencies and Description of Services/Supplies    DME  Agency Name: None.  Agency Phone Number: None.       Home Health  Agency Name: None.  Agency Phone Number: None.  Services: None.    IV Infusion  Agency Name: None.  Agency Phone Number: None.    Nutrition: The patient is on a non-restricted diet but has difficulty swallowing.    Outpatient Pharmacy:     [id number] - [place] - [address] AT [facility] [address] [place] [address]  Phone: [phone] Fax: [phone]    [id number]  [place] - [address] AT [facility] [address]  [address]  [place] [address]  Phone: [phone] Fax: [phone]      Patient Preference of agencies include Patient reported no preferences.    Patient/Caregiver informed of right to choose providers or agencies.  Patient provides permission to release any necessary information to [facility] and to Non-[facility] agencies as needed to facilitate patient care, treatment planning, and patient discharge planning.  Written and verbal resources provided.      Coping: The patient reports that he is coping poorly.    Adjustment to Diagnosis and Treatment: The patient reported that he is not adjusting well to his diagnosis and treatment.    Emotional: The patient reports some sadness and crying episodes,    Behavioral: The patient and parent agree that the patient does not have any behavioral changes or concerns.    Cognitive Issues: The patient reports no cognitive issues or concerns.    Employment: The Patient reported that he was employed at [employer] for 1.5 years without any benefits.    Finance: SSDI pending.    Housing: The patient lives in an apartment with his girlfriend/caretaker.    Support system: The patient reported that he has a good support system.    Mel: Confucianism    Recreation/Hobbies/Leisure: The patient enjoyed playing basketball, and being with friends and family.    POA: None.    Living Will: None    Advance Directives: The patient stated that his mother will be the identified person, Tarik Hirsch.    INS: Medicaid/[employer] Better [employer]:    Transportation: The patient's preference is that his family provide transportation.    History/Current Symptoms of Anxiety/Depression: No:   History/Current Substance Use:   Social History     Tobacco Use    Smoking status: Never    Smokeless tobacco: Never   Substance and Sexual Activity    Alcohol use: Never    Drug use: Yes     Types: Marijuana    Sexual activity: Not on file       Indications of Abuse/Neglect: No:   Abuse Screen (yes response referral  indicated)  Feels Unsafe at Home or Work/School: No.  Feels Threatened by Someone: No.  Does anyone try to keep you from having contact with others or doing things outside your home?: No.  Physical Signs of Abuse Present: No.    Financial:  Payer/Plan Subscr  Sex Relation Sub. Ins. ID Effective Group Num   1. MEDICAID - AE CAROLYN TOMLIN 2000 Male Self [id number]* [id number]                                   [address], [facility] [address]        Other identified concerns/needs: SSDI approval pending.    Plan: discharge with at home Hospice    Interventions/Referrals: [facility].  Patient/caregiver engaged in treatment planning process. Yes     providing psychosocial and supportive counseling, resources, education, assistance and discharge planning as appropriate.  Patient/caregiver state understanding of  available resources,  following, remains available. Yes.

## 2024-01-10 NOTE — CARE UPDATE
"RAPID RESPONSE NURSE CHART REVIEW        Chart Reviewed: 01/10/2024, 2:56 AM    MRN: [id number]  Bed: ED 36/36    Dx: <principal problem not specified>    Magdaleno Hirsch has a past medical history of CML (chronic myelocytic leukemia), HVD (hypertensive vascular disease), and Oropharyngeal candidiasis.    Last VS: BP (!) 152/80   Pulse (!) 121   Temp (!) 102.8 °F (39.3 °C) (Oral)   Resp 18   Wt 79.4 kg (175 lb)   SpO2 97%   BMI 23.09 kg/m²     24H Vital Sign Range:  Temp:  [100.2 °F (37.9 °C)-103.2 °F (39.6 °C)]   Pulse:  [120-135]   Resp:  [16-20]   BP: (136-152)/(71-81)   SpO2:  [95 %-100 %]     Level of Consciousness (AVPU): alert    Recent Labs     01/08/24  1408 01/09/24 2052   WBC 92.43* 114.80*   HGB 7.2* 7.1*   HCT 22.1* 21.4*   PLT 3* 32*       Recent Labs     01/08/24  1408 01/09/24 2052   * 134*   K 3.3* 2.9*   CL  --  93*   CO2 29 27   BUN 7.5* 10   CREATININE 0.73 0.9   GLU  --  110        No results for input(s): "PH", "PCO2", "PO2", "HCO3", "POCSATURATED", "BE" in the last 72 hours.     OXYGEN:             MEWS score:      Epic reports elevated temp and HR. No additional concerns at this time. Call [id number] for further concerns or assistance.    Yesenia Sampson RN        "

## 2024-01-10 NOTE — HPI
24 yo w/ hx of CML on chemo presenting to ED with worsening mouth and throat pain x 1 week with poor po intake. CT neck soft tissue shows <1 cm L intratonsillar abscess. Patient denies any difficulty breathing. Does endorse pain with swallowing and neck pain.

## 2024-01-10 NOTE — ASSESSMENT & PLAN NOTE
Elevated on admission no abdominal pain reported    Will check RUQ US and direct bili to fractionate  Follow up CMP

## 2024-01-10 NOTE — ASSESSMENT & PLAN NOTE
On lopressor and diltiazem    Slightly hypertensive on admission but given concern for sepsis will hold and add back as tolerated

## 2024-01-10 NOTE — SUBJECTIVE & OBJECTIVE
Past Medical History:   Diagnosis Date    CML (chronic myelocytic leukemia)     HVD (hypertensive vascular disease)     Oropharyngeal candidiasis        Past Surgical History:   Procedure Laterality Date    BONE MARROW BIOPSY N/A 8/22/2022    Procedure: Biopsy-bone marrow;  Surgeon: Getachew Chen MD;  Location: [facility] OR;  Service: General;  Laterality: N/A;    BONE MARROW BIOPSY N/A 7/25/2023    Procedure: Biopsy-bone marrow;  Surgeon: Edi Carty MD;  Location: [facility] OR;  Service: General;  Laterality: N/A;    BONE MARROW BIOPSY N/A 8/28/2023    Procedure: Biopsy-bone marrow;  Surgeon: Moo Pina MD;  Location: [facility] OR;  Service: General;  Laterality: N/A;    BONE MARROW BIOPSY N/A 10/31/2023    Procedure: Biopsy-bone marrow;  Surgeon: Edi Carty MD;  Location: [facility] OR;  Service: General;  Laterality: N/A;    KNEE SURGERY Left        Review of patient's allergies indicates:  No Known Allergies    Medications:  Continuous Infusions:   lactated ringers 125 mL/hr at 01/10/24 1242     Scheduled Meds:   acyclovir  800 mg Oral BID    amLODIPine  5 mg Oral Daily    fentaNYL  1 patch Transdermal Q72H    hydroxyurea  1,000 mg Oral BID    magnesium sulfate IVPB  2 g Intravenous Q2H    metoprolol  25 mg Oral BID    pantoprazole  40 mg Intravenous Daily    piperacillin-tazobactam (Zosyn) IV (PEDS and ADULTS) (extended infusion is not appropriate)  4.5 g Intravenous Q8H    potassium, sodium phosphates  1 packet Oral QID (AC & HS)    vancomycin (VANCOCIN) IV (PEDS and ADULTS)  1,000 mg Intravenous Q12H     PRN Meds:(Magic mouthwash) 1:1:1 diphenhydrAMINE(Benadryl) 12.5mg/5ml liq, aluminum & magnesium hydroxide-simethicone (Maalox), LIDOcaine viscous 2%, acetaminophen, dextrose 10%, dextrose 10%, diphenhydrAMINE, glucagon (human recombinant), glucose, glucose, hydrALAZINE, HYDROmorphone, naloxone, oxyCODONE, sodium chloride 0.9%, Pharmacy to dose Vancomycin consult **AND** vancomycin - pharmacy to dose    Family  History       Problem Relation (Age of Onset)    Cancer Maternal Grandmother    Hypertension Maternal Grandmother          Tobacco Use    Smoking status: Never    Smokeless tobacco: Never   Substance and Sexual Activity    Alcohol use: Never    Drug use: Yes     Types: Marijuana    Sexual activity: Not on file       Review of Systems   Constitutional:  Positive for appetite change.   HENT:  Positive for mouth sores and sore throat.    Respiratory: Negative.     Cardiovascular: Negative.    Gastrointestinal: Negative.    Endocrine: Negative.    Musculoskeletal:  Positive for neck pain.   Neurological: Negative.      Objective:     Vital Signs (Most Recent):  Temp: 100.3 °F (37.9 °C) (01/10/24 1700)  Pulse: (!) 139 (01/10/24 1522)  Resp: 18 (01/10/24 1619)  BP: (!) 158/93 (01/10/24 1522)  SpO2: 97 % (01/10/24 1522) Vital Signs (24h Range):  Temp:  [98.6 °F (37 °C)-103.2 °F (39.6 °C)] 100.3 °F (37.9 °C)  Pulse:  [117-139] 139  Resp:  [16-20] 18  SpO2:  [91 %-100 %] 97 %  BP: (136-164)/(71-96) 158/93     Weight: 78.9 kg (174 lb)  Body mass index is 22.96 kg/m².       Physical Exam  Vitals and nursing note reviewed.   Constitutional:       General: He is not in acute distress.     Comments: drowsy   HENT:      Head: Normocephalic.      Right Ear: External ear normal.      Left Ear: External ear normal.      Nose: Nose normal.      Mouth/Throat:      Mouth: Mucous membranes are dry.   Eyes:      Pupils: Pupils are equal, round, and reactive to light.   Cardiovascular:      Rate and Rhythm: Tachycardia present.   Pulmonary:      Effort: No respiratory distress.   Abdominal:      General: There is no distension.   Musculoskeletal:      Cervical back: Normal range of motion.   Neurological:      Mental Status: He is oriented to person, place, and time.            Review of Symptoms      Symptom Assessment (ESAS 0-10 Scale)  Pain:  0  Dyspnea:  0  Anxiety:  0  Nausea:  0  Depression:  0  Anorexia:  0  Fatigue:  7  Insomnia:   0  Restlessness:  0  Agitation:  0     CAM / Delirium:  Negative  Constipation:  Negative  Diarrhea:  Negative      Modified Zion Scale:  0    ECOG Performance Status ndGndrndanddndend:nd nd2nd Living Arrangements:  Lives with family    Psychosocial/Cultural:   See Palliative Psychosocial Note: No  Dad is . Close with mom. Has 3 siblings (22, 19, and 8). The 19 year old is incarcerated  **Primary  to Follow**  Palliative Care  Consult: No    Spiritual:  F - Mel and Belief:  Yes  I - Importance:  Yes  C - Community:  Yes  A - Address in Care:  Yes        Advance Care Planning   Advance Directives:   Living Will: No    LaPOST: No    Do Not Resuscitate Status: No    Medical Power of : No      Decision Making:  Patient answered questions and Family answered questions  Goals of Care: The family endorses that what is most important right now is to focus on symptom/pain control and comfort and QOL     Accordingly, we have decided that the best plan to meet the patient's goals includes enrolling in hospice care         Significant Labs: All pertinent labs within the past 24 hours have been reviewed.  CBC:   Recent Labs   Lab 01/10/24  0843   WBC 85.29*   HGB 6.0*   HCT 18.1*   MCV 83   PLT 21*     BMP:  Recent Labs   Lab 01/10/24  0843   *   *   K 3.7   CL 98   CO2 26   BUN 8   CREATININE 0.7   CALCIUM 8.5*   MG 1.4*     LFT:  Lab Results   Component Value Date    AST 42 (H) 01/10/2024    ALKPHOS 295 (H) 01/10/2024    BILITOT 2.4 (H) 01/10/2024     Albumin:   Albumin   Date Value Ref Range Status   01/10/2024 2.4 (L) 3.5 - 5.2 g/dL Final     Protein:   Total Protein   Date Value Ref Range Status   01/10/2024 6.5 6.0 - 8.4 g/dL Final     Lactic acid:   Lab Results   Component Value Date    LACTATE 1.4 2024       Significant Imaging: I have reviewed all pertinent imaging results/findings within the past 24 hours.    CT soft tissue neck 24  Subcentimeter left palatine tonsil  abscess with mild associated left parapharyngeal edema.

## 2024-01-10 NOTE — PROVIDER PROGRESS NOTES - EMERGENCY DEPT.
Triage EKG interpretation performed at 8:55 PM    No STEMI    Sinus tach. ST depression inf and lat leads    Monica Majano

## 2024-01-10 NOTE — ED TRIAGE NOTES
"Magdaleno Hirsch, a 23 y.o. male presents to the ED w/ complaint of mouth lesions. Patient reports having mouth lesions and a sore throat from chemo. Patient states he is on oral chemo and received it last yesterday. Patient states he saw a doctor and was given nystatin for his mouth sore/lesions. Patient states having some shortness of breath. Patient endorses 10/10 mouth pain.    Triage note:  Chief Complaint   Patient presents with    Mouth Lesions     Reports mouth lesions and sore throat "caused from my chemo". Last received chemo yesterday. Hx of leukemia      Review of patient's allergies indicates:  No Known Allergies  Past Medical History:   Diagnosis Date    CML (chronic myelocytic leukemia)     HVD (hypertensive vascular disease)     Oropharyngeal candidiasis        "

## 2024-01-10 NOTE — ASSESSMENT & PLAN NOTE
Baseline PLT of <50K. No overt bleeding    Transfuse PLT if <50K with bleeding or <10K  Daily CBC

## 2024-01-10 NOTE — ED PROVIDER NOTES
"History:  Magdaleno Hirsch is a 23 y.o. male who presents to the ED with Mouth Lesions (Reports mouth lesions and sore throat "caused from my chemo". Last received chemo yesterday. Hx of leukemia )    Described as 23-year-old male with a history of CML presenting to the emergency department with mouth lesions.  He reports the past week he has had progressively worsening pain and sores in his mouth, poor p.o. intake secondary to the pain.  He endorses low-grade fevers at home, which he has been told his secondary to his leukemia.  He denies any other infectious symptoms.    Review of Systems: All systems reviewed and are negative except as per history of present illness.    Medications:   Current Discharge Medication List        CONTINUE these medications which have NOT CHANGED    Details   (Magic mouthwash) 1:1:1 diphenhydrAMINE(Benadryl) 12.5mg/5ml liq, aluminum & magnesium hydroxide-simethicone (Maalox), LIDOcaine viscous 2% Swish and spit 15 mLs every 4 (four) hours as needed (mouth pain). for mouth sores  Qty: 360 mL, Refills: 2    Associated Diagnoses: CML (chronic myeloid leukemia) in relapse      acyclovir (ZOVIRAX) 800 MG Tab Take 1 tablet (800 mg total) by mouth 2 (two) times daily.  Qty: 60 tablet, Refills: 11    Associated Diagnoses: Immunodeficiency due to conditions classified elsewhere      asciminib 40 mg Tab Take 5 tablets (200 mg total) by mouth twice daily. Take on an empty stomach; avoid food for at least 2 hours before and 1 hour after taking.  Qty: 300 tablet, Refills: 0    Comments: per Dr. Hogan ok to add "Take on an empty stomach; avoid food for at least 2 hours before and 1 hour after taking."  Associated Diagnoses: CML (chronic myeloid leukemia)      diltiaZEM (CARDIZEM CD) 120 MG Cp24 Take 1 capsule (120 mg total) by mouth nightly.  Qty: 30 capsule, Refills: 0    Comments: .      metoprolol tartrate (LOPRESSOR) 25 MG tablet Take 1 tablet (25 mg total) by mouth 2 (two) times daily.  Qty: 180 " tablet, Refills: 0    Comments: .      oxyCODONE (ROXICODONE) 10 mg Tab immediate release tablet Take 1 tablet (10 mg total) by mouth every 4 (four) hours as needed for Pain.  Qty: 120 tablet, Refills: 0    Comments: Quantity prescribed more than 7 day supply? Yes, quantity medically necessary  Associated Diagnoses: CML (chronic myeloid leukemia) in relapse; 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; Cancer related pain      pantoprazole (PROTONIX) 40 MG tablet Take 1 tablet (40 mg total) by mouth once daily.  Qty: 30 tablet, Refills: 11    Associated Diagnoses: Gastroesophageal reflux disease, unspecified whether esophagitis present      potassium, sodium phosphates (PHOS-NAK) 280-160-250 mg PwPk Take 1 packet by mouth 3 (three) times daily with meals.  Qty: 90 packet, Refills: 0      triamcinolone acetonide 0.1% (KENALOG) 0.1 % cream Apply topically 2 (two) times daily. Apply to rash twice daily  Qty: 80 g, Refills: 2    Associated Diagnoses: Drug rash             PMH:   Past Medical History:   Diagnosis Date    CML (chronic myelocytic leukemia)     HVD (hypertensive vascular disease)     Oropharyngeal candidiasis      PSH:   Past Surgical History:   Procedure Laterality Date    BONE MARROW BIOPSY N/A 8/22/2022    Procedure: Biopsy-bone marrow;  Surgeon: Getachew Chen MD;  Location: [facility];  Service: General;  Laterality: N/A;    BONE MARROW BIOPSY N/A 7/25/2023    Procedure: Biopsy-bone marrow;  Surgeon: Edi Carty MD;  Location: [facility] OR;  Service: General;  Laterality: N/A;    BONE MARROW BIOPSY N/A 8/28/2023    Procedure: Biopsy-bone marrow;  Surgeon: Moo Pina MD;  Location: [facility] OR;  Service: General;  Laterality: N/A;    BONE MARROW BIOPSY N/A 10/31/2023    Procedure: Biopsy-bone marrow;  Surgeon: Edi Carty MD;  Location: [facility] OR;  Service: General;  Laterality: N/A;    KNEE SURGERY Left      Allergies: He has No Known Allergies.  Social History: Marital Status: single. He  reports that  "he has never smoked. He has never used smokeless tobacco.. He  reports no history of alcohol use..       Exam:  VITAL SIGNS:   Vitals:    01/10/24 0316 01/10/24 0318 01/10/24 0327 01/10/24 0342   BP:    (!) 156/90   BP Location:    Left arm   Patient Position:    Lying   Pulse:   (!) 118 (!) 117   Resp: 18   18   Temp:  98.6 °F (37 °C)  100.2 °F (37.9 °C)   TempSrc:  Oral  Oral   SpO2:    (!) 91%   Weight:    79.3 kg (174 lb 13.2 oz)   Height:    6' 1" (1.854 m)     Const: Awake and alert, NAD  Head: Atraumatic  Eyes: Normal Conjunctiva  ENT:  Sores along buccal mucosa of lower lip, gums, roof of mouth, tongue.  Slight hot potato voice.  Neck: Full range of motion. No meningismus.  Resp: Normal respiratory effort, No distress  Cardio: Equal and intact distal pulses  Abd: Soft, non tender, non distended.   Skin: No petechiae or rashes  Ext: No cyanosis, or edema  Neur: Awake and alert  Psych: Normal Mood and Affect    Data:  Results for orders placed or performed during the hospital encounter of 01/09/24   Blood culture #1 **CANNOT BE ORDERED STAT**    Specimen: Peripheral, Hand, Right; Blood   Result Value Ref Range    Blood Culture, Routine No Growth to date    Blood culture #2 **CANNOT BE ORDERED STAT**    Specimen: Peripheral, Antecubital, Left; Blood   Result Value Ref Range    Blood Culture, Routine No Growth to date    Comprehensive metabolic panel   Result Value Ref Range    Sodium 134 (L) 136 - 145 mmol/L    Potassium 2.9 (L) 3.5 - 5.1 mmol/L    Chloride 93 (L) 95 - 110 mmol/L    CO2 27 23 - 29 mmol/L    Glucose 110 70 - 110 mg/dL    BUN 10 6 - 20 mg/dL    Creatinine 0.9 0.5 - 1.4 mg/dL    Calcium 8.8 8.7 - 10.5 mg/dL    Total Protein 7.5 6.0 - 8.4 g/dL    Albumin 2.7 (L) 3.5 - 5.2 g/dL    Total Bilirubin 2.6 (H) 0.1 - 1.0 mg/dL    Alkaline Phosphatase 345 (H) 55 - 135 U/L    AST 58 (H) 10 - 40 U/L     (H) 10 - 44 U/L    eGFR >60.0 >60 mL/min/1.73 m^2    Anion Gap 14 8 - 16 mmol/L   CBC auto " differential   Result Value Ref Range    .80 (HH) 3.90 - 12.70 K/uL    RBC 2.53 (L) 4.60 - 6.20 M/uL    Hemoglobin 7.1 (L) 14.0 - 18.0 g/dL    Hematocrit 21.4 (L) 40.0 - 54.0 %    MCV 85 82 - 98 fL    MCH 28.1 27.0 - 31.0 pg    MCHC 33.2 32.0 - 36.0 g/dL    RDW 18.2 (H) 11.5 - 14.5 %    Platelets 32 (LL) 150 - 450 K/uL    MPV 9.9 9.2 - 12.9 fL    Immature Granulocytes CANCELED 0.0 - 0.5 %    Immature Grans (Abs) CANCELED 0.00 - 0.04 K/uL    Lymph # Test Not Performed 1.0 - 4.8 K/uL    Eos # Test Not Performed 0.0 - 0.5 K/uL    Baso # Test Not Performed 0.00 - 0.20 K/uL    nRBC 0 0 /100 WBC    Gran % 0.5 (L) 38.0 - 73.0 %    Lymph % 2.0 (L) 18.0 - 48.0 %    Mono % 3.0 (L) 4.0 - 15.0 %    Eosinophil % 0.0 0.0 - 8.0 %    Basophil % 0.0 0.0 - 1.9 %    Other 95 %    Platelet Estimate Decreased (A)     Aniso Slight     Differential Method Manual    Lactic acid, plasma   Result Value Ref Range    Lactate (Lactic Acid) 1.4 0.5 - 2.2 mmol/L   Urinalysis, Reflex to Urine Culture Urine, Clean Catch    Specimen: Urine   Result Value Ref Range    Specimen UA Urine, Clean Catch     Color, UA Yellow Yellow, Straw, Isis    Appearance, UA Clear Clear    pH, UA 6.0 5.0 - 8.0    Specific Gravity, UA 1.010 1.005 - 1.030    Protein, UA 1+ (A) Negative    Glucose, UA Negative Negative    Ketones, UA Negative Negative    Bilirubin (UA) Negative Negative    Occult Blood UA 1+ (A) Negative    Nitrite, UA Negative Negative    Leukocytes, UA Negative Negative   COVID-19 Rapid Screening   Result Value Ref Range    SARS-CoV-2 RNA, Amplification, Qual Negative Negative   Urinalysis Microscopic   Result Value Ref Range    RBC, UA 2 0 - 4 /hpf    WBC, UA 3 0 - 5 /hpf    Bacteria Rare None-Occ /hpf    Squam Epithel, UA 0 /hpf    Hyaline Casts, UA 3 (A) 0-1/lpf /lpf    Microscopic Comment SEE COMMENT      Imaging Results               CT Soft Tissue Neck With Contrast (Final result)  Result time 01/10/24 00:47:11      Final result by  Alcides Tanner MD (01/10/24 00:47:11)                   Impression:      Subcentimeter left palatine tonsil abscess with mild associated left parapharyngeal edema.    Additional findings as detailed in the body of the report.    This report was flagged in Epic as abnormal.    Electronically signed by resident: Liana Reyes  Date:    01/10/2024  Time:    00:14    Electronically signed by: Alcides Tanner  Date:    01/10/2024  Time:    00:47               Narrative:    EXAMINATION:  CT SOFT TISSUE NECK WITH CONTRAST    CLINICAL HISTORY:  Soft tissue swelling, infection suspected, neck xray done;on chemo, muffled speech, sore throat;    TECHNIQUE:  Axial CT images obtained throughout the region of the neck after the administration of 75 ml omnipaque 350 intravenous contrast. Axial, sagittal and coronal reconstructions were performed.    COMPARISON:  06/11/2023    FINDINGS:  Bilateral palatine tonsil enlargement similar to prior.    However, there is now a 0.7 cm hypodensity with mildly enhanced rim within left palatine tonsil.  There is some mild surrounding soft tissue edema that extends inferior to the palatine tonsil, parapharyngeally.  At this time, CT demonstrates no soft tissue emphysema or discrete retropharyngeal abscess.    Redemonstration of innumerous cervical space prominent lymph nodes overall unchanged compared to prior.    The CT appearance of the soft tissues of the nasopharynx, oropharynx, hypopharynx and larynx is otherwise within normal limits.    The parotid, submandibular, and thyroid glands demonstrate nothing unusual.    Visualized portions of major vessels of the neck appear patent.    Limited intracranial evaluation is within normal limits.    Stable left maxillary sinus retention cyst.  Right maxillary sinus mucosal thickening.    No significant extracranial soft tissue abnormality.    Visualized portion of mediastinum and lung apices are unremarkable.    No acute CT abnormality of the cervical  spine.                                       X-Ray Chest AP Portable (Final result)  Result time 24 23:01:23      Final result by Tyson Win MD (24 23:01:23)                   Impression:      No acute findings in the chest.      Electronically signed by: Tyson Win MD  Date:    2024  Time:    23:01               Narrative:    EXAMINATION:  XR CHEST AP PORTABLE    CLINICAL HISTORY:  fever;    TECHNIQUE:  Single frontal view of the chest was performed.    COMPARISON:  2023.    FINDINGS:  No consolidation, pleural effusion or pneumothorax.    Cardiomediastinal silhouette is unremarkable.                                    Labs & Imaging studies were reviewed independently by me.     Medical Decision Makin-year-old male presenting to the emergency department with mouth sores.  He became febrile up to 103.2 and broad-spectrum antibiotics were initiated.  CT soft tissue neck shows a tonsillar abscess, though very small.  Potassium was low and was replaced orally.  He was given IV fluids as well as Tylenol.  Labs otherwise show a significant leukocytosis consistent with a history of CML and thrombocytopenia, platelets 32 as well as severe anemia, hemoglobin 7.1, stable at baseline  he was given Dilaudid and viscous lidocaine for pain control.  Patient was admitted to [facility] for further IV antibiotics and pain control.    Clinical Impression:  1. Mouth ulcers    2. Tachycardia    3. Chest pain    4. Tonsil, abscess                 Alyssa Mcdowell MD  01/10/24 0676

## 2024-01-10 NOTE — CLINICAL REVIEW
"RAPID RESPONSE NURSE CHART REVIEW        Chart Reviewed: 01/10/2024, 7:47 AM    MRN: [id number]  Bed: 856/856 A    Dx: <principal problem not specified>    Magdaleno Hirsch has a past medical history of CML (chronic myelocytic leukemia), HVD (hypertensive vascular disease), and Oropharyngeal candidiasis.    Last VS: BP (!) 164/94 (BP Location: Left arm, Patient Position: Lying)   Pulse (!) 137   Temp (!) 103.1 °F (39.5 °C) (Oral)   Resp 18   Ht 6' 1" (1.854 m)   Wt 79.3 kg (174 lb 13.2 oz)   SpO2 95%   BMI 23.07 kg/m²     24H Vital Sign Range:  Temp:  [98.6 °F (37 °C)-103.2 °F (39.6 °C)]   Pulse:  [117-137]   Resp:  [16-20]   BP: (136-164)/(71-94)   SpO2:  [91 %-100 %]     Level of Consciousness (AVPU): alert    Recent Labs     01/08/24  1408 01/09/24 2052   WBC 92.43* 114.80*   HGB 7.2* 7.1*   HCT 22.1* 21.4*   PLT 3* 32*       Recent Labs     01/08/24  1408 01/09/24 2052   * 134*   K 3.3* 2.9*   CL  --  93*   CO2 29 27   BUN 7.5* 10   CREATININE 0.73 0.9   GLU  --  110            MEWS score: 3    Charge RN, Caitlin  contacted for fever/tachycardia. Reports PRN antipyretics ordered. No additional concerns verbalized at this time. Instructed to call [phone] for further concerns or assistance.    Brina Bennett RN        "

## 2024-01-11 PROBLEM — R74.01 TRANSAMINITIS: Status: RESOLVED | Noted: 2024-01-09 | Resolved: 2024-01-11

## 2024-01-11 PROBLEM — Z71.89 ACP (ADVANCE CARE PLANNING): Status: ACTIVE | Noted: 2024-01-10

## 2024-01-11 PROBLEM — Z51.5 PALLIATIVE CARE ENCOUNTER: Status: ACTIVE | Noted: 2024-01-11

## 2024-01-11 PROBLEM — A41.9 SEPSIS: Status: RESOLVED | Noted: 2024-01-10 | Resolved: 2024-01-11

## 2024-01-11 PROBLEM — J36 TONSILLAR ABSCESS: Status: ACTIVE | Noted: 2024-01-10

## 2024-01-11 PROBLEM — E87.6 HYPOKALEMIA: Status: RESOLVED | Noted: 2023-06-09 | Resolved: 2024-01-11

## 2024-01-11 PROBLEM — Z71.89 GOALS OF CARE, COUNSELING/DISCUSSION: Status: ACTIVE | Noted: 2024-01-11

## 2024-01-11 PROBLEM — G89.3 CANCER RELATED PAIN: Status: ACTIVE | Noted: 2024-01-11

## 2024-01-11 LAB
ALBUMIN SERPL BCP-MCNC: 2.2 G/DL (ref 3.5–5.2)
ALP SERPL-CCNC: 266 U/L (ref 55–135)
ALT SERPL W/O P-5'-P-CCNC: 70 U/L (ref 10–44)
ANION GAP SERPL CALC-SCNC: 10 MMOL/L (ref 8–16)
ANISOCYTOSIS BLD QL SMEAR: SLIGHT
AST SERPL-CCNC: 42 U/L (ref 10–40)
BASOPHILS NFR BLD: 0 % (ref 0–1.9)
BILIRUB SERPL-MCNC: 2.8 MG/DL (ref 0.1–1)
BLD PROD TYP BPU: NORMAL
BLD PROD TYP BPU: NORMAL
BLOOD UNIT EXPIRATION DATE: NORMAL
BLOOD UNIT EXPIRATION DATE: NORMAL
BLOOD UNIT TYPE CODE: 1700
BLOOD UNIT TYPE CODE: 5100
BLOOD UNIT TYPE: NORMAL
BLOOD UNIT TYPE: NORMAL
BUN SERPL-MCNC: 6 MG/DL (ref 6–20)
CALCIUM SERPL-MCNC: 8.5 MG/DL (ref 8.7–10.5)
CHLORIDE SERPL-SCNC: 97 MMOL/L (ref 95–110)
CO2 SERPL-SCNC: 27 MMOL/L (ref 23–29)
CODING SYSTEM: NORMAL
CODING SYSTEM: NORMAL
CREAT SERPL-MCNC: 0.6 MG/DL (ref 0.5–1.4)
CROSSMATCH INTERPRETATION: NORMAL
CROSSMATCH INTERPRETATION: NORMAL
DIFFERENTIAL METHOD BLD: ABNORMAL
DISPENSE STATUS: NORMAL
DISPENSE STATUS: NORMAL
EOSINOPHIL NFR BLD: 0 % (ref 0–8)
ERYTHROCYTE [DISTWIDTH] IN BLOOD BY AUTOMATED COUNT: 17.4 % (ref 11.5–14.5)
EST. GFR  (NO RACE VARIABLE): >60 ML/MIN/1.73 M^2
GLUCOSE SERPL-MCNC: 102 MG/DL (ref 70–110)
HCT VFR BLD AUTO: 20.2 % (ref 40–54)
HGB BLD-MCNC: 6.7 G/DL (ref 14–18)
HYPOCHROMIA BLD QL SMEAR: ABNORMAL
IMM GRANULOCYTES # BLD AUTO: ABNORMAL K/UL (ref 0–0.04)
IMM GRANULOCYTES NFR BLD AUTO: ABNORMAL % (ref 0–0.5)
LYMPHOCYTES NFR BLD: 4 % (ref 18–48)
MAGNESIUM SERPL-MCNC: 1.8 MG/DL (ref 1.6–2.6)
MCH RBC QN AUTO: 28.3 PG (ref 27–31)
MCHC RBC AUTO-ENTMCNC: 33.2 G/DL (ref 32–36)
MCV RBC AUTO: 85 FL (ref 82–98)
MONOCYTES NFR BLD: 2 % (ref 4–15)
NEUTROPHILS NFR BLD: 0 % (ref 38–73)
NRBC BLD-RTO: 0 /100 WBC
NUM UNITS TRANS PACKED RBC: NORMAL
OVALOCYTES BLD QL SMEAR: ABNORMAL
PHOSPHATE SERPL-MCNC: 2 MG/DL (ref 2.7–4.5)
PLATELET # BLD AUTO: 12 K/UL (ref 150–450)
PLATELET BLD QL SMEAR: ABNORMAL
PMV BLD AUTO: ABNORMAL FL (ref 9.2–12.9)
POIKILOCYTOSIS BLD QL SMEAR: SLIGHT
POLYCHROMASIA BLD QL SMEAR: ABNORMAL
POTASSIUM SERPL-SCNC: 3.5 MMOL/L (ref 3.5–5.1)
PROT SERPL-MCNC: 6.2 G/DL (ref 6–8.4)
RBC # BLD AUTO: 2.37 M/UL (ref 4.6–6.2)
SODIUM SERPL-SCNC: 134 MMOL/L (ref 136–145)
UNIT NUMBER: NORMAL
URATE SERPL-MCNC: 2.2 MG/DL (ref 3.4–7)
VANCOMYCIN TROUGH SERPL-MCNC: 5.4 UG/ML (ref 10–22)
WBC # BLD AUTO: 94.45 K/UL (ref 3.9–12.7)
WBC OTHER NFR BLD MANUAL: 94 %

## 2024-01-11 PROCEDURE — 80202 ASSAY OF VANCOMYCIN: CPT | Performed by: INTERNAL MEDICINE

## 2024-01-11 PROCEDURE — 20600001 HC STEP DOWN PRIVATE ROOM

## 2024-01-11 PROCEDURE — 99233 SBSQ HOSP IP/OBS HIGH 50: CPT | Mod: FS,,, | Performed by: INTERNAL MEDICINE

## 2024-01-11 PROCEDURE — 63600175 PHARM REV CODE 636 W HCPCS: Performed by: NURSE PRACTITIONER

## 2024-01-11 PROCEDURE — 85007 BL SMEAR W/DIFF WBC COUNT: CPT | Performed by: STUDENT IN AN ORGANIZED HEALTH CARE EDUCATION/TRAINING PROGRAM

## 2024-01-11 PROCEDURE — 36430 TRANSFUSION BLD/BLD COMPNT: CPT

## 2024-01-11 PROCEDURE — P9037 PLATE PHERES LEUKOREDU IRRAD: HCPCS | Performed by: NURSE PRACTITIONER

## 2024-01-11 PROCEDURE — 99233 SBSQ HOSP IP/OBS HIGH 50: CPT | Mod: 95,FS,, | Performed by: CLINICAL NURSE SPECIALIST

## 2024-01-11 PROCEDURE — 36415 COLL VENOUS BLD VENIPUNCTURE: CPT | Mod: XB | Performed by: INTERNAL MEDICINE

## 2024-01-11 PROCEDURE — 86920 COMPATIBILITY TEST SPIN: CPT | Performed by: NURSE PRACTITIONER

## 2024-01-11 PROCEDURE — 80053 COMPREHEN METABOLIC PANEL: CPT | Performed by: STUDENT IN AN ORGANIZED HEALTH CARE EDUCATION/TRAINING PROGRAM

## 2024-01-11 PROCEDURE — 84100 ASSAY OF PHOSPHORUS: CPT | Performed by: STUDENT IN AN ORGANIZED HEALTH CARE EDUCATION/TRAINING PROGRAM

## 2024-01-11 PROCEDURE — 25000003 PHARM REV CODE 250: Performed by: NURSE PRACTITIONER

## 2024-01-11 PROCEDURE — 25000003 PHARM REV CODE 250: Performed by: INTERNAL MEDICINE

## 2024-01-11 PROCEDURE — 85027 COMPLETE CBC AUTOMATED: CPT | Performed by: STUDENT IN AN ORGANIZED HEALTH CARE EDUCATION/TRAINING PROGRAM

## 2024-01-11 PROCEDURE — 83735 ASSAY OF MAGNESIUM: CPT | Performed by: STUDENT IN AN ORGANIZED HEALTH CARE EDUCATION/TRAINING PROGRAM

## 2024-01-11 PROCEDURE — P9040 RBC LEUKOREDUCED IRRADIATED: HCPCS | Performed by: NURSE PRACTITIONER

## 2024-01-11 PROCEDURE — 63600175 PHARM REV CODE 636 W HCPCS: Performed by: INTERNAL MEDICINE

## 2024-01-11 PROCEDURE — C9113 INJ PANTOPRAZOLE SODIUM, VIA: HCPCS | Performed by: NURSE PRACTITIONER

## 2024-01-11 PROCEDURE — 36415 COLL VENOUS BLD VENIPUNCTURE: CPT | Performed by: STUDENT IN AN ORGANIZED HEALTH CARE EDUCATION/TRAINING PROGRAM

## 2024-01-11 PROCEDURE — 25000003 PHARM REV CODE 250: Performed by: STUDENT IN AN ORGANIZED HEALTH CARE EDUCATION/TRAINING PROGRAM

## 2024-01-11 PROCEDURE — 30233R1 TRANSFUSION OF NONAUTOLOGOUS PLATELETS INTO PERIPHERAL VEIN, PERCUTANEOUS APPROACH: ICD-10-PCS | Performed by: INTERNAL MEDICINE

## 2024-01-11 PROCEDURE — 84550 ASSAY OF BLOOD/URIC ACID: CPT | Performed by: NURSE PRACTITIONER

## 2024-01-11 PROCEDURE — A4216 STERILE WATER/SALINE, 10 ML: HCPCS | Performed by: NURSE PRACTITIONER

## 2024-01-11 RX ORDER — POTASSIUM CHLORIDE 7.45 MG/ML
10 INJECTION INTRAVENOUS
Status: DISPENSED | OUTPATIENT
Start: 2024-01-11 | End: 2024-01-11

## 2024-01-11 RX ORDER — POTASSIUM CHLORIDE 7.45 MG/ML
40 INJECTION INTRAVENOUS
Status: DISCONTINUED | OUTPATIENT
Start: 2024-01-11 | End: 2024-01-11

## 2024-01-11 RX ORDER — HYDROMORPHONE HYDROCHLORIDE 4 MG/1
4 TABLET ORAL EVERY 4 HOURS PRN
Refills: 0
Start: 2024-01-11 | End: 2024-01-17

## 2024-01-11 RX ORDER — DILTIAZEM HYDROCHLORIDE 120 MG/1
120 CAPSULE, COATED, EXTENDED RELEASE ORAL NIGHTLY
Status: DISCONTINUED | OUTPATIENT
Start: 2024-01-11 | End: 2024-01-17 | Stop reason: HOSPADM

## 2024-01-11 RX ORDER — FENTANYL 12.5 UG/1
1 PATCH TRANSDERMAL
Refills: 0
Start: 2024-01-13 | End: 2024-01-17 | Stop reason: HOSPADM

## 2024-01-11 RX ORDER — HYDROMORPHONE HYDROCHLORIDE 2 MG/ML
2 INJECTION, SOLUTION INTRAMUSCULAR; INTRAVENOUS; SUBCUTANEOUS EVERY 4 HOURS PRN
Status: DISCONTINUED | OUTPATIENT
Start: 2024-01-11 | End: 2024-01-17 | Stop reason: HOSPADM

## 2024-01-11 RX ORDER — AMOXICILLIN 250 MG
1 CAPSULE ORAL 2 TIMES DAILY PRN
Status: DISCONTINUED | OUTPATIENT
Start: 2024-01-11 | End: 2024-01-17 | Stop reason: HOSPADM

## 2024-01-11 RX ADMIN — VANCOMYCIN HYDROCHLORIDE 1000 MG: 1 INJECTION, POWDER, LYOPHILIZED, FOR SOLUTION INTRAVENOUS at 04:01

## 2024-01-11 RX ADMIN — AMLODIPINE BESYLATE 5 MG: 5 TABLET ORAL at 08:01

## 2024-01-11 RX ADMIN — SODIUM CHLORIDE, SODIUM LACTATE, POTASSIUM CHLORIDE, AND CALCIUM CHLORIDE: 600; 310; 30; 20 INJECTION, SOLUTION INTRAVENOUS at 02:01

## 2024-01-11 RX ADMIN — SODIUM CHLORIDE, SODIUM LACTATE, POTASSIUM CHLORIDE, AND CALCIUM CHLORIDE: 600; 310; 30; 20 INJECTION, SOLUTION INTRAVENOUS at 04:01

## 2024-01-11 RX ADMIN — PIPERACILLIN SODIUM AND TAZOBACTAM SODIUM 4.5 G: 4; .5 INJECTION, POWDER, FOR SOLUTION INTRAVENOUS at 01:01

## 2024-01-11 RX ADMIN — HYDROMORPHONE HYDROCHLORIDE 2 MG: 2 INJECTION INTRAMUSCULAR; INTRAVENOUS; SUBCUTANEOUS at 03:01

## 2024-01-11 RX ADMIN — ACETAMINOPHEN 650 MG: 650 SOLUTION ORAL at 10:01

## 2024-01-11 RX ADMIN — DILTIAZEM HYDROCHLORIDE 120 MG: 120 CAPSULE, COATED, EXTENDED RELEASE ORAL at 08:01

## 2024-01-11 RX ADMIN — HYDROXYUREA 1000 MG: 500 CAPSULE ORAL at 08:01

## 2024-01-11 RX ADMIN — POTASSIUM CHLORIDE 10 MEQ: 7.46 INJECTION, SOLUTION INTRAVENOUS at 11:01

## 2024-01-11 RX ADMIN — ACYCLOVIR 800 MG: 200 SUSPENSION ORAL at 08:01

## 2024-01-11 RX ADMIN — HYDROMORPHONE HYDROCHLORIDE 1 MG: 1 INJECTION, SOLUTION INTRAMUSCULAR; INTRAVENOUS; SUBCUTANEOUS at 12:01

## 2024-01-11 RX ADMIN — OXYCODONE HYDROCHLORIDE 10 MG: 10 TABLET ORAL at 07:01

## 2024-01-11 RX ADMIN — POTASSIUM & SODIUM PHOSPHATES POWDER PACK 280-160-250 MG 1 PACKET: 280-160-250 PACK at 11:01

## 2024-01-11 RX ADMIN — POTASSIUM & SODIUM PHOSPHATES POWDER PACK 280-160-250 MG 1 PACKET: 280-160-250 PACK at 04:01

## 2024-01-11 RX ADMIN — PIPERACILLIN SODIUM AND TAZOBACTAM SODIUM 4.5 G: 4; .5 INJECTION, POWDER, FOR SOLUTION INTRAVENOUS at 09:01

## 2024-01-11 RX ADMIN — POTASSIUM & SODIUM PHOSPHATES POWDER PACK 280-160-250 MG 1 PACKET: 280-160-250 PACK at 08:01

## 2024-01-11 RX ADMIN — POTASSIUM CHLORIDE 10 MEQ: 7.46 INJECTION, SOLUTION INTRAVENOUS at 09:01

## 2024-01-11 RX ADMIN — METOPROLOL TARTRATE 25 MG: 25 TABLET, FILM COATED ORAL at 08:01

## 2024-01-11 RX ADMIN — HYDROMORPHONE HYDROCHLORIDE 2 MG: 2 INJECTION INTRAMUSCULAR; INTRAVENOUS; SUBCUTANEOUS at 07:01

## 2024-01-11 RX ADMIN — PANTOPRAZOLE SODIUM 40 MG: 40 INJECTION, POWDER, FOR SOLUTION INTRAVENOUS at 08:01

## 2024-01-11 RX ADMIN — HYDROMORPHONE HYDROCHLORIDE 2 MG: 2 INJECTION INTRAMUSCULAR; INTRAVENOUS; SUBCUTANEOUS at 11:01

## 2024-01-11 RX ADMIN — PIPERACILLIN SODIUM AND TAZOBACTAM SODIUM 4.5 G: 4; .5 INJECTION, POWDER, FOR SOLUTION INTRAVENOUS at 04:01

## 2024-01-11 RX ADMIN — DIPHENHYDRAMINE HYDROCHLORIDE 25 MG: 25 CAPSULE ORAL at 09:01

## 2024-01-11 RX ADMIN — ACETAMINOPHEN 650 MG: 650 SOLUTION ORAL at 12:01

## 2024-01-11 RX ADMIN — HYDROMORPHONE HYDROCHLORIDE 1 MG: 1 INJECTION, SOLUTION INTRAMUSCULAR; INTRAVENOUS; SUBCUTANEOUS at 04:01

## 2024-01-11 RX ADMIN — ACYCLOVIR 800 MG: 200 SUSPENSION ORAL at 09:01

## 2024-01-11 RX ADMIN — ACETAMINOPHEN 650 MG: 650 SOLUTION ORAL at 07:01

## 2024-01-11 RX ADMIN — OXYCODONE HYDROCHLORIDE 10 MG: 10 TABLET ORAL at 09:01

## 2024-01-11 RX ADMIN — HYDROMORPHONE HYDROCHLORIDE 1 MG: 1 INJECTION, SOLUTION INTRAMUSCULAR; INTRAVENOUS; SUBCUTANEOUS at 09:01

## 2024-01-11 NOTE — CARE UPDATE
RAPID RESPONSE NURSE ROUND       Rounding completed with charge RNSiobhan for tachycardia reports prns administered for fever. No additional concerns verbalized at this time. Instructed to call [phone] for further concerns or assistance.

## 2024-01-11 NOTE — PLAN OF CARE
Pt involved in plan of care and communicating needs throughout shift. PRN oxy given x1, PRN dilaudid given x2, PRN tylenol given x2. Tmax 102.9, decreased w/ tylenol. 1 unit of RBCs and 1 unit of plts given w/o issue. Pt pre-medicated w/ tylenol and benadryl. All VSS; no acute events so far this shift.  Pt remaining free from falls or injury throughout shift; bed locked and in lowest position; call light within reach.  Pt instructed to call for assistance as needed.  Q1H rounding done on pt.

## 2024-01-11 NOTE — ASSESSMENT & PLAN NOTE
- palliative care consulted to assist with GOC discussions as both Dr. Green and Dr. Hogan have discussed extremely grim prognosis with patient and family d/t no further treatment options for refractory CML  - initially acceptable to hospice with plans to discharge on 1/11; however rescinded decision as family attempting to get patient to [facility]  - remains full code, continuing all supportive care at this time  - has fentanyl patch and prn dilaudid for pain  -  following

## 2024-01-11 NOTE — PLAN OF CARE
[facility]  Department of Hospital Medicine  [address] [place] [address]  [phone] after hours  [phone] fax    HOSPICE  ORDERS    01/11/2024    Admit to Hospice:  Home Service  Diagnoses:   Active Hospital Problems    Diagnosis  POA    *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 [C92.10]  Yes     Priority: 1 - High    Sepsis [A41.9]  Yes    Odynophagia [R13.10]  Yes    Palliative care encounter [Z51.5]  Not Applicable    Hyperbilirubinemia [E80.6]  Yes    Transaminitis [R74.01]  Yes    Anemia in neoplastic disease [D63.0]  Yes    Thrombocytopenia [D69.6]  Yes    Hypokalemia [E87.6]  Yes    Hypertension [I10]  Yes     Chronic    CML (chronic myeloid leukemia) in relapse [C92.12]  Yes      Resolved Hospital Problems   No resolved problems to display.       Hospice Qualifying Diagnoses:       Patient has a life expectancy < 6 months due to:  Primary Hospice Diagnosis: Blast phase CML  Comorbid Conditions Contributing to Decline: refractory cancer; received multiple lines of therapy without remission; persistent fevers    Vital Signs: Routine per Hospice Protocol.    Code Status: Full at time of this note; DNR to be signed with Hospice later today    Allergies: Review of patient's allergies indicates:  No Known Allergies    Diet: Regular; pleasure feeds    Activities: As tolerated    Goals of Care Treatment Preferences:  Code Status: Full Code          What is most important right now is to focus on symptom/pain control, comfort and QOL .  Accordingly, we have decided that the best plan to meet the patient's goals includes enrolling in hospice care.      Nursing: Per Hospice Routine    Oxygen: room air    Medications:        Medication List        START taking these medications      fentaNYL 12 mcg/hr Pt72  Commonly known as: DURAGESIC  Place 1 patch onto the skin every 72 hours.  Start taking on: January 13, 2024     HYDROmorphone 4 MG tablet  Commonly known  as: DILAUDID  Take 1 tablet (4 mg total) by mouth every 4 (four) hours as needed for Pain.     hydroxyurea 100 mg/mL Susp  Commonly known as: HYDREA  Take 10 mLs (1,000 mg total) by mouth 2 (two) times a day. Medication is considered hazardous. Use appropriate PPE when handling. Call pharmacy with any questions.            CONTINUE taking these medications      (MAGIC MOUTHWASH) 1:1:1 BENADRYL 12.5MG/5ML LIQ, ALUMINUM & MAGNESIUM  Swish and spit 15 mLs every 4 (four) hours as needed (mouth pain). for mouth sores     metoprolol tartrate 25 MG tablet  Commonly known as: LOPRESSOR  Take 1 tablet (25 mg total) by mouth 2 (two) times daily.     triamcinolone acetonide 0.1% 0.1 % cream  Commonly known as: KENALOG  Apply topically 2 (two) times daily. Apply to rash twice daily            STOP taking these medications      acyclovir 800 MG Tab  Commonly known as: ZOVIRAX     diltiaZEM 120 MG Cp24  Commonly known as: CARDIZEM CD     nystatin 100,000 unit/mL suspension  Commonly known as: MYCOSTATIN     oxyCODONE 10 mg Tab immediate release tablet  Commonly known as: ROXICODONE     pantoprazole 40 MG tablet  Commonly known as: PROTONIX     potassium, sodium phosphates 280-160-250 mg Pwpk  Commonly known as: PHOS-NAK     SCEMBLIX 40 mg Tab  Generic drug: asciminib                Future Orders:  Hospice Medical Director may dictate new orders for comfortable care measures & sign death certificate.        _________________________________  Ginger Jacob NP  01/11/2024

## 2024-01-11 NOTE — CHAPLAIN
"Palliative Care     Patient: Magdaleno Hirsch  MRN: [id number]  : 2000  Age: 23 y.o.  Hospital Length of Stay: 1  Code Status: Code Status Discussion Note  Attending Provider: Denny Barajas MD  Principal Problem: 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    Non-clinical observations of patient/room: Visited pall med/hospice pt again today-- many family members bedside; 7 people including mom and girlfriend. 10 min visit    Returned to the room and many family members present, surrounding bed, quiet, entered the room reverently; pt looked comfortable. Eyes closed, wearing his glasses, phone in hand. Mom closest to him.    Offered to get them snacks or water-- all declined. I looked at mom and said, "... There are just no words..." and mom looked back with acknowledgement. Yesterday when I was alone with pt he agreed for me to pray with/for/over him and I offered today as well and mom and some family nodded yes. Pt seemed a little indifferent, but seemingly agreeable. I couldn't help to tear up during my prayer-- close to home, I have a 26 yo.     After prayer mom mouthed "thank you" and I said I'd give them privacy and reminded them to be quick to get nurse if they sense even a remote of amount of discomfort or pain in pt. Palliative  will visit again tomorrow. Reminded them that [place] chaplains are available around the clock. Lord, in your mercy.    **Spiritual Care Dept. Chaplains are available evenings, overnight and weekends **    In Peace,    Rev. Sherley Robison MDiv, [facility]  Board Certified   Palliative Medicine Department  [phone]  "

## 2024-01-11 NOTE — ASSESSMENT & PLAN NOTE
- patient with swollen neck, throat pain, difficulty swallowing  - CT neck shows subcentimeter left palatine tonsil abscess with mild associated left parapharyngeal edema  - seen by ENT; no acute intervention  - continue zosyn and vanc

## 2024-01-11 NOTE — CARE UPDATE
RAPID RESPONSE NURSE ROUND       Rounding completed with charge RNCaitlin for Tachycardia reports patient going home with home hospice. No additional concerns verbalized at this time. Instructed to call [phone] for further concerns or assistance.

## 2024-01-11 NOTE — CHAPLAIN
Palliative Care     Patient: Magdaleno Hirsch  MRN: [id number]  : 2000  Age: 23 y.o.  Hospital Length of Stay: 1  Code Status: Code Status Discussion Note  Attending Provider: Denny Barajas MD  Principal Problem: 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    Non-clinical observations of patient/room: Visited pall med pt again today, but he was soundly sleeping; bedside was pt's girlfriend, Marcia, and Marcia's mom, Nadine. Pt's mother is away getting rest, but will be back. 10 min    Brief visit with pt's girlfriend and her mom. Pt resting/sleeping seemingly comfortable and lightly snoring. Offered to get them water or snacks and they declined. Girlfriend deep. Provided compassionate presence and gave them my card. Palliative  will return later to visit pt's mom, with whom I've not met yet. Lord, in your mercy.    **Spiritual Care Dept. Chaplains are available evenings, overnight and weekends **    In Peace,    Rev. Sherley Robison MDiv, [facility]  Board Certified   Palliative Medicine Department  [phone]

## 2024-01-11 NOTE — SUBJECTIVE & OBJECTIVE
Subjective:     Interval History: Fevers continue. Infectious workup remains negative. On zosyn and vanc for small tonsillar abscess. Continuing hydrea at 1g BID. BP and HR improved with metoprolol and amlodipine. Patient continues on prn dilaudid and fentanyl patch for pain to throat and back. Denies n/v, has low appetite. Denies diarrhea or constipation. GOC discussions continued with patient, family, and palliative care    Objective:     Vital Signs (Most Recent):  Temp: 99.3 °F (37.4 °C) (01/11/24 1644)  Pulse: (!) 122 (01/11/24 1559)  Resp: 20 (01/11/24 1559)  BP: (!) 157/100 (01/11/24 1559)  SpO2: 97 % (01/11/24 1559) Vital Signs (24h Range):  Temp:  [98 °F (36.7 °C)-102.9 °F (39.4 °C)] 99.3 °F (37.4 °C)  Pulse:  [112-143] 122  Resp:  [15-20] 20  SpO2:  [93 %-100 %] 97 %  BP: (134-166)/() 157/100     Weight: 78.9 kg (174 lb)  Body mass index is 22.96 kg/m².  Body surface area is 2.02 meters squared.      Intake/Output - Last 3 Shifts         01/09 0700  01/10 0659 01/10 0700  01/11 0659 01/11 0700  01/12 0659    P.O.  1190     I.V. (mL/kg)  2417.5 (30.6)     Blood  548.8 1266    IV Piggyback  1052.8     Total Intake(mL/kg)  5209.1 (66) 1266 (16)    Net  +5209.1 +1266           Urine Occurrence  4 x 1 x    Stool Occurrence  3 x              Physical Exam  Vitals and nursing note reviewed.   Constitutional:       General: He is in acute distress.      Appearance: He is ill-appearing and toxic-appearing. He is not diaphoretic.   HENT:      Head: Normocephalic.      Nose:      Comments: Limited speaking and hoarse voice. Limited ROM of tongue and R mandibular swelling noted      Mouth/Throat:      Mouth: Mucous membranes are dry.      Pharynx: Pharyngeal swelling and posterior oropharyngeal erythema present.   Eyes:      Extraocular Movements: Extraocular movements intact.      Conjunctiva/sclera: Conjunctivae normal.   Neck:      Comments: R sided mandibular swelling noted.  Cardiovascular:      Rate and  Rhythm: Regular rhythm. Tachycardia present.      Pulses: Normal pulses.      Heart sounds: Normal heart sounds. No murmur heard.  Pulmonary:      Effort: Pulmonary effort is normal. No respiratory distress.      Breath sounds: Normal breath sounds.   Abdominal:      General: Bowel sounds are normal.      Palpations: Abdomen is soft.      Tenderness: There is no abdominal tenderness (to patient palpation).   Musculoskeletal:         General: No swelling. Normal range of motion.      Cervical back: Edema present.      Right lower leg: No edema.      Left lower leg: No edema.   Skin:     Coloration: Skin is not jaundiced.      Findings: Rash present.   Neurological:      General: No focal deficit present.      Mental Status: He is oriented to person, place, and time.   Psychiatric:         Mood and Affect: Affect is flat.            Significant Labs:   CBC:   Recent Labs   Lab 01/09/24  2052 01/10/24  0843 01/11/24  0358   .80* 85.29* 94.45*   HGB 7.1* 6.0* 6.7*   HCT 21.4* 18.1* 20.2*   PLT 32* 21* 12*    and CMP:   Recent Labs   Lab 01/09/24  2052 01/10/24  0843 01/11/24  0358   * 133* 134*   K 2.9* 3.7 3.5   CL 93* 98 97   CO2 27 26 27    129* 102   BUN 10 8 6   CREATININE 0.9 0.7 0.6   CALCIUM 8.8 8.5* 8.5*   PROT 7.5 6.5 6.2   ALBUMIN 2.7* 2.4* 2.2*   BILITOT 2.6* 2.4* 2.8*   ALKPHOS 345* 295* 266*   AST 58* 42* 42*   * 91* 70*   ANIONGAP 14 9 10       Diagnostic Results:  I have reviewed all pertinent imaging results/findings within the past 24 hours.

## 2024-01-11 NOTE — SUBJECTIVE & OBJECTIVE
Past Medical History:   Diagnosis Date    CML (chronic myelocytic leukemia)     HVD (hypertensive vascular disease)     Oropharyngeal candidiasis        Past Surgical History:   Procedure Laterality Date    BONE MARROW BIOPSY N/A 8/22/2022    Procedure: Biopsy-bone marrow;  Surgeon: Getachew Chen MD;  Location: [facility] OR;  Service: General;  Laterality: N/A;    BONE MARROW BIOPSY N/A 7/25/2023    Procedure: Biopsy-bone marrow;  Surgeon: Edi Carty MD;  Location: [facility] OR;  Service: General;  Laterality: N/A;    BONE MARROW BIOPSY N/A 8/28/2023    Procedure: Biopsy-bone marrow;  Surgeon: Moo Pina MD;  Location: [facility];  Service: General;  Laterality: N/A;    BONE MARROW BIOPSY N/A 10/31/2023    Procedure: Biopsy-bone marrow;  Surgeon: Edi Carty MD;  Location: [facility] OR;  Service: General;  Laterality: N/A;    KNEE SURGERY Left        Review of patient's allergies indicates:  No Known Allergies    Medications:  Continuous Infusions:   lactated ringers 125 mL/hr at 01/11/24 1415     Scheduled Meds:   acyclovir  800 mg Oral BID    amLODIPine  5 mg Oral Daily    fentaNYL  1 patch Transdermal Q72H    hydroxyurea  1,000 mg Oral BID    metoprolol  25 mg Oral BID    pantoprazole  40 mg Intravenous Daily    piperacillin-tazobactam (Zosyn) IV (PEDS and ADULTS) (extended infusion is not appropriate)  4.5 g Intravenous Q8H    potassium, sodium phosphates  1 packet Oral QID (AC & HS)    vancomycin (VANCOCIN) IV (PEDS and ADULTS)  1,000 mg Intravenous Q12H     PRN Meds:(Magic mouthwash) 1:1:1 diphenhydrAMINE(Benadryl) 12.5mg/5ml liq, aluminum & magnesium hydroxide-simethicone (Maalox), LIDOcaine viscous 2%, acetaminophen, dextrose 10%, dextrose 10%, diphenhydrAMINE, glucagon (human recombinant), glucose, glucose, hydrALAZINE, HYDROmorphone, naloxone, oxyCODONE, sodium chloride 0.9%, Pharmacy to dose Vancomycin consult **AND** vancomycin - pharmacy to dose    Family History       Problem Relation (Age of Onset)     Cancer Maternal Grandmother    Hypertension Maternal Grandmother          Tobacco Use    Smoking status: Never    Smokeless tobacco: Never   Substance and Sexual Activity    Alcohol use: Never    Drug use: Yes     Types: Marijuana    Sexual activity: Not on file       Review of Systems   Constitutional:  Positive for appetite change.   HENT:  Positive for mouth sores and sore throat.    Respiratory: Negative.     Cardiovascular: Negative.    Gastrointestinal: Negative.    Endocrine: Negative.    Musculoskeletal:  Positive for neck pain.   Neurological: Negative.      Objective:     Vital Signs (Most Recent):  Temp: 99.1 °F (37.3 °C) (01/11/24 1319)  Pulse: (!) 121 (01/11/24 1319)  Resp: 19 (01/11/24 1508)  BP: (!) 145/86 (01/11/24 1319)  SpO2: 95 % (01/11/24 1319) Vital Signs (24h Range):  Temp:  [98 °F (36.7 °C)-102.9 °F (39.4 °C)] 99.1 °F (37.3 °C)  Pulse:  [112-143] 121  Resp:  [15-19] 19  SpO2:  [93 %-100 %] 95 %  BP: (134-166)/() 145/86     Weight: 78.9 kg (174 lb)  Body mass index is 22.96 kg/m².       Physical Exam  Vitals and nursing note reviewed.   Constitutional:       General: He is not in acute distress.     Comments: drowsy   HENT:      Head: Normocephalic.      Right Ear: External ear normal.      Left Ear: External ear normal.      Nose: Nose normal.      Mouth/Throat:      Mouth: Mucous membranes are dry.   Eyes:      Pupils: Pupils are equal, round, and reactive to light.   Cardiovascular:      Rate and Rhythm: Tachycardia present.   Pulmonary:      Effort: No respiratory distress.   Abdominal:      General: There is no distension.   Musculoskeletal:      Cervical back: Normal range of motion.   Neurological:      Mental Status: He is oriented to person, place, and time.            Review of Symptoms      Symptom Assessment (ESAS 0-10 Scale)  Pain:  0  Dyspnea:  0  Anxiety:  0  Nausea:  0  Depression:  0  Anorexia:  0  Fatigue:  0  Insomnia:  0  Restlessness:  0  Agitation:  0     CAM /  Delirium:  Negative  Constipation:  Negative  Diarrhea:  Negative      Modified Zion Scale:  0    ECOG Performance Status ndGndrndanddndend:nd nd2nd Living Arrangements:  Lives with family    Psychosocial/Cultural:   See Palliative Psychosocial Note: No  Dad is . Close with mom. Has 3 siblings (22, 19, and 8). The 19 year old is incarcerated  **Primary  to Follow**  Palliative Care  Consult: No    Spiritual:  F - Mel and Belief:  Yes  I - Importance:  Yes  C - Community:  Yes  A - Address in Care:  Yes        Advance Care Planning   Advance Directives:   Living Will: No    LaPOST: No    Do Not Resuscitate Status: No    Medical Power of : No      Decision Making:  Patient answered questions and Family answered questions  Goals of Care: What is most important right now is to focus on symptom/pain control, comfort and QOL . Accordingly, we have decided that the best plan to meet the patient's goals includes enrolling in hospice care.         Significant Labs: All pertinent labs within the past 24 hours have been reviewed.  CBC:   Recent Labs   Lab 24  0358   WBC 94.45*   HGB 6.7*   HCT 20.2*   MCV 85   PLT 12*       BMP:  Recent Labs   Lab 24  0358      *   K 3.5   CL 97   CO2 27   BUN 6   CREATININE 0.6   CALCIUM 8.5*   MG 1.8       LFT:  Lab Results   Component Value Date    AST 42 (H) 2024    ALKPHOS 266 (H) 2024    BILITOT 2.8 (H) 2024     Albumin:   Albumin   Date Value Ref Range Status   2024 2.2 (L) 3.5 - 5.2 g/dL Final     Protein:   Total Protein   Date Value Ref Range Status   2024 6.2 6.0 - 8.4 g/dL Final     Lactic acid:   Lab Results   Component Value Date    LACTATE 1.4 2024       Significant Imaging: I have reviewed all pertinent imaging results/findings within the past 24 hours.    CT soft tissue neck 24  Subcentimeter left palatine tonsil abscess with mild associated left parapharyngeal edema.

## 2024-01-11 NOTE — HOSPITAL COURSE
Patient was admitted to BMT service for fevers, leukocytosis, throat pain. Imaging showed a small tonsillar abscess for which ENT was consulted. They recommended no surgical intervention and to continue antibiotics. Patient was started on vancomycin and zosyn. Patient continued to be febrile but this was attributed to CML as infectious workup was relatively unremarkable. Leukocytosis was also due to CML and improved with hydrea. Palliative care was consulted for goals of care conversation and pain control. Fentanyl patch was started with some improvement in pain. Discussed with patient, his mother and patient's significant other that unfortunately, there are no further treatment options and that we are at end of life. Patient initially was amenable to hospice but then changed their mind and wanted to go to [facility] for a second opinion. Re-iterated that primary Hematologist previously discussed his case with [facility] physicians and that there were no more additional options or clinical trials available. Emphasized the shift of focusing on providing quality of life and not prolong suffering. Patient stated he wanted to be home with family and not be in the hospital. After a few days, patient and his mother agreed to pursue home hospice with [facility]. Patient was discharged with home hospice and pain medications for 1-2 days per [facility]. See detailed hospital course below.    01/11/2024 Fevers continue. Infectious workup remains negative. On zosyn and vanc for small tonsillar abscess. Continuing hydrea at 1g BID. BP and HR improved with metoprolol and amlodipine. Patient continues on prn dilaudid and fentanyl patch for pain to throat and back. Denies n/v, has low appetite. Denies diarrhea or constipation. [facility] discussions continued with patient, family, and palliative care  01/12/2024 Patient had fevers up to 100.9F earlier today and this morning. He remains tachycardic. He says pain is at a 7. Discussed with  patient, his mother and patient's significant other that unfortunately, there are no further treatment options and that we are at end of life. Patient's family stated they would like to go to [facility]. Re-iterated that primary Hematologist previously discussed his case with [facility] physicians and that there were no more additional options or clinical trials available.    01/13/2024 Mr. Hirsch is overall stable. Mouth sores are his primary complaint, making it difficult to swallow. Pain is more tolerable. Temp of 101.2 overnight. Continues on acyclovir, zosyn and vanc.  01/14/2024 Resting comfortably with mom and girlfriend at bedside. He is eating, though mouth lesions are uncomfortable. Will try brushing teeth today with a soft brush. Pleased to hear white count remains stable.   01/15/2024 Mr. Hirsch is doing well. Able to sleep and eat comfortably with the assistance of pain medication. Continue hydrea for white count control.   01/16/2024 Patient was febrile up to 101.3F around 10:30pm last night. He remains tachycardic. WBC is relatively stable. Patient's mom says that patient is eating and drinking and pain is well controlled.

## 2024-01-11 NOTE — ASSESSMENT & PLAN NOTE
Pal med follow up to goals of care for Magdaleno Hirsch a 22 yo gentleman admitted to BMT service  in blast crisis CML here with intratonsillar abscess. No longer a candidate for cancer directed therapies     Advance Care Planning     Date: 01/11/2024  - no ACP documents received   -Patient did not wish or was not able to name a surrogate decision maker or provide an Advance Care Plan.   - Mr Hirsch appears very ill and does not participate in acp conversation.  Loved ones at bedside states he is able to make decisions  -  next of kin is mother - Danisha Hirsch [phone]  Code status: Full. Not addressed     Goals of Care   - pal med APRN returned to bedside as follow up to goals of care.  Significant other and her mother at bedside.  Pal med reintroduced.    - Mr. Hirsch sleeping soundly after receiving pain medications.  Pal med did not wake.  - As per chart review and discussion with primary team there has been significant change in goals of care  - patient and family are no longer amenable to transition to hospice care.    - significant reports patients mother will not agree to hospice and reports the goal is to seek out additional cancer directed treatment- clinical trial at [facility]    - patient does not have an appointment.  Plan is to discharge from [facility] and present to [facility] emergency department.   - pal med expressed concern for the safety of and Magdaleno's ability to survive a trip to [place] by car.   - Gently  reinforced hospice education and explained the appropriateness of hospice care in regard to Magdaleno's illness.    - intense emotional support provided         Cancer associated pain   Recommendations  - continue fentanyl patch.    -Continue magic mouth wash  - continue  Fentanyl  patch 12.5mcg.   - change IV dilaudid to 0.5 mg every 4 hrs as needed.  - Patient becomes somnolent and unable to participate in conversation after receiving 2 mg IV dilaudid prn.      Recommendations at  discharge  Breakthrough  pain can likely be controlled with morphine 20mg/ml 0.5-1ml q2h prn f discharging with hospice.

## 2024-01-11 NOTE — ASSESSMENT & PLAN NOTE
- [place] [facility] Hogan in [place]  8/2022: Diagnosed with chronic phase CML.  10/2022: Started dasatinib but had questionable compliance.  6/7/23: He presented to the ER at [facility] with gum swelling. Labs notable for  (80% blasts). He was [place] [facility] [place].  6/9/23: Bone marrow biopsy revealed blast phase chronic myeloid leukemia; reticulin fibrosis 2 of 3. 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 FISH with t(9;22) and MLLT3/MLL (KMT2A) fusion. NGS with NRAS (7%), PTPN11 (4%). BCR/ABL1:ABL1 (p210) >55%.  6/16/23: Started induction with CLIA + ponatinib. Course complicated by neutropenic fever, pharyngitis/mucositis, and strep viridans bacteremia.   7/6/23: Day 21 bone marrow biopsy revealed a markedly hypocellular marrow (<5%) with trilineage hypoplasia. CG 46,XY[5].  7/25/23: Bone marrow biopsy revealed a hypocellular marrow but suboptimal specimen for evaluation.   8/4/23: BCR/ABL1:ABL1 (p210) 23.6%.   8/28/23: Bone marrow biopsy revealed a hypocellular marrow (30%) with persistent blast phase with 10-15% blasts.   10/9/23: Cycle 1 day 1 of azacitidine 75 mg/m2 x5 days plus venetoclax 50mg daily x10 days + ponatininb 30mg daily (28 day cycle).   10/31/23: Bone marrow biopsy at the end of cycle 1 revealed persistent myeloid blast crisis (11% circulating blasts).   11/6/23: Cycle 2 day 1 of azacitidine, venetoclax, and ponatinib. Ponatinib increased to 45mg daily.   1/2/24: Started asciminib 200mg daily as patient and family desired more treatment following [facility] discussion.   - Admitted 1/9/24 with neutropenic fevers and leukocytosis (WBC 114K, 95% others on diff)   - Currently holding asciminib due to fevers  - continue hydrea 1g BID  - remains on ppx acyclovir; on zosyn and vanc with fevers

## 2024-01-11 NOTE — PLAN OF CARE
Plan of care reviewed with patient and family. Pt's tmax 101.5. Tylenol given.  Free from falls or injury. Oxy and Dilaudid given prn for pain. LR infusing at 125. Zosyn and Vanc given as scheduled. Pt up to bathroom independently. One bm this shift. 's-130's on telemetry. Bed locked in lowest position, non skid socks on, call light within reach. Pt instructed to call if any assistance is needed. Vitals stable. Family at bedside. Will cont to [facility] pt.

## 2024-01-11 NOTE — PROGRESS NOTES
[facility] - Oncology ([facility])  Hematology  Bone Marrow Transplant  Progress Note    Patient Name: Magdaleno Hirsch  Admission Date: 1/9/2024  Hospital Length of Stay: 1 days  Code Status: Full Code    Subjective:     Interval History: Fevers continue. Infectious workup remains negative. On zosyn and vanc for small tonsillar abscess. Continuing hydrea at 1g BID. BP and HR improved with metoprolol and amlodipine. Patient continues on prn dilaudid and fentanyl patch for pain to throat and back. Denies n/v, has low appetite. Denies diarrhea or constipation. GOC discussions continued with patient, family, and palliative care    Objective:     Vital Signs (Most Recent):  Temp: 99.3 °F (37.4 °C) (01/11/24 1644)  Pulse: (!) 122 (01/11/24 1559)  Resp: 20 (01/11/24 1559)  BP: (!) 157/100 (01/11/24 1559)  SpO2: 97 % (01/11/24 1559) Vital Signs (24h Range):  Temp:  [98 °F (36.7 °C)-102.9 °F (39.4 °C)] 99.3 °F (37.4 °C)  Pulse:  [112-143] 122  Resp:  [15-20] 20  SpO2:  [93 %-100 %] 97 %  BP: (134-166)/() 157/100     Weight: 78.9 kg (174 lb)  Body mass index is 22.96 kg/m².  Body surface area is 2.02 meters squared.      Intake/Output - Last 3 Shifts         01/09 0700  01/10 0659 01/10 0700  01/11 0659 01/11 0700  01/12 0659    P.O.  1190     I.V. (mL/kg)  2417.5 (30.6)     Blood  548.8 1266    IV Piggyback  1052.8     Total Intake(mL/kg)  5209.1 (66) 1266 (16)    Net  +5209.1 +1266           Urine Occurrence  4 x 1 x    Stool Occurrence  3 x              Physical Exam  Vitals and nursing note reviewed.   Constitutional:       General: He is in acute distress.      Appearance: He is ill-appearing and toxic-appearing. He is not diaphoretic.   HENT:      Head: Normocephalic.      Nose:      Comments: Limited speaking and hoarse voice. Limited ROM of tongue and R mandibular swelling noted      Mouth/Throat:      Mouth: Mucous membranes are dry.      Pharynx: Pharyngeal swelling and posterior oropharyngeal erythema present.    Eyes:      Extraocular Movements: Extraocular movements intact.      Conjunctiva/sclera: Conjunctivae normal.   Neck:      Comments: R sided mandibular swelling noted.  Cardiovascular:      Rate and Rhythm: Regular rhythm. Tachycardia present.      Pulses: Normal pulses.      Heart sounds: Normal heart sounds. No murmur heard.  Pulmonary:      Effort: Pulmonary effort is normal. No respiratory distress.      Breath sounds: Normal breath sounds.   Abdominal:      General: Bowel sounds are normal.      Palpations: Abdomen is soft.      Tenderness: There is no abdominal tenderness (to patient palpation).   Musculoskeletal:         General: No swelling. Normal range of motion.      Cervical back: Edema present.      Right lower leg: No edema.      Left lower leg: No edema.   Skin:     Coloration: Skin is not jaundiced.      Findings: Rash present.   Neurological:      General: No focal deficit present.      Mental Status: He is oriented to person, place, and time.   Psychiatric:         Mood and Affect: Affect is flat.            Significant Labs:   CBC:   Recent Labs   Lab 01/09/24  2052 01/10/24  0843 01/11/24  0358   .80* 85.29* 94.45*   HGB 7.1* 6.0* 6.7*   HCT 21.4* 18.1* 20.2*   PLT 32* 21* 12*    and CMP:   Recent Labs   Lab 01/09/24  2052 01/10/24  0843 01/11/24  0358   * 133* 134*   K 2.9* 3.7 3.5   CL 93* 98 97   CO2 27 26 27    129* 102   BUN 10 8 6   CREATININE 0.9 0.7 0.6   CALCIUM 8.8 8.5* 8.5*   PROT 7.5 6.5 6.2   ALBUMIN 2.7* 2.4* 2.2*   BILITOT 2.6* 2.4* 2.8*   ALKPHOS 345* 295* 266*   AST 58* 42* 42*   * 91* 70*   ANIONGAP 14 9 10       Diagnostic Results:  I have reviewed all pertinent imaging results/findings within the past 24 hours.  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 Green at [facility] [place]; follows with Dr. Hogan in [place]  8/2022: Diagnosed with chronic phase CML.  10/2022: Started dasatinib but had questionable  compliance.  6/7/23: He presented to the [place] at [facility] [place] with gum swelling. Labs notable for  (80% blasts). He was transferred to [facility] [place] [place] for further management.  6/9/23: Bone marrow biopsy revealed blast phase chronic myeloid leukemia; reticulin fibrosis 2 of 3. 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 FISH with t(9;22) and MLLT3/MLL (KMT2A) fusion. NGS with NRAS (7%), PTPN11 (4%). BCR/ABL1:ABL1 (p210) >55%.  6/16/23: Started induction with CLIA + ponatinib. Course complicated by neutropenic fever, pharyngitis/mucositis, and strep viridans bacteremia.   7/6/23: Day 21 bone marrow biopsy revealed a markedly hypocellular marrow (<5%) with trilineage hypoplasia. CG 46,XY[5].  7/25/23: Bone marrow biopsy revealed a hypocellular marrow but suboptimal specimen for evaluation.   8/4/23: BCR/ABL1:ABL1 (p210) 23.6%.   8/28/23: Bone marrow biopsy revealed a hypocellular marrow (30%) with persistent blast phase with 10-15% blasts.   10/9/23: Cycle 1 day 1 of azacitidine 75 mg/m2 x5 days plus venetoclax 50mg daily x10 days + ponatininb 30mg daily (28 day cycle).   10/31/23: Bone marrow biopsy at the end of cycle 1 revealed persistent myeloid blast crisis (11% circulating blasts).   11/6/23: Cycle 2 day 1 of azacitidine, venetoclax, and ponatinib. Ponatinib increased to 45mg daily.   1/2/24: Started asciminib 200mg daily as patient and family desired more treatment following GOC discussion.   - Admitted 1/9/24 with neutropenic fevers and leukocytosis (WBC 114K, 95% others on diff)   - Currently holding asciminib due to fevers  - continue hydrea 1g BID  - remains on ppx acyclovir; on zosyn and vanc with fevers    ACP (advance care planning)  - palliative care consulted to assist with GOC discussions as both Dr. Green and Dr. Hogan have discussed extremely grim prognosis with patient and family d/t no further treatment options for refractory CML  - initially acceptable to hospice with plans to discharge on 1/11; however  rescinded decision as family attempting to get patient to [facility]  - remains full code, continuing all supportive care at this time  - has fentanyl patch and prn dilaudid for pain  -  following    Tonsillar abscess  - patient with swollen neck, throat pain, difficulty swallowing  - CT neck shows subcentimeter left palatine tonsil abscess with mild associated left parapharyngeal edema  - seen by ENT; no acute intervention  - continue zosyn and vanc    Hyperbilirubinemia  - elevated on admission; no abd reported  - RUQ US unremarkable    Thrombocytopenia  - daily CBC  - transfuse for Plt <10K or bleeding    Anemia in neoplastic disease  - daily cbc  - transfuse for Hgb <7    Hypertension  - continue home lopressor and diltiazem     CML (chronic myeloid leukemia) in relapse  - see blast phase CML        VTE Risk Mitigation (From admission, onward)           Ordered     Reason for No Pharmacological VTE Prophylaxis  Once        Question:  Reasons:  Answer:  Thrombocytopenia    01/10/24 0213     IP VTE HIGH RISK PATIENT  Once         01/10/24 0213     Place sequential compression device  Until discontinued         01/10/24 0213                    Disposition: Remains inpatient pending continued [facility] discussions with family    Ginger Jacob NP  Bone Marrow Transplant  [facility] - Oncology ([facility])

## 2024-01-11 NOTE — PROGRESS NOTES
01/10/24 1747   Vital Signs   Temp (!) 100.5 °F (38.1 °C)   Temp Source Oral   Pulse (!) 124   Resp 18   SpO2 100 %   Pulse Oximetry Type Intermittent   Device (Oxygen Therapy) room air   BP (!) 166/100     MD notified of high BP.

## 2024-01-12 LAB
ALBUMIN SERPL BCP-MCNC: 2.1 G/DL (ref 3.5–5.2)
ALP SERPL-CCNC: 287 U/L (ref 55–135)
ALT SERPL W/O P-5'-P-CCNC: 53 U/L (ref 10–44)
ANION GAP SERPL CALC-SCNC: 13 MMOL/L (ref 8–16)
ANION GAP SERPL CALC-SCNC: 14 MMOL/L (ref 8–16)
ANISOCYTOSIS BLD QL SMEAR: SLIGHT
APTT PPP: 28.1 SEC (ref 21–32)
AST SERPL-CCNC: 38 U/L (ref 10–40)
BASOPHILS # BLD AUTO: ABNORMAL K/UL (ref 0–0.2)
BASOPHILS NFR BLD: 0 % (ref 0–1.9)
BILIRUB SERPL-MCNC: 2.2 MG/DL (ref 0.1–1)
BUN SERPL-MCNC: 8 MG/DL (ref 6–20)
BUN SERPL-MCNC: 8 MG/DL (ref 6–20)
CALCIUM SERPL-MCNC: 8.5 MG/DL (ref 8.7–10.5)
CALCIUM SERPL-MCNC: 9.1 MG/DL (ref 8.7–10.5)
CHLORIDE SERPL-SCNC: 96 MMOL/L (ref 95–110)
CHLORIDE SERPL-SCNC: 97 MMOL/L (ref 95–110)
CO2 SERPL-SCNC: 24 MMOL/L (ref 23–29)
CO2 SERPL-SCNC: 24 MMOL/L (ref 23–29)
CREAT SERPL-MCNC: 0.7 MG/DL (ref 0.5–1.4)
CREAT SERPL-MCNC: 0.8 MG/DL (ref 0.5–1.4)
DIFFERENTIAL METHOD BLD: ABNORMAL
EOSINOPHIL # BLD AUTO: ABNORMAL K/UL (ref 0–0.5)
EOSINOPHIL NFR BLD: 0 % (ref 0–8)
ERYTHROCYTE [DISTWIDTH] IN BLOOD BY AUTOMATED COUNT: 17 % (ref 11.5–14.5)
EST. GFR  (NO RACE VARIABLE): >60 ML/MIN/1.73 M^2
EST. GFR  (NO RACE VARIABLE): >60 ML/MIN/1.73 M^2
FIBRINOGEN PPP-MCNC: 587 MG/DL (ref 182–400)
GLUCOSE SERPL-MCNC: 109 MG/DL (ref 70–110)
GLUCOSE SERPL-MCNC: 120 MG/DL (ref 70–110)
HCT VFR BLD AUTO: 22 % (ref 40–54)
HGB BLD-MCNC: 7.2 G/DL (ref 14–18)
HYPOCHROMIA BLD QL SMEAR: ABNORMAL
IMM GRANULOCYTES # BLD AUTO: ABNORMAL K/UL (ref 0–0.04)
IMM GRANULOCYTES NFR BLD AUTO: ABNORMAL % (ref 0–0.5)
INR PPP: 1.4 (ref 0.8–1.2)
LDH SERPL L TO P-CCNC: 927 U/L (ref 110–260)
LYMPHOCYTES # BLD AUTO: ABNORMAL K/UL (ref 1–4.8)
LYMPHOCYTES NFR BLD: 4 % (ref 18–48)
MAGNESIUM SERPL-MCNC: 1.6 MG/DL (ref 1.6–2.6)
MAGNESIUM SERPL-MCNC: 1.6 MG/DL (ref 1.6–2.6)
MCH RBC QN AUTO: 27.9 PG (ref 27–31)
MCHC RBC AUTO-ENTMCNC: 32.7 G/DL (ref 32–36)
MCV RBC AUTO: 85 FL (ref 82–98)
MONOCYTES # BLD AUTO: ABNORMAL K/UL (ref 0.3–1)
MONOCYTES NFR BLD: 3 % (ref 4–15)
NEUTROPHILS NFR BLD: 0 % (ref 38–73)
NRBC BLD-RTO: 0 /100 WBC
OVALOCYTES BLD QL SMEAR: ABNORMAL
PHOSPHATE SERPL-MCNC: 3 MG/DL (ref 2.7–4.5)
PLATELET # BLD AUTO: 27 K/UL (ref 150–450)
PLATELET BLD QL SMEAR: ABNORMAL
PMV BLD AUTO: 10.3 FL (ref 9.2–12.9)
POIKILOCYTOSIS BLD QL SMEAR: SLIGHT
POLYCHROMASIA BLD QL SMEAR: ABNORMAL
POTASSIUM SERPL-SCNC: 3.1 MMOL/L (ref 3.5–5.1)
POTASSIUM SERPL-SCNC: 3.4 MMOL/L (ref 3.5–5.1)
PROT SERPL-MCNC: 6.2 G/DL (ref 6–8.4)
PROTHROMBIN TIME: 14.6 SEC (ref 9–12.5)
RBC # BLD AUTO: 2.58 M/UL (ref 4.6–6.2)
SODIUM SERPL-SCNC: 134 MMOL/L (ref 136–145)
SODIUM SERPL-SCNC: 134 MMOL/L (ref 136–145)
URATE SERPL-MCNC: 2 MG/DL (ref 3.4–7)
VANCOMYCIN TROUGH SERPL-MCNC: 22.5 UG/ML (ref 10–22)
WBC # BLD AUTO: 93.6 K/UL (ref 3.9–12.7)
WBC OTHER NFR BLD MANUAL: 93 %

## 2024-01-12 PROCEDURE — 25000003 PHARM REV CODE 250: Performed by: STUDENT IN AN ORGANIZED HEALTH CARE EDUCATION/TRAINING PROGRAM

## 2024-01-12 PROCEDURE — 63600175 PHARM REV CODE 636 W HCPCS: Performed by: STUDENT IN AN ORGANIZED HEALTH CARE EDUCATION/TRAINING PROGRAM

## 2024-01-12 PROCEDURE — 85027 COMPLETE CBC AUTOMATED: CPT | Performed by: STUDENT IN AN ORGANIZED HEALTH CARE EDUCATION/TRAINING PROGRAM

## 2024-01-12 PROCEDURE — 99232 SBSQ HOSP IP/OBS MODERATE 35: CPT | Mod: ,,, | Performed by: INTERNAL MEDICINE

## 2024-01-12 PROCEDURE — 84100 ASSAY OF PHOSPHORUS: CPT | Performed by: STUDENT IN AN ORGANIZED HEALTH CARE EDUCATION/TRAINING PROGRAM

## 2024-01-12 PROCEDURE — 20600001 HC STEP DOWN PRIVATE ROOM

## 2024-01-12 PROCEDURE — 80202 ASSAY OF VANCOMYCIN: CPT | Performed by: INTERNAL MEDICINE

## 2024-01-12 PROCEDURE — 80053 COMPREHEN METABOLIC PANEL: CPT | Performed by: STUDENT IN AN ORGANIZED HEALTH CARE EDUCATION/TRAINING PROGRAM

## 2024-01-12 PROCEDURE — 63600175 PHARM REV CODE 636 W HCPCS: Performed by: NURSE PRACTITIONER

## 2024-01-12 PROCEDURE — 80048 BASIC METABOLIC PNL TOTAL CA: CPT | Mod: XB | Performed by: NURSE PRACTITIONER

## 2024-01-12 PROCEDURE — 80202 ASSAY OF VANCOMYCIN: CPT | Mod: 91 | Performed by: INTERNAL MEDICINE

## 2024-01-12 PROCEDURE — 83615 LACTATE (LD) (LDH) ENZYME: CPT | Performed by: NURSE PRACTITIONER

## 2024-01-12 PROCEDURE — 85730 THROMBOPLASTIN TIME PARTIAL: CPT | Performed by: NURSE PRACTITIONER

## 2024-01-12 PROCEDURE — 84550 ASSAY OF BLOOD/URIC ACID: CPT | Performed by: NURSE PRACTITIONER

## 2024-01-12 PROCEDURE — 36415 COLL VENOUS BLD VENIPUNCTURE: CPT | Mod: XB | Performed by: INTERNAL MEDICINE

## 2024-01-12 PROCEDURE — 25000003 PHARM REV CODE 250: Performed by: NURSE PRACTITIONER

## 2024-01-12 PROCEDURE — 25000003 PHARM REV CODE 250: Performed by: INTERNAL MEDICINE

## 2024-01-12 PROCEDURE — 83735 ASSAY OF MAGNESIUM: CPT | Performed by: STUDENT IN AN ORGANIZED HEALTH CARE EDUCATION/TRAINING PROGRAM

## 2024-01-12 PROCEDURE — 99233 SBSQ HOSP IP/OBS HIGH 50: CPT | Mod: ,,, | Performed by: CLINICAL NURSE SPECIALIST

## 2024-01-12 PROCEDURE — A4216 STERILE WATER/SALINE, 10 ML: HCPCS | Performed by: NURSE PRACTITIONER

## 2024-01-12 PROCEDURE — 85610 PROTHROMBIN TIME: CPT | Performed by: NURSE PRACTITIONER

## 2024-01-12 PROCEDURE — C9113 INJ PANTOPRAZOLE SODIUM, VIA: HCPCS | Performed by: NURSE PRACTITIONER

## 2024-01-12 PROCEDURE — 36415 COLL VENOUS BLD VENIPUNCTURE: CPT | Performed by: NURSE PRACTITIONER

## 2024-01-12 PROCEDURE — 85007 BL SMEAR W/DIFF WBC COUNT: CPT | Performed by: STUDENT IN AN ORGANIZED HEALTH CARE EDUCATION/TRAINING PROGRAM

## 2024-01-12 PROCEDURE — 36415 COLL VENOUS BLD VENIPUNCTURE: CPT | Mod: XB | Performed by: NURSE PRACTITIONER

## 2024-01-12 PROCEDURE — 63600175 PHARM REV CODE 636 W HCPCS: Performed by: INTERNAL MEDICINE

## 2024-01-12 PROCEDURE — 85384 FIBRINOGEN ACTIVITY: CPT | Performed by: NURSE PRACTITIONER

## 2024-01-12 RX ORDER — LANOLIN ALCOHOL/MO/W.PET/CERES
400 CREAM (GRAM) TOPICAL DAILY
Status: DISCONTINUED | OUTPATIENT
Start: 2024-01-12 | End: 2024-01-17 | Stop reason: HOSPADM

## 2024-01-12 RX ORDER — POTASSIUM CHLORIDE 7.45 MG/ML
10 INJECTION INTRAVENOUS
Status: COMPLETED | OUTPATIENT
Start: 2024-01-12 | End: 2024-01-12

## 2024-01-12 RX ADMIN — HYDROMORPHONE HYDROCHLORIDE 2 MG: 2 INJECTION INTRAMUSCULAR; INTRAVENOUS; SUBCUTANEOUS at 03:01

## 2024-01-12 RX ADMIN — ACYCLOVIR 800 MG: 200 SUSPENSION ORAL at 09:01

## 2024-01-12 RX ADMIN — VANCOMYCIN HYDROCHLORIDE 1000 MG: 1 INJECTION, POWDER, LYOPHILIZED, FOR SOLUTION INTRAVENOUS at 04:01

## 2024-01-12 RX ADMIN — SODIUM CHLORIDE, SODIUM LACTATE, POTASSIUM CHLORIDE, AND CALCIUM CHLORIDE: 600; 310; 30; 20 INJECTION, SOLUTION INTRAVENOUS at 03:01

## 2024-01-12 RX ADMIN — POTASSIUM CHLORIDE 10 MEQ: 7.46 INJECTION, SOLUTION INTRAVENOUS at 10:01

## 2024-01-12 RX ADMIN — POTASSIUM CHLORIDE 10 MEQ: 7.46 INJECTION, SOLUTION INTRAVENOUS at 12:01

## 2024-01-12 RX ADMIN — PIPERACILLIN SODIUM AND TAZOBACTAM SODIUM 4.5 G: 4; .5 INJECTION, POWDER, FOR SOLUTION INTRAVENOUS at 09:01

## 2024-01-12 RX ADMIN — METOPROLOL TARTRATE 25 MG: 25 TABLET, FILM COATED ORAL at 09:01

## 2024-01-12 RX ADMIN — POTASSIUM CHLORIDE 10 MEQ: 7.46 INJECTION, SOLUTION INTRAVENOUS at 04:01

## 2024-01-12 RX ADMIN — PANTOPRAZOLE SODIUM 40 MG: 40 INJECTION, POWDER, FOR SOLUTION INTRAVENOUS at 09:01

## 2024-01-12 RX ADMIN — VANCOMYCIN HYDROCHLORIDE 1000 MG: 1 INJECTION, POWDER, LYOPHILIZED, FOR SOLUTION INTRAVENOUS at 02:01

## 2024-01-12 RX ADMIN — VANCOMYCIN HYDROCHLORIDE 1000 MG: 1 INJECTION, POWDER, LYOPHILIZED, FOR SOLUTION INTRAVENOUS at 09:01

## 2024-01-12 RX ADMIN — PIPERACILLIN SODIUM AND TAZOBACTAM SODIUM 4.5 G: 4; .5 INJECTION, POWDER, FOR SOLUTION INTRAVENOUS at 12:01

## 2024-01-12 RX ADMIN — DILTIAZEM HYDROCHLORIDE 120 MG: 120 CAPSULE, COATED, EXTENDED RELEASE ORAL at 09:01

## 2024-01-12 RX ADMIN — POTASSIUM CHLORIDE 10 MEQ: 7.46 INJECTION, SOLUTION INTRAVENOUS at 03:01

## 2024-01-12 RX ADMIN — HYDROMORPHONE HYDROCHLORIDE 2 MG: 2 INJECTION INTRAMUSCULAR; INTRAVENOUS; SUBCUTANEOUS at 04:01

## 2024-01-12 RX ADMIN — POTASSIUM & SODIUM PHOSPHATES POWDER PACK 280-160-250 MG 1 PACKET: 280-160-250 PACK at 05:01

## 2024-01-12 RX ADMIN — HYDROMORPHONE HYDROCHLORIDE 2 MG: 2 INJECTION INTRAMUSCULAR; INTRAVENOUS; SUBCUTANEOUS at 09:01

## 2024-01-12 RX ADMIN — OXYCODONE HYDROCHLORIDE 10 MG: 10 TABLET ORAL at 09:01

## 2024-01-12 RX ADMIN — HYDROXYUREA 1000 MG: 500 CAPSULE ORAL at 09:01

## 2024-01-12 RX ADMIN — Medication 400 MG: at 01:01

## 2024-01-12 RX ADMIN — PIPERACILLIN SODIUM AND TAZOBACTAM SODIUM 4.5 G: 4; .5 INJECTION, POWDER, FOR SOLUTION INTRAVENOUS at 05:01

## 2024-01-12 RX ADMIN — HYDROMORPHONE HYDROCHLORIDE 2 MG: 2 INJECTION INTRAMUSCULAR; INTRAVENOUS; SUBCUTANEOUS at 11:01

## 2024-01-12 RX ADMIN — POTASSIUM & SODIUM PHOSPHATES POWDER PACK 280-160-250 MG 1 PACKET: 280-160-250 PACK at 09:01

## 2024-01-12 RX ADMIN — HYDROMORPHONE HYDROCHLORIDE 2 MG: 2 INJECTION INTRAMUSCULAR; INTRAVENOUS; SUBCUTANEOUS at 07:01

## 2024-01-12 RX ADMIN — SODIUM CHLORIDE, SODIUM LACTATE, POTASSIUM CHLORIDE, AND CALCIUM CHLORIDE: 600; 310; 30; 20 INJECTION, SOLUTION INTRAVENOUS at 09:01

## 2024-01-12 NOTE — ASSESSMENT & PLAN NOTE
Palliative Care  Palliative care encounter  Pal med follow up to goals of care for Magdaleno Hirsch a 22 yo gentleman admitted to BMT service  in blast crisis CML here with intratonsillar abscess. No longer a candidate for cancer directed therapies.    Patient appears comfortable in bed, no behavioral signs of pain, watching video's on his phone.  Mother and significant other at bedside.       Advance Care Planning     Date: 01/11/2024  - no ACP documents received   -Patient did not wish or was not able to name a surrogate decision maker or provide an Advance Care Plan.   - Mr Hirsch appears very ill and does not participate in acp conversation.  Loved ones at bedside states he is able to make decisions  -  next of kin is mother - Danisha Hirsch [phone]  Code status: Full. Not addressed      Goals of Care   - pal med APRN  and LCSW returned to bedside as follow up to goals of care.  Significant other and  mother at bedside.  Pal med reintroduced.    - patient and family acknowledged speaking with primary team and are aware [facility] has no additional treatment options or clinical trials available.   - understandably the patient and family are distraught by this news and less amenable to conversation at this time  - pal med did not discuss hospice as treatment option at this time. Message would not be received.    - asked if patient and family had additional questions or concerns about the clinical condition and or immediate and future care.   - Patient and family indicated through nodding and stating no to having additional questions or concerns.     - assured patient and family pal med remains available to patient and family.  Family has pal med contact information.  Intense emotional support provided.       Cancer associated pain   - appears comfortable at this time.  No reports of pain   - able to watch video's on telephone  Recommendations  - continue fentanyl patch.    -Continue magic mouth wash  - continue  Fentanyl   patch 12.5mcg.   -  consider changing  IV dilaudid to 0.5 mg every 4 hrs as needed.  - Patient becomes somnolent and unable to participate in conversation after receiving 2 mg IV dilaudid prn.   - hydromorphone dose may be escalated as needed       Recommendations at  discharge  Breakthrough pain can likely be controlled with morphine 20mg/ml 0.5-1ml q2h prn  discharging if patient and family reconsider discharge with hospice care.      Recommendations  -  patient and family require intense emotional support - will collaborate with [facility]  for additional support  -  family is understandably grieving and would benefit from repeated discussions regarding prognosis and what to expect in the near future - readdressing end of life care.   -  intense emotional support provided     Primary team fellow and NP aware of the above conversation and recommendations

## 2024-01-12 NOTE — PROGRESS NOTES
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      Assessment/Plan:     Palliative Care  Palliative care encounter  Palliative Care  Palliative care encounter  Pal med follow up to goals of care for Magdaleno Hirsch a 22 yo gentleman admitted to BMT service  in blast crisis CML here with intratonsillar abscess. No longer a candidate for cancer directed therapies.    Patient appears comfortable in bed, no behavioral signs of pain, watching video's on his phone.  Mother and significant other at bedside.       Advance Care Planning     Date: 01/11/2024  - no ACP documents received   -Patient did not wish or was not able to name a surrogate decision maker or provide an Advance Care Plan.   - Mr Hirsch appears very ill and does not participate in acp conversation.  Loved ones at bedside states he is able to make decisions  -  next of kin is mother - Danisha Hirsch [phone]  Code status: Full. Not addressed      Goals of Care   - pal med APRN  and LCSW returned to bedside as follow up to goals of care.  Significant other and  mother at bedside.  Pal med reintroduced.    - patient and family acknowledged speaking with primary team and are aware [facility] has no additional treatment options or clinical trials available.   - understandably the patient and family are distraught by this news and less amenable to conversation at this time  - pal med did not discuss hospice as treatment option at this time. Pal med understands the patient and family are grieving.  Patient and family are processing the information   -   "As per primary team, the  will continue to have this conversation with patient and family and provide them with resources.    -    asked if patient and family had additional questions or concerns about the clinical condition and or immediate and future care.   - Patient and family indicated through nodding and stating no to having additional questions or concerns.     - assured patient and family pal med remains available to patient and family.  Family has pal med contact information.  Intense emotional support provided.       Cancer associated pain   - appears comfortable at this time.  No reports of pain   - able to watch video's on telephone  Recommendations  - continue fentanyl patch.    -Continue magic mouth wash  - continue  Fentanyl  patch 12.5mcg.   -  consider changing  IV dilaudid to 0.5 mg every 4 hrs as needed.  - Patient becomes somnolent and unable to participate in conversation after receiving 2 mg IV dilaudid prn.   - hydromorphone dose may be escalated as needed       Recommendations at  discharge  Breakthrough pain can likely be controlled with morphine 20mg/ml 0.5-1ml q2h prn  discharging if patient and family reconsider discharge with hospice care.      Recommendations  -  patient and family require intense emotional support - will collaborate with [facility] med  for additional support  -  family is understandably grieving and would benefit from repeated discussions regarding prognosis and what to expect in the near future - readdressing end of life care.   -  intense emotional support provided     Primary team fellow and NP aware of the above conversation and recommendations            I will follow-up with patient. Please contact us if you have any additional questions.    Subjective:     Chief Complaint:   Chief Complaint   Patient presents with    Mouth Lesions     Reports mouth lesions and sore throat "caused from my chemo". Last received chemo yesterday. Hx of leukemia  " "      HPI:   Per hematology H&P  "Mr. 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 Patient was alone for AV encounter with nurse at bedside. Patient with very limited talking 2/2 pain. Patient was previously admitted for failure to thrive, poor intake, and fevers. Infectious workup was negative.Patient with leukocytosis, so he was started on Hydrea. Started on asciminib on 1/2/24. He developed a pruritic truncal rash the day after starting asciminib.  Patient was seen in clinic on 1/8 for therapy and concern for worsening CML vs sepsis given overall clinical picture. Patient was instructed to go to the ER. Patient reports having acute on chronic neck pain localized to the R side with no radiation rated at 10/10. Patient reports taking home medication but reports no improvement. Patient denies any SOB but report ROSALES. Patient denies any coughing. Denies any sick contacts. Patient denies any abdominal pain, nausea or vomiting but reports diarrhea. Reports no episodes today and reports anorexia 2/2 pain. Patient reports rash has not changed since last visit. Patient denies any other ENT complaints including sinus pressure pain or ear pain. Patient reports headache localized to R side rated at 8/10.      In the ED patient was febrile to 103, hypertensive and tachycardic to 130s with SpO2 >95 on RA. Labs were significant for WBC 114K with 95% blast/other, hgb 7.1, PLT 32K, Lactic 1.4, Na 134, K 2.9, bili 2.6, AST 58 and . CXR was negative but CT soft tissue neck demonstrated sub centimeter left palatine tonsil abscess with mild associated left parapharyngeal edema. Patient was given IVF, vanc and cefepime and admitted to BMT for further management. "    Per primary team patient is not a candidate for further cancer directed therapies. Palliative consulted for end of life discussions     Hospital Course:  No notes on " file        Past Medical History:   Diagnosis Date    CML (chronic myelocytic leukemia)     HVD (hypertensive vascular disease)     Oropharyngeal candidiasis        Past Surgical History:   Procedure Laterality Date    BONE MARROW BIOPSY N/A 8/22/2022    Procedure: Biopsy-bone marrow;  Surgeon: Getachew Chen MD;  Location: [facility] OR;  Service: General;  Laterality: N/A;    BONE MARROW BIOPSY N/A 7/25/2023    Procedure: Biopsy-bone marrow;  Surgeon: Edi Carty MD;  Location: [facility] OR;  Service: General;  Laterality: N/A;    BONE MARROW BIOPSY N/A 8/28/2023    Procedure: Biopsy-bone marrow;  Surgeon: Moo Pina MD;  Location: [facility] OR;  Service: General;  Laterality: N/A;    BONE MARROW BIOPSY N/A 10/31/2023    Procedure: Biopsy-bone marrow;  Surgeon: Edi Carty MD;  Location: [facility] OR;  Service: General;  Laterality: N/A;    KNEE SURGERY Left        Review of patient's allergies indicates:  No Known Allergies    Medications:  Continuous Infusions:   lactated ringers 125 mL/hr at 01/12/24 0329     Scheduled Meds:   acyclovir  800 mg Oral BID    diltiaZEM  120 mg Oral Nightly    fentaNYL  1 patch Transdermal Q72H    hydroxyurea  1,000 mg Oral BID    magnesium oxide  400 mg Oral Daily    metoprolol  25 mg Oral BID    pantoprazole  40 mg Intravenous Daily    piperacillin-tazobactam (Zosyn) IV (PEDS and ADULTS) (extended infusion is not appropriate)  4.5 g Intravenous Q8H    potassium chloride  10 mEq Intravenous Q2H    potassium, sodium phosphates  1 packet Oral QID (AC & HS)    vancomycin (VANCOCIN) IV (PEDS and ADULTS)  1,000 mg Intravenous Q8H     PRN Meds:(Magic mouthwash) 1:1:1 diphenhydrAMINE(Benadryl) 12.5mg/5ml liq, aluminum & magnesium hydroxide-simethicone (Maalox), LIDOcaine viscous 2%, acetaminophen, dextrose 10%, dextrose 10%, diphenhydrAMINE, glucagon (human recombinant), glucose, glucose, hydrALAZINE, HYDROmorphone, naloxone, oxyCODONE, senna-docusate 8.6-50 mg, sodium chloride 0.9%, Pharmacy to  dose Vancomycin consult **AND** vancomycin - pharmacy to dose    Family History       Problem Relation (Age of Onset)    Cancer Maternal Grandmother    Hypertension Maternal Grandmother          Tobacco Use    Smoking status: Never    Smokeless tobacco: Never   Substance and Sexual Activity    Alcohol use: Never    Drug use: Yes     Types: Marijuana    Sexual activity: Not on file       Review of Systems   Constitutional:  Positive for appetite change.   HENT:  Positive for mouth sores and sore throat.    Respiratory: Negative.     Cardiovascular: Negative.    Gastrointestinal: Negative.    Endocrine: Negative.    Musculoskeletal:  Positive for neck pain.   Neurological: Negative.      Objective:     Vital Signs (Most Recent):  Temp: 99.8 °F (37.7 °C) (01/12/24 0925)  Pulse: 104 (01/12/24 1044)  Resp: 17 (01/12/24 0917)  BP: (!) 158/94 (01/12/24 0751)  SpO2: 95 % (01/12/24 0751) Vital Signs (24h Range):  Temp:  [99.1 °F (37.3 °C)-100.9 °F (38.3 °C)] 99.8 °F (37.7 °C)  Pulse:  [104-133] 104  Resp:  [15-22] 17  SpO2:  [95 %-100 %] 95 %  BP: (132-162)/() 158/94     Weight: 78.9 kg (174 lb)  Body mass index is 22.96 kg/m².       Physical Exam  Vitals and nursing note reviewed.   Constitutional:       General: He is not in acute distress.     Comments: drowsy   HENT:      Head: Normocephalic.      Right Ear: External ear normal.      Left Ear: External ear normal.      Nose: Nose normal.      Mouth/Throat:      Mouth: Mucous membranes are dry.   Eyes:      Pupils: Pupils are equal, round, and reactive to light.   Cardiovascular:      Rate and Rhythm: Tachycardia present.   Pulmonary:      Effort: No respiratory distress.   Abdominal:      General: There is no distension.   Musculoskeletal:      Cervical back: Normal range of motion.   Neurological:      Mental Status: He is oriented to person, place, and time.            Review of Symptoms      Symptom Assessment (ESAS 0-10 Scale)  Pain:  0  Dyspnea:  0  Anxiety:   0  Nausea:  0  Depression:  0  Anorexia:  0  Fatigue:  0  Insomnia:  0  Restlessness:  0  Agitation:  0     CAM / Delirium:  Negative  Constipation:  Negative  Diarrhea:  Negative      Modified [place] Scale:  0    ECOG Performance Status ndGndrndanddndend:nd nd2nd Living Arrangements:  Lives with family    Psychosocial/Cultural:   See Palliative Psychosocial Note: No  Dad is . Close with mom. Has 3 siblings (22, 19, and 8). The 19 year old is incarcerated  **Primary  to Follow**  Palliative Care  Consult: No    Spiritual:  F - Mel and Belief:  Yes  I - Importance:  Yes  C - Community:  Yes  A - Address in Care:  Yes        Advance Care Planning  Advance Directives:   Living Will: No    LaPOST: No    Do Not Resuscitate Status: No    Medical Power of : No      Decision Making:  Patient answered questions and Family answered questions  Goals of Care: What is most important right now is to focus on symptom/pain control, comfort and QOL . Accordingly, we have decided that the best plan to meet the patient's goals includes enrolling in hospice care.         Significant Labs: All pertinent labs within the past 24 hours have been reviewed.  CBC:   Recent Labs   Lab 24   WBC 93.60*   HGB 7.2*   HCT 22.0*   MCV 85   PLT 27*       BMP:  Recent Labs   Lab 24      *   K 3.1*   CL 96   CO2 24   BUN 8   CREATININE 0.7   CALCIUM 8.5*   MG 1.6  1.6       LFT:  Lab Results   Component Value Date    AST 38 2024    ALKPHOS 287 (H) 2024    BILITOT 2.2 (H) 2024     Albumin:   Albumin   Date Value Ref Range Status   2024 2.1 (L) 3.5 - 5.2 g/dL Final     Protein:   Total Protein   Date Value Ref Range Status   2024 6.2 6.0 - 8.4 g/dL Final     Lactic acid:   Lab Results   Component Value Date    LACTATE 1.4 2024       Significant Imaging: I have reviewed all pertinent imaging results/findings within the past 24 hours.    CT soft tissue  neck 1/9/24  Subcentimeter left palatine tonsil abscess with mild associated left parapharyngeal edema.         Shellie Pittman, CNS  Palliative Medicine  [facility] - Oncology ([facility])

## 2024-01-12 NOTE — CARE UPDATE
RAPID RESPONSE NURSE ROUND       Rounding completed with charge RNCaitlin. No concerns verbalized at this time. Instructed to call [phone] for further concerns or assistance.

## 2024-01-12 NOTE — PROGRESS NOTES
Pharmacokinetic Assessment Follow Up: IV Vancomycin    Vancomycin serum concentration assessment(s):    Vancomycin trough level resulted at 5.4 mcg/mL, drawn appropriately. Goal level is 10 to 20 mcg/mL.     Drug levels (last 3 results):  Recent Labs   Lab Result Units 01/11/24  1648   Vancomycin-Trough ug/mL 5.4*     Vancomycin Regimen Plan:    Increase to vancomycin 1000 mg IV every 8 hours with next serum trough concentration measured on 1/12 1600 to ensure clearance.     Pharmacy will continue to follow and monitor vancomycin.    Please contact pharmacy at extension [phone] for questions regarding this assessment.    Thank you for the consult,   Andra Terry, PharmD, BCCCP                 Patient brief summary:  Magdaleno Hirsch is a 23 y.o. male initiated on antimicrobial therapy with IV vancomycin for treatment of sepsis    Drug Allergies:   Review of patient's allergies indicates:  No Known Allergies    Actual Body Weight:   78.9 kg     Renal Function:   Estimated Creatinine Clearance: 210 mL/min (based on SCr of 0.6 mg/dL).    Dialysis Method (if applicable):  N/A    CBC (last 72 hours):  Recent Labs   Lab Result Units 01/09/24  2052 01/10/24  0843 01/11/24  0358   WBC K/uL 114.80* 85.29* 94.45*   Hemoglobin g/dL 7.1* 6.0* 6.7*   Hematocrit % 21.4* 18.1* 20.2*   Platelets K/uL 32* 21* 12*   Gran % % 0.5* 0.0* 0.0*   Lymph % % 2.0* 5.0* 4.0*   Mono % % 3.0* 4.0 2.0*   Eosinophil % % 0.0 0.0 0.0   Basophil % % 0.0 0.0 0.0   Differential Method  Manual Manual Manual       Metabolic Panel (last 72 hours):  Recent Labs   Lab Result Units 01/09/24  2052 01/10/24  0437 01/10/24  0843 01/11/24  0358   Sodium mmol/L 134*  --  133* 134*   Potassium mmol/L 2.9*  --  3.7 3.5   Chloride mmol/L 93*  --  98 97   CO2 mmol/L 27  --  26 27   Glucose mg/dL 110  --  129* 102   Glucose, UA   --  Negative  --   --    BUN mg/dL 10  --  8 6   Creatinine mg/dL 0.9  --  0.7 0.6   Albumin g/dL 2.7*  --  2.4* 2.2*   Total Bilirubin mg/dL  2.6*  --  2.4* 2.8*   Alkaline Phosphatase U/L 345*  --  295* 266*   AST U/L 58*  --  42* 42*   ALT U/L 132*  --  91* 70*   Magnesium mg/dL  --   --  1.4* 1.8   Phosphorus mg/dL  --   --  1.6* 2.0*       Vancomycin Administrations:  vancomycin given in the last 96 hours                     vancomycin (VANCOCIN) 1,000 mg in dextrose 5 % (D5W) 250 mL IVPB (Vial-Mate) (mg) 1,000 mg New Bag 01/11/24 1636     1,000 mg New Bag  0438    vancomycin (VANCOCIN) 1,000 mg in dextrose 5 % (D5W) 250 mL IVPB (Vial-Mate) (mg) 1,000 mg New Bag 01/10/24 1705    vancomycin 2 g in dextrose 5 % 500 mL IVPB (mg) 2,000 mg New Bag 01/10/24 0154                    Microbiologic Results:  Microbiology Results (last 7 days)       Procedure Component Value Units Date/Time    Blood culture #1 **CANNOT BE ORDERED STAT** [[id number]] Collected: 01/09/24 2310    Order Status: Completed Specimen: Blood from Peripheral, Hand, Right Updated: 01/11/24 0613     Blood Culture, Routine No Growth to date      No Growth to date    Blood culture #2 **CANNOT BE ORDERED STAT** [[id number]] Collected: 01/09/24 2052    Order Status: Completed Specimen: Blood from Peripheral, Antecubital, Left Updated: 01/10/24 2212     Blood Culture, Routine No Growth to date      No Growth to date    Culture, MRSA [[id number]]     Order Status: No result Specimen: MRSA source

## 2024-01-12 NOTE — PROGRESS NOTES
[facility] ([facility])  Hematology  Bone Marrow Transplant  Progress Note    Patient Name: Magdaleno Hirsch  Admission Date: 1/9/2024  Hospital Length of Stay: 2 days  Code Status: Full Code    Subjective:     Interval History: Patient had fevers up to 100.9F earlier today and this morning. He remains tachycardic. He says pain is at a 7. Discussed with patient, his mother and patient's significant other that unfortunately, there are no further treatment options and that we are at end of life. Patient's family stated they would like to go to [facility]. Re-iterated that primary Hematologist previously discussed his case with [facility] physicians and that there were no more additional options or clinical trials available.      Objective:     Vital Signs (Most Recent):  Temp: 100 °F (37.8 °C) (01/12/24 1225)  Pulse: (!) 113 (01/12/24 1225)  Resp: 18 (01/12/24 1225)  BP: (!) 157/98 (01/12/24 1225)  SpO2: (!) 93 % (01/12/24 1225) Vital Signs (24h Range):  Temp:  [99.1 °F (37.3 °C)-100.9 °F (38.3 °C)] 100 °F (37.8 °C)  Pulse:  [104-133] 113  Resp:  [15-22] 18  SpO2:  [93 %-100 %] 93 %  BP: (132-162)/() 157/98     Weight: 78.9 kg (174 lb)  Body mass index is 22.96 kg/m².  Body surface area is 2.02 meters squared.    ECOG SCORE           [unfilled]    Intake/Output - Last 3 Shifts         01/10 0700  01/11 0659 01/11 0700  01/12 0659 01/12 0700  01/13 0659    P.O. 1190 360     I.V. (mL/kg) 2417.5 (30.6)      Blood 548.8 1266     IV Piggyback 1052.8      Total Intake(mL/kg) 5209.1 (66) 1626 (20.6)     Net +5209.1 +1626            Urine Occurrence 4 x 3 x     Stool Occurrence 3 x 1 x              Physical Exam  Vitals and nursing note reviewed.   Constitutional:       General: He is in acute distress.      Appearance: He is ill-appearing. He is not diaphoretic.   HENT:      Head: Normocephalic.      Nose:      Comments: Limited speaking and hoarse voice. Limited ROM of tongue and R mandibular swelling noted       Mouth/Throat:      Pharynx: Pharyngeal swelling and posterior oropharyngeal erythema present.   Eyes:      Extraocular Movements: Extraocular movements intact.      Conjunctiva/sclera: Conjunctivae normal.   Neck:      Comments: R sided mandibular swelling noted.  Cardiovascular:      Rate and Rhythm: Regular rhythm. Tachycardia present.   Pulmonary:      Effort: Pulmonary effort is normal. No respiratory distress.      Breath sounds: Normal breath sounds.   Abdominal:      Palpations: Abdomen is soft.      Tenderness: There is no abdominal tenderness.   Musculoskeletal:         General: No swelling. Normal range of motion.      Cervical back: Edema present.      Right lower leg: No edema.      Left lower leg: No edema.   Skin:     Coloration: Skin is not jaundiced.      Findings: Rash present.   Neurological:      General: No focal deficit present.      Mental Status: He is oriented to person, place, and time.   Psychiatric:         Mood and Affect: Affect is flat.            Significant Labs:   CBC:   Recent Labs   Lab 01/11/24 0358 01/12/24 0316   WBC 94.45* 93.60*   HGB 6.7* 7.2*   HCT 20.2* 22.0*   PLT 12* 27*    and CMP:   Recent Labs   Lab 01/11/24  0358 01/12/24  0007 01/12/24 0316   * 134* 134*   K 3.5 3.4* 3.1*   CL 97 97 96   CO2 27 24 24    120* 109   BUN 6 8 8   CREATININE 0.6 0.8 0.7   CALCIUM 8.5* 9.1 8.5*   PROT 6.2  --  6.2   ALBUMIN 2.2*  --  2.1*   BILITOT 2.8*  --  2.2*   ALKPHOS 266*  --  287*   AST 42*  --  38   ALT 70*  --  53*   ANIONGAP 10 13 14       Diagnostic Results:  I have reviewed all pertinent imaging results/findings within the past 24 hours.  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 [place] [facility] [place]. Hogan in [place]  8/2022: Diagnosed with chronic phase CML.  10/2022: Started dasatinib but had questionable compliance.  6/7/23: He presented [place] at [facility] [place] with [place]. Labs notable for   (80% blasts). He was transferred to [facility] for further management.  6/9/23: Bone marrow biopsy revealed blast phase chronic myeloid leukemia; reticulin fibrosis 2 of 3. 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 FISH with t(9;22) and MLLT3/MLL (KMT2A) fusion. NGS with NRAS (7%), PTPN11 (4%). BCR/ABL1:ABL1 (p210) >55%.  6/16/23: Started induction with CLIA + ponatinib. Course complicated by neutropenic fever, pharyngitis/mucositis, and strep viridans bacteremia.   7/6/23: Day 21 bone marrow biopsy revealed a markedly hypocellular marrow (<5%) with trilineage hypoplasia. CG 46,XY[5].  7/25/23: Bone marrow biopsy revealed a hypocellular marrow but suboptimal specimen for evaluation.   8/4/23: BCR/ABL1:ABL1 (p210) 23.6%.   8/28/23: Bone marrow biopsy revealed a hypocellular marrow (30%) with persistent blast phase with 10-15% blasts.   10/9/23: Cycle 1 day 1 of azacitidine 75 mg/m2 x5 days plus venetoclax 50mg daily x10 days + ponatininb 30mg daily (28 day cycle).   10/31/23: Bone marrow biopsy at the end of cycle 1 revealed persistent myeloid blast crisis (11% circulating blasts).   11/6/23: Cycle 2 day 1 of azacitidine, venetoclax, and ponatinib. Ponatinib increased to 45mg daily.   1/2/24: Started asciminib 200mg daily as patient and family desired more treatment following GOC discussion.   - Admitted 1/9/24 with neutropenic fevers and leukocytosis (WBC 114K, 95% others on diff)   - Currently holding asciminib due to fevers  - continue hydrea 1g BID  - remains on ppx acyclovir; on zosyn and vanc with fevers    ACP (advance care planning)  - palliative care consulted to assist with GOC discussions as both Dr. Green and Dr. Hogan have discussed extremely grim prognosis with patient and family d/t no further treatment options for refractory CML  - initially acceptable to hospice with plans to discharge on 1/11; however rescinded decision as family attempting to get patient to [facility]; re-iterated that there are no  additional options including treatment and clinical trials  - remains full code, continuing all supportive care at this time  - has fentanyl patch and prn dilaudid for pain  -  following    Tonsillar abscess  - patient with swollen neck, throat pain, difficulty swallowing  - CT neck shows subcentimeter left palatine tonsil abscess with mild associated left parapharyngeal edema  - seen by ENT; no acute intervention  - continue zosyn and vanc    Hyperbilirubinemia  - elevated on admission  - RUQ US unremarkable    Thrombocytopenia  - daily CBC  - transfuse for Plt <10K or bleeding    Anemia in neoplastic disease  - daily cbc  - transfuse for Hgb <7    Hypertension  - continue home lopressor and diltiazem     CML (chronic myeloid leukemia) in relapse  - see blast phase CML        VTE Risk Mitigation (From admission, onward)           Ordered     Reason for No Pharmacological VTE Prophylaxis  Once        Question:  Reasons:  Answer:  Thrombocytopenia    01/10/24 0213     IP VTE HIGH RISK PATIENT  Once         01/10/24 0213     Place sequential compression device  Until discontinued         01/10/24 0213                    Disposition: Remain inpatient    Ching Albrecht MD  Bone Marrow Transplant  [facility] (Hospital)

## 2024-01-12 NOTE — CHAPLAIN
"Palliative Care     Patient: Magdaleno Hirsch  MRN: [id number]  : 2000  Age: 23 y.o.  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 His mom and girlfriend (of 3+ yrs) stepped out to get something to eat.    Pt continued to say that his care is in God's hands. I agreed with him and reminded him that humans are/will be asking what he wants for his care with the things we have control over--- like home with or without hospice, [facility], etc. Pt said the [facility] told his mom that there is nothing more they can do either. We talked about how tough that is to here; plenty of silence.     Pt said he's not in pain and he has been sleeping well overnight. I told him I saw that there was an order for "home hospice" in his chart and asked if that's still the plan (knowing that it was reminded) and pt said he didn't know.  Reassured him that he is so loved, by God, his mom, girlfriend and family. Also reminded him that he can state his wishes for his plan of care because he is the only one going through this disease/condition in his body--- but clearly it impacts the entire family.    I asked pt what he would like for his care and he said,"I'd like to be home, but I want to make sure I have the right medicines for pain, etc"  Shared that if there are no more invasive treatments available, then hospice at home can accommodate his pain and symptoms.   We talked about how "hospice" gets a bad wrap and it's especially difficult for families to embrace. It's all about quality of life " "and comfort and if God has miraculous plans for him, he can revoke hospice if he'd like or extend it as he wants and qualifies; sometimes people thrive better with hospice services and defy a grim prognosis. Pt seemed to understand. Reminded him it's ultimately his choice, but recognize it turns into a family decision.    Pt stated he can feel his body is getting weaker, "but I want to get up and walk today!"    I asked if he has that physical urge or if it's his mind of matter to defy his illness and he said that one-the latter.  I said that's fine but not to try on his own, make sure he gets nurses help if he wanted to try that. He agreed.    More silence...the patient declined anything I can get or do for him at this time. Told him I'd continue to keep him in my prayers (prayed with him a couple times before on visits) and pt thanked me and said, "God Bless you" to me. Palliative  will continue to follow, but will be out after today until Tuesday. Lord, in your mercy.    **Spiritual Care Dept. Chaplains are available evenings, overnight and weekends **    In Peace,  Rev. Sherley Robison MDiv, [facility]  Board Certified   Palliative Medicine Department  [phone]  "

## 2024-01-12 NOTE — HPI
Mr. Hirsch is a 22 yo M with blast phase CML on asciminib, and gingival hyperplasia who presents to [facility] with complaint of throat pain and fevers. Patient was alone for AV encounter with nurse at bedside. Patient with very limited talking 2/2 pain. Patient was previously admitted for failure to thrive, poor intake, and fevers. Infectious workup was negative.Patient with leukocytosis, so he was started on Hydrea. Started on asciminib on 1/2/24. He developed a pruritic truncal rash the day after starting asciminib.  Patient was seen in clinic on 1/8 for therapy and concern for worsening CML vs sepsis given overall clinical picture. Patient was instructed to go to the ER. Patient reports having acute on chronic neck pain localized to the R side with no radiation rated at 10/10. Patient reports taking home medication but reports no improvement. Patient denies any SOB but report ROSALES. Patient denies any coughing. Denies any sick contacts. Patient denies any abdominal pain, nausea or vomiting but reports diarrhea. Reports no episodes today and reports anorexia 2/2 pain. Patient reports rash has not changed since last visit. Patient denies any other ENT complaints including sinus pressure pain or ear pain. Patient reports headache localized to R side rated at 8/10.      In the ED patient was febrile to 103, hypertensive and tachycardic to 130s with SpO2 >95 on RA. Labs were significant for WBC 114K with 95% blast/other, hgb 7.1, PLT 32K, Lactic 1.4, Na 134, K 2.9, bili 2.6, AST 58 and . CXR was negative but CT soft tissue neck demonstrated sub centimeter left palatine tonsil abscess with mild associated left parapharyngeal edema. Patient was given IVF, vanc and cefepime and admitted to BMT for further management.      Oncology History  Primary Oncologic Diagnosis: Chronic myeloid leukemia, blast phase     8/2022: Diagnosed with chronic phase CML.  10/2022: Started dasatinib but had questionable compliance.  6/7/23: He  presented [place] at [facility] [place] with [place]. Labs notable for  (80% blasts). [place] was [place] [facility] [place].  6/9/23: Bone marrow biopsy revealed blast phase chronic myeloid leukemia; reticulin fibrosis 2 of 3. 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 FISH with t(9;22) and MLLT3/MLL (KMT2A) fusion. NGS with NRAS (7%), PTPN11 (4%). BCR/ABL1:ABL1 (p210) >55%.  6/16/23: Started induction with CLIA + ponatinib. Course complicated by neutropenic fever, pharyngitis/mucositis, and strep viridans bacteremia.   7/6/23: Day 21 bone marrow biopsy revealed a markedly hypocellular marrow (<5%) with trilineage hypoplasia. CG 46,XY[5].  7/25/23: Bone marrow biopsy revealed a hypocellular marrow but suboptimal specimen for evaluation.   8/4/23: BCR/ABL1:ABL1 (p210) 23.6%.   8/28/23: Bone marrow biopsy revealed a hypocellular marrow (30%) with persistent blast phase with 10-15% blasts.   10/9/23: Cycle 1 day 1 of azacitidine 75 mg/m2 x5 days plus venetoclax 50mg daily x10 days + ponatininb 30mg daily (28 day cycle).   10/31/23: Bone marrow biopsy at the end of cycle 1 revealed persistent myeloid blast crisis (11% circulating blasts).   11/6/23: Cycle 2 day 1 of azacitidine, venetoclax, and ponatinib. Ponatinib increased to 45mg daily.   1/2/24: Started asciminib 200mg daily.

## 2024-01-12 NOTE — CARE UPDATE
RAPID RESPONSE NURSE ROUND       Rounding completed with charge RNSiobhan. No concerns verbalized at this time. Instructed to call [phone] for further concerns or assistance.

## 2024-01-12 NOTE — ASSESSMENT & PLAN NOTE
- palliative care consulted to assist with GOC discussions as both Dr. Green and Dr. Hogan have discussed extremely grim prognosis with patient and family d/t no further treatment options for refractory CML  - initially acceptable to hospice with plans to discharge on 1/11; however rescinded decision as family attempting to get patient to [facility]; re-iterated that there are no additional options including treatment and clinical trials  - remains full code, continuing all supportive care at this time  - has fentanyl patch and prn dilaudid for pain  -  following

## 2024-01-12 NOTE — PLAN OF CARE
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 Patient's mother and girlfriend at bedside. APRN inquired if there are any questions or concerns about patient's condition and/or future care. All present indicated no by nodding that they have no questions or concerns.     Karthik Castanon LCSW  Palliative Medicine

## 2024-01-12 NOTE — PROGRESS NOTES
visited the patient at bedside, the patient's mother, and significant other were present.   ask, did you speak with your physician regarding treatment options? The patient stated that the physician stated that there are no more treatment options that are available for me.  The  asked, did the physician offer any additional options to you? The patient stated hospice was the option he mentioned.   asked, what do you think about the option that the physician present to you?   The patient stated that he nor his mother, and girlfriend have not made a decision at this time.  The  informed the patient that Sharron, with [facility] is still available to speak with you again regarding hospice.  The patient remembered Sharron from a previous visit to he patient and family.   contacted Sharron with [facility] to inform her that the patient have not made a decision, but to be on stand by this weekend if a call is made to set-up hospice for the patient.  Carmita Verduzco will be on call, and that I won't return until Tuesday.   Carmita Verduzco LCSW will have all the information necessary to set-up home hospice.

## 2024-01-12 NOTE — PLAN OF CARE
Pt A&Ox4. LR infusing at 125mL/hr. Pt complaint of pain in lower back, legs, and mouth. PRN Dilaudid given x3 and Oxycodone given x1 this shift. Pt up to BR to shower and back to bed, ambulates independently. Instructed to call prior to getting OOB, verbalized understanding. Family remains at the bedside to assist pt. IV K 2/4 given on prior shift. Per MD hold last two doses d/t  order and re-check K level. K 3.4. Two IV K doses ordered and administered. Pt tachy, HR in 130's beginning of shift then down to low 100's. MD aware. Pt tmax 100.9F, PRN Tylenol given per MD ordered. POC discussed with pt and family. Bed in lowest position with wheels locked. Call bell and all personal items within reach. Frequent rounding performed throughout shift.

## 2024-01-12 NOTE — SUBJECTIVE & OBJECTIVE
Subjective:     Interval History: Patient had fevers up to 100.9F earlier today and this morning. He remains tachycardic. He says pain is at a 7. Discussed with patient, his mother and patient's significant other that unfortunately, there are no further treatment options and that we are at end of life. Patient's family stated they would like to go to [facility]. Re-iterated that primary Hematologist previously discussed his case with [facility] physicians and that there were no more additional options or clinical trials available.      Objective:     Vital Signs (Most Recent):  Temp: 100 °F (37.8 °C) (01/12/24 1225)  Pulse: (!) 113 (01/12/24 1225)  Resp: 18 (01/12/24 1225)  BP: (!) 157/98 (01/12/24 1225)  SpO2: (!) 93 % (01/12/24 1225) Vital Signs (24h Range):  Temp:  [99.1 °F (37.3 °C)-100.9 °F (38.3 °C)] 100 °F (37.8 °C)  Pulse:  [104-133] 113  Resp:  [15-22] 18  SpO2:  [93 %-100 %] 93 %  BP: (132-162)/() 157/98     Weight: 78.9 kg (174 lb)  Body mass index is 22.96 kg/m².  Body surface area is 2.02 meters squared.    ECOG SCORE           [unfilled]    Intake/Output - Last 3 Shifts         01/10 0700  01/11 0659 01/11 0700 01/12 0659 01/12 0700 01/13 0659    P.O. 1190 360     I.V. (mL/kg) 2417.5 (30.6)      Blood 548.8 1266     IV Piggyback 1052.8      Total Intake(mL/kg) 5209.1 (66) 1626 (20.6)     Net +5209.1 +1626            Urine Occurrence 4 x 3 x     Stool Occurrence 3 x 1 x              Physical Exam  Vitals and nursing note reviewed.   Constitutional:       General: He is in acute distress.      Appearance: He is ill-appearing. He is not diaphoretic.   HENT:      Head: Normocephalic.      Nose:      Comments: Limited speaking and hoarse voice. Limited ROM of tongue and R mandibular swelling noted      Mouth/Throat:      Pharynx: Pharyngeal swelling and posterior oropharyngeal erythema present.   Eyes:      Extraocular Movements: Extraocular movements intact.      Conjunctiva/sclera: Conjunctivae  normal.   Neck:      Comments: R sided mandibular swelling noted.  Cardiovascular:      Rate and Rhythm: Regular rhythm. Tachycardia present.   Pulmonary:      Effort: Pulmonary effort is normal. No respiratory distress.      Breath sounds: Normal breath sounds.   Abdominal:      Palpations: Abdomen is soft.      Tenderness: There is no abdominal tenderness.   Musculoskeletal:         General: No swelling. Normal range of motion.      Cervical back: Edema present.      Right lower leg: No edema.      Left lower leg: No edema.   Skin:     Coloration: Skin is not jaundiced.      Findings: Rash present.   Neurological:      General: No focal deficit present.      Mental Status: He is oriented to person, place, and time.   Psychiatric:         Mood and Affect: Affect is flat.            Significant Labs:   CBC:   Recent Labs   Lab 01/11/24  0358 01/12/24  0316   WBC 94.45* 93.60*   HGB 6.7* 7.2*   HCT 20.2* 22.0*   PLT 12* 27*    and CMP:   Recent Labs   Lab 01/11/24  0358 01/12/24  0007 01/12/24  0316   * 134* 134*   K 3.5 3.4* 3.1*   CL 97 97 96   CO2 27 24 24    120* 109   BUN 6 8 8   CREATININE 0.6 0.8 0.7   CALCIUM 8.5* 9.1 8.5*   PROT 6.2  --  6.2   ALBUMIN 2.2*  --  2.1*   BILITOT 2.8*  --  2.2*   ALKPHOS 266*  --  287*   AST 42*  --  38   ALT 70*  --  53*   ANIONGAP 10 13 14       Diagnostic Results:  I have reviewed all pertinent imaging results/findings within the past 24 hours.

## 2024-01-12 NOTE — SUBJECTIVE & OBJECTIVE
Past Medical History:   Diagnosis Date    CML (chronic myelocytic leukemia)     HVD (hypertensive vascular disease)     Oropharyngeal candidiasis        Past Surgical History:   Procedure Laterality Date    BONE MARROW BIOPSY N/A 8/22/2022    Procedure: Biopsy-bone marrow;  Surgeon: Getachew Chen MD;  Location: [facility] OR;  Service: General;  Laterality: N/A;    BONE MARROW BIOPSY N/A 7/25/2023    Procedure: Biopsy-bone marrow;  Surgeon: Edi Carty MD;  Location: [facility] OR;  Service: General;  Laterality: N/A;    BONE MARROW BIOPSY N/A 8/28/2023    Procedure: Biopsy-bone marrow;  Surgeon: Moo Pina MD;  Location: [facility] OR;  Service: General;  Laterality: N/A;    BONE MARROW BIOPSY N/A 10/31/2023    Procedure: Biopsy-bone marrow;  Surgeon: Edi Carty MD;  Location: [facility] OR;  Service: General;  Laterality: N/A;    KNEE SURGERY Left        Review of patient's allergies indicates:  No Known Allergies    Medications:  Continuous Infusions:   lactated ringers 125 mL/hr at 01/12/24 0329     Scheduled Meds:   acyclovir  800 mg Oral BID    diltiaZEM  120 mg Oral Nightly    fentaNYL  1 patch Transdermal Q72H    hydroxyurea  1,000 mg Oral BID    magnesium oxide  400 mg Oral Daily    metoprolol  25 mg Oral BID    pantoprazole  40 mg Intravenous Daily    piperacillin-tazobactam (Zosyn) IV (PEDS and ADULTS) (extended infusion is not appropriate)  4.5 g Intravenous Q8H    potassium chloride  10 mEq Intravenous Q2H    potassium, sodium phosphates  1 packet Oral QID (AC & HS)    vancomycin (VANCOCIN) IV (PEDS and ADULTS)  1,000 mg Intravenous Q8H     PRN Meds:(Magic mouthwash) 1:1:1 diphenhydrAMINE(Benadryl) 12.5mg/5ml liq, aluminum & magnesium hydroxide-simethicone (Maalox), LIDOcaine viscous 2%, acetaminophen, dextrose 10%, dextrose 10%, diphenhydrAMINE, glucagon (human recombinant), glucose, glucose, hydrALAZINE, HYDROmorphone, naloxone, oxyCODONE, senna-docusate 8.6-50 mg, sodium chloride 0.9%, Pharmacy to dose  Vancomycin consult **AND** vancomycin - pharmacy to dose    Family History       Problem Relation (Age of Onset)    Cancer Maternal Grandmother    Hypertension Maternal Grandmother          Tobacco Use    Smoking status: Never    Smokeless tobacco: Never   Substance and Sexual Activity    Alcohol use: Never    Drug use: Yes     Types: Marijuana    Sexual activity: Not on file       Review of Systems   Constitutional:  Positive for appetite change.   HENT:  Positive for mouth sores and sore throat.    Respiratory: Negative.     Cardiovascular: Negative.    Gastrointestinal: Negative.    Endocrine: Negative.    Musculoskeletal:  Positive for neck pain.   Neurological: Negative.      Objective:     Vital Signs (Most Recent):  Temp: 99.8 °F (37.7 °C) (01/12/24 0925)  Pulse: 104 (01/12/24 1044)  Resp: 17 (01/12/24 0917)  BP: (!) 158/94 (01/12/24 0751)  SpO2: 95 % (01/12/24 0751) Vital Signs (24h Range):  Temp:  [99.1 °F (37.3 °C)-100.9 °F (38.3 °C)] 99.8 °F (37.7 °C)  Pulse:  [104-133] 104  Resp:  [15-22] 17  SpO2:  [95 %-100 %] 95 %  BP: (132-162)/() 158/94     Weight: 78.9 kg (174 lb)  Body mass index is 22.96 kg/m².       Physical Exam  Vitals and nursing note reviewed.   Constitutional:       General: He is not in acute distress.     Comments: drowsy   HENT:      Head: Normocephalic.      Right Ear: External ear normal.      Left Ear: External ear normal.      Nose: Nose normal.      Mouth/Throat:      Mouth: Mucous membranes are dry.   Eyes:      Pupils: Pupils are equal, round, and reactive to light.   Cardiovascular:      Rate and Rhythm: Tachycardia present.   Pulmonary:      Effort: No respiratory distress.   Abdominal:      General: There is no distension.   Musculoskeletal:      Cervical back: Normal range of motion.   Neurological:      Mental Status: He is oriented to person, place, and time.            Review of Symptoms      Symptom Assessment (ESAS 0-10 Scale)  Pain:  0  Dyspnea:  0  Anxiety:   0  Nausea:  0  Depression:  0  Anorexia:  0  Fatigue:  0  Insomnia:  0  Restlessness:  0  Agitation:  0     CAM / Delirium:  Negative  Constipation:  Negative  Diarrhea:  Negative      Modified Zion Scale:  0    ECOG Performance Status ndGndrndanddndend:nd nd2nd Living Arrangements:  Lives with family    Psychosocial/Cultural:   See Palliative Psychosocial Note: No  Dad is . Close with mom. Has 3 siblings (22, 19, and 8). The 19 year old is incarcerated  **Primary  to Follow**  Palliative Care  Consult: No    Spiritual:  F - Mel and Belief:  Yes  I - Importance:  Yes  C - Community:  Yes  A - Address in Care:  Yes        Advance Care Planning   Advance Directives:   Living Will: No    LaPOST: No    Do Not Resuscitate Status: No    Medical Power of : No      Decision Making:  Patient answered questions and Family answered questions  Goals of Care: What is most important right now is to focus on symptom/pain control, comfort and QOL . Accordingly, we have decided that the best plan to meet the patient's goals includes enrolling in hospice care.         Significant Labs: All pertinent labs within the past 24 hours have been reviewed.  CBC:   Recent Labs   Lab 24   WBC 93.60*   HGB 7.2*   HCT 22.0*   MCV 85   PLT 27*       BMP:  Recent Labs   Lab 24      *   K 3.1*   CL 96   CO2 24   BUN 8   CREATININE 0.7   CALCIUM 8.5*   MG 1.6  1.6       LFT:  Lab Results   Component Value Date    AST 38 2024    ALKPHOS 287 (H) 2024    BILITOT 2.2 (H) 2024     Albumin:   Albumin   Date Value Ref Range Status   2024 2.1 (L) 3.5 - 5.2 g/dL Final     Protein:   Total Protein   Date Value Ref Range Status   2024 6.2 6.0 - 8.4 g/dL Final     Lactic acid:   Lab Results   Component Value Date    LACTATE 1.4 2024       Significant Imaging: I have reviewed all pertinent imaging results/findings within the past 24 hours.    CT soft tissue  neck 1/9/24  Subcentimeter left palatine tonsil abscess with mild associated left parapharyngeal edema.

## 2024-01-13 LAB
ABO + RH BLD: NORMAL
ALBUMIN SERPL BCP-MCNC: 2.1 G/DL (ref 3.5–5.2)
ALP SERPL-CCNC: 252 U/L (ref 55–135)
ALT SERPL W/O P-5'-P-CCNC: 56 U/L (ref 10–44)
ANION GAP SERPL CALC-SCNC: 8 MMOL/L (ref 8–16)
ANISOCYTOSIS BLD QL SMEAR: SLIGHT
AST SERPL-CCNC: 68 U/L (ref 10–40)
BASOPHILS # BLD AUTO: ABNORMAL K/UL (ref 0–0.2)
BASOPHILS NFR BLD: 0 % (ref 0–1.9)
BILIRUB SERPL-MCNC: 1.7 MG/DL (ref 0.1–1)
BLD GP AB SCN CELLS X3 SERPL QL: NORMAL
BUN SERPL-MCNC: 8 MG/DL (ref 6–20)
CALCIUM SERPL-MCNC: 8.4 MG/DL (ref 8.7–10.5)
CHLORIDE SERPL-SCNC: 98 MMOL/L (ref 95–110)
CO2 SERPL-SCNC: 25 MMOL/L (ref 23–29)
CREAT SERPL-MCNC: 0.8 MG/DL (ref 0.5–1.4)
DIFFERENTIAL METHOD BLD: ABNORMAL
EOSINOPHIL # BLD AUTO: ABNORMAL K/UL (ref 0–0.5)
EOSINOPHIL NFR BLD: 0 % (ref 0–8)
ERYTHROCYTE [DISTWIDTH] IN BLOOD BY AUTOMATED COUNT: 17 % (ref 11.5–14.5)
EST. GFR  (NO RACE VARIABLE): >60 ML/MIN/1.73 M^2
GLUCOSE SERPL-MCNC: 94 MG/DL (ref 70–110)
HCT VFR BLD AUTO: 21.6 % (ref 40–54)
HGB BLD-MCNC: 7.3 G/DL (ref 14–18)
HYPOCHROMIA BLD QL SMEAR: ABNORMAL
IMM GRANULOCYTES # BLD AUTO: ABNORMAL K/UL (ref 0–0.04)
IMM GRANULOCYTES NFR BLD AUTO: ABNORMAL % (ref 0–0.5)
LDH SERPL L TO P-CCNC: 882 U/L (ref 110–260)
LYMPHOCYTES # BLD AUTO: ABNORMAL K/UL (ref 1–4.8)
LYMPHOCYTES NFR BLD: 4 % (ref 18–48)
MAGNESIUM SERPL-MCNC: 1.6 MG/DL (ref 1.6–2.6)
MCH RBC QN AUTO: 28.5 PG (ref 27–31)
MCHC RBC AUTO-ENTMCNC: 33.8 G/DL (ref 32–36)
MCV RBC AUTO: 84 FL (ref 82–98)
MONOCYTES # BLD AUTO: ABNORMAL K/UL (ref 0.3–1)
MONOCYTES NFR BLD: 1 % (ref 4–15)
NEUTROPHILS NFR BLD: 0 % (ref 38–73)
NRBC BLD-RTO: 0 /100 WBC
PHOSPHATE SERPL-MCNC: 2.2 MG/DL (ref 2.7–4.5)
PLATELET # BLD AUTO: 15 K/UL (ref 150–450)
PLATELET BLD QL SMEAR: ABNORMAL
PMV BLD AUTO: 10.8 FL (ref 9.2–12.9)
POTASSIUM SERPL-SCNC: 3.1 MMOL/L (ref 3.5–5.1)
PROT SERPL-MCNC: 6.2 G/DL (ref 6–8.4)
RBC # BLD AUTO: 2.56 M/UL (ref 4.6–6.2)
SODIUM SERPL-SCNC: 131 MMOL/L (ref 136–145)
SPECIMEN OUTDATE: NORMAL
URATE SERPL-MCNC: 2.5 MG/DL (ref 3.4–7)
VANCOMYCIN TROUGH SERPL-MCNC: 14.5 UG/ML (ref 10–22)
WBC # BLD AUTO: 91.32 K/UL (ref 3.9–12.7)
WBC OTHER NFR BLD MANUAL: 95 %

## 2024-01-13 PROCEDURE — 63600175 PHARM REV CODE 636 W HCPCS: Performed by: INTERNAL MEDICINE

## 2024-01-13 PROCEDURE — 99233 SBSQ HOSP IP/OBS HIGH 50: CPT | Mod: ,,, | Performed by: INTERNAL MEDICINE

## 2024-01-13 PROCEDURE — 86901 BLOOD TYPING SEROLOGIC RH(D): CPT | Performed by: STUDENT IN AN ORGANIZED HEALTH CARE EDUCATION/TRAINING PROGRAM

## 2024-01-13 PROCEDURE — 83735 ASSAY OF MAGNESIUM: CPT | Performed by: STUDENT IN AN ORGANIZED HEALTH CARE EDUCATION/TRAINING PROGRAM

## 2024-01-13 PROCEDURE — 84100 ASSAY OF PHOSPHORUS: CPT | Performed by: STUDENT IN AN ORGANIZED HEALTH CARE EDUCATION/TRAINING PROGRAM

## 2024-01-13 PROCEDURE — 25000003 PHARM REV CODE 250: Performed by: NURSE PRACTITIONER

## 2024-01-13 PROCEDURE — 83615 LACTATE (LD) (LDH) ENZYME: CPT | Performed by: NURSE PRACTITIONER

## 2024-01-13 PROCEDURE — A4216 STERILE WATER/SALINE, 10 ML: HCPCS | Performed by: NURSE PRACTITIONER

## 2024-01-13 PROCEDURE — 25000003 PHARM REV CODE 250: Performed by: STUDENT IN AN ORGANIZED HEALTH CARE EDUCATION/TRAINING PROGRAM

## 2024-01-13 PROCEDURE — 25000003 PHARM REV CODE 250: Performed by: INTERNAL MEDICINE

## 2024-01-13 PROCEDURE — 84550 ASSAY OF BLOOD/URIC ACID: CPT | Performed by: NURSE PRACTITIONER

## 2024-01-13 PROCEDURE — 85007 BL SMEAR W/DIFF WBC COUNT: CPT | Performed by: STUDENT IN AN ORGANIZED HEALTH CARE EDUCATION/TRAINING PROGRAM

## 2024-01-13 PROCEDURE — 63600175 PHARM REV CODE 636 W HCPCS: Performed by: NURSE PRACTITIONER

## 2024-01-13 PROCEDURE — C9113 INJ PANTOPRAZOLE SODIUM, VIA: HCPCS | Performed by: NURSE PRACTITIONER

## 2024-01-13 PROCEDURE — 80053 COMPREHEN METABOLIC PANEL: CPT | Performed by: STUDENT IN AN ORGANIZED HEALTH CARE EDUCATION/TRAINING PROGRAM

## 2024-01-13 PROCEDURE — 85027 COMPLETE CBC AUTOMATED: CPT | Performed by: STUDENT IN AN ORGANIZED HEALTH CARE EDUCATION/TRAINING PROGRAM

## 2024-01-13 PROCEDURE — 20600001 HC STEP DOWN PRIVATE ROOM

## 2024-01-13 PROCEDURE — 36415 COLL VENOUS BLD VENIPUNCTURE: CPT | Performed by: STUDENT IN AN ORGANIZED HEALTH CARE EDUCATION/TRAINING PROGRAM

## 2024-01-13 RX ORDER — POTASSIUM CHLORIDE 20 MEQ/1
40 TABLET, EXTENDED RELEASE ORAL ONCE
Status: COMPLETED | OUTPATIENT
Start: 2024-01-13 | End: 2024-01-13

## 2024-01-13 RX ADMIN — METOPROLOL TARTRATE 25 MG: 25 TABLET, FILM COATED ORAL at 08:01

## 2024-01-13 RX ADMIN — PIPERACILLIN SODIUM AND TAZOBACTAM SODIUM 4.5 G: 4; .5 INJECTION, POWDER, FOR SOLUTION INTRAVENOUS at 05:01

## 2024-01-13 RX ADMIN — HYDROXYUREA 1000 MG: 500 CAPSULE ORAL at 09:01

## 2024-01-13 RX ADMIN — POTASSIUM & SODIUM PHOSPHATES POWDER PACK 280-160-250 MG 1 PACKET: 280-160-250 PACK at 05:01

## 2024-01-13 RX ADMIN — PANTOPRAZOLE SODIUM 40 MG: 40 INJECTION, POWDER, FOR SOLUTION INTRAVENOUS at 09:01

## 2024-01-13 RX ADMIN — HYDROXYUREA 1000 MG: 500 CAPSULE ORAL at 08:01

## 2024-01-13 RX ADMIN — HYDROMORPHONE HYDROCHLORIDE 2 MG: 2 INJECTION INTRAMUSCULAR; INTRAVENOUS; SUBCUTANEOUS at 09:01

## 2024-01-13 RX ADMIN — SODIUM CHLORIDE, SODIUM LACTATE, POTASSIUM CHLORIDE, AND CALCIUM CHLORIDE: 600; 310; 30; 20 INJECTION, SOLUTION INTRAVENOUS at 04:01

## 2024-01-13 RX ADMIN — POTASSIUM & SODIUM PHOSPHATES POWDER PACK 280-160-250 MG 1 PACKET: 280-160-250 PACK at 10:01

## 2024-01-13 RX ADMIN — ACETAMINOPHEN 650 MG: 650 SOLUTION ORAL at 05:01

## 2024-01-13 RX ADMIN — HYDROMORPHONE HYDROCHLORIDE 2 MG: 2 INJECTION INTRAMUSCULAR; INTRAVENOUS; SUBCUTANEOUS at 05:01

## 2024-01-13 RX ADMIN — FENTANYL 1 PATCH: 12.5 PATCH TRANSDERMAL at 04:01

## 2024-01-13 RX ADMIN — VANCOMYCIN HYDROCHLORIDE 1000 MG: 1 INJECTION, POWDER, LYOPHILIZED, FOR SOLUTION INTRAVENOUS at 04:01

## 2024-01-13 RX ADMIN — POTASSIUM & SODIUM PHOSPHATES POWDER PACK 280-160-250 MG 1 PACKET: 280-160-250 PACK at 04:01

## 2024-01-13 RX ADMIN — PIPERACILLIN SODIUM AND TAZOBACTAM SODIUM 4.5 G: 4; .5 INJECTION, POWDER, FOR SOLUTION INTRAVENOUS at 12:01

## 2024-01-13 RX ADMIN — PIPERACILLIN SODIUM AND TAZOBACTAM SODIUM 4.5 G: 4; .5 INJECTION, POWDER, FOR SOLUTION INTRAVENOUS at 08:01

## 2024-01-13 RX ADMIN — VANCOMYCIN HYDROCHLORIDE 1000 MG: 1 INJECTION, POWDER, LYOPHILIZED, FOR SOLUTION INTRAVENOUS at 12:01

## 2024-01-13 RX ADMIN — SODIUM CHLORIDE, SODIUM LACTATE, POTASSIUM CHLORIDE, AND CALCIUM CHLORIDE: 600; 310; 30; 20 INJECTION, SOLUTION INTRAVENOUS at 05:01

## 2024-01-13 RX ADMIN — Medication 400 MG: at 09:01

## 2024-01-13 RX ADMIN — ACYCLOVIR 800 MG: 200 SUSPENSION ORAL at 08:01

## 2024-01-13 RX ADMIN — HYDROMORPHONE HYDROCHLORIDE 2 MG: 2 INJECTION INTRAMUSCULAR; INTRAVENOUS; SUBCUTANEOUS at 08:01

## 2024-01-13 RX ADMIN — METOPROLOL TARTRATE 25 MG: 25 TABLET, FILM COATED ORAL at 09:01

## 2024-01-13 RX ADMIN — DILTIAZEM HYDROCHLORIDE 120 MG: 120 CAPSULE, COATED, EXTENDED RELEASE ORAL at 08:01

## 2024-01-13 RX ADMIN — ACYCLOVIR 800 MG: 200 SUSPENSION ORAL at 09:01

## 2024-01-13 RX ADMIN — POTASSIUM & SODIUM PHOSPHATES POWDER PACK 280-160-250 MG 1 PACKET: 280-160-250 PACK at 08:01

## 2024-01-13 RX ADMIN — VANCOMYCIN HYDROCHLORIDE 1000 MG: 1 INJECTION, POWDER, LYOPHILIZED, FOR SOLUTION INTRAVENOUS at 09:01

## 2024-01-13 RX ADMIN — POTASSIUM CHLORIDE 40 MEQ: 1500 TABLET, EXTENDED RELEASE ORAL at 09:01

## 2024-01-13 RX ADMIN — HYDROMORPHONE HYDROCHLORIDE 2 MG: 2 INJECTION INTRAMUSCULAR; INTRAVENOUS; SUBCUTANEOUS at 04:01

## 2024-01-13 NOTE — PLAN OF CARE
AAOx4, afebrile  IVF, IV ABX  Dilaudid x2  BP elevated 160/102, on-call notified, no new orders at this time.  Otherwise, VSS, all needs met, call bell within reach.

## 2024-01-13 NOTE — PROGRESS NOTES
Pharmacokinetic Assessment Follow Up: IV Vancomycin    Vancomycin serum concentration assessment(s):    The trough level was drawn correctly and can be used to guide therapy at this time. The measurement is within the desired definitive target range of 10 to 20 mcg/mL.    Vancomycin Regimen Plan:    Continue regimen to Vancomycin 1000 mg IV every 8 hours with next serum trough concentration measured at 1600 prior to 12th dose on 01/14/2024    Drug levels (last 3 results):  Recent Labs   Lab Result Units 01/11/24  1648 01/12/24  1725 01/12/24  2329   Vancomycin-Trough ug/mL 5.4* 22.5* 14.5       Pharmacy will continue to follow and monitor vancomycin.    Please contact pharmacy at extension [phone] for questions regarding this assessment.    Thank you for the consult,   Ric Rojas       Patient brief summary:  Magdaleno Hirsch is a 23 y.o. male initiated on antimicrobial therapy with IV Vancomycin for treatment of sepsis    Drug Allergies:   Review of patient's allergies indicates:  No Known Allergies    Actual Body Weight:   78.9 kg    Renal Function:   Estimated Creatinine Clearance: 180 mL/min (based on SCr of 0.7 mg/dL).,     Dialysis Method (if applicable):  N/A    CBC (last 72 hours):  Recent Labs   Lab Result Units 01/10/24  0843 01/11/24  0358 01/12/24  0316   WBC K/uL 85.29* 94.45* 93.60*   Hemoglobin g/dL 6.0* 6.7* 7.2*   Hematocrit % 18.1* 20.2* 22.0*   Platelets K/uL 21* 12* 27*   Gran % % 0.0* 0.0* 0.0*   Lymph % % 5.0* 4.0* 4.0*   Mono % % 4.0 2.0* 3.0*   Eosinophil % % 0.0 0.0 0.0   Basophil % % 0.0 0.0 0.0   Differential Method  Manual Manual Manual       Metabolic Panel (last 72 hours):  Recent Labs   Lab Result Units 01/10/24  0437 01/10/24  0843 01/11/24  0358 01/12/24  0007 01/12/24  0316   Sodium mmol/L  --  133* 134* 134* 134*   Potassium mmol/L  --  3.7 3.5 3.4* 3.1*   Chloride mmol/L  --  98 97 97 96   CO2 mmol/L  --  26 27 24 24   Glucose mg/dL  --  129* 102 120* 109   Glucose, UA  Negative  --    --   --   --    BUN mg/dL  --  8 6 8 8   Creatinine mg/dL  --  0.7 0.6 0.8 0.7   Albumin g/dL  --  2.4* 2.2*  --  2.1*   Total Bilirubin mg/dL  --  2.4* 2.8*  --  2.2*   Alkaline Phosphatase U/L  --  295* 266*  --  287*   AST U/L  --  42* 42*  --  38   ALT U/L  --  91* 70*  --  53*   Magnesium mg/dL  --  1.4* 1.8  --  1.6  1.6   Phosphorus mg/dL  --  1.6* 2.0*  --  3.0       Vancomycin Administrations:  vancomycin given in the last 96 hours                     vancomycin (VANCOCIN) 1,000 mg in dextrose 5 % (D5W) 250 mL IVPB (Vial-Mate) (mg) 1,000 mg New Bag 01/13/24 0057     1,000 mg New Bag 01/12/24 1656     1,000 mg New Bag  0919     1,000 mg New Bag  0200    vancomycin (VANCOCIN) 1,000 mg in dextrose 5 % (D5W) 250 mL IVPB (Vial-Mate) (mg) 1,000 mg New Bag 01/11/24 1636     1,000 mg New Bag  0438    vancomycin (VANCOCIN) 1,000 mg in dextrose 5 % (D5W) 250 mL IVPB (Vial-Mate) (mg) 1,000 mg New Bag 01/10/24 1705    vancomycin 2 g in dextrose 5 % 500 mL IVPB (mg) 2,000 mg New Bag 01/10/24 0154                    Microbiologic Results:  Microbiology Results (last 7 days)       Procedure Component Value Units Date/Time    Blood culture #2 **CANNOT BE ORDERED STAT** [[id number]] Collected: 01/09/24 2052    Order Status: Completed Specimen: Blood from Peripheral, Antecubital, Left Updated: 01/12/24 2212     Blood Culture, Routine No Growth to date      No Growth to date      No Growth to date      No Growth to date    Blood culture #1 **CANNOT BE ORDERED STAT** [[id number]] Collected: 01/09/24 2310    Order Status: Completed Specimen: Blood from Peripheral, Hand, Right Updated: 01/12/24 0612     Blood Culture, Routine No Growth to date      No Growth to date      No Growth to date    Culture, MRSA [[id number]]     Order Status: No result Specimen: MRSA source

## 2024-01-13 NOTE — CARE UPDATE
AAOx4, family at bedside  Independent  IVF, IV ABX, IV K replaced  100.9 max temp, no PRN tylenol needed  Dilaudid x2  Walked the halls today.  Vanc trough at [place]   No PRN Hydralazine needed, SBP < 170.  All needs met, call light within reach.

## 2024-01-13 NOTE — SUBJECTIVE & OBJECTIVE
Subjective:     Interval History: Mr. Hirsch is overall stable. Mouth sores are his primary complaint, making it difficult to swallow. Pain is more tolerable. Temp of 101.2 overnight. Continues on acyclovir, zosyn and vanc.    Objective:     Vital Signs (Most Recent):  Temp: 98.1 °F (36.7 °C) (01/13/24 1125)  Pulse: 97 (01/13/24 1125)  Resp: 18 (01/13/24 1125)  BP: 126/79 (01/13/24 1125)  SpO2: 97 % (01/13/24 1125) Vital Signs (24h Range):  Temp:  [98.1 °F (36.7 °C)-101.2 °F (38.4 °C)] 98.1 °F (36.7 °C)  Pulse:  [] 97  Resp:  [17-20] 18  SpO2:  [93 %-99 %] 97 %  BP: (126-164)/(79-96) 126/79     Weight: 78.9 kg (174 lb)  Body mass index is 22.96 kg/m².  Body surface area is 2.02 meters squared.    ECOG SCORE           [unfilled]    Intake/Output - Last 3 Shifts         01/11 0700  01/12 0659 01/12 0700  01/13 0659 01/13 0700  01/14 0659    P.O. 360 240     I.V. (mL/kg)       Blood 1266      IV Piggyback       Total Intake(mL/kg) 1626 (20.6) 240 (3)     Urine (mL/kg/hr)  1400 (0.7) 1550 (2.3)    Total Output  1400 1550    Net +1626 -1160 -1550           Urine Occurrence 3 x 2 x     Stool Occurrence 1 x               Physical Exam  Vitals and nursing note reviewed.   Constitutional:       General: He is in acute distress.      Appearance: He is ill-appearing. He is not diaphoretic.   HENT:      Head: Normocephalic.      Nose:      Comments: Limited speaking and hoarse voice. Limited ROM of tongue and R mandibular swelling noted      Mouth/Throat:      Pharynx: Pharyngeal swelling and posterior oropharyngeal erythema present.   Eyes:      Extraocular Movements: Extraocular movements intact.      Conjunctiva/sclera: Conjunctivae normal.   Neck:      Comments: R sided mandibular swelling noted.  Cardiovascular:      Rate and Rhythm: Regular rhythm. Tachycardia present.   Pulmonary:      Effort: Pulmonary effort is normal. No respiratory distress.      Breath sounds: Normal breath sounds.   Abdominal:      Palpations:  Abdomen is soft.      Tenderness: There is no abdominal tenderness.   Musculoskeletal:         General: No swelling. Normal range of motion.      Cervical back: Edema present.      Right lower leg: No edema.      Left lower leg: No edema.   Skin:     Coloration: Skin is not jaundiced.      Findings: Rash present.   Neurological:      General: No focal deficit present.      Mental Status: He is oriented to person, place, and time.   Psychiatric:         Mood and Affect: Affect is flat.            Significant Labs:   CBC:   Recent Labs   Lab 01/12/24 0316 01/13/24 0442   WBC 93.60* 91.32*   HGB 7.2* 7.3*   HCT 22.0* 21.6*   PLT 27* 15*      and CMP:   Recent Labs   Lab 01/12/24  0007 01/12/24 0316 01/13/24 0442   * 134* 131*   K 3.4* 3.1* 3.1*   CL 97 96 98   CO2 24 24 25   * 109 94   BUN 8 8 8   CREATININE 0.8 0.7 0.8   CALCIUM 9.1 8.5* 8.4*   PROT  --  6.2 6.2   ALBUMIN  --  2.1* 2.1*   BILITOT  --  2.2* 1.7*   ALKPHOS  --  287* 252*   AST  --  38 68*   ALT  --  53* 56*   ANIONGAP 13 14 8         Diagnostic Results:  I have reviewed all pertinent imaging results/findings within the past 24 hours.

## 2024-01-13 NOTE — PROGRESS NOTES
[facility] ([facility])  Hematology  Bone Marrow Transplant  Progress Note    Patient Name: Magdaleno Hirsch  Admission Date: 1/9/2024  Hospital Length of Stay: 3 days  Code Status: Full Code    Subjective:     Interval History: Mr. Hirsch is overall stable. Mouth sores are his primary complaint, making it difficult to swallow. Pain is more tolerable. Temp of 101.2 overnight. Continues on acyclovir, zosyn and vanc.    Objective:     Vital Signs (Most Recent):  Temp: 98.1 °F (36.7 °C) (01/13/24 1125)  Pulse: 97 (01/13/24 1125)  Resp: 18 (01/13/24 1125)  BP: 126/79 (01/13/24 1125)  SpO2: 97 % (01/13/24 1125) Vital Signs (24h Range):  Temp:  [98.1 °F (36.7 °C)-101.2 °F (38.4 °C)] 98.1 °F (36.7 °C)  Pulse:  [] 97  Resp:  [17-20] 18  SpO2:  [93 %-99 %] 97 %  BP: (126-164)/(79-96) 126/79     Weight: 78.9 kg (174 lb)  Body mass index is 22.96 kg/m².  Body surface area is 2.02 meters squared.    ECOG SCORE           [unfilled]    Intake/Output - Last 3 Shifts         01/11 0700  01/12 0659 01/12 0700  01/13 0659 01/13 0700  01/14 0659    P.O. 360 240     I.V. (mL/kg)       Blood 1266      IV Piggyback       Total Intake(mL/kg) 1626 (20.6) 240 (3)     Urine (mL/kg/hr)  1400 (0.7) 1550 (2.3)    Total Output  1400 1550    Net +1626 -1160 -1550           Urine Occurrence 3 x 2 x     Stool Occurrence 1 x              Physical Exam  Vitals and nursing note reviewed.   Constitutional:       General: He is in acute distress.      Appearance: He is ill-appearing. He is not diaphoretic.   HENT:      Head: Normocephalic.      Nose:      Comments: Limited speaking and hoarse voice. Limited ROM of tongue and R mandibular swelling noted      Mouth/Throat:      Pharynx: Pharyngeal swelling and posterior oropharyngeal erythema present.   Eyes:      Extraocular Movements: Extraocular movements intact.      Conjunctiva/sclera: Conjunctivae normal.   Neck:      Comments: R sided mandibular swelling noted.  Cardiovascular:      Rate and  Rhythm: Regular rhythm. Tachycardia present.   Pulmonary:      Effort: Pulmonary effort is normal. No respiratory distress.      Breath sounds: Normal breath sounds.   Abdominal:      Palpations: Abdomen is soft.      Tenderness: There is no abdominal tenderness.   Musculoskeletal:         General: No swelling. Normal range of motion.      Cervical back: Edema present.      Right lower leg: No edema.      Left lower leg: No edema.   Skin:     Coloration: Skin is not jaundiced.      Findings: Rash present.   Neurological:      General: No focal deficit present.      Mental Status: He is oriented to person, place, and time.   Psychiatric:         Mood and Affect: Affect is flat.            Significant Labs:   CBC:   Recent Labs   Lab 01/12/24  0316 01/13/24  0442   WBC 93.60* 91.32*   HGB 7.2* 7.3*   HCT 22.0* 21.6*   PLT 27* 15*      and CMP:   Recent Labs   Lab 01/12/24  0007 01/12/24 0316 01/13/24  0442   * 134* 131*   K 3.4* 3.1* 3.1*   CL 97 96 98   CO2 24 24 25   * 109 94   BUN 8 8 8   CREATININE 0.8 0.7 0.8   CALCIUM 9.1 8.5* 8.4*   PROT  --  6.2 6.2   ALBUMIN  --  2.1* 2.1*   BILITOT  --  2.2* 1.7*   ALKPHOS  --  287* 252*   AST  --  38 68*   ALT  --  53* 56*   ANIONGAP 13 14 8         Diagnostic Results:  I have reviewed all pertinent imaging results/findings within the past 24 hours.  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 [facility] in [place]  8/2022: Diagnosed with chronic phase CML.  10/2022: Started dasatinib but had questionable compliance.  6/7/23: He presented to the ER at [facility] with gum swelling. Labs notable for  (80% blasts). [place] [facility] [place].  6/9/23: Bone marrow biopsy revealed blast phase chronic myeloid leukemia; reticulin fibrosis 2 of 3. 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 FISH with t(9;22) and MLLT3/MLL (KMT2A) fusion. NGS with NRAS (7%), PTPN11  (4%). BCR/ABL1:ABL1 (p210) >55%.  6/16/23: Started induction with CLIA + ponatinib. Course complicated by neutropenic fever, pharyngitis/mucositis, and strep viridans bacteremia.   7/6/23: Day 21 bone marrow biopsy revealed a markedly hypocellular marrow (<5%) with trilineage hypoplasia. CG 46,XY[5].  7/25/23: Bone marrow biopsy revealed a hypocellular marrow but suboptimal specimen for evaluation.   8/4/23: BCR/ABL1:ABL1 (p210) 23.6%.   8/28/23: Bone marrow biopsy revealed a hypocellular marrow (30%) with persistent blast phase with 10-15% blasts.   10/9/23: Cycle 1 day 1 of azacitidine 75 mg/m2 x5 days plus venetoclax 50mg daily x10 days + ponatininb 30mg daily (28 day cycle).   10/31/23: Bone marrow biopsy at the end of cycle 1 revealed persistent myeloid blast crisis (11% circulating blasts).   11/6/23: Cycle 2 day 1 of azacitidine, venetoclax, and ponatinib. Ponatinib increased to 45mg daily.   1/2/24: Started asciminib 200mg daily as patient and family desired more treatment following GOC discussion.   - Admitted 1/9/24 with neutropenic fevers and leukocytosis (WBC 114K, 95% others on diff)   - Currently holding asciminib due to fevers  - continue hydrea 1g BID. White count currently holding at roughly 90k  - remains on ppx acyclovir; on zosyn and vanc with fevers    ACP (advance care planning)  - palliative care consulted to assist with GOC discussions as both Dr. Green and Dr. Hogan have discussed extremely grim prognosis with patient and family d/t no further treatment options for refractory CML  - initially acceptable to hospice with plans to discharge on 1/11; however rescinded decision as family attempting to get patient to [facility]; re-iterated that there are no additional options including treatment and clinical trials  - remains full code, continuing all supportive care at this time  - has fentanyl patch and prn dilaudid for pain  -  following    Tonsillar abscess  - patient with swollen neck,  throat pain, difficulty swallowing  - CT neck shows subcentimeter left palatine tonsil abscess with mild associated left parapharyngeal edema  - seen by ENT; no acute intervention  - continue zosyn and vanc    Hyperbilirubinemia  - elevated on admission  - RUQ US unremarkable    Thrombocytopenia  - daily CBC  - transfuse for Plt <10K or bleeding    Anemia in neoplastic disease  - daily cbc  - transfuse for Hgb <7    Hypertension  - continue home lopressor and diltiazem     CML (chronic myeloid leukemia) in relapse  - see blast phase CML        VTE Risk Mitigation (From admission, onward)           Ordered     Reason for No Pharmacological VTE Prophylaxis  Once        Question:  Reasons:  Answer:  Thrombocytopenia    01/10/24 0213     IP VTE HIGH RISK PATIENT  Once         01/10/24 0213     Place sequential compression device  Until discontinued         01/10/24 0213                    Disposition: Will remain inpatient while goals of care conversation are ongoing.    Rocio Waterman MD  Bone Marrow Transplant  [facility] ([facility])

## 2024-01-13 NOTE — PLAN OF CARE
Pt A&Ox4.Tmax 101.2F, resolved within hour with PRN Tylenol. LR infusing at 125mL/hr. Pt complaint of pain in lower back. PRN Dilaudid and Oxycodone given throughout shift. Pt ambulates independently. Instructed to call prior to getting OOB, verbalized understanding. Family remains at the bedside to assist pt. Pt tachy, HR in 120's, MD aware. POC discussed with pt and family. Bed in lowest position with wheels locked. Call bell and all personal items within reach. Frequent rounding performed throughout shift

## 2024-01-13 NOTE — ASSESSMENT & PLAN NOTE
- palliative care consulted to assist with GOC discussions as both Dr. Green and Dr. Hogan have discussed extremely grim prognosis with patient and family d/t no further treatment options for refractory CML  - initially acceptable to hospice with plans to discharge on 1/11; however rescinded decision as family attempting to get patient to [facility]; re-iterated that there are no additional options including treatment and clinical trials  - remains full code, continuing all supportive care at this time  - has fentanyl patch and prn dilaudid for pain  -  following   color consistent with ethnicity/race

## 2024-01-13 NOTE — ASSESSMENT & PLAN NOTE
- patient of Dr. Green at [facility]; follows with Dr. Hogan in [place]  8/2022: Diagnosed with chronic phase CML.  10/2022: Started dasatinib but had questionable compliance.  6/7/23: He presented to the ER at [facility] [place] with gum swelling. Labs notable for  (80% blasts). He was transferred to [facility] for further management.  6/9/23: Bone marrow biopsy revealed blast phase chronic myeloid leukemia; reticulin fibrosis 2 of 3. 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 FISH with t(9;22) and MLLT3/MLL (KMT2A) fusion. NGS with NRAS (7%), PTPN11 (4%). BCR/ABL1:ABL1 (p210) >55%.  6/16/23: Started induction with CLIA + ponatinib. Course complicated by neutropenic fever, pharyngitis/mucositis, and strep viridans bacteremia.   7/6/23: Day 21 bone marrow biopsy revealed a markedly hypocellular marrow (<5%) with trilineage hypoplasia. CG 46,XY[5].  7/25/23: Bone marrow biopsy revealed a hypocellular marrow but suboptimal specimen for evaluation.   8/4/23: BCR/ABL1:ABL1 (p210) 23.6%.   8/28/23: Bone marrow biopsy revealed a hypocellular marrow (30%) with persistent blast phase with 10-15% blasts.   10/9/23: Cycle 1 day 1 of azacitidine 75 mg/m2 x5 days plus venetoclax 50mg daily x10 days + ponatininb 30mg daily (28 day cycle).   10/31/23: Bone marrow biopsy at the end of cycle 1 revealed persistent myeloid blast crisis (11% circulating blasts).   11/6/23: Cycle 2 day 1 of azacitidine, venetoclax, and ponatinib. Ponatinib increased to 45mg daily.   1/2/24: Started asciminib 200mg daily as patient and family desired more treatment following [facility] discussion.   - Admitted 1/9/24 with neutropenic fevers and leukocytosis (WBC 114K, 95% others on diff)   - Currently holding asciminib due to fevers  - continue hydrea 1g BID. White count currently holding at roughly 90k  - remains on ppx acyclovir; on zosyn and vanc with fevers

## 2024-01-13 NOTE — PROGRESS NOTES
Pharmacokinetic Assessment Follow Up: IV Vancomycin    Vancomycin serum concentration assessment(s):    The trough level was drawn incorrectly and cannot be used to guide therapy at this time.  - The trough was drawn ~30 minutes after the last dose of vanc was hung.     Vancomycin Regimen Plan:    Continue regimen to Vancomycin 1000 mg IV every 8 hours with next serum trough concentration measured at 000 prior to next dose on 1/13.  - Mr. Hirsch's renal function appears stable and at baseline.    Drug levels (last 3 results):  Recent Labs   Lab Result Units 01/11/24  1648 01/12/24  1725   Vancomycin-Trough ug/mL 5.4* 22.5*       Pharmacy will continue to follow and monitor vancomycin.    Please contact pharmacy at extension [phone] for questions regarding this assessment.    Thank you for the consult,   Gricelda Galarza       Patient brief summary:  Magdaleno Hirsch is a 23 y.o. male initiated on antimicrobial therapy with IV Vancomycin for treatment of sepsis    Actual Body Weight:   78.9 kg    Renal Function:   Estimated Creatinine Clearance: 180 mL/min (based on SCr of 0.7 mg/dL).    Dialysis Method (if applicable):  N/A

## 2024-01-14 LAB
ALBUMIN SERPL BCP-MCNC: 2 G/DL (ref 3.5–5.2)
ALP SERPL-CCNC: 246 U/L (ref 55–135)
ALT SERPL W/O P-5'-P-CCNC: 71 U/L (ref 10–44)
ANION GAP SERPL CALC-SCNC: 11 MMOL/L (ref 8–16)
ANISOCYTOSIS BLD QL SMEAR: SLIGHT
AST SERPL-CCNC: 76 U/L (ref 10–40)
BACTERIA BLD CULT: NORMAL
BASOPHILS # BLD AUTO: ABNORMAL K/UL (ref 0–0.2)
BASOPHILS NFR BLD: 0 % (ref 0–1.9)
BILIRUB SERPL-MCNC: 1.5 MG/DL (ref 0.1–1)
BLD PROD TYP BPU: NORMAL
BLOOD UNIT EXPIRATION DATE: NORMAL
BLOOD UNIT TYPE CODE: 7300
BLOOD UNIT TYPE: NORMAL
BUN SERPL-MCNC: 6 MG/DL (ref 6–20)
CALCIUM SERPL-MCNC: 8.7 MG/DL (ref 8.7–10.5)
CHLORIDE SERPL-SCNC: 103 MMOL/L (ref 95–110)
CO2 SERPL-SCNC: 23 MMOL/L (ref 23–29)
CODING SYSTEM: NORMAL
CREAT SERPL-MCNC: 1 MG/DL (ref 0.5–1.4)
CROSSMATCH INTERPRETATION: NORMAL
DIFFERENTIAL METHOD BLD: ABNORMAL
DISPENSE STATUS: NORMAL
EOSINOPHIL # BLD AUTO: ABNORMAL K/UL (ref 0–0.5)
EOSINOPHIL NFR BLD: 0 % (ref 0–8)
ERYTHROCYTE [DISTWIDTH] IN BLOOD BY AUTOMATED COUNT: 17.2 % (ref 11.5–14.5)
EST. GFR  (NO RACE VARIABLE): >60 ML/MIN/1.73 M^2
GLUCOSE SERPL-MCNC: 95 MG/DL (ref 70–110)
HCT VFR BLD AUTO: 21.2 % (ref 40–54)
HGB BLD-MCNC: 7 G/DL (ref 14–18)
IMM GRANULOCYTES # BLD AUTO: ABNORMAL K/UL (ref 0–0.04)
IMM GRANULOCYTES NFR BLD AUTO: ABNORMAL % (ref 0–0.5)
LDH SERPL L TO P-CCNC: 830 U/L (ref 110–260)
LYMPHOCYTES # BLD AUTO: ABNORMAL K/UL (ref 1–4.8)
LYMPHOCYTES NFR BLD: 5 % (ref 18–48)
MAGNESIUM SERPL-MCNC: 1.8 MG/DL (ref 1.6–2.6)
MCH RBC QN AUTO: 27.7 PG (ref 27–31)
MCHC RBC AUTO-ENTMCNC: 33 G/DL (ref 32–36)
MCV RBC AUTO: 84 FL (ref 82–98)
MONOCYTES # BLD AUTO: ABNORMAL K/UL (ref 0.3–1)
MONOCYTES NFR BLD: 1 % (ref 4–15)
NEUTROPHILS NFR BLD: 0 % (ref 38–73)
NRBC BLD-RTO: 0 /100 WBC
OVALOCYTES BLD QL SMEAR: ABNORMAL
PHOSPHATE SERPL-MCNC: 3 MG/DL (ref 2.7–4.5)
PLATELET # BLD AUTO: 9 K/UL (ref 150–450)
PLATELET BLD QL SMEAR: ABNORMAL
PMV BLD AUTO: 9.8 FL (ref 9.2–12.9)
POIKILOCYTOSIS BLD QL SMEAR: SLIGHT
POLYCHROMASIA BLD QL SMEAR: ABNORMAL
POTASSIUM SERPL-SCNC: 3.1 MMOL/L (ref 3.5–5.1)
PROT SERPL-MCNC: 5.9 G/DL (ref 6–8.4)
RBC # BLD AUTO: 2.53 M/UL (ref 4.6–6.2)
SMUDGE CELLS BLD QL SMEAR: PRESENT
SODIUM SERPL-SCNC: 137 MMOL/L (ref 136–145)
SPHEROCYTES BLD QL SMEAR: ABNORMAL
UNIT NUMBER: NORMAL
URATE SERPL-MCNC: 2.8 MG/DL (ref 3.4–7)
VANCOMYCIN SERPL-MCNC: 13.8 UG/ML
VANCOMYCIN TROUGH SERPL-MCNC: 20.3 UG/ML (ref 10–22)
WBC # BLD AUTO: 92.33 K/UL (ref 3.9–12.7)
WBC OTHER NFR BLD MANUAL: 94 %

## 2024-01-14 PROCEDURE — 25000003 PHARM REV CODE 250: Performed by: NURSE PRACTITIONER

## 2024-01-14 PROCEDURE — 63600175 PHARM REV CODE 636 W HCPCS: Performed by: INTERNAL MEDICINE

## 2024-01-14 PROCEDURE — 85007 BL SMEAR W/DIFF WBC COUNT: CPT | Performed by: STUDENT IN AN ORGANIZED HEALTH CARE EDUCATION/TRAINING PROGRAM

## 2024-01-14 PROCEDURE — P9037 PLATE PHERES LEUKOREDU IRRAD: HCPCS | Performed by: STUDENT IN AN ORGANIZED HEALTH CARE EDUCATION/TRAINING PROGRAM

## 2024-01-14 PROCEDURE — 25000003 PHARM REV CODE 250: Performed by: INTERNAL MEDICINE

## 2024-01-14 PROCEDURE — 80202 ASSAY OF VANCOMYCIN: CPT | Performed by: INTERNAL MEDICINE

## 2024-01-14 PROCEDURE — C9113 INJ PANTOPRAZOLE SODIUM, VIA: HCPCS | Performed by: NURSE PRACTITIONER

## 2024-01-14 PROCEDURE — 25000003 PHARM REV CODE 250: Performed by: STUDENT IN AN ORGANIZED HEALTH CARE EDUCATION/TRAINING PROGRAM

## 2024-01-14 PROCEDURE — 20600001 HC STEP DOWN PRIVATE ROOM

## 2024-01-14 PROCEDURE — 36415 COLL VENOUS BLD VENIPUNCTURE: CPT | Performed by: NURSE PRACTITIONER

## 2024-01-14 PROCEDURE — A4216 STERILE WATER/SALINE, 10 ML: HCPCS | Performed by: NURSE PRACTITIONER

## 2024-01-14 PROCEDURE — 36415 COLL VENOUS BLD VENIPUNCTURE: CPT | Mod: XB | Performed by: INTERNAL MEDICINE

## 2024-01-14 PROCEDURE — 85027 COMPLETE CBC AUTOMATED: CPT | Performed by: STUDENT IN AN ORGANIZED HEALTH CARE EDUCATION/TRAINING PROGRAM

## 2024-01-14 PROCEDURE — 80202 ASSAY OF VANCOMYCIN: CPT | Mod: 91 | Performed by: INTERNAL MEDICINE

## 2024-01-14 PROCEDURE — 83735 ASSAY OF MAGNESIUM: CPT | Performed by: STUDENT IN AN ORGANIZED HEALTH CARE EDUCATION/TRAINING PROGRAM

## 2024-01-14 PROCEDURE — 83615 LACTATE (LD) (LDH) ENZYME: CPT | Performed by: NURSE PRACTITIONER

## 2024-01-14 PROCEDURE — 84100 ASSAY OF PHOSPHORUS: CPT | Performed by: STUDENT IN AN ORGANIZED HEALTH CARE EDUCATION/TRAINING PROGRAM

## 2024-01-14 PROCEDURE — 63600175 PHARM REV CODE 636 W HCPCS: Performed by: NURSE PRACTITIONER

## 2024-01-14 PROCEDURE — 63600175 PHARM REV CODE 636 W HCPCS: Performed by: STUDENT IN AN ORGANIZED HEALTH CARE EDUCATION/TRAINING PROGRAM

## 2024-01-14 PROCEDURE — 80053 COMPREHEN METABOLIC PANEL: CPT | Performed by: STUDENT IN AN ORGANIZED HEALTH CARE EDUCATION/TRAINING PROGRAM

## 2024-01-14 PROCEDURE — 84550 ASSAY OF BLOOD/URIC ACID: CPT | Performed by: NURSE PRACTITIONER

## 2024-01-14 PROCEDURE — 99233 SBSQ HOSP IP/OBS HIGH 50: CPT | Mod: ,,, | Performed by: INTERNAL MEDICINE

## 2024-01-14 RX ORDER — HYDROCODONE BITARTRATE AND ACETAMINOPHEN 500; 5 MG/1; MG/1
TABLET ORAL
Status: DISCONTINUED | OUTPATIENT
Start: 2024-01-14 | End: 2024-01-17 | Stop reason: HOSPADM

## 2024-01-14 RX ORDER — LOSARTAN POTASSIUM 25 MG/1
25 TABLET ORAL DAILY
Status: DISCONTINUED | OUTPATIENT
Start: 2024-01-14 | End: 2024-01-16

## 2024-01-14 RX ORDER — ACETAMINOPHEN 650 MG/20.3ML
650 LIQUID ORAL EVERY 4 HOURS PRN
Status: DISCONTINUED | OUTPATIENT
Start: 2024-01-14 | End: 2024-01-17 | Stop reason: HOSPADM

## 2024-01-14 RX ORDER — POTASSIUM CHLORIDE 7.45 MG/ML
10 INJECTION INTRAVENOUS
Status: COMPLETED | OUTPATIENT
Start: 2024-01-14 | End: 2024-01-14

## 2024-01-14 RX ADMIN — HYDROMORPHONE HYDROCHLORIDE 2 MG: 2 INJECTION INTRAMUSCULAR; INTRAVENOUS; SUBCUTANEOUS at 12:01

## 2024-01-14 RX ADMIN — POTASSIUM CHLORIDE 10 MEQ: 7.46 INJECTION, SOLUTION INTRAVENOUS at 05:01

## 2024-01-14 RX ADMIN — POTASSIUM & SODIUM PHOSPHATES POWDER PACK 280-160-250 MG 1 PACKET: 280-160-250 PACK at 11:01

## 2024-01-14 RX ADMIN — Medication 400 MG: at 09:01

## 2024-01-14 RX ADMIN — POTASSIUM CHLORIDE 10 MEQ: 7.46 INJECTION, SOLUTION INTRAVENOUS at 04:01

## 2024-01-14 RX ADMIN — POTASSIUM & SODIUM PHOSPHATES POWDER PACK 280-160-250 MG 1 PACKET: 280-160-250 PACK at 04:01

## 2024-01-14 RX ADMIN — OXYCODONE HYDROCHLORIDE 10 MG: 10 TABLET ORAL at 10:01

## 2024-01-14 RX ADMIN — PIPERACILLIN SODIUM AND TAZOBACTAM SODIUM 4.5 G: 4; .5 INJECTION, POWDER, FOR SOLUTION INTRAVENOUS at 05:01

## 2024-01-14 RX ADMIN — OXYCODONE HYDROCHLORIDE 10 MG: 10 TABLET ORAL at 08:01

## 2024-01-14 RX ADMIN — POTASSIUM CHLORIDE 10 MEQ: 7.46 INJECTION, SOLUTION INTRAVENOUS at 08:01

## 2024-01-14 RX ADMIN — PIPERACILLIN SODIUM AND TAZOBACTAM SODIUM 4.5 G: 4; .5 INJECTION, POWDER, FOR SOLUTION INTRAVENOUS at 08:01

## 2024-01-14 RX ADMIN — POTASSIUM CHLORIDE 10 MEQ: 7.46 INJECTION, SOLUTION INTRAVENOUS at 09:01

## 2024-01-14 RX ADMIN — VANCOMYCIN HYDROCHLORIDE 1000 MG: 1 INJECTION, POWDER, LYOPHILIZED, FOR SOLUTION INTRAVENOUS at 09:01

## 2024-01-14 RX ADMIN — METOPROLOL TARTRATE 25 MG: 25 TABLET, FILM COATED ORAL at 09:01

## 2024-01-14 RX ADMIN — OXYCODONE HYDROCHLORIDE 10 MG: 10 TABLET ORAL at 03:01

## 2024-01-14 RX ADMIN — HYDROMORPHONE HYDROCHLORIDE 2 MG: 2 INJECTION INTRAMUSCULAR; INTRAVENOUS; SUBCUTANEOUS at 01:01

## 2024-01-14 RX ADMIN — HYDROMORPHONE HYDROCHLORIDE 2 MG: 2 INJECTION INTRAMUSCULAR; INTRAVENOUS; SUBCUTANEOUS at 08:01

## 2024-01-14 RX ADMIN — ACYCLOVIR 800 MG: 200 SUSPENSION ORAL at 09:01

## 2024-01-14 RX ADMIN — POTASSIUM CHLORIDE 10 MEQ: 7.46 INJECTION, SOLUTION INTRAVENOUS at 10:01

## 2024-01-14 RX ADMIN — PIPERACILLIN SODIUM AND TAZOBACTAM SODIUM 4.5 G: 4; .5 INJECTION, POWDER, FOR SOLUTION INTRAVENOUS at 01:01

## 2024-01-14 RX ADMIN — PANTOPRAZOLE SODIUM 40 MG: 40 INJECTION, POWDER, FOR SOLUTION INTRAVENOUS at 09:01

## 2024-01-14 RX ADMIN — POTASSIUM & SODIUM PHOSPHATES POWDER PACK 280-160-250 MG 1 PACKET: 280-160-250 PACK at 06:01

## 2024-01-14 RX ADMIN — OXYCODONE HYDROCHLORIDE 10 MG: 10 TABLET ORAL at 06:01

## 2024-01-14 RX ADMIN — VANCOMYCIN HYDROCHLORIDE 1250 MG: 1.25 INJECTION, POWDER, LYOPHILIZED, FOR SOLUTION INTRAVENOUS at 11:01

## 2024-01-14 RX ADMIN — HYDROMORPHONE HYDROCHLORIDE 2 MG: 2 INJECTION INTRAMUSCULAR; INTRAVENOUS; SUBCUTANEOUS at 05:01

## 2024-01-14 RX ADMIN — ACETAMINOPHEN 650 MG: 650 SOLUTION ORAL at 12:01

## 2024-01-14 RX ADMIN — VANCOMYCIN HYDROCHLORIDE 1000 MG: 1 INJECTION, POWDER, LYOPHILIZED, FOR SOLUTION INTRAVENOUS at 12:01

## 2024-01-14 RX ADMIN — POTASSIUM & SODIUM PHOSPHATES POWDER PACK 280-160-250 MG 1 PACKET: 280-160-250 PACK at 08:01

## 2024-01-14 RX ADMIN — ACYCLOVIR 800 MG: 200 SUSPENSION ORAL at 08:01

## 2024-01-14 RX ADMIN — HYDROXYUREA 1000 MG: 500 CAPSULE ORAL at 08:01

## 2024-01-14 RX ADMIN — LOSARTAN POTASSIUM 25 MG: 25 TABLET, FILM COATED ORAL at 04:01

## 2024-01-14 RX ADMIN — DILTIAZEM HYDROCHLORIDE 120 MG: 120 CAPSULE, COATED, EXTENDED RELEASE ORAL at 08:01

## 2024-01-14 RX ADMIN — HYDROXYUREA 1000 MG: 500 CAPSULE ORAL at 09:01

## 2024-01-14 RX ADMIN — METOPROLOL TARTRATE 25 MG: 25 TABLET, FILM COATED ORAL at 08:01

## 2024-01-14 NOTE — PLAN OF CARE
Plan of care reviewed with patient throughout shift. Pt Tmax 100.5 overnight - tylenol given. Pt c/o acute pain to throat - oxy given x1, dilaudid given x3. Pt refusing magic mouthwash. Pt bp remains high. NP Avtar contacted for fever and elevated bp overnight. Family members remain at bedside, bed alarm refused (education given). Fall precautions maintained. Bed locked in lowest position, side rails up x2, call light within reach, environment clear. Encouraged pt to call nurse for any concerns.

## 2024-01-14 NOTE — ASSESSMENT & PLAN NOTE
- palliative care consulted to assist with GOC discussions as both Dr. Green and Dr. Hogan have discussed extremely grim prognosis with patient and family d/t no further treatment options for refractory CML  - initially acceptable to hospice with plans to discharge on 1/11; however rescinded decision as family attempting to get patient to [facility]; re-iterated that there are no additional options including treatment and clinical trials  - remains full code, continuing all supportive care at this time  - has fentanyl patch and prn dilaudid for pain  -  following

## 2024-01-14 NOTE — PLAN OF CARE
AAOX4; POC reviewed & verbalizes understanding. Pain control per MD orders. K+rider admin & kphos repletion. 1u PLT admin, tolerated well. Pt ambulated the solorzano today. Will CTM

## 2024-01-14 NOTE — ASSESSMENT & PLAN NOTE
- patient of Dr. Green at [facility] [place]; follows with Dr. Hogan in [place]  8/2022: Diagnosed with chronic phase CML.  10/2022: Started dasatinib but had questionable compliance.  6/7/23: He presented to [place] [facility] [place]. Labs notable for  (80% blasts). He was transferred to [facility] for further management.  6/9/23: Bone marrow biopsy revealed blast phase chronic myeloid leukemia; reticulin fibrosis 2 of 3. 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 FISH with t(9;22) and MLLT3/MLL (KMT2A) fusion. NGS with NRAS (7%), PTPN11 (4%). BCR/ABL1:ABL1 (p210) >55%.  6/16/23: Started induction with CLIA + ponatinib. Course complicated by neutropenic fever, pharyngitis/mucositis, and strep viridans bacteremia.   7/6/23: Day 21 bone marrow biopsy revealed a markedly hypocellular marrow (<5%) with trilineage hypoplasia. CG 46,XY[5].  7/25/23: Bone marrow biopsy revealed a hypocellular marrow but suboptimal specimen for evaluation.   8/4/23: BCR/ABL1:ABL1 (p210) 23.6%.   8/28/23: Bone marrow biopsy revealed a hypocellular marrow (30%) with persistent blast phase with 10-15% blasts.   10/9/23: Cycle 1 day 1 of azacitidine 75 mg/m2 x5 days plus venetoclax 50mg daily x10 days + ponatininb 30mg daily (28 day cycle).   10/31/23: Bone marrow biopsy at the end of cycle 1 revealed persistent myeloid blast crisis (11% circulating blasts).   11/6/23: Cycle 2 day 1 of azacitidine, venetoclax, and ponatinib. Ponatinib increased to 45mg daily.   1/2/24: Started asciminib 200mg daily as patient and family desired more treatment following [facility] [place].   - Admitted 1/9/24 with neutropenic fevers and leukocytosis (WBC 114K, 95% others on diff)   - Currently holding asciminib due to fevers  - continue hydrea 1g BID. White count currently holding at roughly 90k  - remains on ppx acyclovir; on zosyn and vanc with fevers

## 2024-01-14 NOTE — SUBJECTIVE & OBJECTIVE
Subjective:     Interval History: Resting comfortably with mom and girlfriend at bedside. He is eating, though mouth lesions are uncomfortable. Will try brushing teeth today with a soft brush. Pleased to hear white count remains stable.     Objective:     Vital Signs (Most Recent):  Temp: 98.8 °F (37.1 °C) (01/14/24 1317)  Pulse: (!) 119 (01/14/24 1317)  Resp: 16 (01/14/24 1324)  BP: (!) 169/105 (01/14/24 1317)  SpO2: 100 % (01/14/24 1317) Vital Signs (24h Range):  Temp:  [97.9 °F (36.6 °C)-100.5 °F (38.1 °C)] 98.8 °F (37.1 °C)  Pulse:  [103-127] 119  Resp:  [16-18] 16  SpO2:  [96 %-100 %] 100 %  BP: (142-169)/() 169/105     Weight: 78.9 kg (174 lb)  Body mass index is 22.96 kg/m².  Body surface area is 2.02 meters squared.    ECOG SCORE           [unfilled]    Intake/Output - Last 3 Shifts         01/12 0700  01/13 0659 01/13 0700  01/14 0659 01/14 0700  01/15 0659    P.O. 240      Blood       Total Intake(mL/kg) 240 (3)      Urine (mL/kg/hr) 1400 (0.7) 5100 (2.7) 1500 (2.6)    Total Output 1400 5100 1500    Net -1160 -5100 -1500           Urine Occurrence 2 x               Physical Exam  Vitals and nursing note reviewed.   Constitutional:       General: He is in acute distress.      Appearance: He is ill-appearing. He is not diaphoretic.   HENT:      Head: Normocephalic.      Nose:      Comments: Limited speaking and hoarse voice. Limited ROM of tongue and R mandibular swelling noted      Mouth/Throat:      Pharynx: Pharyngeal swelling and posterior oropharyngeal erythema present.   Eyes:      Extraocular Movements: Extraocular movements intact.      Conjunctiva/sclera: Conjunctivae normal.   Neck:      Comments: R sided mandibular swelling noted.  Cardiovascular:      Rate and Rhythm: Regular rhythm. Tachycardia present.   Pulmonary:      Effort: Pulmonary effort is normal. No respiratory distress.      Breath sounds: Normal breath sounds.   Abdominal:      Palpations: Abdomen is soft.      Tenderness:  There is no abdominal tenderness.   Musculoskeletal:         General: No swelling. Normal range of motion.      Cervical back: Edema present.      Right lower leg: No edema.      Left lower leg: No edema.   Skin:     Coloration: Skin is not jaundiced.      Findings: Rash present.   Neurological:      General: No focal deficit present.      Mental Status: He is oriented to person, place, and time.   Psychiatric:         Mood and Affect: Affect is flat.            Significant Labs:   CBC:   Recent Labs   Lab 01/13/24  0442 01/14/24  0300   WBC 91.32* 92.33*   HGB 7.3* 7.0*   HCT 21.6* 21.2*   PLT 15* 9*      and CMP:   Recent Labs   Lab 01/13/24  0442 01/14/24  0300   * 137   K 3.1* 3.1*   CL 98 103   CO2 25 23   GLU 94 95   BUN 8 6   CREATININE 0.8 1.0   CALCIUM 8.4* 8.7   PROT 6.2 5.9*   ALBUMIN 2.1* 2.0*   BILITOT 1.7* 1.5*   ALKPHOS 252* 246*   AST 68* 76*   ALT 56* 71*   ANIONGAP 8 11         Diagnostic Results:  I have reviewed all pertinent imaging results/findings within the past 24 hours.

## 2024-01-14 NOTE — PROGRESS NOTES
[facility] - On[facility] ([facility])  Hematology  Bone Marrow Transplant  Progress Note    Patient Name: Magdaleno Hirsch  Admission Date: 1/9/2024  Hospital Length of Stay: 4 days  Code Status: Full Code    Subjective:     Interval History: Resting comfortably with mom and girlfriend at bedside. He is eating, though mouth lesions are uncomfortable. Will try brushing teeth today with a soft brush. Pleased to hear white count remains stable.     Objective:     Vital Signs (Most Recent):  Temp: 98.8 °F (37.1 °C) (01/14/24 1317)  Pulse: (!) 119 (01/14/24 1317)  Resp: 16 (01/14/24 1324)  BP: (!) 169/105 (01/14/24 1317)  SpO2: 100 % (01/14/24 1317) Vital Signs (24h Range):  Temp:  [97.9 °F (36.6 °C)-100.5 °F (38.1 °C)] 98.8 °F (37.1 °C)  Pulse:  [103-127] 119  Resp:  [16-18] 16  SpO2:  [96 %-100 %] 100 %  BP: (142-169)/() 169/105     Weight: 78.9 kg (174 lb)  Body mass index is 22.96 kg/m².  Body surface area is 2.02 meters squared.    ECOG SCORE           [unfilled]    Intake/Output - Last 3 Shifts         01/12 0700  01/13 0659 01/13 0700  01/14 0659 01/14 0700  01/15 0659    P.O. 240      Blood       Total Intake(mL/kg) 240 (3)      Urine (mL/kg/hr) 1400 (0.7) 5100 (2.7) 1500 (2.6)    Total Output 1400 5100 1500    Net -1160 -5100 -1500           Urine Occurrence 2 x              Physical Exam  Vitals and nursing note reviewed.   Constitutional:       General: He is in acute distress.      Appearance: He is ill-appearing. He is not diaphoretic.   HENT:      Head: Normocephalic.      Nose:      Comments: Limited speaking and hoarse voice. Limited ROM of tongue and R mandibular swelling noted      Mouth/Throat:      Pharynx: Pharyngeal swelling and posterior oropharyngeal erythema present.   Eyes:      Extraocular Movements: Extraocular movements intact.      Conjunctiva/sclera: Conjunctivae normal.   Neck:      Comments: R sided mandibular swelling noted.  Cardiovascular:      Rate and Rhythm: Regular rhythm. Tachycardia  present.   Pulmonary:      Effort: Pulmonary effort is normal. No respiratory distress.      Breath sounds: Normal breath sounds.   Abdominal:      Palpations: Abdomen is soft.      Tenderness: There is no abdominal tenderness.   Musculoskeletal:         General: No swelling. Normal range of motion.      Cervical back: Edema present.      Right lower leg: No edema.      Left lower leg: No edema.   Skin:     Coloration: Skin is not jaundiced.      Findings: Rash present.   Neurological:      General: No focal deficit present.      Mental Status: He is oriented to person, place, and time.   Psychiatric:         Mood and Affect: Affect is flat.            Significant Labs:   CBC:   Recent Labs   Lab 01/13/24 0442 01/14/24  0300   WBC 91.32* 92.33*   HGB 7.3* 7.0*   HCT 21.6* 21.2*   PLT 15* 9*      and CMP:   Recent Labs   Lab 01/13/24 0442 01/14/24  0300   * 137   K 3.1* 3.1*   CL 98 103   CO2 25 23   GLU 94 95   BUN 8 6   CREATININE 0.8 1.0   CALCIUM 8.4* 8.7   PROT 6.2 5.9*   ALBUMIN 2.1* 2.0*   BILITOT 1.7* 1.5*   ALKPHOS 252* 246*   AST 68* 76*   ALT 56* 71*   ANIONGAP 8 11         Diagnostic Results:  I have reviewed all pertinent imaging results/findings within the past 24 hours.  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 Green at [facility]; [place] Dr. Hogan in [place]  8/2022: Diagnosed with chronic phase CML.  10/2022: Started dasatinib but had questionable compliance.  6/7/23: He presented to the ER at [facility] [place] with gum swelling. Labs notable for  (80% blasts). He was transferred to [facility] for [place] management.  6/9/23: Bone marrow biopsy revealed blast phase chronic myeloid leukemia; reticulin fibrosis 2 of 3. 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 FISH with t(9;22) and MLLT3/MLL (KMT2A) fusion. NGS with NRAS (7%), PTPN11 (4%). BCR/ABL1:ABL1 (p210) >55%.  6/16/23: Started induction with CLIA + ponatinib. Course complicated by neutropenic  fever, pharyngitis/mucositis, and strep viridans bacteremia.   7/6/23: Day 21 bone marrow biopsy revealed a markedly hypocellular marrow (<5%) with trilineage hypoplasia. CG 46,XY[5].  7/25/23: Bone marrow biopsy revealed a hypocellular marrow but suboptimal specimen for evaluation.   8/4/23: BCR/ABL1:ABL1 (p210) 23.6%.   8/28/23: Bone marrow biopsy revealed a hypocellular marrow (30%) with persistent blast phase with 10-15% blasts.   10/9/23: Cycle 1 day 1 of azacitidine 75 mg/m2 x5 days plus venetoclax 50mg daily x10 days + ponatininb 30mg daily (28 day cycle).   10/31/23: Bone marrow biopsy at the end of cycle 1 revealed persistent myeloid blast crisis (11% circulating blasts).   11/6/23: Cycle 2 day 1 of azacitidine, venetoclax, and ponatinib. Ponatinib increased to 45mg daily.   1/2/24: Started asciminib 200mg daily as patient and family desired more treatment following GOC discussion.   - Admitted 1/9/24 with neutropenic fevers and leukocytosis (WBC 114K, 95% others on diff)   - Currently holding asciminib due to fevers  - continue hydrea 1g BID. White count currently holding at roughly 90k  - remains on ppx acyclovir; on zosyn and vanc with fevers    ACP (advance care planning)  - palliative care consulted to assist with GOC discussions as both Dr. Green and Dr. Hogan have discussed extremely grim prognosis with patient and family d/t no further treatment options for refractory CML  - initially acceptable to hospice with plans to discharge on 1/11; however rescinded decision as family attempting to get patient to [facility]; re-iterated that there are no additional options including treatment and clinical trials  - remains full code, continuing all supportive care at this time  - has fentanyl patch and prn dilaudid for pain  -  following    Tonsillar abscess  - patient with swollen neck, throat pain, difficulty swallowing  - CT neck shows subcentimeter left palatine tonsil abscess with mild associated  left parapharyngeal edema  - seen by ENT; no acute intervention  - continue zosyn and vanc    Hyperbilirubinemia  - elevated on admission  - RUQ US unremarkable    Thrombocytopenia  - daily CBC  - transfuse for Plt <10K or bleeding    Anemia in neoplastic disease  - daily cbc  - transfuse for Hgb <7    Hypertension  - continue home lopressor and diltiazem     CML (chronic myeloid leukemia) in relapse  - see blast phase CML        VTE Risk Mitigation (From admission, onward)           Ordered     Reason for No Pharmacological VTE Prophylaxis  Once        Question:  Reasons:  Answer:  Thrombocytopenia    01/10/24 0213     IP VTE HIGH RISK PATIENT  Once         01/10/24 0213     Place sequential compression device  Until discontinued         01/10/24 0213                    Disposition: Fever curve is improving. Plan to deescalate abx in the coming day.     Rocio Waterman MD  Bone Marrow Transplant  [facility] - Oncology ([facility])

## 2024-01-15 LAB
ALBUMIN SERPL BCP-MCNC: 2.2 G/DL (ref 3.5–5.2)
ALP SERPL-CCNC: 223 U/L (ref 55–135)
ALT SERPL W/O P-5'-P-CCNC: 57 U/L (ref 10–44)
ANION GAP SERPL CALC-SCNC: 9 MMOL/L (ref 8–16)
ANISOCYTOSIS BLD QL SMEAR: SLIGHT
APTT PPP: 28.3 SEC (ref 21–32)
AST SERPL-CCNC: 46 U/L (ref 10–40)
BACTERIA BLD CULT: NORMAL
BASOPHILS NFR BLD: 0 % (ref 0–1.9)
BILIRUB SERPL-MCNC: 1.7 MG/DL (ref 0.1–1)
BLASTS NFR BLD MANUAL: 98 %
BLD PROD TYP BPU: NORMAL
BLOOD UNIT EXPIRATION DATE: NORMAL
BLOOD UNIT TYPE CODE: 1700
BLOOD UNIT TYPE: NORMAL
BUN SERPL-MCNC: 8 MG/DL (ref 6–20)
CALCIUM SERPL-MCNC: 8.5 MG/DL (ref 8.7–10.5)
CHLORIDE SERPL-SCNC: 105 MMOL/L (ref 95–110)
CO2 SERPL-SCNC: 24 MMOL/L (ref 23–29)
CODING SYSTEM: NORMAL
CREAT SERPL-MCNC: 0.9 MG/DL (ref 0.5–1.4)
CROSSMATCH INTERPRETATION: NORMAL
DIFFERENTIAL METHOD BLD: ABNORMAL
DISPENSE STATUS: NORMAL
EOSINOPHIL NFR BLD: 0 % (ref 0–8)
ERYTHROCYTE [DISTWIDTH] IN BLOOD BY AUTOMATED COUNT: 17 % (ref 11.5–14.5)
EST. GFR  (NO RACE VARIABLE): >60 ML/MIN/1.73 M^2
FIBRINOGEN PPP-MCNC: 488 MG/DL (ref 182–400)
GLUCOSE SERPL-MCNC: 94 MG/DL (ref 70–110)
HCT VFR BLD AUTO: 18.6 % (ref 40–54)
HGB BLD-MCNC: 6.3 G/DL (ref 14–18)
IMM GRANULOCYTES # BLD AUTO: ABNORMAL K/UL (ref 0–0.04)
IMM GRANULOCYTES NFR BLD AUTO: ABNORMAL % (ref 0–0.5)
INR PPP: 1.3 (ref 0.8–1.2)
LDH SERPL L TO P-CCNC: 722 U/L (ref 110–260)
LYMPHOCYTES NFR BLD: 2 % (ref 18–48)
MAGNESIUM SERPL-MCNC: 1.8 MG/DL (ref 1.6–2.6)
MCH RBC QN AUTO: 28.1 PG (ref 27–31)
MCHC RBC AUTO-ENTMCNC: 33.9 G/DL (ref 32–36)
MCV RBC AUTO: 83 FL (ref 82–98)
MONOCYTES NFR BLD: 0 % (ref 4–15)
NEUTROPHILS NFR BLD: 0 % (ref 38–73)
NRBC BLD-RTO: 0 /100 WBC
NUM UNITS TRANS PACKED RBC: NORMAL
PHOSPHATE SERPL-MCNC: 3.6 MG/DL (ref 2.7–4.5)
PLATELET # BLD AUTO: 19 K/UL (ref 150–450)
PMV BLD AUTO: 10.8 FL (ref 9.2–12.9)
POTASSIUM SERPL-SCNC: 3.8 MMOL/L (ref 3.5–5.1)
PROT SERPL-MCNC: 6 G/DL (ref 6–8.4)
PROTHROMBIN TIME: 13.8 SEC (ref 9–12.5)
RBC # BLD AUTO: 2.24 M/UL (ref 4.6–6.2)
SODIUM SERPL-SCNC: 138 MMOL/L (ref 136–145)
URATE SERPL-MCNC: 3.4 MG/DL (ref 3.4–7)
WBC # BLD AUTO: 72.42 K/UL (ref 3.9–12.7)
WBC OTHER NFR BLD MANUAL: 0 %

## 2024-01-15 PROCEDURE — 85730 THROMBOPLASTIN TIME PARTIAL: CPT | Performed by: NURSE PRACTITIONER

## 2024-01-15 PROCEDURE — 83735 ASSAY OF MAGNESIUM: CPT | Performed by: STUDENT IN AN ORGANIZED HEALTH CARE EDUCATION/TRAINING PROGRAM

## 2024-01-15 PROCEDURE — 20600001 HC STEP DOWN PRIVATE ROOM

## 2024-01-15 PROCEDURE — 85384 FIBRINOGEN ACTIVITY: CPT | Performed by: NURSE PRACTITIONER

## 2024-01-15 PROCEDURE — 25000003 PHARM REV CODE 250: Performed by: STUDENT IN AN ORGANIZED HEALTH CARE EDUCATION/TRAINING PROGRAM

## 2024-01-15 PROCEDURE — 36415 COLL VENOUS BLD VENIPUNCTURE: CPT | Performed by: NURSE PRACTITIONER

## 2024-01-15 PROCEDURE — 25000003 PHARM REV CODE 250: Performed by: NURSE PRACTITIONER

## 2024-01-15 PROCEDURE — C9113 INJ PANTOPRAZOLE SODIUM, VIA: HCPCS | Performed by: NURSE PRACTITIONER

## 2024-01-15 PROCEDURE — 80053 COMPREHEN METABOLIC PANEL: CPT | Performed by: STUDENT IN AN ORGANIZED HEALTH CARE EDUCATION/TRAINING PROGRAM

## 2024-01-15 PROCEDURE — P9040 RBC LEUKOREDUCED IRRADIATED: HCPCS | Performed by: STUDENT IN AN ORGANIZED HEALTH CARE EDUCATION/TRAINING PROGRAM

## 2024-01-15 PROCEDURE — 84100 ASSAY OF PHOSPHORUS: CPT | Performed by: STUDENT IN AN ORGANIZED HEALTH CARE EDUCATION/TRAINING PROGRAM

## 2024-01-15 PROCEDURE — 86920 COMPATIBILITY TEST SPIN: CPT | Performed by: STUDENT IN AN ORGANIZED HEALTH CARE EDUCATION/TRAINING PROGRAM

## 2024-01-15 PROCEDURE — 99233 SBSQ HOSP IP/OBS HIGH 50: CPT | Mod: ,,, | Performed by: INTERNAL MEDICINE

## 2024-01-15 PROCEDURE — 63600175 PHARM REV CODE 636 W HCPCS: Performed by: INTERNAL MEDICINE

## 2024-01-15 PROCEDURE — 36430 TRANSFUSION BLD/BLD COMPNT: CPT

## 2024-01-15 PROCEDURE — 84550 ASSAY OF BLOOD/URIC ACID: CPT | Performed by: NURSE PRACTITIONER

## 2024-01-15 PROCEDURE — 83615 LACTATE (LD) (LDH) ENZYME: CPT | Performed by: NURSE PRACTITIONER

## 2024-01-15 PROCEDURE — 85610 PROTHROMBIN TIME: CPT | Performed by: NURSE PRACTITIONER

## 2024-01-15 PROCEDURE — 85027 COMPLETE CBC AUTOMATED: CPT | Performed by: STUDENT IN AN ORGANIZED HEALTH CARE EDUCATION/TRAINING PROGRAM

## 2024-01-15 PROCEDURE — 63600175 PHARM REV CODE 636 W HCPCS: Performed by: NURSE PRACTITIONER

## 2024-01-15 PROCEDURE — 85007 BL SMEAR W/DIFF WBC COUNT: CPT | Performed by: STUDENT IN AN ORGANIZED HEALTH CARE EDUCATION/TRAINING PROGRAM

## 2024-01-15 PROCEDURE — A4216 STERILE WATER/SALINE, 10 ML: HCPCS | Performed by: NURSE PRACTITIONER

## 2024-01-15 PROCEDURE — 25000003 PHARM REV CODE 250: Performed by: INTERNAL MEDICINE

## 2024-01-15 RX ADMIN — SODIUM CHLORIDE, SODIUM LACTATE, POTASSIUM CHLORIDE, AND CALCIUM CHLORIDE: 600; 310; 30; 20 INJECTION, SOLUTION INTRAVENOUS at 10:01

## 2024-01-15 RX ADMIN — HYDROMORPHONE HYDROCHLORIDE 2 MG: 2 INJECTION INTRAMUSCULAR; INTRAVENOUS; SUBCUTANEOUS at 11:01

## 2024-01-15 RX ADMIN — PIPERACILLIN SODIUM AND TAZOBACTAM SODIUM 4.5 G: 4; .5 INJECTION, POWDER, FOR SOLUTION INTRAVENOUS at 08:01

## 2024-01-15 RX ADMIN — POTASSIUM & SODIUM PHOSPHATES POWDER PACK 280-160-250 MG 1 PACKET: 280-160-250 PACK at 10:01

## 2024-01-15 RX ADMIN — PIPERACILLIN SODIUM AND TAZOBACTAM SODIUM 4.5 G: 4; .5 INJECTION, POWDER, FOR SOLUTION INTRAVENOUS at 01:01

## 2024-01-15 RX ADMIN — HYDROXYUREA 1000 MG: 500 CAPSULE ORAL at 08:01

## 2024-01-15 RX ADMIN — PANTOPRAZOLE SODIUM 40 MG: 40 INJECTION, POWDER, FOR SOLUTION INTRAVENOUS at 09:01

## 2024-01-15 RX ADMIN — POTASSIUM & SODIUM PHOSPHATES POWDER PACK 280-160-250 MG 1 PACKET: 280-160-250 PACK at 08:01

## 2024-01-15 RX ADMIN — HYDROMORPHONE HYDROCHLORIDE 2 MG: 2 INJECTION INTRAMUSCULAR; INTRAVENOUS; SUBCUTANEOUS at 06:01

## 2024-01-15 RX ADMIN — PIPERACILLIN SODIUM AND TAZOBACTAM SODIUM 4.5 G: 4; .5 INJECTION, POWDER, FOR SOLUTION INTRAVENOUS at 04:01

## 2024-01-15 RX ADMIN — DILTIAZEM HYDROCHLORIDE 120 MG: 120 CAPSULE, COATED, EXTENDED RELEASE ORAL at 08:01

## 2024-01-15 RX ADMIN — HYDROXYUREA 1000 MG: 500 CAPSULE ORAL at 09:01

## 2024-01-15 RX ADMIN — FENTANYL 1 PATCH: 12.5 PATCH TRANSDERMAL at 12:01

## 2024-01-15 RX ADMIN — VANCOMYCIN HYDROCHLORIDE 1250 MG: 1.25 INJECTION, POWDER, LYOPHILIZED, FOR SOLUTION INTRAVENOUS at 10:01

## 2024-01-15 RX ADMIN — METOPROLOL TARTRATE 25 MG: 25 TABLET, FILM COATED ORAL at 08:01

## 2024-01-15 RX ADMIN — DIPHENHYDRAMINE HYDROCHLORIDE 25 MG: 25 CAPSULE ORAL at 10:01

## 2024-01-15 RX ADMIN — POTASSIUM & SODIUM PHOSPHATES POWDER PACK 280-160-250 MG 1 PACKET: 280-160-250 PACK at 05:01

## 2024-01-15 RX ADMIN — ACYCLOVIR 800 MG: 200 SUSPENSION ORAL at 10:01

## 2024-01-15 RX ADMIN — Medication 400 MG: at 09:01

## 2024-01-15 RX ADMIN — HYDROMORPHONE HYDROCHLORIDE 2 MG: 2 INJECTION INTRAMUSCULAR; INTRAVENOUS; SUBCUTANEOUS at 05:01

## 2024-01-15 RX ADMIN — METOPROLOL TARTRATE 25 MG: 25 TABLET, FILM COATED ORAL at 09:01

## 2024-01-15 RX ADMIN — ACYCLOVIR 800 MG: 200 SUSPENSION ORAL at 08:01

## 2024-01-15 RX ADMIN — OXYCODONE HYDROCHLORIDE 10 MG: 10 TABLET ORAL at 08:01

## 2024-01-15 RX ADMIN — ACETAMINOPHEN 650 MG: 650 SOLUTION ORAL at 10:01

## 2024-01-15 RX ADMIN — OXYCODONE HYDROCHLORIDE 10 MG: 10 TABLET ORAL at 04:01

## 2024-01-15 RX ADMIN — LOSARTAN POTASSIUM 25 MG: 25 TABLET, FILM COATED ORAL at 09:01

## 2024-01-15 RX ADMIN — POTASSIUM & SODIUM PHOSPHATES POWDER PACK 280-160-250 MG 1 PACKET: 280-160-250 PACK at 06:01

## 2024-01-15 RX ADMIN — HYDROMORPHONE HYDROCHLORIDE 2 MG: 2 INJECTION INTRAMUSCULAR; INTRAVENOUS; SUBCUTANEOUS at 01:01

## 2024-01-15 RX ADMIN — VANCOMYCIN HYDROCHLORIDE 1250 MG: 1.25 INJECTION, POWDER, LYOPHILIZED, FOR SOLUTION INTRAVENOUS at 11:01

## 2024-01-15 NOTE — PROGRESS NOTES
Pharmacokinetic Assessment Follow Up: IV Vancomycin    Vancomycin serum concentration assessment(s):    The random level was drawn correctly and can be used to guide therapy at this time. The measurement is within the desired definitive target range of 10 to 20 mcg/mL.  - The random level was drawn ~5 hours after the previous level and ~12 hours after the previous dose. It resulted at 13.8.  - Two-point kinetics indicate a vanc clearance half-life of ~8.6 hours.    Vancomycin Regimen Plan:    Change regimen to Vancomycin 1250 mg IV every 12 hours with next serum trough concentration measured at 1030 prior to 4th dose on 1/16.  - Mr Collado's renal function appears relatively stable and at baseline. Scr did creep up a little today, but will continue to watch closely on a scheduled regimen since he's still clearing vanc well.   - Please draw random level sooner than scheduled trough if renal function changes significantly.    Drug levels (last 3 results):  Recent Labs   Lab Result Units 01/12/24  1725 01/12/24  2329 01/14/24  1621 01/14/24  2110   Vancomycin, Random ug/mL  --   --   --  13.8   Vancomycin-Trough ug/mL 22.5* 14.5 20.3  --        Pharmacy will continue to follow and monitor vancomycin.    Please contact pharmacy at extension [phone] for questions regarding this assessment.    Thank you for the consult,   Gricelda Galarza       Patient brief summary:  Magdaleno Hirsch is a 23 y.o. male initiated on antimicrobial therapy with IV Vancomycin for treatment of sepsis    Actual Body Weight:   78.9 kg    Renal Function:   Estimated Creatinine Clearance: 126 mL/min (based on SCr of 1 mg/dL).     Dialysis Method (if applicable):  N/A

## 2024-01-15 NOTE — ASSESSMENT & PLAN NOTE
- patient of Dr. Green at [facility] [place]; follows with Dr. Hogan in [place]  8/2022: Diagnosed with chronic phase CML.  10/2022: Started dasatinib but had questionable compliance.  6/7/23: He presented to the ER at [facility] with gum swelling. Labs notable for  (80% blasts). He was transferred to [facility] [place] for further management.  6/9/23: Bone marrow biopsy revealed blast phase chronic myeloid leukemia; reticulin fibrosis 2 of 3. 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 FISH with t(9;22) and MLLT3/MLL (KMT2A) fusion. NGS with NRAS (7%), PTPN11 (4%). BCR/ABL1:ABL1 (p210) >55%.  6/16/23: Started induction with CLIA + ponatinib. Course complicated by neutropenic fever, pharyngitis/mucositis, and strep viridans bacteremia.   7/6/23: Day 21 bone marrow biopsy revealed a markedly hypocellular marrow (<5%) with trilineage hypoplasia. CG 46,XY[5].  7/25/23: Bone marrow biopsy revealed a hypocellular marrow but suboptimal specimen for evaluation.   8/4/23: BCR/ABL1:ABL1 (p210) 23.6%.   8/28/23: Bone marrow biopsy revealed a hypocellular marrow (30%) with persistent blast phase with 10-15% blasts.   10/9/23: Cycle 1 day 1 of azacitidine 75 mg/m2 x5 days plus venetoclax 50mg daily x10 days + ponatininb 30mg daily (28 day cycle).   10/31/23: Bone marrow biopsy at the end of cycle 1 revealed persistent myeloid blast crisis (11% circulating blasts).   11/6/23: Cycle 2 day 1 of azacitidine, venetoclax, and ponatinib. Ponatinib increased to 45mg daily.   1/2/24: Started asciminib 200mg daily as patient and family desired more treatment following [facility] discussion.   - Admitted 1/9/24 with neutropenic fevers and leukocytosis (WBC 114K, 95% others on diff)   - Currently holding asciminib due to fevers  - continue hydrea 1g BID. White count previously holding at roughly 90k now down to 72  - remains on ppx acyclovir; on zosyn and vanc with fevers

## 2024-01-15 NOTE — PLAN OF CARE
No acute events this shift. Afebrile & VSS on room air. Pain controlled with dilaudid x2. Fentanyl patch not present at beginning of shift, pt stated it came off in the shower yesterday and that he threw out. Pt, bed, and bathroom searched with no patch found. Pharmacy notified and rescheduled dose. Patch applied to R arm. Pt received 1 U RBC's this shift, tolerated well. Electrolytes closely monitored, no replacements ordered. Ambulates independently to bathroom; educated on importance of I/O recording. CHG wipes provided and encouraged use. Tolerating diet, voiding without difficulty. Pt remaining free from falls or injury throughout shift; bed locked and in lowest position; call light within reach. POC reviewed with patient and family at bedside. All needs addressed. Frequent rounding performed.

## 2024-01-15 NOTE — PROGRESS NOTES
[facility] - Oncology ([facility])  Hematology  Bone Marrow Transplant  Progress Note    Patient Name: Magdaleno Hirsch  Admission Date: 1/9/2024  Hospital Length of Stay: 5 days  Code Status: Full Code    Subjective:     Interval History: Mr. Hirsch is doing well. Able to sleep and eat comfortably with the assistance of pain medication. Continue hydrea for white count control.     Fentanyl patch fell off, suspected to be in the shower. Started on antihypertensives for elevated BP's.       Objective:     Vital Signs (Most Recent):  Temp: 98.4 °F (36.9 °C) (01/15/24 1517)  Pulse: 98 (01/15/24 1520)  Resp: 18 (01/15/24 1517)  BP: (!) 156/102 (01/15/24 1517)  SpO2: 99 % (01/15/24 1517) Vital Signs (24h Range):  Temp:  [98.2 °F (36.8 °C)-100.2 °F (37.9 °C)] 98.4 °F (36.9 °C)  Pulse:  [] 98  Resp:  [16-18] 18  SpO2:  [94 %-100 %] 99 %  BP: (141-164)/() 156/102     Weight: 78.9 kg (174 lb)  Body mass index is 22.96 kg/m².  Body surface area is 2.02 meters squared.    ECOG SCORE           [unfilled]    Intake/Output - Last 3 Shifts         01/13 0700  01/14 0659 01/14 0700  01/15 0659 01/15 0700  01/16 0659    P.O.  420 250    Blood   258.8    Total Intake(mL/kg)  420 (5.3) 508.8 (6.4)    Urine (mL/kg/hr) 5100 (2.7) 4600 (2.4) 1350 (1.8)    Total Output 5100 4600 1350    Net -5100 -4180 -841.3                   Physical Exam  Vitals and nursing note reviewed.   Constitutional:       General: He is in acute distress.      Appearance: He is ill-appearing. He is not diaphoretic.   HENT:      Head: Normocephalic.      Nose:      Comments: Limited speaking and hoarse voice. Limited ROM of tongue and R mandibular swelling noted      Mouth/Throat:      Pharynx: Pharyngeal swelling and posterior oropharyngeal erythema present.   Eyes:      Extraocular Movements: Extraocular movements intact.      Conjunctiva/sclera: Conjunctivae normal.   Neck:      Comments: R sided mandibular swelling noted.  Cardiovascular:      Rate and  Rhythm: Regular rhythm. Tachycardia present.   Pulmonary:      Effort: Pulmonary effort is normal. No respiratory distress.      Breath sounds: Normal breath sounds.   Abdominal:      Palpations: Abdomen is soft.      Tenderness: There is no abdominal tenderness.   Musculoskeletal:         General: No swelling. Normal range of motion.      Cervical back: Edema present.      Right lower leg: No edema.      Left lower leg: No edema.   Skin:     Coloration: Skin is not jaundiced.      Findings: Rash present.   Neurological:      General: No focal deficit present.      Mental Status: He is oriented to person, place, and time.   Psychiatric:         Mood and Affect: Affect is flat.            Significant Labs:   CBC:   Recent Labs   Lab 01/14/24  0300 01/15/24  0505   WBC 92.33* 72.42*   HGB 7.0* 6.3*   HCT 21.2* 18.6*   PLT 9* 19*      and CMP:   Recent Labs   Lab 01/14/24  0300 01/15/24  0505    138   K 3.1* 3.8    105   CO2 23 24   GLU 95 94   BUN 6 8   CREATININE 1.0 0.9   CALCIUM 8.7 8.5*   PROT 5.9* 6.0   ALBUMIN 2.0* 2.2*   BILITOT 1.5* 1.7*   ALKPHOS 246* 223*   AST 76* 46*   ALT 71* 57*   ANIONGAP 11 9         Diagnostic Results:  I have reviewed all pertinent imaging results/findings within the past 24 hours.  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 Green at [facility] [place]; [place] Dr. Hogan in [place]  8/2022: Diagnosed with chronic phase CML.  10/2022: Started dasatinib but had questionable compliance.  6/7/23: He presented to the ER at [facility] [place] with gum swelling. Labs notable for  (80% blasts). He was transferred to [facility] [place] for further management.  6/9/23: Bone marrow biopsy revealed blast phase chronic myeloid leukemia; reticulin fibrosis 2 of 3. 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 FISH with t(9;22) and MLLT3/MLL (KMT2A) fusion. NGS with NRAS (7%), PTPN11 (4%). BCR/ABL1:ABL1 (p210) >55%.  6/16/23: Started induction with CLIA + ponatinib.  Course complicated by neutropenic fever, pharyngitis/mucositis, and strep viridans bacteremia.   7/6/23: Day 21 bone marrow biopsy revealed a markedly hypocellular marrow (<5%) with trilineage hypoplasia. CG 46,XY[5].  7/25/23: Bone marrow biopsy revealed a hypocellular marrow but suboptimal specimen for evaluation.   8/4/23: BCR/ABL1:ABL1 (p210) 23.6%.   8/28/23: Bone marrow biopsy revealed a hypocellular marrow (30%) with persistent blast phase with 10-15% blasts.   10/9/23: Cycle 1 day 1 of azacitidine 75 mg/m2 x5 days plus venetoclax 50mg daily x10 days + ponatininb 30mg daily (28 day cycle).   10/31/23: Bone marrow biopsy at the end of cycle 1 revealed persistent myeloid blast crisis (11% circulating blasts).   11/6/23: Cycle 2 day 1 of azacitidine, venetoclax, and ponatinib. Ponatinib increased to 45mg daily.   1/2/24: Started asciminib 200mg daily as patient and family desired more treatment following GOC discussion.   - Admitted 1/9/24 with neutropenic fevers and leukocytosis (WBC 114K, 95% others on diff)   - Currently holding asciminib due to fevers  - continue hydrea 1g BID. White count previously holding at roughly 90k now down to 72  - remains on ppx acyclovir; on zosyn and vanc with fevers    ACP (advance care planning)  - palliative care consulted to assist with GOC discussions as both Dr. Green and Dr. Hogan have discussed extremely grim prognosis with patient and family d/t no further treatment options for refractory CML  - initially acceptable to hospice with plans to discharge on 1/11; however rescinded decision as family attempting to get patient to [facility]; re-iterated that there are no additional options including treatment and clinical trials  - remains full code, continuing all supportive care at this time  - has fentanyl patch and prn dilaudid for pain  -  following    Tonsillar abscess  - patient with swollen neck, throat pain, difficulty swallowing  - CT neck shows subcentimeter  left palatine tonsil abscess with mild associated left parapharyngeal edema  - seen by ENT; no acute intervention  - continue zosyn and vanc    Hyperbilirubinemia  - elevated on admission  - RUQ US unremarkable    Thrombocytopenia  - daily CBC  - transfuse for Plt <10K or bleeding    Anemia in neoplastic disease  - daily cbc  - transfuse for Hgb <7    Hypertension  - continue home lopressor and diltiazem   -losartan added yesterday for ongoing high pressures    CML (chronic myeloid leukemia) in relapse  - see blast phase CML        VTE Risk Mitigation (From admission, onward)           Ordered     Reason for No Pharmacological VTE Prophylaxis  Once        Question:  Reasons:  Answer:  Thrombocytopenia    01/10/24 0213     IP VTE HIGH RISK PATIENT  Once         01/10/24 0213     Place sequential compression device  Until discontinued         01/10/24 0213                    Disposition: Continue medication optimization while continuing to pursue GOC    Rocio Waterman MD  Bone Marrow Transplant  [facility] - Oncology ([facility])

## 2024-01-15 NOTE — SUBJECTIVE & OBJECTIVE
Subjective:     Interval History: Mr. Hirsch is doing well. Able to sleep and eat comfortably with the assistance of pain medication. Continue hydrea for white count control.     Fentanyl patch fell off, suspected to be in the shower. Started on antihypertensives for elevated BP's.       Objective:     Vital Signs (Most Recent):  Temp: 98.4 °F (36.9 °C) (01/15/24 1517)  Pulse: 98 (01/15/24 1520)  Resp: 18 (01/15/24 1517)  BP: (!) 156/102 (01/15/24 1517)  SpO2: 99 % (01/15/24 1517) Vital Signs (24h Range):  Temp:  [98.2 °F (36.8 °C)-100.2 °F (37.9 °C)] 98.4 °F (36.9 °C)  Pulse:  [] 98  Resp:  [16-18] 18  SpO2:  [94 %-100 %] 99 %  BP: (141-164)/() 156/102     Weight: 78.9 kg (174 lb)  Body mass index is 22.96 kg/m².  Body surface area is 2.02 meters squared.    ECOG SCORE           [unfilled]    Intake/Output - Last 3 Shifts         01/13 0700  01/14 0659 01/14 0700  01/15 0659 01/15 0700  01/16 0659    P.O.  420 250    Blood   258.8    Total Intake(mL/kg)  420 (5.3) 508.8 (6.4)    Urine (mL/kg/hr) 5100 (2.7) 4600 (2.4) 1350 (1.8)    Total Output 5100 4600 1350    Net -5100 -4180 -841.3                    Physical Exam  Vitals and nursing note reviewed.   Constitutional:       General: He is in acute distress.      Appearance: He is ill-appearing. He is not diaphoretic.   HENT:      Head: Normocephalic.      Nose:      Comments: Limited speaking and hoarse voice. Limited ROM of tongue and R mandibular swelling noted      Mouth/Throat:      Pharynx: Pharyngeal swelling and posterior oropharyngeal erythema present.   Eyes:      Extraocular Movements: Extraocular movements intact.      Conjunctiva/sclera: Conjunctivae normal.   Neck:      Comments: R sided mandibular swelling noted.  Cardiovascular:      Rate and Rhythm: Regular rhythm. Tachycardia present.   Pulmonary:      Effort: Pulmonary effort is normal. No respiratory distress.      Breath sounds: Normal breath sounds.   Abdominal:      Palpations:  Abdomen is soft.      Tenderness: There is no abdominal tenderness.   Musculoskeletal:         General: No swelling. Normal range of motion.      Cervical back: Edema present.      Right lower leg: No edema.      Left lower leg: No edema.   Skin:     Coloration: Skin is not jaundiced.      Findings: Rash present.   Neurological:      General: No focal deficit present.      Mental Status: He is oriented to person, place, and time.   Psychiatric:         Mood and Affect: Affect is flat.            Significant Labs:   CBC:   Recent Labs   Lab 01/14/24  0300 01/15/24  0505   WBC 92.33* 72.42*   HGB 7.0* 6.3*   HCT 21.2* 18.6*   PLT 9* 19*      and CMP:   Recent Labs   Lab 01/14/24  0300 01/15/24  0505    138   K 3.1* 3.8    105   CO2 23 24   GLU 95 94   BUN 6 8   CREATININE 1.0 0.9   CALCIUM 8.7 8.5*   PROT 5.9* 6.0   ALBUMIN 2.0* 2.2*   BILITOT 1.5* 1.7*   ALKPHOS 246* 223*   AST 76* 46*   ALT 71* 57*   ANIONGAP 11 9         Diagnostic Results:  I have reviewed all pertinent imaging results/findings within the past 24 hours.

## 2024-01-15 NOTE — ASSESSMENT & PLAN NOTE
- palliative care consulted to assist with GOC discussions as both Dr. Green and Dr. Hogan have discussed extremely grim prognosis with patient and family d/t no further treatment options for refractory CML  - initially acceptable to hospice with plans to discharge on 1/11; however rescinded decision as family attempting to get patient to [facility]; re-iterated that there are no additional options including treatment and clinical trials  - remains full code, continuing all supportive care at this time  - has fentanyl patch and prn dilaudid for pain  -  following

## 2024-01-15 NOTE — PLAN OF CARE
Plan of care reviewed with patient throughout shift. VSS, afebrile, no acute events. Pt c/o acute pain to throat - oxy given x2, dilaudid given x3. Pt refusing magic mouthwash. Pt bp remains high. Family members remain at bedside, bed alarm refused (education given). Fall precautions maintained. Bed locked in lowest position, side rails up x2, call light within reach, environment clear. Encouraged pt to call nurse for any concerns.

## 2024-01-16 LAB
ABO + RH BLD: NORMAL
ALBUMIN SERPL BCP-MCNC: 2.2 G/DL (ref 3.5–5.2)
ALP SERPL-CCNC: 238 U/L (ref 55–135)
ALT SERPL W/O P-5'-P-CCNC: 63 U/L (ref 10–44)
ANION GAP SERPL CALC-SCNC: 12 MMOL/L (ref 8–16)
AST SERPL-CCNC: 58 U/L (ref 10–40)
BASOPHILS NFR BLD: 0 % (ref 0–1.9)
BILIRUB SERPL-MCNC: 1.6 MG/DL (ref 0.1–1)
BLASTS NFR BLD MANUAL: 97 %
BLD GP AB SCN CELLS X3 SERPL QL: NORMAL
BUN SERPL-MCNC: 9 MG/DL (ref 6–20)
CALCIUM SERPL-MCNC: 8.6 MG/DL (ref 8.7–10.5)
CHLORIDE SERPL-SCNC: 104 MMOL/L (ref 95–110)
CO2 SERPL-SCNC: 23 MMOL/L (ref 23–29)
CREAT SERPL-MCNC: 0.9 MG/DL (ref 0.5–1.4)
DIFFERENTIAL METHOD BLD: ABNORMAL
EOSINOPHIL NFR BLD: 0 % (ref 0–8)
ERYTHROCYTE [DISTWIDTH] IN BLOOD BY AUTOMATED COUNT: 17.8 % (ref 11.5–14.5)
EST. GFR  (NO RACE VARIABLE): >60 ML/MIN/1.73 M^2
GLUCOSE SERPL-MCNC: 106 MG/DL (ref 70–110)
HCT VFR BLD AUTO: 25.7 % (ref 40–54)
HGB BLD-MCNC: 8.4 G/DL (ref 14–18)
IMM GRANULOCYTES # BLD AUTO: ABNORMAL K/UL (ref 0–0.04)
IMM GRANULOCYTES NFR BLD AUTO: ABNORMAL % (ref 0–0.5)
LDH SERPL L TO P-CCNC: 676 U/L (ref 110–260)
LYMPHOCYTES NFR BLD: 2 % (ref 18–48)
MAGNESIUM SERPL-MCNC: 1.8 MG/DL (ref 1.6–2.6)
MCH RBC QN AUTO: 28.8 PG (ref 27–31)
MCHC RBC AUTO-ENTMCNC: 32.7 G/DL (ref 32–36)
MCV RBC AUTO: 88 FL (ref 82–98)
MONOCYTES NFR BLD: 0 % (ref 4–15)
NEUTROPHILS NFR BLD: 1 % (ref 38–73)
NRBC BLD-RTO: 0 /100 WBC
PHOSPHATE SERPL-MCNC: 4.1 MG/DL (ref 2.7–4.5)
PLATELET # BLD AUTO: 10 K/UL (ref 150–450)
PMV BLD AUTO: ABNORMAL FL (ref 9.2–12.9)
POTASSIUM SERPL-SCNC: 3.6 MMOL/L (ref 3.5–5.1)
PROT SERPL-MCNC: 6.3 G/DL (ref 6–8.4)
RBC # BLD AUTO: 2.92 M/UL (ref 4.6–6.2)
SODIUM SERPL-SCNC: 139 MMOL/L (ref 136–145)
SPECIMEN OUTDATE: NORMAL
URATE SERPL-MCNC: 3.6 MG/DL (ref 3.4–7)
WBC # BLD AUTO: 57.02 K/UL (ref 3.9–12.7)

## 2024-01-16 PROCEDURE — 25000003 PHARM REV CODE 250: Performed by: NURSE PRACTITIONER

## 2024-01-16 PROCEDURE — 86850 RBC ANTIBODY SCREEN: CPT | Performed by: STUDENT IN AN ORGANIZED HEALTH CARE EDUCATION/TRAINING PROGRAM

## 2024-01-16 PROCEDURE — 85007 BL SMEAR W/DIFF WBC COUNT: CPT | Performed by: STUDENT IN AN ORGANIZED HEALTH CARE EDUCATION/TRAINING PROGRAM

## 2024-01-16 PROCEDURE — 85027 COMPLETE CBC AUTOMATED: CPT | Performed by: STUDENT IN AN ORGANIZED HEALTH CARE EDUCATION/TRAINING PROGRAM

## 2024-01-16 PROCEDURE — 20600001 HC STEP DOWN PRIVATE ROOM

## 2024-01-16 PROCEDURE — 25000003 PHARM REV CODE 250: Performed by: STUDENT IN AN ORGANIZED HEALTH CARE EDUCATION/TRAINING PROGRAM

## 2024-01-16 PROCEDURE — A4216 STERILE WATER/SALINE, 10 ML: HCPCS | Performed by: NURSE PRACTITIONER

## 2024-01-16 PROCEDURE — 84550 ASSAY OF BLOOD/URIC ACID: CPT | Performed by: NURSE PRACTITIONER

## 2024-01-16 PROCEDURE — 99233 SBSQ HOSP IP/OBS HIGH 50: CPT | Mod: ,,, | Performed by: CLINICAL NURSE SPECIALIST

## 2024-01-16 PROCEDURE — 36415 COLL VENOUS BLD VENIPUNCTURE: CPT | Performed by: STUDENT IN AN ORGANIZED HEALTH CARE EDUCATION/TRAINING PROGRAM

## 2024-01-16 PROCEDURE — 63600175 PHARM REV CODE 636 W HCPCS: Performed by: NURSE PRACTITIONER

## 2024-01-16 PROCEDURE — 83615 LACTATE (LD) (LDH) ENZYME: CPT | Performed by: NURSE PRACTITIONER

## 2024-01-16 PROCEDURE — C9113 INJ PANTOPRAZOLE SODIUM, VIA: HCPCS | Performed by: NURSE PRACTITIONER

## 2024-01-16 PROCEDURE — 80053 COMPREHEN METABOLIC PANEL: CPT | Performed by: STUDENT IN AN ORGANIZED HEALTH CARE EDUCATION/TRAINING PROGRAM

## 2024-01-16 PROCEDURE — 99233 SBSQ HOSP IP/OBS HIGH 50: CPT | Mod: ,,, | Performed by: INTERNAL MEDICINE

## 2024-01-16 PROCEDURE — 83735 ASSAY OF MAGNESIUM: CPT | Performed by: STUDENT IN AN ORGANIZED HEALTH CARE EDUCATION/TRAINING PROGRAM

## 2024-01-16 PROCEDURE — 84100 ASSAY OF PHOSPHORUS: CPT | Performed by: STUDENT IN AN ORGANIZED HEALTH CARE EDUCATION/TRAINING PROGRAM

## 2024-01-16 RX ORDER — LOSARTAN POTASSIUM 50 MG/1
50 TABLET ORAL DAILY
Status: DISCONTINUED | OUTPATIENT
Start: 2024-01-16 | End: 2024-01-17 | Stop reason: HOSPADM

## 2024-01-16 RX ORDER — FLUCONAZOLE 200 MG/1
400 TABLET ORAL DAILY
Status: DISCONTINUED | OUTPATIENT
Start: 2024-01-16 | End: 2024-01-17 | Stop reason: HOSPADM

## 2024-01-16 RX ADMIN — POTASSIUM & SODIUM PHOSPHATES POWDER PACK 280-160-250 MG 1 PACKET: 280-160-250 PACK at 09:01

## 2024-01-16 RX ADMIN — OXYCODONE HYDROCHLORIDE 10 MG: 10 TABLET ORAL at 05:01

## 2024-01-16 RX ADMIN — PIPERACILLIN SODIUM AND TAZOBACTAM SODIUM 4.5 G: 4; .5 INJECTION, POWDER, FOR SOLUTION INTRAVENOUS at 05:01

## 2024-01-16 RX ADMIN — OXYCODONE HYDROCHLORIDE 10 MG: 10 TABLET ORAL at 08:01

## 2024-01-16 RX ADMIN — OXYCODONE HYDROCHLORIDE 10 MG: 10 TABLET ORAL at 09:01

## 2024-01-16 RX ADMIN — ACYCLOVIR 800 MG: 200 SUSPENSION ORAL at 08:01

## 2024-01-16 RX ADMIN — OXYCODONE HYDROCHLORIDE 10 MG: 10 TABLET ORAL at 01:01

## 2024-01-16 RX ADMIN — PIPERACILLIN SODIUM AND TAZOBACTAM SODIUM 4.5 G: 4; .5 INJECTION, POWDER, FOR SOLUTION INTRAVENOUS at 04:01

## 2024-01-16 RX ADMIN — LOSARTAN POTASSIUM 50 MG: 50 TABLET, FILM COATED ORAL at 08:01

## 2024-01-16 RX ADMIN — Medication 400 MG: at 08:01

## 2024-01-16 RX ADMIN — DILTIAZEM HYDROCHLORIDE 120 MG: 120 CAPSULE, COATED, EXTENDED RELEASE ORAL at 09:01

## 2024-01-16 RX ADMIN — HYDROMORPHONE HYDROCHLORIDE 2 MG: 2 INJECTION INTRAMUSCULAR; INTRAVENOUS; SUBCUTANEOUS at 10:01

## 2024-01-16 RX ADMIN — ACYCLOVIR 800 MG: 200 SUSPENSION ORAL at 09:01

## 2024-01-16 RX ADMIN — POTASSIUM & SODIUM PHOSPHATES POWDER PACK 280-160-250 MG 1 PACKET: 280-160-250 PACK at 06:01

## 2024-01-16 RX ADMIN — FLUCONAZOLE 400 MG: 200 TABLET ORAL at 10:01

## 2024-01-16 RX ADMIN — PIPERACILLIN SODIUM AND TAZOBACTAM SODIUM 4.5 G: 4; .5 INJECTION, POWDER, FOR SOLUTION INTRAVENOUS at 11:01

## 2024-01-16 RX ADMIN — METOPROLOL TARTRATE 25 MG: 25 TABLET, FILM COATED ORAL at 10:01

## 2024-01-16 RX ADMIN — PANTOPRAZOLE SODIUM 40 MG: 40 INJECTION, POWDER, FOR SOLUTION INTRAVENOUS at 08:01

## 2024-01-16 RX ADMIN — HYDROXYUREA 1000 MG: 500 CAPSULE ORAL at 08:01

## 2024-01-16 RX ADMIN — ACETAMINOPHEN 650 MG: 650 SOLUTION ORAL at 05:01

## 2024-01-16 RX ADMIN — METOPROLOL TARTRATE 25 MG: 25 TABLET, FILM COATED ORAL at 09:01

## 2024-01-16 RX ADMIN — ACETAMINOPHEN 650 MG: 650 SOLUTION ORAL at 01:01

## 2024-01-16 RX ADMIN — HYDROMORPHONE HYDROCHLORIDE 2 MG: 2 INJECTION INTRAMUSCULAR; INTRAVENOUS; SUBCUTANEOUS at 06:01

## 2024-01-16 RX ADMIN — HYDROMORPHONE HYDROCHLORIDE 2 MG: 2 INJECTION INTRAMUSCULAR; INTRAVENOUS; SUBCUTANEOUS at 11:01

## 2024-01-16 RX ADMIN — HYDROMORPHONE HYDROCHLORIDE 2 MG: 2 INJECTION INTRAMUSCULAR; INTRAVENOUS; SUBCUTANEOUS at 03:01

## 2024-01-16 RX ADMIN — HYDROXYUREA 1000 MG: 500 CAPSULE ORAL at 09:01

## 2024-01-16 NOTE — PLAN OF CARE
Plan of care reviewed with patient throughout shift. VSS, Tmax of 101.3- tylenol given, no acute events. Pt c/o acute pain to throat - oxy and dilaudid given as needed. Pt bp remains high. Family members remain at bedside, bed alarm refused (education given). Fall precautions maintained. Bed locked in lowest position, side rails up x2, call light within reach, environment clear. Encouraged pt to call nurse for any concerns.

## 2024-01-16 NOTE — ASSESSMENT & PLAN NOTE
-continue home lopressor and diltiazem   -losartan added for ongoing high pressures, titrate as needed

## 2024-01-16 NOTE — PROGRESS NOTES
Dr. Albrecht notified of current fever with tylenol given as ordered.  Will reassess in one hour.         01/16/24 1700   Vital Signs   Temp (!) 101.2 °F (38.4 °C)   Temp Source Oral   Pulse (!) 126   Heart Rate Source Monitor   Resp 20   SpO2 100 %   Device (Oxygen Therapy) room air   BP (!) 156/99   BP Location Left arm   Patient Position Lying

## 2024-01-16 NOTE — ASSESSMENT & PLAN NOTE
- patient with swollen neck, throat pain, difficulty swallowing  - CT neck shows subcentimeter left palatine tonsil abscess with mild associated left parapharyngeal edema  - seen by ENT; no acute intervention  - continue zosyn, vanc discontinued 1/16 as cultures negative

## 2024-01-16 NOTE — SUBJECTIVE & OBJECTIVE
Subjective:     Interval History: Patient was febrile up to 101.3F around 10:30pm last night. He remains tachycardic. WBC improved from yesterday. Patient says pain is at 8.     Objective:     Vital Signs (Most Recent):  Temp: 98.6 °F (37 °C) (01/16/24 0820)  Pulse: (!) 119 (01/16/24 0820)  Resp: 18 (01/16/24 0820)  BP: (!) 156/103 (01/16/24 0820)  SpO2: 96 % (01/16/24 0820) Vital Signs (24h Range):  Temp:  [98.4 °F (36.9 °C)-101.3 °F (38.5 °C)] 98.6 °F (37 °C)  Pulse:  [] 119  Resp:  [16-18] 18  SpO2:  [96 %-99 %] 96 %  BP: (141-169)/() 156/103     Weight: 78.9 kg (174 lb)  Body mass index is 22.96 kg/m².  Body surface area is 2.02 meters squared.    ECOG SCORE           [unfilled]    Intake/Output - Last 3 Shifts         01/14 0700  01/15 0659 01/15 0700 01/16 0659 01/16 0700 01/17 0659    P.O. 420 500     I.V. (mL/kg)  1153.6 (14.6)     Blood  258.8     IV Piggyback  759.7     Total Intake(mL/kg) 420 (5.3) 2672.1 (33.9)     Urine (mL/kg/hr) 4600 (2.4) 4750 (2.5)     Total Output 4600 4750     Net -6975 -7828                     Physical Exam  Vitals and nursing note reviewed.   Constitutional:       Appearance: He is ill-appearing. He is not diaphoretic.   HENT:      Head: Normocephalic.      Nose:      Comments: Limited speaking and hoarse voice. Limited ROM of tongue and R mandibular swelling noted      Mouth/Throat:      Pharynx: Pharyngeal swelling and posterior oropharyngeal erythema present.   Eyes:      Extraocular Movements: Extraocular movements intact.      Conjunctiva/sclera: Conjunctivae normal.   Neck:      Comments: R sided mandibular swelling noted.  Cardiovascular:      Rate and Rhythm: Regular rhythm. Tachycardia present.   Pulmonary:      Effort: Pulmonary effort is normal. No respiratory distress.   Abdominal:      Palpations: Abdomen is soft.      Tenderness: There is no abdominal tenderness.   Musculoskeletal:         General: No swelling. Normal range of motion.      Cervical  back: Edema present.      Right lower leg: No edema.      Left lower leg: No edema.   Skin:     Coloration: Skin is not jaundiced.      Findings: Rash present.   Neurological:      General: No focal deficit present.      Mental Status: He is oriented to person, place, and time.   Psychiatric:         Mood and Affect: Affect is flat.            Significant Labs:   CBC:   Recent Labs   Lab 01/15/24  0505 01/16/24  0503   WBC 72.42* 57.02*   HGB 6.3* 8.4*   HCT 18.6* 25.7*   PLT 19* 10*    and CMP:   Recent Labs   Lab 01/15/24  0505 01/16/24  0503    139   K 3.8 3.6    104   CO2 24 23   GLU 94 106   BUN 8 9   CREATININE 0.9 0.9   CALCIUM 8.5* 8.6*   PROT 6.0 6.3   ALBUMIN 2.2* 2.2*   BILITOT 1.7* 1.6*   ALKPHOS 223* 238*   AST 46* 58*   ALT 57* 63*   ANIONGAP 9 12       Diagnostic Results:  I have reviewed all pertinent imaging results/findings within the past 24 hours.

## 2024-01-16 NOTE — ASSESSMENT & PLAN NOTE
- patient of Dr. Green at [facility] [place]; follows with Dr. Hogan in [place]  8/2022: Diagnosed with chronic phase CML.  10/2022: Started dasatinib but had questionable compliance.  6/7/23: He presented to the ER at [facility] [place] with gum swelling. Labs notable for  (80% blasts). He was transferred to [facility] [place] for further management.  6/9/23: Bone marrow biopsy revealed blast phase chronic myeloid leukemia; reticulin fibrosis 2 of 3. 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 FISH with t(9;22) and MLLT3/MLL (KMT2A) fusion. NGS with NRAS (7%), PTPN11 (4%). BCR/ABL1:ABL1 (p210) >55%.  6/16/23: Started induction with CLIA + ponatinib. Course complicated by neutropenic fever, pharyngitis/mucositis, and strep viridans bacteremia.   7/6/23: Day 21 bone marrow biopsy revealed a markedly hypocellular marrow (<5%) with trilineage hypoplasia. CG 46,XY[5].  7/25/23: Bone marrow biopsy revealed a hypocellular marrow but suboptimal specimen for evaluation.   8/4/23: BCR/ABL1:ABL1 (p210) 23.6%.   8/28/23: Bone marrow biopsy revealed a hypocellular marrow (30%) with persistent blast phase with 10-15% blasts.   10/9/23: Cycle 1 day 1 of azacitidine 75 mg/m2 x5 days plus venetoclax 50mg daily x10 days + ponatininb 30mg daily (28 day cycle).   10/31/23: Bone marrow biopsy at the end of cycle 1 revealed persistent myeloid blast crisis (11% circulating blasts).   11/6/23: Cycle 2 day 1 of azacitidine, venetoclax, and ponatinib. Ponatinib increased to 45mg daily.   1/2/24: Started asciminib 200mg daily as patient and family desired more treatment following [facility] discussion.   - Admitted 1/9/24 with neutropenic fevers and leukocytosis (WBC 114K, 95% others on diff)   - Currently holding asciminib due to fevers  - continue hydrea 1g BID. White count improving  - remains on ppx acyclovir; on zosyn; CML also contributing to fevers

## 2024-01-16 NOTE — CHAPLAIN
"Palliative Care     Patient: Magdaleno Hirsch  MRN: [id number]  : 2000  Age: 23 y.o.  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 25 min visit.    This was the first opportunity to speak with pt's mom at length; provided compassionate presence and plenty of silence, as no words perfect for this situation. Joined mom in her surreal and profound anticipatory grief.    Mom shared that pt is the oldest of her 4 kids-- all boys, the youngest is 8 and who is having the hardest time. Palliative  will bring Child Life materials next visit-- I did not mention it this visit though.    Mom thanked me for the prayer I said bedside last week when all the family was in the room and when they shared that there is no more effective treatment options.     Asked mom what the next steps/plans are and she said "just keeping him comfortable. I see in chart many people had mentioned hospice, so when I mentioned it she made an expression like she's heard it before.     Compassionately I shared that often times hospice gets a bad rap, especially if a family has had a bad experience and mom nodded in agreement. I explained it again, but reminded her that they get to choose the agency and they can "interview" the hospice rep asking questions--addressing any concerns or experiences they've had in the past with family members.     Also, said that it's possible to change agencies, rescind hospice services or extend hospice services if pt qualifies-- all while pt getting meds and attention for pain and anxiety, no more ER visits and " covered in love from family at home. This seemed to give mom some comfort.     Pt's mom thanked me for the hospice explanation and welcomed me to pray with her; appropriately teary and hugged me afterwards. Again, I assured her that I can only imagine what she's going through, from one mamma to another.       **Spiritual Care Dept. Chaplains are available evenings, overnight and weekends **    In Peace,  Rev. Sherley Robison MDiv, [facility]  Board Certified   Palliative Medicine Department  [phone]

## 2024-01-16 NOTE — PROGRESS NOTES
VANCOMYCIN DOSING BY PHARMACY DISCONTINUATION NOTE    Magdaleno Hirsch is a 23 y.o. male who had been consulted for vancomycin dosing.    The pharmacy consult for vancomycin dosing has been discontinued.     Vancomycin Dosing by Pharmacy Consult will sign-off. Please reconsult if necessary. Thank you for allowing us to participate in this patient's care.     Thank you for the consult,   Charles Lugo, Pharm.D.  Inpatient Pharmacist  [phone]

## 2024-01-16 NOTE — PROGRESS NOTES
[facility] - Oncology ([facility])  Hematology  Bone Marrow Transplant  Progress Note    Patient Name: Magdaleno Hirsch  Admission Date: 1/9/2024  Hospital Length of Stay: 6 days  Code Status: Full Code    Subjective:     Interval History: Patient was febrile up to 101.3F around 10:30pm last night. He remains tachycardic. WBC improved from yesterday. Patient says pain is at 8.     Objective:     Vital Signs (Most Recent):  Temp: 98.6 °F (37 °C) (01/16/24 0820)  Pulse: (!) 119 (01/16/24 0820)  Resp: 18 (01/16/24 0820)  BP: (!) 156/103 (01/16/24 0820)  SpO2: 96 % (01/16/24 0820) Vital Signs (24h Range):  Temp:  [98.4 °F (36.9 °C)-101.3 °F (38.5 °C)] 98.6 °F (37 °C)  Pulse:  [] 119  Resp:  [16-18] 18  SpO2:  [96 %-99 %] 96 %  BP: (141-169)/() 156/103     Weight: 78.9 kg (174 lb)  Body mass index is 22.96 kg/m².  Body surface area is 2.02 meters squared.    ECOG SCORE           [unfilled]    Intake/Output - Last 3 Shifts         01/14 0700  01/15 0659 01/15 0700  01/16 0659 01/16 0700  01/17 0659    P.O. 420 500     I.V. (mL/kg)  1153.6 (14.6)     Blood  258.8     IV Piggyback  759.7     Total Intake(mL/kg) 420 (5.3) 2672.1 (33.9)     Urine (mL/kg/hr) 4600 (2.4) 4750 (2.5)     Total Output 4600 4750     Net -2545 -1467                     Physical Exam  Vitals and nursing note reviewed.   Constitutional:       Appearance: He is ill-appearing. He is not diaphoretic.   HENT:      Head: Normocephalic.      Nose:      Comments: Limited speaking and hoarse voice. Limited ROM of tongue and R mandibular swelling noted      Mouth/Throat:      Pharynx: Pharyngeal swelling and posterior oropharyngeal erythema present.   Eyes:      Extraocular Movements: Extraocular movements intact.      Conjunctiva/sclera: Conjunctivae normal.   Neck:      Comments: R sided mandibular swelling noted.  Cardiovascular:      Rate and Rhythm: Regular rhythm. Tachycardia present.   Pulmonary:      Effort: Pulmonary effort is normal. No respiratory  distress.   Abdominal:      Palpations: Abdomen is soft.      Tenderness: There is no abdominal tenderness.   Musculoskeletal:         General: No swelling. Normal range of motion.      Cervical back: Edema present.      Right lower leg: No edema.      Left lower leg: No edema.   Skin:     Coloration: Skin is not jaundiced.      Findings: Rash present.   Neurological:      General: No focal deficit present.      Mental Status: He is oriented to person, place, and time.   Psychiatric:         Mood and Affect: Affect is flat.            Significant Labs:   CBC:   Recent Labs   Lab 01/15/24  0505 01/16/24  0503   WBC 72.42* 57.02*   HGB 6.3* 8.4*   HCT 18.6* 25.7*   PLT 19* 10*    and CMP:   Recent Labs   Lab 01/15/24  0505 01/16/24  0503    139   K 3.8 3.6    104   CO2 24 23   GLU 94 106   BUN 8 9   CREATININE 0.9 0.9   CALCIUM 8.5* 8.6*   PROT 6.0 6.3   ALBUMIN 2.2* 2.2*   BILITOT 1.7* 1.6*   ALKPHOS 223* 238*   AST 46* 58*   ALT 57* 63*   ANIONGAP 9 12       Diagnostic Results:  I have reviewed all pertinent imaging results/findings within the past 24 hours.  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 Green at [facility] [place] [facility]; follows with Dr. Hogan in [place]  8/2022: Diagnosed with chronic phase CML.  10/2022: Started dasatinib but had questionable compliance.  6/7/23: He presented to [place] [facility] [place] with [place]. Labs notable for  (80% blasts). He was transferred to [facility] for further management.  6/9/23: Bone marrow biopsy revealed blast phase chronic myeloid leukemia; reticulin fibrosis 2 of 3. 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 FISH with t(9;22) and MLLT3/MLL (KMT2A) fusion. NGS with NRAS (7%), PTPN11 (4%). BCR/ABL1:ABL1 (p210) >55%.  6/16/23: Started induction with CLIA + ponatinib. Course complicated by neutropenic fever, pharyngitis/mucositis, and strep viridans bacteremia.   7/6/23: Day 21 bone marrow biopsy revealed a markedly  hypocellular marrow (<5%) with trilineage hypoplasia. CG 46,XY[5].  7/25/23: Bone marrow biopsy revealed a hypocellular marrow but suboptimal specimen for evaluation.   8/4/23: BCR/ABL1:ABL1 (p210) 23.6%.   8/28/23: Bone marrow biopsy revealed a hypocellular marrow (30%) with persistent blast phase with 10-15% blasts.   10/9/23: Cycle 1 day 1 of azacitidine 75 mg/m2 x5 days plus venetoclax 50mg daily x10 days + ponatininb 30mg daily (28 day cycle).   10/31/23: Bone marrow biopsy at the end of cycle 1 revealed persistent myeloid blast crisis (11% circulating blasts).   11/6/23: Cycle 2 day 1 of azacitidine, venetoclax, and ponatinib. Ponatinib increased to 45mg daily.   1/2/24: Started asciminib 200mg daily as patient and family desired more treatment following GOC discussion.   - Admitted 1/9/24 with neutropenic fevers and leukocytosis (WBC 114K, 95% others on diff)   - Currently holding asciminib due to fevers  - continue hydrea 1g BID. White count improving  - remains on ppx acyclovir; on zosyn; CML also contributing to fevers    ACP (advance care planning)  - palliative care consulted to assist with GOC discussions as both Dr. Green and Dr. Hogan have discussed extremely grim prognosis with patient and family d/t no further treatment options for refractory CML  - initially acceptable to hospice with plans to discharge on 1/11; however rescinded decision as family attempting to get patient to [facility]; re-iterated that there are no additional options including treatment and clinical trials  - remains full code, continuing all supportive care at this time  - has fentanyl patch and prn dilaudid for pain  -  following    Tonsillar abscess  - patient with swollen neck, throat pain, difficulty swallowing  - CT neck shows subcentimeter left palatine tonsil abscess with mild associated left parapharyngeal edema  - seen by ENT; no acute intervention  - continue zosyn, vanc discontinued 1/16 as cultures  negative    Hyperbilirubinemia  - elevated on admission; overall, improving  - RUQ US unremarkable    Thrombocytopenia  - daily CBC  - transfuse for Plt <10K or bleeding    Anemia in neoplastic disease  - daily cbc  - transfuse for Hgb <7    Hypertension  -continue home lopressor and diltiazem   -losartan added for ongoing high pressures, titrate as needed    CML (chronic myeloid leukemia) in relapse  - see blast phase CML        VTE Risk Mitigation (From admission, onward)           Ordered     Reason for No Pharmacological VTE Prophylaxis  Once        Question:  Reasons:  Answer:  Thrombocytopenia    01/10/24 0213     IP VTE HIGH RISK PATIENT  Once         01/10/24 0213     Place sequential compression device  Until discontinued         01/10/24 0213                    Disposition: Remain inpatient    Ching Albrecht MD  Bone Marrow Transplant  [facility] - Oncology ([facility])

## 2024-01-17 VITALS
RESPIRATION RATE: 18 BRPM | WEIGHT: 174 LBS | TEMPERATURE: 99 F | HEART RATE: 108 BPM | BODY MASS INDEX: 23.06 KG/M2 | OXYGEN SATURATION: 95 % | DIASTOLIC BLOOD PRESSURE: 82 MMHG | HEIGHT: 73 IN | SYSTOLIC BLOOD PRESSURE: 138 MMHG

## 2024-01-17 LAB
ALBUMIN SERPL BCP-MCNC: 2.3 G/DL (ref 3.5–5.2)
ALP SERPL-CCNC: 233 U/L (ref 55–135)
ALT SERPL W/O P-5'-P-CCNC: 58 U/L (ref 10–44)
ANION GAP SERPL CALC-SCNC: 10 MMOL/L (ref 8–16)
APTT PPP: 27.7 SEC (ref 21–32)
AST SERPL-CCNC: 43 U/L (ref 10–40)
BASOPHILS # BLD AUTO: ABNORMAL K/UL (ref 0–0.2)
BASOPHILS NFR BLD: 0 % (ref 0–1.9)
BILIRUB SERPL-MCNC: 2 MG/DL (ref 0.1–1)
BLASTS NFR BLD MANUAL: 99 %
BLD PROD TYP BPU: NORMAL
BLOOD UNIT EXPIRATION DATE: NORMAL
BLOOD UNIT TYPE CODE: 6200
BLOOD UNIT TYPE: NORMAL
BUN SERPL-MCNC: 12 MG/DL (ref 6–20)
CALCIUM SERPL-MCNC: 9.2 MG/DL (ref 8.7–10.5)
CHLORIDE SERPL-SCNC: 103 MMOL/L (ref 95–110)
CO2 SERPL-SCNC: 23 MMOL/L (ref 23–29)
CODING SYSTEM: NORMAL
CREAT SERPL-MCNC: 1 MG/DL (ref 0.5–1.4)
CROSSMATCH INTERPRETATION: NORMAL
DIFFERENTIAL METHOD BLD: ABNORMAL
DISPENSE STATUS: NORMAL
EOSINOPHIL # BLD AUTO: ABNORMAL K/UL (ref 0–0.5)
EOSINOPHIL NFR BLD: 0 % (ref 0–8)
ERYTHROCYTE [DISTWIDTH] IN BLOOD BY AUTOMATED COUNT: 18 % (ref 11.5–14.5)
EST. GFR  (NO RACE VARIABLE): >60 ML/MIN/1.73 M^2
FIBRINOGEN PPP-MCNC: 556 MG/DL (ref 182–400)
GLUCOSE SERPL-MCNC: 94 MG/DL (ref 70–110)
HCT VFR BLD AUTO: 23.4 % (ref 40–54)
HGB BLD-MCNC: 7.9 G/DL (ref 14–18)
IMM GRANULOCYTES # BLD AUTO: ABNORMAL K/UL (ref 0–0.04)
IMM GRANULOCYTES NFR BLD AUTO: ABNORMAL % (ref 0–0.5)
INR PPP: 1.2 (ref 0.8–1.2)
LDH SERPL L TO P-CCNC: 614 U/L (ref 110–260)
LYMPHOCYTES # BLD AUTO: ABNORMAL K/UL (ref 1–4.8)
LYMPHOCYTES NFR BLD: 0 % (ref 18–48)
MAGNESIUM SERPL-MCNC: 1.9 MG/DL (ref 1.6–2.6)
MCH RBC QN AUTO: 29.3 PG (ref 27–31)
MCHC RBC AUTO-ENTMCNC: 33.8 G/DL (ref 32–36)
MCV RBC AUTO: 87 FL (ref 82–98)
MONOCYTES # BLD AUTO: ABNORMAL K/UL (ref 0.3–1)
MONOCYTES NFR BLD: 1 % (ref 4–15)
NEUTROPHILS NFR BLD: 0 % (ref 38–73)
NRBC BLD-RTO: 0 /100 WBC
OVALOCYTES BLD QL SMEAR: ABNORMAL
PHOSPHATE SERPL-MCNC: 3.9 MG/DL (ref 2.7–4.5)
PLATELET # BLD AUTO: 6 K/UL (ref 150–450)
PLATELET BLD QL SMEAR: ABNORMAL
PMV BLD AUTO: ABNORMAL FL (ref 9.2–12.9)
POTASSIUM SERPL-SCNC: 4 MMOL/L (ref 3.5–5.1)
PROT SERPL-MCNC: 6.5 G/DL (ref 6–8.4)
PROTHROMBIN TIME: 12.9 SEC (ref 9–12.5)
RBC # BLD AUTO: 2.7 M/UL (ref 4.6–6.2)
SMUDGE CELLS BLD QL SMEAR: PRESENT
SODIUM SERPL-SCNC: 136 MMOL/L (ref 136–145)
UNIT NUMBER: NORMAL
URATE SERPL-MCNC: 3.6 MG/DL (ref 3.4–7)
WBC # BLD AUTO: 48.93 K/UL (ref 3.9–12.7)

## 2024-01-17 PROCEDURE — 80053 COMPREHEN METABOLIC PANEL: CPT | Performed by: STUDENT IN AN ORGANIZED HEALTH CARE EDUCATION/TRAINING PROGRAM

## 2024-01-17 PROCEDURE — 93005 ELECTROCARDIOGRAM TRACING: CPT

## 2024-01-17 PROCEDURE — 25000003 PHARM REV CODE 250: Performed by: NURSE PRACTITIONER

## 2024-01-17 PROCEDURE — 85610 PROTHROMBIN TIME: CPT | Performed by: NURSE PRACTITIONER

## 2024-01-17 PROCEDURE — 84100 ASSAY OF PHOSPHORUS: CPT | Performed by: STUDENT IN AN ORGANIZED HEALTH CARE EDUCATION/TRAINING PROGRAM

## 2024-01-17 PROCEDURE — 85027 COMPLETE CBC AUTOMATED: CPT | Performed by: STUDENT IN AN ORGANIZED HEALTH CARE EDUCATION/TRAINING PROGRAM

## 2024-01-17 PROCEDURE — 36415 COLL VENOUS BLD VENIPUNCTURE: CPT | Performed by: NURSE PRACTITIONER

## 2024-01-17 PROCEDURE — 63600175 PHARM REV CODE 636 W HCPCS: Performed by: NURSE PRACTITIONER

## 2024-01-17 PROCEDURE — 85007 BL SMEAR W/DIFF WBC COUNT: CPT | Performed by: STUDENT IN AN ORGANIZED HEALTH CARE EDUCATION/TRAINING PROGRAM

## 2024-01-17 PROCEDURE — 85384 FIBRINOGEN ACTIVITY: CPT | Performed by: NURSE PRACTITIONER

## 2024-01-17 PROCEDURE — 83615 LACTATE (LD) (LDH) ENZYME: CPT | Performed by: NURSE PRACTITIONER

## 2024-01-17 PROCEDURE — 99233 SBSQ HOSP IP/OBS HIGH 50: CPT | Mod: ,,, | Performed by: INTERNAL MEDICINE

## 2024-01-17 PROCEDURE — A4216 STERILE WATER/SALINE, 10 ML: HCPCS | Performed by: NURSE PRACTITIONER

## 2024-01-17 PROCEDURE — 25000003 PHARM REV CODE 250: Performed by: STUDENT IN AN ORGANIZED HEALTH CARE EDUCATION/TRAINING PROGRAM

## 2024-01-17 PROCEDURE — 36430 TRANSFUSION BLD/BLD COMPNT: CPT

## 2024-01-17 PROCEDURE — P9073 PLATELETS PHERESIS PATH REDU: HCPCS | Performed by: STUDENT IN AN ORGANIZED HEALTH CARE EDUCATION/TRAINING PROGRAM

## 2024-01-17 PROCEDURE — 85730 THROMBOPLASTIN TIME PARTIAL: CPT | Performed by: NURSE PRACTITIONER

## 2024-01-17 PROCEDURE — P9073 PLATELETS PHERESIS PATH REDU: HCPCS | Mod: 91 | Performed by: STUDENT IN AN ORGANIZED HEALTH CARE EDUCATION/TRAINING PROGRAM

## 2024-01-17 PROCEDURE — 84550 ASSAY OF BLOOD/URIC ACID: CPT | Performed by: NURSE PRACTITIONER

## 2024-01-17 PROCEDURE — C9113 INJ PANTOPRAZOLE SODIUM, VIA: HCPCS | Performed by: NURSE PRACTITIONER

## 2024-01-17 PROCEDURE — 83735 ASSAY OF MAGNESIUM: CPT | Performed by: STUDENT IN AN ORGANIZED HEALTH CARE EDUCATION/TRAINING PROGRAM

## 2024-01-17 PROCEDURE — 93010 ELECTROCARDIOGRAM REPORT: CPT | Mod: ,,, | Performed by: INTERNAL MEDICINE

## 2024-01-17 RX ORDER — ACETAMINOPHEN 325 MG/1
650 TABLET ORAL ONCE
Status: COMPLETED | OUTPATIENT
Start: 2024-01-17 | End: 2024-01-17

## 2024-01-17 RX ORDER — HYDROMORPHONE HYDROCHLORIDE 4 MG/1
4 TABLET ORAL EVERY 4 HOURS PRN
Qty: 12 TABLET | Refills: 0 | Status: SHIPPED | OUTPATIENT
Start: 2024-01-17 | End: 2024-01-19

## 2024-01-17 RX ORDER — FENTANYL 75 UG/H
1 PATCH TRANSDERMAL
Qty: 1 PATCH | Refills: 0 | Status: SHIPPED | OUTPATIENT
Start: 2024-01-18 | End: 2024-01-17

## 2024-01-17 RX ORDER — HYDROCODONE BITARTRATE AND ACETAMINOPHEN 500; 5 MG/1; MG/1
TABLET ORAL
Status: DISCONTINUED | OUTPATIENT
Start: 2024-01-17 | End: 2024-01-17 | Stop reason: HOSPADM

## 2024-01-17 RX ORDER — HYDROMORPHONE HYDROCHLORIDE 4 MG/1
4 TABLET ORAL EVERY 4 HOURS PRN
Qty: 6 TABLET | Refills: 0 | Status: SHIPPED | OUTPATIENT
Start: 2024-01-18 | End: 2024-01-17

## 2024-01-17 RX ORDER — FENTANYL 75 UG/H
1 PATCH TRANSDERMAL
Qty: 1 PATCH | Refills: 0 | Status: SHIPPED | OUTPATIENT
Start: 2024-01-17 | End: 2024-01-17

## 2024-01-17 RX ORDER — FENTANYL 75 UG/H
1 PATCH TRANSDERMAL
Qty: 5 PATCH | Refills: 0 | Status: SHIPPED | OUTPATIENT
Start: 2024-01-17 | End: 2024-02-01

## 2024-01-17 RX ADMIN — PIPERACILLIN SODIUM AND TAZOBACTAM SODIUM 4.5 G: 4; .5 INJECTION, POWDER, FOR SOLUTION INTRAVENOUS at 08:01

## 2024-01-17 RX ADMIN — POTASSIUM & SODIUM PHOSPHATES POWDER PACK 280-160-250 MG 1 PACKET: 280-160-250 PACK at 11:01

## 2024-01-17 RX ADMIN — HYDROXYUREA 1000 MG: 500 CAPSULE ORAL at 09:01

## 2024-01-17 RX ADMIN — POTASSIUM & SODIUM PHOSPHATES POWDER PACK 280-160-250 MG 1 PACKET: 280-160-250 PACK at 06:01

## 2024-01-17 RX ADMIN — LOSARTAN POTASSIUM 50 MG: 50 TABLET, FILM COATED ORAL at 09:01

## 2024-01-17 RX ADMIN — METOPROLOL TARTRATE 25 MG: 25 TABLET, FILM COATED ORAL at 09:01

## 2024-01-17 RX ADMIN — PANTOPRAZOLE SODIUM 40 MG: 40 INJECTION, POWDER, FOR SOLUTION INTRAVENOUS at 09:01

## 2024-01-17 RX ADMIN — DIPHENHYDRAMINE HYDROCHLORIDE 25 MG: 25 CAPSULE ORAL at 02:01

## 2024-01-17 RX ADMIN — HYDROMORPHONE HYDROCHLORIDE 2 MG: 2 INJECTION INTRAMUSCULAR; INTRAVENOUS; SUBCUTANEOUS at 03:01

## 2024-01-17 RX ADMIN — PIPERACILLIN SODIUM AND TAZOBACTAM SODIUM 4.5 G: 4; .5 INJECTION, POWDER, FOR SOLUTION INTRAVENOUS at 03:01

## 2024-01-17 RX ADMIN — ACETAMINOPHEN 650 MG: 325 TABLET ORAL at 02:01

## 2024-01-17 RX ADMIN — ACETAMINOPHEN 650 MG: 650 SOLUTION ORAL at 02:01

## 2024-01-17 RX ADMIN — ACYCLOVIR 800 MG: 200 SUSPENSION ORAL at 09:01

## 2024-01-17 RX ADMIN — OXYCODONE HYDROCHLORIDE 10 MG: 10 TABLET ORAL at 02:01

## 2024-01-17 RX ADMIN — HYDROMORPHONE HYDROCHLORIDE 2 MG: 2 INJECTION INTRAMUSCULAR; INTRAVENOUS; SUBCUTANEOUS at 06:01

## 2024-01-17 RX ADMIN — HYDROMORPHONE HYDROCHLORIDE 2 MG: 2 INJECTION INTRAMUSCULAR; INTRAVENOUS; SUBCUTANEOUS at 09:01

## 2024-01-17 RX ADMIN — Medication 400 MG: at 09:01

## 2024-01-17 RX ADMIN — FLUCONAZOLE 400 MG: 200 TABLET ORAL at 09:01

## 2024-01-17 NOTE — CHAPLAIN
Palliative Care     Patient: Magdaleno Hirsch  MRN: [id number]  : 2000  Age: 23 y.o.  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 10 min visit    Brought mom a Child Life goodie bag for her youngest, 9 yo brother of pt who is having a hard time understanding why big brother is in hospital so long. Also gave mom a folder of Child Life educational materials to help mom handling the grief with all the children in their family and extended family. She was appreciative. Told her she need not look at it now.    Mom asked me if I'll be here all day and I said yes, as well as tomorrow and Friday; seemed like she wanted to talk, but pt was awake. She also told me she was going to check out the chapel I told her about yesterday. I encouraged her to do so. I said very little this visit.Palliative  will continue to support pt and family. Lord, in your mercy.      **Spiritual Care Dept. Chaplains are available evenings, overnight and weekends **    In Peace,    Rev. Sherley Robison MDiv, [facility]  Board Certified   Palliative Medicine Department  [phone]

## 2024-01-17 NOTE — PLAN OF CARE
Patient A&Ox4 with mother at bedside. Pt refused bed alarm throughput shift. Platelets administered for low platelet count. Pt tolerated food well. Pain managed with Diladid for pain 7-10 on scale. Care team spoke to mother about discharge home to hospice care. Ambulance transport initiated for transport home. Awaiting discharge medication. Fentanyl patch remains on right arm.

## 2024-01-17 NOTE — PROGRESS NOTES
The hospice agency, [facility] visited with the patient, and the admissions paperwork was completed. The family and patient opted to be transported home in the patient's personal vehicle.   The parent will need assistance getting into the patient's to the vehicle. There are no additional needs form Social Work. The patient can be discharged today or tomorrow. The family only request was to have oxygen for home use, and a wheelchair, which will be delivered to the patient's home today by the [facility] provider. The hospice agency request, 1-2 days of medications in case the patient has a difficult ride home.    The  updated [facility] that the patient will be discharged Thursday 01/18/2023.

## 2024-01-17 NOTE — PROGRESS NOTES
The patient has decided to discharge today. The  contacted [facility] with the updated information. Nadine with [facility] stated that discharge today will be fine because the equipment requested by the family will be delivered prior to the patient arriving home.

## 2024-01-17 NOTE — PLAN OF CARE
No acute events this shift. Patient AAOx4. Patient complaint of pain. PRN Oxycodone administered x2. PRN IV Dilaudid administered x1. Moderate relief obtained. T-max of 100.2. Did not sustain. Patient hypertensive and tachycardic. Sustaining HR . NP notified. x1 dose of Tylenol and EKG ordered. Tylenol administered to patient. No further orders given. Patient's HR sustaining 103-110. Abx continued as ordered. Mother remains at bedside. Bed in lowest position and locked. Side rails up x2. All possessions and call light within reach. Non-skid socks worn. Instructed to call for assistance and voiced understanding. All needs of patient currently met. Will continue to monitor with frequent rounding.

## 2024-01-17 NOTE — PROGRESS NOTES
The patient decided to go home today. Transportation is scheduled for a 4:30  from the patient's room to home by ambulance.  The patient will go home today with 1-day of his home medications. The patient's nurse MARYBEL Santoro is aware of the patient's discharge later today.

## 2024-01-17 NOTE — PLAN OF CARE
Pt. with nonskid footwear on with bed in lowest position and locked with bed rails up x2.  Pt. instructed to call prior to getting OOB.  Pt. with call light within reach and verbalized understanding.  Pt. With IVF's and vanc D/C's this AM.  Pt. with c/o back pain with oxycodone and dilaudid being given PRN.  Pt. With a decreased appetite.  Pt. With mother at bedside.  Will continue to monitor pt.

## 2024-01-17 NOTE — CHAPLAIN
Palliative Care     Patient: Magdaleno Hirsch  MRN: [id number]  : 2000  Age: 23 y.o.  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 Later checked chart and it said that home hospice orders were placed, so circled back to the room. Ran into mom in the hallway and she spoke much more openly and freely when not in the room with her son. Hugged her and she thanked me for the support..    We talked a little more about hospice, what to expect, surround him with love, etc. Mom said pt is handling the situation better than her.Nadine from [facility] came by and we all talked a moment. Pt's mom knows to call hospice once home, if there are questions or needs. Mom has my card. Lord, in your mercy.    In Peace,    Rev. Sherley Robison MDiv, [facility]  Board Certified   Palliative Medicine Department  [phone]

## 2024-01-17 NOTE — PLAN OF CARE
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 [C92.10]  Yes    Cancer related pain [G89.3]  Unknown    Goals of care, counseling/discussion [Z71.89]  Not Applicable    Palliative care encounter [Z51.5]  Not Applicable    Tonsillar abscess [J36]  Yes    ACP (advance care planning) [Z71.89]  Not Applicable    Hyperbilirubinemia [E80.6]  Yes    Anemia in neoplastic disease [D63.0]  Yes    Thrombocytopenia [D69.6]  Yes    Hypertension [I10]  Yes     Chronic    CML (chronic myeloid leukemia) in relapse [C92.12]  Yes      Resolved Hospital Problems    Diagnosis Date Resolved POA    Sepsis [A41.9] 01/11/2024 Yes    Transaminitis [R74.01] 01/11/2024 Yes    Hypokalemia [E87.6] 01/11/2024 Yes       Hospice Qualifying Diagnoses:        Patient has a life expectancy < 6 months due to:  Primary Hospice Diagnosis:  CML in blast phase  Comorbid Conditions Contributing to Decline:  Dysphagia    Vital Signs: Routine per Hospice Protocol.    Code Status: Full    Allergies: Review of patient's allergies indicates:  No Known Allergies    Diet: Regular diet    Activities: As tolerated    Goals of Care Treatment Preferences:  Code Status: Full Code          What is most important right now is to focus on symptom/pain control, comfort and QOL .  Accordingly, we have decided that the best plan to meet the patient's goals includes enrolling in hospice care.      Nursing: Per Hospice Routine.        Routine Skin for Bedridden Patients: Apply moisture barrier cream to all skin folds and   wet areas in perineal area daily and after baths and all bowel movements.    Medications:      Medication List        START taking these medications      fentaNYL  75 mcg/hr  Commonly known as: DURAGESIC  Place 1 patch onto the skin every 72 hours. for 1 dose  Start taking on: January 18, 2024     HYDROmorphone 4 MG tablet  Commonly known as: DILAUDID  Take 1 tablet (4 mg total) by mouth every 4 (four) hours as needed for Pain.  Start taking on: January 18, 2024     hydroxyurea 100 mg/mL Susp  Commonly known as: HYDREA  Take 10 mLs (1,000 mg total) by mouth 2 (two) times a day. Medication is considered hazardous. Use appropriate PPE when handling. Call pharmacy with any questions. for 14 days            CONTINUE taking these medications      (MAGIC MOUTHWASH) 1:1:1 BENADRYL 12.5MG/5ML LIQ, ALUMINUM & MAGNESIUM  Swish and spit 15 mLs every 4 (four) hours as needed (mouth pain). for mouth sores     metoprolol tartrate 25 MG tablet  Commonly known as: LOPRESSOR  Take 1 tablet (25 mg total) by mouth 2 (two) times daily.     triamcinolone acetonide 0.1% 0.1 % cream  Commonly known as: KENALOG  Apply topically 2 (two) times daily. Apply to rash twice daily            STOP taking these medications      acyclovir 800 MG Tab  Commonly known as: ZOVIRAX     diltiaZEM 120 MG Cp24  Commonly known as: CARDIZEM CD     nystatin 100,000 unit/mL suspension  Commonly known as: MYCOSTATIN     oxyCODONE 10 mg Tab immediate release tablet  Commonly known as: ROXICODONE     pantoprazole 40 MG tablet  Commonly known as: PROTONIX     potassium, sodium phosphates 280-160-250 mg Pwpk  Commonly known as: PHOS-NAK     SCEMBLIX 40 mg Tab  Generic drug: asciminib                Future Orders:  Hospice Medical Director may dictate new orders for comfortable care measures & sign death certificate.      _________________________________  Ching Albrecht MD  01/17/2024

## 2024-01-18 ENCOUNTER — PATIENT MESSAGE (OUTPATIENT)
Dept: HEMATOLOGY/ONCOLOGY | Facility: CLINIC | Age: 24
End: 2024-01-18
Payer: MEDICAID

## 2024-01-18 NOTE — PROGRESS NOTES
[facility] - Oncology ([facility])  Palliative Medicine  Progress Note    Patient Name: Magdaleno Hirsch  MRN: [id number]  Admission Date: 1/9/2024  Hospital Length of Stay: 7 days  Code Status: Prior   Attending Provider: No att. 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      Assessment/Plan:     Palliative Care  Palliative care encounter  Palliative Care  Palliative care encounter  Pal med follow up to goals of care for Magdaleno Hirsch a 22 yo gentleman admitted to BMT service  in blast crisis CML here with intratonsillar abscess. No longer a candidate for cancer directed therapies.    Patient appears comfortable in bed, no behavioral signs of pain Over last 24 hrs OME has increased significantly and Tu is now febrile.  Clinical condition is appropriate for transition to comfort focused care - hospice has been recommended.  Family and patient continue to make decisions regarding next level of care.        Advance Care Planning     Date: 01/11/2024  - no ACP documents received   -Patient did not wish or was not able to name a surrogate decision maker or provide an Advance Care Plan.   - Mr Hirsch appears very ill and does not participate in acp conversation.  Loved ones at bedside states he is able to make decisions  -  next of kin is mother - Danisha Hirsch [phone]  Code status: Full. Not addressed      Goals of Care   - pal med APRN  returned to bedside as follow up to goals of care.  Significant other and  mother at bedside.  Pal med reintroduced.    - patient and family  aware of clinical updates and what this means for Tu.    - understandably the family is distraught and are still hoping for a positive outcome.   - Tu appears to be at peace with the situation and indicates his goal is to be home.   - Hospice  has been discussed several times now by both [facility] and the primary team.  Family has not been  amenable.    - [facility] has approached patient and family with hospice in a manner that ensures the patient's comfort and quality of life.  Also emphasized arranging hospice closer to Tu's home to be in closer proximity with his family.  - given his increased symptom management needs - pain, pal med  would recommend inpatient hospice in the [facility].    - Tu appears indifferent about setting of hospice.  Mother listening and weighing all options   - [facility] did not discuss hospice as treatment option at this time. Message would not be received.    - asked if patient and family had additional questions or concerns about the clinical condition and or immediate and future care.   - Patient and family indicated through nodding and stating no to having additional questions or concerns.     - assured patient and family [facility] med remains available to patient and family.  Family has [facility] contact information.  Intense emotional support provided.       Cancer associated pain   - appears comfortable at this time.  No reports of pain   - able to watch video's on telephone  - 24 hr    Recommendations      - recommend increasing   Fentanyl  patch to 75 mcg    -  continue hydromorphone 2 mg every 4 hrs as needed and oxycodone 10 mg by mouth every 4 hrs as needed.  ded.  - Patient becomes somnolent and unable to participate in conversation after receiving 2 mg IV dilaudid prn.         Recommendations at  discharge  Breakthrough pain can likely be controlled with morphine 20mg/ml 0.5-1ml q2h prn  discharging if patient and family reconsider discharge with hospice care.      Recommendations  - see above symptom management recommendations   -  patient and family require intense emotional support - will collaborate with [facility]  for additional support  -  clinical condition and symptom management needs appropriate  "to discuss inpatient hospice if family remains opposed to home with hospice.     -  intense emotional support provided     Primary team fellow  notified of the above recommendations            I will follow-up with patient. Please contact us if you have any additional questions.    Subjective:     Chief Complaint:   Chief Complaint   Patient presents with    Mouth Lesions     Reports mouth lesions and sore throat "caused from my chemo". Last received chemo yesterday. Hx of leukemia        HPI:   Per hematology H&P  "Mr. Hirsch is a 24 yo M with blast phase CML on asciminib, and gingival hyperplasia who [place] [facility] [place] pain and fevers. Patient was alone for AV encounter with nurse at bedside. Patient with very limited talking 2/2 pain. Patient was previously admitted for failure to thrive, poor intake, and fevers. Infectious workup was negative.Patient with leukocytosis, so he was started on Hydrea. Started on asciminib on 1/2/24. He developed a pruritic truncal rash the day after starting asciminib.  Patient was seen in clinic on 1/8 for therapy and concern for worsening CML vs sepsis given overall clinical picture. Patient was instructed to go to the ER. Patient reports having acute on chronic neck pain localized to the R side with no radiation rated at 10/10. Patient reports taking home medication but reports no improvement. Patient denies any SOB but report ROSALES. Patient denies any coughing. Denies any sick contacts. Patient denies any abdominal pain, nausea or vomiting but reports diarrhea. Reports no episodes today and reports anorexia 2/2 pain. Patient reports rash has not changed since last visit. Patient denies any other ENT complaints including sinus pressure pain or ear pain. Patient reports headache localized to R side rated at 8/10.      In the ED patient was febrile to 103, hypertensive and tachycardic to 130s with SpO2 >95 on RA. Labs were significant for WBC 114K with 95% " "blast/other, hgb 7.1, PLT 32K, Lactic 1.4, Na 134, K 2.9, bili 2.6, AST 58 and . CXR was negative but CT soft tissue neck demonstrated sub centimeter left palatine tonsil abscess with mild associated left parapharyngeal edema. Patient was given IVF, vanc and cefepime and admitted to BMT for further management. "    Per primary team patient is not a candidate for further cancer directed therapies. Palliative consulted for end of life discussions     Hospital Course:  No notes on file        Past Medical History:   Diagnosis Date    CML (chronic myelocytic leukemia)     HVD (hypertensive vascular disease)     Oropharyngeal candidiasis        Past Surgical History:   Procedure Laterality Date    BONE MARROW BIOPSY N/A 8/22/2022    Procedure: Biopsy-bone marrow;  Surgeon: Getachew Chen MD;  Location: [facility] OR;  Service: General;  Laterality: N/A;    BONE MARROW BIOPSY N/A 7/25/2023    Procedure: Biopsy-bone marrow;  Surgeon: Edi Carty MD;  Location: [facility] OR;  Service: General;  Laterality: N/A;    BONE MARROW BIOPSY N/A 8/28/2023    Procedure: Biopsy-bone marrow;  Surgeon: Moo Pina MD;  Location: [facility] OR;  Service: General;  Laterality: N/A;    BONE MARROW BIOPSY N/A 10/31/2023    Procedure: Biopsy-bone marrow;  Surgeon: Edi Carty MD;  Location: [facility] OR;  Service: General;  Laterality: N/A;    KNEE SURGERY Left        Review of patient's allergies indicates:  No Known Allergies    Medications:  Continuous Infusions:      Scheduled Meds:      PRN Meds:    Family History       Problem Relation (Age of Onset)    Cancer Maternal Grandmother    Hypertension Maternal Grandmother          Tobacco Use    Smoking status: Never    Smokeless tobacco: Never   Substance and Sexual Activity    Alcohol use: Never    Drug use: Yes     Types: Marijuana    Sexual activity: Not on file       Review of Systems   Constitutional:  Positive for appetite change.   HENT:  Positive for mouth sores and sore throat.  "   Respiratory: Negative.     Cardiovascular: Negative.    Gastrointestinal: Negative.    Endocrine: Negative.    Musculoskeletal:  Positive for neck pain.   Neurological: Negative.      Objective:     Vital Signs (Most Recent):  Temp: 98.6 °F (37 °C) (24 1540)  Pulse: 108 (24 1611)  Resp: 18 (24 1857)  BP: 138/82 (24 1611)  SpO2: 95 % (24 1540) Vital Signs (24h Range):  Temp:  [98.6 °F (37 °C)-99.2 °F (37.3 °C)] 98.6 °F (37 °C)  Pulse:  [] 108  Resp:  [16-18] 18  SpO2:  [95 %-97 %] 95 %  BP: (137-160)/() 138/82     Weight: 78.9 kg (174 lb)  Body mass index is 22.96 kg/m².       Physical Exam  Vitals and nursing note reviewed.   Constitutional:       General: He is not in acute distress.     Comments: drowsy   HENT:      Head: Normocephalic.      Right Ear: External ear normal.      Left Ear: External ear normal.      Nose: Nose normal.      Mouth/Throat:      Mouth: Mucous membranes are dry.   Eyes:      Pupils: Pupils are equal, round, and reactive to light.   Cardiovascular:      Rate and Rhythm: Tachycardia present.   Pulmonary:      Effort: No respiratory distress.   Abdominal:      General: There is no distension.   Musculoskeletal:      Cervical back: Normal range of motion.   Neurological:      Mental Status: He is oriented to person, place, and time.            Review of Symptoms      Symptom Assessment (ESAS 0-10 Scale)  Pain:  0  Dyspnea:  0  Anxiety:  0  Nausea:  0  Depression:  0  Anorexia:  0  Fatigue:  0  Insomnia:  0  Restlessness:  0  Agitation:  0     CAM / Delirium:  Negative  Constipation:  Negative  Diarrhea:  Negative      Modified [place] Scale:  0    ECOG Performance Status ndGndrndanddndend:nd nd2nd Living Arrangements:  Lives with family    Psychosocial/Cultural:   See Palliative Psychosocial Note: No  Dad is . Close with mom. Has 3 siblings (22, 19, and 8). The 19 year old is incarcerated  **Primary  to Follow**  Palliative Care   "Consult: No    Spiritual:  F - Mel and Belief:  Yes  I - Importance:  Yes  C - Community:  Yes  A - Address in Care:  Yes        Advance Care Planning  Advance Directives:   Living Will: No    LaPOST: No    Do Not Resuscitate Status: No    Medical Power of : No      Decision Making:  Patient answered questions and Family answered questions  Goals of Care: What is most important right now is to focus on symptom/pain control, comfort and QOL . Accordingly, we have decided that the best plan to meet the patient's goals includes enrolling in hospice care.         Significant Labs: All pertinent labs within the past 24 hours have been reviewed.  CBC:   Recent Labs   Lab 01/17/24  0445   WBC 48.93*   HGB 7.9*   HCT 23.4*   MCV 87   PLT 6*       BMP:  No results for input(s): "GLU", "NA", "K", "CL", "CO2", "BUN", "CREATININE", "CALCIUM", "MG" in the last 24 hours.    LFT:  Lab Results   Component Value Date    AST 43 (H) 01/17/2024    ALKPHOS 233 (H) 01/17/2024    BILITOT 2.0 (H) 01/17/2024     Albumin:   Albumin   Date Value Ref Range Status   01/17/2024 2.3 (L) 3.5 - 5.2 g/dL Final     Protein:   Total Protein   Date Value Ref Range Status   01/17/2024 6.5 6.0 - 8.4 g/dL Final     Lactic acid:   Lab Results   Component Value Date    LACTATE 1.4 01/09/2024       Significant Imaging: I have reviewed all pertinent imaging results/findings within the past 24 hours.    CT soft tissue neck 1/9/24  Subcentimeter left palatine tonsil abscess with mild associated left parapharyngeal edema.         Shellie Pittman, CNS  Palliative Medicine  [facility] - Oncology ([facility])                "

## 2024-01-18 NOTE — DISCHARGE SUMMARY
[facility] - Oncology ([facility])  Hematology  Bone Marrow Transplant  Discharge Summary      Patient Name: Magdaleno Hirsch  MRN: [id number]  Admission Date: 1/9/2024  Hospital Length of Stay: 7 days  Discharge Date and Time:  01/17/2024 6:30 PM  Attending Physician: Rocio Waterman MD   Discharging Provider: Ching Albrecht MD  Primary Care Provider: Christine, Primary Doctor    HPI: Mr. Hirsch is a 24 yo M with blast phase CML on asciminib, and gingival hyperplasia [place] [facility] [place] pain and fevers. Patient was alone for AV encounter with nurse at bedside. Patient with very limited talking 2/2 pain. Patient was previously admitted for failure to thrive, poor intake, and fevers. Infectious workup was negative.Patient with leukocytosis, so he was started on Hydrea. Started on asciminib on 1/2/24. He developed a pruritic truncal rash the day after starting asciminib.  Patient was seen in clinic on 1/8 for therapy and concern for worsening CML vs sepsis given overall clinical picture. Patient was instructed to go to the ER. Patient reports having acute on chronic neck pain localized to the R side with no radiation rated at 10/10. Patient reports taking home medication but reports no improvement. Patient denies any SOB but report ROSALES. Patient denies any coughing. Denies any sick contacts. Patient denies any abdominal pain, nausea or vomiting but reports diarrhea. Reports no episodes today and reports anorexia 2/2 pain. Patient reports rash has not changed since last visit. Patient denies any other ENT complaints including sinus pressure pain or ear pain. Patient reports headache localized to R side rated at 8/10.      In the ED patient was febrile to 103, hypertensive and tachycardic to 130s with SpO2 >95 on RA. Labs were significant for WBC 114K with 95% blast/other, hgb 7.1, PLT 32K, Lactic 1.4, Na 134, K 2.9, bili 2.6, AST 58 and . CXR was negative but CT soft tissue neck demonstrated sub centimeter  left palatine tonsil abscess with mild associated left parapharyngeal edema. Patient was given IVF, vanc and cefepime and admitted to BMT for further management.      Oncology History  Primary Oncologic Diagnosis: Chronic myeloid leukemia, blast phase     8/2022: Diagnosed with chronic phase CML.  10/2022: Started dasatinib but had questionable compliance.  6/7/23: [place] [facility] [place]. Labs notable for  (80% blasts). [place] was [place] [facility] [place].  6/9/23: Bone marrow biopsy revealed blast phase chronic myeloid leukemia; reticulin fibrosis 2 of 3. 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 FISH with t(9;22) and MLLT3/MLL (KMT2A) fusion. NGS with NRAS (7%), PTPN11 (4%). BCR/ABL1:ABL1 (p210) >55%.  6/16/23: Started induction with CLIA + ponatinib. Course complicated by neutropenic fever, pharyngitis/mucositis, and strep viridans bacteremia.   7/6/23: Day 21 bone marrow biopsy revealed a markedly hypocellular marrow (<5%) with trilineage hypoplasia. CG 46,XY[5].  7/25/23: Bone marrow biopsy revealed a hypocellular marrow but suboptimal specimen for evaluation.   8/4/23: BCR/ABL1:ABL1 (p210) 23.6%.   8/28/23: Bone marrow biopsy revealed a hypocellular marrow (30%) with persistent blast phase with 10-15% blasts.   10/9/23: Cycle 1 day 1 of azacitidine 75 mg/m2 x5 days plus venetoclax 50mg daily x10 days + ponatininb 30mg daily (28 day cycle).   10/31/23: Bone marrow biopsy at the end of cycle 1 revealed persistent myeloid blast crisis (11% circulating blasts).   11/6/23: Cycle 2 day 1 of azacitidine, venetoclax, and ponatinib. Ponatinib increased to 45mg daily.   1/2/24: Started asciminib 200mg daily.     * No surgery found *     Hospital Course: Patient was admitted to BMT service for fevers, leukocytosis, throat pain. Imaging showed a small tonsillar abscess for which ENT was consulted. They recommended no surgical intervention and to continue antibiotics.  Patient was started on vancomycin and zosyn. Patient continued to be febrile but this was attributed to CML as infectious workup was relatively unremarkable. Leukocytosis was also due to CML and improved with hydrea. Palliative care was consulted for goals of care conversation and pain control. Fentanyl patch was started with some improvement in pain. Discussed with patient, his mother and patient's significant other that unfortunately, there are no further treatment options and that we are at end of life. Patient initially was amenable to hospice but then changed their mind and wanted to go to [facility] for a second opinion. Re-iterated that primary Hematologist previously discussed his case with [facility] physicians and that there were no more additional options or clinical trials available. Emphasized the shift of focusing on providing quality of life and not prolong suffering. Patient stated he wanted to be home with family and not be in the hospital. After a few days, patient and his mother agreed to pursue home hospice with [facility]. Patient was discharged with home hospice and pain medications for 1-2 days per [facility]. See detailed hospital course below.    01/11/2024 Fevers continue. Infectious workup remains negative. On zosyn and vanc for small tonsillar abscess. Continuing hydrea at 1g BID. BP and HR improved with metoprolol and amlodipine. Patient continues on prn dilaudid and fentanyl patch for pain to throat and back. Denies n/v, has low appetite. Denies diarrhea or constipation. [facility] discussions continued with patient, family, and palliative care  01/12/2024 Patient had fevers up to 100.9F earlier today and this morning. He remains tachycardic. He says pain is at a 7. Discussed with patient, his mother and patient's significant other that unfortunately, there are no further treatment options and that we are at end of life. Patient's family stated they would like to go to [facility]. Re-iterated  that primary Hematologist previously discussed his case with [facility] physicians and that there were no more additional options or clinical trials available.    01/13/2024 Mr. Hirsch is overall stable. Mouth sores are his primary complaint, making it difficult to swallow. Pain is more tolerable. Temp of 101.2 overnight. Continues on acyclovir, zosyn and vanc.  01/14/2024 Resting comfortably with mom and girlfriend at bedside. He is eating, though mouth lesions are uncomfortable. Will try brushing teeth today with a soft brush. Pleased to hear white count remains stable.   01/15/2024 Mr. Hirsch is doing well. Able to sleep and eat comfortably with the assistance of pain medication. Continue hydrea for white count control.   01/16/2024 Patient was febrile up to 101.3F around 10:30pm last night. He remains tachycardic. WBC is relatively stable. Patient's mom says that patient is eating and drinking and pain is well controlled.    Goals of Care Treatment Preferences:  Code Status: Full Code          What is most important right now is to focus on symptom/pain control, comfort and QOL .  Accordingly, we have decided that the best plan to meet the patient's goals includes enrolling in hospice care.      Consults (From admission, onward)          Status Ordering Provider     Inpatient consult to Hospice  Once        Provider:  (Not yet assigned)    SYLVIA Mckoy     Inpatient consult to Palliative Care  Once        Provider:  (Not yet assigned)    Completed TRUPTI GONZALES     Inpatient consult to ENT  Once        Provider:  (Not yet assigned)    Completed TRUPTI GONZALES          Vitals:    01/17/24 1516 01/17/24 1519 01/17/24 1540 01/17/24 1611   BP:   (!) 160/103 138/82   BP Location:       Patient Position: Lying      Pulse:  105 (!) 115 108   Resp:   16    Temp:   98.6 °F (37 °C)    TempSrc:   Oral    SpO2:   95%    Weight:       Height:          Physical Exam  Vitals and nursing note reviewed.    Constitutional:       Appearance: He is ill-appearing. He is not diaphoretic.   HENT:      Head: Normocephalic.      Nose:      Comments: Limited speaking and hoarse voice.      Mouth/Throat:      Pharynx: Pharyngeal swelling and posterior oropharyngeal erythema present.   Eyes:      Extraocular Movements: Extraocular movements intact.      Conjunctiva/sclera: Conjunctivae normal.   Neck:      Comments: R sided mandibular swelling noted.  Cardiovascular:      Rate and Rhythm: Regular rhythm. Tachycardia present.   Pulmonary:      Effort: Pulmonary effort is normal. No respiratory distress.   Abdominal:      Palpations: Abdomen is soft.      Tenderness: There is no abdominal tenderness.   Musculoskeletal:         General: No swelling. Normal range of motion.      Cervical back: Edema present.      Right lower leg: No edema.      Left lower leg: No edema.   Skin:     Coloration: Skin is not jaundiced.      Findings: Rash present.   Neurological:      General: No focal deficit present.      Mental Status: He is oriented to person, place, and time.   Psychiatric:         Mood and Affect: Affect is flat.        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 [C92.10] 06/08/2023 Yes    Cancer related pain [G89.3] 01/11/2024 Unknown    Goals of care, counseling/discussion [Z71.89] 01/11/2024 Not Applicable    Palliative care encounter [Z51.5]  01/11/2024 Not Applicable    Tonsillar abscess [J36] 01/10/2024 Yes    ACP (advance care planning) [Z71.89] 01/10/2024 Not Applicable    Hyperbilirubinemia [E80.6] 01/09/2024 Yes    Anemia in neoplastic disease [D63.0] 12/09/2023 Yes    Thrombocytopenia [D69.6] 12/09/2023 Yes    Hypertension [I10] 06/08/2023 Yes     Chronic    CML (chronic myeloid leukemia) in relapse [C92.12] 09/27/2022 Yes      Problems Resolved During this Admission:    Diagnosis Date Noted Date Resolved POA    Sepsis [A41.9] 01/10/2024 01/11/2024 Yes    Transaminitis [R74.01] 01/09/2024 01/11/2024 Yes    Hypokalemia [E87.6] 06/09/2023 01/11/2024 Yes      Discharged Condition: fair    Disposition: Hospice/Home    Follow Up:    Patient Instructions:      Diet Adult Regular     Activity as tolerated     Medications:  Reconciled Home Medications:      Medication List        START taking these medications      fentaNYL 75 mcg/hr  Commonly known as: DURAGESIC  Place 1 patch onto the skin every 72 hours. for 15 days     HYDROmorphone 4 MG tablet  Commonly known as: DILAUDID  Take 1 tablet (4 mg total) by mouth every 4 (four) hours as needed for Pain.     hydroxyurea 100 mg/mL Susp  Commonly known as: HYDREA  Take 10 mLs (1,000 mg total) by mouth 2 (two) times a day. Medication is considered hazardous. Use appropriate PPE when handling. Call pharmacy with any questions. for 14 days            CONTINUE taking these medications      (MAGIC MOUTHWASH) 1:1:1 BENADRYL 12.5MG/5ML LIQ, ALUMINUM & MAGNESIUM  Swish and spit 15 mLs every 4 (four) hours as needed (mouth pain). for mouth sores     metoprolol tartrate 25 MG tablet  Commonly known as: LOPRESSOR  Take 1 tablet (25 mg total) by mouth 2 (two) times daily.     triamcinolone acetonide 0.1% 0.1 % cream  Commonly known as: KENALOG  Apply topically 2 (two) times daily. Apply to rash twice daily            STOP taking these medications      acyclovir 800 MG Tab  Commonly known as: ZOVIRAX     diltiaZEM 120 MG  Cp24  Commonly known as: CARDIZEM CD     nystatin 100,000 unit/mL suspension  Commonly known as: MYCOSTATIN     oxyCODONE 10 mg Tab immediate release tablet  Commonly known as: ROXICODONE     pantoprazole 40 MG tablet  Commonly known as: PROTONIX     potassium, sodium phosphates 280-160-250 mg Pwpk  Commonly known as: PHOS-NAK     SCEMBLIX 40 mg Tab  Generic drug: asciminib              Ching Albrecht MD  Bone Marrow Transplant  [facility] - Oncology ([facility])

## 2024-01-18 NOTE — SUBJECTIVE & OBJECTIVE
Past Medical History:   Diagnosis Date    CML (chronic myelocytic leukemia)     HVD (hypertensive vascular disease)     Oropharyngeal candidiasis        Past Surgical History:   Procedure Laterality Date    BONE MARROW BIOPSY N/A 8/22/2022    Procedure: Biopsy-bone marrow;  Surgeon: Getachew Chen MD;  Location: [facility] OR;  Service: General;  Laterality: N/A;    BONE MARROW BIOPSY N/A 7/25/2023    Procedure: Biopsy-bone marrow;  Surgeon: Edi Carty MD;  Location: [facility] OR;  Service: General;  Laterality: N/A;    BONE MARROW BIOPSY N/A 8/28/2023    Procedure: Biopsy-bone marrow;  Surgeon: Moo Pina MD;  Location: [facility] OR;  Service: General;  Laterality: N/A;    BONE MARROW BIOPSY N/A 10/31/2023    Procedure: Biopsy-bone marrow;  Surgeon: Edi Carty MD;  Location: [facility] OR;  Service: General;  Laterality: N/A;    KNEE SURGERY Left        Review of patient's allergies indicates:  No Known Allergies    Medications:  Continuous Infusions:      Scheduled Meds:      PRN Meds:    Family History       Problem Relation (Age of Onset)    Cancer Maternal Grandmother    Hypertension Maternal Grandmother          Tobacco Use    Smoking status: Never    Smokeless tobacco: Never   Substance and Sexual Activity    Alcohol use: Never    Drug use: Yes     Types: Marijuana    Sexual activity: Not on file       Review of Systems   Constitutional:  Positive for appetite change.   HENT:  Positive for mouth sores and sore throat.    Respiratory: Negative.     Cardiovascular: Negative.    Gastrointestinal: Negative.    Endocrine: Negative.    Musculoskeletal:  Positive for neck pain.   Neurological: Negative.      Objective:     Vital Signs (Most Recent):  Temp: 98.6 °F (37 °C) (01/17/24 1540)  Pulse: 108 (01/17/24 1611)  Resp: 18 (01/17/24 1857)  BP: 138/82 (01/17/24 1611)  SpO2: 95 % (01/17/24 1540) Vital Signs (24h Range):  Temp:  [98.6 °F (37 °C)-99.2 °F (37.3 °C)] 98.6 °F (37 °C)  Pulse:  [] 108  Resp:  [16-18]  18  SpO2:  [95 %-97 %] 95 %  BP: (137-160)/() 138/82     Weight: 78.9 kg (174 lb)  Body mass index is 22.96 kg/m².       Physical Exam  Vitals and nursing note reviewed.   Constitutional:       General: He is not in acute distress.     Comments: drowsy   HENT:      Head: Normocephalic.      Right Ear: External ear normal.      Left Ear: External ear normal.      Nose: Nose normal.      Mouth/Throat:      Mouth: Mucous membranes are dry.   Eyes:      Pupils: Pupils are equal, round, and reactive to light.   Cardiovascular:      Rate and Rhythm: Tachycardia present.   Pulmonary:      Effort: No respiratory distress.   Abdominal:      General: There is no distension.   Musculoskeletal:      Cervical back: Normal range of motion.   Neurological:      Mental Status: He is oriented to person, place, and time.            Review of Symptoms      Symptom Assessment (ESAS 0-10 Scale)  Pain:  0  Dyspnea:  0  Anxiety:  0  Nausea:  0  Depression:  0  Anorexia:  0  Fatigue:  0  Insomnia:  0  Restlessness:  0  Agitation:  0     CAM / Delirium:  Negative  Constipation:  Negative  Diarrhea:  Negative      Modified Zion Scale:  0    ECOG Performance Status ndGndrndanddndend:nd nd2nd Living Arrangements:  Lives with family    Psychosocial/Cultural:   See Palliative Psychosocial Note: No  Dad is . Close with mom. Has 3 siblings (22, 19, and 8). The 19 year old is incarcerated  **Primary  to Follow**  Palliative Care  Consult: No    Spiritual:  F - Mel and Belief:  Yes  I - Importance:  Yes  C - Community:  Yes  A - Address in Care:  Yes        Advance Care Planning   Advance Directives:   Living Will: No    LaPOST: No    Do Not Resuscitate Status: No    Medical Power of : No      Decision Making:  Patient answered questions and Family answered questions  Goals of Care: What is most important right now is to focus on symptom/pain control, comfort and QOL . Accordingly, we have decided that the best  "plan to meet the patient's goals includes enrolling in hospice care.         Significant Labs: All pertinent labs within the past 24 hours have been reviewed.  CBC:   Recent Labs   Lab 01/17/24  0445   WBC 48.93*   HGB 7.9*   HCT 23.4*   MCV 87   PLT 6*       BMP:  No results for input(s): "GLU", "NA", "K", "CL", "CO2", "BUN", "CREATININE", "CALCIUM", "MG" in the last 24 hours.    LFT:  Lab Results   Component Value Date    AST 43 (H) 01/17/2024    ALKPHOS 233 (H) 01/17/2024    BILITOT 2.0 (H) 01/17/2024     Albumin:   Albumin   Date Value Ref Range Status   01/17/2024 2.3 (L) 3.5 - 5.2 g/dL Final     Protein:   Total Protein   Date Value Ref Range Status   01/17/2024 6.5 6.0 - 8.4 g/dL Final     Lactic acid:   Lab Results   Component Value Date    LACTATE 1.4 01/09/2024       Significant Imaging: I have reviewed all pertinent imaging results/findings within the past 24 hours.    CT soft tissue neck 1/9/24  Subcentimeter left palatine tonsil abscess with mild associated left parapharyngeal edema.       "

## 2024-01-18 NOTE — NURSING
Patient discharged home via EMS transport; escorted out of hospital via stretcher by EMS staff. PIV and telemetry removed prior. Vital signs stable. Planned for home hospice. Prescriptions delivered to bedside, AVS reviewed, and discharge education completed. All needs addressed and questions answered.

## 2024-01-18 NOTE — ASSESSMENT & PLAN NOTE
Palliative Care  Palliative care encounter  Pal med follow up to goals of care for Magdaleno Hirsch a 24 yo gentleman admitted to BMT service  in blast crisis CML here with intratonsillar abscess. No longer a candidate for cancer directed therapies.    Patient appears comfortable in bed, no behavioral signs of pain Over last 24 hrs OME has increased significantly and Tu is now febrile.  Clinical condition is appropriate for transition to comfort focused care - hospice has been recommended.  Family and patient continue to make decisions regarding next level of care.        Advance Care Planning     Date: 01/11/2024  - no ACP documents received   -Patient did not wish or was not able to name a surrogate decision maker or provide an Advance Care Plan.   - Mr Hirsch appears very ill and does not participate in acp conversation.  Loved ones at bedside states he is able to make decisions  -  next of kin is mother - Danisha Hirsch [phone]  Code status: Full. Not addressed      Goals of Care   - pal med APRN  returned to bedside as follow up to goals of care.  Significant other and  mother at bedside.  Pal med reintroduced.    - patient and family  aware of clinical updates and what this means for Tu.    - understandably the family is distraught and are still hoping for a positive outcome.   - Tu appears to be at peace with the situation and indicates his goal is to be home.   - Hospice has been discussed several times now by both pal med and the primary team.  Family has not been  amenable.    - [facility] med has approached patient and family with hospice in a manner that ensures the patient's comfort and quality of life.  Also emphasized arranging hospice closer to Tu's home to be in closer proximity with his family.  - given his increased symptom management needs - pain, pal med  would recommend inpatient hospice in the [facility].    - Tu appears indifferent about setting of hospice.  Mother listening and weighing  all options   - [facility] did not discuss hospice as treatment option at this time. Message would not be received.    - asked if patient and family had additional questions or concerns about the clinical condition and or immediate and future care.   - Patient and family indicated through nodding and stating no to having additional questions or concerns.     - assured patient and family pal med remains available to patient and family.  Family has pal med contact information.  Intense emotional support provided.       Cancer associated pain   - appears comfortable at this time.  No reports of pain   - able to watch video's on telephone  - 24 hr    Recommendations      - recommend increasing   Fentanyl  patch to 75 mcg    -  continue hydromorphone 2 mg every 4 hrs as needed and oxycodone 10 mg by mouth every 4 hrs as needed.  ded.  - Patient becomes somnolent and unable to participate in conversation after receiving 2 mg IV dilaudid prn.         Recommendations at  discharge  Breakthrough pain can likely be controlled with morphine 20mg/ml 0.5-1ml q2h prn  discharging if patient and family reconsider discharge with hospice care.      Recommendations  - see above symptom management recommendations   -  patient and family require intense emotional support - will collaborate with [facility]  for additional support  -  clinical condition and symptom management needs appropriate to discuss inpatient hospice if family remains opposed to home with hospice.     -  intense emotional support provided     Primary team fellow  notified of the above recommendations

## 2024-01-19 ENCOUNTER — INFUSION (OUTPATIENT)
Dept: INFUSION THERAPY | Facility: HOSPITAL | Age: 24
End: 2024-01-19
Attending: NURSE PRACTITIONER
Payer: MEDICAID

## 2024-01-19 VITALS
HEART RATE: 114 BPM | SYSTOLIC BLOOD PRESSURE: 144 MMHG | RESPIRATION RATE: 16 BRPM | DIASTOLIC BLOOD PRESSURE: 87 MMHG | TEMPERATURE: 100 F | HEIGHT: 73 IN | BODY MASS INDEX: 22.26 KG/M2 | OXYGEN SATURATION: 100 % | WEIGHT: 168 LBS

## 2024-01-19 DIAGNOSIS — C92.10 BLAST CRISIS PHASE OF CHRONIC MYELOID LEUKEMIA: ICD-10-CM

## 2024-01-19 DIAGNOSIS — D69.6 THROMBOCYTOPENIA: Primary | ICD-10-CM

## 2024-01-19 DIAGNOSIS — K06.1 GINGIVAL HYPERPLASIA: ICD-10-CM

## 2024-01-19 DIAGNOSIS — C92.10 CML (CHRONIC MYELOCYTIC LEUKEMIA): Primary | ICD-10-CM

## 2024-01-19 DIAGNOSIS — D69.6 THROMBOCYTOPENIA: ICD-10-CM

## 2024-01-19 LAB
ABO + RH BLD: NORMAL
BLD PROD TYP BPU: NORMAL
BLOOD UNIT EXPIRATION DATE: NORMAL
BLOOD UNIT TYPE CODE: 5100
CROSSMATCH INTERPRETATION: NORMAL
DISPENSE STATUS: NORMAL
UNIT NUMBER: NORMAL

## 2024-01-19 PROCEDURE — 36430 TRANSFUSION BLD/BLD COMPNT: CPT

## 2024-01-19 PROCEDURE — 63600175 PHARM REV CODE 636 W HCPCS: Performed by: NURSE PRACTITIONER

## 2024-01-19 PROCEDURE — 96374 THER/PROPH/DIAG INJ IV PUSH: CPT

## 2024-01-19 PROCEDURE — 25000003 PHARM REV CODE 250: Performed by: NURSE PRACTITIONER

## 2024-01-19 PROCEDURE — P9037 PLATE PHERES LEUKOREDU IRRAD: HCPCS | Performed by: NURSE PRACTITIONER

## 2024-01-19 RX ORDER — ACETAMINOPHEN 325 MG/1
650 TABLET ORAL ONCE
Status: CANCELLED | OUTPATIENT
Start: 2024-01-19 | End: 2024-01-19

## 2024-01-19 RX ORDER — HYDROCODONE BITARTRATE AND ACETAMINOPHEN 500; 5 MG/1; MG/1
TABLET ORAL ONCE
Status: COMPLETED | OUTPATIENT
Start: 2024-01-19 | End: 2024-01-19

## 2024-01-19 RX ORDER — ACETAMINOPHEN 325 MG/1
650 TABLET ORAL ONCE
Status: COMPLETED | OUTPATIENT
Start: 2024-01-19 | End: 2024-01-19

## 2024-01-19 RX ORDER — HYDROMORPHONE HYDROCHLORIDE 2 MG/ML
2 INJECTION, SOLUTION INTRAMUSCULAR; INTRAVENOUS; SUBCUTANEOUS
Status: COMPLETED | OUTPATIENT
Start: 2024-01-19 | End: 2024-01-19

## 2024-01-19 RX ORDER — HYDROCODONE BITARTRATE AND ACETAMINOPHEN 500; 5 MG/1; MG/1
TABLET ORAL ONCE
Status: CANCELLED | OUTPATIENT
Start: 2024-01-19 | End: 2024-01-19

## 2024-01-19 RX ADMIN — ACETAMINOPHEN 650 MG: 325 TABLET ORAL at 09:01

## 2024-01-19 RX ADMIN — HYDROMORPHONE HYDROCHLORIDE 2 MG: 2 INJECTION INTRAMUSCULAR; INTRAVENOUS; SUBCUTANEOUS at 09:01

## 2024-01-19 RX ADMIN — SODIUM CHLORIDE: 9 INJECTION, SOLUTION INTRAVENOUS at 09:01

## 2024-01-20 ENCOUNTER — PATIENT MESSAGE (OUTPATIENT)
Dept: HEMATOLOGY/ONCOLOGY | Facility: CLINIC | Age: 24
End: 2024-01-20
Payer: MEDICAID

## 2024-01-22 ENCOUNTER — PATIENT MESSAGE (OUTPATIENT)
Dept: HEMATOLOGY/ONCOLOGY | Facility: CLINIC | Age: 24
End: 2024-01-22

## 2024-01-22 ENCOUNTER — INFUSION (OUTPATIENT)
Dept: INFUSION THERAPY | Facility: HOSPITAL | Age: 24
End: 2024-01-22
Attending: NURSE PRACTITIONER
Payer: MEDICAID

## 2024-01-22 ENCOUNTER — OFFICE VISIT (OUTPATIENT)
Dept: HEMATOLOGY/ONCOLOGY | Facility: CLINIC | Age: 24
End: 2024-01-22
Payer: MEDICAID

## 2024-01-22 ENCOUNTER — DOCUMENTATION ONLY (OUTPATIENT)
Dept: HEMATOLOGY/ONCOLOGY | Facility: CLINIC | Age: 24
End: 2024-01-22
Payer: MEDICAID

## 2024-01-22 VITALS
OXYGEN SATURATION: 100 % | DIASTOLIC BLOOD PRESSURE: 86 MMHG | TEMPERATURE: 99 F | RESPIRATION RATE: 18 BRPM | WEIGHT: 159.19 LBS | HEART RATE: 102 BPM | BODY MASS INDEX: 21.56 KG/M2 | SYSTOLIC BLOOD PRESSURE: 127 MMHG | HEIGHT: 72 IN

## 2024-01-22 VITALS
DIASTOLIC BLOOD PRESSURE: 85 MMHG | SYSTOLIC BLOOD PRESSURE: 122 MMHG | RESPIRATION RATE: 18 BRPM | HEIGHT: 72 IN | OXYGEN SATURATION: 100 % | BODY MASS INDEX: 21.56 KG/M2 | WEIGHT: 159.19 LBS | HEART RATE: 107 BPM

## 2024-01-22 DIAGNOSIS — G89.3 CANCER RELATED PAIN: ICD-10-CM

## 2024-01-22 DIAGNOSIS — C92.10 CML (CHRONIC MYELOCYTIC LEUKEMIA): Primary | ICD-10-CM

## 2024-01-22 DIAGNOSIS — C92.10 CML (CHRONIC MYELOCYTIC LEUKEMIA): ICD-10-CM

## 2024-01-22 DIAGNOSIS — C92.10 BLAST CRISIS PHASE OF CHRONIC MYELOID LEUKEMIA: ICD-10-CM

## 2024-01-22 DIAGNOSIS — Z71.89 GOALS OF CARE, COUNSELING/DISCUSSION: ICD-10-CM

## 2024-01-22 PROCEDURE — 36430 TRANSFUSION BLD/BLD COMPNT: CPT

## 2024-01-22 PROCEDURE — 63600175 PHARM REV CODE 636 W HCPCS: Performed by: INTERNAL MEDICINE

## 2024-01-22 PROCEDURE — 99999 PR PBB SHADOW E&M-EST. PATIENT-LVL III: CPT | Mod: PBBFAC,,, | Performed by: NURSE PRACTITIONER

## 2024-01-22 PROCEDURE — 99213 OFFICE O/P EST LOW 20 MIN: CPT | Mod: PBBFAC | Performed by: NURSE PRACTITIONER

## 2024-01-22 PROCEDURE — 96375 TX/PRO/DX INJ NEW DRUG ADDON: CPT

## 2024-01-22 PROCEDURE — P9040 RBC LEUKOREDUCED IRRADIATED: HCPCS | Performed by: NURSE PRACTITIONER

## 2024-01-22 PROCEDURE — 99215 OFFICE O/P EST HI 40 MIN: CPT | Mod: S$PBB,,, | Performed by: INTERNAL MEDICINE

## 2024-01-22 PROCEDURE — 96374 THER/PROPH/DIAG INJ IV PUSH: CPT

## 2024-01-22 PROCEDURE — 63600175 PHARM REV CODE 636 W HCPCS: Performed by: NURSE PRACTITIONER

## 2024-01-22 PROCEDURE — P9037 PLATE PHERES LEUKOREDU IRRAD: HCPCS | Performed by: NURSE PRACTITIONER

## 2024-01-22 PROCEDURE — 25000003 PHARM REV CODE 250: Performed by: NURSE PRACTITIONER

## 2024-01-22 PROCEDURE — 86923 COMPATIBILITY TEST ELECTRIC: CPT | Performed by: NURSE PRACTITIONER

## 2024-01-22 RX ORDER — DIPHENHYDRAMINE HYDROCHLORIDE 50 MG/ML
25 INJECTION INTRAMUSCULAR; INTRAVENOUS ONCE
Status: COMPLETED | OUTPATIENT
Start: 2024-01-22 | End: 2024-01-22

## 2024-01-22 RX ORDER — ACETAMINOPHEN 325 MG/1
650 TABLET ORAL ONCE
Status: CANCELLED | OUTPATIENT
Start: 2024-01-22

## 2024-01-22 RX ORDER — HYDROCODONE BITARTRATE AND ACETAMINOPHEN 500; 5 MG/1; MG/1
TABLET ORAL ONCE
Status: DISCONTINUED | OUTPATIENT
Start: 2024-01-22 | End: 2024-01-31

## 2024-01-22 RX ORDER — FENTANYL 75 UG/H
1 PATCH TRANSDERMAL
Qty: 5 PATCH | Refills: 0 | Status: CANCELLED | OUTPATIENT
Start: 2024-01-22 | End: 2024-02-06

## 2024-01-22 RX ORDER — ACETAMINOPHEN 325 MG/1
650 TABLET ORAL ONCE
Status: COMPLETED | OUTPATIENT
Start: 2024-01-22 | End: 2024-01-22

## 2024-01-22 RX ORDER — DIPHENHYDRAMINE HYDROCHLORIDE 50 MG/ML
25 INJECTION INTRAMUSCULAR; INTRAVENOUS ONCE
Status: CANCELLED | OUTPATIENT
Start: 2024-01-22

## 2024-01-22 RX ORDER — HYDROCODONE BITARTRATE AND ACETAMINOPHEN 500; 5 MG/1; MG/1
TABLET ORAL ONCE
Status: COMPLETED | OUTPATIENT
Start: 2024-01-22 | End: 2024-01-22

## 2024-01-22 RX ORDER — HYDROCODONE BITARTRATE AND ACETAMINOPHEN 500; 5 MG/1; MG/1
TABLET ORAL ONCE
Status: CANCELLED | OUTPATIENT
Start: 2024-01-22 | End: 2024-01-22

## 2024-01-22 RX ORDER — HYDROMORPHONE HYDROCHLORIDE 4 MG/1
4 TABLET ORAL EVERY 6 HOURS PRN
Qty: 120 TABLET | Refills: 0 | Status: SHIPPED | OUTPATIENT
Start: 2024-01-22 | End: 2024-01-23 | Stop reason: SDUPTHER

## 2024-01-22 RX ORDER — HYDROMORPHONE HYDROCHLORIDE 2 MG/ML
2 INJECTION, SOLUTION INTRAMUSCULAR; INTRAVENOUS; SUBCUTANEOUS ONCE
Status: COMPLETED | OUTPATIENT
Start: 2024-01-22 | End: 2024-01-22

## 2024-01-22 RX ORDER — HYDROMORPHONE HYDROCHLORIDE 2 MG/ML
2 INJECTION, SOLUTION INTRAMUSCULAR; INTRAVENOUS; SUBCUTANEOUS ONCE
Status: DISCONTINUED | OUTPATIENT
Start: 2024-01-22 | End: 2024-01-22

## 2024-01-22 RX ADMIN — ACETAMINOPHEN 650 MG: 325 TABLET ORAL at 11:01

## 2024-01-22 RX ADMIN — HYDROMORPHONE HYDROCHLORIDE 2 MG: 2 INJECTION INTRAMUSCULAR; INTRAVENOUS; SUBCUTANEOUS at 11:01

## 2024-01-22 RX ADMIN — DIPHENHYDRAMINE HYDROCHLORIDE 25 MG: 50 INJECTION INTRAMUSCULAR; INTRAVENOUS at 12:01

## 2024-01-22 RX ADMIN — SODIUM CHLORIDE: 9 INJECTION, SOLUTION INTRAVENOUS at 11:01

## 2024-01-22 NOTE — PROGRESS NOTES
The patient's mother called to request pain medications. The  contacted the coordinator, Sharron with [facility]. Sharron checked with the [facility] [facility]. The representative with the [facility] reported that the patient rejected hospices services on Saturday, 01/20/2024.  The  called the patient, and spoke with the patient's mother by phone. The patient's mother, would like to be in a program in which the patient can continue to receive platelets and blood. The family is scheduled to see a representative from Hospice/Palliative care [facility] for an informational discussion for 3:30 p.m. today. The representative will call later today with an update.

## 2024-01-22 NOTE — LETTER
January 22, 2024    Magdaleno Hirsch  [address] [place] [address]             [facility]  [address], [address]  [place] [address]  Phone: [phone]        To whom it may concern:    I hereby affirm that Magdaleno Hirsch is my patient and has a serious medical condition at this present time. It is medically advisable for window tint due to photosensitivity from his treatment for cancer.                       Thank you so much!  If you have any questions or concerns, please call at [phone].  Fax: [phone]  [facility]            Paul Hogan MD

## 2024-01-22 NOTE — NURSING
Hgb 7.0 and Plt 7 today. Pt transfused with 1 Unit Platelets and 1 Unit pRBCs, tolerated well. Pt discharged home in stable condition, aware of future appts.

## 2024-01-22 NOTE — PROGRESS NOTES
"Subjective:       Patient ID: Magdaleno Hirsch is a 23 y.o. male.    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      Current Treatment:   Hydroxyurea started on 12/26/2023 for cytoreduction.  Asciminib ordered on 12/28/2023.    Treatment History:  Hydroxyurea 2g every 12 hours  Dasatinib 100 mg po daily, unsure on start date as patient was lost to follow up.  Patient received CLIA and ponatinib on 06/16/2023.    Azacitidine 5/28 days, venetoclax 50 mg p.o. daily for 10/28 days, and ponatinib 30 mg p.o. daily every day on 10/09/2023.  Increased ponatinib to 45 mg p.o. daily on 11/06/2023.    HPI:  Patient with no real medical history who went in for a routine eye exam on 08/18/2022 to update his eye glasses prescription.  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  He had bilateral Valdez spots with vessel tortuosity.  The optometrist recommended that the patient have blood work including testing for sickle cell disease.  The patient presented to the emergency department.  CBC in the emergency department revealed a white blood cell count of 411,900. Hemoglobin was 8.3, platelets were normal at 375,000 hundred and seventy five thousand.  Differential was suggestive of CML.  Coagulation studies revealed an INR of 1.38, PTT of 36.4 and a fibrinogen of 292.  Patient was going to present to [place], however they were on diversion.  He was transferred from [facility] to [facility].  Hematology consulted.  Patient underwent bone marrow biopsy on 08/22/2022, this revealed CML, chronic phase, BCR/ABL1 positive.  Ordered dasatinib 100 mg p.o. daily as of 10/10/2022, unsure when patient started taking medication due to him being lost to follow-up.  Patient went into blast crisis, received CLIA and ponatinib on 06/16/2023.  He had a repeat bone marrow " biopsy on 08/28/2023, this had to be sent off to [facility], however the final diagnosis was persistent/recurrent myeloid neoplasm with 10-15% bone marrow blasts and 8% circulating blasts.  Started azacitidine, venetoclax, ponatinib as mentioned above on 10/09/2023.  Bone marrow biopsy done after 1 cycle done on 10/31/2023 revealed relapsed/persistent acute myeloid leukemia with 11% circulating blasts and 70-80% blasts by IHC in the bone marrow.  Was ordered on 12/28/2023.    Interval History:   Patient is here today for follow up visit.  He was recently admitted in [place].  He was discharged home with hospice.  The patient's family is interested in another opinion.  I saw him today, 01/22/2024.  I had a long conversation with them stating that he has had all treatment options for his disease and there are not any good options that would likely help.  They wanted to continue with osimertinib and blood transfusions for at least the next 4 weeks and determine whether or not he could have other treatments at another facility.    Past Medical History:   Diagnosis Date    CML (chronic myelocytic leukemia)     HVD (hypertensive vascular disease)     Oropharyngeal candidiasis       Past Surgical History:   Procedure Laterality Date    BONE MARROW BIOPSY N/A 8/22/2022    Procedure: Biopsy-bone marrow;  Surgeon: Getachew Chen MD;  Location: [facility];  Service: General;  Laterality: N/A;    BONE MARROW BIOPSY N/A 7/25/2023    Procedure: Biopsy-bone marrow;  Surgeon: Edi Carty MD;  Location: [facility] OR;  Service: General;  Laterality: N/A;    BONE MARROW BIOPSY N/A 8/28/2023    Procedure: Biopsy-bone marrow;  Surgeon: Moo Pina MD;  Location: [facility] OR;  Service: General;  Laterality: N/A;    BONE MARROW BIOPSY N/A 10/31/2023    Procedure: Biopsy-bone marrow;  Surgeon: Edi Carty MD;  Location: [facility] OR;  Service: General;  Laterality: N/A;    KNEE SURGERY Left      Social History     Socioeconomic History     Marital status: Single   Tobacco Use    Smoking status: Never    Smokeless tobacco: Never   Substance and Sexual Activity    Alcohol use: Never    Drug use: Yes     Types: Marijuana     Social Determinants of Health     Financial Resource Strain: Medium Risk (1/11/2024)    Overall Financial Resource Strain (CARDIA)     Difficulty of Paying Living Expenses: Somewhat hard   Food Insecurity: Food Insecurity Present (1/11/2024)    Hunger Vital Sign     Worried About Running Out of Food in the Last Year: Sometimes true     Ran Out of Food in the Last Year: Patient declined   Transportation Needs: No Transportation Needs (1/11/2024)    PRAPARE - Transportation     Lack of Transportation (Medical): No     Lack of Transportation (Non-Medical): No   Physical Activity: Insufficiently Active (1/8/2024)    Exercise Vital Sign     Days of Exercise per Week: 2 days     Minutes of Exercise per Session: 10 min   Stress: Stress Concern Present (1/11/2024)    [employer] [place] [employer] - Occupational Stress Questionnaire     Feeling of Stress : To some extent   Social Connections: Unknown (1/8/2024)    Social Connection and Isolation Panel [NHANES]     Frequency of Communication with Friends and Family: Three times a week     Frequency of Social Gatherings with Friends and Family: Twice a week     Active Member of Clubs or Organizations: No     Attends Club or Organization Meetings: Never     Marital Status: Living with partner   Housing Stability: Low Risk  (1/11/2024)    Housing Stability Vital Sign     Unable to Pay for Housing in the Last Year: No     Number of Places Lived in the Last Year: 1     Unstable Housing in the Last Year: No      Family History   Problem Relation Age of Onset    Hypertension Maternal Grandmother     Cancer Maternal Grandmother            Review of Systems   Constitutional:  Positive for fatigue. Negative for chills, diaphoresis and unexpected weight change.   HENT:  Negative for nasal  congestion and sore throat.         Gums are less swollen.   Eyes:  Negative for pain.   Respiratory:  Negative for cough, chest tightness and shortness of breath.    Cardiovascular:  Negative for chest pain, palpitations and leg swelling.   Gastrointestinal:  Negative for abdominal distention, abdominal pain, blood in stool, constipation and diarrhea.   Genitourinary:  Negative for dysuria, frequency and hematuria.   Musculoskeletal:  Negative for arthralgias and back pain.   Integumentary:  Negative for rash.   Neurological:  Negative for dizziness, weakness, numbness and headaches.   Hematological:  Negative for adenopathy. Bruises/bleeds easily.   Psychiatric/Behavioral:  Negative for confusion.          Objective:      Physical Exam  Vitals reviewed.   Constitutional:       General: He is awake.      Appearance: Normal appearance.   HENT:      Head: Normocephalic and atraumatic.      Right Ear: Hearing normal.      Left Ear: Hearing normal.      Nose: Nose normal.      Mouth/Throat:      Comments: Gums are still swollen, but better and there is much less oozing of blood.    Eyes:      General: Lids are normal. Vision grossly intact.      Extraocular Movements: Extraocular movements intact.      Conjunctiva/sclera: Conjunctivae normal.   Cardiovascular:      Rate and Rhythm: Normal rate and regular rhythm.      Pulses: Normal pulses.      Heart sounds: Normal heart sounds.   Pulmonary:      Effort: Pulmonary effort is normal.      Breath sounds: Normal breath sounds. No wheezing, rhonchi or rales.   Abdominal:      General: Bowel sounds are normal.      Palpations: Abdomen is soft.      Tenderness: There is no abdominal tenderness.   Musculoskeletal:      Cervical back: Full passive range of motion without pain.      Right lower leg: No edema.      Left lower leg: No edema.   Skin:     General: Skin is warm.   Neurological:      General: No focal deficit present.      Mental Status: He is alert and oriented to  person, place, and time.   Psychiatric:         Attention and Perception: Attention normal.         Mood and Affect: Mood and affect normal.         Behavior: Behavior is cooperative.         LABS AND IMAGING REVIEWED IN EPIC  Lab Review:        Assessment:   CML, chronic phase, transformed to blast crisis       Plan:     Patient was on azacitidine, venetoclax, ponatinib.  Unfortunately, it looks as though his disease is progressing.    We were going to have a bone marrow biopsy done on 12/27/2023, however the patient presented the day prior with platelets and hemoglobin requiring transfusions of both platelets and packed red blood cells.  His gums were swollen and oozing.    I discussed with Dr. Green, the patient has failed multiple lines of therapy. We will try  asciminib, however I discussed with the patient that his disease is very resistant to therapy and that there are no guarantees.  He voiced understanding.     Asciminib was ordered on 12/28/2023.    The patient was recently hospitalized in [place] in the decision was made to discharge home with hospice.  The patient's family would like another opinion at another facility, and they would like to continue supportive transfusions in his seminal until they have that opinion.    I explained that I did agree with hospice, but I would be willing to continue to transfuse him and give asciminib for the next 4 weeks while they obtain another opinion.  I explained that if in 4 weeks, with continue asciminib, if his white blood cell count does not decrease in his blast count does not decrease, I would recommend hospice.    Continue supportive care.  Of note, I have started having goals of care discussions with the patient and his family.    We will plan for labs on Mondays and Thursdays, patient knows to call for any increased bleeding.    We will give 1 unit of platelets today and 1 unit pRBCs.    Instructed him to discontinue Hydrea and start asciminib  tomorrow.    We will change his pain medication to try to improve his pain control.  We will also get nutritional supplements ensure/boost for mouth spread as he is unable to eat solid foods due to his gum swelling/bleeding.    Hydromorphone sent to pharmacy    Blood and platelet transfusion today. Repeat labs on 01/25/2024.    Twice weekly labs/PRN transfusion    Refer patient to [facility].    Return to clinic in 4 weeks    Paul Hogan II, MD I, Jaelyn Giles LPN, acted solely as a scribe for and in the presence of, Dr. Paul Hogan who performed the service.

## 2024-01-22 NOTE — PROGRESS NOTES
"Subjective:       Patient ID: Magdaleno Hirsch is a 23 y.o. male.    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      Current Treatment:   Hydroxyurea started on 12/26/2023 for cytoreduction.  Asciminib ordered on 12/28/2023.    Treatment History:  Hydroxyurea 2g every 12 hours  Dasatinib 100 mg po daily, unsure on start date as patient was lost to follow up.  Patient received CLIA and ponatinib on 06/16/2023.    Azacitidine 5/28 days, venetoclax 50 mg p.o. daily for 10/28 days, and ponatinib 30 mg p.o. daily every day on 10/09/2023.  Increased ponatinib to 45 mg p.o. daily on 11/06/2023.    HPI:  Patient with no real medical history who went in for a routine eye exam on 08/18/2022 to update his eye glasses prescription.  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  He had bilateral Valdez spots with vessel tortuosity.  The optometrist recommended that the patient have blood work including testing for sickle cell disease.  The patient presented to the emergency department.  CBC in the emergency department revealed a white blood cell count of 411,900. Hemoglobin was 8.3, platelets were normal at 375,000 hundred and seventy five thousand.  Differential was suggestive of CML.  Coagulation studies revealed an INR of 1.38, PTT of 36.4 and a fibrinogen of 292.  Patient was going to present to [place], however they were on diversion.  He was transferred from [facility] to [facility].  Hematology consulted.  Patient underwent bone marrow biopsy on 08/22/2022, this revealed CML, chronic phase, BCR/ABL1 positive.  Ordered dasatinib 100 mg p.o. daily as of 10/10/2022, unsure when patient started taking medication due to him being lost to follow-up.  Patient went into blast crisis, received CLIA and ponatinib on 06/16/2023.  He had a repeat bone marrow " biopsy on 08/28/2023, this had to be sent off to [facility], however the final diagnosis was persistent/recurrent myeloid neoplasm with 10-15% bone marrow blasts and 8% circulating blasts.  Started azacitidine, venetoclax, ponatinib as mentioned above on 10/09/2023.  Bone marrow biopsy done after 1 cycle done on 10/31/2023 revealed relapsed/persistent acute myeloid leukemia with 11% circulating blasts and 70-80% blasts by IHC in the bone marrow.  Was ordered on 12/28/2023.    Interval History:   Patient is here today for follow up visit.  He was recently admitted in [place].  He was discharged home with hospice.  The patient's family is interested in another opinion.  I saw him today, 01/22/2024.  I had a long conversation with them stating that he has had all treatment options for his disease and there are not any good options that would likely help.  They wanted to continue with osimertinib and blood transfusions for at least the next 4 weeks and determine whether or not he could have other treatments at another facility.    Past Medical History:   Diagnosis Date    CML (chronic myelocytic leukemia)     HVD (hypertensive vascular disease)     Oropharyngeal candidiasis       Past Surgical History:   Procedure Laterality Date    BONE MARROW BIOPSY N/A 8/22/2022    Procedure: Biopsy-bone marrow;  Surgeon: Getachew Chen MD;  Location: [facility];  Service: General;  Laterality: N/A;    BONE MARROW BIOPSY N/A 7/25/2023    Procedure: Biopsy-bone marrow;  Surgeon: Edi Carty MD;  Location: [facility] OR;  Service: General;  Laterality: N/A;    BONE MARROW BIOPSY N/A 8/28/2023    Procedure: Biopsy-bone marrow;  Surgeon: Moo Pina MD;  Location: [facility] OR;  Service: General;  Laterality: N/A;    BONE MARROW BIOPSY N/A 10/31/2023    Procedure: Biopsy-bone marrow;  Surgeon: Edi Carty MD;  Location: [facility] OR;  Service: General;  Laterality: N/A;    KNEE SURGERY Left      Social History     Socioeconomic History     Marital status: Single   Tobacco Use    Smoking status: Never    Smokeless tobacco: Never   Substance and Sexual Activity    Alcohol use: Never    Drug use: Yes     Types: Marijuana     Social Determinants of Health     Financial Resource Strain: Medium Risk (1/11/2024)    Overall Financial Resource Strain (CARDIA)     Difficulty of Paying Living Expenses: Somewhat hard   Food Insecurity: Food Insecurity Present (1/11/2024)    Hunger Vital Sign     Worried About Running Out of Food in the Last Year: Sometimes true     Ran Out of Food in the Last Year: Patient declined   Transportation Needs: No Transportation Needs (1/11/2024)    PRAPARE - Transportation     Lack of Transportation (Medical): No     Lack of Transportation (Non-Medical): No   Physical Activity: Insufficiently Active (1/8/2024)    Exercise Vital Sign     Days of Exercise per Week: 2 days     Minutes of Exercise per Session: 10 min   Stress: Stress Concern Present (1/11/2024)    [employer] [place] [employer] - Occupational Stress Questionnaire     Feeling of Stress : To some extent   Social Connections: Unknown (1/8/2024)    Social Connection and Isolation Panel [NHANES]     Frequency of Communication with Friends and Family: Three times a week     Frequency of Social Gatherings with Friends and Family: Twice a week     Active Member of Clubs or Organizations: No     Attends Club or Organization Meetings: Never     Marital Status: Living with partner   Housing Stability: Low Risk  (1/11/2024)    Housing Stability Vital Sign     Unable to Pay for Housing in the Last Year: No     Number of Places Lived in the Last Year: 1     Unstable Housing in the Last Year: No      Family History   Problem Relation Age of Onset    Hypertension Maternal Grandmother     Cancer Maternal Grandmother            Review of Systems   Constitutional:  Positive for fatigue. Negative for chills, diaphoresis and unexpected weight change.   HENT:  Negative for nasal  congestion and sore throat.         Gums are less swollen.   Eyes:  Negative for pain.   Respiratory:  Negative for cough, chest tightness and shortness of breath.    Cardiovascular:  Negative for chest pain, palpitations and leg swelling.   Gastrointestinal:  Negative for abdominal distention, abdominal pain, blood in stool, constipation and diarrhea.   Genitourinary:  Negative for dysuria, frequency and hematuria.   Musculoskeletal:  Negative for arthralgias and back pain.   Integumentary:  Negative for rash.   Neurological:  Negative for dizziness, weakness, numbness and headaches.   Hematological:  Negative for adenopathy. Bruises/bleeds easily.   Psychiatric/Behavioral:  Negative for confusion.          Objective:      Physical Exam  Vitals reviewed.   Constitutional:       General: He is awake.      Appearance: Normal appearance.   HENT:      Head: Normocephalic and atraumatic.      Right Ear: Hearing normal.      Left Ear: Hearing normal.      Nose: Nose normal.      Mouth/Throat:      Comments: Gums are still swollen, but better and there is much less oozing of blood.    Eyes:      General: Lids are normal. Vision grossly intact.      Extraocular Movements: Extraocular movements intact.      Conjunctiva/sclera: Conjunctivae normal.   Cardiovascular:      Rate and Rhythm: Normal rate and regular rhythm.      Pulses: Normal pulses.      Heart sounds: Normal heart sounds.   Pulmonary:      Effort: Pulmonary effort is normal.      Breath sounds: Normal breath sounds. No wheezing, rhonchi or rales.   Abdominal:      General: Bowel sounds are normal.      Palpations: Abdomen is soft.      Tenderness: There is no abdominal tenderness.   Musculoskeletal:      Cervical back: Full passive range of motion without pain.      Right lower leg: No edema.      Left lower leg: No edema.   Skin:     General: Skin is warm.   Neurological:      General: No focal deficit present.      Mental Status: He is alert and oriented to  person, place, and time.   Psychiatric:         Attention and Perception: Attention normal.         Mood and Affect: Mood and affect normal.         Behavior: Behavior is cooperative.         LABS AND IMAGING REVIEWED IN EPIC  Lab Review:        Assessment:   CML, chronic phase, transformed to blast crisis       Plan:     Patient was on azacitidine, venetoclax, ponatinib.  Unfortunately, it looks as though his disease is progressing.    We were going to have a bone marrow biopsy done on 12/27/2023, however the patient presented the day prior with platelets and hemoglobin requiring transfusions of both platelets and packed red blood cells.  His gums were swollen and oozing.    I discussed with Dr. Green, the patient has failed multiple lines of therapy. We will try  asciminib, however I discussed with the patient that his disease is very resistant to therapy and that there are no guarantees.  He voiced understanding.     Asciminib was ordered on 12/28/2023.    The patient was recently hospitalized in [place] in the decision was made to discharge home with hospice.  The patient's family would like another opinion at another facility, and they would like to continue supportive transfusions in his seminal until they have that opinion.    I explained that I did agree with hospice, but I would be willing to continue to transfuse him and give asciminib for the next 4 weeks while they obtain another opinion.  I explained that if in 4 weeks, with continue asciminib, if his white blood cell count does not decrease in his blast count does not decrease, I would recommend hospice.    Continue supportive care.  Of note, I have started having goals of care discussions with the patient and his family.    We will plan for labs on Mondays and Thursdays, patient knows to call for any increased bleeding.    We will give 1 unit of platelets today and 1 unit pRBCs.    Instructed him to discontinue Hydrea and start asciminib  tomorrow.    We will change his pain medication to try to improve his pain control.  We will also get nutritional supplements ensure/boost for mouth spread as he is unable to eat solid foods due to his gum swelling/bleeding.    Hydromorphone sent to pharmacy    Blood and platelet transfusion today. Repeat labs on 01/25/2024.    Twice weekly labs/PRN transfusion    Refer patient to [facility].    Return to clinic in 4 weeks    Paul Hogan II, MD I, Jaelyn Giles LPN, acted solely as a scribe for and in the presence of, Dr. Paul Hogan who performed the service.

## 2024-01-23 ENCOUNTER — INFUSION (OUTPATIENT)
Dept: INFUSION THERAPY | Facility: HOSPITAL | Age: 24
End: 2024-01-23
Attending: NURSE PRACTITIONER
Payer: MEDICAID

## 2024-01-23 VITALS
HEART RATE: 114 BPM | OXYGEN SATURATION: 100 % | TEMPERATURE: 101 F | DIASTOLIC BLOOD PRESSURE: 98 MMHG | SYSTOLIC BLOOD PRESSURE: 142 MMHG

## 2024-01-23 DIAGNOSIS — C92.10 CML (CHRONIC MYELOCYTIC LEUKEMIA): ICD-10-CM

## 2024-01-23 DIAGNOSIS — G89.3 CANCER RELATED PAIN: ICD-10-CM

## 2024-01-23 DIAGNOSIS — C92.10 BLAST CRISIS PHASE OF CHRONIC MYELOID LEUKEMIA: ICD-10-CM

## 2024-01-23 DIAGNOSIS — C92.10 BLAST CRISIS PHASE OF CHRONIC MYELOID LEUKEMIA: Primary | ICD-10-CM

## 2024-01-23 PROCEDURE — 96374 THER/PROPH/DIAG INJ IV PUSH: CPT

## 2024-01-23 PROCEDURE — 63600175 PHARM REV CODE 636 W HCPCS: Performed by: INTERNAL MEDICINE

## 2024-01-23 RX ORDER — SODIUM CHLORIDE 0.9 % (FLUSH) 0.9 %
10 SYRINGE (ML) INJECTION
Status: DISCONTINUED | OUTPATIENT
Start: 2024-01-23 | End: 2024-01-23 | Stop reason: HOSPADM

## 2024-01-23 RX ORDER — HYDROMORPHONE HYDROCHLORIDE 4 MG/1
4 TABLET ORAL EVERY 6 HOURS PRN
Qty: 120 TABLET | Refills: 0 | Status: SHIPPED | OUTPATIENT
Start: 2024-01-23 | End: 2024-01-23 | Stop reason: SDUPTHER

## 2024-01-23 RX ORDER — HYDROMORPHONE HYDROCHLORIDE 2 MG/ML
2 INJECTION, SOLUTION INTRAMUSCULAR; INTRAVENOUS; SUBCUTANEOUS ONCE
Status: COMPLETED | OUTPATIENT
Start: 2024-01-23 | End: 2024-01-23

## 2024-01-23 RX ORDER — HYDROMORPHONE HYDROCHLORIDE 2 MG/ML
2 INJECTION, SOLUTION INTRAMUSCULAR; INTRAVENOUS; SUBCUTANEOUS ONCE
Status: CANCELLED
Start: 2024-01-23 | End: 2024-01-23

## 2024-01-23 RX ORDER — HYDROMORPHONE HYDROCHLORIDE 4 MG/1
4 TABLET ORAL EVERY 6 HOURS PRN
Qty: 120 TABLET | Refills: 0 | OUTPATIENT
Start: 2024-01-23

## 2024-01-23 RX ORDER — HEPARIN 100 UNIT/ML
500 SYRINGE INTRAVENOUS
Status: DISCONTINUED | OUTPATIENT
Start: 2024-01-23 | End: 2024-01-23 | Stop reason: HOSPADM

## 2024-01-23 RX ORDER — HYDROMORPHONE HYDROCHLORIDE 4 MG/1
4 TABLET ORAL EVERY 6 HOURS PRN
Qty: 120 TABLET | Refills: 0 | Status: SHIPPED | OUTPATIENT
Start: 2024-01-23

## 2024-01-23 RX ORDER — SODIUM CHLORIDE 0.9 % (FLUSH) 0.9 %
10 SYRINGE (ML) INJECTION
Status: CANCELLED | OUTPATIENT
Start: 2024-01-23

## 2024-01-23 RX ORDER — HEPARIN 100 UNIT/ML
500 SYRINGE INTRAVENOUS
Status: CANCELLED | OUTPATIENT
Start: 2024-01-23

## 2024-01-23 RX ADMIN — HYDROMORPHONE HYDROCHLORIDE 2 MG: 2 INJECTION INTRAMUSCULAR; INTRAVENOUS; SUBCUTANEOUS at 04:01

## 2024-01-25 ENCOUNTER — INFUSION (OUTPATIENT)
Dept: INFUSION THERAPY | Facility: HOSPITAL | Age: 24
End: 2024-01-25
Attending: NURSE PRACTITIONER
Payer: MEDICAID

## 2024-01-25 VITALS
RESPIRATION RATE: 18 BRPM | SYSTOLIC BLOOD PRESSURE: 122 MMHG | TEMPERATURE: 99 F | BODY MASS INDEX: 21.54 KG/M2 | OXYGEN SATURATION: 100 % | HEART RATE: 103 BPM | WEIGHT: 159 LBS | HEIGHT: 72 IN | DIASTOLIC BLOOD PRESSURE: 76 MMHG

## 2024-01-25 DIAGNOSIS — D61.810 PANCYTOPENIA DUE TO ANTINEOPLASTIC CHEMOTHERAPY: ICD-10-CM

## 2024-01-25 DIAGNOSIS — D61.810 PANCYTOPENIA DUE TO ANTINEOPLASTIC CHEMOTHERAPY: Primary | ICD-10-CM

## 2024-01-25 DIAGNOSIS — T45.1X5A PANCYTOPENIA DUE TO ANTINEOPLASTIC CHEMOTHERAPY: Primary | ICD-10-CM

## 2024-01-25 DIAGNOSIS — T45.1X5A PANCYTOPENIA DUE TO ANTINEOPLASTIC CHEMOTHERAPY: ICD-10-CM

## 2024-01-25 DIAGNOSIS — C92.10 BLAST CRISIS PHASE OF CHRONIC MYELOID LEUKEMIA: ICD-10-CM

## 2024-01-25 PROCEDURE — 96374 THER/PROPH/DIAG INJ IV PUSH: CPT

## 2024-01-25 PROCEDURE — 63600175 PHARM REV CODE 636 W HCPCS: Performed by: NURSE PRACTITIONER

## 2024-01-25 PROCEDURE — 36430 TRANSFUSION BLD/BLD COMPNT: CPT

## 2024-01-25 PROCEDURE — 25000003 PHARM REV CODE 250: Performed by: NURSE PRACTITIONER

## 2024-01-25 PROCEDURE — P9037 PLATE PHERES LEUKOREDU IRRAD: HCPCS | Performed by: NURSE PRACTITIONER

## 2024-01-25 RX ORDER — ACETAMINOPHEN 325 MG/1
650 TABLET ORAL ONCE
Status: CANCELLED | OUTPATIENT
Start: 2024-01-25 | End: 2024-01-25

## 2024-01-25 RX ORDER — HYDROCODONE BITARTRATE AND ACETAMINOPHEN 500; 5 MG/1; MG/1
TABLET ORAL ONCE
Status: ACTIVE | OUTPATIENT
Start: 2024-01-25

## 2024-01-25 RX ORDER — ACETAMINOPHEN 325 MG/1
650 TABLET ORAL ONCE
Status: COMPLETED | OUTPATIENT
Start: 2024-01-25 | End: 2024-01-25

## 2024-01-25 RX ORDER — DIPHENHYDRAMINE HCL 25 MG
25 CAPSULE ORAL ONCE
Status: COMPLETED | OUTPATIENT
Start: 2024-01-25 | End: 2024-01-25

## 2024-01-25 RX ORDER — DIPHENHYDRAMINE HCL 25 MG
25 CAPSULE ORAL ONCE
Status: CANCELLED | OUTPATIENT
Start: 2024-01-25 | End: 2024-01-25

## 2024-01-25 RX ORDER — HYDROMORPHONE HYDROCHLORIDE 2 MG/ML
2 INJECTION, SOLUTION INTRAMUSCULAR; INTRAVENOUS; SUBCUTANEOUS ONCE
Status: COMPLETED | OUTPATIENT
Start: 2024-01-25 | End: 2024-01-25

## 2024-01-25 RX ORDER — HYDROCODONE BITARTRATE AND ACETAMINOPHEN 500; 5 MG/1; MG/1
TABLET ORAL ONCE
Status: CANCELLED | OUTPATIENT
Start: 2024-01-25 | End: 2024-01-25

## 2024-01-25 RX ORDER — HYDROCODONE BITARTRATE AND ACETAMINOPHEN 500; 5 MG/1; MG/1
TABLET ORAL ONCE
Status: COMPLETED | OUTPATIENT
Start: 2024-01-25 | End: 2024-01-25

## 2024-01-25 RX ADMIN — SODIUM CHLORIDE: 9 INJECTION, SOLUTION INTRAVENOUS at 10:01

## 2024-01-25 RX ADMIN — HYDROMORPHONE HYDROCHLORIDE 2 MG: 2 INJECTION INTRAMUSCULAR; INTRAVENOUS; SUBCUTANEOUS at 10:01

## 2024-01-25 RX ADMIN — ACETAMINOPHEN 650 MG: 325 TABLET ORAL at 09:01

## 2024-01-25 RX ADMIN — DIPHENHYDRAMINE HYDROCHLORIDE 25 MG: 25 CAPSULE ORAL at 09:01

## 2024-01-25 NOTE — DISCHARGE SUMMARY
Hospital Medicine  Discharge Summary    Patient Name: Magdaleno Hirsch  MRN: [id number]  Admit Date: 12/26/2023  Discharge Date: 12/30/2023   Status: IP- Inpatient   Length of Stay: 4      PHYSICIANS   Admitting Physician: Brody Khan MD  Discharging Physician: Wilman Rizvi MD.  Primary Care Physician: Christine Primary Doctor  Consults: Hematology/Oncology      DISCHARGE DIAGNOSES   Severe thrombocytopenia  Acute on chronic anemia   CML  Diffuse gingival swelling with ulcerations and drainage   History of  HTN       PROCEDURES   Biopsy-bone marrow - 12/27/23       HOSPITAL COURSE    23 y.o. male with a history that includes CML with transformation to AML,  followed by Dr. Hogan, and previously on chemotherapy with Azacitidine/Venetoclax presented to [facility] on 12/26 with oral swelling.  Patient reported oral ulcers and swelling x 1 week.  Patient states he was prescribed magic mouthwash without relief.  Patient reported increased gum pain with bleeding. Last chemo treatment was last month.   Evaluation noted  Labs revealed WBC 89K, with 79% blasts, hemoglobin 7, hematocrit 22.3, MCV 89.9, platelets 2K. Patient was admitted to  services.  Oncology was consulted.  Patient required platelet and PRBC transfusions.  BM biopsy performed 12/27.  Patient was also started on hydroxyurea, and white count quickly improved.  Patient continued with intermittent fevers, but cultures remains negative; and patient has been having intermittent fevers prior, suspect related to his progressing disease.  Antibiotics stopped.  Counts were improved and patient cleared by Onc for discharge to have close follow up.      STATUS  Stable    DISPOSITION  Discharge to home    DIET  Regulaer    ACTIVITY  As tolerated      FOLLOW-UP      Paul Hogan II, MD. Go in 3 day(s).    Specialties: Oncology, Hematology and Oncology  Contact information:  [address] [place] [address]  [address]  [facility] [address]  [phone]                  DISCHARGE MEDICATION RECONCILIATION     CONTINUE with CHANGES     (MAGIC MOUTHWASH) 1:1:1 BENADRYL 12.5MG/5ML LIQ, ALUMINUM & MAGNESIUM  Swish and spit 15 mLs every 4 (four) hours as needed (mouth pain). for mouth sores            CONTINUE     metoprolol tartrate 25 MG tablet  Commonly known as: LOPRESSOR  Take 1 tablet (25 mg total) by mouth 2 (two) times daily.            DISCONTINUE     oxyCODONE-acetaminophen  mg per tablet  Commonly known as: PERCOCET     pantoprazole 40 MG tablet  Commonly known as: PROTONIX         PHYSICAL EXAM   VITALS: T 99.6 °F (37.6 °C)   /87   P 94   RR 18   O2 98 %    GENERAL: awake and in no acute distress  LUNGS: Respirations non-labored  CVS: Normal rate  ABD: Soft, non-tender  EXTREMITIES: Radial pulse 2+  NEURO: AAOx3  PSYCHIATRIC: Cooperative      Discharge time: 33 minutes     Wilman Rizvi MD  [facility] Medicine       DIAGNOSITCS   X-Ray Chest 1 View  Result Date: 12/29/2023  EXAMINATION: XR CHEST 1 VIEW CLINICAL HISTORY: fever; TECHNIQUE: One view COMPARISON: December 27, 2023. FINDINGS: Cardiopericardial silhouette is within normal limits.  MediPort terminates within the superior vena cava.  No acute dense focal or segmental consolidation, congestive process, pleural effusions or pneumothorax.   Impression:  NO ACUTE CARDIOPULMONARY PROCESS IDENTIFIED.   Electronically signed by: Phill Andrew Date:    12/29/2023 Time:    22:26    X-Ray Chest 1 View for Line/Tube Placement  Result Date: 12/27/2023  EXAMINATION: XR CHEST 1 VIEW FOR LINE/TUBE PLACEMENT CLINICAL HISTORY: picc placement; COMPARISON: Chest x-ray dated 12/08/2023 FINDINGS: Right sided PICC line has its tip over the superior vena cava.  The heart is normal size.  The lungs are clear.  There is no pleural effusion or visible pneumothorax.   Impression:  Right-sided PICC line with tip over the superior vena cava.   Electronically signed by: Malu Weaver Date:    12/27/2023 Time:    17:21

## 2024-01-30 ENCOUNTER — INFUSION (OUTPATIENT)
Dept: INFUSION THERAPY | Facility: HOSPITAL | Age: 24
End: 2024-01-30
Attending: NURSE PRACTITIONER
Payer: MEDICAID

## 2024-01-30 ENCOUNTER — OFFICE VISIT (OUTPATIENT)
Dept: INTERNAL MEDICINE | Facility: CLINIC | Age: 24
End: 2024-01-30
Payer: MEDICAID

## 2024-01-30 VITALS
WEIGHT: 152.38 LBS | OXYGEN SATURATION: 100 % | DIASTOLIC BLOOD PRESSURE: 82 MMHG | HEIGHT: 72 IN | SYSTOLIC BLOOD PRESSURE: 128 MMHG | RESPIRATION RATE: 20 BRPM | TEMPERATURE: 101 F | HEART RATE: 91 BPM | BODY MASS INDEX: 20.64 KG/M2

## 2024-01-30 VITALS
HEART RATE: 104 BPM | OXYGEN SATURATION: 99 % | TEMPERATURE: 100 F | DIASTOLIC BLOOD PRESSURE: 88 MMHG | SYSTOLIC BLOOD PRESSURE: 154 MMHG | RESPIRATION RATE: 16 BRPM

## 2024-01-30 DIAGNOSIS — C92.12 CML (CHRONIC MYELOID LEUKEMIA) IN RELAPSE: ICD-10-CM

## 2024-01-30 DIAGNOSIS — C92.10 BLAST CRISIS PHASE OF CHRONIC MYELOID LEUKEMIA: Primary | ICD-10-CM

## 2024-01-30 DIAGNOSIS — D61.818 PANCYTOPENIA WITH FEVER: ICD-10-CM

## 2024-01-30 DIAGNOSIS — I10 PRIMARY HYPERTENSION: Chronic | ICD-10-CM

## 2024-01-30 DIAGNOSIS — R50.81 PANCYTOPENIA WITH FEVER: ICD-10-CM

## 2024-01-30 DIAGNOSIS — C92.10 BLAST CRISIS PHASE OF CHRONIC MYELOID LEUKEMIA: ICD-10-CM

## 2024-01-30 DIAGNOSIS — Z76.89 ENCOUNTER TO ESTABLISH CARE: Primary | ICD-10-CM

## 2024-01-30 DIAGNOSIS — H61.23 BILATERAL IMPACTED CERUMEN: ICD-10-CM

## 2024-01-30 LAB
ABO + RH BLD: NORMAL
ABO + RH BLD: NORMAL
BLD PROD TYP BPU: NORMAL
BLD PROD TYP BPU: NORMAL
BLOOD UNIT EXPIRATION DATE: NORMAL
BLOOD UNIT EXPIRATION DATE: NORMAL
BLOOD UNIT TYPE CODE: 6200
BLOOD UNIT TYPE CODE: 7300
CROSSMATCH INTERPRETATION: NORMAL
CROSSMATCH INTERPRETATION: NORMAL
DISPENSE STATUS: NORMAL
DISPENSE STATUS: NORMAL
UNIT NUMBER: NORMAL
UNIT NUMBER: NORMAL

## 2024-01-30 PROCEDURE — 36430 TRANSFUSION BLD/BLD COMPNT: CPT

## 2024-01-30 PROCEDURE — 99215 OFFICE O/P EST HI 40 MIN: CPT | Mod: PBBFAC | Performed by: NURSE PRACTITIONER

## 2024-01-30 PROCEDURE — P9037 PLATE PHERES LEUKOREDU IRRAD: HCPCS | Performed by: NURSE PRACTITIONER

## 2024-01-30 PROCEDURE — 63600175 PHARM REV CODE 636 W HCPCS: Performed by: NURSE PRACTITIONER

## 2024-01-30 PROCEDURE — 1111F DSCHRG MED/CURRENT MED MERGE: CPT | Mod: CPTII,,, | Performed by: NURSE PRACTITIONER

## 2024-01-30 PROCEDURE — 99214 OFFICE O/P EST MOD 30 MIN: CPT | Mod: S$PBB,,, | Performed by: NURSE PRACTITIONER

## 2024-01-30 PROCEDURE — 25000003 PHARM REV CODE 250: Performed by: NURSE PRACTITIONER

## 2024-01-30 RX ORDER — HYDROCODONE BITARTRATE AND ACETAMINOPHEN 500; 5 MG/1; MG/1
TABLET ORAL ONCE
Status: ACTIVE | OUTPATIENT
Start: 2024-01-30

## 2024-01-30 RX ORDER — HYDROCODONE BITARTRATE AND ACETAMINOPHEN 500; 5 MG/1; MG/1
TABLET ORAL ONCE
Status: CANCELLED | OUTPATIENT
Start: 2024-01-30 | End: 2024-01-30

## 2024-01-30 RX ORDER — DIPHENHYDRAMINE HCL 25 MG
25 CAPSULE ORAL ONCE
Status: COMPLETED | OUTPATIENT
Start: 2024-01-30 | End: 2024-01-30

## 2024-01-30 RX ORDER — HYDROCODONE BITARTRATE AND ACETAMINOPHEN 500; 5 MG/1; MG/1
TABLET ORAL ONCE
Status: COMPLETED | OUTPATIENT
Start: 2024-01-30 | End: 2024-01-30

## 2024-01-30 RX ORDER — HYDROMORPHONE HYDROCHLORIDE 2 MG/ML
2 INJECTION, SOLUTION INTRAMUSCULAR; INTRAVENOUS; SUBCUTANEOUS
Status: COMPLETED | OUTPATIENT
Start: 2024-01-30 | End: 2024-01-30

## 2024-01-30 RX ORDER — ACETAMINOPHEN 325 MG/1
650 TABLET ORAL ONCE
Status: CANCELLED | OUTPATIENT
Start: 2024-01-30 | End: 2024-01-30

## 2024-01-30 RX ORDER — ACETAMINOPHEN 325 MG/1
650 TABLET ORAL ONCE
Status: COMPLETED | OUTPATIENT
Start: 2024-01-30 | End: 2024-01-30

## 2024-01-30 RX ORDER — DIPHENHYDRAMINE HCL 25 MG
25 CAPSULE ORAL ONCE
Status: CANCELLED | OUTPATIENT
Start: 2024-01-30 | End: 2024-01-30

## 2024-01-30 RX ADMIN — DIPHENHYDRAMINE HYDROCHLORIDE 25 MG: 25 CAPSULE ORAL at 11:01

## 2024-01-30 RX ADMIN — SODIUM CHLORIDE: 9 INJECTION, SOLUTION INTRAVENOUS at 11:01

## 2024-01-30 RX ADMIN — HYDROMORPHONE HYDROCHLORIDE 2 MG: 2 INJECTION INTRAMUSCULAR; INTRAVENOUS; SUBCUTANEOUS at 11:01

## 2024-01-30 RX ADMIN — ACETAMINOPHEN 650 MG: 325 TABLET ORAL at 11:01

## 2024-01-30 NOTE — PROGRESS NOTES
TERRI Carlisle   [facility]  [address] [facility] [address]      PATIENT NAME: Magdaleno Hirsch  : 2000  DATE: 24  MRN: [id number]      Reason for Visit / Chief Complaint: Establish Care (Patient here to establish care. Patient states can't hear out of left ear started on yesterday. Pain all over body. Decreased appetite. Fatigue.)       History of Present Illness / Problem Focused Workflow     Magdaleno Hirsch is a 23 y.o. Black or  male presents to the clinic to establish primary care. 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 Followed by [facility].     Pt presents today to establish primary care. Has not had PCP since pediatrician. Review of EMR/oncology notes state:  Patient with no real medical history who went in for a routine eye exam on 2022 to update his eye glasses prescription.  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  He had bilateral Valdez spots with vessel tortuosity.  The optometrist recommended that the patient have blood work including testing for sickle cell disease.  The patient presented to the emergency department.  CBC in the emergency department revealed a white blood cell count of 411,900. Hemoglobin was 8.3, platelets were normal at 375,000 hundred and seventy five thousand.  Differential was suggestive of CML.  Coagulation studies revealed an INR of 1.38, PTT of 36.4 and a fibrinogen of 292.  Patient was going to present to [place], however they were on diversion.  He was transferred from [facility] to [facility].  Hematology consulted.  Patient underwent bone marrow biopsy on 2022, this revealed CML, chronic phase, BCR/ABL1 positive.  Ordered dasatinib 100 mg p.o. daily as of 10/10/2022, unsure when patient started taking medication due to  him being lost to follow-up.  Patient went into blast crisis, received CLIA and ponatinib on 06/16/2023.  He had a repeat bone marrow biopsy on 08/28/2023, this had to be sent off to [facility], however the final diagnosis was persistent/recurrent myeloid neoplasm with 10-15% bone marrow blasts and 8% circulating blasts.  Started azacitidine, venetoclax, ponatinib as mentioned above on 10/09/2023.  Bone marrow biopsy done after 1 cycle done on 10/31/2023 revealed relapsed/persistent acute myeloid leukemia with 11% circulating blasts and 70-80% blasts by IHC in the bone marrow.  Was ordered on 12/28/2023.     Last oncology note on 1/22/24 states:  Patient is here today for follow up visit.  He was recently admitted in [place].  He was discharged home with hospice.  The patient's family is interested in another opinion.  I saw him today, 01/22/2024.  I had a long conversation with them stating that he has had all treatment options for his disease and there are not any good options that would likely help.  They wanted to continue with osimertinib and blood transfusions for at least the next 4 weeks and determine whether or not he could have other treatments at another facility. Oncology visit plan:    Patient was on azacitidine, venetoclax, ponatinib.  Unfortunately, it looks as though his disease is progressing.  We were going to have a bone marrow biopsy done on 12/27/2023, however the patient presented the day prior with platelets and hemoglobin requiring transfusions of both platelets and packed red blood cells.  His gums were swollen and oozing.  I discussed with Dr. Green, the patient has failed multiple lines of therapy. We will try  asciminib, however I discussed with the patient that his disease is very resistant to therapy and that there are no guarantees.  He voiced understanding.   Asciminib was ordered on 12/28/2023.  The patient was recently hospitalized in [place] in the decision was made to  discharge home with hospice.  The patient's family would like another opinion at another facility, and they would like to continue supportive transfusions in his seminal until they have that opinion.  I explained that I did agree with hospice, but I would be willing to continue to transfuse him and give asciminib for the next 4 weeks while they obtain another opinion.  I explained that if in 4 weeks, with continue asciminib, if his white blood cell count does not decrease in his blast count does not decrease, I would recommend hospice.  Continue supportive care.  Of note, I have started having goals of care discussions with the patient and his family.  We will plan for labs on Mondays and Thursdays, patient knows to call for any increased bleeding.  We will give 1 unit of platelets today and 1 unit pRBCs.  Instructed him to discontinue Hydrea and start asciminib tomorrow.  We will change his pain medication to try to improve his pain control.  We will also get nutritional supplements ensure/boost for mouth spread as he is unable to eat solid foods due to his gum swelling/bleeding.  Hydromorphone sent to pharmacy  Blood and platelet transfusion today. Repeat labs on 01/25/2024.  Twice weekly labs/PRN transfusion  Refer patient to [facility]. RTC 4 weeks.    Today, patient presents to clinic with two aunts. States appt in [place] is scheduled for Feb 6 but are unsure of the location. Pt reports worsening fatigue, continued gum swelling, and new onset of low grade temp of 100.6 which has been intermittent since Friday. Pt lethargic during visit; reports worsening pain. Pt mostly sleeping during visit. Pt also reports decreased hearing to left ear, onset yesterday. Pt reports med compliance. Pt is eating very little. Pt has oncology f/u later this morning. Does not need any refills on metoprolol. Denies chest pain, shortness of breath, cough, headache, dizziness, abdominal pain, nausea, vomiting, diarrhea,  constipation, dysuria, SI/HI.      Review of Systems     Review of Systems     See HPI for details    Constitutional: Denies Change in appetite. Denies Chills. Admits low grade Fever. Denies Night sweats.Admits Fatigue.  Eye: Denies Blurred vision. Denies Discharge. Denies Eye pain.  ENT: Admits Decreased hearing left ear. Denies Sore throat. Denies Swollen glands. Admits Gum swelling and oozing.  Respiratory: Denies Cough. Denies Shortness of breath. Denies Shortness of breath with exertion. Denies Wheezing.  Cardiovascular: DeniesChest pain at rest. Denies Chest pain with exertion. Denies Irregular heartbeat. Denies Palpitations. Denies Edema.  Gastrointestinal: Denies Abdominal pain. Denies Diarrhea. Denies Nausea. Denies Vomiting. Denies Hematemesis or Hematochezia.  Genitourinary: Denies Dysuria. Denies Urinary frequency. Denies Urinary urgency. Denies Blood in urine.  Endocrine: Denies Cold intolerance. Denies Excessive thirst. Denies Heat intolerance. Denies Weight loss. Denies Weight gain.  Musculoskeletal: Denies Painful joints. Denies Weakness.  Integumentary: Denies Rash. Denies Itching. Denies Dry skin.  Neurologic: Denies Dizziness. Denies Fainting. Denies Headache.  Psychiatric: Denies Suicidal/Homicidal ideations.  Hematologic: Admits bruises/bleeds easily.    All Other ROS: Negative except as stated in HPI.     Medical / Surgical / Social / Family History       ----------------------------  CML (chronic myelocytic leukemia)  HVD (hypertensive vascular disease)  Oropharyngeal candidiasis     Past Surgical History:   Procedure Laterality Date    BONE MARROW BIOPSY N/A 8/22/2022    Procedure: Biopsy-bone marrow;  Surgeon: Getachew Chen MD;  Location: [facility] OR;  Service: General;  Laterality: N/A;    BONE MARROW BIOPSY N/A 7/25/2023    Procedure: Biopsy-bone marrow;  Surgeon: Edi Carty MD;  Location: [facility] OR;  Service: General;  Laterality: N/A;    BONE MARROW BIOPSY N/A 8/28/2023    Procedure:  Biopsy-bone marrow;  Surgeon: Moo Pina MD;  Location: [facility] OR;  Service: General;  Laterality: N/A;    BONE MARROW BIOPSY N/A 10/31/2023    Procedure: Biopsy-bone marrow;  Surgeon: Edi Carty MD;  Location: [facility] OR;  Service: General;  Laterality: N/A;    KNEE SURGERY Left        Social History     Socioeconomic History    Marital status: Single   Tobacco Use    Smoking status: Never    Smokeless tobacco: Never   Substance and Sexual Activity    Alcohol use: Never    Drug use: Yes     Types: Marijuana     Social Determinants of Health     Financial Resource Strain: Medium Risk (1/11/2024)    Overall Financial Resource Strain (CARDIA)     Difficulty of Paying Living Expenses: Somewhat hard   Food Insecurity: Food Insecurity Present (1/11/2024)    Hunger Vital Sign     Worried About Running Out of Food in the Last Year: Sometimes true     Ran Out of Food in the Last Year: Patient declined   Transportation Needs: No Transportation Needs (1/11/2024)    PRAPARE - Transportation     Lack of Transportation (Medical): No     Lack of Transportation (Non-Medical): No   Physical Activity: Insufficiently Active (1/8/2024)    Exercise Vital Sign     Days of Exercise per Week: 2 days     Minutes of Exercise per Session: 10 min   Stress: Stress Concern Present (1/11/2024)    [employer] [place] [place] [employer] - Occupational Stress Questionnaire     Feeling of Stress : To some extent   Social Connections: Unknown (1/8/2024)    Social Connection and Isolation Panel [NHANES]     Frequency of Communication with Friends and Family: Three times a week     Frequency of Social Gatherings with Friends and Family: Twice a week     Active Member of Clubs or Organizations: No     Attends Club or Organization Meetings: Never     Marital Status: Living with partner   Housing Stability: Low Risk  (1/11/2024)    Housing Stability Vital Sign     Unable to Pay for Housing in the Last Year: No     Number of Places Lived in the  Last Year: 1     Unstable Housing in the Last Year: No        Family History   Problem Relation Age of Onset    Hypertension Maternal Grandmother     Cancer Maternal Grandmother         Medications and Allergies     Medications  Current Outpatient Medications   Medication Instructions    (Magic mouthwash) 1:1:1 diphenhydrAMINE(Benadryl) 12.5mg/5ml liq, aluminum & magnesium hydroxide-simethicone (Maalox), LIDOcaine viscous 2% 15 mLs, Swish & Spit, Every 4 hours PRN, for mouth sores    asciminib 40 mg Tab Take 5 tablets (200 mg total) by mouth twice daily. Take on an empty stomach; avoid food for at least 2 hours before and 1 hour after taking.    fentaNYL (DURAGESIC) 75 mcg/hr 1 patch, Transdermal, Every 72 hours    HYDROmorphone (DILAUDID) 4 mg, Oral, Every 6 hours PRN    hydroxyurea (HYDREA) 1,000 mg, Oral, 2 times daily, Medication is considered hazardous. Use appropriate PPE when handling. Call pharmacy with any questions.    metoprolol tartrate (LOPRESSOR) 25 mg, Oral, 2 times daily         Allergies  Review of patient's allergies indicates:  No Known Allergies    Physical Examination     /82 (BP Location: Right arm, Patient Position: Sitting, BP Method: Large (Automatic))   Pulse 91   Temp (!) 100.6 °F (38.1 °C) (Oral)   Resp 20   Ht 6' (1.829 m)   Wt 69.1 kg (152 lb 6.4 oz)   SpO2 100%   BMI 20.67 kg/m²     Physical Exam  Vitals reviewed: generalized weakness.   Constitutional:       Appearance: He is ill-appearing.   HENT:      Right Ear: There is impacted cerumen.      Left Ear: There is impacted cerumen.      Mouth/Throat:      Mouth: Mucous membranes are dry.   Cardiovascular:      Rate and Rhythm: Tachycardia present.   Neurological:      Mental Status: He is alert.         General: Alert and oriented  Head: Normocephalic, Atraumatic.  Eye: Pupils are equal, round and reactive to light, Extraocular movements are intact, Sclera non-icteric.  Ears/Nose/Throat: Swollen gums  Neck/Thyroid:  Supple, Non-tender, No carotid bruit, No lymphadenopathy, No JVD, Full range of motion.  Respiratory: Clear to auscultation bilaterally; No wheezes, rales or rhonchi,Non-labored respirations, Symmetrical chest wall expansion.  Cardiovascular: Regular rate and rhythm, S1/S2 normal, No murmurs, rubs or gallops.  Gastrointestinal: Soft, Non-tender, Non-distended, Normal bowel sounds, No palpable organomegaly.  Musculoskeletal: Normal range of motion.  Integumentary: Warm, Dry, Intact, No suspicious lesions or rashes.  Extremities: No clubbing, cyanosis or edema  Neurologic: No focal deficits, Cranial Nerves II-XII are grossly intact, Motor strength normal upper and lower extremities, Sensory exam intact.  Psychiatric: Normal interaction, Coherent speech, Appropriate affect       Results     Lab Results   Component Value Date    .45 (HH) 01/30/2024    HGB 5.6 (LL) 01/30/2024    HCT 16.0 (LL) 01/30/2024    PLT 5 (LL) 01/30/2024    ALT 49 01/22/2024    AST 41 (H) 01/22/2024     01/22/2024    K 3.6 01/22/2024     01/17/2024    CREATININE 0.93 01/22/2024    BUN 6.7 (L) 01/22/2024    CO2 26 01/22/2024    INR 1.2 01/17/2024         Assessment and Plan (including Health Maintenance)     Plan:     1. Encounter to establish care  EMR reviewed.    2. Primary hypertension  At goal.  Continue metoprolol; does not need refills..      3. CML (chronic myeloid leukemia) in relapse  Has oncology appt this morning.  Informed to notify of oncology of intermittent low grade fever.  Understanding voiced.  Continue meds as prescribed.  Reports worsening pain, fatigue and decreased appetite.  Continue pain meds as prescribed.    4. Bilateral cerumen impaction  Purchase OTC debrox or ear wax irrigation kit.    Problem List Items Addressed This Visit          ENT    Bilateral impacted cerumen       Cardiac/Vascular    Hypertension (Chronic)       Oncology    CML (chronic myeloid leukemia) in relapse       Other    Encounter  to establish care - Primary         Health Maintenance Due   Topic Date Due    Lipid Panel  Never done    COVID-19 Vaccine (1) Never done    Pneumococcal Vaccines (Age 0-64) (1 of 2 - PCV) 06/23/2004    HPV Vaccines (3 - Risk male 3-dose series) 03/16/2016    TETANUS VACCINE  Never done    Influenza Vaccine (1) 09/01/2023       Follow up in about 6 months (around 7/30/2024) for Follow up.        Signature:  TERRI Carlisle  [facility]  [address] [facility] [address]

## 2024-01-31 ENCOUNTER — TELEPHONE (OUTPATIENT)
Dept: HEMATOLOGY/ONCOLOGY | Facility: CLINIC | Age: 24
End: 2024-01-31
Payer: MEDICAID

## 2024-01-31 ENCOUNTER — OFFICE VISIT (OUTPATIENT)
Dept: URGENT CARE | Facility: CLINIC | Age: 24
End: 2024-01-31
Payer: MEDICAID

## 2024-01-31 VITALS
RESPIRATION RATE: 16 BRPM | HEART RATE: 127 BPM | SYSTOLIC BLOOD PRESSURE: 143 MMHG | BODY MASS INDEX: 20.68 KG/M2 | OXYGEN SATURATION: 99 % | DIASTOLIC BLOOD PRESSURE: 92 MMHG | TEMPERATURE: 100 F | HEIGHT: 72 IN | WEIGHT: 152.69 LBS

## 2024-01-31 DIAGNOSIS — C92.10 CML (CHRONIC MYELOCYTIC LEUKEMIA): ICD-10-CM

## 2024-01-31 DIAGNOSIS — I10 HYPERTENSION, UNSPECIFIED TYPE: ICD-10-CM

## 2024-01-31 DIAGNOSIS — Z53.29 LEFT AGAINST MEDICAL ADVICE: ICD-10-CM

## 2024-01-31 DIAGNOSIS — H61.23 BILATERAL IMPACTED CERUMEN: ICD-10-CM

## 2024-01-31 DIAGNOSIS — R82.90 ABNORMAL URINALYSIS: ICD-10-CM

## 2024-01-31 DIAGNOSIS — R50.9 FEVER, UNSPECIFIED FEVER CAUSE: Primary | ICD-10-CM

## 2024-01-31 LAB
BILIRUB UR QL STRIP: POSITIVE
CTP QC/QA: YES
GLUCOSE UR QL STRIP: NEGATIVE
KETONES UR QL STRIP: POSITIVE
LEUKOCYTE ESTERASE UR QL STRIP: NEGATIVE
MOLECULAR STREP A: NEGATIVE
PH, POC UA: 6.5
POC BLOOD, URINE: POSITIVE
POC MOLECULAR INFLUENZA A AGN: NEGATIVE
POC MOLECULAR INFLUENZA B AGN: NEGATIVE
POC NITRATES, URINE: NEGATIVE
PROT UR QL STRIP: POSITIVE
SARS-COV-2 RDRP RESP QL NAA+PROBE: NEGATIVE
SP GR UR STRIP: 1.01 (ref 1–1.03)
UROBILINOGEN UR STRIP-ACNC: ABNORMAL (ref 0.3–2.2)

## 2024-01-31 PROCEDURE — 1111F DSCHRG MED/CURRENT MED MERGE: CPT | Mod: CPTII,,,

## 2024-01-31 PROCEDURE — 87651 STREP A DNA AMP PROBE: CPT | Mod: PBBFAC

## 2024-01-31 PROCEDURE — 81003 URINALYSIS AUTO W/O SCOPE: CPT | Mod: PBBFAC

## 2024-01-31 PROCEDURE — 87635 SARS-COV-2 COVID-19 AMP PRB: CPT | Mod: PBBFAC

## 2024-01-31 PROCEDURE — 87086 URINE CULTURE/COLONY COUNT: CPT

## 2024-01-31 PROCEDURE — 25000003 PHARM REV CODE 250

## 2024-01-31 PROCEDURE — 87502 INFLUENZA DNA AMP PROBE: CPT | Mod: PBBFAC

## 2024-01-31 PROCEDURE — 99215 OFFICE O/P EST HI 40 MIN: CPT | Mod: PBBFAC

## 2024-01-31 PROCEDURE — 99215 OFFICE O/P EST HI 40 MIN: CPT | Mod: S$PBB,,,

## 2024-01-31 RX ORDER — ACETAMINOPHEN 325 MG/1
650 TABLET ORAL
Status: COMPLETED | OUTPATIENT
Start: 2024-01-31 | End: 2024-01-31

## 2024-01-31 RX ADMIN — ACETAMINOPHEN 650 MG: 325 TABLET, FILM COATED ORAL at 11:01

## 2024-01-31 NOTE — TELEPHONE ENCOUNTER
Kristyn with [facility]iative [facility] of [facility] just wanted to report that patient's is running a temp of 102.0 with a heart rate of 125. The nurse practitioner is aware that this is more than likely from the leukemia, but is concerned about patient becoming septic. States patient informed her that he has been having an elevated temp for the past week. Any recommendations?

## 2024-01-31 NOTE — PROGRESS NOTES
Subjective:      Patient ID: Magdaleno Hirsch is a 23 y.o. male.    Vitals:  height is 6' (1.829 m) and weight is 69.3 kg (152 lb 11.2 oz). His temperature is 100.1 °F (37.8 °C). His blood pressure is 143/92 (abnormal) and his pulse is 127 (abnormal). His respiration is 16 and oxygen saturation is 99%.     Chief Complaint: Ear Fullness (X 1 week.)    Cc as above.  Present to the clinic for ear irrigation.  He was seen by PCP yesterday and started taking over-the-counter eardrops yesterday, no relief. Denies any URI symptoms. He has been running fever for the last week. He is under the care of palliative care. Mom states last chemotherapy was in November. He received blood and platelets yesterday at the oncology clinic.     Ear Fullness   Pertinent negatives include no abdominal pain, coughing, sore throat or vomiting.   Fever   This is a new problem. The current episode started 1 to 4 weeks ago. The problem occurs constantly. The problem has been unchanged. The maximum temperature noted was 102 to 102.9 F. The temperature was taken using an oral thermometer. Associated symptoms include muscle aches. Pertinent negatives include no abdominal pain, chest pain, congestion, coughing, ear pain, nausea, sore throat, urinary pain, vomiting or wheezing. Associated symptoms comments: Back pain d/t sitting in the wheelchair. He has tried acetaminophen for the symptoms. The treatment provided mild relief.   Risk factors: hx of cancer        Constitution: Positive for fever.   HENT:  Negative for ear pain, congestion and sore throat.    Cardiovascular:  Negative for chest pain.   Respiratory:  Negative for cough and wheezing.    Gastrointestinal:  Negative for abdominal pain, nausea and vomiting.   Genitourinary:  Negative for dysuria.      Objective:     Physical Exam   Constitutional: He is oriented to person, place, and time. He is cooperative.  Non-toxic appearance. He appears ill. No distress. underweightawake  HENT:   Head:  Normocephalic and atraumatic.   Ears:   Right Ear: Tympanic membrane normal. There is cerumen present. impacted cerumen  Left Ear: Tympanic membrane normal. There is cerumen present. Decreased hearing is noted. impacted cerumen  Nose: Nose normal.   Mouth/Throat: Mucous membranes are normal. Mucous membranes are moist. Oral lesions present. Oropharyngeal exudate and posterior oropharyngeal erythema present.   Eyes: Conjunctivae are normal.   Neck: Neck supple.   Cardiovascular: Regular rhythm, normal heart sounds and normal pulses. Tachycardia present.   Pulmonary/Chest: Effort normal and breath sounds normal. No respiratory distress. He has no wheezes. He has no rhonchi.   Musculoskeletal: Normal range of motion.         General: Normal range of motion.   Neurological: no focal deficit. He is oriented to person, place, and time.   Skin: Skin is warm, dry and not diaphoretic.   Psychiatric: His behavior is normal. Mood normal.   Nursing note and vitals reviewed.Chaperone present: mom present.         Assessment:     1. Fever, unspecified fever cause    2. Abnormal urinalysis    3. Bilateral impacted cerumen    4. CML (chronic myelocytic leukemia)    5. Hypertension, unspecified type    6. Left against medical advice      Results for orders placed or performed in visit on 01/31/24   POCT COVID-19 Rapid Screening   Result Value Ref Range    POC Rapid COVID Negative Negative     Acceptable Yes    POCT Influenza A/B Molecular   Result Value Ref Range    POC Molecular Influenza A Ag Negative Negative, Not Reported    POC Molecular Influenza B Ag Negative Negative, Not Reported     Acceptable Yes    POCT Strep A, Molecular   Result Value Ref Range    Molecular Strep A, POC Negative Negative     Acceptable Yes    POCT Urinalysis, Dipstick, Automated, W/O Scope   Result Value Ref Range    POC Blood, Urine Positive (A) Negative    POC Bilirubin, Urine Positive (A) Negative    POC  Urobilinogen, Urine >=8.0e.u./dl 0.3 - 2.2    POC Ketones, Urine Positive (A) Negative    POC Protein, Urine Positive (A) Negative    POC Nitrates, Urine Negative Negative    POC Glucose, Urine Negative Negative    pH, UA 6.5     POC Specific Gravity, Urine 1.015 1.003 - 1.029    POC Leukocytes, Urine Negative Negative       Plan:       Fever, unspecified fever cause  -     POCT COVID-19 Rapid Screening  -     POCT Influenza A/B Molecular  -     POCT Strep A, Molecular  -     POCT Urinalysis, Dipstick, Automated, W/O Scope  -     acetaminophen tablet 650 mg  -     Refer to Emergency Dept.    Abnormal urinalysis  -     Urine culture    Bilateral impacted cerumen  -     carbamide peroxide (DEBROX) 6.5 % otic solution; Place 5 drops into both ears 2 (two) times daily. for 4 days  Dispense: 15 mL; Refill: 0    CML (chronic myelocytic leukemia)  -     Refer to Emergency Dept.    Hypertension, unspecified type    Left against medical advice  -     Refer to Emergency Dept.    He was being transferred to ER for higher level of care. I discussed with patient and mom that he needed additional blood work and possible treatment (he needs to be rule out for possible sepsis). I discussed the concern that I had with his ongoing fever and previous abnormal blood work. He refused to go. He signed AMA paperwork and left per wheelchair with mother.

## 2024-02-01 ENCOUNTER — INFUSION (OUTPATIENT)
Dept: INFUSION THERAPY | Facility: HOSPITAL | Age: 24
End: 2024-02-01
Attending: NURSE PRACTITIONER
Payer: MEDICAID

## 2024-02-01 ENCOUNTER — TELEPHONE (OUTPATIENT)
Dept: HEMATOLOGY/ONCOLOGY | Facility: CLINIC | Age: 24
End: 2024-02-01
Payer: MEDICAID

## 2024-02-01 VITALS
OXYGEN SATURATION: 98 % | RESPIRATION RATE: 16 BRPM | SYSTOLIC BLOOD PRESSURE: 126 MMHG | TEMPERATURE: 101 F | HEART RATE: 109 BPM | DIASTOLIC BLOOD PRESSURE: 75 MMHG

## 2024-02-01 DIAGNOSIS — C92.12 CML (CHRONIC MYELOID LEUKEMIA) IN RELAPSE: Primary | ICD-10-CM

## 2024-02-01 DIAGNOSIS — C92.12 CML (CHRONIC MYELOID LEUKEMIA) IN RELAPSE: ICD-10-CM

## 2024-02-01 DIAGNOSIS — E87.6 HYPOKALEMIA: Primary | ICD-10-CM

## 2024-02-01 LAB
ABO + RH BLD: NORMAL
ABO + RH BLD: NORMAL
BLD PROD TYP BPU: NORMAL
BLD PROD TYP BPU: NORMAL
BLOOD UNIT EXPIRATION DATE: NORMAL
BLOOD UNIT EXPIRATION DATE: NORMAL
BLOOD UNIT TYPE CODE: 6200
BLOOD UNIT TYPE CODE: 9500
CROSSMATCH INTERPRETATION: NORMAL
CROSSMATCH INTERPRETATION: NORMAL
DISPENSE STATUS: NORMAL
DISPENSE STATUS: NORMAL
UNIT NUMBER: NORMAL
UNIT NUMBER: NORMAL

## 2024-02-01 PROCEDURE — 96375 TX/PRO/DX INJ NEW DRUG ADDON: CPT

## 2024-02-01 PROCEDURE — 63600175 PHARM REV CODE 636 W HCPCS: Performed by: INTERNAL MEDICINE

## 2024-02-01 PROCEDURE — 25000003 PHARM REV CODE 250: Performed by: INTERNAL MEDICINE

## 2024-02-01 PROCEDURE — 96374 THER/PROPH/DIAG INJ IV PUSH: CPT

## 2024-02-01 PROCEDURE — 36430 TRANSFUSION BLD/BLD COMPNT: CPT

## 2024-02-01 PROCEDURE — P9037 PLATE PHERES LEUKOREDU IRRAD: HCPCS | Performed by: INTERNAL MEDICINE

## 2024-02-01 RX ORDER — DIPHENHYDRAMINE HYDROCHLORIDE 50 MG/ML
25 INJECTION INTRAMUSCULAR; INTRAVENOUS ONCE
Status: CANCELLED | OUTPATIENT
Start: 2024-02-01

## 2024-02-01 RX ORDER — POTASSIUM CHLORIDE 750 MG/1
10 CAPSULE, EXTENDED RELEASE ORAL 2 TIMES DAILY
Qty: 60 CAPSULE | Refills: 1 | Status: SHIPPED | OUTPATIENT
Start: 2024-02-01 | End: 2024-04-01

## 2024-02-01 RX ORDER — HYDROCODONE BITARTRATE AND ACETAMINOPHEN 500; 5 MG/1; MG/1
TABLET ORAL ONCE
Status: ACTIVE | OUTPATIENT
Start: 2024-02-01

## 2024-02-01 RX ORDER — ACETAMINOPHEN 325 MG/1
650 TABLET ORAL ONCE
Status: COMPLETED | OUTPATIENT
Start: 2024-02-01 | End: 2024-02-01

## 2024-02-01 RX ORDER — HYDROCODONE BITARTRATE AND ACETAMINOPHEN 500; 5 MG/1; MG/1
TABLET ORAL ONCE
Status: COMPLETED | OUTPATIENT
Start: 2024-02-01 | End: 2024-02-01

## 2024-02-01 RX ORDER — HYDROCODONE BITARTRATE AND ACETAMINOPHEN 500; 5 MG/1; MG/1
TABLET ORAL ONCE
Status: CANCELLED | OUTPATIENT
Start: 2024-02-01 | End: 2024-02-01

## 2024-02-01 RX ORDER — DIPHENHYDRAMINE HYDROCHLORIDE 50 MG/ML
25 INJECTION INTRAMUSCULAR; INTRAVENOUS ONCE
Status: COMPLETED | OUTPATIENT
Start: 2024-02-01 | End: 2024-02-01

## 2024-02-01 RX ORDER — ACETAMINOPHEN 325 MG/1
650 TABLET ORAL ONCE
Status: CANCELLED | OUTPATIENT
Start: 2024-02-01

## 2024-02-01 RX ORDER — HYDROCODONE BITARTRATE AND ACETAMINOPHEN 500; 5 MG/1; MG/1
TABLET ORAL ONCE
Status: COMPLETED | OUTPATIENT
Start: 2024-02-01 | End: 2024-02-05

## 2024-02-01 RX ADMIN — SODIUM CHLORIDE: 9 INJECTION, SOLUTION INTRAVENOUS at 09:02

## 2024-02-01 RX ADMIN — ACETAMINOPHEN 650 MG: 325 TABLET ORAL at 09:02

## 2024-02-01 RX ADMIN — DIPHENHYDRAMINE HYDROCHLORIDE 25 MG: 50 INJECTION INTRAMUSCULAR; INTRAVENOUS at 10:02

## 2024-02-02 LAB — BACTERIA UR CULT: NO GROWTH

## 2024-02-03 LAB — RBCS: NORMAL

## 2024-02-05 ENCOUNTER — INFUSION (OUTPATIENT)
Dept: INFUSION THERAPY | Facility: HOSPITAL | Age: 24
End: 2024-02-05
Attending: NURSE PRACTITIONER
Payer: MEDICAID

## 2024-02-05 VITALS
RESPIRATION RATE: 16 BRPM | SYSTOLIC BLOOD PRESSURE: 117 MMHG | HEART RATE: 113 BPM | TEMPERATURE: 99 F | DIASTOLIC BLOOD PRESSURE: 78 MMHG | OXYGEN SATURATION: 97 %

## 2024-02-05 DIAGNOSIS — C92.12 CML (CHRONIC MYELOID LEUKEMIA) IN RELAPSE: Primary | ICD-10-CM

## 2024-02-05 DIAGNOSIS — C92.10 BLAST CRISIS PHASE OF CHRONIC MYELOID LEUKEMIA: ICD-10-CM

## 2024-02-05 DIAGNOSIS — D63.0 ANEMIA IN NEOPLASTIC DISEASE: ICD-10-CM

## 2024-02-05 PROCEDURE — 36430 TRANSFUSION BLD/BLD COMPNT: CPT

## 2024-02-05 PROCEDURE — 25000003 PHARM REV CODE 250: Performed by: INTERNAL MEDICINE

## 2024-02-05 PROCEDURE — 96374 THER/PROPH/DIAG INJ IV PUSH: CPT

## 2024-02-05 PROCEDURE — 63600175 PHARM REV CODE 636 W HCPCS: Performed by: INTERNAL MEDICINE

## 2024-02-05 PROCEDURE — P9037 PLATE PHERES LEUKOREDU IRRAD: HCPCS | Performed by: NURSE PRACTITIONER

## 2024-02-05 PROCEDURE — 25000003 PHARM REV CODE 250: Performed by: NURSE PRACTITIONER

## 2024-02-05 RX ORDER — HYDROCODONE BITARTRATE AND ACETAMINOPHEN 500; 5 MG/1; MG/1
TABLET ORAL
Status: ACTIVE | OUTPATIENT
Start: 2024-02-05

## 2024-02-05 RX ORDER — DIPHENHYDRAMINE HCL 25 MG
25 CAPSULE ORAL ONCE
Status: COMPLETED | OUTPATIENT
Start: 2024-02-05 | End: 2024-02-05

## 2024-02-05 RX ORDER — HYDROMORPHONE HYDROCHLORIDE 2 MG/ML
2 INJECTION, SOLUTION INTRAMUSCULAR; INTRAVENOUS; SUBCUTANEOUS ONCE
Status: COMPLETED | OUTPATIENT
Start: 2024-02-05 | End: 2024-02-05

## 2024-02-05 RX ORDER — ACETAMINOPHEN 325 MG/1
650 TABLET ORAL ONCE
Status: COMPLETED | OUTPATIENT
Start: 2024-02-05 | End: 2024-02-05

## 2024-02-05 RX ADMIN — DIPHENHYDRAMINE HYDROCHLORIDE 25 MG: 25 CAPSULE ORAL at 11:02

## 2024-02-05 RX ADMIN — SODIUM CHLORIDE: 9 INJECTION, SOLUTION INTRAVENOUS at 09:02

## 2024-02-05 RX ADMIN — ACETAMINOPHEN 650 MG: 325 TABLET ORAL at 11:02

## 2024-02-05 RX ADMIN — HYDROMORPHONE HYDROCHLORIDE 2 MG: 2 INJECTION INTRAMUSCULAR; INTRAVENOUS; SUBCUTANEOUS at 10:02

## 2024-02-05 NOTE — PLAN OF CARE
Plan of care reviewed with patient; patient and mother in agreement. 2 units PLTs, 1 unit PRBCs ordered; 1 unit PLTs and 1 unit PRBCs transfused due to no additional units of platelets available (MD aware and OK'ed). Appts reviewed; patient uses portal.

## 2024-02-08 ENCOUNTER — TELEPHONE (OUTPATIENT)
Dept: INFUSION THERAPY | Facility: HOSPITAL | Age: 24
End: 2024-02-08
Payer: MEDICAID

## 2024-02-12 ENCOUNTER — LAB VISIT (OUTPATIENT)
Dept: LAB | Facility: HOSPITAL | Age: 24
End: 2024-02-12
Attending: INTERNAL MEDICINE
Payer: MEDICAID

## 2024-02-12 ENCOUNTER — TELEPHONE (OUTPATIENT)
Dept: HEMATOLOGY/ONCOLOGY | Facility: CLINIC | Age: 24
End: 2024-02-12
Payer: MEDICAID

## 2024-02-12 DIAGNOSIS — C92.10 BLAST CRISIS PHASE OF CHRONIC MYELOID LEUKEMIA: ICD-10-CM

## 2024-02-12 DIAGNOSIS — C92.10 CML (CHRONIC MYELOCYTIC LEUKEMIA): ICD-10-CM

## 2024-02-12 LAB
ALBUMIN SERPL-MCNC: 2.2 G/DL (ref 3.5–5)
ALBUMIN/GLOB SERPL: 0.7 RATIO (ref 1.1–2)
ALP SERPL-CCNC: 179 UNIT/L (ref 40–150)
ALT SERPL-CCNC: 40 UNIT/L (ref 0–55)
AST SERPL-CCNC: 85 UNIT/L (ref 5–34)
BASOPHILS # BLD AUTO: 0.24 X10(3)/MCL
BASOPHILS NFR BLD AUTO: 0.1 %
BILIRUB SERPL-MCNC: 1.9 MG/DL
BUN SERPL-MCNC: 12.6 MG/DL (ref 8.9–20.6)
CALCIUM SERPL-MCNC: 7.9 MG/DL (ref 8.4–10.2)
CHLORIDE SERPL-SCNC: 104 MMOL/L (ref 98–107)
CO2 SERPL-SCNC: 20 MMOL/L (ref 22–29)
CREAT SERPL-MCNC: 0.78 MG/DL (ref 0.73–1.18)
EOSINOPHIL # BLD AUTO: 0 X10(3)/MCL (ref 0–0.9)
EOSINOPHIL NFR BLD AUTO: 0 %
ERYTHROCYTE [DISTWIDTH] IN BLOOD BY AUTOMATED COUNT: 17.3 % (ref 11.5–17)
GFR SERPLBLD CREATININE-BSD FMLA CKD-EPI: >60 MLS/MIN/1.73/M2
GLOBULIN SER-MCNC: 3 GM/DL (ref 2.4–3.5)
GLUCOSE SERPL-MCNC: 93 MG/DL (ref 74–100)
HCT VFR BLD AUTO: 21.6 % (ref 42–52)
HGB BLD-MCNC: 7.5 G/DL (ref 14–18)
IMM GRANULOCYTES # BLD AUTO: 1.35 X10(3)/MCL (ref 0–0.04)
IMM GRANULOCYTES NFR BLD AUTO: 0.5 %
LYMPHOCYTES # BLD AUTO: 26.87 X10(3)/MCL (ref 0.6–4.6)
LYMPHOCYTES NFR BLD AUTO: 9.5 %
MCH RBC QN AUTO: 29.4 PG (ref 27–31)
MCHC RBC AUTO-ENTMCNC: 34.7 G/DL (ref 33–36)
MCV RBC AUTO: 84.7 FL (ref 80–94)
MONOCYTES # BLD AUTO: 254.88 X10(3)/MCL (ref 0.1–1.3)
MONOCYTES NFR BLD AUTO: 89.8 %
NEUTROPHILS # BLD AUTO: 0.61 X10(3)/MCL (ref 2.1–9.2)
NEUTROPHILS NFR BLD AUTO: 0.1 %
PLATELET # BLD AUTO: 7 X10(3)/MCL (ref 130–400)
PMV BLD AUTO: ABNORMAL FL
POTASSIUM SERPL-SCNC: 1.9 MMOL/L (ref 3.5–5.1)
PROT SERPL-MCNC: 5.2 GM/DL (ref 6.4–8.3)
RBC # BLD AUTO: 2.55 X10(6)/MCL (ref 4.7–6.1)
SODIUM SERPL-SCNC: 135 MMOL/L (ref 136–145)
WBC # SPEC AUTO: 283.95 X10(3)/MCL (ref 4.5–11.5)

## 2024-02-12 PROCEDURE — 80053 COMPREHEN METABOLIC PANEL: CPT

## 2024-02-12 PROCEDURE — 85025 COMPLETE CBC W/AUTO DIFF WBC: CPT

## 2024-02-12 PROCEDURE — 36415 COLL VENOUS BLD VENIPUNCTURE: CPT

## 2024-02-12 NOTE — TELEPHONE ENCOUNTER
I was notified by staff that patient is under hospice care. Spoke with Pat at [facility] and left message that patient came in for lab today. Has critical potassium of 1.9. She will report to the on call nurse. I also contacted Dr. Gallardo, who is currently on call, to notify her that I left message for on call nurse regarding these critical results.

## 2024-02-14 ENCOUNTER — TELEPHONE (OUTPATIENT)
Dept: OTOLARYNGOLOGY | Facility: CLINIC | Age: 24
End: 2024-02-14
Payer: MEDICAID

## 2024-02-14 NOTE — TELEPHONE ENCOUNTER
----- Message from Fabienne Lundy sent at 2/14/2024  8:48 AM CST -----  LULÚ FROM [facility] [phone] PHONED AND IS REQUESTING AN APPT FOR SAID PATIENT FOR IMPACTED EAR.  PT HAS LEUKEMIA AND IS UNDERGOING TREATMENT BUT NO POSSIBILITY OF REMISSION.  ASKING FOR A CALL BACK PLEASE    Was able to schedule pt today

## 2024-07-19 ENCOUNTER — TELEPHONE (OUTPATIENT)
Dept: INTERNAL MEDICINE | Facility: CLINIC | Age: 24
End: 2024-07-19
Payer: MEDICAID

## 2025-04-27 NOTE — ASSESSMENT & PLAN NOTE
- Functionally neutropenic despite leukocytosis, given low neutrophil count  - 2/2 disease and hydrea  - Daily CBC while inpatient  - Transfuse for hgb < 7 or plts < 10K  - Started antimicrobial ppx with acyclovir and fluconazole and contining Zosyn and doxy for neutropenic fever.   impaired balance/impaired coordination/impaired postural control/decreased strength